# Patient Record
Sex: FEMALE | Race: WHITE | NOT HISPANIC OR LATINO | Employment: UNEMPLOYED | URBAN - METROPOLITAN AREA
[De-identification: names, ages, dates, MRNs, and addresses within clinical notes are randomized per-mention and may not be internally consistent; named-entity substitution may affect disease eponyms.]

---

## 2018-07-31 RX ORDER — IBUPROFEN 200 MG
200 TABLET ORAL EVERY 6 HOURS PRN
COMMUNITY
End: 2023-03-17 | Stop reason: HOSPADM

## 2018-07-31 RX ORDER — LAMOTRIGINE 100 MG/1
100 TABLET ORAL 2 TIMES DAILY
Status: ON HOLD | COMMUNITY

## 2018-07-31 RX ORDER — ACETAMINOPHEN 325 MG/1
650 TABLET ORAL EVERY 6 HOURS PRN
COMMUNITY
End: 2023-03-17 | Stop reason: HOSPADM

## 2018-08-23 ENCOUNTER — OFFICE VISIT (OUTPATIENT)
Dept: NEUROSURGERY | Facility: CLINIC | Age: 53
End: 2018-08-23

## 2018-08-23 VITALS
WEIGHT: 122 LBS | BODY MASS INDEX: 21.62 KG/M2 | RESPIRATION RATE: 18 BRPM | HEART RATE: 73 BPM | HEIGHT: 63 IN | SYSTOLIC BLOOD PRESSURE: 148 MMHG | DIASTOLIC BLOOD PRESSURE: 90 MMHG

## 2018-08-23 DIAGNOSIS — M54.9 UPPER BACK PAIN: ICD-10-CM

## 2018-08-23 DIAGNOSIS — G89.29 NECK PAIN, CHRONIC: Primary | ICD-10-CM

## 2018-08-23 DIAGNOSIS — M54.2 NECK PAIN, CHRONIC: Primary | ICD-10-CM

## 2018-08-23 PROCEDURE — 99205 OFFICE O/P NEW HI 60 MIN: CPT | Performed by: NURSE PRACTITIONER

## 2018-08-23 NOTE — PROGRESS NOTES
"Subjective   Patient ID: Louise Stoddard is a 53 y.o. female is being seen for consultation today at the request of KASIA Martines for neck pain s/p C5/6 C6/7 ACDF 11/06/2013. Patient presents unaccompanied.    History of Present Illness     She presents to the office today at the request of her primary care provider for neck pain.  She is status post C5 6 and C6 7 ACDF with C6 corpectomy in November 2013 with Dr. Tejeda.  She has had a CT imaging of the cervical spine in August 2016.    Today, she reports that she has had long-standing mid and upper back pain that started shortly after her neck surgery in 2013.  She was admitted to [facility] in May for pyelonephritis and sepsis.  She reports that she had a right IJ central line placed that caused her to have significant neck pain for a few months following hospital discharge.  Just recently, the right-sided neck pain has started to improve.  She states that she was told by Dr. Tejeda after her neck surgery that she would potentially need more surgery secondary to significant arthritis throughout her cervical spine and upper back.  Due to the ongoing mid and upper back pain that she has had, she is concerned that this is related to the arthritis that he previously addressed.    She reports that pain is constant and on average is between a 7 to an 8 out of 10.  She is hesitant to try physical therapy as she is concerned that it will make her symptoms worse.  Pain is around the bra line and radiates bilaterally.  It does not radiate anteriorly to her chest.  She has very mild chronic weakness in the right arm and has been present since her neck surgery.  She denies any pain, numbness or tingling in her other extremities.  She continues to feel \"weak all over\" following prolonged hospitalization a few months ago.    Her biggest concern is that prior to being hospitalized for sepsis, she had a fall at home causing her to hit her head on her dining room " "table.  Following that, she has had some worsening neck discomfort and she is afraid that this has worsened any underlying arthritis or stenosis in her neck.    She treats the pain with over-the-counter anti-inflammatories.  She states it is starting to get debilitating to the point where she has difficulty lifting her young grandchildren.    She presents unaccompanied.      /90 (BP Location: Right arm, Patient Position: Sitting)   Pulse 73   Resp 18   Ht 158.8 cm (62.5\")   Wt 55.3 kg (122 lb)   BMI 21.96 kg/m²     The following portions of the patient's history were reviewed and updated as appropriate: allergies, current medications, past family history, past medical history, past social history, past surgical history and problem list.    Review of Systems   Constitutional: Positive for chills (occ'l). Negative for fever.   HENT: Negative for trouble swallowing.    Eyes: Negative for visual disturbance.   Respiratory: Negative for cough, shortness of breath and wheezing.    Cardiovascular: Negative for chest pain and palpitations.   Gastrointestinal: Negative for abdominal pain, nausea and vomiting.   Genitourinary: Positive for enuresis (stress incontinence, unchanged). Negative for difficulty urinating.   Musculoskeletal: Positive for back pain, gait problem and neck pain.        Denies arm or leg pain   Skin: Negative for rash.   Neurological: Positive for weakness (right side). Negative for numbness.   Psychiatric/Behavioral: Positive for sleep disturbance.       Objective   Physical Exam   Constitutional: She is oriented to person, place, and time. She appears well-developed and well-nourished. She is cooperative.   Very pleasant, middle-aged, petite statured female   HENT:   Head: Atraumatic.   Eyes: EOM are normal.   Neck: Neck supple.   Cardiovascular: Intact distal pulses.    Pulmonary/Chest: Effort normal.   Abdominal: Soft.   Musculoskeletal: Normal range of motion. She exhibits tenderness " (Very tender to minimal palpation bilateral paraspinal upper thoracic spine just above the brow line, also tenderness bilateral lower neck and upper back). She exhibits no edema or deformity.        Cervical back: She exhibits tenderness and pain. She exhibits normal range of motion.        Thoracic back: She exhibits tenderness and pain. She exhibits normal range of motion.   Strength equal bilateral upper and lower extremities, normal muscle tone and bulk  Full cervical range of motion, pain with extension   Neurological: She is alert and oriented to person, place, and time. She has normal strength. She displays normal reflexes. No cranial nerve deficit or sensory deficit. She exhibits normal muscle tone. Coordination and gait normal. GCS eye subscore is 4. GCS verbal subscore is 5. GCS motor subscore is 6.   Reflex Scores:       Tricep reflexes are 2+ on the right side and 2+ on the left side.       Bicep reflexes are 2+ on the right side and 2+ on the left side.       Brachioradialis reflexes are 2+ on the right side and 2+ on the left side.       Patellar reflexes are 2+ on the right side and 2+ on the left side.       Achilles reflexes are 2+ on the right side and 2+ on the left side.  Sensory intact to soft touch, temperature and vibration bilateral upper and lower extremities  Normal proprioception throughout  Gait is stable and upright  Able to heel and toe walk, able to tandem   normal deep tendon reflexes  Negative Esquivel's, negative clonus   Skin: Skin is warm and dry.   Psychiatric: She has a normal mood and affect. Her behavior is normal. Thought content normal.   Vitals reviewed.    Neurologic Exam     Mental Status   Oriented to person, place, and time.     Cranial Nerves     CN III, IV, VI   Extraocular motions are normal.     Motor Exam     Strength   Strength 5/5 throughout.     Gait, Coordination, and Reflexes     Reflexes   Right brachioradialis: 2+  Left brachioradialis: 2+  Right biceps:  2+  Left biceps: 2+  Right triceps: 2+  Left triceps: 2+  Right patellar: 2+  Left patellar: 2+  Right achilles: 2+  Left achilles: 2+      Assessment/Plan   Independent Review of Radiographic Studies:    CT cervical imaging from [facility] dated August 27, 2016 reveals straightening have the normal lordosis with degenerative changes seen at multiple levels.    Prior hospital records reviewed      Medical Decision Making:    She presents the office today for further evaluation of chronic mid and upper back pain as well as neck pain.  Exam as noted above, no red flags.  She is a prior patient of Dr. Tejeda and is status post 2 level cervical decompression and fusion in 2013.  At that time, she had severe stenosis.  She had a fall a couple of months ago prior to being hospitalized for sepsis stating that she had her head on a table.  Following this episode, her neck and upper back pain have worsened.  She reports that thoracic pain has been present for a few years that has progressively worsened.    Thoracic CT imaging from [facility] reveals some degenerative changes at multiple levels.  Given her history of severe cervical stenosis as well as her reports acute on chronic neck and upper back pain, I have ordered MRI imaging of the thoracic and cervical spine for further evaluation.  I've also ordered physical therapy to help work out any of her pain that may be muscular in origin.  She was given no new medications today.  We will plan on seeing her back in the office once imaging studies have been completed.  She is requesting to have this done here at [facility].    Plan: MRI imaging of the cervical and thoracic spine, referral to physical therapy, return to office following  Louise was seen today for neck pain.    Diagnoses and all orders for this visit:    Neck pain, chronic  -     MRI Cervical Spine With & Without Contrast; Future  -     MRI Thoracic Spine Without Contrast;  Future  -     Ambulatory Referral to Physical Therapy Evaluate and treat; Stretching, ROM, Strengthening; Feather weight bearing    Upper back pain  -     MRI Cervical Spine With & Without Contrast; Future  -     MRI Thoracic Spine Without Contrast; Future  -     Ambulatory Referral to Physical Therapy Evaluate and treat; Stretching, ROM, Strengthening; Feather weight bearing      Return in about 4 weeks (around 9/20/2018).

## 2018-08-28 ENCOUNTER — APPOINTMENT (OUTPATIENT)
Dept: MRI IMAGING | Facility: HOSPITAL | Age: 53
End: 2018-08-28

## 2018-09-04 ENCOUNTER — APPOINTMENT (OUTPATIENT)
Dept: MRI IMAGING | Facility: HOSPITAL | Age: 53
End: 2018-09-04

## 2018-10-11 ENCOUNTER — TELEPHONE (OUTPATIENT)
Dept: NEUROSURGERY | Facility: CLINIC | Age: 53
End: 2018-10-11

## 2018-10-11 NOTE — TELEPHONE ENCOUNTER
Patient was referred back to our office for neck pain s/p ACDF with DELLA 11/6/13.  At OV with SUGAR 8/23 patient also reported mid/upper back pain that began shortly following that surgery. States DELLA told her in 2013 she may need further surgery secondary to significant arthritis.  States thoracic pain present for a few years, progressively worsening. D/T history of severe cervical stenosis and acute/chronic neck and back pain, LB ordered MRI cerv/thor as well as PT that patient wished to have done at [facility].  Patient had been scheduled for MRI 8/28 and 9/4 but cancelled both (one because of issues with insurance). They have attempted to reach her multiple times regarding scheduling this imaging but it has not been completed. Spoke with rehab dept at [facility] who states that they have never been seen patient for PT.     Will send to KK to determine what she would like done.

## 2018-10-11 NOTE — TELEPHONE ENCOUNTER
Patient was seen on 8/23 by LB for neck pain. Pt was suppose to return in 4 wks with an MRI.        The patient was out of town for a while. I contacted her and she was going to schedule her MRI but scheduling still has not been able to reach her. I called her again today and left a message for her to call us.  Please advise

## 2018-10-11 NOTE — TELEPHONE ENCOUNTER
There were no red flags per Bety's note. Patient can call for follow up if she desires with PT and MRI prior.

## 2021-09-06 ENCOUNTER — HOSPITAL ENCOUNTER (EMERGENCY)
Facility: HOSPITAL | Age: 56
End: 2021-09-06

## 2023-02-20 ENCOUNTER — APPOINTMENT (OUTPATIENT)
Dept: CT IMAGING | Facility: HOSPITAL | Age: 58
DRG: 163 | End: 2023-02-20
Payer: COMMERCIAL

## 2023-02-20 ENCOUNTER — HOSPITAL ENCOUNTER (INPATIENT)
Facility: HOSPITAL | Age: 58
LOS: 22 days | DRG: 163 | End: 2023-03-17
Attending: EMERGENCY MEDICINE | Admitting: STUDENT IN AN ORGANIZED HEALTH CARE EDUCATION/TRAINING PROGRAM
Payer: COMMERCIAL

## 2023-02-20 ENCOUNTER — APPOINTMENT (OUTPATIENT)
Dept: MRI IMAGING | Facility: HOSPITAL | Age: 58
DRG: 163 | End: 2023-02-20
Payer: COMMERCIAL

## 2023-02-20 DIAGNOSIS — R26.81 GAIT INSTABILITY: ICD-10-CM

## 2023-02-20 DIAGNOSIS — R20.2 PARESTHESIA: Primary | ICD-10-CM

## 2023-02-20 DIAGNOSIS — J86.9 EMPYEMA OF PLEURA: ICD-10-CM

## 2023-02-20 DIAGNOSIS — R20.0 EXTREMITY NUMBNESS: ICD-10-CM

## 2023-02-20 LAB
ALBUMIN SERPL-MCNC: 4.1 G/DL (ref 3.5–5.2)
ALBUMIN/GLOB SERPL: 1.4 G/DL
ALP SERPL-CCNC: 165 U/L (ref 39–117)
ALT SERPL W P-5'-P-CCNC: 21 U/L (ref 1–33)
ANION GAP SERPL CALCULATED.3IONS-SCNC: 16.7 MMOL/L (ref 5–15)
AST SERPL-CCNC: 72 U/L (ref 1–32)
BACTERIA UR QL AUTO: ABNORMAL /HPF
BASOPHILS # BLD AUTO: 0.11 10*3/MM3 (ref 0–0.2)
BASOPHILS NFR BLD AUTO: 1.1 % (ref 0–1.5)
BILIRUB SERPL-MCNC: 0.7 MG/DL (ref 0–1.2)
BILIRUB UR QL STRIP: ABNORMAL
BUN SERPL-MCNC: 7 MG/DL (ref 6–20)
BUN/CREAT SERPL: 10.1 (ref 7–25)
CALCIUM SPEC-SCNC: 10.3 MG/DL (ref 8.6–10.5)
CHLORIDE SERPL-SCNC: 99 MMOL/L (ref 98–107)
CK SERPL-CCNC: 32 U/L (ref 20–180)
CLARITY UR: ABNORMAL
CO2 SERPL-SCNC: 23.3 MMOL/L (ref 22–29)
COLOR UR: ABNORMAL
CREAT SERPL-MCNC: 0.69 MG/DL (ref 0.57–1)
DEPRECATED RDW RBC AUTO: 52.1 FL (ref 37–54)
EGFRCR SERPLBLD CKD-EPI 2021: 100.1 ML/MIN/1.73
EOSINOPHIL # BLD AUTO: 0.06 10*3/MM3 (ref 0–0.4)
EOSINOPHIL NFR BLD AUTO: 0.6 % (ref 0.3–6.2)
ERYTHROCYTE [DISTWIDTH] IN BLOOD BY AUTOMATED COUNT: 14.4 % (ref 12.3–15.4)
GLOBULIN UR ELPH-MCNC: 2.9 GM/DL
GLUCOSE SERPL-MCNC: 100 MG/DL (ref 65–99)
GLUCOSE UR STRIP-MCNC: NEGATIVE MG/DL
HBA1C MFR BLD: 5.1 % (ref 4.8–5.6)
HCT VFR BLD AUTO: 36 % (ref 34–46.6)
HGB BLD-MCNC: 12.3 G/DL (ref 12–15.9)
HGB UR QL STRIP.AUTO: NEGATIVE
HYALINE CASTS UR QL AUTO: ABNORMAL /LPF
IMM GRANULOCYTES # BLD AUTO: 0.13 10*3/MM3 (ref 0–0.05)
IMM GRANULOCYTES NFR BLD AUTO: 1.3 % (ref 0–0.5)
KETONES UR QL STRIP: ABNORMAL
LEUKOCYTE ESTERASE UR QL STRIP.AUTO: ABNORMAL
LYMPHOCYTES # BLD AUTO: 3 10*3/MM3 (ref 0.7–3.1)
LYMPHOCYTES NFR BLD AUTO: 29.6 % (ref 19.6–45.3)
MAGNESIUM SERPL-MCNC: 1.4 MG/DL (ref 1.6–2.6)
MCH RBC QN AUTO: 34.6 PG (ref 26.6–33)
MCHC RBC AUTO-ENTMCNC: 34.2 G/DL (ref 31.5–35.7)
MCV RBC AUTO: 101.1 FL (ref 79–97)
MONOCYTES # BLD AUTO: 1.09 10*3/MM3 (ref 0.1–0.9)
MONOCYTES NFR BLD AUTO: 10.8 % (ref 5–12)
NEUTROPHILS NFR BLD AUTO: 5.74 10*3/MM3 (ref 1.7–7)
NEUTROPHILS NFR BLD AUTO: 56.6 % (ref 42.7–76)
NITRITE UR QL STRIP: NEGATIVE
NRBC BLD AUTO-RTO: 0.2 /100 WBC (ref 0–0.2)
PH UR STRIP.AUTO: 5.5 [PH] (ref 5–8)
PHOSPHATE SERPL-MCNC: 3.1 MG/DL (ref 2.5–4.5)
PLATELET # BLD AUTO: 389 10*3/MM3 (ref 140–450)
PMV BLD AUTO: 10.6 FL (ref 6–12)
POTASSIUM SERPL-SCNC: 3.1 MMOL/L (ref 3.5–5.2)
PROT SERPL-MCNC: 7 G/DL (ref 6–8.5)
PROT UR QL STRIP: ABNORMAL
RBC # BLD AUTO: 3.56 10*6/MM3 (ref 3.77–5.28)
RBC # UR STRIP: ABNORMAL /HPF
REF LAB TEST METHOD: ABNORMAL
SODIUM SERPL-SCNC: 139 MMOL/L (ref 136–145)
SP GR UR STRIP: >=1.03 (ref 1–1.03)
SQUAMOUS #/AREA URNS HPF: ABNORMAL /HPF
UROBILINOGEN UR QL STRIP: ABNORMAL
VIT B12 BLD-MCNC: 534 PG/ML (ref 211–946)
WBC # UR STRIP: ABNORMAL /HPF
WBC NRBC COR # BLD: 10.13 10*3/MM3 (ref 3.4–10.8)

## 2023-02-20 PROCEDURE — 80053 COMPREHEN METABOLIC PANEL: CPT | Performed by: PHYSICIAN ASSISTANT

## 2023-02-20 PROCEDURE — 83036 HEMOGLOBIN GLYCOSYLATED A1C: CPT | Performed by: NURSE PRACTITIONER

## 2023-02-20 PROCEDURE — G0378 HOSPITAL OBSERVATION PER HR: HCPCS

## 2023-02-20 PROCEDURE — 72148 MRI LUMBAR SPINE W/O DYE: CPT

## 2023-02-20 PROCEDURE — 25010000002 LORAZEPAM PER 2 MG: Performed by: NURSE PRACTITIONER

## 2023-02-20 PROCEDURE — 83735 ASSAY OF MAGNESIUM: CPT | Performed by: PHYSICIAN ASSISTANT

## 2023-02-20 PROCEDURE — 82550 ASSAY OF CK (CPK): CPT | Performed by: PHYSICIAN ASSISTANT

## 2023-02-20 PROCEDURE — 70450 CT HEAD/BRAIN W/O DYE: CPT

## 2023-02-20 PROCEDURE — 82607 VITAMIN B-12: CPT | Performed by: NURSE PRACTITIONER

## 2023-02-20 PROCEDURE — 84100 ASSAY OF PHOSPHORUS: CPT | Performed by: PHYSICIAN ASSISTANT

## 2023-02-20 PROCEDURE — 99285 EMERGENCY DEPT VISIT HI MDM: CPT

## 2023-02-20 PROCEDURE — 85025 COMPLETE CBC W/AUTO DIFF WBC: CPT | Performed by: PHYSICIAN ASSISTANT

## 2023-02-20 PROCEDURE — 81001 URINALYSIS AUTO W/SCOPE: CPT | Performed by: PHYSICIAN ASSISTANT

## 2023-02-20 RX ORDER — POTASSIUM CHLORIDE 750 MG/1
40 TABLET, FILM COATED, EXTENDED RELEASE ORAL EVERY 4 HOURS
Status: COMPLETED | OUTPATIENT
Start: 2023-02-20 | End: 2023-02-20

## 2023-02-20 RX ORDER — SODIUM CHLORIDE 0.9 % (FLUSH) 0.9 %
10 SYRINGE (ML) INJECTION EVERY 12 HOURS SCHEDULED
Status: DISCONTINUED | OUTPATIENT
Start: 2023-02-20 | End: 2023-03-17

## 2023-02-20 RX ORDER — UBIDECARENONE 75 MG
50 CAPSULE ORAL DAILY
Status: DISCONTINUED | OUTPATIENT
Start: 2023-02-21 | End: 2023-03-04

## 2023-02-20 RX ORDER — LAMOTRIGINE 100 MG/1
100 TABLET ORAL DAILY
Status: DISCONTINUED | OUTPATIENT
Start: 2023-02-21 | End: 2023-02-23

## 2023-02-20 RX ORDER — MAGNESIUM SULFATE HEPTAHYDRATE 40 MG/ML
2 INJECTION, SOLUTION INTRAVENOUS AS NEEDED
Status: DISCONTINUED | OUTPATIENT
Start: 2023-02-20 | End: 2023-03-12

## 2023-02-20 RX ORDER — UBIDECARENONE 75 MG
50 CAPSULE ORAL DAILY
Status: ON HOLD | COMMUNITY

## 2023-02-20 RX ORDER — MAGNESIUM SULFATE HEPTAHYDRATE 40 MG/ML
4 INJECTION, SOLUTION INTRAVENOUS AS NEEDED
Status: DISCONTINUED | OUTPATIENT
Start: 2023-02-20 | End: 2023-03-12

## 2023-02-20 RX ORDER — SODIUM CHLORIDE 9 MG/ML
40 INJECTION, SOLUTION INTRAVENOUS AS NEEDED
Status: DISCONTINUED | OUTPATIENT
Start: 2023-02-20 | End: 2023-03-17

## 2023-02-20 RX ORDER — SODIUM CHLORIDE 0.9 % (FLUSH) 0.9 %
10 SYRINGE (ML) INJECTION AS NEEDED
Status: DISCONTINUED | OUTPATIENT
Start: 2023-02-20 | End: 2023-03-12

## 2023-02-20 RX ORDER — LORAZEPAM 2 MG/ML
1 INJECTION INTRAMUSCULAR ONCE
Status: COMPLETED | OUTPATIENT
Start: 2023-02-20 | End: 2023-02-20

## 2023-02-20 RX ORDER — NITROFURANTOIN 25; 75 MG/1; MG/1
800 CAPSULE ORAL 2 TIMES DAILY
COMMUNITY
Start: 2023-02-17 | End: 2023-03-17 | Stop reason: HOSPADM

## 2023-02-20 RX ORDER — FOLIC ACID 1 MG/1
1 TABLET ORAL DAILY
Status: DISCONTINUED | OUTPATIENT
Start: 2023-02-21 | End: 2023-03-17 | Stop reason: HOSPADM

## 2023-02-20 RX ORDER — SODIUM CHLORIDE 0.9 % (FLUSH) 0.9 %
10 SYRINGE (ML) INJECTION AS NEEDED
Status: DISCONTINUED | OUTPATIENT
Start: 2023-02-20 | End: 2023-03-17

## 2023-02-20 RX ORDER — FOLIC ACID 1 MG/1
1 TABLET ORAL DAILY
Status: ON HOLD | COMMUNITY
Start: 2022-11-29

## 2023-02-20 RX ADMIN — POTASSIUM CHLORIDE 40 MEQ: 750 TABLET, EXTENDED RELEASE ORAL at 21:28

## 2023-02-20 RX ADMIN — Medication 10 ML: at 21:28

## 2023-02-20 RX ADMIN — POTASSIUM CHLORIDE 40 MEQ: 750 TABLET, EXTENDED RELEASE ORAL at 23:49

## 2023-02-20 RX ADMIN — LORAZEPAM 1 MG: 2 INJECTION INTRAMUSCULAR; INTRAVENOUS at 22:12

## 2023-02-21 ENCOUNTER — APPOINTMENT (OUTPATIENT)
Dept: MRI IMAGING | Facility: HOSPITAL | Age: 58
DRG: 163 | End: 2023-02-21
Payer: COMMERCIAL

## 2023-02-21 PROBLEM — G95.9 CERVICAL MYELOPATHY: Status: ACTIVE | Noted: 2023-02-21

## 2023-02-21 LAB
ANION GAP SERPL CALCULATED.3IONS-SCNC: 8.3 MMOL/L (ref 5–15)
BUN SERPL-MCNC: 8 MG/DL (ref 6–20)
BUN/CREAT SERPL: 12.7 (ref 7–25)
CALCIUM SPEC-SCNC: 9.3 MG/DL (ref 8.6–10.5)
CHLORIDE SERPL-SCNC: 106 MMOL/L (ref 98–107)
CO2 SERPL-SCNC: 24.7 MMOL/L (ref 22–29)
CREAT SERPL-MCNC: 0.63 MG/DL (ref 0.57–1)
DEPRECATED RDW RBC AUTO: 49.8 FL (ref 37–54)
EGFRCR SERPLBLD CKD-EPI 2021: 102.3 ML/MIN/1.73
ERYTHROCYTE [DISTWIDTH] IN BLOOD BY AUTOMATED COUNT: 13.9 % (ref 12.3–15.4)
FOLATE SERPL-MCNC: >20 NG/ML (ref 4.78–24.2)
GLUCOSE SERPL-MCNC: 88 MG/DL (ref 65–99)
HCT VFR BLD AUTO: 28.2 % (ref 34–46.6)
HGB BLD-MCNC: 10 G/DL (ref 12–15.9)
MAGNESIUM SERPL-MCNC: 1.3 MG/DL (ref 1.6–2.6)
MCH RBC QN AUTO: 35 PG (ref 26.6–33)
MCHC RBC AUTO-ENTMCNC: 35.5 G/DL (ref 31.5–35.7)
MCV RBC AUTO: 98.6 FL (ref 79–97)
PLATELET # BLD AUTO: 305 10*3/MM3 (ref 140–450)
PMV BLD AUTO: 11 FL (ref 6–12)
POTASSIUM SERPL-SCNC: 4.3 MMOL/L (ref 3.5–5.2)
RBC # BLD AUTO: 2.86 10*6/MM3 (ref 3.77–5.28)
SODIUM SERPL-SCNC: 139 MMOL/L (ref 136–145)
WBC NRBC COR # BLD: 8.39 10*3/MM3 (ref 3.4–10.8)

## 2023-02-21 PROCEDURE — 85027 COMPLETE CBC AUTOMATED: CPT | Performed by: NURSE PRACTITIONER

## 2023-02-21 PROCEDURE — 72157 MRI CHEST SPINE W/O & W/DYE: CPT

## 2023-02-21 PROCEDURE — 83735 ASSAY OF MAGNESIUM: CPT | Performed by: PHYSICIAN ASSISTANT

## 2023-02-21 PROCEDURE — 99222 1ST HOSP IP/OBS MODERATE 55: CPT | Performed by: PSYCHIATRY & NEUROLOGY

## 2023-02-21 PROCEDURE — 0 METHYLPREDNISOLONE PER 125 MG: Performed by: PSYCHIATRY & NEUROLOGY

## 2023-02-21 PROCEDURE — G0378 HOSPITAL OBSERVATION PER HR: HCPCS

## 2023-02-21 PROCEDURE — 25010000002 MAGNESIUM SULFATE 2 GM/50ML SOLUTION: Performed by: NURSE PRACTITIONER

## 2023-02-21 PROCEDURE — 25010000002 LORAZEPAM PER 2 MG: Performed by: PSYCHIATRY & NEUROLOGY

## 2023-02-21 PROCEDURE — A9577 INJ MULTIHANCE: HCPCS | Performed by: EMERGENCY MEDICINE

## 2023-02-21 PROCEDURE — 82746 ASSAY OF FOLIC ACID SERUM: CPT | Performed by: PHYSICIAN ASSISTANT

## 2023-02-21 PROCEDURE — 80048 BASIC METABOLIC PNL TOTAL CA: CPT | Performed by: NURSE PRACTITIONER

## 2023-02-21 PROCEDURE — 72156 MRI NECK SPINE W/O & W/DYE: CPT

## 2023-02-21 PROCEDURE — 97530 THERAPEUTIC ACTIVITIES: CPT

## 2023-02-21 PROCEDURE — 97162 PT EVAL MOD COMPLEX 30 MIN: CPT

## 2023-02-21 PROCEDURE — 0 GADOBENATE DIMEGLUMINE 529 MG/ML SOLUTION: Performed by: EMERGENCY MEDICINE

## 2023-02-21 RX ORDER — POLYETHYLENE GLYCOL 3350 17 G/17G
17 POWDER, FOR SOLUTION ORAL DAILY PRN
Status: DISCONTINUED | OUTPATIENT
Start: 2023-02-21 | End: 2023-03-02

## 2023-02-21 RX ORDER — BISACODYL 5 MG/1
5 TABLET, DELAYED RELEASE ORAL DAILY PRN
Status: DISCONTINUED | OUTPATIENT
Start: 2023-02-21 | End: 2023-02-21

## 2023-02-21 RX ORDER — BISACODYL 10 MG
10 SUPPOSITORY, RECTAL RECTAL DAILY PRN
Status: DISCONTINUED | OUTPATIENT
Start: 2023-02-21 | End: 2023-03-02

## 2023-02-21 RX ORDER — AMOXICILLIN 250 MG
2 CAPSULE ORAL 2 TIMES DAILY
Status: DISCONTINUED | OUTPATIENT
Start: 2023-02-21 | End: 2023-03-09

## 2023-02-21 RX ORDER — POLYETHYLENE GLYCOL 3350 17 G/17G
17 POWDER, FOR SOLUTION ORAL DAILY PRN
Status: DISCONTINUED | OUTPATIENT
Start: 2023-02-21 | End: 2023-02-21

## 2023-02-21 RX ORDER — LORAZEPAM 2 MG/ML
1 INJECTION INTRAMUSCULAR EVERY 4 HOURS PRN
Status: DISCONTINUED | OUTPATIENT
Start: 2023-02-21 | End: 2023-02-21

## 2023-02-21 RX ORDER — AMOXICILLIN 250 MG
2 CAPSULE ORAL 2 TIMES DAILY
Status: DISCONTINUED | OUTPATIENT
Start: 2023-02-21 | End: 2023-02-21

## 2023-02-21 RX ORDER — CHOLECALCIFEROL (VITAMIN D3) 125 MCG
5 CAPSULE ORAL NIGHTLY PRN
Status: DISCONTINUED | OUTPATIENT
Start: 2023-02-21 | End: 2023-03-17 | Stop reason: HOSPADM

## 2023-02-21 RX ORDER — LORAZEPAM 2 MG/ML
1 INJECTION INTRAMUSCULAR ONCE
Status: COMPLETED | OUTPATIENT
Start: 2023-02-21 | End: 2023-02-21

## 2023-02-21 RX ORDER — BISACODYL 10 MG
10 SUPPOSITORY, RECTAL RECTAL DAILY PRN
Status: DISCONTINUED | OUTPATIENT
Start: 2023-02-21 | End: 2023-02-21

## 2023-02-21 RX ORDER — BISACODYL 5 MG/1
5 TABLET, DELAYED RELEASE ORAL DAILY PRN
Status: DISCONTINUED | OUTPATIENT
Start: 2023-02-21 | End: 2023-03-02

## 2023-02-21 RX ORDER — LORAZEPAM 2 MG/ML
1 INJECTION INTRAMUSCULAR EVERY 4 HOURS PRN
Status: DISCONTINUED | OUTPATIENT
Start: 2023-02-21 | End: 2023-02-27

## 2023-02-21 RX ADMIN — LORAZEPAM 1 MG: 2 INJECTION INTRAMUSCULAR; INTRAVENOUS at 15:51

## 2023-02-21 RX ADMIN — GADOBENATE DIMEGLUMINE 11 ML: 529 INJECTION, SOLUTION INTRAVENOUS at 17:21

## 2023-02-21 RX ADMIN — MAGNESIUM SULFATE 2 G: 2 INJECTION INTRAVENOUS at 08:21

## 2023-02-21 RX ADMIN — APIXABAN 5 MG: 5 TABLET, FILM COATED ORAL at 08:21

## 2023-02-21 RX ADMIN — MAGNESIUM SULFATE 2 G: 2 INJECTION INTRAVENOUS at 10:54

## 2023-02-21 RX ADMIN — Medication 10 ML: at 08:28

## 2023-02-21 RX ADMIN — FOLIC ACID 1 MG: 1 TABLET ORAL at 08:21

## 2023-02-21 RX ADMIN — Medication 10 ML: at 20:12

## 2023-02-21 RX ADMIN — DOCUSATE SODIUM 50MG AND SENNOSIDES 8.6MG 2 TABLET: 8.6; 5 TABLET, FILM COATED ORAL at 20:12

## 2023-02-21 RX ADMIN — LAMOTRIGINE 100 MG: 100 TABLET ORAL at 08:21

## 2023-02-21 RX ADMIN — MAGNESIUM SULFATE 2 G: 2 INJECTION INTRAVENOUS at 06:14

## 2023-02-21 RX ADMIN — METHYLPREDNISOLONE SODIUM SUCCINATE 1 G: 1 INJECTION, POWDER, LYOPHILIZED, FOR SOLUTION INTRAMUSCULAR; INTRAVENOUS at 12:53

## 2023-02-22 ENCOUNTER — APPOINTMENT (OUTPATIENT)
Dept: GENERAL RADIOLOGY | Facility: HOSPITAL | Age: 58
DRG: 163 | End: 2023-02-22
Payer: COMMERCIAL

## 2023-02-22 PROBLEM — Z79.01 CHRONIC ANTICOAGULATION: Status: ACTIVE | Noted: 2023-02-22

## 2023-02-22 PROBLEM — R29.898 LOWER EXTREMITY WEAKNESS: Status: ACTIVE | Noted: 2023-02-22

## 2023-02-22 PROBLEM — Z86.718 HISTORY OF BLOOD CLOTS: Status: ACTIVE | Noted: 2023-02-22

## 2023-02-22 LAB
ANION GAP SERPL CALCULATED.3IONS-SCNC: 16.6 MMOL/L (ref 5–15)
BASOPHILS # BLD AUTO: 0.01 10*3/MM3 (ref 0–0.2)
BASOPHILS NFR BLD AUTO: 0.1 % (ref 0–1.5)
BUN SERPL-MCNC: 9 MG/DL (ref 6–20)
BUN/CREAT SERPL: 12.7 (ref 7–25)
CALCIUM SPEC-SCNC: 9.1 MG/DL (ref 8.6–10.5)
CHLORIDE SERPL-SCNC: 101 MMOL/L (ref 98–107)
CO2 SERPL-SCNC: 16.4 MMOL/L (ref 22–29)
CREAT SERPL-MCNC: 0.71 MG/DL (ref 0.57–1)
DEPRECATED RDW RBC AUTO: 51.3 FL (ref 37–54)
EGFRCR SERPLBLD CKD-EPI 2021: 98.1 ML/MIN/1.73
EOSINOPHIL # BLD AUTO: 0 10*3/MM3 (ref 0–0.4)
EOSINOPHIL NFR BLD AUTO: 0 % (ref 0.3–6.2)
ERYTHROCYTE [DISTWIDTH] IN BLOOD BY AUTOMATED COUNT: 14.1 % (ref 12.3–15.4)
GLUCOSE SERPL-MCNC: 291 MG/DL (ref 65–99)
HCT VFR BLD AUTO: 30.4 % (ref 34–46.6)
HGB BLD-MCNC: 10.7 G/DL (ref 12–15.9)
IMM GRANULOCYTES # BLD AUTO: 0.17 10*3/MM3 (ref 0–0.05)
IMM GRANULOCYTES NFR BLD AUTO: 1.2 % (ref 0–0.5)
LYMPHOCYTES # BLD AUTO: 0.72 10*3/MM3 (ref 0.7–3.1)
LYMPHOCYTES NFR BLD AUTO: 4.9 % (ref 19.6–45.3)
MAGNESIUM SERPL-MCNC: 2.3 MG/DL (ref 1.6–2.6)
MCH RBC QN AUTO: 35.7 PG (ref 26.6–33)
MCHC RBC AUTO-ENTMCNC: 35.2 G/DL (ref 31.5–35.7)
MCV RBC AUTO: 101.3 FL (ref 79–97)
MONOCYTES # BLD AUTO: 0.14 10*3/MM3 (ref 0.1–0.9)
MONOCYTES NFR BLD AUTO: 1 % (ref 5–12)
NEUTROPHILS NFR BLD AUTO: 13.52 10*3/MM3 (ref 1.7–7)
NEUTROPHILS NFR BLD AUTO: 92.8 % (ref 42.7–76)
NRBC BLD AUTO-RTO: 0.1 /100 WBC (ref 0–0.2)
PLATELET # BLD AUTO: 275 10*3/MM3 (ref 140–450)
PMV BLD AUTO: 10.6 FL (ref 6–12)
POTASSIUM SERPL-SCNC: 4.5 MMOL/L (ref 3.5–5.2)
RBC # BLD AUTO: 3 10*6/MM3 (ref 3.77–5.28)
SODIUM SERPL-SCNC: 134 MMOL/L (ref 136–145)
WBC NRBC COR # BLD: 14.56 10*3/MM3 (ref 3.4–10.8)

## 2023-02-22 PROCEDURE — 99231 SBSQ HOSP IP/OBS SF/LOW 25: CPT | Performed by: PSYCHIATRY & NEUROLOGY

## 2023-02-22 PROCEDURE — 92611 MOTION FLUOROSCOPY/SWALLOW: CPT

## 2023-02-22 PROCEDURE — 99204 OFFICE O/P NEW MOD 45 MIN: CPT | Performed by: NURSE PRACTITIONER

## 2023-02-22 PROCEDURE — 0 METHYLPREDNISOLONE PER 125 MG: Performed by: PSYCHIATRY & NEUROLOGY

## 2023-02-22 PROCEDURE — G0378 HOSPITAL OBSERVATION PER HR: HCPCS

## 2023-02-22 PROCEDURE — 25010000002 ENOXAPARIN PER 10 MG: Performed by: NURSE PRACTITIONER

## 2023-02-22 PROCEDURE — 85025 COMPLETE CBC W/AUTO DIFF WBC: CPT | Performed by: PHYSICIAN ASSISTANT

## 2023-02-22 PROCEDURE — 80048 BASIC METABOLIC PNL TOTAL CA: CPT | Performed by: PHYSICIAN ASSISTANT

## 2023-02-22 PROCEDURE — 74230 X-RAY XM SWLNG FUNCJ C+: CPT

## 2023-02-22 PROCEDURE — 25010000002 LORAZEPAM PER 2 MG: Performed by: PSYCHIATRY & NEUROLOGY

## 2023-02-22 PROCEDURE — 97530 THERAPEUTIC ACTIVITIES: CPT

## 2023-02-22 PROCEDURE — 92610 EVALUATE SWALLOWING FUNCTION: CPT

## 2023-02-22 PROCEDURE — 83735 ASSAY OF MAGNESIUM: CPT | Performed by: NURSE PRACTITIONER

## 2023-02-22 RX ORDER — POTASSIUM CHLORIDE 7.45 MG/ML
10 INJECTION INTRAVENOUS
Status: DISCONTINUED | OUTPATIENT
Start: 2023-02-22 | End: 2023-03-17

## 2023-02-22 RX ORDER — ENOXAPARIN SODIUM 100 MG/ML
1 INJECTION SUBCUTANEOUS EVERY 12 HOURS
Status: DISCONTINUED | OUTPATIENT
Start: 2023-02-22 | End: 2023-02-23

## 2023-02-22 RX ORDER — POTASSIUM CHLORIDE 750 MG/1
40 TABLET, FILM COATED, EXTENDED RELEASE ORAL AS NEEDED
Status: DISCONTINUED | OUTPATIENT
Start: 2023-02-22 | End: 2023-03-17 | Stop reason: HOSPADM

## 2023-02-22 RX ORDER — POTASSIUM CHLORIDE 1.5 G/1.77G
40 POWDER, FOR SOLUTION ORAL AS NEEDED
Status: DISCONTINUED | OUTPATIENT
Start: 2023-02-22 | End: 2023-03-17 | Stop reason: HOSPADM

## 2023-02-22 RX ADMIN — Medication 10 ML: at 09:30

## 2023-02-22 RX ADMIN — LORAZEPAM 1 MG: 2 INJECTION INTRAMUSCULAR; INTRAVENOUS at 12:57

## 2023-02-22 RX ADMIN — BARIUM SULFATE 55 ML: 0.81 POWDER, FOR SUSPENSION ORAL at 13:56

## 2023-02-22 RX ADMIN — Medication 5 MG: at 00:04

## 2023-02-22 RX ADMIN — ENOXAPARIN SODIUM 60 MG: 100 INJECTION SUBCUTANEOUS at 18:33

## 2023-02-22 RX ADMIN — BARIUM SULFATE 50 ML: 400 SUSPENSION ORAL at 13:56

## 2023-02-22 RX ADMIN — LORAZEPAM 1 MG: 2 INJECTION INTRAMUSCULAR; INTRAVENOUS at 22:42

## 2023-02-22 RX ADMIN — Medication 10 ML: at 20:54

## 2023-02-22 RX ADMIN — BARIUM SULFATE 4 ML: 980 POWDER, FOR SUSPENSION ORAL at 13:56

## 2023-02-22 RX ADMIN — LAMOTRIGINE 100 MG: 100 TABLET ORAL at 09:29

## 2023-02-22 RX ADMIN — Medication 50 MCG: at 10:07

## 2023-02-22 RX ADMIN — LORAZEPAM 1 MG: 2 INJECTION INTRAMUSCULAR; INTRAVENOUS at 04:12

## 2023-02-22 RX ADMIN — DOCUSATE SODIUM 50MG AND SENNOSIDES 8.6MG 2 TABLET: 8.6; 5 TABLET, FILM COATED ORAL at 20:54

## 2023-02-22 RX ADMIN — FOLIC ACID 1 MG: 1 TABLET ORAL at 09:29

## 2023-02-22 RX ADMIN — BARIUM SULFATE 1 TEASPOON(S): 0.6 CREAM ORAL at 13:56

## 2023-02-22 RX ADMIN — METHYLPREDNISOLONE SODIUM SUCCINATE 1 G: 1 INJECTION, POWDER, LYOPHILIZED, FOR SOLUTION INTRAMUSCULAR; INTRAVENOUS at 05:44

## 2023-02-23 ENCOUNTER — APPOINTMENT (OUTPATIENT)
Dept: MRI IMAGING | Facility: HOSPITAL | Age: 58
DRG: 163 | End: 2023-02-23
Payer: COMMERCIAL

## 2023-02-23 LAB
ANION GAP SERPL CALCULATED.3IONS-SCNC: 10.7 MMOL/L (ref 5–15)
BASOPHILS # BLD AUTO: 0.02 10*3/MM3 (ref 0–0.2)
BASOPHILS NFR BLD AUTO: 0.1 % (ref 0–1.5)
BUN SERPL-MCNC: 14 MG/DL (ref 6–20)
BUN/CREAT SERPL: 20.6 (ref 7–25)
CALCIUM SPEC-SCNC: 8.8 MG/DL (ref 8.6–10.5)
CHLORIDE SERPL-SCNC: 106 MMOL/L (ref 98–107)
CO2 SERPL-SCNC: 21.3 MMOL/L (ref 22–29)
CREAT SERPL-MCNC: 0.68 MG/DL (ref 0.57–1)
DEPRECATED RDW RBC AUTO: 51 FL (ref 37–54)
EGFRCR SERPLBLD CKD-EPI 2021: 100.5 ML/MIN/1.73
EOSINOPHIL # BLD AUTO: 0 10*3/MM3 (ref 0–0.4)
EOSINOPHIL NFR BLD AUTO: 0 % (ref 0.3–6.2)
ERYTHROCYTE [DISTWIDTH] IN BLOOD BY AUTOMATED COUNT: 13.9 % (ref 12.3–15.4)
GLUCOSE SERPL-MCNC: 143 MG/DL (ref 65–99)
HCT VFR BLD AUTO: 28.2 % (ref 34–46.6)
HGB BLD-MCNC: 9.9 G/DL (ref 12–15.9)
IMM GRANULOCYTES # BLD AUTO: 0.19 10*3/MM3 (ref 0–0.05)
IMM GRANULOCYTES NFR BLD AUTO: 1.2 % (ref 0–0.5)
LYMPHOCYTES # BLD AUTO: 1.01 10*3/MM3 (ref 0.7–3.1)
LYMPHOCYTES NFR BLD AUTO: 6.1 % (ref 19.6–45.3)
MAGNESIUM SERPL-MCNC: 2 MG/DL (ref 1.6–2.6)
MCH RBC QN AUTO: 35.2 PG (ref 26.6–33)
MCHC RBC AUTO-ENTMCNC: 35.1 G/DL (ref 31.5–35.7)
MCV RBC AUTO: 100.4 FL (ref 79–97)
MONOCYTES # BLD AUTO: 0.64 10*3/MM3 (ref 0.1–0.9)
MONOCYTES NFR BLD AUTO: 3.9 % (ref 5–12)
NEUTROPHILS NFR BLD AUTO: 14.59 10*3/MM3 (ref 1.7–7)
NEUTROPHILS NFR BLD AUTO: 88.7 % (ref 42.7–76)
NRBC BLD AUTO-RTO: 0.1 /100 WBC (ref 0–0.2)
PHOSPHATE SERPL-MCNC: 3 MG/DL (ref 2.5–4.5)
PLATELET # BLD AUTO: 293 10*3/MM3 (ref 140–450)
PMV BLD AUTO: 11.1 FL (ref 6–12)
POTASSIUM SERPL-SCNC: 4.3 MMOL/L (ref 3.5–5.2)
RBC # BLD AUTO: 2.81 10*6/MM3 (ref 3.77–5.28)
SODIUM SERPL-SCNC: 138 MMOL/L (ref 136–145)
WBC NRBC COR # BLD: 16.45 10*3/MM3 (ref 3.4–10.8)

## 2023-02-23 PROCEDURE — 25010000002 ENOXAPARIN PER 10 MG: Performed by: NURSE PRACTITIONER

## 2023-02-23 PROCEDURE — 25010000002 LORAZEPAM PER 2 MG: Performed by: PSYCHIATRY & NEUROLOGY

## 2023-02-23 PROCEDURE — 99232 SBSQ HOSP IP/OBS MODERATE 35: CPT | Performed by: PSYCHIATRY & NEUROLOGY

## 2023-02-23 PROCEDURE — A9577 INJ MULTIHANCE: HCPCS | Performed by: STUDENT IN AN ORGANIZED HEALTH CARE EDUCATION/TRAINING PROGRAM

## 2023-02-23 PROCEDURE — 009U3ZX DRAINAGE OF SPINAL CANAL, PERCUTANEOUS APPROACH, DIAGNOSTIC: ICD-10-PCS | Performed by: RADIOLOGY

## 2023-02-23 PROCEDURE — 0 GADOBENATE DIMEGLUMINE 529 MG/ML SOLUTION: Performed by: STUDENT IN AN ORGANIZED HEALTH CARE EDUCATION/TRAINING PROGRAM

## 2023-02-23 PROCEDURE — 70553 MRI BRAIN STEM W/O & W/DYE: CPT

## 2023-02-23 PROCEDURE — 0 METHYLPREDNISOLONE PER 125 MG: Performed by: PSYCHIATRY & NEUROLOGY

## 2023-02-23 PROCEDURE — 97530 THERAPEUTIC ACTIVITIES: CPT

## 2023-02-23 PROCEDURE — 80048 BASIC METABOLIC PNL TOTAL CA: CPT | Performed by: STUDENT IN AN ORGANIZED HEALTH CARE EDUCATION/TRAINING PROGRAM

## 2023-02-23 PROCEDURE — 97116 GAIT TRAINING THERAPY: CPT

## 2023-02-23 PROCEDURE — 85025 COMPLETE CBC W/AUTO DIFF WBC: CPT | Performed by: STUDENT IN AN ORGANIZED HEALTH CARE EDUCATION/TRAINING PROGRAM

## 2023-02-23 PROCEDURE — 84100 ASSAY OF PHOSPHORUS: CPT | Performed by: STUDENT IN AN ORGANIZED HEALTH CARE EDUCATION/TRAINING PROGRAM

## 2023-02-23 PROCEDURE — 83735 ASSAY OF MAGNESIUM: CPT | Performed by: STUDENT IN AN ORGANIZED HEALTH CARE EDUCATION/TRAINING PROGRAM

## 2023-02-23 RX ORDER — LAMOTRIGINE 100 MG/1
200 TABLET ORAL DAILY
Status: CANCELLED | OUTPATIENT
Start: 2023-02-24

## 2023-02-23 RX ORDER — LAMOTRIGINE 100 MG/1
100 TABLET ORAL ONCE
Status: COMPLETED | OUTPATIENT
Start: 2023-02-23 | End: 2023-02-23

## 2023-02-23 RX ORDER — LAMOTRIGINE 100 MG/1
200 TABLET ORAL EVERY 12 HOURS SCHEDULED
Status: DISCONTINUED | OUTPATIENT
Start: 2023-02-23 | End: 2023-02-23

## 2023-02-23 RX ORDER — LAMOTRIGINE 100 MG/1
200 TABLET ORAL DAILY
Status: DISCONTINUED | OUTPATIENT
Start: 2023-02-24 | End: 2023-03-17 | Stop reason: HOSPADM

## 2023-02-23 RX ADMIN — Medication 10 ML: at 08:55

## 2023-02-23 RX ADMIN — Medication 10 ML: at 20:30

## 2023-02-23 RX ADMIN — LORAZEPAM 1 MG: 2 INJECTION INTRAMUSCULAR; INTRAVENOUS at 22:54

## 2023-02-23 RX ADMIN — FOLIC ACID 1 MG: 1 TABLET ORAL at 08:55

## 2023-02-23 RX ADMIN — GADOBENATE DIMEGLUMINE 11 ML: 529 INJECTION, SOLUTION INTRAVENOUS at 14:33

## 2023-02-23 RX ADMIN — SODIUM CHLORIDE 40 ML: 900 INJECTION INTRAVENOUS at 06:03

## 2023-02-23 RX ADMIN — METHYLPREDNISOLONE SODIUM SUCCINATE 1 G: 1 INJECTION, POWDER, LYOPHILIZED, FOR SOLUTION INTRAMUSCULAR; INTRAVENOUS at 06:05

## 2023-02-23 RX ADMIN — DOCUSATE SODIUM 50MG AND SENNOSIDES 8.6MG 2 TABLET: 8.6; 5 TABLET, FILM COATED ORAL at 08:55

## 2023-02-23 RX ADMIN — Medication 50 MCG: at 08:55

## 2023-02-23 RX ADMIN — ENOXAPARIN SODIUM 60 MG: 100 INJECTION SUBCUTANEOUS at 06:03

## 2023-02-23 RX ADMIN — LORAZEPAM 1 MG: 2 INJECTION INTRAMUSCULAR; INTRAVENOUS at 13:43

## 2023-02-23 RX ADMIN — LAMOTRIGINE 100 MG: 100 TABLET ORAL at 17:20

## 2023-02-23 RX ADMIN — LAMOTRIGINE 100 MG: 100 TABLET ORAL at 08:55

## 2023-02-24 ENCOUNTER — APPOINTMENT (OUTPATIENT)
Dept: GENERAL RADIOLOGY | Facility: HOSPITAL | Age: 58
DRG: 163 | End: 2023-02-24
Payer: COMMERCIAL

## 2023-02-24 LAB
ANION GAP SERPL CALCULATED.3IONS-SCNC: 10.8 MMOL/L (ref 5–15)
APPEARANCE CSF: CLEAR
APPEARANCE CSF: CLEAR
BASOPHILS # BLD AUTO: 0.01 10*3/MM3 (ref 0–0.2)
BASOPHILS NFR BLD AUTO: 0.1 % (ref 0–1.5)
BUN SERPL-MCNC: 20 MG/DL (ref 6–20)
BUN/CREAT SERPL: 29.9 (ref 7–25)
CALCIUM SPEC-SCNC: 8.9 MG/DL (ref 8.6–10.5)
CHLORIDE SERPL-SCNC: 105 MMOL/L (ref 98–107)
CO2 SERPL-SCNC: 19.2 MMOL/L (ref 22–29)
COLOR CSF: COLORLESS
COLOR CSF: COLORLESS
CREAT SERPL-MCNC: 0.67 MG/DL (ref 0.57–1)
DEPRECATED RDW RBC AUTO: 54.8 FL (ref 37–54)
EGFRCR SERPLBLD CKD-EPI 2021: 100.8 ML/MIN/1.73
EOSINOPHIL # BLD AUTO: 0 10*3/MM3 (ref 0–0.4)
EOSINOPHIL NFR BLD AUTO: 0 % (ref 0.3–6.2)
ERYTHROCYTE [DISTWIDTH] IN BLOOD BY AUTOMATED COUNT: 14.4 % (ref 12.3–15.4)
GLUCOSE CSF-MCNC: 93 MG/DL (ref 40–70)
GLUCOSE SERPL-MCNC: 122 MG/DL (ref 65–99)
HCT VFR BLD AUTO: 29 % (ref 34–46.6)
HGB BLD-MCNC: 10 G/DL (ref 12–15.9)
IMM GRANULOCYTES # BLD AUTO: 0.23 10*3/MM3 (ref 0–0.05)
IMM GRANULOCYTES NFR BLD AUTO: 1.9 % (ref 0–0.5)
LYMPHOCYTES # BLD AUTO: 0.95 10*3/MM3 (ref 0.7–3.1)
LYMPHOCYTES NFR BLD AUTO: 7.7 % (ref 19.6–45.3)
MAGNESIUM SERPL-MCNC: 2.1 MG/DL (ref 1.6–2.6)
MCH RBC QN AUTO: 35.7 PG (ref 26.6–33)
MCHC RBC AUTO-ENTMCNC: 34.5 G/DL (ref 31.5–35.7)
MCV RBC AUTO: 103.6 FL (ref 79–97)
MONOCYTES # BLD AUTO: 0.61 10*3/MM3 (ref 0.1–0.9)
MONOCYTES NFR BLD AUTO: 4.9 % (ref 5–12)
NEUTROPHILS NFR BLD AUTO: 10.61 10*3/MM3 (ref 1.7–7)
NEUTROPHILS NFR BLD AUTO: 85.4 % (ref 42.7–76)
NRBC BLD AUTO-RTO: 0 /100 WBC (ref 0–0.2)
NUC CELL # CSF MANUAL: 0 /MM3 (ref 0–5)
NUC CELL # CSF MANUAL: 0 /MM3 (ref 0–5)
PHOSPHATE SERPL-MCNC: 3 MG/DL (ref 2.5–4.5)
PLATELET # BLD AUTO: 292 10*3/MM3 (ref 140–450)
PMV BLD AUTO: 11.3 FL (ref 6–12)
POTASSIUM SERPL-SCNC: 3.8 MMOL/L (ref 3.5–5.2)
PROT CSF-MCNC: 38.2 MG/DL (ref 15–45)
RBC # BLD AUTO: 2.8 10*6/MM3 (ref 3.77–5.28)
RBC # CSF MANUAL: 0 /MM3 (ref 0–0)
RBC # CSF MANUAL: 4 /MM3 (ref 0–0)
SODIUM SERPL-SCNC: 135 MMOL/L (ref 136–145)
SPECIMEN VOL CSF: 3 ML
SPECIMEN VOL CSF: 3 ML
TUBE # CSF: 1
TUBE # CSF: 4
WBC NRBC COR # BLD: 12.41 10*3/MM3 (ref 3.4–10.8)

## 2023-02-24 PROCEDURE — 86255 FLUORESCENT ANTIBODY SCREEN: CPT | Performed by: PHYSICIAN ASSISTANT

## 2023-02-24 PROCEDURE — 86789 WEST NILE VIRUS ANTIBODY: CPT | Performed by: PSYCHIATRY & NEUROLOGY

## 2023-02-24 PROCEDURE — 86052 AQUAPORIN-4 ANTB CBA EACH: CPT | Performed by: PSYCHIATRY & NEUROLOGY

## 2023-02-24 PROCEDURE — 25010000002 LORAZEPAM PER 2 MG: Performed by: PSYCHIATRY & NEUROLOGY

## 2023-02-24 PROCEDURE — 86788 WEST NILE VIRUS AB IGM: CPT | Performed by: PSYCHIATRY & NEUROLOGY

## 2023-02-24 PROCEDURE — 84100 ASSAY OF PHOSPHORUS: CPT | Performed by: STUDENT IN AN ORGANIZED HEALTH CARE EDUCATION/TRAINING PROGRAM

## 2023-02-24 PROCEDURE — 82945 GLUCOSE OTHER FLUID: CPT | Performed by: PSYCHIATRY & NEUROLOGY

## 2023-02-24 PROCEDURE — 97116 GAIT TRAINING THERAPY: CPT

## 2023-02-24 PROCEDURE — 83735 ASSAY OF MAGNESIUM: CPT | Performed by: STUDENT IN AN ORGANIZED HEALTH CARE EDUCATION/TRAINING PROGRAM

## 2023-02-24 PROCEDURE — 0 LIDOCAINE 1 % SOLUTION: Performed by: RADIOLOGY

## 2023-02-24 PROCEDURE — 85025 COMPLETE CBC W/AUTO DIFF WBC: CPT | Performed by: STUDENT IN AN ORGANIZED HEALTH CARE EDUCATION/TRAINING PROGRAM

## 2023-02-24 PROCEDURE — 89050 BODY FLUID CELL COUNT: CPT | Performed by: PSYCHIATRY & NEUROLOGY

## 2023-02-24 PROCEDURE — 84157 ASSAY OF PROTEIN OTHER: CPT | Performed by: PSYCHIATRY & NEUROLOGY

## 2023-02-24 PROCEDURE — 86341 ISLET CELL ANTIBODY: CPT | Performed by: PHYSICIAN ASSISTANT

## 2023-02-24 PROCEDURE — 80048 BASIC METABOLIC PNL TOTAL CA: CPT | Performed by: STUDENT IN AN ORGANIZED HEALTH CARE EDUCATION/TRAINING PROGRAM

## 2023-02-24 PROCEDURE — 25010000002 METHYLPREDNISOLONE PER 125 MG: Performed by: PSYCHIATRY & NEUROLOGY

## 2023-02-24 PROCEDURE — 99231 SBSQ HOSP IP/OBS SF/LOW 25: CPT | Performed by: PSYCHIATRY & NEUROLOGY

## 2023-02-24 PROCEDURE — 97530 THERAPEUTIC ACTIVITIES: CPT

## 2023-02-24 PROCEDURE — 86617 LYME DISEASE ANTIBODY: CPT | Performed by: PSYCHIATRY & NEUROLOGY

## 2023-02-24 RX ORDER — HYDROXYZINE HYDROCHLORIDE 25 MG/1
25 TABLET, FILM COATED ORAL 4 TIMES DAILY PRN
Status: DISCONTINUED | OUTPATIENT
Start: 2023-02-24 | End: 2023-02-28

## 2023-02-24 RX ORDER — HYDROCODONE BITARTRATE AND ACETAMINOPHEN 5; 325 MG/1; MG/1
1 TABLET ORAL EVERY 6 HOURS PRN
Status: DISCONTINUED | OUTPATIENT
Start: 2023-02-24 | End: 2023-03-02

## 2023-02-24 RX ORDER — LIDOCAINE HYDROCHLORIDE 10 MG/ML
10 INJECTION, SOLUTION INFILTRATION; PERINEURAL ONCE
Status: COMPLETED | OUTPATIENT
Start: 2023-02-24 | End: 2023-02-24

## 2023-02-24 RX ADMIN — HYDROCODONE BITARTRATE AND ACETAMINOPHEN 1 TABLET: 5; 325 TABLET ORAL at 16:25

## 2023-02-24 RX ADMIN — LIDOCAINE HYDROCHLORIDE 2 ML: 10 INJECTION, SOLUTION INFILTRATION; PERINEURAL at 11:06

## 2023-02-24 RX ADMIN — Medication 10 ML: at 20:17

## 2023-02-24 RX ADMIN — LORAZEPAM 1 MG: 2 INJECTION INTRAMUSCULAR; INTRAVENOUS at 21:35

## 2023-02-24 RX ADMIN — LORAZEPAM 1 MG: 2 INJECTION INTRAMUSCULAR; INTRAVENOUS at 10:10

## 2023-02-24 RX ADMIN — LAMOTRIGINE 200 MG: 100 TABLET ORAL at 14:12

## 2023-02-24 RX ADMIN — SODIUM CHLORIDE 500 MG: 900 INJECTION INTRAVENOUS at 16:25

## 2023-02-24 RX ADMIN — FOLIC ACID 1 MG: 1 TABLET ORAL at 14:12

## 2023-02-24 RX ADMIN — Medication 10 ML: at 10:10

## 2023-02-24 RX ADMIN — LORAZEPAM 1 MG: 2 INJECTION INTRAMUSCULAR; INTRAVENOUS at 03:58

## 2023-02-24 RX ADMIN — Medication 50 MCG: at 14:12

## 2023-02-24 RX ADMIN — HYDROXYZINE HYDROCHLORIDE 25 MG: 25 TABLET ORAL at 16:36

## 2023-02-25 ENCOUNTER — APPOINTMENT (OUTPATIENT)
Dept: MRI IMAGING | Facility: HOSPITAL | Age: 58
DRG: 163 | End: 2023-02-25
Payer: COMMERCIAL

## 2023-02-25 PROBLEM — K21.9 GERD (GASTROESOPHAGEAL REFLUX DISEASE): Status: ACTIVE | Noted: 2023-02-25

## 2023-02-25 PROBLEM — R73.9 HYPERGLYCEMIA: Status: ACTIVE | Noted: 2023-02-25

## 2023-02-25 PROBLEM — D64.9 NORMOCYTIC ANEMIA: Status: ACTIVE | Noted: 2023-02-25

## 2023-02-25 PROBLEM — D72.829 LEUKOCYTOSIS: Status: ACTIVE | Noted: 2023-02-25

## 2023-02-25 LAB
ANION GAP SERPL CALCULATED.3IONS-SCNC: 11.8 MMOL/L (ref 5–15)
BASOPHILS # BLD AUTO: 0.01 10*3/MM3 (ref 0–0.2)
BASOPHILS NFR BLD AUTO: 0.1 % (ref 0–1.5)
BUN SERPL-MCNC: 24 MG/DL (ref 6–20)
BUN/CREAT SERPL: 28.6 (ref 7–25)
CALCIUM SPEC-SCNC: 8.9 MG/DL (ref 8.6–10.5)
CHLORIDE SERPL-SCNC: 99 MMOL/L (ref 98–107)
CO2 SERPL-SCNC: 22.2 MMOL/L (ref 22–29)
CREAT SERPL-MCNC: 0.84 MG/DL (ref 0.57–1)
DEPRECATED RDW RBC AUTO: 51 FL (ref 37–54)
EGFRCR SERPLBLD CKD-EPI 2021: 80.2 ML/MIN/1.73
EOSINOPHIL # BLD AUTO: 0 10*3/MM3 (ref 0–0.4)
EOSINOPHIL NFR BLD AUTO: 0 % (ref 0.3–6.2)
ERYTHROCYTE [DISTWIDTH] IN BLOOD BY AUTOMATED COUNT: 14.2 % (ref 12.3–15.4)
GLUCOSE SERPL-MCNC: 165 MG/DL (ref 65–99)
HCT VFR BLD AUTO: 31 % (ref 34–46.6)
HGB BLD-MCNC: 10.4 G/DL (ref 12–15.9)
IMM GRANULOCYTES # BLD AUTO: 0.34 10*3/MM3 (ref 0–0.05)
IMM GRANULOCYTES NFR BLD AUTO: 3 % (ref 0–0.5)
LYMPHOCYTES # BLD AUTO: 0.85 10*3/MM3 (ref 0.7–3.1)
LYMPHOCYTES NFR BLD AUTO: 7.4 % (ref 19.6–45.3)
MAGNESIUM SERPL-MCNC: 1.9 MG/DL (ref 1.6–2.6)
MCH RBC QN AUTO: 34.1 PG (ref 26.6–33)
MCHC RBC AUTO-ENTMCNC: 33.5 G/DL (ref 31.5–35.7)
MCV RBC AUTO: 101.6 FL (ref 79–97)
MONOCYTES # BLD AUTO: 0.19 10*3/MM3 (ref 0.1–0.9)
MONOCYTES NFR BLD AUTO: 1.6 % (ref 5–12)
NEUTROPHILS NFR BLD AUTO: 10.13 10*3/MM3 (ref 1.7–7)
NEUTROPHILS NFR BLD AUTO: 87.9 % (ref 42.7–76)
NRBC BLD AUTO-RTO: 0 /100 WBC (ref 0–0.2)
PHOSPHATE SERPL-MCNC: 3.1 MG/DL (ref 2.5–4.5)
PLATELET # BLD AUTO: 269 10*3/MM3 (ref 140–450)
PMV BLD AUTO: 11.4 FL (ref 6–12)
POTASSIUM SERPL-SCNC: 3.8 MMOL/L (ref 3.5–5.2)
RBC # BLD AUTO: 3.05 10*6/MM3 (ref 3.77–5.28)
SODIUM SERPL-SCNC: 133 MMOL/L (ref 136–145)
WBC NRBC COR # BLD: 11.52 10*3/MM3 (ref 3.4–10.8)

## 2023-02-25 PROCEDURE — A9577 INJ MULTIHANCE: HCPCS | Performed by: STUDENT IN AN ORGANIZED HEALTH CARE EDUCATION/TRAINING PROGRAM

## 2023-02-25 PROCEDURE — 84100 ASSAY OF PHOSPHORUS: CPT | Performed by: STUDENT IN AN ORGANIZED HEALTH CARE EDUCATION/TRAINING PROGRAM

## 2023-02-25 PROCEDURE — 99231 SBSQ HOSP IP/OBS SF/LOW 25: CPT | Performed by: PSYCHIATRY & NEUROLOGY

## 2023-02-25 PROCEDURE — 83735 ASSAY OF MAGNESIUM: CPT | Performed by: STUDENT IN AN ORGANIZED HEALTH CARE EDUCATION/TRAINING PROGRAM

## 2023-02-25 PROCEDURE — 97110 THERAPEUTIC EXERCISES: CPT

## 2023-02-25 PROCEDURE — 0 GADOBENATE DIMEGLUMINE 529 MG/ML SOLUTION: Performed by: STUDENT IN AN ORGANIZED HEALTH CARE EDUCATION/TRAINING PROGRAM

## 2023-02-25 PROCEDURE — 25010000002 METHYLPREDNISOLONE PER 125 MG: Performed by: PSYCHIATRY & NEUROLOGY

## 2023-02-25 PROCEDURE — 85025 COMPLETE CBC W/AUTO DIFF WBC: CPT | Performed by: STUDENT IN AN ORGANIZED HEALTH CARE EDUCATION/TRAINING PROGRAM

## 2023-02-25 PROCEDURE — 25010000002 LORAZEPAM PER 2 MG: Performed by: PSYCHIATRY & NEUROLOGY

## 2023-02-25 PROCEDURE — 72158 MRI LUMBAR SPINE W/O & W/DYE: CPT

## 2023-02-25 PROCEDURE — 80048 BASIC METABOLIC PNL TOTAL CA: CPT | Performed by: STUDENT IN AN ORGANIZED HEALTH CARE EDUCATION/TRAINING PROGRAM

## 2023-02-25 RX ADMIN — HYDROCODONE BITARTRATE AND ACETAMINOPHEN 1 TABLET: 5; 325 TABLET ORAL at 20:59

## 2023-02-25 RX ADMIN — Medication 10 ML: at 21:00

## 2023-02-25 RX ADMIN — FOLIC ACID 1 MG: 1 TABLET ORAL at 10:06

## 2023-02-25 RX ADMIN — HYDROXYZINE HYDROCHLORIDE 25 MG: 25 TABLET ORAL at 03:17

## 2023-02-25 RX ADMIN — LORAZEPAM 1 MG: 2 INJECTION INTRAMUSCULAR; INTRAVENOUS at 19:19

## 2023-02-25 RX ADMIN — GADOBENATE DIMEGLUMINE 11 ML: 529 INJECTION, SOLUTION INTRAVENOUS at 20:05

## 2023-02-25 RX ADMIN — LORAZEPAM 1 MG: 2 INJECTION INTRAMUSCULAR; INTRAVENOUS at 13:44

## 2023-02-25 RX ADMIN — Medication 50 MCG: at 10:06

## 2023-02-25 RX ADMIN — LAMOTRIGINE 200 MG: 100 TABLET ORAL at 10:06

## 2023-02-25 RX ADMIN — SODIUM CHLORIDE 500 MG: 900 INJECTION INTRAVENOUS at 05:20

## 2023-02-25 RX ADMIN — Medication 10 ML: at 10:06

## 2023-02-26 LAB
ALBUMIN SERPL-MCNC: 3.1 G/DL (ref 3.5–5.2)
ALBUMIN/GLOB SERPL: 1.3 G/DL
ALP SERPL-CCNC: 76 U/L (ref 39–117)
ALT SERPL W P-5'-P-CCNC: 19 U/L (ref 1–33)
ANION GAP SERPL CALCULATED.3IONS-SCNC: 12.1 MMOL/L (ref 5–15)
AST SERPL-CCNC: 22 U/L (ref 1–32)
BASOPHILS # BLD AUTO: 0.05 10*3/MM3 (ref 0–0.2)
BASOPHILS NFR BLD AUTO: 0.3 % (ref 0–1.5)
BILIRUB SERPL-MCNC: 0.4 MG/DL (ref 0–1.2)
BUN SERPL-MCNC: 22 MG/DL (ref 6–20)
BUN/CREAT SERPL: 28.9 (ref 7–25)
CALCIUM SPEC-SCNC: 8.9 MG/DL (ref 8.6–10.5)
CHLORIDE SERPL-SCNC: 103 MMOL/L (ref 98–107)
CO2 SERPL-SCNC: 22.9 MMOL/L (ref 22–29)
CREAT SERPL-MCNC: 0.76 MG/DL (ref 0.57–1)
CRP SERPL-MCNC: <0.3 MG/DL (ref 0–0.5)
DEPRECATED RDW RBC AUTO: 53.2 FL (ref 37–54)
EGFRCR SERPLBLD CKD-EPI 2021: 90.4 ML/MIN/1.73
EOSINOPHIL # BLD AUTO: 0 10*3/MM3 (ref 0–0.4)
EOSINOPHIL NFR BLD AUTO: 0 % (ref 0.3–6.2)
ERYTHROCYTE [DISTWIDTH] IN BLOOD BY AUTOMATED COUNT: 14.6 % (ref 12.3–15.4)
ERYTHROCYTE [SEDIMENTATION RATE] IN BLOOD: 15 MM/HR (ref 0–30)
GLOBULIN UR ELPH-MCNC: 2.3 GM/DL
GLUCOSE SERPL-MCNC: 112 MG/DL (ref 65–99)
HCT VFR BLD AUTO: 29.3 % (ref 34–46.6)
HGB BLD-MCNC: 10.3 G/DL (ref 12–15.9)
IMM GRANULOCYTES # BLD AUTO: 0.5 10*3/MM3 (ref 0–0.05)
IMM GRANULOCYTES NFR BLD AUTO: 3.2 % (ref 0–0.5)
LYMPHOCYTES # BLD AUTO: 1.28 10*3/MM3 (ref 0.7–3.1)
LYMPHOCYTES NFR BLD AUTO: 8.2 % (ref 19.6–45.3)
MAGNESIUM SERPL-MCNC: 2.1 MG/DL (ref 1.6–2.6)
MCH RBC QN AUTO: 35.6 PG (ref 26.6–33)
MCHC RBC AUTO-ENTMCNC: 35.2 G/DL (ref 31.5–35.7)
MCV RBC AUTO: 101.4 FL (ref 79–97)
MONOCYTES # BLD AUTO: 1.09 10*3/MM3 (ref 0.1–0.9)
MONOCYTES NFR BLD AUTO: 7 % (ref 5–12)
NEUTROPHILS NFR BLD AUTO: 12.71 10*3/MM3 (ref 1.7–7)
NEUTROPHILS NFR BLD AUTO: 81.3 % (ref 42.7–76)
NRBC BLD AUTO-RTO: 0.1 /100 WBC (ref 0–0.2)
PHOSPHATE SERPL-MCNC: 2.9 MG/DL (ref 2.5–4.5)
PLATELET # BLD AUTO: 253 10*3/MM3 (ref 140–450)
PMV BLD AUTO: 11.2 FL (ref 6–12)
POTASSIUM SERPL-SCNC: 3.7 MMOL/L (ref 3.5–5.2)
PROT SERPL-MCNC: 5.4 G/DL (ref 6–8.5)
RBC # BLD AUTO: 2.89 10*6/MM3 (ref 3.77–5.28)
SODIUM SERPL-SCNC: 138 MMOL/L (ref 136–145)
T3FREE SERPL-MCNC: 1.7 PG/ML (ref 2–4.4)
T4 FREE SERPL-MCNC: 0.96 NG/DL (ref 0.93–1.7)
TSH SERPL DL<=0.05 MIU/L-ACNC: 1.67 UIU/ML (ref 0.27–4.2)
WBC NRBC COR # BLD: 15.63 10*3/MM3 (ref 3.4–10.8)

## 2023-02-26 PROCEDURE — 83655 ASSAY OF LEAD: CPT | Performed by: PHYSICIAN ASSISTANT

## 2023-02-26 PROCEDURE — 86235 NUCLEAR ANTIGEN ANTIBODY: CPT | Performed by: PHYSICIAN ASSISTANT

## 2023-02-26 PROCEDURE — 85652 RBC SED RATE AUTOMATED: CPT | Performed by: PHYSICIAN ASSISTANT

## 2023-02-26 PROCEDURE — 94799 UNLISTED PULMONARY SVC/PX: CPT

## 2023-02-26 PROCEDURE — 80050 GENERAL HEALTH PANEL: CPT | Performed by: STUDENT IN AN ORGANIZED HEALTH CARE EDUCATION/TRAINING PROGRAM

## 2023-02-26 PROCEDURE — 97110 THERAPEUTIC EXERCISES: CPT

## 2023-02-26 PROCEDURE — 25010000002 LORAZEPAM PER 2 MG: Performed by: PSYCHIATRY & NEUROLOGY

## 2023-02-26 PROCEDURE — 83825 ASSAY OF MERCURY: CPT | Performed by: PHYSICIAN ASSISTANT

## 2023-02-26 PROCEDURE — 86140 C-REACTIVE PROTEIN: CPT | Performed by: PHYSICIAN ASSISTANT

## 2023-02-26 PROCEDURE — 82164 ANGIOTENSIN I ENZYME TEST: CPT | Performed by: PHYSICIAN ASSISTANT

## 2023-02-26 PROCEDURE — 84439 ASSAY OF FREE THYROXINE: CPT | Performed by: PHYSICIAN ASSISTANT

## 2023-02-26 PROCEDURE — 99233 SBSQ HOSP IP/OBS HIGH 50: CPT | Performed by: PHYSICIAN ASSISTANT

## 2023-02-26 PROCEDURE — 82175 ASSAY OF ARSENIC: CPT | Performed by: PHYSICIAN ASSISTANT

## 2023-02-26 PROCEDURE — 83735 ASSAY OF MAGNESIUM: CPT | Performed by: STUDENT IN AN ORGANIZED HEALTH CARE EDUCATION/TRAINING PROGRAM

## 2023-02-26 PROCEDURE — 84100 ASSAY OF PHOSPHORUS: CPT | Performed by: STUDENT IN AN ORGANIZED HEALTH CARE EDUCATION/TRAINING PROGRAM

## 2023-02-26 PROCEDURE — 84481 FREE ASSAY (FT-3): CPT | Performed by: PHYSICIAN ASSISTANT

## 2023-02-26 PROCEDURE — 25010000002 METHYLPREDNISOLONE PER 125 MG: Performed by: PSYCHIATRY & NEUROLOGY

## 2023-02-26 PROCEDURE — 86225 DNA ANTIBODY NATIVE: CPT | Performed by: PHYSICIAN ASSISTANT

## 2023-02-26 PROCEDURE — 82300 ASSAY OF CADMIUM: CPT | Performed by: PHYSICIAN ASSISTANT

## 2023-02-26 RX ADMIN — Medication 10 ML: at 20:07

## 2023-02-26 RX ADMIN — Medication 50 MCG: at 09:40

## 2023-02-26 RX ADMIN — FOLIC ACID 1 MG: 1 TABLET ORAL at 09:40

## 2023-02-26 RX ADMIN — LAMOTRIGINE 200 MG: 100 TABLET ORAL at 09:41

## 2023-02-26 RX ADMIN — HYDROCODONE BITARTRATE AND ACETAMINOPHEN 1 TABLET: 5; 325 TABLET ORAL at 13:10

## 2023-02-26 RX ADMIN — LORAZEPAM 1 MG: 2 INJECTION INTRAMUSCULAR; INTRAVENOUS at 02:27

## 2023-02-26 RX ADMIN — LORAZEPAM 1 MG: 2 INJECTION INTRAMUSCULAR; INTRAVENOUS at 09:36

## 2023-02-26 RX ADMIN — Medication 10 ML: at 09:41

## 2023-02-26 RX ADMIN — SODIUM CHLORIDE 250 MG: 9 INJECTION, SOLUTION INTRAVENOUS at 09:34

## 2023-02-26 RX ADMIN — LORAZEPAM 1 MG: 2 INJECTION INTRAMUSCULAR; INTRAVENOUS at 14:47

## 2023-02-26 RX ADMIN — LORAZEPAM 1 MG: 2 INJECTION INTRAMUSCULAR; INTRAVENOUS at 22:29

## 2023-02-27 PROBLEM — G61.0 GUILLAIN-BARRE SYNDROME: Status: ACTIVE | Noted: 2023-02-27

## 2023-02-27 LAB
ALBUMIN SERPL-MCNC: 3.1 G/DL (ref 3.5–5.2)
ALBUMIN/GLOB SERPL: 1.4 G/DL
ALP SERPL-CCNC: 77 U/L (ref 39–117)
ALT SERPL W P-5'-P-CCNC: 28 U/L (ref 1–33)
ANION GAP SERPL CALCULATED.3IONS-SCNC: 13.8 MMOL/L (ref 5–15)
AST SERPL-CCNC: 26 U/L (ref 1–32)
BASOPHILS # BLD AUTO: 0.03 10*3/MM3 (ref 0–0.2)
BASOPHILS NFR BLD AUTO: 0.2 % (ref 0–1.5)
BILIRUB SERPL-MCNC: 0.4 MG/DL (ref 0–1.2)
BUN SERPL-MCNC: 22 MG/DL (ref 6–20)
BUN/CREAT SERPL: 28.6 (ref 7–25)
CALCIUM SPEC-SCNC: 8.6 MG/DL (ref 8.6–10.5)
CHLORIDE SERPL-SCNC: 102 MMOL/L (ref 98–107)
CK SERPL-CCNC: 25 U/L (ref 20–180)
CO2 SERPL-SCNC: 22.2 MMOL/L (ref 22–29)
CREAT SERPL-MCNC: 0.77 MG/DL (ref 0.57–1)
DEPRECATED RDW RBC AUTO: 53.4 FL (ref 37–54)
EGFRCR SERPLBLD CKD-EPI 2021: 89 ML/MIN/1.73
EOSINOPHIL # BLD AUTO: 0 10*3/MM3 (ref 0–0.4)
EOSINOPHIL NFR BLD AUTO: 0 % (ref 0.3–6.2)
ERYTHROCYTE [DISTWIDTH] IN BLOOD BY AUTOMATED COUNT: 14.5 % (ref 12.3–15.4)
GLOBULIN UR ELPH-MCNC: 2.2 GM/DL
GLUCOSE SERPL-MCNC: 198 MG/DL (ref 65–99)
HCT VFR BLD AUTO: 29.6 % (ref 34–46.6)
HGB BLD-MCNC: 10.1 G/DL (ref 12–15.9)
IGA1 MFR SER: 185 MG/DL (ref 70–400)
IGG1 SER-MCNC: 493 MG/DL (ref 700–1600)
IGM SERPL-MCNC: 178 MG/DL (ref 40–230)
IMM GRANULOCYTES # BLD AUTO: 0.66 10*3/MM3 (ref 0–0.05)
IMM GRANULOCYTES NFR BLD AUTO: 3.8 % (ref 0–0.5)
LYMPHOCYTES # BLD AUTO: 1.1 10*3/MM3 (ref 0.7–3.1)
LYMPHOCYTES NFR BLD AUTO: 6.4 % (ref 19.6–45.3)
MAGNESIUM SERPL-MCNC: 2 MG/DL (ref 1.6–2.6)
MCH RBC QN AUTO: 34.8 PG (ref 26.6–33)
MCHC RBC AUTO-ENTMCNC: 34.1 G/DL (ref 31.5–35.7)
MCV RBC AUTO: 102.1 FL (ref 79–97)
MONOCYTES # BLD AUTO: 0.99 10*3/MM3 (ref 0.1–0.9)
MONOCYTES NFR BLD AUTO: 5.7 % (ref 5–12)
NEUTROPHILS NFR BLD AUTO: 14.54 10*3/MM3 (ref 1.7–7)
NEUTROPHILS NFR BLD AUTO: 83.9 % (ref 42.7–76)
NRBC BLD AUTO-RTO: 0.2 /100 WBC (ref 0–0.2)
PHOSPHATE SERPL-MCNC: 3 MG/DL (ref 2.5–4.5)
PLATELET # BLD AUTO: 240 10*3/MM3 (ref 140–450)
PMV BLD AUTO: 10.9 FL (ref 6–12)
POTASSIUM SERPL-SCNC: 3.5 MMOL/L (ref 3.5–5.2)
PROT SERPL-MCNC: 5.3 G/DL (ref 6–8.5)
RBC # BLD AUTO: 2.9 10*6/MM3 (ref 3.77–5.28)
SODIUM SERPL-SCNC: 138 MMOL/L (ref 136–145)
WBC NRBC COR # BLD: 17.32 10*3/MM3 (ref 3.4–10.8)

## 2023-02-27 PROCEDURE — 95911 NRV CNDJ TEST 9-10 STUDIES: CPT | Performed by: PSYCHIATRY & NEUROLOGY

## 2023-02-27 PROCEDURE — 97535 SELF CARE MNGMENT TRAINING: CPT

## 2023-02-27 PROCEDURE — 63710000001 DIPHENHYDRAMINE PER 50 MG: Performed by: PSYCHIATRY & NEUROLOGY

## 2023-02-27 PROCEDURE — 95911 NRV CNDJ TEST 9-10 STUDIES: CPT

## 2023-02-27 PROCEDURE — 95886 MUSC TEST DONE W/N TEST COMP: CPT | Performed by: PSYCHIATRY & NEUROLOGY

## 2023-02-27 PROCEDURE — 25010000002 METHYLPREDNISOLONE PER 125 MG: Performed by: PSYCHIATRY & NEUROLOGY

## 2023-02-27 PROCEDURE — 80053 COMPREHEN METABOLIC PANEL: CPT | Performed by: STUDENT IN AN ORGANIZED HEALTH CARE EDUCATION/TRAINING PROGRAM

## 2023-02-27 PROCEDURE — 97166 OT EVAL MOD COMPLEX 45 MIN: CPT

## 2023-02-27 PROCEDURE — 84100 ASSAY OF PHOSPHORUS: CPT | Performed by: STUDENT IN AN ORGANIZED HEALTH CARE EDUCATION/TRAINING PROGRAM

## 2023-02-27 PROCEDURE — 97110 THERAPEUTIC EXERCISES: CPT

## 2023-02-27 PROCEDURE — 95886 MUSC TEST DONE W/N TEST COMP: CPT

## 2023-02-27 PROCEDURE — 25010000002 IMMUNE GLOBULIN (HUMAN) 20 GM/200ML SOLUTION: Performed by: PSYCHIATRY & NEUROLOGY

## 2023-02-27 PROCEDURE — 85025 COMPLETE CBC W/AUTO DIFF WBC: CPT | Performed by: STUDENT IN AN ORGANIZED HEALTH CARE EDUCATION/TRAINING PROGRAM

## 2023-02-27 PROCEDURE — 82784 ASSAY IGA/IGD/IGG/IGM EACH: CPT | Performed by: PSYCHIATRY & NEUROLOGY

## 2023-02-27 PROCEDURE — 83735 ASSAY OF MAGNESIUM: CPT | Performed by: STUDENT IN AN ORGANIZED HEALTH CARE EDUCATION/TRAINING PROGRAM

## 2023-02-27 PROCEDURE — 82550 ASSAY OF CK (CPK): CPT | Performed by: PSYCHIATRY & NEUROLOGY

## 2023-02-27 PROCEDURE — 99233 SBSQ HOSP IP/OBS HIGH 50: CPT | Performed by: PSYCHIATRY & NEUROLOGY

## 2023-02-27 PROCEDURE — 4A01X2B MEASUREMENT OF PERIPHERAL NERVOUS CONDUCTIVITY, MOTOR, EXTERNAL APPROACH: ICD-10-PCS | Performed by: PSYCHIATRY & NEUROLOGY

## 2023-02-27 PROCEDURE — 4A0FX3Z MEASUREMENT OF MUSCULOSKELETAL CONTRACTILITY, EXTERNAL APPROACH: ICD-10-PCS | Performed by: PSYCHIATRY & NEUROLOGY

## 2023-02-27 RX ORDER — LORAZEPAM 1 MG/1
1 TABLET ORAL EVERY 6 HOURS PRN
Status: DISCONTINUED | OUTPATIENT
Start: 2023-02-27 | End: 2023-03-02

## 2023-02-27 RX ORDER — DIPHENHYDRAMINE HYDROCHLORIDE 50 MG/ML
50 INJECTION INTRAMUSCULAR; INTRAVENOUS ONCE AS NEEDED
Status: DISCONTINUED | OUTPATIENT
Start: 2023-02-27 | End: 2023-03-12

## 2023-02-27 RX ORDER — FAMOTIDINE 10 MG/ML
20 INJECTION, SOLUTION INTRAVENOUS ONCE AS NEEDED
Status: DISCONTINUED | OUTPATIENT
Start: 2023-02-27 | End: 2023-03-17

## 2023-02-27 RX ORDER — ACETAMINOPHEN 325 MG/1
650 TABLET ORAL ONCE
Status: COMPLETED | OUTPATIENT
Start: 2023-02-27 | End: 2023-02-27

## 2023-02-27 RX ORDER — DIPHENHYDRAMINE HCL 25 MG
25 CAPSULE ORAL ONCE
Status: COMPLETED | OUTPATIENT
Start: 2023-02-27 | End: 2023-02-27

## 2023-02-27 RX ADMIN — FOLIC ACID 1 MG: 1 TABLET ORAL at 09:48

## 2023-02-27 RX ADMIN — IMMUNE GLOBULIN (HUMAN) 20 G: 10 INJECTION INTRAVENOUS; SUBCUTANEOUS at 17:04

## 2023-02-27 RX ADMIN — Medication 10 ML: at 09:56

## 2023-02-27 RX ADMIN — HYDROCODONE BITARTRATE AND ACETAMINOPHEN 1 TABLET: 5; 325 TABLET ORAL at 16:03

## 2023-02-27 RX ADMIN — LORAZEPAM 1 MG: 1 TABLET ORAL at 16:03

## 2023-02-27 RX ADMIN — SODIUM CHLORIDE 250 MG: 9 INJECTION, SOLUTION INTRAVENOUS at 09:58

## 2023-02-27 RX ADMIN — HYDROCODONE BITARTRATE AND ACETAMINOPHEN 1 TABLET: 5; 325 TABLET ORAL at 03:13

## 2023-02-27 RX ADMIN — DIPHENHYDRAMINE HYDROCHLORIDE 25 MG: 25 CAPSULE ORAL at 16:03

## 2023-02-27 RX ADMIN — LORAZEPAM 1 MG: 1 TABLET ORAL at 09:48

## 2023-02-27 RX ADMIN — Medication 50 MCG: at 09:48

## 2023-02-27 RX ADMIN — HYDROCODONE BITARTRATE AND ACETAMINOPHEN 1 TABLET: 5; 325 TABLET ORAL at 09:48

## 2023-02-27 RX ADMIN — APIXABAN 5 MG: 5 TABLET, FILM COATED ORAL at 09:48

## 2023-02-27 RX ADMIN — LORAZEPAM 1 MG: 1 TABLET ORAL at 22:33

## 2023-02-27 RX ADMIN — LAMOTRIGINE 200 MG: 100 TABLET ORAL at 09:48

## 2023-02-27 RX ADMIN — ACETAMINOPHEN 650 MG: 325 TABLET ORAL at 16:03

## 2023-02-27 RX ADMIN — Medication 10 ML: at 20:08

## 2023-02-28 PROBLEM — F43.23 SITUATIONAL MIXED ANXIETY AND DEPRESSIVE DISORDER: Status: ACTIVE | Noted: 2023-02-28

## 2023-02-28 LAB
ACE SERPL-CCNC: 48 U/L (ref 14–82)
ALBUMIN SERPL-MCNC: 2.8 G/DL (ref 3.5–5.2)
ALBUMIN/GLOB SERPL: 0.9 G/DL
ALP SERPL-CCNC: 86 U/L (ref 39–117)
ALT SERPL W P-5'-P-CCNC: 34 U/L (ref 1–33)
ANION GAP SERPL CALCULATED.3IONS-SCNC: 13 MMOL/L (ref 5–15)
AST SERPL-CCNC: 29 U/L (ref 1–32)
BASOPHILS # BLD AUTO: 0.02 10*3/MM3 (ref 0–0.2)
BASOPHILS NFR BLD AUTO: 0.1 % (ref 0–1.5)
BILIRUB SERPL-MCNC: 0.4 MG/DL (ref 0–1.2)
BUN SERPL-MCNC: 21 MG/DL (ref 6–20)
BUN/CREAT SERPL: 30.9 (ref 7–25)
CALCIUM SPEC-SCNC: 8.4 MG/DL (ref 8.6–10.5)
CENTROMERE B AB SER-ACNC: <0.2 AI (ref 0–0.9)
CHLORIDE SERPL-SCNC: 102 MMOL/L (ref 98–107)
CHROMATIN AB SERPL-ACNC: <0.2 AI (ref 0–0.9)
CO2 SERPL-SCNC: 23 MMOL/L (ref 22–29)
CREAT SERPL-MCNC: 0.68 MG/DL (ref 0.57–1)
DEPRECATED RDW RBC AUTO: 51.4 FL (ref 37–54)
DSDNA AB SER-ACNC: 2 IU/ML (ref 0–9)
EGFRCR SERPLBLD CKD-EPI 2021: 100.5 ML/MIN/1.73
ENA JO1 AB SER-ACNC: <0.2 AI (ref 0–0.9)
ENA RNP AB SER-ACNC: <0.2 AI (ref 0–0.9)
ENA SCL70 AB SER-ACNC: <0.2 AI (ref 0–0.9)
ENA SM AB SER-ACNC: <0.2 AI (ref 0–0.9)
ENA SS-A AB SER-ACNC: <0.2 AI (ref 0–0.9)
ENA SS-B AB SER-ACNC: <0.2 AI (ref 0–0.9)
EOSINOPHIL # BLD AUTO: 0 10*3/MM3 (ref 0–0.4)
EOSINOPHIL NFR BLD AUTO: 0 % (ref 0.3–6.2)
ERYTHROCYTE [DISTWIDTH] IN BLOOD BY AUTOMATED COUNT: 14.3 % (ref 12.3–15.4)
FOLATE SERPL-MCNC: >20 NG/ML (ref 4.78–24.2)
GLOBULIN UR ELPH-MCNC: 3.2 GM/DL
GLUCOSE SERPL-MCNC: 139 MG/DL (ref 65–99)
HCT VFR BLD AUTO: 28.4 % (ref 34–46.6)
HGB BLD-MCNC: 9.7 G/DL (ref 12–15.9)
HOLD SPECIMEN: NORMAL
IMM GRANULOCYTES # BLD AUTO: 0.71 10*3/MM3 (ref 0–0.05)
IMM GRANULOCYTES NFR BLD AUTO: 3.9 % (ref 0–0.5)
LYMPHOCYTES # BLD AUTO: 1.18 10*3/MM3 (ref 0.7–3.1)
LYMPHOCYTES NFR BLD AUTO: 6.4 % (ref 19.6–45.3)
Lab: NORMAL
MAGNESIUM SERPL-MCNC: 2.1 MG/DL (ref 1.6–2.6)
MCH RBC QN AUTO: 34.4 PG (ref 26.6–33)
MCHC RBC AUTO-ENTMCNC: 34.2 G/DL (ref 31.5–35.7)
MCV RBC AUTO: 100.7 FL (ref 79–97)
MONOCYTES # BLD AUTO: 1.24 10*3/MM3 (ref 0.1–0.9)
MONOCYTES NFR BLD AUTO: 6.8 % (ref 5–12)
NEUTROPHILS NFR BLD AUTO: 15.22 10*3/MM3 (ref 1.7–7)
NEUTROPHILS NFR BLD AUTO: 82.8 % (ref 42.7–76)
NRBC BLD AUTO-RTO: 0.1 /100 WBC (ref 0–0.2)
PHOSPHATE SERPL-MCNC: 2.6 MG/DL (ref 2.5–4.5)
PLATELET # BLD AUTO: 195 10*3/MM3 (ref 140–450)
PMV BLD AUTO: 11.6 FL (ref 6–12)
POTASSIUM SERPL-SCNC: 3.8 MMOL/L (ref 3.5–5.2)
PROT SERPL-MCNC: 6 G/DL (ref 6–8.5)
RBC # BLD AUTO: 2.82 10*6/MM3 (ref 3.77–5.28)
SODIUM SERPL-SCNC: 138 MMOL/L (ref 136–145)
WBC NRBC COR # BLD: 18.37 10*3/MM3 (ref 3.4–10.8)
WNV IGG SPEC QL IA: NEGATIVE
WNV IGM CSF QL IA: NEGATIVE

## 2023-02-28 PROCEDURE — 82784 ASSAY IGA/IGD/IGG/IGM EACH: CPT | Performed by: NURSE PRACTITIONER

## 2023-02-28 PROCEDURE — 84100 ASSAY OF PHOSPHORUS: CPT | Performed by: STUDENT IN AN ORGANIZED HEALTH CARE EDUCATION/TRAINING PROGRAM

## 2023-02-28 PROCEDURE — 85025 COMPLETE CBC W/AUTO DIFF WBC: CPT | Performed by: STUDENT IN AN ORGANIZED HEALTH CARE EDUCATION/TRAINING PROGRAM

## 2023-02-28 PROCEDURE — 99233 SBSQ HOSP IP/OBS HIGH 50: CPT | Performed by: NURSE PRACTITIONER

## 2023-02-28 PROCEDURE — 97530 THERAPEUTIC ACTIVITIES: CPT

## 2023-02-28 PROCEDURE — 86334 IMMUNOFIX E-PHORESIS SERUM: CPT | Performed by: NURSE PRACTITIONER

## 2023-02-28 PROCEDURE — 82595 ASSAY OF CRYOGLOBULIN: CPT | Performed by: NURSE PRACTITIONER

## 2023-02-28 PROCEDURE — 25010000002 IMMUNE GLOBULIN (HUMAN) 20 GM/200ML SOLUTION: Performed by: PSYCHIATRY & NEUROLOGY

## 2023-02-28 PROCEDURE — 97110 THERAPEUTIC EXERCISES: CPT

## 2023-02-28 PROCEDURE — 25010000002 METHYLPREDNISOLONE PER 125 MG: Performed by: PSYCHIATRY & NEUROLOGY

## 2023-02-28 PROCEDURE — 82746 ASSAY OF FOLIC ACID SERUM: CPT | Performed by: NURSE PRACTITIONER

## 2023-02-28 PROCEDURE — 83735 ASSAY OF MAGNESIUM: CPT | Performed by: STUDENT IN AN ORGANIZED HEALTH CARE EDUCATION/TRAINING PROGRAM

## 2023-02-28 PROCEDURE — 84165 PROTEIN E-PHORESIS SERUM: CPT | Performed by: NURSE PRACTITIONER

## 2023-02-28 PROCEDURE — 86592 SYPHILIS TEST NON-TREP QUAL: CPT | Performed by: NURSE PRACTITIONER

## 2023-02-28 PROCEDURE — 80053 COMPREHEN METABOLIC PANEL: CPT | Performed by: STUDENT IN AN ORGANIZED HEALTH CARE EDUCATION/TRAINING PROGRAM

## 2023-02-28 RX ORDER — DESVENLAFAXINE 25 MG/1
25 TABLET, EXTENDED RELEASE ORAL DAILY
Status: DISCONTINUED | OUTPATIENT
Start: 2023-02-28 | End: 2023-03-17 | Stop reason: HOSPADM

## 2023-02-28 RX ORDER — GABAPENTIN 100 MG/1
100 CAPSULE ORAL NIGHTLY
Status: DISCONTINUED | OUTPATIENT
Start: 2023-02-28 | End: 2023-03-02

## 2023-02-28 RX ADMIN — Medication 10 ML: at 21:54

## 2023-02-28 RX ADMIN — IMMUNE GLOBULIN (HUMAN) 20 G: 10 INJECTION INTRAVENOUS; SUBCUTANEOUS at 09:28

## 2023-02-28 RX ADMIN — HYDROCODONE BITARTRATE AND ACETAMINOPHEN 1 TABLET: 5; 325 TABLET ORAL at 22:00

## 2023-02-28 RX ADMIN — Medication 50 MCG: at 09:26

## 2023-02-28 RX ADMIN — SODIUM CHLORIDE 250 MG: 9 INJECTION, SOLUTION INTRAVENOUS at 09:27

## 2023-02-28 RX ADMIN — LAMOTRIGINE 200 MG: 100 TABLET ORAL at 09:27

## 2023-02-28 RX ADMIN — LORAZEPAM 1 MG: 1 TABLET ORAL at 18:17

## 2023-02-28 RX ADMIN — DESVENLAFAXINE SUCCINATE 25 MG: 25 TABLET, EXTENDED RELEASE ORAL at 21:53

## 2023-02-28 RX ADMIN — GABAPENTIN 100 MG: 100 CAPSULE ORAL at 21:53

## 2023-02-28 RX ADMIN — APIXABAN 5 MG: 5 TABLET, FILM COATED ORAL at 09:27

## 2023-02-28 RX ADMIN — LORAZEPAM 1 MG: 1 TABLET ORAL at 05:06

## 2023-02-28 RX ADMIN — FOLIC ACID 1 MG: 1 TABLET ORAL at 09:27

## 2023-02-28 RX ADMIN — Medication 5 MG: at 22:00

## 2023-02-28 RX ADMIN — Medication 10 ML: at 09:42

## 2023-02-28 RX ADMIN — DOCUSATE SODIUM 50MG AND SENNOSIDES 8.6MG 2 TABLET: 8.6; 5 TABLET, FILM COATED ORAL at 21:53

## 2023-03-01 ENCOUNTER — APPOINTMENT (OUTPATIENT)
Dept: GENERAL RADIOLOGY | Facility: HOSPITAL | Age: 58
DRG: 163 | End: 2023-03-01
Payer: COMMERCIAL

## 2023-03-01 LAB
ALBUMIN SERPL ELPH-MCNC: 2.5 G/DL (ref 2.9–4.4)
ALBUMIN SERPL-MCNC: 2.7 G/DL (ref 3.5–5.2)
ALBUMIN/GLOB SERPL: 0.7 {RATIO} (ref 0.7–1.7)
ALBUMIN/GLOB SERPL: 0.9 G/DL
ALP SERPL-CCNC: 89 U/L (ref 39–117)
ALPHA1 GLOB SERPL ELPH-MCNC: 0.3 G/DL (ref 0–0.4)
ALPHA2 GLOB SERPL ELPH-MCNC: 0.9 G/DL (ref 0.4–1)
ALT SERPL W P-5'-P-CCNC: 37 U/L (ref 1–33)
ANION GAP SERPL CALCULATED.3IONS-SCNC: 8 MMOL/L (ref 5–15)
ANISOCYTOSIS BLD QL: ABNORMAL
AQP4 H2O CHANNEL IGG CSF QL: NORMAL
ARSENIC BLD-MCNC: <1 UG/L (ref 0–9)
AST SERPL-CCNC: 34 U/L (ref 1–32)
B-GLOBULIN SERPL ELPH-MCNC: 0.8 G/DL (ref 0.7–1.3)
BILIRUB SERPL-MCNC: 0.5 MG/DL (ref 0–1.2)
BUN SERPL-MCNC: 17 MG/DL (ref 6–20)
BUN/CREAT SERPL: 27.9 (ref 7–25)
CADMIUM BLD-MCNC: 0.7 UG/L (ref 0–1.2)
CALCIUM SPEC-SCNC: 8.1 MG/DL (ref 8.6–10.5)
CHLORIDE SERPL-SCNC: 101 MMOL/L (ref 98–107)
CO2 SERPL-SCNC: 27 MMOL/L (ref 22–29)
CREAT SERPL-MCNC: 0.61 MG/DL (ref 0.57–1)
D DIMER PPP FEU-MCNC: 0.56 MCGFEU/ML (ref 0–0.59)
DEPRECATED RDW RBC AUTO: 53 FL (ref 37–54)
EGFRCR SERPLBLD CKD-EPI 2021: 103.1 ML/MIN/1.73
ERYTHROCYTE [DISTWIDTH] IN BLOOD BY AUTOMATED COUNT: 14.7 % (ref 12.3–15.4)
GAMMA GLOB SERPL ELPH-MCNC: 1.5 G/DL (ref 0.4–1.8)
GEN 5 2HR TROPONIN T REFLEX: 7 NG/L
GLOBULIN SER-MCNC: 3.6 G/DL (ref 2.2–3.9)
GLOBULIN UR ELPH-MCNC: 3 GM/DL
GLUCOSE SERPL-MCNC: 122 MG/DL (ref 65–99)
HCT VFR BLD AUTO: 27.6 % (ref 34–46.6)
HGB BLD-MCNC: 9.4 G/DL (ref 12–15.9)
IGA SERPL-MCNC: 164 MG/DL (ref 87–352)
IGG SERPL-MCNC: 1577 MG/DL (ref 586–1602)
IGM SERPL-MCNC: 161 MG/DL (ref 26–217)
INTERPRETATION SERPL IEP-IMP: ABNORMAL
LABORATORY COMMENT REPORT: ABNORMAL
LEAD BLDV-MCNC: 1.3 UG/DL (ref 0–3.4)
LYMPHOCYTES # BLD MANUAL: 0.61 10*3/MM3 (ref 0.7–3.1)
LYMPHOCYTES NFR BLD MANUAL: 5.3 % (ref 5–12)
M PROTEIN SERPL ELPH-MCNC: ABNORMAL G/DL
MACROCYTES BLD QL SMEAR: ABNORMAL
MAGNESIUM SERPL-MCNC: 1.9 MG/DL (ref 1.6–2.6)
MCH RBC QN AUTO: 34.3 PG (ref 26.6–33)
MCHC RBC AUTO-ENTMCNC: 34.1 G/DL (ref 31.5–35.7)
MCV RBC AUTO: 100.7 FL (ref 79–97)
MERCURY BLD-MCNC: <1 UG/L (ref 0–14.9)
MONOCYTES # BLD: 1.02 10*3/MM3 (ref 0.1–0.9)
NEUTROPHILS # BLD AUTO: 17.53 10*3/MM3 (ref 1.7–7)
NEUTROPHILS NFR BLD MANUAL: 91.5 % (ref 42.7–76)
PHOSPHATE SERPL-MCNC: 3.1 MG/DL (ref 2.5–4.5)
PLAT MORPH BLD: NORMAL
PLATELET # BLD AUTO: 243 10*3/MM3 (ref 140–450)
PMV BLD AUTO: 11.7 FL (ref 6–12)
POTASSIUM SERPL-SCNC: 3.7 MMOL/L (ref 3.5–5.2)
PROT SERPL-MCNC: 5.7 G/DL (ref 6–8.5)
PROT SERPL-MCNC: 6.1 G/DL (ref 6–8.5)
QT INTERVAL: 377 MS
RBC # BLD AUTO: 2.74 10*6/MM3 (ref 3.77–5.28)
RPR SER QL: NORMAL
SMUDGE CELLS BLD QL SMEAR: ABNORMAL
SODIUM SERPL-SCNC: 136 MMOL/L (ref 136–145)
TROPONIN T DELTA: -3 NG/L
TROPONIN T SERPL HS-MCNC: 10 NG/L
VARIANT LYMPHS NFR BLD MANUAL: 3.2 % (ref 19.6–45.3)
WBC NRBC COR # BLD: 19.16 10*3/MM3 (ref 3.4–10.8)

## 2023-03-01 PROCEDURE — 85379 FIBRIN DEGRADATION QUANT: CPT | Performed by: NURSE PRACTITIONER

## 2023-03-01 PROCEDURE — 25010000002 KETOROLAC TROMETHAMINE PER 15 MG: Performed by: NURSE PRACTITIONER

## 2023-03-01 PROCEDURE — 97530 THERAPEUTIC ACTIVITIES: CPT

## 2023-03-01 PROCEDURE — 71045 X-RAY EXAM CHEST 1 VIEW: CPT

## 2023-03-01 PROCEDURE — 85025 COMPLETE CBC W/AUTO DIFF WBC: CPT | Performed by: STUDENT IN AN ORGANIZED HEALTH CARE EDUCATION/TRAINING PROGRAM

## 2023-03-01 PROCEDURE — 93010 ELECTROCARDIOGRAM REPORT: CPT | Performed by: INTERNAL MEDICINE

## 2023-03-01 PROCEDURE — 85007 BL SMEAR W/DIFF WBC COUNT: CPT | Performed by: STUDENT IN AN ORGANIZED HEALTH CARE EDUCATION/TRAINING PROGRAM

## 2023-03-01 PROCEDURE — 93005 ELECTROCARDIOGRAM TRACING: CPT | Performed by: NURSE PRACTITIONER

## 2023-03-01 PROCEDURE — 25010000002 METHYLPREDNISOLONE PER 125 MG: Performed by: PSYCHIATRY & NEUROLOGY

## 2023-03-01 PROCEDURE — 99232 SBSQ HOSP IP/OBS MODERATE 35: CPT | Performed by: NURSE PRACTITIONER

## 2023-03-01 PROCEDURE — 97110 THERAPEUTIC EXERCISES: CPT

## 2023-03-01 PROCEDURE — 80053 COMPREHEN METABOLIC PANEL: CPT | Performed by: STUDENT IN AN ORGANIZED HEALTH CARE EDUCATION/TRAINING PROGRAM

## 2023-03-01 PROCEDURE — 83735 ASSAY OF MAGNESIUM: CPT | Performed by: STUDENT IN AN ORGANIZED HEALTH CARE EDUCATION/TRAINING PROGRAM

## 2023-03-01 PROCEDURE — 84100 ASSAY OF PHOSPHORUS: CPT | Performed by: STUDENT IN AN ORGANIZED HEALTH CARE EDUCATION/TRAINING PROGRAM

## 2023-03-01 PROCEDURE — 84484 ASSAY OF TROPONIN QUANT: CPT | Performed by: NURSE PRACTITIONER

## 2023-03-01 PROCEDURE — 25010000002 IMMUNE GLOBULIN (HUMAN) 20 GM/200ML SOLUTION: Performed by: PSYCHIATRY & NEUROLOGY

## 2023-03-01 RX ORDER — LIDOCAINE 50 MG/G
1 PATCH TOPICAL
Status: DISCONTINUED | OUTPATIENT
Start: 2023-03-01 | End: 2023-03-17

## 2023-03-01 RX ORDER — KETOROLAC TROMETHAMINE 15 MG/ML
15 INJECTION, SOLUTION INTRAMUSCULAR; INTRAVENOUS EVERY 6 HOURS PRN
Status: DISCONTINUED | OUTPATIENT
Start: 2023-03-01 | End: 2023-03-03

## 2023-03-01 RX ADMIN — Medication 50 MCG: at 09:56

## 2023-03-01 RX ADMIN — LORAZEPAM 1 MG: 1 TABLET ORAL at 01:44

## 2023-03-01 RX ADMIN — LAMOTRIGINE 200 MG: 100 TABLET ORAL at 09:56

## 2023-03-01 RX ADMIN — KETOROLAC TROMETHAMINE 15 MG: 15 INJECTION, SOLUTION INTRAMUSCULAR; INTRAVENOUS at 16:47

## 2023-03-01 RX ADMIN — HYDROCODONE BITARTRATE AND ACETAMINOPHEN 1 TABLET: 5; 325 TABLET ORAL at 20:36

## 2023-03-01 RX ADMIN — KETOROLAC TROMETHAMINE 15 MG: 15 INJECTION, SOLUTION INTRAMUSCULAR; INTRAVENOUS at 23:13

## 2023-03-01 RX ADMIN — SODIUM CHLORIDE 250 MG: 9 INJECTION, SOLUTION INTRAVENOUS at 05:35

## 2023-03-01 RX ADMIN — APIXABAN 5 MG: 5 TABLET, FILM COATED ORAL at 09:56

## 2023-03-01 RX ADMIN — HYDROCODONE BITARTRATE AND ACETAMINOPHEN 1 TABLET: 5; 325 TABLET ORAL at 11:53

## 2023-03-01 RX ADMIN — Medication 10 ML: at 09:57

## 2023-03-01 RX ADMIN — GABAPENTIN 100 MG: 100 CAPSULE ORAL at 20:36

## 2023-03-01 RX ADMIN — DOCUSATE SODIUM 50MG AND SENNOSIDES 8.6MG 2 TABLET: 8.6; 5 TABLET, FILM COATED ORAL at 09:55

## 2023-03-01 RX ADMIN — FOLIC ACID 1 MG: 1 TABLET ORAL at 09:56

## 2023-03-01 RX ADMIN — LIDOCAINE 1 PATCH: 700 PATCH TOPICAL at 14:59

## 2023-03-01 RX ADMIN — IMMUNE GLOBULIN (HUMAN) 20 G: 10 INJECTION INTRAVENOUS; SUBCUTANEOUS at 10:42

## 2023-03-01 RX ADMIN — DESVENLAFAXINE SUCCINATE 25 MG: 25 TABLET, EXTENDED RELEASE ORAL at 09:56

## 2023-03-01 RX ADMIN — HYDROCODONE BITARTRATE AND ACETAMINOPHEN 1 TABLET: 5; 325 TABLET ORAL at 05:35

## 2023-03-01 RX ADMIN — LORAZEPAM 1 MG: 1 TABLET ORAL at 20:36

## 2023-03-01 RX ADMIN — Medication 10 ML: at 20:40

## 2023-03-01 NOTE — THERAPY TREATMENT NOTE
Patient Name: Louise GARCIA  : 1965    MRN: [id number]                              Today's Date: 3/1/2023       Admit Date: 2023    Visit Dx:     ICD-10-CM ICD-9-CM   1. Paresthesia  R20.2 782.0   2. Gait instability  R26.81 781.2   3. Extremity numbness  R20.0 782.0     Patient Active Problem List   Diagnosis   • Sepsis (HCC)   • Neck pain, chronic   • Upper back pain   • Paresthesia   • Cervical myelopathy (HCC)   • Lower extremity weakness   • History of blood clots   • Chronic anticoagulation   • Seizures (HCC)   • Leukocytosis   • Normocytic anemia   • Hyperglycemia   • GERD (gastroesophageal reflux disease)   • Guillain-Centuria syndrome (HCC)   • Situational mixed anxiety and depressive disorder     Past Medical History:   Diagnosis Date   • Degenerative disc disease, cervical    • Gestational diabetes    • H/O blood clots    • Hematemesis    • Seizures (HCC)    • Septic shock (HCC)      Past Surgical History:   Procedure Laterality Date   • APPENDECTOMY     • BACK SURGERY     • CHOLECYSTECTOMY     • ENDOSCOPY N/A 2016    Procedure: ESOPHAGOGASTRODUODENOSCOPY with biopsy;  Surgeon: Austen Brito MD;  Location: [facility] ENDOSCOPY;  Service:    • HYSTERECTOMY     • NECK SURGERY       approx 6 yrs ago   • TONSILLECTOMY     • TONSILLECTOMY AND ADENOIDECTOMY        General Information     Row Name 23 1110          Physical Therapy Time and Intention    Document Type therapy note (daily note)  -MS     Mode of Treatment physical therapy  -MS     Row Name 23 1110          General Information    Existing Precautions/Restrictions fall  -MS     Row Name 23 1110          Cognition    Orientation Status (Cognition) oriented x 4  -MS     Row Name 23 1110          Safety Issues, Functional Mobility    Comment, Safety Issues/Impairments (Mobility) Gait belt and non skid socks donned.  -MS           User Key  (r) = Recorded By, (t) = Taken By, (c) = Cosigned By    Initials Name  Provider Type    Елена Stallworth, PT Physical Therapist               Mobility     Row Name 03/01/23 1111          Bed Mobility    Supine-Sit Strandquist (Bed Mobility) standby assist  -MS     Assistive Device (Bed Mobility) bed rails;head of bed elevated  -MS     Row Name 03/01/23 1111          Sit-Stand Transfer    Sit-Stand Strandquist (Transfers) minimum assist (75% patient effort);moderate assist (50% patient effort);verbal cues  -MS     Assistive Device (Sit-Stand Transfers) walker, front-wheeled  -MS     Comment, (Sit-Stand Transfer) Unsuccessful first STS- significant WS anteriorly with knees forward- abruplty sat back down for safety.  -MS     Row Name 03/01/23 1111          Gait/Stairs (Locomotion)    Strandquist Level (Gait) contact guard;verbal cues  -MS     Assistive Device (Gait) walker, front-wheeled  -MS     Distance in Feet (Gait) 15'  -MS     Comment, (Gait/Stairs) Chair followed behind patient for safety, minimally unsteady with wide DASHA, agreeable to sitting UIC  -MS           User Key  (r) = Recorded By, (t) = Taken By, (c) = Cosigned By    Initials Name Provider Type    Елена Stallworth, PT Physical Therapist               Obj/Interventions     Row Name 03/01/23 1124          Motor Skills    Therapeutic Exercise --  Seated LAQs x 10 reps  -MS     Row Name 03/01/23 1124          Balance    Static Sitting Balance standby assist  -MS     Dynamic Sitting Balance standby assist  -MS     Position, Sitting Balance sitting edge of bed  -MS     Static Standing Balance contact guard  -MS     Position/Device Used, Standing Balance supported;walker, rolling  -MS           User Key  (r) = Recorded By, (t) = Taken By, (c) = Cosigned By    Initials Name Provider Type    Елена Stallworth, PT Physical Therapist               Goals/Plan    No documentation.                Clinical Impression     Row Name 03/01/23 1124          Pain    Pretreatment Pain Rating 0/10 - no pain  -MS      Posttreatment Pain Rating 0/10 - no pain  -MS     Row Name 03/01/23 1124          Plan of Care Review    Plan of Care Reviewed With patient;family  -MS     Progress improving  -MS     Outcome Evaluation Patient pleasant and agreeable to PT this AM. Increased ambulation distance to 15' CGA with a RW today- chair pulled behind patient to ensure safety. Anxious at times but overall improvement noted. 2 STS required as first was unsuccessful- transfers appear to be patient's most difficult task currently. PT will continue to follow.  -MS           User Key  (r) = Recorded By, (t) = Taken By, (c) = Cosigned By    Initials Name Provider Type    MS GautamЕлена, PT Physical Therapist               Outcome Measures     Row Name 03/01/23 1127          How much help from another person do you currently need...    Turning from your back to your side while in flat bed without using bedrails? 4  -MS     Moving from lying on back to sitting on the side of a flat bed without bedrails? 3  -MS     Moving to and from a bed to a chair (including a wheelchair)? 2  -MS     Standing up from a chair using your arms (e.g., wheelchair, bedside chair)? 2  -MS     Climbing 3-5 steps with a railing? 1  -MS     To walk in hospital room? 2  -MS     AM-PAC 6 Clicks Score (PT) 14  -MS     Highest level of mobility 4 --> Transferred to chair/commode  -MS     Row Name 03/01/23 0849          Functional Assessment    Outcome Measure Options Tinetti  -MS           User Key  (r) = Recorded By, (t) = Taken By, (c) = Cosigned By    Initials Name Provider Type    Елена Stallworth PT Physical Therapist                             Physical Therapy Education     Title: PT OT SLP Therapies (In Progress)     Topic: Physical Therapy (Done)     Point: Mobility training (Done)     Learning Progress Summary           Patient Acceptance, E,TB, VU by MS at 3/1/2023 1135    Acceptance, E,TB, VU by MS at 2/28/2023 1105    Acceptance, E,TB,D, VU,DU by LB at  2/26/2023 1159    Acceptance, E,TB, VU,DU by LB at 2/25/2023 1542    Acceptance, E,D, VU,NR by EB at 2/24/2023 1254    Acceptance, E,D, VU,NR by EB at 2/23/2023 1057    Acceptance, E,TB, VU,NR by EB1 at 2/21/2023 1134   Significant Other Acceptance, E,TB, VU,NR by EB1 at 2/21/2023 1134                   Point: Home exercise program (Done)     Learning Progress Summary           Patient Acceptance, E,TB, VU by MS at 2/28/2023 1105    Acceptance, E,TB,D, VU,DU by LB at 2/26/2023 1159    Acceptance, E,TB, VU,DU by LB at 2/25/2023 1542    Acceptance, E,D, VU,NR by EB at 2/24/2023 1254    Acceptance, E,D, VU,NR by EB at 2/23/2023 1057                   Point: Body mechanics (Done)     Learning Progress Summary           Patient Acceptance, E,TB,D, VU,DU by LB at 2/26/2023 1159    Acceptance, E,TB, VU,DU by LB at 2/25/2023 1542    Acceptance, E,D, VU,NR by EB at 2/24/2023 1254    Acceptance, E,D, VU,NR by EB at 2/23/2023 1057                   Point: Precautions (Done)     Learning Progress Summary           Patient Acceptance, E,TB,D, VU,DU by LB at 2/26/2023 1159    Acceptance, E,TB, VU,DU by LB at 2/25/2023 1542    Acceptance, E,D, VU,NR by EB at 2/24/2023 1254    Acceptance, E,D, VU,NR by EB at 2/23/2023 1057                               User Key     Initials Effective Dates Name Provider Type Discipline    LB 08/09/20 -  Nia Arellano, PT Physical Therapist PT    MS 06/16/21 -  Елена Florez PT Physical Therapist PT    EB 02/14/23 -  Caren James, PTA Physical Therapist Assistant PT    EB1 01/12/23 -  Yesica Zamora, PT Student PT Student PT              PT Recommendation and Plan     Plan of Care Reviewed With: patient, family  Progress: improving  Outcome Evaluation: Patient pleasant and agreeable to PT this AM. Increased ambulation distance to 15' CGA with a RW today- chair pulled behind patient to ensure safety. Anxious at times but overall improvement noted. 2 STS required as first was unsuccessful-  transfers appear to be patient's most difficult task currently. PT will continue to follow.     Time Calculation:    PT Charges     Row Name 03/01/23 1110             Time Calculation    Start Time 1005  -MS      Stop Time 1024  -MS      Time Calculation (min) 19 min  -MS      PT Received On 03/01/23  -MS      PT - Next Appointment 03/02/23  -MS      PT Goal Re-Cert Due Date 03/08/23  -MS            User Key  (r) = Recorded By, (t) = Taken By, (c) = Cosigned By    Initials Name Provider Type    MS FlorezЕлена, PT Physical Therapist              Therapy Charges for Today     Code Description Service Date Service Provider Modifiers Qty    80364991439 HC PT THER PROC EA 15 MIN 2/28/2023 Елена Florez, PT GP 1    72948622475 HC PT THER SUPP EA 15 MIN 2/28/2023 Елена Florez, PT GP 1    27860517583 HC PT THER PROC EA 15 MIN 3/1/2023 Елена Florez, PT GP 1    [id number]  PT THER SUPP EA 15 MIN 3/1/2023 Елена Florez, PT GP 1          PT G-Codes  Outcome Measure Options: Tinetti  AM-PAC 6 Clicks Score (PT): 14  AM-PAC 6 Clicks Score (OT): 15  Modified Parisa Scale: 4 - Moderately severe disability.  Unable to walk without assistance, and unable to attend to own bodily needs without assistance.  PT Discharge Summary  Anticipated Discharge Disposition (PT): inpatient rehabilitation facility    Елена Florez PT  3/1/2023

## 2023-03-01 NOTE — PLAN OF CARE
Goal Outcome Evaluation:  Plan of Care Reviewed With: patient, family        Progress: improving  Outcome Evaluation: Pt participated in OT this morning. PT present for cotreat to maxmize therepeutic benefit. Supine>sit w/ SBA using bed rails, HOB elevated. Donned socks w/ setup from EOB. Attempted 1st sit>stand from EOB, was unsuccessful. 2nd attempt sit>stand successful w/ Min A x 2<>Mod A x 2. Performed functional mobility (household distance) w/ CGA x 1 using RW. Chair follow for safety. Stand>sit w/ CGA. Improvements w/ overall mobility this date,  but Continues to present w/ decreased endurance and needing assist for sit>stand. OT to continue to follow to address stated deficits.

## 2023-03-01 NOTE — NURSING NOTE
C/o chest pain over right breast. States it worsens with taking a breath. C/o shortness of breath as well. JASON PEDROZA notified. Orders received.

## 2023-03-01 NOTE — THERAPY TREATMENT NOTE
Patient Name: Louise GARCIA  : 1965    MRN: [id number]                              Today's Date: 3/1/2023       Admit Date: 2023    Visit Dx:     ICD-10-CM ICD-9-CM   1. Paresthesia  R20.2 782.0   2. Gait instability  R26.81 781.2   3. 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)   • Situational mixed anxiety and depressive disorder     Past Medical History:   Diagnosis Date   • Degenerative disc disease, cervical    • Gestational diabetes    • H/O blood clots    • Hematemesis    • Seizures (HCC)    • Septic shock (HCC)      Past Surgical History:   Procedure Laterality Date   • APPENDECTOMY     • BACK SURGERY     • CHOLECYSTECTOMY     • ENDOSCOPY N/A 2016    Procedure: ESOPHAGOGASTRODUODENOSCOPY with biopsy;  Surgeon: Austen Brito MD;  Location: [facility] ENDOSCOPY;  Service:    • HYSTERECTOMY     • NECK SURGERY       approx 6 yrs ago   • TONSILLECTOMY     • TONSILLECTOMY AND ADENOIDECTOMY        General Information     Row Name 23 110          OT Time and Intention    Document Type therapy note (daily note)  -KG     Mode of Treatment co-treatment;physical therapy;occupational therapy  -KG     Row Name 23 110          General Information    Patient Profile Reviewed yes  -KG           User Key  (r) = Recorded By, (t) = Taken By, (c) = Cosigned By    Initials Name Provider Type    KG Yomi Harris OT Occupational Therapist                 Mobility/ADL's     Row Name 23 1109          Bed Mobility    Bed Mobility supine-sit  -KG     Supine-Sit [place] (Bed Mobility) standby assist  -KG     Assistive  Device (Bed Mobility) bed rails;head of bed elevated  -KG     Row Name 03/01/23 1109          Transfers    Transfers sit-stand transfer;stand-sit transfer  -KG     Row Name 03/01/23 1109          Sit-Stand Transfer    Sit-Stand [place] (Transfers) moderate assist (50% patient effort)  -KG     Assistive Device (Sit-Stand Transfers) walker, front-wheeled  -KG     Row Name 03/01/23 1109          Stand-Sit Transfer    Stand-Sit [place] (Transfers) contact guard  -KG     Assistive Device (Stand-Sit Transfers) walker, front-wheeled  -KG     Row Name 03/01/23 1109          Functional Mobility    Functional Mobility- Ind. Level --  Performed household distance functional mobility w/ CGA x 1 using RW.  -KG     Functional Mobility- Device walker, front-wheeled  -KG     Row Name 03/01/23 1109          Activities of Daily Living    BADL Assessment/Intervention lower body dressing  -KG     Row Name 03/01/23 1109          Lower Body Dressing Assessment/Training    [place] Level (Lower Body Dressing) don;socks;set up  -KG     Position (Lower Body Dressing) edge of bed sitting  -KG           User Key  (r) = Recorded By, (t) = Taken By, (c) = Cosigned By    Initials Name Provider Type    Yomi Mathis OT Occupational Therapist               Obj/Interventions     Row Name 03/01/23 1111          Balance    Balance Assessment --  G dynamic/static sitting balance, F+ static/dynamic standing balance.  -KG     Balance Interventions sitting;standing;static;dynamic;occupation based/functional task  -KG           User Key  (r) = Recorded By, (t) = Taken By, (c) = Cosigned By    Initials Name Provider Type    Yomi Mathis OT Occupational Therapist               Goals/Plan    No documentation.                Clinical Impression     Row Name 03/01/23 1112          Pain Assessment    Pretreatment Pain Rating 0/10 - no pain  -KG     Posttreatment Pain Rating 0/10 - no pain  -KG     Row Name 03/01/23 1112          Plan of  Care Review    Plan of Care Reviewed With patient;family  -KG     Progress improving  -KG     Outcome Evaluation Pt participated in OT this morning. PT present for cotreat to maxmize therepeutic benefit. Supine>sit w/ SBA using bed rails, HOB elevated. Donned socks w/ setup from EOB. Attempted 1st sit>stand from EOB, was unsuccessful. 2nd attempt sit>stand successful w/ Min A x 2<>Mod A x 2. Performed functional mobility (household distance) w/ CGA x 1 using RW. Chair follow for safety. Stand>sit w/ CGA. Improvements w/ overall mobility this date,  but Continues to present w/ decreased endurance and needing assist for sit>stand. OT to continue to follow to address stated deficits.  -KG     Row Name 03/01/23 1112          Vital Signs    Pre Patient Position Supine  -KG     Intra Patient Position Standing  -KG     Post Patient Position Sitting  -KG     Row Name 03/01/23 1112          Positioning and Restraints    Pre-Treatment Position in bed  -KG     Post Treatment Position chair  -KG     In Chair notified nsg;reclined;call light within reach;exit alarm on;with family/caregiver  -KG           User Key  (r) = Recorded By, (t) = Taken By, (c) = Cosigned By    Initials Name Provider Type    KG Yomi Harris OT Occupational Therapist               Outcome Measures     Row Name 03/01/23 0849          Functional Assessment    Outcome Measure Options [place]  -MS           User Key  (r) = Recorded By, (t) = Taken By, (c) = Cosigned By    Initials Name Provider Type    MS FlorezЕлена, PT Physical Therapist                Occupational Therapy Education     Title: PT OT SLP Therapies (In Progress)     Topic: Occupational Therapy (In Progress)     Point: ADL training (Done)     Description:   Instruct learner(s) on proper safety adaptation and remediation techniques during self care or transfers.   Instruct in proper use of assistive devices.              Learning Progress Summary           Patient Acceptance, E, VU by   at 2/27/2023 1522    Comment: role of OT, d/c rec, goals of care   Family Acceptance, E, VU by  at 2/27/2023 1522    Comment: role of OT, d/c rec, goals of care                   Point: Home exercise program (Not Started)     Description:   Instruct learner(s) on appropriate technique for monitoring, assisting and/or progressing therapeutic exercises/activities.              Learner Progress:  Not documented in this visit.          Point: Precautions (Done)     Description:   Instruct learner(s) on prescribed precautions during self-care and functional transfers.              Learning Progress Summary           Patient Acceptance, E, VU by  at 2/27/2023 1522    Comment: role of OT, d/c rec, goals of care   Family Acceptance, E, VU by  at 2/27/2023 1522    Comment: role of OT, d/c rec, goals of care                   Point: Body mechanics (Done)     Description:   Instruct learner(s) on proper positioning and spine alignment during self-care, functional mobility activities and/or exercises.              Learning Progress Summary           Patient Acceptance, E, VU by  at 2/27/2023 1522    Comment: role of OT, d/c rec, goals of care   Family Acceptance, E, VU by  at 2/27/2023 1522    Comment: role of OT, d/c rec, goals of care                               User Key     Initials Effective Dates Name Provider Type Discipline     08/20/21 -  Nathalia Holly OT Occupational Therapist OT              OT Recommendation and Plan     Plan of Care Review  Plan of Care Reviewed With: patient, family  Progress: improving  Outcome Evaluation: Pt participated in OT this morning. PT present for cotreat to [facility] therepeutic benefit. Supine>sit w/ SBA using bed rails, HOB elevated. Donned socks w/ setup from EOB. Attempted 1st sit>stand from EOB, was unsuccessful. 2nd attempt sit>stand successful w/ Min A x 2<>Mod A x 2. Performed functional mobility (household distance) w/ CGA x 1 using RW. Chair follow for  safety. Stand>sit w/ CGA. Improvements w/ overall mobility this date,  but Continues to present w/ decreased endurance and needing assist for sit>stand. OT to continue to follow to address stated deficits.     Time Calculation:    Time Calculation- OT     Row Name 03/01/23 1120             Time Calculation- OT    OT Start Time 1005  -KG      OT Stop Time 1024  -KG      OT Time Calculation (min) 19 min  -KG      Total Timed Code Minutes- OT 19 minute(s)  -KG      OT Received On 03/01/23  -KG      OT - Next Appointment 03/02/23  -KG         Timed Charges    30968 - OT Therapeutic Activity Minutes 19  -KG         Total Minutes    Timed Charges Total Minutes 19  -KG       Total Minutes 19  -KG            User Key  (r) = Recorded By, (t) = Taken By, (c) = Cosigned By    Initials Name Provider Type    KG Yomi Harris OT Occupational Therapist              Therapy Charges for Today     Code Description Service Date Service Provider Modifiers Qty    11731476990 HC OT THERAPEUTIC ACT EA 15 MIN 3/1/2023 Yomi Harris OT GO 1               Yomi Harris OT  3/1/2023

## 2023-03-01 NOTE — PROGRESS NOTES
DOS: 3/1/2023  NAME: Louise GARCIA   : 1965  PCP: Chloe Schaefer APRN    Chief Complaint   Patient presents with   • Fall   • Numbness     Numbness in bilateral arms and legs post falling.         Stroke    Subjective: No acute events overnight.  She states she feels much better and slept much better last night after receiving the gabapentin.  She states that she feels her left leg is still weak.  She would like to get up more today.  No worsening paresthesias.  Denies any new weakness, numbness, speech or visual disturbances, or headaches.   at bedside.    Objective:  Vital signs:      Vitals:    23 1113 23 1149 23 1228 23 1248   BP: 144/81 141/84 143/80 139/88   BP Location: Left arm Left arm Left arm Left arm   Patient Position: Sitting Lying Sitting Lying   Pulse: 93 91 98 92   Resp: 20 20 18 18   Temp: 98.2 °F (36.8 °C) 97.9 °F (36.6 °C) 97.8 °F (36.6 °C) 97.8 °F (36.6 °C)   TempSrc: Oral Oral Oral Oral   SpO2: 94% 97% 94% 93%   Weight:       Height:           Current Facility-Administered Medications:   •  apixaban (ELIQUIS) tablet 5 mg, 5 mg, Oral, Daily, Jake Richmond DO, 5 mg at 23 0956  •  sennosides-docusate (PERICOLACE) 8.6-50 MG per tablet 2 tablet, 2 tablet, Oral, BID, 2 tablet at 23 0955 **AND** polyethylene glycol (MIRALAX) packet 17 g, 17 g, Oral, Daily PRN **AND** bisacodyl (DULCOLAX) EC tablet 5 mg, 5 mg, Oral, Daily PRN **AND** bisacodyl (DULCOLAX) suppository 10 mg, 10 mg, Rectal, Daily PRN, Franci Griffith PA  •  Desvenlafaxine Succinate ER 25 mg, 25 mg, Oral, Daily, Violet Burdick APRN, 25 mg at 23 0956  •  diphenhydrAMINE (BENADRYL) injection 50 mg, 50 mg, Intravenous, Once PRN, Dwayne Jerry MD  •  famotidine (PEPCID) injection 20 mg, 20 mg, Intravenous, Once PRN, Dwayne Jerry MD  •  folic acid (FOLVITE) tablet 1 mg, 1 mg, Oral, Daily, Renata Oro, APRN, 1 mg at 23 0956  •  gabapentin (NEURONTIN)  capsule 100 mg, 100 mg, Oral, Nightly, Gila Pro, APRN, 100 mg at 02/28/23 2153  •  HYDROcodone-acetaminophen (NORCO) 5-325 MG per tablet 1 tablet, 1 tablet, Oral, Q6H PRN, Violet Burdick APRN, 1 tablet at 03/01/23 1153  •  Hydrocortisone Sod Suc (PF) (Solu-CORTEF) injection 100 mg, 100 mg, Intravenous, Once PRN, Dwayne Jerry MD  •  immune globulin (human) 20 g infusion 200 mL, 20 g, Intravenous, Daily, Ryley Arce MD, Stopped at 03/01/23 1250  •  lamoTRIgine (LaMICtal) tablet 200 mg, 200 mg, Oral, Daily, Violet Burdick APRN, 200 mg at 03/01/23 0956  •  lidocaine (LIDODERM) 5 % 1 patch, 1 patch, Transdermal, Q24H, Violet Burdick APRN  •  LORazepam (ATIVAN) tablet 1 mg, 1 mg, Oral, Q6H PRN, Steve Valentino MD, 1 mg at 03/01/23 0144  •  Magnesium Sulfate - Total Dose 10 grams - Magnesium 1 or Less, 2 g, Intravenous, PRN **OR** Magnesium Sulfate - Total Dose 6 grams - Magnesium 1.1 - 1.5, 2 g, Intravenous, PRN, Stopped at 02/21/23 1252 **OR** Magnesium Sulfate - Total Dose 4 grams - Magnesium 1.6 - 1.9, 4 g, Intravenous, PRN, Renata Oro, APRN  •  melatonin tablet 5 mg, 5 mg, Oral, Nightly PRN, Renata Oro, APRN, 5 mg at 02/28/23 2200  •  methylPREDNISolone sodium succinate (SOLU-Medrol) 250 mg in sodium chloride 0.9 % 100 mL IVPB, 250 mg, Intravenous, Q AM, Ryley Arce MD, Last Rate: 200 mL/hr at 03/01/23 0535, 250 mg at 03/01/23 0535  •  Pharmacy to enter IVIG order 20,000 mg, 400 mg/kg (Adjusted), Intravenous, Daily, Dwayne Jerry MD, 20,000 mg at 02/27/23 1706  •  potassium & sodium phosphates (PHOS-NAK) 280-160-250 MG packet - for Phosphorus less than 1.25 mg/dL, 2 packet, Oral, Q6H PRN **OR** potassium & sodium phosphates (PHOS-NAK) 280-160-250 MG packet - for Phosphorus 1.25 - 2.5 mg/dL, 2 packet, Oral, Q6H PRN, Jake Richmond, DO  •  potassium chloride (K-DUR,KLOR-CON) ER tablet 40 mEq, 40 mEq, Oral, PRN **OR** potassium chloride (KLOR-CON) packet  40 mEq, 40 mEq, Oral, PRN **OR** potassium chloride 10 mEq in 100 mL IVPB, 10 mEq, Intravenous, Q1H PRN, Jake Richmond DO  •  [COMPLETED] Insert Peripheral IV, , , Once **AND** sodium chloride 0.9 % flush 10 mL, 10 mL, Intravenous, PRN, Shin Rivers PA  •  sodium chloride 0.9 % flush 10 mL, 10 mL, Intravenous, Q12H, Qadah, Abdalhakim, APRN, 10 mL at 03/01/23 0957  •  sodium chloride 0.9 % flush 10 mL, 10 mL, Intravenous, PRN, Qadah, Abdalhakim, APRN  •  sodium chloride 0.9 % infusion 40 mL, 40 mL, Intravenous, PRN, Qadah, Abdalhakim, APRN, 40 mL at 02/23/23 0603  •  vitamin B-12 (CYANOCOBALAMIN) tablet 50 mcg, 50 mcg, Oral, Daily, Qadah, Abdalhakim, APRN, 50 mcg at 03/01/23 0956    PRN meds  •  senna-docusate sodium **AND** polyethylene glycol **AND** bisacodyl **AND** bisacodyl  •  diphenhydrAMINE  •  famotidine  •  HYDROcodone-acetaminophen  •  hydrocortisone sodium succinate  •  LORazepam  •  magnesium sulfate **OR** magnesium sulfate **OR** magnesium sulfate  •  melatonin  •  potassium & sodium phosphates **OR** potassium & sodium phosphates  •  potassium chloride **OR** potassium chloride **OR** potassium chloride  •  [COMPLETED] Insert Peripheral IV **AND** sodium chloride  •  sodium chloride  •  sodium chloride    No current facility-administered medications on file prior to encounter.     Current Outpatient Medications on File Prior to Encounter   Medication Sig   • acetaminophen (TYLENOL) 325 MG tablet Take 650 mg by mouth Every 6 (Six) Hours As Needed for Mild Pain .   • apixaban (ELIQUIS) 5 MG tablet tablet Take 5 mg by mouth Daily.   • folic acid (FOLVITE) 1 MG tablet Take 1 mg by mouth Daily.   • ibuprofen (ADVIL,MOTRIN) 200 MG tablet Take 200 mg by mouth Every 6 (Six) Hours As Needed for Mild Pain .   • lamoTRIgine (LaMICtal) 100 MG tablet Take 100 mg by mouth Daily.   • nitrofurantoin, macrocrystal-monohydrate, (MACROBID) 100 MG capsule Take 800 mg by mouth 2 (Two) Times a Day.   • vitamin B-12  (CYANOCOBALAMIN) 100 MCG tablet Take 50 mcg by mouth Daily.   • diazepam (VALIUM) 10 MG tablet TAKE 1 TABLET THREE TIMES A DAY   • estrogens, conjugated,-methyltestosterone (ESTRATEST HS) 0.625-1.25 MG per tablet Take  by mouth.   • pantoprazole (PROTONIX) 40 MG EC tablet Take 1 tablet by mouth 2 (two) times a day before meals.   • sucralfate (CARAFATE) 1 GM/10ML suspension Take 10 mL by mouth every 6 (six) hours.       General appearance: NAD, alert and cooperative, well groomed  HEENT: Normocephalic, atraumatic, right pupil 3, left pupil 3, no masses or tenderness  Resp: Even and unlabored  Extremities: No obvious edema  Skin: warm, dry     Neurological:   MS: oriented x3, able to correctly identify wall clock time, recent/remote memory intact, normal attention/concentration, language intact however noted dysphonia, no neglect, normal fund of knowledge  CN: visual acuity grossly normal, visual fields full, EOMI, facial sensation equal, no facial droop, palate elevates symmetrically, tongue midline  Motor: 5/5 in upper extremities, 4/5 LLE worse proximally, 4/5 RLE  Reflexes: Areflexic in lower extremities, diminished in upper extremities  Sensory: Diminished cold temperature sensation of bilateral lower extremities, no sense of vibratory sensation of lower extremities or abdomen just below the navel   Coordination: Normal finger to nose test  Gait and station: Deferred  Rapid alternating movements: normal finger to thumb tap    Physical exam performed, changes noted.    Laboratory results:  Lab Results   Component Value Date    TSH 1.670 02/26/2023     Lab Results   Component Value Date    HGBA1C 5.10 02/20/2023     Lab Results   Component Value Date    LCENYYHM58 534 02/20/2023     No results found for: CHOL, CHLPL  No results found for: TRIG  No results found for: HDL  No results found for: LDL, LDLDIRECT  Lab Results   Component Value Date    WBC 19.16 (H) 03/01/2023    HGB 9.4 (L) 03/01/2023    HCT 27.6 (L)  03/01/2023    .7 (H) 03/01/2023     03/01/2023     Lab Results   Component Value Date    GLUCOSE 122 (H) 03/01/2023    BUN 17 03/01/2023    CREATININE 0.61 03/01/2023    EGFRIFNONA 81 08/29/2016    EGFRIFAFRI  08/29/2016      Comment:      <15 Indicative of kidney failure.    BCR 27.9 (H) 03/01/2023    K 3.7 03/01/2023    CO2 27.0 03/01/2023    CALCIUM 8.1 (L) 03/01/2023    ALBUMIN 2.7 (L) 03/01/2023    LABIL2 1.7 03/01/2019    AST 34 (H) 03/01/2023    ALT 37 (H) 03/01/2023     Lab Results   Component Value Date    PTT 25.4 03/01/2019     Lab Results   Component Value Date    INR 1.0 03/01/2019    INR 1.1 05/24/2018    INR 1.3 05/23/2018    PROTIME 11.2 03/01/2019    PROTIME 12.3 05/24/2018    PROTIME 13.9 (H) 05/23/2018     Brief Urine Lab Results  (Last result in the past 365 days)      Color   Clarity   Blood   Leuk Est   Nitrite   Protein   CREAT   Urine HCG        02/20/23 1732 Dark Yellow   Cloudy   Negative   Trace   Negative   Trace               Folate >20.00  RPR nonreactive  CK 25     Review and interpretation of imaging:  MRI Brain With & Without Contrast    Result Date: 2/23/2023   Moderate T2 hyperintensity within the periventricular and subcortical white matter, many of which have a pericallosal distribution. No definite abnormal cranial enhancement or restricted diffusion. Differential considerations include demyelination such as multiple sclerosis versus chronic ischemic white matter changes and correlation with patient history is recommended as well as continued attention on follow-up.  This report was finalized on 2/23/2023 5:09 PM by Dr. Juan Duckworth M.D.      MRI Cervical Spine With & Without Contrast    Result Date: 2/21/2023   Multilevel spondyloarthropathy, as detailed above.  This report was finalized on 2/21/2023 6:14 PM by Dr. Kurt Garrison M.D.      MRI Thoracic Spine With & Without Contrast    Result Date: 2/21/2023   Multilevel spondyloarthropathy, as detailed  above.  This report was finalized on 2/21/2023 6:14 PM by Dr. Kurt Garrison M.D.      IR LUMBAR PUNCTURE DIAGNOSTIC    Result Date: 2/24/2023  Technically successful fluoroscopically guided lumbar puncture.  FLUOROSCOPY TIME: 5 seconds, 2 images.  This report was finalized on 2/24/2023 11:22 AM by Dr. Agustin Reyes M.D.      FL Video Swallow Single Contrast    Result Date: 2/22/2023  Fluoroscopy was provided for the speech pathologist during a video swallow study. For full details please see the speech pathology report.  This report was finalized on 2/22/2023 3:47 PM by Dr. Ryley Nascimento M.D.      MRI Lumbar Spine With & Without Contrast    Result Date: 2/25/2023  Electronically signed by Lanny Wilkinson MD on 02-25-23 at 4554      Impression/Assessment:  This is a 58-year-old female with past medical history of cervical DDD s/p C6 corpectomy and C5-7 ACDF for disc herniation with radiculopathy and weakness, blood clots on Eliquis 5 mg twice daily PTA, gestational diabetes, reported seizures on Lamictal 100 mg daily PTA, anxiety on Valium 1 tablet 3 times daily, reported history of alcohol abuse who presented to the hospital on 2/20/2023 with complaints of whole body numbness that started after a fall earlier this month.  She reported initially she had a numbness from her neck down her chest, abdomen, pubic area, and down both legs.  She began to notice some urinary urgency and incontinence as well as constipation a few days later.  She denied any numbness involving her upper extremities but did note some numbness of the tips of her fingers.  She noted that she was becoming weak as well progressively in her legs which is worse on the left. Reportedly she also had an additional fall on 2/20 leaving work due to her weakness.     She was initially evaluated by Dr. Arce with our service who ordered full spinal and brain imaging with contrasted MRI as well as a lumbar puncture.  He was concern for an acute  traumatic myelopathy and initiated her on Solu-Medrol 1 g IV daily which she received 3 doses of and then was reduced to 500 daily which she received 2 doses of it and then reduce to 250 mg daily which she received 3 doses. Her spinal imaging was essentially unrevealing showing multilevel spondyloarthropathy but no significant canal stenosis.  Her MRI brain revealed mild to moderate white matter changes otherwise no other acute findings.  Her spinal fluid was also unremarkable showing 0 TNCs, a protein level 38.2, glucose 93, and RBC 4.  She has had serum autoimmune testing including antiscleroderma antibodies, gaol 1 IgG, JEANNETTE SSB antibody, JEANNETTE SSA antibody, anti-DNA antibody, antichromatin antibody, anticentromere B antibodies, [place] virus, IgA 185, IgG 493, IgM 178 which have all been negative.  She did receive an EMG/NCS by Dr. Caleb Reyes yesterday that revealed findings consistent with peripheral neuropathy with mild evidence in the upper extremity with sensory involvement and mild to moderate involvement of the lower extremity with sensorimotor involvement, no evidence of right cervical or lumbosacral radiculopathy.  We are still awaiting oligoclonal banding, heavy metal profile, ACE, Western blot Lyme, SPEP/IEP, cryoglobulins, and NMO/aqua foreign results.      Although her EMG/NCS did not show typical features of GBS given concerning clinical exam especially with areflexia the decision was made to start her on IVIG based on clinical suspicion for a total of 5 doses.     Diagnosis:  Progressive generalized numbness and weakness  Recurrent falls  History of cervical degenerative disc disease s/p corpectomy and ACDF  Generalized anxiety  History of alcohol abuse    Plan:  Looks better today, no worsening paresthesias.  Abdominal numbness improving.   Continue Gabapentin 100mg nightly  On day 3 of IVIG, tolerating well.  I have sent a message to the office to have her follow-up with Dr. Caleb Reyes in the  outpatient setting.  We will continue to follow.    Case discussed with patient, , RN, and Dr. Jerry, and he agrees with plan above.    KASIA Melo

## 2023-03-01 NOTE — PROGRESS NOTES
Name: Louise GARCIA ADMIT: 2023   : 1965  PCP: Chloe Schaefer APRN    MRN: [id number] LOS: 6 days   AGE/SEX: 58 y.o. female  ROOM: [facility]     Subjective   Subjective   Aide is at bedside.  Weakness in left leg unchanged and worse then right.  Anxiety and depression is better today.  He is complaining of right-sided chest wall pain near her breast that is worse with palpation, movement of right arm.  No cardiac complaints.    Review of Systems   Constitutional: Positive for fatigue. Negative for chills and fever.   HENT: Positive for voice change. Negative for trouble swallowing.    Respiratory: Negative for shortness of breath.    Cardiovascular: Negative for chest pain and palpitations.   Gastrointestinal: Negative for abdominal distention and abdominal pain.   Neurological: Positive for weakness. Negative for dizziness, syncope and light-headedness.        Objective   Objective   Vital Signs  Temp:  [97.8 °F (36.6 °C)-98.4 °F (36.9 °C)] 97.8 °F (36.6 °C)  Heart Rate:  [] 115  Resp:  [18-20] 18  BP: (131-158)/(67-98) 139/88  SpO2:  [87 %-97 %] 93 %  on   ;   Device (Oxygen Therapy): room air  Body mass index is 22.86 kg/m².  Physical Exam  Vitals and nursing note reviewed.   Constitutional:       General: She is not in acute distress.     Appearance: She is ill-appearing.      Comments: Appears uncomfortable   Eyes:      General: No scleral icterus.     Extraocular Movements: Extraocular movements intact.   Cardiovascular:      Rate and Rhythm: Regular rhythm. Tachycardia present.      Pulses: Normal pulses.      Heart sounds: Normal heart sounds.   Pulmonary:      Effort: Pulmonary effort is normal. No respiratory distress.      Breath sounds: Normal breath sounds. No wheezing, rhonchi or rales.   Abdominal:      General: Bowel sounds are normal.      Palpations: Abdomen is soft.      Tenderness: There is no abdominal tenderness.   Musculoskeletal:      Right lower leg: No edema.      Left  lower leg: No edema.      Comments: Reproducible right-sided chest wall pain under her breast tissue   Skin:     General: Skin is warm and dry.   Neurological:      General: No focal deficit present.      Mental Status: She is alert and oriented to person, place, and time.      Sensory: No sensory deficit.      Motor: Weakness (Bilateral upper extremities 4/5; bilateral lower extremities 3/5) present.   Psychiatric:         Behavior: Behavior normal.         Thought Content: Thought content normal.      Comments: Flat affect depressed       Results Review:  I reviewed the patient's new clinical results.  I reviewed the patient's new imaging results and agree with the interpretation.  I reviewed the patient's other test results and agree with the interpretation  I personally viewed and interpreted the patient's EKG/Telemetry data    Results from last 7 days   Lab Units 03/01/23  0742 02/28/23  0525 02/27/23  0503 02/26/23  0408   WBC 10*3/mm3 19.16* 18.37* 17.32* 15.63*   HEMOGLOBIN g/dL 9.4* 9.7* 10.1* 10.3*   PLATELETS 10*3/mm3 243 195 240 253     Results from last 7 days   Lab Units 03/01/23  0653 02/28/23  0525 02/27/23  0503 02/26/23  0408   SODIUM mmol/L 136 138 138 138   POTASSIUM mmol/L 3.7 3.8 3.5 3.7   CHLORIDE mmol/L 101 102 102 103   CO2 mmol/L 27.0 23.0 22.2 22.9   BUN mg/dL 17 21* 22* 22*   CREATININE mg/dL 0.61 0.68 0.77 0.76   GLUCOSE mg/dL 122* 139* 198* 112*   EGFR mL/min/1.73 103.1 100.5 89.0 90.4     Results from last 7 days   Lab Units 03/01/23  0653 02/28/23  1450 02/28/23  0525 02/27/23  0503 02/26/23  0408   ALBUMIN g/dL 2.7* 2.5* 2.8* 3.1* 3.1*   BILIRUBIN mg/dL 0.5  --  0.4 0.4 0.4   ALK PHOS U/L 89  --  86 77 76   AST (SGOT) U/L 34*  --  29 26 22   ALT (SGPT) U/L 37*  --  34* 28 19     Results from last 7 days   Lab Units 03/01/23  0653 02/28/23  1450 02/28/23  0525 02/27/23  0503 02/26/23  0408   CALCIUM mg/dL 8.1*  --  8.4* 8.6 8.9   ALBUMIN g/dL 2.7* 2.5* 2.8* 3.1* 3.1*   MAGNESIUM mg/dL  1.9  --  2.1 2.0 2.1   PHOSPHORUS mg/dL 3.1  --  2.6 3.0 2.9       No results found for: HGBA1C, POCGLU    No radiology results for the last day  I have personally reviewed all medications:  Scheduled Medications  apixaban, 5 mg, Oral, Daily  desvenlafaxine, 25 mg, Oral, Daily  folic acid, 1 mg, Oral, Daily  gabapentin, 100 mg, Oral, Nightly  immune globulin (human), 20 g, Intravenous, Daily  lamoTRIgine, 200 mg, Oral, Daily  lidocaine, 1 patch, Transdermal, Q24H  methylPREDNISolone sodium succinate, 250 mg, Intravenous, Q AM  Pharmacy to enter IVIG order, 400 mg/kg (Adjusted), Intravenous, Daily  senna-docusate sodium, 2 tablet, Oral, BID  sodium chloride, 10 mL, Intravenous, Q12H  vitamin B-12, 50 mcg, Oral, Daily    Infusions   Diet  Diet: Regular/House Diet; Texture: Regular Texture (IDDSI 7); Fluid Consistency: Thin (IDDSI 0)    I have personally reviewed:  [x]  Laboratory   [x]  Radiology   [x]  EKG/Telemetry       Assessment/Plan     Active Hospital Problems    Diagnosis  POA   • **Paresthesia [R20.2]  Yes   • Situational mixed anxiety and depressive disorder [F43.23]  Yes   • 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       58 y.o. female admitted with Paresthesia.    Traumatic myelopathy  Paresthesias/generalized weakness  - Per Neurology,  findings are concerning for suspected traumatic myelopathy at this time. She is on high dose Corticosteroids, dose subsequently being weaned at present. It appears that tomorrow will be final day of treatment.  MRI Brain with white  matter possibly chronic changes versus MS but no enhancement.    - MRI lumbar spine showing multilevel disc degeneration with annular disc bulging flattening the ventral thecal sac and causing mild subarticular recess stenosis at L2-L5, without any evidence of neural impingement.  Multilevel minimal to moderate facet arthropathy of lumbar spine.  No evidence of fracture, infection, tumor or arachnoiditis.  - SLP evaluated with VFSS. No swallowing restrictions.  - Patient with complaints of hoarseness which is a little better today.   ENT eval as outpatient if persists.  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 Started on IVIG for 3/5 days per neurology. Started on gabapentin.   - Continue PT/OT, given the significant weakness, she needs SNF.    Normocytic anemia  - Hemoglobin low, but stable from prior. No evidence of overt blood loss. No indications for acute intervention at this time.        History of DVT on long term AC  - On Eliquis at home, has been held in setting of procedures but resumed yesterday    Leukocytosis  - Leukocytosis noted during hospital stay, likely 2/2 corticosteroids, patient afebrile, no new signs/symptoms of infection.  Will continue to monitor for now.    Hyperglycemia  - No history of diabetes. Most recent A1c 5.10%, likely 2/2 corticosteroids, suspect this will improve as steroids are weaned. No indication for SSI for now, but will monitor and administer if needed.    Right chest wall pain  Musculoskeletal in nature.  Cardiac work-up negative.  Chest x-ray with atelectasis as expected encourage I-S.  Add Lidoderm patch.  She is already on gabapentin.  If continues to have pain consider Toradol.    Seizure disorder  - Stable. Continue Lamotrigine as prescribed.    GERD  - Stable. Continue PPI as prescribed.     Transaminitis, resolved  - Noted on admission. LFT subsequently improved and now within normal limits.  Continue to monitor.    Situational depression/anxiety  Limit narcotics.  Continue desvenlafaxine. She did not like atarax. Prn ativan.     · SCDs for DVT prophylaxis.  · Full code.  · Discussed with patient, family, nursing staff and consulting provider.  · Anticipate discharge TBKASIA Membreno  [facility]  03/01/23

## 2023-03-01 NOTE — PROGRESS NOTES
[facility]  Continuing to follow progress with therapy. Today is day 3/5 of IVIG.     Wendy Hamlin RN  Acute Rehab Admission Nurse

## 2023-03-01 NOTE — PLAN OF CARE
Goal Outcome Evaluation:  Plan of Care Reviewed With: patient, family        Progress: improving  Outcome Evaluation: Patient pleasant and agreeable to PT this AM. Increased ambulation distance to 15' CGA with a RW today- chair pulled behind patient to ensure safety. Anxious at times but overall improvement noted. 2 STS required as first was unsuccessful- transfers appear to be patient's most difficult task currently. PT will continue to follow.

## 2023-03-02 ENCOUNTER — APPOINTMENT (OUTPATIENT)
Dept: GENERAL RADIOLOGY | Facility: HOSPITAL | Age: 58
DRG: 163 | End: 2023-03-02
Payer: COMMERCIAL

## 2023-03-02 ENCOUNTER — APPOINTMENT (OUTPATIENT)
Dept: ULTRASOUND IMAGING | Facility: HOSPITAL | Age: 58
DRG: 163 | End: 2023-03-02
Payer: COMMERCIAL

## 2023-03-02 ENCOUNTER — APPOINTMENT (OUTPATIENT)
Dept: CT IMAGING | Facility: HOSPITAL | Age: 58
DRG: 163 | End: 2023-03-02
Payer: COMMERCIAL

## 2023-03-02 PROBLEM — R10.10 UPPER ABDOMINAL PAIN: Status: ACTIVE | Noted: 2023-03-02

## 2023-03-02 LAB
ALBUMIN SERPL-MCNC: 2.7 G/DL (ref 3.5–5.2)
ALBUMIN/GLOB SERPL: 0.7 G/DL
ALP SERPL-CCNC: 96 U/L (ref 39–117)
ALT SERPL W P-5'-P-CCNC: 51 U/L (ref 1–33)
ANION GAP SERPL CALCULATED.3IONS-SCNC: 9.6 MMOL/L (ref 5–15)
AST SERPL-CCNC: 40 U/L (ref 1–32)
B BURGDOR IGG PATRN CSF IB-IMP: NEGATIVE
B BURGDOR IGM PATRN CSF IB-IMP: NEGATIVE
B BURGDOR18KD IGG CSF QL IB: NORMAL
B BURGDOR23KD IGG CSF QL IB: NORMAL
B BURGDOR23KD IGM CSF QL IB: NORMAL
B BURGDOR28KD IGG CSF QL IB: NORMAL
B BURGDOR30KD IGG CSF QL IB: NORMAL
B BURGDOR39KD IGG CSF QL IB: NORMAL
B BURGDOR39KD IGM CSF QL IB: NORMAL
B BURGDOR41KD IGG CSF QL IB: NORMAL
B BURGDOR41KD IGM CSF QL IB: NORMAL
B BURGDOR45KD IGG CSF QL IB: NORMAL
B BURGDOR58KD IGG CSF QL IB: NORMAL
B BURGDOR66KD IGG CSF QL IB: NORMAL
B BURGDOR93KD IGG CSF QL IB: NORMAL
BASOPHILS # BLD AUTO: 0.05 10*3/MM3 (ref 0–0.2)
BASOPHILS NFR BLD AUTO: 0.2 % (ref 0–1.5)
BILIRUB SERPL-MCNC: 0.9 MG/DL (ref 0–1.2)
BUN SERPL-MCNC: 23 MG/DL (ref 6–20)
BUN/CREAT SERPL: 33.8 (ref 7–25)
CALCIUM SPEC-SCNC: 8.6 MG/DL (ref 8.6–10.5)
CHLORIDE SERPL-SCNC: 99 MMOL/L (ref 98–107)
CO2 SERPL-SCNC: 26.4 MMOL/L (ref 22–29)
CREAT SERPL-MCNC: 0.68 MG/DL (ref 0.57–1)
D DIMER PPP FEU-MCNC: 0.96 MCGFEU/ML (ref 0–0.59)
D-LACTATE SERPL-SCNC: 3.6 MMOL/L (ref 0.5–2)
D-LACTATE SERPL-SCNC: 3.6 MMOL/L (ref 0.5–2)
DEPRECATED RDW RBC AUTO: 49.9 FL (ref 37–54)
EGFRCR SERPLBLD CKD-EPI 2021: 100.5 ML/MIN/1.73
EOSINOPHIL # BLD AUTO: 0 10*3/MM3 (ref 0–0.4)
EOSINOPHIL NFR BLD AUTO: 0 % (ref 0.3–6.2)
ERYTHROCYTE [DISTWIDTH] IN BLOOD BY AUTOMATED COUNT: 14.2 % (ref 12.3–15.4)
GLOBULIN UR ELPH-MCNC: 3.9 GM/DL
GLUCOSE SERPL-MCNC: 95 MG/DL (ref 65–99)
HAV IGM SERPL QL IA: NORMAL
HBV CORE IGM SERPL QL IA: NORMAL
HBV SURFACE AG SERPL QL IA: NORMAL
HCT VFR BLD AUTO: 28.7 % (ref 34–46.6)
HCV AB SER DONR QL: NORMAL
HGB BLD-MCNC: 9.9 G/DL (ref 12–15.9)
IMM GRANULOCYTES # BLD AUTO: 0.38 10*3/MM3 (ref 0–0.05)
IMM GRANULOCYTES NFR BLD AUTO: 1.6 % (ref 0–0.5)
LIPASE SERPL-CCNC: 29 U/L (ref 13–60)
LYMPHOCYTES # BLD AUTO: 1.34 10*3/MM3 (ref 0.7–3.1)
LYMPHOCYTES NFR BLD AUTO: 5.7 % (ref 19.6–45.3)
MAGNESIUM SERPL-MCNC: 2.1 MG/DL (ref 1.6–2.6)
MCH RBC QN AUTO: 33.9 PG (ref 26.6–33)
MCHC RBC AUTO-ENTMCNC: 34.5 G/DL (ref 31.5–35.7)
MCV RBC AUTO: 98.3 FL (ref 79–97)
MONOCYTES # BLD AUTO: 0.9 10*3/MM3 (ref 0.1–0.9)
MONOCYTES NFR BLD AUTO: 3.8 % (ref 5–12)
NEUTROPHILS NFR BLD AUTO: 20.73 10*3/MM3 (ref 1.7–7)
NEUTROPHILS NFR BLD AUTO: 88.7 % (ref 42.7–76)
NRBC BLD AUTO-RTO: 0.1 /100 WBC (ref 0–0.2)
PHOSPHATE SERPL-MCNC: 3.8 MG/DL (ref 2.5–4.5)
PLATELET # BLD AUTO: 168 10*3/MM3 (ref 140–450)
PMV BLD AUTO: 11.9 FL (ref 6–12)
POTASSIUM SERPL-SCNC: 4.1 MMOL/L (ref 3.5–5.2)
PROCALCITONIN SERPL-MCNC: 0.43 NG/ML (ref 0–0.25)
PROT SERPL-MCNC: 6.6 G/DL (ref 6–8.5)
RBC # BLD AUTO: 2.92 10*6/MM3 (ref 3.77–5.28)
SODIUM SERPL-SCNC: 135 MMOL/L (ref 136–145)
VIT B12 BLD-MCNC: 298 PG/ML (ref 211–946)
WBC NRBC COR # BLD: 23.4 10*3/MM3 (ref 3.4–10.8)

## 2023-03-02 PROCEDURE — 84100 ASSAY OF PHOSPHORUS: CPT | Performed by: STUDENT IN AN ORGANIZED HEALTH CARE EDUCATION/TRAINING PROGRAM

## 2023-03-02 PROCEDURE — 87040 BLOOD CULTURE FOR BACTERIA: CPT | Performed by: INTERNAL MEDICINE

## 2023-03-02 PROCEDURE — 84145 PROCALCITONIN (PCT): CPT | Performed by: NURSE PRACTITIONER

## 2023-03-02 PROCEDURE — 87147 CULTURE TYPE IMMUNOLOGIC: CPT | Performed by: INTERNAL MEDICINE

## 2023-03-02 PROCEDURE — 25010000002 IMMUNE GLOBULIN (HUMAN) 20 GM/200ML SOLUTION: Performed by: PSYCHIATRY & NEUROLOGY

## 2023-03-02 PROCEDURE — 76705 ECHO EXAM OF ABDOMEN: CPT

## 2023-03-02 PROCEDURE — 74176 CT ABD & PELVIS W/O CONTRAST: CPT

## 2023-03-02 PROCEDURE — 99232 SBSQ HOSP IP/OBS MODERATE 35: CPT | Performed by: PHYSICIAN ASSISTANT

## 2023-03-02 PROCEDURE — 85025 COMPLETE CBC W/AUTO DIFF WBC: CPT | Performed by: STUDENT IN AN ORGANIZED HEALTH CARE EDUCATION/TRAINING PROGRAM

## 2023-03-02 PROCEDURE — 25010000002 METHYLPREDNISOLONE PER 125 MG: Performed by: PSYCHIATRY & NEUROLOGY

## 2023-03-02 PROCEDURE — 80053 COMPREHEN METABOLIC PANEL: CPT | Performed by: STUDENT IN AN ORGANIZED HEALTH CARE EDUCATION/TRAINING PROGRAM

## 2023-03-02 PROCEDURE — 83735 ASSAY OF MAGNESIUM: CPT | Performed by: STUDENT IN AN ORGANIZED HEALTH CARE EDUCATION/TRAINING PROGRAM

## 2023-03-02 PROCEDURE — 82607 VITAMIN B-12: CPT | Performed by: INTERNAL MEDICINE

## 2023-03-02 PROCEDURE — 25010000002 HYDROMORPHONE 1 MG/ML SOLUTION: Performed by: NURSE PRACTITIONER

## 2023-03-02 PROCEDURE — 87186 SC STD MICRODIL/AGAR DIL: CPT | Performed by: INTERNAL MEDICINE

## 2023-03-02 PROCEDURE — 87150 DNA/RNA AMPLIFIED PROBE: CPT | Performed by: INTERNAL MEDICINE

## 2023-03-02 PROCEDURE — 71275 CT ANGIOGRAPHY CHEST: CPT

## 2023-03-02 PROCEDURE — 25010000002 LEVOFLOXACIN PER 250 MG: Performed by: INTERNAL MEDICINE

## 2023-03-02 PROCEDURE — 25510000001 IOPAMIDOL PER 1 ML: Performed by: INTERNAL MEDICINE

## 2023-03-02 PROCEDURE — 25010000002 KETOROLAC TROMETHAMINE PER 15 MG: Performed by: NURSE PRACTITIONER

## 2023-03-02 PROCEDURE — 85379 FIBRIN DEGRADATION QUANT: CPT | Performed by: NURSE PRACTITIONER

## 2023-03-02 PROCEDURE — 25010000002 MORPHINE PER 10 MG: Performed by: NURSE PRACTITIONER

## 2023-03-02 PROCEDURE — 25010000002 HYDROMORPHONE 1 MG/ML SOLUTION: Performed by: INTERNAL MEDICINE

## 2023-03-02 PROCEDURE — 80074 ACUTE HEPATITIS PANEL: CPT | Performed by: INTERNAL MEDICINE

## 2023-03-02 PROCEDURE — 74018 RADEX ABDOMEN 1 VIEW: CPT

## 2023-03-02 PROCEDURE — 83690 ASSAY OF LIPASE: CPT | Performed by: NURSE PRACTITIONER

## 2023-03-02 PROCEDURE — 83605 ASSAY OF LACTIC ACID: CPT | Performed by: INTERNAL MEDICINE

## 2023-03-02 RX ORDER — SODIUM CHLORIDE 9 MG/ML
100 INJECTION, SOLUTION INTRAVENOUS CONTINUOUS
Status: DISCONTINUED | OUTPATIENT
Start: 2023-03-02 | End: 2023-03-04

## 2023-03-02 RX ORDER — LACTULOSE 10 G/15ML
20 SOLUTION ORAL 2 TIMES DAILY
Status: DISCONTINUED | OUTPATIENT
Start: 2023-03-02 | End: 2023-03-09

## 2023-03-02 RX ORDER — BISACODYL 10 MG
10 SUPPOSITORY, RECTAL RECTAL DAILY
Status: DISCONTINUED | OUTPATIENT
Start: 2023-03-02 | End: 2023-03-09

## 2023-03-02 RX ORDER — MORPHINE SULFATE 2 MG/ML
2 INJECTION, SOLUTION INTRAMUSCULAR; INTRAVENOUS ONCE
Status: COMPLETED | OUTPATIENT
Start: 2023-03-02 | End: 2023-03-02

## 2023-03-02 RX ORDER — HYDROCODONE BITARTRATE AND ACETAMINOPHEN 5; 325 MG/1; MG/1
1 TABLET ORAL EVERY 4 HOURS PRN
Status: DISCONTINUED | OUTPATIENT
Start: 2023-03-02 | End: 2023-03-04

## 2023-03-02 RX ORDER — LEVOFLOXACIN 5 MG/ML
750 INJECTION, SOLUTION INTRAVENOUS EVERY 24 HOURS
Status: COMPLETED | OUTPATIENT
Start: 2023-03-02 | End: 2023-03-06

## 2023-03-02 RX ORDER — LORAZEPAM 1 MG/1
1 TABLET ORAL EVERY 4 HOURS PRN
Status: DISCONTINUED | OUTPATIENT
Start: 2023-03-02 | End: 2023-03-07

## 2023-03-02 RX ORDER — GABAPENTIN 100 MG/1
100 CAPSULE ORAL EVERY 8 HOURS SCHEDULED
Status: DISCONTINUED | OUTPATIENT
Start: 2023-03-02 | End: 2023-03-03

## 2023-03-02 RX ORDER — POLYETHYLENE GLYCOL 3350 17 G/17G
17 POWDER, FOR SOLUTION ORAL DAILY
Status: DISCONTINUED | OUTPATIENT
Start: 2023-03-02 | End: 2023-03-03

## 2023-03-02 RX ORDER — PANTOPRAZOLE SODIUM 40 MG/10ML
40 INJECTION, POWDER, LYOPHILIZED, FOR SOLUTION INTRAVENOUS EVERY 12 HOURS SCHEDULED
Status: DISCONTINUED | OUTPATIENT
Start: 2023-03-02 | End: 2023-03-06

## 2023-03-02 RX ADMIN — GABAPENTIN 100 MG: 100 CAPSULE ORAL at 23:28

## 2023-03-02 RX ADMIN — LORAZEPAM 1 MG: 1 TABLET ORAL at 05:23

## 2023-03-02 RX ADMIN — HYDROCODONE BITARTRATE AND ACETAMINOPHEN 1 TABLET: 5; 325 TABLET ORAL at 19:10

## 2023-03-02 RX ADMIN — MORPHINE SULFATE 2 MG: 2 INJECTION, SOLUTION INTRAMUSCULAR; INTRAVENOUS at 03:00

## 2023-03-02 RX ADMIN — SODIUM CHLORIDE 500 ML: 9 INJECTION, SOLUTION INTRAVENOUS at 21:54

## 2023-03-02 RX ADMIN — DOCUSATE SODIUM 50MG AND SENNOSIDES 8.6MG 2 TABLET: 8.6; 5 TABLET, FILM COATED ORAL at 10:07

## 2023-03-02 RX ADMIN — LAMOTRIGINE 200 MG: 100 TABLET ORAL at 10:20

## 2023-03-02 RX ADMIN — HYDROCODONE BITARTRATE AND ACETAMINOPHEN 1 TABLET: 5; 325 TABLET ORAL at 02:41

## 2023-03-02 RX ADMIN — HYDROMORPHONE HYDROCHLORIDE 1 MG: 1 INJECTION, SOLUTION INTRAMUSCULAR; INTRAVENOUS; SUBCUTANEOUS at 20:31

## 2023-03-02 RX ADMIN — IOPAMIDOL 100 ML: 755 INJECTION, SOLUTION INTRAVENOUS at 18:17

## 2023-03-02 RX ADMIN — DOCUSATE SODIUM 50MG AND SENNOSIDES 8.6MG 2 TABLET: 8.6; 5 TABLET, FILM COATED ORAL at 20:32

## 2023-03-02 RX ADMIN — POTASSIUM CHLORIDE 40 MEQ: 750 TABLET, EXTENDED RELEASE ORAL at 20:33

## 2023-03-02 RX ADMIN — Medication 10 MG: at 20:32

## 2023-03-02 RX ADMIN — LACTULOSE 20 G: 20 SOLUTION ORAL at 20:34

## 2023-03-02 RX ADMIN — LORAZEPAM 1 MG: 1 TABLET ORAL at 20:33

## 2023-03-02 RX ADMIN — DESVENLAFAXINE SUCCINATE 25 MG: 25 TABLET, EXTENDED RELEASE ORAL at 10:20

## 2023-03-02 RX ADMIN — SODIUM CHLORIDE 250 MG: 9 INJECTION, SOLUTION INTRAVENOUS at 10:07

## 2023-03-02 RX ADMIN — Medication 50 MCG: at 10:22

## 2023-03-02 RX ADMIN — IMMUNE GLOBULIN (HUMAN) 20 G: 10 INJECTION INTRAVENOUS; SUBCUTANEOUS at 10:07

## 2023-03-02 RX ADMIN — LEVOFLOXACIN 750 MG: 5 INJECTION, SOLUTION INTRAVENOUS at 20:27

## 2023-03-02 RX ADMIN — APIXABAN 5 MG: 5 TABLET, FILM COATED ORAL at 10:29

## 2023-03-02 RX ADMIN — FOLIC ACID 1 MG: 1 TABLET ORAL at 10:20

## 2023-03-02 RX ADMIN — POLYETHYLENE GLYCOL 3350 17 G: 17 POWDER, FOR SOLUTION ORAL at 20:30

## 2023-03-02 RX ADMIN — PANTOPRAZOLE SODIUM 40 MG: 40 INJECTION, POWDER, FOR SOLUTION INTRAVENOUS at 21:53

## 2023-03-02 RX ADMIN — KETOROLAC TROMETHAMINE 15 MG: 15 INJECTION, SOLUTION INTRAMUSCULAR; INTRAVENOUS at 05:23

## 2023-03-02 RX ADMIN — HYDROMORPHONE HYDROCHLORIDE 1 MG: 1 INJECTION, SOLUTION INTRAMUSCULAR; INTRAVENOUS; SUBCUTANEOUS at 10:44

## 2023-03-02 RX ADMIN — SODIUM CHLORIDE 100 ML/HR: 9 INJECTION, SOLUTION INTRAVENOUS at 20:27

## 2023-03-02 RX ADMIN — HYDROCODONE BITARTRATE AND ACETAMINOPHEN 1 TABLET: 5; 325 TABLET ORAL at 10:07

## 2023-03-02 RX ADMIN — HYDROMORPHONE HYDROCHLORIDE 1 MG: 1 INJECTION, SOLUTION INTRAMUSCULAR; INTRAVENOUS; SUBCUTANEOUS at 16:04

## 2023-03-02 RX ADMIN — Medication 10 ML: at 10:21

## 2023-03-02 RX ADMIN — LIDOCAINE 1 PATCH: 700 PATCH TOPICAL at 10:20

## 2023-03-02 NOTE — PROGRESS NOTES
[facility]    Following for progress with therapies.  Today is day 4/5 of IVIG.    Thank you,  Gaviota Goodman, RN  Rehab Admission Nurse

## 2023-03-02 NOTE — PROGRESS NOTES
Name: Louise GARCIA ADMIT: 2023   : 1965  PCP: Chloe Schaefer APRN    MRN: [id number] LOS: 7 days   AGE/SEX: 58 y.o. female  ROOM: [facility]     Subjective   Subjective   Aide is at bedside.  Weakness in left leg unchanged.   The right upper quadrant pain that she complained of under her breast yesterday is now  in her entire right side of her abdomen and worse.  Per nurse she was in severe pain this morning.  KUB ordered demonstrating stool.  She remains very tender on exam so CT abdomen ordered.  No shortness of breath but is on supplemental oxygen.  She denies chest pain except for the right chest pain that is reproducible yesterday.      Review of Systems   Constitutional: Positive for fatigue. Negative for chills and fever.   HENT: Positive for voice change. Negative for trouble swallowing.    Respiratory: Positive for shortness of breath.    Cardiovascular: Positive for chest pain. Negative for palpitations.   Gastrointestinal: Positive for abdominal pain. Negative for abdominal distention.   Neurological: Positive for weakness. Negative for dizziness, syncope and light-headedness.        Objective   Objective   Vital Signs  Temp:  [98.1 °F (36.7 °C)-98.5 °F (36.9 °C)] 98.5 °F (36.9 °C)  Heart Rate:  [] 103  Resp:  [18-20] 18  BP: (122-151)/(83-96) 122/85  SpO2:  [70 %-95 %] 92 %  on  Flow (L/min):  [2] 2;   Device (Oxygen Therapy): nasal cannula  Body mass index is 22.86 kg/m².  Physical Exam  Vitals and nursing note reviewed.   Constitutional:       General: She is not in acute distress.     Appearance: She is ill-appearing.      Comments: Appears uncomfortable   Eyes:      General: No scleral icterus.     Extraocular Movements: Extraocular movements intact.   Cardiovascular:      Rate and Rhythm: Regular rhythm. Tachycardia present.      Pulses: Normal pulses.      Heart sounds: Normal heart sounds.   Pulmonary:      Effort: Pulmonary effort is normal. No respiratory distress.      Breath  sounds: Normal breath sounds. No wheezing, rhonchi or rales.      Comments: Lung sounds diminished.  No increased work of breathing  Abdominal:      General: Bowel sounds are normal.      Palpations: Abdomen is soft.      Tenderness: There is abdominal tenderness (Abdomen distended with tenderness right upper quadrant right lower quadrant and periumbilical.  Hyperactive bowel sounds present).   Musculoskeletal:      Right lower leg: No edema.      Left lower leg: No edema.      Comments: Reproducible right-sided chest wall pain under her breast tissue   Skin:     General: Skin is warm and dry.   Neurological:      General: No focal deficit present.      Mental Status: She is alert and oriented to person, place, and time.      Sensory: No sensory deficit.      Motor: Weakness (Bilateral upper extremities 4/5; bilateral lower extremities 3/5) present.   Psychiatric:         Behavior: Behavior normal.         Thought Content: Thought content normal.      Comments: Flat affect depressed       Results Review:  I reviewed the patient's new clinical results.  I reviewed the patient's new imaging results and agree with the interpretation.  I reviewed the patient's other test results and agree with the interpretation  I personally viewed and interpreted the patient's EKG/Telemetry data    Results from last 7 days   Lab Units 03/02/23  0633 03/01/23  0742 02/28/23  0525 02/27/23  0503   WBC 10*3/mm3 23.40* 19.16* 18.37* 17.32*   HEMOGLOBIN g/dL 9.9* 9.4* 9.7* 10.1*   PLATELETS 10*3/mm3 168 243 195 240     Results from last 7 days   Lab Units 03/02/23  0633 03/01/23  0653 02/28/23  0525 02/27/23  0503   SODIUM mmol/L 135* 136 138 138   POTASSIUM mmol/L 4.1 3.7 3.8 3.5   CHLORIDE mmol/L 99 101 102 102   CO2 mmol/L 26.4 27.0 23.0 22.2   BUN mg/dL 23* 17 21* 22*   CREATININE mg/dL 0.68 0.61 0.68 0.77   GLUCOSE mg/dL 95 122* 139* 198*   EGFR mL/min/1.73 100.5 103.1 100.5 89.0     Results from last 7 days   Lab Units 03/02/23  0633  03/01/23  0653 02/28/23  1450 02/28/23  0525 02/27/23  0503   ALBUMIN g/dL 2.7* 2.7* 2.5* 2.8* 3.1*   BILIRUBIN mg/dL 0.9 0.5  --  0.4 0.4   ALK PHOS U/L 96 89  --  86 77   AST (SGOT) U/L 40* 34*  --  29 26   ALT (SGPT) U/L 51* 37*  --  34* 28     Results from last 7 days   Lab Units 03/02/23  0633 03/01/23  0653 02/28/23  1450 02/28/23  0525 02/27/23  0503   CALCIUM mg/dL 8.6 8.1*  --  8.4* 8.6   ALBUMIN g/dL 2.7* 2.7* 2.5* 2.8* 3.1*   MAGNESIUM mg/dL 2.1 1.9  --  2.1 2.0   PHOSPHORUS mg/dL 3.8 3.1  --  2.6 3.0       No results found for: HGBA1C, POCGLU    No radiology results for the last day  I have personally reviewed all medications:  Scheduled Medications  apixaban, 5 mg, Oral, Daily  desvenlafaxine, 25 mg, Oral, Daily  folic acid, 1 mg, Oral, Daily  gabapentin, 100 mg, Oral, Nightly  immune globulin (human), 20 g, Intravenous, Daily  lamoTRIgine, 200 mg, Oral, Daily  lidocaine, 1 patch, Transdermal, Q24H  methylPREDNISolone sodium succinate, 250 mg, Intravenous, Q AM  Pharmacy to enter IVIG order, 400 mg/kg (Adjusted), Intravenous, Daily  senna-docusate sodium, 2 tablet, Oral, BID  sodium chloride, 10 mL, Intravenous, Q12H  vitamin B-12, 50 mcg, Oral, Daily    Infusions   Diet  Diet: Regular/House Diet; Texture: Regular Texture (IDDSI 7); Fluid Consistency: Thin (IDDSI 0)    I have personally reviewed:  [x]  Laboratory   [x]  Radiology   [x]  EKG/Telemetry       Assessment/Plan     Active Hospital Problems    Diagnosis  POA   • **Paresthesia [R20.2]  Yes   • Situational mixed anxiety and depressive disorder [F43.23]  Yes   • 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       58 y.o. female admitted with Paresthesia.    Traumatic myelopathy  Paresthesias/generalized weakness  - Per Neurology,  findings are concerning for suspected traumatic myelopathy at this time. She is on high dose Corticosteroids, dose subsequently being weaned at present. It appears that tomorrow will be final day of treatment.  MRI Brain with white matter possibly chronic changes versus MS but no enhancement.    - MRI lumbar spine showing multilevel disc degeneration with annular disc bulging flattening the ventral thecal sac and causing mild subarticular recess stenosis at L2-L5, without any evidence of neural impingement.  Multilevel minimal to moderate facet arthropathy of lumbar spine.  No evidence of fracture, infection, tumor or arachnoiditis.  - SLP evaluated with VFSS. No swallowing restrictions.  - Patient with complaints of hoarseness which is a little better today.   ENT eval as outpatient if persists.  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 Started on IVIG for 3/5 days per neurology. Started on gabapentin.   - Continue PT/OT, given the significant weakness, she needs SNF.    Right upper quadrant pain/right chest wall pain  Abdominal pain is severe this morning.  KUB with extensive stool but due to significant pain we will check CT abdomen pelvis.  Lipase normal.  Chest x-ray yesterday with atelectasis possible developing pneumonia and Pro-Chucky very mildly elevated today.  We will start IV antibiotics but await results of CTA chest.  encourage I-S.    Elevated D-dimer  Checked overnight for unclear reasons but slightly elevated with hypoxia and mild tachycardia. Will obtain CTA.     Normocytic anemia  - Hemoglobin low, but stable from prior. No evidence of overt blood loss. No indications for acute intervention at this time.       History of DVT on long term AC  - On Eliquis at home, has been held in setting of procedures but resumed yesterday    Leukocytosis  - Leukocytosis noted during hospital stay, likely 2/2 corticosteroids, patient afebrile, no new signs/symptoms of infection.  Will continue to monitor for now.    Hyperglycemia  - No history of diabetes. Most recent A1c 5.10%, likely 2/2 corticosteroids, suspect this will improve as steroids are weaned. No indication for SSI for now, but will monitor and administer if needed.     Seizure disorder  - Stable. Continue Lamotrigine as prescribed.    GERD  - Stable. Continue PPI as prescribed.     Transaminitis, resolved  - Noted on admission. LFT subsequently improved and now within normal limits. Continue to monitor.    Situational depression/anxiety  Limit narcotics.  Continue desvenlafaxine. She did not like atarax. Prn ativan.     · SCDs for DVT prophylaxis.  · Full code.  · Discussed with patient, family, nursing staff and consulting provider.  · Anticipate discharge TBKASIA Membreno  [facility]  03/02/23

## 2023-03-02 NOTE — PLAN OF CARE
Goal Outcome Evaluation:               Alert and oriented.  No change in neuro assessment this shift.  Up to chair and bedside commode today.  BM noted x2.  CXR obtained due to persistent right chest/side pain .  Toradol added with much relief.  Heat pump obtained for head therapy, but heading pad and distilled water ordered x2.  Will apply heat when pad arrives.

## 2023-03-02 NOTE — PLAN OF CARE
Goal Outcome Evaluation:      Patient had ct of pelvis and angiogram of chest done today, bm 3-1-23, nerve conduction test to be completed, 1mg of diluadid given before ct, pw for voiding, patient admitted with left sided weakness. A/ox4, slow to respond. Sepsis screening done this afternoon. RN messaged dr, waiting for dr to see patient.

## 2023-03-02 NOTE — CASE MANAGEMENT/SOCIAL WORK
Continued Stay Note  [facility]     Patient Name: Louise GARCIA  MRN: [id number]  Today's Date: 3/2/2023    Admit Date: 2/20/2023    Plan: [facility] pending   Discharge Plan     Row Name 03/02/23 0755       Plan    Plan [facility] eval pending    Patient/Family in Agreement with Plan yes    Plan Comments Patient noted to be on day 4/5 IVIG. [facility] eval pending. CCP following. Vicki SHANNON RN CCP               Discharge Codes    No documentation.               Expected Discharge Date and Time     Expected Discharge Date Expected Discharge Time    Mar 3, 2023             Vicki Lopez RN

## 2023-03-02 NOTE — PROGRESS NOTES
"DOS: 3/2/2023  NAME: Louise GARCIA   : 1965  PCP: Chloe Schaefer APRN  Chief Complaint   Patient presents with   • Fall   • Numbness     Numbness in bilateral arms and legs post falling.        Chief complaint: weakness  Subjective: IVIG infusing.  Patient complaining of right upper quadrant belly pain.  This is new over the last couple of days, KUB done.  She denies vomiting.  She had small bowel movement yesterday.  She thinks that her numbness and is improved but weakness is the same    Objective:  Vital signs: /85 (BP Location: Left arm, Patient Position: Lying)   Pulse 103   Temp 98.5 °F (36.9 °C) (Oral)   Resp 18   Ht 157.5 cm (62\")   Wt 56.7 kg (125 lb)   SpO2 92%   Breastfeeding No   BMI 22.86 kg/m²      Gen: NAD, vitals reviewed  MS: oriented x3, drowsy, easily arousable, recent/remote memory intact, fair attention/concentration, language intact, no neglect.  CN: visual acuity grossly normal, PERRL, EOMI, no facial droop, no dysarthria, hypophonic voice  Motor: Antigravity throughout, good power of bilateral upper extremity tricep//bicep, good palmar grasp, 5 out of 5 bilateral iliopsoas, 5 out of 5 dorsi flexion bilaterally, poor effort of confrontation motor testing of quads and gastrocs, pain limited  Sensory: intact to light touch all 4 ext.  Reflexes: Areflexia, down toes    ROS:  No weakness, numbness  No fevers, chills      Laboratory results:  Lab Results   Component Value Date    GLUCOSE 95 2023    CALCIUM 8.6 2023     (L) 2023    K 4.1 2023    CO2 26.4 2023    CL 99 2023    BUN 23 (H) 2023    CREATININE 0.68 2023    EGFRIFAFRI  2016      Comment:      <15 Indicative of kidney failure.    EGFRIFNONA 81 2016    BCR 33.8 (H) 2023    ANIONGAP 9.6 2023     Lab Results   Component Value Date    WBC 23.40 (H) 2023    HGB 9.9 (L) 2023    HCT 28.7 (L) 2023    MCV 98.3 (H) 2023    PLT " 168 03/02/2023     No results found for: LDL         Review of labs: CK 32, B12 534, folate greater than 20, rpr non reactive, spep with decreased IgG but non monoclonal protein, DENISE, ACE,heavy metal negative, spinal fluid, 0 cells, protein 38, NMO, West Nile, Lyme, MS pending    Review and interpretation of imaging:   Reviewed neuroimaging and there is moderate hyperintensities in the periventricular and subcortical white matter some with.  Colossal distribution and demyelination is on the differential.  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  She has proximal weakness and bulbar involvement.  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  IVIG initiated.  Of note she is remained on steroids      Plan:  1.  Tomorrow his last dose of IVIG  2.  Gabapentin seems to be helping, could increase as needed  3.  Order daily NIFs- suspect some hypoventilation in setting of intractable pain today  4.  Primary team planning CT abdomen pelvis  5.  I d/meg steroids, no indication from my perspective    I spent at least 40 minutes interviewing, examining, and counseling patient.  I independently reviewed documentation, laboratory and diagnostic findings, external documentation where applicable, and formulated treatment plan which was discussed with the patient.      Thank you for  this consultation.  Discussed above plan with neuro attending, Dr. Jerry who agrees with above plan.  Neurology team is available for concerns or questions.

## 2023-03-02 NOTE — PROGRESS NOTES
"Nutrition Services    Patient Name:  Louise GARCIA  YOB: 1965  MRN: [id number]  Admit Date:  2/20/2023    Assessment Date:  03/02/23    NUTRITION SCREENING      Reason for Encounter LOS x 10    Diagnosis/Problem Paresthesia     Good po intake. No wt loss. Placement for rehab pending. Will follow PRN        PO Diet Diet: Regular/House Diet; Texture: Regular Texture (IDDSI 7); Fluid Consistency: Thin (IDDSI 0)   PO Supplements/Snacks    PO Intake % %        Labs  Listed below, reviewed   Physical Findings Alert, oriented, dentures    GI Function Last BM 3/1    Skin Status Skin intact        Height:  Weight:  BMI:    Weight Trend Height: 157.5 cm (62\")  Weight: 56.7 kg (125 lb) (02/20/23 2019)  Body mass index is 22.86 kg/m².    Stable       Intervention/Plan No nutrition dx at this time. Will follow PRN          Results from last 7 days   Lab Units 03/02/23  0633 03/01/23  0653 02/28/23  0525   SODIUM mmol/L 135* 136 138   POTASSIUM mmol/L 4.1 3.7 3.8   CHLORIDE mmol/L 99 101 102   CO2 mmol/L 26.4 27.0 23.0   BUN mg/dL 23* 17 21*   CREATININE mg/dL 0.68 0.61 0.68   CALCIUM mg/dL 8.6 8.1* 8.4*   BILIRUBIN mg/dL 0.9 0.5 0.4   ALK PHOS U/L 96 89 86   ALT (SGPT) U/L 51* 37* 34*   AST (SGOT) U/L 40* 34* 29   GLUCOSE mg/dL 95 122* 139*     Results from last 7 days   Lab Units 03/02/23  0633 03/01/23  0742 03/01/23  0653 02/28/23  0525   MAGNESIUM mg/dL 2.1  --  1.9 2.1   PHOSPHORUS mg/dL 3.8  --  3.1 2.6   HEMOGLOBIN g/dL 9.9*   < >  --  9.7*   HEMATOCRIT % 28.7*   < >  --  28.4*    < > = values in this interval not displayed.     No results found for: COVID19  Lab Results   Component Value Date    HGBA1C 5.10 02/20/2023       RD to follow up per protocol.    Electronically signed by:  Cherie Robbins RD  03/02/23 15:25 EST  "

## 2023-03-03 ENCOUNTER — APPOINTMENT (OUTPATIENT)
Dept: CARDIOLOGY | Facility: HOSPITAL | Age: 58
DRG: 163 | End: 2023-03-03
Payer: COMMERCIAL

## 2023-03-03 PROBLEM — R91.8 MASS OF MIDDLE LOBE OF RIGHT LUNG: Status: ACTIVE | Noted: 2023-03-03

## 2023-03-03 LAB
ALBUMIN SERPL-MCNC: 3 G/DL (ref 3.5–5.2)
ALBUMIN/GLOB SERPL: 0.7 G/DL
ALP SERPL-CCNC: 119 U/L (ref 39–117)
ALT SERPL W P-5'-P-CCNC: 55 U/L (ref 1–33)
ANION GAP SERPL CALCULATED.3IONS-SCNC: 10.1 MMOL/L (ref 5–15)
ANISOCYTOSIS BLD QL: ABNORMAL
AST SERPL-CCNC: 39 U/L (ref 1–32)
B PARAPERT DNA SPEC QL NAA+PROBE: NOT DETECTED
B PERT DNA SPEC QL NAA+PROBE: NOT DETECTED
BH CV VAS HEPATIC PORTAL VEIN DIAMETER: 0.92 CM
BH CV VAS HEPATOPORTAL HEPATIC V LT DIRECTION: NORMAL
BH CV VAS HEPATOPORTAL HEPATIC V LT SPONT: NORMAL
BH CV VAS HEPATOPORTAL HEPATIC V MID DIRECTION: NORMAL
BH CV VAS HEPATOPORTAL HEPATIC V MID SPONT: NORMAL
BH CV VAS HEPATOPORTAL HEPATIC V RT DIRECTION: NORMAL
BH CV VAS HEPATOPORTAL HEPATIC V RT SPONT: NORMAL
BH CV VAS HEPATOPORTAL IVC CONFLUENCE SPONT: NORMAL
BH CV VAS HEPATOPORTAL IVC SPONT: NORMAL
BH CV VAS HEPATOPORTAL PORTAL V EXTRAHEPATIC DIRECTION: NORMAL
BH CV VAS HEPATOPORTAL PORTAL V EXTRAHEPATIC SPONT: NORMAL
BH CV VAS HEPATOPORTAL PORTAL V LT INTRA DIRECTION: NORMAL
BH CV VAS HEPATOPORTAL PORTAL V LT INTRA SPONT: NORMAL
BH CV VAS HEPATOPORTAL PORTAL V MAIN INTRA DIRECTION: NORMAL
BH CV VAS HEPATOPORTAL PORTAL V MAIN INTRA SPONT: NORMAL
BH CV VAS HEPATOPORTAL PORTAL V PSV: 48.2 CM/S
BH CV VAS HEPATOPORTAL PORTAL V RT INTRA DIRECTION: NORMAL
BH CV VAS HEPATOPORTAL PORTAL V RT INTRA SPONT: NORMAL
BH CV VAS HEPATOPORTAL SMV DIRECTION: NORMAL
BH CV VAS HEPATOPORTAL SMV PSV: 13 CM/S
BH CV VAS HEPATOPORTAL SMV SPONT: NORMAL
BH CV VAS HEPATOPORTAL SPLENIC DIRECTION: NORMAL
BH CV VAS HEPATOPORTAL SPLENIC SPONT: NORMAL
BH CV VAS HEPATOPORTAL SPLENIC V PSV: 63.7 CM/S
BH CV VAS SMA HEPATIC EDV: 56 CM/S
BH CV VAS SMA HEPATIC PSV: 184 CM/S
BH CV VAS SMA SPLENIC EDV: 46.1 CM/S
BH CV VAS SMA SPLENIC PSV: 146 CM/S
BILIRUB SERPL-MCNC: 0.8 MG/DL (ref 0–1.2)
BUN SERPL-MCNC: 31 MG/DL (ref 6–20)
BUN/CREAT SERPL: 38.3 (ref 7–25)
C PNEUM DNA NPH QL NAA+NON-PROBE: NOT DETECTED
CALCIUM SPEC-SCNC: 9.5 MG/DL (ref 8.6–10.5)
CHLORIDE SERPL-SCNC: 96 MMOL/L (ref 98–107)
CO2 SERPL-SCNC: 25.9 MMOL/L (ref 22–29)
CREAT SERPL-MCNC: 0.81 MG/DL (ref 0.57–1)
D-LACTATE SERPL-SCNC: 1.4 MMOL/L (ref 0.5–2)
D-LACTATE SERPL-SCNC: 2.1 MMOL/L (ref 0.5–2)
D-LACTATE SERPL-SCNC: 2.7 MMOL/L (ref 0.5–2)
DEPRECATED RDW RBC AUTO: 49 FL (ref 37–54)
EGFRCR SERPLBLD CKD-EPI 2021: 83.7 ML/MIN/1.73
ERYTHROCYTE [DISTWIDTH] IN BLOOD BY AUTOMATED COUNT: 13.9 % (ref 12.3–15.4)
FLUAV SUBTYP SPEC NAA+PROBE: NOT DETECTED
FLUBV RNA ISLT QL NAA+PROBE: NOT DETECTED
GLOBULIN UR ELPH-MCNC: 4.4 GM/DL
GLUCOSE SERPL-MCNC: 143 MG/DL (ref 65–99)
HADV DNA SPEC NAA+PROBE: NOT DETECTED
HCOV 229E RNA SPEC QL NAA+PROBE: NOT DETECTED
HCOV HKU1 RNA SPEC QL NAA+PROBE: NOT DETECTED
HCOV NL63 RNA SPEC QL NAA+PROBE: NOT DETECTED
HCOV OC43 RNA SPEC QL NAA+PROBE: NOT DETECTED
HCT VFR BLD AUTO: 27.6 % (ref 34–46.6)
HGB BLD-MCNC: 10 G/DL (ref 12–15.9)
HMPV RNA NPH QL NAA+NON-PROBE: NOT DETECTED
HOLD SPECIMEN: NORMAL
HPIV1 RNA ISLT QL NAA+PROBE: NOT DETECTED
HPIV2 RNA SPEC QL NAA+PROBE: NOT DETECTED
HPIV3 RNA NPH QL NAA+PROBE: NOT DETECTED
HPIV4 P GENE NPH QL NAA+PROBE: NOT DETECTED
LYMPHOCYTES # BLD MANUAL: 0.31 10*3/MM3 (ref 0.7–3.1)
LYMPHOCYTES NFR BLD MANUAL: 2 % (ref 5–12)
M PNEUMO IGG SER IA-ACNC: NOT DETECTED
MACROCYTES BLD QL SMEAR: ABNORMAL
MAGNESIUM SERPL-MCNC: 2.4 MG/DL (ref 1.6–2.6)
MAXIMAL PREDICTED HEART RATE: 161 BPM
MCH RBC QN AUTO: 35.1 PG (ref 26.6–33)
MCHC RBC AUTO-ENTMCNC: 36.2 G/DL (ref 31.5–35.7)
MCV RBC AUTO: 96.8 FL (ref 79–97)
MONOCYTES # BLD: 0.63 10*3/MM3 (ref 0.1–0.9)
MRSA DNA SPEC QL NAA+PROBE: ABNORMAL
NEUTROPHILS # BLD AUTO: 30.45 10*3/MM3 (ref 1.7–7)
NEUTROPHILS NFR BLD MANUAL: 97 % (ref 42.7–76)
PHOSPHATE SERPL-MCNC: 4.3 MG/DL (ref 2.5–4.5)
PLAT MORPH BLD: NORMAL
PLATELET # BLD AUTO: 155 10*3/MM3 (ref 140–450)
PMV BLD AUTO: 11.3 FL (ref 6–12)
POIKILOCYTOSIS BLD QL SMEAR: ABNORMAL
POLYCHROMASIA BLD QL SMEAR: ABNORMAL
POTASSIUM SERPL-SCNC: 4.7 MMOL/L (ref 3.5–5.2)
PROT SERPL-MCNC: 7.4 G/DL (ref 6–8.5)
QT INTERVAL: 321 MS
RBC # BLD AUTO: 2.85 10*6/MM3 (ref 3.77–5.28)
RHINOVIRUS RNA SPEC NAA+PROBE: NOT DETECTED
RSV RNA NPH QL NAA+NON-PROBE: NOT DETECTED
SARS-COV-2 RNA NPH QL NAA+NON-PROBE: NOT DETECTED
SODIUM SERPL-SCNC: 132 MMOL/L (ref 136–145)
SPHEROCYTES BLD QL SMEAR: ABNORMAL
STRESS TARGET HR: 137 BPM
VARIANT LYMPHS NFR BLD MANUAL: 1 % (ref 19.6–45.3)
WBC MORPH BLD: NORMAL
WBC NRBC COR # BLD: 31.39 10*3/MM3 (ref 3.4–10.8)
WHOLE BLOOD HOLD SPECIMEN: NORMAL

## 2023-03-03 PROCEDURE — 93010 ELECTROCARDIOGRAM REPORT: CPT | Performed by: INTERNAL MEDICINE

## 2023-03-03 PROCEDURE — 97530 THERAPEUTIC ACTIVITIES: CPT

## 2023-03-03 PROCEDURE — 25010000002 HYDROMORPHONE 1 MG/ML SOLUTION: Performed by: INTERNAL MEDICINE

## 2023-03-03 PROCEDURE — 93005 ELECTROCARDIOGRAM TRACING: CPT | Performed by: INTERNAL MEDICINE

## 2023-03-03 PROCEDURE — 93975 VASCULAR STUDY: CPT

## 2023-03-03 PROCEDURE — 80053 COMPREHEN METABOLIC PANEL: CPT | Performed by: STUDENT IN AN ORGANIZED HEALTH CARE EDUCATION/TRAINING PROGRAM

## 2023-03-03 PROCEDURE — 25010000002 LEVOFLOXACIN PER 250 MG: Performed by: INTERNAL MEDICINE

## 2023-03-03 PROCEDURE — 85007 BL SMEAR W/DIFF WBC COUNT: CPT | Performed by: STUDENT IN AN ORGANIZED HEALTH CARE EDUCATION/TRAINING PROGRAM

## 2023-03-03 PROCEDURE — 85025 COMPLETE CBC W/AUTO DIFF WBC: CPT | Performed by: STUDENT IN AN ORGANIZED HEALTH CARE EDUCATION/TRAINING PROGRAM

## 2023-03-03 PROCEDURE — 25010000002 IMMUNE GLOBULIN (HUMAN) 20 GM/200ML SOLUTION: Performed by: PSYCHIATRY & NEUROLOGY

## 2023-03-03 PROCEDURE — 94799 UNLISTED PULMONARY SVC/PX: CPT

## 2023-03-03 PROCEDURE — 83605 ASSAY OF LACTIC ACID: CPT | Performed by: INTERNAL MEDICINE

## 2023-03-03 PROCEDURE — 99232 SBSQ HOSP IP/OBS MODERATE 35: CPT | Performed by: NURSE PRACTITIONER

## 2023-03-03 PROCEDURE — 25010000002 KETOROLAC TROMETHAMINE PER 15 MG: Performed by: NURSE PRACTITIONER

## 2023-03-03 PROCEDURE — 84100 ASSAY OF PHOSPHORUS: CPT | Performed by: STUDENT IN AN ORGANIZED HEALTH CARE EDUCATION/TRAINING PROGRAM

## 2023-03-03 PROCEDURE — 83735 ASSAY OF MAGNESIUM: CPT | Performed by: STUDENT IN AN ORGANIZED HEALTH CARE EDUCATION/TRAINING PROGRAM

## 2023-03-03 PROCEDURE — 97110 THERAPEUTIC EXERCISES: CPT

## 2023-03-03 PROCEDURE — 0202U NFCT DS 22 TRGT SARS-COV-2: CPT | Performed by: INTERNAL MEDICINE

## 2023-03-03 PROCEDURE — 87641 MR-STAPH DNA AMP PROBE: CPT | Performed by: INTERNAL MEDICINE

## 2023-03-03 RX ORDER — GABAPENTIN 100 MG/1
100 CAPSULE ORAL EVERY 12 HOURS SCHEDULED
Status: DISCONTINUED | OUTPATIENT
Start: 2023-03-03 | End: 2023-03-08 | Stop reason: SDUPTHER

## 2023-03-03 RX ORDER — KETOROLAC TROMETHAMINE 30 MG/ML
30 INJECTION, SOLUTION INTRAMUSCULAR; INTRAVENOUS EVERY 6 HOURS PRN
Status: DISCONTINUED | OUTPATIENT
Start: 2023-03-03 | End: 2023-03-06

## 2023-03-03 RX ORDER — LUBIPROSTONE 8 UG/1
8 CAPSULE ORAL 2 TIMES DAILY WITH MEALS
Status: DISCONTINUED | OUTPATIENT
Start: 2023-03-03 | End: 2023-03-03

## 2023-03-03 RX ORDER — POLYETHYLENE GLYCOL 3350 17 G/17G
17 POWDER, FOR SOLUTION ORAL 2 TIMES DAILY
Status: DISCONTINUED | OUTPATIENT
Start: 2023-03-03 | End: 2023-03-09

## 2023-03-03 RX ORDER — LUBIPROSTONE 24 UG/1
24 CAPSULE ORAL
Status: DISCONTINUED | OUTPATIENT
Start: 2023-03-04 | End: 2023-03-09

## 2023-03-03 RX ADMIN — PANTOPRAZOLE SODIUM 40 MG: 40 INJECTION, POWDER, FOR SOLUTION INTRAVENOUS at 21:00

## 2023-03-03 RX ADMIN — Medication 50 MCG: at 09:42

## 2023-03-03 RX ADMIN — HYDROMORPHONE HYDROCHLORIDE 1 MG: 1 INJECTION, SOLUTION INTRAMUSCULAR; INTRAVENOUS; SUBCUTANEOUS at 21:00

## 2023-03-03 RX ADMIN — DOCUSATE SODIUM 50MG AND SENNOSIDES 8.6MG 2 TABLET: 8.6; 5 TABLET, FILM COATED ORAL at 09:42

## 2023-03-03 RX ADMIN — PANTOPRAZOLE SODIUM 40 MG: 40 INJECTION, POWDER, FOR SOLUTION INTRAVENOUS at 09:42

## 2023-03-03 RX ADMIN — KETOROLAC TROMETHAMINE 15 MG: 15 INJECTION, SOLUTION INTRAMUSCULAR; INTRAVENOUS at 02:26

## 2023-03-03 RX ADMIN — LACTULOSE 20 G: 20 SOLUTION ORAL at 21:01

## 2023-03-03 RX ADMIN — LUBIPROSTONE 8 MCG: 8 CAPSULE, GELATIN COATED ORAL at 18:08

## 2023-03-03 RX ADMIN — GABAPENTIN 100 MG: 100 CAPSULE ORAL at 21:00

## 2023-03-03 RX ADMIN — Medication 10 ML: at 21:02

## 2023-03-03 RX ADMIN — Medication 10 ML: at 09:47

## 2023-03-03 RX ADMIN — POLYETHYLENE GLYCOL 3350 17 G: 17 POWDER, FOR SOLUTION ORAL at 15:30

## 2023-03-03 RX ADMIN — SODIUM CHLORIDE 100 ML/HR: 9 INJECTION, SOLUTION INTRAVENOUS at 07:44

## 2023-03-03 RX ADMIN — Medication 10 MG: at 15:30

## 2023-03-03 RX ADMIN — GABAPENTIN 100 MG: 100 CAPSULE ORAL at 05:53

## 2023-03-03 RX ADMIN — HYDROCODONE BITARTRATE AND ACETAMINOPHEN 1 TABLET: 5; 325 TABLET ORAL at 12:40

## 2023-03-03 RX ADMIN — IMMUNE GLOBULIN (HUMAN) 20 G: 10 INJECTION INTRAVENOUS; SUBCUTANEOUS at 15:18

## 2023-03-03 RX ADMIN — LORAZEPAM 1 MG: 1 TABLET ORAL at 10:00

## 2023-03-03 RX ADMIN — HYDROCODONE BITARTRATE AND ACETAMINOPHEN 1 TABLET: 5; 325 TABLET ORAL at 07:54

## 2023-03-03 RX ADMIN — DESVENLAFAXINE SUCCINATE 25 MG: 25 TABLET, EXTENDED RELEASE ORAL at 09:47

## 2023-03-03 RX ADMIN — POLYETHYLENE GLYCOL 3350 17 G: 17 POWDER, FOR SOLUTION ORAL at 21:00

## 2023-03-03 RX ADMIN — LAMOTRIGINE 200 MG: 100 TABLET ORAL at 09:42

## 2023-03-03 RX ADMIN — LIDOCAINE 1 PATCH: 700 PATCH TOPICAL at 09:41

## 2023-03-03 RX ADMIN — APIXABAN 5 MG: 5 TABLET, FILM COATED ORAL at 09:42

## 2023-03-03 RX ADMIN — HYDROCODONE BITARTRATE AND ACETAMINOPHEN 1 TABLET: 5; 325 TABLET ORAL at 03:26

## 2023-03-03 RX ADMIN — LEVOFLOXACIN 750 MG: 5 INJECTION, SOLUTION INTRAVENOUS at 21:04

## 2023-03-03 RX ADMIN — LORAZEPAM 1 MG: 1 TABLET ORAL at 02:33

## 2023-03-03 RX ADMIN — HYDROMORPHONE HYDROCHLORIDE 1 MG: 1 INJECTION, SOLUTION INTRAMUSCULAR; INTRAVENOUS; SUBCUTANEOUS at 02:27

## 2023-03-03 RX ADMIN — LUBIPROSTONE 8 MCG: 8 CAPSULE, GELATIN COATED ORAL at 15:18

## 2023-03-03 RX ADMIN — HYDROCODONE BITARTRATE AND ACETAMINOPHEN 1 TABLET: 5; 325 TABLET ORAL at 19:06

## 2023-03-03 RX ADMIN — LACTULOSE 20 G: 20 SOLUTION ORAL at 15:28

## 2023-03-03 RX ADMIN — HYDROMORPHONE HYDROCHLORIDE 1 MG: 1 INJECTION, SOLUTION INTRAMUSCULAR; INTRAVENOUS; SUBCUTANEOUS at 05:53

## 2023-03-03 RX ADMIN — FOLIC ACID 1 MG: 1 TABLET ORAL at 09:42

## 2023-03-03 NOTE — PROGRESS NOTES
Name: Louise GARCIA ADMIT: 2023   : 1965  PCP: Chloe Schaefer APRN    MRN: [id number] LOS: 8 days   AGE/SEX: 58 y.o. female  ROOM: [facility]     Subjective   Subjective   Per PT, patient was weaker today particular in her legs than previously.  Patient is quite sleepy during exam but states her abdominal pain and right-sided chest wall pain is better controlled.  Voice is still hoarse and weak.  In general she appears more ill today.  No BM but have not given all bowel regimen until she returned from ultrasound is now      Review of Systems   Constitutional: Positive for fatigue. Negative for chills and fever.   HENT: Positive for voice change. Negative for trouble swallowing.    Respiratory: Positive for shortness of breath.    Cardiovascular: Positive for chest pain. Negative for palpitations.   Gastrointestinal: Positive for abdominal pain and constipation. Negative for abdominal distention.   Neurological: Positive for weakness. Negative for dizziness, syncope and light-headedness.        Objective   Objective   Vital Signs  Temp:  [98.2 °F (36.8 °C)-98.5 °F (36.9 °C)] 98.5 °F (36.9 °C)  Heart Rate:  [] 109  Resp:  [18] 18  BP: (122-148)/(85-90) 134/90  SpO2:  [90 %-92 %] 91 %  on  Flow (L/min):  [2] 2;   Device (Oxygen Therapy): nasal cannula  Body mass index is 22.86 kg/m².  Physical Exam  Vitals and nursing note reviewed.   Constitutional:       General: She is not in acute distress.     Appearance: She is ill-appearing.      Comments:  Sleepy and falls asleep during sentence.  Appears overmedicated.    Eyes:      General: No scleral icterus.     Extraocular Movements: Extraocular movements intact.   Cardiovascular:      Rate and Rhythm: Regular rhythm. Tachycardia present.      Pulses: Normal pulses.      Heart sounds: Normal heart sounds.   Pulmonary:      Effort: Pulmonary effort is normal. No respiratory distress.      Breath sounds: Rales present. No wheezing or rhonchi.      Comments:  Lung sounds diminished tickly on the right with rales. no increased work of breathing  Chest:      Chest wall: Tenderness (Right chest wall tenderness to palpitation) present.   Abdominal:      General: Bowel sounds are normal.      Palpations: Abdomen is soft.      Tenderness: There is abdominal tenderness (Abdomen distended with tenderness right upper quadrant right lower quadrant and periumbilical.  Hyperactive bowel sounds present).   Musculoskeletal:      Right lower leg: No edema.      Left lower leg: No edema.      Comments: Reproducible right-sided chest wall pain under her breast tissue   Skin:     General: Skin is warm and dry.   Neurological:      General: No focal deficit present.      Mental Status: She is alert and oriented to person, place, and time.      Sensory: No sensory deficit.      Motor: Weakness ( bilateral lower extremities 3/5.  Decreased effort on motor exam due to sleepiness) present.   Psychiatric:         Behavior: Behavior normal.         Thought Content: Thought content normal.      Comments: Flat affect depressed       Results Review:  I reviewed the patient's new clinical results.  I reviewed the patient's new imaging results and agree with the interpretation.  I reviewed the patient's other test results and agree with the interpretation  I personally viewed and interpreted the patient's EKG/Telemetry data    Results from last 7 days   Lab Units 03/03/23  0159 03/02/23  0633 03/01/23  0742 02/28/23  0525   WBC 10*3/mm3 31.39* 23.40* 19.16* 18.37*   HEMOGLOBIN g/dL 10.0* 9.9* 9.4* 9.7*   PLATELETS 10*3/mm3 155 168 243 195     Results from last 7 days   Lab Units 03/03/23  0159 03/02/23  0633 03/01/23  0653 02/28/23  0525   SODIUM mmol/L 132* 135* 136 138   POTASSIUM mmol/L 4.7 4.1 3.7 3.8   CHLORIDE mmol/L 96* 99 101 102   CO2 mmol/L 25.9 26.4 27.0 23.0   BUN mg/dL 31* 23* 17 21*   CREATININE mg/dL 0.81 0.68 0.61 0.68   GLUCOSE mg/dL 143* 95 122* 139*   EGFR mL/min/1.73 83.7 100.5  103.1 100.5     Results from last 7 days   Lab Units 03/03/23  0159 03/02/23  0633 03/01/23  0653 02/28/23  1450 02/28/23  0525   ALBUMIN g/dL 3.0* 2.7* 2.7* 2.5* 2.8*   BILIRUBIN mg/dL 0.8 0.9 0.5  --  0.4   ALK PHOS U/L 119* 96 89  --  86   AST (SGOT) U/L 39* 40* 34*  --  29   ALT (SGPT) U/L 55* 51* 37*  --  34*     Results from last 7 days   Lab Units 03/03/23  0159 03/02/23  0633 03/01/23  0653 02/28/23  1450 02/28/23  0525   CALCIUM mg/dL 9.5 8.6 8.1*  --  8.4*   ALBUMIN g/dL 3.0* 2.7* 2.7* 2.5* 2.8*   MAGNESIUM mg/dL 2.4 2.1 1.9  --  2.1   PHOSPHORUS mg/dL 4.3 3.8 3.1  --  2.6     Results from last 7 days   Lab Units 03/03/23  1051 03/03/23  0454 03/03/23  0159 03/02/23  2250 03/02/23  1955 03/02/23  0633   PROCALCITONIN ng/mL  --   --   --   --   --  0.43*   LACTATE mmol/L 1.4 2.1* 2.7* 3.6*   < >  --     < > = values in this interval not displayed.     No results found for: HGBA1C, POCGLU    CT Abdomen Pelvis Without Contrast    Result Date: 3/3/2023  1. Abnormal parenchymal opacity in the right middle lobe has a somewhat masslike appearance anteriorly where there is a subcentimeter area of air suggesting cavitation. Whether this represents tumor or pneumonia is unclear. There is a small right pleural effusion with parenchymal volume loss in the posterior right lower lobe favored to represent atelectasis. Mildly enlarged right hilar and subcarinal lymph nodes. 2. Diffuse third spacing around the pelvis. Otherwise no acute abnormality identified in the abdomen or the pelvis. 3. 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  This report was finalized on 3/3/2023 10:13 AM by Dr. Ryley Nascimento M.D.      CT Angiogram Chest    Result Date: 3/3/2023  1. Abnormal parenchymal opacity in the right middle lobe has a somewhat masslike appearance anteriorly where there is a subcentimeter area of air suggesting cavitation. Whether this represents tumor or pneumonia is unclear. There is a small right pleural effusion with  parenchymal volume loss in the posterior right lower lobe favored to represent atelectasis. Mildly enlarged right hilar and subcarinal lymph nodes. 2. Diffuse third spacing around the pelvis. Otherwise no acute abnormality identified in the abdomen or the pelvis. 3. 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  This report was finalized on 3/3/2023 10:13 AM by Dr. Ryley Nascimento M.D.      US Abdomen Limited    Result Date: 3/2/2023  Prior cholecystectomy. No abnormality identified to account for pain.  This report was finalized on 3/2/2023 10:55 PM by Dr. Ryley Nascimento M.D.      I have personally reviewed all medications:  Scheduled Medications  apixaban, 5 mg, Oral, Daily  bisacodyl, 10 mg, Rectal, Daily  desvenlafaxine, 25 mg, Oral, Daily  folic acid, 1 mg, Oral, Daily  gabapentin, 100 mg, Oral, Q12H  lactulose, 20 g, Oral, BID  lamoTRIgine, 200 mg, Oral, Daily  levoFLOXacin, 750 mg, Intravenous, Q24H  lidocaine, 1 patch, Transdermal, Q24H  lubiprostone, 8 mcg, Oral, BID With Meals  pantoprazole, 40 mg, Intravenous, Q12H  Pharmacy to enter IVIG order, 400 mg/kg (Adjusted), Intravenous, Daily  polyethylene glycol, 17 g, Oral, BID  senna-docusate sodium, 2 tablet, Oral, BID  sodium chloride, 10 mL, Intravenous, Q12H  vitamin B-12, 50 mcg, Oral, Daily    Infusions  hold, 1 each  sodium chloride, 100 mL/hr, Last Rate: 100 mL/hr (03/03/23 0744)    Diet  Diet: Regular/House Diet; Texture: Regular Texture (IDDSI 7); Fluid Consistency: Thin (IDDSI 0)    I have personally reviewed:  [x]  Laboratory   [x]  Radiology   [x]  EKG/Telemetry       Assessment/Plan     Active Hospital Problems    Diagnosis  POA   • **Paresthesia [R20.2]  Yes   • Upper abdominal pain [R10.10]  No   • Situational mixed anxiety and depressive disorder [F43.23]  Yes   • 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       58 y.o. female admitted with Paresthesia.    Traumatic myelopathy  Paresthesias/generalized weakness  - Per Neurology,  findings are concerning for suspected traumatic myelopathy at this time. She is on high dose Corticosteroids, dose subsequently being weaned at present. It appears that tomorrow will be final day of treatment.  MRI Brain with white matter possibly chronic changes versus MS but no enhancement.    - MRI lumbar spine showing multilevel disc degeneration with annular disc bulging flattening the ventral thecal sac and causing mild subarticular recess stenosis at L2-L5, without any evidence of neural impingement.  Multilevel minimal to moderate facet arthropathy of lumbar spine.  No evidence of fracture, infection, tumor or arachnoiditis.  - SLP evaluated with VFSS. No swallowing restrictions.   - EMG did not show typical features of GBS but clinical exam concerning so she was started on IV Ig and has completed 5 doses.  Paresthesias of the leg has improved but does not offer much from effort today during exam to move her legs.  NIF -35 and FVC 1.74.  CSF fluids pending.  awaiting cryo globin test and oligoclonal bands.  MS profile and M BP of CSF send out lab ordered by neuro.     right chest wall pain/abnormal CTA chest     CTA chest does demonstrate right middle lobe masslike appearance concerning for pneumonia versus mass.  Levaquin started with increase in WBC, steroids stopped yesterday.  She is afebrile.  Appreciate pulmonary assistance.  Infectious work-up for pneumonia. thoracentesis tomorrow and possible bronchoscopy.  Concern for neoplastic process and additional CSF lab ordered    Right  upper quadrant pain/Constipation  Ultrasound abdomen, hepatic duplex, CT abdomen and pelvis without acute process explaining abdominal pain.KUB with extensive stool.  Continue bowel regimen.   Avoid over medicating for pain.  She is quite somnolent today may need to decrease gabapentin, discussed with RN.     Normocytic anemia  - Hemoglobin low, but stable from prior. No evidence of overt blood loss. No indications for acute intervention at this time.      History of DVT on long term AC  - On Eliquis at home, has been held for thoracentesis     Leukocytosis  - Leukocytosis noted during hospital stay, likely 2/2 corticosteroids, patient afebrile, no new signs/symptoms of infection.  Will continue to monitor for now.    Hyperglycemia  - No history of diabetes. Most recent A1c 5.10%, likely 2/2 corticosteroids, suspect this will improve as steroids are weaned. No indication for SSI for now, but will monitor and administer if needed.     Seizure disorder  - Stable. Continue Lamotrigine as prescribed.    GERD  - Stable. Continue PPI as prescribed.     Transaminitis, resolved  - Noted on admission. LFT subsequently improved and now within normal limits. Continue to monitor.    Situational depression/anxiety  Limit narcotics.  Continue desvenlafaxine. She did not like atarax. Prn ativan.     · SCDs for DVT prophylaxis.  · Full code.  · Discussed with patient, family, nursing staff and consulting provider.  · Anticipate discharge [facility]KASIA Membreno  [facility]  03/03/23

## 2023-03-03 NOTE — PLAN OF CARE
Goal Outcome Evaluation:              Outcome Evaluation: Declined strength in LLE during PT today. Pt now requiring L knee blocked to stand, moderate assist x2 to stand w/  B UE support on PT/tech.  Unable to attempt any steps 2/2 LE weakness.  ALso impaired siting balance, frequently leaning towards L; requiring CGA-min A w/ frequent verbal cues.  RN reports pt received ativan ~2 hours before PT.  Hopeful for progress for DC to acute rehab.

## 2023-03-03 NOTE — PLAN OF CARE
Problem: Adult Inpatient Plan of Care  Goal: Plan of Care Review  Outcome: Ongoing, Progressing     Problem: Adult Inpatient Plan of Care  Goal: Absence of Hospital-Acquired Illness or Injury  Intervention: Identify and Manage Fall Risk  Recent Flowsheet Documentation  Taken 3/3/2023 0400 by Gwen Walton RN  Safety Promotion/Fall Prevention:   assistive device/personal items within reach   clutter free environment maintained   room organization consistent   safety round/check completed  Taken 3/3/2023 0000 by Gwen Walton RN  Safety Promotion/Fall Prevention:   assistive device/personal items within reach   clutter free environment maintained   room organization consistent   safety round/check completed  Taken 3/2/2023 2200 by Gwen Walton RN  Safety Promotion/Fall Prevention:   clutter free environment maintained   assistive device/personal items within reach   safety round/check completed   room organization consistent  Taken 3/2/2023 2010 by Gwen Walton RN  Safety Promotion/Fall Prevention:   safety round/check completed   room organization consistent   assistive device/personal items within reach   clutter free environment maintained   activity supervised     Problem: Adult Inpatient Plan of Care  Goal: Absence of Hospital-Acquired Illness or Injury  Intervention: Prevent Infection  Recent Flowsheet Documentation  Taken 3/3/2023 0400 by Gwen Walton RN  Infection Prevention:   environmental surveillance performed   equipment surfaces disinfected   personal protective equipment utilized   rest/sleep promoted   hand hygiene promoted   single patient room provided  Taken 3/3/2023 0000 by Gwen Walton RN  Infection Prevention:   environmental surveillance performed   equipment surfaces disinfected   hand hygiene promoted   personal protective equipment utilized   rest/sleep promoted   single patient room provided  Taken 3/2/2023 2200 by Gwen Walton RN  Infection Prevention:    environmental surveillance performed   equipment surfaces disinfected   hand hygiene promoted   rest/sleep promoted   personal protective equipment utilized   single patient room provided  Taken 3/2/2023 2010 by Gwen Walton RN  Infection Prevention:   single patient room provided   rest/sleep promoted   personal protective equipment utilized   equipment surfaces disinfected   environmental surveillance performed   hand hygiene promoted     Problem: Adult Inpatient Plan of Care  Goal: Absence of Hospital-Acquired Illness or Injury  Intervention: Prevent and Manage VTE (Venous Thromboembolism) Risk  Recent Flowsheet Documentation  Taken 3/2/2023 2010 by Gwen Walton RN  VTE Prevention/Management:   bilateral   sequential compression devices on  Range of Motion: ROM (range of motion) performed     Problem: Fall Injury Risk  Goal: Absence of Fall and Fall-Related Injury  Outcome: Ongoing, Progressing  Intervention: Identify and Manage Contributors  Recent Flowsheet Documentation  Taken 3/3/2023 0400 by Gwen Walton RN  Medication Review/Management: medications reviewed  Taken 3/3/2023 0000 by Gwen Walton RN  Medication Review/Management: medications reviewed  Taken 3/2/2023 2200 by Gwen Walton RN  Medication Review/Management: medications reviewed  Taken 3/2/2023 2010 by Gwen Walton RN  Medication Review/Management: medications reviewed  Intervention: Promote Injury-Free Environment  Recent Flowsheet Documentation  Taken 3/3/2023 0400 by Gwen Walton RN  Safety Promotion/Fall Prevention:   assistive device/personal items within reach   clutter free environment maintained   room organization consistent   safety round/check completed  Taken 3/3/2023 0000 by Gwen Walton RN  Safety Promotion/Fall Prevention:   assistive device/personal items within reach   clutter free environment maintained   room organization consistent   safety round/check completed  Taken 3/2/2023 2200 by  Killinder, Gwen, RN  Safety Promotion/Fall Prevention:   clutter free environment maintained   assistive device/personal items within reach   safety round/check completed   room organization consistent  Taken 3/2/2023 2010 by Gwen Walton RN  Safety Promotion/Fall Prevention:   safety round/check completed   room organization consistent   assistive device/personal items within reach   clutter free environment maintained   activity supervised     Problem: Fall Injury Risk  Goal: Absence of Fall and Fall-Related Injury  Intervention: Identify and Manage Contributors  Recent Flowsheet Documentation  Taken 3/3/2023 0400 by Gwen Walton RN  Medication Review/Management: medications reviewed  Taken 3/3/2023 0000 by Gwen Walton RN  Medication Review/Management: medications reviewed  Taken 3/2/2023 2200 by Gwen Walton RN  Medication Review/Management: medications reviewed  Taken 3/2/2023 2010 by Gwen Walton RN  Medication Review/Management: medications reviewed     Problem: Fall Injury Risk  Goal: Absence of Fall and Fall-Related Injury  Intervention: Promote Injury-Free Environment  Recent Flowsheet Documentation  Taken 3/3/2023 0400 by Gwen Walton RN  Safety Promotion/Fall Prevention:   assistive device/personal items within reach   clutter free environment maintained   room organization consistent   safety round/check completed  Taken 3/3/2023 0000 by Gwen Walton RN  Safety Promotion/Fall Prevention:   assistive device/personal items within reach   clutter free environment maintained   room organization consistent   safety round/check completed  Taken 3/2/2023 2200 by Gwen Walton RN  Safety Promotion/Fall Prevention:   clutter free environment maintained   assistive device/personal items within reach   safety round/check completed   room organization consistent  Taken 3/2/2023 2010 by Gwen Walton RN  Safety Promotion/Fall Prevention:   safety round/check completed    room organization consistent   assistive device/personal items within reach   clutter free environment maintained   activity supervised     Problem: Skin Injury Risk Increased  Goal: Skin Health and Integrity  Outcome: Ongoing, Progressing  Intervention: Optimize Skin Protection  Recent Flowsheet Documentation  Taken 3/2/2023 2010 by Gwen Walton RN  Pressure Reduction Techniques:   frequent weight shift encouraged   weight shift assistance provided  Pressure Reduction Devices:   alternating pressure pump (ADD)   positioning supports utilized  Skin Protection:   adhesive use limited   transparent dressing maintained     Problem: Skin Injury Risk Increased  Goal: Skin Health and Integrity  Intervention: Optimize Skin Protection  Recent Flowsheet Documentation  Taken 3/2/2023 2010 by Gwen Walton RN  Pressure Reduction Techniques:   frequent weight shift encouraged   weight shift assistance provided  Pressure Reduction Devices:   alternating pressure pump (ADD)   positioning supports utilized  Skin Protection:   adhesive use limited   transparent dressing maintained     Problem: Pain Acute  Goal: Acceptable Pain Control and Functional Ability  Outcome: Ongoing, Progressing  Intervention: Prevent or Manage Pain  Recent Flowsheet Documentation  Taken 3/3/2023 0400 by Gwen Walton RN  Medication Review/Management: medications reviewed  Taken 3/3/2023 0000 by Gwen Walton RN  Medication Review/Management: medications reviewed  Taken 3/2/2023 2200 by Gwen Walton RN  Medication Review/Management: medications reviewed  Taken 3/2/2023 2010 by Gwen Walton RN  Sleep/Rest Enhancement:   awakenings minimized   relaxation techniques promoted   room darkened  Medication Review/Management: medications reviewed  Intervention: Optimize Psychosocial Wellbeing  Recent Flowsheet Documentation  Taken 3/2/2023 2010 by Gwen Walton RN  Diversional Activities: television     Problem: Pain Acute  Goal:  Acceptable Pain Control and Functional Ability  Intervention: Prevent or Manage Pain  Recent Flowsheet Documentation  Taken 3/3/2023 0400 by Gwen Walton RN  Medication Review/Management: medications reviewed  Taken 3/3/2023 0000 by Gwen Walton RN  Medication Review/Management: medications reviewed  Taken 3/2/2023 2200 by Gwen Walton RN  Medication Review/Management: medications reviewed  Taken 3/2/2023 2010 by Gwen Walton RN  Sleep/Rest Enhancement:   awakenings minimized   relaxation techniques promoted   room darkened  Medication Review/Management: medications reviewed     Problem: Pain Acute  Goal: Acceptable Pain Control and Functional Ability  Intervention: Optimize Psychosocial Wellbeing  Recent Flowsheet Documentation  Taken 3/2/2023 2010 by Gwen Walton RN  Diversional Activities: television   Goal Outcome Evaluation:

## 2023-03-03 NOTE — THERAPY TREATMENT NOTE
Patient Name: Louise GARCIA  : 1965    MRN: [id number]                              Today's Date: 3/3/2023       Admit Date: 2023    Visit Dx:     ICD-10-CM ICD-9-CM   1. Paresthesia  R20.2 782.0   2. Gait instability  R26.81 781.2   3. Extremity numbness  R20.0 782.0     Patient Active Problem List   Diagnosis   • Sepsis (HCC)   • Neck pain, chronic   • Upper back pain   • Paresthesia   • Cervical myelopathy (HCC)   • Lower extremity weakness   • History of blood clots   • Chronic anticoagulation   • Seizures (HCC)   • Leukocytosis   • Normocytic anemia   • Hyperglycemia   • GERD (gastroesophageal reflux disease)   • Guillain-Ocilla syndrome (HCC)   • Situational mixed anxiety and depressive disorder   • Upper abdominal pain     Past Medical History:   Diagnosis Date   • Degenerative disc disease, cervical    • Gestational diabetes    • H/O blood clots    • Hematemesis    • Seizures (HCC)    • Septic shock (HCC)      Past Surgical History:   Procedure Laterality Date   • APPENDECTOMY     • BACK SURGERY     • CHOLECYSTECTOMY     • ENDOSCOPY N/A 2016    Procedure: ESOPHAGOGASTRODUODENOSCOPY with biopsy;  Surgeon: Austen Brito MD;  Location: [facility] ENDOSCOPY;  Service:    • HYSTERECTOMY     • NECK SURGERY       approx 6 yrs ago   • TONSILLECTOMY     • TONSILLECTOMY AND ADENOIDECTOMY        General Information     Row Name 23 1341          Physical Therapy Time and Intention    Document Type therapy note (daily note)  -AR     Mode of Treatment physical therapy  -AR     Row Name 23 1341          General Information    Patient Profile Reviewed yes  -AR     Existing Precautions/Restrictions fall  -AR     Row Name 23 1341          Cognition    Orientation Status (Cognition) oriented x 3  -AR           User Key  (r) = Recorded By, (t) = Taken By, (c) = Cosigned By    Initials Name Provider Type    AR Jovita Miller PT Physical Therapist               Mobility     Row Name  03/03/23 1341          Bed Mobility    Bed Mobility supine-sit  -AR     Supine-Sit Maries (Bed Mobility) moderate assist (50% patient effort)  -AR     Sit-Supine Maries (Bed Mobility) moderate assist (50% patient effort);maximum assist (25% patient effort);2 person assist  -AR     Assistive Device (Bed Mobility) bed rails;head of bed elevated;draw sheet  -AR     Comment, (Bed Mobility) increased time, cues for sequencing  -AR     Row Name 03/03/23 1341          Sit-Stand Transfer    Sit-Stand Maries (Transfers) moderate assist (50% patient effort);2 person assist  -AR     Comment, (Sit-Stand Transfer) first STS with walker and mod A x2.  weight shift anteriorly w/ safety concerns.  2nd stand, L knee blocked well, B UE support on PT/tech.  -AR     Row Name 03/03/23 1341          Gait/Stairs (Locomotion)    Distance in Feet (Gait) unable to attempt any steps today 2/2 weakness  -AR           User Key  (r) = Recorded By, (t) = Taken By, (c) = Cosigned By    Initials Name Provider Type    Jovita Lopez, PT Physical Therapist               Obj/Interventions     Row Name 03/03/23 1343          Balance    Static Sitting Balance minimal assist  -AR           User Key  (r) = Recorded By, (t) = Taken By, (c) = Cosigned By    Initials Name Provider Type    Jovita Lopez, PT Physical Therapist               Goals/Plan    No documentation.                Clinical Impression     Row Name 03/03/23 1343          Pain    Pretreatment Pain Rating 5/10  -AR     Posttreatment Pain Rating 5/10  -AR     Pain Location - abdomen  -AR     Pain Intervention(s) Repositioned  -AR     Row Name 03/03/23 1343          Plan of Care Review    Outcome Evaluation Declined strength in LLE during PT today. Pt now requiring L knee blocked to stand, moderate assist x2 to stand w/  B UE support on PT/tech.  Unable to attempt any steps 2/2 LE weakness.  ALso impaired siting balance, frequently leaning towards L; requiring  CGA-min A w/ frequent verbal cues.  RN reports pt received ativan ~2 hours before PT.  Hopeful for progress for DC to acute rehab.  -AR     Row Name 03/03/23 1343          Therapy Assessment/Plan (PT)    Rehab Potential (PT) good, to achieve stated therapy goals  -AR     Criteria for Skilled Interventions Met (PT) yes;skilled treatment is necessary  -AR     Therapy Frequency (PT) 6 times/wk  -AR     Row Name 03/03/23 1343          Vital Signs    O2 Delivery Pre Treatment room air  -AR     Row Name 03/03/23 1343          Positioning and Restraints    Pre-Treatment Position in bed  -AR     Post Treatment Position bed  -AR     In Bed notified nsg;supine;call light within reach;encouraged to call for assist;exit alarm on  chair position.  spouse declined pt pivoting to chair due to her pain level and needing to be slid to bed for another test soon.  -AR           User Key  (r) = Recorded By, (t) = Taken By, (c) = Cosigned By    Initials Name Provider Type    Jovita Lopez, PT Physical Therapist               Outcome Measures     Row Name 03/03/23 1346 03/03/23 0745       How much help from another person do you currently need...    Turning from your back to your side while in flat bed without using bedrails? 3  -AR 4  -JM    Moving from lying on back to sitting on the side of a flat bed without bedrails? 2  -AR 3  -JM    Moving to and from a bed to a chair (including a wheelchair)? 2  -AR 2  -JM    Standing up from a chair using your arms (e.g., wheelchair, bedside chair)? 2  -AR 2  -JM    Climbing 3-5 steps with a railing? 1  -AR 1  -JM    To walk in hospital room? 1  -AR 2  -JM    AM-PAC 6 Clicks Score (PT) 11  -AR 14  -JM    Highest level of mobility 4 --> Transferred to chair/commode  -AR 4 --> Transferred to chair/commode  -JM    Row Name 03/03/23 1346          Functional Assessment    Outcome Measure Options AM-PAC 6 Clicks Basic Mobility (PT)  -AR           User Key  (r) = Recorded By, (t) = Taken By, (c) =  Cosigned By    Initials Name Provider Type    Jovita Lopez PT Physical Therapist    Chey Hays, RN Registered Nurse                             Physical Therapy Education     Title: PT OT SLP Therapies (In Progress)     Topic: Physical Therapy (In Progress)     Point: Mobility training (In Progress)     Learning Progress Summary           Patient Acceptance, E, NR by AR at 3/3/2023 1346    Acceptance, E,TB, VU by JOÃO at 3/2/2023 1836    Acceptance, E,TB, VU by MS at 3/1/2023 1135    Acceptance, E,TB, VU by MS at 2/28/2023 1105    Acceptance, E,TB,D, VU,DU by LB at 2/26/2023 1159    Acceptance, E,TB, VU,DU by LB at 2/25/2023 1542    Acceptance, E,D, VU,NR by EB at 2/24/2023 1254    Acceptance, E,D, VU,NR by EB at 2/23/2023 1057    Acceptance, E,TB, VU,NR by EB1 at 2/21/2023 1134   Family Acceptance, E, NR by AR at 3/3/2023 1346   Significant Other Acceptance, E,TB, VU,NR by EB1 at 2/21/2023 1134                   Point: Home exercise program (In Progress)     Learning Progress Summary           Patient Acceptance, E, NR by AR at 3/3/2023 1346    Acceptance, E,TB, VU by JOÃO at 3/2/2023 1836    Acceptance, E,TB, VU by MS at 2/28/2023 1105    Acceptance, E,TB,D, VU,DU by LB at 2/26/2023 1159    Acceptance, E,TB, VU,DU by LB at 2/25/2023 1542    Acceptance, E,D, VU,NR by EB at 2/24/2023 1254    Acceptance, E,D, VU,NR by EB at 2/23/2023 1057   Family Acceptance, E, NR by AR at 3/3/2023 1346                   Point: Body mechanics (In Progress)     Learning Progress Summary           Patient Acceptance, E, NR by AR at 3/3/2023 1346    Acceptance, E,TB, VU by JOÃO at 3/2/2023 1836    Acceptance, E,TB,D, VU,DU by LB at 2/26/2023 1159    Acceptance, E,TB, VU,DU by LB at 2/25/2023 1542    Acceptance, E,D, VU,NR by EB at 2/24/2023 1254    Acceptance, E,D, VU,NR by EB at 2/23/2023 1057   Family Acceptance, E, NR by AR at 3/3/2023 1346                   Point: Precautions (In Progress)     Learning Progress Summary            Patient Acceptance, E, NR by AR at 3/3/2023 1346    Acceptance, E,TB, VU by JOÃO at 3/2/2023 1836    Acceptance, E,TB,D, VU,DU by LB at 2/26/2023 1159    Acceptance, E,TB, VU,DU by LB at 2/25/2023 1542    Acceptance, E,D, VU,NR by EB at 2/24/2023 1254    Acceptance, E,D, VU,NR by EB at 2/23/2023 1057   Family Acceptance, E, NR by AR at 3/3/2023 1346                               User Key     Initials Effective Dates Name Provider Type Discipline    AR 06/16/21 -  Jovita Miller, PT Physical Therapist PT    LB 08/09/20 -  Nia Arellano, PT Physical Therapist PT    MS 06/16/21 -  Елена Florez, PT Physical Therapist PT    EB 02/14/23 -  Caren James, PTA Physical Therapist Assistant PT    JOÃO 09/20/22 -  Jayda Patel, RN Registered Nurse Nurse    EB 01/12/23 -  Yesica Zamora, PT Student PT Student PT              PT Recommendation and Plan     Outcome Evaluation: Declined strength in LLE during PT today. Pt now requiring L knee blocked to stand, moderate assist x2 to stand w/  B UE support on PT/tech.  Unable to attempt any steps 2/2 LE weakness.  ALso impaired siting balance, frequently leaning towards L; requiring CGA-min A w/ frequent verbal cues.  RN reports pt received ativan ~2 hours before PT.  Hopeful for progress for DC to acute rehab.     Time Calculation:    PT Charges     Row Name 03/03/23 1341             Time Calculation    Start Time 1146  -AR      Stop Time 1219  -AR      Time Calculation (min) 33 min  -AR      PT Received On 03/03/23  -AR      PT - Next Appointment 03/04/23  -AR            User Key  (r) = Recorded By, (t) = Taken By, (c) = Cosigned By    Initials Name Provider Type    AR Jovita Miller, PT Physical Therapist              Therapy Charges for Today     Code Description Service Date Service Provider Modifiers Qty    74952082377 HC PT THER PROC EA 15 MIN 3/3/2023 Jovita Miller, PT GP 1    18662177367 HC PT THERAPEUTIC ACT EA 15 MIN 3/3/2023 Jovita Miller, PT GP 1     [id number]  PT THER SUPP EA 15 MIN 3/3/2023 Jovita Miller, PT GP 2          PT G-Codes  Outcome Measure Options: AM-PAC 6 Clicks Basic Mobility (PT)  AM-PAC 6 Clicks Score (PT): 11  AM-PAC 6 Clicks Score (OT): 15  Modified [place] Scale: 4 - Moderately severe disability.  Unable to walk without assistance, and unable to attend to own bodily needs without assistance.  PT Discharge Summary  Anticipated Discharge Disposition (PT): inpatient rehabilitation facility    Jovita Miller PT  3/3/2023

## 2023-03-03 NOTE — PROGRESS NOTES
DOS: 3/3/2023  NAME: Louise GARCIA   : 1965  PCP: Chloe Schaefer APRN    Chief Complaint   Patient presents with   • Fall   • Numbness     Numbness in bilateral arms and legs post falling.         Subjective: No acute events overnight.  Was falling asleep off-and-on during exam today.  Has reports she has not been getting up as much as she should.  She also has not been doing her IS that much.  She denies any new weakness, numbness, speech or visual disturbances, or headaches.  Paresthesias improving.  Still feels left leg is weaker than the right.  Receives her last IVIG treatment today.    Objective:  Vital signs:      Vitals:    23 1600 23 1632 23 0329   BP: 122/85 123/85 131/88 148/89   BP Location:  Left arm Left arm Left arm   Patient Position:  Lying Sitting Sitting   Pulse: 101 92 114 109   Resp:  18 18 18   Temp:  98.2 °F (36.8 °C)  98.5 °F (36.9 °C)   TempSrc:  Oral  Oral   SpO2: 92% 90% 92% 91%   Weight:       Height:           Current Facility-Administered Medications:   •  apixaban (ELIQUIS) tablet 5 mg, 5 mg, Oral, Daily, Jake Richmond DO, 5 mg at 23  •  bisacodyl (DULCOLAX) suppository 10 mg, 10 mg, Rectal, Daily, Daisy Kaur MD, 10 mg at 23  •  Desvenlafaxine Succinate ER 25 mg, 25 mg, Oral, Daily, Violet Burdick APRN, 25 mg at 23 0947  •  diphenhydrAMINE (BENADRYL) injection 50 mg, 50 mg, Intravenous, Once PRN, Dwayne Jerry MD  •  famotidine (PEPCID) injection 20 mg, 20 mg, Intravenous, Once PRN, Dwayne Jerry MD  •  folic acid (FOLVITE) tablet 1 mg, 1 mg, Oral, Daily, Renata Oro APRN, 1 mg at 2342  •  gabapentin (NEURONTIN) capsule 100 mg, 100 mg, Oral, Q8H, Daisy Kaur MD, 100 mg at 23 0565  •  HYDROcodone-acetaminophen (NORCO) 5-325 MG per tablet 1 tablet, 1 tablet, Oral, Q4H PRN, Daisy Kaur MD, 1 tablet at 23 1240  •  Hydrocortisone Sod Suc (PF)  (Solu-CORTEF) injection 100 mg, 100 mg, Intravenous, Once PRN, Dwayne Jerry MD  •  HYDROmorphone (DILAUDID) injection 1 mg, 1 mg, Intravenous, Q4H PRN, Daisy Kaur MD, 1 mg at 03/03/23 0553  •  immune globulin (human) 20 g infusion 200 mL, 20 g, Intravenous, Daily, Ryley Arce MD, Last Rate: 0 mL/hr at 03/01/23 1250, 20 g at 03/02/23 1007  •  ketorolac (TORADOL) injection 30 mg, 30 mg, Intravenous, Q6H PRN, Violet Burdick APRN  •  lactulose (CHRONULAC) 10 GM/15ML solution 20 g, 20 g, Oral, BID, Daisy Kaur MD, 20 g at 03/02/23 2034  •  lamoTRIgine (LaMICtal) tablet 200 mg, 200 mg, Oral, Daily, Violet Burdick APRN, 200 mg at 03/03/23 0942  •  levoFLOXacin (LEVAQUIN) 750 mg/150 mL D5W (premix) (LEVAQUIN) 750 mg, 750 mg, Intravenous, Q24H, Daisy Kaur MD, 750 mg at 03/02/23 2027  •  lidocaine (LIDODERM) 5 % 1 patch, 1 patch, Transdermal, Q24H, Violet Burdick APRN, 1 patch at 03/03/23 0941  •  LORazepam (ATIVAN) tablet 1 mg, 1 mg, Oral, Q4H PRN, Daisy Kaur MD, 1 mg at 03/03/23 1000  •  lubiprostone (AMITIZA) capsule 8 mcg, 8 mcg, Oral, BID With Meals, Violet Burdick APRN  •  Magnesium Sulfate - Total Dose 10 grams - Magnesium 1 or Less, 2 g, Intravenous, PRN **OR** Magnesium Sulfate - Total Dose 6 grams - Magnesium 1.1 - 1.5, 2 g, Intravenous, PRN, Stopped at 02/21/23 1252 **OR** Magnesium Sulfate - Total Dose 4 grams - Magnesium 1.6 - 1.9, 4 g, Intravenous, PRN, Qadah, Abdalhakim, APRN  •  melatonin tablet 5 mg, 5 mg, Oral, Nightly PRN, Renata Oro APRN, 5 mg at 02/28/23 2200  •  pantoprazole (PROTONIX) injection 40 mg, 40 mg, Intravenous, Q12H, Daisy Kaur MD, 40 mg at 03/03/23 0942  •  Pharmacy to enter IVIG order 20,000 mg, 400 mg/kg (Adjusted), Intravenous, Daily, Dwayne Jerry MD, 20,000 mg at 02/27/23 1706  •  polyethylene glycol (MIRALAX) packet 17 g, 17 g, Oral, BID, Violet Burdick APRN  •  potassium & sodium phosphates  (PHOS-NAK) 280-160-250 MG packet - for Phosphorus less than 1.25 mg/dL, 2 packet, Oral, Q6H PRN **OR** potassium & sodium phosphates (PHOS-NAK) 280-160-250 MG packet - for Phosphorus 1.25 - 2.5 mg/dL, 2 packet, Oral, Q6H PRN, Yi, Jake, DO  •  potassium chloride (K-DUR,KLOR-CON) ER tablet 40 mEq, 40 mEq, Oral, PRN, 40 mEq at 03/02/23 2033 **OR** potassium chloride (KLOR-CON) packet 40 mEq, 40 mEq, Oral, PRN **OR** potassium chloride 10 mEq in 100 mL IVPB, 10 mEq, Intravenous, Q1H PRN, Yi, Jake, DO  •  sennosides-docusate (PERICOLACE) 8.6-50 MG per tablet 2 tablet, 2 tablet, Oral, BID, 2 tablet at 03/03/23 0942 **AND** [DISCONTINUED] polyethylene glycol (MIRALAX) packet 17 g, 17 g, Oral, Daily PRN **AND** [DISCONTINUED] bisacodyl (DULCOLAX) EC tablet 5 mg, 5 mg, Oral, Daily PRN **AND** [DISCONTINUED] bisacodyl (DULCOLAX) suppository 10 mg, 10 mg, Rectal, Daily PRN, Franci Griffith PA  •  [COMPLETED] Insert Peripheral IV, , , Once **AND** sodium chloride 0.9 % flush 10 mL, 10 mL, Intravenous, PRN, Shin Rivers PA  •  sodium chloride 0.9 % flush 10 mL, 10 mL, Intravenous, Q12H, Qadah, Abdalhakim, APRN, 10 mL at 03/03/23 0947  •  sodium chloride 0.9 % flush 10 mL, 10 mL, Intravenous, PRN, Qadah, Abdalhakim, APRN  •  sodium chloride 0.9 % infusion 40 mL, 40 mL, Intravenous, PRN, Qadah, Abdalhakim, APRN, 40 mL at 02/23/23 0603  •  sodium chloride 0.9 % infusion, 100 mL/hr, Intravenous, Continuous, Daisy Kaur MD, Last Rate: 100 mL/hr at 03/03/23 0744, 100 mL/hr at 03/03/23 0744  •  vitamin B-12 (CYANOCOBALAMIN) tablet 50 mcg, 50 mcg, Oral, Daily, Renata Oro APRN, 50 mcg at 03/03/23 0942    PRN meds  •  diphenhydrAMINE  •  famotidine  •  HYDROcodone-acetaminophen  •  hydrocortisone sodium succinate  •  HYDROmorphone  •  ketorolac  •  LORazepam  •  magnesium sulfate **OR** magnesium sulfate **OR** magnesium sulfate  •  melatonin  •  potassium & sodium phosphates **OR** potassium & sodium  phosphates  •  potassium chloride **OR** potassium chloride **OR** potassium chloride  •  [COMPLETED] Insert Peripheral IV **AND** sodium chloride  •  sodium chloride  •  sodium chloride    No current facility-administered medications on file prior to encounter.     Current Outpatient Medications on File Prior to Encounter   Medication Sig   • acetaminophen (TYLENOL) 325 MG tablet Take 650 mg by mouth Every 6 (Six) Hours As Needed for Mild Pain .   • apixaban (ELIQUIS) 5 MG tablet tablet Take 5 mg by mouth Daily.   • folic acid (FOLVITE) 1 MG tablet Take 1 mg by mouth Daily.   • ibuprofen (ADVIL,MOTRIN) 200 MG tablet Take 200 mg by mouth Every 6 (Six) Hours As Needed for Mild Pain .   • lamoTRIgine (LaMICtal) 100 MG tablet Take 100 mg by mouth Daily.   • nitrofurantoin, macrocrystal-monohydrate, (MACROBID) 100 MG capsule Take 800 mg by mouth 2 (Two) Times a Day.   • vitamin B-12 (CYANOCOBALAMIN) 100 MCG tablet Take 50 mcg by mouth Daily.   • diazepam (VALIUM) 10 MG tablet TAKE 1 TABLET THREE TIMES A DAY   • estrogens, conjugated,-methyltestosterone (ESTRATEST HS) 0.625-1.25 MG per tablet Take  by mouth.   • pantoprazole (PROTONIX) 40 MG EC tablet Take 1 tablet by mouth 2 (two) times a day before meals.   • sucralfate (CARAFATE) 1 GM/10ML suspension Take 10 mL by mouth every 6 (six) hours.       General appearance: NAD, drowsy, well groomed  HEENT: Normocephalic, atraumatic, right pupil 3, left pupil 3, no masses or tenderness  Resp: Even and unlabored  Extremities: No obvious edema  Skin: warm, dry     Neurological:   MS: oriented to person, place, year, recent/remote memory intact, decreased attention/concentration 2/2 drowsiness, delayed speech with response, no neglect, normal fund of knowledge  CN: visual acuity grossly normal, visual fields full, EOMI, facial sensation equal, no facial droop, tongue midline  Motor: gave minimal effort today which I feel was 2/2 to her drowsiness- 5/5 in upper extremities, LLE  3/5 LLE worse distally, 4/5 RLE  Reflexes: Areflexic in lower extremities, diminished in upper extremities  Sensory: Normal cold temperature sensation of all 4 ext. no sense of vibratory sensation of lower extremities or abdomen just below the navel   Coordination: Normal finger to nose test  Gait and station: Deferred  Rapid alternating movements: normal finger to thumb tap    Physical exam performed, changes noted    Laboratory results:  Lab Results   Component Value Date    TSH 1.670 02/26/2023     Lab Results   Component Value Date    HGBA1C 5.10 02/20/2023     Lab Results   Component Value Date    [id number] 298 03/02/2023     No results found for: CHOL, CHLPL  No results found for: TRIG  No results found for: HDL  No results found for: LDL, LDLDIRECT  Lab Results   Component Value Date    WBC 31.39 (C) 03/03/2023    HGB 10.0 (L) 03/03/2023    HCT 27.6 (L) 03/03/2023    MCV 96.8 03/03/2023     03/03/2023     Lab Results   Component Value Date    GLUCOSE 143 (H) 03/03/2023    BUN 31 (H) 03/03/2023    CREATININE 0.81 03/03/2023    EGFRIFNONA 81 08/29/2016    EGFRIFAFRI  08/29/2016      Comment:      <15 Indicative of kidney failure.    BCR 38.3 (H) 03/03/2023    K 4.7 03/03/2023    CO2 25.9 03/03/2023    CALCIUM 9.5 03/03/2023    PROTENTOTREF 6.1 02/28/2023    ALBUMIN 3.0 (L) 03/03/2023    LABIL2 0.7 02/28/2023    AST 39 (H) 03/03/2023    ALT 55 (H) 03/03/2023     Lab Results   Component Value Date    PTT 25.4 03/01/2019     Lab Results   Component Value Date    INR 1.0 03/01/2019    INR 1.1 05/24/2018    INR 1.3 05/23/2018    PROTIME 11.2 03/01/2019    PROTIME 12.3 05/24/2018    PROTIME 13.9 (H) 05/23/2018     Brief Urine Lab Results  (Last result in the past 365 days)      Color   Clarity   Blood   Leuk Est   Nitrite   Protein   CREAT   Urine HCG        02/20/23 1732 Dark Yellow   Cloudy   Negative   Trace   Negative   Trace                 Review and interpretation of imaging:  CT Abdomen Pelvis Without  Contrast    Result Date: 3/3/2023  1. Abnormal parenchymal opacity in the right middle lobe has a somewhat masslike appearance anteriorly where there is a subcentimeter area of air suggesting cavitation. Whether this represents tumor or pneumonia is unclear. There is a small right pleural effusion with parenchymal volume loss in the posterior right lower lobe favored to represent atelectasis. Mildly enlarged right hilar and subcarinal lymph nodes. 2. Diffuse third spacing around the pelvis. Otherwise no acute abnormality identified in the abdomen or the pelvis. 3. 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  This report was finalized on 3/3/2023 10:13 AM by Dr. Ryley Nascimento M.D.      CT Angiogram Chest    Result Date: 3/3/2023  1. Abnormal parenchymal opacity in the right middle lobe has a somewhat masslike appearance anteriorly where there is a subcentimeter area of air suggesting cavitation. Whether this represents tumor or pneumonia is unclear. There is a small right pleural effusion with parenchymal volume loss in the posterior right lower lobe favored to represent atelectasis. Mildly enlarged right hilar and subcarinal lymph nodes. 2. Diffuse third spacing around the pelvis. Otherwise no acute abnormality identified in the abdomen or the pelvis. 3. 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  This report was finalized on 3/3/2023 10:13 AM by Dr. Ryley Nascimento M.D.      MRI Brain With & Without Contrast    Result Date: 2/23/2023   Moderate T2 hyperintensity within the periventricular and subcortical white matter, many of which have a pericallosal distribution. No definite abnormal cranial enhancement or restricted diffusion. Differential considerations include demyelination such as multiple sclerosis versus chronic ischemic white matter changes and correlation with patient history is recommended as well as continued attention on follow-up.  This report was finalized on 2/23/2023 5:09 PM by Dr. Juan Duckworth M.D.       IR LUMBAR PUNCTURE DIAGNOSTIC    Result Date: 2/24/2023  Technically successful fluoroscopically guided lumbar puncture.  FLUOROSCOPY TIME: 5 seconds, 2 images.  This report was finalized on 2/24/2023 11:22 AM by Dr. Agustin Reyes M.D.      US Abdomen Limited    Result Date: 3/2/2023  Prior cholecystectomy. No abnormality identified to account for pain.  This report was finalized on 3/2/2023 10:55 PM by Dr. Ryley Nascimento M.D.      MRI Lumbar Spine With & Without Contrast    Result Date: 2/25/2023  Electronically signed by Lanny Wilkinson MD on 02-25-23 at 2254        Impression/Assessment:  This is a 58-year-old female with past medical history of cervical DDD s/p C6 corpectomy and C5-7 ACDF for disc herniation with radiculopathy and weakness, blood clots on Eliquis 5 mg twice daily PTA, gestational diabetes, reported seizures on Lamictal 100 mg daily PTA, anxiety on Valium 1 tablet 3 times daily, reported history of alcohol abuse who presented to the hospital on 2/20/2023 with complaints of whole body numbness that started after a fall earlier this month.  She reported initially she had a numbness from her neck down her chest, abdomen, pubic area, and down both legs.  She began to notice some urinary urgency and incontinence as well as constipation a few days later.  She denied any numbness involving her upper extremities but did note some numbness of the tips of her fingers.  She noted that she was becoming weak as well progressively in her legs which is worse on the left. Reportedly she also had an additional fall on 2/20 leaving work due to her weakness.     She was initially evaluated by Dr. Arce with our service who ordered full spinal and brain imaging with contrasted MRI as well as a lumbar puncture.  He was concern for an acute traumatic myelopathy and initiated her on Solu-Medrol 1 g IV daily which she received 3 doses of and then was reduced to 500 daily which she received 2 doses of it and then  reduce to 250 mg daily which she received 3 doses. Her spinal imaging was essentially unrevealing showing multilevel spondyloarthropathy but no significant canal stenosis.  Her MRI brain revealed mild to moderate white matter changes otherwise no other acute findings.  Her spinal fluid was also unremarkable showing 0 TNCs, a protein level 38.2, glucose 93, and RBC 4.  She has had serum autoimmune testing including antiscleroderma antibodies, gaol 1 IgG, JEANNETTE SSB antibody, JEANNETTE SSA antibody, anti-DNA antibody, antichromatin antibody, anticentromere B antibodies, west nile virus, IgA 185, IgG 493, IgM 178 which have all been negative.  She did receive an EMG/NCS by Dr. Caleb Reyes yesterday that revealed findings consistent with peripheral neuropathy with mild evidence in the upper extremity with sensory involvement and mild to moderate involvement of the lower extremity with sensorimotor involvement, no evidence of right cervical or lumbosacral radiculopathy.     Although her EMG/NCS did not show typical features of GBS given concerning clinical exam especially with areflexia the decision was made to start her on IVIG based on clinical suspicion for a total of 5 doses.     Diagnosis:  Progressive generalized numbness and weakness  Leukocytosis  Abnormal CT chest  Recurrent falls  History of cervical degenerative disc disease s/p corpectomy and ACDF  Generalized anxiety  History of alcohol abuse    Plan:  Paresthesias of the legs have improved.  She had minimal effort today with motor testing secondary to her drowsiness but appears her weakness of her left leg was more worse distally today. NIF noted to be -35 and FVC 1.74.  Scheduled for day 5/5 of IVIG today.  Only waiting on oligoclonal bands and cryoglobulin testing.  She was pretty drowsy today, gabapentin increased to q8h dosing. Will talk to Dr. Kaur about backing off of this to BID.  She had CT C/A/P that showing concern for right middle lobe masslike  appearance and air as well as a small pleural effusion and third spacing around her pelvis.  She has been initiated on Levaquin by primary and is awaiting hepatic U/S.  White count markedly elevated today, steroids were d/c'd yesterday. No fevers. BC pending.  Will continue to follow.    Case discussed with patient,  at bedside, and Dr. Jerry, and he agrees with plan above.    KASIA Melo

## 2023-03-03 NOTE — PROGRESS NOTES
[facility]    Today is day 5/5 of IVIG.  Following for progress with therapies and ability to tolerate 3 hours of therapy per day.    Thank you,  Gaviota Goodman RN  Rehab Admission Nurse

## 2023-03-03 NOTE — CONSULTS
[facility]    Reason for consult: abnormal CT chest    HPI:  Mrs. Soriano is a 59yo female admitted 2/20 with weakness.  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  She has been on steroids as well. She had an abnormal CXR and has had elevated WBC count and procalcitonin.  A CT chest was ordered showing right middle lobe infiltrate or mass and right sided pleural effusion.  She was started on levaquin yesterday  She is sleepy but arouses and is appropriate, she has some chest discomfort on the right worse with inhalations. She is not having much cough. She has some shortness of breath.     Past Medical History:   Diagnosis Date   • Degenerative disc disease, cervical    • Gestational diabetes    • H/O blood clots    • Hematemesis    • Seizures (HCC)    • Septic shock (HCC)      Social History     Socioeconomic History   • Marital status:    Tobacco Use   • Smoking status: Never   • Smokeless tobacco: Never   Vaping Use   • Vaping Use: Never used   Substance and Sexual Activity   • Alcohol use: Yes     Comment: social   • Drug use: No   • Sexual activity: Defer     Family History   Problem Relation Age of Onset   • Colon cancer Paternal Uncle    • Cancer Mother    • Diabetes Father    • Heart disease Father    • Stroke Father    • Cancer Sister    • Diabetes Paternal Grandmother    • Diabetes Paternal Grandfather      MEDS: reviewed as per chart  ALL: PCN, sulfa  ROS: has had falls, paresthesias, numbness, some urinary urgency    Vital Sign Min/Max for last 24 hours  Temp  Min: 98.2 °F (36.8 °C)  Max: 98.5 °F (36.9 °C)   BP  Min: 122/85  Max: 148/89   Pulse  Min: 92  Max: 114   Resp  Min: 18  Max: 18   SpO2  Min: 90 %  Max: 92 %   Flow (L/min)  Min: 2  Max: 2   No data recorded     GEN:  , appears ill,  A&O x3  HEENT: PERRL, EOMI, no icterus, mmm, no JVD, trachea midline, neck  supple  CHEST: decreased ae  On the right base, no wheezes, + crackles, no use of accessory muscles  CV: tachy, regular, no m/g/r  ABD: soft, nt, nd +bs, no hepatosplenomegaly  EXT: no c/c/e  SKIN: no rashes, no xanthomas, nl turgor, warm, dry  LYMPH: no palpable cervical or supraclavicular lymphadenopathy  NEURO: CN 2-12 intact and symmetric bilaterally  PSYCH: nl affect, nl orientation, nl judgment, nl mood  MSK: no kyphoscoliosis, diminished strength    Labs: 3/3; reviewed:  Lactate 2.1  Glucose 143  Bun 31  Cr 0.81  Na 132  Bicarb 25  Alt 55  ast 39  Alk phos 119  Wbc 31 (97% neutrophils)  hgb 10  plts 155  3/2:  procal 0.43    Ct chest: 3/3: reviewed right middle lobe infiltrate vs. Mass, right sided pleural effusion    A/P:  1. Right sided infiltrate vs. Mass -- certainly concerning given her neurologic presentation, not sure if paraneoplasitc process has been ruled out?  She does appear to meet criteria for sepsis and will check infecitous work up and would recommend continuing antibiotics.  2. Right sided pleural effusion -- recommend thoracentesis given wbc count is increasing, could certainly be parapneumonic or empyema, would start with throacentesis, consider bronchoscopy based on results  3. 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 Anemia  5. Elevated liver enzymes  6. Hyponatremia    Recommend holding eliquis for procedure.

## 2023-03-04 ENCOUNTER — APPOINTMENT (OUTPATIENT)
Dept: ULTRASOUND IMAGING | Facility: HOSPITAL | Age: 58
DRG: 163 | End: 2023-03-04
Payer: COMMERCIAL

## 2023-03-04 PROBLEM — B37.0 ORAL CANDIDIASIS: Status: ACTIVE | Noted: 2023-03-04

## 2023-03-04 LAB
ALBUMIN FLD-MCNC: 1.8 G/DL
ALBUMIN SERPL-MCNC: 2.3 G/DL (ref 3.5–5.2)
ALBUMIN/GLOB SERPL: 0.5 G/DL
ALP SERPL-CCNC: 161 U/L (ref 39–117)
ALT SERPL W P-5'-P-CCNC: 42 U/L (ref 1–33)
ANION GAP SERPL CALCULATED.3IONS-SCNC: 7.2 MMOL/L (ref 5–15)
APPEARANCE FLD: ABNORMAL
AST SERPL-CCNC: 44 U/L (ref 1–32)
BACTERIA BLD CULT: ABNORMAL
BACTERIA SPEC RESP CULT: NORMAL
BACTERIA UR QL AUTO: ABNORMAL /HPF
BASOPHILS # BLD AUTO: 0.04 10*3/MM3 (ref 0–0.2)
BASOPHILS NFR BLD AUTO: 0.2 % (ref 0–1.5)
BILIRUB SERPL-MCNC: 0.8 MG/DL (ref 0–1.2)
BILIRUB UR QL STRIP: NEGATIVE
BOTTLE TYPE: ABNORMAL
BUN SERPL-MCNC: 29 MG/DL (ref 6–20)
BUN/CREAT SERPL: 45.3 (ref 7–25)
CALCIUM SPEC-SCNC: 8.9 MG/DL (ref 8.6–10.5)
CHLORIDE SERPL-SCNC: 101 MMOL/L (ref 98–107)
CLARITY UR: CLEAR
CLUMPED PLATELETS: PRESENT
CO2 SERPL-SCNC: 25.8 MMOL/L (ref 22–29)
COLOR FLD: YELLOW
COLOR UR: YELLOW
CREAT SERPL-MCNC: 0.64 MG/DL (ref 0.57–1)
DEPRECATED RDW RBC AUTO: 50.7 FL (ref 37–54)
EGFRCR SERPLBLD CKD-EPI 2021: 101.9 ML/MIN/1.73
EOSINOPHIL # BLD AUTO: 0.04 10*3/MM3 (ref 0–0.4)
EOSINOPHIL NFR BLD AUTO: 0.2 % (ref 0.3–6.2)
ERYTHROCYTE [DISTWIDTH] IN BLOOD BY AUTOMATED COUNT: 14.4 % (ref 12.3–15.4)
GLOBULIN UR ELPH-MCNC: 4.4 GM/DL
GLUCOSE FLD-MCNC: 25 MG/DL
GLUCOSE SERPL-MCNC: 82 MG/DL (ref 65–99)
GLUCOSE UR STRIP-MCNC: NEGATIVE MG/DL
GRAM STN SPEC: NORMAL
HCT VFR BLD AUTO: 25.1 % (ref 34–46.6)
HGB BLD-MCNC: 8.6 G/DL (ref 12–15.9)
HGB UR QL STRIP.AUTO: NEGATIVE
HYALINE CASTS UR QL AUTO: ABNORMAL /LPF
HYPOCHROMIA BLD QL: NORMAL
KETONES UR QL STRIP: NEGATIVE
L PNEUMO1 AG UR QL IA: NEGATIVE
LDH FLD-CCNC: 2254 U/L
LEUKOCYTE ESTERASE UR QL STRIP.AUTO: ABNORMAL
LYMPHOCYTES # BLD AUTO: 0.94 10*3/MM3 (ref 0.7–3.1)
LYMPHOCYTES NFR BLD AUTO: 3.9 % (ref 19.6–45.3)
LYMPHOCYTES NFR FLD MANUAL: 1 %
MAGNESIUM SERPL-MCNC: 2.3 MG/DL (ref 1.6–2.6)
MCH RBC QN AUTO: 33.9 PG (ref 26.6–33)
MCHC RBC AUTO-ENTMCNC: 34.3 G/DL (ref 31.5–35.7)
MCV RBC AUTO: 98.8 FL (ref 79–97)
METHOD: ABNORMAL
MONOCYTES # BLD AUTO: 0.85 10*3/MM3 (ref 0.1–0.9)
MONOCYTES NFR BLD AUTO: 3.6 % (ref 5–12)
MONOS+MACROS NFR FLD: 10 %
NEUTROPHILS NFR BLD AUTO: 21.5 10*3/MM3 (ref 1.7–7)
NEUTROPHILS NFR BLD AUTO: 89.9 % (ref 42.7–76)
NEUTROPHILS NFR FLD MANUAL: 89 %
NITRITE UR QL STRIP: NEGATIVE
NUC CELL # FLD: ABNORMAL /MM3
PH UR STRIP.AUTO: 5.5 [PH] (ref 5–8)
PHOSPHATE SERPL-MCNC: 3.2 MG/DL (ref 2.5–4.5)
PLATELET # BLD AUTO: 176 10*3/MM3 (ref 140–450)
PMV BLD AUTO: 11.8 FL (ref 6–12)
POTASSIUM SERPL-SCNC: 4.5 MMOL/L (ref 3.5–5.2)
PROT FLD-MCNC: 4.2 G/DL
PROT SERPL-MCNC: 6.7 G/DL (ref 6–8.5)
PROT UR QL STRIP: NEGATIVE
QT INTERVAL: 321 MS
RBC # BLD AUTO: 2.54 10*6/MM3 (ref 3.77–5.28)
RBC # FLD AUTO: 8445 /MM3
RBC # UR STRIP: ABNORMAL /HPF
REF LAB TEST METHOD: ABNORMAL
S PNEUM AG SPEC QL LA: NEGATIVE
SODIUM SERPL-SCNC: 134 MMOL/L (ref 136–145)
SP GR UR STRIP: 1.02 (ref 1–1.03)
SQUAMOUS #/AREA URNS HPF: ABNORMAL /HPF
UROBILINOGEN UR QL STRIP: ABNORMAL
WBC # UR STRIP: ABNORMAL /HPF
WBC MORPH BLD: NORMAL
WBC NRBC COR # BLD: 23.89 10*3/MM3 (ref 3.4–10.8)

## 2023-03-04 PROCEDURE — 25010000002 VANCOMYCIN PER 500 MG: Performed by: NURSE PRACTITIONER

## 2023-03-04 PROCEDURE — 76942 ECHO GUIDE FOR BIOPSY: CPT

## 2023-03-04 PROCEDURE — 81001 URINALYSIS AUTO W/SCOPE: CPT | Performed by: INTERNAL MEDICINE

## 2023-03-04 PROCEDURE — 87186 SC STD MICRODIL/AGAR DIL: CPT | Performed by: INTERNAL MEDICINE

## 2023-03-04 PROCEDURE — 86255 FLUORESCENT ANTIBODY SCREEN: CPT | Performed by: NURSE PRACTITIONER

## 2023-03-04 PROCEDURE — 87015 SPECIMEN INFECT AGNT CONCNTJ: CPT | Performed by: INTERNAL MEDICINE

## 2023-03-04 PROCEDURE — 25010000002 VANCOMYCIN 10 G RECONSTITUTED SOLUTION: Performed by: NURSE PRACTITIONER

## 2023-03-04 PROCEDURE — 87205 SMEAR GRAM STAIN: CPT | Performed by: INTERNAL MEDICINE

## 2023-03-04 PROCEDURE — 84157 ASSAY OF PROTEIN OTHER: CPT | Performed by: INTERNAL MEDICINE

## 2023-03-04 PROCEDURE — 87070 CULTURE OTHR SPECIMN AEROBIC: CPT | Performed by: INTERNAL MEDICINE

## 2023-03-04 PROCEDURE — 85007 BL SMEAR W/DIFF WBC COUNT: CPT | Performed by: STUDENT IN AN ORGANIZED HEALTH CARE EDUCATION/TRAINING PROGRAM

## 2023-03-04 PROCEDURE — 88184 FLOWCYTOMETRY/ TC 1 MARKER: CPT

## 2023-03-04 PROCEDURE — 25010000002 LEVOFLOXACIN PER 250 MG: Performed by: INTERNAL MEDICINE

## 2023-03-04 PROCEDURE — 88112 CYTOPATH CELL ENHANCE TECH: CPT | Performed by: INTERNAL MEDICINE

## 2023-03-04 PROCEDURE — 94760 N-INVAS EAR/PLS OXIMETRY 1: CPT

## 2023-03-04 PROCEDURE — 88305 TISSUE EXAM BY PATHOLOGIST: CPT | Performed by: INTERNAL MEDICINE

## 2023-03-04 PROCEDURE — 83615 LACTATE (LD) (LDH) ENZYME: CPT | Performed by: INTERNAL MEDICINE

## 2023-03-04 PROCEDURE — 87899 AGENT NOS ASSAY W/OPTIC: CPT | Performed by: INTERNAL MEDICINE

## 2023-03-04 PROCEDURE — 25010000002 CYANOCOBALAMIN PER 1000 MCG: Performed by: NURSE PRACTITIONER

## 2023-03-04 PROCEDURE — 87075 CULTR BACTERIA EXCEPT BLOOD: CPT | Performed by: INTERNAL MEDICINE

## 2023-03-04 PROCEDURE — 89051 BODY FLUID CELL COUNT: CPT | Performed by: INTERNAL MEDICINE

## 2023-03-04 PROCEDURE — 25010000002 HYDROMORPHONE 1 MG/ML SOLUTION: Performed by: INTERNAL MEDICINE

## 2023-03-04 PROCEDURE — 84100 ASSAY OF PHOSPHORUS: CPT | Performed by: STUDENT IN AN ORGANIZED HEALTH CARE EDUCATION/TRAINING PROGRAM

## 2023-03-04 PROCEDURE — 94761 N-INVAS EAR/PLS OXIMETRY MLT: CPT

## 2023-03-04 PROCEDURE — 88185 FLOWCYTOMETRY/TC ADD-ON: CPT

## 2023-03-04 PROCEDURE — 87147 CULTURE TYPE IMMUNOLOGIC: CPT | Performed by: INTERNAL MEDICINE

## 2023-03-04 PROCEDURE — 83735 ASSAY OF MAGNESIUM: CPT | Performed by: STUDENT IN AN ORGANIZED HEALTH CARE EDUCATION/TRAINING PROGRAM

## 2023-03-04 PROCEDURE — 99231 SBSQ HOSP IP/OBS SF/LOW 25: CPT | Performed by: NURSE PRACTITIONER

## 2023-03-04 PROCEDURE — 94799 UNLISTED PULMONARY SVC/PX: CPT

## 2023-03-04 PROCEDURE — 93010 ELECTROCARDIOGRAM REPORT: CPT | Performed by: INTERNAL MEDICINE

## 2023-03-04 PROCEDURE — 87102 FUNGUS ISOLATION CULTURE: CPT | Performed by: INTERNAL MEDICINE

## 2023-03-04 PROCEDURE — 85025 COMPLETE CBC W/AUTO DIFF WBC: CPT | Performed by: STUDENT IN AN ORGANIZED HEALTH CARE EDUCATION/TRAINING PROGRAM

## 2023-03-04 PROCEDURE — 82042 OTHER SOURCE ALBUMIN QUAN EA: CPT | Performed by: INTERNAL MEDICINE

## 2023-03-04 PROCEDURE — 93005 ELECTROCARDIOGRAM TRACING: CPT | Performed by: INTERNAL MEDICINE

## 2023-03-04 PROCEDURE — 87040 BLOOD CULTURE FOR BACTERIA: CPT | Performed by: INTERNAL MEDICINE

## 2023-03-04 PROCEDURE — 0 LIDOCAINE 1 % SOLUTION: Performed by: RADIOLOGY

## 2023-03-04 PROCEDURE — 87449 NOS EACH ORGANISM AG IA: CPT | Performed by: INTERNAL MEDICINE

## 2023-03-04 PROCEDURE — 82945 GLUCOSE OTHER FLUID: CPT | Performed by: INTERNAL MEDICINE

## 2023-03-04 PROCEDURE — 80053 COMPREHEN METABOLIC PANEL: CPT | Performed by: STUDENT IN AN ORGANIZED HEALTH CARE EDUCATION/TRAINING PROGRAM

## 2023-03-04 RX ORDER — CHOLECALCIFEROL (VITAMIN D3) 125 MCG
500 CAPSULE ORAL DAILY
Status: DISCONTINUED | OUTPATIENT
Start: 2023-03-07 | End: 2023-03-17 | Stop reason: HOSPADM

## 2023-03-04 RX ORDER — VANCOMYCIN HYDROCHLORIDE 1 G/200ML
1000 INJECTION, SOLUTION INTRAVENOUS EVERY 12 HOURS
Status: DISCONTINUED | OUTPATIENT
Start: 2023-03-04 | End: 2023-03-09

## 2023-03-04 RX ORDER — LIDOCAINE HYDROCHLORIDE 10 MG/ML
10 INJECTION, SOLUTION INFILTRATION; PERINEURAL ONCE
Status: COMPLETED | OUTPATIENT
Start: 2023-03-04 | End: 2023-03-04

## 2023-03-04 RX ORDER — CYANOCOBALAMIN 1000 UG/ML
1000 INJECTION, SOLUTION INTRAMUSCULAR; SUBCUTANEOUS DAILY
Status: COMPLETED | OUTPATIENT
Start: 2023-03-04 | End: 2023-03-06

## 2023-03-04 RX ORDER — HYDROCODONE BITARTRATE AND ACETAMINOPHEN 5; 325 MG/1; MG/1
2 TABLET ORAL EVERY 4 HOURS PRN
Status: DISCONTINUED | OUTPATIENT
Start: 2023-03-04 | End: 2023-03-07

## 2023-03-04 RX ADMIN — PANTOPRAZOLE SODIUM 40 MG: 40 INJECTION, POWDER, FOR SOLUTION INTRAVENOUS at 20:31

## 2023-03-04 RX ADMIN — DOCUSATE SODIUM 50MG AND SENNOSIDES 8.6MG 2 TABLET: 8.6; 5 TABLET, FILM COATED ORAL at 20:31

## 2023-03-04 RX ADMIN — Medication 10 ML: at 20:32

## 2023-03-04 RX ADMIN — FOLIC ACID 1 MG: 1 TABLET ORAL at 08:03

## 2023-03-04 RX ADMIN — HYDROCODONE BITARTRATE AND ACETAMINOPHEN 2 TABLET: 5; 325 TABLET ORAL at 09:58

## 2023-03-04 RX ADMIN — GABAPENTIN 100 MG: 100 CAPSULE ORAL at 08:03

## 2023-03-04 RX ADMIN — HYDROMORPHONE HYDROCHLORIDE 1 MG: 1 INJECTION, SOLUTION INTRAMUSCULAR; INTRAVENOUS; SUBCUTANEOUS at 04:20

## 2023-03-04 RX ADMIN — LEVOFLOXACIN 750 MG: 5 INJECTION, SOLUTION INTRAVENOUS at 18:51

## 2023-03-04 RX ADMIN — VANCOMYCIN HYDROCHLORIDE 1250 MG: 10 INJECTION, POWDER, LYOPHILIZED, FOR SOLUTION INTRAVENOUS at 08:00

## 2023-03-04 RX ADMIN — GABAPENTIN 100 MG: 100 CAPSULE ORAL at 20:19

## 2023-03-04 RX ADMIN — LAMOTRIGINE 200 MG: 100 TABLET ORAL at 08:03

## 2023-03-04 RX ADMIN — DESVENLAFAXINE SUCCINATE 25 MG: 25 TABLET, EXTENDED RELEASE ORAL at 08:03

## 2023-03-04 RX ADMIN — DOCUSATE SODIUM 50MG AND SENNOSIDES 8.6MG 2 TABLET: 8.6; 5 TABLET, FILM COATED ORAL at 08:03

## 2023-03-04 RX ADMIN — SODIUM CHLORIDE 100 ML/HR: 9 INJECTION, SOLUTION INTRAVENOUS at 00:36

## 2023-03-04 RX ADMIN — LACTULOSE 20 G: 20 SOLUTION ORAL at 08:03

## 2023-03-04 RX ADMIN — NYSTATIN 500000 UNITS: 100000 SUSPENSION ORAL at 20:31

## 2023-03-04 RX ADMIN — Medication 50 MCG: at 08:03

## 2023-03-04 RX ADMIN — HYDROCODONE BITARTRATE AND ACETAMINOPHEN 1 TABLET: 5; 325 TABLET ORAL at 06:03

## 2023-03-04 RX ADMIN — LIDOCAINE HYDROCHLORIDE 10 ML: 10 INJECTION, SOLUTION INFILTRATION; PERINEURAL at 08:58

## 2023-03-04 RX ADMIN — NYSTATIN 500000 UNITS: 100000 SUSPENSION ORAL at 17:50

## 2023-03-04 RX ADMIN — HYDROCODONE BITARTRATE AND ACETAMINOPHEN 2 TABLET: 5; 325 TABLET ORAL at 20:19

## 2023-03-04 RX ADMIN — PANTOPRAZOLE SODIUM 40 MG: 40 INJECTION, POWDER, FOR SOLUTION INTRAVENOUS at 08:05

## 2023-03-04 RX ADMIN — VANCOMYCIN HYDROCHLORIDE 1000 MG: 1 INJECTION, SOLUTION INTRAVENOUS at 21:00

## 2023-03-04 RX ADMIN — CYANOCOBALAMIN 1000 MCG: 1000 INJECTION, SOLUTION INTRAMUSCULAR; SUBCUTANEOUS at 11:19

## 2023-03-04 RX ADMIN — APIXABAN 5 MG: 5 TABLET, FILM COATED ORAL at 20:56

## 2023-03-04 RX ADMIN — POLYETHYLENE GLYCOL 3350 17 G: 17 POWDER, FOR SOLUTION ORAL at 20:19

## 2023-03-04 RX ADMIN — HYDROCODONE BITARTRATE AND ACETAMINOPHEN 1 TABLET: 5; 325 TABLET ORAL at 00:34

## 2023-03-04 RX ADMIN — POLYETHYLENE GLYCOL 3350 17 G: 17 POWDER, FOR SOLUTION ORAL at 08:03

## 2023-03-04 RX ADMIN — HYDROMORPHONE HYDROCHLORIDE 1 MG: 1 INJECTION, SOLUTION INTRAMUSCULAR; INTRAVENOUS; SUBCUTANEOUS at 11:22

## 2023-03-04 RX ADMIN — LUBIPROSTONE 24 MCG: 24 CAPSULE, GELATIN COATED ORAL at 08:03

## 2023-03-04 RX ADMIN — LORAZEPAM 1 MG: 1 TABLET ORAL at 00:34

## 2023-03-04 RX ADMIN — Medication 10 ML: at 08:05

## 2023-03-04 RX ADMIN — LACTULOSE 20 G: 20 SOLUTION ORAL at 20:19

## 2023-03-04 RX ADMIN — HYDROCODONE BITARTRATE AND ACETAMINOPHEN 2 TABLET: 5; 325 TABLET ORAL at 15:36

## 2023-03-04 RX ADMIN — LIDOCAINE 1 PATCH: 700 PATCH TOPICAL at 08:05

## 2023-03-04 NOTE — PROGRESS NOTES
"[facility]: Vancomycin Monitoring Note    Louise GARCIA is a 58 y.o. female who is on day 1/5 ordered days of pharmacy to dose vancomycin for Bacteremia.    Previous Vancomycin Dose: 1250 mg (~22 mg/kg) IV x1 loading dose followed by maintenance regimen of 750 mg (~13 mg/kg) IV every 12 hours to start 12 hours after loading dose tonight     Updated Cultures and Sensitivities:   - BCx 1 out of 2 sets 03/02 MRSA       Vitals/Labs  Ht: 157.5 cm (62\"); Wt: 56.7 kg (125 lb)   Temp Readings from Last 1 Encounters:   03/04/23 98.5 °F (36.9 °C) (Oral)     Estimated Creatinine Clearance: 84.7 mL/min (by C-G formula based on SCr of 0.64 mg/dL).   Results from last 7 days   Lab Units 03/04/23  0522 03/03/23  0159 03/02/23  0633   CREATININE mg/dL 0.64 0.81 0.68   WBC 10*3/mm3 23.89* 31.39* 23.40*     Assessment/Plan    Current Vancomycin Dose: 750 mg IV every  12  hours; provides a predicted  mg/L.hr   Changing Vancomycin Dose: changing to 1000 mg IV every  12  hours; provides a predicted  mg/L.hr. Will increase regimen to increase probability of therapeutic regimen due to severity of illness while closely monitoring daily SCr.   Next Level Date and Time: Vanc Trough on 03/05 at 1930  SCr ordered daily with AM labs.    Thank you for involving pharmacy in this patient's care. Please contact pharmacy with any questions or concerns.       Juan Badillo, Kita, HELEN, BCPS  03/04/23 11:08 EST        "

## 2023-03-04 NOTE — SIGNIFICANT NOTE
03/04/23 1120   OTHER   Discipline physical therapy assistant   Rehab Time/Intention   Session Not Performed patient/family declined, not feeling well  (pt declined PT stating she would like to rest today and f/u tomorrow; nsg aware)   Therapy Assessment/Plan (PT)   Criteria for Skilled Interventions Met (PT) yes   Recommendation   PT - Next Appointment 03/05/23

## 2023-03-04 NOTE — PROGRESS NOTES
Sujit Bruce MD                          [phone]            Patient ID:    Name:  Louise GARCIA    MRN:  [id number]    1965   58 y.o.  female            Patient Care Team:  Chloe Schaefer APRN as PCP - General (Family Medicine)  Newton, Mikhail Velazquez MD as Consulting Physician (Neurology)    CC/ Reason for visit: Acute hypoxic sukhwinder failure, abnormal CAT scan, small pleural effusion    Subjective: Pt seen and examined this AM. No acute overnight events noted. Doing same.  Still weak and confused requiring 2 to 3 L nasal cannula to keep sats more than 90%.  No new fevers.  Underwent thoracentesis today but only 50 cc of dark sundar-colored liquid was aspirated per notes.  Awaiting pleural fluid analysis.  Ongoing neurology work-up and IVIG treatment noted.    ROS: Unable to obtain more than about due to encephalopathy    Objective     Vital Signs past 24hrs    BP range: BP: (115-146)/() 132/83  Pulse range: Heart Rate:  [] 101  Resp rate range: Resp:  [18-20] 18  Temp range: Temp (24hrs), Av.4 °F (36.9 °C), Min:97.9 °F (36.6 °C), Max:98.9 °F (37.2 °C)      Ventilator/Non-Invasive Ventilation Settings (From admission, onward)    None          Vent Settings                                         Device (Oxygen Therapy): nasal cannula       56.7 kg (125 lb); Body mass index is 22.86 kg/m².      Intake/Output Summary (Last 24 hours) at 3/4/2023 1307  Last data filed at 3/4/2023 0920  Gross per 24 hour   Intake 2455 ml   Output 550 ml   Net 1905 ml       PHYSICAL EXAM   Constitutional: Middle-aged pt in bed, mild respiratory distress, + accessory muscle use  Head: - NCAT  Eyes: No pallor.  Anicteric sclerae, EOMI.  ENMT:  Mallampati 4, no oral thrush. Moist MM.   NECK: Trachea midline, No thyromegaly, no palpable cervical lymphadenopathy  Heart: RRR, no murmur. No pedal edema   Lungs: CHIRAG +, decreased breath sounds at the right base. no wheezes/ crackles  heard    Abdomen: Soft.  Softly distended. no tenderness, guarding or rigidity. No palpable masses  Extremities: Extremities warm and well perfused. No cyanosis/ clubbing  Neuro: Conscious, drowsy, no gross focal neuro deficits -generalized weakness  Psych: Mood and affect appropriate    PPE recommended per [facility] infectious disease Isolation protocol for the current clinical scenario (as mentioned below) was followed.     Scheduled meds:  apixaban, 5 mg, Oral, Daily  bisacodyl, 10 mg, Rectal, Daily  cyanocobalamin, 1,000 mcg, Intramuscular, Daily  desvenlafaxine, 25 mg, Oral, Daily  folic acid, 1 mg, Oral, Daily  gabapentin, 100 mg, Oral, Q12H  lactulose, 20 g, Oral, BID  lamoTRIgine, 200 mg, Oral, Daily  levoFLOXacin, 750 mg, Intravenous, Q24H  lidocaine, 1 patch, Transdermal, Q24H  lubiprostone, 24 mcg, Oral, Daily With Breakfast  pantoprazole, 40 mg, Intravenous, Q12H  polyethylene glycol, 17 g, Oral, BID  senna-docusate sodium, 2 tablet, Oral, BID  sodium chloride, 10 mL, Intravenous, Q12H  vancomycin, 1,000 mg, Intravenous, Q12H  [START ON 3/7/2023] vitamin B-12, 500 mcg, Oral, Daily        IV meds:                      hold, 1 each  Pharmacy to dose vancomycin,   sodium chloride, 100 mL/hr, Last Rate: 100 mL/hr (03/04/23 0036)        Data Review:      Results from last 7 days   Lab Units 03/04/23  0522 03/03/23  0159 03/02/23  0633   SODIUM mmol/L 134* 132* 135*   POTASSIUM mmol/L 4.5 4.7 4.1   CHLORIDE mmol/L 101 96* 99   CO2 mmol/L 25.8 25.9 26.4   BUN mg/dL 29* 31* 23*   CREATININE mg/dL 0.64 0.81 0.68   CALCIUM mg/dL 8.9 9.5 8.6   BILIRUBIN mg/dL 0.8 0.8 0.9   ALK PHOS U/L 161* 119* 96   ALT (SGPT) U/L 42* 55* 51*   AST (SGOT) U/L 44* 39* 40*   GLUCOSE mg/dL 82 143* 95   WBC 10*3/mm3 23.89* 31.39* 23.40*   HEMOGLOBIN g/dL 8.6* 10.0* 9.9*   PLATELETS 10*3/mm3 176 155 168   PROCALCITONIN ng/mL  --   --  0.43*       Lab Results   Component Value Date    CALCIUM 8.9 03/04/2023    PHOS 3.2 03/04/2023        Results from last 7 days   Lab Units 03/04/23  0959 03/02/23 1955   BLOODCX   --  Abnormal Stain*  No growth at 24 hours   RESPCX  Rejected  --    BCIDPCR   --  Staph aureus. mecA/C and MREJ (methicillin resistance gene) detected. Identification by BCID2 PCR.*              I have personally reviewed the results from the time of this admission to 3/4/2023 13:07 EST and agree with these findings:  [x]  Laboratory  [x]  Microbiology  [x]  Radiology  []  EKG/Telemetry   [x]  Cardiology/Vascular   []  Pathology  []  Old records    ASSESSMENT   Acute hypoxic respiratory failure  Right middle lobe masslike infiltrate - ?  Pneumonia versus other  Right-sided pleural effusion s/p Thora-3/4 -50 cc; exudative  Suspected GBS  ?  MRSA bacteremia  Recurrent falls/generalized weakness  Previous DVT on Eliquis  Hepatitis  Anemia  Hyponatremia    PLAN:  All problems are new to me.  Work-up done so far as well as recommendations from prior pulmonologist noted.  Patient still requiring supplemental oxygen to keep sats more than 90%.  Likely related to abnormal CAT scan findings mainly right middle lobe as well as right lower lobe infiltrate/atelectasis along with some masslike changes in the right middle lobe.  Would need to consider bronchoscopic evaluation if unable to make a diagnosis with pleural fluid analysis.  Pleural fluid aspirated is thick sundar-colored and exudative.  Interestingly only 50 cc was aspirated but suspect there is residual effusion.  Await pleural fluid cytology and cultures.  Agree with empiric antibiotics for potential pneumonia given elevated procalcitonin and leukocytosis.  Unclear about the significance of MRSA and blood cultures and defer to primary.  We will get echocardiogram given some third spacing noted on the CT as well as possible bacteremia.  Ongoing neurology work-up and treatment with IVIG noted  Okay to continue Eliquis.  Would need to be held if he move forward with bronchoscopy  potentially Tuesday/Wednesday depending on pleural fluid analysis.    Long discussion with the patient's family at bedside and expressed concerns about the complex clinical scenario and need for ongoing work-up and potentially further procedures.  He expressed understanding and appreciates input.    Sujit Bruce MD  3/4/2023

## 2023-03-04 NOTE — PROGRESS NOTES
"[facility]: Vancomycin Pharmacokinetic Initial Consult Note    Luoise GARCIA is a 58 y.o. female who is on day 1 of pharmacy to dose vancomycin.    Indication:  MRSA bacteremia  Consulting Provider: KASIA Patel  Planned Duration of Therapy:   Loading Dose Ordered or Given: 1250 mg on 3/4 at 0630  Culture/Source:   3/2 Blood culture - MRSA  Target: -600 mg/L.hr   Other Antimicrobials: levofloxacin 750 mg iv every 24 hours    Vitals/Labs  Ht: 157.5 cm (62\"); Wt: 56.7 kg (125 lb)  Temp Readings from Last 1 Encounters:   03/04/23 98.4 °F (36.9 °C) (Oral)    Estimated Creatinine Clearance: 66.9 mL/min (by C-G formula based on SCr of 0.81 mg/dL).     Results from last 7 days   Lab Units 03/03/23  0159 03/02/23  0633 03/01/23  0742 03/01/23  0653   CREATININE mg/dL 0.81 0.68  --  0.61   WBC 10*3/mm3 31.39* 23.40* 19.16*  --      Assessment/Plan:    Vancomycin Dose:   750 mg IV every  12  hours  Predictive AUC level for the dose ordered is 519 mg/L.hr, which is within the target of 400-600 mg/L.hr  Vanc Trough has been ordered for 3/5 at 1830     Pharmacy will follow patient's kidney function and will adjust doses and obtain levels as necessary. Thank you for involving pharmacy in this patient's care. Please contact pharmacy with any questions or concerns.                           Lloyd Badillo III, [facility]  Clinical Pharmacist    "

## 2023-03-04 NOTE — PROGRESS NOTES
Name: Louise GARCIA ADMIT: 2023   : 1965  PCP: Chloe Schaefer APRN    MRN: [id number] LOS: 9 days   AGE/SEX: 58 y.o. female  ROOM: [facility]     Subjective   Subjective   Continues with right upper quadrant pain and right sided lower chest pain.  Positive cough productive of clear sputum.  Positive shortness of breath.  Positive occasional wheeze.  No hemoptysis.  No fever or chills.  No cardiac chest pain.  No palpitation.    Review of Systems  GI.  Decreased appetite.  No nausea or vomiting.  .  No dysuria or hematuria.  CNS.  Resolved paresthesia.  Positive bilateral lower extremity weakness.     Objective   Objective   Vital Signs  Temp:  [97.9 °F (36.6 °C)-98.9 °F (37.2 °C)] 98.5 °F (36.9 °C)  Heart Rate:  [] 101  Resp:  [18-20] 18  BP: (115-146)/() 132/83  SpO2:  [91 %-94 %] 92 %  on  Flow (L/min):  [2-3] 3;   Device (Oxygen Therapy): nasal cannula    Intake/Output Summary (Last 24 hours) at 3/4/2023 1523  Last data filed at 3/4/2023 0920  Gross per 24 hour   Intake 2455 ml   Output 550 ml   Net 1905 ml     Body mass index is 22.86 kg/m².      23  1425 23   Weight: 54.4 kg (120 lb) 56.7 kg (125 lb)     Physical Exam  General.  Middle-aged female.  She is alert.  Oriented x3.  No acute pain. No respiratory distress or diaphoresis.  Eyes.  Pupils equal round and reactive.  Intact extraocular musculature.  No pallor or jaundice.  Oral cavity.  Dry mucous membrane with thrush.  Neck.  Supple.  No JVD.  No lymphadenopathy or thyromegaly.  Cardiovascular.  Regular rate and rhythm with no gallops or murmurs.  Positive mild tachycardia.    Chest.  Poor to auscultation bilaterally more so on the right than the left.  Rhonchi in right lung base.  No wheezing.  Abdomen.  Mild right upper quadrant tenderness.  No guarding or rebound.  No organomegaly.  Mild distention.  Normal bowel sounds.  Extremities.  No clubbing/cyanosis/edema.  CNS.  No acute focal neurological  deficits    Results Review:      Results from last 7 days   Lab Units 03/04/23  0522 03/03/23  0159 03/02/23  0633 03/01/23  0653 02/28/23  0525 02/27/23  0503 02/26/23  0408   SODIUM mmol/L 134* 132* 135* 136 138 138 138   POTASSIUM mmol/L 4.5 4.7 4.1 3.7 3.8 3.5 3.7   CHLORIDE mmol/L 101 96* 99 101 102 102 103   CO2 mmol/L 25.8 25.9 26.4 27.0 23.0 22.2 22.9   BUN mg/dL 29* 31* 23* 17 21* 22* 22*   CREATININE mg/dL 0.64 0.81 0.68 0.61 0.68 0.77 0.76   GLUCOSE mg/dL 82 143* 95 122* 139* 198* 112*   CALCIUM mg/dL 8.9 9.5 8.6 8.1* 8.4* 8.6 8.9   AST (SGOT) U/L 44* 39* 40* 34* 29 26 22   ALT (SGPT) U/L 42* 55* 51* 37* 34* 28 19     Estimated Creatinine Clearance: 84.7 mL/min (by C-G formula based on SCr of 0.64 mg/dL).          Results from last 7 days   Lab Units 03/01/23  1114 03/01/23  0653 02/27/23  1145   CK TOTAL U/L  --   --  25   HSTROP T ng/L 7 10*  --          Results from last 7 days   Lab Units 02/26/23  1415   TSH uIU/mL 1.670     Results from last 7 days   Lab Units 03/04/23  0522 03/03/23  0159 03/02/23  0633 03/01/23  0653 02/28/23  0525 02/27/23  0503 02/26/23  0408   MAGNESIUM mg/dL 2.3 2.4 2.1 1.9 2.1 2.0 2.1           Invalid input(s): LDLCALC  Results from last 7 days   Lab Units 03/04/23  0522 03/03/23  0159 03/02/23  0633 03/01/23  0742 02/28/23  0525 02/28/23  0525 02/27/23  0503 02/26/23  0408   WBC 10*3/mm3 23.89* 31.39* 23.40* 19.16*  --  18.37* 17.32* 15.63*   HEMOGLOBIN g/dL 8.6* 10.0* 9.9* 9.4*  --  9.7* 10.1* 10.3*   HEMATOCRIT % 25.1* 27.6* 28.7* 27.6*  --  28.4* 29.6* 29.3*   PLATELETS 10*3/mm3 176 155 168 243  --  195 240 253   MCV fL 98.8* 96.8 98.3* 100.7*  --  100.7* 102.1* 101.4*   MCH pg 33.9* 35.1* 33.9* 34.3*  --  34.4* 34.8* 35.6*   MCHC g/dL 34.3 36.2* 34.5 34.1  --  34.2 34.1 35.2   RDW % 14.4 13.9 14.2 14.7  --  14.3 14.5 14.6   RDW-SD fl 50.7 49.0 49.9 53.0  --  51.4 53.4 53.2   MPV fL 11.8 11.3 11.9 11.7  --  11.6 10.9 11.2   NEUTROPHIL % % 89.9*  --  88.7*  --   --  82.8*  83.9* 81.3*   LYMPHOCYTE % % 3.9*  --  5.7*  --   --  6.4* 6.4* 8.2*   MONOCYTES % % 3.6*  --  3.8*  --   --  6.8 5.7 7.0   EOSINOPHIL % % 0.2*  --  0.0*  --   --  0.0* 0.0* 0.0*   BASOPHIL % % 0.2  --  0.2  --   --  0.1 0.2 0.3   IMM GRAN % %  --   --  1.6*  --   --  3.9* 3.8* 3.2*   NEUTROS ABS 10*3/mm3 21.50* 30.45* 20.73* 17.53*   < > 15.22* 14.54* 12.71*   LYMPHS ABS 10*3/mm3 0.94  --  1.34  --   --  1.18 1.10 1.28   MONOS ABS 10*3/mm3 0.85  --  0.90  --   --  1.24* 0.99* 1.09*   EOS ABS 10*3/mm3 0.04  --  0.00  --   --  0.00 0.00 0.00   BASOS ABS 10*3/mm3 0.04  --  0.05  --   --  0.02 0.03 0.05   IMMATURE GRANS (ABS) 10*3/mm3  --   --  0.38*  --   --  0.71* 0.66* 0.50*   NRBC /100 WBC  --   --  0.1  --   --  0.1 0.2 0.1    < > = values in this interval not displayed.             Results from last 7 days   Lab Units 03/03/23  1051 03/03/23  0454 03/03/23  0159 03/02/23  2250 03/02/23  1955 03/02/23  0633   PROCALCITONIN ng/mL  --   --   --   --   --  0.43*   LACTATE mmol/L 1.4 2.1* 2.7* 3.6* 3.6*  --      Results from last 7 days   Lab Units 02/26/23  1415   SED RATE mm/hr 15   CRP mg/dL <0.30     Results from last 7 days   Lab Units 03/02/23  0633   LIPASE U/L 29     Results from last 7 days   Lab Units 03/04/23  0959 03/02/23  1955   BLOODCX   --  Abnormal Stain*  No growth at 24 hours   RESPCX  Rejected  --    BCIDPCR   --  Staph aureus. mecA/C and MREJ (methicillin resistance gene) detected. Identification by BCID2 PCR.*     Results from last 7 days   Lab Units 03/03/23  1830   ADENOVIRUS DETECTION BY PCR  Not Detected   CORONAVIRUS 229E  Not Detected   CORONAVIRUS HKU1  Not Detected   CORONAVIRUS NL63  Not Detected   CORONAVIRUS OC43  Not Detected   HUMAN METAPNEUMOVIRUS  Not Detected   HUMAN RHINOVIRUS/ENTEROVIRUS  Not Detected   INFLUENZA B PCR  Not Detected   PARAINFLUENZA 1  Not Detected   PARAINFLUENZA VIRUS 2  Not Detected   PARAINFLUENZA VIRUS 3  Not Detected   PARAINFLUENZA VIRUS 4  Not Detected    BORDETELLA PERTUSSIS PCR  Not Detected   CHLAMYDOPHILA PNEUMONIAE PCR  Not Detected   MYCOPLAMA PNEUMO PCR  Not Detected   INFLUENZA A PCR  Not Detected   RSV, PCR  Not Detected     Results from last 7 days   Lab Units 03/04/23  1000   NITRITE UA  Negative   WBC UA /HPF 3-5*   BACTERIA UA /HPF None Seen   SQUAM EPITHEL UA /HPF 0-2           Imaging:  Imaging Results (Last 24 Hours)     Procedure Component Value Units Date/Time    US Thoracentesis [[id number]] Collected: 03/04/23 1002    Specimen: Body Fluid Updated: 03/04/23 1422    Narrative:      ULTRASOUND-GUIDED THORACENTESIS     HISTORY: Small right pleural effusion.     The examination was performed with the patient in the left side down  decubitus position. There is a small right pleural effusion and  following sterile prep and local anesthetic, a 5 [place] catheter was  advanced into the pleural effusion by Dr. Ponce. 50 mL of somewhat  dark sundar-colored pleural fluid was removed and sent for studies as  requested.     The patient tolerated the procedure well and there were no immediate  complications.     This report was finalized on 3/4/2023 2:19 PM by Dr. Erwin Ponce M.D.                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         1.  Right upper abdominal pain/elevated liver function test/constipation in a patient with a history of GERD  and status post cholecystectomy.  Right upper abdominal pain is related to #2.  Right upper quadrant ultrasound is negative.  Portal circulation Doppler is negative.  CT scan of the abdomen and pelvis is without acute disease.  Pain is much better controlled today.    Continue pain control..  Continue IV Protonix.  Lipase is normal.   Negative hepatitis panel.  Liver function test elevation could be secondary to #2.  Will monitor liver function test.  Continue bowel regimen   2.  Right lung masslike infiltrate with cavitary lesion and pleural effusion/pneumosepsis/Staph aureus septicemia CT of the chest is noted.  No PE.  Blood cultures 1 and 2 is positive for Staph aureus .  Continue Levaquin.  Vancomycin added yesterday.   Awaiting ID consultation.  Swallow study was negative for aspiration.    MRSA screen is positive.  Negative urine Legionella and Streptococcus antigen/respiratory PCR panel.   Differential diagnoses include necrotizing pneumonia/lung cancer.  Both can cause paraneoplastic syndrome that can explain the paresthesia.  S/p thoracocentesis with exudative fluid.  Awaiting rest of the fluid analysis including culture and cytology. Pulmonary consult noted and appreciated.  Pulmonary entertaining bronchoscopy.  I agree with echo.  We will repeat blood cultures tomorrow   3.  Paresthesia and generalized weakness in a patient with a history of seizure.  MRI of the brain with small vessel disease and demyelination with possible MS.  MRI of the C, T, L-spine revealed osteoarthritis and degenerative disc disease but no cord compromise.  Lumbar puncture with unremarkable CSF.  EMG revealed possibility of [place] barre syndrome but not typical.  Paresthesia and weakness improved on Neurontin (dose decreased secondary  to increased somnolence)..  She is s/p IVIG   She was on steroid and this has been DC'd after it was weaned down.  On PT/OT.  The following tests are negative/Normal : West Nile virus serology, Lyme serology, ESR, CRP, magnesium, phosphorus, potassium, RPR, folate, CK, DENISE, heavy metal screen, TSH, angiotensin-converting enzyme, heavy metal screen.  Still awaiting oligoclonal CSF antibody and MS profile and paraneoplastic panel in the CSF.  Continue Lamictal for seizure.  Neurology on board and their help is appreciated.  4.  Leukocytosis.  This is mostly a combination of steroid-induced and pneumosepsis.    Starting to improve.  Currently on Levaquin/vancomycin.  Awaiting infectious work-up.  Lactic acid was elevated that has now improved with IV fluids.  Awaiting UA.  5.  Steroid-induced hyperglycemia.  6.  Depression and anxiety.  Continue desvenlafaxine.  7.  VTE prophylaxis patient anticoagulated with Eliquis.    Discussed my findings and plan of treatment with the patient/  Disposition.  To be determined based on clinical course      Daisy Kaur MD  [facility]  03/04/23  15:23 EST

## 2023-03-04 NOTE — CONSULTS
CONSULT NOTE    Infectious Diseases - Reji Whitley MD  [facility]       Patient Identification:  Name: Louise GARCIA  Age: 58 y.o.  Sex: female  :  1965  MRN: [id number]             Date of Consultation: 3/4/2023      Primary Care Physician: Chloe Schaefer APRN                               Requesting Physician: Dr. Oliver  Reason for Consultation: MRSA bacteremia    Impression: Patient is a 58-year-old female with past medical history as noted below and consist of seizure disorder and degenerative disc disease has been progressively in pain and discomfort for the last 3 to 4 weeks involving the right upper abdominal and chest area had a fall in the beginning of February.  After the fall she developed numbness from neck down to her wrist and subsequently developed numbness to her leg and feet covering her entire body.  Patient was recently started on Macrobid for her symptoms of body aches fever and chills and weakness.  Because of this she presented to the emergency room on 2023 and has been in the hospital since.  Patient and her condition study performed on 2023 and was noted to have findings consistent with peripheral neuropathy.  MRI of the cervical thoracic and lumbar spine did not show acute abnormality and had a CSF analysis which was essentially unremarkable.  Patient has been on steroid throughout this hospitalization and on 3/2/2023 her white blood cell count jumped up to 31,000 from baseline of 18-22,000.  Blood cultures were drawn today blood culture come back positive for MRSA resulting in introduction of vancomycin and infectious disease consultation.  She had left-sided pleural effusion which was aspirated earlier today and findings consistent with exudative picture with Gram stain negative for any pathogen.  Patient is coming with significant discomfort in the chest and feels unwell.  1-MRSA bacteremia on hospitalization day #11 and likely underlying etiology could  be developing septic phlebitis due to IV access with septic emboli and cavitary lesion in the right lung versus secondary seeding related issues involving her spine, endocarditis or septic arthritis.  Possibility of empyema due to secondary seeding of the traumatic effusion is another concern.  2-paresthesia generalized weakness  3-recent fall  4-seizure disorder  5-leukocytosis likely secondary to steroids with superimposed infection  6-other diagnoses per primary team.  7-MRSA colonization    Recommendations/Discussions:  At this juncture I agree with the care plan consisting of IV vancomycin with plans to repeat blood culture in 24 hours after effective treatment.  Continue with periodic review of systems and if her right upper abdomen and chest discomfort persist in the setting of bacteremia then he may need repeat imaging studies involving her spine.  She will require echocardiogram to rule out right-sided endocarditis and subsequent pulmonary emboli causing cavitary pneumonia and careful assessment of her previous IV access for possibility of phlebitis.  Thank you Dr. Mckay for letting me be the part of your patient care please see above impression and recommendations      History of Present Illness:    Patient is a 58-year-old female with past medical history as noted below and consist of seizure disorder and degenerative disc disease has been progressively in pain and discomfort for the last 3 to 4 weeks involving the right upper abdominal and chest area had a fall in the beginning of February.  After the fall she developed numbness from neck down to her wrist and subsequently developed numbness to her leg and feet covering her entire body.  Patient was recently started on Macrobid for her symptoms of body aches fever and chills and weakness.  Because of this she presented to the emergency room on 2/20/2023 and has been in the hospital since.  Patient and her condition study performed on 2/27/2023 and was  noted to have findings consistent with peripheral neuropathy.  MRI of the cervical thoracic and lumbar spine did not show acute abnormality and had a CSF analysis which was essentially unremarkable.  Patient has been on steroid throughout this hospitalization and on 3/2/2023 her white blood cell count jumped up to 31,000 from baseline of 18-22,000.  Blood cultures were drawn today blood culture come back positive for MRSA resulting in introduction of vancomycin and infectious disease consultation.  She had left-sided pleural effusion which was aspirated earlier today and findings consistent with exudative picture with Gram stain negative for any pathogen.  Patient is coming with significant discomfort in the chest and feels unwell.      Past Medical History:  Past Medical History:   Diagnosis Date   • Degenerative disc disease, cervical    • Gestational diabetes    • H/O blood clots    • Hematemesis    • Seizures (HCC)    • Septic shock (HCC)      Past Surgical History:  Past Surgical History:   Procedure Laterality Date   • APPENDECTOMY     • BACK SURGERY     • CHOLECYSTECTOMY     • ENDOSCOPY N/A 8/30/2016    Procedure: ESOPHAGOGASTRODUODENOSCOPY with biopsy;  Surgeon: Austen Brito MD;  Location: [facility] ENDOSCOPY;  Service:    • HYSTERECTOMY     • NECK SURGERY       approx 6 yrs ago   • TONSILLECTOMY     • TONSILLECTOMY AND ADENOIDECTOMY  1975      Home Meds:  Medications Prior to Admission   Medication Sig Dispense Refill Last Dose   • acetaminophen (TYLENOL) 325 MG tablet Take 650 mg by mouth Every 6 (Six) Hours As Needed for Mild Pain .   Past Week   • apixaban (ELIQUIS) 5 MG tablet tablet Take 5 mg by mouth Daily.   2/20/2023 at 0800   • folic acid (FOLVITE) 1 MG tablet Take 1 mg by mouth Daily.   2/20/2023 at 0800   • ibuprofen (ADVIL,MOTRIN) 200 MG tablet Take 200 mg by mouth Every 6 (Six) Hours As Needed for Mild Pain .   Past Week   • lamoTRIgine (LaMICtal) 100 MG tablet Take 100 mg by mouth Daily.    2/20/2023 at 0800   • nitrofurantoin, macrocrystal-monohydrate, (MACROBID) 100 MG capsule Take 800 mg by mouth 2 (Two) Times a Day.   2/20/2023 at 0800   • vitamin B-12 (CYANOCOBALAMIN) 100 MCG tablet Take 50 mcg by mouth Daily.   2/20/2023 at 0800   • diazepam (VALIUM) 10 MG tablet TAKE 1 TABLET THREE TIMES A DAY  0    • estrogens, conjugated,-methyltestosterone (ESTRATEST HS) 0.625-1.25 MG per tablet Take  by mouth.      • pantoprazole (PROTONIX) 40 MG EC tablet Take 1 tablet by mouth 2 (two) times a day before meals. 60 tablet 0    • sucralfate (CARAFATE) 1 GM/10ML suspension Take 10 mL by mouth every 6 (six) hours. 420 mL 0      Current Meds:     Current Facility-Administered Medications:   •  apixaban (ELIQUIS) tablet 5 mg, 5 mg, Oral, Daily, Jake Richmond DO, 5 mg at 03/03/23 0942  •  bisacodyl (DULCOLAX) suppository 10 mg, 10 mg, Rectal, Daily, Daisy Kaur MD, 10 mg at 03/03/23 1530  •  Desvenlafaxine Succinate ER 25 mg, 25 mg, Oral, Daily, Violet Burdick APRN, 25 mg at 03/03/23 0947  •  diphenhydrAMINE (BENADRYL) injection 50 mg, 50 mg, Intravenous, Once PRN, Dwayne Jerry MD  •  famotidine (PEPCID) injection 20 mg, 20 mg, Intravenous, Once PRN, Dwayne Jerry MD  •  folic acid (FOLVITE) tablet 1 mg, 1 mg, Oral, Daily, Renata Oro APRN, 1 mg at 03/03/23 0942  •  gabapentin (NEURONTIN) capsule 100 mg, 100 mg, Oral, Q12H, Daisy Kaur MD, 100 mg at 03/03/23 2100  •  Hold medication, 1 each, Does not apply, Continuous PRN, Franklyn Powers MD  •  HYDROcodone-acetaminophen (NORCO) 5-325 MG per tablet 1 tablet, 1 tablet, Oral, Q4H PRN, Daisy Kaur MD, 1 tablet at 03/04/23 0603  •  Hydrocortisone Sod Suc (PF) (Solu-CORTEF) injection 100 mg, 100 mg, Intravenous, Once PRN, Dwayne Jerry MD  •  HYDROmorphone (DILAUDID) injection 1 mg, 1 mg, Intravenous, Q4H PRN, Daisy Kaur MD, 1 mg at 03/04/23 0420  •  ketorolac (TORADOL) injection 30 mg, 30 mg,  Intravenous, Q6H PRN, Violet Burdick APRN  •  lactulose (CHRONULAC) 10 GM/15ML solution 20 g, 20 g, Oral, BID, Daisy Kaur MD, 20 g at 03/03/23 2101  •  lamoTRIgine (LaMICtal) tablet 200 mg, 200 mg, Oral, Daily, Violet Burdick APRN, 200 mg at 03/03/23 0942  •  levoFLOXacin (LEVAQUIN) 750 mg/150 mL D5W (premix) (LEVAQUIN) 750 mg, 750 mg, Intravenous, Q24H, Daisy Kaur MD, 750 mg at 03/03/23 2104  •  lidocaine (LIDODERM) 5 % 1 patch, 1 patch, Transdermal, Q24H, Violet Burdick APRN, 1 patch at 03/03/23 0941  •  LORazepam (ATIVAN) tablet 1 mg, 1 mg, Oral, Q4H PRN, Daisy Kaur MD, 1 mg at 03/04/23 0034  •  lubiprostone (AMITIZA) capsule 24 mcg, 24 mcg, Oral, Daily With Breakfast, Daisy Kaur MD  •  Magnesium Sulfate - Total Dose 10 grams - Magnesium 1 or Less, 2 g, Intravenous, PRN **OR** Magnesium Sulfate - Total Dose 6 grams - Magnesium 1.1 - 1.5, 2 g, Intravenous, PRN, Stopped at 02/21/23 1252 **OR** Magnesium Sulfate - Total Dose 4 grams - Magnesium 1.6 - 1.9, 4 g, Intravenous, PRN, Renata Oro APRN  •  melatonin tablet 5 mg, 5 mg, Oral, Nightly PRN, JaynedaRenata eddy APRN, 5 mg at 02/28/23 2200  •  pantoprazole (PROTONIX) injection 40 mg, 40 mg, Intravenous, Q12H, Daisy Kaur MD, 40 mg at 03/03/23 2100  •  Pharmacy to dose vancomycin, , Does not apply, Continuous PRN, Becca Davenport APRJERROD  •  Pharmacy to enter IVIG order 20,000 mg, 400 mg/kg (Adjusted), Intravenous, Daily, Dwayne Jerry MD, 20,000 mg at 02/27/23 1706  •  polyethylene glycol (MIRALAX) packet 17 g, 17 g, Oral, BID, Violet Burdick APRN, 17 g at 03/03/23 2100  •  potassium & sodium phosphates (PHOS-NAK) 280-160-250 MG packet - for Phosphorus less than 1.25 mg/dL, 2 packet, Oral, Q6H PRN **OR** potassium & sodium phosphates (PHOS-NAK) 280-160-250 MG packet - for Phosphorus 1.25 - 2.5 mg/dL, 2 packet, Oral, Q6H PRN, Jake Richmond, DO  •  potassium chloride (K-DUR,KLOR-CON) ER  tablet 40 mEq, 40 mEq, Oral, PRN, 40 mEq at 03/02/23 2033 **OR** potassium chloride (KLOR-CON) packet 40 mEq, 40 mEq, Oral, PRN **OR** potassium chloride 10 mEq in 100 mL IVPB, 10 mEq, Intravenous, Q1H PRN, Jake Richmond DO  •  sennosides-docusate (PERICOLACE) 8.6-50 MG per tablet 2 tablet, 2 tablet, Oral, BID, 2 tablet at 03/03/23 0942 **AND** [DISCONTINUED] polyethylene glycol (MIRALAX) packet 17 g, 17 g, Oral, Daily PRN **AND** [DISCONTINUED] bisacodyl (DULCOLAX) EC tablet 5 mg, 5 mg, Oral, Daily PRN **AND** [DISCONTINUED] bisacodyl (DULCOLAX) suppository 10 mg, 10 mg, Rectal, Daily PRN, Franci Griffith PA  •  [COMPLETED] Insert Peripheral IV, , , Once **AND** sodium chloride 0.9 % flush 10 mL, 10 mL, Intravenous, PRN, Shin Rivers PA  •  sodium chloride 0.9 % flush 10 mL, 10 mL, Intravenous, Q12H, Qadanunu, Abdalhakim, APRN, 10 mL at 03/03/23 2102  •  sodium chloride 0.9 % flush 10 mL, 10 mL, Intravenous, PRN, Qadah, Abdalhakim, APRN  •  sodium chloride 0.9 % infusion 40 mL, 40 mL, Intravenous, PRN, Qadah, Abdalhakim, APRN, 40 mL at 02/23/23 0603  •  sodium chloride 0.9 % infusion, 100 mL/hr, Intravenous, Continuous, Daisy Kaur MD, Last Rate: 100 mL/hr at 03/04/23 0036, 100 mL/hr at 03/04/23 0036  •  vancomycin 1250 mg/250 mL 0.9% NS IVPB ([facility]), 1,250 mg, Intravenous, Once, Becca Davenport, KASIA  •  vancomycin 750 mg in sodium chloride 0.9 % 250 mL IVPB-VTB, 750 mg, Intravenous, Q12H, Becca Davenport, APRN  •  vitamin B-12 (CYANOCOBALAMIN) tablet 50 mcg, 50 mcg, Oral, Daily, Renata Oro APRN, 50 mcg at 03/03/23 0942  Allergies:  Allergies   Allergen Reactions   • Penicillins Shortness Of Breath and Swelling   • Sulfa Antibiotics Rash     Social History:   Social History     Tobacco Use   • Smoking status: Never   • Smokeless tobacco: Never   Substance Use Topics   • Alcohol use: Yes     Comment: social      Family History:  Family History   Problem Relation Age of Onset   • Colon  "cancer Paternal Uncle    • Cancer Mother    • Diabetes Father    • Heart disease Father    • Stroke Father    • Cancer Sister    • Diabetes Paternal Grandmother    • Diabetes Paternal Grandfather           Review of Systems  See history of present illness and past medical history.  Complaining of pain and discomfort in the right upper chest and upper abdomen and the back and feels unwell.      Vitals:   /93   Pulse 113   Temp 98.4 °F (36.9 °C) (Oral)   Resp 20   Ht 157.5 cm (62\")   Wt 56.7 kg (125 lb)   SpO2 92%   Breastfeeding No   BMI 22.86 kg/m²   I/O:     Intake/Output Summary (Last 24 hours) at 3/4/2023 0727  Last data filed at 3/3/2023 1800  Gross per 24 hour   Intake 2455 ml   Output 500 ml   Net 1955 ml     Exam:  Patient is examined using the personal protective equipment as per guidelines from infection control for this particular patient as enacted.  Hand washing was performed before and after patient interaction.  General Appearance:   Ill-appearing female who is crying and miserable.   Head:    Normocephalic, without obvious abnormality, atraumatic   Eyes:    PERRL, conjunctivae/corneas clear, EOM's intact, both eyes   Ears:    Normal external ear canals, both ears   Nose:   Nares normal, septum midline, mucosa normal, no drainage    or sinus tenderness   Throat:   Lips, tongue, gums normal; oral mucosa pink and moist   Neck:   Supple, symmetrical, trachea midline, no adenopathy;     thyroid:  no enlargement/tenderness/nodules; no carotid    bruit or JVD   Back:    Right-sided thoracic spine tenderness   Lungs:     Clear to auscultation bilaterally, respirations unlabored   Chest Wall:    No tenderness or deformity    Heart:   S1-S2 tachycardiac   Abdomen:    No guarding rigidity or rebound noted right upper quadrant tenderness   Extremities:  No rash noted   Pulses:   Pulses palpable in all extremities; symmetric all extremities   Skin:  No obvious phlebitis type lesions noted "   Neurologic:  Weakness of upper and lower extremities with numbness       Data Review:    I reviewed the patient's new clinical results.  Results from last 7 days   Lab Units 03/04/23  0522 03/03/23  0159 03/02/23  0633 03/01/23  0742 02/28/23  0525 02/27/23  0503 02/26/23  0408   WBC 10*3/mm3 23.89* 31.39* 23.40* 19.16* 18.37* 17.32* 15.63*   HEMOGLOBIN g/dL 8.6* 10.0* 9.9* 9.4* 9.7* 10.1* 10.3*   PLATELETS 10*3/mm3 176 155 168 243 195 240 253     Results from last 7 days   Lab Units 03/04/23  0522 03/03/23  0159 03/02/23  0633 03/01/23  0653 02/28/23  0525 02/27/23  0503 02/26/23  0408   SODIUM mmol/L 134* 132* 135* 136 138 138 138   POTASSIUM mmol/L 4.5 4.7 4.1 3.7 3.8 3.5 3.7   CHLORIDE mmol/L 101 96* 99 101 102 102 103   CO2 mmol/L 25.8 25.9 26.4 27.0 23.0 22.2 22.9   BUN mg/dL 29* 31* 23* 17 21* 22* 22*   CREATININE mg/dL 0.64 0.81 0.68 0.61 0.68 0.77 0.76   CALCIUM mg/dL 8.9 9.5 8.6 8.1* 8.4* 8.6 8.9   GLUCOSE mg/dL 82 143* 95 122* 139* 198* 112*     No results found for: CULTURE]    Assessment:  Active Hospital Problems    Diagnosis  POA   • **Paresthesia [R20.2]  Yes   • Mass of middle lobe of right lung [R91.8]  Yes   • Upper abdominal pain [R10.10]  No   • 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         Plan:  See above  Reji Oliver MD   3/4/2023  07:27 EST    Parts of this note may be an electronic transcription/translation of spoken language to printed text using the Dragon dictation system.

## 2023-03-04 NOTE — PROGRESS NOTES
DOS: 3/4/2023  NAME: Louise GARCIA   : 1965  PCP: Chloe Schaefer APRN    Chief Complaint   Patient presents with   • Fall   • Numbness     Numbness in bilateral arms and legs post falling.         Stroke    Subjective: No acute events overnight.  Patient states she is sore from the thoracentesis.  Still drowsy today.   at bedside.  Denies any new weakness, numbness, speech or visual disturbances, or headaches.    Objective:  Vital signs:      Vitals:    23 2357 23 0356 23 0423 23 0828   BP: 141/96 (!) 146/106 137/93 141/89   BP Location: Left arm Left arm  Right arm   Patient Position: Lying Lying  Lying   Pulse: 106 113  100   Resp: 20 20  18   Temp: 97.9 °F (36.6 °C) 98.4 °F (36.9 °C)     TempSrc: Oral Oral     SpO2: 93% 92%  94%   Weight:       Height:           Current Facility-Administered Medications:   •  apixaban (ELIQUIS) tablet 5 mg, 5 mg, Oral, Daily, Jake Richmond DO, 5 mg at 23 0942  •  bisacodyl (DULCOLAX) suppository 10 mg, 10 mg, Rectal, Daily, Daisy Kuar MD, 10 mg at 23 1530  •  Desvenlafaxine Succinate ER 25 mg, 25 mg, Oral, Daily, Violet Burdick APRN, 25 mg at 23 0803  •  diphenhydrAMINE (BENADRYL) injection 50 mg, 50 mg, Intravenous, Once PRN, Dwayne Jerry MD  •  famotidine (PEPCID) injection 20 mg, 20 mg, Intravenous, Once PRN, Dwayne Jerry MD  •  folic acid (FOLVITE) tablet 1 mg, 1 mg, Oral, Daily, Renata Oro APRN, 1 mg at 23 0803  •  gabapentin (NEURONTIN) capsule 100 mg, 100 mg, Oral, Q12H, Daisy Kaur MD, 100 mg at 23 0803  •  Hold medication, 1 each, Does not apply, Continuous PRN, Franklyn Powers MD  •  HYDROcodone-acetaminophen (NORCO) 5-325 MG per tablet 2 tablet, 2 tablet, Oral, Q4H PRN, Daisy Kaur MD  •  Hydrocortisone Sod Suc (PF) (Solu-CORTEF) injection 100 mg, 100 mg, Intravenous, Once PRN, Dwayne Jerry MD  •  HYDROmorphone (DILAUDID) injection 1 mg, 1  mg, Intravenous, Q4H PRN, Daisy Kaur MD, 1 mg at 03/04/23 0420  •  ketorolac (TORADOL) injection 30 mg, 30 mg, Intravenous, Q6H PRN, Violet Burdick APRN  •  lactulose (CHRONULAC) 10 GM/15ML solution 20 g, 20 g, Oral, BID, Daisy Kaur MD, 20 g at 03/04/23 0803  •  lamoTRIgine (LaMICtal) tablet 200 mg, 200 mg, Oral, Daily, Violet Burdick APRN, 200 mg at 03/04/23 0803  •  levoFLOXacin (LEVAQUIN) 750 mg/150 mL D5W (premix) (LEVAQUIN) 750 mg, 750 mg, Intravenous, Q24H, Daisy Kaur MD, 750 mg at 03/03/23 2104  •  lidocaine (LIDODERM) 5 % 1 patch, 1 patch, Transdermal, Q24H, Violet Burdick APRN, 1 patch at 03/04/23 0805  •  lidocaine (XYLOCAINE) 1 % injection 10 mL, 10 mL, Subcutaneous, Once, Mu Pocne MD  •  LORazepam (ATIVAN) tablet 1 mg, 1 mg, Oral, Q4H PRN, Daisy Kaur MD, 1 mg at 03/04/23 0034  •  lubiprostone (AMITIZA) capsule 24 mcg, 24 mcg, Oral, Daily With Breakfast, Daisy Kaur MD, 24 mcg at 03/04/23 0803  •  Magnesium Sulfate - Total Dose 10 grams - Magnesium 1 or Less, 2 g, Intravenous, PRN **OR** Magnesium Sulfate - Total Dose 6 grams - Magnesium 1.1 - 1.5, 2 g, Intravenous, PRN, Stopped at 02/21/23 1252 **OR** Magnesium Sulfate - Total Dose 4 grams - Magnesium 1.6 - 1.9, 4 g, Intravenous, PRN, JaynedaRenata eddy, APRN  •  melatonin tablet 5 mg, 5 mg, Oral, Nightly PRN, JaynedaRneata eddy APRN, 5 mg at 02/28/23 2200  •  pantoprazole (PROTONIX) injection 40 mg, 40 mg, Intravenous, Q12H, Daisy Kaur MD, 40 mg at 03/04/23 0805  •  Pharmacy to dose vancomycin, , Does not apply, Continuous PRN, Becca Davenport, APRN  •  Pharmacy to enter IVIG order 20,000 mg, 400 mg/kg (Adjusted), Intravenous, Daily, Dwayne Jerry MD, 20,000 mg at 02/27/23 1706  •  polyethylene glycol (MIRALAX) packet 17 g, 17 g, Oral, BID, Violet Burdick APRN, 17 g at 03/04/23 0803  •  potassium & sodium phosphates (PHOS-NAK) 280-160-250 MG packet - for  Phosphorus less than 1.25 mg/dL, 2 packet, Oral, Q6H PRN **OR** potassium & sodium phosphates (PHOS-NAK) 280-160-250 MG packet - for Phosphorus 1.25 - 2.5 mg/dL, 2 packet, Oral, Q6H PRN, Jake Richmond, DO  •  potassium chloride (K-DUR,KLOR-CON) ER tablet 40 mEq, 40 mEq, Oral, PRN, 40 mEq at 03/02/23 2033 **OR** potassium chloride (KLOR-CON) packet 40 mEq, 40 mEq, Oral, PRN **OR** potassium chloride 10 mEq in 100 mL IVPB, 10 mEq, Intravenous, Q1H PRN, Yi, Jake, DO  •  sennosides-docusate (PERICOLACE) 8.6-50 MG per tablet 2 tablet, 2 tablet, Oral, BID, 2 tablet at 03/04/23 0803 **AND** [DISCONTINUED] polyethylene glycol (MIRALAX) packet 17 g, 17 g, Oral, Daily PRN **AND** [DISCONTINUED] bisacodyl (DULCOLAX) EC tablet 5 mg, 5 mg, Oral, Daily PRN **AND** [DISCONTINUED] bisacodyl (DULCOLAX) suppository 10 mg, 10 mg, Rectal, Daily PRN, Franci Griffith PA  •  [COMPLETED] Insert Peripheral IV, , , Once **AND** sodium chloride 0.9 % flush 10 mL, 10 mL, Intravenous, PRN, Shin Rivers PA  •  sodium chloride 0.9 % flush 10 mL, 10 mL, Intravenous, Q12H, Qadah, Abdalhakim, APRN, 10 mL at 03/04/23 0805  •  sodium chloride 0.9 % flush 10 mL, 10 mL, Intravenous, PRN, Qadah, Abdalhakim, APRN  •  sodium chloride 0.9 % infusion 40 mL, 40 mL, Intravenous, PRN, Qadah, Abdalhakim, APRN, 40 mL at 02/23/23 0603  •  sodium chloride 0.9 % infusion, 100 mL/hr, Intravenous, Continuous, Daisy Kaur MD, Last Rate: 100 mL/hr at 03/04/23 0036, 100 mL/hr at 03/04/23 0036  •  vancomycin 1250 mg/250 mL 0.9% NS IVPB ([facility]), 1,250 mg, Intravenous, Once, Becca Davenport APRN, 1,250 mg at 03/04/23 0800  •  vancomycin 750 mg in sodium chloride 0.9 % 250 mL IVPB-VTB, 750 mg, Intravenous, Q12H, Becca Davenport APRN  •  vitamin B-12 (CYANOCOBALAMIN) tablet 50 mcg, 50 mcg, Oral, Daily, Renata Oro APRN, 50 mcg at 03/04/23 0803    PRN meds  •  diphenhydrAMINE  •  famotidine  •  hold  •  HYDROcodone-acetaminophen  •   hydrocortisone sodium succinate  •  HYDROmorphone  •  ketorolac  •  LORazepam  •  magnesium sulfate **OR** magnesium sulfate **OR** magnesium sulfate  •  melatonin  •  Pharmacy to dose vancomycin  •  potassium & sodium phosphates **OR** potassium & sodium phosphates  •  potassium chloride **OR** potassium chloride **OR** potassium chloride  •  [COMPLETED] Insert Peripheral IV **AND** sodium chloride  •  sodium chloride  •  sodium chloride    No current facility-administered medications on file prior to encounter.     Current Outpatient Medications on File Prior to Encounter   Medication Sig   • acetaminophen (TYLENOL) 325 MG tablet Take 650 mg by mouth Every 6 (Six) Hours As Needed for Mild Pain .   • apixaban (ELIQUIS) 5 MG tablet tablet Take 5 mg by mouth Daily.   • folic acid (FOLVITE) 1 MG tablet Take 1 mg by mouth Daily.   • ibuprofen (ADVIL,MOTRIN) 200 MG tablet Take 200 mg by mouth Every 6 (Six) Hours As Needed for Mild Pain .   • lamoTRIgine (LaMICtal) 100 MG tablet Take 100 mg by mouth Daily.   • nitrofurantoin, macrocrystal-monohydrate, (MACROBID) 100 MG capsule Take 800 mg by mouth 2 (Two) Times a Day.   • vitamin B-12 (CYANOCOBALAMIN) 100 MCG tablet Take 50 mcg by mouth Daily.   • diazepam (VALIUM) 10 MG tablet TAKE 1 TABLET THREE TIMES A DAY   • estrogens, conjugated,-methyltestosterone (ESTRATEST HS) 0.625-1.25 MG per tablet Take  by mouth.   • pantoprazole (PROTONIX) 40 MG EC tablet Take 1 tablet by mouth 2 (two) times a day before meals.   • sucralfate (CARAFATE) 1 GM/10ML suspension Take 10 mL by mouth every 6 (six) hours.       General appearance: NAD, drowsy, well groomed  HEENT: Normocephalic, atraumatic, right pupil 3, left pupil 3, no masses or tenderness  Resp: Even and mildly labored during conversation  Extremities: No obvious edema  Skin: warm, dry     Neurological:   MS: oriented to person, place, year, stated month was Feb. recent/remote memory intact, decreased attention/concentration  2/2 drowsiness, delayed speech with response, no neglect, normal fund of knowledge  CN: visual acuity grossly normal, visual fields full, EOMI, facial sensation equal, no facial droop, tongue midline  Motor: gave minimal effort today which I feel is still 2/2 to her drowsiness- 5/5 in upper extremities, LLE 1/5 but when manually held up and released pt able to hold up for 2 seconds prior to drifting to bed, RLE 1/5 again when manually held up and released pt able to hold up for 3 seconds prior to drifting to bed. Did have some asterixis of upper extremities  Reflexes: Areflexic in lower extremities, diminished in upper extremities  Sensory: Normal cold temperature sensation of all 4 ext. normal vibratory sensation of lower and upper extremities  Coordination: Normal finger to nose test  Gait and station: Deferred  Rapid alternating movements: normal finger to thumb tap    Physical exam performed, changes noted.    Laboratory results:  Lab Results   Component Value Date    TSH 1.670 02/26/2023     Lab Results   Component Value Date    HGBA1C 5.10 02/20/2023     Lab Results   Component Value Date    GVLLMPWT02 298 03/02/2023     No results found for: CHOL, CHLPL  No results found for: TRIG  No results found for: HDL  No results found for: LDL, LDLDIRECT  Lab Results   Component Value Date    WBC 23.89 (H) 03/04/2023    HGB 8.6 (L) 03/04/2023    HCT 25.1 (L) 03/04/2023    MCV 98.8 (H) 03/04/2023     03/04/2023     Lab Results   Component Value Date    GLUCOSE 82 03/04/2023    BUN 29 (H) 03/04/2023    CREATININE 0.64 03/04/2023    EGFRIFNONA 81 08/29/2016    EGFRIFAFRI  08/29/2016      Comment:      <15 Indicative of kidney failure.    BCR 45.3 (H) 03/04/2023    K 4.5 03/04/2023    CO2 25.8 03/04/2023    CALCIUM 8.9 03/04/2023    PROTENTOTREF 6.1 02/28/2023    ALBUMIN 2.3 (L) 03/04/2023    LABIL2 0.7 02/28/2023    AST 44 (H) 03/04/2023    ALT 42 (H) 03/04/2023     Lab Results   Component Value Date    PTT 25.4  03/01/2019     Lab Results   Component Value Date    INR 1.0 03/01/2019    INR 1.1 05/24/2018    INR 1.3 05/23/2018    PROTIME 11.2 03/01/2019    PROTIME 12.3 05/24/2018    PROTIME 13.9 (H) 05/23/2018     Brief Urine Lab Results  (Last result in the past 365 days)      Color   Clarity   Blood   Leuk Est   Nitrite   Protein   CREAT   Urine HCG        02/20/23 1732 Dark Yellow   Cloudy   Negative   Trace   Negative   Trace                       RPR nonreactive  RNP antibodies <0.2  Reyes antibodies <0.2  EVERETT-1 IgG <0.2  CRP <0.30  ESR  15  Serial CKs all normal  Folate >20.00  IgA 185/164  IgG 493/1577  IgM 178/161  [place] virus CSF negative   Serum tox negative   Lyme antibodies negative   Hepatitis panel negative.    Review and interpretation of imaging:  CT Abdomen Pelvis Without Contrast    Result Date: 3/3/2023  1. Abnormal parenchymal opacity in the right middle lobe has a somewhat masslike appearance anteriorly where there is a subcentimeter area of air suggesting cavitation. Whether this represents tumor or pneumonia is unclear. There is a small right pleural effusion with parenchymal volume loss in the posterior right lower lobe favored to represent atelectasis. Mildly enlarged right hilar and subcarinal lymph nodes. 2. Diffuse third spacing around the pelvis. Otherwise no acute abnormality identified in the abdomen or the pelvis. 3. 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  This report was finalized on 3/3/2023 10:13 AM by Dr. Ryley Nascimento M.D.      CT Angiogram Chest    Result Date: 3/3/2023  1. Abnormal parenchymal opacity in the right middle lobe has a somewhat masslike appearance anteriorly where there is a subcentimeter area of air suggesting cavitation. Whether this represents tumor or pneumonia is unclear. There is a small right pleural effusion with parenchymal volume loss in the posterior right lower lobe favored to represent atelectasis. Mildly enlarged right hilar and subcarinal lymph nodes.  2. Diffuse third spacing around the pelvis. Otherwise no acute abnormality identified in the abdomen or the pelvis. 3. 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  This report was finalized on 3/3/2023 10:13 AM by Dr. Ryley Nascimento M.D.      IR LUMBAR PUNCTURE DIAGNOSTIC    Result Date: 2/24/2023  Technically successful fluoroscopically guided lumbar puncture.  FLUOROSCOPY TIME: 5 seconds, 2 images.  This report was finalized on 2/24/2023 11:22 AM by Dr. Agustin Reyes M.D.      US Abdomen Limited    Result Date: 3/2/2023  Prior cholecystectomy. No abnormality identified to account for pain.  This report was finalized on 3/2/2023 10:55 PM by Dr. Ryely Nascimento M.D.      MRI Lumbar Spine With & Without Contrast    Result Date: 2/25/2023  Electronically signed by Lanny Wilkinson MD on 02-25-23 at 2254       Impression/Assessment:  This is a 58-year-old female with past medical history of cervical DDD s/p C6 corpectomy and C5-7 ACDF for disc herniation with radiculopathy and weakness, blood clots on Eliquis 5 mg twice daily PTA, gestational diabetes, reported seizures on Lamictal 100 mg daily PTA, anxiety on Valium 1 tablet 3 times daily, reported history of alcohol abuse who presented to the hospital on 2/20/2023 with complaints of whole body numbness that started after a fall earlier this month.  She reported initially she had a numbness from her neck down her chest, abdomen, pubic area, and down both legs.  She began to notice some urinary urgency and incontinence as well as constipation a few days later.  She denied any numbness involving her upper extremities but did note some numbness of the tips of her fingers.  She noted that she was becoming weak as well progressively in her legs which is worse on the left. Reportedly she also had an additional fall on 2/20 leaving work due to her weakness.     She was initially evaluated by Dr. Arce with our service who ordered full spinal and brain imaging with contrasted  MRI as well as a lumbar puncture.  He was concern for an acute traumatic myelopathy and initiated her on Solu-Medrol 1 g IV daily which she received 3 doses of and then was reduced to 500 daily which she received 2 doses of it and then reduce to 250 mg daily which she received 3 doses. Her spinal imaging was essentially unrevealing showing multilevel spondyloarthropathy but no significant canal stenosis.  Her MRI brain revealed mild to moderate white matter changes otherwise no other acute findings.  Her initial routine spinal fluid was also unremarkable showing 0 TNCs, a protein level of 38.2, glucose 93, and RBC 4.     She did receive an EMG/NCS by Dr. Caleb Reyes while inpatient that revealed findings consistent with peripheral neuropathy with mild evidence in the upper extremity with sensory involvement and mild to moderate involvement of the lower extremity with sensorimotor involvement, no evidence of right cervical or lumbosacral radiculopathy. Although her EMG/NCS did not show typical features of GBS given concerning clinical exam especially with areflexia the decision was made to start her on IVIG based on clinical suspicion for a total of 5 doses.     Diagnosis:  Progressive generalized numbness and weakness  Abnormal CT chest  Leukocytosis  Recurrent falls  History of cervical degenerative disc disease s/p corpectomy and ACDF  Generalized anxiety  History of alcohol abuse  Elevated LFTs    Plan:  No worsening of paresthesias or weakness, actual improvement of paresthesias of legs and arms. Able to feel vibratory and cold sensation. Did have some asterixis of upper extremities today.   Pt underwent thoracentesis this morning for pleural effusion, noted Dr. Powers's kiarra. Concern is mass vs right sided infiltrate and possibly necrotizing pneumonia per primary.   Agree that if we feel this area could be malignant paraneoplastic process is a concern. I am going to call the lab today and see if she has enough  CSF fluid left over to send a paraneoplastic profile. For now will send serum profile.   May need to repeat LP, I will discuss this with Dr. Jerry today.  She completed the 5th dose of IVIG yesterday.  Steroids were discontinued on 3/2.  White count improved to 23.89 today.  Lactate also improved.  Remains on Levaquin. BC look contaminated. ID has been consulted.  B12 298 despite being on 50mcg daily, will start her on IM replacement 1000mcg x3 days and then PO 500mcg daily.  Still waiting on oligoclonal banding and cryoglobulins but so far all other testing has been unremarkable.  See my lab review above for results of numerous testing results so far.  Continue PT/OT.  Will follow.     Case discussed with patient,  at bedside, and RN.    I spent greater than 30 minutes of floor time reviewing patient's chart and new imaging and lab results, ordering tests, discussion with other providers and patient about continued plan of care.    Addenda: Lab states they are unsure of CSF sample, states pathology would be the one to ask. When I asked if pathology was here on weekends micro lab stated I do not know. We will need to check with pathology on Monday. Discussed with Dr. Jerry.    KASIA Melo

## 2023-03-04 NOTE — PROGRESS NOTES
[facility] Acute Inpt Rehab    Continuing to follow progress with therapies to determine most appropriate level of rehab for pt at time of DC.    Gaviota Haro RN  Acute Rehab Admission Nurse

## 2023-03-05 ENCOUNTER — APPOINTMENT (OUTPATIENT)
Dept: GENERAL RADIOLOGY | Facility: HOSPITAL | Age: 58
DRG: 163 | End: 2023-03-05
Payer: COMMERCIAL

## 2023-03-05 ENCOUNTER — APPOINTMENT (OUTPATIENT)
Dept: CARDIOLOGY | Facility: HOSPITAL | Age: 58
DRG: 163 | End: 2023-03-05
Payer: COMMERCIAL

## 2023-03-05 LAB
ALBUMIN SERPL-MCNC: 2.5 G/DL (ref 3.5–5.2)
ALBUMIN/GLOB SERPL: 0.6 G/DL
ALP SERPL-CCNC: 213 U/L (ref 39–117)
ALT SERPL W P-5'-P-CCNC: 55 U/L (ref 1–33)
ANION GAP SERPL CALCULATED.3IONS-SCNC: 9 MMOL/L (ref 5–15)
AORTIC DIMENSIONLESS INDEX: 0.8 (DI)
ARTERIAL PATENCY WRIST A: ABNORMAL
ASCENDING AORTA: 3.3 CM
AST SERPL-CCNC: 80 U/L (ref 1–32)
ATMOSPHERIC PRESS: 753.7 MMHG
BASE EXCESS BLDA CALC-SCNC: 2.5 MMOL/L (ref 0–2)
BASOPHILS # BLD AUTO: 0.02 10*3/MM3 (ref 0–0.2)
BASOPHILS NFR BLD AUTO: 0.1 % (ref 0–1.5)
BDY SITE: ABNORMAL
BH CV ECHO MEAS - ACS: 1.82 CM
BH CV ECHO MEAS - AO MAX PG: 12.1 MMHG
BH CV ECHO MEAS - AO MEAN PG: 7 MMHG
BH CV ECHO MEAS - AO V2 MAX: 173.7 CM/SEC
BH CV ECHO MEAS - AO V2 VTI: 31.9 CM
BH CV ECHO MEAS - AVA(I,D): 2.01 CM2
BH CV ECHO MEAS - EDV(CUBED): 49.2 ML
BH CV ECHO MEAS - EDV(MOD-SP2): 76 ML
BH CV ECHO MEAS - EDV(MOD-SP4): 73 ML
BH CV ECHO MEAS - EF(MOD-BP): 62.2 %
BH CV ECHO MEAS - EF(MOD-SP2): 64.5 %
BH CV ECHO MEAS - EF(MOD-SP4): 58.9 %
BH CV ECHO MEAS - ESV(CUBED): 12.5 ML
BH CV ECHO MEAS - ESV(MOD-SP2): 27 ML
BH CV ECHO MEAS - ESV(MOD-SP4): 30 ML
BH CV ECHO MEAS - FS: 36.7 %
BH CV ECHO MEAS - IVS/LVPW: 1.16 CM
BH CV ECHO MEAS - IVSD: 1.2 CM
BH CV ECHO MEAS - LAT PEAK E' VEL: 9.2 CM/SEC
BH CV ECHO MEAS - LV DIASTOLIC VOL/BSA (35-75): 46.6 CM2
BH CV ECHO MEAS - LV MASS(C)D: 129.8 GRAMS
BH CV ECHO MEAS - LV MAX PG: 7.7 MMHG
BH CV ECHO MEAS - LV MEAN PG: 4 MMHG
BH CV ECHO MEAS - LV SYSTOLIC VOL/BSA (12-30): 19.2 CM2
BH CV ECHO MEAS - LV V1 MAX: 138.8 CM/SEC
BH CV ECHO MEAS - LV V1 VTI: 26.4 CM
BH CV ECHO MEAS - LVIDD: 3.7 CM
BH CV ECHO MEAS - LVIDS: 2.32 CM
BH CV ECHO MEAS - LVOT AREA: 2.42 CM2
BH CV ECHO MEAS - LVOT DIAM: 1.76 CM
BH CV ECHO MEAS - LVPWD: 1.03 CM
BH CV ECHO MEAS - MED PEAK E' VEL: 7.7 CM/SEC
BH CV ECHO MEAS - MV A DUR: 0.1 SEC
BH CV ECHO MEAS - MV A MAX VEL: 77.1 CM/SEC
BH CV ECHO MEAS - MV DEC SLOPE: 524.3 CM/SEC2
BH CV ECHO MEAS - MV DEC TIME: 180 MSEC
BH CV ECHO MEAS - MV E MAX VEL: 99.6 CM/SEC
BH CV ECHO MEAS - MV E/A: 1.29
BH CV ECHO MEAS - MV MAX PG: 4.9 MMHG
BH CV ECHO MEAS - MV MEAN PG: 3 MMHG
BH CV ECHO MEAS - MV P1/2T: 62.4 MSEC
BH CV ECHO MEAS - MV V2 VTI: 23.1 CM
BH CV ECHO MEAS - MVA(P1/2T): 3.5 CM2
BH CV ECHO MEAS - MVA(VTI): 2.8 CM2
BH CV ECHO MEAS - PA ACC TIME: 0.1 SEC
BH CV ECHO MEAS - PA PR(ACCEL): 32.7 MMHG
BH CV ECHO MEAS - PA V2 MAX: 137.1 CM/SEC
BH CV ECHO MEAS - PULM A REVS DUR: 0.11 SEC
BH CV ECHO MEAS - PULM A REVS VEL: 34.5 CM/SEC
BH CV ECHO MEAS - PULM DIAS VEL: 53.1 CM/SEC
BH CV ECHO MEAS - PULM S/D: 0.75
BH CV ECHO MEAS - PULM SYS VEL: 39.9 CM/SEC
BH CV ECHO MEAS - RAP SYSTOLE: 3 MMHG
BH CV ECHO MEAS - RV MAX PG: 3.1 MMHG
BH CV ECHO MEAS - RV V1 MAX: 87.6 CM/SEC
BH CV ECHO MEAS - RV V1 VTI: 11.7 CM
BH CV ECHO MEAS - RVSP: 28.7 MMHG
BH CV ECHO MEAS - SI(MOD-SP2): 31.3 ML/M2
BH CV ECHO MEAS - SI(MOD-SP4): 27.5 ML/M2
BH CV ECHO MEAS - SV(LVOT): 64 ML
BH CV ECHO MEAS - SV(MOD-SP2): 49 ML
BH CV ECHO MEAS - SV(MOD-SP4): 43 ML
BH CV ECHO MEAS - TAPSE (>1.6): 2.23 CM
BH CV ECHO MEAS - TR MAX PG: 25.7 MMHG
BH CV ECHO MEAS - TR MAX VEL: 253.4 CM/SEC
BH CV ECHO MEASUREMENTS AVERAGE E/E' RATIO: 11.79
BH CV LOWER VASCULAR LEFT COMMON FEMORAL AUGMENT: NORMAL
BH CV LOWER VASCULAR LEFT COMMON FEMORAL COMPETENT: NORMAL
BH CV LOWER VASCULAR LEFT COMMON FEMORAL COMPRESS: NORMAL
BH CV LOWER VASCULAR LEFT COMMON FEMORAL PHASIC: NORMAL
BH CV LOWER VASCULAR LEFT COMMON FEMORAL SPONT: NORMAL
BH CV LOWER VASCULAR LEFT DISTAL FEMORAL COMPRESS: NORMAL
BH CV LOWER VASCULAR LEFT GASTRONEMIUS COMPRESS: NORMAL
BH CV LOWER VASCULAR LEFT GREATER SAPH AK COMPRESS: NORMAL
BH CV LOWER VASCULAR LEFT GREATER SAPH BK COMPRESS: NORMAL
BH CV LOWER VASCULAR LEFT LESSER SAPH COMPRESS: NORMAL
BH CV LOWER VASCULAR LEFT MID FEMORAL AUGMENT: NORMAL
BH CV LOWER VASCULAR LEFT MID FEMORAL COMPETENT: NORMAL
BH CV LOWER VASCULAR LEFT MID FEMORAL COMPRESS: NORMAL
BH CV LOWER VASCULAR LEFT MID FEMORAL PHASIC: NORMAL
BH CV LOWER VASCULAR LEFT MID FEMORAL SPONT: NORMAL
BH CV LOWER VASCULAR LEFT PERONEAL COMPRESS: NORMAL
BH CV LOWER VASCULAR LEFT POPLITEAL AUGMENT: NORMAL
BH CV LOWER VASCULAR LEFT POPLITEAL COMPETENT: NORMAL
BH CV LOWER VASCULAR LEFT POPLITEAL COMPRESS: NORMAL
BH CV LOWER VASCULAR LEFT POPLITEAL PHASIC: NORMAL
BH CV LOWER VASCULAR LEFT POPLITEAL SPONT: NORMAL
BH CV LOWER VASCULAR LEFT POSTERIOR TIBIAL COMPRESS: NORMAL
BH CV LOWER VASCULAR LEFT PROFUNDA FEMORAL COMPRESS: NORMAL
BH CV LOWER VASCULAR LEFT PROXIMAL FEMORAL COMPRESS: NORMAL
BH CV LOWER VASCULAR LEFT SAPHENOFEMORAL JUNCTION COMPRESS: NORMAL
BH CV LOWER VASCULAR RIGHT COMMON FEMORAL AUGMENT: NORMAL
BH CV LOWER VASCULAR RIGHT COMMON FEMORAL COMPETENT: NORMAL
BH CV LOWER VASCULAR RIGHT COMMON FEMORAL COMPRESS: NORMAL
BH CV LOWER VASCULAR RIGHT COMMON FEMORAL PHASIC: NORMAL
BH CV LOWER VASCULAR RIGHT COMMON FEMORAL SPONT: NORMAL
BH CV LOWER VASCULAR RIGHT DISTAL FEMORAL COMPRESS: NORMAL
BH CV LOWER VASCULAR RIGHT GASTRONEMIUS COMPRESS: NORMAL
BH CV LOWER VASCULAR RIGHT GREATER SAPH AK COMPRESS: NORMAL
BH CV LOWER VASCULAR RIGHT GREATER SAPH BK COMPRESS: NORMAL
BH CV LOWER VASCULAR RIGHT LESSER SAPH COMPRESS: NORMAL
BH CV LOWER VASCULAR RIGHT MID FEMORAL AUGMENT: NORMAL
BH CV LOWER VASCULAR RIGHT MID FEMORAL COMPETENT: NORMAL
BH CV LOWER VASCULAR RIGHT MID FEMORAL COMPRESS: NORMAL
BH CV LOWER VASCULAR RIGHT MID FEMORAL PHASIC: NORMAL
BH CV LOWER VASCULAR RIGHT MID FEMORAL SPONT: NORMAL
BH CV LOWER VASCULAR RIGHT PERONEAL COMPRESS: NORMAL
BH CV LOWER VASCULAR RIGHT POPLITEAL AUGMENT: NORMAL
BH CV LOWER VASCULAR RIGHT POPLITEAL COMPETENT: NORMAL
BH CV LOWER VASCULAR RIGHT POPLITEAL COMPRESS: NORMAL
BH CV LOWER VASCULAR RIGHT POPLITEAL PHASIC: NORMAL
BH CV LOWER VASCULAR RIGHT POPLITEAL SPONT: NORMAL
BH CV LOWER VASCULAR RIGHT POSTERIOR TIBIAL COMPRESS: NORMAL
BH CV LOWER VASCULAR RIGHT PROFUNDA FEMORAL COMPRESS: NORMAL
BH CV LOWER VASCULAR RIGHT PROXIMAL FEMORAL COMPRESS: NORMAL
BH CV LOWER VASCULAR RIGHT SAPHENOFEMORAL JUNCTION COMPRESS: NORMAL
BH CV UPPER VENOUS LEFT AXILLARY AUGMENT: NORMAL
BH CV UPPER VENOUS LEFT AXILLARY COMPRESS: NORMAL
BH CV UPPER VENOUS LEFT AXILLARY PHASIC: NORMAL
BH CV UPPER VENOUS LEFT AXILLARY SPONT: NORMAL
BH CV UPPER VENOUS LEFT BASILIC FOREARM COMPRESS: NORMAL
BH CV UPPER VENOUS LEFT BASILIC UPPER COMPRESS: NORMAL
BH CV UPPER VENOUS LEFT BRACHIAL COMPRESS: NORMAL
BH CV UPPER VENOUS LEFT CEPHALIC FOREARM COMPRESS: NORMAL
BH CV UPPER VENOUS LEFT CEPHALIC UPPER COMPRESS: NORMAL
BH CV UPPER VENOUS LEFT INTERNAL JUGULAR AUGMENT: NORMAL
BH CV UPPER VENOUS LEFT INTERNAL JUGULAR COMPRESS: NORMAL
BH CV UPPER VENOUS LEFT INTERNAL JUGULAR PHASIC: NORMAL
BH CV UPPER VENOUS LEFT INTERNAL JUGULAR SPONT: NORMAL
BH CV UPPER VENOUS LEFT RADIAL COMPRESS: NORMAL
BH CV UPPER VENOUS LEFT SUBCLAVIAN AUGMENT: NORMAL
BH CV UPPER VENOUS LEFT SUBCLAVIAN COMPRESS: NORMAL
BH CV UPPER VENOUS LEFT SUBCLAVIAN PHASIC: NORMAL
BH CV UPPER VENOUS LEFT SUBCLAVIAN SPONT: NORMAL
BH CV UPPER VENOUS LEFT ULNAR COMPRESS: NORMAL
BH CV UPPER VENOUS RIGHT AXILLARY AUGMENT: NORMAL
BH CV UPPER VENOUS RIGHT AXILLARY COMPRESS: NORMAL
BH CV UPPER VENOUS RIGHT AXILLARY PHASIC: NORMAL
BH CV UPPER VENOUS RIGHT AXILLARY SPONT: NORMAL
BH CV UPPER VENOUS RIGHT BASILIC FOREARM COMPRESS: NORMAL
BH CV UPPER VENOUS RIGHT BASILIC UPPER COMPRESS: NORMAL
BH CV UPPER VENOUS RIGHT BRACHIAL COMPRESS: NORMAL
BH CV UPPER VENOUS RIGHT CEPHALIC FOREARM COLOR: 1
BH CV UPPER VENOUS RIGHT CEPHALIC FOREARM COMPRESS: NORMAL
BH CV UPPER VENOUS RIGHT CEPHALIC FOREARM THROMBUS: NORMAL
BH CV UPPER VENOUS RIGHT CEPHALIC UPPER COLOR: 1
BH CV UPPER VENOUS RIGHT CEPHALIC UPPER COMPRESS: NORMAL
BH CV UPPER VENOUS RIGHT CEPHALIC UPPER THROMBUS: NORMAL
BH CV UPPER VENOUS RIGHT INTERNAL JUGULAR AUGMENT: NORMAL
BH CV UPPER VENOUS RIGHT INTERNAL JUGULAR COMPRESS: NORMAL
BH CV UPPER VENOUS RIGHT INTERNAL JUGULAR PHASIC: NORMAL
BH CV UPPER VENOUS RIGHT INTERNAL JUGULAR SPONT: NORMAL
BH CV UPPER VENOUS RIGHT RADIAL COMPRESS: NORMAL
BH CV UPPER VENOUS RIGHT SUBCLAVIAN AUGMENT: NORMAL
BH CV UPPER VENOUS RIGHT SUBCLAVIAN COMPRESS: NORMAL
BH CV UPPER VENOUS RIGHT SUBCLAVIAN PHASIC: NORMAL
BH CV UPPER VENOUS RIGHT SUBCLAVIAN SPONT: NORMAL
BH CV UPPER VENOUS RIGHT ULNAR COMPRESS: NORMAL
BH CV XLRA - RV BASE: 3.1 CM
BH CV XLRA - RV LENGTH: 6.7 CM
BH CV XLRA - RV MID: 2.43 CM
BH CV XLRA - TDI S': 17.7 CM/SEC
BILIRUB SERPL-MCNC: 1.4 MG/DL (ref 0–1.2)
BUN SERPL-MCNC: 19 MG/DL (ref 6–20)
BUN/CREAT SERPL: 33.3 (ref 7–25)
CALCIUM SPEC-SCNC: 9 MG/DL (ref 8.6–10.5)
CHLORIDE SERPL-SCNC: 99 MMOL/L (ref 98–107)
CO2 SERPL-SCNC: 26 MMOL/L (ref 22–29)
CREAT SERPL-MCNC: 0.57 MG/DL (ref 0.57–1)
DEPRECATED RDW RBC AUTO: 50.7 FL (ref 37–54)
EGFRCR SERPLBLD CKD-EPI 2021: 104.8 ML/MIN/1.73
EOSINOPHIL # BLD AUTO: 0.13 10*3/MM3 (ref 0–0.4)
EOSINOPHIL NFR BLD AUTO: 0.6 % (ref 0.3–6.2)
ERYTHROCYTE [DISTWIDTH] IN BLOOD BY AUTOMATED COUNT: 14.3 % (ref 12.3–15.4)
GAS FLOW AIRWAY: 4 LPM
GLOBULIN UR ELPH-MCNC: 4.2 GM/DL
GLUCOSE BLDC GLUCOMTR-MCNC: 115 MG/DL (ref 70–130)
GLUCOSE SERPL-MCNC: 79 MG/DL (ref 65–99)
HCO3 BLDA-SCNC: 27.4 MMOL/L (ref 22–28)
HCT VFR BLD AUTO: 26.9 % (ref 34–46.6)
HGB BLD-MCNC: 9.3 G/DL (ref 12–15.9)
IMM GRANULOCYTES # BLD AUTO: 0.57 10*3/MM3 (ref 0–0.05)
IMM GRANULOCYTES NFR BLD AUTO: 2.8 % (ref 0–0.5)
LEFT ATRIUM VOLUME INDEX: 26.2 ML/M2
LYMPHOCYTES # BLD AUTO: 1.09 10*3/MM3 (ref 0.7–3.1)
LYMPHOCYTES NFR BLD AUTO: 5.4 % (ref 19.6–45.3)
MAGNESIUM SERPL-MCNC: 2 MG/DL (ref 1.6–2.6)
MAXIMAL PREDICTED HEART RATE: 161 BPM
MCH RBC QN AUTO: 33.8 PG (ref 26.6–33)
MCHC RBC AUTO-ENTMCNC: 34.6 G/DL (ref 31.5–35.7)
MCV RBC AUTO: 97.8 FL (ref 79–97)
MODALITY: ABNORMAL
MONOCYTES # BLD AUTO: 1.24 10*3/MM3 (ref 0.1–0.9)
MONOCYTES NFR BLD AUTO: 6.1 % (ref 5–12)
NEUTROPHILS NFR BLD AUTO: 17.22 10*3/MM3 (ref 1.7–7)
NEUTROPHILS NFR BLD AUTO: 85 % (ref 42.7–76)
NRBC BLD AUTO-RTO: 0 /100 WBC (ref 0–0.2)
PCO2 BLDA: 43 MM HG (ref 35–45)
PH BLDA: 7.41 PH UNITS (ref 7.35–7.45)
PHOSPHATE SERPL-MCNC: 4 MG/DL (ref 2.5–4.5)
PLATELET # BLD AUTO: 167 10*3/MM3 (ref 140–450)
PMV BLD AUTO: 11.5 FL (ref 6–12)
PO2 BLDA: 80.5 MM HG (ref 80–100)
POTASSIUM SERPL-SCNC: 4.2 MMOL/L (ref 3.5–5.2)
PROCALCITONIN SERPL-MCNC: 2.4 NG/ML (ref 0–0.25)
PROT SERPL-MCNC: 6.7 G/DL (ref 6–8.5)
QT INTERVAL: 313 MS
RBC # BLD AUTO: 2.75 10*6/MM3 (ref 3.77–5.28)
SAO2 % BLDCOA: 95.9 % (ref 92–99)
SINUS: 2.9 CM
SODIUM SERPL-SCNC: 134 MMOL/L (ref 136–145)
STRESS TARGET HR: 137 BPM
TOTAL RATE: 24 BREATHS/MINUTE
VANCOMYCIN TROUGH SERPL-MCNC: 13.6 MCG/ML (ref 5–20)
WBC NRBC COR # BLD: 20.27 10*3/MM3 (ref 3.4–10.8)

## 2023-03-05 PROCEDURE — 71045 X-RAY EXAM CHEST 1 VIEW: CPT

## 2023-03-05 PROCEDURE — 80202 ASSAY OF VANCOMYCIN: CPT | Performed by: NURSE PRACTITIONER

## 2023-03-05 PROCEDURE — 25010000002 CYANOCOBALAMIN PER 1000 MCG: Performed by: NURSE PRACTITIONER

## 2023-03-05 PROCEDURE — 82962 GLUCOSE BLOOD TEST: CPT

## 2023-03-05 PROCEDURE — 93005 ELECTROCARDIOGRAM TRACING: CPT | Performed by: INTERNAL MEDICINE

## 2023-03-05 PROCEDURE — 93306 TTE W/DOPPLER COMPLETE: CPT | Performed by: STUDENT IN AN ORGANIZED HEALTH CARE EDUCATION/TRAINING PROGRAM

## 2023-03-05 PROCEDURE — 84145 PROCALCITONIN (PCT): CPT | Performed by: INTERNAL MEDICINE

## 2023-03-05 PROCEDURE — 93306 TTE W/DOPPLER COMPLETE: CPT

## 2023-03-05 PROCEDURE — 80053 COMPREHEN METABOLIC PANEL: CPT | Performed by: STUDENT IN AN ORGANIZED HEALTH CARE EDUCATION/TRAINING PROGRAM

## 2023-03-05 PROCEDURE — 85025 COMPLETE CBC W/AUTO DIFF WBC: CPT | Performed by: STUDENT IN AN ORGANIZED HEALTH CARE EDUCATION/TRAINING PROGRAM

## 2023-03-05 PROCEDURE — 83735 ASSAY OF MAGNESIUM: CPT | Performed by: STUDENT IN AN ORGANIZED HEALTH CARE EDUCATION/TRAINING PROGRAM

## 2023-03-05 PROCEDURE — 93970 EXTREMITY STUDY: CPT

## 2023-03-05 PROCEDURE — 84100 ASSAY OF PHOSPHORUS: CPT | Performed by: STUDENT IN AN ORGANIZED HEALTH CARE EDUCATION/TRAINING PROGRAM

## 2023-03-05 PROCEDURE — 25010000002 LEVOFLOXACIN PER 250 MG: Performed by: INTERNAL MEDICINE

## 2023-03-05 PROCEDURE — 94761 N-INVAS EAR/PLS OXIMETRY MLT: CPT

## 2023-03-05 PROCEDURE — 94640 AIRWAY INHALATION TREATMENT: CPT

## 2023-03-05 PROCEDURE — 25010000002 VANCOMYCIN PER 500 MG: Performed by: NURSE PRACTITIONER

## 2023-03-05 PROCEDURE — 82803 BLOOD GASES ANY COMBINATION: CPT

## 2023-03-05 PROCEDURE — 99222 1ST HOSP IP/OBS MODERATE 55: CPT | Performed by: THORACIC SURGERY (CARDIOTHORACIC VASCULAR SURGERY)

## 2023-03-05 PROCEDURE — 94799 UNLISTED PULMONARY SVC/PX: CPT

## 2023-03-05 PROCEDURE — 25010000002 HYDROMORPHONE 1 MG/ML SOLUTION: Performed by: INTERNAL MEDICINE

## 2023-03-05 PROCEDURE — 99231 SBSQ HOSP IP/OBS SF/LOW 25: CPT | Performed by: NURSE PRACTITIONER

## 2023-03-05 PROCEDURE — 94664 DEMO&/EVAL PT USE INHALER: CPT

## 2023-03-05 PROCEDURE — 36600 WITHDRAWAL OF ARTERIAL BLOOD: CPT

## 2023-03-05 PROCEDURE — 93010 ELECTROCARDIOGRAM REPORT: CPT | Performed by: INTERNAL MEDICINE

## 2023-03-05 RX ORDER — HYDROMORPHONE HYDROCHLORIDE 1 MG/ML
0.5 INJECTION, SOLUTION INTRAMUSCULAR; INTRAVENOUS; SUBCUTANEOUS EVERY 4 HOURS PRN
Status: DISPENSED | OUTPATIENT
Start: 2023-03-05 | End: 2023-03-09

## 2023-03-05 RX ORDER — LIDOCAINE 50 MG/G
1 PATCH TOPICAL
Status: DISCONTINUED | OUTPATIENT
Start: 2023-03-05 | End: 2023-03-17

## 2023-03-05 RX ORDER — IPRATROPIUM BROMIDE AND ALBUTEROL SULFATE 2.5; .5 MG/3ML; MG/3ML
3 SOLUTION RESPIRATORY (INHALATION)
Status: DISCONTINUED | OUTPATIENT
Start: 2023-03-05 | End: 2023-03-17 | Stop reason: HOSPADM

## 2023-03-05 RX ADMIN — APIXABAN 5 MG: 5 TABLET, FILM COATED ORAL at 08:10

## 2023-03-05 RX ADMIN — CYANOCOBALAMIN 1000 MCG: 1000 INJECTION, SOLUTION INTRAMUSCULAR; SUBCUTANEOUS at 08:11

## 2023-03-05 RX ADMIN — LEVOFLOXACIN 750 MG: 5 INJECTION, SOLUTION INTRAVENOUS at 22:00

## 2023-03-05 RX ADMIN — DOCUSATE SODIUM 50MG AND SENNOSIDES 8.6MG 2 TABLET: 8.6; 5 TABLET, FILM COATED ORAL at 08:10

## 2023-03-05 RX ADMIN — HYDROMORPHONE HYDROCHLORIDE 1 MG: 1 INJECTION, SOLUTION INTRAMUSCULAR; INTRAVENOUS; SUBCUTANEOUS at 09:31

## 2023-03-05 RX ADMIN — LAMOTRIGINE 200 MG: 100 TABLET ORAL at 08:10

## 2023-03-05 RX ADMIN — LIDOCAINE 1 PATCH: 700 PATCH TOPICAL at 08:11

## 2023-03-05 RX ADMIN — Medication 10 ML: at 09:23

## 2023-03-05 RX ADMIN — Medication 10 ML: at 22:00

## 2023-03-05 RX ADMIN — LORAZEPAM 1 MG: 1 TABLET ORAL at 03:40

## 2023-03-05 RX ADMIN — HYDROMORPHONE HYDROCHLORIDE 1 MG: 1 INJECTION, SOLUTION INTRAMUSCULAR; INTRAVENOUS; SUBCUTANEOUS at 01:29

## 2023-03-05 RX ADMIN — IPRATROPIUM BROMIDE AND ALBUTEROL SULFATE 3 ML: 2.5; .5 SOLUTION RESPIRATORY (INHALATION) at 10:59

## 2023-03-05 RX ADMIN — VANCOMYCIN HYDROCHLORIDE 1000 MG: 1 INJECTION, SOLUTION INTRAVENOUS at 09:25

## 2023-03-05 RX ADMIN — LACTULOSE 20 G: 20 SOLUTION ORAL at 08:12

## 2023-03-05 RX ADMIN — PANTOPRAZOLE SODIUM 40 MG: 40 INJECTION, POWDER, FOR SOLUTION INTRAVENOUS at 08:10

## 2023-03-05 RX ADMIN — HYDROCODONE BITARTRATE AND ACETAMINOPHEN 2 TABLET: 5; 325 TABLET ORAL at 08:10

## 2023-03-05 RX ADMIN — DESVENLAFAXINE SUCCINATE 25 MG: 25 TABLET, EXTENDED RELEASE ORAL at 08:12

## 2023-03-05 RX ADMIN — GABAPENTIN 100 MG: 100 CAPSULE ORAL at 08:10

## 2023-03-05 RX ADMIN — FOLIC ACID 1 MG: 1 TABLET ORAL at 08:11

## 2023-03-05 RX ADMIN — NYSTATIN 500000 UNITS: 100000 SUSPENSION ORAL at 08:12

## 2023-03-05 RX ADMIN — LUBIPROSTONE 24 MCG: 24 CAPSULE, GELATIN COATED ORAL at 08:11

## 2023-03-05 NOTE — PROGRESS NOTES
Sujit Bruce MD                          [phone]            Patient ID:    Name:  Louies GARCIA    MRN:  [id number]    1965   58 y.o.  female            Patient Care Team:  Chloe Schaefer APRN as PCP - General (Family Medicine)  Newton, Mikhail Velazquez MD as Consulting Physician (Neurology)    CC/ Reason for visit: Acute hypoxic respiratory failure, abnormal CAT scan, small pleural effusion    Subjective: Pt seen and examined this AM.  Patient is more confused and drowsy this a.m. but able to answer some questions intermittently.  Does not have any new fevers.  Tolerating vancomycin.  Blood culture grew MRSA and pleural fluid culture is growing MRSA as well and patient continues to complain of right-sided chest discomfort and is requiring as needed pushes as well as lidocaine patch.  Long discussion with the neurology team a few times today, discussed with the nurse and charge nurse as well.  Ordered patient to be transferred to the ICU.    ROS: Unable to obtain more than about due to encephalopathy    Objective     Vital Signs past 24hrs    BP range: BP: (131-158)/(92-99) 149/92  Pulse range: Heart Rate:  [] 109  Resp rate range: Resp:  [18-20] 20  Temp range: Temp (24hrs), Av °F (36.7 °C), Min:97.5 °F (36.4 °C), Max:98.2 °F (36.8 °C)      Ventilator/Non-Invasive Ventilation Settings (From admission, onward)    None          Vent Settings                                         Device (Oxygen Therapy): nasal cannula       56.7 kg (125 lb); Body mass index is 22.86 kg/m².      Intake/Output Summary (Last 24 hours) at 3/5/2023 1318  Last data filed at 3/5/2023 0632  Gross per 24 hour   Intake --   Output 100 ml   Net -100 ml       PHYSICAL EXAM   Constitutional: Middle-aged pt in bed, mild respiratory distress, + accessory muscle use  Head: - NCAT  Eyes: No pallor.  Anicteric sclerae, EOMI.  ENMT:  Mallampati 4, no oral thrush. Moist MM.   NECK: Trachea  midline, No thyromegaly, no palpable cervical lymphadenopathy  Heart: RRR, no murmur. No pedal edema   Lungs: CHIRAG +, decreased breath sounds at the right base. no wheezes/ crackles heard    Abdomen: Soft.  Softly distended. no tenderness, guarding or rigidity. No palpable masses  Extremities: Extremities warm and well perfused. No cyanosis/ clubbing  Neuro: Conscious, drowsy, no gross focal neuro deficits -generalized weakness  Psych: Mood and affect-UTO    PPE recommended per [facility] infectious disease Isolation protocol for the current clinical scenario (as mentioned below) was followed.     Scheduled meds:  bisacodyl, 10 mg, Rectal, Daily  cyanocobalamin, 1,000 mcg, Intramuscular, Daily  desvenlafaxine, 25 mg, Oral, Daily  folic acid, 1 mg, Oral, Daily  gabapentin, 100 mg, Oral, Q12H  ipratropium-albuterol, 3 mL, Nebulization, 4x Daily - RT  lactulose, 20 g, Oral, BID  lamoTRIgine, 200 mg, Oral, Daily  levoFLOXacin, 750 mg, Intravenous, Q24H  lidocaine, 1 patch, Transdermal, Q24H  lidocaine, 1 patch, Transdermal, Q24H  lubiprostone, 24 mcg, Oral, Daily With Breakfast  nystatin, 5 mL, Swish & Swallow, 4x Daily  pantoprazole, 40 mg, Intravenous, Q12H  polyethylene glycol, 17 g, Oral, BID  senna-docusate sodium, 2 tablet, Oral, BID  sodium chloride, 10 mL, Intravenous, Q12H  vancomycin, 1,000 mg, Intravenous, Q12H  [START ON 3/7/2023] vitamin B-12, 500 mcg, Oral, Daily        IV meds:                      hold, 1 each  Pharmacy to dose vancomycin,         Data Review:      Results from last 7 days   Lab Units 03/05/23  0532 03/04/23  0522 03/03/23  0159 03/02/23  0633   SODIUM mmol/L 134* 134* 132* 135*   POTASSIUM mmol/L 4.2 4.5 4.7 4.1   CHLORIDE mmol/L 99 101 96* 99   CO2 mmol/L 26.0 25.8 25.9 26.4   BUN mg/dL 19 29* 31* 23*   CREATININE mg/dL 0.57 0.64 0.81 0.68   CALCIUM mg/dL 9.0 8.9 9.5 8.6   BILIRUBIN mg/dL 1.4* 0.8 0.8 0.9   ALK PHOS U/L 213* 161* 119* 96   ALT (SGPT) U/L 55* 42* 55* 51*   AST  (SGOT) U/L 80* 44* 39* 40*   GLUCOSE mg/dL 79 82 143* 95   WBC 10*3/mm3 20.27* 23.89* 31.39* 23.40*   HEMOGLOBIN g/dL 9.3* 8.6* 10.0* 9.9*   PLATELETS 10*3/mm3 167 176 155 168   PROCALCITONIN ng/mL 2.40*  --   --  0.43*       Lab Results   Component Value Date    CALCIUM 9.0 03/05/2023    PHOS 4.0 03/05/2023       Results from last 7 days   Lab Units 03/04/23  0959 03/04/23  0900 03/02/23  1955   BLOODCX   --   --  Staphylococcus aureus, MRSA*  No growth at 2 days   BODYFLDCX   --  Light growth (2+) Staphylococcus aureus, MRSA*  --    RESPCX  Rejected  --   --    BCIDPCR   --   --  Staph aureus. mecA/C and MREJ (methicillin resistance gene) detected. Identification by BCID2 PCR.*       Results from last 7 days   Lab Units 03/05/23  1054   PH, ARTERIAL pH units 7.413   PO2 ART mm Hg 80.5   PCO2, ARTERIAL mm Hg 43.0   HCO3 ART mmol/L 27.4        I have personally reviewed the results from the time of this admission to 3/5/2023 13:18 EST and agree with these findings:  [x]  Laboratory  [x]  Microbiology  [x]  Radiology  []  EKG/Telemetry   [x]  Cardiology/Vascular   []  Pathology  []  Old records    ASSESSMENT   Acute hypoxic respiratory failure  Acute metabolic encephalopathy  Right middle lobe masslike infiltrate - ?  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  Orders have been placed and updated nurse and charge nurse.  Pleural fluid is growing MRSA and in the setting of ongoing respiratory compromise as well as sepsis we will need to drain the pleural space dry and will get a thoracic surgery consultation for potential chest  tube versus VATS intervention.  Continue with antibiotics for coverage of MRSA bacteremia and pleural infection  Suspect CT findings to likely represent MRSA pneumonia at this point likely explaining the entire course with potential sepsis.  Given complex nature we will consult infectious diseases as well.  Echocardiogram is pending.  Will consider bronchoscopy and biopsy given masslike infiltrate if the patient unfortunately ends up on mechanical ventilator but would hold off for now as she is high risk for the same with any intervention.  Defer further work-up for neurological findings to neurology team.  Paraneoplastic work-up is being considered.  Okay to continue Eliquis.   Poor prognosis  Full code    We will transfer the patient to the ICU.  Updated the ICU team and defer further management to them.  High risk of further decompensation.  I was unable to update the patient's  as he reportedly took a break and went home.  We are available for any further questions or concerns.    CC-45 minutes    Sujit Bruce MD  3/5/2023

## 2023-03-05 NOTE — PROGRESS NOTES
Name: Louise GARCIA ADMIT: 2023   : 1965  PCP: Chloe Schaefer APRN    MRN: [id number] LOS: 10 days   AGE/SEX: 58 y.o. female  ROOM: [facility]     Subjective   Subjective   Slight improvement of right upper quadrant pain and right sided lower chest pain.  No change in the shortness of breath.  Resolution of the cough.  No wheeze.  No hemoptysis.  No fever but positive chills.  No cardiac chest pain.  No palpitation.    Review of Systems  GI.  No abdominal pain.  No nausea or vomiting.  Failed initial speech evaluation today  .  No dysuria or hematuria.  CNS.  Resolved paresthesia.  Positive bilateral lower extremity weakness without change from yesterday.     Objective   Objective   Vital Signs  Temp:  [97.5 °F (36.4 °C)-98.2 °F (36.8 °C)] 98.2 °F (36.8 °C)  Heart Rate:  [] 92  Resp:  [18-20] 20  BP: (121-164)/(64-99) 130/78  SpO2:  [90 %-97 %] 91 %  on  Flow (L/min):  [3-4] 4;   Device (Oxygen Therapy): nasal cannula    Intake/Output Summary (Last 24 hours) at 3/5/2023 1859  Last data filed at 3/5/2023 0632  Gross per 24 hour   Intake --   Output 100 ml   Net -100 ml     Body mass index is 22.86 kg/m².      23  1425 23  1755   Weight: 54.4 kg (120 lb) 56.7 kg (125 lb) 56.7 kg (125 lb)     Physical Exam    General.  Middle-aged female.  She is alert.  Oriented x3.  No acute pain. No respiratory distress or diaphoresis.  Lethargic  Eyes.  Pupils equal round and reactive.  Intact extraocular musculature.  No pallor or jaundice.  Oral cavity.  Dry mucous membrane with thrush.  Neck.  Supple.  No JVD.  No lymphadenopathy or thyromegaly.  Cardiovascular.  Regular rate and rhythm with no gallops or murmurs.  Tachycardia  Chest.  Poor to auscultation bilaterally more so on the right than the left.    Scattered rhonchi in the right lung  Abdomen.  Mild right upper quadrant tenderness.  No guarding or rebound.  No organomegaly.  Mild distention.  Normal bowel  sounds.  Extremities.  No clubbing/cyanosis/edema.  CNS.  No acute focal neurological deficits    Results Review:      Results from last 7 days   Lab Units 03/05/23  0532 03/04/23  0522 03/03/23  0159 03/02/23  0633 03/01/23  0653 02/28/23  0525 02/27/23  0503   SODIUM mmol/L 134* 134* 132* 135* 136 138 138   POTASSIUM mmol/L 4.2 4.5 4.7 4.1 3.7 3.8 3.5   CHLORIDE mmol/L 99 101 96* 99 101 102 102   CO2 mmol/L 26.0 25.8 25.9 26.4 27.0 23.0 22.2   BUN mg/dL 19 29* 31* 23* 17 21* 22*   CREATININE mg/dL 0.57 0.64 0.81 0.68 0.61 0.68 0.77   GLUCOSE mg/dL 79 82 143* 95 122* 139* 198*   CALCIUM mg/dL 9.0 8.9 9.5 8.6 8.1* 8.4* 8.6   AST (SGOT) U/L 80* 44* 39* 40* 34* 29 26   ALT (SGPT) U/L 55* 42* 55* 51* 37* 34* 28     Estimated Creatinine Clearance: 95.1 mL/min (by C-G formula based on SCr of 0.57 mg/dL).      Results from last 7 days   Lab Units 03/05/23  1046   GLUCOSE mg/dL 115     Results from last 7 days   Lab Units 03/01/23  1114 03/01/23  0653 02/27/23  1145   CK TOTAL U/L  --   --  25   HSTROP T ng/L 7 10*  --              Results from last 7 days   Lab Units 03/05/23  0532 03/04/23  0522 03/03/23  0159 03/02/23  0633 03/01/23  0653 02/28/23  0525 02/27/23  0503   MAGNESIUM mg/dL 2.0 2.3 2.4 2.1 1.9 2.1 2.0           Invalid input(s): LDLCALC  Results from last 7 days   Lab Units 03/05/23  0532 03/04/23  0522 03/03/23  0159 03/02/23  0633 03/01/23  0742 02/28/23  0525 02/28/23  0525 02/27/23  0503   WBC 10*3/mm3 20.27* 23.89* 31.39* 23.40* 19.16*  --  18.37* 17.32*   HEMOGLOBIN g/dL 9.3* 8.6* 10.0* 9.9* 9.4*  --  9.7* 10.1*   HEMATOCRIT % 26.9* 25.1* 27.6* 28.7* 27.6*  --  28.4* 29.6*   PLATELETS 10*3/mm3 167 176 155 168 243  --  195 240   MCV fL 97.8* 98.8* 96.8 98.3* 100.7*  --  100.7* 102.1*   MCH pg 33.8* 33.9* 35.1* 33.9* 34.3*  --  34.4* 34.8*   MCHC g/dL 34.6 34.3 36.2* 34.5 34.1  --  34.2 34.1   RDW % 14.3 14.4 13.9 14.2 14.7  --  14.3 14.5   RDW-SD fl 50.7 50.7 49.0 49.9 53.0  --  51.4 53.4   MPV fL 11.5  11.8 11.3 11.9 11.7  --  11.6 10.9   NEUTROPHIL % % 85.0* 89.9*  --  88.7*  --   --  82.8* 83.9*   LYMPHOCYTE % % 5.4* 3.9*  --  5.7*  --   --  6.4* 6.4*   MONOCYTES % % 6.1 3.6*  --  3.8*  --   --  6.8 5.7   EOSINOPHIL % % 0.6 0.2*  --  0.0*  --   --  0.0* 0.0*   BASOPHIL % % 0.1 0.2  --  0.2  --   --  0.1 0.2   IMM GRAN % % 2.8*  --   --  1.6*  --   --  3.9* 3.8*   NEUTROS ABS 10*3/mm3 17.22* 21.50* 30.45* 20.73* 17.53*   < > 15.22* 14.54*   LYMPHS ABS 10*3/mm3 1.09 0.94  --  1.34  --   --  1.18 1.10   MONOS ABS 10*3/mm3 1.24* 0.85  --  0.90  --   --  1.24* 0.99*   EOS ABS 10*3/mm3 0.13 0.04  --  0.00  --   --  0.00 0.00   BASOS ABS 10*3/mm3 0.02 0.04  --  0.05  --   --  0.02 0.03   IMMATURE GRANS (ABS) 10*3/mm3 0.57*  --   --  0.38*  --   --  0.71* 0.66*   NRBC /100 WBC 0.0  --   --  0.1  --   --  0.1 0.2    < > = values in this interval not displayed.         Results from last 7 days   Lab Units 03/05/23  1054   PH, ARTERIAL pH units 7.413   PO2 ART mm Hg 80.5   PCO2, ARTERIAL mm Hg 43.0   HCO3 ART mmol/L 27.4     Results from last 7 days   Lab Units 03/05/23  0532 03/03/23  1051 03/03/23  0454 03/03/23  0159 03/02/23  2250 03/02/23 1955 03/02/23  0633   PROCALCITONIN ng/mL 2.40*  --   --   --   --   --  0.43*   LACTATE mmol/L  --  1.4 2.1* 2.7* 3.6* 3.6*  --          Results from last 7 days   Lab Units 03/02/23  0633   LIPASE U/L 29     Results from last 7 days   Lab Units 03/04/23  0959 03/04/23  0900 03/02/23 1955   BLOODCX   --   --  Staphylococcus aureus, MRSA*  No growth at 2 days   BODYFLDCX   --  Light growth (2+) Staphylococcus aureus, MRSA*  --    RESPCX  Rejected  --   --    BCIDPCR   --   --  Staph aureus. mecA/C and MREJ (methicillin resistance gene) detected. Identification by BCID2 PCR.*     Results from last 7 days   Lab Units 03/03/23  1830   ADENOVIRUS DETECTION BY PCR  Not Detected   CORONAVIRUS 229E  Not Detected   CORONAVIRUS HKU1  Not Detected   CORONAVIRUS NL63  Not Detected    CORONAVIRUS OC43  Not Detected   HUMAN METAPNEUMOVIRUS  Not Detected   HUMAN RHINOVIRUS/ENTEROVIRUS  Not Detected   INFLUENZA B PCR  Not Detected   PARAINFLUENZA 1  Not Detected   PARAINFLUENZA VIRUS 2  Not Detected   PARAINFLUENZA VIRUS 3  Not Detected   PARAINFLUENZA VIRUS 4  Not Detected   BORDETELLA PERTUSSIS PCR  Not Detected   CHLAMYDOPHILA PNEUMONIAE PCR  Not Detected   MYCOPLAMA PNEUMO PCR  Not Detected   INFLUENZA A PCR  Not Detected   RSV, PCR  Not Detected     Results from last 7 days   Lab Units 03/04/23  1000   NITRITE UA  Negative   WBC UA /HPF 3-5*   BACTERIA UA /HPF None Seen   SQUAM EPITHEL UA /HPF 0-2           Imaging:  Imaging Results (Last 24 Hours)     Procedure Component Value Units Date/Time    XR Chest 1 View [[id number]] Collected: 03/05/23 1131     Updated: 03/05/23 1139    Narrative:      ONE VIEW PORTABLE CHEST AT 10:40 AM     HISTORY: Follow-up of right pleural effusion.     FINDINGS: There is now very large right pleural effusion which is  somewhat loculated and extends along the right lateral chest wall and at  the apex and this shows marked enlargement since the CT scan performed  on 03/02/2023 and chest x-ray performed on 03/01/2023. There is  associated prominent compressive atelectasis in the right chest. The  left lung remains grossly clear except for some minimal atelectasis at  the left base medially. The heart remains borderline enlarged.     This report was called directly to the nursing staff at the time of the  dictation.     This report was finalized on 3/5/2023 11:36 AM by Dr. Erwin Ponce M.D.                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         1.  Right upper abdominal pain/elevated liver function test/constipation in a patient with a history of GERD  and status post cholecystectomy.  Right upper abdominal pain is related to #2.  Right upper quadrant ultrasound is negative.  Portal circulation Doppler is negative.  CT scan of the abdomen and pelvis is without acute disease.  Pain is much better controlled today.    Continue pain control..  Continue IV Protonix.  Lipase is normal.   Negative hepatitis panel.  Liver function test elevation could be secondary to #2.  Will monitor liver function test.  Continue bowel regimen   2.  Right lung masslike infiltrate with cavitary lesion and MRSA infected right pleural effusion/pneumosepsis/MRSA septicemia CT of the chest is noted.  No PE.  Blood cultures are positive for MRSA .  Currently on Levaquin and vancomycin.  Awaiting ID consultation.  Swallow study was negative for aspiration but this needs to be reevaluated by speech as the patient has failed her swallow study screen again..      Negative urine Legionella and Streptococcus antigen/respiratory PCR panel.   Need to rule out lung cancer.  Cardiothoracic surgery planning VATS with decortication (Eliquis DC'gunjan today).  Pulmonary entertaining bronchoscopy.  Awaiting repeat blood cultures.  Awaiting echo.   3.  Paresthesia and generalized weakness in a patient with a history of seizure.  MRI of the brain with small vessel disease and demyelination with possible MS.  MRI  of the C, T, L-spine revealed osteoarthritis and degenerative disc disease but no cord compromise.  Lumbar puncture with unremarkable CSF.  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  Paresthesia and weakness improved on Neurontin (dose decreased secondary to increased somnolence)..  She is s/p IVIG   She was on steroid and this has been DC'd after it was weaned down.  On PT/OT.  The following tests are negative/Normal : West Nile virus serology, Lyme serology, ESR, CRP, magnesium, phosphorus, potassium, RPR, folate, CK, DENISE, heavy metal screen, TSH, angiotensin-converting enzyme, heavy metal screen.  Still awaiting oligoclonal CSF antibody and MS profile and paraneoplastic panel in the CSF.  Continue Lamictal for seizure.  Neurology on board and their help is appreciated.  4.  Leukocytosis.  This is mostly a combination of steroid-induced and pneumosepsis.    Starting to improve.  Currently on Levaquin/vancomycin.  Awaiting infectious work-up.  Lactic acid was elevated that has now improved with IV fluids.  Awaiting UA.  5.  Steroid-induced hyperglycemia.  6.  Depression and anxiety.  Continue desvenlafaxine.  7.  VTE prophylaxis.  Sequential compression devices after stopping Eliquis.    Discussed my findings and plan of treatment with the patient//nurse  Transferred to the ICU after speaking with the consultants for closer observation  Disposition.  To be determined based on clinical course      Daisy Kaur MD  [facility]  03/05/23  18:59 EST

## 2023-03-05 NOTE — PROGRESS NOTES
DOS: 3/5/2023  NAME: Louise GARCIA   : 1965  PCP: Chloe Schaefer APRN    Chief Complaint   Patient presents with   • Fall   • Numbness     Numbness in bilateral arms and legs post falling.         Stroke    Subjective: No acute events overnight.  Still drowsy.  States she still having pain in her right side under her breast especially when she coughs.  She still feels short of air at times.  She states numbness has improved but she still feels weak in her lower extremities left greater than right.  More confused today.  No family at bedside.    Objective:  Vital signs:      Vitals:    23 1734 23 2133 23 0100 23 0824   BP: 131/95  158/99 149/92   BP Location: Left arm  Left arm Left arm   Patient Position: Lying  Lying Lying   Pulse: 106 105 107 114   Resp: 18  18 18   Temp: 98.2 °F (36.8 °C)  97.5 °F (36.4 °C) 98.2 °F (36.8 °C)   TempSrc: Oral   Oral   SpO2: 92% 94% 94% 97%   Weight:       Height:           Current Facility-Administered Medications:   •  apixaban (ELIQUIS) tablet 5 mg, 5 mg, Oral, Q12H, Jake Richmond DO, 5 mg at 23 0810  •  bisacodyl (DULCOLAX) suppository 10 mg, 10 mg, Rectal, Daily, Daisy Kaur MD, 10 mg at 23 1530  •  cyanocobalamin injection 1,000 mcg, 1,000 mcg, Intramuscular, Daily, Gila Pro APRN, 1,000 mcg at 23 0811  •  Desvenlafaxine Succinate ER 25 mg, 25 mg, Oral, Daily, Violet Burdick APRN, 25 mg at 23 0812  •  diphenhydrAMINE (BENADRYL) injection 50 mg, 50 mg, Intravenous, Once PRN, Dwayne Jerry MD  •  famotidine (PEPCID) injection 20 mg, 20 mg, Intravenous, Once PRN, Dwayne Jerry MD  •  folic acid (FOLVITE) tablet 1 mg, 1 mg, Oral, Daily, Renata Oro APRN, 1 mg at 23 0811  •  gabapentin (NEURONTIN) capsule 100 mg, 100 mg, Oral, Q12H, Daisy Kaur MD, 100 mg at 23 0810  •  Hold medication, 1 each, Does not apply, Continuous PRN, Franklyn Powers MD  •   HYDROcodone-acetaminophen (NORCO) 5-325 MG per tablet 2 tablet, 2 tablet, Oral, Q4H PRN, Daisy Kaur MD, 2 tablet at 03/05/23 0810  •  Hydrocortisone Sod Suc (PF) (Solu-CORTEF) injection 100 mg, 100 mg, Intravenous, Once PRN, Dwayne Jerry MD  •  HYDROmorphone (DILAUDID) injection 1 mg, 1 mg, Intravenous, Q4H PRN, Daisy Kaur MD, 1 mg at 03/05/23 0931  •  ketorolac (TORADOL) injection 30 mg, 30 mg, Intravenous, Q6H PRN, Violet Burdick APRN  •  lactulose (CHRONULAC) 10 GM/15ML solution 20 g, 20 g, Oral, BID, Daisy Kaur MD, 20 g at 03/05/23 0812  •  lamoTRIgine (LaMICtal) tablet 200 mg, 200 mg, Oral, Daily, Violet Burdick APRN, 200 mg at 03/05/23 0810  •  levoFLOXacin (LEVAQUIN) 750 mg/150 mL D5W (premix) (LEVAQUIN) 750 mg, 750 mg, Intravenous, Q24H, Daisy Kaur MD, 750 mg at 03/04/23 1851  •  lidocaine (LIDODERM) 5 % 1 patch, 1 patch, Transdermal, Q24H, Violet Burdick APRN, 1 patch at 03/05/23 0811  •  LORazepam (ATIVAN) tablet 1 mg, 1 mg, Oral, Q4H PRN, Daisy Kaur MD, 1 mg at 03/05/23 0340  •  lubiprostone (AMITIZA) capsule 24 mcg, 24 mcg, Oral, Daily With Breakfast, Daisy Kaur MD, 24 mcg at 03/05/23 0811  •  Magnesium Sulfate - Total Dose 10 grams - Magnesium 1 or Less, 2 g, Intravenous, PRN **OR** Magnesium Sulfate - Total Dose 6 grams - Magnesium 1.1 - 1.5, 2 g, Intravenous, PRN, Stopped at 02/21/23 1252 **OR** Magnesium Sulfate - Total Dose 4 grams - Magnesium 1.6 - 1.9, 4 g, Intravenous, PRN, Renata Oro APRN  •  melatonin tablet 5 mg, 5 mg, Oral, Nightly PRN, Renata Oro APRN, 5 mg at 02/28/23 2200  •  nystatin (MYCOSTATIN) 100,000 unit/mL suspension 500,000 Units, 5 mL, Swish & Swallow, 4x Daily, Daisy Kaur MD, 500,000 Units at 03/05/23 0812  •  pantoprazole (PROTONIX) injection 40 mg, 40 mg, Intravenous, Q12H, Daisy Kaur MD, 40 mg at 03/05/23 0810  •  Pharmacy to dose vancomycin, , Does not apply, Continuous PRN,  Becca Davenport, APRN  •  polyethylene glycol (MIRALAX) packet 17 g, 17 g, Oral, BID, Violet Burdick, APRN, 17 g at 03/04/23 2019  •  potassium & sodium phosphates (PHOS-NAK) 280-160-250 MG packet - for Phosphorus less than 1.25 mg/dL, 2 packet, Oral, Q6H PRN **OR** potassium & sodium phosphates (PHOS-NAK) 280-160-250 MG packet - for Phosphorus 1.25 - 2.5 mg/dL, 2 packet, Oral, Q6H PRN, Yi, Jake, DO  •  potassium chloride (K-DUR,KLOR-CON) ER tablet 40 mEq, 40 mEq, Oral, PRN, 40 mEq at 03/02/23 2033 **OR** potassium chloride (KLOR-CON) packet 40 mEq, 40 mEq, Oral, PRN **OR** potassium chloride 10 mEq in 100 mL IVPB, 10 mEq, Intravenous, Q1H PRN, Yi, Jake, DO  •  sennosides-docusate (PERICOLACE) 8.6-50 MG per tablet 2 tablet, 2 tablet, Oral, BID, 2 tablet at 03/05/23 0810 **AND** [DISCONTINUED] polyethylene glycol (MIRALAX) packet 17 g, 17 g, Oral, Daily PRN **AND** [DISCONTINUED] bisacodyl (DULCOLAX) EC tablet 5 mg, 5 mg, Oral, Daily PRN **AND** [DISCONTINUED] bisacodyl (DULCOLAX) suppository 10 mg, 10 mg, Rectal, Daily PRN, Franci Griffith PA  •  [COMPLETED] Insert Peripheral IV, , , Once **AND** sodium chloride 0.9 % flush 10 mL, 10 mL, Intravenous, PRN, Shin Rivers PA  •  sodium chloride 0.9 % flush 10 mL, 10 mL, Intravenous, Q12H, Renata Oro, APRN, 10 mL at 03/05/23 0923  •  sodium chloride 0.9 % flush 10 mL, 10 mL, Intravenous, PRN, Qadah, Abdalhakim, APRN  •  sodium chloride 0.9 % infusion 40 mL, 40 mL, Intravenous, PRN, Renata Oro APRN, 40 mL at 02/23/23 0603  •  vancomycin (VANCOCIN) 1000 mg/200 mL dextrose 5% IVPB, 1,000 mg, Intravenous, Q12H, Becca Davenport APRN, 1,000 mg at 03/05/23 0925  •  [START ON 3/7/2023] vitamin B-12 (CYANOCOBALAMIN) tablet 500 mcg, 500 mcg, Oral, Daily, Gila Pro APRN    PRN meds  •  diphenhydrAMINE  •  famotidine  •  hold  •  HYDROcodone-acetaminophen  •  hydrocortisone sodium succinate  •  HYDROmorphone  •  ketorolac  •   LORazepam  •  magnesium sulfate **OR** magnesium sulfate **OR** magnesium sulfate  •  melatonin  •  Pharmacy to dose vancomycin  •  potassium & sodium phosphates **OR** potassium & sodium phosphates  •  potassium chloride **OR** potassium chloride **OR** potassium chloride  •  [COMPLETED] Insert Peripheral IV **AND** sodium chloride  •  sodium chloride  •  sodium chloride    No current facility-administered medications on file prior to encounter.     Current Outpatient Medications on File Prior to Encounter   Medication Sig   • acetaminophen (TYLENOL) 325 MG tablet Take 650 mg by mouth Every 6 (Six) Hours As Needed for Mild Pain .   • apixaban (ELIQUIS) 5 MG tablet tablet Take 1 tablet by mouth Daily.   • folic acid (FOLVITE) 1 MG tablet Take 1 mg by mouth Daily.   • ibuprofen (ADVIL,MOTRIN) 200 MG tablet Take 200 mg by mouth Every 6 (Six) Hours As Needed for Mild Pain .   • lamoTRIgine (LaMICtal) 100 MG tablet Take 100 mg by mouth Daily.   • nitrofurantoin, macrocrystal-monohydrate, (MACROBID) 100 MG capsule Take 800 mg by mouth 2 (Two) Times a Day.   • vitamin B-12 (CYANOCOBALAMIN) 100 MCG tablet Take 50 mcg by mouth Daily.   • diazepam (VALIUM) 10 MG tablet TAKE 1 TABLET THREE TIMES A DAY   • estrogens, conjugated,-methyltestosterone (ESTRATEST HS) 0.625-1.25 MG per tablet Take  by mouth.   • pantoprazole (PROTONIX) 40 MG EC tablet Take 1 tablet by mouth 2 (two) times a day before meals.   • sucralfate (CARAFATE) 1 GM/10ML suspension Take 10 mL by mouth every 6 (six) hours.       General appearance: NAD, drowsy, appears ill  HEENT: Normocephalic, atraumatic, right pupil 3, left pupil 3, no masses or tenderness, dry mouth/lips  Resp: Even and mildly labored during conversation, non productive cough  Extremities: Left hand bruising and edema  Skin: warm, dry     Neurological:   MS: drowsy and more confused, oriented to person and year, recent/remote memory impaired, decreased attention/concentration 2/2  drowsiness, delayed speech with response, no neglect, normal fund of knowledge  CN: visual acuity grossly normal, visual fields full, EOMI, facial sensation equal, no facial droop, tongue midline  Motor: Mild asterixis of upper extremities, 5/5 upper extremities, 1/5 BLE- pt c/o pain around her upper abdominal area and around the right rib cage when attempting to lift legs which greatly limited exam, 5/5 dorsi/plantarflexion  Reflexes: Areflexic in lower extremities, diminished in upper extremities  Sensory: Normal cold temperature sensation of all 4 ext.  Diminished vibratory sensation of lower extremities  Coordination: Normal finger to nose test, but performed very slowly   Gait and station: Deferred  Rapid alternating movements: normal finger to thumb tap    Physical exam performed, changes noted.    Laboratory results:  Lab Results   Component Value Date    TSH 1.670 02/26/2023     Lab Results   Component Value Date    HGBA1C 5.10 02/20/2023     Lab Results   Component Value Date    TXEBZFYR56 298 03/02/2023     No results found for: CHOL, CHLPL  No results found for: TRIG  No results found for: HDL  No results found for: LDL, LDLDIRECT  Lab Results   Component Value Date    WBC 20.27 (H) 03/05/2023    HGB 9.3 (L) 03/05/2023    HCT 26.9 (L) 03/05/2023    MCV 97.8 (H) 03/05/2023     03/05/2023     Lab Results   Component Value Date    GLUCOSE 79 03/05/2023    BUN 19 03/05/2023    CREATININE 0.57 03/05/2023    EGFRIFNONA 81 08/29/2016    EGFRIFAFRI  08/29/2016      Comment:      <15 Indicative of kidney failure.    BCR 33.3 (H) 03/05/2023    K 4.2 03/05/2023    CO2 26.0 03/05/2023    CALCIUM 9.0 03/05/2023    PROTENTOTREF 6.1 02/28/2023    ALBUMIN 2.5 (L) 03/05/2023    LABIL2 0.7 02/28/2023    AST 80 (H) 03/05/2023    ALT 55 (H) 03/05/2023     Lab Results   Component Value Date    PTT 25.4 03/01/2019     Lab Results   Component Value Date    INR 1.0 03/01/2019    INR 1.1 05/24/2018    INR 1.3 05/23/2018     PROTIME 11.2 03/01/2019    PROTIME 12.3 05/24/2018    PROTIME 13.9 (H) 05/23/2018     Brief Urine Lab Results  (Last result in the past 365 days)      Color   Clarity   Blood   Leuk Est   Nitrite   Protein   CREAT   Urine HCG        03/04/23 1000 Yellow   Clear   Negative   Trace   Negative   Negative                              RPR nonreactive  RNP antibodies <0.2  Reyes antibodies <0.2  EVERETT-1 IgG <0.2  CRP <0.30  ESR  15  Serial CKs all normal  Folate >20.00  IgA 185/164  IgG 493/1577  IgM 178/161  [place] virus CSF negative   Serum tox negative   Lyme antibodies negative   Hepatitis panel negative.    Review and interpretation of imaging:  CT Abdomen Pelvis Without Contrast    Result Date: 3/3/2023  1. Abnormal parenchymal opacity in the right middle lobe has a somewhat masslike appearance anteriorly where there is a subcentimeter area of air suggesting cavitation. Whether this represents tumor or pneumonia is unclear. There is a small right pleural effusion with parenchymal volume loss in the posterior right lower lobe favored to represent atelectasis. Mildly enlarged right hilar and subcarinal lymph nodes. 2. Diffuse third spacing around the pelvis. Otherwise no acute abnormality identified in the abdomen or the pelvis. 3. 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  This report was finalized on 3/3/2023 10:13 AM by Dr. Ryley Nascimento M.D.      CT Angiogram Chest    Result Date: 3/3/2023  1. Abnormal parenchymal opacity in the right middle lobe has a somewhat masslike appearance anteriorly where there is a subcentimeter area of air suggesting cavitation. Whether this represents tumor or pneumonia is unclear. There is a small right pleural effusion with parenchymal volume loss in the posterior right lower lobe favored to represent atelectasis. Mildly enlarged right hilar and subcarinal lymph nodes. 2. Diffuse third spacing around the pelvis. Otherwise no acute abnormality identified in the abdomen or the pelvis.  3. 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  This report was finalized on 3/3/2023 10:13 AM by Dr. Ryley Nascimento M.D.      US Abdomen Limited    Result Date: 3/2/2023  Prior cholecystectomy. No abnormality identified to account for pain.  This report was finalized on 3/2/2023 10:55 PM by Dr. Ryley Nascimento M.D.      MRI Lumbar Spine With & Without Contrast    Result Date: 2/25/2023  Electronically signed by Lanny Wilkinson MD on 02-25-23 at 2254    Impression/Assessment:  This is a 58-year-old female with past medical history of cervical DDD s/p C6 corpectomy and C5-7 ACDF for disc herniation with radiculopathy and weakness, blood clots on Eliquis 5 mg twice daily PTA, gestational diabetes, reported seizures on Lamictal 100 mg daily PTA, anxiety on Valium 1 tablet 3 times daily, reported history of alcohol abuse who presented to the hospital on 2/20/2023 with complaints of whole body numbness that started after a fall earlier this month.  She reported initially she had a numbness from her neck down her chest, abdomen, pubic area, and down both legs.  She began to notice some urinary urgency and incontinence as well as constipation a few days later.  She denied any numbness involving her upper extremities but did note some numbness of the tips of her fingers.  She noted that she was becoming weak as well progressively in her legs which is worse on the left. Reportedly she also had an additional fall on 2/20 leaving work due to her weakness.     She was initially evaluated by Dr. Arce with our service who ordered full spinal and brain imaging with contrasted MRI as well as a lumbar puncture.  He was concern for an acute traumatic myelopathy and initiated her on Solu-Medrol 1 g IV daily which she received 3 doses of and then was reduced to 500 daily which she received 2 doses of it and then reduce to 250 mg daily which she received 3 doses. Her spinal imaging was essentially unrevealing showing multilevel  spondyloarthropathy but no significant canal stenosis.  Her MRI brain revealed mild to moderate white matter changes otherwise no other acute findings.  Her initial routine spinal fluid was also unremarkable showing 0 TNCs, a protein level of 38.2, glucose 93, and RBC 4.      She did receive an EMG/NCS by Dr. Caleb Reyes while inpatient that revealed findings consistent with peripheral neuropathy with mild evidence in the upper extremity with sensory involvement and mild to moderate involvement of the lower extremity with sensorimotor involvement, no evidence of right cervical or lumbosacral radiculopathy. Although her EMG/NCS did not show typical features of GBS given concerning clinical exam especially with areflexia the decision was made to start her on IVIG based on clinical suspicion for a total of 5 doses which she completed on 3/3.     Diagnosis:  Progressive generalized numbness and weakness  Bacteremia   Abnormal CT Chest, pleural fluid + MRSA concern for abscess versus empyema  B12 deficiency  Elevated LFTs  Generalized anxiety    Plan:  Pt appears more drowsy today. She was c/o worsening pain with cough and labored breathing. Denies new/worsening numbness. Generally weak all over.   Being transferred to the ICU today, discussed with Dr. Bruce. She has MRSA growing in her pleural fluid as well as + BC for the same. May need chest tube, possibly abscess vs empyema. On levaquin and vancomycin.  Again spoke with micro lab yesterday, will need to call back and discuss with pathology on Monday if patient has enough CSF to add on paraneoplastic profile. Serum profile sent on 3/4.   Cryoglobulin and oligoclonal banding still pending. Of note, the oligoclonal banding was an add on. There may not have been enough sample for this. If repeat LP may need to repeat this.  Continue B12 replacement as ordered.  Dr. Aleman will resume care tomorrow.   We will follow.    Case discussed with patient, RN,   Teo, and Dr. Jerry who spoke with Dr. Kaur, and he agrees with plan above.    I spent greater than 30 minutes of floor time reviewing patient's chart and new imaging and lab results, ordering tests, discussing with other providers and patient about continued plan of care and need for higher level of care.    Gila Pro, APRN  .

## 2023-03-05 NOTE — CONSULTS
Consults    Patient Care Team:  Chloe Schaefer APRN as PCP - General (Family Medicine)  Mikhail Glez MD as Consulting Physician (Neurology)    Chief Complaint   Patient presents with   • Fall   • Numbness     Numbness in bilateral arms and legs post falling.        Subjective     History of Present Illness  Ms. Soriano is a 58-year-old lady who presents with weakness, abdominal pain and chest pain.  She was found to have a right pleural effusion and is status post thoracentesis.  She has developed a MRSA bacteremia.  She has also had reaccumulation of her fluid in her chest cavity postthoracentesis.  She is growing MRSA from her chest cavity fluid.  Review of Systems   Constitutional: Positive for activity change, appetite change and fatigue.   HENT: Negative.    Eyes: Negative.    Respiratory: Positive for cough and shortness of breath.    Cardiovascular: Positive for chest pain.   Gastrointestinal: Positive for abdominal pain.   Endocrine: Negative.    Genitourinary: Negative.    Musculoskeletal: Negative.    Skin: Negative.    Allergic/Immunologic: Negative.    Neurological: Positive for dizziness, seizures and weakness.   Hematological: Negative.    Psychiatric/Behavioral: Positive for confusion.        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)   • Situational mixed anxiety and depressive disorder   • Upper abdominal pain   • Mass of middle lobe of right lung   • Oral candidiasis     Past Medical History:   Diagnosis Date   • Degenerative disc disease, cervical    • Gestational diabetes    • H/O blood clots    • Hematemesis    • Seizures ([facility])    • Septic shock  (HCC)      Past Surgical History:   Procedure Laterality Date   • APPENDECTOMY     • BACK SURGERY     • CHOLECYSTECTOMY     • ENDOSCOPY N/A 8/30/2016    Procedure: ESOPHAGOGASTRODUODENOSCOPY with biopsy;  Surgeon: Austen Brito MD;  Location: [facility] ENDOSCOPY;  Service:    • HYSTERECTOMY     • NECK SURGERY       approx 6 yrs ago   • TONSILLECTOMY     • TONSILLECTOMY AND ADENOIDECTOMY  1975     Family History   Problem Relation Age of Onset   • Colon cancer Paternal Uncle    • Cancer Mother    • Diabetes Father    • Heart disease Father    • Stroke Father    • Cancer Sister    • Diabetes Paternal Grandmother    • Diabetes Paternal Grandfather      Social History     Socioeconomic History   • Marital status:    Tobacco Use   • Smoking status: Never   • Smokeless tobacco: Never   Vaping Use   • Vaping Use: Never used   Substance and Sexual Activity   • Alcohol use: Yes     Comment: social   • Drug use: No   • Sexual activity: Defer     Medications Prior to Admission   Medication Sig Dispense Refill Last Dose   • acetaminophen (TYLENOL) 325 MG tablet Take 650 mg by mouth Every 6 (Six) Hours As Needed for Mild Pain .   Past Week   • apixaban (ELIQUIS) 5 MG tablet tablet Take 1 tablet by mouth Daily.   2/20/2023 at 0800   • folic acid (FOLVITE) 1 MG tablet Take 1 mg by mouth Daily.   2/20/2023 at 0800   • ibuprofen (ADVIL,MOTRIN) 200 MG tablet Take 200 mg by mouth Every 6 (Six) Hours As Needed for Mild Pain .   Past Week   • lamoTRIgine (LaMICtal) 100 MG tablet Take 100 mg by mouth Daily.   2/20/2023 at 0800   • nitrofurantoin, macrocrystal-monohydrate, (MACROBID) 100 MG capsule Take 800 mg by mouth 2 (Two) Times a Day.   2/20/2023 at 0800   • vitamin B-12 (CYANOCOBALAMIN) 100 MCG tablet Take 50 mcg by mouth Daily.   2/20/2023 at 0800   • diazepam (VALIUM) 10 MG tablet TAKE 1 TABLET THREE TIMES A DAY  0    • estrogens, conjugated,-methyltestosterone (ESTRATEST HS) 0.625-1.25 MG per tablet Take  by mouth.       • pantoprazole (PROTONIX) 40 MG EC tablet Take 1 tablet by mouth 2 (two) times a day before meals. 60 tablet 0    • sucralfate (CARAFATE) 1 GM/10ML suspension Take 10 mL by mouth every 6 (six) hours. 420 mL 0      Allergies   Allergen Reactions   • Penicillins Shortness Of Breath and Swelling   • Sulfa Antibiotics Rash       Objective      Vital Signs  Temp:  [97.5 °F (36.4 °C)-98.2 °F (36.8 °C)] 98.2 °F (36.8 °C)  Heart Rate:  [] 96  Resp:  [18-20] 20  BP: (130-158)/(76-99) 130/76    Intake & Output (last day)       03/04 0701  03/05 0700 03/05 0701 03/06 0700    I.V. (mL/kg)      IV Piggyback      Total Intake(mL/kg)      Urine (mL/kg/hr) 450 (0.3)     Other 50     Total Output 500     Net -500           Urine Unmeasured Occurrence 3 x 1 x          Physical Exam  Vitals and nursing note reviewed.   Constitutional:       General: She is not in acute distress.     Appearance: She is well-developed. She is ill-appearing.   HENT:      Head: Normocephalic and atraumatic.      Nose: Nose normal.   Eyes:      Conjunctiva/sclera: Conjunctivae normal.   Cardiovascular:      Rate and Rhythm: Normal rate.   Pulmonary:      Effort: Pulmonary effort is normal.   Abdominal:      Palpations: Abdomen is soft.   Musculoskeletal:      Cervical back: Neck supple.   Skin:     General: Skin is warm and dry.   Neurological:      Mental Status: She is alert.   Psychiatric:         Thought Content: Thought content normal.         Results Review:    I reviewed the patient's new clinical results.  I reviewed the patient's new imaging results and agree with the interpretation.  I reviewed the patient's other test results and agree with the interpretation    Imaging Results (Last 24 Hours)     Procedure Component Value Units Date/Time    XR Chest 1 View [[id number]] Collected: 03/05/23 1131     Updated: 03/05/23 1139    Narrative:      ONE VIEW PORTABLE CHEST AT 10:40 AM     HISTORY: Follow-up of right pleural effusion.     FINDINGS:  There is now very large right pleural effusion which is  somewhat loculated and extends along the right lateral chest wall and at  the apex and this shows marked enlargement since the CT scan performed  on 03/02/2023 and chest x-ray performed on 03/01/2023. There is  associated prominent compressive atelectasis in the right chest. The  left lung remains grossly clear except for some minimal atelectasis at  the left base medially. The heart remains borderline enlarged.     This report was called directly to the nursing staff at the time of the  dictation.     This report was finalized on 3/5/2023 11:36 AM by Dr. Erwin Ponce M.D.             Lab Results:  Lab Results (last 24 hours)     Procedure Component Value Units Date/Time    Blood Gas, Arterial - [[id number]]  (Abnormal) Collected: 03/05/23 1054    Specimen: Arterial Blood Updated: 03/05/23 1057     Site Arterial: right brachial     Justino's Test N/A     pH, Arterial 7.413 pH units      pCO2, Arterial 43.0 mm Hg      pO2, Arterial 80.5 mm Hg      HCO3, Arterial 27.4 mmol/L      Base Excess, Arterial 2.5 mmol/L      O2 Saturation Calculated 95.9 %      Comment: spo2=93%. 1050 Meter: [id number] : [id number] [place]        Barometric Pressure for Blood Gas 753.7 mmHg      Modality Cannula     Flow Rate 4 lpm      Rate 24 Breaths/minute     POC Glucose Once [[id number]]  (Normal) Collected: 03/05/23 1046    Specimen: Blood Updated: 03/05/23 1047     Glucose 115 mg/dL      Comment: Meter: [id number] : [id number] Fahad Grimaldo CNA       Procalcitonin [[id number]]  (Abnormal) Collected: 03/05/23 0532    Specimen: Blood Updated: 03/05/23 1022     Procalcitonin 2.40 ng/mL     Narrative:      As a Marker for Sepsis (Non-Neonates):    1. <0.5 ng/mL represents a low risk of severe sepsis and/or septic shock.  2. >2 ng/mL represents a high risk of severe sepsis and/or septic shock.    As a Marker for Lower Respiratory Tract Infections that require  "antibiotic therapy:    PCT on Admission    Antibiotic Therapy       6-12 Hrs later    >0.5                Strongly Recommended  >0.25 - <0.5        Recommended   0.1 - 0.25          Discouraged              Remeasure/reassess PCT  <0.1                Strongly Discouraged     Remeasure/reassess PCT    As 28 day mortality risk marker: \"Change in Procalcitonin Result\" (>80% or <=80%) if Day 0 (or Day 1) and Day 4 values are available. Refer to http://[email or web address]-[email or web address]-[email or web address]    Change in PCT <=80%  A decrease of PCT levels below or equal to 80% defines a positive change in PCT test result representing a higher risk for 28-day all-cause mortality of patients diagnosed with severe sepsis for septic shock.    Change in PCT >80%  A decrease of PCT levels of more than 80% defines a negative change in PCT result representing a lower risk for 28-day all-cause mortality of patients diagnosed with severe sepsis or septic shock.       Body Fluid Culture - Body Fluid, Pleural Cavity [[id number]]  (Abnormal) Collected: 03/04/23 0900    Specimen: Body Fluid from Pleural Cavity Updated: 03/05/23 0902     Body Fluid Culture Light growth (2+) Staphylococcus aureus, MRSA     Comment:   Methicillin resistant Staphylococcus aureus, Patient may be an isolation risk.        Gram Stain Rare (1+) WBCs per low power field      No organisms seen    Phosphorus [[id number]]  (Normal) Collected: 03/05/23 0532    Specimen: Blood Updated: 03/05/23 0633     Phosphorus 4.0 mg/dL     Blood Culture - Blood, Arm, Left [[id number]]  (Abnormal) Collected: 03/02/23 1955    Specimen: Blood from Arm, Left Updated: 03/05/23 0631     Blood Culture Staphylococcus aureus, MRSA     Comment:   Infectious disease consultation is highly recommended to rule out distant foci of infection.  Methicillin resistant Staphylococcus aureus, Patient may be an isolation risk.        Isolated from Aerobic Bottle     Gram Stain Aerobic Bottle Gram positive cocci in clusters    " Comprehensive Metabolic Panel [[id number]]  (Abnormal) Collected: 03/05/23 0532    Specimen: Blood Updated: 03/05/23 0629     Glucose 79 mg/dL      BUN 19 mg/dL      Creatinine 0.57 mg/dL      Sodium 134 mmol/L      Potassium 4.2 mmol/L      Chloride 99 mmol/L      CO2 26.0 mmol/L      Calcium 9.0 mg/dL      Total Protein 6.7 g/dL      Albumin 2.5 g/dL      ALT (SGPT) 55 U/L      AST (SGOT) 80 U/L      Alkaline Phosphatase 213 U/L      Total Bilirubin 1.4 mg/dL      Globulin 4.2 gm/dL      A/G Ratio 0.6 g/dL      BUN/Creatinine Ratio 33.3     Anion Gap 9.0 mmol/L      eGFR 104.8 mL/min/1.73     Narrative:      GFR Normal >60  Chronic Kidney Disease <60  Kidney Failure <15      Magnesium [[id number]]  (Normal) Collected: 03/05/23 0532    Specimen: Blood Updated: 03/05/23 0629     Magnesium 2.0 mg/dL     CBC & Differential [[id number]]  (Abnormal) Collected: 03/05/23 0532    Specimen: Blood Updated: 03/05/23 0614    Narrative:      The following orders were created for panel order CBC & Differential.  Procedure                               Abnormality         Status                     ---------                               -----------         ------                     CBC Auto Differential[[id number]]        Abnormal            Final result                 Please view results for these tests on the individual orders.    CBC Auto Differential [[id number]]  (Abnormal) Collected: 03/05/23 0532    Specimen: Blood Updated: 03/05/23 0614     WBC 20.27 10*3/mm3      RBC 2.75 10*6/mm3      Hemoglobin 9.3 g/dL      Hematocrit 26.9 %      MCV 97.8 fL      MCH 33.8 pg      MCHC 34.6 g/dL      RDW 14.3 %      RDW-SD 50.7 fl      MPV 11.5 fL      Platelets 167 10*3/mm3      Neutrophil % 85.0 %      Lymphocyte % 5.4 %      Monocyte % 6.1 %      Eosinophil % 0.6 %      Basophil % 0.1 %      Immature Grans % 2.8 %      Neutrophils, Absolute 17.22 10*3/mm3      Lymphocytes, Absolute 1.09 10*3/mm3      Monocytes, Absolute 1.24 10*3/mm3       Eosinophils, Absolute 0.13 10*3/mm3      Basophils, Absolute 0.02 10*3/mm3      Immature Grans, Absolute 0.57 10*3/mm3      nRBC 0.0 /100 WBC     Blood Culture - Blood, Wrist, Left [[id number]] Collected: 03/04/23 2043    Specimen: Blood from Wrist, Left Updated: 03/04/23 2108    Blood Culture - Blood, Arm, Left [[id number]]  (Normal) Collected: 03/02/23 1955    Specimen: Blood from Arm, Left Updated: 03/04/23 2016     Blood Culture No growth at 2 days    Blood Culture - Blood, Arm, Right [[id number]] Collected: 03/04/23 1952    Specimen: Blood from Arm, Right Updated: 03/04/23 2014              Assessment & Plan       Paresthesia    Cervical myelopathy (HCC)    Lower extremity weakness    History of blood clots    Chronic anticoagulation    Seizures (HCC)    Leukocytosis    Normocytic anemia    Hyperglycemia    GERD (gastroesophageal reflux disease)    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  She will need a decortication, VATS robotic assisted, however, she was on Eliquis up until today.  I have stopped her Eliquis and we will plan to perform her decortication on Wednesday.  If she worsens between now and then, we will plan a CT-guided chest tube for drainage of her fluid followed by decortication.  This was discussed with her family at bedside.  I discussed the patients findings and our recommendations with patient, family, nursing staff and primary care team    Thank you for this consult and allowing us to participate in the care of your patient.  We will follow along with you during this hospitalization.       Chloe Cedillo MD  [facility]  03/05/23  15:02 EST    Greater than 40 minutes was spent reviewing  the patient's chart, radiographic imaging, labs, provider notes, assessing the patient and developing a plan of care which was discussed with the patient/family and other providers.

## 2023-03-05 NOTE — PROGRESS NOTES
[facility]    Noted transfer to ICU today.  Will continue to monitor medical status and progress with therapies.    Thank you,    Gaviota Haro RN  Acute Inpt Rehab

## 2023-03-05 NOTE — SIGNIFICANT NOTE
03/05/23 1100   Vital Capacity (VC)   Forced Vital Capacity (L)   (pt unable to do at this time, can't follow the directions)

## 2023-03-05 NOTE — PLAN OF CARE
Problem: Adult Inpatient Plan of Care  Goal: Optimal Comfort and Wellbeing  Intervention: Monitor Pain and Promote Comfort  Recent Flowsheet Documentation  Taken 3/4/2023 2000 by Zuleyma Hernandez RN  Pain Management Interventions: see MAR     Problem: Adult Inpatient Plan of Care  Goal: Optimal Comfort and Wellbeing  Intervention: Provide Person-Centered Care  Recent Flowsheet Documentation  Taken 3/4/2023 2000 by Zuleyma Hernandez RN  Trust Relationship/Rapport:   care explained   choices provided   emotional support provided   empathic listening provided   questions answered   Goal Outcome Evaluation:

## 2023-03-05 NOTE — PROGRESS NOTES
Today's preliminary report for bilateral upper extremity venous Doppler. Right arm is positive for acute superficial venous thrombus. Left arm is negative. Report called to floor VIKA Ott.

## 2023-03-06 ENCOUNTER — APPOINTMENT (OUTPATIENT)
Dept: GENERAL RADIOLOGY | Facility: HOSPITAL | Age: 58
DRG: 163 | End: 2023-03-06
Payer: COMMERCIAL

## 2023-03-06 PROBLEM — I33.0 TRICUSPID VALVE VEGETATION: Status: ACTIVE | Noted: 2023-03-06

## 2023-03-06 PROBLEM — J86.9 EMPYEMA OF PLEURA: Status: ACTIVE | Noted: 2023-02-20

## 2023-03-06 LAB
ALBUMIN SERPL-MCNC: 2.1 G/DL (ref 3.5–5.2)
ALBUMIN/GLOB SERPL: 0.5 G/DL
ALP SERPL-CCNC: 202 U/L (ref 39–117)
ALT SERPL W P-5'-P-CCNC: 41 U/L (ref 1–33)
ANION GAP SERPL CALCULATED.3IONS-SCNC: 11.5 MMOL/L (ref 5–15)
APPEARANCE CSF: CLEAR
APPEARANCE CSF: CLEAR
AST SERPL-CCNC: 40 U/L (ref 1–32)
BACTERIA FLD CULT: ABNORMAL
BACTERIA SPEC AEROBE CULT: ABNORMAL
BASOPHILS # BLD AUTO: 0.03 10*3/MM3 (ref 0–0.2)
BASOPHILS NFR BLD AUTO: 0.2 % (ref 0–1.5)
BILIRUB SERPL-MCNC: 1.4 MG/DL (ref 0–1.2)
BUN SERPL-MCNC: 15 MG/DL (ref 6–20)
BUN/CREAT SERPL: 26.3 (ref 7–25)
C GATTII+NEOFOR DNA CSF QL NAA+NON-PROBE: NOT DETECTED
CALCIUM SPEC-SCNC: 8.7 MG/DL (ref 8.6–10.5)
CHLORIDE SERPL-SCNC: 97 MMOL/L (ref 98–107)
CMV DNA CSF QL NAA+PROBE: NOT DETECTED
CO2 SERPL-SCNC: 24.5 MMOL/L (ref 22–29)
COLOR CSF: COLORLESS
COLOR CSF: COLORLESS
CREAT SERPL-MCNC: 0.57 MG/DL (ref 0.57–1)
CRYOGLOB SER QL 1D COLD INC: NORMAL
DEPRECATED RDW RBC AUTO: 48.6 FL (ref 37–54)
E COLI K1 DNA CSF QL NAA+NON-PROBE: NOT DETECTED
EGFRCR SERPLBLD CKD-EPI 2021: 104.8 ML/MIN/1.73
EOSINOPHIL # BLD AUTO: 0.05 10*3/MM3 (ref 0–0.4)
EOSINOPHIL NFR BLD AUTO: 0.3 % (ref 0.3–6.2)
ERYTHROCYTE [DISTWIDTH] IN BLOOD BY AUTOMATED COUNT: 13.8 % (ref 12.3–15.4)
EV RNA CSF QL NAA+PROBE: NOT DETECTED
GLOBULIN UR ELPH-MCNC: 3.9 GM/DL
GLUCOSE BLDC GLUCOMTR-MCNC: 103 MG/DL (ref 70–130)
GLUCOSE CSF-MCNC: 65 MG/DL (ref 40–70)
GLUCOSE SERPL-MCNC: 82 MG/DL (ref 65–99)
GP B STREP DNA SPEC QL NAA+PROBE: NOT DETECTED
GRAM STN SPEC: ABNORMAL
HAEM INFLU SEROTYP DNA SPEC NAA+PROBE: NOT DETECTED
HCT VFR BLD AUTO: 25.2 % (ref 34–46.6)
HGB BLD-MCNC: 9 G/DL (ref 12–15.9)
HHV6 DNA CSF QL NAA+PROBE: NOT DETECTED
HOLD SPECIMEN: NORMAL
HSV1 DNA CSF QL NAA+PROBE: NOT DETECTED
HSV2 DNA CSF QL NAA+PROBE: NOT DETECTED
IMM GRANULOCYTES # BLD AUTO: 0.5 10*3/MM3 (ref 0–0.05)
IMM GRANULOCYTES NFR BLD AUTO: 3 % (ref 0–0.5)
ISOLATED FROM: ABNORMAL
L MONOCYTOG RRNA SPEC QL PROBE: NOT DETECTED
LYMPHOCYTES # BLD AUTO: 1.01 10*3/MM3 (ref 0.7–3.1)
LYMPHOCYTES NFR BLD AUTO: 6.1 % (ref 19.6–45.3)
MAGNESIUM SERPL-MCNC: 1.7 MG/DL (ref 1.6–2.6)
MCH RBC QN AUTO: 34.7 PG (ref 26.6–33)
MCHC RBC AUTO-ENTMCNC: 35.7 G/DL (ref 31.5–35.7)
MCV RBC AUTO: 97.3 FL (ref 79–97)
METHOD: ABNORMAL
METHOD: ABNORMAL
MONOCYTES # BLD AUTO: 0.97 10*3/MM3 (ref 0.1–0.9)
MONOCYTES NFR BLD AUTO: 5.8 % (ref 5–12)
N MEN DNA SPEC QL NAA+PROBE: NOT DETECTED
NEUTROPHILS NFR BLD AUTO: 14.04 10*3/MM3 (ref 1.7–7)
NEUTROPHILS NFR BLD AUTO: 84.6 % (ref 42.7–76)
NRBC BLD AUTO-RTO: 0.1 /100 WBC (ref 0–0.2)
NUC CELL # CSF MANUAL: 0 /MM3 (ref 0–5)
NUC CELL # CSF MANUAL: 1 /MM3 (ref 0–5)
PARECHOVIRUS A RNA CSF QL NAA+NON-PROBE: NOT DETECTED
PHOSPHATE SERPL-MCNC: 3.8 MG/DL (ref 2.5–4.5)
PLATELET # BLD AUTO: 173 10*3/MM3 (ref 140–450)
PMV BLD AUTO: 11.6 FL (ref 6–12)
POTASSIUM SERPL-SCNC: 3.9 MMOL/L (ref 3.5–5.2)
PROT CSF-MCNC: 23.4 MG/DL (ref 15–45)
PROT SERPL-MCNC: 6 G/DL (ref 6–8.5)
RBC # BLD AUTO: 2.59 10*6/MM3 (ref 3.77–5.28)
RBC # CSF MANUAL: 3 /MM3 (ref 0–0)
RBC # CSF MANUAL: 4 /MM3 (ref 0–0)
S PNEUM DNA CSF QL NAA+NON-PROBE: NOT DETECTED
SODIUM SERPL-SCNC: 133 MMOL/L (ref 136–145)
TUBE # CSF: 1
TUBE # CSF: 4
VZV DNA CSF QL NAA+PROBE: NOT DETECTED
WBC NRBC COR # BLD: 16.6 10*3/MM3 (ref 3.4–10.8)

## 2023-03-06 PROCEDURE — 83873 ASSAY OF CSF PROTEIN: CPT | Performed by: PSYCHIATRY & NEUROLOGY

## 2023-03-06 PROCEDURE — 82784 ASSAY IGA/IGD/IGG/IGM EACH: CPT | Performed by: PSYCHIATRY & NEUROLOGY

## 2023-03-06 PROCEDURE — 99222 1ST HOSP IP/OBS MODERATE 55: CPT | Performed by: INTERNAL MEDICINE

## 2023-03-06 PROCEDURE — 97110 THERAPEUTIC EXERCISES: CPT

## 2023-03-06 PROCEDURE — 25010000002 LEVOFLOXACIN PER 250 MG: Performed by: INTERNAL MEDICINE

## 2023-03-06 PROCEDURE — 82962 GLUCOSE BLOOD TEST: CPT

## 2023-03-06 PROCEDURE — 84157 ASSAY OF PROTEIN OTHER: CPT | Performed by: PSYCHIATRY & NEUROLOGY

## 2023-03-06 PROCEDURE — 94799 UNLISTED PULMONARY SVC/PX: CPT

## 2023-03-06 PROCEDURE — 89050 BODY FLUID CELL COUNT: CPT | Performed by: PSYCHIATRY & NEUROLOGY

## 2023-03-06 PROCEDURE — 87070 CULTURE OTHR SPECIMN AEROBIC: CPT | Performed by: PSYCHIATRY & NEUROLOGY

## 2023-03-06 PROCEDURE — 83916 OLIGOCLONAL BANDS: CPT | Performed by: PSYCHIATRY & NEUROLOGY

## 2023-03-06 PROCEDURE — 25010000002 VANCOMYCIN PER 500 MG: Performed by: NURSE PRACTITIONER

## 2023-03-06 PROCEDURE — 94761 N-INVAS EAR/PLS OXIMETRY MLT: CPT

## 2023-03-06 PROCEDURE — 80053 COMPREHEN METABOLIC PANEL: CPT | Performed by: STUDENT IN AN ORGANIZED HEALTH CARE EDUCATION/TRAINING PROGRAM

## 2023-03-06 PROCEDURE — 92526 ORAL FUNCTION THERAPY: CPT

## 2023-03-06 PROCEDURE — 87205 SMEAR GRAM STAIN: CPT | Performed by: PSYCHIATRY & NEUROLOGY

## 2023-03-06 PROCEDURE — 85025 COMPLETE CBC W/AUTO DIFF WBC: CPT | Performed by: STUDENT IN AN ORGANIZED HEALTH CARE EDUCATION/TRAINING PROGRAM

## 2023-03-06 PROCEDURE — 0 LIDOCAINE 1 % SOLUTION: Performed by: INTERNAL MEDICINE

## 2023-03-06 PROCEDURE — 99232 SBSQ HOSP IP/OBS MODERATE 35: CPT | Performed by: NURSE PRACTITIONER

## 2023-03-06 PROCEDURE — 82040 ASSAY OF SERUM ALBUMIN: CPT | Performed by: PSYCHIATRY & NEUROLOGY

## 2023-03-06 PROCEDURE — 25010000002 CYANOCOBALAMIN PER 1000 MCG: Performed by: NURSE PRACTITIONER

## 2023-03-06 PROCEDURE — 82042 OTHER SOURCE ALBUMIN QUAN EA: CPT | Performed by: PSYCHIATRY & NEUROLOGY

## 2023-03-06 PROCEDURE — 94760 N-INVAS EAR/PLS OXIMETRY 1: CPT

## 2023-03-06 PROCEDURE — 87015 SPECIMEN INFECT AGNT CONCNTJ: CPT | Performed by: PSYCHIATRY & NEUROLOGY

## 2023-03-06 PROCEDURE — 82945 GLUCOSE OTHER FLUID: CPT | Performed by: PSYCHIATRY & NEUROLOGY

## 2023-03-06 PROCEDURE — 84100 ASSAY OF PHOSPHORUS: CPT | Performed by: STUDENT IN AN ORGANIZED HEALTH CARE EDUCATION/TRAINING PROGRAM

## 2023-03-06 PROCEDURE — 87483 CNS DNA AMP PROBE TYPE 12-25: CPT | Performed by: PSYCHIATRY & NEUROLOGY

## 2023-03-06 PROCEDURE — 99232 SBSQ HOSP IP/OBS MODERATE 35: CPT | Performed by: PSYCHIATRY & NEUROLOGY

## 2023-03-06 PROCEDURE — 83735 ASSAY OF MAGNESIUM: CPT | Performed by: STUDENT IN AN ORGANIZED HEALTH CARE EDUCATION/TRAINING PROGRAM

## 2023-03-06 PROCEDURE — 0W993ZX DRAINAGE OF RIGHT PLEURAL CAVITY, PERCUTANEOUS APPROACH, DIAGNOSTIC: ICD-10-PCS | Performed by: RADIOLOGY

## 2023-03-06 RX ORDER — LIDOCAINE HYDROCHLORIDE 10 MG/ML
10 INJECTION, SOLUTION INFILTRATION; PERINEURAL ONCE
Status: COMPLETED | OUTPATIENT
Start: 2023-03-06 | End: 2023-03-06

## 2023-03-06 RX ORDER — PANTOPRAZOLE SODIUM 40 MG/10ML
40 INJECTION, POWDER, LYOPHILIZED, FOR SOLUTION INTRAVENOUS
Status: DISCONTINUED | OUTPATIENT
Start: 2023-03-07 | End: 2023-03-10

## 2023-03-06 RX ORDER — LIDOCAINE HYDROCHLORIDE 10 MG/ML
10 INJECTION, SOLUTION INFILTRATION; PERINEURAL ONCE
Status: DISCONTINUED | OUTPATIENT
Start: 2023-03-06 | End: 2023-03-17

## 2023-03-06 RX ADMIN — LEVOFLOXACIN 750 MG: 5 INJECTION, SOLUTION INTRAVENOUS at 22:02

## 2023-03-06 RX ADMIN — VANCOMYCIN HYDROCHLORIDE 1000 MG: 1 INJECTION, SOLUTION INTRAVENOUS at 00:30

## 2023-03-06 RX ADMIN — DESVENLAFAXINE SUCCINATE 25 MG: 25 TABLET, EXTENDED RELEASE ORAL at 11:15

## 2023-03-06 RX ADMIN — CYANOCOBALAMIN 1000 MCG: 1000 INJECTION, SOLUTION INTRAMUSCULAR; SUBCUTANEOUS at 08:33

## 2023-03-06 RX ADMIN — FOLIC ACID 1 MG: 1 TABLET ORAL at 11:16

## 2023-03-06 RX ADMIN — NYSTATIN 500000 UNITS: 100000 SUSPENSION ORAL at 21:04

## 2023-03-06 RX ADMIN — POLYETHYLENE GLYCOL 3350 17 G: 17 POWDER, FOR SOLUTION ORAL at 21:03

## 2023-03-06 RX ADMIN — LIDOCAINE 1 PATCH: 700 PATCH TOPICAL at 08:33

## 2023-03-06 RX ADMIN — DOCUSATE SODIUM 50MG AND SENNOSIDES 8.6MG 2 TABLET: 8.6; 5 TABLET, FILM COATED ORAL at 21:03

## 2023-03-06 RX ADMIN — LAMOTRIGINE 200 MG: 100 TABLET ORAL at 11:16

## 2023-03-06 RX ADMIN — IPRATROPIUM BROMIDE AND ALBUTEROL SULFATE 3 ML: 2.5; .5 SOLUTION RESPIRATORY (INHALATION) at 19:20

## 2023-03-06 RX ADMIN — IPRATROPIUM BROMIDE AND ALBUTEROL SULFATE 3 ML: 2.5; .5 SOLUTION RESPIRATORY (INHALATION) at 14:18

## 2023-03-06 RX ADMIN — Medication 10 ML: at 11:17

## 2023-03-06 RX ADMIN — GABAPENTIN 100 MG: 100 CAPSULE ORAL at 11:16

## 2023-03-06 RX ADMIN — VANCOMYCIN HYDROCHLORIDE 1000 MG: 1 INJECTION, SOLUTION INTRAVENOUS at 11:18

## 2023-03-06 RX ADMIN — LACTULOSE 20 G: 20 SOLUTION ORAL at 11:14

## 2023-03-06 RX ADMIN — LIDOCAINE 1 PATCH: 700 PATCH TOPICAL at 08:34

## 2023-03-06 RX ADMIN — LIDOCAINE HYDROCHLORIDE 1 ML: 10 INJECTION, SOLUTION INFILTRATION; PERINEURAL at 15:24

## 2023-03-06 RX ADMIN — PANTOPRAZOLE SODIUM 40 MG: 40 INJECTION, POWDER, FOR SOLUTION INTRAVENOUS at 08:33

## 2023-03-06 RX ADMIN — NYSTATIN 500000 UNITS: 100000 SUSPENSION ORAL at 18:41

## 2023-03-06 RX ADMIN — NYSTATIN 500000 UNITS: 100000 SUSPENSION ORAL at 11:17

## 2023-03-06 RX ADMIN — DOCUSATE SODIUM 50MG AND SENNOSIDES 8.6MG 2 TABLET: 8.6; 5 TABLET, FILM COATED ORAL at 11:16

## 2023-03-06 RX ADMIN — IPRATROPIUM BROMIDE AND ALBUTEROL SULFATE 3 ML: 2.5; .5 SOLUTION RESPIRATORY (INHALATION) at 10:11

## 2023-03-06 RX ADMIN — GABAPENTIN 100 MG: 100 CAPSULE ORAL at 21:03

## 2023-03-06 RX ADMIN — Medication 100 MG: at 13:07

## 2023-03-06 RX ADMIN — Medication 10 ML: at 21:04

## 2023-03-06 RX ADMIN — LUBIPROSTONE 24 MCG: 24 CAPSULE, GELATIN COATED ORAL at 11:16

## 2023-03-06 RX ADMIN — POLYETHYLENE GLYCOL 3350 17 G: 17 POWDER, FOR SOLUTION ORAL at 11:17

## 2023-03-06 RX ADMIN — HYDROCODONE BITARTRATE AND ACETAMINOPHEN 2 TABLET: 5; 325 TABLET ORAL at 13:30

## 2023-03-06 RX ADMIN — PANTOPRAZOLE SODIUM 40 MG: 40 INJECTION, POWDER, FOR SOLUTION INTRAVENOUS at 00:31

## 2023-03-06 NOTE — PLAN OF CARE
Goal Outcome Evaluation:  Plan of Care Reviewed With: patient           Outcome Evaluation: Pt is now in ICU for closer observation, she is lethargic and very weak, pt declined sitting up in bed, perf RAJEEV Hankins, only able to perform AP and glut sets on her own, PT will cont to follow, VATS planned for Wednesday

## 2023-03-06 NOTE — PROGRESS NOTES
"Nutrition Services    Patient Name:  Louise GARCIA  YOB: 1965  MRN: [id number]  Admit Date:  2/20/2023    FOLLOW UP - CLINICAL NUTRITION    Assessment Date:  03/06/23    Encounter Information         Reason for Encounter Follow Up    Current Issues Pt moved to ICU on 3/5 d/t AMS. Pt is A&O x 4 today. CT surgery following d/t empyema, with plan for VATS, tentative plan for OR Wednesday. Pt is currently NPO with meds in puree and free water protocol. Spoke with SLP today who reports plan for f/u FEES study this afternoon or tomorrow morning, states hopeful for pt to pass for PO diet.      Current Nutrition Orders & Evaluation of Intake       Oral Nutrition     Current PO Diet NPO Diet NPO Type: Other (see comments), Sips with Meds   Supplement n/a   PO Evaluation     % PO Intake     # of Days Evaluated     Factors Affecting Intake  swallow impairment   --  Anthropometrics          Height    Weight Height: 157.5 cm (62\")  Weight: 56.7 kg (125 lb) (03/06/23 0600)    BMI kg/m2 Body mass index is 22.86 kg/m².    Weight trend Stable     Labs        Pertinent Labs Reviewed, listed below     Results from last 7 days   Lab Units 03/06/23  0719 03/05/23 0532 03/04/23  0522   SODIUM mmol/L 133* 134* 134*   POTASSIUM mmol/L 3.9 4.2 4.5   CHLORIDE mmol/L 97* 99 101   CO2 mmol/L 24.5 26.0 25.8   BUN mg/dL 15 19 29*   CREATININE mg/dL 0.57 0.57 0.64   CALCIUM mg/dL 8.7 9.0 8.9   BILIRUBIN mg/dL 1.4* 1.4* 0.8   ALK PHOS U/L 202* 213* 161*   ALT (SGPT) U/L 41* 55* 42*   AST (SGOT) U/L 40* 80* 44*   GLUCOSE mg/dL 82 79 82     Results from last 7 days   Lab Units 03/06/23 0719 03/05/23  0532 03/04/23  0522   MAGNESIUM mg/dL 1.7 2.0 2.3   PHOSPHORUS mg/dL 3.8 4.0 3.2   HEMOGLOBIN g/dL 9.0* 9.3* 8.6*   HEMATOCRIT % 25.2* 26.9* 25.1*   WBC 10*3/mm3 16.60* 20.27* 23.89*   ALBUMIN g/dL 2.1* 2.5* 2.3*     Results from last 7 days   Lab Units 03/06/23  0719 03/05/23  0532 03/04/23  0522 03/03/23  0159 03/02/23  0633 "   PLATELETS 10*3/mm3 173 167 176 155 168     COVID19   Date Value Ref Range Status   03/03/2023 Not Detected Not Detected - Ref. Range Final     Lab Results   Component Value Date    HGBA1C 5.10 02/20/2023          Medications            Scheduled Medications bisacodyl, 10 mg, Rectal, Daily  desvenlafaxine, 25 mg, Oral, Daily  folic acid, 1 mg, Oral, Daily  gabapentin, 100 mg, Oral, Q12H  ipratropium-albuterol, 3 mL, Nebulization, 4x Daily - RT  lactulose, 20 g, Oral, BID  lamoTRIgine, 200 mg, Oral, Daily  levoFLOXacin, 750 mg, Intravenous, Q24H  lidocaine, 1 patch, Transdermal, Q24H  lidocaine, 1 patch, Transdermal, Q24H  lidocaine, 10 mL, Intradermal, Once  lubiprostone, 24 mcg, Oral, Daily With Breakfast  nystatin, 5 mL, Swish & Swallow, 4x Daily  pantoprazole, 40 mg, Intravenous, Q12H  polyethylene glycol, 17 g, Oral, BID  senna-docusate sodium, 2 tablet, Oral, BID  sodium chloride, 10 mL, Intravenous, Q12H  thiamine, 100 mg, Oral, Daily  vancomycin, 1,000 mg, Intravenous, Q12H  [START ON 3/7/2023] vitamin B-12, 500 mcg, Oral, Daily        Infusions hold, 1 each  hold, 1 each  Pharmacy to dose vancomycin,         PRN Medications •  diphenhydrAMINE  •  famotidine  •  hold  •  hold  •  HYDROcodone-acetaminophen  •  HYDROmorphone  •  LORazepam  •  magnesium sulfate **OR** magnesium sulfate **OR** magnesium sulfate  •  melatonin  •  Pharmacy to dose vancomycin  •  potassium & sodium phosphates **OR** potassium & sodium phosphates  •  potassium chloride **OR** potassium chloride **OR** potassium chloride  •  [COMPLETED] Insert Peripheral IV **AND** sodium chloride  •  sodium chloride  •  sodium chloride     Physical Findings          Physical Appearance alert, oriented   Oral/Mouth Cavity dentures   Edema  no edema   Gastrointestinal last bowel movement: 3/6   Skin  skin intact   Tubes/Drains none   NFPE Not applicable at this time   --  NUTRITION INTERVENTION / PLAN OF CARE  Intervention Goal         Intervention  Goal(s) Initiate feeding/diet and Maintain weight     Nutrition Intervention         RD Action Follow Tx Progress     Nutrition Prescription         Diet Prescription     Supplement Prescription n/a   EN/PN Prescription    New Prescription Ordered? No changes at this time   --  Monitor/Evaluation        Monitor Per protocol   Discharge Needs No discharge needs identified at this time   Education Will instruct as appropriate   --    RD to follow up per protocol.    Electronically signed by:  Madie Aldana RD  03/06/23 15:35 EST

## 2023-03-06 NOTE — NURSING NOTE
Pt has very up and down assessments.  She has gotten clearer as the shift when on.   During conversation she would be clear then say something that didn't make sense.  Her spouse was at bedside and says she has been this way during this admission.  She accidently pulled out her IV turning in bed.  This caused  meds to be off , pharmacy to re-schedule doses.

## 2023-03-06 NOTE — H&P (VIEW-ONLY)
"DOS: 3/6/2023  NAME: Louise GARCIA   : 1965  PCP: Chloe Schaefer APRN  Chief Complaint   Patient presents with   • Fall   • Numbness     Numbness in bilateral arms and legs post falling.        Chief complaint: Weakness  Subjective: Patient is new to me today.  This is a 58-year-old female who was originally seen by our service when she was admitted in late February.  She presented with generalized numbness and weakness with associated ataxia and paraparesis.  My colleague Dr. Arce initially suspected a myelopathy.  Imaging of the total neuraxis including MRI brain, C, T and L-spine were done and were negative for any evidence of demyelinating lesions or spinal stenosis.  Initial CSF did not show any albumin no cytologic dissociation.  Last week care was assumed by Dr. Jerry.  He arranged for an EMG which was done by Dr. Reyes.  He reported no definite evidence of acute inflammatory demyelinating neuropathy.  He and Dr. Carrillo discussed the case further and agreedh to treat her presumptively with IVIG.  Patient declined medically and went to the ICU for respiratory failure in the setting of empyema.  She is pending additional surgery for that.  She reports stable paraparesis and numbness without much change since receiving IVIG last week.    Objective:  Vital signs: /83   Pulse 110   Temp 98.7 °F (37.1 °C) (Oral)   Resp 20   Ht 157.5 cm (62\")   Wt 56.7 kg (125 lb)   SpO2 94%   Breastfeeding No   BMI 22.86 kg/m²    Gen: NAD, vitals reviewed  MS: oriented x3, recent/remote memory intact, normal attention/concentration, language intact, no neglect.  CN: visual acuity grossly normal, PERRL, EOMI, no facial droop, no dysarthria  Motor: 5/5 bilateral upper extremities, 4+/5 bilateral lower extremities, weaker proximal than distal  Sensory: intact to cold temperature and vibration all 4 extremities  Reflexes: Absent throughout, plantars downgoing, no ankle clonus    Laboratory results:  Lab Results "   Component Value Date    GLUCOSE 82 03/06/2023    CALCIUM 8.7 03/06/2023     (L) 03/06/2023    K 3.9 03/06/2023    CO2 24.5 03/06/2023    CL 97 (L) 03/06/2023    BUN 15 03/06/2023    CREATININE 0.57 03/06/2023    EGFRIFAFRI  08/29/2016      Comment:      <15 Indicative of kidney failure.    EGFRIFNONA 81 08/29/2016    BCR 26.3 (H) 03/06/2023    ANIONGAP 11.5 03/06/2023     Lab Results   Component Value Date    WBC 16.60 (H) 03/06/2023    HGB 9.0 (L) 03/06/2023    HCT 25.2 (L) 03/06/2023    MCV 97.3 (H) 03/06/2023     03/06/2023     No results found for: LDL         Review of labs: Sodium 133, WBC 17, hemoglobin 9.0.    Initial CSF 2/24 protein 38 total nucleated cells 0.  Aqua porin 4 negative, DENISE negative, heavy metal screen negative, ACE negative, ESR normal, CK normal, SPEP normal    Repeat CSF studies ordered including cell count and protein    Review and interpretation of imaging: I personally reviewed her MRI brain, C, T, L-spine performed shortly after her initial admission.  The studies are essentially unremarkable with no evidence of demyelinating disease or spinal stenosis appreciated.  Radiology reports reviewed.    Diagnoses:  Paraparesis  Lower extremity numbness  Areflexia  Empyema of the lung  Respiratory failure    Comment: Patient new to me today.  She presented to the hospital with an unidentified neurologic illness consisting of ataxia, paraparesis and lower extremity numbness.  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  The work-up has been equivocal so far but she was treated empirically with IVIG.  Neurologic exam is significant mainly for areflexia with mild to moderate lower extremity weakness.  I agree with the working diagnosis of GBS.  I will try to repeat her lumbar puncture this week to look for any development of albuminocytologic dissociation which would seal the diagnosis at this point.  She has been treated with IVIG and would be expected to improve gradually  over the next several weeks to months.    Plan:  1.  Continue supportive care  2.  Repeat CSF studies when able  3.  Completed IVIG    Discussed with Dr. Cruz    Total visit time 45 minutes including chart/imaging review, patient assessment, care coordination, patient/family education.

## 2023-03-06 NOTE — PROGRESS NOTES
"    Chief Complaint: Empyema, follow-up    Subjective:  Symptoms:  Stable.  She reports shortness of breath and chest pain.    Diet:  NPO.  No nausea or vomiting.    Activity level: Impaired due to weakness.    Pain:  She complains of pain that is mild.        Vital Signs:  Temp:  [98.6 °F (37 °C)-99 °F (37.2 °C)] 99 °F (37.2 °C)  Heart Rate:  [] 95  Resp:  [20] 20  BP: (121-164)/() 130/73    Intake & Output (last day)       03/05 0701  03/06 0700 03/06 0701  03/07 0700    I.V. (mL/kg) 60 (1.1)     IV Piggyback 350     Total Intake(mL/kg) 410 (7.2)     Urine (mL/kg/hr) 575 (0.4)     Other      Total Output 575     Net -165           Urine Unmeasured Occurrence 1 x           Objective:  General Appearance:  Comfortable, ill-appearing and in no acute distress.    Vital signs: (most recent): Blood pressure 130/73, pulse 95, temperature 99 °F (37.2 °C), temperature source Oral, resp. rate 20, height 157.5 cm (62\"), weight 56.7 kg (125 lb), SpO2 95 %, not currently breastfeeding.  Vital signs are normal.  No fever.    Lungs:  Normal effort and normal respiratory rate.  There are decreased breath sounds.  No wheezes or rhonchi.    Heart: Normal rate.  Regular rhythm.    Chest: No chest wall tenderness.    Abdomen: Bowel sounds are normal.   There is no mass.   Pulses: Distal pulses are intact.    Neurological: Patient is alert and oriented to person, place and time.    Skin:  Warm and dry.          Results Review:     I reviewed the patient's new clinical results.  I reviewed the patient's new imaging results and agree with the interpretation.  I reviewed the patient's other test results and agree with the interpretation  Discussed with patient, RN and Dr. Allison.    Imaging Results (Last 24 Hours)     ** No results found for the last 24 hours. **          Lab Results:     Lab Results (last 24 hours)     Procedure Component Value Units Date/Time    Body Fluid Culture - Body Fluid, Pleural Cavity [[id number]]  " (Abnormal)  (Susceptibility) Collected: 03/04/23 0900    Specimen: Body Fluid from Pleural Cavity Updated: 03/06/23 1124     Body Fluid Culture Light growth (2+) Staphylococcus aureus, MRSA     Comment:   Methicillin resistant Staphylococcus aureus, Patient may be an isolation risk.        Gram Stain Rare (1+) WBCs per low power field      No organisms seen    Susceptibility      Staphylococcus aureus, MRSA      YAKOV      Gentamicin Susceptible      Oxacillin Resistant     Rifampin Susceptible      Vancomycin Susceptible                       Susceptibility Comments     Staphylococcus aureus, MRSA    This isolate does not demonstrate inducible clindamycin resistance in vitro.               POC Glucose Once [[id number]]  (Normal) Collected: 03/06/23 1114    Specimen: Blood Updated: 03/06/23 1116     Glucose 103 mg/dL      Comment: Meter: [id number] : [id number] Graham Aryeon NA       Non-gynecologic Cytology [[id number]] Collected: 03/04/23 0900    Specimen: Pleural Fluid from Pleural Cavity Updated: 03/06/23 1015    Comprehensive Metabolic Panel [[id number]]  (Abnormal) Collected: 03/06/23 0719    Specimen: Blood Updated: 03/06/23 0830     Glucose 82 mg/dL      BUN 15 mg/dL      Creatinine 0.57 mg/dL      Sodium 133 mmol/L      Potassium 3.9 mmol/L      Chloride 97 mmol/L      CO2 24.5 mmol/L      Calcium 8.7 mg/dL      Total Protein 6.0 g/dL      Albumin 2.1 g/dL      ALT (SGPT) 41 U/L      AST (SGOT) 40 U/L      Alkaline Phosphatase 202 U/L      Total Bilirubin 1.4 mg/dL      Globulin 3.9 gm/dL      A/G Ratio 0.5 g/dL      BUN/Creatinine Ratio 26.3     Anion Gap 11.5 mmol/L      eGFR 104.8 mL/min/1.73     Narrative:      GFR Normal >60  Chronic Kidney Disease <60  Kidney Failure <15      Phosphorus [[id number]]  (Normal) Collected: 03/06/23 0719    Specimen: Blood Updated: 03/06/23 0830     Phosphorus 3.8 mg/dL     Magnesium [[id number]]  (Normal) Collected: 03/06/23 0719    Specimen: Blood Updated: 03/06/23 0830      Magnesium 1.7 mg/dL     CBC & Differential [[id number]]  (Abnormal) Collected: 03/06/23 0719    Specimen: Blood Updated: 03/06/23 0816    Narrative:      The following orders were created for panel order CBC & Differential.  Procedure                               Abnormality         Status                     ---------                               -----------         ------                     CBC Auto Differential[[id number]]        Abnormal            Final result                 Please view results for these tests on the individual orders.    CBC Auto Differential [[id number]]  (Abnormal) Collected: 03/06/23 0719    Specimen: Blood Updated: 03/06/23 0816     WBC 16.60 10*3/mm3      RBC 2.59 10*6/mm3      Hemoglobin 9.0 g/dL      Hematocrit 25.2 %      MCV 97.3 fL      MCH 34.7 pg      MCHC 35.7 g/dL      RDW 13.8 %      RDW-SD 48.6 fl      MPV 11.6 fL      Platelets 173 10*3/mm3      Neutrophil % 84.6 %      Lymphocyte % 6.1 %      Monocyte % 5.8 %      Eosinophil % 0.3 %      Basophil % 0.2 %      Immature Grans % 3.0 %      Neutrophils, Absolute 14.04 10*3/mm3      Lymphocytes, Absolute 1.01 10*3/mm3      Monocytes, Absolute 0.97 10*3/mm3      Eosinophils, Absolute 0.05 10*3/mm3      Basophils, Absolute 0.03 10*3/mm3      Immature Grans, Absolute 0.50 10*3/mm3      nRBC 0.1 /100 WBC     Cryoglobulin [[id number]] Collected: 02/28/23 1450    Specimen: Blood Updated: 03/06/23 0808     Cryoglobulin, Ql, Serum, Rflx Comment     Comment: None Detected at 72 hours  This test was developed and its performance characteristics  determined by [facility]. It has not been cleared or approved  by the Food and Drug Administration.       Narrative:      Performed at:  [address]  [id number]  : Devon Madden PhD, Phone:  [phone]    Blood Culture - Blood, Arm, Left [[id number]]  (Abnormal)  (Susceptibility) Collected: 03/02/23 1955    Specimen: Blood from Arm, Left Updated:  03/06/23 0622     Blood Culture Staphylococcus aureus, MRSA     Comment:   Infectious disease consultation is highly recommended to rule out distant foci of infection.  Methicillin resistant Staphylococcus aureus, Patient may be an isolation risk.        Isolated from Aerobic Bottle     Gram Stain Aerobic Bottle Gram positive cocci in clusters    Susceptibility      Staphylococcus aureus, MRSA      YAKOV      Gentamicin Susceptible      Oxacillin Resistant     Rifampin Susceptible      Vancomycin Susceptible                       Susceptibility Comments     Staphylococcus aureus, MRSA    This isolate does not demonstrate inducible clindamycin resistance in vitro.               Vancomycin, Trough [[id number]]  (Normal) Collected: 03/05/23 2210    Specimen: Blood Updated: 03/05/23 2308     Vancomycin Trough 13.60 mcg/mL     Narrative:      Therapeutic Ranges for Vancomycin    Vancomycin Random   5.0-40.0 mcg/mL  Vancomycin Trough   5.0-20.0 mcg/mL  Vancomycin Peak     20.0-40.0 mcg/mL    Blood Culture - Blood, Wrist, Left [[id number]]  (Normal) Collected: 03/04/23 2043    Specimen: Blood from Wrist, Left Updated: 03/05/23 2115     Blood Culture No growth at 24 hours    Blood Culture - Blood, Arm, Right [[id number]]  (Normal) Collected: 03/04/23 1952    Specimen: Blood from Arm, Right Updated: 03/05/23 2016     Blood Culture No growth at 24 hours    Blood Culture - Blood, Arm, Left [[id number]]  (Normal) Collected: 03/02/23 1955    Specimen: Blood from Arm, Left Updated: 03/05/23 2016     Blood Culture No growth at 3 days           Assessment & Plan       Paresthesia    Cervical myelopathy (HCC)    Lower extremity weakness    History of blood clots    Chronic anticoagulation    Seizures (HCC)    Leukocytosis    Normocytic anemia    Hyperglycemia    GERD (gastroesophageal reflux disease)    Guillain-Cecil syndrome (HCC)    Situational mixed anxiety and depressive disorder    Upper abdominal pain    Mass of middle lobe of  right lung    Oral candidiasis    Empyema of pleura (HCC)       Assessment & Plan     Patient is new to me today.    I reviewed her chart and radiographic imaging in detail.  She appears to have a right pleural effusion with compressive atelectasis.  There is an abnormal parenchymal opacity in the right middle lobe with an area suggesting cavitation concerning for infectious process.  She underwent thoracentesis on 3/4/2023 with only 50 cc of fluid drained from her right chest and fluid returning positive for MRSA.    Empyema: Patient will require VATS with decortication.  She was continued on Eliquis during her hospitalization which has since been held.  We will need this to be held for 3 days prior to surgical intervention so tentative plan is for OR on Wednesday.  Preoperative orders will be placed tomorrow.  Discussed plan in detail with the patient and her spouse who is at bedside.  All of their questions have been answered to their satisfaction and they are agreeable to proceeding with surgery on Wednesday.    Dysphagia: Patient underwent video swallow earlier during admission.  Plan is to proceed with FEES further diet recommendations.  She is presently n.p.o. with meds in purée and free water protocol with ice.    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  Neurology is following.  Plan for repeat LP today.  She has been treated with IVIG.  Gradual improvement over the next several weeks to months is expected.    KASIA Boyd  [facility]  03/06/23  13:51 EST    Greater than 37 minutes was spent reviewing the patient's chart, radiographic imaging, labs, provider notes, assessing the patient and developing a plan of care.  This was discussed with the patient and RN.

## 2023-03-06 NOTE — PROGRESS NOTES
[facility]  [phone]  Dr. Eliezer Cruz    Subjective:  LOS: 11    Chief Complaint:  AMS and Empyema    She feels better. Her chest is not longer hurting. Remains somewhat weak.    Objective   Vital Signs past 24hrs    Temp range: Temp (24hrs), Av.7 °F (37.1 °C), Min:98.6 °F (37 °C), Max:99 °F (37.2 °C)    BP range: BP: (121-164)/() 130/73  Pulse range: Heart Rate:  [] 95  Resp rate range: Resp:  [20] 20  Device (Oxygen Therapy): nasal cannulaFlow (L/min):  [3-4] 3  Oxygen range:SpO2:  [90 %-97 %] 95 %       Physical Exam  Cardiovascular:      Rate and Rhythm: Normal rate and regular rhythm.      Heart sounds: No murmur heard.  Pulmonary:      Breath sounds: Rales (right side) present.   Abdominal:      General: Bowel sounds are normal.      Palpations: Abdomen is soft. There is no mass.      Tenderness: There is no abdominal tenderness.   Musculoskeletal:         General: No swelling.   Neurological:      Mental Status: She is lethargic.       Results Review:    I have reviewed the laboratory and imaging data since the last note by [facility] physician.  My annotations are noted in assessment and plan.      Result Review:  I have personally reviewed the results from last note by [facility] physician to 3/6/2023 12:23 EST and agree with these findings:  [x]  Laboratory list / accordion  [x]  Microbiology  [x]  Radiology  [x]  EKG/Telemetry   []  Cardiology/Vascular   []  Pathology  []  Old records  []  Other:    Medication Review:  I have reviewed the current MAR.  My annotations are noted in assessment and plan.    bisacodyl, 10 mg, Rectal, Daily  desvenlafaxine, 25 mg, Oral, Daily  folic acid, 1 mg, Oral, Daily  gabapentin, 100 mg, Oral, Q12H  ipratropium-albuterol, 3 mL, Nebulization, 4x Daily - RT  lactulose, 20 g, Oral, BID  lamoTRIgine, 200 mg, Oral, Daily  levoFLOXacin, 750 mg, Intravenous, Q24H  lidocaine, 1 patch, Transdermal, Q24H  lidocaine, 1 patch, Transdermal, Q24H  lubiprostone,  24 mcg, Oral, Daily With Breakfast  nystatin, 5 mL, Swish & Swallow, 4x Daily  pantoprazole, 40 mg, Intravenous, Q12H  polyethylene glycol, 17 g, Oral, BID  senna-docusate sodium, 2 tablet, Oral, BID  sodium chloride, 10 mL, Intravenous, Q12H  vancomycin, 1,000 mg, Intravenous, Q12H  [START ON 3/7/2023] vitamin B-12, 500 mcg, Oral, Daily        hold, 1 each  hold, 1 each  Pharmacy to dose vancomycin,       Lines, Drains & Airways     Active LDAs     Name Placement date Placement time Site Days    Peripheral IV 03/03/23 0815 Anterior;Distal;Right;Upper Arm 03/03/23  0815  Arm  3    Peripheral IV 03/06/23 0520 Left;Anterior Wrist 03/06/23  0520  Wrist  less than 1    External Urinary Catheter --  --  --  --              Contact  Diet Orders (active) (From admission, onward)     Start     Ordered    03/06/23 1043  NPO Diet NPO Type: Other (see comments), Sips with Meds  Diet Effective Now        Comments: Meds whole or crushed in puree. Can allow free water protocol with ice.    03/06/23 1043              PCCM Problems  Paraparesis and areflexia - unexplained  MRSA pneumonia/empyema  Elevated liver enz  Anemia  Dysphagia  Hx DVT, was on Eliquis  Osteonecrosis of femoral heads    THESE ARE NEW MEDICAL PROBLEMS TO ME.    Plan of Treatment    Discussed with neurology. Pending further investigation of ? Axonal neuropathy. Already on FA and Vitamin B12. Add Thiamine.    MRSA pneumonia and empyema - on abx. Plan VATS on Wednesday. (was on Eliquis).    Elevated liver enz of uncertain etiology. Mild.     Note speech input - proceed with FEES and decide on diet.    Hx DVT and Eliquis on hold.    Keep ICU for now.      Eliezer Cruz MD  03/06/23  12:23 EST      Part of this note may be an electronic transcription/translation of spoken language to printed text using the Dragon Dictation System.

## 2023-03-06 NOTE — THERAPY TREATMENT NOTE
Patient Name: Louise GARCIA  : 1965    MRN: [id number]                              Today's Date: 3/6/2023       Admit Date: 2023    Visit Dx:     ICD-10-CM ICD-9-CM   1. Paresthesia  R20.2 782.0   2. Gait instability  R26.81 781.2   3. Extremity numbness  R20.0 782.0   4. 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)   • Situational mixed anxiety and depressive disorder   • Upper abdominal pain   • Mass of middle lobe of right lung   • Oral candidiasis   • Empyema of pleura (HCC)     Past Medical History:   Diagnosis Date   • Degenerative disc disease, cervical    • Gestational diabetes    • H/O blood clots    • Hematemesis    • Seizures (HCC)    • Septic shock (HCC)      Past Surgical History:   Procedure Laterality Date   • APPENDECTOMY     • BACK SURGERY     • CHOLECYSTECTOMY     • ENDOSCOPY N/A 2016    Procedure: ESOPHAGOGASTRODUODENOSCOPY with biopsy;  Surgeon: Austen Brito MD;  Location: [facility];  Service:    • HYSTERECTOMY     • NECK SURGERY       approx 6 yrs ago   • TONSILLECTOMY     • TONSILLECTOMY AND ADENOIDECTOMY        General Information     Row Name 23 2054          Physical Therapy Time and Intention    Document Type therapy note (daily note)  -PC     Mode of Treatment physical therapy;occupational therapy;co-treatment  -PC           User Key  (r) = Recorded By, (t) = Taken By, (c) = Cosigned By    Initials Name Provider Type    PC Saida Burrows PT Physical Therapist               Mobility     Row Name 23 5301          Bed Mobility    Comment, (Bed Mobility) pt declined  attempting to sit edge of bed today, said she felt tired and too weak  -PC           User Key  (r) = Recorded By, (t) = Taken By, (c) = Cosigned By    Initials Name Provider Type    PC Saida Burrows, PT Physical Therapist               Obj/Interventions     Row Name 03/06/23 1352          Motor Skills    Therapeutic Exercise --  pt able to perform AP and GS  on her own, but needed assist to perform other LE ex, perf AAROM B hips and knees, pt stated QS caused pain  -PC           User Key  (r) = Recorded By, (t) = Taken By, (c) = Cosigned By    Initials Name Provider Type    PC Saida Burrows, PT Physical Therapist               Goals/Plan    No documentation.                Clinical Impression     Row Name 03/06/23 1354          Pain    Pretreatment Pain Rating 7/10  -PC     Pain Location - abdomen  -PC     Pre/Posttreatment Pain Comment abdomen and B LEs  -PC     Row Name 03/06/23 1354          Plan of Care Review    Plan of Care Reviewed With patient  -PC     Outcome Evaluation Pt is now in ICU for closer observation, she is lethargic and very weak, pt declined sitting up in bed, perf AAROM B LEs, only able to perform AP and glut sets on her own, PT will cont to follow, VATS planned for Wednesday  -PC     Row Name 03/06/23 1354          Positioning and Restraints    Pre-Treatment Position in bed  -PC     Post Treatment Position bed  -PC     In Bed supine;call light within reach;encouraged to call for assist;exit alarm on  -PC           User Key  (r) = Recorded By, (t) = Taken By, (c) = Cosigned By    Initials Name Provider Type    Saida Jones, PT Physical Therapist               Outcome Measures     Row Name 03/06/23 1353          How much help from another person do you currently need...    Turning from your back to your side while in flat bed without using bedrails? 1  -PC     Moving from lying on back to sitting on the side of a flat bed without bedrails? 1  -PC     Moving to and from a bed to a chair  (including a wheelchair)? 1  -PC     Standing up from a chair using your arms (e.g., wheelchair, bedside chair)? 1  -PC     Climbing 3-5 steps with a railing? 1  -PC     To walk in hospital room? 1  -PC     AM-PAC 6 Clicks Score (PT) 6  -PC     Highest level of mobility 2 --> Bed activities/dependent transfer  -PC           User Key  (r) = Recorded By, (t) = Taken By, (c) = Cosigned By    Initials Name Provider Type    PC Saida Burrows, PT Physical Therapist                             Physical Therapy Education     Title: PT OT SLP Therapies (In Progress)     Topic: Physical Therapy (In Progress)     Point: Mobility training (In Progress)     Learning Progress Summary           Patient Acceptance, E, NR by AR at 3/3/2023 1346    Acceptance, E,TB, VU by JOÃO at 3/2/2023 1836    Acceptance, E,TB, VU by MS at 3/1/2023 1135    Acceptance, E,TB, VU by MS at 2/28/2023 1105    Acceptance, E,TB,D, VU,DU by LB at 2/26/2023 1159    Acceptance, E,TB, VU,DU by LB at 2/25/2023 1542    Acceptance, E,D, VU,NR by EB at 2/24/2023 1254    Acceptance, E,D, VU,NR by EB at 2/23/2023 1057    Acceptance, E,TB, VU,NR by EB1 at 2/21/2023 1134   Family Acceptance, E, NR by AR at 3/3/2023 1346   Significant Other Acceptance, E,TB, VU,NR by EB1 at 2/21/2023 1134                   Point: Home exercise program (In Progress)     Learning Progress Summary           Patient Acceptance, E,D, NR by PC at 3/6/2023 1357    Acceptance, E, NR by AR at 3/3/2023 1346    Acceptance, E,TB, VU by JOÃO at 3/2/2023 1836    Acceptance, E,TB, VU by MS at 2/28/2023 1105    Acceptance, E,TB,D, VU,DU by LB at 2/26/2023 1159    Acceptance, E,TB, VU,DU by LB at 2/25/2023 1542    Acceptance, E,D, VU,NR by EB at 2/24/2023 1254    Acceptance, E,D, VU,NR by EB at 2/23/2023 1057   Family Acceptance, E, NR by AR at 3/3/2023 1346                   Point: Body mechanics (In Progress)     Learning Progress Summary           Patient Acceptance, E, NR by AR at 3/3/2023 134     Acceptance, E,TB, VU by JOÃO at 3/2/2023 1836    Acceptance, E,TB,D, VU,DU by LB at 2/26/2023 1159    Acceptance, E,TB, VU,DU by LB at 2/25/2023 1542    Acceptance, E,D, VU,NR by EB at 2/24/2023 1254    Acceptance, E,D, VU,NR by EB at 2/23/2023 1057   Family Acceptance, E, NR by AR at 3/3/2023 1346                   Point: Precautions (In Progress)     Learning Progress Summary           Patient Acceptance, E, NR by AR at 3/3/2023 1346    Acceptance, E,TB, VU by JOÃO at 3/2/2023 1836    Acceptance, E,TB,D, VU,DU by LB at 2/26/2023 1159    Acceptance, E,TB, VU,DU by LB at 2/25/2023 1542    Acceptance, E,D, VU,NR by EB at 2/24/2023 1254    Acceptance, E,D, VU,NR by EB at 2/23/2023 1057   Family Acceptance, E, NR by AR at 3/3/2023 1346                               User Key     Initials Effective Dates Name Provider Type Discipline    PC 06/16/21 -  Saida Burrows, PT Physical Therapist PT    AR 06/16/21 -  Jovita Miller, PT Physical Therapist PT    LB 08/09/20 -  Nia Arellano, PT Physical Therapist PT    MS 06/16/21 -  Елена Florez, PT Physical Therapist PT    EB 02/14/23 -  Caren James PTA Physical Therapist Assistant PT    JOÃO 09/20/22 -  Jayda Patel, RN Registered Nurse Nurse    EB1 01/12/23 -  Yesica Zamora, ALBERTO Student PT Student PT              PT Recommendation and Plan     Plan of Care Reviewed With: patient  Outcome Evaluation: Pt is now in ICU for closer observation, she is lethargic and very weak, pt declined sitting up in bed, perf AAROM B LEs, only able to perform AP and glut sets on her own, PT will cont to follow, VATS planned for Wednesday     Time Calculation:    PT Charges     Row Name 03/06/23 1357             Time Calculation    Start Time 1017  -PC      Stop Time 1027  -PC      Time Calculation (min) 10 min  -PC      PT Received On 03/06/23  -PC      PT - Next Appointment 03/07/23  -            User Key  (r) = Recorded By, (t) = Taken By, (c) = Cosigned By    Initials Name Provider Type     Saida Jones, PT Physical Therapist              Therapy Charges for Today     Code Description Service Date Service Provider Modifiers Qty    91214260985 HC PT THER PROC EA 15 MIN 3/6/2023 Saida Burrows, PT GP 1          PT G-Codes  Outcome Measure Options: AM-PAC 6 Clicks Basic Mobility (PT)  AM-PAC 6 Clicks Score (PT): 6  AM-PAC 6 Clicks Score (OT): 15  Modified Parisa Scale: 4 - Moderately severe disability.  Unable to walk without assistance, and unable to attend to own bodily needs without assistance.       Saida Burrows, PT  3/6/2023

## 2023-03-06 NOTE — CONSULTS
Patient Name: Louise SORIANO  :1965  58 y.o.    Date of Admission: 2023  Date of Consultation:  23  Encounter Provider: Hank Dowling III, MD  Place of Service: [facility] CARDIOLOGY  Referring Provider: No ref. provider found  Patient Care Team:  Chloe Schaefer APRN as PCP - General (Family Medicine)  Mikhail Glez MD as Consulting Physician (Neurology)      Chief complaint: fall and numbness    History of Present Illness:Louise Soriano is a 58 year old pt with a history of seizures DVT of LLE on Eliquis and DDD.  Patient has developed a MRSA empyema.  Decortication is planned, anticipated for Wednesday.    Patient has been found to have MRSA septicemia.  We have been asked to see her because of possible tricuspid valve vegetation.  Patient had an echocardiogram there appears to be a possible small vegetation on the atrial surface of the tricuspid    Pt presented to ER on 23 with complaints of paresthesia and falls. Pt reported a fall on 23 and struck her head. A few days later she developed numbness from her neck down to her waist and then on Friday the numbness began to radiate down to her legs and feet and covering her entire body. She described the numbness and tingling and pricking sensation. Pt was seen by her PCP on Friday and was diagnosed with UTI and started on Macrobid. She denied any headache, lightheadedness, abd pain, N/V/D or visual disturbance, aphasia, chest pain, palpitations or dyspnea. Pt reported increased urinary frequency, and urgency. Pt reported several episodes or urgency incontinence. In ER, CK 32, K 3.1, AST 72, MG 1.4, WBC 10.13, HGB 12.2 PLTs 389, UA showed ketones 15, leukocytes race, protein trace, bilirubin small and squamous epithelial cells 7-12, CT of head negative for acute, MRI of lumbar spine was negative for acute,     Neurology was consulted and pt started on steroids.   Neurosurgery consulted and felt there  were no neurosurgical issues to address.      ECHO 3/5/23    There is a small (12 mm) mobile mass on the tricuspid valve affecting the posterior leaflet that is consistent with a vegetation. This is best visualized in the subcostal view image series 78  •  Left ventricular systolic function is normal. Calculated left ventricular EF = 62.2% Left ventricular ejection fraction appears to be 61 - 65%.  •  Left ventricular wall thickness is consistent with concentric remodeling.  •  Left ventricular diastolic function was normal.  •  Estimated right ventricular systolic pressure from tricuspid regurgitation is normal (<35 mmHg).       Past Medical History:   Diagnosis Date   • Degenerative disc disease, cervical    • Gestational diabetes    • H/O blood clots    • Hematemesis    • Seizures (HCC)    • Septic shock (HCC)        Past Surgical History:   Procedure Laterality Date   • APPENDECTOMY     • BACK SURGERY     • CHOLECYSTECTOMY     • ENDOSCOPY N/A 8/30/2016    Procedure: ESOPHAGOGASTRODUODENOSCOPY with biopsy;  Surgeon: Austen Brito MD;  Location: [facility] ENDOSCOPY;  Service:    • HYSTERECTOMY     • NECK SURGERY       approx 6 yrs ago   • TONSILLECTOMY     • TONSILLECTOMY AND ADENOIDECTOMY  1975         Prior to Admission medications    Medication Sig Start Date End Date Taking? Authorizing Provider   acetaminophen (TYLENOL) 325 MG tablet Take 650 mg by mouth Every 6 (Six) Hours As Needed for Mild Pain .   Yes ProviderBebeto MD   apixaban (ELIQUIS) 5 MG tablet tablet Take 1 tablet by mouth Daily.   Yes ProviderBebeto MD   folic acid (FOLVITE) 1 MG tablet Take 1 mg by mouth Daily. 11/29/22  Yes ProviderBebeto MD   ibuprofen (ADVIL,MOTRIN) 200 MG tablet Take 200 mg by mouth Every 6 (Six) Hours As Needed for Mild Pain .   Yes ProviderBebeto MD   lamoTRIgine (LaMICtal) 100 MG tablet Take 100 mg by mouth Daily.   Yes Bebeto Brice MD   nitrofurantoin, macrocrystal-monohydrate,  (MACROBID) 100 MG capsule Take 800 mg by mouth 2 (Two) Times a Day. 2/17/23  Yes Bebeto Brice MD   vitamin B-12 (CYANOCOBALAMIN) 100 MCG tablet Take 50 mcg by mouth Daily.   Yes Bebeto Brice MD   diazepam (VALIUM) 10 MG tablet TAKE 1 TABLET THREE TIMES A DAY 9/2/16   Bebeto Brice MD   estrogens, conjugated,-methyltestosterone (ESTRATEST HS) 0.625-1.25 MG per tablet Take  by mouth. 10/17/13   Bebeto Brice MD   pantoprazole (PROTONIX) 40 MG EC tablet Take 1 tablet by mouth 2 (two) times a day before meals. 8/30/16   Franklyn Powers MD   sucralfate (CARAFATE) 1 GM/10ML suspension Take 10 mL by mouth every 6 (six) hours. 8/30/16   Franklyn Powers MD       Allergies   Allergen Reactions   • Penicillins Shortness Of Breath and Swelling   • Sulfa Antibiotics Rash       Social History     Socioeconomic History   • Marital status:    Tobacco Use   • Smoking status: Never   • Smokeless tobacco: Never   Vaping Use   • Vaping Use: Never used   Substance and Sexual Activity   • Alcohol use: Yes     Comment: social   • Drug use: No   • Sexual activity: Defer       Family History   Problem Relation Age of Onset   • Colon cancer Paternal Uncle    • Cancer Mother    • Diabetes Father    • Heart disease Father    • Stroke Father    • Cancer Sister    • Diabetes Paternal Grandmother    • Diabetes Paternal Grandfather        REVIEW OF SYSTEMS:   All systems reviewed.  Pertinent positives identified in HPI.  All other systems are negative.      Objective:     Vitals:    03/06/23 0900 03/06/23 1000 03/06/23 1011 03/06/23 1100   BP: 149/86 143/100  130/73   Pulse: 94 112 110 95   Resp:   20    Temp:    99 °F (37.2 °C)   TempSrc:    Oral   SpO2: 97% 91% 94% 95%   Weight:       Height:         Body mass index is 22.86 kg/m².    Physical Exam:  General Appearance:    Ill appearing   Head:    Normocephalic, without obvious abnormality   Eyes:            Lids and lashes normal, conjunctivae and  sclerae normal, no icterus, no pallor, corneas clear   Ears:    Ears appear intact with no abnormalities noted   Throat:   No oral lesions, oral mucosa moist   Neck:   No adenopathy, supple, trachea midline, no thyromegaly, no carotid bruit, no JVD   Back:     No kyphosis present, no erythema or scars, no tenderness to palpation    Lungs:    Diminished breath sounds right base,respirations regular, even and unlabored    Heart:    Regular rhythm and normal rate, normal S1 and S2, no murmur, no gallop, no rub, no click   Chest Wall:    No abnormalities observed   Abdomen:     Normal bowel sounds, no masses, no organomegaly, soft        non-tender, non-distended, no guarding   Extremities:   Moves all extremities well, no edema, no cyanosis, no redness   Pulses:  Bilateral carotids brisk   Skin:  Psychiatric:   No bleeding or rash    Sleepy, awakens     Lab Review:     Results from last 7 days   Lab Units 03/06/23  0719   SODIUM mmol/L 133*   POTASSIUM mmol/L 3.9   CHLORIDE mmol/L 97*   CO2 mmol/L 24.5   BUN mg/dL 15   CREATININE mg/dL 0.57   CALCIUM mg/dL 8.7   BILIRUBIN mg/dL 1.4*   ALK PHOS U/L 202*   ALT (SGPT) U/L 41*   AST (SGOT) U/L 40*   GLUCOSE mg/dL 82     Results from last 7 days   Lab Units 03/01/23  1114 03/01/23  0653   HSTROP T ng/L 7 10*     Results from last 7 days   Lab Units 03/06/23  0719   WBC 10*3/mm3 16.60*   HEMOGLOBIN g/dL 9.0*   HEMATOCRIT % 25.2*   PLATELETS 10*3/mm3 173         Results from last 7 days   Lab Units 03/06/23  0719   MAGNESIUM mg/dL 1.7                           I personally viewed and interpreted the patient's EKG/Telemetry data.      Current Facility-Administered Medications:   •  bisacodyl (DULCOLAX) suppository 10 mg, 10 mg, Rectal, Daily, Daisy Kaur MD, 10 mg at 03/03/23 1530  •  Desvenlafaxine Succinate ER 25 mg, 25 mg, Oral, Daily, Violet Burdick APRN, 25 mg at 03/06/23 1115  •  diphenhydrAMINE (BENADRYL) injection 50 mg, 50 mg, Intravenous, Once PRN, Rosalino  Dwayne Crow MD  •  famotidine (PEPCID) injection 20 mg, 20 mg, Intravenous, Once PRN, Dwayne Jerry MD  •  folic acid (FOLVITE) tablet 1 mg, 1 mg, Oral, Daily, Renata Oro, APRN, 1 mg at 03/06/23 1116  •  gabapentin (NEURONTIN) capsule 100 mg, 100 mg, Oral, Q12H, Daisy Kaur MD, 100 mg at 03/06/23 1116  •  Hold medication, 1 each, Does not apply, Continuous PRN, Franklyn Powers MD  •  Hold medication, 1 each, Does not apply, Continuous PRN, Barber Aleman MD  •  HYDROcodone-acetaminophen (NORCO) 5-325 MG per tablet 2 tablet, 2 tablet, Oral, Q4H PRN, Daisy Kaur MD, 2 tablet at 03/05/23 0810  •  HYDROmorphone (DILAUDID) injection 0.5 mg, 0.5 mg, Intravenous, Q4H PRN, Sujit Bruce MD  •  ipratropium-albuterol (DUO-NEB) nebulizer solution 3 mL, 3 mL, Nebulization, 4x Daily - RT, Sujit Bruce MD, 3 mL at 03/06/23 1011  •  lactulose (CHRONULAC) 10 GM/15ML solution 20 g, 20 g, Oral, BID, Daisy Kaur MD, 20 g at 03/06/23 1114  •  lamoTRIgine (LaMICtal) tablet 200 mg, 200 mg, Oral, Daily, Violet Burdick APRN, 200 mg at 03/06/23 1116  •  levoFLOXacin (LEVAQUIN) 750 mg/150 mL D5W (premix) (LEVAQUIN) 750 mg, 750 mg, Intravenous, Q24H, Daisy Kaur MD, 750 mg at 03/05/23 2200  •  lidocaine (LIDODERM) 5 % 1 patch, 1 patch, Transdermal, Q24H, Violet Burdick APRN, 1 patch at 03/06/23 0833  •  lidocaine (LIDODERM) 5 % 1 patch, 1 patch, Transdermal, Q24H, Sujit Bruec MD, 1 patch at 03/06/23 0834  •  LORazepam (ATIVAN) tablet 1 mg, 1 mg, Oral, Q4H PRN, Daisy Kaur MD, 1 mg at 03/05/23 0340  •  lubiprostone (AMITIZA) capsule 24 mcg, 24 mcg, Oral, Daily With Breakfast, Daisy Kaur MD, 24 mcg at 03/06/23 1116  •  Magnesium Sulfate - Total Dose 10 grams - Magnesium 1 or Less, 2 g, Intravenous, PRN **OR** Magnesium Sulfate - Total Dose 6 grams - Magnesium 1.1 - 1.5, 2 g, Intravenous, PRN, Stopped at 02/21/23 1252 **OR**  Magnesium Sulfate - Total Dose 4 grams - Magnesium 1.6 - 1.9, 4 g, Intravenous, PRN, Renata Oro APRN  •  melatonin tablet 5 mg, 5 mg, Oral, Nightly PRN, Renata Oro APRN, 5 mg at 02/28/23 2200  •  nystatin (MYCOSTATIN) 100,000 unit/mL suspension 500,000 Units, 5 mL, Swish & Swallow, 4x Daily, Daisy Kaur MD, 500,000 Units at 03/06/23 1117  •  pantoprazole (PROTONIX) injection 40 mg, 40 mg, Intravenous, Q12H, Daisy Kaur MD, 40 mg at 03/06/23 0833  •  Pharmacy to dose vancomycin, , Does not apply, Continuous PRN, Becca Davenport APRN  •  polyethylene glycol (MIRALAX) packet 17 g, 17 g, Oral, BID, Violet Burdick, APRN, 17 g at 03/06/23 1117  •  potassium & sodium phosphates (PHOS-NAK) 280-160-250 MG packet - for Phosphorus less than 1.25 mg/dL, 2 packet, Oral, Q6H PRN **OR** potassium & sodium phosphates (PHOS-NAK) 280-160-250 MG packet - for Phosphorus 1.25 - 2.5 mg/dL, 2 packet, Oral, Q6H PRN, Jake Richmond, DO  •  potassium chloride (K-DUR,KLOR-CON) ER tablet 40 mEq, 40 mEq, Oral, PRN, 40 mEq at 03/02/23 2033 **OR** potassium chloride (KLOR-CON) packet 40 mEq, 40 mEq, Oral, PRN **OR** potassium chloride 10 mEq in 100 mL IVPB, 10 mEq, Intravenous, Q1H PRN, Yi, Jake, DO  •  sennosides-docusate (PERICOLACE) 8.6-50 MG per tablet 2 tablet, 2 tablet, Oral, BID, 2 tablet at 03/06/23 1116 **AND** [DISCONTINUED] polyethylene glycol (MIRALAX) packet 17 g, 17 g, Oral, Daily PRN **AND** [DISCONTINUED] bisacodyl (DULCOLAX) EC tablet 5 mg, 5 mg, Oral, Daily PRN **AND** [DISCONTINUED] bisacodyl (DULCOLAX) suppository 10 mg, 10 mg, Rectal, Daily PRN, Franci Griffith PA  •  [COMPLETED] Insert Peripheral IV, , , Once **AND** sodium chloride 0.9 % flush 10 mL, 10 mL, Intravenous, PRN, Shin Rivers PA  •  sodium chloride 0.9 % flush 10 mL, 10 mL, Intravenous, Q12H, Qadah, Renata, APRN, 10 mL at 03/06/23 1117  •  sodium chloride 0.9 % flush 10 mL, 10 mL, Intravenous, PRN, Qadah,  KASIA Yanez  •  sodium chloride 0.9 % infusion 40 mL, 40 mL, Intravenous, PRN, MalathiRenata eddy APRN, 40 mL at 02/23/23 0603  •  thiamine (VITAMIN B-1) tablet 100 mg, 100 mg, Oral, Daily, Eliezer Cruz MD, 100 mg at 03/06/23 1307  •  vancomycin (VANCOCIN) 1000 mg/200 mL dextrose 5% IVPB, 1,000 mg, Intravenous, Q12H, Becca Davenport APRN, 1,000 mg at 03/06/23 1118  •  [START ON 3/7/2023] vitamin B-12 (CYANOCOBALAMIN) tablet 500 mcg, 500 mcg, Oral, Daily, Gila Pro APRN    Assessment and Plan:       Active Hospital Problems    Diagnosis  POA   • **Paresthesia [R20.2]  Yes   • Oral candidiasis [B37.0]  No   • Mass of middle lobe of right lung [R91.8]  Yes   • Upper abdominal pain [R10.10]  No   • Situational mixed anxiety and depressive disorder [F43.23]  Yes   • 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     1.  MRSA pneumonia and empyema- VATS/decortication planned Wednesday, Eliquis held  2.  MRSA septicemia, ID following  3.  Paresthesias and areflexia, neurology following  4.  Possible small vegetation of tricuspid valve, continue to follow right now, continue IV antibiotics, valvular function normal  5.  DVT history, Eliquis on hold    Hank Dowling III, MD  03/06/23  13:20 EST

## 2023-03-06 NOTE — PLAN OF CARE
Goal Outcome Evaluation:  Plan of Care Reviewed With: patient           Outcome Evaluation: New orders received. Pt now lethargic and in the ICU. VFSS completed on admission was WFLs. FEES aborted d/t seizure hx. VATs planned Wed. Dysphonia persists, breathiness and diplophonia more severe. Irregular high pitch noted. Baseline cough. No overt s/s of aspiration with ice or puree. Immediate cough with nectar and honey with slight voice change, difficult to rate. Discussed with Dr. Cruz, FEES approved. SLP recs NPO, can allow meds in puree and free water protocol with ice. Await FEES completion for diet recs.      Patient was not wearing a face mask during this therapy encounter. Therapist used appropriate personal protective equipment including gown, eye protection, mask and gloves.  Mask used was standard procedure mask. Appropriate PPE was worn during the entire therapy session. Hand hygiene was completed before and after therapy session. Patient is not in enhanced droplet precautions.

## 2023-03-06 NOTE — THERAPY RE-EVALUATION
Acute Care - Speech Language Pathology   Swallow Re-Evaluation [facility]     Patient Name: Louise GARCIA  : 1965  MRN: [id number]  Today's Date: 3/6/2023               Admit Date: 2023    Visit Dx:     ICD-10-CM ICD-9-CM   1. Paresthesia  R20.2 782.0   2. Gait instability  R26.81 781.2   3. Extremity numbness  R20.0 782.0   4. Empyema of pleura (HCC)  J86.9 510.9     Patient Active Problem List   Diagnosis   • Sepsis (HCC)   • Neck pain, chronic   • Upper back pain   • Paresthesia   • Cervical myelopathy (HCC)   • Lower extremity weakness   • History of blood clots   • Chronic anticoagulation   • Seizures (HCC)   • Leukocytosis   • Normocytic anemia   • Hyperglycemia   • GERD (gastroesophageal reflux disease)   • Guillain-Melbeta syndrome (HCC)   • Situational mixed anxiety and depressive disorder   • Upper abdominal pain   • Mass of middle lobe of right lung   • Oral candidiasis   • Empyema of pleura (HCC)     Past Medical History:   Diagnosis Date   • Degenerative disc disease, cervical    • Gestational diabetes    • H/O blood clots    • Hematemesis    • Seizures (HCC)    • Septic shock (HCC)      Past Surgical History:   Procedure Laterality Date   • APPENDECTOMY     • BACK SURGERY     • CHOLECYSTECTOMY     • ENDOSCOPY N/A 2016    Procedure: ESOPHAGOGASTRODUODENOSCOPY with biopsy;  Surgeon: Austen Brito MD;  Location: [facility] ENDOSCOPY;  Service:    • HYSTERECTOMY     • NECK SURGERY       approx 6 yrs ago   • TONSILLECTOMY     • TONSILLECTOMY AND ADENOIDECTOMY         SLP Recommendation and Plan                                                                            Plan of Care Reviewed With: patient  Outcome Evaluation: New orders received. Pt now lethargic and in the ICU. VFSS completed on admission was WFLs. FEES aborted d/t seizure hx. VATs planned Wed. Dysphonia hoarse, breathiness and diplophonia more severe. Irregular high pitch noted. Baseline cough. No overt s/s of  aspiration with ice or puree. Immediate cough with nectar and honey with slight voice change, difficult to rate. Discussed with Dr. Cruz, FEES approved. SLP recs NPO, can allow meds in puree and free water protocol with ice. Await FEES completion for diet recs.      SWALLOW EVALUATION (last 72 hours)     SLP Adult Swallow Evaluation     Row Name 03/06/23 0900                   Rehab Evaluation    Document Type re-evaluation  -           General Information    Patient Profile Reviewed yes  -        Pertinent History Of Current Problem Now plan for VATs d/t lung infection. Working diagnosis of GB.  -              User Key  (r) = Recorded By, (t) = Taken By, (c) = Cosigned By    Initials Name Effective Dates     Milana Ruby MS CCC-SLP 06/16/21 -                 EDUCATION  The patient has been educated in the following areas:   Dysphagia (Swallowing Impairment).        SLP GOALS     Row Name 03/06/23 0900             (LTG) Patient will demonstrate functional swallow for    Diet Texture (Demonstrate functional swallow) regular textures  -      Liquid viscosity (Demonstrate functional swallow) thin liquids  -      Progress/Outcomes (Demonstrate functional swallow) progress slower than expected  -      Comment (Demonstrate functional swallow) New orders received. Pt now lethargic and in the ICU. VFSS completed on admission was WFLs. FEES aborted d/t seizure hx. VATs planned Wed. Dysphonia persists, breathiness and diplophonia more severe. Irregular high pitch noted. Baseline cough. No overt s/s of aspiration with ice or puree. Immediate cough with nectar and honey with slight voice change, difficult to rate. Discussed with Dr. Cruz, FEES approved. SLP recs NPO, can allow meds in puree and free water protocol with ice. Await FEES completion for diet recs.  -            User Key  (r) = Recorded By, (t) = Taken By, (c) = Cosigned By    Initials Name Provider Type     Milana Ruby MS CCC-SLP Speech and  Language Pathologist                   Time Calculation:    Time Calculation- SLP     Row Name 03/06/23 1241             Time Calculation- SLP    SLP Start Time 0900  -SH      SLP Received On 03/06/23  -SH         Untimed Charges    64186-MV Treatment Swallow Minutes 60  -SH         Total Minutes    Untimed Charges Total Minutes 60  -SH       Total Minutes 60  -SH            User Key  (r) = Recorded By, (t) = Taken By, (c) = Cosigned By    Initials Name Provider Type     Milana Ruby MS CCC-SLP Speech and Language Pathologist                Therapy Charges for Today     Code Description Service Date Service Provider Modifiers Qty    [id number]  ST TREATMENT SWALLOW 4 3/6/2023 Milana Ruby MS CCC-SLP GN 1               Milana Ruby MS CCC-SLP  3/6/2023

## 2023-03-06 NOTE — PROGRESS NOTES
Name: Louise GARCIA ADMIT: 2023   : 1965  PCP: Chloe Schaefer APRN    MRN: [id number] LOS: 11 days   AGE/SEX: 58 y.o. female  ROOM: [facility]     Subjective   Subjective   Patient actually reports that she is feeling better.  No shortness of breath.  Positive dry cough.  Decreased right-sided chest pain.  No hemoptysis.  No fever or chills.  No change in the lower extremity weakness but the speech has greatly improved per patient.  No other focal neurological symptoms.  No headache.  No dizziness.  No loss of consciousness.       Review of Systems  GI.  No abdominal pain.  No nausea or vomiting.  Normal bowel movement today without constipation/diarrhea/bleeding per rectum/melena.  Still n.p.o.    .  No dysuria or hematuria.       Objective   Objective   Vital Signs  Temp:  [98.6 °F (37 °C)-99 °F (37.2 °C)] 98.7 °F (37.1 °C)  Heart Rate:  [] 97  Resp:  [18-23] 23  BP: (119-162)/() 140/84  SpO2:  [91 %-99 %] 95 %  on  Flow (L/min):  [3-4] 3;   Device (Oxygen Therapy): nasal cannula    Intake/Output Summary (Last 24 hours) at 3/6/2023 1830  Last data filed at 3/6/2023 0600  Gross per 24 hour   Intake 410 ml   Output 575 ml   Net -165 ml     Body mass index is 22.86 kg/m².      23  1755 23  0600   Weight: 56.7 kg (125 lb) 56.7 kg (125 lb) 56.7 kg (125 lb)     Physical Exam    General.  Middle-aged female.  She is alert.  Oriented x3.  No acute pain. No respiratory distress or diaphoresis.  Much less lethargic than yesterday   Eyes.  Pupils equal round and reactive.  Intact extraocular musculature.  No pallor or jaundice.  Oral cavity.  Improving thrush.  Mildly dry mucous membrane.  Neck.  Supple.  No JVD.  No lymphadenopathy or thyromegaly.  Cardiovascular.  Regular rate and rhythm with no gallops or murmurs.    Chest.  Poor to auscultation bilaterally more so on the right than the left.    Scattered rhonchi in the right lung  Abdomen.  No tenderness.  No guarding  or rebound.  No organomegaly.  No distention.  Normal bowel sounds.  Extremities.  No clubbing/cyanosis/edema.  CNS.  No acute focal neurological deficits    Results Review:      Results from last 7 days   Lab Units 03/06/23  0719 03/05/23  0532 03/04/23  0522 03/03/23  0159 03/02/23  0633 03/01/23  0653 02/28/23  0525   SODIUM mmol/L 133* 134* 134* 132* 135* 136 138   POTASSIUM mmol/L 3.9 4.2 4.5 4.7 4.1 3.7 3.8   CHLORIDE mmol/L 97* 99 101 96* 99 101 102   CO2 mmol/L 24.5 26.0 25.8 25.9 26.4 27.0 23.0   BUN mg/dL 15 19 29* 31* 23* 17 21*   CREATININE mg/dL 0.57 0.57 0.64 0.81 0.68 0.61 0.68   GLUCOSE mg/dL 82 79 82 143* 95 122* 139*   CALCIUM mg/dL 8.7 9.0 8.9 9.5 8.6 8.1* 8.4*   AST (SGOT) U/L 40* 80* 44* 39* 40* 34* 29   ALT (SGPT) U/L 41* 55* 42* 55* 51* 37* 34*     Estimated Creatinine Clearance: 95.1 mL/min (by C-G formula based on SCr of 0.57 mg/dL).      Results from last 7 days   Lab Units 03/06/23  1114 03/05/23  1046   GLUCOSE mg/dL 103 115     Results from last 7 days   Lab Units 03/01/23  1114 03/01/23  0653   HSTROP T ng/L 7 10*             Results from last 7 days   Lab Units 03/06/23  0719 03/05/23  0532 03/04/23  0522 03/03/23  0159 03/02/23  0633 03/01/23  0653 02/28/23  0525   MAGNESIUM mg/dL 1.7 2.0 2.3 2.4 2.1 1.9 2.1           Invalid input(s): LDLCALC  Results from last 7 days   Lab Units 03/06/23  0719 03/05/23  0532 03/04/23  0522 03/03/23  0159 03/02/23  0633 03/01/23  0742 02/28/23  0525 02/28/23  0525   WBC 10*3/mm3 16.60* 20.27* 23.89* 31.39* 23.40* 19.16*  --  18.37*   HEMOGLOBIN g/dL 9.0* 9.3* 8.6* 10.0* 9.9* 9.4*  --  9.7*   HEMATOCRIT % 25.2* 26.9* 25.1* 27.6* 28.7* 27.6*  --  28.4*   PLATELETS 10*3/mm3 173 167 176 155 168 243  --  195   MCV fL 97.3* 97.8* 98.8* 96.8 98.3* 100.7*  --  100.7*   MCH pg 34.7* 33.8* 33.9* 35.1* 33.9* 34.3*  --  34.4*   MCHC g/dL 35.7 34.6 34.3 36.2* 34.5 34.1  --  34.2   RDW % 13.8 14.3 14.4 13.9 14.2 14.7  --  14.3   RDW-SD fl 48.6 50.7 50.7 49.0 49.9  53.0  --  51.4   MPV fL 11.6 11.5 11.8 11.3 11.9 11.7  --  11.6   NEUTROPHIL % % 84.6* 85.0* 89.9*  --  88.7*  --   --  82.8*   LYMPHOCYTE % % 6.1* 5.4* 3.9*  --  5.7*  --   --  6.4*   MONOCYTES % % 5.8 6.1 3.6*  --  3.8*  --   --  6.8   EOSINOPHIL % % 0.3 0.6 0.2*  --  0.0*  --   --  0.0*   BASOPHIL % % 0.2 0.1 0.2  --  0.2  --   --  0.1   IMM GRAN % % 3.0* 2.8*  --   --  1.6*  --   --  3.9*   NEUTROS ABS 10*3/mm3 14.04* 17.22* 21.50* 30.45* 20.73* 17.53*   < > 15.22*   LYMPHS ABS 10*3/mm3 1.01 1.09 0.94  --  1.34  --   --  1.18   MONOS ABS 10*3/mm3 0.97* 1.24* 0.85  --  0.90  --   --  1.24*   EOS ABS 10*3/mm3 0.05 0.13 0.04  --  0.00  --   --  0.00   BASOS ABS 10*3/mm3 0.03 0.02 0.04  --  0.05  --   --  0.02   IMMATURE GRANS (ABS) 10*3/mm3 0.50* 0.57*  --   --  0.38*  --   --  0.71*   NRBC /100 WBC 0.1 0.0  --   --  0.1  --   --  0.1    < > = values in this interval not displayed.         Results from last 7 days   Lab Units 03/05/23  1054   PH, ARTERIAL pH units 7.413   PO2 ART mm Hg 80.5   PCO2, ARTERIAL mm Hg 43.0   HCO3 ART mmol/L 27.4     Results from last 7 days   Lab Units 03/05/23  0532 03/03/23  1051 03/03/23  0454 03/03/23  0159 03/02/23 2250 03/02/23 1955 03/02/23  0633   PROCALCITONIN ng/mL 2.40*  --   --   --   --   --  0.43*   LACTATE mmol/L  --  1.4 2.1* 2.7* 3.6* 3.6*  --          Results from last 7 days   Lab Units 03/02/23  0633   LIPASE U/L 29     Results from last 7 days   Lab Units 03/04/23 2043 03/04/23 1952 03/04/23 0959 03/04/23  0900 03/02/23 1955   BLOODCX  No growth at 24 hours No growth at 24 hours  --   --  Staphylococcus aureus, MRSA*  No growth at 3 days   BODYFLDCX   --   --   --  Light growth (2+) Staphylococcus aureus, MRSA*  --    RESPCX   --   --  Rejected  --   --    BCIDPCR   --   --   --   --  Staph aureus. mecA/C and MREJ (methicillin resistance gene) detected. Identification by BCID2 PCR.*     Results from last 7 days   Lab Units 03/03/23  1830   ADENOVIRUS  DETECTION BY PCR  Not Detected   CORONAVIRUS 229E  Not Detected   CORONAVIRUS HKU1  Not Detected   CORONAVIRUS NL63  Not Detected   CORONAVIRUS OC43  Not Detected   HUMAN METAPNEUMOVIRUS  Not Detected   HUMAN RHINOVIRUS/ENTEROVIRUS  Not Detected   INFLUENZA B PCR  Not Detected   PARAINFLUENZA 1  Not Detected   PARAINFLUENZA VIRUS 2  Not Detected   PARAINFLUENZA VIRUS 3  Not Detected   PARAINFLUENZA VIRUS 4  Not Detected   BORDETELLA PERTUSSIS PCR  Not Detected   CHLAMYDOPHILA PNEUMONIAE PCR  Not Detected   MYCOPLAMA PNEUMO PCR  Not Detected   INFLUENZA A PCR  Not Detected   RSV, PCR  Not Detected     Results from last 7 days   Lab Units 03/04/23  1000   NITRITE UA  Negative   WBC UA /HPF 3-5*   BACTERIA UA /HPF None Seen   SQUAM EPITHEL UA /HPF 0-2           Imaging:  Imaging Results (Last 24 Hours)     Procedure Component Value Units Date/Time    IR LUMBAR PUNCTURE DIAGNOSTIC [[id number]] Collected: 03/06/23 1629     Updated: 03/06/23 1633    Narrative:      FLUOROSCOPIC GUIDED LUMBAR PUNCTURE      HISTORY: follow up, presumed Guillain [place] Syndrome     PROCEDURE:   Informed consent was obtained and documented. The patient was placed in  a left decubitus position on the fluoroscopy table. The skin surface for  access to the thecal sac was identified fluoroscopically and marked. The  area was prepped and draped in the usual sterile fashion. 1% lidocaine  was utilized for local anesthesia. The tip of a 22-gauge spinal needle  was then advanced into the lumbar thecal sac at level of superior L3  using intermittent fluoroscopic guidance. Needle tip position was  confirmed by noting clear spinal fluid in the needle hub. A total of 10  mL clear cerebrospinal fluid was then obtained via gravity drainage and  sent to the clinical laboratories for analysis as requested by the  clinical service. The needle was removed and direct pressure was applied  for a few minutes. There were no immediate complications. All elements  of  maximal sterile technique were followed. 0minutes 26 seconds  fluoroscopy time, 4 mGy, 2 stored images.          Impression:      Successful lumbar puncture.     This report was finalized on 3/6/2023 4:30 PM by Dr. Rickie Rodriguez M.D.                I reviewed the patient's new clinical results / labs / tests / procedures      Assessment/Plan     Active Hospital Problems    Diagnosis  POA   • **Paresthesia [R20.2]  Yes   • Tricuspid valve vegetation [I33.0]  Yes   • Oral candidiasis [B37.0]  No   • Mass of middle lobe of right lung [R91.8]  Yes   • Upper abdominal pain [R10.10]  No   • Situational mixed anxiety and depressive disorder [F43.23]  Yes   • 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]  Yes   • Leukocytosis [D72.829]  Yes   • Normocytic anemia [D64.9]  Yes   • Hyperglycemia [R73.9]  No   • GERD (gastroesophageal reflux disease) [K21.9]  Yes   • Lower extremity weakness [R29.898]  Yes   • History of blood clots [Z86.718]  Not Applicable   • Chronic anticoagulation [Z79.01]  Not Applicable   • Seizures (HCC) [R56.9]  Yes   • Cervical myelopathy (HCC) [G95.9]  Yes   • Empyema of pleura (HCC) [J86.9]  Yes      Resolved Hospital Problems   No resolved problems to display.         1.  Right upper abdominal pain/elevated liver function test/constipation in a patient with a history of GERD  and status post cholecystectomy.  Right upper abdominal pain is related to #2 and has resolved..  Right upper quadrant ultrasound is negative.  Portal circulation Doppler is negative.  CT scan of the abdomen and pelvis is without acute disease.    Continue IV Protonix but decrease the dose..  Lipase is normal.   Negative hepatitis panel.  Liver function test elevation could be secondary to #2.    Stable liver function test.  Will monitor liver function test.  Continue bowel regimen   2.  Right lung masslike infiltrate with cavitary lesion and MRSA infected right pleural effusion/pneumosepsis/MRSA septicemia/tricuspid vegetation.  CT  of the chest is noted.  No PE.  Blood cultures are positive for MRSA .  Currently on Levaquin and vancomycin.  Noted and appreciate ID consultation.  ID recommended continuation of the vancomycin.  Swallow study was negative for aspiration and is being repeated again because of swallowing issue.    Negative urine Legionella and Streptococcus antigen/respiratory PCR panel.  Repeat blood cultures are negative.  Need to rule out lung cancer.  Cardiothoracic surgery planning VATS with decortication on Wednesday once Eliquis is out of her system.  Pulmonary entertaining bronchoscopy.  Cardiology does not recommend MARIAMA at this time.   3.  Paresthesia and generalized weakness in a patient with a history of seizure.  MRI of the brain with small vessel disease and demyelination with possible MS.  MRI of the C, T, L-spine revealed osteoarthritis and degenerative disc disease but no cord compromise.  Lumbar puncture with unremarkable CSF.  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  Paresthesia and weakness improved on Neurontin.  She is s/p IVIG   She was on steroid and this has been DC'd after it was weaned down.  On PT/OT.  The following tests are negative/Normal : West Nile virus serology, Lyme serology, ESR, CRP, magnesium, phosphorus, potassium, RPR, folate, CK, DENISE, heavy metal screen, TSH, angiotensin-converting enzyme, heavy metal screen.  Still awaiting oligoclonal CSF antibody and MS profile and paraneoplastic panel in the CSF.  Continue Lamictal for seizure.  Neurology on board and their help is appreciated.  Neurology repeated lumbar puncture today.  4.  Leukocytosis.  This is mostly a combination of steroid-induced and pneumosepsis.    Starting to improve.  Currently on Levaquin/vancomycin.   Lactic acid was elevated that has now improved with IV fluids.  Repeat blood cultures are negative.  5.  Steroid-induced hyperglycemia.  6.  Depression and anxiety.  Continue desvenlafaxine.  7.  VTE  prophylaxis.  Sequential compression devices after stopping Eliquis.    Discussed my findings and plan of treatment with the patient/  Patient remains in ICU.  Disposition.  To be determined based on clinical course      Daisy Kaur MD  [facility]  03/06/23  18:30 EST

## 2023-03-06 NOTE — PROGRESS NOTES
[facility] Clinical Pharmacy Services: Vancomycin Level Monitoring Note    Louise GARCIA is a 58 y.o. female who is on day 2/5 of pharmacy to dose vancomycin for Bacteremia.    Estimated Creatinine Clearance: 95.1 mL/min (by C-G formula based on SCr of 0.57 mg/dL).    Current Vanc Dose: 750 mg IV every  12  hours  Results from last 7 days   Lab Units 03/05/23  2210   VANCOMYCIN TR mcg/mL 13.60   Predicted AUC at current dose:415 mg/L.hr    No changes at this time. Pharmacy is continuing to monitor and will adjust as needed.    Acosta Walls, [facility]  Clinical Pharmacist

## 2023-03-06 NOTE — PROGRESS NOTES
[facility] Acute Rehab    Will continue to monitor for progress with therapies and medical stability.    Thank you,  Gaviota Goodman RN  Rehab Admission Nurse

## 2023-03-06 NOTE — PROGRESS NOTES
"  Infectious Diseases Progress Note    Reji Oliver MD     [facility]  [place]: 11 days  Patient Identification:  Name: Louise GARCIA  Age: 58 y.o.  Sex: female  :  1965  MRN: [id number]         Primary Care Physician: Chloe Schaefer, KASIA        Subjective: Feeling somewhat better.  Pain is better and breathing somewhat better  Interval History: See consultation note.    Objective:    Scheduled Meds:bisacodyl, 10 mg, Rectal, Daily  cyanocobalamin, 1,000 mcg, Intramuscular, Daily  desvenlafaxine, 25 mg, Oral, Daily  folic acid, 1 mg, Oral, Daily  gabapentin, 100 mg, Oral, Q12H  ipratropium-albuterol, 3 mL, Nebulization, 4x Daily - RT  lactulose, 20 g, Oral, BID  lamoTRIgine, 200 mg, Oral, Daily  levoFLOXacin, 750 mg, Intravenous, Q24H  lidocaine, 1 patch, Transdermal, Q24H  lidocaine, 1 patch, Transdermal, Q24H  lubiprostone, 24 mcg, Oral, Daily With Breakfast  nystatin, 5 mL, Swish & Swallow, 4x Daily  pantoprazole, 40 mg, Intravenous, Q12H  polyethylene glycol, 17 g, Oral, BID  senna-docusate sodium, 2 tablet, Oral, BID  sodium chloride, 10 mL, Intravenous, Q12H  vancomycin, 1,000 mg, Intravenous, Q12H  [START ON 3/7/2023] vitamin B-12, 500 mcg, Oral, Daily      Continuous Infusions:hold, 1 each  Pharmacy to dose vancomycin,         Vital signs in last 24 hours:  Temp:  [98.2 °F (36.8 °C)-98.7 °F (37.1 °C)] 98.7 °F (37.1 °C)  Heart Rate:  [] 88  Resp:  [18-20] 20  BP: (121-164)/() 149/83    Intake/Output:    Intake/Output Summary (Last 24 hours) at 3/6/2023 0752  Last data filed at 3/6/2023 0600  Gross per 24 hour   Intake 410 ml   Output 575 ml   Net -165 ml       Exam:  /83   Pulse 88   Temp 98.7 °F (37.1 °C) (Oral)   Resp 20   Ht 157.5 cm (62\")   Wt 56.7 kg (125 lb)   SpO2 97%   Breastfeeding No   BMI 22.86 kg/m²   Patient is examined using the personal protective equipment as per guidelines from infection control for this particular patient as enacted.  Hand washing " was performed before and after patient interaction.  General Appearance:  Ill-appearing female who is much more calmer and comfortable compared to 24 hours ago.                          Head:    Normocephalic, without obvious abnormality, atraumatic                           Eyes:    PERRL, conjunctivae/corneas clear, EOM's intact, both eyes                         Throat:   Lips, tongue, gums normal; oral mucosa pink and moist                           Neck:   Supple, symmetrical, trachea midline, no JVD                         Lungs:    Decreased breath sounds bilaterally left greater than right                 Chest Wall:    No tenderness or deformity                          Heart:    Regular rate and rhythm, S1 and S2 normal, no murmur, no rub                                         or gallop                  Abdomen:   Soft nontender epigastric and right upper quadrant tenderness noted.                 Extremities: Right forearm IV phlebitis site erythema and tenderness noted.                        Pulses:   Pulses palpable in all extremities                            Skin:   Skin is warm and dry,  no rashes or palpable lesions                  Neurologic: Awake interactive and grossly nonfocal       Data Review:    I reviewed the patient's new clinical results.  Results from last 7 days   Lab Units 03/05/23  0532 03/04/23  0522 03/03/23  0159 03/02/23  0633 03/01/23  0742 02/28/23  0525   WBC 10*3/mm3 20.27* 23.89* 31.39* 23.40* 19.16* 18.37*   HEMOGLOBIN g/dL 9.3* 8.6* 10.0* 9.9* 9.4* 9.7*   PLATELETS 10*3/mm3 167 176 155 168 243 195     Results from last 7 days   Lab Units 03/05/23  0532 03/04/23  0522 03/03/23  0159 03/02/23  0633 03/01/23  0653 02/28/23  0525   SODIUM mmol/L 134* 134* 132* 135* 136 138   POTASSIUM mmol/L 4.2 4.5 4.7 4.1 3.7 3.8   CHLORIDE mmol/L 99 101 96* 99 101 102   CO2 mmol/L 26.0 25.8 25.9 26.4 27.0 23.0   BUN mg/dL 19 29* 31* 23* 17 21*   CREATININE mg/dL 0.57 0.64 0.81 0.68 0.61  0.68   CALCIUM mg/dL 9.0 8.9 9.5 8.6 8.1* 8.4*   GLUCOSE mg/dL 79 82 143* 95 122* 139*     Microbiology Results (last 10 days)     Procedure Component Value - Date/Time    Blood Culture - Blood, Wrist, Left [[id number]]  (Normal) Collected: 03/04/23 2043    Lab Status: Preliminary result Specimen: Blood from Wrist, Left Updated: 03/05/23 2115     Blood Culture No growth at 24 hours    Blood Culture - Blood, Arm, Right [[id number]]  (Normal) Collected: 03/04/23 1952    Lab Status: Preliminary result Specimen: Blood from Arm, Right Updated: 03/05/23 2016     Blood Culture No growth at 24 hours    S. Pneumo Ag Urine or CSF - Urine, Urine, Clean Catch [[id number]]  (Normal) Collected: 03/04/23 1000    Lab Status: Final result Specimen: Urine, Clean Catch Updated: 03/04/23 1128     Strep Pneumo Ag Negative    Legionella Antigen, Urine - Urine, Urine, Clean Catch [[id number]]  (Normal) Collected: 03/04/23 1000    Lab Status: Final result Specimen: Urine, Clean Catch Updated: 03/04/23 1128     LEGIONELLA ANTIGEN, URINE Negative    Respiratory Culture - Sputum, Cough [[id number]] Collected: 03/04/23 0959    Lab Status: Final result Specimen: Sputum from Cough Updated: 03/04/23 1215     Respiratory Culture Rejected     Gram Stain Many (4+) Epithelial cells per low power field      Many (4+) WBCs per low power field      Many (4+) Mixed bacterial morphotypes seen on Gram Stain    Narrative:      Specimen rejected due to oropharyngeal contamination. Please reorder and recollect specimen if clinically necessary.    Body Fluid Culture - Body Fluid, Pleural Cavity [[id number]]  (Abnormal) Collected: 03/04/23 0900    Lab Status: Preliminary result Specimen: Body Fluid from Pleural Cavity Updated: 03/05/23 0902     Body Fluid Culture Light growth (2+) Staphylococcus aureus, MRSA     Comment:   Methicillin resistant Staphylococcus aureus, Patient may be an isolation risk.        Gram Stain Rare (1+) WBCs per low power field      No  organisms seen    MRSA Screen, PCR (Inpatient) - Swab, Nares [[id number]]  (Abnormal) Collected: 03/03/23 1830    Lab Status: Final result Specimen: Swab from Nares Updated: 03/03/23 2016     MRSA PCR MRSA Detected    Narrative:      The negative predictive value of this diagnostic test is high and should only be used to consider de-escalating anti-MRSA therapy. A positive result may indicate colonization with MRSA and must be correlated clinically.    Respiratory Panel PCR w/COVID-19(SARS-CoV-2) NAOMY/RASHMI/ADINA/PAD/COR/MAD/STEVE In-House, NP Swab in UTM/VTM, 3-4 HR TAT - Swab, Nasopharynx [[id number]]  (Normal) Collected: 03/03/23 1830    Lab Status: Final result Specimen: Swab from Nasopharynx Updated: 03/03/23 2005     ADENOVIRUS, PCR Not Detected     Coronavirus 229E Not Detected     Coronavirus HKU1 Not Detected     Coronavirus NL63 Not Detected     Coronavirus OC43 Not Detected     COVID19 Not Detected     Human Metapneumovirus Not Detected     Human Rhinovirus/Enterovirus Not Detected     Influenza A PCR Not Detected     Influenza B PCR Not Detected     Parainfluenza Virus 1 Not Detected     Parainfluenza Virus 2 Not Detected     Parainfluenza Virus 3 Not Detected     Parainfluenza Virus 4 Not Detected     RSV, PCR Not Detected     Bordetella pertussis pcr Not Detected     Bordetella parapertussis PCR Not Detected     Chlamydophila pneumoniae PCR Not Detected     Mycoplasma pneumo by PCR Not Detected    Narrative:      In the setting of a positive respiratory panel with a viral infection PLUS a negative procalcitonin without other underlying concern for bacterial infection, consider observing off antibiotics or discontinuation of antibiotics and continue supportive care. If the respiratory panel is positive for atypical bacterial infection (Bordetella pertussis, Chlamydophila pneumoniae, or Mycoplasma pneumoniae), consider antibiotic de-escalation to target atypical bacterial infection.    Blood Culture - Blood, Arm,  Left [[id number]]  (Abnormal)  (Susceptibility) Collected: 03/02/23 1955    Lab Status: Final result Specimen: Blood from Arm, Left Updated: 03/06/23 0622     Blood Culture Staphylococcus aureus, MRSA     Comment:   Infectious disease consultation is highly recommended to rule out distant foci of infection.  Methicillin resistant Staphylococcus aureus, Patient may be an isolation risk.        Isolated from Aerobic Bottle     Gram Stain Aerobic Bottle Gram positive cocci in clusters    Susceptibility      Staphylococcus aureus, MRSA      YAKOV      Gentamicin Susceptible      Oxacillin Resistant     Rifampin Susceptible      Vancomycin Susceptible                       Susceptibility Comments     Staphylococcus aureus, MRSA    This isolate does not demonstrate inducible clindamycin resistance in vitro.               Blood Culture - Blood, Arm, Left [[id number]]  (Normal) Collected: 03/02/23 1955    Lab Status: Preliminary result Specimen: Blood from Arm, Left Updated: 03/05/23 2016     Blood Culture No growth at 3 days    Blood Culture ID, PCR - Blood, Arm, Left [[id number]]  (Abnormal) Collected: 03/02/23 1955    Lab Status: Final result Specimen: Blood from Arm, Left Updated: 03/04/23 0439     BCID, PCR Staph aureus. mecA/C and MREJ (methicillin resistance gene) detected. Identification by BCID2 PCR.     BOTTLE TYPE Aerobic Bottle    Narrative:      Infectious disease consultation is highly recommended to rule out distant foci of infection.            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    •     Right Cephalic Forearm: Acute superficial thrombophlebitis noted.     - septic emboli and cavitary lesion in the right lung   - Tricuspid Valve endocarditis   - Empyema due to secondary seeding of the traumatic effusion  2-paresthesia generalized weakness  3-recent fall  4-seizure disorder  5-leukocytosis likely secondary to steroids with superimposed infection  6-other diagnoses per primary team.  7-MRSA colonization     Recommendations/Discussions:  · Continue with IV vancomycin  · Moist heat to the phlebitis site of the right forearm  · Plans for treatment for empyema per thoracic surgery service  · Continue to monitor her back pain and evidence of secondary seeding to the joint and spine which may require repeat imaging studies if her symptoms localizes to these areas and affect her function.  · Discussed with Dr. Mckay.  Reji Oliver MD  3/6/2023  07:52 EST    Parts of this note may be an electronic transcription/translation of spoken language to printed text using the Dragon dictation system.

## 2023-03-07 ENCOUNTER — ANCILLARY PROCEDURE (OUTPATIENT)
Dept: SPEECH THERAPY | Facility: HOSPITAL | Age: 58
DRG: 163 | End: 2023-03-07
Payer: COMMERCIAL

## 2023-03-07 LAB
ALBUMIN SERPL-MCNC: 2.2 G/DL (ref 3.5–5.2)
ALBUMIN/GLOB SERPL: 0.6 G/DL
ALP SERPL-CCNC: 202 U/L (ref 39–117)
ALT SERPL W P-5'-P-CCNC: 29 U/L (ref 1–33)
ANION GAP SERPL CALCULATED.3IONS-SCNC: 10.9 MMOL/L (ref 5–15)
AST SERPL-CCNC: 35 U/L (ref 1–32)
BACTERIA SPEC AEROBE CULT: NORMAL
BASOPHILS # BLD AUTO: 0.02 10*3/MM3 (ref 0–0.2)
BASOPHILS NFR BLD AUTO: 0.1 % (ref 0–1.5)
BILIRUB SERPL-MCNC: 1.4 MG/DL (ref 0–1.2)
BUN SERPL-MCNC: 11 MG/DL (ref 6–20)
BUN/CREAT SERPL: 20.8 (ref 7–25)
CALCIUM SPEC-SCNC: 8.1 MG/DL (ref 8.6–10.5)
CALCIUM SPEC-SCNC: 8.2 MG/DL (ref 8.6–10.5)
CHLORIDE SERPL-SCNC: 96 MMOL/L (ref 98–107)
CO2 SERPL-SCNC: 27.1 MMOL/L (ref 22–29)
CREAT SERPL-MCNC: 0.53 MG/DL (ref 0.57–1)
DEPRECATED RDW RBC AUTO: 50.7 FL (ref 37–54)
EGFRCR SERPLBLD CKD-EPI 2021: 106.7 ML/MIN/1.73
EOSINOPHIL # BLD AUTO: 0.1 10*3/MM3 (ref 0–0.4)
EOSINOPHIL NFR BLD AUTO: 0.6 % (ref 0.3–6.2)
ERYTHROCYTE [DISTWIDTH] IN BLOOD BY AUTOMATED COUNT: 14.1 % (ref 12.3–15.4)
GLOBULIN UR ELPH-MCNC: 3.6 GM/DL
GLUCOSE SERPL-MCNC: 80 MG/DL (ref 65–99)
HCT VFR BLD AUTO: 24 % (ref 34–46.6)
HGB BLD-MCNC: 8.3 G/DL (ref 12–15.9)
HU AB SER QL IF: NEGATIVE
HU2 AB SER QL IF: NEGATIVE
IMM GRANULOCYTES # BLD AUTO: 0.72 10*3/MM3 (ref 0–0.05)
IMM GRANULOCYTES NFR BLD AUTO: 4 % (ref 0–0.5)
LYMPHOCYTES # BLD AUTO: 1.18 10*3/MM3 (ref 0.7–3.1)
LYMPHOCYTES NFR BLD AUTO: 6.6 % (ref 19.6–45.3)
MAGNESIUM SERPL-MCNC: 1.7 MG/DL (ref 1.6–2.6)
MCH RBC QN AUTO: 33.7 PG (ref 26.6–33)
MCHC RBC AUTO-ENTMCNC: 34.6 G/DL (ref 31.5–35.7)
MCV RBC AUTO: 97.6 FL (ref 79–97)
MONOCYTES # BLD AUTO: 1.05 10*3/MM3 (ref 0.1–0.9)
MONOCYTES NFR BLD AUTO: 5.9 % (ref 5–12)
NEUTROPHILS NFR BLD AUTO: 14.71 10*3/MM3 (ref 1.7–7)
NEUTROPHILS NFR BLD AUTO: 82.8 % (ref 42.7–76)
NRBC BLD AUTO-RTO: 0.1 /100 WBC (ref 0–0.2)
OLIGOCLONAL BANDS SERPL: NORMAL
OLIGOCLONAL BANDS.IT SER+CSF QL: NORMAL
PCA-1 AB SER QL IF: NEGATIVE
PHOSPHATE SERPL-MCNC: 3.8 MG/DL (ref 2.5–4.5)
PLATELET # BLD AUTO: 198 10*3/MM3 (ref 140–450)
PMV BLD AUTO: 11.8 FL (ref 6–12)
POTASSIUM SERPL-SCNC: 3.8 MMOL/L (ref 3.5–5.2)
PROT SERPL-MCNC: 5.8 G/DL (ref 6–8.5)
PTH-INTACT SERPL-MCNC: 24.4 PG/ML (ref 15–65)
RBC # BLD AUTO: 2.46 10*6/MM3 (ref 3.77–5.28)
SODIUM SERPL-SCNC: 134 MMOL/L (ref 136–145)
WBC NRBC COR # BLD: 17.78 10*3/MM3 (ref 3.4–10.8)

## 2023-03-07 PROCEDURE — 94799 UNLISTED PULMONARY SVC/PX: CPT

## 2023-03-07 PROCEDURE — 83970 ASSAY OF PARATHORMONE: CPT | Performed by: INTERNAL MEDICINE

## 2023-03-07 PROCEDURE — 99232 SBSQ HOSP IP/OBS MODERATE 35: CPT | Performed by: NURSE PRACTITIONER

## 2023-03-07 PROCEDURE — 84100 ASSAY OF PHOSPHORUS: CPT | Performed by: STUDENT IN AN ORGANIZED HEALTH CARE EDUCATION/TRAINING PROGRAM

## 2023-03-07 PROCEDURE — 92612 ENDOSCOPY SWALLOW (FEES) VID: CPT

## 2023-03-07 PROCEDURE — 99232 SBSQ HOSP IP/OBS MODERATE 35: CPT | Performed by: INTERNAL MEDICINE

## 2023-03-07 PROCEDURE — 80053 COMPREHEN METABOLIC PANEL: CPT | Performed by: STUDENT IN AN ORGANIZED HEALTH CARE EDUCATION/TRAINING PROGRAM

## 2023-03-07 PROCEDURE — 97110 THERAPEUTIC EXERCISES: CPT

## 2023-03-07 PROCEDURE — 85025 COMPLETE CBC W/AUTO DIFF WBC: CPT | Performed by: STUDENT IN AN ORGANIZED HEALTH CARE EDUCATION/TRAINING PROGRAM

## 2023-03-07 PROCEDURE — 94664 DEMO&/EVAL PT USE INHALER: CPT

## 2023-03-07 PROCEDURE — 99232 SBSQ HOSP IP/OBS MODERATE 35: CPT | Performed by: PSYCHIATRY & NEUROLOGY

## 2023-03-07 PROCEDURE — 97530 THERAPEUTIC ACTIVITIES: CPT

## 2023-03-07 PROCEDURE — 25010000002 VANCOMYCIN PER 500 MG: Performed by: INTERNAL MEDICINE

## 2023-03-07 PROCEDURE — 83735 ASSAY OF MAGNESIUM: CPT | Performed by: STUDENT IN AN ORGANIZED HEALTH CARE EDUCATION/TRAINING PROGRAM

## 2023-03-07 PROCEDURE — 94761 N-INVAS EAR/PLS OXIMETRY MLT: CPT

## 2023-03-07 PROCEDURE — 25010000002 VANCOMYCIN PER 500 MG: Performed by: NURSE PRACTITIONER

## 2023-03-07 RX ORDER — SODIUM CHLORIDE 9 MG/ML
75 INJECTION, SOLUTION INTRAVENOUS ONCE
Status: COMPLETED | OUTPATIENT
Start: 2023-03-08 | End: 2023-03-07

## 2023-03-07 RX ORDER — DIAZEPAM 2 MG/1
2 TABLET ORAL EVERY 6 HOURS PRN
Status: DISCONTINUED | OUTPATIENT
Start: 2023-03-07 | End: 2023-03-17 | Stop reason: HOSPADM

## 2023-03-07 RX ORDER — OXYCODONE HYDROCHLORIDE 5 MG/1
5 TABLET ORAL EVERY 4 HOURS PRN
Status: DISCONTINUED | OUTPATIENT
Start: 2023-03-07 | End: 2023-03-17 | Stop reason: HOSPADM

## 2023-03-07 RX ADMIN — Medication 500 MCG: at 08:04

## 2023-03-07 RX ADMIN — VANCOMYCIN HYDROCHLORIDE 1000 MG: 1 INJECTION, SOLUTION INTRAVENOUS at 23:15

## 2023-03-07 RX ADMIN — LUBIPROSTONE 24 MCG: 24 CAPSULE, GELATIN COATED ORAL at 08:04

## 2023-03-07 RX ADMIN — SODIUM CHLORIDE 75 ML/HR: 9 INJECTION, SOLUTION INTRAVENOUS at 23:15

## 2023-03-07 RX ADMIN — VANCOMYCIN HYDROCHLORIDE 1000 MG: 1 INJECTION, SOLUTION INTRAVENOUS at 10:58

## 2023-03-07 RX ADMIN — POLYETHYLENE GLYCOL 3350 17 G: 17 POWDER, FOR SOLUTION ORAL at 08:04

## 2023-03-07 RX ADMIN — VANCOMYCIN HYDROCHLORIDE 1000 MG: 1 INJECTION, SOLUTION INTRAVENOUS at 00:12

## 2023-03-07 RX ADMIN — Medication 100 MG: at 08:04

## 2023-03-07 RX ADMIN — OXYCODONE HYDROCHLORIDE 5 MG: 5 TABLET ORAL at 20:16

## 2023-03-07 RX ADMIN — DESVENLAFAXINE SUCCINATE 25 MG: 25 TABLET, EXTENDED RELEASE ORAL at 08:04

## 2023-03-07 RX ADMIN — FOLIC ACID 1 MG: 1 TABLET ORAL at 08:04

## 2023-03-07 RX ADMIN — DIAZEPAM 2 MG: 2 TABLET ORAL at 20:08

## 2023-03-07 RX ADMIN — HYDROCODONE BITARTRATE AND ACETAMINOPHEN 2 TABLET: 5; 325 TABLET ORAL at 15:12

## 2023-03-07 RX ADMIN — HYDROCODONE BITARTRATE AND ACETAMINOPHEN 2 TABLET: 5; 325 TABLET ORAL at 03:22

## 2023-03-07 RX ADMIN — GABAPENTIN 100 MG: 100 CAPSULE ORAL at 08:04

## 2023-03-07 RX ADMIN — IPRATROPIUM BROMIDE AND ALBUTEROL SULFATE 3 ML: 2.5; .5 SOLUTION RESPIRATORY (INHALATION) at 19:26

## 2023-03-07 RX ADMIN — DOCUSATE SODIUM 50MG AND SENNOSIDES 8.6MG 2 TABLET: 8.6; 5 TABLET, FILM COATED ORAL at 08:04

## 2023-03-07 RX ADMIN — IPRATROPIUM BROMIDE AND ALBUTEROL SULFATE 3 ML: 2.5; .5 SOLUTION RESPIRATORY (INHALATION) at 09:24

## 2023-03-07 RX ADMIN — Medication 10 ML: at 20:08

## 2023-03-07 RX ADMIN — PANTOPRAZOLE SODIUM 40 MG: 40 INJECTION, POWDER, FOR SOLUTION INTRAVENOUS at 06:30

## 2023-03-07 RX ADMIN — GABAPENTIN 100 MG: 100 CAPSULE ORAL at 20:08

## 2023-03-07 RX ADMIN — LIDOCAINE 1 PATCH: 700 PATCH TOPICAL at 08:04

## 2023-03-07 RX ADMIN — HYDROCODONE BITARTRATE AND ACETAMINOPHEN 2 TABLET: 5; 325 TABLET ORAL at 08:05

## 2023-03-07 RX ADMIN — LAMOTRIGINE 200 MG: 100 TABLET ORAL at 08:04

## 2023-03-07 RX ADMIN — Medication 10 ML: at 08:06

## 2023-03-07 NOTE — PLAN OF CARE
Goal Outcome Evaluation:         Pt assessment per flowsheet. Medications per MAR. No acute events. Pt AOX4 today, able to moved BLE's more but remains weak.  at bedside. Spoke with them through day updated on care and surgery tomorrow.

## 2023-03-07 NOTE — THERAPY TREATMENT NOTE
Patient Name: Louise GARCIA  : 1965    MRN: [id number]                              Today's Date: 3/7/2023       Admit Date: 2023    Visit Dx:     ICD-10-CM ICD-9-CM   1. Paresthesia  R20.2 782.0   2. Gait instability  R26.81 781.2   3. Extremity numbness  R20.0 782.0   4. Empyema of pleura (HCC)  J86.9 510.9     Patient Active Problem List   Diagnosis   • Sepsis (HCC)   • Neck pain, chronic   • Upper back pain   • Paresthesia   • Cervical myelopathy (HCC)   • Lower extremity weakness   • History of blood clots   • Chronic anticoagulation   • Seizures (HCC)   • Leukocytosis   • Normocytic anemia   • Hyperglycemia   • GERD (gastroesophageal reflux disease)   • Guillain-Ontario syndrome (HCC)   • Situational mixed anxiety and depressive disorder   • Upper abdominal pain   • Mass of middle lobe of right lung   • Oral candidiasis   • Empyema of pleura (HCC)   • Tricuspid valve vegetation     Past Medical History:   Diagnosis Date   • Degenerative disc disease, cervical    • Gestational diabetes    • H/O blood clots    • Hematemesis    • Seizures (HCC)    • Septic shock (HCC)      Past Surgical History:   Procedure Laterality Date   • APPENDECTOMY     • BACK SURGERY     • CHOLECYSTECTOMY     • ENDOSCOPY N/A 2016    Procedure: ESOPHAGOGASTRODUODENOSCOPY with biopsy;  Surgeon: Austen Brito MD;  Location: [facility];  Service:    • HYSTERECTOMY     • NECK SURGERY       approx 6 yrs ago   • TONSILLECTOMY     • TONSILLECTOMY AND ADENOIDECTOMY        General Information     Row Name 23 1430          OT Time and Intention    Document Type therapy note (daily note)  -KB     Mode of Treatment co-treatment;occupational therapy  cotx due to medical complexity  -KB     Row Name 23 1439          General Information    Patient Profile Reviewed yes  -KB     Existing Precautions/Restrictions fall  -KB     Barriers to Rehab none identified  -KB     Row Name 23 1430          Cognition     Orientation Status (Cognition) oriented x 3  -KB     Row Name 03/07/23 1430          Safety Issues, Functional Mobility    Impairments Affecting Function (Mobility) balance;endurance/activity tolerance;strength;sensation/sensory awareness;pain  -KB     Comment, Safety Issues/Impairments (Mobility) gait belt, non slip socks  -KB           User Key  (r) = Recorded By, (t) = Taken By, (c) = Cosigned By    Initials Name Provider Type    KB Reena Tovar OT Occupational Therapist                 Mobility/ADL's     Row Name 03/07/23 1432          Bed Mobility    Bed Mobility supine-sit  -KB     Supine-Sit [place] (Bed Mobility) moderate assist (50% patient effort);minimum assist (75% patient effort);2 person assist  -KB     Sit-Supine [place] (Bed Mobility) moderate assist (50% patient effort);maximum assist (25% patient effort);2 person assist  -KB     Assistive Device (Bed Mobility) bed rails;head of bed elevated;draw sheet  -KB     Comment, (Bed Mobility) pt sat edge of bed with sba this date.  -     Row Name 03/07/23 1432          Transfers    Transfers sit-stand transfer  -KB     Comment, (Transfers) max assist x2 sit/stand with B knees blocking  -     Row Name 03/07/23 1432          Bed-Chair Transfer    Bed-Chair [place] (Transfers) not tested  -     Row Name 03/07/23 1432          Functional Mobility    Functional Mobility- Comment unable to attempt due to weakness  -     Row Name 03/07/23 1432          Activities of Daily Living    BADL Assessment/Intervention lower body dressing;grooming  -     Row Name 03/07/23 1432          Lower Body Dressing Assessment/Training    [place] Level (Lower Body Dressing) don;socks;dependent (less than 25% patient effort)  -     Position (Lower Body Dressing) edge of bed sitting  -KB     Comment, (Lower Body Dressing) unable to attempt due to weakness  -     Row Name 03/07/23 1432          Grooming Assessment/Training    [place] Level  (Grooming) hair care, combing/brushing;minimum assist (75% patient effort)  -     Position (Grooming) edge of bed sitting  -           User Key  (r) = Recorded By, (t) = Taken By, (c) = Cosigned By    Initials Name Provider Type    Reena Guidry OT Occupational Therapist               Obj/Interventions     Row Name 03/07/23 1435          Strength Comprehensive (MMT)    Comment, General Manual Muscle Testing (MMT) Assessment ue B weakness  -     Row Name 03/07/23 1435          Shoulder (Therapeutic Exercise)    Shoulder (Therapeutic Exercise) AROM (active range of motion)  -     Shoulder AROM (Therapeutic Exercise) bilateral;flexion;extension;5 repetitions  -     Row Name 03/07/23 1435          Elbow/Forearm (Therapeutic Exercise)    Elbow/Forearm (Therapeutic Exercise) AROM (active range of motion)  -     Elbow/Forearm AROM (Therapeutic Exercise) bilateral;5 repetitions;flexion;extension  -     Row Name 03/07/23 1435          Motor Skills    Motor Skills functional endurance  -     Functional Endurance poor  -     Therapeutic Exercise shoulder;elbow/forearm  -     Row Name 03/07/23 1435          Balance    Balance Assessment sitting static balance;sitting dynamic balance  -     Static Sitting Balance standby assist  -     Dynamic Sitting Balance minimal assist;moderate assist  -           User Key  (r) = Recorded By, (t) = Taken By, (c) = Cosigned By    Initials Name Provider Type    Reena Guidry OT Occupational Therapist               Goals/Plan    No documentation.                Clinical Impression     Row Name 03/07/23 1437          Pain Assessment    Pretreatment Pain Rating 0/10 - no pain  -     Posttreatment Pain Rating 0/10 - no pain  -     Row Name 03/07/23 1437          Plan of Care Review    Plan of Care Reviewed With patient  -     Progress improving  -     Outcome Evaluation Pt seen for OT this date with PT due to medical complexity. Pt agreeable to  participate. Pt performed bed mobility with mod assist x2. Sat edge of bed to perform LE dressing and grooming. Pt unable to perform LE dressing due to increased weakness, instability in sitting. Pt required min assist for grooming. Pt performed BUE ther ex, arom exercises. Pt tolerated well. Pt attempted to stand x2 with max assist x2 with B knees buckling. Pt cont to demo declining functional endurance/strength since admission and cont to benefit from OT to address functional strength/endurance. Pt plans for VAT tomorrow. Pt will likely benefit from inpatient rehab upon time of dc.  -     Row Name 03/07/23 1437          Therapy Assessment/Plan (OT)    Rehab Potential (OT) good, to achieve stated therapy goals  -     Criteria for Skilled Therapeutic Interventions Met (OT) meets criteria;yes;skilled treatment is necessary  -KB     Therapy Frequency (OT) 5 times/wk  -     Row Name 03/07/23 1437          Therapy Plan Review/Discharge Plan (OT)    Anticipated Discharge Disposition (OT) inpatient rehabilitation facility  -     Row Name 03/07/23 1437          Positioning and Restraints    Pre-Treatment Position in bed  -KB     Post Treatment Position bed  -KB     In Bed supine;call light within reach;encouraged to call for assist;with PT  -KB           User Key  (r) = Recorded By, (t) = Taken By, (c) = Cosigned By    Initials Name Provider Type    Reena Guidry, WILI Occupational Therapist               Outcome Measures     Row Name 03/07/23 1442          How much help from another is currently needed...    Putting on and taking off regular lower body clothing? 1  -KB     Bathing (including washing, rinsing, and drying) 1  -KB     Toileting (which includes using toilet bed pan or urinal) 1  -KB     Putting on and taking off regular upper body clothing 2  -KB     Taking care of personal grooming (such as brushing teeth) 3  -KB     Eating meals 3  -KB     AM-PAC 6 Clicks Score (OT) 11  -KB     Row Name 03/07/23  1418          How much help from another person do you currently need...    Turning from your back to your side while in flat bed without using bedrails? 1  -PC     Moving from lying on back to sitting on the side of a flat bed without bedrails? 2  -PC     Moving to and from a bed to a chair (including a wheelchair)? 1  -PC     Standing up from a chair using your arms (e.g., wheelchair, bedside chair)? 2  -PC     Climbing 3-5 steps with a railing? 1  -PC     To walk in hospital room? 1  -PC     AM-PAC 6 Clicks Score (PT) 8  -PC     Highest level of mobility 3 --> Sat at edge of bed  -PC           User Key  (r) = Recorded By, (t) = Taken By, (c) = Cosigned By    Initials Name Provider Type    PC Saida Burrows, PT Physical Therapist    Reena Guidry, OT Occupational Therapist                Occupational Therapy Education     Title: PT OT SLP Therapies (In Progress)     Topic: Occupational Therapy (Done)     Point: ADL training (Done)     Description:   Instruct learner(s) on proper safety adaptation and remediation techniques during self care or transfers.   Instruct in proper use of assistive devices.              Learning Progress Summary           Patient Acceptance, E,D, NR by PC at 3/6/2023 1357    Acceptance, E, VU by BL at 3/6/2023 0830    Comment: Spoke with patient and spouse at bedside through day.    Acceptance, E,TB, VU by JOÃO at 3/2/2023 1836    Acceptance, E, VU by MW at 2/27/2023 1522    Comment: role of OT, d/c rec, goals of care   Family Acceptance, E, VU by BL at 3/6/2023 0830    Comment: Spoke with patient and spouse at bedside through day.    Acceptance, E, VU by MW at 2/27/2023 1522    Comment: role of OT, d/c rec, goals of care                   Point: Home exercise program (Done)     Description:   Instruct learner(s) on appropriate technique for monitoring, assisting and/or progressing therapeutic exercises/activities.              Learning Progress Summary           Patient Acceptance,  E,D, NR by PC at 3/6/2023 1357    Acceptance, E, VU by BL at 3/6/2023 0830    Comment: Spoke with patient and spouse at bedside through day.    Acceptance, E,TB, VU by JOÃO at 3/2/2023 1836   Family Acceptance, E, VU by BL at 3/6/2023 0830    Comment: Spoke with patient and spouse at bedside through day.                   Point: Precautions (Done)     Description:   Instruct learner(s) on prescribed precautions during self-care and functional transfers.              Learning Progress Summary           Patient Acceptance, E,D, NR by PC at 3/6/2023 1357    Acceptance, E, VU by BL at 3/6/2023 0830    Comment: Spoke with patient and spouse at bedside through day.    Acceptance, E,TB, VU by JOÃO at 3/2/2023 1836    Acceptance, E, VU by MW at 2/27/2023 1522    Comment: role of OT, d/c rec, goals of care   Family Acceptance, E, VU by BL at 3/6/2023 0830    Comment: Spoke with patient and spouse at bedside through day.    Acceptance, E, VU by MW at 2/27/2023 1522    Comment: role of OT, d/c rec, goals of care                   Point: Body mechanics (Done)     Description:   Instruct learner(s) on proper positioning and spine alignment during self-care, functional mobility activities and/or exercises.              Learning Progress Summary           Patient Acceptance, E,D, NR by PC at 3/6/2023 1357    Acceptance, E, VU by BL at 3/6/2023 0830    Comment: Spoke with patient and spouse at bedside through day.    Acceptance, E,TB, VU by JOÃO at 3/2/2023 1836    Acceptance, E, VU by MW at 2/27/2023 1522    Comment: role of OT, d/c rec, goals of care   Family Acceptance, E, VU by BL at 3/6/2023 0830    Comment: Spoke with patient and spouse at bedside through day.    Acceptance, E, VU by MW at 2/27/2023 1522    Comment: role of OT, d/c rec, goals of care                               User Key     Initials Effective Dates Name Provider Type Discipline    PC 06/16/21 -  Saida Burrows PT Physical Therapist PT    MW 08/20/21 -   Nathalia Holly, OT Occupational Therapist OT     10/18/22 -  Josefa Jones, RN Registered Nurse Nurse     09/20/22 -  Jayda Patel RN Registered Nurse Nurse              OT Recommendation and Plan  Therapy Frequency (OT): 5 times/wk  Plan of Care Review  Plan of Care Reviewed With: patient  Progress: improving  Outcome Evaluation: Pt seen for OT this date with PT due to medical complexity. Pt agreeable to participate. Pt performed bed mobility with mod assist x2. Sat edge of bed to perform LE dressing and grooming. Pt unable to perform LE dressing due to increased weakness, instability in sitting. Pt required min assist for grooming. Pt performed BUE ther ex, arom exercises. Pt tolerated well. Pt attempted to stand x2 with max assist x2 with B knees buckling. Pt cont to demo declining functional endurance/strength since admission and cont to benefit from OT to address functional strength/endurance. Pt plans for VAT tomorrow. Pt will likely benefit from inpatient rehab upon time of dc.     Time Calculation:    Time Calculation- OT     Row Name 03/07/23 1442             Time Calculation- OT    OT Start Time 1322  -KB      OT Stop Time 1346  -KB      OT Time Calculation (min) 24 min  -KB      Total Timed Code Minutes- OT 24 minute(s)  -KB      OT Received On 03/07/23  -KB      OT - Next Appointment 03/08/23  -KB      OT Goal Re-Cert Due Date 03/13/23  -KB         Timed Charges    40197 - OT Therapeutic Activity Minutes 24  -KB         Total Minutes    Timed Charges Total Minutes 24  -KB       Total Minutes 24  -KB            User Key  (r) = Recorded By, (t) = Taken By, (c) = Cosigned By    Initials Name Provider Type    KB Reena Tovar OT Occupational Therapist              Therapy Charges for Today     Code Description Service Date Service Provider Modifiers Qty    [id number]  OT THERAPEUTIC ACT EA 15 MIN 3/7/2023 Reena Tovar OT GO 2               Reena Tovar OT  3/7/2023

## 2023-03-07 NOTE — PROGRESS NOTES
[facility]  [phone]  Dr. Eliezer Cruz    Subjective:  LOS: 12    Chief Complaint:  AMS and Empyema    Feels much better today. On RA.    Objective   Vital Signs past 24hrs    Temp range: Temp (24hrs), Av.7 °F (37.1 °C), Min:98.4 °F (36.9 °C), Max:99 °F (37.2 °C)    BP range: BP: (116-151)/() 140/73  Pulse range: Heart Rate:  [] 76  Resp rate range: Resp:  [15-23] 18  Device (Oxygen Therapy): nasal cannulaFlow (L/min):  [3] 3  Oxygen range:SpO2:  [91 %-99 %] 97 %       Physical Exam  Cardiovascular:      Rate and Rhythm: Normal rate and regular rhythm.      Heart sounds: No murmur heard.  Pulmonary:      Breath sounds: Rales (right side) present.   Abdominal:      General: Bowel sounds are normal.      Palpations: Abdomen is soft. There is no mass.      Tenderness: There is no abdominal tenderness.   Musculoskeletal:         General: No swelling.   Neurological:      Mental Status: She is alert.       Results Review:    I have reviewed the laboratory and imaging data since the last note by [facility] physician.  My annotations are noted in assessment and plan.      Result Review:  I have personally reviewed the results from last note by [facility] physician to 3/7/2023 09:03 EST and agree with these findings:  [x]  Laboratory list / accordion  [x]  Microbiology  [x]  Radiology  [x]  EKG/Telemetry   []  Cardiology/Vascular   []  Pathology  []  Old records  []  Other:    Medication Review:  I have reviewed the current MAR.  My annotations are noted in assessment and plan.    bisacodyl, 10 mg, Rectal, Daily  desvenlafaxine, 25 mg, Oral, Daily  folic acid, 1 mg, Oral, Daily  gabapentin, 100 mg, Oral, Q12H  ipratropium-albuterol, 3 mL, Nebulization, 4x Daily - RT  lactulose, 20 g, Oral, BID  lamoTRIgine, 200 mg, Oral, Daily  lidocaine, 1 patch, Transdermal, Q24H  lidocaine, 1 patch, Transdermal, Q24H  lidocaine, 10 mL, Intradermal, Once  lubiprostone, 24 mcg, Oral, Daily With  Breakfast  pantoprazole, 40 mg, Intravenous, Q AM  polyethylene glycol, 17 g, Oral, BID  senna-docusate sodium, 2 tablet, Oral, BID  sodium chloride, 10 mL, Intravenous, Q12H  thiamine, 100 mg, Oral, Daily  vancomycin, 1,000 mg, Intravenous, Q12H  vitamin B-12, 500 mcg, Oral, Daily        hold, 1 each  hold, 1 each  Pharmacy to dose vancomycin,       Lines, Drains & Airways     Active LDAs     Name Placement date Placement time Site Days    Peripheral IV 03/03/23 0815 Anterior;Distal;Right;Upper Arm 03/03/23  0815  Arm  3    Peripheral IV 03/06/23 0520 Left;Anterior Wrist 03/06/23 0520  Wrist  less than 1    External Urinary Catheter --  --  --  --              Contact  Diet Orders (active) (From admission, onward)     Start     Ordered    03/06/23 1043  NPO Diet NPO Type: Other (see comments), Sips with Meds  Diet Effective Now        Comments: Meds whole or crushed in puree. Can allow free water protocol with ice.    03/06/23 1043              PCCM Problems  Paraparesis and areflexia - unexplained  MRSA pneumonia/empyema  Elevated liver enz  Anemia  Dysphagia  Hx DVT, was on Eliquis  Osteonecrosis of femoral heads            Plan of Treatment    Discussed with neurology. Idiopathic neuropathy with negative reveles so far.    MRSA pneumonia and empyema - on abx. Plan VATS on Wednesday. (was on Eliquis).    Elevated liver enz of uncertain etiology. Improved.    Note speech input - proceed with FEES and decide on diet.    Hx DVT and Eliquis on hold.    Transfer out.      Eliezer Cruz MD  03/07/23  09:03 EST      Part of this note may be an electronic transcription/translation of spoken language to printed text using the Dragon Dictation System.

## 2023-03-07 NOTE — THERAPY TREATMENT NOTE
Patient Name: Louise GARCIA  : 1965    MRN: [id number]                              Today's Date: 3/7/2023       Admit Date: 2023    Visit Dx:     ICD-10-CM ICD-9-CM   1. Paresthesia  R20.2 782.0   2. Gait instability  R26.81 781.2   3. Extremity numbness  R20.0 782.0   4. Empyema of pleura (HCC)  J86.9 510.9     Patient Active Problem List   Diagnosis   • Sepsis (HCC)   • Neck pain, chronic   • Upper back pain   • Paresthesia   • Cervical myelopathy (HCC)   • Lower extremity weakness   • History of blood clots   • Chronic anticoagulation   • Seizures (HCC)   • Leukocytosis   • Normocytic anemia   • Hyperglycemia   • GERD (gastroesophageal reflux disease)   • Guillain-Ellis syndrome (HCC)   • Situational mixed anxiety and depressive disorder   • Upper abdominal pain   • Mass of middle lobe of right lung   • Oral candidiasis   • Empyema of pleura (HCC)   • Tricuspid valve vegetation     Past Medical History:   Diagnosis Date   • Degenerative disc disease, cervical    • Gestational diabetes    • H/O blood clots    • Hematemesis    • Seizures (HCC)    • Septic shock (HCC)      Past Surgical History:   Procedure Laterality Date   • APPENDECTOMY     • BACK SURGERY     • CHOLECYSTECTOMY     • ENDOSCOPY N/A 2016    Procedure: ESOPHAGOGASTRODUODENOSCOPY with biopsy;  Surgeon: Austen Brito MD;  Location: [facility];  Service:    • HYSTERECTOMY     • NECK SURGERY       approx 6 yrs ago   • TONSILLECTOMY     • TONSILLECTOMY AND ADENOIDECTOMY        General Information     Row Name 23 140          Physical Therapy Time and Intention    Document Type therapy note (daily note)  -PC     Mode of Treatment physical therapy;co-treatment  -PC     Row Name 23 140          General Information    Existing Precautions/Restrictions fall  -PC     Row Name 23 1401          Cognition    Orientation Status (Cognition) oriented x 3  -PC           User Key  (r) = Recorded By, (t) = Taken By,  (c) = Cosigned By    Initials Name Provider Type    PC Saida Burrows, PT Physical Therapist               Mobility     Row Name 03/07/23 1411          Bed Mobility    Supine-Sit Luce (Bed Mobility) moderate assist (50% patient effort);2 person assist  -PC     Sit-Supine Luce (Bed Mobility) moderate assist (50% patient effort);maximum assist (25% patient effort);2 person assist  -PC     Row Name 03/07/23 1411          Sit-Stand Transfer    Sit-Stand Luce (Transfers) moderate assist (50% patient effort);maximum assist (25% patient effort);2 person assist  -PC     Comment, (Sit-Stand Transfer) HHA x 2 with knees blocked, attempted weight shifting L-R , knees buckling  -PC           User Key  (r) = Recorded By, (t) = Taken By, (c) = Cosigned By    Initials Name Provider Type    PC Saida Burrows, PT Physical Therapist               Obj/Interventions     Row Name 03/07/23 1414          Motor Skills    Therapeutic Exercise --  worked on AAROM ex B LEs with PNF patterns, active AP, QS, GS  -     Row Name 03/07/23 1414          Balance    Comment, Balance worked in sitting on reaching, trunk control  -PC           User Key  (r) = Recorded By, (t) = Taken By, (c) = Cosigned By    Initials Name Provider Type    PC Saida Burrows, PT Physical Therapist               Goals/Plan    No documentation.                Clinical Impression     Row Name 03/07/23 1415          Pain    Pretreatment Pain Rating 0/10 - no pain  -     Row Name 03/07/23 1415          Plan of Care Review    Plan of Care Reviewed With patient  -PC     Progress improving  -PC     Outcome Evaluation Good participation with PT/OT today, worked in sitting on balance and trunk control and worked on LE strengthening ex, pt needed mod A x 2 to sit edge of bed, did 2 sit to stand attempts, knees buckling, L LE weaker than RLE, noted plans for VAT tomorrow, PT will follow up after thoracic surgery  -     Row Name 03/07/23 1416           Positioning and Restraints    Pre-Treatment Position in bed  -PC     Post Treatment Position bed  -PC     In Bed supine;call light within reach;encouraged to call for assist  -PC           User Key  (r) = Recorded By, (t) = Taken By, (c) = Cosigned By    Initials Name Provider Type    PC Saida Burrows, PT Physical Therapist               Outcome Measures     Row Name 03/07/23 1418          How much help from another person do you currently need...    Turning from your back to your side while in flat bed without using bedrails? 1  -PC     Moving from lying on back to sitting on the side of a flat bed without bedrails? 2  -PC     Moving to and from a bed to a chair (including a wheelchair)? 1  -PC     Standing up from a chair using your arms (e.g., wheelchair, bedside chair)? 2  -PC     Climbing 3-5 steps with a railing? 1  -PC     To walk in hospital room? 1  -PC     AM-PAC 6 Clicks Score (PT) 8  -PC     Highest level of mobility 3 --> Sat at edge of bed  -PC           User Key  (r) = Recorded By, (t) = Taken By, (c) = Cosigned By    Initials Name Provider Type    PC Saida Burrows, PT Physical Therapist                             Physical Therapy Education     Title: PT OT SLP Therapies (In Progress)     Topic: Physical Therapy (In Progress)     Point: Mobility training (In Progress)     Learning Progress Summary           Patient Acceptance, E,D, NR by PC at 3/7/2023 1419    Acceptance, E, VU by BL at 3/6/2023 0830    Comment: Spoke with patient and spouse at bedside through day.    Acceptance, E, NR by AR at 3/3/2023 1346    Acceptance, E,TB, VU by JOÃO at 3/2/2023 1836    Acceptance, E,TB, VU by MS at 3/1/2023 1135    Acceptance, E,TB, VU by MS at 2/28/2023 1105    Acceptance, E,TB,D, VU,DU by LB at 2/26/2023 1159    Acceptance, E,TB, VU,DU by LB at 2/25/2023 1542    Acceptance, E,D, VU,NR by EB at 2/24/2023 1254    Acceptance, E,D, VU,NR by EB at 2/23/2023 1057    Acceptance, E,TB, VU,NR by EB1 at 2/21/2023  1134   Family Acceptance, E, VU by BL at 3/6/2023 0830    Comment: Spoke with patient and spouse at bedside through day.    Acceptance, E, NR by AR at 3/3/2023 1346   Significant Other Acceptance, E,TB, VU,NR by EB1 at 2/21/2023 1134                   Point: Home exercise program (In Progress)     Learning Progress Summary           Patient Acceptance, E,D, NR by PC at 3/7/2023 1419    Acceptance, E,D, NR by PC at 3/6/2023 1357    Acceptance, E, VU by BL at 3/6/2023 0830    Comment: Spoke with patient and spouse at bedside through day.    Acceptance, E, NR by AR at 3/3/2023 1346    Acceptance, E,TB, VU by JOÃO at 3/2/2023 1836    Acceptance, E,TB, VU by MS at 2/28/2023 1105    Acceptance, E,TB,D, VU,DU by LB at 2/26/2023 1159    Acceptance, E,TB, VU,DU by LB at 2/25/2023 1542    Acceptance, E,D, VU,NR by EB at 2/24/2023 1254    Acceptance, E,D, VU,NR by EB at 2/23/2023 1057   Family Acceptance, E, VU by BL at 3/6/2023 0830    Comment: Spoke with patient and spouse at bedside through day.    Acceptance, E, NR by AR at 3/3/2023 1346                   Point: Body mechanics (In Progress)     Learning Progress Summary           Patient Acceptance, E,D, NR by PC at 3/7/2023 1419    Acceptance, E, VU by BL at 3/6/2023 0830    Comment: Spoke with patient and spouse at bedside through day.    Acceptance, E, NR by AR at 3/3/2023 1346    Acceptance, E,TB, VU by JOÃO at 3/2/2023 1836    Acceptance, E,TB,D, VU,DU by LB at 2/26/2023 1159    Acceptance, E,TB, VU,DU by LB at 2/25/2023 1542    Acceptance, E,D, VU,NR by EB at 2/24/2023 1254    Acceptance, E,D, VU,NR by EB at 2/23/2023 1057   Family Acceptance, E, VU by BL at 3/6/2023 0830    Comment: Spoke with patient and spouse at bedside through day.    Acceptance, E, NR by AR at 3/3/2023 1346                   Point: Precautions (In Progress)     Learning Progress Summary           Patient Acceptance, E,D, NR by PC at 3/7/2023 1419    Acceptance, E, VU by BL at 3/6/2023 0830     Comment: Spoke with patient and spouse at bedside through day.    Acceptance, E, NR by AR at 3/3/2023 1346    Acceptance, E,TB, VU by JOÃO at 3/2/2023 1836    Acceptance, E,TB,D, VU,DU by LB at 2/26/2023 1159    Acceptance, E,TB, VU,DU by LB at 2/25/2023 1542    Acceptance, E,D, VU,NR by EB at 2/24/2023 1254    Acceptance, E,D, VU,NR by EB at 2/23/2023 1057   Family Acceptance, E, VU by BL at 3/6/2023 0830    Comment: Spoke with patient and spouse at bedside through day.    Acceptance, E, NR by AR at 3/3/2023 1346                               User Key     Initials Effective Dates Name Provider Type Discipline    PC 06/16/21 -  Saida Burrows, PT Physical Therapist PT    AR 06/16/21 -  Jovita Miller, PT Physical Therapist PT    LB 08/09/20 -  Nia Arellano, PT Physical Therapist PT    MS 06/16/21 -  Елена Florez, PT Physical Therapist PT    EB 02/14/23 -  Caren James, PTA Physical Therapist Assistant PT    BL 10/18/22 -  Josefa Jones, RN Registered Nurse Nurse    JOÃO 09/20/22 -  Jayda Patel, VIKA Registered Nurse Nurse    EB1 01/12/23 -  Yesica Zamora, ALBERTO Student PT Student PT              PT Recommendation and Plan     Plan of Care Reviewed With: patient  Progress: improving  Outcome Evaluation: Good participation with PT/OT today, worked in sitting on balance and trunk control and worked on LE strengthening ex, pt needed mod A x 2 to sit edge of bed, did 2 sit to stand attempts, knees buckling, L LE weaker than RLE, noted plans for VAT tomorrow, PT will follow up after thoracic surgery     Time Calculation:    PT Charges     Row Name 03/07/23 1419             Time Calculation    Start Time 1324  -PC      Stop Time 1347  -PC      Time Calculation (min) 23 min  -PC      PT Received On 03/07/23  -PC      PT - Next Appointment 03/08/23  -PC            User Key  (r) = Recorded By, (t) = Taken By, (c) = Cosigned By    Initials Name Provider Type    PC Saida Burrows, PT Physical Therapist              Therapy  Charges for Today     Code Description Service Date Service Provider Modifiers Qty    26391406387  PT THER PROC EA 15 MIN 3/6/2023 Saida Burrows, PT GP 1    17081302409 HC PT THER PROC EA 15 MIN 3/7/2023 Saida Burrows, PT GP 2    [id number]  PT THER SUPP EA 15 MIN 3/7/2023 Saida Burrows, PT GP 1          PT G-Codes  Outcome Measure Options: AM-PAC 6 Clicks Basic Mobility (PT)  AM-PAC 6 Clicks Score (PT): 8  AM-PAC 6 Clicks Score (OT): 15  Modified Bergen Scale: 4 - Moderately severe disability.  Unable to walk without assistance, and unable to attend to own bodily needs without assistance.       Saida Burrows, PT  3/7/2023

## 2023-03-07 NOTE — PLAN OF CARE
Goal Outcome Evaluation:  Plan of Care Reviewed With: patient        Progress: improving  Outcome Evaluation: Good participation with PT/OT today, worked in sitting on balance and trunk control and worked on LE strengthening ex, pt needed mod A x 2 to sit edge of bed, did 2 sit to stand attempts, knees buckling, L LE weaker than RLE, noted plans for VAT tomorrow, PT will follow up after thoracic surgery

## 2023-03-07 NOTE — H&P (VIEW-ONLY)
"    Chief Complaint: Empyema, follow-up    Subjective:  Symptoms:  Improved.  She reports shortness of breath and chest pain.    Diet:  Poor intake.  No nausea or vomiting.    Activity level: Impaired due to weakness.    Pain:  She complains of pain that is mild.  She reports pain is improving.        Vital Signs:  Temp:  [98.4 °F (36.9 °C)-98.9 °F (37.2 °C)] 98.4 °F (36.9 °C)  Heart Rate:  [] 91  Resp:  [15-18] 18  BP: ()/(59-87) 120/65    Intake & Output (last day)       03/06 0701  03/07 0700 03/07 0701  03/08 0700    I.V. (mL/kg)      IV Piggyback      Total Intake(mL/kg)      Urine (mL/kg/hr) 275 (0.2)     Stool 0     Total Output 275     Net -275           Urine Unmeasured Occurrence 1 x     Stool Unmeasured Occurrence 1 x           Objective:  General Appearance:  Comfortable, ill-appearing, in no acute distress and not in pain.    Vital signs: (most recent): Blood pressure 120/65, pulse 91, temperature 98.4 °F (36.9 °C), temperature source Oral, resp. rate 18, height 157.5 cm (62\"), weight 56.7 kg (125 lb), SpO2 97 %, not currently breastfeeding.  Vital signs are normal.  No fever.    Lungs:  Normal effort and normal respiratory rate.  There are decreased breath sounds.  No wheezes or rhonchi.    Heart: Normal rate.  Regular rhythm.    Chest: No chest wall tenderness.    Abdomen: Bowel sounds are normal.   There is no mass.   Pulses: Distal pulses are intact.    Neurological: Patient is alert and oriented to person, place and time.    Skin:  Warm and dry.          Results Review:     I reviewed the patient's new clinical results.  I reviewed the patient's new imaging results and agree with the interpretation.  I reviewed the patient's other test results and agree with the interpretation  Discussed with patient, RN and Dr. Whitehead.    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 The patient was placed in  a left decubitus position on the fluoroscopy table. The skin surface for  access to the thecal sac was identified fluoroscopically and marked. The  area was prepped and draped in the usual sterile fashion. 1% lidocaine  was utilized for local anesthesia. The tip of a 22-gauge spinal needle  was then advanced into the lumbar thecal sac at level of superior L3  using intermittent fluoroscopic guidance. Needle tip position was  confirmed by noting clear spinal fluid in the needle hub. A total of 10  mL clear cerebrospinal fluid was then obtained via gravity drainage and  sent to the clinical laboratories for analysis as requested by the  clinical service. The needle was removed and direct pressure was applied  for a few minutes. There were no immediate complications. All elements  of maximal sterile technique were followed. 0minutes 26 seconds  fluoroscopy time, 4 mGy, 2 stored images.          Impression:      Successful lumbar puncture.     This report was finalized on 3/6/2023 4:30 PM by Dr. Rickie Rodriguez M.D.             Lab Results:     Lab Results (last 24 hours)     Procedure Component Value Units Date/Time    Oligoclonal Banding (COLLECT RED TUBE) [[id number]] Collected: 03/06/23 1551    Specimen: Cerebrospinal Fluid from Lumbar Puncture Updated: 03/07/23 1311    Narrative:      The following orders were created for panel order Oligoclonal Banding (COLLECT RED TUBE).  Procedure                               Abnormality         Status                     ---------                               -----------         ------                     Oligoclonal Banding - Ce...[[id number]]                      Final result               Red Top[[id number]]                                          Final result                  Please view results for these tests on the individual orders.    Oligoclonal Banding - Cerebrospinal Fluid, Lumbar Puncture [[id number]] Collected: 03/06/23 1551    Specimen: Cerebrospinal Fluid from Lumbar Puncture Updated: 03/07/23 1311     Oligo Bands TNP     Comment: Test not performed. Duplicate tests ordered.  See [id number]     3-7-23        Oligoclonal Bands, CSF TNP     Comment: Test not performed       Narrative:      Performed at:  [address]  [id number]  : Devon Madden PhD, Phone:  [phone]    Paraneoplastic Profile 1 [[id number]] Collected: 03/04/23 0947    Specimen: Blood Updated: 03/07/23 1212     Anti-Hu Antibody Negative     Anti-Ri Antibodies* Negative     Anti-Yo Antibodies* Negative    Narrative:      Test(s) 505241-Anti-Hu Ab; 427628-Ycub-Mz Ab; 296624-Ysfe-Iw Ab  was developed and its performance characteristics determined  by [facility]. It has not been cleared or approved by the Food  and Drug Administration.  Performed at:  [address]  [id number]  : Jeane Abdullahi MD, Phone:  [phone]    Culture, CSF - Cerebrospinal Fluid, Lumbar Puncture [[id number]] Collected: 03/06/23 1550    Specimen: Cerebrospinal Fluid from Lumbar Puncture Updated: 03/07/23 1120     CSF Culture No growth     Gram Stain No WBCs or organisms seen    Non-gynecologic Cytology [[id number]] Collected: 03/04/23 0900    Specimen: Pleural Fluid from Pleural Cavity Updated: 03/07/23 0918     Case Report --     Non-gynecologic Cytology                          Case: [id number]                                  Authorizing Provider:  Franklyn Powers MD        Collected:           03/04/2023 09:00 AM          [facility]  Received:            03/06/2023 10:15 AM                                 Agapito SALMON                                                                        Pathologist:           uM Meyer MD                                                         Specimen:    Pleural Cavity, Right Thoracentesis                                                         Final Diagnosis --     1. Pleural Fluid, Right Thoracentesis:   A. Negative for malignant cells.  B. Abundant mixed inflammation, rare histiocytes and mesothelium.  C. Flow pending.        Gross Description --     1. Pleural Cavity.  4 syringes containing in total 28.5 mls of yellow fluid with blood clot received. 1 thin prep, cellblock (in formalin @ 10:52 on 3/6/23) and portion of fluid sent to [facility] for flow cytometry studies. 10 mls of fluid remaining.          Microscopic Description --     Screened by S. Sacksteder      PTH, Intact & Calcium [[id number]]  (Abnormal) Collected: 03/07/23 0733    Specimen: Blood Updated: 03/07/23 0908     PTH, Intact 24.4 pg/mL      Calcium 8.2 mg/dL     Narrative:      PTH results may be falsely decreased if patient taking Biotin.        Comprehensive Metabolic Panel [[id number]]  (Abnormal) Collected: 03/07/23 0733    Specimen: Blood Updated: 03/07/23 0903     Glucose 80 mg/dL      BUN 11 mg/dL      Creatinine 0.53 mg/dL      Sodium 134 mmol/L      Potassium 3.8 mmol/L      Chloride 96 mmol/L      CO2 27.1 mmol/L      Calcium 8.1 mg/dL      Total Protein 5.8 g/dL      Albumin 2.2 g/dL      ALT (SGPT) 29 U/L      AST (SGOT) 35 U/L      Alkaline Phosphatase 202 U/L      Total Bilirubin 1.4 mg/dL      Globulin 3.6 gm/dL      A/G Ratio 0.6 g/dL      BUN/Creatinine Ratio 20.8     Anion Gap 10.9 mmol/L      eGFR 106.7 mL/min/1.73     Narrative:      GFR Normal >60  Chronic Kidney Disease <60  Kidney Failure <15      Phosphorus [[id number]]  (Normal) Collected: 03/07/23 0733    Specimen: Blood Updated: 03/07/23 0903     Phosphorus 3.8 mg/dL     Magnesium [[id number]]  (Normal) Collected: 03/07/23 0733    Specimen: Blood Updated: 03/07/23 0903     Magnesium 1.7 mg/dL     CBC &  Differential [[id number]]  (Abnormal) Collected: 03/07/23 0733    Specimen: Blood Updated: 03/07/23 0845    Narrative:      The following orders were created for panel order CBC & Differential.  Procedure                               Abnormality         Status                     ---------                               -----------         ------                     CBC Auto Differential[[id number]]        Abnormal            Final result                 Please view results for these tests on the individual orders.    CBC Auto Differential [[id number]]  (Abnormal) Collected: 03/07/23 0733    Specimen: Blood Updated: 03/07/23 0845     WBC 17.78 10*3/mm3      RBC 2.46 10*6/mm3      Hemoglobin 8.3 g/dL      Hematocrit 24.0 %      MCV 97.6 fL      MCH 33.7 pg      MCHC 34.6 g/dL      RDW 14.1 %      RDW-SD 50.7 fl      MPV 11.8 fL      Platelets 198 10*3/mm3      Neutrophil % 82.8 %      Lymphocyte % 6.6 %      Monocyte % 5.9 %      Eosinophil % 0.6 %      Basophil % 0.1 %      Immature Grans % 4.0 %      Neutrophils, Absolute 14.71 10*3/mm3      Lymphocytes, Absolute 1.18 10*3/mm3      Monocytes, Absolute 1.05 10*3/mm3      Eosinophils, Absolute 0.10 10*3/mm3      Basophils, Absolute 0.02 10*3/mm3      Immature Grans, Absolute 0.72 10*3/mm3      nRBC 0.1 /100 WBC     Anaerobic Culture - Pleural Fluid, Pleural Cavity [[id number]]  (Normal) Collected: 03/04/23 0900    Specimen: Pleural Fluid from Pleural Cavity Updated: 03/07/23 0707     Anaerobic Culture Screening for Anaerobes    Blood Culture - Blood, Wrist, Left [[id number]]  (Normal) Collected: 03/04/23 2043    Specimen: Blood from Wrist, Left Updated: 03/06/23 2115     Blood Culture No growth at 2 days    Blood Culture - Blood, Arm, Left [[id number]]  (Normal) Collected: 03/02/23 1955    Specimen: Blood from Arm, Left Updated: 03/06/23 2015     Blood Culture No growth at 4 days    Blood Culture - Blood, Arm, Right [[id number]]  (Normal) Collected: 03/04/23 1952     Specimen: Blood from Arm, Right Updated: 03/06/23 2015     Blood Culture No growth at 2 days    Red Top [[id number]] Collected: 03/06/23 1628    Specimen: Blood Updated: 03/06/23 1731     Extra Tube Hold for add-ons.     Comment: Auto resulted.       Meningitis / Encephalitis Panel, PCR - Cerebrospinal Fluid, Lumbar Puncture [[id number]]  (Normal) Collected: 03/06/23 1550    Specimen: Cerebrospinal Fluid from Lumbar Puncture Updated: 03/06/23 1725     ESCHERICHIA COLI K1, PCR Not Detected     HAEMOPHILUS INFLUENZAE, PCR Not Detected     LISTERIA MONOCYTOGENES, PCR Not Detected     NEISSERIA MENINGITIDIS, PCR Not Detected     STREPTOCOCCUS AGALACTIAE, PCR Not Detected     STREPTOCOCCUS PNEUMONIAE, PCR Not Detected     CYTOMEGALOVIRUS (CMV), PCR Not Detected     ENTEROVIRUS, PCR Not Detected     HERPES SIMPLEX VIRUS 1 (HSV-1), PCR Not Detected     HERPES SIMPLEX VIRUS 2 (HSV-2), PCR Not Detected     HUMAN PARECHOVIRUS, PCR Not Detected     VARICELLA ZOSTER VIRUS (VZV), PCR Not Detected     CRYPTOCOCCUS NEOFORMANS / GATTII, PCR Not Detected     HUMAN HERPES VIRUS 6 PCR Not Detected    Protein, CSF - Cerebrospinal Fluid, Lumbar Puncture [[id number]]  (Normal) Collected: 03/06/23 1550    Specimen: Cerebrospinal Fluid from Lumbar Puncture Updated: 03/06/23 1643     Protein, Total (CSF) 23.4 mg/dL     Glucose, CSF - Cerebrospinal Fluid, Lumbar Puncture [[id number]]  (Normal) Collected: 03/06/23 1550    Specimen: Cerebrospinal Fluid from Lumbar Puncture Updated: 03/06/23 1643     Glucose, CSF 65 mg/dL     Cell Count With Differential, CSF [[id number]]  (Abnormal) Collected: 03/06/23 1550    Specimen: Cerebrospinal Fluid from Lumbar Puncture Updated: 03/06/23 1625    Narrative:      The following orders were created for panel order Cell Count With Differential, CSF.  Procedure                               Abnormality         Status                     ---------                               -----------         ------                      Cell Count, CSF - Cerebr...[[id number]]  Abnormal            Final result                 Please view results for these tests on the individual orders.    Cell Count, CSF - Cerebrospinal Fluid, Lumbar Puncture [[id number]]  (Abnormal) Collected: 03/06/23 1550    Specimen: Cerebrospinal Fluid from Lumbar Puncture Updated: 03/06/23 1625     Color, CSF Colorless     Appearance, CSF Clear     RBC, CSF 3 /mm3      Nucleated Cells, CSF 1 /mm3      Tube Number, CSF 4     Method: UF 1000i Automated Method    Narrative:      Differential not indicated.  This test was developed, its performance characteristics determined and judged suitable for clinical purposes by [facility]. It has not been cleared or approved by the FDA. The laboratory is regulated under CLIA as qualified to perform high-complexity testing.    Cell Count With Differential, CSF Use CSF Tube: 1 [[id number]]  (Abnormal) Collected: 03/06/23 1551    Specimen: Cerebrospinal Fluid from Lumbar Puncture Updated: 03/06/23 1624    Narrative:      The following orders were created for panel order Cell Count With Differential, CSF Use CSF Tube: 1.  Procedure                               Abnormality         Status                     ---------                               -----------         ------                     Cell Count, CSF - Cerebr...[[id number]]  Abnormal            Final result                 Please view results for these tests on the individual orders.    Cell Count, CSF - Cerebrospinal Fluid, Lumbar Puncture [[id number]]  (Abnormal) Collected: 03/06/23 1551    Specimen: Cerebrospinal Fluid from Lumbar Puncture Updated: 03/06/23 1624     Color, CSF Colorless     Appearance, CSF Clear     RBC, CSF 4 /mm3      Nucleated Cells, CSF 0 /mm3      Tube Number, CSF 1     Method: UF 1000i Automated Method    Narrative:      Differential not indicated.  This test was developed, its performance characteristics determined and judged  suitable for clinical purposes by [facility]. It has not been cleared or approved by the FDA. The laboratory is regulated under CLIA as qualified to perform high-complexity testing.    MS Profile & MBP, CSF - Cerebrospinal Fluid, Lumbar Puncture [[id number]] Collected: 03/06/23 1551    Specimen: Cerebrospinal Fluid from Lumbar Puncture Updated: 03/06/23 1551    [facility] autoimmune encephalopathy panel CSF (test code ENC2) - Miscellaneous Test [[id number]] Collected: 03/06/23 1551    Specimen: Cerebrospinal Fluid from Lumbar Puncture Updated: 03/06/23 1551    Oligoclonal Banding (COLLECT RED TUBE) [[id number]] Collected: 02/28/23 1101    Specimen: Cerebrospinal Fluid from Lumbar Puncture Updated: 03/06/23 1534    Narrative:      The following orders were created for panel order Oligoclonal Banding (COLLECT RED TUBE).  Procedure                               Abnormality         Status                     ---------                               -----------         ------                     Oligoclonal Banding - Ce...[[id number]]                                                 Red Top[[id number]]                                          Final result                 Please view results for these tests on the individual orders.           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  She appears to have a right pleural effusion with compressive atelectasis.  There is an abnormal  parenchymal opacity in the right middle lobe with an area suggesting cavitation concerning for infectious process.  She underwent thoracentesis on 3/4/2023 with only 50 cc of fluid drained from her right chest and fluid returning positive for MRSA.    Empyema: Patient will require VATS with decortication.  She was continued on Eliquis during her hospitalization which has since been held.  We will need this to be held for 3 days prior to surgical intervention so tentative plan is for OR tomorrow.  Preoperative orders have been placed.  We will transition from Ativan to Valium as this will be somewhat helpful with chest wall spasms.  Patient is quite anxious and I have encouraged her to take medication as needed.  We will go ahead and initiate ERA S protocol for pain management, as well.  N.p.o. at midnight with sips with meds (as well as pre-op gatorade protocol).  Discussed planned surgery in detail with the patient and her spouse who is at bedside.  All of their questions have been answered to their satisfaction and they are agreeable to proceeding with surgery tomorrow.    Dysphagia: Patient underwent FEES earlier today.  No signs or symptoms of aspiration.  Per speech therapy she may have regular diet with thin liquids.  Meds as tolerated.    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  Neurology is following. She has been treated with IVIG.  Gradual improvement over the next several weeks to months is expected.  Continue to work with PT/OT.    Patient okay to transfer to [facility].    KASIA Boyd  [facility]  03/07/23  15:11 EST    Greater than 37 minutes was spent reviewing the patient's chart, radiographic imaging, labs, provider notes, assessing the patient and developing a plan of care.  This was discussed with the patient and RN.

## 2023-03-07 NOTE — PROGRESS NOTES
"    Chief Complaint: Empyema, follow-up    Subjective:  Symptoms:  Improved.  She reports shortness of breath and chest pain.    Diet:  Poor intake.  No nausea or vomiting.    Activity level: Impaired due to weakness.    Pain:  She complains of pain that is mild.  She reports pain is improving.        Vital Signs:  Temp:  [98.4 °F (36.9 °C)-98.9 °F (37.2 °C)] 98.4 °F (36.9 °C)  Heart Rate:  [] 91  Resp:  [15-18] 18  BP: ()/(59-87) 120/65    Intake & Output (last day)       03/06 0701  03/07 0700 03/07 0701  03/08 0700    I.V. (mL/kg)      IV Piggyback      Total Intake(mL/kg)      Urine (mL/kg/hr) 275 (0.2)     Stool 0     Total Output 275     Net -275           Urine Unmeasured Occurrence 1 x     Stool Unmeasured Occurrence 1 x           Objective:  General Appearance:  Comfortable, ill-appearing, in no acute distress and not in pain.    Vital signs: (most recent): Blood pressure 120/65, pulse 91, temperature 98.4 °F (36.9 °C), temperature source Oral, resp. rate 18, height 157.5 cm (62\"), weight 56.7 kg (125 lb), SpO2 97 %, not currently breastfeeding.  Vital signs are normal.  No fever.    Lungs:  Normal effort and normal respiratory rate.  There are decreased breath sounds.  No wheezes or rhonchi.    Heart: Normal rate.  Regular rhythm.    Chest: No chest wall tenderness.    Abdomen: Bowel sounds are normal.   There is no mass.   Pulses: Distal pulses are intact.    Neurological: Patient is alert and oriented to person, place and time.    Skin:  Warm and dry.          Results Review:     I reviewed the patient's new clinical results.  I reviewed the patient's new imaging results and agree with the interpretation.  I reviewed the patient's other test results and agree with the interpretation  Discussed with patient, RN and Dr. Whitehead.    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 The patient was placed in  a left decubitus position on the fluoroscopy table. The skin surface for  access to the thecal sac was identified fluoroscopically and marked. The  area was prepped and draped in the usual sterile fashion. 1% lidocaine  was utilized for local anesthesia. The tip of a 22-gauge spinal needle  was then advanced into the lumbar thecal sac at level of superior L3  using intermittent fluoroscopic guidance. Needle tip position was  confirmed by noting clear spinal fluid in the needle hub. A total of 10  mL clear cerebrospinal fluid was then obtained via gravity drainage and  sent to the clinical laboratories for analysis as requested by the  clinical service. The needle was removed and direct pressure was applied  for a few minutes. There were no immediate complications. All elements  of maximal sterile technique were followed. 0minutes 26 seconds  fluoroscopy time, 4 mGy, 2 stored images.          Impression:      Successful lumbar puncture.     This report was finalized on 3/6/2023 4:30 PM by Dr. Rickie Rodriguez M.D.             Lab Results:     Lab Results (last 24 hours)     Procedure Component Value Units Date/Time    Oligoclonal Banding (COLLECT RED TUBE) [[id number]] Collected: 03/06/23 1551    Specimen: Cerebrospinal Fluid from Lumbar Puncture Updated: 03/07/23 1311    Narrative:      The following orders were created for panel order Oligoclonal Banding (COLLECT RED TUBE).  Procedure                               Abnormality         Status                     ---------                               -----------         ------                     Oligoclonal Banding - Ce...[[id number]]                      Final result               Red Top[[id number]]                                          Final result                  Please view results for these tests on the individual orders.    Oligoclonal Banding - Cerebrospinal Fluid, Lumbar Puncture [[id number]] Collected: 03/06/23 1551    Specimen: Cerebrospinal Fluid from Lumbar Puncture Updated: 03/07/23 1311     Oligo Bands TNP     Comment: Test not performed. Duplicate tests ordered.  See [id number]     3-7-23        Oligoclonal Bands, CSF TNP     Comment: Test not performed       Narrative:      Performed at:  [address]  [id number]  : Devon Madden PhD, Phone:  [phone]    Paraneoplastic Profile 1 [[id number]] Collected: 03/04/23 0947    Specimen: Blood Updated: 03/07/23 1212     Anti-Hu Antibody Negative     Anti-Ri Antibodies* Negative     Anti-Yo Antibodies* Negative    Narrative:      Test(s) 505241-Anti-Hu Ab; 619772-Dnvs-Em Ab; 188844-Zinl-Qi Ab  was developed and its performance characteristics determined  by [facility]. It has not been cleared or approved by the Food  and Drug Administration.  Performed at:  [address]  [id number]  : Jeane Abdullahi MD, Phone:  [phone]    Culture, CSF - Cerebrospinal Fluid, Lumbar Puncture [[id number]] Collected: 03/06/23 1550    Specimen: Cerebrospinal Fluid from Lumbar Puncture Updated: 03/07/23 1120     CSF Culture No growth     Gram Stain No WBCs or organisms seen    Non-gynecologic Cytology [[id number]] Collected: 03/04/23 0900    Specimen: Pleural Fluid from Pleural Cavity Updated: 03/07/23 0918     Case Report --     Non-gynecologic Cytology                          Case: [id number]                                  Authorizing Provider:  Franklyn Powers MD        Collected:           03/04/2023 09:00 AM          Ordering Location:     [facility]  Received:            03/06/2023 10:15 AM                                 Agapito SALMON                                                                        Pathologist:           Mu Meyer MD                                                         Specimen:    Pleural Cavity, Right Thoracentesis                                                         Final Diagnosis --     1. Pleural Fluid, Right Thoracentesis:   A. Negative for malignant cells.  B. Abundant mixed inflammation, rare histiocytes and mesothelium.  C. Flow pending.        Gross Description --     1. Pleural Cavity.  4 syringes containing in total 28.5 mls of yellow fluid with blood clot received. 1 thin prep, cellblock (in formalin @ 10:52 on 3/6/23) and portion of fluid sent to [place] for flow cytometry studies. 10 mls of fluid remaining.          Microscopic Description --     Screened by S. Sacksteder      PTH, Intact & Calcium [[id number]]  (Abnormal) Collected: 03/07/23 0733    Specimen: Blood Updated: 03/07/23 0908     PTH, Intact 24.4 pg/mL      Calcium 8.2 mg/dL     Narrative:      PTH results may be falsely decreased if patient taking Biotin.        Comprehensive Metabolic Panel [[id number]]  (Abnormal) Collected: 03/07/23 0733    Specimen: Blood Updated: 03/07/23 0903     Glucose 80 mg/dL      BUN 11 mg/dL      Creatinine 0.53 mg/dL      Sodium 134 mmol/L      Potassium 3.8 mmol/L      Chloride 96 mmol/L      CO2 27.1 mmol/L      Calcium 8.1 mg/dL      Total Protein 5.8 g/dL      Albumin 2.2 g/dL      ALT (SGPT) 29 U/L      AST (SGOT) 35 U/L      Alkaline Phosphatase 202 U/L      Total Bilirubin 1.4 mg/dL      Globulin 3.6 gm/dL      A/G Ratio 0.6 g/dL      BUN/Creatinine Ratio 20.8     Anion Gap 10.9 mmol/L      eGFR 106.7 mL/min/1.73     Narrative:      GFR Normal >60  Chronic Kidney Disease <60  Kidney Failure <15      Phosphorus [[id number]]  (Normal) Collected: 03/07/23 0733    Specimen: Blood Updated: 03/07/23 0903     Phosphorus 3.8 mg/dL     Magnesium [[id number]]  (Normal) Collected: 03/07/23 0733    Specimen: Blood Updated: 03/07/23 0903     Magnesium 1.7 mg/dL     CBC &  Differential [[id number]]  (Abnormal) Collected: 03/07/23 0733    Specimen: Blood Updated: 03/07/23 0845    Narrative:      The following orders were created for panel order CBC & Differential.  Procedure                               Abnormality         Status                     ---------                               -----------         ------                     CBC Auto Differential[[id number]]        Abnormal            Final result                 Please view results for these tests on the individual orders.    CBC Auto Differential [[id number]]  (Abnormal) Collected: 03/07/23 0733    Specimen: Blood Updated: 03/07/23 0845     WBC 17.78 10*3/mm3      RBC 2.46 10*6/mm3      Hemoglobin 8.3 g/dL      Hematocrit 24.0 %      MCV 97.6 fL      MCH 33.7 pg      MCHC 34.6 g/dL      RDW 14.1 %      RDW-SD 50.7 fl      MPV 11.8 fL      Platelets 198 10*3/mm3      Neutrophil % 82.8 %      Lymphocyte % 6.6 %      Monocyte % 5.9 %      Eosinophil % 0.6 %      Basophil % 0.1 %      Immature Grans % 4.0 %      Neutrophils, Absolute 14.71 10*3/mm3      Lymphocytes, Absolute 1.18 10*3/mm3      Monocytes, Absolute 1.05 10*3/mm3      Eosinophils, Absolute 0.10 10*3/mm3      Basophils, Absolute 0.02 10*3/mm3      Immature Grans, Absolute 0.72 10*3/mm3      nRBC 0.1 /100 WBC     Anaerobic Culture - Pleural Fluid, Pleural Cavity [[id number]]  (Normal) Collected: 03/04/23 0900    Specimen: Pleural Fluid from Pleural Cavity Updated: 03/07/23 0707     Anaerobic Culture Screening for Anaerobes    Blood Culture - Blood, Wrist, Left [[id number]]  (Normal) Collected: 03/04/23 2043    Specimen: Blood from Wrist, Left Updated: 03/06/23 2115     Blood Culture No growth at 2 days    Blood Culture - Blood, Arm, Left [[id number]]  (Normal) Collected: 03/02/23 1955    Specimen: Blood from Arm, Left Updated: 03/06/23 2015     Blood Culture No growth at 4 days    Blood Culture - Blood, Arm, Right [[id number]]  (Normal) Collected: 03/04/23 1952     Specimen: Blood from Arm, Right Updated: 03/06/23 2015     Blood Culture No growth at 2 days    Red Top [[id number]] Collected: 03/06/23 1628    Specimen: Blood Updated: 03/06/23 1731     Extra Tube Hold for add-ons.     Comment: Auto resulted.       Meningitis / Encephalitis Panel, PCR - Cerebrospinal Fluid, Lumbar Puncture [[id number]]  (Normal) Collected: 03/06/23 1550    Specimen: Cerebrospinal Fluid from Lumbar Puncture Updated: 03/06/23 1725     ESCHERICHIA COLI K1, PCR Not Detected     HAEMOPHILUS INFLUENZAE, PCR Not Detected     LISTERIA MONOCYTOGENES, PCR Not Detected     NEISSERIA MENINGITIDIS, PCR Not Detected     STREPTOCOCCUS AGALACTIAE, PCR Not Detected     STREPTOCOCCUS PNEUMONIAE, PCR Not Detected     CYTOMEGALOVIRUS (CMV), PCR Not Detected     ENTEROVIRUS, PCR Not Detected     HERPES SIMPLEX VIRUS 1 (HSV-1), PCR Not Detected     HERPES SIMPLEX VIRUS 2 (HSV-2), PCR Not Detected     HUMAN PARECHOVIRUS, PCR Not Detected     VARICELLA ZOSTER VIRUS (VZV), PCR Not Detected     CRYPTOCOCCUS NEOFORMANS / GATTII, PCR Not Detected     HUMAN HERPES VIRUS 6 PCR Not Detected    Protein, CSF - Cerebrospinal Fluid, Lumbar Puncture [[id number]]  (Normal) Collected: 03/06/23 1550    Specimen: Cerebrospinal Fluid from Lumbar Puncture Updated: 03/06/23 1643     Protein, Total (CSF) 23.4 mg/dL     Glucose, CSF - Cerebrospinal Fluid, Lumbar Puncture [[id number]]  (Normal) Collected: 03/06/23 1550    Specimen: Cerebrospinal Fluid from Lumbar Puncture Updated: 03/06/23 1643     Glucose, CSF 65 mg/dL     Cell Count With Differential, CSF [[id number]]  (Abnormal) Collected: 03/06/23 1550    Specimen: Cerebrospinal Fluid from Lumbar Puncture Updated: 03/06/23 1625    Narrative:      The following orders were created for panel order Cell Count With Differential, CSF.  Procedure                               Abnormality         Status                     ---------                               -----------         ------                      Cell Count, CSF - Cerebr...[[id number]]  Abnormal            Final result                 Please view results for these tests on the individual orders.    Cell Count, CSF - Cerebrospinal Fluid, Lumbar Puncture [[id number]]  (Abnormal) Collected: 03/06/23 1550    Specimen: Cerebrospinal Fluid from Lumbar Puncture Updated: 03/06/23 1625     Color, CSF Colorless     Appearance, CSF Clear     RBC, CSF 3 /mm3      Nucleated Cells, CSF 1 /mm3      Tube Number, CSF 4     Method: UF 1000i Automated Method    Narrative:      Differential not indicated.  This test was developed, its performance characteristics determined and judged suitable for clinical purposes by [facility]. It has not been cleared or approved by the FDA. The laboratory is regulated under CLIA as qualified to perform high-complexity testing.    Cell Count With Differential, CSF Use CSF Tube: 1 [[id number]]  (Abnormal) Collected: 03/06/23 1551    Specimen: Cerebrospinal Fluid from Lumbar Puncture Updated: 03/06/23 1624    Narrative:      The following orders were created for panel order Cell Count With Differential, CSF Use CSF Tube: 1.  Procedure                               Abnormality         Status                     ---------                               -----------         ------                     Cell Count, CSF - Cerebr...[[id number]]  Abnormal            Final result                 Please view results for these tests on the individual orders.    Cell Count, CSF - Cerebrospinal Fluid, Lumbar Puncture [[id number]]  (Abnormal) Collected: 03/06/23 1551    Specimen: Cerebrospinal Fluid from Lumbar Puncture Updated: 03/06/23 1624     Color, CSF Colorless     Appearance, CSF Clear     RBC, CSF 4 /mm3      Nucleated Cells, CSF 0 /mm3      Tube Number, CSF 1     Method: UF 1000i Automated Method    Narrative:      Differential not indicated.  This test was developed, its performance characteristics determined and judged  suitable for clinical purposes by [facility]. It has not been cleared or approved by the FDA. The laboratory is regulated under CLIA as qualified to perform high-complexity testing.    MS Profile & MBP, CSF - Cerebrospinal Fluid, Lumbar Puncture [[id number]] Collected: 03/06/23 1551    Specimen: Cerebrospinal Fluid from Lumbar Puncture Updated: 03/06/23 1551    [facility] autoimmune encephalopathy panel CSF (test code ENC2) - Miscellaneous Test [[id number]] Collected: 03/06/23 1551    Specimen: Cerebrospinal Fluid from Lumbar Puncture Updated: 03/06/23 1551    Oligoclonal Banding (COLLECT RED TUBE) [[id number]] Collected: 02/28/23 1101    Specimen: Cerebrospinal Fluid from Lumbar Puncture Updated: 03/06/23 1534    Narrative:      The following orders were created for panel order Oligoclonal Banding (COLLECT RED TUBE).  Procedure                               Abnormality         Status                     ---------                               -----------         ------                     Oligoclonal Banding - Ce...[[id number]]                                                 Red Top[[id number]]                                          Final result                 Please view results for these tests on the individual orders.           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  She appears to have a right pleural effusion with compressive atelectasis.  There is an abnormal  parenchymal opacity in the right middle lobe with an area suggesting cavitation concerning for infectious process.  She underwent thoracentesis on 3/4/2023 with only 50 cc of fluid drained from her right chest and fluid returning positive for MRSA.    Empyema: Patient will require VATS with decortication.  She was continued on Eliquis during her hospitalization which has since been held.  We will need this to be held for 3 days prior to surgical intervention so tentative plan is for OR tomorrow.  Preoperative orders have been placed.  We will transition from Ativan to Valium as this will be somewhat helpful with chest wall spasms.  Patient is quite anxious and I have encouraged her to take medication as needed.  We will go ahead and initiate ERA S protocol for pain management, as well.  N.p.o. at midnight with sips with meds (as well as pre-op gatorade protocol).  Discussed planned surgery in detail with the patient and her spouse who is at bedside.  All of their questions have been answered to their satisfaction and they are agreeable to proceeding with surgery tomorrow.    Dysphagia: Patient underwent FEES earlier today.  No signs or symptoms of aspiration.  Per speech therapy she may have regular diet with thin liquids.  Meds as tolerated.    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  Neurology is following. She has been treated with IVIG.  Gradual improvement over the next several weeks to months is expected.  Continue to work with PT/OT.    Patient okay to transfer to [facility].    KASIA Boyd  [facility]  03/07/23  15:11 EST    Greater than 37 minutes was spent reviewing the patient's chart, radiographic imaging, labs, provider notes, assessing the patient and developing a plan of care.  This was discussed with the patient and RN.

## 2023-03-07 NOTE — PROGRESS NOTES
[facility] Acute Rehab    Continuing to follow for progress with therapies and medical stability to determine most appropriate level of rehab needed at time of discharge.    Thank you,  Gaviota Goodman RN  Rehab Admission Nurse

## 2023-03-07 NOTE — PLAN OF CARE
Goal Outcome Evaluation:            Pt assessment per flowsheet. Medications per MAR. No acute events today. Pt able to follow commands, sleeping in between care at times. NIH 10 due to inability to lift legs off bed and slight drift of arms. Pt went for LP per orders. Care explained to pt and  through day. Antibiotics per orders. Report to Anisha SANDY.

## 2023-03-07 NOTE — PROGRESS NOTES
"DOS: 3/7/2023  NAME: Louise GARCIA   : 1965  PCP: Chloe Schaefer APRN  Chief Complaint   Patient presents with   • Fall   • Numbness     Numbness in bilateral arms and legs post falling.        Chief complaint: Weakness  Subjective: More alert today.  Complains of a headache but feels it was improved after lumbar puncture yesterday.  Bilateral lower extremity weakness is persistent but stable    Objective:  Vital signs: /73   Pulse 76   Temp 98.4 °F (36.9 °C) (Oral)   Resp 18   Ht 157.5 cm (62\")   Wt 56.7 kg (125 lb)   SpO2 97%   Breastfeeding No   BMI 22.86 kg/m²    Gen: NAD, vitals reviewed  MS: oriented x3, recent/remote memory intact, normal attention/concentration, language intact, no neglect.  CN: visual acuity grossly normal, PERRL, EOMI, no facial droop, no dysarthria  Motor: 5/5 throughout upper upper extremities, 4/5 bilateral lower extremities, normal tone throughout  Sensory: intact to cold temperature and vibration throughout  Reflexes: Absent throughout upper and lower extremities, plantars downgoing    Laboratory results:  Lab Results   Component Value Date    GLUCOSE 82 2023    CALCIUM 8.7 2023     (L) 2023    K 3.9 2023    CO2 24.5 2023    CL 97 (L) 2023    BUN 15 2023    CREATININE 0.57 2023    EGFRIFAFRI  2016      Comment:      <15 Indicative of kidney failure.    EGFRIFNONA 81 2016    BCR 26.3 (H) 2023    ANIONGAP 11.5 2023     Lab Results   Component Value Date    WBC 16.60 (H) 2023    HGB 9.0 (L) 2023    HCT 25.2 (L) 2023    MCV 97.3 (H) 2023     2023     No results found for: LDL         Review of labs: Sodium 133, WBC 16, hemoglobin 9.0, repeat CSF total nucleated cells 0, protein 23.4.  Encephalitis PCR negative, CSF culture pending, [facility] autoimmune panel and MS profile pending.    Diagnoses:  Idiopathic peripheral neuropathy  Paraparesis  Lower " extremity numbness  Areflexia  Empyema of the lung  Respiratory failure    Comment: Her neurologic exam is consistent with a motor predominant neuropathy which has been subacute in presentation however her work-up has been negative despite being comprehensive.  She has undergone a course of IVIG.  We will see if there is any improvement over time.  She will need follow-up with my partner Dr. Caleb Reyes and likely a follow-up EMG in the outpatient setting.  She complains of a headache today.  This preceded the lumbar puncture.  If she develops worsening orthostatic headache we could consider a blood patch    Plan:  1.  Completed a course of IVIG for presumed inflammatory neuropathy but work-up has been negative  2.  PT/OT  3.  Outpatient follow-up with Dr. Reyes  4.  Follow-up send out labs from CSF  5.  We will monitor her headache    Discussed with Dr. Cruz

## 2023-03-07 NOTE — PLAN OF CARE
Goal Outcome Evaluation:  Plan of Care Reviewed With: patient        Progress: improving  Outcome Evaluation: Pt seen for OT this date with PT due to medical complexity. Pt agreeable to participate. Pt performed bed mobility with mod assist x2. Sat edge of bed to perform LE dressing and grooming. Pt unable to perform LE dressing due to increased weakness, instability in sitting. Pt required min assist for grooming. Pt performed BUE ther ex, arom exercises. Pt tolerated well. Pt attempted to stand x2 with max assist x2 with B knees buckling. Pt cont to demo declining functional endurance/strength since admission and cont to benefit from OT to address functional strength/endurance. Pt plans for VAT tomorrow. Pt will likely benefit from inpatient rehab upon time of dc.OT wore appropriate PPE during session and performed hand hygiene before/after session.

## 2023-03-07 NOTE — PLAN OF CARE
Goal Outcome Evaluation:  Plan of Care Reviewed With: patient           Outcome Evaluation: Pt and  initially resistant to FEES, therefore re evaluation complete at the bedside. Voice improved, but still dysphonic. Persistent coughing after all PO noted. Instrumental indicated and patient agreed with encouragement. FEES completed. SLP recs regular and thins. Meds as chato. No aspiration noted, despite persistent cough after PO. Gap visualized between vocal cords during phonation, which accounts for dysphonia. False vocal cord recruitment noted. Recommend outpatient videostrobe and ENT follow up in outpatient setting if symptoms do not resolve.    Patient was not wearing a face mask during this therapy encounter. Therapist used appropriate personal protective equipment including gown, eye protection, mask and gloves.  Mask used was standard procedure mask. Appropriate PPE was worn during the entire therapy session. Hand hygiene was completed before and after therapy session. Patient is not in enhanced droplet precautions.

## 2023-03-07 NOTE — PROGRESS NOTES
"    Patient Name: Louise GARCIA  :1965  58 y.o.      Patient Care Team:  Chloe Schaefer APRN as PCP - General (Family Medicine)  Mikhail Glez MD as Consulting Physician (Neurology)    Chief Complaint: empyema    Interval History: SR overnight       Objective   Vital Signs  Temp:  [98.4 °F (36.9 °C)-98.9 °F (37.2 °C)] 98.4 °F (36.9 °C)  Heart Rate:  [] 92  Resp:  [15-23] 18  BP: (116-151)/(64-99) 140/73    Intake/Output Summary (Last 24 hours) at 3/7/2023 1129  Last data filed at 3/6/2023 1900  Gross per 24 hour   Intake --   Output 275 ml   Net -275 ml     Flowsheet Rows    Flowsheet Row First Filed Value   Admission Height 157.5 cm (62\") Documented at 2023 1425   Admission Weight 54.4 kg (120 lb) Documented at 2023 1425          Physical Exam:   General Appearance:    Alert, cooperative, in no acute distress   Lungs:     Clear to auscultation.  Normal respiratory effort and rate.      Heart:    Regular rhythm and normal rate, normal S1 and S2, no murmurs, gallops or rubs.     Chest Wall:    No abnormalities observed   Abdomen:     Soft, nontender, positive bowel sounds.     Extremities:   no cyanosis, clubbing or edema.  No marked joint deformities.  Adequate musculoskeletal strength.       Results Review:    Results from last 7 days   Lab Units 23  0733   SODIUM mmol/L 134*   POTASSIUM mmol/L 3.8   CHLORIDE mmol/L 96*   CO2 mmol/L 27.1   BUN mg/dL 11   CREATININE mg/dL 0.53*   GLUCOSE mg/dL 80   CALCIUM mg/dL 8.2*  8.1*     Results from last 7 days   Lab Units 23  1114 23  0653   HSTROP T ng/L 7 10*     Results from last 7 days   Lab Units 23  0733   WBC 10*3/mm3 17.78*   HEMOGLOBIN g/dL 8.3*   HEMATOCRIT % 24.0*   PLATELETS 10*3/mm3 198         Results from last 7 days   Lab Units 23  0733   MAGNESIUM mg/dL 1.7                   Medication Review:   bisacodyl, 10 mg, Rectal, Daily  desvenlafaxine, 25 mg, Oral, Daily  folic acid, 1 mg, Oral, " Daily  gabapentin, 100 mg, Oral, Q12H  ipratropium-albuterol, 3 mL, Nebulization, 4x Daily - RT  lactulose, 20 g, Oral, BID  lamoTRIgine, 200 mg, Oral, Daily  lidocaine, 1 patch, Transdermal, Q24H  lidocaine, 1 patch, Transdermal, Q24H  lidocaine, 10 mL, Intradermal, Once  lubiprostone, 24 mcg, Oral, Daily With Breakfast  pantoprazole, 40 mg, Intravenous, Q AM  polyethylene glycol, 17 g, Oral, BID  senna-docusate sodium, 2 tablet, Oral, BID  sodium chloride, 10 mL, Intravenous, Q12H  thiamine, 100 mg, Oral, Daily  vancomycin, 1,000 mg, Intravenous, Q12H  vitamin B-12, 500 mcg, Oral, Daily         hold, 1 each  hold, 1 each  Pharmacy to dose vancomycin,         Assessment & Plan     Active Hospital Problems    Diagnosis  POA   • **Paresthesia [R20.2]  Yes   • Tricuspid valve vegetation [I33.0]  Yes   • Oral candidiasis [B37.0]  No   • Mass of middle lobe of right lung [R91.8]  Yes   • Upper abdominal pain [R10.10]  No   • Situational mixed anxiety and depressive disorder [F43.23]  Yes   • 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     1.  MRSA pneumonia and empyema- VATS/decortication planned Wednesday, Eliquis held  2.  MRSA septicemia, ID following  3.  Paresthesias and areflexia, neurology following  4.  Possible small vegetation of tricuspid valve, continue to follow right now, continue IV antibiotics, valvular function normal  5.  DVT history, Eliquis on hold    Stable cardiac status, for surgery tomorrow, will see Violet BADILLO  HAROON Dowling MD  [facility]  03/07/23  11:29 EST

## 2023-03-07 NOTE — MBS/VFSS/FEES
Acute Care - Speech Language Pathology   Swallow Initial Evaluation and Re-Evaluation [facility]     Patient Name: Louise GARCIA  : 1965  MRN: [id number]  Today's Date: 3/7/2023               Admit Date: 2023    Visit Dx:     ICD-10-CM ICD-9-CM   1. Paresthesia  R20.2 782.0   2. Gait instability  R26.81 781.2   3. Extremity numbness  R20.0 782.0   4. Empyema of pleura (HCC)  J86.9 510.9     Patient Active Problem List   Diagnosis   • Sepsis (HCC)   • Neck pain, chronic   • Upper back pain   • Paresthesia   • Cervical myelopathy (HCC)   • Lower extremity weakness   • History of blood clots   • Chronic anticoagulation   • Seizures (HCC)   • Leukocytosis   • Normocytic anemia   • Hyperglycemia   • GERD (gastroesophageal reflux disease)   • Guillain-[place] syndrome (HCC)   • Situational mixed anxiety and depressive disorder   • Upper abdominal pain   • Mass of middle lobe of right lung   • Oral candidiasis   • Empyema of pleura (HCC)   • Tricuspid valve vegetation     Past Medical History:   Diagnosis Date   • Degenerative disc disease, cervical    • Gestational diabetes    • H/O blood clots    • Hematemesis    • Seizures (HCC)    • Septic shock (HCC)      Past Surgical History:   Procedure Laterality Date   • APPENDECTOMY     • BACK SURGERY     • CHOLECYSTECTOMY     • ENDOSCOPY N/A 2016    Procedure: ESOPHAGOGASTRODUODENOSCOPY with biopsy;  Surgeon: Austen Brito MD;  Location: [facility];  Service:    • HYSTERECTOMY     • NECK SURGERY       approx 6 yrs ago   • TONSILLECTOMY     • TONSILLECTOMY AND ADENOIDECTOMY         SLP Recommendation and Plan  SLP Swallowing Diagnosis: swallow WFL/no suspected pharyngeal impairment (23 1500)  SLP Diet Recommendation: regular textures, thin liquids (23 1500)  Recommended Precautions and Strategies: upright posture during/after eating, small bites of food and sips of liquid (23 1500)  SLP Rec. for Method of Medication  Administration: meds whole, as tolerated (03/07/23 1500)     Monitor for Signs of Aspiration: yes, notify SLP if any concerns (03/07/23 1500)        Anticipated Discharge Disposition (SLP): unknown (03/07/23 1500)     Therapy Frequency (Swallow): PRN (03/07/23 1500)  Predicted Duration Therapy Intervention (Days): until discharge (03/07/23 1500)                                        Plan of Care Reviewed With: patient  Outcome Evaluation: FEES completed. SLP recs regular and thins. Meds as chato. Gap visualized during phonation, which accounts for dysphonia. False vocal cord recruitment noted. Recomment outpatient videostrobe and ENT follow up in outpatient setting if symptoms do not resolve.      SWALLOW EVALUATION (last 72 hours)     SLP Adult Swallow Evaluation     Row Name 03/07/23 1500          Document Type evaluation;re-evaluation  -          Patient Profile Reviewed yes  -    Pertinent History Of Current Problem --    Current Method of Nutrition NPO  -    Precautions/Limitations, Vision WFL;for purposes of eval  -    Precautions/Limitations, Hearing WFL;for purposes of eval  -    Plans/Goals Discussed with patient;family;agreed upon  -    Barriers to Rehab medically complex  -    Patient's Goals for Discharge patient did not state  -    Family Goals for Discharge patient able to return to PO diet  -          Additional Documentation Pain Scale: FACES Pre/Post-Treatment (Group)  -          Pain: FACES Scale, Pretreatment 2-->hurts little bit  -    Pre/Posttreatment Pain Comment 2  -          Risks/Benefits Reviewed risks/benefits explained;patient;family;agreed to eval  -    Nasal Entry left:  -    Special Considerations Ambu scope  -          Oral Phase, Comment Pt and  initially resistant to FEES, therefore re evaluation complete at the bedside. Voice improved, but still dysphonic. Persistent coughing after all PO noted. Instrumental indicated and patient agreed with  encouragement. FEES completed. Objective: Risks/benefits were reviewed with the patient and family, and consent was obtained. The nasendoscope was introduced into the left nare without the use of topical anaesthetic. Textures given included ice, thin liquid, puree consistency, mechanical soft consistency and regular consistency. Assessment: Epiglottis was Adequate in appearance. Tongue base movement was symmetrical with adequate range of motion. Laryngeal function was characterized by incomplete closure during phonation. Secretion rating scale score was 0 - No visible secretions. Difficulties were noted with none of the above consistencies. Adequate swallow initiation appreciated with thins via cup/straw, puree, and regular solids. Mild spill to the pyriforms before the swallow with thin portion of mixed. Trace pharyngeal residue noted post swallow across trials. Trace material present in the interarytenoid space following thins via straw x1, which was not noted with additional trials. SLP Findings:  Patient presents with functional oropharyngeal dysphagia. Recommendations:  Diet Textures: thin liquid, regular consistency food. Medications should be taken whole with thin liquids or puree. No aspiration noted, despite persistent cough after PO. Gap visualized between vocal cords during phonation, which accounts for dysphonia. False vocal cord recruitment noted. Recommend outpatient videostrobe and ENT follow up in outpatient setting if symptoms do not resolve.  -          SLP Swallowing Diagnosis swallow WFL/no suspected pharyngeal impairment  -          Therapy Frequency (Swallow) PRN  -    Predicted Duration Therapy Intervention (Days) until discharge  -    SLP Diet Recommendation regular textures;thin liquids  -    Recommended Precautions and Strategies upright posture during/after eating;small bites of food and sips of liquid  -    Oral Care Recommendations Oral Care BID/PRN  -    SLP Rec. for  Method of Medication Administration meds whole;as tolerated  -    Monitor for Signs of Aspiration yes;notify SLP if any concerns  -    Anticipated Discharge Disposition (SLP) unknown  -          Swallow LTGs Patient will demonstrate functional swallow for  -          Diet Texture (Demonstrate functional swallow) regular textures  -    Liquid viscosity (Demonstrate functional swallow) thin liquids  -          User Key  (r) = Recorded By, (t) = Taken By, (c) = Cosigned By    Initials Name Effective Dates    LUCIE Milana Ruby RAMILA MS CCC-SLP 06/16/21 -                 EDUCATION  The patient has been educated in the following areas:   Dysphagia (Swallowing Impairment).        SLP GOALS     Row Name 03/07/23 1500 03/06/23 0900          (LTG) Patient will demonstrate functional swallow for    Diet Texture (Demonstrate functional swallow) regular textures  - regular textures  -     Liquid viscosity (Demonstrate functional swallow) thin liquids  - thin liquids  -     Progress/Outcomes (Demonstrate functional swallow) -- progress slower than expected  -     Comment (Demonstrate functional swallow) -- New orders received. Pt now lethargic and in the ICU. VFSS completed on admission was WFLs. FEES aborted d/t seizure hx. VATs planned Wed. Dysphonia persists, breathiness and diplophonia more severe. Irregular high pitch noted. Baseline cough. No overt s/s of aspiration with ice or puree. Immediate cough with nectar and honey with slight voice change, difficult to rate. Discussed with Dr. Cruz, FEES approved. SLP recs NPO, can allow meds in puree and free water protocol with ice. Await FEES completion for diet recs.  -           User Key  (r) = Recorded By, (t) = Taken By, (c) = Cosigned By    Initials Name Provider Type    LUCIE TungMilana MS CCC-SLP Speech and Language Pathologist                   Time Calculation:    Time Calculation- SLP     Row Name 03/07/23 1512             Time Calculation- SLP    SLP  Start Time 0800  -      SLP Received On 03/07/23  -            User Key  (r) = Recorded By, (t) = Taken By, (c) = Cosigned By    Initials Name Provider Type     Milana Ruby MS CCC-SLP Speech and Language Pathologist                Therapy Charges for Today     Code Description Service Date Service Provider Modifiers Qty    00731159951 HC ST TREATMENT SWALLOW 4 3/6/2023 Milana Ruby MS CCC-SLP GN 1    [id number]  ST FIBEROPTIC ENDO EVAL SWALL 8 3/7/2023 Milana Ruby MS CCC-SLP GN 1    [id number] [facility] SCP BRONCH ASCOPE FLX DISPOSABLE 3/7/2023 Milana Ruby MS CCC-SLP  1               MS DEAN Reid  3/7/2023

## 2023-03-07 NOTE — SIGNIFICANT NOTE
03/07/23 0854   Vital Capacity (VC)   Vital Capacity (mL) 1.07  (best of 3;)   Patient Position (VC) semi-Lema's   Effort (VC) poor  (pt coughing constantly)     Pt was coughing during/after vital capacity and NIF due to deep breathing. Pt has suction at bedside.

## 2023-03-08 ENCOUNTER — APPOINTMENT (OUTPATIENT)
Dept: GENERAL RADIOLOGY | Facility: HOSPITAL | Age: 58
DRG: 163 | End: 2023-03-08
Payer: COMMERCIAL

## 2023-03-08 ENCOUNTER — ANESTHESIA EVENT (OUTPATIENT)
Dept: PERIOP | Facility: HOSPITAL | Age: 58
DRG: 163 | End: 2023-03-08
Payer: COMMERCIAL

## 2023-03-08 ENCOUNTER — ANESTHESIA (OUTPATIENT)
Dept: PERIOP | Facility: HOSPITAL | Age: 58
DRG: 163 | End: 2023-03-08
Payer: COMMERCIAL

## 2023-03-08 PROBLEM — R78.81 MRSA BACTEREMIA: Status: ACTIVE | Noted: 2023-03-08

## 2023-03-08 PROBLEM — B95.62 MRSA BACTEREMIA: Status: ACTIVE | Noted: 2023-03-08

## 2023-03-08 PROBLEM — R73.9 HYPERGLYCEMIA: Status: RESOLVED | Noted: 2023-02-25 | Resolved: 2023-03-08

## 2023-03-08 PROBLEM — D72.829 LEUKOCYTOSIS: Status: RESOLVED | Noted: 2023-02-25 | Resolved: 2023-03-08

## 2023-03-08 PROBLEM — R10.10 UPPER ABDOMINAL PAIN: Status: RESOLVED | Noted: 2023-03-02 | Resolved: 2023-03-08

## 2023-03-08 LAB
ABO GROUP BLD: NORMAL
ALB CSF/SERPL: 5 {RATIO} (ref 0–8)
ALBUMIN CSF-MCNC: 10 MG/DL (ref 8–37)
ALBUMIN SERPL-MCNC: 1.8 G/DL (ref 3.5–5.2)
ALBUMIN SERPL-MCNC: 2.1 G/DL (ref 3.8–4.9)
ALBUMIN/GLOB SERPL: 0.5 G/DL
ALP SERPL-CCNC: 210 U/L (ref 39–117)
ALT SERPL W P-5'-P-CCNC: 27 U/L (ref 1–33)
ANION GAP SERPL CALCULATED.3IONS-SCNC: 9.1 MMOL/L (ref 5–15)
APTT PPP: 32.2 SECONDS (ref 22.7–35.4)
AST SERPL-CCNC: 32 U/L (ref 1–32)
BASOPHILS # BLD AUTO: 0.05 10*3/MM3 (ref 0–0.2)
BASOPHILS NFR BLD AUTO: 0.2 % (ref 0–1.5)
BILIRUB SERPL-MCNC: 1.2 MG/DL (ref 0–1.2)
BLD GP AB SCN SERPL QL: NEGATIVE
BUN SERPL-MCNC: 9 MG/DL (ref 6–20)
BUN/CREAT SERPL: 16.1 (ref 7–25)
CALCIUM SPEC-SCNC: 8.4 MG/DL (ref 8.6–10.5)
CHLORIDE SERPL-SCNC: 95 MMOL/L (ref 98–107)
CO2 SERPL-SCNC: 27.9 MMOL/L (ref 22–29)
CREAT SERPL-MCNC: 0.56 MG/DL (ref 0.57–1)
DEPRECATED RDW RBC AUTO: 52.6 FL (ref 37–54)
EGFRCR SERPLBLD CKD-EPI 2021: 105.3 ML/MIN/1.73
EOSINOPHIL # BLD AUTO: 0.13 10*3/MM3 (ref 0–0.4)
EOSINOPHIL NFR BLD AUTO: 0.6 % (ref 0.3–6.2)
ERYTHROCYTE [DISTWIDTH] IN BLOOD BY AUTOMATED COUNT: 14.6 % (ref 12.3–15.4)
GLOBULIN UR ELPH-MCNC: 3.9 GM/DL
GLUCOSE BLDC GLUCOMTR-MCNC: 77 MG/DL (ref 70–130)
GLUCOSE SERPL-MCNC: 78 MG/DL (ref 65–99)
HCT VFR BLD AUTO: 23.8 % (ref 34–46.6)
HGB BLD-MCNC: 8.2 G/DL (ref 12–15.9)
IGG CSF-MCNC: 4 MG/DL (ref 0–6.7)
IGG SERPL-MCNC: 1359 MG/DL (ref 586–1602)
IGG SYNTH RATE SER+CSF CALC-MRATE: 0.4 MG/DAY
IGG/ALB CLEAR SER+CSF-RTO: 0.6 (ref 0–0.7)
IGG/ALB CSF: 0.4 {RATIO} (ref 0–0.25)
IMM GRANULOCYTES # BLD AUTO: 0.69 10*3/MM3 (ref 0–0.05)
IMM GRANULOCYTES NFR BLD AUTO: 3.4 % (ref 0–0.5)
INR PPP: 1.1 (ref 0.9–1.1)
LYMPHOCYTES # BLD AUTO: 1.34 10*3/MM3 (ref 0.7–3.1)
LYMPHOCYTES NFR BLD AUTO: 6.6 % (ref 19.6–45.3)
MAGNESIUM SERPL-MCNC: 1.7 MG/DL (ref 1.6–2.6)
MBP CSF-MCNC: 4.1 NG/ML (ref 0–3.7)
MCH RBC QN AUTO: 34.7 PG (ref 26.6–33)
MCHC RBC AUTO-ENTMCNC: 34.5 G/DL (ref 31.5–35.7)
MCV RBC AUTO: 100.8 FL (ref 79–97)
MONOCYTES # BLD AUTO: 0.99 10*3/MM3 (ref 0.1–0.9)
MONOCYTES NFR BLD AUTO: 4.9 % (ref 5–12)
NEUTROPHILS NFR BLD AUTO: 17.11 10*3/MM3 (ref 1.7–7)
NEUTROPHILS NFR BLD AUTO: 84.3 % (ref 42.7–76)
NRBC BLD AUTO-RTO: 0 /100 WBC (ref 0–0.2)
OLIGOCLONAL BANDS.IT SER+CSF QL: ABNORMAL
PHOSPHATE SERPL-MCNC: 3.4 MG/DL (ref 2.5–4.5)
PLATELET # BLD AUTO: 165 10*3/MM3 (ref 140–450)
PMV BLD AUTO: 11.1 FL (ref 6–12)
POTASSIUM SERPL-SCNC: 3.4 MMOL/L (ref 3.5–5.2)
PROT SERPL-MCNC: 5.7 G/DL (ref 6–8.5)
PROTHROMBIN TIME: 14.4 SECONDS (ref 11.7–14.2)
RBC # BLD AUTO: 2.36 10*6/MM3 (ref 3.77–5.28)
RH BLD: POSITIVE
SODIUM SERPL-SCNC: 132 MMOL/L (ref 136–145)
T&S EXPIRATION DATE: NORMAL
WBC NRBC COR # BLD: 20.31 10*3/MM3 (ref 3.4–10.8)

## 2023-03-08 PROCEDURE — 25010000002 MIDAZOLAM PER 1 MG: Performed by: ANESTHESIOLOGY

## 2023-03-08 PROCEDURE — C1729 CATH, DRAINAGE: HCPCS | Performed by: THORACIC SURGERY (CARDIOTHORACIC VASCULAR SURGERY)

## 2023-03-08 PROCEDURE — 25010000002 HYDROMORPHONE PER 4 MG: Performed by: INTERNAL MEDICINE

## 2023-03-08 PROCEDURE — 85610 PROTHROMBIN TIME: CPT | Performed by: NURSE PRACTITIONER

## 2023-03-08 PROCEDURE — 80053 COMPREHEN METABOLIC PANEL: CPT | Performed by: INTERNAL MEDICINE

## 2023-03-08 PROCEDURE — 86923 COMPATIBILITY TEST ELECTRIC: CPT

## 2023-03-08 PROCEDURE — 99232 SBSQ HOSP IP/OBS MODERATE 35: CPT | Performed by: PSYCHIATRY & NEUROLOGY

## 2023-03-08 PROCEDURE — 87070 CULTURE OTHR SPECIMN AEROBIC: CPT | Performed by: THORACIC SURGERY (CARDIOTHORACIC VASCULAR SURGERY)

## 2023-03-08 PROCEDURE — 25010000002 PHENYLEPHRINE 10 MG/ML SOLUTION: Performed by: ANESTHESIOLOGY

## 2023-03-08 PROCEDURE — 87102 FUNGUS ISOLATION CULTURE: CPT | Performed by: THORACIC SURGERY (CARDIOTHORACIC VASCULAR SURGERY)

## 2023-03-08 PROCEDURE — 71045 X-RAY EXAM CHEST 1 VIEW: CPT

## 2023-03-08 PROCEDURE — 25010000002 VANCOMYCIN 750 MG RECONSTITUTED SOLUTION 1 EACH VIAL: Performed by: NURSE PRACTITIONER

## 2023-03-08 PROCEDURE — 86900 BLOOD TYPING SEROLOGIC ABO: CPT | Performed by: NURSE PRACTITIONER

## 2023-03-08 PROCEDURE — 94799 UNLISTED PULMONARY SVC/PX: CPT

## 2023-03-08 PROCEDURE — 86901 BLOOD TYPING SEROLOGIC RH(D): CPT | Performed by: NURSE PRACTITIONER

## 2023-03-08 PROCEDURE — 87176 TISSUE HOMOGENIZATION CULTR: CPT | Performed by: THORACIC SURGERY (CARDIOTHORACIC VASCULAR SURGERY)

## 2023-03-08 PROCEDURE — 0BNK4ZZ RELEASE RIGHT LUNG, PERCUTANEOUS ENDOSCOPIC APPROACH: ICD-10-PCS | Performed by: THORACIC SURGERY (CARDIOTHORACIC VASCULAR SURGERY)

## 2023-03-08 PROCEDURE — 87147 CULTURE TYPE IMMUNOLOGIC: CPT | Performed by: THORACIC SURGERY (CARDIOTHORACIC VASCULAR SURGERY)

## 2023-03-08 PROCEDURE — 87075 CULTR BACTERIA EXCEPT BLOOD: CPT | Performed by: THORACIC SURGERY (CARDIOTHORACIC VASCULAR SURGERY)

## 2023-03-08 PROCEDURE — 85730 THROMBOPLASTIN TIME PARTIAL: CPT | Performed by: NURSE PRACTITIONER

## 2023-03-08 PROCEDURE — 84100 ASSAY OF PHOSPHORUS: CPT | Performed by: INTERNAL MEDICINE

## 2023-03-08 PROCEDURE — 86850 RBC ANTIBODY SCREEN: CPT | Performed by: NURSE PRACTITIONER

## 2023-03-08 PROCEDURE — 82962 GLUCOSE BLOOD TEST: CPT

## 2023-03-08 PROCEDURE — 32652 THORACOSCOPY REM TOTL CORTEX: CPT | Performed by: SPECIALIST/TECHNOLOGIST, OTHER

## 2023-03-08 PROCEDURE — 25010000002 PHENYLEPHRINE 10 MG/ML SOLUTION 5 ML VIAL: Performed by: ANESTHESIOLOGY

## 2023-03-08 PROCEDURE — 8E0W4CZ ROBOTIC ASSISTED PROCEDURE OF TRUNK REGION, PERCUTANEOUS ENDOSCOPIC APPROACH: ICD-10-PCS | Performed by: THORACIC SURGERY (CARDIOTHORACIC VASCULAR SURGERY)

## 2023-03-08 PROCEDURE — 85025 COMPLETE CBC W/AUTO DIFF WBC: CPT | Performed by: INTERNAL MEDICINE

## 2023-03-08 PROCEDURE — 25010000002 PROPOFOL 10 MG/ML EMULSION: Performed by: ANESTHESIOLOGY

## 2023-03-08 PROCEDURE — 88305 TISSUE EXAM BY PATHOLOGIST: CPT | Performed by: THORACIC SURGERY (CARDIOTHORACIC VASCULAR SURGERY)

## 2023-03-08 PROCEDURE — 94664 DEMO&/EVAL PT USE INHALER: CPT

## 2023-03-08 PROCEDURE — 87015 SPECIMEN INFECT AGNT CONCNTJ: CPT | Performed by: THORACIC SURGERY (CARDIOTHORACIC VASCULAR SURGERY)

## 2023-03-08 PROCEDURE — 25010000002 VANCOMYCIN PER 500 MG: Performed by: INTERNAL MEDICINE

## 2023-03-08 PROCEDURE — 87186 SC STD MICRODIL/AGAR DIL: CPT | Performed by: THORACIC SURGERY (CARDIOTHORACIC VASCULAR SURGERY)

## 2023-03-08 PROCEDURE — C9290 INJ, BUPIVACAINE LIPOSOME: HCPCS | Performed by: ANESTHESIOLOGY

## 2023-03-08 PROCEDURE — 009U3ZX DRAINAGE OF SPINAL CANAL, PERCUTANEOUS APPROACH, DIAGNOSTIC: ICD-10-PCS | Performed by: RADIOLOGY

## 2023-03-08 PROCEDURE — 87205 SMEAR GRAM STAIN: CPT | Performed by: THORACIC SURGERY (CARDIOTHORACIC VASCULAR SURGERY)

## 2023-03-08 PROCEDURE — 25010000002 FENTANYL CITRATE (PF) 50 MCG/ML SOLUTION: Performed by: ANESTHESIOLOGY

## 2023-03-08 PROCEDURE — 94761 N-INVAS EAR/PLS OXIMETRY MLT: CPT

## 2023-03-08 PROCEDURE — 0 BUPIVACAINE LIPOSOME 1.3 % SUSPENSION: Performed by: ANESTHESIOLOGY

## 2023-03-08 PROCEDURE — 83735 ASSAY OF MAGNESIUM: CPT | Performed by: INTERNAL MEDICINE

## 2023-03-08 PROCEDURE — 25010000002 ONDANSETRON PER 1 MG: Performed by: ANESTHESIOLOGY

## 2023-03-08 PROCEDURE — 32652 THORACOSCOPY REM TOTL CORTEX: CPT | Performed by: THORACIC SURGERY (CARDIOTHORACIC VASCULAR SURGERY)

## 2023-03-08 RX ORDER — IPRATROPIUM BROMIDE AND ALBUTEROL SULFATE 2.5; .5 MG/3ML; MG/3ML
3 SOLUTION RESPIRATORY (INHALATION) ONCE AS NEEDED
Status: DISCONTINUED | OUTPATIENT
Start: 2023-03-08 | End: 2023-03-08 | Stop reason: HOSPADM

## 2023-03-08 RX ORDER — ONDANSETRON 2 MG/ML
4 INJECTION INTRAMUSCULAR; INTRAVENOUS EVERY 6 HOURS PRN
Status: DISCONTINUED | OUTPATIENT
Start: 2023-03-08 | End: 2023-03-17

## 2023-03-08 RX ORDER — PROPOFOL 10 MG/ML
VIAL (ML) INTRAVENOUS AS NEEDED
Status: DISCONTINUED | OUTPATIENT
Start: 2023-03-08 | End: 2023-03-08 | Stop reason: SURG

## 2023-03-08 RX ORDER — ONDANSETRON 4 MG/1
4 TABLET, FILM COATED ORAL EVERY 6 HOURS PRN
Status: DISCONTINUED | OUTPATIENT
Start: 2023-03-08 | End: 2023-03-17 | Stop reason: HOSPADM

## 2023-03-08 RX ORDER — PROMETHAZINE HYDROCHLORIDE 25 MG/1
25 SUPPOSITORY RECTAL ONCE AS NEEDED
Status: DISCONTINUED | OUTPATIENT
Start: 2023-03-08 | End: 2023-03-08 | Stop reason: HOSPADM

## 2023-03-08 RX ORDER — MIDAZOLAM HYDROCHLORIDE 1 MG/ML
INJECTION INTRAMUSCULAR; INTRAVENOUS
Status: COMPLETED | OUTPATIENT
Start: 2023-03-08 | End: 2023-03-08

## 2023-03-08 RX ORDER — EPHEDRINE SULFATE 50 MG/ML
5 INJECTION, SOLUTION INTRAVENOUS ONCE AS NEEDED
Status: DISCONTINUED | OUTPATIENT
Start: 2023-03-08 | End: 2023-03-08 | Stop reason: HOSPADM

## 2023-03-08 RX ORDER — FENTANYL CITRATE 50 UG/ML
50 INJECTION, SOLUTION INTRAMUSCULAR; INTRAVENOUS
Status: DISCONTINUED | OUTPATIENT
Start: 2023-03-08 | End: 2023-03-08 | Stop reason: HOSPADM

## 2023-03-08 RX ORDER — HYDROCODONE BITARTRATE AND ACETAMINOPHEN 7.5; 325 MG/1; MG/1
1 TABLET ORAL ONCE AS NEEDED
Status: DISCONTINUED | OUTPATIENT
Start: 2023-03-08 | End: 2023-03-08 | Stop reason: HOSPADM

## 2023-03-08 RX ORDER — PROMETHAZINE HYDROCHLORIDE 25 MG/1
25 TABLET ORAL ONCE AS NEEDED
Status: DISCONTINUED | OUTPATIENT
Start: 2023-03-08 | End: 2023-03-08 | Stop reason: HOSPADM

## 2023-03-08 RX ORDER — FLUMAZENIL 0.1 MG/ML
0.2 INJECTION INTRAVENOUS AS NEEDED
Status: DISCONTINUED | OUTPATIENT
Start: 2023-03-08 | End: 2023-03-08 | Stop reason: HOSPADM

## 2023-03-08 RX ORDER — HEPARIN SODIUM 5000 [USP'U]/ML
5000 INJECTION, SOLUTION INTRAVENOUS; SUBCUTANEOUS ONCE
Status: DISCONTINUED | OUTPATIENT
Start: 2023-03-08 | End: 2023-03-08 | Stop reason: HOSPADM

## 2023-03-08 RX ORDER — LIDOCAINE HYDROCHLORIDE 20 MG/ML
INJECTION, SOLUTION INFILTRATION; PERINEURAL AS NEEDED
Status: DISCONTINUED | OUTPATIENT
Start: 2023-03-08 | End: 2023-03-08 | Stop reason: SURG

## 2023-03-08 RX ORDER — OXYCODONE HYDROCHLORIDE 5 MG/1
5 TABLET ORAL EVERY 4 HOURS PRN
Status: DISCONTINUED | OUTPATIENT
Start: 2023-03-08 | End: 2023-03-12

## 2023-03-08 RX ORDER — DROPERIDOL 2.5 MG/ML
0.62 INJECTION, SOLUTION INTRAMUSCULAR; INTRAVENOUS
Status: DISCONTINUED | OUTPATIENT
Start: 2023-03-08 | End: 2023-03-08 | Stop reason: HOSPADM

## 2023-03-08 RX ORDER — NALOXONE HCL 0.4 MG/ML
0.2 VIAL (ML) INJECTION AS NEEDED
Status: DISCONTINUED | OUTPATIENT
Start: 2023-03-08 | End: 2023-03-08 | Stop reason: HOSPADM

## 2023-03-08 RX ORDER — HYDROMORPHONE HYDROCHLORIDE 1 MG/ML
0.5 INJECTION, SOLUTION INTRAMUSCULAR; INTRAVENOUS; SUBCUTANEOUS
Status: DISCONTINUED | OUTPATIENT
Start: 2023-03-08 | End: 2023-03-13

## 2023-03-08 RX ORDER — LABETALOL HYDROCHLORIDE 5 MG/ML
5 INJECTION, SOLUTION INTRAVENOUS
Status: DISCONTINUED | OUTPATIENT
Start: 2023-03-08 | End: 2023-03-08 | Stop reason: HOSPADM

## 2023-03-08 RX ORDER — IPRATROPIUM BROMIDE AND ALBUTEROL SULFATE 2.5; .5 MG/3ML; MG/3ML
3 SOLUTION RESPIRATORY (INHALATION)
Status: DISCONTINUED | OUTPATIENT
Start: 2023-03-09 | End: 2023-03-09

## 2023-03-08 RX ORDER — ONDANSETRON 2 MG/ML
4 INJECTION INTRAMUSCULAR; INTRAVENOUS ONCE AS NEEDED
Status: DISCONTINUED | OUTPATIENT
Start: 2023-03-08 | End: 2023-03-08 | Stop reason: HOSPADM

## 2023-03-08 RX ORDER — DEXTROSE, SODIUM CHLORIDE, AND POTASSIUM CHLORIDE 5; .45; .15 G/100ML; G/100ML; G/100ML
75 INJECTION INTRAVENOUS CONTINUOUS
Status: DISCONTINUED | OUTPATIENT
Start: 2023-03-09 | End: 2023-03-09

## 2023-03-08 RX ORDER — BUPIVACAINE HYDROCHLORIDE 2.5 MG/ML
INJECTION, SOLUTION EPIDURAL; INFILTRATION; INTRACAUDAL
Status: COMPLETED | OUTPATIENT
Start: 2023-03-08 | End: 2023-03-08

## 2023-03-08 RX ORDER — KETAMINE HCL IN NACL, ISO-OSM 100MG/10ML
SYRINGE (ML) INJECTION AS NEEDED
Status: DISCONTINUED | OUTPATIENT
Start: 2023-03-08 | End: 2023-03-08 | Stop reason: SURG

## 2023-03-08 RX ORDER — MIDAZOLAM HYDROCHLORIDE 1 MG/ML
1 INJECTION INTRAMUSCULAR; INTRAVENOUS
Status: DISCONTINUED | OUTPATIENT
Start: 2023-03-08 | End: 2023-03-08 | Stop reason: HOSPADM

## 2023-03-08 RX ORDER — SODIUM CHLORIDE, SODIUM LACTATE, POTASSIUM CHLORIDE, CALCIUM CHLORIDE 600; 310; 30; 20 MG/100ML; MG/100ML; MG/100ML; MG/100ML
INJECTION, SOLUTION INTRAVENOUS CONTINUOUS PRN
Status: DISCONTINUED | OUTPATIENT
Start: 2023-03-08 | End: 2023-03-08 | Stop reason: SURG

## 2023-03-08 RX ORDER — DIPHENHYDRAMINE HYDROCHLORIDE 50 MG/ML
12.5 INJECTION INTRAMUSCULAR; INTRAVENOUS
Status: DISCONTINUED | OUTPATIENT
Start: 2023-03-08 | End: 2023-03-08 | Stop reason: HOSPADM

## 2023-03-08 RX ORDER — LIDOCAINE HYDROCHLORIDE 10 MG/ML
0.5 INJECTION, SOLUTION EPIDURAL; INFILTRATION; INTRACAUDAL; PERINEURAL ONCE AS NEEDED
Status: DISCONTINUED | OUTPATIENT
Start: 2023-03-08 | End: 2023-03-08 | Stop reason: HOSPADM

## 2023-03-08 RX ORDER — CELECOXIB 100 MG/1
100 CAPSULE ORAL EVERY 12 HOURS SCHEDULED
Status: DISCONTINUED | OUTPATIENT
Start: 2023-03-09 | End: 2023-03-17 | Stop reason: HOSPADM

## 2023-03-08 RX ORDER — BISACODYL 10 MG
10 SUPPOSITORY, RECTAL RECTAL DAILY PRN
Status: DISCONTINUED | OUTPATIENT
Start: 2023-03-08 | End: 2023-03-17 | Stop reason: HOSPADM

## 2023-03-08 RX ORDER — CELECOXIB 200 MG/1
200 CAPSULE ORAL ONCE
Status: COMPLETED | OUTPATIENT
Start: 2023-03-08 | End: 2023-03-08

## 2023-03-08 RX ORDER — ONDANSETRON 2 MG/ML
INJECTION INTRAMUSCULAR; INTRAVENOUS AS NEEDED
Status: DISCONTINUED | OUTPATIENT
Start: 2023-03-08 | End: 2023-03-08 | Stop reason: SURG

## 2023-03-08 RX ORDER — PHENYLEPHRINE HYDROCHLORIDE 10 MG/ML
INJECTION INTRAVENOUS AS NEEDED
Status: DISCONTINUED | OUTPATIENT
Start: 2023-03-08 | End: 2023-03-08 | Stop reason: SURG

## 2023-03-08 RX ORDER — FENTANYL CITRATE 50 UG/ML
INJECTION, SOLUTION INTRAMUSCULAR; INTRAVENOUS
Status: COMPLETED | OUTPATIENT
Start: 2023-03-08 | End: 2023-03-08

## 2023-03-08 RX ORDER — GABAPENTIN 300 MG/1
300 CAPSULE ORAL EVERY 8 HOURS SCHEDULED
Status: DISCONTINUED | OUTPATIENT
Start: 2023-03-09 | End: 2023-03-17 | Stop reason: HOSPADM

## 2023-03-08 RX ORDER — ROCURONIUM BROMIDE 10 MG/ML
INJECTION, SOLUTION INTRAVENOUS AS NEEDED
Status: DISCONTINUED | OUTPATIENT
Start: 2023-03-08 | End: 2023-03-08 | Stop reason: SURG

## 2023-03-08 RX ORDER — HEPARIN SODIUM 5000 [USP'U]/ML
5000 INJECTION, SOLUTION INTRAVENOUS; SUBCUTANEOUS EVERY 8 HOURS SCHEDULED
Status: DISCONTINUED | OUTPATIENT
Start: 2023-03-09 | End: 2023-03-15

## 2023-03-08 RX ORDER — SODIUM CHLORIDE 0.9 % (FLUSH) 0.9 %
3 SYRINGE (ML) INJECTION EVERY 12 HOURS SCHEDULED
Status: DISCONTINUED | OUTPATIENT
Start: 2023-03-08 | End: 2023-03-08 | Stop reason: HOSPADM

## 2023-03-08 RX ORDER — HYDROMORPHONE HYDROCHLORIDE 1 MG/ML
0.5 INJECTION, SOLUTION INTRAMUSCULAR; INTRAVENOUS; SUBCUTANEOUS
Status: DISCONTINUED | OUTPATIENT
Start: 2023-03-08 | End: 2023-03-08 | Stop reason: HOSPADM

## 2023-03-08 RX ORDER — SODIUM CHLORIDE 0.9 % (FLUSH) 0.9 %
3-10 SYRINGE (ML) INJECTION AS NEEDED
Status: DISCONTINUED | OUTPATIENT
Start: 2023-03-08 | End: 2023-03-08 | Stop reason: HOSPADM

## 2023-03-08 RX ORDER — HEPARIN SODIUM 5000 [USP'U]/ML
5000 INJECTION, SOLUTION INTRAVENOUS; SUBCUTANEOUS ONCE
Status: DISCONTINUED | OUTPATIENT
Start: 2023-03-09 | End: 2023-03-08

## 2023-03-08 RX ORDER — NITROGLYCERIN 0.4 MG/1
0.4 TABLET SUBLINGUAL
Status: DISCONTINUED | OUTPATIENT
Start: 2023-03-08 | End: 2023-03-17

## 2023-03-08 RX ORDER — SODIUM CHLORIDE, SODIUM LACTATE, POTASSIUM CHLORIDE, CALCIUM CHLORIDE 600; 310; 30; 20 MG/100ML; MG/100ML; MG/100ML; MG/100ML
9 INJECTION, SOLUTION INTRAVENOUS CONTINUOUS
Status: DISCONTINUED | OUTPATIENT
Start: 2023-03-08 | End: 2023-03-12

## 2023-03-08 RX ORDER — OXYCODONE AND ACETAMINOPHEN 7.5; 325 MG/1; MG/1
1 TABLET ORAL EVERY 4 HOURS PRN
Status: DISCONTINUED | OUTPATIENT
Start: 2023-03-08 | End: 2023-03-08 | Stop reason: HOSPADM

## 2023-03-08 RX ORDER — HYDRALAZINE HYDROCHLORIDE 20 MG/ML
5 INJECTION INTRAMUSCULAR; INTRAVENOUS
Status: DISCONTINUED | OUTPATIENT
Start: 2023-03-08 | End: 2023-03-08 | Stop reason: HOSPADM

## 2023-03-08 RX ORDER — FAMOTIDINE 10 MG/ML
20 INJECTION, SOLUTION INTRAVENOUS ONCE
Status: COMPLETED | OUTPATIENT
Start: 2023-03-08 | End: 2023-03-08

## 2023-03-08 RX ORDER — FENTANYL CITRATE 50 UG/ML
INJECTION, SOLUTION INTRAMUSCULAR; INTRAVENOUS AS NEEDED
Status: DISCONTINUED | OUTPATIENT
Start: 2023-03-08 | End: 2023-03-08 | Stop reason: SURG

## 2023-03-08 RX ORDER — ACETAMINOPHEN 500 MG
1000 TABLET ORAL 3 TIMES DAILY
Status: DISCONTINUED | OUTPATIENT
Start: 2023-03-09 | End: 2023-03-17 | Stop reason: HOSPADM

## 2023-03-08 RX ORDER — GABAPENTIN 300 MG/1
600 CAPSULE ORAL ONCE
Status: COMPLETED | OUTPATIENT
Start: 2023-03-08 | End: 2023-03-08

## 2023-03-08 RX ORDER — ACETAMINOPHEN 500 MG
1000 TABLET ORAL ONCE
Status: COMPLETED | OUTPATIENT
Start: 2023-03-08 | End: 2023-03-08

## 2023-03-08 RX ADMIN — LAMOTRIGINE 200 MG: 100 TABLET ORAL at 09:39

## 2023-03-08 RX ADMIN — ROCURONIUM BROMIDE 10 MG: 10 INJECTION, SOLUTION INTRAVENOUS at 20:37

## 2023-03-08 RX ADMIN — BUPIVACAINE HYDROCHLORIDE 30 ML: 2.5 INJECTION, SOLUTION EPIDURAL; INFILTRATION; INTRACAUDAL; PERINEURAL at 17:11

## 2023-03-08 RX ADMIN — DESVENLAFAXINE SUCCINATE 25 MG: 25 TABLET, EXTENDED RELEASE ORAL at 08:59

## 2023-03-08 RX ADMIN — SODIUM CHLORIDE, POTASSIUM CHLORIDE, SODIUM LACTATE AND CALCIUM CHLORIDE: 600; 310; 30; 20 INJECTION, SOLUTION INTRAVENOUS at 18:22

## 2023-03-08 RX ADMIN — VANCOMYCIN HYDROCHLORIDE 750 MG: 750 INJECTION, POWDER, LYOPHILIZED, FOR SOLUTION INTRAVENOUS at 17:34

## 2023-03-08 RX ADMIN — FAMOTIDINE 20 MG: 10 INJECTION INTRAVENOUS at 17:03

## 2023-03-08 RX ADMIN — PHENYLEPHRINE HYDROCHLORIDE 0.4 MCG/KG/MIN: 10 INJECTION INTRAVENOUS at 19:40

## 2023-03-08 RX ADMIN — LIDOCAINE HYDROCHLORIDE 60 MG: 20 INJECTION, SOLUTION INFILTRATION; PERINEURAL at 18:42

## 2023-03-08 RX ADMIN — Medication 100 MG: at 08:59

## 2023-03-08 RX ADMIN — PROPOFOL 50 MG: 10 INJECTION, EMULSION INTRAVENOUS at 18:45

## 2023-03-08 RX ADMIN — Medication 10 ML: at 09:40

## 2023-03-08 RX ADMIN — SODIUM CHLORIDE, POTASSIUM CHLORIDE, SODIUM LACTATE AND CALCIUM CHLORIDE 9 ML/HR: 600; 310; 30; 20 INJECTION, SOLUTION INTRAVENOUS at 17:04

## 2023-03-08 RX ADMIN — FENTANYL CITRATE 25 MCG: 50 INJECTION, SOLUTION INTRAMUSCULAR; INTRAVENOUS at 18:55

## 2023-03-08 RX ADMIN — OXYCODONE HYDROCHLORIDE 5 MG: 5 TABLET ORAL at 00:31

## 2023-03-08 RX ADMIN — ACETAMINOPHEN 1000 MG: 500 TABLET, FILM COATED ORAL at 16:29

## 2023-03-08 RX ADMIN — GABAPENTIN 300 MG: 300 CAPSULE ORAL at 23:41

## 2023-03-08 RX ADMIN — FOLIC ACID 1 MG: 1 TABLET ORAL at 08:59

## 2023-03-08 RX ADMIN — GABAPENTIN 600 MG: 300 CAPSULE ORAL at 16:28

## 2023-03-08 RX ADMIN — PROPOFOL 100 MG: 10 INJECTION, EMULSION INTRAVENOUS at 18:42

## 2023-03-08 RX ADMIN — BUPIVACAINE 10 ML: 13.3 INJECTION, SUSPENSION, LIPOSOMAL INFILTRATION at 17:11

## 2023-03-08 RX ADMIN — MIDAZOLAM 0.5 MG: 1 INJECTION INTRAMUSCULAR; INTRAVENOUS at 17:18

## 2023-03-08 RX ADMIN — PHENYLEPHRINE HYDROCHLORIDE 200 MCG: 10 INJECTION, SOLUTION INTRAVENOUS at 19:14

## 2023-03-08 RX ADMIN — GABAPENTIN 100 MG: 100 CAPSULE ORAL at 08:59

## 2023-03-08 RX ADMIN — PANTOPRAZOLE SODIUM 40 MG: 40 INJECTION, POWDER, FOR SOLUTION INTRAVENOUS at 07:00

## 2023-03-08 RX ADMIN — SODIUM CHLORIDE, POTASSIUM CHLORIDE, SODIUM LACTATE AND CALCIUM CHLORIDE: 600; 310; 30; 20 INJECTION, SOLUTION INTRAVENOUS at 21:18

## 2023-03-08 RX ADMIN — ONDANSETRON 4 MG: 2 INJECTION INTRAMUSCULAR; INTRAVENOUS at 21:03

## 2023-03-08 RX ADMIN — HYDROMORPHONE HYDROCHLORIDE 0.5 MG: 1 INJECTION, SOLUTION INTRAMUSCULAR; INTRAVENOUS; SUBCUTANEOUS at 23:41

## 2023-03-08 RX ADMIN — CELECOXIB 200 MG: 200 CAPSULE ORAL at 16:28

## 2023-03-08 RX ADMIN — IPRATROPIUM BROMIDE AND ALBUTEROL SULFATE 3 ML: 2.5; .5 SOLUTION RESPIRATORY (INHALATION) at 11:45

## 2023-03-08 RX ADMIN — Medication 20 MG: at 19:32

## 2023-03-08 RX ADMIN — POTASSIUM CHLORIDE, DEXTROSE MONOHYDRATE AND SODIUM CHLORIDE 75 ML/HR: 150; 5; 450 INJECTION, SOLUTION INTRAVENOUS at 23:44

## 2023-03-08 RX ADMIN — PHENYLEPHRINE HYDROCHLORIDE 100 MCG: 10 INJECTION, SOLUTION INTRAVENOUS at 19:09

## 2023-03-08 RX ADMIN — PHENYLEPHRINE HYDROCHLORIDE 100 MCG: 10 INJECTION, SOLUTION INTRAVENOUS at 19:38

## 2023-03-08 RX ADMIN — FENTANYL CITRATE 25 MCG: 50 INJECTION, SOLUTION INTRAMUSCULAR; INTRAVENOUS at 18:42

## 2023-03-08 RX ADMIN — Medication 500 MCG: at 08:59

## 2023-03-08 RX ADMIN — VANCOMYCIN HYDROCHLORIDE 1000 MG: 1 INJECTION, SOLUTION INTRAVENOUS at 11:33

## 2023-03-08 RX ADMIN — LUBIPROSTONE 24 MCG: 24 CAPSULE, GELATIN COATED ORAL at 08:59

## 2023-03-08 RX ADMIN — ROCURONIUM BROMIDE 40 MG: 10 INJECTION, SOLUTION INTRAVENOUS at 18:42

## 2023-03-08 RX ADMIN — IPRATROPIUM BROMIDE AND ALBUTEROL SULFATE 3 ML: .5; 3 SOLUTION RESPIRATORY (INHALATION) at 23:45

## 2023-03-08 RX ADMIN — SUGAMMADEX 200 MG: 100 INJECTION, SOLUTION INTRAVENOUS at 21:26

## 2023-03-08 RX ADMIN — FENTANYL CITRATE 50 MCG: 50 INJECTION, SOLUTION INTRAMUSCULAR; INTRAVENOUS at 17:12

## 2023-03-08 RX ADMIN — ACETAMINOPHEN 1000 MG: 500 TABLET, FILM COATED ORAL at 23:41

## 2023-03-08 RX ADMIN — PROPOFOL 50 MG: 10 INJECTION, EMULSION INTRAVENOUS at 18:54

## 2023-03-08 NOTE — SIGNIFICANT NOTE
03/08/23 1532   OTHER   Discipline occupational therapist   Rehab Time/Intention   Session Not Performed patient/family declined treatment  (pt sleeping. having VATS later today. family asked to hold OT. will follow up 3/9)   Recommendation   OT - Next Appointment 03/09/23

## 2023-03-08 NOTE — PROGRESS NOTES
Name: Louise GARCIA ADMIT: 2023   : 1965  PCP: Chloe Schaefer APRN    MRN: [id number] LOS: 13 days   AGE/SEX: 58 y.o. female  ROOM: [facility] Main OR/MAIN OR     Subjective   Subjective   On bed hold to transfer out of ICU. No new complaints. Plan for VATS today.     Objective   Objective   Vital Signs  Temp:  [98.6 °F (37 °C)-98.9 °F (37.2 °C)] 98.9 °F (37.2 °C)  Heart Rate:  [] 98  Resp:  [11-24] 16  BP: (100-140)/(53-85) 140/85  SpO2:  [92 %-98 %] 98 %  on  Flow (L/min):  [3] 3;   Device (Oxygen Therapy): room air  Body mass index is 22.86 kg/m².  Physical Exam  Constitutional:       Appearance: Normal appearance.   Cardiovascular:      Rate and Rhythm: Normal rate.      Heart sounds: No murmur heard.  Pulmonary:      Effort: Pulmonary effort is normal.      Breath sounds: No stridor. No rhonchi.   Abdominal:      General: Abdomen is flat. There is no distension.      Palpations: Abdomen is soft.   Skin:     General: Skin is warm.      Coloration: Skin is not jaundiced.   Neurological:      General: No focal deficit present.      Mental Status: She is alert and oriented to person, place, and time.      Comments: 5/5 strength b/l UEs; 4/5 strength b/l LEs;         Results Review     I reviewed the patient's new clinical results.  Results from last 7 days   Lab Units 23  0731 23  0733 23  0723  0532   WBC 10*3/mm3 20.31* 17.78* 16.60* 20.27*   HEMOGLOBIN g/dL 8.2* 8.3* 9.0* 9.3*   PLATELETS 10*3/mm3 165 198 173 167     Results from last 7 days   Lab Units 23  0731 23  0733 23  0719 23  0532   SODIUM mmol/L 132* 134* 133* 134*   POTASSIUM mmol/L 3.4* 3.8 3.9 4.2   CHLORIDE mmol/L 95* 96* 97* 99   CO2 mmol/L 27.9 27.1 24.5 26.0   BUN mg/dL 9 11 15 19   CREATININE mg/dL 0.56* 0.53* 0.57 0.57   GLUCOSE mg/dL 78 80 82 79   EGFR mL/min/1.73 105.3 106.7 104.8 104.8     Results from last 7 days   Lab Units 23  0731 23  0733 23  1550  03/06/23  0719 03/05/23  0532   ALBUMIN g/dL 1.8* 2.2* 2.1* 2.1* 2.5*   BILIRUBIN mg/dL 1.2 1.4*  --  1.4* 1.4*   ALK PHOS U/L 210* 202*  --  202* 213*   AST (SGOT) U/L 32 35*  --  40* 80*   ALT (SGPT) U/L 27 29  --  41* 55*     Results from last 7 days   Lab Units 03/08/23  0731 03/07/23  0733 03/06/23  1551 03/06/23  0719 03/05/23  0532   CALCIUM mg/dL 8.4* 8.2*  8.1*  --  8.7 9.0   ALBUMIN g/dL 1.8* 2.2* 2.1* 2.1* 2.5*   MAGNESIUM mg/dL 1.7 1.7  --  1.7 2.0   PHOSPHORUS mg/dL 3.4 3.8  --  3.8 4.0     Results from last 7 days   Lab Units 03/05/23  0532 03/03/23  1051 03/03/23  0454 03/03/23  0159 03/02/23  2250 03/02/23  1955 03/02/23  0633   PROCALCITONIN ng/mL 2.40*  --   --   --   --   --  0.43*   LACTATE mmol/L  --  1.4 2.1* 2.7* 3.6*   < >  --     < > = values in this interval not displayed.     Glucose   Date/Time Value Ref Range Status   03/08/2023 1701 77 70 - 130 mg/dL Final     Comment:     Meter: [id number] : [id number] Amrik Mitchell SHANTEL SANDY   03/06/2023 1114 103 70 - 130 mg/dL Final     Comment:     Meter: [id number] : [id number] Graham Aryeon NA       XR Chest 1 View    Result Date: 3/8/2023  Patient does appear to have some increasing vascular congestion. Loculated right pleural effusion appears stable.  This report was finalized on 3/8/2023 4:56 AM by Dr. Lorin Olivia M.D.      Scheduled Medications  bisacodyl, 10 mg, Rectal, Daily  desvenlafaxine, 25 mg, Oral, Daily  folic acid, 1 mg, Oral, Daily  gabapentin, 100 mg, Oral, Q12H  ipratropium-albuterol, 3 mL, Nebulization, 4x Daily - RT  lactulose, 20 g, Oral, BID  lamoTRIgine, 200 mg, Oral, Daily  lidocaine, 1 patch, Transdermal, Q24H  lidocaine, 1 patch, Transdermal, Q24H  lidocaine, 10 mL, Intradermal, Once  lubiprostone, 24 mcg, Oral, Daily With Breakfast  pantoprazole, 40 mg, Intravenous, Q AM  polyethylene glycol, 17 g, Oral, BID  senna-docusate sodium, 2 tablet, Oral, BID  sodium chloride, 10 mL, Intravenous, Q12H  thiamine, 100 mg,  Oral, Daily  vancomycin, 1,000 mg, Intravenous, Q12H  vitamin B-12, 500 mcg, Oral, Daily    Infusions  hold, 1 each  hold, 1 each  lactated ringers, 9 mL/hr, Last Rate: 9 mL/hr (03/08/23 1704)  Pharmacy to dose vancomycin,     Diet  NPO Diet NPO Type: Sips with Meds       Assessment/Plan     Active Hospital Problems    Diagnosis  POA   • **Paresthesia [R20.2]  Yes   • MRSA bacteremia [R78.81, B95.62]  Unknown   • Tricuspid valve vegetation [I33.0]  Yes   • Oral candidiasis [B37.0]  No   • Mass of middle lobe of right lung [R91.8]  Yes   • Situational mixed anxiety and depressive disorder [F43.23]  Yes   • Guillain-[place] syndrome (HCC) [G61.0]  Yes   • Normocytic anemia [D64.9]  Yes   • GERD (gastroesophageal reflux disease) [K21.9]  Yes   • Lower extremity weakness [R29.898]  Yes   • History of blood clots [Z86.718]  Not Applicable   • Chronic anticoagulation [Z79.01]  Not Applicable   • Seizures (HCC) [R56.9]  Yes   • Cervical myelopathy (HCC) [G95.9]  Yes   • Empyema of pleura (HCC) [J86.9]  Yes      Resolved Hospital Problems    Diagnosis Date Resolved POA   • Upper abdominal pain [R10.10] 03/08/2023 No   • Leukocytosis [D72.829] 03/08/2023 Yes   • Hyperglycemia [R73.9] 03/08/2023 No       58 y.o. female admitted with Paresthesia.      03/08/23  Plan for VATS today.    MRSA bacteremia   PNA with empyema  Endocarditis  Septic phlebitis  R pleural effusion   -Blood cx (3/2/23): MRSA; repeat negative   -s/p thoracentesis 3/4/23; pleural fluid w/ MRSA  -TTE (3/5/23): 61-65%; small mobile mass on TV c/w vegetation  -b/l UE doppler (3/5/23): acute RUE superficial thrombophlebitis  -ID following  -cont Vanc (3/4- )  -plan for VATS w/ decortication 3/8/23 with CTS    Idiopathic peripheral neuropathy, motor predominant  -EMG showing evidence of neuropathy, not typical of GBS  -s/p IVIG  -following tests are negative/Normal : West Nile virus serology, Lyme serology, ESR, CRP, magnesium, phosphorus, potassium, RPR, folate,  CK, DENISE, heavy metal screen, TSH, angiotensin-converting enzyme, heavy metal screen  -Neuro following    Seizure disorder  -lamotrigine 200mg    Depression  -desvenlafaxine 25mg    Hypokalemia, replete    Hx DVT  -on Eliquis, on hold for VATS    · SCDs for DVT prophylaxis; Eliquis ON HOLD  · Full code.  · Discussed with patient and nursing staff.  · Anticipate discharge home when cleared by consultants      Balwinder Hidalgo MD  [facility]  03/08/23  19:24 EST

## 2023-03-08 NOTE — PROGRESS NOTES
[facility]  [phone]  Dr. Eliezer Cruz    Subjective:  LOS: 13    Chief Complaint:  AMS and Empyema    She feels okay.  On room air.  Pending surgery later today.    Objective   Vital Signs past 24hrs    Temp range: Temp (24hrs), Av.4 °F (36.9 °C), Min:98.1 °F (36.7 °C), Max:98.8 °F (37.1 °C)    BP range: BP: ()/(63-86) 136/79  Pulse range: Heart Rate:  [] 93  Resp rate range: Resp:  [11-18] 17  Device (Oxygen Therapy): nasal cannulaFlow (L/min):  [3] 3  Oxygen range:SpO2:  [93 %-99 %] 95 %       Physical Exam  Cardiovascular:      Rate and Rhythm: Normal rate and regular rhythm.      Heart sounds: No murmur heard.  Pulmonary:      Breath sounds: Rales (right side) present.   Abdominal:      General: Bowel sounds are normal.      Palpations: Abdomen is soft. There is no mass.      Tenderness: There is no abdominal tenderness.   Musculoskeletal:         General: No swelling.   Neurological:      Mental Status: She is alert.       Results Review:    I have reviewed the laboratory and imaging data since the last note by [facility] physician.  My annotations are noted in assessment and plan.      Result Review:  I have personally reviewed the results from last note by [facility] physician to 3/8/2023 10:42 EST and agree with these findings:  [x]  Laboratory list / accordion  [x]  Microbiology  [x]  Radiology  [x]  EKG/Telemetry   []  Cardiology/Vascular   []  Pathology  []  Old records  []  Other:    Medication Review:  I have reviewed the current MAR.  My annotations are noted in assessment and plan.    bisacodyl, 10 mg, Rectal, Daily  desvenlafaxine, 25 mg, Oral, Daily  folic acid, 1 mg, Oral, Daily  gabapentin, 100 mg, Oral, Q12H  ipratropium-albuterol, 3 mL, Nebulization, 4x Daily - RT  lactulose, 20 g, Oral, BID  lamoTRIgine, 200 mg, Oral, Daily  lidocaine, 1 patch, Transdermal, Q24H  lidocaine, 1 patch, Transdermal, Q24H  lidocaine, 10 mL, Intradermal, Once  lubiprostone, 24 mcg, Oral, Daily  With Breakfast  pantoprazole, 40 mg, Intravenous, Q AM  polyethylene glycol, 17 g, Oral, BID  senna-docusate sodium, 2 tablet, Oral, BID  sodium chloride, 10 mL, Intravenous, Q12H  thiamine, 100 mg, Oral, Daily  vancomycin, 1,000 mg, Intravenous, Q12H  vitamin B-12, 500 mcg, Oral, Daily        hold, 1 each  hold, 1 each  Pharmacy to dose vancomycin,       Lines, Drains & Airways     Active LDAs     Name Placement date Placement time Site Days    Peripheral IV 03/03/23 0815 Anterior;Distal;Right;Upper Arm 03/03/23  0815  Arm  3    Peripheral IV 03/06/23 0520 Left;Anterior Wrist 03/06/23  0520  Wrist  less than 1    External Urinary Catheter --  --  --  --              Contact  Diet Orders (active) (From admission, onward)     Start     Ordered    03/06/23 1043  NPO Diet NPO Type: Other (see comments), Sips with Meds  Diet Effective Now        Comments: Meds whole or crushed in puree. Can allow free water protocol with ice.    03/06/23 1043              PCCM Problems  Idiopathic neuropathy  MRSA pneumonia/empyema  Elevated liver enz  Anemia  Dysphagia  Hx DVT, was on Eliquis  Osteonecrosis of femoral heads            Plan of Treatment    Discussed with neurology. Idiopathic neuropathy with negative reveles so far.    MRSA pneumonia and empyema - on abx. Plan VATS today (at 6PM).    Elevated liver enz of uncertain etiology. Improved.    Hx DVT and Eliquis on hold.      Eliezer Cruz MD  03/08/23  10:42 EST      Part of this note may be an electronic transcription/translation of spoken language to printed text using the Dragon Dictation System.

## 2023-03-08 NOTE — ANESTHESIA PROCEDURE NOTES
Arterial Line      Patient reassessed immediately prior to procedure    Patient location during procedure: pre-op  Start time: 3/8/2023 5:11 PM  Stop Time:3/8/2023 5:25 PM       Line placed for hemodynamic monitoring and ABGs/Labs/ISTAT.  Performed By   Anesthesiologist: Agustin Martínez MD   Preanesthetic Checklist  Completed: patient identified, IV checked, site marked, risks and benefits discussed, surgical consent, monitors and equipment checked, pre-op evaluation and timeout performed  Arterial Line Prep    Sterile Tech: cap, mask, gloves and sterile barriers  Prep: ChloraPrep  Patient monitoring: blood pressure monitoring, continuous pulse oximetry and EKG  Arterial Line Procedure   Laterality:left  Location:  radial artery  Catheter size: 20 G   Guidance: ultrasound guided and palpation technique  Number of attempts: 1  Successful placement: yes Images: still images not obtained  Post Assessment   Dressing Type: occlusive dressing applied, secured with tape and wrist guard applied.   Complications no  Circ/Move/Sens Assessment: unchanged.   Patient Tolerance: patient tolerated the procedure well with no apparent complications

## 2023-03-08 NOTE — SIGNIFICANT NOTE
03/08/23 1436   OTHER   Discipline physical therapist   Rehab Time/Intention   Session Not Performed other (see comments)  (pt currently sleeping and preparing for VATS sx later today, family asked therapy to hold off for today, will follow up tomorrow)   Recommendation   PT - Next Appointment 03/09/23

## 2023-03-08 NOTE — PROGRESS NOTES
[facility]    Noted plan for VATS today. Will follow up postop for progress with therapies for determination of most appropriate level rehab at time of discharge.    Catarina Whatley RN  Rehab Admissions Coordinator  [phone]

## 2023-03-08 NOTE — ANESTHESIA PROCEDURE NOTES
Peripheral Block      Patient reassessed immediately prior to procedure    Patient location during procedure: pre-op  Start time: 3/8/2023 5:05 PM  Stop time: 3/8/2023 5:11 PM  Reason for block: at surgeon's request and post-op pain management  Performed by  Anesthesiologist: Agustin Martínez MD  Preanesthetic Checklist  Completed: patient identified, IV checked, site marked, risks and benefits discussed, surgical consent, monitors and equipment checked, pre-op evaluation and timeout performed  Prep:  Pt Position: supine  Sterile barriers:alcohol skin prep, washed/disinfected hands, cap, gloves and mask  Prep: ChloraPrep  Patient monitoring: blood pressure monitoring, continuous pulse oximetry and EKG  Procedure    Sedation: yes  Performed under: general  Guidance:ultrasound guided    ULTRASOUND INTERPRETATION.  Using ultrasound guidance a 21 G gauge needle was placed in close proximity to the nerve, at which point, under ultrasound guidance anesthetic was injected in the area of the nerve and spread of the anesthesia was seen on ultrasound in close proximity thereto.  There were no abnormalities seen on ultrasound; a digital image was taken; and the patient tolerated the procedure with no complications. Images:still images obtained, printed/placed on chart    Laterality:right  Block Type:erector spinae block  Injection Technique:single-shot  Needle Type:echogenic and short-bevel  Needle Gauge:21 G  Loss of resistance: normal.    Medications Used: bupivacaine liposome (EXPAREL) 1.3 % injection - Infiltration   10 mL - 3/8/2023 5:11:00 PM  bupivacaine PF (MARCAINE) 0.25 % injection - Injection   30 mL - 3/8/2023 5:11:00 PM      Medications  Comment:  Ultrasound Interpretation:  Ultrasound guidance utilized for visualization of needle approach to appropriate fascial plane and verification of local anesthetic disbursement to said region. Photo printed and placed on chart for reference.    Post  Assessment  Injection Assessment: negative aspiration for heme and no paresthesia on injection  Patient Tolerance:comfortable throughout block  Complications:no

## 2023-03-08 NOTE — PROGRESS NOTES
Name: Louise GARCIA ADMIT: 2023   : 1965  PCP: Chloe Schaefer APRN    MRN: [id number] LOS: 12 days   AGE/SEX: 58 y.o. female  ROOM: [facility]     Subjective   Subjective   Resolved right-sided chest pain.  No shortness of breath.  Positive dry cough.  .  No hemoptysis.  No fever or chills.  No change in the lower extremity weakness.  No other focal neurological symptoms.  No headache.  No dizziness.  No loss of consciousness.       Review of Systems  GI.  No abdominal pain.  No nausea or vomiting.  Normal bowel movement yesterday without constipation/diarrhea/bleeding per rectum/melena.    .  No dysuria or hematuria.       Objective   Objective   Vital Signs  Temp:  [98.1 °F (36.7 °C)-98.9 °F (37.2 °C)] 98.1 °F (36.7 °C)  Heart Rate:  [] 80  Resp:  [18] 18  BP: ()/(59-86) 126/78  SpO2:  [94 %-99 %] 98 %  on  Flow (L/min):  [3] 3;   Device (Oxygen Therapy): nasal cannula  No intake or output data in the 24 hours ending 23  Body mass index is 22.86 kg/m².      23  2019 23  1755 23  0600   Weight: 56.7 kg (125 lb) 56.7 kg (125 lb) 56.7 kg (125 lb)     Physical Exam    General.  Middle-aged female.  She is alert.  Oriented x3.  No acute pain. No respiratory distress or diaphoresis.  Eyes.  Pupils equal round and reactive.  Intact extraocular musculature.  No pallor or jaundice.  Oral cavity.  Improving thrush.  Moist mucous membrane.  Neck.  Supple.  No JVD.  No lymphadenopathy or thyromegaly.  Cardiovascular.  Regular rate and rhythm with no gallops or murmurs.    Chest.  Decreased right-sided air entry.  No added sounds.    Abdomen.  No tenderness.  No guarding or rebound.  No organomegaly.  No distention.  Normal bowel sounds.  Extremities.  No clubbing/cyanosis/edema.  CNS.  No acute focal neurological deficits    Results Review:      Results from last 7 days   Lab Units 23  0733 23  0719 23  0532 23  0522 23  0159 23  0633  03/01/23  0653   SODIUM mmol/L 134* 133* 134* 134* 132* 135* 136   POTASSIUM mmol/L 3.8 3.9 4.2 4.5 4.7 4.1 3.7   CHLORIDE mmol/L 96* 97* 99 101 96* 99 101   CO2 mmol/L 27.1 24.5 26.0 25.8 25.9 26.4 27.0   BUN mg/dL 11 15 19 29* 31* 23* 17   CREATININE mg/dL 0.53* 0.57 0.57 0.64 0.81 0.68 0.61   GLUCOSE mg/dL 80 82 79 82 143* 95 122*   CALCIUM mg/dL 8.2*  8.1* 8.7 9.0 8.9 9.5 8.6 8.1*   AST (SGOT) U/L 35* 40* 80* 44* 39* 40* 34*   ALT (SGPT) U/L 29 41* 55* 42* 55* 51* 37*     Estimated Creatinine Clearance: 102.3 mL/min (A) (by C-G formula based on SCr of 0.53 mg/dL (L)).      Results from last 7 days   Lab Units 03/06/23  1114 03/05/23  1046   GLUCOSE mg/dL 103 115     Results from last 7 days   Lab Units 03/01/23  1114 03/01/23  0653   HSTROP T ng/L 7 10*             Results from last 7 days   Lab Units 03/07/23  0733 03/06/23  0719 03/05/23  0532 03/04/23  0522 03/03/23  0159 03/02/23  0633 03/01/23  0653   MAGNESIUM mg/dL 1.7 1.7 2.0 2.3 2.4 2.1 1.9           Invalid input(s): LDLCALC  Results from last 7 days   Lab Units 03/07/23  0733 03/06/23  0719 03/05/23  0532 03/04/23  0522 03/03/23  0159 03/02/23  0633 03/01/23  0742 03/01/23  0742   WBC 10*3/mm3 17.78* 16.60* 20.27* 23.89* 31.39* 23.40*  --  19.16*   HEMOGLOBIN g/dL 8.3* 9.0* 9.3* 8.6* 10.0* 9.9*  --  9.4*   HEMATOCRIT % 24.0* 25.2* 26.9* 25.1* 27.6* 28.7*  --  27.6*   PLATELETS 10*3/mm3 198 173 167 176 155 168  --  243   MCV fL 97.6* 97.3* 97.8* 98.8* 96.8 98.3*  --  100.7*   MCH pg 33.7* 34.7* 33.8* 33.9* 35.1* 33.9*  --  34.3*   MCHC g/dL 34.6 35.7 34.6 34.3 36.2* 34.5  --  34.1   RDW % 14.1 13.8 14.3 14.4 13.9 14.2  --  14.7   RDW-SD fl 50.7 48.6 50.7 50.7 49.0 49.9  --  53.0   MPV fL 11.8 11.6 11.5 11.8 11.3 11.9  --  11.7   NEUTROPHIL % % 82.8* 84.6* 85.0* 89.9*  --  88.7*   < >  --    LYMPHOCYTE % % 6.6* 6.1* 5.4* 3.9*  --  5.7*   < >  --    MONOCYTES % % 5.9 5.8 6.1 3.6*  --  3.8*   < >  --    EOSINOPHIL % % 0.6 0.3 0.6 0.2*  --  0.0*   < >  --     BASOPHIL % % 0.1 0.2 0.1 0.2  --  0.2   < >  --    IMM GRAN % % 4.0* 3.0* 2.8*  --   --  1.6*   < >  --    NEUTROS ABS 10*3/mm3 14.71* 14.04* 17.22* 21.50* 30.45* 20.73*   < > 17.53*   LYMPHS ABS 10*3/mm3 1.18 1.01 1.09 0.94  --  1.34   < >  --    MONOS ABS 10*3/mm3 1.05* 0.97* 1.24* 0.85  --  0.90   < >  --    EOS ABS 10*3/mm3 0.10 0.05 0.13 0.04  --  0.00   < >  --    BASOS ABS 10*3/mm3 0.02 0.03 0.02 0.04  --  0.05   < >  --    IMMATURE GRANS (ABS) 10*3/mm3 0.72* 0.50* 0.57*  --   --  0.38*   < >  --    NRBC /100 WBC 0.1 0.1 0.0  --   --  0.1   < >  --     < > = values in this interval not displayed.         Results from last 7 days   Lab Units 03/05/23  1054   PH, ARTERIAL pH units 7.413   PO2 ART mm Hg 80.5   PCO2, ARTERIAL mm Hg 43.0   HCO3 ART mmol/L 27.4     Results from last 7 days   Lab Units 03/05/23  0532 03/03/23  1051 03/03/23  0454 03/03/23  0159 03/02/23  2250 03/02/23 1955 03/02/23  0633   PROCALCITONIN ng/mL 2.40*  --   --   --   --   --  0.43*   LACTATE mmol/L  --  1.4 2.1* 2.7* 3.6* 3.6*  --          Results from last 7 days   Lab Units 03/02/23  0633   LIPASE U/L 29     Results from last 7 days   Lab Units 03/04/23  2043 03/04/23 1952 03/04/23  0959 03/04/23  0900 03/02/23 1955   BLOODCX  No growth at 2 days No growth at 2 days  --   --  No growth at 4 days  Staphylococcus aureus, MRSA*   BODYFLDCX   --   --   --  Light growth (2+) Staphylococcus aureus, MRSA*  --    RESPCX   --   --  Rejected  --   --    BCIDPCR   --   --   --   --  Staph aureus. mecA/C and MREJ (methicillin resistance gene) detected. Identification by BCID2 PCR.*     Results from last 7 days   Lab Units 03/03/23  3504   ADENOVIRUS DETECTION BY PCR  Not Detected   CORONAVIRUS 229E  Not Detected   CORONAVIRUS HKU1  Not Detected   CORONAVIRUS NL63  Not Detected   CORONAVIRUS OC43  Not Detected   HUMAN METAPNEUMOVIRUS  Not Detected   HUMAN RHINOVIRUS/ENTEROVIRUS  Not Detected   INFLUENZA B PCR  Not Detected   PARAINFLUENZA  1  Not Detected   PARAINFLUENZA VIRUS 2  Not Detected   PARAINFLUENZA VIRUS 3  Not Detected   PARAINFLUENZA VIRUS 4  Not Detected   BORDETELLA PERTUSSIS PCR  Not Detected   CHLAMYDOPHILA PNEUMONIAE PCR  Not Detected   MYCOPLAMA PNEUMO PCR  Not Detected   INFLUENZA A PCR  Not Detected   RSV, PCR  Not Detected     Results from last 7 days   Lab Units 03/04/23  1000   NITRITE UA  Negative   WBC UA /HPF 3-5*   BACTERIA UA /HPF None Seen   SQUAM EPITHEL UA /HPF 0-2           Imaging:  Imaging Results (Last 24 Hours)     Procedure Component Value Units Date/Time    SLP FEES - Fiberoptic Endo Eval Swallow [[id number]] Resulted: 03/07/23 1520     Updated: 03/07/23 1520    Narrative:      This procedure was auto-finalized with no dictation required.             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         1.  Right upper abdominal pain/elevated liver function test/constipation in a patient with a history of GERD  and status post cholecystectomy.  Right upper abdominal pain is related to #2 and has resolved..  Right upper quadrant ultrasound  is negative.  Portal circulation Doppler is negative.  CT scan of the abdomen and pelvis is without acute disease.    Continue IV Protonix ..  Lipase is normal.   Negative hepatitis panel.  Liver function test elevation could be secondary to #2.    Stable liver function test.  Will monitor liver function test.  Continue bowel regimen   2.  Right lung masslike infiltrate with cavitary lesion and MRSA infected right pleural effusion/pneumosepsis/MRSA septicemia/tricuspid vegetation.  CT of the chest is noted.  No PE.  Blood cultures are positive for MRSA .  S/p IV Levaquin treatment.  Currently on vancomycin.  Noted and appreciate ID consultation.  ID recommended continuation of the vancomycin.  Swallow study was negative for aspiration x2.  Speech liberated diet to normal with thin liquids.  Negative urine Legionella and Streptococcus antigen/respiratory PCR panel.  Repeat blood cultures are negative.  Need to rule out lung cancer.  Cardiothoracic surgery planning VATS with decortication on tomorrow.  Eliquis has been on hold..  Pulmonary entertaining bronchoscopy.  Cardiology does not recommend MARIAMA at this time.   3.  Paresthesia and generalized weakness in a patient with a history of seizure.  MRI of the brain with small vessel disease and demyelination with possible MS.  MRI of the C, T, L-spine revealed osteoarthritis and degenerative disc disease but no cord compromise.  Lumbar puncture with unremarkable CSF.  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  Paresthesia and weakness improved on Neurontin.  She is s/p IVIG   She was on steroid and this has been DC'd after it was weaned down.  On PT/OT.  The following tests are negative/Normal : West Nile virus serology, Lyme serology, ESR, CRP, magnesium, phosphorus, potassium, RPR, folate, CK, DENISE, heavy metal screen, TSH, angiotensin-converting enzyme, heavy metal screen.  Still awaiting oligoclonal CSF antibody and MS profile and  paraneoplastic panel in the CSF.  Continue Lamictal for seizure.  Neurology on board and their help is appreciated.  Neurology repeated lumbar puncture today.  4.  Leukocytosis.  This is mostly a combination of steroid-induced and pneumosepsis.    Starting to improve.  Currently on Levaquin/vancomycin.   Lactic acid was elevated that has now improved with IV fluids.  Repeat blood cultures are negative.  5.  Depression and anxiety.  Continue desvenlafaxine.  7.  VTE prophylaxis.  Sequential compression devices after stopping Eliquis.    Discussed my findings and plan of treatment with the patient/  Patient remains in ICU.  Disposition.  To be determined based on clinical course      Daisy Kaur MD  [facility]  03/07/23  20:06 EST

## 2023-03-08 NOTE — ANESTHESIA PREPROCEDURE EVALUATION
Anesthesia Evaluation     no history of anesthetic complications:  NPO Solid Status: > 8 hours  NPO Liquid Status: > 2 hours           Airway   Mallampati: III  Neck ROM: full  no difficulty expected  Dental - normal exam     Pulmonary - negative pulmonary ROS and normal exam   (-) COPD, asthma, sleep apnea, not a smoker    ROS comment: Empyema of pleura;  Mass of middle lobe R lung  PE comment: nonlabored  Cardiovascular - normal exam  Exercise tolerance: poor (<4 METS)    ECG reviewed  Rhythm: regular  Rate: normal    (+) valvular problems/murmurs (Tricuspid valve vegetation), DVT current,   (-) hypertension, past MI, CAD, dysrhythmias, angina    ROS comment: Echo 3/4/23:  •  There is a small (12 mm) mobile mass on the tricuspid valve affecting the posterior leaflet that is consistent with a vegetation. This is best visualized in the subcostal view image series 78  •  Left ventricular systolic function is normal. Calculated left ventricular EF = 62.2% Left ventricular ejection fraction appears to be 61 - 65%.  •  Left ventricular wall thickness is consistent with concentric remodeling.  •  Left ventricular diastolic function was normal.  •  Estimated right ventricular systolic pressure from tricuspid regurgitation is normal (<35 mmHg).      Neuro/Psych  (+) seizures, weakness (LE weakness--unclear etiology but workup ongoing, may be Guillain-[place]), numbness (paresthesias), psychiatric history Anxiety and Depression,    (-) TIA, CVA    ROS Comment: Guillain-[place]  GI/Hepatic/Renal/Endo    (+)  GERD, GI bleeding (Hematemesis) , diabetes mellitus (h/o gestational DM),   (-) liver disease, no renal disease, no thyroid disorder    Musculoskeletal     (+) arthralgias, back pain, chronic pain, neck pain,   Abdominal    Substance History      OB/GYN          Other   arthritis, blood dyscrasia (on anticoagulation chronically') anemia,     ROS/Med Hx Other: Septic shock                  Anesthesia Plan    ASA 4      general and ERAS Protocol     (A-line. MARCELO block for post-op pain PSR)  intravenous induction     Anesthetic plan, risks, benefits, and alternatives have been provided, discussed and informed consent has been obtained with: patient.        CODE STATUS:    Level Of Support Discussed With: Patient  Code Status (Patient has no pulse and is not breathing): CPR (Attempt to Resuscitate)  Medical Interventions (Patient has pulse or is breathing): Full Support

## 2023-03-08 NOTE — PROGRESS NOTES
"DOS: 3/8/2023  NAME: Lousie GARCIA   : 1965  PCP: Chloe Schaefer APRN  Chief Complaint   Patient presents with   • Fall   • Numbness     Numbness in bilateral arms and legs post falling.        Chief complaint: weakness  Subjective: Stood up with therapy yesterday.  Otherwise no significant change in lower extremity weakness.  Persistent generalized headache.  Going for thoracic surgery today.    Objective:  Vital signs: /71   Pulse 97   Temp 98.8 °F (37.1 °C) (Oral)   Resp 16   Ht 157.5 cm (62\")   Wt 56.7 kg (125 lb)   SpO2 95%   Breastfeeding No   BMI 22.86 kg/m²    Gen: NAD, vitals reviewed  MS: oriented x3, recent/remote memory intact, normal attention/concentration, language intact, no neglect.  CN: visual acuity grossly normal, PERRL, EOMI, no facial droop, no dysarthria  Motor: 5/5 bilateral upper extremities, 4+/5 bilateral lower extremities  Sensory: intact to cold temperature and vibration throughout    Laboratory results:  Lab Results   Component Value Date    GLUCOSE 78 2023    CALCIUM 8.4 (L) 2023     (L) 2023    K 3.4 (L) 2023    CO2 27.9 2023    CL 95 (L) 2023    BUN 9 2023    CREATININE 0.56 (L) 2023    EGFRIFAFRI  2016      Comment:      <15 Indicative of kidney failure.    EGFRIFNONA 81 2016    BCR 16.1 2023    ANIONGAP 9.1 2023     Lab Results   Component Value Date    WBC 20.31 (H) 2023    HGB 8.2 (L) 2023    HCT 23.8 (L) 2023    .8 (H) 2023     2023     No results found for: LDL         Review of labs: Sodium 132, WBC 20.3, hemoglobin 8.2    Diagnoses:  Idiopathic peripheral neuropathy, motor predominant  Paraparesis  Lower extremity numbness  Areflexia  Empyema of the lung  Respiratory failure    Comment: No significant change in exam.  Working diagnosis is idiopathic motor predominant neuropathy.  She was treated with IVIG.  She may be improving " slightly at this point.    Plan:  1.  Completed IVIG for possible inflammatory neuropathy.  Extensive negative autoimmune work-up as well as negative CSF studies.  EMG did show evidence of neuropathy but not typical features of GBS.  Additional follow-up CSF studies pending  2.  PT/OT  3.  OR today for empyema  4.  Monitor headache clinically.  Has remained mild.  Possible post LP headache.    Discussed with Dr. Cruz.  We will continue to follow.

## 2023-03-09 ENCOUNTER — APPOINTMENT (OUTPATIENT)
Dept: GENERAL RADIOLOGY | Facility: HOSPITAL | Age: 58
DRG: 163 | End: 2023-03-09
Payer: COMMERCIAL

## 2023-03-09 LAB
ALBUMIN SERPL-MCNC: 1.8 G/DL (ref 3.5–5.2)
ALBUMIN/GLOB SERPL: 0.6 G/DL
ALP SERPL-CCNC: 178 U/L (ref 39–117)
ALT SERPL W P-5'-P-CCNC: 19 U/L (ref 1–33)
ANION GAP SERPL CALCULATED.3IONS-SCNC: 6 MMOL/L (ref 5–15)
AST SERPL-CCNC: 27 U/L (ref 1–32)
BACTERIA SPEC AEROBE CULT: NORMAL
BACTERIA SPEC ANAEROBE CULT: NORMAL
BASOPHILS # BLD AUTO: 0.06 10*3/MM3 (ref 0–0.2)
BASOPHILS NFR BLD AUTO: 0.2 % (ref 0–1.5)
BILIRUB SERPL-MCNC: 1 MG/DL (ref 0–1.2)
BUN SERPL-MCNC: 10 MG/DL (ref 6–20)
BUN/CREAT SERPL: 16.9 (ref 7–25)
CALCIUM SPEC-SCNC: 7.7 MG/DL (ref 8.6–10.5)
CHLORIDE SERPL-SCNC: 99 MMOL/L (ref 98–107)
CO2 SERPL-SCNC: 29 MMOL/L (ref 22–29)
CREAT SERPL-MCNC: 0.59 MG/DL (ref 0.57–1)
CYTO UR: NORMAL
DEPRECATED RDW RBC AUTO: 50.2 FL (ref 37–54)
EGFRCR SERPLBLD CKD-EPI 2021: 104 ML/MIN/1.73
EOSINOPHIL # BLD AUTO: 0.06 10*3/MM3 (ref 0–0.4)
EOSINOPHIL NFR BLD AUTO: 0.2 % (ref 0.3–6.2)
ERYTHROCYTE [DISTWIDTH] IN BLOOD BY AUTOMATED COUNT: 14.3 % (ref 12.3–15.4)
GLOBULIN UR ELPH-MCNC: 3.1 GM/DL
GLUCOSE SERPL-MCNC: 112 MG/DL (ref 65–99)
GRAM STN SPEC: NORMAL
HCT VFR BLD AUTO: 19.3 % (ref 34–46.6)
HCT VFR BLD AUTO: 23.9 % (ref 34–46.6)
HCT VFR BLD AUTO: 24.9 % (ref 34–46.6)
HGB BLD-MCNC: 6.6 G/DL (ref 12–15.9)
HGB BLD-MCNC: 8.5 G/DL (ref 12–15.9)
HGB BLD-MCNC: 8.7 G/DL (ref 12–15.9)
IMM GRANULOCYTES # BLD AUTO: 0.85 10*3/MM3 (ref 0–0.05)
IMM GRANULOCYTES NFR BLD AUTO: 3.3 % (ref 0–0.5)
LAB AP CASE REPORT: NORMAL
LYMPHOCYTES # BLD AUTO: 1.58 10*3/MM3 (ref 0.7–3.1)
LYMPHOCYTES NFR BLD AUTO: 6.2 % (ref 19.6–45.3)
MAGNESIUM SERPL-MCNC: 1.6 MG/DL (ref 1.6–2.6)
MCH RBC QN AUTO: 34 PG (ref 26.6–33)
MCHC RBC AUTO-ENTMCNC: 34.2 G/DL (ref 31.5–35.7)
MCV RBC AUTO: 99.5 FL (ref 79–97)
MONOCYTES # BLD AUTO: 0.85 10*3/MM3 (ref 0.1–0.9)
MONOCYTES NFR BLD AUTO: 3.3 % (ref 5–12)
NEUTROPHILS NFR BLD AUTO: 22.21 10*3/MM3 (ref 1.7–7)
NEUTROPHILS NFR BLD AUTO: 86.8 % (ref 42.7–76)
NRBC BLD AUTO-RTO: 0 /100 WBC (ref 0–0.2)
PATH REPORT.ADDENDUM SPEC: NORMAL
PATH REPORT.FINAL DX SPEC: NORMAL
PATH REPORT.GROSS SPEC: NORMAL
PHOSPHATE SERPL-MCNC: 4.4 MG/DL (ref 2.5–4.5)
PLATELET # BLD AUTO: 219 10*3/MM3 (ref 140–450)
PMV BLD AUTO: 10.9 FL (ref 6–12)
POTASSIUM SERPL-SCNC: 4.1 MMOL/L (ref 3.5–5.2)
PROT SERPL-MCNC: 4.9 G/DL (ref 6–8.5)
RBC # BLD AUTO: 1.94 10*6/MM3 (ref 3.77–5.28)
REF LAB TEST METHOD: NORMAL
SODIUM SERPL-SCNC: 134 MMOL/L (ref 136–145)
VANCOMYCIN TROUGH SERPL-MCNC: 20.7 MCG/ML (ref 5–20)
VANCOMYCIN TROUGH SERPL-MCNC: 34.9 MCG/ML (ref 5–20)
WBC NRBC COR # BLD: 25.61 10*3/MM3 (ref 3.4–10.8)

## 2023-03-09 PROCEDURE — 83735 ASSAY OF MAGNESIUM: CPT | Performed by: THORACIC SURGERY (CARDIOTHORACIC VASCULAR SURGERY)

## 2023-03-09 PROCEDURE — 25010000002 VANCOMYCIN PER 500 MG: Performed by: THORACIC SURGERY (CARDIOTHORACIC VASCULAR SURGERY)

## 2023-03-09 PROCEDURE — 71045 X-RAY EXAM CHEST 1 VIEW: CPT

## 2023-03-09 PROCEDURE — 80053 COMPREHEN METABOLIC PANEL: CPT | Performed by: THORACIC SURGERY (CARDIOTHORACIC VASCULAR SURGERY)

## 2023-03-09 PROCEDURE — 85025 COMPLETE CBC W/AUTO DIFF WBC: CPT | Performed by: INTERNAL MEDICINE

## 2023-03-09 PROCEDURE — 84100 ASSAY OF PHOSPHORUS: CPT | Performed by: THORACIC SURGERY (CARDIOTHORACIC VASCULAR SURGERY)

## 2023-03-09 PROCEDURE — 25010000002 HYDROMORPHONE PER 4 MG: Performed by: THORACIC SURGERY (CARDIOTHORACIC VASCULAR SURGERY)

## 2023-03-09 PROCEDURE — 86900 BLOOD TYPING SEROLOGIC ABO: CPT

## 2023-03-09 PROCEDURE — 36430 TRANSFUSION BLD/BLD COMPNT: CPT

## 2023-03-09 PROCEDURE — 99232 SBSQ HOSP IP/OBS MODERATE 35: CPT | Performed by: INTERNAL MEDICINE

## 2023-03-09 PROCEDURE — 25010000002 HYDROMORPHONE PER 4 MG: Performed by: INTERNAL MEDICINE

## 2023-03-09 PROCEDURE — 80202 ASSAY OF VANCOMYCIN: CPT | Performed by: NURSE PRACTITIONER

## 2023-03-09 PROCEDURE — 94664 DEMO&/EVAL PT USE INHALER: CPT

## 2023-03-09 PROCEDURE — 85018 HEMOGLOBIN: CPT | Performed by: NURSE PRACTITIONER

## 2023-03-09 PROCEDURE — 85014 HEMATOCRIT: CPT | Performed by: NURSE PRACTITIONER

## 2023-03-09 PROCEDURE — 99232 SBSQ HOSP IP/OBS MODERATE 35: CPT | Performed by: PSYCHIATRY & NEUROLOGY

## 2023-03-09 PROCEDURE — 99024 POSTOP FOLLOW-UP VISIT: CPT

## 2023-03-09 PROCEDURE — 85018 HEMOGLOBIN: CPT

## 2023-03-09 PROCEDURE — 25010000002 HEPARIN (PORCINE) PER 1000 UNITS: Performed by: THORACIC SURGERY (CARDIOTHORACIC VASCULAR SURGERY)

## 2023-03-09 PROCEDURE — 94761 N-INVAS EAR/PLS OXIMETRY MLT: CPT

## 2023-03-09 PROCEDURE — 86901 BLOOD TYPING SEROLOGIC RH(D): CPT

## 2023-03-09 PROCEDURE — 94799 UNLISTED PULMONARY SVC/PX: CPT

## 2023-03-09 PROCEDURE — 80202 ASSAY OF VANCOMYCIN: CPT | Performed by: THORACIC SURGERY (CARDIOTHORACIC VASCULAR SURGERY)

## 2023-03-09 PROCEDURE — 85014 HEMATOCRIT: CPT

## 2023-03-09 PROCEDURE — P9016 RBC LEUKOCYTES REDUCED: HCPCS

## 2023-03-09 RX ORDER — AMOXICILLIN 250 MG
2 CAPSULE ORAL 2 TIMES DAILY PRN
Status: DISCONTINUED | OUTPATIENT
Start: 2023-03-09 | End: 2023-03-17 | Stop reason: HOSPADM

## 2023-03-09 RX ORDER — POLYETHYLENE GLYCOL 3350 17 G/17G
17 POWDER, FOR SOLUTION ORAL 2 TIMES DAILY PRN
Status: DISCONTINUED | OUTPATIENT
Start: 2023-03-09 | End: 2023-03-17 | Stop reason: HOSPADM

## 2023-03-09 RX ADMIN — IPRATROPIUM BROMIDE AND ALBUTEROL SULFATE 3 ML: 2.5; .5 SOLUTION RESPIRATORY (INHALATION) at 19:21

## 2023-03-09 RX ADMIN — IPRATROPIUM BROMIDE AND ALBUTEROL SULFATE 3 ML: 2.5; .5 SOLUTION RESPIRATORY (INHALATION) at 16:05

## 2023-03-09 RX ADMIN — LUBIPROSTONE 24 MCG: 24 CAPSULE, GELATIN COATED ORAL at 08:24

## 2023-03-09 RX ADMIN — IPRATROPIUM BROMIDE AND ALBUTEROL SULFATE 3 ML: 2.5; .5 SOLUTION RESPIRATORY (INHALATION) at 08:36

## 2023-03-09 RX ADMIN — FOLIC ACID 1 MG: 1 TABLET ORAL at 08:24

## 2023-03-09 RX ADMIN — GABAPENTIN 300 MG: 300 CAPSULE ORAL at 05:55

## 2023-03-09 RX ADMIN — GABAPENTIN 300 MG: 300 CAPSULE ORAL at 21:28

## 2023-03-09 RX ADMIN — GABAPENTIN 300 MG: 300 CAPSULE ORAL at 14:03

## 2023-03-09 RX ADMIN — LIDOCAINE 1 PATCH: 700 PATCH TOPICAL at 10:57

## 2023-03-09 RX ADMIN — Medication 500 MCG: at 08:24

## 2023-03-09 RX ADMIN — LAMOTRIGINE 200 MG: 100 TABLET ORAL at 08:24

## 2023-03-09 RX ADMIN — Medication 100 MG: at 08:24

## 2023-03-09 RX ADMIN — VANCOMYCIN HYDROCHLORIDE 1000 MG: 1 INJECTION, SOLUTION INTRAVENOUS at 14:28

## 2023-03-09 RX ADMIN — HEPARIN SODIUM 5000 UNITS: 5000 INJECTION INTRAVENOUS; SUBCUTANEOUS at 14:03

## 2023-03-09 RX ADMIN — ACETAMINOPHEN 1000 MG: 500 TABLET, FILM COATED ORAL at 15:00

## 2023-03-09 RX ADMIN — HEPARIN SODIUM 5000 UNITS: 5000 INJECTION INTRAVENOUS; SUBCUTANEOUS at 05:55

## 2023-03-09 RX ADMIN — OXYCODONE HYDROCHLORIDE 5 MG: 5 TABLET ORAL at 08:45

## 2023-03-09 RX ADMIN — ACETAMINOPHEN 1000 MG: 500 TABLET, FILM COATED ORAL at 21:28

## 2023-03-09 RX ADMIN — OXYCODONE HYDROCHLORIDE 5 MG: 5 TABLET ORAL at 18:28

## 2023-03-09 RX ADMIN — Medication 10 ML: at 21:29

## 2023-03-09 RX ADMIN — IPRATROPIUM BROMIDE AND ALBUTEROL SULFATE 3 ML: 2.5; .5 SOLUTION RESPIRATORY (INHALATION) at 12:10

## 2023-03-09 RX ADMIN — HYDROMORPHONE HYDROCHLORIDE 0.5 MG: 1 INJECTION, SOLUTION INTRAMUSCULAR; INTRAVENOUS; SUBCUTANEOUS at 14:56

## 2023-03-09 RX ADMIN — VANCOMYCIN HYDROCHLORIDE 1000 MG: 1 INJECTION, SOLUTION INTRAVENOUS at 02:06

## 2023-03-09 RX ADMIN — HYDROMORPHONE HYDROCHLORIDE 0.5 MG: 1 INJECTION, SOLUTION INTRAMUSCULAR; INTRAVENOUS; SUBCUTANEOUS at 06:02

## 2023-03-09 RX ADMIN — ACETAMINOPHEN 1000 MG: 500 TABLET, FILM COATED ORAL at 08:26

## 2023-03-09 RX ADMIN — DESVENLAFAXINE SUCCINATE 25 MG: 25 TABLET, EXTENDED RELEASE ORAL at 08:24

## 2023-03-09 RX ADMIN — PANTOPRAZOLE SODIUM 40 MG: 40 INJECTION, POWDER, FOR SOLUTION INTRAVENOUS at 05:55

## 2023-03-09 RX ADMIN — CELECOXIB 100 MG: 100 CAPSULE ORAL at 08:24

## 2023-03-09 RX ADMIN — Medication 10 ML: at 08:25

## 2023-03-09 RX ADMIN — CELECOXIB 100 MG: 100 CAPSULE ORAL at 22:30

## 2023-03-09 RX ADMIN — CELECOXIB 100 MG: 100 CAPSULE ORAL at 02:06

## 2023-03-09 RX ADMIN — HEPARIN SODIUM 5000 UNITS: 5000 INJECTION INTRAVENOUS; SUBCUTANEOUS at 21:28

## 2023-03-09 NOTE — PROGRESS NOTES
[facility] Clinical Pharmacy Services: Vancomycin Level Monitoring Note    Louise GARCIA is a 58 y.o. female who is on day 6 of pharmacy to dose vancomycin for MRSA bacteremia-- empyema/ endocarditis.    Estimated Creatinine Clearance: 91.9 mL/min (by C-G formula based on SCr of 0.59 mg/dL).    Current Vanc Dose: 1000 mg IV every  12  hours  Results from last 7 days   Lab Units 03/09/23  0443 03/05/23  2210   VANCOMYCIN TR mcg/mL 34.90* 13.60   Trough level collected early, only ~2.5hr after dose given. Will re-order for appropriate time to evaluate.    No changes at this time. Pharmacy is continuing to monitor and will adjust as needed.    Carleen Burton, PharmD  Clinical Pharmacist

## 2023-03-09 NOTE — ANESTHESIA PROCEDURE NOTES
Airway  Urgency: elective    Date/Time: 3/8/2023 6:55 PM  Airway not difficult    General Information and Staff    Patient location during procedure: OR  Anesthesiologist: Holly Longo MD    Indications and Patient Condition  Indications for airway management: airway protection    Preoxygenated: yes  MILS maintained throughout  Mask difficulty assessment: 1 - vent by mask    Final Airway Details  Final airway type: endotracheal airway      Successful airway: EBT - double lumen left  Cuffed: yes   Successful intubation technique: video laryngoscopy  Endotracheal tube insertion site: oral  Blade: CMAC  Blade size: D  EBT DL size (fr): 37  Cormack-Lehane Classification: grade IIb - view of arytenoids or posterior of glottis only  Placement verified by: bronchoscopy and capnometry   Measured from: lips  Number of attempts at approach: 2  Assessment: lips, teeth, and gum same as pre-op and atraumatic intubation    Additional Comments  Bronchoscopy per surgeon. Placement assisted by direct video laryngoscopy placement of bronchoscope. ETT guided over bronchoscope

## 2023-03-09 NOTE — BRIEF OP NOTE
THORACOSCOPY WITH DAVINCI ROBOT  Progress Note    Louise GARCIA  3/8/2023    Pre-op Diagnosis:   Empyema of pleura (HCC) [J86.9]       Post-Op Diagnosis Codes:     * Empyema of pleura (HCC) [J86.9]    Procedure/CPT® Codes:        Procedure(s):  THORACOSCOPY WITH DAVINCI ROBOT WITH COMPLETE DECORTICATION        Surgeon(s):  Chloe Cedillo MD    Anesthesia: * No anesthesia type entered *    Staff:   Circulator: Laine Ferrera, VIKA; Asuncion Bunch RN  Scrub Person: Radha Cuellar PCT; Kathya Briggs; Jam Peoples  Assistant: Ebonie Silva CRNFA; Juarez Arshad CSA  Assistant: Ebonie Silva CRNFA; Juarez Arshad CSA      Estimated Blood Loss: 300 mL    Urine Voided: 250 mL    Specimens:                Specimens     ID Source Type Tests Collected By Collected At Frozen?    1 Pleural Cavity Body Fluid · ANAEROBIC CULTURE  · BODY FLUID CULTURE  · ANAEROBIC CULTURE 10 DAY INCUBATION (Canceled)   Chloe Cedillo MD 3/8/23 1923     Description: Right Pleural Fluid    2 Pleural Cavity Body Fluid · ANAEROBIC CULTURE  · FUNGAL CULTURE  · BODY FLUID CULTURE   Chloe Cedillo MD 3/8/23 1957     Description: Right Pleural Fluid #2    Comment: Aerobic Test included please.    A Pleural Cavity Tissue · ANAEROBIC CULTURE  · FUNGAL CULTURE  · TISSUE PATHOLOGY EXAM   Chloe Cedillo MD 3/8/23 2036     Description: Pleural Peel    Comment: Aerobic Test included please                Drains:   Chest Tube 1 Right Pleural (Active)       Chest Tube 2 Right Pleural (Active)       Urethral Catheter Silicone 16 Fr. (Active)       [REMOVED] External Urinary Catheter (Removed)   Site Assessment Clean;Skin intact 03/08/23 1030   Application/Removal external catheter changed 03/08/23 1030   Collection Container Wall suction 03/08/23 1138   Wall suction (mmHG) 100 mmHG 03/08/23 1138   Securement Method Securing device 03/08/23 1138   Catheter care complete Yes 03/08/23 1030   Output (mL) 650 mL 03/07/23 8410        Findings: ~1L of gelatinous fluid with a thick fibrinopurulent peel covering the surface of the lung.  Ruptured cavitary lesion in the lower lobe.         Complications: None apparent    Assistant: Ebonie Silva CRNFA; Juarez Arshad CSA  was responsible for performing the following activities: Retraction, Suction, Irrigation, Closing, Placing Dressing and Held/Positioned Camera and their skilled assistance was necessary for the success of this case.    Chloe Cedillo MD     Date: 3/8/2023  Time: 21:18 EST

## 2023-03-09 NOTE — PROGRESS NOTES
Name: Louise GARCIA ADMIT: 2023   : 1965  PCP: Chloe Schaefer APRN    MRN: [id number] LOS: 14 days   AGE/SEX: 58 y.o. female  ROOM: [facility] [place]/     Subjective   Subjective   A bit tired this morning.  Status post VATS yesterday.  Has some pain at chest tube site.    Objective   Objective   Vital Signs  Temp:  [97.7 °F (36.5 °C)-98.9 °F (37.2 °C)] 98.1 °F (36.7 °C)  Heart Rate:  [] 99  Resp:  [11-24] 12  BP: ()/(40-99) 82/49  Arterial Line BP: (105-117)/(9-60) 105/55  SpO2:  [92 %-100 %] 99 %  on  Flow (L/min):  [2-3] 2;   Device (Oxygen Therapy): nasal cannula  Body mass index is 22.86 kg/m².  Physical Exam  Constitutional:       Appearance: She is ill-appearing.   Cardiovascular:      Rate and Rhythm: Normal rate.      Heart sounds: No murmur heard.  Pulmonary:      Effort: Pulmonary effort is normal.      Breath sounds: No stridor. No rhonchi.      Comments: 2 R sided chest tubes  Abdominal:      General: Abdomen is flat. There is no distension.      Palpations: Abdomen is soft.   Skin:     General: Skin is warm.      Coloration: Skin is not jaundiced.   Neurological:      General: No focal deficit present.      Mental Status: She is alert and oriented to person, place, and time.      Comments: 5/5 strength b/l UEs; 4/5 strength b/l LEs;         Results Review     I reviewed the patient's new clinical results.  Results from last 7 days   Lab Units 2331 2333 23  07   WBC 10*3/mm3 25.61* 20.31* 17.78* 16.60*   HEMOGLOBIN g/dL 6.6* 8.2* 8.3* 9.0*   PLATELETS 10*3/mm3 219 165 198 173     Results from last 7 days   Lab Units 23  0731 23  0733 23  0719   SODIUM mmol/L 134* 132* 134* 133*   POTASSIUM mmol/L 4.1 3.4* 3.8 3.9   CHLORIDE mmol/L 99 95* 96* 97*   CO2 mmol/L 29.0 27.9 27.1 24.5   BUN mg/dL 10 9 11 15   CREATININE mg/dL 0.59 0.56* 0.53* 0.57   GLUCOSE mg/dL 112* 78 80 82   EGFR mL/min/1.73 104.0 105.3 106.7 104.8      Results from last 7 days   Lab Units 03/09/23  0443 03/08/23  0731 03/07/23  0733 03/06/23  1551 03/06/23  0719   ALBUMIN g/dL 1.8* 1.8* 2.2* 2.1* 2.1*   BILIRUBIN mg/dL 1.0 1.2 1.4*  --  1.4*   ALK PHOS U/L 178* 210* 202*  --  202*   AST (SGOT) U/L 27 32 35*  --  40*   ALT (SGPT) U/L 19 27 29  --  41*     Results from last 7 days   Lab Units 03/09/23  0443 03/08/23  0731 03/07/23  0733 03/06/23  1551 03/06/23  0719   CALCIUM mg/dL 7.7* 8.4* 8.2*  8.1*  --  8.7   ALBUMIN g/dL 1.8* 1.8* 2.2* 2.1* 2.1*   MAGNESIUM mg/dL 1.6 1.7 1.7  --  1.7   PHOSPHORUS mg/dL 4.4 3.4 3.8  --  3.8     Results from last 7 days   Lab Units 03/05/23  0532 03/03/23  1051 03/03/23  0454 03/03/23  0159 03/02/23  2250   PROCALCITONIN ng/mL 2.40*  --   --   --   --    LACTATE mmol/L  --  1.4 2.1* 2.7* 3.6*     Glucose   Date/Time Value Ref Range Status   03/08/2023 1701 77 70 - 130 mg/dL Final     Comment:     Meter: [id number] : [id number] Amrik Mitchell SHANTEL SANDY   03/06/2023 1114 103 70 - 130 mg/dL Final     Comment:     Meter: [id number] : [id number] Graham Aryeon NA       XR Chest 1 View    Result Date: 3/9/2023  Patient does appear to have a pneumothorax on the current study, most notable at the right lung base.  This report was finalized on 3/9/2023 4:44 AM by Dr. Lorin Olivia M.D.      XR Chest 1 View    Result Date: 3/8/2023  Right-sided chest tube placement, with significant improvement in previously identified loculated right pleural effusion. No obvious pneumothorax seen.  This report was finalized on 3/8/2023 10:55 PM by Dr. Lorin Olivia M.D.      XR Chest 1 View    Result Date: 3/8/2023  Patient does appear to have some increasing vascular congestion. Loculated right pleural effusion appears stable.  This report was finalized on 3/8/2023 4:56 AM by Dr. Lorin Olivia M.D.      Scheduled Medications  acetaminophen, 1,000 mg, Oral, TID  bisacodyl, 10 mg, Rectal, Daily  celecoxib, 100 mg, Oral,  Q12H  desvenlafaxine, 25 mg, Oral, Daily  folic acid, 1 mg, Oral, Daily  gabapentin, 300 mg, Oral, Q8H  heparin (porcine), 5,000 Units, Subcutaneous, Q8H  ipratropium-albuterol, 3 mL, Nebulization, 4x Daily - RT  lactulose, 20 g, Oral, BID  lamoTRIgine, 200 mg, Oral, Daily  lidocaine, 1 patch, Transdermal, Q24H  lidocaine, 1 patch, Transdermal, Q24H  lidocaine, 10 mL, Intradermal, Once  lubiprostone, 24 mcg, Oral, Daily With Breakfast  pantoprazole, 40 mg, Intravenous, Q AM  polyethylene glycol, 17 g, Oral, BID  senna-docusate sodium, 2 tablet, Oral, BID  sodium chloride, 10 mL, Intravenous, Q12H  thiamine, 100 mg, Oral, Daily  vancomycin, 1,000 mg, Intravenous, Q12H  vitamin B-12, 500 mcg, Oral, Daily    Infusions  dextrose 5 % and sodium chloride 0.45 % with KCl 20 mEq/L, 75 mL/hr, Last Rate: 75 mL/hr (03/08/23 2344)  hold, 1 each  hold, 1 each  lactated ringers, 9 mL/hr, Last Rate: 500 mL/hr (03/08/23 1856)  Pharmacy to dose vancomycin,     Diet  Diet: Liquid Diets; Clear Liquid; Texture: Regular Texture (IDDSI 7); Fluid Consistency: Thin (IDDSI 0)       Assessment/Plan     Active Hospital Problems    Diagnosis  POA   • **Paresthesia [R20.2]  Yes   • MRSA bacteremia [R78.81, B95.62]  Unknown   • Tricuspid valve vegetation [I33.0]  Yes   • Oral candidiasis [B37.0]  No   • Mass of middle lobe of right lung [R91.8]  Yes   • Situational mixed anxiety and depressive disorder [F43.23]  Yes   • 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 female admitted with Paresthesia.      03/09/23  Continue antibiotics.  Thoracic surgery managing chest tubes.    MRSA bacteremia   PNA with empyema  Endocarditis  Septic phlebitis  R pleural effusion   -Blood cx (3/2/23): MRSA; repeat negative   -s/p thoracentesis 3/4/23; pleural fluid w/ MRSA  -TTE (3/5/23): 61-65%; small mobile mass on TV c/w vegetation  -b/l UE doppler (3/5/23): acute RUE superficial thrombophlebitis  -ID following  -cont Vanc (3/4- )  -s/p thoracoscopy w/ DaVinci robot w/ complete decortication 3/8/23 with CTS    Idiopathic peripheral neuropathy, motor predominant  -EMG showing evidence of neuropathy, not typical of GBS  -s/p IVIG  -following tests are negative/Normal : West Nile virus serology, Lyme serology, ESR, CRP, magnesium, phosphorus, potassium, RPR, folate, CK, DENISE, heavy metal screen, TSH, angiotensin-converting enzyme, heavy metal screen  -Neuro following    Seizure disorder  -lamotrigine 200mg    Depression  -desvenlafaxine 25mg    Hypokalemia, replete    Hx DVT  -on Eliquis, on hold for VATS    · SCDs for DVT prophylaxis; Eliquis ON HOLD  · Full code.  · Discussed with patient and nursing staff.  · Anticipate discharge home when cleared by consultants      Balwinder Hidalgo MD  [facility]  03/09/23  07:22 EST

## 2023-03-09 NOTE — PROGRESS NOTES
"    Patient Name: Louise GARCIA  :1965  58 y.o.      Patient Care Team:  Chloe Schaefer APRN as PCP - General (Family Medicine)  Mikhail Glez MD as Consulting Physician (Neurology)    Chief Complaint: empyema    Interval History: SR overnight       Objective   Vital Signs  Temp:  [97.7 °F (36.5 °C)-98.9 °F (37.2 °C)] 98 °F (36.7 °C)  Heart Rate:  [] 99  Resp:  [12-24] 12  BP: ()/(40-99) 82/49  Arterial Line BP: (105-117)/(9-60) 105/55    Intake/Output Summary (Last 24 hours) at 3/9/2023 0809  Last data filed at 3/9/2023 0700  Gross per 24 hour   Intake 1942 ml   Output 1230 ml   Net 712 ml     Flowsheet Rows    Flowsheet Row First Filed Value   Admission Height 157.5 cm (62\") Documented at 2023 1425   Admission Weight 54.4 kg (120 lb) Documented at 2023 1425          Physical Exam:   General Appearance:    lethargic, in no acute distress   Lungs:     Clear to auscultation.  Normal respiratory effort and rate.      Heart:    Regular rhythm and normal rate, normal S1 and S2, no murmurs, gallops or rubs.     Chest Wall:    No abnormalities observed   Abdomen:     Soft, nontender, positive bowel sounds.     Extremities:   no cyanosis, clubbing or edema.  No marked joint deformities.  Adequate musculoskeletal strength.       Results Review:    Results from last 7 days   Lab Units 23  0443   SODIUM mmol/L 134*   POTASSIUM mmol/L 4.1   CHLORIDE mmol/L 99   CO2 mmol/L 29.0   BUN mg/dL 10   CREATININE mg/dL 0.59   GLUCOSE mg/dL 112*   CALCIUM mg/dL 7.7*         Results from last 7 days   Lab Units 23  0443   WBC 10*3/mm3 25.61*   HEMOGLOBIN g/dL 6.6*   HEMATOCRIT % 19.3*   PLATELETS 10*3/mm3 219     Results from last 7 days   Lab Units 23  0731   INR  1.10   APTT seconds 32.2     Results from last 7 days   Lab Units 23  0443   MAGNESIUM mg/dL 1.6                   Medication Review:   acetaminophen, 1,000 mg, Oral, TID  bisacodyl, 10 mg, Rectal, " Daily  celecoxib, 100 mg, Oral, Q12H  desvenlafaxine, 25 mg, Oral, Daily  folic acid, 1 mg, Oral, Daily  gabapentin, 300 mg, Oral, Q8H  heparin (porcine), 5,000 Units, Subcutaneous, Q8H  ipratropium-albuterol, 3 mL, Nebulization, 4x Daily - RT  lactulose, 20 g, Oral, BID  lamoTRIgine, 200 mg, Oral, Daily  lidocaine, 1 patch, Transdermal, Q24H  lidocaine, 1 patch, Transdermal, Q24H  lidocaine, 10 mL, Intradermal, Once  lubiprostone, 24 mcg, Oral, Daily With Breakfast  pantoprazole, 40 mg, Intravenous, Q AM  polyethylene glycol, 17 g, Oral, BID  senna-docusate sodium, 2 tablet, Oral, BID  sodium chloride, 10 mL, Intravenous, Q12H  thiamine, 100 mg, Oral, Daily  vancomycin, 1,000 mg, Intravenous, Q12H  vitamin B-12, 500 mcg, Oral, Daily         dextrose 5 % and sodium chloride 0.45 % with KCl 20 mEq/L, 75 mL/hr, Last Rate: 75 mL/hr (03/08/23 2344)  hold, 1 each  hold, 1 each  lactated ringers, 9 mL/hr, Last Rate: 500 mL/hr (03/08/23 1856)  Pharmacy to dose vancomycin,         Assessment & Plan     Active Hospital Problems    Diagnosis  POA   • **Paresthesia [R20.2]  Yes   • MRSA bacteremia [R78.81, B95.62]  Unknown   • Tricuspid valve vegetation [I33.0]  Yes   • Oral candidiasis [B37.0]  No   • Mass of middle lobe of right lung [R91.8]  Yes   • Situational mixed anxiety and depressive disorder [F43.23]  Yes   • 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  MRSA pneumonia and  empyema- VATS/decortication performed Wednesday, Eliquis held  2.  MRSA septicemia, ID following  3.  Paresthesias and areflexia, neurology following  4.  Possible small vegetation of tricuspid valve, continue to follow right now, continue IV antibiotics, valvular function normal  5.  DVT history, Eliquis on hold    Stable cardiac status, for surgery tomorrow, will see Violet Dowling III, MD  [facility]  03/09/23  08:09 EST

## 2023-03-09 NOTE — PROGRESS NOTES
Continued Stay Note  [facility]     Patient Name: Louise GARCIA  MRN: [id number]  Today's Date: 3/9/2023    Admit Date: 2/20/2023    Plan: Evaluation continues for BAR   Discharge Plan     Row Name 03/09/23 0900       Plan    Plan Evaluation continues for BAR    Plan Comments BAR following.  CCP following for discharge needs as clinical course evolves.               Discharge Codes    No documentation.               Expected Discharge Date and Time     Expected Discharge Date Expected Discharge Time    Mar 6, 2023             Елена Worthington RN

## 2023-03-09 NOTE — PROGRESS NOTES
[facility]     Continue to follow postop for progress with therapies for determination of most appropriate level rehab at time of discharge.     Catarina Whatley RN  Rehab Admissions Coordinator  [phone]

## 2023-03-09 NOTE — PROGRESS NOTES
"  POST-OPERATIVE NOTE     Chief Complaint: Empyema  S/P: Video-assisted thoracoscopy with complete decortication  POD # 1    Subjective:  Symptoms:  Stable.  She reports cough and weakness.  No shortness of breath.    Diet:  Poor intake.  No nausea or vomiting.    Activity level: Impaired due to weakness.    Pain:  She complains of pain that is mild.  Pain is well controlled.    Remains in ICU.  Somnolent today.  Hemodynamically stable.    Objective:  General Appearance:  Ill-appearing, in no acute distress, in pain and comfortable.    Vital signs: (most recent): Blood pressure (!) 89/56, pulse 87, temperature 98.1 °F (36.7 °C), temperature source Axillary, resp. rate 16, height 157.5 cm (62\"), weight 56.7 kg (125 lb), SpO2 95 %, not currently breastfeeding.    Output: Producing urine (Booker catheter in place.).    Lungs:  Normal effort and normal respiratory rate.    Heart: Normal rate.  Regular rhythm.    Chest: Symmetric chest wall expansion. Chest wall tenderness (Around chest tubes.) present.    Abdomen: Abdomen is non-distended.    Neurological: Patient is alert and oriented to person, place and time.    Skin:  Warm and dry.            Chest tube:   Site: Right, Clean, Dry and Securement device intact  Suction: -40 cm  Air Leak: Negative  24 Hour Total: 300/130ml     Results Review:     I reviewed the patient's new clinical results.  I reviewed the patient's new imaging results and agree with the interpretation.  Discussed with Patient, nurse, and Dr. Cedillo.    Assessment & Plan     S/p VAT with complete decortication POD 1.    She remains stable postoperatively.  Chest x-ray performed this morning  demonstrates chest tubes in stable position.  The right lung is relatively well-expanded. She remains on 2 L oxygen via nasal cannula.  Hemoglobin of 6.6 noted with morning labs.  Transfused 1 unit of PRBCs, repeat Hgb 8.7.  Continue to trend H&H and transfuse as needed. Continue analgesic regimen.  Antibiotics per " infectious disease.    Her chest tubes remained to -40 cm of suction overnight and are draining a moderate amount of thin serosanguineous fluid.  No air leak noted on exam.  Continue chest tube to -40 cm of suction and repeat a chest x-ray in the morning.    Encourage good pulmonary hygiene including use incentive spirometer, coughing, deep breathing.  Increase activity as tolerated with assistance from nursing staff.  Pending OT/PT evaluation.    KASIA Olmos  [facility]  03/09/23  14:17 EST    Patient was seen and assessed while wearing personal protective equipment including facemask, protective eyewear and gloves.  Hand hygiene performed prior to entering the room and upon exiting with doffing of gloves.

## 2023-03-09 NOTE — SIGNIFICANT NOTE
03/09/23 1022   OTHER   Discipline occupational therapist   Rehab Time/Intention   Session Not Performed other (see comments)  (hemoglobin 6.6, receiving blood currently. RN gave ok to see pt, but she is very groggy and unable to stay awake for OT. Will follow up 3/10)   Recommendation   OT - Next Appointment 03/10/23

## 2023-03-09 NOTE — PROGRESS NOTES
[facility] INPATIENT PROGRESS NOTE         [facility]IVE [facility]    3/9/2023      PATIENT IDENTIFICATION:  Name: Louise GARCIA ADMIT: 2023   : 1965  PCP: Chloe Schaefer APRN    MRN: [id number] LOS: 14 days   AGE/SEX: 58 y.o. female  ROOM: [facility]                     LOS 14    Reason for visit: Altered mental status and empyema      SUBJECTIVE:      Complains of pain at chest tube sites as expected.  Chest tubes stable without airleak.  Not requiring pressors.  Discussed with multiple family members at bedside.  I am seeing the patient for the first time today.  All patient problems are new to me.      Objective   OBJECTIVE:    Vital Sign Min/Max for last 24 hours  Temp  Min: 97.7 °F (36.5 °C)  Max: 98.9 °F (37.2 °C)   BP  Min: 82/49  Max: 140/85   Pulse  Min: 83  Max: 112   Resp  Min: 12  Max: 24   SpO2  Min: 92 %  Max: 100 %   No data recorded   No data recorded                         Body mass index is 22.86 kg/m².    Intake/Output Summary (Last 24 hours) at 3/9/2023 1051  Last data filed at 3/9/2023 0700  Gross per 24 hour   Intake 1942 ml   Output 1230 ml   Net 712 ml         Exam:  GEN:  No distress, appears stated age  EYES:   PERRL, anicteric sclerae  ENT:    External ears/nose normal, OP clear  NECK:  No adenopathy, midline trachea  LUNGS: Normal chest on inspection, palpation and diminished right greater than left on auscultation.  Chest tubes stable without airleak.  CV:  Normal S1S2, without murmur  ABD:  Nontender, nondistended, no hepatosplenomegaly, +BS  EXT:  No edema.  No cyanosis or clubbing.  No mottling and normal cap refill.    Assessment     Scheduled meds:  acetaminophen, 1,000 mg, Oral, TID  bisacodyl, 10 mg, Rectal, Daily  celecoxib, 100 mg, Oral, Q12H  desvenlafaxine, 25 mg, Oral, Daily  folic acid, 1 mg, Oral, Daily  gabapentin, 300 mg, Oral, Q8H  heparin (porcine), 5,000 Units, Subcutaneous, Q8H  ipratropium-albuterol, 3 mL, Nebulization, 4x Daily -  RT  lactulose, 20 g, Oral, BID  lamoTRIgine, 200 mg, Oral, Daily  lidocaine, 1 patch, Transdermal, Q24H  lidocaine, 1 patch, Transdermal, Q24H  lidocaine, 10 mL, Intradermal, Once  lubiprostone, 24 mcg, Oral, Daily With Breakfast  pantoprazole, 40 mg, Intravenous, Q AM  polyethylene glycol, 17 g, Oral, BID  senna-docusate sodium, 2 tablet, Oral, BID  sodium chloride, 10 mL, Intravenous, Q12H  thiamine, 100 mg, Oral, Daily  vancomycin, 1,000 mg, Intravenous, Q12H  vitamin B-12, 500 mcg, Oral, Daily      IV meds:                      hold, 1 each  hold, 1 each  lactated ringers, 9 mL/hr, Last Rate: 500 mL/hr (03/08/23 1856)  Pharmacy to dose vancomycin,       Data Review:  Results from last 7 days   Lab Units 03/09/23 0443 03/08/23 0731 03/07/23  0733 03/06/23  0719 03/05/23  0532   SODIUM mmol/L 134* 132* 134* 133* 134*   POTASSIUM mmol/L 4.1 3.4* 3.8 3.9 4.2   CHLORIDE mmol/L 99 95* 96* 97* 99   CO2 mmol/L 29.0 27.9 27.1 24.5 26.0   BUN mg/dL 10 9 11 15 19   CREATININE mg/dL 0.59 0.56* 0.53* 0.57 0.57   GLUCOSE mg/dL 112* 78 80 82 79   CALCIUM mg/dL 7.7* 8.4* 8.2*  8.1* 8.7 9.0         Estimated Creatinine Clearance: 91.9 mL/min (by C-G formula based on SCr of 0.59 mg/dL).  Results from last 7 days   Lab Units 03/09/23 0443 03/08/23 0731 03/07/23  0733 03/06/23  0719 03/05/23  0532   WBC 10*3/mm3 25.61* 20.31* 17.78* 16.60* 20.27*   HEMOGLOBIN g/dL 6.6* 8.2* 8.3* 9.0* 9.3*   PLATELETS 10*3/mm3 219 165 198 173 167     Results from last 7 days   Lab Units 03/08/23  0731   INR  1.10     Results from last 7 days   Lab Units 03/09/23  0443 03/08/23  0731 03/07/23  0733 03/06/23  0719 03/05/23  0532   ALT (SGPT) U/L 19 27 29 41* 55*   AST (SGOT) U/L 27 32 35* 40* 80*     Results from last 7 days   Lab Units 03/05/23  1054   PH, ARTERIAL pH units 7.413   PO2 ART mm Hg 80.5   PCO2, ARTERIAL mm Hg 43.0   HCO3 ART mmol/L 27.4     Results from last 7 days   Lab Units 03/05/23  0532 03/03/23  1051 03/03/23  0454  03/03/23  0159 03/02/23  2250 03/02/23 1955   PROCALCITONIN ng/mL 2.40*  --   --   --   --   --    LACTATE mmol/L  --  1.4 2.1* 2.7* 3.6* 3.6*         Glucose   Date/Time Value Ref Range Status   03/08/2023 1701 77 70 - 130 mg/dL Final     Comment:     Meter: [id number] : [id number] Amrik FUNES RN   03/06/2023 1114 103 70 - 130 mg/dL Final     Comment:     Meter: [id number] : [id number] Alfred Aryeon NA               Active Hospital Problems    Diagnosis  POA   • **Paresthesia [R20.2]  Yes   • MRSA bacteremia [R78.81, B95.62]  Unknown   • Tricuspid valve vegetation [I33.0]  Yes   • Oral candidiasis [B37.0]  No   • Mass of middle lobe of right lung [R91.8]  Yes   • Situational mixed anxiety and depressive disorder [F43.23]  Yes   • 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]  Yes   • Normocytic anemia [D64.9]  Yes   • GERD (gastroesophageal reflux disease) [K21.9]  Yes   • Lower extremity weakness [R29.898]  Yes   • History of blood clots [Z86.718]  Not Applicable   • Chronic anticoagulation [Z79.01]  Not Applicable   • Seizures (HCC) [R56.9]  Yes   • Cervical myelopathy (HCC) [G95.9]  Yes   • Empyema of pleura (HCC) [J86.9]  Yes      Resolved Hospital Problems    Diagnosis Date Resolved POA   • Upper abdominal pain [R10.10] 03/08/2023 No   • Leukocytosis [D72.829] 03/08/2023 Yes   • Hyperglycemia [R73.9] 03/08/2023 No         ASSESSMENT:    Idiopathic neuropathy  MRSA pneumonia/empyema: S/P decortication 3/8  Elevated liver enz  Anemia  Dysphagia  Hx DVT, was on Eliquis  Osteonecrosis of femoral heads        PLAN:    Encourage pulmonary toilet.  Continue antibiotics.  Infectious disease following.  Bronchodilators to help with clearance.  Pain control.  Chest tube management per thoracic surgery.  Transfuse for hemoglobin less than 7.  Repeat H&H posttransfusion.    Discussed with multidisciplinary ICU team on rounds this morning.      CCT: 33 min    Michael Murry MD  Pulmonary and Critical Care  [facility]  3/9/2023    10:51 EST

## 2023-03-09 NOTE — PROGRESS NOTES
"DOS: 3/9/2023  NAME: Louise GARCIA   : 1965  PCP: Chloe Schaefer APRN  Chief Complaint   Patient presents with   • Fall   • Numbness     Numbness in bilateral arms and legs post falling.        Chief complaint: weakness  Subjective: s/p thoracic surgery yesterday. Somewhat sedated and encephalopathic this morning.    Objective:  Vital signs: /51   Pulse 83   Temp 98 °F (36.7 °C) (Oral)   Resp 18   Ht 157.5 cm (62\")   Wt 56.7 kg (125 lb)   SpO2 100%   Breastfeeding No   BMI 22.86 kg/m²    Gen: NAD, vitals reviewed  MS: oriented x 2, memory, concentration impaired, language intact, no neglect  CN: visual acuity grossly normal, PERRL, EOMI, no facial droop, mild dysarthria  Motor: 4+/5 bilateral upper extremities, 2/5 left lower extremity, 3/5 right lower extremity, normal tone throughout  Sensory: intact to cold temperature and vibration throughout    Laboratory results:  Lab Results   Component Value Date    GLUCOSE 112 (H) 2023    CALCIUM 7.7 (L) 2023     (L) 2023    K 4.1 2023    CO2 29.0 2023    CL 99 2023    BUN 10 2023    CREATININE 0.59 2023    EGFRIFAFRI  2016      Comment:      <15 Indicative of kidney failure.    EGFRIFNONA 81 2016    BCR 16.9 2023    ANIONGAP 6.0 2023     Lab Results   Component Value Date    WBC 25.61 (H) 2023    HGB 6.6 (C) 2023    HCT 19.3 (C) 2023    MCV 99.5 (H) 2023     2023     No results found for: LDL         Review of labs: Sodium 134, WBC 25.6, hemoglobin 6.6    Diagnoses:  Idiopathic peripheral neuropathy, motor predominant  Paraparesis  Lower extremity numbness  Areflexia  Empyema of the lung  Respiratory failure    Comment: Slightly weaker in the legs today but I think this is probably normal with her postop fatigue and degree of encephalopathy    Plan:  1.  Completed IVIG for possible inflammatory neuropathy.  Negative work-up including " autoimmune studies, CSF studies.  [facility] autoimmune encephalopathy screening and CSF still pending.  2.  PT/OT  3.  Follow-up anticipated with Dr. Caleb Reyes after discharge.    Discussed with patient, family at bedside.

## 2023-03-09 NOTE — SIGNIFICANT NOTE
03/09/23 1108   OTHER   Discipline physical therapist   Rehab Time/Intention   Session Not Performed   (Hgb 6.6 this morning. pt has gotten blood, but also just got pain medicine and is very lethargic and having difficulty staying awake to participate. Will follow up tomorrow)   Recommendation   PT - Next Appointment 03/10/23

## 2023-03-09 NOTE — ANESTHESIA POSTPROCEDURE EVALUATION
Patient: Louise GARCIA    Procedure Summary     Date: 03/08/23 Room / Location: [facility] OR  / [facility] MAIN OR    Anesthesia Start: 1829 Anesthesia Stop: 2201    Procedure: THORACOSCOPY WITH DAVINCI ROBOT WITH COMPLETE DECORTICATION (Right: Chest) Diagnosis:       Empyema of pleura (HCC)      (Empyema of pleura (HCC) [J86.9])    Surgeons: Chloe Cedillo MD Provider: Deniz Frankel MD    Anesthesia Type: general, ERAS Protocol ASA Status: 4          Anesthesia Type: general, ERAS Protocol    Vitals  Vitals Value Taken Time   /84 03/08/23 2236   Temp 36.5 °C (97.7 °F) 03/08/23 2151   Pulse 85 03/08/23 2246   Resp 16 03/08/23 2235   SpO2 95 % 03/08/23 2246   Vitals shown include unvalidated device data.        Post Anesthesia Care and Evaluation      Comments: Pt. Discharged prior to being evaluated by anesthesia.  Chart is reviewed and no complications are noted.  THIS CASE IS NOT MEDICALLY DIRECTED

## 2023-03-09 NOTE — PROGRESS NOTES
"[facility] Clinical Pharmacy Services: Vancomycin Monitoring Note    Louise GARCIA is a 58 y.o. female who is on day 6 of pharmacy to dose vancomycin for MRSA Bacteremia-- empyema/endocarditis    current Vancomycin Dose:   1000 mg IV every  12  hours  Updated Cultures and Sensitivities:   -3/8 pleural fluid cultures pending  -3/6 CSF:  ng  -3/6 menin/encph pcr:  neg  -3/6 blood (2 sets):  ng x 2  -3/4 pleural fluid:  2+ MRSA  -3/3 MRSA screen positive  -3/2 blood:  MRSA  -3/2 blood   ng x 5 days    Results from last 7 days   Lab Units 03/09/23  1427 03/09/23  0443 03/05/23  2210   VANCOMYCIN TR mcg/mL 20.70* 34.90* 13.60     Vitals/Labs  Ht: 157.5 cm (62\"); Wt: 56.7 kg (125 lb)   Temp Readings from Last 1 Encounters:   03/09/23 98.1 °F (36.7 °C) (Axillary)     Estimated Creatinine Clearance: 91.9 mL/min (by C-G formula based on SCr of 0.59 mg/dL).       Results from last 7 days   Lab Units 03/09/23  0443 03/08/23  0731 03/07/23  0733   CREATININE mg/dL 0.59 0.56* 0.53*   WBC 10*3/mm3 25.61* 20.31* 17.78*     Assessment/Plan  Re-draw of trough level collected prior to afternoon dose today= 20.7 mcg/ml. Predicted AUC with current dose 1000mg IV q12h= 598 mg/L.h (goal 400-600). Note pt got an additional perioperative dose of vancomycin 750mg x1 3/8 @ 1734 which may affect levels collected today. Will follow levels closely.  Vancomycin Dose: Will decrease dose slightly beginning tomorrow AM to 750 mg IV every  12  hours; provides a predicted  mg/L.hr   Next Level Date and Time: Recheck trough prior to dose due 3/11 @ 0600.   We will continue to monitor patient changes and renal function     Thank you for involving pharmacy in this patient's care. Please contact pharmacy with any questions or concerns.       Carleen Burton, PharmD  Clinical Pharmacist              "

## 2023-03-10 ENCOUNTER — APPOINTMENT (OUTPATIENT)
Dept: CARDIOLOGY | Facility: HOSPITAL | Age: 58
DRG: 163 | End: 2023-03-10
Payer: COMMERCIAL

## 2023-03-10 ENCOUNTER — APPOINTMENT (OUTPATIENT)
Dept: GENERAL RADIOLOGY | Facility: HOSPITAL | Age: 58
DRG: 163 | End: 2023-03-10
Payer: COMMERCIAL

## 2023-03-10 LAB
ALBUMIN SERPL-MCNC: 1.8 G/DL (ref 3.5–5.2)
ALBUMIN/GLOB SERPL: 0.5 G/DL
ALP SERPL-CCNC: 199 U/L (ref 39–117)
ALT SERPL W P-5'-P-CCNC: 17 U/L (ref 1–33)
ANION GAP SERPL CALCULATED.3IONS-SCNC: 7 MMOL/L (ref 5–15)
AST SERPL-CCNC: 29 U/L (ref 1–32)
BASOPHILS # BLD AUTO: 0.03 10*3/MM3 (ref 0–0.2)
BASOPHILS NFR BLD AUTO: 0.2 % (ref 0–1.5)
BH BB BLOOD EXPIRATION DATE: NORMAL
BH BB BLOOD TYPE BARCODE: 6200
BH BB DISPENSE STATUS: NORMAL
BH BB PRODUCT CODE: NORMAL
BH BB UNIT NUMBER: NORMAL
BILIRUB SERPL-MCNC: 0.8 MG/DL (ref 0–1.2)
BUN SERPL-MCNC: 10 MG/DL (ref 6–20)
BUN/CREAT SERPL: 11.4 (ref 7–25)
CALCIUM SPEC-SCNC: 7.6 MG/DL (ref 8.6–10.5)
CHLORIDE SERPL-SCNC: 98 MMOL/L (ref 98–107)
CO2 SERPL-SCNC: 28 MMOL/L (ref 22–29)
CREAT SERPL-MCNC: 0.88 MG/DL (ref 0.57–1)
CROSSMATCH INTERPRETATION: NORMAL
DEPRECATED RDW RBC AUTO: 51.5 FL (ref 37–54)
EGFRCR SERPLBLD CKD-EPI 2021: 75.8 ML/MIN/1.73
EOSINOPHIL # BLD AUTO: 0.19 10*3/MM3 (ref 0–0.4)
EOSINOPHIL NFR BLD AUTO: 1 % (ref 0.3–6.2)
ERYTHROCYTE [DISTWIDTH] IN BLOOD BY AUTOMATED COUNT: 14.7 % (ref 12.3–15.4)
GLOBULIN UR ELPH-MCNC: 3.3 GM/DL
GLUCOSE SERPL-MCNC: 81 MG/DL (ref 65–99)
HCT VFR BLD AUTO: 23.1 % (ref 34–46.6)
HCT VFR BLD AUTO: 23.8 % (ref 34–46.6)
HCT VFR BLD AUTO: 23.8 % (ref 34–46.6)
HGB BLD-MCNC: 8.1 G/DL (ref 12–15.9)
HGB BLD-MCNC: 8.1 G/DL (ref 12–15.9)
HGB BLD-MCNC: 8.2 G/DL (ref 12–15.9)
IMM GRANULOCYTES # BLD AUTO: 0.68 10*3/MM3 (ref 0–0.05)
IMM GRANULOCYTES NFR BLD AUTO: 3.6 % (ref 0–0.5)
LAB AP CASE REPORT: NORMAL
LAB AP CLINICAL INFORMATION: NORMAL
LYMPHOCYTES # BLD AUTO: 1.88 10*3/MM3 (ref 0.7–3.1)
LYMPHOCYTES NFR BLD AUTO: 9.9 % (ref 19.6–45.3)
MAGNESIUM SERPL-MCNC: 1.7 MG/DL (ref 1.6–2.6)
MAXIMAL PREDICTED HEART RATE: 161 BPM
MCH RBC QN AUTO: 32.9 PG (ref 26.6–33)
MCHC RBC AUTO-ENTMCNC: 34 G/DL (ref 31.5–35.7)
MCV RBC AUTO: 96.7 FL (ref 79–97)
MONOCYTES # BLD AUTO: 0.96 10*3/MM3 (ref 0.1–0.9)
MONOCYTES NFR BLD AUTO: 5.1 % (ref 5–12)
NEUTROPHILS NFR BLD AUTO: 15.21 10*3/MM3 (ref 1.7–7)
NEUTROPHILS NFR BLD AUTO: 80.2 % (ref 42.7–76)
NRBC BLD AUTO-RTO: 0.1 /100 WBC (ref 0–0.2)
PATH REPORT.FINAL DX SPEC: NORMAL
PATH REPORT.GROSS SPEC: NORMAL
PHOSPHATE SERPL-MCNC: 3.1 MG/DL (ref 2.5–4.5)
PLATELET # BLD AUTO: 238 10*3/MM3 (ref 140–450)
PMV BLD AUTO: 10.7 FL (ref 6–12)
POTASSIUM SERPL-SCNC: 3.8 MMOL/L (ref 3.5–5.2)
PROT SERPL-MCNC: 5.1 G/DL (ref 6–8.5)
RBC # BLD AUTO: 2.46 10*6/MM3 (ref 3.77–5.28)
SODIUM SERPL-SCNC: 133 MMOL/L (ref 136–145)
STRESS TARGET HR: 137 BPM
UNIT  ABO: NORMAL
UNIT  RH: NORMAL
VANCOMYCIN TROUGH SERPL-MCNC: 21.9 MCG/ML (ref 5–20)
WBC NRBC COR # BLD: 18.95 10*3/MM3 (ref 3.4–10.8)

## 2023-03-10 PROCEDURE — 99232 SBSQ HOSP IP/OBS MODERATE 35: CPT | Performed by: PSYCHIATRY & NEUROLOGY

## 2023-03-10 PROCEDURE — 97110 THERAPEUTIC EXERCISES: CPT

## 2023-03-10 PROCEDURE — 93325 DOPPLER ECHO COLOR FLOW MAPG: CPT | Performed by: INTERNAL MEDICINE

## 2023-03-10 PROCEDURE — 25010000002 VANCOMYCIN 750 MG RECONSTITUTED SOLUTION 1 EACH VIAL: Performed by: NURSE PRACTITIONER

## 2023-03-10 PROCEDURE — 93325 DOPPLER ECHO COLOR FLOW MAPG: CPT

## 2023-03-10 PROCEDURE — 97530 THERAPEUTIC ACTIVITIES: CPT

## 2023-03-10 PROCEDURE — 97166 OT EVAL MOD COMPLEX 45 MIN: CPT

## 2023-03-10 PROCEDURE — 93308 TTE F-UP OR LMTD: CPT | Performed by: INTERNAL MEDICINE

## 2023-03-10 PROCEDURE — 80202 ASSAY OF VANCOMYCIN: CPT | Performed by: NURSE PRACTITIONER

## 2023-03-10 PROCEDURE — 25010000002 HYDROMORPHONE PER 4 MG: Performed by: THORACIC SURGERY (CARDIOTHORACIC VASCULAR SURGERY)

## 2023-03-10 PROCEDURE — 93308 TTE F-UP OR LMTD: CPT

## 2023-03-10 PROCEDURE — 99024 POSTOP FOLLOW-UP VISIT: CPT

## 2023-03-10 PROCEDURE — 85014 HEMATOCRIT: CPT

## 2023-03-10 PROCEDURE — 25010000002 HEPARIN (PORCINE) PER 1000 UNITS: Performed by: THORACIC SURGERY (CARDIOTHORACIC VASCULAR SURGERY)

## 2023-03-10 PROCEDURE — 99232 SBSQ HOSP IP/OBS MODERATE 35: CPT | Performed by: INTERNAL MEDICINE

## 2023-03-10 PROCEDURE — 94664 DEMO&/EVAL PT USE INHALER: CPT

## 2023-03-10 PROCEDURE — 94799 UNLISTED PULMONARY SVC/PX: CPT

## 2023-03-10 PROCEDURE — 80053 COMPREHEN METABOLIC PANEL: CPT | Performed by: THORACIC SURGERY (CARDIOTHORACIC VASCULAR SURGERY)

## 2023-03-10 PROCEDURE — 84100 ASSAY OF PHOSPHORUS: CPT | Performed by: THORACIC SURGERY (CARDIOTHORACIC VASCULAR SURGERY)

## 2023-03-10 PROCEDURE — 85025 COMPLETE CBC W/AUTO DIFF WBC: CPT | Performed by: THORACIC SURGERY (CARDIOTHORACIC VASCULAR SURGERY)

## 2023-03-10 PROCEDURE — 83735 ASSAY OF MAGNESIUM: CPT | Performed by: THORACIC SURGERY (CARDIOTHORACIC VASCULAR SURGERY)

## 2023-03-10 PROCEDURE — 71045 X-RAY EXAM CHEST 1 VIEW: CPT

## 2023-03-10 PROCEDURE — 94761 N-INVAS EAR/PLS OXIMETRY MLT: CPT

## 2023-03-10 PROCEDURE — B24BZZ4 ULTRASONOGRAPHY OF HEART WITH AORTA, TRANSESOPHAGEAL: ICD-10-PCS | Performed by: INTERNAL MEDICINE

## 2023-03-10 PROCEDURE — 85018 HEMOGLOBIN: CPT

## 2023-03-10 RX ORDER — PANTOPRAZOLE SODIUM 40 MG/1
40 TABLET, DELAYED RELEASE ORAL
Status: DISCONTINUED | OUTPATIENT
Start: 2023-03-11 | End: 2023-03-17 | Stop reason: HOSPADM

## 2023-03-10 RX ADMIN — HEPARIN SODIUM 5000 UNITS: 5000 INJECTION INTRAVENOUS; SUBCUTANEOUS at 13:53

## 2023-03-10 RX ADMIN — CELECOXIB 100 MG: 100 CAPSULE ORAL at 20:07

## 2023-03-10 RX ADMIN — GABAPENTIN 300 MG: 300 CAPSULE ORAL at 13:23

## 2023-03-10 RX ADMIN — GABAPENTIN 300 MG: 300 CAPSULE ORAL at 06:49

## 2023-03-10 RX ADMIN — ACETAMINOPHEN 1000 MG: 500 TABLET, FILM COATED ORAL at 16:40

## 2023-03-10 RX ADMIN — ACETAMINOPHEN 1000 MG: 500 TABLET, FILM COATED ORAL at 08:41

## 2023-03-10 RX ADMIN — DESVENLAFAXINE SUCCINATE 25 MG: 25 TABLET, EXTENDED RELEASE ORAL at 08:41

## 2023-03-10 RX ADMIN — HEPARIN SODIUM 5000 UNITS: 5000 INJECTION INTRAVENOUS; SUBCUTANEOUS at 06:49

## 2023-03-10 RX ADMIN — Medication 10 ML: at 10:00

## 2023-03-10 RX ADMIN — ACETAMINOPHEN 1000 MG: 500 TABLET, FILM COATED ORAL at 20:07

## 2023-03-10 RX ADMIN — IPRATROPIUM BROMIDE AND ALBUTEROL SULFATE 3 ML: 2.5; .5 SOLUTION RESPIRATORY (INHALATION) at 11:32

## 2023-03-10 RX ADMIN — HYDROMORPHONE HYDROCHLORIDE 0.5 MG: 1 INJECTION, SOLUTION INTRAMUSCULAR; INTRAVENOUS; SUBCUTANEOUS at 16:29

## 2023-03-10 RX ADMIN — FOLIC ACID 1 MG: 1 TABLET ORAL at 08:41

## 2023-03-10 RX ADMIN — CELECOXIB 100 MG: 100 CAPSULE ORAL at 08:41

## 2023-03-10 RX ADMIN — GABAPENTIN 300 MG: 300 CAPSULE ORAL at 21:59

## 2023-03-10 RX ADMIN — OXYCODONE HYDROCHLORIDE 5 MG: 5 TABLET ORAL at 23:12

## 2023-03-10 RX ADMIN — LAMOTRIGINE 200 MG: 100 TABLET ORAL at 08:41

## 2023-03-10 RX ADMIN — Medication 10 ML: at 20:08

## 2023-03-10 RX ADMIN — IPRATROPIUM BROMIDE AND ALBUTEROL SULFATE 3 ML: 2.5; .5 SOLUTION RESPIRATORY (INHALATION) at 19:54

## 2023-03-10 RX ADMIN — OXYCODONE HYDROCHLORIDE 5 MG: 5 TABLET ORAL at 10:13

## 2023-03-10 RX ADMIN — Medication 100 MG: at 08:41

## 2023-03-10 RX ADMIN — VANCOMYCIN HYDROCHLORIDE 750 MG: 750 INJECTION, POWDER, LYOPHILIZED, FOR SOLUTION INTRAVENOUS at 06:54

## 2023-03-10 RX ADMIN — OXYCODONE HYDROCHLORIDE 5 MG: 5 TABLET ORAL at 14:42

## 2023-03-10 RX ADMIN — PANTOPRAZOLE SODIUM 40 MG: 40 INJECTION, POWDER, FOR SOLUTION INTRAVENOUS at 06:49

## 2023-03-10 RX ADMIN — HEPARIN SODIUM 5000 UNITS: 5000 INJECTION INTRAVENOUS; SUBCUTANEOUS at 21:58

## 2023-03-10 RX ADMIN — Medication 500 MCG: at 08:41

## 2023-03-10 RX ADMIN — IPRATROPIUM BROMIDE AND ALBUTEROL SULFATE 3 ML: 2.5; .5 SOLUTION RESPIRATORY (INHALATION) at 07:25

## 2023-03-10 NOTE — PROGRESS NOTES
"DOS: 3/10/2023  NAME: Louise GARCIA   : 1965  PCP: Chloe Schaefer APRN  Chief Complaint   Patient presents with   • Fall   • Numbness     Numbness in bilateral arms and legs post falling.        Chief complaint: Weakness  Subjective: Feels that hands are tingling today.  Headache improved    Objective:  Vital signs: /75   Pulse 106   Temp 97.4 °F (36.3 °C) (Oral)   Resp 22   Ht 157.5 cm (62\")   Wt 57.9 kg (127 lb 10.3 oz)   SpO2 98%   Breastfeeding No   BMI 23.35 kg/m²    Gen: NAD, vitals reviewed  MS: oriented x3, recent/remote memory intact, normal attention/concentration, language intact, no neglect.  CN: visual acuity grossly normal, PERRL, EOMI, no facial droop, no dysarthria  Motor: 4+/5 bilateral upper extremities proximally, 3/5 bilateral lower extremities, normal tone throughout  Sensory: intact to cold temperature and vibration throughout  Reflexes: Diffusely areflexic    Laboratory results:  Lab Results   Component Value Date    GLUCOSE 81 03/10/2023    CALCIUM 7.6 (L) 03/10/2023     (L) 03/10/2023    K 3.8 03/10/2023    CO2 28.0 03/10/2023    CL 98 03/10/2023    BUN 10 03/10/2023    CREATININE 0.88 03/10/2023    EGFRIFAFRI  2016      Comment:      <15 Indicative of kidney failure.    EGFRIFNONA 81 2016    BCR 11.4 03/10/2023    ANIONGAP 7.0 03/10/2023     Lab Results   Component Value Date    WBC 18.95 (H) 03/10/2023    HGB 8.1 (L) 03/10/2023    HGB 8.1 (L) 03/10/2023    HCT 23.8 (L) 03/10/2023    HCT 23.8 (L) 03/10/2023    MCV 96.7 03/10/2023     03/10/2023     No results found for: LDL         Review of labs: Sodium 133, WBC 19, hemoglobin 8.1    Diagnoses:  Idiopathic peripheral neuropathy, motor predominant  Paraparesis  Lower extremity numbness  Areflexia  Empyema of the lung  Respiratory failure    Comment: Strength overall improved compared to yesterday.    Plan:  1.  Status post IVIG  2.  Awaiting [facility] CSF autoimmune panel    Discussed with " patient, spouse

## 2023-03-10 NOTE — PROGRESS NOTES
"[facility] Clinical Pharmacy Services: Vancomycin Monitoring Note    Louise GARCIA is a 58 y.o. female who is on day 7 of pharmacy to dose vancomycin for MRSA Bacteremia-- empyema/endocarditis    current Vancomycin Dose:   750 mg IV every  12  hours  Updated Cultures and Sensitivities:   -3/8 pleural fluid cultures pending  3/8 tissue culture pending  -3/6 CSF:  ng  -3/6 menin/encph pcr:  neg  -3/6 blood (2 sets):  ng x 2  -3/4 pleural fluid:  2+ MRSA  -3/3 MRSA screen positive  -3/2 blood:  MRSA  -3/2 blood   ng x 5 days    Results from last 7 days   Lab Units 03/10/23  1640 03/09/23  1427 03/09/23  0443   VANCOMYCIN TR mcg/mL 21.90* 20.70* 34.90*     Vitals/Labs  Ht: 157.5 cm (62\"); Wt: 57.9 kg (127 lb 10.3 oz)   Temp Readings from Last 1 Encounters:   03/10/23 98 °F (36.7 °C) (Oral)     Estimated Creatinine Clearance: 62.9 mL/min (by C-G formula based on SCr of 0.88 mg/dL).       Results from last 7 days   Lab Units 03/10/23  0322 03/09/23  0443 03/08/23  0731   CREATININE mg/dL 0.88 0.59 0.56*   WBC 10*3/mm3 18.95* 25.61* 20.31*     Assessment/Plan  Creatinine increased slightly today. Levels yesterday (and predicted AUC) may be slightly inaccurate due to extra dose given perioperatively on 3/8.   Vancomycin Dose: Change frequency of current regimen of 750 mg IV every 12 hours to every 18 hrs. Trough was 21.9 mcg/ml with an AUC of 615; Start next dose at 0100 3/11/23 which provides a predicted  mg/L.hr   Next Level Date and Time: Will change trough to be collected at 0630 3/13/23  We will continue to monitor patient changes and renal function     Thank you for involving pharmacy in this patient's care. Please contact pharmacy with any questions or concerns.       Barber Masterson [facility]  Clinical Pharmacist                  "

## 2023-03-10 NOTE — PROGRESS NOTES
[facility] INPATIENT PROGRESS NOTE         [facility] CARDIAC INTENSIVE CARE    3/10/2023      PATIENT IDENTIFICATION:  Name: Louise GARCIA ADMIT: 2023   : 1965  PCP: Chloe Schaefer APRN    MRN: [id number] LOS: 15 days   AGE/SEX: 58 y.o. female  ROOM: [facility]                     LOS 15    Reason for visit: Altered mental status and empyema      SUBJECTIVE:      No new complaints.  Complains of numbness and weakness in her hands but she says that this is not a new issue going on for a long time.  Chest tubes are stable without airleak.  Discussed with family at bedside.      Objective   OBJECTIVE:    Vital Sign Min/Max for last 24 hours  Temp  Min: 97.2 °F (36.2 °C)  Max: 98.5 °F (36.9 °C)   BP  Min: 87/44  Max: 116/70   Pulse  Min: 77  Max: 103   Resp  Min: 14  Max: 18   SpO2  Min: 92 %  Max: 100 %   No data recorded   Weight  Min: 57.9 kg (127 lb 10.3 oz)  Max: 57.9 kg (127 lb 10.3 oz)                         Body mass index is 23.35 kg/m².    Intake/Output Summary (Last 24 hours) at 3/10/2023 0846  Last data filed at 3/10/2023 0700  Gross per 24 hour   Intake 570 ml   Output 1995 ml   Net -1425 ml         Exam:  GEN:  No distress, appears stated age  EYES:   PERRL, anicteric sclerae  ENT:    External ears/nose normal, OP clear  NECK:  No adenopathy, midline trachea  LUNGS: Normal chest on inspection, palpation and diminished right greater than left on auscultation.  Chest tubes stable without airleak.  CV:  Normal S1S2, without murmur  ABD:  Nontender, nondistended, no hepatosplenomegaly, +BS  EXT:  No edema.  No cyanosis or clubbing.  No mottling and normal cap refill.    Assessment     Scheduled meds:  acetaminophen, 1,000 mg, Oral, TID  celecoxib, 100 mg, Oral, Q12H  desvenlafaxine, 25 mg, Oral, Daily  folic acid, 1 mg, Oral, Daily  gabapentin, 300 mg, Oral, Q8H  heparin (porcine), 5,000 Units, Subcutaneous, Q8H  ipratropium-albuterol, 3 mL, Nebulization, 4x Daily - RT  lamoTRIgine, 200  mg, Oral, Daily  lidocaine, 1 patch, Transdermal, Q24H  lidocaine, 1 patch, Transdermal, Q24H  lidocaine, 10 mL, Intradermal, Once  pantoprazole, 40 mg, Intravenous, Q AM  sodium chloride, 10 mL, Intravenous, Q12H  thiamine, 100 mg, Oral, Daily  vancomycin, 750 mg, Intravenous, Q12H  vitamin B-12, 500 mcg, Oral, Daily      IV meds:                      hold, 1 each  hold, 1 each  lactated ringers, 9 mL/hr, Last Rate: 500 mL/hr (03/08/23 1856)  Pharmacy to dose vancomycin,       Data Review:  Results from last 7 days   Lab Units 03/10/23  0322 03/09/23  0443 03/08/23  0731 03/07/23 0733 03/06/23 0719   SODIUM mmol/L 133* 134* 132* 134* 133*   POTASSIUM mmol/L 3.8 4.1 3.4* 3.8 3.9   CHLORIDE mmol/L 98 99 95* 96* 97*   CO2 mmol/L 28.0 29.0 27.9 27.1 24.5   BUN mg/dL 10 10 9 11 15   CREATININE mg/dL 0.88 0.59 0.56* 0.53* 0.57   GLUCOSE mg/dL 81 112* 78 80 82   CALCIUM mg/dL 7.6* 7.7* 8.4* 8.2*  8.1* 8.7         Estimated Creatinine Clearance: 62.9 mL/min (by C-G formula based on SCr of 0.88 mg/dL).  Results from last 7 days   Lab Units 03/10/23  0322 03/09/23  2026 03/09/23  1224 03/09/23 0443 03/08/23 0731 03/07/23 0733 03/06/23  0719   WBC 10*3/mm3 18.95*  --   --  25.61* 20.31* 17.78* 16.60*   HEMOGLOBIN g/dL 8.1*  8.1* 8.5* 8.7* 6.6* 8.2* 8.3* 9.0*   PLATELETS 10*3/mm3 238  --   --  219 165 198 173     Results from last 7 days   Lab Units 03/08/23  0731   INR  1.10     Results from last 7 days   Lab Units 03/10/23  0322 03/09/23  0443 03/08/23  0731 03/07/23  0733 03/06/23  0719   ALT (SGPT) U/L 17 19 27 29 41*   AST (SGOT) U/L 29 27 32 35* 40*     Results from last 7 days   Lab Units 03/05/23  1054   PH, ARTERIAL pH units 7.413   PO2 ART mm Hg 80.5   PCO2, ARTERIAL mm Hg 43.0   HCO3 ART mmol/L 27.4     Results from last 7 days   Lab Units 03/05/23  0532 03/03/23  1051   PROCALCITONIN ng/mL 2.40*  --    LACTATE mmol/L  --  1.4         Glucose   Date/Time Value Ref Range Status   03/08/2023 1701 77 70 - 130  mg/dL Final     Comment:     Meter: [id number] : [id number] Amrik FUNES RN     Chest x-ray 3/10 reviewed            Microbiology reviewed                  Active Hospital Problems    Diagnosis  POA   • **Paresthesia [R20.2]  Yes   • MRSA bacteremia [R78.81, B95.62]  Unknown   • Tricuspid valve vegetation [I33.0]  Yes   • Oral candidiasis [B37.0]  No   • Mass of middle lobe of right lung [R91.8]  Yes   • Situational mixed anxiety and depressive disorder [F43.23]  Yes   • 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]  Yes   • Normocytic anemia [D64.9]  Yes   • GERD (gastroesophageal reflux disease) [K21.9]  Yes   • Lower extremity weakness [R29.898]  Yes   • History of blood clots [Z86.718]  Not Applicable   • Chronic anticoagulation [Z79.01]  Not Applicable   • Seizures (HCC) [R56.9]  Yes   • Cervical myelopathy (HCC) [G95.9]  Yes   • Empyema of pleura (HCC) [J86.9]  Yes      Resolved Hospital Problems    Diagnosis Date Resolved POA   • Upper abdominal pain [R10.10] 03/08/2023 No   • Leukocytosis [D72.829] 03/08/2023 Yes   • Hyperglycemia [R73.9] 03/08/2023 No         ASSESSMENT:    Idiopathic neuropathy  MRSA pneumonia/empyema: S/P decortication 3/8  Elevated liver enz  Anemia  Dysphagia  Hx DVT, was on Eliquis  Osteonecrosis of femoral heads        PLAN:    Encourage pulmonary toilet.  Continue antibiotics per ID recs.  Bronchodilators to help with clearance.  Pain control.  Chest tube management per thoracic surgery.  Transfuse for hemoglobin less than 7.  Repeat H&H posttransfusion showed appropriate response and stable repeat H&H.    Discussed with multidisciplinary ICU team on rounds this morning.    Discussed with family at bedside.      Michael Murry MD  Pulmonary and Critical Care Medicine  [facility], [facility]  3/10/2023    08:46 EST

## 2023-03-10 NOTE — PROGRESS NOTES
"    Patient Name: Louise GARCIA  :1965  58 y.o.      Patient Care Team:  Chloe Schaefer APRN as PCP - General (Family Medicine)  Mikhail Glez MD as Consulting Physician (Neurology)    Chief Complaint: empyema    Interval History: remains in SR       Objective   Vital Signs  Temp:  [97.2 °F (36.2 °C)-98.5 °F (36.9 °C)] 98 °F (36.7 °C)  Heart Rate:  [] 89  Resp:  [14-18] 16  BP: ()/() 114/71    Intake/Output Summary (Last 24 hours) at 3/10/2023 0831  Last data filed at 3/10/2023 0700  Gross per 24 hour   Intake 570 ml   Output 1995 ml   Net -1425 ml     Flowsheet Rows    Flowsheet Row First Filed Value   Admission Height 157.5 cm (62\") Documented at 2023 1425   Admission Weight 54.4 kg (120 lb) Documented at 2023 1425          Physical Exam:   General Appearance:    lethargic, in no acute distress   Lungs:     Clear to auscultation.  Normal respiratory effort and rate.      Heart:    Regular rhythm and normal rate, normal S1 and S2, no murmurs, gallops or rubs.     Chest Wall:    No abnormalities observed   Abdomen:     Soft, nontender, positive bowel sounds.     Extremities:   no cyanosis, clubbing or edema.  No marked joint deformities.  Adequate musculoskeletal strength.       Results Review:    Results from last 7 days   Lab Units 03/10/23  0322   SODIUM mmol/L 133*   POTASSIUM mmol/L 3.8   CHLORIDE mmol/L 98   CO2 mmol/L 28.0   BUN mg/dL 10   CREATININE mg/dL 0.88   GLUCOSE mg/dL 81   CALCIUM mg/dL 7.6*         Results from last 7 days   Lab Units 03/10/23  0322   WBC 10*3/mm3 18.95*   HEMOGLOBIN g/dL 8.1*  8.1*   HEMATOCRIT % 23.8*  23.8*   PLATELETS 10*3/mm3 238     Results from last 7 days   Lab Units 23  0731   INR  1.10   APTT seconds 32.2     Results from last 7 days   Lab Units 03/10/23  0322   MAGNESIUM mg/dL 1.7                   Medication Review:   acetaminophen, 1,000 mg, Oral, TID  celecoxib, 100 mg, Oral, Q12H  desvenlafaxine, 25 mg, Oral, " Daily  folic acid, 1 mg, Oral, Daily  gabapentin, 300 mg, Oral, Q8H  heparin (porcine), 5,000 Units, Subcutaneous, Q8H  ipratropium-albuterol, 3 mL, Nebulization, 4x Daily - RT  lamoTRIgine, 200 mg, Oral, Daily  lidocaine, 1 patch, Transdermal, Q24H  lidocaine, 1 patch, Transdermal, Q24H  lidocaine, 10 mL, Intradermal, Once  pantoprazole, 40 mg, Intravenous, Q AM  sodium chloride, 10 mL, Intravenous, Q12H  thiamine, 100 mg, Oral, Daily  vancomycin, 750 mg, Intravenous, Q12H  vitamin B-12, 500 mcg, Oral, Daily         hold, 1 each  hold, 1 each  lactated ringers, 9 mL/hr, Last Rate: 500 mL/hr (03/08/23 1856)  Pharmacy to dose vancomycin,         Assessment & Plan     Active Hospital Problems    Diagnosis  POA   • **Paresthesia [R20.2]  Yes   • MRSA bacteremia [R78.81, B95.62]  Unknown   • Tricuspid valve vegetation [I33.0]  Yes   • Oral candidiasis [B37.0]  No   • Mass of middle lobe of right lung [R91.8]  Yes   • Situational mixed anxiety and depressive disorder [F43.23]  Yes   • 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  MRSA pneumonia and empyema- VATS/decortication performed Wednesday, Eliquis held  2.  MRSA septicemia, ID following  3.  Paresthesias and areflexia, neurology following  4.  Possible small vegetation of tricuspid valve, continue to follow right now, continue IV antibiotics, valvular function normal, will repeat limited echocardiogram to assess  tricuspid valve  5.  DVT history, Eliquis on hold    Hank Dowling III, MD  [facility]  03/10/23  08:31 EST

## 2023-03-10 NOTE — THERAPY RE-EVALUATION
Patient Name: Louise GARCIA  : 1965    MRN: [id number]                              Today's Date: 3/10/2023       Admit Date: 2023    Visit Dx:     ICD-10-CM ICD-9-CM   1. Paresthesia  R20.2 782.0   2. Gait instability  R26.81 781.2   3. Extremity numbness  R20.0 782.0   4. Empyema of pleura (HCC)  J86.9 510.9     Patient Active Problem List   Diagnosis   • Sepsis (HCC)   • Neck pain, chronic   • Upper back pain   • Paresthesia   • Cervical myelopathy (HCC)   • Lower extremity weakness   • History of blood clots   • Chronic anticoagulation   • Seizures (HCC)   • Normocytic anemia   • GERD (gastroesophageal reflux disease)   • Guillain-Tehama syndrome (HCC)   • Situational mixed anxiety and depressive disorder   • Mass of middle lobe of right lung   • Oral candidiasis   • Empyema of pleura (HCC)   • Tricuspid valve vegetation   • MRSA bacteremia     Past Medical History:   Diagnosis Date   • Degenerative disc disease, cervical    • Gestational diabetes    • H/O blood clots    • Hematemesis    • Seizures (HCC)    • Septic shock (HCC)      Past Surgical History:   Procedure Laterality Date   • APPENDECTOMY     • BACK SURGERY     • CHOLECYSTECTOMY     • ENDOSCOPY N/A 2016    Procedure: ESOPHAGOGASTRODUODENOSCOPY with biopsy;  Surgeon: Austen Brito MD;  Location: [facility] ENDOSCOPY;  Service:    • HYSTERECTOMY     • NECK SURGERY       approx 6 yrs ago   • THORACOSCOPY Right 3/8/2023    Procedure: THORACOSCOPY WITH DAVINCI ROBOT WITH COMPLETE DECORTICATION;  Surgeon: Chloe Cedillo MD;  Location: [facility] MAIN OR;  Service: Robotics - DaVinci;  Laterality: Right;   • TONSILLECTOMY     • TONSILLECTOMY AND ADENOIDECTOMY        General Information     Row Name 03/10/23 1559          OT Time and Intention    Document Type re-evaluation  -KB     Mode of Treatment co-treatment;occupational therapy  cotx due to medical complexity  -KB     Row Name 03/10/23 1559          General Information    Patient  Profile Reviewed yes  -KB     Existing Precautions/Restrictions fall  -KB     Row Name 03/10/23 1559          Cognition    Orientation Status (Cognition) oriented x 3  -KB     Row Name 03/10/23 1559          Safety Issues, Functional Mobility    Impairments Affecting Function (Mobility) balance;endurance/activity tolerance;strength;pain  -KB     Comment, Safety Issues/Impairments (Mobility) gait belt , non slip socks  -KB           User Key  (r) = Recorded By, (t) = Taken By, (c) = Cosigned By    Initials Name Provider Type    Reena Guidry OT Occupational Therapist                 Mobility/ADL's     Row Name 03/10/23 1600          Bed Mobility    Bed Mobility supine-sit  -KB     Supine-Sit [place] (Bed Mobility) moderate assist (50% patient effort);2 person assist  -KB     Sit-Supine [place] (Bed Mobility) moderate assist (50% patient effort);2 person assist  -KB     Assistive Device (Bed Mobility) head of bed elevated;draw sheet  -     Row Name 03/10/23 1600          Transfers    Transfers sit-stand transfer  -     Comment, (Transfers) min assist x2 sit/stand, left knee buckling, required blocking  -KB     Row Name 03/10/23 1600          Sit-Stand Transfer    Sit-Stand [place] (Transfers) minimum assist (75% patient effort);2 person assist  -KB     Row Name 03/10/23 1600          Toilet Transfer    Comment, (Toilet Transfer) unable to tolerate functional transfers at this time due to weakness  -KB     Row Name 03/10/23 1600          Activities of Daily Living    BADL Assessment/Intervention grooming  -     Row Name 03/10/23 1600          Grooming Assessment/Training    [place] Level (Grooming) hair care, combing/brushing;minimum assist (75% patient effort)  -     Position (Grooming) edge of bed sitting  -KB           User Key  (r) = Recorded By, (t) = Taken By, (c) = Cosigned By    Initials Name Provider Type    Reena Guidry OT Occupational Therapist                Obj/Interventions     Row Name 03/10/23 1602          Sensory Assessment (Somatosensory)    Sensory Assessment reports tingling,numbness in hands  -     Row Name 03/10/23 1602          Vision Assessment/Intervention    Visual Impairment/Limitations WNL  -     Row Name 03/10/23 1602          Range of Motion Comprehensive    General Range of Motion bilateral upper extremity ROM WNL  -[facility] Name 03/10/23 1602          Strength Comprehensive (MMT)    General Manual Muscle Testing (MMT) Assessment upper extremity strength deficits identified  -     Comment, General Manual Muscle Testing (MMT) Assessment B UE grossly 3+/5  -[facility] Name 03/10/23 1602          Shoulder (Therapeutic Exercise)    Shoulder (Therapeutic Exercise) AROM (active range of motion)  -     Shoulder AROM (Therapeutic Exercise) bilateral;flexion;extension;10 repetitions  -[facility] Name 03/10/23 1602          Elbow/Forearm (Therapeutic Exercise)    Elbow/Forearm (Therapeutic Exercise) AROM (active range of motion)  -     Elbow/Forearm AROM (Therapeutic Exercise) bilateral;flexion;extension;10 repetitions  -[facility] Name 03/10/23 1602          Hand (Therapeutic Exercise)    Hand (Therapeutic Exercise) AROM (active range of motion)  -     Hand AROM/AAROM (Therapeutic Exercise) bilateral;AROM (active range of motion);finger extension;finger flexion;10 repetitions  -[facility] Name 03/10/23 1602          Motor Skills    Motor Skills functional endurance  -     Functional Endurance poor  -     Therapeutic Exercise shoulder;elbow/forearm;hand  -[facility] Name 03/10/23 1602          Balance    Balance Assessment sitting static balance;sitting dynamic balance;standing static balance;standing dynamic balance  -     Static Sitting Balance standby assist;contact guard  -     Dynamic Sitting Balance minimal assist  -     Static Standing Balance minimal assist;2-person assist  -     Dynamic Standing Balance moderate  assist;2-person assist  -KB           User Key  (r) = Recorded By, (t) = Taken By, (c) = Cosigned By    Initials Name Provider Type    Reena Guidry OT Occupational Therapist               Goals/Plan     Row Name 03/10/23 1608          Transfer Goal 1 (OT)    Activity/Assistive Device (Transfer Goal 1, OT) sit-to-stand/stand-to-sit;bed-to-chair/chair-to-bed;toilet  -KB     [place] Level/Cues Needed (Transfer Goal 1, OT) minimum assist (75% or more patient effort)  -KB     Time Frame (Transfer Goal 1, OT) 2 weeks  -KB     Progress/Outcome (Transfer Goal 1, OT) goal ongoing  -     Row Name 03/10/23 1608          Bathing Goal 1 (OT)    Activity/Device (Bathing Goal 1, OT) lower body bathing;upper body bathing  -KB     [place] Level/Cues Needed (Bathing Goal 1, OT) minimum assist (75% or more patient effort)  -KB     Time Frame (Bathing Goal 1, OT) 2 weeks  -KB     Progress/Outcomes (Bathing Goal 1, OT) goal ongoing  -     Row Name 03/10/23 1608          Dressing Goal 1 (OT)    Activity/Device (Dressing Goal 1, OT) lower body dressing  -KB     [place]/Cues Needed (Dressing Goal 1, OT) moderate assist (50-74% patient effort)  -KB     Time Frame (Dressing Goal 1, OT) short term goal (STG);2 weeks  -KB     Progress/Outcome (Dressing Goal 1, OT) goal ongoing  -[place]     Row Name 03/10/23 1608          Toileting Goal 1 (OT)    Activity/Device (Toileting Goal 1, OT) toileting skills, all  -KB     [place] Level/Cues Needed (Toileting Goal 1, OT) minimum assist (75% or more patient effort)  -KB     Time Frame (Toileting Goal 1, OT) short term goal (STG);2 weeks  -KB     Progress/Outcome (Toileting Goal 1, OT) goal ongoing  -     Row Name 03/10/23 1608          Strength Goal 1 (OT)    Strength Goal 1 (OT) improve strength to 4+/5  -KB     Time Frame (Strength Goal 1, OT) short term goal (STG);2 weeks  -KB     Progress/Outcome (Strength Goal 1, OT) goal ongoing  -[place]     Row Name 03/10/23 1606           Therapy Assessment/Plan (OT)    Planned Therapy Interventions (OT) activity tolerance training;BADL retraining;occupation/activity based interventions;patient/caregiver education/training;transfer/mobility retraining;strengthening exercise  -           User Key  (r) = Recorded By, (t) = Taken By, (c) = Cosigned By    Initials Name Provider Type    KB Reena Tovar OT Occupational Therapist               Clinical Impression     Row Name 03/10/23 1603          Pain Assessment    Pretreatment Pain Rating 0/10 - no pain  -KB     Posttreatment Pain Rating 0/10 - no pain  -KB     Row Name 03/10/23 1603          Plan of Care Review    Plan of Care Reviewed With patient  -KB     Progress improving  -     Outcome Evaluation Pt seen for OT reeval this date. Pt has had medical set backs due to recent VATS. Pt agreeable and motivated to participate in OT. Performed head elevated supine UE exercises, sets of 10. Performed bed mobility with mod assist x2 supine to sit, max assist x2 sit to supine. Sat edge of bed with occassional loss of balance backward, but otherwise sba. Performed sit/stand x3 with min assist x2 with left knee buckling, needing blocking at all times. Attempted to side step to left, but increased assist needed due to LE left weakness. Pt cont to demo decreased functional endurance/strength for adls, mobility and will cont to benefit from cont OT. Will likely need snf vs rehab at time of dc  -     Row Name 03/10/23 1602          Therapy Assessment/Plan (OT)    Rehab Potential (OT) good, to achieve stated therapy goals  -     Criteria for Skilled Therapeutic Interventions Met (OT) meets criteria;yes;skilled treatment is necessary  -     Therapy Frequency (OT) 5 times/wk  -     Row Name 03/10/23 1603          Therapy Plan Review/Discharge Plan (OT)    Anticipated Discharge Disposition (OT) inpatient rehabilitation facility  -     Row Name 03/10/23 1603          Positioning and Restraints     Pre-Treatment Position in bed  -KB     Post Treatment Position bed  -KB     In Bed notified nsg;supine;with family/caregiver;encouraged to call for assist;call light within reach  -KB           User Key  (r) = Recorded By, (t) = Taken By, (c) = Cosigned By    Initials Name Provider Type    Reena Guidry OT Occupational Therapist               Outcome Measures     Row Name 03/10/23 1609          How much help from another is currently needed...    Putting on and taking off regular lower body clothing? 1  -KB     Bathing (including washing, rinsing, and drying) 1  -KB     Toileting (which includes using toilet bed pan or urinal) 1  -KB     Putting on and taking off regular upper body clothing 2  -KB     Taking care of personal grooming (such as brushing teeth) 3  -KB     Eating meals 3  -KB     AM-PAC 6 Clicks Score (OT) 11  -KB     Row Name 03/10/23 1881          How much help from another person do you currently need...    Turning from your back to your side while in flat bed without using bedrails? 2  -EM     Moving from lying on back to sitting on the side of a flat bed without bedrails? 2  -EM     Moving to and from a bed to a chair (including a wheelchair)? 2  -EM     Standing up from a chair using your arms (e.g., wheelchair, bedside chair)? 2  -EM     Climbing 3-5 steps with a railing? 1  -EM     To walk in hospital room? 1  -EM     AM-PAC 6 Clicks Score (PT) 10  -EM     Highest level of mobility 4 --> Transferred to chair/commode  -EM     Row Name 03/10/23 1609          Modified Parisa Scale    Modified Parisa Scale 4 - Moderately severe disability.  Unable to walk without assistance, and unable to attend to own bodily needs without assistance.  -KB           User Key  (r) = Recorded By, (t) = Taken By, (c) = Cosigned By    Initials Name Provider Type    Violet Ku PT Physical Therapist    Reena Guidry OT Occupational Therapist                Occupational Therapy Education     Title:  PT OT SLP Therapies (Done)     Topic: Occupational Therapy (Done)     Point: ADL training (Done)     Description:   Instruct learner(s) on proper safety adaptation and remediation techniques during self care or transfers.   Instruct in proper use of assistive devices.              Learning Progress Summary           Patient Acceptance, E, VU by BL at 3/7/2023 1712    Comment: Spoke with Pt and  through day, updated on plan of care. All questions answered.    Acceptance, E,D, NR by PC at 3/6/2023 1357    Acceptance, E, VU by BL at 3/6/2023 0830    Comment: Spoke with patient and spouse at bedside through day.    Acceptance, E,TB, VU by JÃOO at 3/2/2023 1836    Acceptance, E, VU by MW at 2/27/2023 1522    Comment: role of OT, d/c rec, goals of care   Family Acceptance, E, VU by BL at 3/7/2023 1712    Comment: Spoke with Pt and  through day, updated on plan of care. All questions answered.    Acceptance, E, VU by BL at 3/6/2023 0830    Comment: Spoke with patient and spouse at bedside through day.    Acceptance, E, VU by MW at 2/27/2023 1522    Comment: role of OT, d/c rec, goals of care                   Point: Home exercise program (Done)     Description:   Instruct learner(s) on appropriate technique for monitoring, assisting and/or progressing therapeutic exercises/activities.              Learning Progress Summary           Patient Acceptance, E, VU by BL at 3/7/2023 1712    Comment: Spoke with Pt and  through day, updated on plan of care. All questions answered.    Acceptance, E,D, NR by PC at 3/6/2023 1357    Acceptance, E, VU by BL at 3/6/2023 0830    Comment: Spoke with patient and spouse at bedside through day.    Acceptance, E,TB, VU by JOÃO at 3/2/2023 1836   Family Acceptance, E, VU by BL at 3/7/2023 1712    Comment: Spoke with Pt and  through day, updated on plan of care. All questions answered.    Acceptance, E, VU by BL at 3/6/2023 0830    Comment: Spoke with patient and spouse  at bedside through day.                   Point: Precautions (Done)     Description:   Instruct learner(s) on prescribed precautions during self-care and functional transfers.              Learning Progress Summary           Patient Acceptance, E, VU by BL at 3/7/2023 1712    Comment: Spoke with Pt and  through day, updated on plan of care. All questions answered.    Acceptance, E,D, NR by PC at 3/6/2023 1357    Acceptance, E, VU by BL at 3/6/2023 0830    Comment: Spoke with patient and spouse at bedside through day.    Acceptance, E,TB, VU by JOÃO at 3/2/2023 1836    Acceptance, E, VU by MW at 2/27/2023 1522    Comment: role of OT, d/c rec, goals of care   Family Acceptance, E, VU by BL at 3/7/2023 1712    Comment: Spoke with Pt and  through day, updated on plan of care. All questions answered.    Acceptance, E, VU by BL at 3/6/2023 0830    Comment: Spoke with patient and spouse at bedside through day.    Acceptance, E, VU by MW at 2/27/2023 1522    Comment: role of OT, d/c rec, goals of care                   Point: Body mechanics (Done)     Description:   Instruct learner(s) on proper positioning and spine alignment during self-care, functional mobility activities and/or exercises.              Learning Progress Summary           Patient Acceptance, E, VU by BL at 3/7/2023 1712    Comment: Spoke with Pt and  through day, updated on plan of care. All questions answered.    Acceptance, E,D, NR by PC at 3/6/2023 1357    Acceptance, E, VU by BL at 3/6/2023 0830    Comment: Spoke with patient and spouse at bedside through day.    Acceptance, E,TB, VU by JOÃO at 3/2/2023 1836    Acceptance, E, VU by MW at 2/27/2023 1522    Comment: role of OT, d/c rec, goals of care   Family Acceptance, E, VU by BL at 3/7/2023 1712    Comment: Spoke with Pt and  through day, updated on plan of care. All questions answered.    Acceptance, E, VU by BL at 3/6/2023 0830    Comment: Spoke with patient and spouse at  bedside through day.    Acceptance, E, VU by MW at 2/27/2023 1522    Comment: role of OT, d/c rec, goals of care                               User Key     Initials Effective Dates Name Provider Type Discipline    PC 06/16/21 -  Saida Burrows PT Physical Therapist PT     08/20/21 -  Nathalia Holly OT Occupational Therapist OT    BL 10/18/22 -  Josefa Jones, RN Registered Nurse Nurse    JOÃO 09/20/22 -  Jayda Patel RN Registered Nurse Nurse              OT Recommendation and Plan  Planned Therapy Interventions (OT): activity tolerance training, BADL retraining, occupation/activity based interventions, patient/caregiver education/training, transfer/mobility retraining, strengthening exercise  Therapy Frequency (OT): 5 times/wk  Plan of Care Review  Plan of Care Reviewed With: patient  Progress: improving  Outcome Evaluation: Pt seen for OT reeval this date. Pt has had medical set backs due to recent VATS. Pt agreeable and motivated to participate in OT. Performed head elevated supine UE exercises, sets of 10. Performed bed mobility with mod assist x2 supine to sit, max assist x2 sit to supine. Sat edge of bed with occassional loss of balance backward, but otherwise sba. Performed sit/stand x3 with min assist x2 with left knee buckling, needing blocking at all times. Attempted to side step to left, but increased assist needed due to LE left weakness. Pt cont to demo decreased functional endurance/strength for adls, mobility and will cont to benefit from cont OT. Will likely need snf vs rehab at time of dc     Time Calculation:    Time Calculation- OT     Row Name 03/10/23 1610             Time Calculation- OT    OT Start Time 1319  -KB      OT Stop Time 1352  -KB      OT Time Calculation (min) 33 min  -KB      Total Timed Code Minutes- OT 23 minute(s)  -KB      OT Received On 03/10/23  -KB      OT - Next Appointment 03/13/23  -KB      OT Goal Re-Cert Due Date 03/24/23  -KB         Untimed Charges    OT  Eval/Re-eval Minutes 10  -KB         Total Minutes    Untimed Charges Total Minutes 10  -KB       Total Minutes 10  -KB            User Key  (r) = Recorded By, (t) = Taken By, (c) = Cosigned By    Initials Name Provider Type    Reena Guidry OT Occupational Therapist              Therapy Charges for Today     Code Description Service Date Service Provider Modifiers Qty    [id number]  OT THERAPEUTIC ACT EA 15 MIN 3/10/2023 Reena Tovar OT GO 2    [id number]  OT EVAL MOD COMPLEXITY 2 3/10/2023 Reena Tovar OT GO 1               Reena Tovar OT  3/10/2023

## 2023-03-10 NOTE — PROGRESS NOTES
"  Infectious Diseases Progress Note    Reji Oliver MD     [facility]  [place]: 15 days  Patient Identification:  Name: Louise GARCIA  Age: 58 y.o.  Sex: female  :  1965  MRN: [id number]         Primary Care Physician: Chloe Schaefer APRN        Subjective: Feeling better and breathing better.  Interval History: See consultation note.  3/8/2023 underwent thoracoscopy with Nevaeh hernandez with complete decortication  Objective:    Scheduled Meds:acetaminophen, 1,000 mg, Oral, TID  celecoxib, 100 mg, Oral, Q12H  desvenlafaxine, 25 mg, Oral, Daily  folic acid, 1 mg, Oral, Daily  gabapentin, 300 mg, Oral, Q8H  heparin (porcine), 5,000 Units, Subcutaneous, Q8H  ipratropium-albuterol, 3 mL, Nebulization, 4x Daily - RT  lamoTRIgine, 200 mg, Oral, Daily  lidocaine, 1 patch, Transdermal, Q24H  lidocaine, 1 patch, Transdermal, Q24H  lidocaine, 10 mL, Intradermal, Once  pantoprazole, 40 mg, Intravenous, Q AM  sodium chloride, 10 mL, Intravenous, Q12H  thiamine, 100 mg, Oral, Daily  vancomycin, 750 mg, Intravenous, Q12H  vitamin B-12, 500 mcg, Oral, Daily      Continuous Infusions:hold, 1 each  hold, 1 each  lactated ringers, 9 mL/hr, Last Rate: 500 mL/hr (23 1856)  Pharmacy to dose vancomycin,         Vital signs in last 24 hours:  Temp:  [97.2 °F (36.2 °C)-98.5 °F (36.9 °C)] 98.3 °F (36.8 °C)  Heart Rate:  [] 99  Resp:  [14-18] 18  BP: ()/(44-72) 111/70    Intake/Output:    Intake/Output Summary (Last 24 hours) at 3/10/2023 1131  Last data filed at 3/10/2023 0700  Gross per 24 hour   Intake 220 ml   Output 1995 ml   Net -1775 ml       Exam:  /70   Pulse 99   Temp 98.3 °F (36.8 °C)   Resp 18   Ht 157.5 cm (62\")   Wt 57.9 kg (127 lb 10.3 oz)   SpO2 91%   Breastfeeding No   BMI 23.35 kg/m²   Patient is examined using the personal protective equipment as per guidelines from infection control for this particular patient as enacted.  Hand washing was performed before and after " patient interaction.  General Appearance:  Ill-appearing female who is much more calmer and comfortable compared to 24 hours ago.                          Head:    Normocephalic, without obvious abnormality, atraumatic                           Eyes:    PERRL, conjunctivae/corneas clear, EOM's intact, both eyes                         Throat:   Lips, tongue, gums normal; oral mucosa pink and moist                           Neck:   Supple, symmetrical, trachea midline, no JVD                         Lungs:    Decreased breath sounds bilaterally left greater than right                 Chest Wall:  Right sided chest tube in place                          Heart:    Regular rate and rhythm, S1 and S2 normal, no murmur, no rub                                         or gallop                  Abdomen:   Soft nontender epigastric and right upper quadrant tenderness noted.                 Extremities: Right forearm IV phlebitis site erythema and tenderness noted.                        Pulses:   Pulses palpable in all extremities                            Skin:   Skin is warm and dry,  no rashes or palpable lesions                  Neurologic: Awake interactive and grossly nonfocal       Data Review:    I reviewed the patient's new clinical results.  Results from last 7 days   Lab Units 03/10/23  0322 03/09/23  2026 03/09/23  1224 03/09/23 0443 03/08/23  0731 03/07/23  0733 03/06/23  0719 03/05/23  0532 03/04/23  0522   WBC 10*3/mm3 18.95*  --   --  25.61* 20.31* 17.78* 16.60* 20.27* 23.89*   HEMOGLOBIN g/dL 8.1*  8.1* 8.5* 8.7* 6.6* 8.2* 8.3* 9.0* 9.3* 8.6*   PLATELETS 10*3/mm3 238  --   --  219 165 198 173 167 176     Results from last 7 days   Lab Units 03/10/23  0322 03/09/23  0443 03/08/23  0731 03/07/23  0733 03/06/23  0719 03/05/23  0532 03/04/23  0522   SODIUM mmol/L 133* 134* 132* 134* 133* 134* 134*   POTASSIUM mmol/L 3.8 4.1 3.4* 3.8 3.9 4.2 4.5   CHLORIDE mmol/L 98 99 95* 96* 97* 99 101   CO2 mmol/L 28.0 29.0  27.9 27.1 24.5 26.0 25.8   BUN mg/dL 10 10 9 11 15 19 29*   CREATININE mg/dL 0.88 0.59 0.56* 0.53* 0.57 0.57 0.64   CALCIUM mg/dL 7.6* 7.7* 8.4* 8.2*  8.1* 8.7 9.0 8.9   GLUCOSE mg/dL 81 112* 78 80 82 79 82     Microbiology Results (last 10 days)     Procedure Component Value - Date/Time    Tissue / Bone Culture - Tissue, Pleural Cavity [[id number]]  (Abnormal) Collected: 03/08/23 2036    Lab Status: Preliminary result Specimen: Tissue from Pleural Cavity Updated: 03/10/23 0759     Tissue Culture Rare Staphylococcus aureus, MRSA     Comment:   Methicillin resistant Staphylococcus aureus, Patient may be an isolation risk.        Gram Stain Occasional WBCs seen      Rare (1+) Gram positive cocci in pairs and clusters    Body Fluid Culture - Body Fluid, Pleural Cavity [[id number]]  (Abnormal) Collected: 03/08/23 1957    Lab Status: Preliminary result Specimen: Body Fluid from Pleural Cavity Updated: 03/10/23 0757     Body Fluid Culture Scant growth (1+) Staphylococcus aureus, MRSA     Comment:   Methicillin resistant Staphylococcus aureus, Patient may be an isolation risk.        Gram Stain Moderate (3+) WBCs per low power field      Rare (1+) Gram positive cocci    Body Fluid Culture - Body Fluid, Pleural Cavity [[id number]] Collected: 03/08/23 1923    Lab Status: Preliminary result Specimen: Body Fluid from Pleural Cavity Updated: 03/10/23 0849     Body Fluid Culture No growth at 2 days     Gram Stain Rare (1+) WBCs per low power field      Rare (1+) Gram positive cocci    Culture, CSF - Cerebrospinal Fluid, Lumbar Puncture [[id number]] Collected: 03/06/23 1550    Lab Status: Final result Specimen: Cerebrospinal Fluid from Lumbar Puncture Updated: 03/09/23 1058     CSF Culture No growth at 3 days     Gram Stain No WBCs or organisms seen    Meningitis / Encephalitis Panel, PCR - Cerebrospinal Fluid, Lumbar Puncture [[id number]]  (Normal) Collected: 03/06/23 1550    Lab Status: Final result Specimen: Cerebrospinal Fluid  from Lumbar Puncture Updated: 03/06/23 1725     ESCHERICHIA COLI K1, PCR Not Detected     HAEMOPHILUS INFLUENZAE, PCR Not Detected     LISTERIA MONOCYTOGENES, PCR Not Detected     NEISSERIA MENINGITIDIS, PCR Not Detected     STREPTOCOCCUS AGALACTIAE, PCR Not Detected     STREPTOCOCCUS PNEUMONIAE, PCR Not Detected     CYTOMEGALOVIRUS (CMV), PCR Not Detected     ENTEROVIRUS, PCR Not Detected     HERPES SIMPLEX VIRUS 1 (HSV-1), PCR Not Detected     HERPES SIMPLEX VIRUS 2 (HSV-2), PCR Not Detected     HUMAN PARECHOVIRUS, PCR Not Detected     VARICELLA ZOSTER VIRUS (VZV), PCR Not Detected     CRYPTOCOCCUS NEOFORMANS / GATTII, PCR Not Detected     HUMAN HERPES VIRUS 6 PCR Not Detected    Blood Culture - Blood, Wrist, Left [[id number]]  (Normal) Collected: 03/04/23 2043    Lab Status: Final result Specimen: Blood from Wrist, Left Updated: 03/09/23 2116     Blood Culture No growth at 5 days    Blood Culture - Blood, Arm, Right [[id number]]  (Normal) Collected: 03/04/23 1952    Lab Status: Final result Specimen: Blood from Arm, Right Updated: 03/09/23 2015     Blood Culture No growth at 5 days    S. Pneumo Ag Urine or CSF - Urine, Urine, Clean Catch [[id number]]  (Normal) Collected: 03/04/23 1000    Lab Status: Final result Specimen: Urine, Clean Catch Updated: 03/04/23 1128     Strep Pneumo Ag Negative    Legionella Antigen, Urine - Urine, Urine, Clean Catch [[id number]]  (Normal) Collected: 03/04/23 1000    Lab Status: Final result Specimen: Urine, Clean Catch Updated: 03/04/23 1128     LEGIONELLA ANTIGEN, URINE Negative    Respiratory Culture - Sputum, Cough [[id number]] Collected: 03/04/23 0959    Lab Status: Final result Specimen: Sputum from Cough Updated: 03/04/23 1215     Respiratory Culture Rejected     Gram Stain Many (4+) Epithelial cells per low power field      Many (4+) WBCs per low power field      Many (4+) Mixed bacterial morphotypes seen on Gram Stain    Narrative:      Specimen rejected due to oropharyngeal  contamination. Please reorder and recollect specimen if clinically necessary.    Body Fluid Culture - Body Fluid, Pleural Cavity [[id number]]  (Abnormal)  (Susceptibility) Collected: 03/04/23 0900    Lab Status: Final result Specimen: Body Fluid from Pleural Cavity Updated: 03/06/23 1124     Body Fluid Culture Light growth (2+) Staphylococcus aureus, MRSA     Comment:   Methicillin resistant Staphylococcus aureus, Patient may be an isolation risk.        Gram Stain Rare (1+) WBCs per low power field      No organisms seen    Susceptibility      Staphylococcus aureus, MRSA      YAKOV      Gentamicin Susceptible      Oxacillin Resistant     Rifampin Susceptible      Vancomycin Susceptible                       Susceptibility Comments     Staphylococcus aureus, MRSA    This isolate does not demonstrate inducible clindamycin resistance in vitro.               Anaerobic Culture - Pleural Fluid, Pleural Cavity [[id number]]  (Normal) Collected: 03/04/23 0900    Lab Status: Final result Specimen: Pleural Fluid from Pleural Cavity Updated: 03/09/23 0656     Anaerobic Culture No anaerobes isolated at 5 days    Fungus Culture - Body Fluid, Pleural Cavity [[id number]] Collected: 03/04/23 0900    Lab Status: Preliminary result Specimen: Body Fluid from Pleural Cavity Updated: 03/09/23 1000     Fungus Culture No fungus isolated at less than 1 week    MRSA Screen, PCR (Inpatient) - Swab, Nares [[id number]]  (Abnormal) Collected: 03/03/23 1830    Lab Status: Final result Specimen: Swab from Nares Updated: 03/03/23 2016     MRSA PCR MRSA Detected    Narrative:      The negative predictive value of this diagnostic test is high and should only be used to consider de-escalating anti-MRSA therapy. A positive result may indicate colonization with MRSA and must be correlated clinically.    Respiratory Panel PCR w/COVID-19(SARS-CoV-2) NAOMY/RASHMI/ADINA/PAD/COR/MAD/STEVE In-House, NP Swab in UTM/VTM, 3-4 HR TAT - Swab, Nasopharynx [[id number]]  (Normal)  Collected: 03/03/23 1830    Lab Status: Final result Specimen: Swab from Nasopharynx Updated: 03/03/23 2005     ADENOVIRUS, PCR Not Detected     Coronavirus 229E Not Detected     Coronavirus HKU1 Not Detected     Coronavirus NL63 Not Detected     Coronavirus OC43 Not Detected     COVID19 Not Detected     Human Metapneumovirus Not Detected     Human Rhinovirus/Enterovirus Not Detected     Influenza A PCR Not Detected     Influenza B PCR Not Detected     Parainfluenza Virus 1 Not Detected     Parainfluenza Virus 2 Not Detected     Parainfluenza Virus 3 Not Detected     Parainfluenza Virus 4 Not Detected     RSV, PCR Not Detected     Bordetella pertussis pcr Not Detected     Bordetella parapertussis PCR Not Detected     Chlamydophila pneumoniae PCR Not Detected     Mycoplasma pneumo by PCR Not Detected    Narrative:      In the setting of a positive respiratory panel with a viral infection PLUS a negative procalcitonin without other underlying concern for bacterial infection, consider observing off antibiotics or discontinuation of antibiotics and continue supportive care. If the respiratory panel is positive for atypical bacterial infection (Bordetella pertussis, Chlamydophila pneumoniae, or Mycoplasma pneumoniae), consider antibiotic de-escalation to target atypical bacterial infection.    Blood Culture - Blood, Arm, Left [[id number]]  (Abnormal)  (Susceptibility) Collected: 03/02/23 1955    Lab Status: Final result Specimen: Blood from Arm, Left Updated: 03/06/23 0622     Blood Culture Staphylococcus aureus, MRSA     Comment:   Infectious disease consultation is highly recommended to rule out distant foci of infection.  Methicillin resistant Staphylococcus aureus, Patient may be an isolation risk.        Isolated from Aerobic Bottle     Gram Stain Aerobic Bottle Gram positive cocci in clusters    Susceptibility      Staphylococcus aureus, MRSA      YAKOV      Gentamicin Susceptible      Oxacillin Resistant     Rifampin  Susceptible      Vancomycin Susceptible                       Susceptibility Comments     Staphylococcus aureus, MRSA    This isolate does not demonstrate inducible clindamycin resistance in vitro.               Blood Culture - Blood, Arm, Left [[id number]]  (Normal) Collected: 03/02/23 1955    Lab Status: Final result Specimen: Blood from Arm, Left Updated: 03/07/23 2015     Blood Culture No growth at 5 days    Blood Culture ID, PCR - Blood, Arm, Left [[id number]]  (Abnormal) Collected: 03/02/23 1955    Lab Status: Final result Specimen: Blood from Arm, Left Updated: 03/04/23 0439     BCID, PCR Staph aureus. mecA/C and MREJ (methicillin resistance gene) detected. Identification by BCID2 PCR.     BOTTLE TYPE Aerobic Bottle    Narrative:      Infectious disease consultation is highly recommended to rule out distant foci of infection.            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    •     Right Cephalic Forearm: Acute superficial thrombophlebitis noted.     - septic emboli and cavitary lesion in the right lung   - Tricuspid Valve endocarditis   - Empyema due to secondary seeding of the traumatic effusion  2-paresthesia generalized weakness  3-recent fall  4-seizure disorder  5-leukocytosis likely secondary to steroids with superimposed infection  6-other diagnoses per primary team.  7-MRSA  colonization     Recommendations/Discussions:  · Continue with IV vancomycin  · Moist heat to the phlebitis site of the right forearm  · Continue to monitor her back pain and evidence of secondary seeding to the joint and spine which may require repeat imaging studies if her symptoms localizes to these areas and affect her function.  · Discussed with patient's caring nurse.  Reji Oliver MD  3/10/2023  11:31 EST    Parts of this note may be an electronic transcription/translation of spoken language to printed text using the Dragon dictation system.

## 2023-03-10 NOTE — PLAN OF CARE
Goal Outcome Evaluation:  Plan of Care Reviewed With: patient, spouse           Outcome Evaluation: Vitals remain stable. Pain medicine given per protocol. Minimal chest tubes drainage. Plan of care explained to patient and spouse. Will continue to monitor.

## 2023-03-10 NOTE — PROGRESS NOTES
"  POST-OPERATIVE NOTE     Chief Complaint: Empyema  S/P: Video-assisted thoracoscopy with complete decortication  POD # 2    Subjective:  Symptoms:  Stable.  She reports cough and weakness.  No shortness of breath.    Diet:  Poor intake.  No nausea or vomiting.    Activity level: Impaired due to weakness.    Pain:  She complains of pain that is mild.  Pain is well controlled.    Remains in ICU, remains pretty somnolent.  In bed, appears comfortable.    Objective:  General Appearance:  Ill-appearing, in no acute distress, in pain and comfortable.    Vital signs: (most recent): Blood pressure 120/75, pulse 106, temperature 97.4 °F (36.3 °C), temperature source Oral, resp. rate 22, height 157.5 cm (62\"), weight 57.9 kg (127 lb 10.3 oz), SpO2 98 %, not currently breastfeeding.    Output: Producing urine (Booker catheter in place.).    Lungs:  Normal effort and normal respiratory rate.    Heart: Normal rate.  Regular rhythm.    Chest: Symmetric chest wall expansion. Chest wall tenderness (Around chest tubes.) present.    Abdomen: Abdomen is non-distended.    Neurological: Patient is alert and oriented to person, place and time.    Skin:  Warm and dry.            Chest tube:   Site: Right, Clean, Dry and Securement device intact  Suction: -40 cm  Air Leak: Negative  24 Hour Total: 130/240ml     Results Review:     I reviewed the patient's new clinical results.  I reviewed the patient's new imaging results and agree with the interpretation.  Discussed with Patient, nurse, and Dr. Cedillo.    Assessment & Plan     S/p VAT with complete decortication POD 2.     Remains hemodynamically stable postoperatively.  I have been independently reviewed this morning's chest x-ray which demonstrates stable positioning of the 2 right-sided chest tubes.  There is increasing right basilar atelectasis.  Hemoglobin continues to downtrend.  Continue serial H&H's and transfuse as needed for hemoglobin less than 7 or if she becomes " symptomatic.    Chest tube remained to -40 cm of suction and has drained a small amount of thin serosanguineous fluid.  We will transition the chest tube to -20 cm of suction and repeat a chest x-ray in the morning.    MRSA empyema: Antibiotics per infectious disease.  Leukocytosis continues to downtrend.  She remains afebrile.     It is imperative we increase her activity with assistance from nursing staff, PT, and OT. Continue analgesic medications.  Encourage good pulmonary hygiene including use incentive spirometer, coughing, and deep breathing.  Wean oxygen as able.    KASIA Olmos  [facility]  03/10/23  13:11 EST    Patient was seen and assessed while wearing personal protective equipment including facemask, protective eyewear and gloves.  Hand hygiene performed prior to entering the room and upon exiting with doffing of gloves.

## 2023-03-10 NOTE — PROGRESS NOTES
"Nutrition Services    Patient Name:  Louise GARCIA  YOB: 1965  MRN: [id number]  Admit Date:  2/20/2023    FOLLOW UP - CLINICAL NUTRITION    Assessment Date:  03/10/23    Encounter Information         Reason for Encounter Follow Up    Current Issues Pt in CICU s/p VATS w/ decortication (POD2). Diet advanced from clear liquids to regular this AM. Pt had breakfast tray and eager for intake, no N/V. Boost Plus added BID.   R chest tube x 2.  Na 133  LR@9mL/hr       Current Nutrition Orders & Evaluation of Intake       Oral Nutrition     Current PO Diet Diet: Regular/House Diet; Texture: Regular Texture (IDDSI 7); Fluid Consistency: Thin (IDDSI 0)   Supplement n/a   PO Evaluation     % PO Intake Not documented, tolerated clear liquids    # of Days Evaluated     Factors Affecting Intake  No factors at this time   --  Anthropometrics          Height    Weight Height: 157.5 cm (62\")  Weight: 57.9 kg (127 lb 10.3 oz) (03/10/23 0600)    BMI kg/m2 Body mass index is 23.35 kg/m².    Weight trend Stable     Labs        Pertinent Labs Reviewed, listed below     Results from last 7 days   Lab Units 03/10/23  0322 03/09/23 0443 03/08/23  0731   SODIUM mmol/L 133* 134* 132*   POTASSIUM mmol/L 3.8 4.1 3.4*   CHLORIDE mmol/L 98 99 95*   CO2 mmol/L 28.0 29.0 27.9   BUN mg/dL 10 10 9   CREATININE mg/dL 0.88 0.59 0.56*   CALCIUM mg/dL 7.6* 7.7* 8.4*   BILIRUBIN mg/dL 0.8 1.0 1.2   ALK PHOS U/L 199* 178* 210*   ALT (SGPT) U/L 17 19 27   AST (SGOT) U/L 29 27 32   GLUCOSE mg/dL 81 112* 78     Results from last 7 days   Lab Units 03/10/23  0322 03/09/23  1224 03/09/23  0443 03/08/23  0731   MAGNESIUM mg/dL 1.7  --  1.6 1.7   PHOSPHORUS mg/dL 3.1  --  4.4 3.4   HEMOGLOBIN g/dL 8.1*  8.1*   < > 6.6* 8.2*   HEMATOCRIT % 23.8*  23.8*   < > 19.3* 23.8*   WBC 10*3/mm3 18.95*  --  25.61* 20.31*   ALBUMIN g/dL 1.8*  --  1.8* 1.8*    < > = values in this interval not displayed.     Results from last 7 days   Lab Units 03/10/23  0322 " 03/09/23  0443 03/08/23  0731 03/07/23  0733 03/06/23  0719   INR   --   --  1.10  --   --    APTT seconds  --   --  32.2  --   --    PLATELETS 10*3/mm3 238 219 165 198 173     COVID19   Date Value Ref Range Status   03/03/2023 Not Detected Not Detected - Ref. Range Final     Lab Results   Component Value Date    HGBA1C 5.10 02/20/2023          Medications            Scheduled Medications acetaminophen, 1,000 mg, Oral, TID  celecoxib, 100 mg, Oral, Q12H  desvenlafaxine, 25 mg, Oral, Daily  folic acid, 1 mg, Oral, Daily  gabapentin, 300 mg, Oral, Q8H  heparin (porcine), 5,000 Units, Subcutaneous, Q8H  ipratropium-albuterol, 3 mL, Nebulization, 4x Daily - RT  lamoTRIgine, 200 mg, Oral, Daily  lidocaine, 1 patch, Transdermal, Q24H  lidocaine, 1 patch, Transdermal, Q24H  lidocaine, 10 mL, Intradermal, Once  pantoprazole, 40 mg, Intravenous, Q AM  sodium chloride, 10 mL, Intravenous, Q12H  thiamine, 100 mg, Oral, Daily  vancomycin, 750 mg, Intravenous, Q12H  vitamin B-12, 500 mcg, Oral, Daily        Infusions hold, 1 each  hold, 1 each  lactated ringers, 9 mL/hr, Last Rate: 500 mL/hr (03/08/23 1856)  Pharmacy to dose vancomycin,         PRN Medications •  bisacodyl  •  diazePAM  •  diphenhydrAMINE  •  famotidine  •  hold  •  hold  •  HYDROmorphone  •  magnesium hydroxide  •  magnesium sulfate **OR** magnesium sulfate **OR** magnesium sulfate  •  melatonin  •  nitroglycerin  •  ondansetron **OR** ondansetron  •  oxyCODONE  •  oxyCODONE  •  Pharmacy to dose vancomycin  •  polyethylene glycol  •  potassium & sodium phosphates **OR** potassium & sodium phosphates  •  potassium chloride **OR** potassium chloride **OR** potassium chloride  •  senna-docusate sodium **AND** [DISCONTINUED] polyethylene glycol **AND** [DISCONTINUED] bisacodyl **AND** [DISCONTINUED] bisacodyl  •  [COMPLETED] Insert Peripheral IV **AND** sodium chloride  •  sodium chloride  •  sodium chloride     Physical Findings          Physical Appearance alert,  oriented   Oral/Mouth Cavity teeth missing   Edema  1+ (trace)   Gastrointestinal fecal incontinence, last bowel movement: 3/8   Skin  surgical incision R lateral   Tubes/Drains chest tubex2 (R)   NFPE Not applicable at this time   --  NUTRITION INTERVENTION / PLAN OF CARE  Intervention Goal         Intervention Goal(s) Establish PO intake and Maintain weight     Nutrition Intervention         RD Action Follow Tx Progress     Nutrition Prescription         Diet Prescription     Supplement Prescription Boost Plus   EN/PN Prescription    New Prescription Ordered? Yes   --  Monitor/Evaluation        Monitor Per protocol   Discharge Needs No discharge needs identified at this time   Education Will instruct as appropriate   --    RD to follow up per protocol.    Electronically signed by:  Madie Aldana RD  03/10/23 13:58 EST

## 2023-03-10 NOTE — PROGRESS NOTES
[facility]     Therapies restarted s/p VATS. Will follow progress and participation for determination of most appropriate level rehab at time of discharge.     Catarina Whatley RN  Rehab Admissions Coordinator  [phone]

## 2023-03-10 NOTE — THERAPY TREATMENT NOTE
Patient Name: Louise GARCIA  : 1965    MRN: [id number]                              Today's Date: 3/10/2023       Admit Date: 2023    Visit Dx:     ICD-10-CM ICD-9-CM   1. Paresthesia  R20.2 782.0   2. Gait instability  R26.81 781.2   3. Extremity numbness  R20.0 782.0   4. 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)   • Situational mixed anxiety and depressive disorder   • Mass of middle lobe of right lung   • Oral candidiasis   • Empyema of pleura (HCC)   • Tricuspid valve vegetation   • MRSA bacteremia     Past Medical History:   Diagnosis Date   • Degenerative disc disease, cervical    • Gestational diabetes    • H/O blood clots    • Hematemesis    • Seizures (HCC)    • Septic shock (HCC)      Past Surgical History:   Procedure Laterality Date   • APPENDECTOMY     • BACK SURGERY     • CHOLECYSTECTOMY     • ENDOSCOPY N/A 2016    Procedure: ESOPHAGOGASTRODUODENOSCOPY with biopsy;  Surgeon: Austen Brito MD;  Location: [facility] ENDOSCOPY;  Service:    • HYSTERECTOMY     • NECK SURGERY       approx 6 yrs ago   • THORACOSCOPY Right 3/8/2023    Procedure: THORACOSCOPY WITH DAVINCI ROBOT WITH COMPLETE DECORTICATION;  Surgeon: Chloe Cedillo MD;  Location: [facility] MAIN OR;  Service: Robotics - DaVinci;  Laterality: Right;   • TONSILLECTOMY     • TONSILLECTOMY AND ADENOIDECTOMY        General Information     Row Name 03/10/23 1446          Physical Therapy Time and Intention    Document Type therapy note (daily note)  -EM     Mode of Treatment co-treatment;physical therapy;occupational therapy  -EM     Row Name 03/10/23 1446          General Information     Patient Profile Reviewed yes  -EM     Existing Precautions/Restrictions fall  -EM     Row Name 03/10/23 1446          Cognition    Orientation Status (Cognition) oriented x 3  -EM     Row Name 03/10/23 1446          Safety Issues, Functional Mobility    Impairments Affecting Function (Mobility) balance;endurance/activity tolerance;strength;pain  -EM           User Key  (r) = Recorded By, (t) = Taken By, (c) = Cosigned By    Initials Name Provider Type    Violet Ku PT Physical Therapist               Mobility     Row Name 03/10/23 1446          Bed Mobility    Bed Mobility supine-sit  -EM     Supine-Sit [place] (Bed Mobility) moderate assist (50% patient effort);2 person assist  -EM     Sit-Supine [place] (Bed Mobility) moderate assist (50% patient effort);2 person assist  -EM     Assistive Device (Bed Mobility) head of bed elevated;draw sheet  -EM     Row Name 03/10/23 1446          Sit-Stand Transfer    Sit-Stand [place] (Transfers) minimum assist (75% patient effort);2 person assist  -EM     Comment, (Sit-Stand Transfer) stood x 4 trials at the bedside, L knee blocked, able to achieve full upright standing but L knee king quickly with weight shifting  -EM           User Key  (r) = Recorded By, (t) = Taken By, (c) = Cosigned By    Initials Name Provider Type    Violet Ku PT Physical Therapist               Obj/Interventions     Row Name 03/10/23 1447          Motor Skills    Therapeutic Exercise other (see comments)  AP, QS, LAQ x 10 reps (assist with LAQ to achieve full ROM and to maintain sitting balance)  -EM     Row Name 03/10/23 1447          Balance    Static Sitting Balance standby assist  -EM     Dynamic Sitting Balance minimal assist  -EM     Position, Sitting Balance sitting edge of bed  -EM     Static Standing Balance minimal assist  -EM     Dynamic Standing Balance moderate assist;2-person assist  -EM           User Key  (r) = Recorded By, (t) = Taken By,  (c) = Cosigned By    Initials Name Provider Type    EM Violet Branham PT Physical Therapist               Goals/Plan     Row Name 03/10/23 1452          Bed Mobility Goal 1 (PT)    Activity/Assistive Device (Bed Mobility Goal 1, PT) bed mobility activities, all  -EM     [place] Level/Cues Needed (Bed Mobility Goal 1, PT) minimum assist (75% or more patient effort)  -EM     Time Frame (Bed Mobility Goal 1, PT) 2 weeks  -EM     Progress/Outcomes (Bed Mobility Goal 1, PT) goal revised this date  -EM     Row Name 03/10/23 1452          Transfer Goal 1 (PT)    Activity/Assistive Device (Transfer Goal 1, PT) transfers, all  -EM     [place] Level/Cues Needed (Transfer Goal 1, PT) minimum assist (75% or more patient effort)  -EM     Time Frame (Transfer Goal 1, PT) 2 weeks  -EM     Progress/Outcome (Transfer Goal 1, PT) goal revised this date  -EM     Row Name 03/10/23 1452          Gait Training Goal 1 (PT)    Activity/Assistive Device (Gait Training Goal 1, PT) gait (walking locomotion);walker, rolling  -EM     [place] Level (Gait Training Goal 1, PT) minimum assist (75% or more patient effort);moderate assist (50-74% patient effort)  -EM     Distance (Gait Training Goal 1, PT) 20  -EM     Time Frame (Gait Training Goal 1, PT) 2 weeks  -EM     Progress/Outcome (Gait Training Goal 1, PT) goal revised this date  -EM           User Key  (r) = Recorded By, (t) = Taken By, (c) = Cosigned By    Initials Name Provider Type    EM Violet Branham PT Physical Therapist               Clinical Impression     Row Name 03/10/23 1442          Pain    Pre/Posttreatment Pain Comment pt c/o pain in R flank (chest tube insertion), did not rate on numeric scale  -EM     Pain Intervention(s) Medication (See MAR);Repositioned  -EM     Row Name 03/10/23 0212          Plan of Care Review    Plan of Care Reviewed With patient  -EM     Outcome Evaluation Pt is POD s/p thorascopy with complete decortication. Patient  awake and alert today, multiple family members at the bedside. patient performed LE exercises in supine and in sitting with assist with LAQ to achieve full ROM and maintain sitting balance while perfoming. Patient performed bed mobility with modAx2, sit to stand with minAx2, able to achieve full upright standing but L knee king with any attempted weight shifting. Pt encouraged to perform LE exercises ad chet throughout weekend, PT will continue to follow.  -EM     Row Name 03/10/23 1449          Positioning and Restraints    Pre-Treatment Position in bed  -EM     Post Treatment Position bed  -EM     In Bed supine;call light within reach;with nsg  -EM           User Key  (r) = Recorded By, (t) = Taken By, (c) = Cosigned By    Initials Name Provider Type    Violet Ku PT Physical Therapist               Outcome Measures     Row Name 03/10/23 3728          How much help from another person do you currently need...    Turning from your back to your side while in flat bed without using bedrails? 2  -EM     Moving from lying on back to sitting on the side of a flat bed without bedrails? 2  -EM     Moving to and from a bed to a chair (including a wheelchair)? 2  -EM     Standing up from a chair using your arms (e.g., wheelchair, bedside chair)? 2  -EM     Climbing 3-5 steps with a railing? 1  -EM     To walk in hospital room? 1  -EM     AM-PAC 6 Clicks Score (PT) 10  -EM     Highest level of mobility 4 --> Transferred to chair/commode  -EM           User Key  (r) = Recorded By, (t) = Taken By, (c) = Cosigned By    Initials Name Provider Type    Violet Ku PT Physical Therapist                             Physical Therapy Education     Title: PT OT SLP Therapies (Done)     Topic: Physical Therapy (Done)     Point: Mobility training (Done)     Learning Progress Summary           Patient Acceptance, E, VU by EM at 3/10/2023 1454    Acceptance, E, VU by BL at 3/7/2023 1712    Comment: Spoke with  Pt and  through day, updated on plan of care. All questions answered.    Acceptance, E,D, NR by PC at 3/7/2023 1419    Acceptance, E, VU by BL at 3/6/2023 0830    Comment: Spoke with patient and spouse at bedside through day.    Acceptance, E, NR by AR at 3/3/2023 1346    Acceptance, E,TB, VU by JOÃO at 3/2/2023 1836    Acceptance, E,TB, VU by MS at 3/1/2023 1135    Acceptance, E,TB, VU by MS at 2/28/2023 1105    Acceptance, E,TB,D, VU,DU by LB at 2/26/2023 1159    Acceptance, E,TB, VU,DU by LB at 2/25/2023 1542    Acceptance, E,D, VU,NR by EB at 2/24/2023 1254    Acceptance, E,D, VU,NR by EB at 2/23/2023 1057    Acceptance, E,TB, VU,NR by EB1 at 2/21/2023 1134   Family Acceptance, E, VU by EM at 3/10/2023 1454    Acceptance, E, VU by BL at 3/7/2023 1712    Comment: Spoke with Pt and  through day, updated on plan of care. All questions answered.    Acceptance, E, VU by BL at 3/6/2023 0830    Comment: Spoke with patient and spouse at bedside through day.    Acceptance, E, NR by AR at 3/3/2023 1346   Significant Other Acceptance, E,TB, VU,NR by EB1 at 2/21/2023 1134                   Point: Home exercise program (Done)     Learning Progress Summary           Patient Acceptance, E, VU by EM at 3/10/2023 1454    Acceptance, E, VU by BL at 3/7/2023 1712    Comment: Spoke with Pt and  through day, updated on plan of care. All questions answered.    Acceptance, E,D, NR by PC at 3/7/2023 1419    Acceptance, E,D, NR by PC at 3/6/2023 1357    Acceptance, E, VU by BL at 3/6/2023 0830    Comment: Spoke with patient and spouse at bedside through day.    Acceptance, E, NR by AR at 3/3/2023 1346    Acceptance, E,TB, VU by JOÃO at 3/2/2023 1836    Acceptance, E,TB, VU by MS at 2/28/2023 1105    Acceptance, E,TB,D, VU,DU by LB at 2/26/2023 1159    Acceptance, E,TB, VU,DU by LB at 2/25/2023 1542    Acceptance, E,D, VU,NR by EB at 2/24/2023 1254    Acceptance, E,D, VU,NR by EB at 2/23/2023 1057   Family Acceptance, E,  VU by EM at 3/10/2023 1454    Acceptance, E, VU by BL at 3/7/2023 1712    Comment: Spoke with Pt and  through day, updated on plan of care. All questions answered.    Acceptance, E, VU by BL at 3/6/2023 0830    Comment: Spoke with patient and spouse at bedside through day.    Acceptance, E, NR by AR at 3/3/2023 1346                   Point: Body mechanics (Done)     Learning Progress Summary           Patient Acceptance, E, VU by BL at 3/7/2023 1712    Comment: Spoke with Pt and  through day, updated on plan of care. All questions answered.    Acceptance, E,D, NR by PC at 3/7/2023 1419    Acceptance, E, VU by BL at 3/6/2023 0830    Comment: Spoke with patient and spouse at bedside through day.    Acceptance, E, NR by AR at 3/3/2023 1346    Acceptance, E,TB, VU by JOÃO at 3/2/2023 1836    Acceptance, E,TB,D, VU,DU by LB at 2/26/2023 1159    Acceptance, E,TB, VU,DU by LB at 2/25/2023 1542    Acceptance, E,D, VU,NR by EB at 2/24/2023 1254    Acceptance, E,D, VU,NR by EB at 2/23/2023 1057   Family Acceptance, E, VU by BL at 3/7/2023 1712    Comment: Spoke with Pt and  through day, updated on plan of care. All questions answered.    Acceptance, E, VU by BL at 3/6/2023 0830    Comment: Spoke with patient and spouse at bedside through day.    Acceptance, E, NR by AR at 3/3/2023 1346                   Point: Precautions (Done)     Learning Progress Summary           Patient Acceptance, E, VU by BL at 3/7/2023 1712    Comment: Spoke with Pt and  through day, updated on plan of care. All questions answered.    Acceptance, E,D, NR by PC at 3/7/2023 1419    Acceptance, E, VU by BL at 3/6/2023 0830    Comment: Spoke with patient and spouse at bedside through day.    Acceptance, E, NR by AR at 3/3/2023 1346    Acceptance, E,TB, VU by JOÃO at 3/2/2023 1836    Acceptance, E,TB,D, VU,DU by LB at 2/26/2023 1159    Acceptance, E,TB, VU,DU by LB at 2/25/2023 1542    Acceptance, E,D, VU,NR by EB at 2/24/2023 1254     Acceptance, E,D, VU,NR by EB at 2/23/2023 1057   Family Acceptance, E, VU by BL at 3/7/2023 1712    Comment: Spoke with Pt and  through day, updated on plan of care. All questions answered.    Acceptance, E, VU by BL at 3/6/2023 0830    Comment: Spoke with patient and spouse at bedside through day.    Acceptance, E, NR by AR at 3/3/2023 1346                               User Key     Initials Effective Dates Name Provider Type Discipline    PC 06/16/21 -  Saida Burrows, PT Physical Therapist PT    EM 06/16/21 -  Violet Branham, PT Physical Therapist PT    AR 06/16/21 -  Jovita Miller, PT Physical Therapist PT    LB 08/09/20 -  Nia Arellano, PT Physical Therapist PT    MS 06/16/21 -  Елена Florez, PT Physical Therapist PT    EB 02/14/23 -  Caren James, JACQUELYN Physical Therapist Assistant PT    BL 10/18/22 -  Josefa Jones, RN Registered Nurse Nurse    JOÃO 09/20/22 -  Jayda Patel RN Registered Nurse Nurse    EB1 01/12/23 -  Yesica Zamora, PT Student PT Student PT              PT Recommendation and Plan     Plan of Care Reviewed With: patient  Outcome Evaluation: Pt is POD s/p thorascopy with complete decortication. Patient awake and alert today, multiple family members at the bedside. patient performed LE exercises in supine and in sitting with assist with LAQ to achieve full ROM and maintain sitting balance while perfoming. Patient performed bed mobility with modAx2, sit to stand with minAx2, able to achieve full upright standing but L knee king with any attempted weight shifting. Pt encouraged to perform LE exercises ad chet throughout weekend, PT will continue to follow.     Time Calculation:    PT Charges     Row Name 03/10/23 1454             Time Calculation    Start Time 1320  -EM      Stop Time 1353  -EM      Time Calculation (min) 33 min  -EM      PT Received On 03/10/23  -EM      PT - Next Appointment 03/11/23  -EM      PT Goal Re-Cert Due Date 03/24/23  -EM         Time  Calculation- PT    Total Timed Code Minutes- PT 33 minute(s)  -EM         Timed Charges    70099 - PT Therapeutic Exercise Minutes 10  -EM      63713 - PT Therapeutic Activity Minutes 23  -EM         Total Minutes    Timed Charges Total Minutes 33  -EM       Total Minutes 33  -EM            User Key  (r) = Recorded By, (t) = Taken By, (c) = Cosigned By    Initials Name Provider Type    Violet Ku PT Physical Therapist              Therapy Charges for Today     Code Description Service Date Service Provider Modifiers Qty    04472155971 HC PT THER PROC EA 15 MIN 3/10/2023 Violet Branham PT GP 1    [id number]  PT THERAPEUTIC ACT EA 15 MIN 3/10/2023 Violet Branham PT GP 1          PT G-Codes  Outcome Measure Options: AM-PAC 6 Clicks Basic Mobility (PT)  AM-PAC 6 Clicks Score (PT): 10  AM-PAC 6 Clicks Score (OT): 11  Modified [place] Scale: 4 - Moderately severe disability.  Unable to walk without assistance, and unable to attend to own bodily needs without assistance.       Violet Branham PT  3/10/2023

## 2023-03-10 NOTE — PLAN OF CARE
Goal Outcome Evaluation:  Plan of Care Reviewed With: patient        Progress: improving  Outcome Evaluation: Pt seen for OT reeval this date. Pt has had medical set backs due to recent VATS. Pt agreeable and motivated to participate in OT. Performed head elevated supine UE exercises, sets of 10. Performed bed mobility with mod assist x2 supine to sit, max assist x2 sit to supine. Sat edge of bed with occassional loss of balance backward, but otherwise sba. Performed sit/stand x3 with min assist x2 with left knee buckling, needing blocking at all times. Attempted to side step to left, but increased assist needed due to LE left weakness. Pt cont to demo decreased functional endurance/strength for adls, mobility and will cont to benefit from cont OT. Will likely need snf vs rehab at time of dc. OT wore appropriate PPE during session and performed hand hygiene before/after session.

## 2023-03-10 NOTE — PLAN OF CARE
Problem: Adult Inpatient Plan of Care  Goal: Plan of Care Review  Outcome: Ongoing, Progressing  Goal: Patient-Specific Goal (Individualized)  Outcome: Ongoing, Progressing  Goal: Absence of Hospital-Acquired Illness or Injury  Outcome: Ongoing, Progressing  Intervention: Identify and Manage Fall Risk  Recent Flowsheet Documentation  Taken 3/9/2023 1800 by Genet Bledsoe RN  Safety Promotion/Fall Prevention:   safety round/check completed   clutter free environment maintained  Taken 3/9/2023 1700 by Genet Bledsoe RN  Safety Promotion/Fall Prevention:   safety round/check completed   clutter free environment maintained  Taken 3/9/2023 1600 by Genet Bledsoe RN  Safety Promotion/Fall Prevention:   safety round/check completed   clutter free environment maintained  Taken 3/9/2023 1500 by Genet Bledsoe RN  Safety Promotion/Fall Prevention:   safety round/check completed   clutter free environment maintained  Taken 3/9/2023 1400 by Genet Bledsoe RN  Safety Promotion/Fall Prevention:   safety round/check completed   clutter free environment maintained  Taken 3/9/2023 1300 by Genet Bledsoe RN  Safety Promotion/Fall Prevention:   safety round/check completed   clutter free environment maintained  Taken 3/9/2023 1215 by Genet Bledsoe RN  Safety Promotion/Fall Prevention:   safety round/check completed   clutter free environment maintained  Taken 3/9/2023 1000 by Genet Bledsoe RN  Safety Promotion/Fall Prevention:   safety round/check completed   clutter free environment maintained  Taken 3/9/2023 0900 by Genet Bledsoe RN  Safety Promotion/Fall Prevention:   safety round/check completed   clutter free environment maintained  Taken 3/9/2023 0845 by Genet Bledsoe RN  Safety Promotion/Fall Prevention: safety round/check completed  Intervention: Prevent Skin Injury  Recent Flowsheet Documentation  Taken 3/9/2023 1600 by Genet Bledsoe RN  Body Position: supine, legs elevated  Taken 3/9/2023 1400 by Genet Bledsoe  RN  Body Position:   turned   right   side-lying  Taken 3/9/2023 1215 by Genet Bledsoe RN  Body Position:   turned   left   side-lying  Skin Protection:   tubing/devices free from skin contact   skin-to-device areas padded  Taken 3/9/2023 0845 by Genet Bledsoe RN  Body Position:   turned   right   position changed independently  Skin Protection:   skin-to-device areas padded   tubing/devices free from skin contact  Intervention: Prevent and Manage VTE (Venous Thromboembolism) Risk  Recent Flowsheet Documentation  Taken 3/9/2023 1600 by Genet Bledsoe RN  VTE Prevention/Management:   bilateral   sequential compression devices on  Taken 3/9/2023 1215 by Genet Bledsoe RN  Activity Management: bedrest  VTE Prevention/Management:   bilateral   sequential compression devices on  Taken 3/9/2023 0845 by Genet Bledsoe RN  Activity Management: bedrest  VTE Prevention/Management:   bilateral   sequential compression devices on  Intervention: Prevent Infection  Recent Flowsheet Documentation  Taken 3/9/2023 1600 by Genet Bledsoe RN  Infection Prevention: single patient room provided  Taken 3/9/2023 1400 by Genet Bledsoe RN  Infection Prevention: single patient room provided  Taken 3/9/2023 1215 by Genet Bledsoe RN  Infection Prevention: single patient room provided  Taken 3/9/2023 1000 by Genet Bledsoe RN  Infection Prevention: single patient room provided  Goal: Optimal Comfort and Wellbeing  Outcome: Ongoing, Progressing  Intervention: Monitor Pain and Promote Comfort  Recent Flowsheet Documentation  Taken 3/9/2023 1828 by Genet Bledsoe RN  Pain Management Interventions:   see MAR   pillow support provided   position adjusted   care clustered  Taken 3/9/2023 0845 by Genet Bledsoe RN  Pain Management Interventions:   see MAR   pillow support provided   position adjusted   care clustered  Intervention: Provide Person-Centered Care  Recent Flowsheet Documentation  Taken 3/9/2023 1600 by Genet Bledsoe RN  Trust  Relationship/Rapport:   care explained   thoughts/feelings acknowledged  Taken 3/9/2023 1215 by Genet Bledsoe, RN  Trust Relationship/Rapport:   care explained   thoughts/feelings acknowledged  Taken 3/9/2023 0845 by Genet Bledsoe RN  Trust Relationship/Rapport:   care explained   thoughts/feelings acknowledged  Goal: Readiness for Transition of Care  Outcome: Ongoing, Progressing   Goal Outcome Evaluation:  Pt remain in cicu on 2L o2. A & O. Chest tube in place. Booker in place. Afibrile. Given prn pain meds. 1 unit of blood today. Hold off on another unit until next lab by cardio thoracic. Vital stable. Will keep monitoring.

## 2023-03-10 NOTE — PLAN OF CARE
Goal Outcome Evaluation:  Pt remained in the CICU overnight. Blood pressures normotensive for the shift. Pt alert and oriented x 4 with intermittent confusion at the beginning of the shift. Good urine output overnight.

## 2023-03-10 NOTE — PLAN OF CARE
Goal Outcome Evaluation:  Plan of Care Reviewed With: patient           Outcome Evaluation: Pt is POD s/p thorascopy with complete decortication. Patient awake and alert today, multiple family members at the bedside. patient performed LE exercises in supine and in sitting with assist with LAQ to achieve full ROM and maintain sitting balance while perfoming. Patient performed bed mobility with modAx2, sit to stand with minAx2, able to achieve full upright standing but L knee king with any attempted weight shifting. Pt encouraged to perform LE exercises ad chet throughout weekend, PT will continue to follow.

## 2023-03-11 ENCOUNTER — APPOINTMENT (OUTPATIENT)
Dept: GENERAL RADIOLOGY | Facility: HOSPITAL | Age: 58
DRG: 163 | End: 2023-03-11
Payer: COMMERCIAL

## 2023-03-11 LAB
ALBUMIN SERPL-MCNC: 1.9 G/DL (ref 3.5–5.2)
ALBUMIN/GLOB SERPL: 0.6 G/DL
ALP SERPL-CCNC: 216 U/L (ref 39–117)
ALT SERPL W P-5'-P-CCNC: 16 U/L (ref 1–33)
ANION GAP SERPL CALCULATED.3IONS-SCNC: 8 MMOL/L (ref 5–15)
AST SERPL-CCNC: 26 U/L (ref 1–32)
BACTERIA FLD CULT: ABNORMAL
BACTERIA FLD CULT: NORMAL
BACTERIA SPEC AEROBE CULT: ABNORMAL
BASOPHILS # BLD AUTO: 0.01 10*3/MM3 (ref 0–0.2)
BASOPHILS NFR BLD AUTO: 0.1 % (ref 0–1.5)
BH BB BLOOD EXPIRATION DATE: NORMAL
BH BB BLOOD TYPE BARCODE: 6200
BH BB DISPENSE STATUS: NORMAL
BH BB PRODUCT CODE: NORMAL
BH BB UNIT NUMBER: NORMAL
BILIRUB SERPL-MCNC: 0.4 MG/DL (ref 0–1.2)
BUN SERPL-MCNC: 12 MG/DL (ref 6–20)
BUN/CREAT SERPL: 13.3 (ref 7–25)
CALCIUM SPEC-SCNC: 7.5 MG/DL (ref 8.6–10.5)
CHLORIDE SERPL-SCNC: 103 MMOL/L (ref 98–107)
CO2 SERPL-SCNC: 27 MMOL/L (ref 22–29)
CREAT SERPL-MCNC: 0.9 MG/DL (ref 0.57–1)
CROSSMATCH INTERPRETATION: NORMAL
DEPRECATED RDW RBC AUTO: 51.4 FL (ref 37–54)
EGFRCR SERPLBLD CKD-EPI 2021: 73.8 ML/MIN/1.73
EOSINOPHIL # BLD AUTO: 0.14 10*3/MM3 (ref 0–0.4)
EOSINOPHIL NFR BLD AUTO: 1 % (ref 0.3–6.2)
ERYTHROCYTE [DISTWIDTH] IN BLOOD BY AUTOMATED COUNT: 14.5 % (ref 12.3–15.4)
GLOBULIN UR ELPH-MCNC: 3.2 GM/DL
GLUCOSE SERPL-MCNC: 103 MG/DL (ref 65–99)
GRAM STN SPEC: ABNORMAL
GRAM STN SPEC: NORMAL
GRAM STN SPEC: NORMAL
HCT VFR BLD AUTO: 22.3 % (ref 34–46.6)
HCT VFR BLD AUTO: 22.7 % (ref 34–46.6)
HCT VFR BLD AUTO: 24.8 % (ref 34–46.6)
HGB BLD-MCNC: 7.5 G/DL (ref 12–15.9)
HGB BLD-MCNC: 7.7 G/DL (ref 12–15.9)
HGB BLD-MCNC: 8.3 G/DL (ref 12–15.9)
IMM GRANULOCYTES # BLD AUTO: 0.4 10*3/MM3 (ref 0–0.05)
IMM GRANULOCYTES NFR BLD AUTO: 2.8 % (ref 0–0.5)
LYMPHOCYTES # BLD AUTO: 1.6 10*3/MM3 (ref 0.7–3.1)
LYMPHOCYTES NFR BLD AUTO: 11.3 % (ref 19.6–45.3)
MAGNESIUM SERPL-MCNC: 1.9 MG/DL (ref 1.6–2.6)
MCH RBC QN AUTO: 32.5 PG (ref 26.6–33)
MCHC RBC AUTO-ENTMCNC: 33 G/DL (ref 31.5–35.7)
MCV RBC AUTO: 98.3 FL (ref 79–97)
MONOCYTES # BLD AUTO: 0.82 10*3/MM3 (ref 0.1–0.9)
MONOCYTES NFR BLD AUTO: 5.8 % (ref 5–12)
NEUTROPHILS NFR BLD AUTO: 11.13 10*3/MM3 (ref 1.7–7)
NEUTROPHILS NFR BLD AUTO: 79 % (ref 42.7–76)
NRBC BLD AUTO-RTO: 0.1 /100 WBC (ref 0–0.2)
PHOSPHATE SERPL-MCNC: 2.6 MG/DL (ref 2.5–4.5)
PLATELET # BLD AUTO: 210 10*3/MM3 (ref 140–450)
PMV BLD AUTO: 10.6 FL (ref 6–12)
POTASSIUM SERPL-SCNC: 3.7 MMOL/L (ref 3.5–5.2)
PROT SERPL-MCNC: 5.1 G/DL (ref 6–8.5)
RBC # BLD AUTO: 2.31 10*6/MM3 (ref 3.77–5.28)
SODIUM SERPL-SCNC: 138 MMOL/L (ref 136–145)
UNIT  ABO: NORMAL
UNIT  RH: NORMAL
WBC NRBC COR # BLD: 14.1 10*3/MM3 (ref 3.4–10.8)

## 2023-03-11 PROCEDURE — 97110 THERAPEUTIC EXERCISES: CPT

## 2023-03-11 PROCEDURE — 94799 UNLISTED PULMONARY SVC/PX: CPT

## 2023-03-11 PROCEDURE — 25010000002 VANCOMYCIN PER 500 MG: Performed by: INTERNAL MEDICINE

## 2023-03-11 PROCEDURE — 99024 POSTOP FOLLOW-UP VISIT: CPT | Performed by: NURSE PRACTITIONER

## 2023-03-11 PROCEDURE — 94664 DEMO&/EVAL PT USE INHALER: CPT

## 2023-03-11 PROCEDURE — 85018 HEMOGLOBIN: CPT

## 2023-03-11 PROCEDURE — 84100 ASSAY OF PHOSPHORUS: CPT | Performed by: THORACIC SURGERY (CARDIOTHORACIC VASCULAR SURGERY)

## 2023-03-11 PROCEDURE — 80053 COMPREHEN METABOLIC PANEL: CPT | Performed by: THORACIC SURGERY (CARDIOTHORACIC VASCULAR SURGERY)

## 2023-03-11 PROCEDURE — 85025 COMPLETE CBC W/AUTO DIFF WBC: CPT | Performed by: THORACIC SURGERY (CARDIOTHORACIC VASCULAR SURGERY)

## 2023-03-11 PROCEDURE — 99232 SBSQ HOSP IP/OBS MODERATE 35: CPT | Performed by: INTERNAL MEDICINE

## 2023-03-11 PROCEDURE — 85014 HEMATOCRIT: CPT

## 2023-03-11 PROCEDURE — 25010000002 HYDROMORPHONE PER 4 MG: Performed by: THORACIC SURGERY (CARDIOTHORACIC VASCULAR SURGERY)

## 2023-03-11 PROCEDURE — 94761 N-INVAS EAR/PLS OXIMETRY MLT: CPT

## 2023-03-11 PROCEDURE — 83735 ASSAY OF MAGNESIUM: CPT | Performed by: THORACIC SURGERY (CARDIOTHORACIC VASCULAR SURGERY)

## 2023-03-11 PROCEDURE — 71045 X-RAY EXAM CHEST 1 VIEW: CPT

## 2023-03-11 PROCEDURE — 25010000002 HEPARIN (PORCINE) PER 1000 UNITS: Performed by: THORACIC SURGERY (CARDIOTHORACIC VASCULAR SURGERY)

## 2023-03-11 PROCEDURE — 97530 THERAPEUTIC ACTIVITIES: CPT

## 2023-03-11 PROCEDURE — 25010000002 VANCOMYCIN 750 MG RECONSTITUTED SOLUTION 1 EACH VIAL: Performed by: STUDENT IN AN ORGANIZED HEALTH CARE EDUCATION/TRAINING PROGRAM

## 2023-03-11 RX ORDER — VANCOMYCIN HYDROCHLORIDE 1 G/200ML
1000 INJECTION, SOLUTION INTRAVENOUS EVERY 24 HOURS
Status: DISCONTINUED | OUTPATIENT
Start: 2023-03-11 | End: 2023-03-17 | Stop reason: HOSPADM

## 2023-03-11 RX ADMIN — VANCOMYCIN HYDROCHLORIDE 750 MG: 750 INJECTION, POWDER, LYOPHILIZED, FOR SOLUTION INTRAVENOUS at 01:18

## 2023-03-11 RX ADMIN — OXYCODONE HYDROCHLORIDE 5 MG: 5 TABLET ORAL at 08:23

## 2023-03-11 RX ADMIN — IPRATROPIUM BROMIDE AND ALBUTEROL SULFATE 3 ML: 2.5; .5 SOLUTION RESPIRATORY (INHALATION) at 19:53

## 2023-03-11 RX ADMIN — HEPARIN SODIUM 5000 UNITS: 5000 INJECTION INTRAVENOUS; SUBCUTANEOUS at 13:57

## 2023-03-11 RX ADMIN — FOLIC ACID 1 MG: 1 TABLET ORAL at 08:23

## 2023-03-11 RX ADMIN — OXYCODONE HYDROCHLORIDE 5 MG: 5 TABLET ORAL at 19:35

## 2023-03-11 RX ADMIN — CELECOXIB 100 MG: 100 CAPSULE ORAL at 21:35

## 2023-03-11 RX ADMIN — HEPARIN SODIUM 5000 UNITS: 5000 INJECTION INTRAVENOUS; SUBCUTANEOUS at 05:02

## 2023-03-11 RX ADMIN — DESVENLAFAXINE SUCCINATE 25 MG: 25 TABLET, EXTENDED RELEASE ORAL at 08:23

## 2023-03-11 RX ADMIN — Medication 100 MG: at 08:22

## 2023-03-11 RX ADMIN — GABAPENTIN 300 MG: 300 CAPSULE ORAL at 05:02

## 2023-03-11 RX ADMIN — PANTOPRAZOLE SODIUM 40 MG: 40 TABLET, DELAYED RELEASE ORAL at 05:02

## 2023-03-11 RX ADMIN — OXYCODONE HYDROCHLORIDE 5 MG: 5 TABLET ORAL at 11:32

## 2023-03-11 RX ADMIN — GABAPENTIN 300 MG: 300 CAPSULE ORAL at 13:59

## 2023-03-11 RX ADMIN — ACETAMINOPHEN 1000 MG: 500 TABLET, FILM COATED ORAL at 08:27

## 2023-03-11 RX ADMIN — HYDROMORPHONE HYDROCHLORIDE 0.5 MG: 1 INJECTION, SOLUTION INTRAMUSCULAR; INTRAVENOUS; SUBCUTANEOUS at 00:18

## 2023-03-11 RX ADMIN — IPRATROPIUM BROMIDE AND ALBUTEROL SULFATE 3 ML: 2.5; .5 SOLUTION RESPIRATORY (INHALATION) at 16:53

## 2023-03-11 RX ADMIN — GABAPENTIN 300 MG: 300 CAPSULE ORAL at 21:35

## 2023-03-11 RX ADMIN — Medication 10 ML: at 08:27

## 2023-03-11 RX ADMIN — IPRATROPIUM BROMIDE AND ALBUTEROL SULFATE 3 ML: 2.5; .5 SOLUTION RESPIRATORY (INHALATION) at 07:03

## 2023-03-11 RX ADMIN — HEPARIN SODIUM 5000 UNITS: 5000 INJECTION INTRAVENOUS; SUBCUTANEOUS at 21:35

## 2023-03-11 RX ADMIN — ACETAMINOPHEN 1000 MG: 500 TABLET, FILM COATED ORAL at 15:23

## 2023-03-11 RX ADMIN — HYDROMORPHONE HYDROCHLORIDE 0.5 MG: 1 INJECTION, SOLUTION INTRAMUSCULAR; INTRAVENOUS; SUBCUTANEOUS at 16:08

## 2023-03-11 RX ADMIN — LAMOTRIGINE 200 MG: 100 TABLET ORAL at 08:22

## 2023-03-11 RX ADMIN — VANCOMYCIN HYDROCHLORIDE 1000 MG: 1 INJECTION, SOLUTION INTRAVENOUS at 23:27

## 2023-03-11 RX ADMIN — CELECOXIB 100 MG: 100 CAPSULE ORAL at 08:23

## 2023-03-11 RX ADMIN — HYDROMORPHONE HYDROCHLORIDE 0.5 MG: 1 INJECTION, SOLUTION INTRAMUSCULAR; INTRAVENOUS; SUBCUTANEOUS at 22:07

## 2023-03-11 RX ADMIN — IPRATROPIUM BROMIDE AND ALBUTEROL SULFATE 3 ML: 2.5; .5 SOLUTION RESPIRATORY (INHALATION) at 12:05

## 2023-03-11 RX ADMIN — ACETAMINOPHEN 1000 MG: 500 TABLET, FILM COATED ORAL at 21:35

## 2023-03-11 RX ADMIN — Medication 10 ML: at 21:35

## 2023-03-11 RX ADMIN — Medication 500 MCG: at 08:22

## 2023-03-11 NOTE — PROGRESS NOTES
"  POST-OPERATIVE NOTE     Chief Complaint: Empyema  S/P: Video-assisted thoracoscopy with complete decortication  POD # 3    Subjective:  Symptoms:  Stable.  She reports cough and weakness.  No shortness of breath.    Diet:  Poor intake.  No nausea or vomiting.    Activity level: Impaired due to weakness.    Pain:  She complains of pain that is mild.  Pain is well controlled.    Eating lunch. Reports she is improved.    Objective:  General Appearance:  Ill-appearing, in no acute distress, in pain and comfortable.    Vital signs: (most recent): Blood pressure 127/77, pulse 98, temperature 98.4 °F (36.9 °C), temperature source Oral, resp. rate 18, height 157.5 cm (62\"), weight 57.6 kg (127 lb), SpO2 99 %, not currently breastfeeding.  Vital signs are normal.    Output: Producing urine (Booker catheter in place.).    Lungs:  Normal effort and normal respiratory rate.    Heart: Normal rate.  Regular rhythm.    Chest: Symmetric chest wall expansion. Chest wall tenderness (Around chest tubes.) present.    Abdomen: Abdomen is non-distended.    Extremities: Decreased range of motion.    Neurological: Patient is alert and oriented to person, place and time.    Skin:  Warm and dry.            Chest tube:   Site: Right, Clean, Dry and Securement device intact  Suction: -20 cm  Air Leak: Negative  24 Hour Total: 60/120ml     Results Review:     I reviewed the patient's new clinical results.  I reviewed the patient's new imaging results and agree with the interpretation.  Discussed with Patient, nurse, and Dr. Cedillo.    Assessment & Plan     S/p VAT with complete decortication POD 3.     Remains hemodynamically stable postoperatively.   DC A-line.  Chest tubes to waterseal today.  Repeat x-ray in AM.  Daily CBC, trend hemoglobin.    Appears better this morning and states that she feels improved.  She is still quite weak.  Expressed the importance of increasing activity and getting out of the bed and up to the chair today as well " as incentive spirometry 10 times per hour while awake to improve her pulmonary function and expedite recovery.  This was all explained with her family present who expressed understanding, as well.    Transfer to [facility].    KASIA Boyd  [facility]  03/11/23  12:35 EST    Patient was seen and assessed while wearing personal protective equipment including facemask, protective eyewear and gloves.  Hand hygiene performed prior to entering the room and upon exiting with doffing of gloves.

## 2023-03-11 NOTE — PROGRESS NOTES
"    Patient Name: Louise GARCIA  :1965  58 y.o.      Patient Care Team:  Chloe Schaefer APRN as PCP - General (Family Medicine)  Mikhail Glez MD as Consulting Physician (Neurology)    Chief Complaint: empyema    Interval History: remains in SR       Objective   Vital Signs  Temp:  [97.4 °F (36.3 °C)-98.4 °F (36.9 °C)] 98.4 °F (36.9 °C)  Heart Rate:  [] 91  Resp:  [16-22] 17  BP: (120-153)/() 141/76    Intake/Output Summary (Last 24 hours) at 3/11/2023 0910  Last data filed at 3/11/2023 0500  Gross per 24 hour   Intake 1232.52 ml   Output 630 ml   Net 602.52 ml     Flowsheet Rows    Flowsheet Row First Filed Value   Admission Height 157.5 cm (62\") Documented at 2023 1425   Admission Weight 54.4 kg (120 lb) Documented at 2023 1425          Physical Exam:   General Appearance:    lethargic, in no acute distress   Lungs:     Clear to auscultation.  Normal respiratory effort and rate.      Heart:    Regular rhythm and normal rate, normal S1 and S2, no murmurs, gallops or rubs.     Chest Wall:    No abnormalities observed   Abdomen:     Soft, nontender, positive bowel sounds.     Extremities:   no cyanosis, clubbing or edema.  No marked joint deformities.  Adequate musculoskeletal strength.       Results Review:    Results from last 7 days   Lab Units 23  0315   SODIUM mmol/L 138   POTASSIUM mmol/L 3.7   CHLORIDE mmol/L 103   CO2 mmol/L 27.0   BUN mg/dL 12   CREATININE mg/dL 0.90   GLUCOSE mg/dL 103*   CALCIUM mg/dL 7.5*         Results from last 7 days   Lab Units 23  0315   WBC 10*3/mm3 14.10*   HEMOGLOBIN g/dL 7.5*   HEMATOCRIT % 22.7*   PLATELETS 10*3/mm3 210     Results from last 7 days   Lab Units 23  0731   INR  1.10   APTT seconds 32.2     Results from last 7 days   Lab Units 23  0315   MAGNESIUM mg/dL 1.9                   Medication Review:   acetaminophen, 1,000 mg, Oral, TID  celecoxib, 100 mg, Oral, Q12H  desvenlafaxine, 25 mg, Oral, " Daily  folic acid, 1 mg, Oral, Daily  gabapentin, 300 mg, Oral, Q8H  heparin (porcine), 5,000 Units, Subcutaneous, Q8H  ipratropium-albuterol, 3 mL, Nebulization, 4x Daily - RT  lamoTRIgine, 200 mg, Oral, Daily  lidocaine, 1 patch, Transdermal, Q24H  lidocaine, 1 patch, Transdermal, Q24H  lidocaine, 10 mL, Intradermal, Once  pantoprazole, 40 mg, Oral, Q AM  sodium chloride, 10 mL, Intravenous, Q12H  thiamine, 100 mg, Oral, Daily  vancomycin, 1,000 mg, Intravenous, Q24H  vitamin B-12, 500 mcg, Oral, Daily         hold, 1 each  hold, 1 each  lactated ringers, 9 mL/hr, Last Rate: 500 mL/hr (03/08/23 1856)  Pharmacy to dose vancomycin,         Assessment & Plan     Active Hospital Problems    Diagnosis  POA   • **Paresthesia [R20.2]  Yes   • MRSA bacteremia [R78.81, B95.62]  Unknown   • Tricuspid valve vegetation [I33.0]  Yes   • Oral candidiasis [B37.0]  No   • Mass of middle lobe of right lung [R91.8]  Yes   • Situational mixed anxiety and depressive disorder [F43.23]  Yes   • 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  MRSA pneumonia and empyema- VATS/decortication performed Wednesday, Eliquis held  2.  MRSA septicemia, ID following  3.  Paresthesias and areflexia, neurology following  4.  Possible small vegetation of tricuspid valve, continue to follow right now, continue IV antibiotics, valvular function normal, repeated limited echocardiogram to assess tricuspid valve  and no vegetation now present  5.  DVT history, Eliquis on hold    No active cardiac issues, will sign off, call if we can help in any way    Hank Dowling III, MD  [facility]  03/11/23  09:10 EST

## 2023-03-11 NOTE — PROGRESS NOTES
[facility]     Mar/chart reviewed  Follow up empyema, pneumonia, neuropathy   chest discomfort at site of chest tubes, some cough    Vital Sign Min/Max for last 24 hours  Temp  Min: 97.4 °F (36.3 °C)  Max: 98.4 °F (36.9 °C)   BP  Min: 120/75  Max: 153/85   Pulse  Min: 84  Max: 107   Resp  Min: 16  Max: 22   SpO2  Min: 90 %  Max: 100 %   Flow (L/min)  Min: 2  Max: 2   Weight  Min: 57.6 kg (127 lb)  Max: 57.6 kg (127 lb)   1232/630 (180 chest tube)  Appears ill, axox3,   perrl, eomi, normal sclera  mmm, no jvd, trachea midline, neck supple,  chest decreased ae bilaterally, no crackles, no wheezes,   rrr,   soft, nt, nd +bs,  no c/c/ e  Skin warm, dry no rashes    Labs: 3/11: reviewed:  Glucose 103  Bun 12  Cr 0.9  Bicarb 27  Wbc 14.1  hgb 7.5  plts 210    3/11: CXR: chest tube in place, right sided infiltrate    ASSESSMENT:     Idiopathic neuropathy  MRSA pneumonia/empyema: S/P decortication 3/8  Elevated liver enz  Anemia  Dysphagia  Hx DVT, was on Eliquis  Osteonecrosis of femoral heads  Possible vegitation on tricuspid valve -- cards and ID following             PLAN:     Encourage pulmonary toilet.  Continue antibiotics per ID recs.  Bronchodilators to help with clearance.  Pain control.  Chest tube management per thoracic surgery.  Transfuse for hemoglobin less than 7.  Repeat H&H posttransfusion showed appropriate response and stable repeat H&H.    Patient is new to me today, out of unit when ok with thoracic surgery

## 2023-03-11 NOTE — PLAN OF CARE
Goal Outcome Evaluation:  Plan of Care Reviewed With: patient, spouse           Outcome Evaluation: Patient sleeping but arousable, agreeable to therapy. patient performed bed mobility with modA x 1-2, sit to stand x 3 trials at the bedside with L knee blocked, able to achieve full upright standing, knees buckle quickly with any attempted weight shifting, demonstrates decreased eccentric control with stand to sit. Pt completed LE exercises with assist.

## 2023-03-11 NOTE — PROGRESS NOTES
[facility]    Will follow for progress with therapies and medical stability to determine most appropriate level of rehab needed at time of discharge.    Thank you,  Gaviota Goodman, RN  Rehab Admission Nurse

## 2023-03-11 NOTE — PLAN OF CARE
Goal Outcome Evaluation:        Pt remains in the CICU. Vitals remain stable.  Minimal chest tubes drainage. No changes overnight. Plan of care explained to patient. Will continue to monitor.

## 2023-03-11 NOTE — PROGRESS NOTES
"[facility] Clinical Pharmacy Services: Vancomycin Monitoring Note    Louise GARCIA is a 58 y.o. female who is on day 8 of pharmacy to dose vancomycin for MRSA Bacteremia-- empyema/endocarditis    current Vancomycin Dose:   750 mg IV every  12  hours  Updated Cultures and Sensitivities:   -3/8 pleural fluid cultures pending  3/8 tissue culture pending  -3/6 CSF:  ng  -3/6 menin/encph pcr:  neg  -3/6 blood (2 sets):  ng x 2  -3/4 pleural fluid:  2+ MRSA  -3/3 MRSA screen positive  -3/2 blood:  MRSA  -3/2 blood   ng x 5 days    Results from last 7 days   Lab Units 03/10/23  1640 03/09/23  1427 03/09/23  0443   VANCOMYCIN TR mcg/mL 21.90* 20.70* 34.90*   34.9mcg/mL on 3/9 was essentially a peak level.  Vitals/Labs  Ht: 157.5 cm (62\"); Wt: 57.6 kg (127 lb)   Temp Readings from Last 1 Encounters:   03/11/23 98.4 °F (36.9 °C) (Oral)     Estimated Creatinine Clearance: 61.2 mL/min (by C-G formula based on SCr of 0.9 mg/dL).       Results from last 7 days   Lab Units 03/11/23  0315 03/10/23  0322 03/09/23  0443   CREATININE mg/dL 0.90 0.88 0.59   WBC 10*3/mm3 14.10* 18.95* 25.61*     Assessment/Plan  Changing to a q24h interval regimen since levels have been sitting around 21 and I am concern more frequent dosing will accumulate further. I agree with previous pharmacist the extra perioperative dose likely playing a role.    Vancomycin Dose: adjust to 1000mg q24h dosing. Predicted AUC 434mg/L hr    Next Level Date and Time: Will change trough to be collected at 2030 on 3/12/23  We will continue to monitor patient changes and renal function     Thank you for involving pharmacy in this patient's care. Please contact pharmacy with any questions or concerns.       Barber Suarez [facility]  Clinical Pharmacist                    "

## 2023-03-11 NOTE — THERAPY TREATMENT NOTE
Patient Name: Louise GARCIA  : 1965    MRN: [id number]                              Today's Date: 3/11/2023       Admit Date: 2023    Visit Dx:     ICD-10-CM ICD-9-CM   1. Paresthesia  R20.2 782.0   2. Gait instability  R26.81 781.2   3. Extremity numbness  R20.0 782.0   4. 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)   • Situational mixed anxiety and depressive disorder   • Mass of middle lobe of right lung   • Oral candidiasis   • Empyema of pleura (HCC)   • Tricuspid valve vegetation   • MRSA bacteremia     Past Medical History:   Diagnosis Date   • Degenerative disc disease, cervical    • Gestational diabetes    • H/O blood clots    • Hematemesis    • Seizures (HCC)    • Septic shock (HCC)      Past Surgical History:   Procedure Laterality Date   • APPENDECTOMY     • BACK SURGERY     • CHOLECYSTECTOMY     • ENDOSCOPY N/A 2016    Procedure: ESOPHAGOGASTRODUODENOSCOPY with biopsy;  Surgeon: Austen Brito MD;  Location: [facility] ENDOSCOPY;  Service:    • HYSTERECTOMY     • NECK SURGERY       approx 6 yrs ago   • THORACOSCOPY Right 3/8/2023    Procedure: THORACOSCOPY WITH DAVINCI ROBOT WITH COMPLETE DECORTICATION;  Surgeon: Chloe Cedillo MD;  Location: [facility] MAIN OR;  Service: Robotics - DaVinci;  Laterality: Right;   • TONSILLECTOMY     • TONSILLECTOMY AND ADENOIDECTOMY        General Information     Row Name 23 1605          Physical Therapy Time and Intention    Document Type therapy note (daily note)  -EM     Mode of Treatment individual therapy;physical therapy  -EM     Row Name 23 1605          General Information    Existing  Precautions/Restrictions fall  -EM           User Key  (r) = Recorded By, (t) = Taken By, (c) = Cosigned By    Initials Name Provider Type    Violet Ku PT Physical Therapist               Mobility     Row Name 03/11/23 1605          Bed Mobility    Supine-Sit Turner (Bed Mobility) moderate assist (50% patient effort)  -EM     Sit-Supine Turner (Bed Mobility) moderate assist (50% patient effort);2 person assist  -EM     Assistive Device (Bed Mobility) head of bed elevated  -EM     Row Name 03/11/23 1605          Sit-Stand Transfer    Sit-Stand Turner (Transfers) moderate assist (50% patient effort);minimum assist (75% patient effort);2 person assist  -EM     Comment, (Sit-Stand Transfer) 3 standing trials at the bedside, L knee blocked, attempted weight shifting  -EM           User Key  (r) = Recorded By, (t) = Taken By, (c) = Cosigned By    Initials Name Provider Type    Violet Ku PT Physical Therapist               Obj/Interventions     Row Name 03/11/23 1606          Motor Skills    Therapeutic Exercise other (see comments)  AP, QS, SAQ, LAQ, hip abd/add x 10-15 reps  -EM           User Key  (r) = Recorded By, (t) = Taken By, (c) = Cosigned By    Initials Name Provider Type    Violet Ku PT Physical Therapist               Goals/Plan    No documentation.                Clinical Impression     Row Name 03/11/23 1607          Pain    Pretreatment Pain Rating 4/10  -EM     Pain Location - Side/Orientation Right  -EM     Pain Location - flank  -EM     Pre/Posttreatment Pain Comment chest tubes painful  -EM     Pain Intervention(s) Medication (See MAR);Repositioned  -EM     Row Name 03/11/23 1607          Plan of Care Review    Plan of Care Reviewed With patient;spouse  -EM     Outcome Evaluation Patient sleeping but arousable, agreeable to therapy. patient performed bed mobility with modA x 1-2, sit to stand x 3 trials at the bedside with L knee blocked,  able to achieve full upright standing, knees buckle quickly with any attempted weight shifting, demonstrates decreased eccentric control with stand to sit. Pt completed LE exercises with assist.  -EM     Row Name 03/11/23 1607          Positioning and Restraints    Pre-Treatment Position in bed  -EM     Post Treatment Position bed  -EM     In Bed notified nsg;supine;call light within reach;with family/caregiver  -EM           User Key  (r) = Recorded By, (t) = Taken By, (c) = Cosigned By    Initials Name Provider Type    Violet Ku PT Physical Therapist               Outcome Measures     Row Name 03/11/23 1611          How much help from another person do you currently need...    Turning from your back to your side while in flat bed without using bedrails? 2  -EM     Moving from lying on back to sitting on the side of a flat bed without bedrails? 2  -EM     Moving to and from a bed to a chair (including a wheelchair)? 2  -EM     Standing up from a chair using your arms (e.g., wheelchair, bedside chair)? 2  -EM     Climbing 3-5 steps with a railing? 1  -EM     To walk in hospital room? 1  -EM     AM-PAC 6 Clicks Score (PT) 10  -EM     Highest level of mobility 4 --> Transferred to chair/commode  -EM           User Key  (r) = Recorded By, (t) = Taken By, (c) = Cosigned By    Initials Name Provider Type    Violet Ku PT Physical Therapist                             Physical Therapy Education     Title: PT OT SLP Therapies (Done)     Topic: Physical Therapy (Done)     Point: Mobility training (Done)     Learning Progress Summary           Patient Acceptance, E, VU by EM at 3/11/2023 1611    Acceptance, E, VU by EM at 3/10/2023 1454    Acceptance, E, VU by BL at 3/7/2023 1712    Comment: Spoke with Pt and  through day, updated on plan of care. All questions answered.    Acceptance, E,D, NR by PC at 3/7/2023 1419    Acceptance, E, VU by BL at 3/6/2023 0830    Comment: Spoke with patient  and spouse at bedside through day.    Acceptance, E, NR by AR at 3/3/2023 1346    Acceptance, E,TB, VU by JOÃO at 3/2/2023 1836    Acceptance, E,TB, VU by MS at 3/1/2023 1135    Acceptance, E,TB, VU by MS at 2/28/2023 1105    Acceptance, E,TB,D, VU,DU by LB at 2/26/2023 1159    Acceptance, E,TB, VU,DU by LB at 2/25/2023 1542    Acceptance, E,D, VU,NR by EB at 2/24/2023 1254    Acceptance, E,D, VU,NR by EB at 2/23/2023 1057    Acceptance, E,TB, VU,NR by EB1 at 2/21/2023 1134   Family Acceptance, E, VU by EM at 3/10/2023 1454    Acceptance, E, VU by BL at 3/7/2023 1712    Comment: Spoke with Pt and  through day, updated on plan of care. All questions answered.    Acceptance, E, VU by BL at 3/6/2023 0830    Comment: Spoke with patient and spouse at bedside through day.    Acceptance, E, NR by AR at 3/3/2023 1346   Significant Other Acceptance, E,TB, VU,NR by EB1 at 2/21/2023 1134                   Point: Home exercise program (Done)     Learning Progress Summary           Patient Acceptance, E, VU by EM at 3/11/2023 1611    Acceptance, E, VU by EM at 3/10/2023 1454    Acceptance, E, VU by BL at 3/7/2023 1712    Comment: Spoke with Pt and  through day, updated on plan of care. All questions answered.    Acceptance, E,D, NR by PC at 3/7/2023 1419    Acceptance, E,D, NR by PC at 3/6/2023 1357    Acceptance, E, VU by BL at 3/6/2023 0830    Comment: Spoke with patient and spouse at bedside through day.    Acceptance, E, NR by AR at 3/3/2023 1346    Acceptance, E,TB, VU by JOÃO at 3/2/2023 1836    Acceptance, E,TB, VU by MS at 2/28/2023 1105    Acceptance, E,TB,D, VU,DU by LB at 2/26/2023 1159    Acceptance, E,TB, VU,DU by LB at 2/25/2023 1542    Acceptance, E,D, VU,NR by EB at 2/24/2023 1254    Acceptance, E,D, VU,NR by EB at 2/23/2023 1057   Family Acceptance, E, VU by EM at 3/10/2023 1454    Acceptance, E, VU by BL at 3/7/2023 1712    Comment: Spoke with Pt and  through day, updated on plan of care. All  questions answered.    Acceptance, E, VU by BL at 3/6/2023 0830    Comment: Spoke with patient and spouse at bedside through day.    Acceptance, E, NR by AR at 3/3/2023 1346                   Point: Body mechanics (Done)     Learning Progress Summary           Patient Acceptance, E, VU by BL at 3/7/2023 1712    Comment: Spoke with Pt and  through day, updated on plan of care. All questions answered.    Acceptance, E,D, NR by PC at 3/7/2023 1419    Acceptance, E, VU by BL at 3/6/2023 0830    Comment: Spoke with patient and spouse at bedside through day.    Acceptance, E, NR by AR at 3/3/2023 1346    Acceptance, E,TB, VU by JOÃO at 3/2/2023 1836    Acceptance, E,TB,D, VU,DU by LB at 2/26/2023 1159    Acceptance, E,TB, VU,DU by LB at 2/25/2023 1542    Acceptance, E,D, VU,NR by EB at 2/24/2023 1254    Acceptance, E,D, VU,NR by EB at 2/23/2023 1057   Family Acceptance, E, VU by BL at 3/7/2023 1712    Comment: Spoke with Pt and  through day, updated on plan of care. All questions answered.    Acceptance, E, VU by BL at 3/6/2023 0830    Comment: Spoke with patient and spouse at bedside through day.    Acceptance, E, NR by AR at 3/3/2023 1346                   Point: Precautions (Done)     Learning Progress Summary           Patient Acceptance, E, VU by BL at 3/7/2023 1712    Comment: Spoke with Pt and  through day, updated on plan of care. All questions answered.    Acceptance, E,D, NR by PC at 3/7/2023 1419    Acceptance, E, VU by BL at 3/6/2023 0830    Comment: Spoke with patient and spouse at bedside through day.    Acceptance, E, NR by AR at 3/3/2023 1346    Acceptance, E,TB, VU by JOÃO at 3/2/2023 1836    Acceptance, E,TB,D, VU,DU by LB at 2/26/2023 1159    Acceptance, E,TB, VU,DU by LB at 2/25/2023 1542    Acceptance, E,D, VU,NR by EB at 2/24/2023 1254    Acceptance, E,D, VU,NR by EB at 2/23/2023 1057   Family Acceptance, E, VU by BL at 3/7/2023 1712    Comment: Spoke with Pt and  through day,  updated on plan of care. All questions answered.    Acceptance, E, VU by BL at 3/6/2023 0830    Comment: Spoke with patient and spouse at bedside through day.    Acceptance, E, NR by AR at 3/3/2023 1346                               User Key     Initials Effective Dates Name Provider Type Discipline    PC 06/16/21 -  Saida Burrows, PT Physical Therapist PT    EM 06/16/21 -  Violet Branham, PT Physical Therapist PT    AR 06/16/21 -  Jovita Miller, PT Physical Therapist PT    LB 08/09/20 -  Nia Arellano, PT Physical Therapist PT    MS 06/16/21 -  Елена Florez, PT Physical Therapist PT    EB 02/14/23 -  Caren James PTA Physical Therapist Assistant PT    BL 10/18/22 -  Josefa Jones, RN Registered Nurse Nurse    JOÃO 09/20/22 -  Jayda Patel RN Registered Nurse Nurse    EB1 01/12/23 -  Yesica Zamora, PT Student PT Student PT              PT Recommendation and Plan     Plan of Care Reviewed With: patient, spouse  Outcome Evaluation: Patient sleeping but arousable, agreeable to therapy. patient performed bed mobility with modA x 1-2, sit to stand x 3 trials at the bedside with L knee blocked, able to achieve full upright standing, knees buckle quickly with any attempted weight shifting, demonstrates decreased eccentric control with stand to sit. Pt completed LE exercises with assist.     Time Calculation:    PT Charges     Row Name 03/11/23 1612             Time Calculation    Start Time 1326  -EM      Stop Time 1354  -EM      Time Calculation (min) 28 min  -EM      PT Received On 03/11/23  -EM      PT - Next Appointment 03/13/23  -EM         Time Calculation- PT    Total Timed Code Minutes- PT 28 minute(s)  -EM         Timed Charges    61341 - PT Therapeutic Exercise Minutes 15  -EM      69765 - PT Therapeutic Activity Minutes 13  -EM         Total Minutes    Timed Charges Total Minutes 28  -EM       Total Minutes 28  -EM            User Key  (r) = Recorded By, (t) = Taken By, (c) = Cosigned By     Initials Name Provider Type     Violet Branham PT Physical Therapist              Therapy Charges for Today     Code Description Service Date Service Provider Modifiers Qty    57043629119 HC PT THER PROC EA 15 MIN 3/10/2023 Violet Branham, PT GP 1    41392240915 HC PT THERAPEUTIC ACT EA 15 MIN 3/10/2023 Violet Branham, PT GP 1    42923112071 HC PT THER PROC EA 15 MIN 3/11/2023 Violet Branham, PT GP 1    08316941048  PT THERAPEUTIC ACT EA 15 MIN 3/11/2023 Violet Branham PT GP 1          PT G-Codes  Outcome Measure Options: AM-PAC 6 Clicks Basic Mobility (PT)  AM-PAC 6 Clicks Score (PT): 10  AM-PAC 6 Clicks Score (OT): 11  Modified Payette Scale: 4 - Moderately severe disability.  Unable to walk without assistance, and unable to attend to own bodily needs without assistance.       Violet Branham PT  3/11/2023

## 2023-03-11 NOTE — NURSING NOTE
Took over patient care. Will continue to monitor and care for this patient. Pt remains stable and on 2 liters nasal cannula.    Tim Meek

## 2023-03-12 ENCOUNTER — APPOINTMENT (OUTPATIENT)
Dept: GENERAL RADIOLOGY | Facility: HOSPITAL | Age: 58
DRG: 163 | End: 2023-03-12
Payer: COMMERCIAL

## 2023-03-12 PROBLEM — I33.0 TRICUSPID VALVE VEGETATION: Status: RESOLVED | Noted: 2023-03-06 | Resolved: 2023-03-12

## 2023-03-12 LAB
ALBUMIN SERPL-MCNC: 1.7 G/DL (ref 3.5–5.2)
ALBUMIN/GLOB SERPL: 0.5 G/DL
ALP SERPL-CCNC: 224 U/L (ref 39–117)
ALT SERPL W P-5'-P-CCNC: 14 U/L (ref 1–33)
ANION GAP SERPL CALCULATED.3IONS-SCNC: 8 MMOL/L (ref 5–15)
AST SERPL-CCNC: 29 U/L (ref 1–32)
BASOPHILS # BLD AUTO: 0.02 10*3/MM3 (ref 0–0.2)
BASOPHILS NFR BLD AUTO: 0.2 % (ref 0–1.5)
BILIRUB SERPL-MCNC: 0.3 MG/DL (ref 0–1.2)
BUN SERPL-MCNC: 12 MG/DL (ref 6–20)
BUN/CREAT SERPL: 12.9 (ref 7–25)
CALCIUM SPEC-SCNC: 7.8 MG/DL (ref 8.6–10.5)
CHLORIDE SERPL-SCNC: 101 MMOL/L (ref 98–107)
CO2 SERPL-SCNC: 26 MMOL/L (ref 22–29)
CREAT SERPL-MCNC: 0.93 MG/DL (ref 0.57–1)
DEPRECATED RDW RBC AUTO: 49.1 FL (ref 37–54)
EGFRCR SERPLBLD CKD-EPI 2021: 70.9 ML/MIN/1.73
EOSINOPHIL # BLD AUTO: 0.11 10*3/MM3 (ref 0–0.4)
EOSINOPHIL NFR BLD AUTO: 1 % (ref 0.3–6.2)
ERYTHROCYTE [DISTWIDTH] IN BLOOD BY AUTOMATED COUNT: 14.1 % (ref 12.3–15.4)
GLOBULIN UR ELPH-MCNC: 3.7 GM/DL
GLUCOSE BLDC GLUCOMTR-MCNC: 90 MG/DL (ref 70–130)
GLUCOSE SERPL-MCNC: 126 MG/DL (ref 65–99)
HCT VFR BLD AUTO: 22 % (ref 34–46.6)
HCT VFR BLD AUTO: 22 % (ref 34–46.6)
HGB BLD-MCNC: 7.6 G/DL (ref 12–15.9)
HGB BLD-MCNC: 7.6 G/DL (ref 12–15.9)
IMM GRANULOCYTES # BLD AUTO: 0.42 10*3/MM3 (ref 0–0.05)
IMM GRANULOCYTES NFR BLD AUTO: 4 % (ref 0–0.5)
LYMPHOCYTES # BLD AUTO: 1.49 10*3/MM3 (ref 0.7–3.1)
LYMPHOCYTES NFR BLD AUTO: 14.1 % (ref 19.6–45.3)
MAGNESIUM SERPL-MCNC: 1.9 MG/DL (ref 1.6–2.6)
MCH RBC QN AUTO: 33.6 PG (ref 26.6–33)
MCHC RBC AUTO-ENTMCNC: 34.5 G/DL (ref 31.5–35.7)
MCV RBC AUTO: 97.3 FL (ref 79–97)
MONOCYTES # BLD AUTO: 0.6 10*3/MM3 (ref 0.1–0.9)
MONOCYTES NFR BLD AUTO: 5.7 % (ref 5–12)
NEUTROPHILS NFR BLD AUTO: 7.95 10*3/MM3 (ref 1.7–7)
NEUTROPHILS NFR BLD AUTO: 75 % (ref 42.7–76)
NRBC BLD AUTO-RTO: 0.1 /100 WBC (ref 0–0.2)
PHOSPHATE SERPL-MCNC: 2.6 MG/DL (ref 2.5–4.5)
PLATELET # BLD AUTO: 276 10*3/MM3 (ref 140–450)
PMV BLD AUTO: 10.3 FL (ref 6–12)
POTASSIUM SERPL-SCNC: 4.3 MMOL/L (ref 3.5–5.2)
PROT SERPL-MCNC: 5.4 G/DL (ref 6–8.5)
RBC # BLD AUTO: 2.26 10*6/MM3 (ref 3.77–5.28)
SODIUM SERPL-SCNC: 135 MMOL/L (ref 136–145)
VANCOMYCIN TROUGH SERPL-MCNC: 17.1 MCG/ML (ref 5–20)
WBC NRBC COR # BLD: 10.59 10*3/MM3 (ref 3.4–10.8)

## 2023-03-12 PROCEDURE — 99232 SBSQ HOSP IP/OBS MODERATE 35: CPT | Performed by: PSYCHIATRY & NEUROLOGY

## 2023-03-12 PROCEDURE — 85014 HEMATOCRIT: CPT

## 2023-03-12 PROCEDURE — 85025 COMPLETE CBC W/AUTO DIFF WBC: CPT | Performed by: THORACIC SURGERY (CARDIOTHORACIC VASCULAR SURGERY)

## 2023-03-12 PROCEDURE — 25010000002 VANCOMYCIN PER 500 MG: Performed by: INTERNAL MEDICINE

## 2023-03-12 PROCEDURE — 82962 GLUCOSE BLOOD TEST: CPT

## 2023-03-12 PROCEDURE — 94799 UNLISTED PULMONARY SVC/PX: CPT

## 2023-03-12 PROCEDURE — 84100 ASSAY OF PHOSPHORUS: CPT | Performed by: THORACIC SURGERY (CARDIOTHORACIC VASCULAR SURGERY)

## 2023-03-12 PROCEDURE — 94664 DEMO&/EVAL PT USE INHALER: CPT

## 2023-03-12 PROCEDURE — 71045 X-RAY EXAM CHEST 1 VIEW: CPT

## 2023-03-12 PROCEDURE — 80202 ASSAY OF VANCOMYCIN: CPT | Performed by: INTERNAL MEDICINE

## 2023-03-12 PROCEDURE — 25010000002 HEPARIN (PORCINE) PER 1000 UNITS: Performed by: THORACIC SURGERY (CARDIOTHORACIC VASCULAR SURGERY)

## 2023-03-12 PROCEDURE — 25010000002 HYDROMORPHONE PER 4 MG: Performed by: THORACIC SURGERY (CARDIOTHORACIC VASCULAR SURGERY)

## 2023-03-12 PROCEDURE — 85018 HEMOGLOBIN: CPT

## 2023-03-12 PROCEDURE — 94761 N-INVAS EAR/PLS OXIMETRY MLT: CPT

## 2023-03-12 PROCEDURE — 80053 COMPREHEN METABOLIC PANEL: CPT | Performed by: THORACIC SURGERY (CARDIOTHORACIC VASCULAR SURGERY)

## 2023-03-12 PROCEDURE — 83735 ASSAY OF MAGNESIUM: CPT | Performed by: THORACIC SURGERY (CARDIOTHORACIC VASCULAR SURGERY)

## 2023-03-12 RX ADMIN — VANCOMYCIN HYDROCHLORIDE 1000 MG: 1 INJECTION, SOLUTION INTRAVENOUS at 21:17

## 2023-03-12 RX ADMIN — CELECOXIB 100 MG: 100 CAPSULE ORAL at 10:09

## 2023-03-12 RX ADMIN — IPRATROPIUM BROMIDE AND ALBUTEROL SULFATE 3 ML: 2.5; .5 SOLUTION RESPIRATORY (INHALATION) at 15:42

## 2023-03-12 RX ADMIN — IPRATROPIUM BROMIDE AND ALBUTEROL SULFATE 3 ML: 2.5; .5 SOLUTION RESPIRATORY (INHALATION) at 19:10

## 2023-03-12 RX ADMIN — HEPARIN SODIUM 5000 UNITS: 5000 INJECTION INTRAVENOUS; SUBCUTANEOUS at 14:00

## 2023-03-12 RX ADMIN — DIAZEPAM 2 MG: 2 TABLET ORAL at 10:09

## 2023-03-12 RX ADMIN — OXYCODONE HYDROCHLORIDE 5 MG: 5 TABLET ORAL at 07:23

## 2023-03-12 RX ADMIN — ACETAMINOPHEN 1000 MG: 500 TABLET, FILM COATED ORAL at 10:09

## 2023-03-12 RX ADMIN — PANTOPRAZOLE SODIUM 40 MG: 40 TABLET, DELAYED RELEASE ORAL at 06:10

## 2023-03-12 RX ADMIN — LIDOCAINE 1 PATCH: 700 PATCH TOPICAL at 10:11

## 2023-03-12 RX ADMIN — CELECOXIB 100 MG: 100 CAPSULE ORAL at 21:18

## 2023-03-12 RX ADMIN — LIDOCAINE 1 PATCH: 700 PATCH TOPICAL at 10:10

## 2023-03-12 RX ADMIN — GABAPENTIN 300 MG: 300 CAPSULE ORAL at 21:18

## 2023-03-12 RX ADMIN — DESVENLAFAXINE SUCCINATE 25 MG: 25 TABLET, EXTENDED RELEASE ORAL at 10:09

## 2023-03-12 RX ADMIN — OXYCODONE HYDROCHLORIDE 5 MG: 5 TABLET ORAL at 17:07

## 2023-03-12 RX ADMIN — OXYCODONE HYDROCHLORIDE 5 MG: 5 TABLET ORAL at 21:18

## 2023-03-12 RX ADMIN — HEPARIN SODIUM 5000 UNITS: 5000 INJECTION INTRAVENOUS; SUBCUTANEOUS at 06:11

## 2023-03-12 RX ADMIN — IPRATROPIUM BROMIDE AND ALBUTEROL SULFATE 3 ML: 2.5; .5 SOLUTION RESPIRATORY (INHALATION) at 08:33

## 2023-03-12 RX ADMIN — ACETAMINOPHEN 1000 MG: 500 TABLET, FILM COATED ORAL at 17:07

## 2023-03-12 RX ADMIN — GABAPENTIN 300 MG: 300 CAPSULE ORAL at 14:46

## 2023-03-12 RX ADMIN — Medication 100 MG: at 10:09

## 2023-03-12 RX ADMIN — FOLIC ACID 1 MG: 1 TABLET ORAL at 10:09

## 2023-03-12 RX ADMIN — Medication 10 ML: at 10:12

## 2023-03-12 RX ADMIN — LAMOTRIGINE 200 MG: 100 TABLET ORAL at 10:09

## 2023-03-12 RX ADMIN — HYDROMORPHONE HYDROCHLORIDE 0.5 MG: 1 INJECTION, SOLUTION INTRAMUSCULAR; INTRAVENOUS; SUBCUTANEOUS at 14:46

## 2023-03-12 RX ADMIN — ACETAMINOPHEN 1000 MG: 500 TABLET, FILM COATED ORAL at 21:17

## 2023-03-12 RX ADMIN — Medication 10 ML: at 21:19

## 2023-03-12 RX ADMIN — IPRATROPIUM BROMIDE AND ALBUTEROL SULFATE 3 ML: 2.5; .5 SOLUTION RESPIRATORY (INHALATION) at 12:03

## 2023-03-12 RX ADMIN — Medication 500 MCG: at 10:09

## 2023-03-12 RX ADMIN — HEPARIN SODIUM 5000 UNITS: 5000 INJECTION INTRAVENOUS; SUBCUTANEOUS at 21:18

## 2023-03-12 RX ADMIN — OXYCODONE HYDROCHLORIDE 5 MG: 5 TABLET ORAL at 12:34

## 2023-03-12 RX ADMIN — GABAPENTIN 300 MG: 300 CAPSULE ORAL at 06:10

## 2023-03-12 NOTE — PLAN OF CARE
Goal Outcome Evaluation:  Plan of Care Reviewed With: patient, spouse        Progress: no change  Outcome Evaluation: patient arrived on unit stable, chest tube to waterseal, no air leak/fluctuation. Patient with c/o pain and treated with PRN pain medication  Problem: Adult Inpatient Plan of Care  Goal: Plan of Care Review  3/12/2023 1842 by Violet Tavares RN  Outcome: Ongoing, Progressing  Flowsheets  Taken 3/12/2023 1842  Progress: no change  Taken 3/12/2023 1733  Outcome Evaluation: patient arrived on unit stable, chest tube to waterseal,  3/12/2023 1824 by Violet Tavares RN  Outcome: Ongoing, Progressing  Flowsheets  Taken 3/12/2023 1733 by Violet Tavares RN  Plan of Care Reviewed With:   patient   spouse  Outcome Evaluation: patient arrived on unit stable, chest tube to waterseal,  Taken 3/12/2023 0539 by Chey Pham RN  Progress: improving  Goal: Patient-Specific Goal (Individualized)  3/12/2023 1842 by Violet Tavares RN  Outcome: Ongoing, Progressing  3/12/2023 1824 by Violet Tavares RN  Outcome: Ongoing, Progressing  Goal: Absence of Hospital-Acquired Illness or Injury  3/12/2023 1842 by Violet Tavares RN  Outcome: Ongoing, Progressing  3/12/2023 1824 by Violet Tavares RN  Outcome: Ongoing, Progressing  Intervention: Identify and Manage Fall Risk  Recent Flowsheet Documentation  Taken 3/12/2023 1600 by Violet Tavares RN  Safety Promotion/Fall Prevention:   activity supervised   assistive device/personal items within reach   clutter free environment maintained   fall prevention program maintained   nonskid shoes/slippers when out of bed   room organization consistent   safety round/check completed  Taken 3/12/2023 1200 by Violet Tavares RN  Safety Promotion/Fall Prevention:   activity supervised   assistive device/personal items within reach   clutter free environment maintained   fall prevention program maintained   nonskid shoes/slippers when out of bed   safety  round/check completed   room organization consistent  Taken 3/12/2023 1000 by Violet Tavares RN  Safety Promotion/Fall Prevention:   activity supervised   assistive device/personal items within reach   clutter free environment maintained   fall prevention program maintained   nonskid shoes/slippers when out of bed   room organization consistent   safety round/check completed  Intervention: Prevent Skin Injury  Recent Flowsheet Documentation  Taken 3/12/2023 1600 by Violet Tavares RN  Body Position:   position changed independently   patient/family refused  Taken 3/12/2023 1200 by Violet Tavares RN  Body Position: position changed independently  Taken 3/12/2023 1000 by Violet Tavares RN  Body Position:   position changed independently   patient/family refused  Intervention: Prevent and Manage VTE (Venous Thromboembolism) Risk  Recent Flowsheet Documentation  Taken 3/12/2023 1600 by Violet Tavares RN  Activity Management:   activity encouraged   activity adjusted per tolerance  Taken 3/12/2023 1200 by Violet Tavares RN  Activity Management:   activity encouraged   activity adjusted per tolerance  Taken 3/12/2023 1000 by Violet Tavares RN  Activity Management:   activity encouraged   activity adjusted per tolerance  Intervention: Prevent Infection  Recent Flowsheet Documentation  Taken 3/12/2023 1600 by Violet Tavares RN  Infection Prevention: single patient room provided  Goal: Optimal Comfort and Wellbeing  3/12/2023 1842 by Violet Tavares RN  Outcome: Ongoing, Progressing  3/12/2023 1824 by Violet Tavares RN  Outcome: Ongoing, Progressing  Intervention: Monitor Pain and Promote Comfort  Recent Flowsheet Documentation  Taken 3/12/2023 1707 by Violet Tavares RN  Pain Management Interventions: see MAR  Taken 3/12/2023 1600 by Violet Tavares RN  Pain Management Interventions:   position adjusted   pillow support provided  Taken 3/12/2023 1200 by Violet Tavares RN  Pain Management  Interventions: see MAR  Goal: Readiness for Transition of Care  3/12/2023 1842 by Violet Tavares RN  Outcome: Ongoing, Progressing  3/12/2023 1824 by Violet Tavares RN  Outcome: Ongoing, Progressing     Problem: Fall Injury Risk  Goal: Absence of Fall and Fall-Related Injury  3/12/2023 1842 by Violet Tavares RN  Outcome: Ongoing, Progressing  3/12/2023 1824 by Violet Tavares RN  Outcome: Ongoing, Progressing  Intervention: Identify and Manage Contributors  Recent Flowsheet Documentation  Taken 3/12/2023 1600 by Violet Tavares RN  Medication Review/Management: medications reviewed  Intervention: Promote Injury-Free Environment  Recent Flowsheet Documentation  Taken 3/12/2023 1600 by Violet Tavares RN  Safety Promotion/Fall Prevention:   activity supervised   assistive device/personal items within reach   clutter free environment maintained   fall prevention program maintained   nonskid shoes/slippers when out of bed   room organization consistent   safety round/check completed  Taken 3/12/2023 1200 by Violet Tavares RN  Safety Promotion/Fall Prevention:   activity supervised   assistive device/personal items within reach   clutter free environment maintained   fall prevention program maintained   nonskid shoes/slippers when out of bed   safety round/check completed   room organization consistent  Taken 3/12/2023 1000 by Violet Tavares RN  Safety Promotion/Fall Prevention:   activity supervised   assistive device/personal items within reach   clutter free environment maintained   fall prevention program maintained   nonskid shoes/slippers when out of bed   room organization consistent   safety round/check completed     Problem: Skin Injury Risk Increased  Goal: Skin Health and Integrity  3/12/2023 1842 by Violet Tavares RN  Outcome: Ongoing, Progressing  3/12/2023 1824 by Violet Tavares RN  Outcome: Ongoing, Progressing  Intervention: Optimize Skin Protection  Recent Flowsheet  Documentation  Taken 3/12/2023 1600 by Violet Tavares RN  Head of Bed ([facility]) Positioning: HOB elevated  Taken 3/12/2023 1200 by Violet Tavares RN  Head of Bed ([facility]) Positioning: HOB elevated  Taken 3/12/2023 1000 by Violet Tavares RN  Head of Bed ([facility]) Positioning: HOB elevated     Problem: Pain Acute  Goal: Acceptable Pain Control and Functional Ability  3/12/2023 1842 by Violet Tavares RN  Outcome: Ongoing, Progressing  3/12/2023 1824 by Violet Tavares RN  Outcome: Ongoing, Progressing  Intervention: Prevent or Manage Pain  Recent Flowsheet Documentation  Taken 3/12/2023 1600 by Violet Tavares RN  Medication Review/Management: medications reviewed  Intervention: Develop Pain Management Plan  Recent Flowsheet Documentation  Taken 3/12/2023 1707 by Violet Tavares RN  Pain Management Interventions: see MAR  Taken 3/12/2023 1600 by Violte Tavares RN  Pain Management Interventions:   position adjusted   pillow support provided  Taken 3/12/2023 1200 by Violet Tavares RN  Pain Management Interventions: see MAR

## 2023-03-12 NOTE — PLAN OF CARE
Goal Outcome Evaluation:  Plan of Care Reviewed With: patient        Progress: improving  Outcome Evaluation: VSS, 2L O2, SR/ST on monitor. Right chest tubes to water seal. Pain controlled with ERAS and PRN medication. IV Vanc per order. Spouse at bedside assisting patient. Will continue to monitor.

## 2023-03-12 NOTE — PLAN OF CARE
Problem: Adult Inpatient Plan of Care  Goal: Plan of Care Review  3/12/2023 1902 by Violet Tavares RN  Outcome: Ongoing, Progressing  Flowsheets  Taken 3/12/2023 1844  Progress: improving  Outcome Evaluation: patient with CT #2 removed and tolerated without s/s of distress. patient up in bed and using IS as instructed. patient appears to have slight improvement this shift by rolling/turning more in bed.  Taken 3/12/2023 1733  Plan of Care Reviewed With:   patient   spouse  3/12/2023 1844 by Violet Tavares RN  Outcome: Ongoing, Progressing  Flowsheets  Taken 3/12/2023 1844  Progress: improving  Outcome Evaluation: patient with CT #2 removed and tolerated without s/s of distress. patient up in bed and using IS as instructed. patient appears to have slight improvement this shift by rolling/turning more in bed.  Taken 3/12/2023 1733  Plan of Care Reviewed With:   patient   spouse  3/12/2023 1842 by Violet Tavares RN  Outcome: Ongoing, Progressing  Flowsheets  Taken 3/12/2023 1842  Progress: no change  Taken 3/12/2023 1733  Outcome Evaluation: patient arrived on unit stable, chest tube to waterseal,  3/12/2023 1824 by Violet Tavares RN  Outcome: Ongoing, Progressing  Flowsheets  Taken 3/12/2023 1733 by Violet Tavares RN  Plan of Care Reviewed With:   patient   spouse  Outcome Evaluation: patient arrived on unit stable, chest tube to waterseal,  Taken 3/12/2023 0539 by Chey Pham RN  Progress: improving  Goal: Patient-Specific Goal (Individualized)  3/12/2023 1902 by Violet Tavares RN  Outcome: Ongoing, Progressing  3/12/2023 1844 by Violet Tavares RN  Outcome: Ongoing, Progressing  3/12/2023 1842 by Violet Tavares RN  Outcome: Ongoing, Progressing  3/12/2023 1824 by Violet Tavares RN  Outcome: Ongoing, Progressing  Goal: Absence of Hospital-Acquired Illness or Injury  3/12/2023 1902 by Violet Tavares RN  Outcome: Ongoing, Progressing  3/12/2023 1844 by Violet Tavares,  RN  Outcome: Ongoing, Progressing  3/12/2023 1842 by Violet Tavares RN  Outcome: Ongoing, Progressing  3/12/2023 1824 by Violet Tavares RN  Outcome: Ongoing, Progressing  Intervention: Identify and Manage Fall Risk  Recent Flowsheet Documentation  Taken 3/12/2023 1600 by Violet Tavares RN  Safety Promotion/Fall Prevention:   activity supervised   assistive device/personal items within reach   clutter free environment maintained   fall prevention program maintained   nonskid shoes/slippers when out of bed   room organization consistent   safety round/check completed  Taken 3/12/2023 1200 by Violet Tavares RN  Safety Promotion/Fall Prevention:   activity supervised   assistive device/personal items within reach   clutter free environment maintained   fall prevention program maintained   nonskid shoes/slippers when out of bed   safety round/check completed   room organization consistent  Taken 3/12/2023 1000 by Violet Tavares RN  Safety Promotion/Fall Prevention:   activity supervised   assistive device/personal items within reach   clutter free environment maintained   fall prevention program maintained   nonskid shoes/slippers when out of bed   room organization consistent   safety round/check completed  Taken 3/12/2023 0800 by Violet Tavares RN  Safety Promotion/Fall Prevention:   activity supervised   assistive device/personal items within reach   clutter free environment maintained   fall prevention program maintained   nonskid shoes/slippers when out of bed   room organization consistent   safety round/check completed  Intervention: Prevent Skin Injury  Recent Flowsheet Documentation  Taken 3/12/2023 1600 by Violet Tavares RN  Body Position:   position changed independently   patient/family refused  Taken 3/12/2023 1200 by Violet Tavares RN  Body Position: position changed independently  Taken 3/12/2023 1000 by Violet Tavares RN  Body Position:   position changed independently    patient/family refused  Taken 3/12/2023 0800 by Violet Tavares RN  Skin Protection: adhesive use limited  Intervention: Prevent and Manage VTE (Venous Thromboembolism) Risk  Recent Flowsheet Documentation  Taken 3/12/2023 1600 by Violet Tavares RN  Activity Management:   activity encouraged   activity adjusted per tolerance  Taken 3/12/2023 1200 by Violet Tavares RN  Activity Management:   activity encouraged   activity adjusted per tolerance  Taken 3/12/2023 1000 by Violet Tavares RN  Activity Management:   activity encouraged   activity adjusted per tolerance  Taken 3/12/2023 0800 by Violet Tavares RN  Activity Management:   activity adjusted per tolerance   activity encouraged  VTE Prevention/Management:   bilateral   sequential compression devices on  Intervention: Prevent Infection  Recent Flowsheet Documentation  Taken 3/12/2023 1600 by Violet Tavares RN  Infection Prevention: single patient room provided  Goal: Optimal Comfort and Wellbeing  3/12/2023 1902 by Violet Tavares RN  Outcome: Ongoing, Progressing  3/12/2023 1844 by Violet Tavares RN  Outcome: Ongoing, Progressing  3/12/2023 1842 by Violet Tavares RN  Outcome: Ongoing, Progressing  3/12/2023 1824 by Violet Tavares RN  Outcome: Ongoing, Progressing  Intervention: Monitor Pain and Promote Comfort  Recent Flowsheet Documentation  Taken 3/12/2023 1707 by Violet Tavares RN  Pain Management Interventions: see MAR  Taken 3/12/2023 1600 by Violet Tavares RN  Pain Management Interventions:   position adjusted   pillow support provided  Taken 3/12/2023 1200 by Violet Tavares RN  Pain Management Interventions: see MAR  Taken 3/12/2023 0800 by Violet Tavares RN  Pain Management Interventions:   pillow support provided   position adjusted  Intervention: Provide Person-Centered Care  Recent Flowsheet Documentation  Taken 3/12/2023 0800 by Violet Tavares RN  Trust Relationship/Rapport:   care explained   choices  provided   questions answered   thoughts/feelings acknowledged   reassurance provided  Goal: Readiness for Transition of Care  3/12/2023 1902 by Violet Tavares RN  Outcome: Ongoing, Progressing  3/12/2023 1844 by Violet Tavares RN  Outcome: Ongoing, Progressing  3/12/2023 1842 by Violet Tavares RN  Outcome: Ongoing, Progressing  3/12/2023 1824 by Violet Tavares RN  Outcome: Ongoing, Progressing     Problem: Fall Injury Risk  Goal: Absence of Fall and Fall-Related Injury  3/12/2023 1902 by Violet Tavares RN  Outcome: Ongoing, Progressing  3/12/2023 1844 by Violet Tavares RN  Outcome: Ongoing, Progressing  3/12/2023 1842 by Violet Tavares RN  Outcome: Ongoing, Progressing  3/12/2023 1824 by Violet Tavares RN  Outcome: Ongoing, Progressing  Intervention: Identify and Manage Contributors  Recent Flowsheet Documentation  Taken 3/12/2023 1600 by Violet Tavares RN  Medication Review/Management: medications reviewed  Taken 3/12/2023 0800 by Violet Tavares RN  Medication Review/Management: medications reviewed  Intervention: Promote Injury-Free Environment  Recent Flowsheet Documentation  Taken 3/12/2023 1600 by Violet Tavares RN  Safety Promotion/Fall Prevention:   activity supervised   assistive device/personal items within reach   clutter free environment maintained   fall prevention program maintained   nonskid shoes/slippers when out of bed   room organization consistent   safety round/check completed  Taken 3/12/2023 1200 by Violet Tavares RN  Safety Promotion/Fall Prevention:   activity supervised   assistive device/personal items within reach   clutter free environment maintained   fall prevention program maintained   nonskid shoes/slippers when out of bed   safety round/check completed   room organization consistent  Taken 3/12/2023 1000 by Violet Tavares RN  Safety Promotion/Fall Prevention:   activity supervised   assistive device/personal items within reach   clutter free  environment maintained   fall prevention program maintained   nonskid shoes/slippers when out of bed   room organization consistent   safety round/check completed  Taken 3/12/2023 0800 by Violet Tavares RN  Safety Promotion/Fall Prevention:   activity supervised   assistive device/personal items within reach   clutter free environment maintained   fall prevention program maintained   nonskid shoes/slippers when out of bed   room organization consistent   safety round/check completed     Problem: Skin Injury Risk Increased  Goal: Skin Health and Integrity  3/12/2023 1902 by Violet Tavares RN  Outcome: Ongoing, Progressing  3/12/2023 1844 by Violet Tavares RN  Outcome: Ongoing, Progressing  3/12/2023 1842 by Violet Tavares RN  Outcome: Ongoing, Progressing  3/12/2023 1824 by Violet Tavares RN  Outcome: Ongoing, Progressing  Intervention: Optimize Skin Protection  Recent Flowsheet Documentation  Taken 3/12/2023 1600 by Violet Tavares RN  Head of Bed (HOB) Positioning: HOB elevated  Taken 3/12/2023 1200 by Violet Tavares RN  Head of Bed (HOB) Positioning: HOB elevated  Taken 3/12/2023 1000 by Violet Tavares RN  Head of Bed (HOB) Positioning: HOB elevated  Taken 3/12/2023 0800 by Violet Tavares RN  Pressure Reduction Techniques: weight shift assistance provided  Pressure Reduction Devices: specialty bed utilized  Skin Protection: adhesive use limited     Problem: Pain Acute  Goal: Acceptable Pain Control and Functional Ability  3/12/2023 1902 by Violet Tavares RN  Outcome: Ongoing, Progressing  3/12/2023 1844 by Violet Tavares RN  Outcome: Ongoing, Progressing  3/12/2023 1842 by Violet Tavares RN  Outcome: Ongoing, Progressing  3/12/2023 1824 by Violet Tavares RN  Outcome: Ongoing, Progressing  Intervention: Prevent or Manage Pain  Recent Flowsheet Documentation  Taken 3/12/2023 1600 by Violet Tavares RN  Medication Review/Management: medications reviewed  Taken 3/12/2023 0800 by  Violet Tavares RN  Medication Review/Management: medications reviewed  Intervention: Develop Pain Management Plan  Recent Flowsheet Documentation  Taken 3/12/2023 1707 by Violet Tavares RN  Pain Management Interventions: see MAR  Taken 3/12/2023 1600 by Violet Tavares RN  Pain Management Interventions:   position adjusted   pillow support provided  Taken 3/12/2023 1200 by Violet Tavares RN  Pain Management Interventions: see MAR  Taken 3/12/2023 0800 by Violet Tavares RN  Pain Management Interventions:   pillow support provided   position adjusted  Intervention: Optimize Psychosocial Wellbeing  Recent Flowsheet Documentation  Taken 3/12/2023 0800 by Violet Tavares RN  Diversional Activities: television   Goal Outcome Evaluation:  Plan of Care Reviewed With: patient, spouse        Progress: improving  Outcome Evaluation: patient with CT #2 removed and tolerated without s/s of distress. patient up in bed and using IS as instructed. patient appears to have slight improvement this shift by rolling/turning more in bed.

## 2023-03-12 NOTE — PROGRESS NOTES
"  POST-OPERATIVE NOTE     Chief Complaint: Empyema  S/P: Video-assisted thoracoscopy with complete decortication  POD # 4    Subjective:  Symptoms:  Stable.  She reports cough and weakness.  No shortness of breath.    Diet:  Poor intake.  No nausea or vomiting.    Activity level: Impaired due to weakness.    Pain:  She complains of pain that is mild.  Pain is well controlled.    Feeling much better today.    Objective:  General Appearance:  Ill-appearing, in no acute distress, in pain and comfortable.    Vital signs: (most recent): Blood pressure 125/77, pulse 119, temperature 99 °F (37.2 °C), temperature source Oral, resp. rate 18, height 157.5 cm (62\"), weight 57.6 kg (127 lb), SpO2 92 %, not currently breastfeeding.  Vital signs are normal.    Output: Producing urine (Booker catheter in place.).    Lungs:  Normal effort and normal respiratory rate.    Heart: Normal rate.  Regular rhythm.    Chest: Symmetric chest wall expansion. Chest wall tenderness (Around chest tubes.) present.    Abdomen: Abdomen is non-distended.    Extremities: Decreased range of motion.    Neurological: Patient is alert and oriented to person, place and time.    Skin:  Warm and dry.            Chest tube:   Site: Right, Clean, Dry and Securement device intact  Suction: Waterseal  Air Leak: Negative    Results Review:     I reviewed the patient's new clinical results.  I reviewed the patient's new imaging results and agree with the interpretation.    Assessment & Plan     S/p VAT with complete decortication POD 4.    Recovering as expected.  Continue multimodal pain management.  Wean supplemental oxygen.  1 out of 2 chest tube removed.  The last chest tube will likely come out tomorrow.  Repeat x-ray in the morning.  Encourage incentive spirometer, progressive mobility.  Rest of the care per primary team.  PT/OT.    Sekou Allison MD  [facility]  03/12/23  13:00 EDT    Patient was seen and assessed while wearing personal " protective equipment including facemask, protective eyewear and gloves.  Hand hygiene performed prior to entering the room and upon exiting with doffing of gloves.

## 2023-03-12 NOTE — PROGRESS NOTES
[facility]    Will follow for progress with therapies and ability to tolerate 3 hours of therapy per day.    Thank you,  Gaviota Goodman, RN  Rehab Admission Nurse

## 2023-03-12 NOTE — PROGRESS NOTES
Name: Louise GARCIA ADMIT: 2023   : 1965  PCP: Chloe Schaefer APRN    MRN: [id number] LOS: 17 days   AGE/SEX: 58 y.o. female  ROOM: [facility]     Subjective   Subjective   Did not feel well this morning but doing a little better this afternoon.  No new complaints.  Expected chest pain given chest tubes.  Having bowel movements.  Tolerating some diet.  Working with therapy.      Objective   Objective   Vital Signs  Temp:  [98.6 °F (37 °C)-99.2 °F (37.3 °C)] 99 °F (37.2 °C)  Heart Rate:  [] 119  Resp:  [16-18] 18  BP: (120-131)/(63-77) 125/77  SpO2:  [89 %-99 %] 92 %  on  Flow (L/min):  [2] 2;   Device (Oxygen Therapy): room air  Body mass index is 23.23 kg/m².  Physical Exam  Constitutional:       Appearance: She is ill-appearing. She is not toxic-appearing.   Cardiovascular:      Rate and Rhythm: Tachycardia present.      Heart sounds: No murmur heard.  Pulmonary:      Effort: Pulmonary effort is normal.      Breath sounds: No stridor. No rhonchi.      Comments: 2 R sided chest tubes  Abdominal:      General: Abdomen is flat. There is no distension.      Palpations: Abdomen is soft.   Skin:     General: Skin is warm.      Coloration: Skin is not jaundiced.   Neurological:      General: No focal deficit present.      Mental Status: She is alert and oriented to person, place, and time.      Comments: 5/5 strength b/l UEs; 4/5 strength b/l LEs;         Results Review     I reviewed the patient's new clinical results.  Results from last 7 days   Lab Units 23  0813 23  2319 23  0840 23  0315 03/10/23  2017 03/10/23  0322 23  1224 23  0443   WBC 10*3/mm3 10.59  --   --  14.10*  --  18.95*  --  25.61*   HEMOGLOBIN g/dL 7.6*  7.6* 7.7* 8.3* 7.5*   < > 8.1*  8.1*   < > 6.6*   PLATELETS 10*3/mm3 276  --   --  210  --  238  --  219    < > = values in this interval not displayed.     Results from last 7 days   Lab Units 23  0813 23  0315 03/10/23  0322  03/09/23  0443   SODIUM mmol/L 135* 138 133* 134*   POTASSIUM mmol/L 4.3 3.7 3.8 4.1   CHLORIDE mmol/L 101 103 98 99   CO2 mmol/L 26.0 27.0 28.0 29.0   BUN mg/dL 12 12 10 10   CREATININE mg/dL 0.93 0.90 0.88 0.59   GLUCOSE mg/dL 126* 103* 81 112*   EGFR mL/min/1.73 70.9 73.8 75.8 104.0     Results from last 7 days   Lab Units 03/12/23  0813 03/11/23  0315 03/10/23  0322 03/09/23  0443   ALBUMIN g/dL 1.7* 1.9* 1.8* 1.8*   BILIRUBIN mg/dL 0.3 0.4 0.8 1.0   ALK PHOS U/L 224* 216* 199* 178*   AST (SGOT) U/L 29 26 29 27   ALT (SGPT) U/L 14 16 17 19     Results from last 7 days   Lab Units 03/12/23  0813 03/11/23  0315 03/10/23  0322 03/09/23  0443   CALCIUM mg/dL 7.8* 7.5* 7.6* 7.7*   ALBUMIN g/dL 1.7* 1.9* 1.8* 1.8*   MAGNESIUM mg/dL 1.9 1.9 1.7 1.6   PHOSPHORUS mg/dL 2.6 2.6 3.1 4.4         Glucose   Date/Time Value Ref Range Status   03/12/2023 0537 90 70 - 130 mg/dL Final     Comment:     Meter: [id number] : [id number] Sherman Strickland PCA       XR Chest 1 View    Result Date: 3/12/2023  Partially improved aeration of the right lung.  This report was finalized on 3/12/2023 7:24 AM by Dr. Kurt Garrison M.D.      XR Chest 1 View    Result Date: 3/11/2023  Electronically signed by Juan Lake MD on 03-11-23 at 0626    Scheduled Medications  acetaminophen, 1,000 mg, Oral, TID  celecoxib, 100 mg, Oral, Q12H  desvenlafaxine, 25 mg, Oral, Daily  folic acid, 1 mg, Oral, Daily  gabapentin, 300 mg, Oral, Q8H  heparin (porcine), 5,000 Units, Subcutaneous, Q8H  ipratropium-albuterol, 3 mL, Nebulization, 4x Daily - RT  lamoTRIgine, 200 mg, Oral, Daily  lidocaine, 1 patch, Transdermal, Q24H  lidocaine, 1 patch, Transdermal, Q24H  lidocaine, 10 mL, Intradermal, Once  pantoprazole, 40 mg, Oral, Q AM  sodium chloride, 10 mL, Intravenous, Q12H  thiamine, 100 mg, Oral, Daily  vancomycin, 1,000 mg, Intravenous, Q24H  vitamin B-12, 500 mcg, Oral, Daily    Infusions  hold, 1 each  hold, 1 each  lactated ringers, 9  mL/hr, Last Rate: 500 mL/hr (03/08/23 1856)  Pharmacy to dose vancomycin,     Diet  Diet: Regular/House Diet; Texture: Regular Texture (IDDSI 7); Fluid Consistency: Thin (IDDSI 0)       Assessment/Plan     Active Hospital Problems    Diagnosis  POA   • **Paresthesia [R20.2]  Yes   • MRSA bacteremia [R78.81, B95.62]  Unknown   • Oral candidiasis [B37.0]  No   • Mass of middle lobe of right lung [R91.8]  Yes   • Situational mixed anxiety and depressive disorder [F43.23]  Yes   • 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]  Yes   • Normocytic anemia [D64.9]  Yes   • GERD (gastroesophageal reflux disease) [K21.9]  Yes   • Lower extremity weakness [R29.898]  Yes   • History of blood clots [Z86.718]  Not Applicable   • Chronic anticoagulation [Z79.01]  Not Applicable   • Seizures (HCC) [R56.9]  Yes   • Cervical myelopathy (HCC) [G95.9]  Yes   • Empyema of pleura (HCC) [J86.9]  Yes      Resolved Hospital Problems    Diagnosis Date Resolved POA   • Tricuspid valve vegetation [I33.0] 03/12/2023 Yes   • Upper abdominal pain [R10.10] 03/08/2023 No   • Leukocytosis [D72.829] 03/08/2023 Yes   • Hyperglycemia [R73.9] 03/08/2023 No       58 y.o. female admitted with Paresthesia.    Magnesium and phosphorus have both been normal for 19 consecutive days.  See no reason to continue checking these daily.    LFTs have normalized.  Monitor as outpatient.      MRSA bacteremia/PNA with empyema/Endocarditis/Septic phlebitis  -Blood cx (3/2/23): MRSA; repeat negative   -s/p thoracentesis 3/4/23; pleural fluid w/ MRSA  -TTE (3/5/23): 61-65%; small mobile mass on TV c/w vegetation  -TTE (3/10/23): NO VEGETATION  -b/l UE doppler (3/5/23): acute RUE superficial thrombophlebitis  -ID following; continues on IV vancomycin  -s/p thoracoscopy w/ DaVinci robot w/ complete decortication 3/8/23 with CTS    Idiopathic peripheral neuropathy, motor predominant  -EMG showing evidence of neuropathy, not typical of GBS  -s/p IVIG for possible autoimmune  neuropathy  -following tests are negative/Normal : West Nile virus serology, Lyme serology, ESR, CRP, magnesium, phosphorus, potassium, RPR, folate, CK, DENISE, heavy metal screen, TSH, angiotensin-converting enzyme, heavy metal screen  -Neuro has signed off but will follow-up after discharge and will follow-up results [facility] CSF autoimmune panel    Seizure disorder  -lamotrigine 200mg    Depression  -desvenlafaxine 25mg    Hx DVT  -on Eliquis, on hold for VATS      · SCDs for DVT prophylaxis; Eliquis ON HOLD  · Full code.  · Discussed with patient and family.  · Anticipate discharge to SNF when cleared by consultants      Yvan Samuel MD  [facility]  03/12/23  15:18 EDT

## 2023-03-12 NOTE — PROGRESS NOTES
"DOS: 3/12/2023  NAME: Louise GARCIA   : 1965  PCP: Chloe Schaefer APRN  Chief Complaint   Patient presents with   • Fall   • Numbness     Numbness in bilateral arms and legs post falling.        Chief complaint: Weakness  Subjective: Feels her upper extremity strength is improved slightly today.  Complains of some slight right-sided thoracic pain from her chest tubes.    Objective:  Vital signs: /63 (BP Location: Left arm, Patient Position: Lying)   Pulse 101   Temp 98.6 °F (37 °C) (Oral)   Resp 18   Ht 157.5 cm (62\")   Wt 57.6 kg (127 lb)   SpO2 99%   Breastfeeding No   BMI 23.23 kg/m²    Gen: NAD, vitals reviewed  MS: oriented x3, recent/remote memory intact, normal attention/concentration, language intact, no neglect.  CN: visual acuity grossly normal, PERRL, EOMI, no facial droop, no dysarthria  Motor: 5/5 bilateral upper extremities, 3/5 right lower extremity, 2/5 left lower extremity, normal tone throughout  Sensory: intact to vibration and cold temperature throughout  Reflexes: 2+ right biceps, otherwise areflexic.  Plantars downgoing    Laboratory results:  Lab Results   Component Value Date    GLUCOSE 126 (H) 2023    CALCIUM 7.8 (L) 2023     (L) 2023    K 4.3 2023    CO2 26.0 2023     2023    BUN 12 2023    CREATININE 0.93 2023    EGFRIFAFRI  2016      Comment:      <15 Indicative of kidney failure.    EGFRIFNONA 81 2016    BCR 12.9 2023    ANIONGAP 8.0 2023     Lab Results   Component Value Date    WBC 10.59 2023    HGB 7.6 (L) 2023    HGB 7.6 (L) 2023    HCT 22.0 (L) 2023    HCT 22.0 (L) 2023    MCV 97.3 (H) 2023     2023     No results found for: LDL         Review of labs: Sodium 135, hemoglobin 7.6    Work-up to date: Idiopathic motor predominant neuropathy, subacute onset, status post course of IVIG    Labs: Cryo, SPEP, CK, ACE, ESR, TSH, heavy metal " "screen, DENISE all negative/normal  MRI brain, total spine: Negative  CSF x2: Normal cell count, normal protein, 0 oligoclonal bands  [facility] autoimmune panel pending  EMG Dr. Reyes 2/27: \"Abnormal study consistent with peripheral neuropathy there is mild evidence in the upper extremity with sensory involvement and mild to moderate involvement of the lower extremity with sensory and motor involvement.  Typical features however of Guillain-Barré syndrome were not seen such as dramatically slowed velocities or significant conduction block.  There was no evidence of a right cervical or lumbosacral radiculopathy\"     Diagnoses:  Subacute motor predominant idiopathic peripheral neuropathy  Paraparesis  Areflexia  Lung empyema    Comment: Clinically now back to her preop state.  Subjectively she does seem to have  had some slight improvement with IVIG.  We will see how she does over the next month or so    Plan:  1.  Continue supportive care, status post IVIG for possible autoimmune neuropathy  2.  Awaiting [facility] CSF autoimmune panel  3.  I will arrange follow-up in our clinic for after rehab    Discussed with patient, spouse.  I will see the patient as needed at this point.    "

## 2023-03-12 NOTE — PROGRESS NOTES
[facility]      Mar/chart reviewed  Follow up empyema, pneumonia, neuropathy   chest discomfort at site of chest tubes, some cough    Vital Sign Min/Max for last 24 hours  Temp  Min: 98 °F (36.7 °C)  Max: 99.2 °F (37.3 °C)   BP  Min: 120/63  Max: 142/76   Pulse  Min: 87  Max: 104   Resp  Min: 16  Max: 18   SpO2  Min: 89 %  Max: 99 %   Flow (L/min)  Min: 2  Max: 2   No data recorded   150 out chest tube    Appears ill, axox3,   perrl, eomi, normal sclera  mmm, no jvd, trachea midline, neck supple,  chest decreased ae bilaterally, no crackles, no wheezes,   rrr,   soft, nt, nd +bs,  no c/c/ e  Skin warm, dry no rashes    Labs: 3/12: reviewed:  Glucose 126  Bun 12  Cr 0.93  Bicarb 26  Wbc 10.6  hgb 7.6  plts 276    3/12: cxr: reviewed chest tubes, improving infiltrate      ASSESSMENT:     Idiopathic neuropathy  MRSA pneumonia/empyema: S/P decortication 3/8  Elevated liver enz  Anemia  Dysphagia  Hx DVT, was on Eliquis  Osteonecrosis of femoral heads  Possible vegitation on tricuspid valve -- cards and ID following              PLAN:     Encourage pulmonary toilet.  Continue antibiotics per ID recs.  Bronchodilators to help with clearance.  Pain control.  Chest tube management per thoracic surgery.  Doing well

## 2023-03-13 ENCOUNTER — APPOINTMENT (OUTPATIENT)
Dept: GENERAL RADIOLOGY | Facility: HOSPITAL | Age: 58
DRG: 163 | End: 2023-03-13
Payer: COMMERCIAL

## 2023-03-13 LAB
ANION GAP SERPL CALCULATED.3IONS-SCNC: 9.1 MMOL/L (ref 5–15)
BACTERIA SPEC ANAEROBE CULT: NORMAL
BUN SERPL-MCNC: 12 MG/DL (ref 6–20)
BUN/CREAT SERPL: 11.8 (ref 7–25)
CALCIUM SPEC-SCNC: 8.5 MG/DL (ref 8.6–10.5)
CHLORIDE SERPL-SCNC: 99 MMOL/L (ref 98–107)
CO2 SERPL-SCNC: 25.9 MMOL/L (ref 22–29)
CREAT SERPL-MCNC: 1.02 MG/DL (ref 0.57–1)
DEPRECATED RDW RBC AUTO: 50.8 FL (ref 37–54)
EGFRCR SERPLBLD CKD-EPI 2021: 63.5 ML/MIN/1.73
ERYTHROCYTE [DISTWIDTH] IN BLOOD BY AUTOMATED COUNT: 14 % (ref 12.3–15.4)
GLUCOSE SERPL-MCNC: 81 MG/DL (ref 65–99)
HCT VFR BLD AUTO: 23.8 % (ref 34–46.6)
HGB BLD-MCNC: 7.9 G/DL (ref 12–15.9)
MCH RBC QN AUTO: 33.1 PG (ref 26.6–33)
MCHC RBC AUTO-ENTMCNC: 33.2 G/DL (ref 31.5–35.7)
MCV RBC AUTO: 99.6 FL (ref 79–97)
PLATELET # BLD AUTO: 290 10*3/MM3 (ref 140–450)
PMV BLD AUTO: 11.2 FL (ref 6–12)
POTASSIUM SERPL-SCNC: 4.8 MMOL/L (ref 3.5–5.2)
RBC # BLD AUTO: 2.39 10*6/MM3 (ref 3.77–5.28)
SODIUM SERPL-SCNC: 134 MMOL/L (ref 136–145)
WBC NRBC COR # BLD: 11.32 10*3/MM3 (ref 3.4–10.8)

## 2023-03-13 PROCEDURE — 97530 THERAPEUTIC ACTIVITIES: CPT

## 2023-03-13 PROCEDURE — 85027 COMPLETE CBC AUTOMATED: CPT | Performed by: HOSPITALIST

## 2023-03-13 PROCEDURE — 97110 THERAPEUTIC EXERCISES: CPT

## 2023-03-13 PROCEDURE — 71045 X-RAY EXAM CHEST 1 VIEW: CPT

## 2023-03-13 PROCEDURE — 25010000002 VANCOMYCIN PER 500 MG: Performed by: INTERNAL MEDICINE

## 2023-03-13 PROCEDURE — 99024 POSTOP FOLLOW-UP VISIT: CPT | Performed by: NURSE PRACTITIONER

## 2023-03-13 PROCEDURE — 94664 DEMO&/EVAL PT USE INHALER: CPT

## 2023-03-13 PROCEDURE — 80048 BASIC METABOLIC PNL TOTAL CA: CPT | Performed by: HOSPITALIST

## 2023-03-13 PROCEDURE — 97535 SELF CARE MNGMENT TRAINING: CPT

## 2023-03-13 PROCEDURE — 94799 UNLISTED PULMONARY SVC/PX: CPT

## 2023-03-13 PROCEDURE — 25010000002 HYDROMORPHONE PER 4 MG: Performed by: THORACIC SURGERY (CARDIOTHORACIC VASCULAR SURGERY)

## 2023-03-13 PROCEDURE — 94761 N-INVAS EAR/PLS OXIMETRY MLT: CPT

## 2023-03-13 PROCEDURE — 25010000002 HEPARIN (PORCINE) PER 1000 UNITS: Performed by: THORACIC SURGERY (CARDIOTHORACIC VASCULAR SURGERY)

## 2023-03-13 RX ADMIN — ACETAMINOPHEN 1000 MG: 500 TABLET, FILM COATED ORAL at 15:22

## 2023-03-13 RX ADMIN — DESVENLAFAXINE SUCCINATE 25 MG: 25 TABLET, EXTENDED RELEASE ORAL at 08:10

## 2023-03-13 RX ADMIN — Medication 100 MG: at 08:10

## 2023-03-13 RX ADMIN — OXYCODONE HYDROCHLORIDE 5 MG: 5 TABLET ORAL at 16:22

## 2023-03-13 RX ADMIN — DIAZEPAM 2 MG: 2 TABLET ORAL at 18:19

## 2023-03-13 RX ADMIN — CELECOXIB 100 MG: 100 CAPSULE ORAL at 08:09

## 2023-03-13 RX ADMIN — LIDOCAINE 1 PATCH: 700 PATCH TOPICAL at 08:11

## 2023-03-13 RX ADMIN — OXYCODONE HYDROCHLORIDE 5 MG: 5 TABLET ORAL at 20:55

## 2023-03-13 RX ADMIN — FOLIC ACID 1 MG: 1 TABLET ORAL at 08:10

## 2023-03-13 RX ADMIN — GABAPENTIN 300 MG: 300 CAPSULE ORAL at 05:53

## 2023-03-13 RX ADMIN — IPRATROPIUM BROMIDE AND ALBUTEROL SULFATE 3 ML: 2.5; .5 SOLUTION RESPIRATORY (INHALATION) at 07:03

## 2023-03-13 RX ADMIN — Medication 10 ML: at 20:57

## 2023-03-13 RX ADMIN — PANTOPRAZOLE SODIUM 40 MG: 40 TABLET, DELAYED RELEASE ORAL at 05:53

## 2023-03-13 RX ADMIN — OXYCODONE HYDROCHLORIDE 5 MG: 5 TABLET ORAL at 12:05

## 2023-03-13 RX ADMIN — OXYCODONE HYDROCHLORIDE 5 MG: 5 TABLET ORAL at 07:51

## 2023-03-13 RX ADMIN — ACETAMINOPHEN 1000 MG: 500 TABLET, FILM COATED ORAL at 08:09

## 2023-03-13 RX ADMIN — HEPARIN SODIUM 5000 UNITS: 5000 INJECTION INTRAVENOUS; SUBCUTANEOUS at 21:00

## 2023-03-13 RX ADMIN — VANCOMYCIN HYDROCHLORIDE 1000 MG: 1 INJECTION, SOLUTION INTRAVENOUS at 21:09

## 2023-03-13 RX ADMIN — GABAPENTIN 300 MG: 300 CAPSULE ORAL at 14:20

## 2023-03-13 RX ADMIN — CELECOXIB 100 MG: 100 CAPSULE ORAL at 20:55

## 2023-03-13 RX ADMIN — Medication 500 MCG: at 08:10

## 2023-03-13 RX ADMIN — LIDOCAINE 1 PATCH: 700 PATCH TOPICAL at 08:10

## 2023-03-13 RX ADMIN — IPRATROPIUM BROMIDE AND ALBUTEROL SULFATE 3 ML: 2.5; .5 SOLUTION RESPIRATORY (INHALATION) at 14:11

## 2023-03-13 RX ADMIN — HYDROMORPHONE HYDROCHLORIDE 0.5 MG: 1 INJECTION, SOLUTION INTRAMUSCULAR; INTRAVENOUS; SUBCUTANEOUS at 05:53

## 2023-03-13 RX ADMIN — HEPARIN SODIUM 5000 UNITS: 5000 INJECTION INTRAVENOUS; SUBCUTANEOUS at 14:20

## 2023-03-13 RX ADMIN — Medication 10 ML: at 08:11

## 2023-03-13 RX ADMIN — IPRATROPIUM BROMIDE AND ALBUTEROL SULFATE 3 ML: 2.5; .5 SOLUTION RESPIRATORY (INHALATION) at 17:16

## 2023-03-13 RX ADMIN — IPRATROPIUM BROMIDE AND ALBUTEROL SULFATE 3 ML: 2.5; .5 SOLUTION RESPIRATORY (INHALATION) at 19:11

## 2023-03-13 RX ADMIN — HEPARIN SODIUM 5000 UNITS: 5000 INJECTION INTRAVENOUS; SUBCUTANEOUS at 05:53

## 2023-03-13 RX ADMIN — GABAPENTIN 300 MG: 300 CAPSULE ORAL at 21:00

## 2023-03-13 RX ADMIN — LAMOTRIGINE 200 MG: 100 TABLET ORAL at 08:10

## 2023-03-13 RX ADMIN — ACETAMINOPHEN 1000 MG: 500 TABLET, FILM COATED ORAL at 20:55

## 2023-03-13 NOTE — PROGRESS NOTES
"  Infectious Diseases Progress Note    Reji Oliver MD     [facility]  [place]: 18 days  Patient Identification:  Name: Louise GARCIA  Age: 58 y.o.  Sex: female  :  1965  MRN: [id number]         Primary Care Physician: Chloe Schaefer, KASIA        Subjective: Continues to feel well except for discomfort in the right side of the chest which is improving but is still there.  Interval History: See consultation note.  3/8/2023 underwent thoracoscopy with Nevaeh hernandez with complete decortication  3/12/2023 one of the right-sided chest tube were removed.  Objective:    Scheduled Meds:acetaminophen, 1,000 mg, Oral, TID  celecoxib, 100 mg, Oral, Q12H  desvenlafaxine, 25 mg, Oral, Daily  folic acid, 1 mg, Oral, Daily  gabapentin, 300 mg, Oral, Q8H  heparin (porcine), 5,000 Units, Subcutaneous, Q8H  ipratropium-albuterol, 3 mL, Nebulization, 4x Daily - RT  lamoTRIgine, 200 mg, Oral, Daily  lidocaine, 1 patch, Transdermal, Q24H  lidocaine, 1 patch, Transdermal, Q24H  lidocaine, 10 mL, Intradermal, Once  pantoprazole, 40 mg, Oral, Q AM  sodium chloride, 10 mL, Intravenous, Q12H  thiamine, 100 mg, Oral, Daily  vancomycin, 1,000 mg, Intravenous, Q24H  vitamin B-12, 500 mcg, Oral, Daily      Continuous Infusions:Pharmacy to dose vancomycin,         Vital signs in last 24 hours:  Temp:  [98.2 °F (36.8 °C)-99.1 °F (37.3 °C)] 98.6 °F (37 °C)  Heart Rate:  [] 102  Resp:  [18-20] 20  BP: (125-149)/(71-91) 130/71    Intake/Output:    Intake/Output Summary (Last 24 hours) at 3/13/2023 0958  Last data filed at 3/13/2023 0800  Gross per 24 hour   Intake 560 ml   Output 1160 ml   Net -600 ml       Exam:  /71 (BP Location: Left arm, Patient Position: Lying)   Pulse 102   Temp 98.6 °F (37 °C) (Oral)   Resp 20   Ht 157.5 cm (62\")   Wt 57.6 kg (127 lb)   SpO2 97%   Breastfeeding No   BMI 23.23 kg/m²   Patient is examined using the personal protective equipment as per guidelines from infection control for " this particular patient as enacted.  Hand washing was performed before and after patient interaction.  General Appearance:  Comfortable nontoxic-appearing female who is pleasant and interactive.  Did participate in physical therapy earlier.                          Head:    Normocephalic, without obvious abnormality, atraumatic                           Eyes:    PERRL, conjunctivae/corneas clear, EOM's intact, both eyes                         Throat:   Lips, tongue, gums normal; oral mucosa pink and moist                           Neck:   Supple, symmetrical, trachea midline, no JVD                         Lungs:    Decreased breath sounds bilaterally left greater than right                 Chest Wall:  Right sided chest tube in place                          Heart:    Regular rate and rhythm, S1 and S2 normal, no murmur, no rub                                         or gallop                  Abdomen:   Soft nontender epigastric and right upper quadrant tenderness noted.                 Extremities: Right forearm superficial phlebitis site improved and inflammation and tenderness resolved.                        Pulses:   Pulses palpable in all extremities                            Skin:   Skin is warm and dry,  no rashes or palpable lesions                  Neurologic: Awake interactive and grossly nonfocal       Data Review:    I reviewed the patient's new clinical results.  Results from last 7 days   Lab Units 03/13/23  0528 03/12/23  0813 03/11/23  2319 03/11/23  0840 03/11/23  0315 03/10/23  2017 03/10/23  0322 03/09/23  1224 03/09/23  0443 03/08/23  0731 03/07/23  0733   WBC 10*3/mm3 11.32* 10.59  --   --  14.10*  --  18.95*  --  25.61* 20.31* 17.78*   HEMOGLOBIN g/dL 7.9* 7.6*  7.6* 7.7* 8.3* 7.5* 8.2* 8.1*  8.1*   < > 6.6* 8.2* 8.3*   PLATELETS 10*3/mm3 290 276  --   --  210  --  238  --  219 165 198    < > = values in this interval not displayed.     Results from last 7 days   Lab Units 03/13/23  0528  03/12/23  0813 03/11/23  0315 03/10/23  0322 03/09/23  0443 03/08/23  0731 03/07/23  0733   SODIUM mmol/L 134* 135* 138 133* 134* 132* 134*   POTASSIUM mmol/L 4.8 4.3 3.7 3.8 4.1 3.4* 3.8   CHLORIDE mmol/L 99 101 103 98 99 95* 96*   CO2 mmol/L 25.9 26.0 27.0 28.0 29.0 27.9 27.1   BUN mg/dL 12 12 12 10 10 9 11   CREATININE mg/dL 1.02* 0.93 0.90 0.88 0.59 0.56* 0.53*   CALCIUM mg/dL 8.5* 7.8* 7.5* 7.6* 7.7* 8.4* 8.2*  8.1*   GLUCOSE mg/dL 81 126* 103* 81 112* 78 80     Microbiology Results (last 10 days)     Procedure Component Value - Date/Time    Anaerobic Culture - Tissue, Pleural Cavity [[id number]]  (Normal) Collected: 03/08/23 2036    Lab Status: Final result Specimen: Tissue from Pleural Cavity Updated: 03/13/23 0655     Anaerobic Culture No anaerobes isolated at 5 days    Tissue / Bone Culture - Tissue, Pleural Cavity [[id number]]  (Abnormal) Collected: 03/08/23 2036    Lab Status: Final result Specimen: Tissue from Pleural Cavity Updated: 03/11/23 1037     Tissue Culture Rare Staphylococcus aureus, MRSA     Comment:   Methicillin resistant Staphylococcus aureus, Patient may be an isolation risk.        Gram Stain Occasional WBCs seen      Rare (1+) Gram positive cocci in pairs and clusters    Narrative:      Refer to previous fluid culture collected on 3/08      Anaerobic Culture - Body Fluid, Pleural Cavity [[id number]]  (Normal) Collected: 03/08/23 1957    Lab Status: Final result Specimen: Body Fluid from Pleural Cavity Updated: 03/13/23 0655     Anaerobic Culture No anaerobes isolated at 5 days    Body Fluid Culture - Body Fluid, Pleural Cavity [[id number]]  (Abnormal)  (Susceptibility) Collected: 03/08/23 1957    Lab Status: Final result Specimen: Body Fluid from Pleural Cavity Updated: 03/11/23 1036     Body Fluid Culture Scant growth (1+) Staphylococcus aureus, MRSA     Comment:   Methicillin resistant Staphylococcus aureus, Patient may be an isolation risk.        Gram Stain Moderate (3+) WBCs per low  power field      Rare (1+) Gram positive cocci    Susceptibility      Staphylococcus aureus, MRSA      YAKOV      Gentamicin Susceptible      Oxacillin Resistant     Rifampin Susceptible      Vancomycin Susceptible                       Susceptibility Comments     Staphylococcus aureus, MRSA    This isolate does not demonstrate inducible clindamycin resistance in vitro.               Anaerobic Culture - Body Fluid, Pleural Cavity [[id number]]  (Normal) Collected: 03/08/23 1923    Lab Status: Final result Specimen: Body Fluid from Pleural Cavity Updated: 03/13/23 0700     Anaerobic Culture No anaerobes isolated at 5 days    Body Fluid Culture - Body Fluid, Pleural Cavity [[id number]] Collected: 03/08/23 1923    Lab Status: Final result Specimen: Body Fluid from Pleural Cavity Updated: 03/11/23 1050     Body Fluid Culture No growth at 3 days     Gram Stain Rare (1+) WBCs per low power field      Rare (1+) Gram positive cocci    Culture, CSF - Cerebrospinal Fluid, Lumbar Puncture [[id number]] Collected: 03/06/23 1550    Lab Status: Final result Specimen: Cerebrospinal Fluid from Lumbar Puncture Updated: 03/09/23 1058     CSF Culture No growth at 3 days     Gram Stain No WBCs or organisms seen    Meningitis / Encephalitis Panel, PCR - Cerebrospinal Fluid, Lumbar Puncture [[id number]]  (Normal) Collected: 03/06/23 1550    Lab Status: Final result Specimen: Cerebrospinal Fluid from Lumbar Puncture Updated: 03/06/23 1725     ESCHERICHIA COLI K1, PCR Not Detected     HAEMOPHILUS INFLUENZAE, PCR Not Detected     LISTERIA MONOCYTOGENES, PCR Not Detected     NEISSERIA MENINGITIDIS, PCR Not Detected     STREPTOCOCCUS AGALACTIAE, PCR Not Detected     STREPTOCOCCUS PNEUMONIAE, PCR Not Detected     CYTOMEGALOVIRUS (CMV), PCR Not Detected     ENTEROVIRUS, PCR Not Detected     HERPES SIMPLEX VIRUS 1 (HSV-1), PCR Not Detected     HERPES SIMPLEX VIRUS 2 (HSV-2), PCR Not Detected     HUMAN PARECHOVIRUS, PCR Not Detected     VARICELLA ZOSTER  VIRUS (VZV), PCR Not Detected     CRYPTOCOCCUS NEOFORMANS / GATTII, PCR Not Detected     HUMAN HERPES VIRUS 6 PCR Not Detected    Blood Culture - Blood, Wrist, Left [[id number]]  (Normal) Collected: 03/04/23 2043    Lab Status: Final result Specimen: Blood from Wrist, Left Updated: 03/09/23 2116     Blood Culture No growth at 5 days    Blood Culture - Blood, Arm, Right [[id number]]  (Normal) Collected: 03/04/23 1952    Lab Status: Final result Specimen: Blood from Arm, Right Updated: 03/09/23 2015     Blood Culture No growth at 5 days    S. Pneumo Ag Urine or CSF - Urine, Urine, Clean Catch [[id number]]  (Normal) Collected: 03/04/23 1000    Lab Status: Final result Specimen: Urine, Clean Catch Updated: 03/04/23 1128     Strep Pneumo Ag Negative    Legionella Antigen, Urine - Urine, Urine, Clean Catch [[id number]]  (Normal) Collected: 03/04/23 1000    Lab Status: Final result Specimen: Urine, Clean Catch Updated: 03/04/23 1128     LEGIONELLA ANTIGEN, URINE Negative    Respiratory Culture - Sputum, Cough [[id number]] Collected: 03/04/23 0959    Lab Status: Final result Specimen: Sputum from Cough Updated: 03/04/23 1215     Respiratory Culture Rejected     Gram Stain Many (4+) Epithelial cells per low power field      Many (4+) WBCs per low power field      Many (4+) Mixed bacterial morphotypes seen on Gram Stain    Narrative:      Specimen rejected due to oropharyngeal contamination. Please reorder and recollect specimen if clinically necessary.    Body Fluid Culture - Body Fluid, Pleural Cavity [[id number]]  (Abnormal)  (Susceptibility) Collected: 03/04/23 0900    Lab Status: Final result Specimen: Body Fluid from Pleural Cavity Updated: 03/06/23 1124     Body Fluid Culture Light growth (2+) Staphylococcus aureus, MRSA     Comment:   Methicillin resistant Staphylococcus aureus, Patient may be an isolation risk.        Gram Stain Rare (1+) WBCs per low power field      No organisms seen    Susceptibility      Staphylococcus  aureus, MRSA      YAKOV      Gentamicin Susceptible      Oxacillin Resistant     Rifampin Susceptible      Vancomycin Susceptible                       Susceptibility Comments     Staphylococcus aureus, MRSA    This isolate does not demonstrate inducible clindamycin resistance in vitro.               Anaerobic Culture - Pleural Fluid, Pleural Cavity [[id number]]  (Normal) Collected: 03/04/23 0900    Lab Status: Final result Specimen: Pleural Fluid from Pleural Cavity Updated: 03/09/23 0656     Anaerobic Culture No anaerobes isolated at 5 days    Fungus Culture - Body Fluid, Pleural Cavity [[id number]] Collected: 03/04/23 0900    Lab Status: Preliminary result Specimen: Body Fluid from Pleural Cavity Updated: 03/11/23 1000     Fungus Culture No fungus isolated at 1 week    MRSA Screen, PCR (Inpatient) - Swab, Nares [[id number]]  (Abnormal) Collected: 03/03/23 1830    Lab Status: Final result Specimen: Swab from Nares Updated: 03/03/23 2016     MRSA PCR MRSA Detected    Narrative:      The negative predictive value of this diagnostic test is high and should only be used to consider de-escalating anti-MRSA therapy. A positive result may indicate colonization with MRSA and must be correlated clinically.    Respiratory Panel PCR w/COVID-19(SARS-CoV-2) NAOMY/RASHMI/ADINA/PAD/COR/MAD/STEVE In-House, NP Swab in UTM/VTM, 3-4 HR TAT - Swab, Nasopharynx [[id number]]  (Normal) Collected: 03/03/23 1830    Lab Status: Final result Specimen: Swab from Nasopharynx Updated: 03/03/23 2005     ADENOVIRUS, PCR Not Detected     Coronavirus 229E Not Detected     Coronavirus HKU1 Not Detected     Coronavirus NL63 Not Detected     Coronavirus OC43 Not Detected     COVID19 Not Detected     Human Metapneumovirus Not Detected     Human Rhinovirus/Enterovirus Not Detected     Influenza A PCR Not Detected     Influenza B PCR Not Detected     Parainfluenza Virus 1 Not Detected     Parainfluenza Virus 2 Not Detected     Parainfluenza Virus 3 Not Detected      Parainfluenza Virus 4 Not Detected     RSV, PCR Not Detected     Bordetella pertussis pcr Not Detected     Bordetella parapertussis PCR Not Detected     Chlamydophila pneumoniae PCR Not Detected     Mycoplasma pneumo by PCR Not Detected    Narrative:      In the setting of a positive respiratory panel with a viral infection PLUS a negative procalcitonin without other underlying concern for bacterial infection, consider observing off antibiotics or discontinuation of antibiotics and continue supportive care. If the respiratory panel is positive for atypical bacterial infection (Bordetella pertussis, Chlamydophila pneumoniae, or Mycoplasma pneumoniae), consider antibiotic de-escalation to target atypical bacterial infection.            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    •     Right Cephalic Forearm: Acute superficial thrombophlebitis noted.     - septic emboli and cavitary lesion in the right lung   - Tricuspid Valve endocarditis   - Empyema due to secondary seeding of the traumatic effusion  2-paresthesia generalized weakness  3-recent fall  4-seizure disorder  5-leukocytosis likely secondary to steroids with superimposed infection  6-other diagnoses per primary team.  7-MRSA colonization     Recommendations/Discussions:  · Continue with IV vancomycin-would need 4 weeks of IV vancomycin to be dosed by pharmacy to achieve a trough level of 15-20 or area  under the curve of 400-600 from last negative blood culture or last intervention which ever is the latest.  · Moist heat to the phlebitis site of the right forearm  · Continue to monitor her back pain and evidence of secondary seeding to the joint and spine which may require repeat imaging studies if her symptoms localizes to these areas and affect her function.  · Discussed with patient's caring nurse.  Reji Oliver MD  3/13/2023  09:58 EDT    Parts of this note may be an electronic transcription/translation of spoken language to printed text using the Dragon dictation system.

## 2023-03-13 NOTE — PROGRESS NOTES
[facility] Clinical Pharmacy Services: Vancomycin Level Monitoring Note    Louise GARCIA is a 58 y.o. female who is on day 10/14 of pharmacy to dose vancomycin for MRSA Bacteremia-- empyema/endocarditis    Estimated Creatinine Clearance: 54 mL/min (A) (by C-G formula based on SCr of 1.02 mg/dL (H)).    Current Vanc Dose: 1000 mg IV every  24 hours  Results from last 7 days   Lab Units 03/12/23 2027 03/10/23  1640 03/09/23  1427   VANCOMYCIN TR mcg/mL 17.10 21.90* 20.70*   Predicted AUC at current dose:458 mg/L.hr. Trough within goal range  Next Level Date and Time: Vanc Trough on 3/15 @ 2030 unless scr increases.     No changes at this time. Pharmacy is continuing to monitor and will adjust as needed.    Barber Masterson [facility]  Clinical Pharmacist

## 2023-03-13 NOTE — CASE MANAGEMENT/SOCIAL WORK
Continued Stay Note  [facility]     Patient Name: Louise GARCIA  MRN: [id number]  Today's Date: 3/13/2023    Admit Date: 2/20/2023    Plan: BAR pending precert and bed availability   Discharge Plan     Row Name 03/13/23 1557       Plan    Plan BAR pending precert and bed availability    Patient/Family in Agreement with Plan yes    Plan Comments Spoke with [facility] who states pt accepted and they will start precert. Anticipate bed available Wed or Thurs.  Pt and spouse updated.  CCP continues to follow.  SHARONDA Johnson RN               Discharge Codes    No documentation.               Expected Discharge Date and Time     Expected Discharge Date Expected Discharge Time    Mar 14, 2023             Valerie Johnson RN

## 2023-03-13 NOTE — PROGRESS NOTES
"  Infectious Diseases Progress Note    Reji Oliver MD     [facility]  [place]: 17 days  Patient Identification:  Name: Louise GARCIA  Age: 58 y.o.  Sex: female  :  1965  MRN: [id number]         Primary Care Physician: Chloe Schaefer, KASIA        Subjective: Continues to feel better with improvement in chest discomfort and denies any fever and chills.  Denies any new pain and discomfort.  Currently being attended by her  at the bedside.  Had one of the right-sided chest tube removed.  Interval History: See consultation note.  3/8/2023 underwent thoracoscopy with Nevaeh hernandez with complete decortication  Objective:    Scheduled Meds:acetaminophen, 1,000 mg, Oral, TID  celecoxib, 100 mg, Oral, Q12H  desvenlafaxine, 25 mg, Oral, Daily  folic acid, 1 mg, Oral, Daily  gabapentin, 300 mg, Oral, Q8H  heparin (porcine), 5,000 Units, Subcutaneous, Q8H  ipratropium-albuterol, 3 mL, Nebulization, 4x Daily - RT  lamoTRIgine, 200 mg, Oral, Daily  lidocaine, 1 patch, Transdermal, Q24H  lidocaine, 1 patch, Transdermal, Q24H  lidocaine, 10 mL, Intradermal, Once  pantoprazole, 40 mg, Oral, Q AM  sodium chloride, 10 mL, Intravenous, Q12H  thiamine, 100 mg, Oral, Daily  vancomycin, 1,000 mg, Intravenous, Q24H  vitamin B-12, 500 mcg, Oral, Daily      Continuous Infusions:Pharmacy to dose vancomycin,         Vital signs in last 24 hours:  Temp:  [98.2 °F (36.8 °C)-99.1 °F (37.3 °C)] 98.2 °F (36.8 °C)  Heart Rate:  [] 105  Resp:  [16-18] 18  BP: (120-136)/(63-79) 136/79    Intake/Output:    Intake/Output Summary (Last 24 hours) at 3/12/2023 2201  Last data filed at 3/12/2023 2117  Gross per 24 hour   Intake 640 ml   Output 520 ml   Net 120 ml       Exam:  /79 (BP Location: Left arm, Patient Position: Lying)   Pulse 105   Temp 98.2 °F (36.8 °C) (Oral)   Resp 18   Ht 157.5 cm (62\")   Wt 57.6 kg (127 lb)   SpO2 94%   Breastfeeding No   BMI 23.23 kg/m²   Patient is examined using the personal " protective equipment as per guidelines from infection control for this particular patient as enacted.  Hand washing was performed before and after patient interaction.  General Appearance:  Ill-appearing female who is much more calmer and comfortable compared to 24 hours ago.                          Head:    Normocephalic, without obvious abnormality, atraumatic                           Eyes:    PERRL, conjunctivae/corneas clear, EOM's intact, both eyes                         Throat:   Lips, tongue, gums normal; oral mucosa pink and moist                           Neck:   Supple, symmetrical, trachea midline, no JVD                         Lungs:    Decreased breath sounds bilaterally left greater than right                 Chest Wall:  Right sided chest tube in place                          Heart:    Regular rate and rhythm, S1 and S2 normal, no murmur, no rub                                         or gallop                  Abdomen:   Soft nontender epigastric and right upper quadrant tenderness noted.                 Extremities: Right forearm IV phlebitis site erythema and tenderness noted.                        Pulses:   Pulses palpable in all extremities                            Skin:   Skin is warm and dry,  no rashes or palpable lesions                  Neurologic: Awake interactive and grossly nonfocal       Data Review:    I reviewed the patient's new clinical results.  Results from last 7 days   Lab Units 03/12/23  0813 03/11/23  2319 03/11/23  0840 03/11/23  0315 03/10/23  2017 03/10/23  0322 03/09/23  2026 03/09/23  1224 03/09/23  0443 03/08/23  0731 03/07/23  0733 03/06/23  0719   WBC 10*3/mm3 10.59  --   --  14.10*  --  18.95*  --   --  25.61* 20.31* 17.78* 16.60*   HEMOGLOBIN g/dL 7.6*  7.6* 7.7* 8.3* 7.5* 8.2* 8.1*  8.1* 8.5*   < > 6.6* 8.2* 8.3* 9.0*   PLATELETS 10*3/mm3 276  --   --  210  --  238  --   --  219 165 198 173    < > = values in this interval not displayed.     Results from  last 7 days   Lab Units 03/12/23  0813 03/11/23  0315 03/10/23  0322 03/09/23  0443 03/08/23  0731 03/07/23  0733 03/06/23  0719   SODIUM mmol/L 135* 138 133* 134* 132* 134* 133*   POTASSIUM mmol/L 4.3 3.7 3.8 4.1 3.4* 3.8 3.9   CHLORIDE mmol/L 101 103 98 99 95* 96* 97*   CO2 mmol/L 26.0 27.0 28.0 29.0 27.9 27.1 24.5   BUN mg/dL 12 12 10 10 9 11 15   CREATININE mg/dL 0.93 0.90 0.88 0.59 0.56* 0.53* 0.57   CALCIUM mg/dL 7.8* 7.5* 7.6* 7.7* 8.4* 8.2*  8.1* 8.7   GLUCOSE mg/dL 126* 103* 81 112* 78 80 82     Microbiology Results (last 10 days)     Procedure Component Value - Date/Time    Anaerobic Culture - Tissue, Pleural Cavity [[id number]]  (Normal) Collected: 03/08/23 2036    Lab Status: Preliminary result Specimen: Tissue from Pleural Cavity Updated: 03/11/23 1408     Anaerobic Culture No anaerobes isolated at 3 days    Tissue / Bone Culture - Tissue, Pleural Cavity [[id number]]  (Abnormal) Collected: 03/08/23 2036    Lab Status: Final result Specimen: Tissue from Pleural Cavity Updated: 03/11/23 1037     Tissue Culture Rare Staphylococcus aureus, MRSA     Comment:   Methicillin resistant Staphylococcus aureus, Patient may be an isolation risk.        Gram Stain Occasional WBCs seen      Rare (1+) Gram positive cocci in pairs and clusters    Narrative:      Refer to previous fluid culture collected on 3/08      Anaerobic Culture - Body Fluid, Pleural Cavity [[id number]]  (Normal) Collected: 03/08/23 1957    Lab Status: Preliminary result Specimen: Body Fluid from Pleural Cavity Updated: 03/11/23 1408     Anaerobic Culture No anaerobes isolated at 3 days    Body Fluid Culture - Body Fluid, Pleural Cavity [[id number]]  (Abnormal)  (Susceptibility) Collected: 03/08/23 1957    Lab Status: Final result Specimen: Body Fluid from Pleural Cavity Updated: 03/11/23 1036     Body Fluid Culture Scant growth (1+) Staphylococcus aureus, MRSA     Comment:   Methicillin resistant Staphylococcus aureus, Patient may be an isolation  risk.        Gram Stain Moderate (3+) WBCs per low power field      Rare (1+) Gram positive cocci    Susceptibility      Staphylococcus aureus, MRSA      YAKOV      Gentamicin Susceptible      Oxacillin Resistant     Rifampin Susceptible      Vancomycin Susceptible                       Susceptibility Comments     Staphylococcus aureus, MRSA    This isolate does not demonstrate inducible clindamycin resistance in vitro.               Anaerobic Culture - Body Fluid, Pleural Cavity [[id number]]  (Normal) Collected: 03/08/23 1923    Lab Status: Preliminary result Specimen: Body Fluid from Pleural Cavity Updated: 03/11/23 1408     Anaerobic Culture No anaerobes isolated at 3 days    Body Fluid Culture - Body Fluid, Pleural Cavity [[id number]] Collected: 03/08/23 1923    Lab Status: Final result Specimen: Body Fluid from Pleural Cavity Updated: 03/11/23 1050     Body Fluid Culture No growth at 3 days     Gram Stain Rare (1+) WBCs per low power field      Rare (1+) Gram positive cocci    Culture, CSF - Cerebrospinal Fluid, Lumbar Puncture [[id number]] Collected: 03/06/23 1550    Lab Status: Final result Specimen: Cerebrospinal Fluid from Lumbar Puncture Updated: 03/09/23 1058     CSF Culture No growth at 3 days     Gram Stain No WBCs or organisms seen    Meningitis / Encephalitis Panel, PCR - Cerebrospinal Fluid, Lumbar Puncture [[id number]]  (Normal) Collected: 03/06/23 1550    Lab Status: Final result Specimen: Cerebrospinal Fluid from Lumbar Puncture Updated: 03/06/23 1725     ESCHERICHIA COLI K1, PCR Not Detected     HAEMOPHILUS INFLUENZAE, PCR Not Detected     LISTERIA MONOCYTOGENES, PCR Not Detected     NEISSERIA MENINGITIDIS, PCR Not Detected     STREPTOCOCCUS AGALACTIAE, PCR Not Detected     STREPTOCOCCUS PNEUMONIAE, PCR Not Detected     CYTOMEGALOVIRUS (CMV), PCR Not Detected     ENTEROVIRUS, PCR Not Detected     HERPES SIMPLEX VIRUS 1 (HSV-1), PCR Not Detected     HERPES SIMPLEX VIRUS 2 (HSV-2), PCR Not Detected      HUMAN PARECHOVIRUS, PCR Not Detected     VARICELLA ZOSTER VIRUS (VZV), PCR Not Detected     CRYPTOCOCCUS NEOFORMANS / GATTII, PCR Not Detected     HUMAN HERPES VIRUS 6 PCR Not Detected    Blood Culture - Blood, Wrist, Left [[id number]]  (Normal) Collected: 03/04/23 2043    Lab Status: Final result Specimen: Blood from Wrist, Left Updated: 03/09/23 2116     Blood Culture No growth at 5 days    Blood Culture - Blood, Arm, Right [[id number]]  (Normal) Collected: 03/04/23 1952    Lab Status: Final result Specimen: Blood from Arm, Right Updated: 03/09/23 2015     Blood Culture No growth at 5 days    S. Pneumo Ag Urine or CSF - Urine, Urine, Clean Catch [[id number]]  (Normal) Collected: 03/04/23 1000    Lab Status: Final result Specimen: Urine, Clean Catch Updated: 03/04/23 1128     Strep Pneumo Ag Negative    Legionella Antigen, Urine - Urine, Urine, Clean Catch [[id number]]  (Normal) Collected: 03/04/23 1000    Lab Status: Final result Specimen: Urine, Clean Catch Updated: 03/04/23 1128     LEGIONELLA ANTIGEN, URINE Negative    Respiratory Culture - Sputum, Cough [[id number]] Collected: 03/04/23 0959    Lab Status: Final result Specimen: Sputum from Cough Updated: 03/04/23 1215     Respiratory Culture Rejected     Gram Stain Many (4+) Epithelial cells per low power field      Many (4+) WBCs per low power field      Many (4+) Mixed bacterial morphotypes seen on Gram Stain    Narrative:      Specimen rejected due to oropharyngeal contamination. Please reorder and recollect specimen if clinically necessary.    Body Fluid Culture - Body Fluid, Pleural Cavity [[id number]]  (Abnormal)  (Susceptibility) Collected: 03/04/23 0900    Lab Status: Final result Specimen: Body Fluid from Pleural Cavity Updated: 03/06/23 1124     Body Fluid Culture Light growth (2+) Staphylococcus aureus, MRSA     Comment:   Methicillin resistant Staphylococcus aureus, Patient may be an isolation risk.        Gram Stain Rare (1+) WBCs per low power field       No organisms seen    Susceptibility      Staphylococcus aureus, MRSA      YAKOV      Gentamicin Susceptible      Oxacillin Resistant     Rifampin Susceptible      Vancomycin Susceptible                       Susceptibility Comments     Staphylococcus aureus, MRSA    This isolate does not demonstrate inducible clindamycin resistance in vitro.               Anaerobic Culture - Pleural Fluid, Pleural Cavity [[id number]]  (Normal) Collected: 03/04/23 0900    Lab Status: Final result Specimen: Pleural Fluid from Pleural Cavity Updated: 03/09/23 0656     Anaerobic Culture No anaerobes isolated at 5 days    Fungus Culture - Body Fluid, Pleural Cavity [[id number]] Collected: 03/04/23 0900    Lab Status: Preliminary result Specimen: Body Fluid from Pleural Cavity Updated: 03/11/23 1000     Fungus Culture No fungus isolated at 1 week    MRSA Screen, PCR (Inpatient) - Swab, Nares [[id number]]  (Abnormal) Collected: 03/03/23 1830    Lab Status: Final result Specimen: Swab from Nares Updated: 03/03/23 2016     MRSA PCR MRSA Detected    Narrative:      The negative predictive value of this diagnostic test is high and should only be used to consider de-escalating anti-MRSA therapy. A positive result may indicate colonization with MRSA and must be correlated clinically.    Respiratory Panel PCR w/COVID-19(SARS-CoV-2) NAOMY/RASHMI/ADINA/PAD/COR/MAD/STEVE In-House, NP Swab in UTM/VTM, 3-4 HR TAT - Swab, Nasopharynx [[id number]]  (Normal) Collected: 03/03/23 1830    Lab Status: Final result Specimen: Swab from Nasopharynx Updated: 03/03/23 2005     ADENOVIRUS, PCR Not Detected     Coronavirus 229E Not Detected     Coronavirus HKU1 Not Detected     Coronavirus NL63 Not Detected     Coronavirus OC43 Not Detected     COVID19 Not Detected     Human Metapneumovirus Not Detected     Human Rhinovirus/Enterovirus Not Detected     Influenza A PCR Not Detected     Influenza B PCR Not Detected     Parainfluenza Virus 1 Not Detected     Parainfluenza Virus 2  Not Detected     Parainfluenza Virus 3 Not Detected     Parainfluenza Virus 4 Not Detected     RSV, PCR Not Detected     Bordetella pertussis pcr Not Detected     Bordetella parapertussis PCR Not Detected     Chlamydophila pneumoniae PCR Not Detected     Mycoplasma pneumo by PCR Not Detected    Narrative:      In the setting of a positive respiratory panel with a viral infection PLUS a negative procalcitonin without other underlying concern for bacterial infection, consider observing off antibiotics or discontinuation of antibiotics and continue supportive care. If the respiratory panel is positive for atypical bacterial infection (Bordetella pertussis, Chlamydophila pneumoniae, or Mycoplasma pneumoniae), consider antibiotic de-escalation to target atypical bacterial infection.            Assessment:    Paresthesia    Cervical myelopathy (HCC)    Lower extremity weakness    History of blood clots    Chronic anticoagulation    Seizures (HCC)    Normocytic anemia    GERD (gastroesophageal reflux disease)    Guillain-Coello syndrome (HCC)    Situational mixed anxiety and depressive disorder    Mass of middle lobe of right lung    Oral candidiasis    Empyema of pleura (HCC)    MRSA bacteremia  1-MRSA bacteremia on hospitalization day #11 and likely underlying etiology:   - Septic phlebitis due to IV access with -     •     Right Cephalic Upper Arm: Acute superficial thrombophlebitis noted.    •     Right Cephalic Forearm: Acute superficial thrombophlebitis noted.     - septic emboli and cavitary lesion in the right lung   - Tricuspid Valve endocarditis   - Empyema due to secondary seeding of the traumatic effusion  2-paresthesia generalized weakness  3-recent fall  4-seizure disorder  5-leukocytosis likely secondary to steroids with superimposed infection  6-other diagnoses per primary team.  7-MRSA colonization     Recommendations/Discussions:  · Continue with IV vancomycin-would need 4 weeks of IV vancomycin to be  dosed by pharmacy to achieve a trough level of 15-20 or area under the curve of 400-600 from last negative blood culture or last intervention which ever is the latest.  · Moist heat to the phlebitis site of the right forearm  · Continue to monitor her back pain and evidence of secondary seeding to the joint and spine which may require repeat imaging studies if her symptoms localizes to these areas and affect her function.  · Discussed with patient's caring nurse.  Reji Oliver MD  3/12/2023  22:01 EDT    Parts of this note may be an electronic transcription/translation of spoken language to printed text using the Dragon dictation system.

## 2023-03-13 NOTE — PLAN OF CARE
Goal Outcome Evaluation:  Plan of Care Reviewed With: patient        Progress: improving  Outcome Evaluation: VSS, 2L O2. Right chest tube #1 to waterseal with fluctuation. Bowel movement overnight. Purewick in place. IV Vanc per order. Pain controlled with ERAS and PRN medication.  Will continue to monitor.

## 2023-03-13 NOTE — PROGRESS NOTES
"  PROGRESS NOTE  Patient Name: Louise GARCIA  Age/Sex: 58 y.o. female  : 1965  MRN: [id number]    Date of Admission: 2023  Date of Encounter Visit: 23   LOS: 18 days   Patient Care Team:  Chloe Schaefer APRN as PCP - General (Family Medicine)  Newton, Mikhail Velazquez MD as Consulting Physician (Neurology)    Chief Complaint: Pneumonia with empyema status post decortication    Hospital course: Patient is doing well, she had 1 chest tube already removed with the plan to consider removing the other 1 today.  She is afebrile Tmax 99.1, on minimal amount of oxygen supplementation, working on coughing deep breathing and expectoration.  She has positive MRSA sepsis and there was some questionable vegetation on the tricuspid valve and that is being evaluated by infectious disease and cardiology.  Tolerating p.o. diet, working with PT/OT        REVIEW OF SYSTEMS:   CONSTITUTIONAL: no fever or chills  CARDIOVASCULAR: No chest pain, chest pressure or chest discomfort. No palpitations or edema.   RESPIRATORY: Improving shortness of breath, less discomfort from the chest tube, good cough.   GASTROINTESTINAL: No anorexia, nausea, vomiting or diarrhea. No abdominal pain or blood.   HEMATOLOGIC: No bleeding or bruising.     Ventilator/Non-Invasive Ventilation Settings (From admission, onward)     1 L/min nasal cannula oxygen            Vital Signs  Temp:  [98.2 °F (36.8 °C)-99.1 °F (37.3 °C)] 98.6 °F (37 °C)  Heart Rate:  [] 102  Resp:  [18-20] 20  BP: (125-149)/(71-91) 130/71  SpO2:  [90 %-99 %] 97 %  on  Flow (L/min):  [0.5-1] 1 Device (Oxygen Therapy): nasal cannula    Intake/Output Summary (Last 24 hours) at 3/13/2023 0923  Last data filed at 3/13/2023 0800  Gross per 24 hour   Intake 560 ml   Output 1160 ml   Net -600 ml     Flowsheet Rows    Flowsheet Row First Filed Value   Admission Height 157.5 cm (62\") Documented at 2023 1425   Admission Weight 54.4 kg (120 lb) Documented at 2023 1425 "        Body mass index is 23.23 kg/m².      03/06/23  0600 03/10/23  0600 03/10/23  1410   Weight: 56.7 kg (125 lb) 57.9 kg (127 lb 10.3 oz) 57.6 kg (127 lb)       Physical Exam:  GEN:  No acute distress, alert, cooperative, well developed, on nasal oxygen  EYES:   Sclerae clear. No icterus. PERRL. Normal EOM  ENT:   External ears/nose normal, no oral lesions, no thrush, mucous membranes moist  NECK:  Supple, midline trachea, no JVD  LUNGS: Normal chest on inspection, she has positive crackles and very minimal rhonchi, good breath sounds, right-sided chest tube with no subcutaneous air and no air leak from the chest tube. Respirations regular, even and unlabored.   CV:  Regular rhythm and rate. Normal S1/S2. No murmurs, gallops, or rubs noted.  ABD:  Soft, nontender and nondistended. Normal bowel sounds. No guarding  EXT:  Moves all extremities well. No cyanosis. No redness.  She has positive pitting edema of both lower extremities bilaterally 2+.   Skin: Dry, intact, no bleeding    Results Review:    Results From Last 14 Days   Lab Units 03/03/23  1051 03/03/23  0454 03/03/23  0159 03/02/23  1955 03/02/23  0633 03/01/23  0708   D DIMER QUANT MCGFEU/mL  --   --   --   --  0.96* 0.56   LACTATE mmol/L 1.4 2.1* 2.7*   < >  --   --     < > = values in this interval not displayed.     Results from last 7 days   Lab Units 03/13/23  0528 03/12/23  0813 03/11/23  0315 03/10/23  0322 03/09/23  0443 03/08/23  0731 03/07/23  0733 03/06/23  1551   SODIUM mmol/L 134* 135* 138 133* 134* 132* 134*  --    POTASSIUM mmol/L 4.8 4.3 3.7 3.8 4.1 3.4* 3.8  --    CHLORIDE mmol/L 99 101 103 98 99 95* 96*  --    CO2 mmol/L 25.9 26.0 27.0 28.0 29.0 27.9 27.1  --    BUN mg/dL 12 12 12 10 10 9 11  --    CREATININE mg/dL 1.02* 0.93 0.90 0.88 0.59 0.56* 0.53*  --    CALCIUM mg/dL 8.5* 7.8* 7.5* 7.6* 7.7* 8.4* 8.2*  8.1*  --    AST (SGOT) U/L  --  29 26 29 27 32 35*  --    ALT (SGPT) U/L  --  14 16 17 19 27 29  --    ANION GAP mmol/L 9.1 8.0  8.0 7.0 6.0 9.1 10.9  --    ALBUMIN g/dL  --  1.7* 1.9* 1.8* 1.8* 1.8* 2.2* 2.1*                 Results from last 7 days   Lab Units 03/13/23  0528 03/12/23  0813 03/11/23  2319 03/11/23  0840 03/11/23  0315 03/10/23  2017 03/10/23  0322 03/09/23  1224 03/09/23  0443 03/08/23  0731 03/07/23  0733   WBC 10*3/mm3 11.32* 10.59  --   --  14.10*  --  18.95*  --  25.61* 20.31* 17.78*   HEMOGLOBIN g/dL 7.9* 7.6*  7.6* 7.7* 8.3* 7.5* 8.2* 8.1*  8.1*   < > 6.6* 8.2* 8.3*   HEMATOCRIT % 23.8* 22.0*  22.0* 22.3* 24.8* 22.7* 23.1* 23.8*  23.8*   < > 19.3* 23.8* 24.0*   PLATELETS 10*3/mm3 290 276  --   --  210  --  238  --  219 165 198   MCV fL 99.6* 97.3*  --   --  98.3*  --  96.7  --  99.5* 100.8* 97.6*   NEUTROPHIL % %  --  75.0  --   --  79.0*  --  80.2*  --  86.8* 84.3* 82.8*   LYMPHOCYTE % %  --  14.1*  --   --  11.3*  --  9.9*  --  6.2* 6.6* 6.6*   MONOCYTES % %  --  5.7  --   --  5.8  --  5.1  --  3.3* 4.9* 5.9   EOSINOPHIL % %  --  1.0  --   --  1.0  --  1.0  --  0.2* 0.6 0.6   BASOPHIL % %  --  0.2  --   --  0.1  --  0.2  --  0.2 0.2 0.1   IMM GRAN % %  --  4.0*  --   --  2.8*  --  3.6*  --  3.3* 3.4* 4.0*    < > = values in this interval not displayed.     Results from last 7 days   Lab Units 03/08/23  0731   INR  1.10   APTT seconds 32.2     Results from last 7 days   Lab Units 03/12/23  0813 03/11/23  0315 03/10/23  0322 03/09/23  0443 03/08/23  0731 03/07/23  0733   MAGNESIUM mg/dL 1.9 1.9 1.7 1.6 1.7 1.7           Invalid input(s): LDLCALC          Glucose   Date/Time Value Ref Range Status   03/12/2023 0537 90 70 - 130 mg/dL Final     Comment:     Meter: [id number] : [id number] Sherman Strickland PCA         Results from last 7 days   Lab Units 03/08/23 1957 03/08/23  1923   BODYFLDCX  Scant growth (1+) Staphylococcus aureus, MRSA* No growth at 3 days                       Imaging:   Imaging Results (All)     Procedure Component Value Units Date/Time        I reviewed the patient's new clinical  results.  I personally viewed and interpreted the patient's imaging results:        Medication Review:   acetaminophen, 1,000 mg, Oral, TID  celecoxib, 100 mg, Oral, Q12H  desvenlafaxine, 25 mg, Oral, Daily  folic acid, 1 mg, Oral, Daily  gabapentin, 300 mg, Oral, Q8H  heparin (porcine), 5,000 Units, Subcutaneous, Q8H  ipratropium-albuterol, 3 mL, Nebulization, 4x Daily - RT  lamoTRIgine, 200 mg, Oral, Daily  lidocaine, 1 patch, Transdermal, Q24H  lidocaine, 1 patch, Transdermal, Q24H  lidocaine, 10 mL, Intradermal, Once  pantoprazole, 40 mg, Oral, Q AM  sodium chloride, 10 mL, Intravenous, Q12H  thiamine, 100 mg, Oral, Daily  vancomycin, 1,000 mg, Intravenous, Q24H  vitamin B-12, 500 mcg, Oral, Daily        Pharmacy to dose vancomycin,         ASSESSMENT:   1. Idiopathic neuropathy  2. MRSA pneumonia/empyema: S/P decortication 3/8/2023  3. Elevated liver enz  4. Anemia  5. Dysphagia  6. Hx DVT, was on Eliquis  7. Osteonecrosis of femoral heads  8. Possible vegitation on tricuspid valve -- cards and ID following  9. Seizure disorder    PLAN:  Antibiotics are being followed and dosed by infectious diseases, the patient is currently on vancomycin IV  Chest tube management per thoracic surgery, patient had 1 chest tube removed and the second 1 will likely be removed today  Patient is on pulmonary hygiene measures  Followed by PT/OT  Pain control with gabapentin and Celebrex trying to minimize narcotics  Chest x-ray today showed no pneumothorax with 1 remaining  Renal function is stable, white count is borderline with a low but stable hemoglobin chest tube  Encourage p.o. intake  Questionable endocarditis evaluation per ID/cardiology  Discussed with patient and family and with thoracic surgery    Disposition: Per primary team    Aurelia Heller MD  03/13/23  09:23 EDT          Dictated utilizing Dragon dictation

## 2023-03-13 NOTE — PLAN OF CARE
Goal Outcome Evaluation:  Plan of Care Reviewed With: patient        Progress: improving  Outcome Evaluation: Pt tolerated treatment well this date. Required min A for supine to sit, then stood w/ mod A x2. B knees were locked out and did not buckle until attempting to turn to the chair. Knees were blocked as needed when making a turn to the chair w/ max A x2. Pt completed a few OT tasks at sink while sitting upright in chair, then was assisted back to bed w/ max A x2 again. Pt completed a few seated LE exercises, and required max A x2 to return to supine. Pt remains very motivated and was encouraged to attempt a few exercises on own during the day.

## 2023-03-13 NOTE — THERAPY TREATMENT NOTE
Patient Name: Louise GARCIA  : 1965    MRN: [id number]                              Today's Date: 3/13/2023       Admit Date: 2023    Visit Dx:     ICD-10-CM ICD-9-CM   1. Paresthesia  R20.2 782.0   2. Gait instability  R26.81 781.2   3. Extremity numbness  R20.0 782.0   4. 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)   • Situational mixed anxiety and depressive disorder   • Mass of middle lobe of right lung   • Oral candidiasis   • Empyema of pleura (HCC)   • MRSA bacteremia     Past Medical History:   Diagnosis Date   • Degenerative disc disease, cervical    • Gestational diabetes    • H/O blood clots    • Hematemesis    • Seizures (HCC)    • Septic shock (HCC)      Past Surgical History:   Procedure Laterality Date   • APPENDECTOMY     • BACK SURGERY     • CHOLECYSTECTOMY     • ENDOSCOPY N/A 2016    Procedure: ESOPHAGOGASTRODUODENOSCOPY with biopsy;  Surgeon: Austen Brito MD;  Location: [facility] ENDOSCOPY;  Service:    • HYSTERECTOMY     • NECK SURGERY       approx 6 yrs ago   • THORACOSCOPY Right 3/8/2023    Procedure: THORACOSCOPY WITH DAVINCI ROBOT WITH COMPLETE DECORTICATION;  Surgeon: Chloe Cedillo MD;  Location: [facility] MAIN OR;  Service: Robotics - DaVinci;  Laterality: Right;   • TONSILLECTOMY     • TONSILLECTOMY AND ADENOIDECTOMY        General Information     Row Name 23 1022          Physical Therapy Time and Intention    Document Type therapy note (daily note)  -     Mode of Treatment physical therapy  -     Row Name 23 1022          General Information    Existing Precautions/Restrictions fall  -     Row Name 23  1022          Cognition    Orientation Status (Cognition) oriented x 3  -           User Key  (r) = Recorded By, (t) = Taken By, (c) = Cosigned By    Initials Name Provider Type    Milana Aguirre PTA Physical Therapist Assistant               Mobility     Row Name 03/13/23 1023          Bed Mobility    Bed Mobility supine-sit;sit-supine  -     Supine-Sit [place] (Bed Mobility) minimum assist (75% patient effort)  -     Sit-Supine [place] (Bed Mobility) maximum assist (25% patient effort);2 person assist  -     Assistive Device (Bed Mobility) bed rails;head of bed elevated  -     Row Name 03/13/23 1023          Bed-Chair Transfer    Bed-Chair [place] (Transfers) maximum assist (25% patient effort);2 person assist  -     Assistive Device (Bed-Chair Transfers) --  HHA  -     Comment, (Bed-Chair Transfer) knees blocked  -     Row Name 03/13/23 1023          Sit-Stand Transfer    Sit-Stand [place] (Transfers) moderate assist (50% patient effort);2 person assist  -     Assistive Device (Sit-Stand Transfers) --  HHA  -           User Key  (r) = Recorded By, (t) = Taken By, (c) = Cosigned By    Initials Name Provider Type    Milana Aguirre PTA Physical Therapist Assistant               Obj/Interventions     Row Name 03/13/23 1024          Motor Skills    Therapeutic Exercise --  seated AP and LAQ x10 reps  -           User Key  (r) = Recorded By, (t) = Taken By, (c) = Cosigned By    Initials Name Provider Type     Milana Burdick PTA Physical Therapist Assistant               Goals/Plan    No documentation.                Clinical Impression     Row Name 03/13/23 1024          Pain    Pretreatment Pain Rating 2/10  -SM     Posttreatment Pain Rating 4/10  -     Pain Location - Side/Orientation Right  -     Pain Location - flank  -     Pain Intervention(s) Repositioned;Ambulation/increased activity;Rest  -     Row Name 03/13/23 1024           Positioning and Restraints    Pre-Treatment Position in bed  -SM     Post Treatment Position bed  -SM     In Bed supine;call light within reach;encouraged to call for assist;exit alarm on;with family/caregiver  -           User Key  (r) = Recorded By, (t) = Taken By, (c) = Cosigned By    Initials Name Provider Type    Milana Aguirre PTA Physical Therapist Assistant               Outcome Measures     Row Name 03/13/23 1025          How much help from another person do you currently need...    Turning from your back to your side while in flat bed without using bedrails? 2  -SM     Moving from lying on back to sitting on the side of a flat bed without bedrails? 2  -SM     Moving to and from a bed to a chair (including a wheelchair)? 2  -SM     Standing up from a chair using your arms (e.g., wheelchair, bedside chair)? 2  -SM     Climbing 3-5 steps with a railing? 1  -SM     To walk in hospital room? 1  -SM     AM-PAC 6 Clicks Score (PT) 10  -SM     Highest level of mobility 4 --> Transferred to chair/commode  -     Row Name 03/13/23 1025          Functional Assessment    Outcome Measure Options AM-PAC 6 Clicks Basic Mobility (PT)  -           User Key  (r) = Recorded By, (t) = Taken By, (c) = Cosigned By    Initials Name Provider Type    Milana Aguirre PTA Physical Therapist Assistant                             Physical Therapy Education     Title: PT OT SLP Therapies (Done)     Topic: Physical Therapy (Done)     Point: Mobility training (Done)     Learning Progress Summary           Patient Acceptance, E,TB,D, VU,NR by  at 3/13/2023 1026    Acceptance, E, VU by EM at 3/11/2023 1611    Acceptance, E, VU by EM at 3/10/2023 1454    Acceptance, E, VU by BL at 3/7/2023 1712    Comment: Spoke with Pt and  through day, updated on plan of care. All questions answered.    Acceptance, E,D, NR by PC at 3/7/2023 1419    Acceptance, E, VU by BL at 3/6/2023 0830    Comment: Spoke with patient and  spouse at bedside through day.    Acceptance, E, NR by AR at 3/3/2023 1346    Acceptance, E,TB, VU by JOÃO at 3/2/2023 1836    Acceptance, E,TB, VU by MS at 3/1/2023 1135    Acceptance, E,TB, VU by MS at 2/28/2023 1105    Acceptance, E,TB,D, VU,DU by LB at 2/26/2023 1159    Acceptance, E,TB, VU,DU by LB at 2/25/2023 1542    Acceptance, E,D, VU,NR by EB at 2/24/2023 1254    Acceptance, E,D, VU,NR by EB at 2/23/2023 1057    Acceptance, E,TB, VU,NR by EB1 at 2/21/2023 1134   Family Acceptance, E, VU by EM at 3/10/2023 1454    Acceptance, E, VU by BL at 3/7/2023 1712    Comment: Spoke with Pt and  through day, updated on plan of care. All questions answered.    Acceptance, E, VU by BL at 3/6/2023 0830    Comment: Spoke with patient and spouse at bedside through day.    Acceptance, E, NR by AR at 3/3/2023 1346   Significant Other Acceptance, E,TB, VU,NR by EB1 at 2/21/2023 1134                   Point: Home exercise program (Done)     Learning Progress Summary           Patient Acceptance, E,TB,D, VU,NR by SM at 3/13/2023 1026    Acceptance, E, VU by EM at 3/11/2023 1611    Acceptance, E, VU by EM at 3/10/2023 1454    Acceptance, E, VU by BL at 3/7/2023 1712    Comment: Spoke with Pt and  through day, updated on plan of care. All questions answered.    Acceptance, E,D, NR by PC at 3/7/2023 1419    Acceptance, E,D, NR by PC at 3/6/2023 1357    Acceptance, E, VU by BL at 3/6/2023 0830    Comment: Spoke with patient and spouse at bedside through day.    Acceptance, E, NR by AR at 3/3/2023 1346    Acceptance, E,TB, VU by JOÃO at 3/2/2023 1836    Acceptance, E,TB, VU by MS at 2/28/2023 1105    Acceptance, E,TB,D, VU,DU by LB at 2/26/2023 1159    Acceptance, E,TB, VU,DU by LB at 2/25/2023 1542    Acceptance, E,D, VU,NR by EB at 2/24/2023 1254    Acceptance, E,D, VU,NR by EB at 2/23/2023 1057   Family Acceptance, E, VU by EM at 3/10/2023 1454    Acceptance, E, VU by BL at 3/7/2023 1712    Comment: Spoke with Pt and   through day, updated on plan of care. All questions answered.    Acceptance, E, VU by BL at 3/6/2023 0830    Comment: Spoke with patient and spouse at bedside through day.    Acceptance, E, NR by AR at 3/3/2023 1346                   Point: Body mechanics (Done)     Learning Progress Summary           Patient Acceptance, E,TB,D, VU,NR by SM at 3/13/2023 1026    Acceptance, E, VU by BL at 3/7/2023 1712    Comment: Spoke with Pt and  through day, updated on plan of care. All questions answered.    Acceptance, E,D, NR by PC at 3/7/2023 1419    Acceptance, E, VU by BL at 3/6/2023 0830    Comment: Spoke with patient and spouse at bedside through day.    Acceptance, E, NR by AR at 3/3/2023 1346    Acceptance, E,TB, VU by JOÃO at 3/2/2023 1836    Acceptance, E,TB,D, VU,DU by LB at 2/26/2023 1159    Acceptance, E,TB, VU,DU by LB at 2/25/2023 1542    Acceptance, E,D, VU,NR by EB at 2/24/2023 1254    Acceptance, E,D, VU,NR by EB at 2/23/2023 1057   Family Acceptance, E, VU by BL at 3/7/2023 1712    Comment: Spoke with Pt and  through day, updated on plan of care. All questions answered.    Acceptance, E, VU by BL at 3/6/2023 0830    Comment: Spoke with patient and spouse at bedside through day.    Acceptance, E, NR by AR at 3/3/2023 1346                   Point: Precautions (Done)     Learning Progress Summary           Patient Acceptance, E,TB,D, VU,NR by SM at 3/13/2023 1026    Acceptance, E, VU by BL at 3/7/2023 1712    Comment: Spoke with Pt and  through day, updated on plan of care. All questions answered.    Acceptance, E,D, NR by PC at 3/7/2023 1419    Acceptance, E, VU by BL at 3/6/2023 0830    Comment: Spoke with patient and spouse at bedside through day.    Acceptance, E, NR by AR at 3/3/2023 1346    Acceptance, E,TB, VU by JOÃO at 3/2/2023 1836    Acceptance, E,TB,D, VU,DU by LB at 2/26/2023 1159    Acceptance, E,TB, VU,DU by LB at 2/25/2023 1542    Acceptance, E,D, VU,NR by EB at 2/24/2023  1254    Acceptance, E,D, VU,NR by EB at 2/23/2023 1057   Family Acceptance, E, VU by BL at 3/7/2023 1712    Comment: Spoke with Pt and  through day, updated on plan of care. All questions answered.    Acceptance, E, VU by BL at 3/6/2023 0830    Comment: Spoke with patient and spouse at bedside through day.    Acceptance, E, NR by AR at 3/3/2023 1346                               User Key     Initials Effective Dates Name Provider Type Discipline    PC 06/16/21 -  Saida Burrows, PT Physical Therapist PT    EM 06/16/21 -  Violet Branham, PT Physical Therapist PT    AR 06/16/21 -  Jovita Miller, PT Physical Therapist PT    SM 03/07/18 -  Milana Burdick, PTA Physical Therapist Assistant PT    LB 08/09/20 -  Nia Arellano, PT Physical Therapist PT    MS 06/16/21 -  Елена Florez, PT Physical Therapist PT    EB 02/14/23 -  Caren James PTA Physical Therapist Assistant PT    BL 10/18/22 -  Josefa Jones, RN Registered Nurse Nurse    JOÃO 09/20/22 -  Jayda Patel RN Registered Nurse Nurse    EB1 01/12/23 -  Yesica Zamora, PT Student PT Student PT              PT Recommendation and Plan     Plan of Care Reviewed With: patient  Progress: improving  Outcome Evaluation: Pt tolerated treatment well this date. Required min A for supine to sit, then stood w/ mod A x2. B knees were locked out and did not buckle until attempting to turn to the chair. Knees were blocked as needed when making a turn to the chair w/ max A x2. Pt completed a few OT tasks at sink while sitting upright in chair, then was assisted back to bed w/ max A x2 again. Pt completed a few seated LE exercises, and required max A x2 to return to supine. Pt remains very motivated and was encouraged to attempt a few exercises on own during the day.     Time Calculation:    PT Charges     Row Name 03/13/23 1031             Time Calculation    Start Time 0832  -SM      Stop Time 0907  -SM      Time Calculation (min) 35 min  -SM      PT  Received On 03/13/23  -TIFFANIE      PT - Next Appointment 03/14/23  -            User Key  (r) = Recorded By, (t) = Taken By, (c) = Cosigned By    Initials Name Provider Type    Milana Aguirre PTA Physical Therapist Assistant              Therapy Charges for Today     Code Description Service Date Service Provider Modifiers Qty    40904983114  PT THER PROC EA 15 MIN 3/13/2023 Milana Burdick, JACQUELYN GP 1    51855599311 HC PT THERAPEUTIC ACT EA 15 MIN 3/13/2023 Milana Burdick PTA GP 1    [id number]  PT THER SUPP EA 15 MIN 3/13/2023 Milana Burdick PTA GP 2          PT G-Codes  Outcome Measure Options: AM-PAC 6 Clicks Basic Mobility (PT)  AM-PAC 6 Clicks Score (PT): 10  AM-PAC 6 Clicks Score (OT): 11  Modified [place] Scale: 4 - Moderately severe disability.  Unable to walk without assistance, and unable to attend to own bodily needs without assistance.  PT Discharge Summary  Anticipated Discharge Disposition (PT): inpatient rehabilitation facility    Milana Burdick PTA  3/13/2023

## 2023-03-13 NOTE — PROGRESS NOTES
"  POST-OPERATIVE NOTE     Chief Complaint: Empyema  S/P: Video-assisted thoracoscopy with complete decortication  POD # 5    Subjective:  Symptoms:  Stable.  She reports cough and weakness.  No shortness of breath.    Diet:  Poor intake.  No nausea or vomiting.    Activity level: Impaired due to weakness.    Pain:  She complains of pain that is mild.  Pain is well controlled.    Feeling much better today.    Objective:  General Appearance:  Ill-appearing, in no acute distress, in pain and comfortable.    Vital signs: (most recent): Blood pressure 132/81, pulse 106, temperature 98.6 °F (37 °C), temperature source Oral, resp. rate 20, height 157.5 cm (62\"), weight 57.6 kg (127 lb), SpO2 95 %, not currently breastfeeding.  Vital signs are normal.    Output: Producing urine (Booker catheter in place.).    Lungs:  Normal effort and normal respiratory rate.    Heart: Normal rate.  Regular rhythm.    Chest: Symmetric chest wall expansion. Chest wall tenderness (Around chest tubes.) present.    Abdomen: Abdomen is non-distended.    Extremities: Decreased range of motion.    Neurological: Patient is alert and oriented to person, place and time.    Skin:  Warm and dry.            Chest tube:   Site: Right, Clean, Dry and Securement device intact  Suction: Waterseal  Air Leak: Negative  24h: 10ml    Results Review:     I reviewed the patient's new clinical results.  I reviewed the patient's new imaging results and agree with the interpretation.    Assessment & Plan     S/p VAT with complete decortication POD 5.    Recovering as expected.  Final chest tube was removed at the bedside without difficulty today.  We will recheck a chest x-ray in the morning and patient should be stable for discharge from a thoracic standpoint pending stable clinical course.    Continue to work with physical therapy and nursing staff with increasing mobilization and out of bed is much as possible.    Noa Garcia DNP, APRN  [facility]  03/13/23  11:41 EDT    Patient was seen and assessed while wearing personal protective equipment including facemask, protective eyewear and gloves.  Hand hygiene performed prior to entering the room and upon exiting with doffing of gloves.

## 2023-03-13 NOTE — OP NOTE
THORACOSCOPY WITH DAVINCI ROBOT  Procedure Report    Patient Name:  Louise GARCIA  YOB: 1965    Date of Surgery:  3/8/2023     Indications: Empyema    Pre-op Diagnosis:   Empyema of pleura (HCC) [J86.9]       Post-Op Diagnosis Codes:     * Empyema of pleura (HCC) [J86.9]    Procedure/CPT® Codes:      Procedure(s):  THORACOSCOPY WITH DAVINCI ROBOT WITH COMPLETE DECORTICATION    Staff:  Surgeon(s):  Chloe Cedillo MD    Assistant: Ebonie Silva CRNFA; Juarez Arshad CSA was responsible for performing the following activities: Retraction, Suction, Closing, Placing Dressing and Held/Positioned Camera and their skilled assistance was necessary for the success of this case.     Anesthesia: * No anesthesia type entered *    Estimated Blood Loss: 300 mL    Implants:    Nothing was implanted during the procedure    Specimen:          Specimens     ID Source Type Tests Collected By Collected At Frozen?    1 Pleural Cavity Body Fluid · ANAEROBIC CULTURE  · BODY FLUID CULTURE  · ANAEROBIC CULTURE 10 DAY INCUBATION (Canceled)   Chloe Cedillo MD 3/8/23 1923     Description: Right Pleural Fluid    2 Pleural Cavity Body Fluid · ANAEROBIC CULTURE  · FUNGAL CULTURE  · BODY FLUID CULTURE   Chloe Cedillo MD 3/8/23 1957     Description: Right Pleural Fluid #2    Comment: Aerobic Test included please.    A Pleural Cavity Tissue · ANAEROBIC CULTURE  · FUNGAL CULTURE  · TISSUE PATHOLOGY EXAM   Chloe Cedillo MD 3/8/23 2036 No    Description: Pleural Peel    Comment: Aerobic Test included please            Findings: Empyema, approximately 2 L of purulent fluid and fibrinopurulent purulence evacuated from patient's chest    Complications: None apparent        Description of Procedure: Louise GARCIA was identified in the preoperative holding area and again her consent for the procedure was verified.  The patient was transported to the operating room and placed on the operating room table in supine position.   A general anesthetic was successfully administered and She was intubated with a double lumen endotracheal tube without difficulty.  Placement of the double lumen was confirmed with a video bronchoscope examination.  A time out was performed to verify correct patient, correct procedure and the correct administration of IV antibiotics per [facility] protocol.     The patient was placed in left lateral decubitus position, right side up and all pressure points were padded.  The patient was prepped and draped in standard sterile fashion.    After standard robotic ports were placed in the seventh intercostal space from the midaxillary line.  An assistant port was placed in the 10th intercostal space posterior axillary line.  The chest cavity was entered and insufflation was begun.  The robot was brought in the field and docked to the patient in standard fashion.  Instruments were passed under direct vision.  My assistant remained at the bedside and I proceeded to the robotic console.     There were copious amounts of purulent material and fibrinopurulent peel in the chest cavity.  This was evacuated.  All of the gelatinous material was removed from the chest cavity and the lung was completely mobilized.  The costophrenic angle was cleared.  The lung was mobilized off the diaphragm, pericardium.  The fissures were opened and fluid was evacuated.  A large amount of fibrinopurulent peel was removed from the surface of the lung from the diaphragm to the apex.  Once all of this was removed, the lung nicely expanded.  Two 28 [place] chest tubes was placed in the 7th intercostal space incisions. The lung was reexpanded under direct visualization.  The incisions were closed using deep vicryl sutures and Monocryl for the skin in standard fashion.  The chest tube was secured to the skin.      The patient tolerated this procedure well and was extubated and transported to the recovery room in stable condition.  The counts were correct at the  end of the case.           Chloe Cedillo MD     Date: 3/13/2023  Time: 17:07 EDT

## 2023-03-13 NOTE — PROGRESS NOTES
Name: Louise GARCIA ADMIT: 2023   : 1965  PCP: Chloe Schaefer APRN    MRN: [id number] LOS: 18 days   AGE/SEX: 58 y.o. female  ROOM: [facility]     Subjective   Subjective   No new complaints.  Has some right-sided chest/abdominal pain related to chest tube.  Feels weak in general specifically in her left leg.  Decreased appetite but tolerating boost.  Having bowel movements.      Objective   Objective   Vital Signs  Temp:  [98.2 °F (36.8 °C)-99.1 °F (37.3 °C)] 98.6 °F (37 °C)  Heart Rate:  [] 102  Resp:  [18-20] 20  BP: (125-149)/(71-91) 130/71  SpO2:  [90 %-99 %] 97 %  on  Flow (L/min):  [0.5-1] 1;   Device (Oxygen Therapy): nasal cannula  Body mass index is 23.23 kg/m².  Physical Exam  Constitutional:       Appearance: She is ill-appearing. She is not toxic-appearing.   Cardiovascular:      Rate and Rhythm: Tachycardia present.      Heart sounds: No murmur heard.  Pulmonary:      Effort: Pulmonary effort is normal.      Breath sounds: No stridor. No rhonchi.      Comments: 2 R sided chest tubes  Abdominal:      General: Abdomen is flat. There is no distension.      Palpations: Abdomen is soft.   Skin:     General: Skin is warm.      Coloration: Skin is not jaundiced.   Neurological:      General: No focal deficit present.      Mental Status: She is alert and oriented to person, place, and time.      Comments: 5/5 strength b/l UEs; 4/5 strength b/l LEs;         Results Review     I reviewed the patient's new clinical results.  Results from last 7 days   Lab Units 23  0528 23  0813 23  2319 23  0840 23  0315 03/10/23  2017 03/10/23  0322   WBC 10*3/mm3 11.32* 10.59  --   --  14.10*  --  18.95*   HEMOGLOBIN g/dL 7.9* 7.6*  7.6* 7.7* 8.3* 7.5*   < > 8.1*  8.1*   PLATELETS 10*3/mm3 290 276  --   --  210  --  238    < > = values in this interval not displayed.     Results from last 7 days   Lab Units 23  0528 23  0813 23  0315 03/10/23  0322   SODIUM  mmol/L 134* 135* 138 133*   POTASSIUM mmol/L 4.8 4.3 3.7 3.8   CHLORIDE mmol/L 99 101 103 98   CO2 mmol/L 25.9 26.0 27.0 28.0   BUN mg/dL 12 12 12 10   CREATININE mg/dL 1.02* 0.93 0.90 0.88   GLUCOSE mg/dL 81 126* 103* 81   EGFR mL/min/1.73 63.5 70.9 73.8 75.8     Results from last 7 days   Lab Units 03/12/23  0813 03/11/23  0315 03/10/23  0322 03/09/23  0443   ALBUMIN g/dL 1.7* 1.9* 1.8* 1.8*   BILIRUBIN mg/dL 0.3 0.4 0.8 1.0   ALK PHOS U/L 224* 216* 199* 178*   AST (SGOT) U/L 29 26 29 27   ALT (SGPT) U/L 14 16 17 19     Results from last 7 days   Lab Units 03/13/23  0528 03/12/23  0813 03/11/23  0315 03/10/23  0322 03/09/23  0443   CALCIUM mg/dL 8.5* 7.8* 7.5* 7.6* 7.7*   ALBUMIN g/dL  --  1.7* 1.9* 1.8* 1.8*   MAGNESIUM mg/dL  --  1.9 1.9 1.7 1.6   PHOSPHORUS mg/dL  --  2.6 2.6 3.1 4.4         Glucose   Date/Time Value Ref Range Status   03/12/2023 0537 90 70 - 130 mg/dL Final     Comment:     Meter: [id number] : [id number] Sherman Strickland PCA       XR Chest 1 View    Result Date: 3/12/2023  Partially improved aeration of the right lung.  This report was finalized on 3/12/2023 7:24 AM by Dr. Kurt Garrison M.D.      Scheduled Medications  acetaminophen, 1,000 mg, Oral, TID  celecoxib, 100 mg, Oral, Q12H  desvenlafaxine, 25 mg, Oral, Daily  folic acid, 1 mg, Oral, Daily  gabapentin, 300 mg, Oral, Q8H  heparin (porcine), 5,000 Units, Subcutaneous, Q8H  ipratropium-albuterol, 3 mL, Nebulization, 4x Daily - RT  lamoTRIgine, 200 mg, Oral, Daily  lidocaine, 1 patch, Transdermal, Q24H  lidocaine, 1 patch, Transdermal, Q24H  lidocaine, 10 mL, Intradermal, Once  pantoprazole, 40 mg, Oral, Q AM  sodium chloride, 10 mL, Intravenous, Q12H  thiamine, 100 mg, Oral, Daily  vancomycin, 1,000 mg, Intravenous, Q24H  vitamin B-12, 500 mcg, Oral, Daily    Infusions  Pharmacy to dose vancomycin,     Diet  Diet: Regular/House Diet; Texture: Regular Texture (IDDSI 7); Fluid Consistency: Thin (IDDSI 0)       Assessment/Plan      Active Hospital Problems    Diagnosis  POA   • **Paresthesia [R20.2]  Yes   • MRSA bacteremia [R78.81, B95.62]  Unknown   • Oral candidiasis [B37.0]  No   • Mass of middle lobe of right lung [R91.8]  Yes   • Situational mixed anxiety and depressive disorder [F43.23]  Yes   • 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]  Yes   • Normocytic anemia [D64.9]  Yes   • GERD (gastroesophageal reflux disease) [K21.9]  Yes   • Lower extremity weakness [R29.898]  Yes   • History of blood clots [Z86.718]  Not Applicable   • Chronic anticoagulation [Z79.01]  Not Applicable   • Seizures (HCC) [R56.9]  Yes   • Cervical myelopathy (HCC) [G95.9]  Yes   • Empyema of pleura (HCC) [J86.9]  Yes      Resolved Hospital Problems    Diagnosis Date Resolved POA   • Tricuspid valve vegetation [I33.0] 03/12/2023 Yes   • Upper abdominal pain [R10.10] 03/08/2023 No   • Leukocytosis [D72.829] 03/08/2023 Yes   • Hyperglycemia [R73.9] 03/08/2023 No       58 y.o. female admitted with Paresthesia.    Thoracic surgery to follow-up make recommendations regarding chest tube.  Could probably look to going to rehab soon after tube removal.  ID recommending 4 weeks of IV antibiotics.    [facility] following.       MRSA bacteremia/PNA with empyema/Endocarditis/Septic phlebitis  -Blood cx (3/2/23): MRSA; repeat negative   -s/p thoracentesis 3/4/23; pleural fluid w/ MRSA  -TTE (3/5/23): 61-65%; small mobile mass on TV c/w vegetation  -TTE (3/10/23): NO VEGETATION  -b/l UE doppler (3/5/23): acute RUE superficial thrombophlebitis  -ID following and recommend IV vancomycin for 4 weeks  -s/p thoracoscopy w/ DaVinci robot w/ complete decortication 3/8/23 with CTS    Idiopathic peripheral neuropathy, motor predominant  -EMG showing evidence of neuropathy, not typical of GBS  -s/p IVIG for possible autoimmune neuropathy  -following tests are negative/Normal : West Nile virus serology, Lyme serology, ESR, CRP, magnesium, phosphorus, potassium, RPR, folate, CK, DENISE,  heavy metal screen, TSH, angiotensin-converting enzyme, heavy metal screen  -Neuro has signed off but will follow-up after discharge and will follow-up results [facility] CSF autoimmune panel    Seizure disorder  -lamotrigine 200mg    Depression  -desvenlafaxine 25mg    Hx DVT  -on Eliquis, on hold for VATS      · SCDs for DVT prophylaxis; Eliquis ON HOLD  · Full code.  · Discussed with patient and family.  · Anticipate discharge to SNF or possibly [facility] when cleared by consultants.      Yvan Samuel MD  [facility]  03/13/23  08:55 EDT

## 2023-03-13 NOTE — PLAN OF CARE
The pt participated in OT/PT this AM. She was pleasant and agreeable to therapy. Min A provided for bed mobility. Total A LBD. She stood with Min A x2 and knees locked into extension. She was able to participate in mini squats without any buckling. She pivoted to a bedside chair with Mod-Max A x2 with knees blocked PRN 2/2 to buckling. Chair pushed to sinkside where she completed oral hygiene after set-up. Therapists assisted her with hair washing. She stood and pivoted back to bed with Max A x2. She remains very motivated and acute rehab recommended.     Patient was not wearing a face mask during this therapy encounter. Therapist used appropriate personal protective equipment including mask and gloves. Mask used was standard procedure mask. Appropriate PPE was worn during the entire therapy session. Hand hygiene was completed before and after therapy session. Patient is not in enhanced droplet precautions.

## 2023-03-13 NOTE — THERAPY TREATMENT NOTE
Patient Name: Louise GARCIA  : 1965    MRN: [id number]                              Today's Date: 3/13/2023       Admit Date: 2023    Visit Dx:     ICD-10-CM ICD-9-CM   1. Paresthesia  R20.2 782.0   2. Gait instability  R26.81 781.2   3. Extremity numbness  R20.0 782.0   4. 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)   • Situational mixed anxiety and depressive disorder   • Mass of middle lobe of right lung   • Oral candidiasis   • Empyema of pleura (HCC)   • MRSA bacteremia     Past Medical History:   Diagnosis Date   • Degenerative disc disease, cervical    • Gestational diabetes    • H/O blood clots    • Hematemesis    • Seizures (HCC)    • Septic shock (HCC)      Past Surgical History:   Procedure Laterality Date   • APPENDECTOMY     • BACK SURGERY     • CHOLECYSTECTOMY     • ENDOSCOPY N/A 2016    Procedure: ESOPHAGOGASTRODUODENOSCOPY with biopsy;  Surgeon: Austen Brito MD;  Location: [facility] ENDOSCOPY;  Service:    • HYSTERECTOMY     • NECK SURGERY       approx 6 yrs ago   • THORACOSCOPY Right 3/8/2023    Procedure: THORACOSCOPY WITH DAVINCI ROBOT WITH COMPLETE DECORTICATION;  Surgeon: Chloe Cedillo MD;  Location: [facility] OR;  Service: Robotics - DaVinci;  Laterality: Right;   • TONSILLECTOMY     • TONSILLECTOMY AND ADENOIDECTOMY        General Information     Row Name 23 0943          OT Time and Intention    Document Type therapy note (daily note)  -RB     Mode of Treatment occupational therapy;co-treatment;physical therapy  -RB     Row Name 23 0943          General Information    Patient Profile Reviewed yes  -RB      Existing Precautions/Restrictions fall  -RB     Row Name 03/13/23 0943          Cognition    Orientation Status (Cognition) oriented x 3  -           User Key  (r) = Recorded By, (t) = Taken By, (c) = Cosigned By    Initials Name Provider Type    RB La Carr OT Occupational Therapist                 Mobility/ADL's     Row Name 03/13/23 0943          Bed Mobility    Bed Mobility supine-sit;sit-supine  -RB     Supine-Sit [place] (Bed Mobility) minimum assist (75% patient effort);2 person assist;verbal cues  -RB     Sit-Supine [place] (Bed Mobility) minimum assist (75% patient effort);2 person assist;verbal cues;moderate assist (50% patient effort)  -     Assistive Device (Bed Mobility) bed rails  -     Row Name 03/13/23 0943          Transfers    Transfers sit-stand transfer;stand-sit transfer;bed-chair transfer  -RB     Comment, (Transfers) knees blocked ~75% of the time. Near fall at the beginning of the first transfer due to impulsively trying to lunge for the chair. education provided on standing and taking small steps to the bedside chair  -     Row Name 03/13/23 0943          Bed-Chair Transfer    Bed-Chair [place] (Transfers) moderate assist (50% patient effort);maximum assist (25% patient effort);2 person assist;verbal cues  -RB     Assistive Device (Bed-Chair Transfers) --  hha  -     Row Name 03/13/23 0943          Sit-Stand Transfer    Sit-Stand [place] (Transfers) minimum assist (75% patient effort);2 person assist;verbal cues  -RB     Assistive Device (Sit-Stand Transfers) --  [facility]  -RB     Comment, (Sit-Stand Transfer) kness blocked ~75% of the time  -     Row Name 03/13/23 0943          Stand-Sit Transfer    Stand-Sit [place] (Transfers) minimum assist (75% patient effort);2 person assist;verbal cues  -     Row Name 03/13/23 0943          Functional Mobility    Functional Mobility- Ind. Level not appropriate to assess  -     Row Name 03/13/23 0943           Activities of Daily Living    BADL Assessment/Intervention grooming  -RB     Row Name 03/13/23 0943          Grooming Assessment/Training    [place] Level (Grooming) grooming skills;oral care regimen;verbal cues  OT/PT dependently washed her hair at sinkside  -RB     Position (Grooming) supported sitting;sink side  -RB           User Key  (r) = Recorded By, (t) = Taken By, (c) = Cosigned By    Initials Name Provider Type    La Gaviria OT Occupational Therapist               Obj/Interventions     Row Name 03/13/23 0946          Sensory Assessment (Somatosensory)    Sensory Assessment (Somatosensory) sensation intact  -RB     Row Name 03/13/23 0946          Balance    Comment, Balance CGA sitting balance, Mod-Max A for standing balance due to knee buckling  -RB           User Key  (r) = Recorded By, (t) = Taken By, (c) = Cosigned By    Initials Name Provider Type    La Gaviria OT Occupational Therapist               Goals/Plan    No documentation.                Clinical Impression     Row Name 03/13/23 0946          Pain Assessment    Pretreatment Pain Rating 7/10  -RB     Posttreatment Pain Rating 7/10  -RB     Pain Location - Side/Orientation Right  -RB     Pain Location - flank  -RB     Pain Intervention(s) Repositioned;Ambulation/increased activity  -RB     Row Name 03/13/23 0946          Plan of Care Review    Plan of Care Reviewed With patient;spouse  -RB     Progress improving  -RB     Row Name 03/13/23 0946          Therapy Assessment/Plan (OT)    Rehab Potential (OT) good, to achieve stated therapy goals  -RB     Criteria for Skilled Therapeutic Interventions Met (OT) yes;skilled treatment is necessary  -RB     Therapy Frequency (OT) 5 times/wk  -RB     Row Name 03/13/23 0946          Therapy Plan Review/Discharge Plan (OT)    Anticipated Discharge Disposition (OT) inpatient rehabilitation facility  -RB     Row Name 03/13/23 0946          Vital Signs    Pre SpO2 (%) 98  -RB      O2 Delivery Pre Treatment supplemental O2  -RB     O2 Delivery Intra Treatment supplemental O2  -RB     Post SpO2 (%) 95  -RB     O2 Delivery Post Treatment supplemental O2  -RB     Pre Patient Position Supine  -RB     Intra Patient Position Standing  -RB     Post Patient Position Supine  -RB     Row Name 03/13/23 0946          Positioning and Restraints    Pre-Treatment Position in bed  -RB     Post Treatment Position bed  -RB     In Bed notified nsg;supine;call light within reach;encouraged to call for assist;exit alarm on;fowlers  -RB           User Key  (r) = Recorded By, (t) = Taken By, (c) = Cosigned By    Initials Name Provider Type    La Gaviria OT Occupational Therapist               Outcome Measures    No documentation.                 Occupational Therapy Education     Title: PT OT SLP Therapies (Done)     Topic: Occupational Therapy (Done)     Point: ADL training (Done)     Description:   Instruct learner(s) on proper safety adaptation and remediation techniques during self care or transfers.   Instruct in proper use of assistive devices.              Learning Progress Summary           Patient Acceptance, E, VU by BL at 3/7/2023 1712    Comment: Spoke with Pt and  through day, updated on plan of care. All questions answered.    Acceptance, E,D, NR by PC at 3/6/2023 1357    Acceptance, E, VU by BL at 3/6/2023 0830    Comment: Spoke with patient and spouse at bedside through day.    Acceptance, E,TB, VU by JOÃO at 3/2/2023 1836    Acceptance, E, VU by MW at 2/27/2023 1522    Comment: role of OT, d/c rec, goals of care   Family Acceptance, E, VU by BL at 3/7/2023 1712    Comment: Spoke with Pt and  through day, updated on plan of care. All questions answered.    Acceptance, E, VU by BL at 3/6/2023 0830    Comment: Spoke with patient and spouse at bedside through day.    Acceptance, E, VU by MW at 2/27/2023 1522    Comment: role of OT, d/c rec, goals of care                   Point:  Home exercise program (Done)     Description:   Instruct learner(s) on appropriate technique for monitoring, assisting and/or progressing therapeutic exercises/activities.              Learning Progress Summary           Patient Acceptance, E, VU by BL at 3/7/2023 1712    Comment: Spoke with Pt and  through day, updated on plan of care. All questions answered.    Acceptance, E,D, NR by PC at 3/6/2023 1357    Acceptance, E, VU by BL at 3/6/2023 0830    Comment: Spoke with patient and spouse at bedside through day.    Acceptance, E,TB, VU by JOÃO at 3/2/2023 1836   Family Acceptance, E, VU by BL at 3/7/2023 1712    Comment: Spoke with Pt and  through day, updated on plan of care. All questions answered.    Acceptance, E, VU by BL at 3/6/2023 0830    Comment: Spoke with patient and spouse at bedside through day.                   Point: Precautions (Done)     Description:   Instruct learner(s) on prescribed precautions during self-care and functional transfers.              Learning Progress Summary           Patient Acceptance, E, VU by BL at 3/7/2023 1712    Comment: Spoke with Pt and  through day, updated on plan of care. All questions answered.    Acceptance, E,D, NR by PC at 3/6/2023 1357    Acceptance, E, VU by BL at 3/6/2023 0830    Comment: Spoke with patient and spouse at bedside through day.    Acceptance, E,TB, VU by JOÃO at 3/2/2023 1836    Acceptance, E, VU by MW at 2/27/2023 1522    Comment: role of OT, d/c rec, goals of care   Family Acceptance, E, VU by BL at 3/7/2023 1712    Comment: Spoke with Pt and  through day, updated on plan of care. All questions answered.    Acceptance, E, VU by BL at 3/6/2023 0830    Comment: Spoke with patient and spouse at bedside through day.    Acceptance, E, VU by MW at 2/27/2023 1522    Comment: role of OT, d/c rec, goals of care                   Point: Body mechanics (Done)     Description:   Instruct learner(s) on proper positioning and spine  alignment during self-care, functional mobility activities and/or exercises.              Learning Progress Summary           Patient Acceptance, E, VU by BL at 3/7/2023 1712    Comment: Spoke with Pt and  through day, updated on plan of care. All questions answered.    Acceptance, E,D, NR by PC at 3/6/2023 1357    Acceptance, E, VU by BL at 3/6/2023 0830    Comment: Spoke with patient and spouse at bedside through day.    Acceptance, E,TB, VU by JOÃO at 3/2/2023 1836    Acceptance, E, VU by  at 2/27/2023 1522    Comment: role of OT, d/c rec, goals of care   Family Acceptance, E, VU by BL at 3/7/2023 1712    Comment: Spoke with Pt and  through day, updated on plan of care. All questions answered.    Acceptance, E, VU by BL at 3/6/2023 0830    Comment: Spoke with patient and spouse at bedside through day.    Acceptance, E, VU by MW at 2/27/2023 1522    Comment: role of OT, d/c rec, goals of care                               User Key     Initials Effective Dates Name Provider Type Discipline    PC 06/16/21 -  Saida Burrows PT Physical Therapist PT    MW 08/20/21 -  Nathalia Holly OT Occupational Therapist OT    BL 10/18/22 -  Josefa Jones, RN Registered Nurse Nurse     09/20/22 -  Jayda Patel RN Registered Nurse Nurse              OT Recommendation and Plan  Therapy Frequency (OT): 5 times/wk  Plan of Care Review  Plan of Care Reviewed With: patient, spouse  Progress: improving     Time Calculation:    Time Calculation- OT     Row Name 03/13/23 0942             Time Calculation- OT    OT Start Time 0832  -RB      OT Stop Time 0907  -RB      OT Time Calculation (min) 35 min  -RB      Total Timed Code Minutes- OT 35 minute(s)  -RB      OT Received On 03/13/23  -RB      OT - Next Appointment 03/14/23  -RB         Timed Charges    42679 - OT Therapeutic Activity Minutes 10  -RB      00391 - OT Self Care/Mgmt Minutes 25  -RB         Total Minutes    Timed Charges Total Minutes 35  -RB        Total Minutes 35  -RB            User Key  (r) = Recorded By, (t) = Taken By, (c) = Cosigned By    Initials Name Provider Type    La Gaviria OT Occupational Therapist              Therapy Charges for Today     Code Description Service Date Service Provider Modifiers Qty    [id number]  OT THERAPEUTIC ACT EA 15 MIN 3/13/2023 La Carr OT GO 1    [id number]  OT SELF CARE/MGMT/TRAIN EA 15 MIN 3/13/2023 La Carr OT GO 1               La Carr OT  3/13/2023

## 2023-03-13 NOTE — PROGRESS NOTES
[facility]: Vancomycin Level Monitoring Note    Louise GARCIA is a 58 y.o. female who is on day 8 of pharmacy to dose vancomycin for MRSA Bacteremia-- empyema/endocarditis    Estimated Creatinine Clearance: 59.2 mL/min (by C-G formula based on SCr of 0.93 mg/dL).    Current Vanc Dose: 1000 mg IV every  24 hours  Results from last 7 days   Lab Units 03/12/23  2027 03/10/23  1640 03/09/23  1427   VANCOMYCIN TR mcg/mL 17.10 21.90* 20.70*   Predicted AUC at current dose:458 mg/L.hr. Trough within goal range  Next Level Date and Time: Vanc Trough on 3/14 @ 0900    No changes at this time. Pharmacy is continuing to monitor and will adjust as needed.    Hesham Gomez [facility]  Clinical Pharmacist

## 2023-03-13 NOTE — PROGRESS NOTES
[facility]    Discussed with Dr. Rider and has been accepted for acute inpatient rehab.  Will initiate pre-cert.  Bed available Wed. or Thurs.     Left message for CCP.    Thank you,  Gaviota Goodman RN  Rehab Admission Nurse

## 2023-03-14 ENCOUNTER — APPOINTMENT (OUTPATIENT)
Dept: GENERAL RADIOLOGY | Facility: HOSPITAL | Age: 58
DRG: 163 | End: 2023-03-14
Payer: COMMERCIAL

## 2023-03-14 LAB
ANION GAP SERPL CALCULATED.3IONS-SCNC: 10.1 MMOL/L (ref 5–15)
BUN SERPL-MCNC: 11 MG/DL (ref 6–20)
BUN/CREAT SERPL: 11.8 (ref 7–25)
CALCIUM SPEC-SCNC: 8.9 MG/DL (ref 8.6–10.5)
CHLORIDE SERPL-SCNC: 98 MMOL/L (ref 98–107)
CO2 SERPL-SCNC: 28.9 MMOL/L (ref 22–29)
CREAT SERPL-MCNC: 0.93 MG/DL (ref 0.57–1)
DEPRECATED RDW RBC AUTO: 49.6 FL (ref 37–54)
EGFRCR SERPLBLD CKD-EPI 2021: 70.9 ML/MIN/1.73
ERYTHROCYTE [DISTWIDTH] IN BLOOD BY AUTOMATED COUNT: 14.1 % (ref 12.3–15.4)
GLUCOSE SERPL-MCNC: 84 MG/DL (ref 65–99)
HCT VFR BLD AUTO: 22.6 % (ref 34–46.6)
HGB BLD-MCNC: 7.7 G/DL (ref 12–15.9)
MCH RBC QN AUTO: 33 PG (ref 26.6–33)
MCHC RBC AUTO-ENTMCNC: 34.1 G/DL (ref 31.5–35.7)
MCV RBC AUTO: 97 FL (ref 79–97)
PLATELET # BLD AUTO: 351 10*3/MM3 (ref 140–450)
PMV BLD AUTO: 10.8 FL (ref 6–12)
POTASSIUM SERPL-SCNC: 4.5 MMOL/L (ref 3.5–5.2)
RBC # BLD AUTO: 2.33 10*6/MM3 (ref 3.77–5.28)
SODIUM SERPL-SCNC: 137 MMOL/L (ref 136–145)
WBC NRBC COR # BLD: 11.39 10*3/MM3 (ref 3.4–10.8)

## 2023-03-14 PROCEDURE — 94799 UNLISTED PULMONARY SVC/PX: CPT

## 2023-03-14 PROCEDURE — 94664 DEMO&/EVAL PT USE INHALER: CPT

## 2023-03-14 PROCEDURE — 97110 THERAPEUTIC EXERCISES: CPT

## 2023-03-14 PROCEDURE — 25010000002 VANCOMYCIN PER 500 MG: Performed by: INTERNAL MEDICINE

## 2023-03-14 PROCEDURE — 25010000002 HEPARIN (PORCINE) PER 1000 UNITS: Performed by: THORACIC SURGERY (CARDIOTHORACIC VASCULAR SURGERY)

## 2023-03-14 PROCEDURE — 80048 BASIC METABOLIC PNL TOTAL CA: CPT | Performed by: HOSPITALIST

## 2023-03-14 PROCEDURE — 25010000002 FUROSEMIDE PER 20 MG: Performed by: HOSPITALIST

## 2023-03-14 PROCEDURE — 99024 POSTOP FOLLOW-UP VISIT: CPT | Performed by: NURSE PRACTITIONER

## 2023-03-14 PROCEDURE — 97530 THERAPEUTIC ACTIVITIES: CPT

## 2023-03-14 PROCEDURE — 94761 N-INVAS EAR/PLS OXIMETRY MLT: CPT

## 2023-03-14 PROCEDURE — 85027 COMPLETE CBC AUTOMATED: CPT | Performed by: HOSPITALIST

## 2023-03-14 PROCEDURE — 71045 X-RAY EXAM CHEST 1 VIEW: CPT

## 2023-03-14 RX ORDER — FUROSEMIDE 10 MG/ML
40 INJECTION INTRAMUSCULAR; INTRAVENOUS EVERY 12 HOURS
Status: COMPLETED | OUTPATIENT
Start: 2023-03-14 | End: 2023-03-15

## 2023-03-14 RX ORDER — CELECOXIB 100 MG/1
100 CAPSULE ORAL EVERY 12 HOURS SCHEDULED
Qty: 60 CAPSULE | Refills: 0 | OUTPATIENT
Start: 2023-03-14 | End: 2023-04-13

## 2023-03-14 RX ORDER — POTASSIUM CHLORIDE 750 MG/1
20 TABLET, FILM COATED, EXTENDED RELEASE ORAL ONCE
Status: COMPLETED | OUTPATIENT
Start: 2023-03-14 | End: 2023-03-14

## 2023-03-14 RX ORDER — ACETAMINOPHEN 500 MG
1000 TABLET ORAL 3 TIMES DAILY
Qty: 56 TABLET | Refills: 0 | OUTPATIENT
Start: 2023-03-14 | End: 2023-03-24

## 2023-03-14 RX ADMIN — OXYCODONE HYDROCHLORIDE 5 MG: 5 TABLET ORAL at 05:53

## 2023-03-14 RX ADMIN — VANCOMYCIN HYDROCHLORIDE 1000 MG: 1 INJECTION, SOLUTION INTRAVENOUS at 21:56

## 2023-03-14 RX ADMIN — OXYCODONE HYDROCHLORIDE 5 MG: 5 TABLET ORAL at 18:07

## 2023-03-14 RX ADMIN — LIDOCAINE 1 PATCH: 700 PATCH TOPICAL at 09:16

## 2023-03-14 RX ADMIN — GABAPENTIN 300 MG: 300 CAPSULE ORAL at 21:55

## 2023-03-14 RX ADMIN — IPRATROPIUM BROMIDE AND ALBUTEROL SULFATE 3 ML: 2.5; .5 SOLUTION RESPIRATORY (INHALATION) at 10:47

## 2023-03-14 RX ADMIN — OXYCODONE HYDROCHLORIDE 5 MG: 5 TABLET ORAL at 00:42

## 2023-03-14 RX ADMIN — GABAPENTIN 300 MG: 300 CAPSULE ORAL at 05:53

## 2023-03-14 RX ADMIN — IPRATROPIUM BROMIDE AND ALBUTEROL SULFATE 3 ML: 2.5; .5 SOLUTION RESPIRATORY (INHALATION) at 15:12

## 2023-03-14 RX ADMIN — LAMOTRIGINE 200 MG: 100 TABLET ORAL at 09:16

## 2023-03-14 RX ADMIN — IPRATROPIUM BROMIDE AND ALBUTEROL SULFATE 3 ML: 2.5; .5 SOLUTION RESPIRATORY (INHALATION) at 19:58

## 2023-03-14 RX ADMIN — Medication 100 MG: at 09:16

## 2023-03-14 RX ADMIN — HEPARIN SODIUM 5000 UNITS: 5000 INJECTION INTRAVENOUS; SUBCUTANEOUS at 14:30

## 2023-03-14 RX ADMIN — POTASSIUM CHLORIDE 20 MEQ: 750 TABLET, EXTENDED RELEASE ORAL at 12:20

## 2023-03-14 RX ADMIN — OXYCODONE HYDROCHLORIDE 5 MG: 5 TABLET ORAL at 21:55

## 2023-03-14 RX ADMIN — ACETAMINOPHEN 1000 MG: 500 TABLET, FILM COATED ORAL at 18:07

## 2023-03-14 RX ADMIN — FOLIC ACID 1 MG: 1 TABLET ORAL at 09:16

## 2023-03-14 RX ADMIN — PANTOPRAZOLE SODIUM 40 MG: 40 TABLET, DELAYED RELEASE ORAL at 05:53

## 2023-03-14 RX ADMIN — Medication 500 MCG: at 09:16

## 2023-03-14 RX ADMIN — Medication 10 ML: at 21:57

## 2023-03-14 RX ADMIN — OXYCODONE HYDROCHLORIDE 5 MG: 5 TABLET ORAL at 14:31

## 2023-03-14 RX ADMIN — FUROSEMIDE 40 MG: 10 INJECTION, SOLUTION INTRAMUSCULAR; INTRAVENOUS at 12:19

## 2023-03-14 RX ADMIN — ACETAMINOPHEN 1000 MG: 500 TABLET, FILM COATED ORAL at 21:55

## 2023-03-14 RX ADMIN — IPRATROPIUM BROMIDE AND ALBUTEROL SULFATE 3 ML: 2.5; .5 SOLUTION RESPIRATORY (INHALATION) at 07:33

## 2023-03-14 RX ADMIN — ACETAMINOPHEN 1000 MG: 500 TABLET, FILM COATED ORAL at 09:16

## 2023-03-14 RX ADMIN — HEPARIN SODIUM 5000 UNITS: 5000 INJECTION INTRAVENOUS; SUBCUTANEOUS at 05:53

## 2023-03-14 RX ADMIN — HEPARIN SODIUM 5000 UNITS: 5000 INJECTION INTRAVENOUS; SUBCUTANEOUS at 21:56

## 2023-03-14 RX ADMIN — OXYCODONE HYDROCHLORIDE 5 MG: 5 TABLET ORAL at 09:54

## 2023-03-14 RX ADMIN — CELECOXIB 100 MG: 100 CAPSULE ORAL at 09:16

## 2023-03-14 RX ADMIN — Medication 10 ML: at 09:16

## 2023-03-14 RX ADMIN — GABAPENTIN 300 MG: 300 CAPSULE ORAL at 14:31

## 2023-03-14 RX ADMIN — DESVENLAFAXINE SUCCINATE 25 MG: 25 TABLET, EXTENDED RELEASE ORAL at 14:31

## 2023-03-14 RX ADMIN — CELECOXIB 100 MG: 100 CAPSULE ORAL at 21:56

## 2023-03-14 NOTE — PROGRESS NOTES
"  POST-OPERATIVE NOTE     Chief Complaint: Empyema  S/P: Video-assisted thoracoscopy with complete decortication  POD # 6    Subjective:  Symptoms:  Improved.  She reports cough and weakness.  No shortness of breath.    Diet:  Poor intake.  No nausea or vomiting.    Activity level: Impaired due to weakness.    Pain:  She complains of pain that is mild.  Pain is well controlled.    Sitting on side of the bed.  Working with PT.  Reports she feels improved.  Has some burning pain in her right chest wall.    Objective:  General Appearance:  Ill-appearing, in no acute distress, in pain and comfortable.    Vital signs: (most recent): Blood pressure 121/65, pulse 116, temperature 99.9 °F (37.7 °C), temperature source Oral, resp. rate 16, height 157.5 cm (62\"), weight 57.6 kg (127 lb), SpO2 90 %, not currently breastfeeding.  Vital signs are normal.    Output: Producing urine (Booker catheter in place.).    Lungs:  Normal effort and normal respiratory rate.    Heart: Normal rate.  Regular rhythm.    Chest: Symmetric chest wall expansion. Chest wall tenderness (Around chest tubes.) present.    Abdomen: Abdomen is non-distended.    Extremities: Decreased range of motion.    Neurological: Patient is alert and oriented to person, place and time.    Skin:  Warm and dry.              Results Review:     I reviewed the patient's new clinical results.  I reviewed the patient's new imaging results and agree with the interpretation.  I discussed these results with the patient, spouse at bedside, RN and Dr. Whitehead.    Assessment & Plan     S/p VAT with complete decortication POD 6.    Recovering as expected.  Chest x-ray stable this morning s/p removal of final chest tube.  No significant effusion or pneumothorax.   Chest wall discomfort is likely secondary to postoperative neuralgia.  Gabapentin has been ordered.  She may apply heat or ice as needed.  Surgical sites are healing appropriately.  Leave DuoDERM in place for another 24 " hours.  Noted plans to discharge to rehab later this week.  We are in agreement with this plan.  Patient will need to follow-up with us in 2 to 3 weeks with a hospital perform chest x-ray.  If she is in [facility] acute rehab, we are happy to see her there for her initial postop visit.  Continue to work with physical therapy and nursing staff with increasing mobilization and out of bed is much as possible.    We will sign off.  Patient has an outpatient appointment with Dr. Cedillo on 3/28/2023.    KASIA Boyd  [facility]  03/14/23  11:43 EDT    Patient was seen and assessed while wearing personal protective equipment including facemask, protective eyewear and gloves.  Hand hygiene performed prior to entering the room and upon exiting with doffing of gloves.

## 2023-03-14 NOTE — PLAN OF CARE
Problem: Adult Inpatient Plan of Care  Goal: Plan of Care Review  Outcome: Ongoing, Progressing  Flowsheets (Taken 3/14/2023 1522)  Progress: no change  Plan of Care Reviewed With: patient  Outcome Evaluation: vss. up with assist x1-2. on room air when awake. prn medication provided for c/o pain. plan for possible discharge tomorrow.   Goal Outcome Evaluation:  Plan of Care Reviewed With: patient        Progress: no change  Outcome Evaluation: vss. up with assist x1-2. on room air when awake. prn medication provided for c/o pain. plan for possible discharge tomorrow.

## 2023-03-14 NOTE — PROGRESS NOTES
[facility]    Await insurance precert. Bed availaable 3/14/23 pending insurance approval and medical stability. Will f/u in JENNIFER Whatley RN  Rehab Admissions Coordinator  [phone]

## 2023-03-14 NOTE — PROGRESS NOTES
"    Name: Louise GARCIA ADMIT: 2023   : 1965  PCP: Chloe Schaefer APRN    MRN: [id number] LOS: 19 days   AGE/SEX: 58 y.o. female  ROOM: [facility]     Subjective   Subjective   Complains of some discomfort in both quadrants of her lower abdomen but more so on the right where the chest tubes were.  She feels \"swollen.\"  Chest tubes removed yesterday.    Objective   Objective   Vital Signs  Temp:  [98.2 °F (36.8 °C)-99.9 °F (37.7 °C)] 99.9 °F (37.7 °C)  Heart Rate:  [] 116  Resp:  [16-20] 16  BP: (121-134)/(65-82) 121/65  SpO2:  [85 %-100 %] 90 %  on  Flow (L/min):  [1] 1;   Device (Oxygen Therapy): room air  Body mass index is 23.23 kg/m².     Intake/Output Summary (Last 24 hours) at 3/14/2023 1138  Last data filed at 3/14/2023 1117  Gross per 24 hour   Intake 480 ml   Output 950 ml   Net -470 ml   Net IO Since Admission: 168.52 mL [23 1138]   Wt Readings from Last 1 Encounters:   03/10/23 1410 57.6 kg (127 lb)   03/10/23 0600 57.9 kg (127 lb 10.3 oz)   23 0600 56.7 kg (125 lb)   23 1755 56.7 kg (125 lb)   23 2019 56.7 kg (125 lb)   23 1425 54.4 kg (120 lb)     Wt Readings from Last 3 Encounters:   03/10/23 57.6 kg (127 lb)   23 56.7 kg (125 lb)   23 56.7 kg (125 lb)     Physical Exam  Constitutional:       Appearance: She is ill-appearing. She is not toxic-appearing.   Cardiovascular:      Rate and Rhythm: Tachycardia present.      Heart sounds: No murmur heard.  Pulmonary:      Effort: Pulmonary effort is normal.      Breath sounds: No stridor. No rhonchi.      Comments: 2 R sided chest tubes  Abdominal:      General: Abdomen is flat. There is no distension.      Palpations: Abdomen is soft.   Skin:     General: Skin is warm.      Coloration: Skin is not jaundiced.   Neurological:      General: No focal deficit present.      Mental Status: She is alert and oriented to person, place, and time.      Comments: 5/5 strength b/l UEs; 4/5 strength b/l LEs; "         Results Review     I reviewed the patient's new clinical results.  Results from last 7 days   Lab Units 03/14/23  0535 03/13/23  0528 03/12/23  0813 03/11/23  2319 03/11/23  0840 03/11/23 0315   WBC 10*3/mm3 11.39* 11.32* 10.59  --   --  14.10*   HEMOGLOBIN g/dL 7.7* 7.9* 7.6*  7.6* 7.7*   < > 7.5*   PLATELETS 10*3/mm3 351 290 276  --   --  210    < > = values in this interval not displayed.     Results from last 7 days   Lab Units 03/14/23  0535 03/13/23  0528 03/12/23  0813 03/11/23 0315   SODIUM mmol/L 137 134* 135* 138   POTASSIUM mmol/L 4.5 4.8 4.3 3.7   CHLORIDE mmol/L 98 99 101 103   CO2 mmol/L 28.9 25.9 26.0 27.0   BUN mg/dL 11 12 12 12   CREATININE mg/dL 0.93 1.02* 0.93 0.90   GLUCOSE mg/dL 84 81 126* 103*   EGFR mL/min/1.73 70.9 63.5 70.9 73.8     Results from last 7 days   Lab Units 03/12/23  0813 03/11/23  0315 03/10/23  0322 03/09/23  0443   ALBUMIN g/dL 1.7* 1.9* 1.8* 1.8*   BILIRUBIN mg/dL 0.3 0.4 0.8 1.0   ALK PHOS U/L 224* 216* 199* 178*   AST (SGOT) U/L 29 26 29 27   ALT (SGPT) U/L 14 16 17 19     Results from last 7 days   Lab Units 03/14/23  0535 03/13/23  0528 03/12/23  0813 03/11/23  0315 03/10/23  0322 03/09/23  0443   CALCIUM mg/dL 8.9 8.5* 7.8* 7.5* 7.6* 7.7*   ALBUMIN g/dL  --   --  1.7* 1.9* 1.8* 1.8*   MAGNESIUM mg/dL  --   --  1.9 1.9 1.7 1.6   PHOSPHORUS mg/dL  --   --  2.6 2.6 3.1 4.4         Glucose   Date/Time Value Ref Range Status   03/12/2023 0537 90 70 - 130 mg/dL Final     Comment:     Meter: [id number] : [id number] Sherman Strickland PCA       XR Chest 1 View    Result Date: 3/14/2023  There has been interval removal of a right-sided chest tube without evidence for pneumothorax. A stable right pleural effusion and/or pleural thickening is noted in conjunction with stable areas of atelectasis, interstitial scarring and/or possibly pneumonia throughout the right lung.  This report was finalized on 3/14/2023 8:34 AM by Dr. Hubert Milner M.D.      Scheduled  Medications  acetaminophen, 1,000 mg, Oral, TID  celecoxib, 100 mg, Oral, Q12H  desvenlafaxine, 25 mg, Oral, Daily  folic acid, 1 mg, Oral, Daily  gabapentin, 300 mg, Oral, Q8H  heparin (porcine), 5,000 Units, Subcutaneous, Q8H  ipratropium-albuterol, 3 mL, Nebulization, 4x Daily - RT  lamoTRIgine, 200 mg, Oral, Daily  lidocaine, 1 patch, Transdermal, Q24H  lidocaine, 1 patch, Transdermal, Q24H  lidocaine, 10 mL, Intradermal, Once  pantoprazole, 40 mg, Oral, Q AM  sodium chloride, 10 mL, Intravenous, Q12H  thiamine, 100 mg, Oral, Daily  vancomycin, 1,000 mg, Intravenous, Q24H  vitamin B-12, 500 mcg, Oral, Daily    Infusions  Pharmacy to dose vancomycin,     Diet  Diet: Regular/House Diet; Texture: Regular Texture (IDDSI 7); Fluid Consistency: Thin (IDDSI 0)       Assessment/Plan     Active Hospital Problems    Diagnosis  POA   • **Paresthesia [R20.2]  Yes   • MRSA bacteremia [R78.81, B95.62]  Unknown   • Oral candidiasis [B37.0]  No   • Mass of middle lobe of right lung [R91.8]  Yes   • Situational mixed anxiety and depressive disorder [F43.23]  Yes   • 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]  Yes   • Normocytic anemia [D64.9]  Yes   • GERD (gastroesophageal reflux disease) [K21.9]  Yes   • Lower extremity weakness [R29.898]  Yes   • History of blood clots [Z86.718]  Not Applicable   • Chronic anticoagulation [Z79.01]  Not Applicable   • Seizures (HCC) [R56.9]  Yes   • Cervical myelopathy (HCC) [G95.9]  Yes   • Empyema of pleura (HCC) [J86.9]  Yes      Resolved Hospital Problems    Diagnosis Date Resolved POA   • Tricuspid valve vegetation [I33.0] 03/12/2023 Yes   • Upper abdominal pain [R10.10] 03/08/2023 No   • Leukocytosis [D72.829] 03/08/2023 Yes   • Hyperglycemia [R73.9] 03/08/2023 No       58 y.o. female admitted with Paresthesia.    Chest tubes removed yesterday.  She has quite a bit of dependent edema including in her lower abdomen.  I do not feel any fluctuance or warmth in this area.  Patient states it was  red and hot earlier.  She does have low-grade fever this morning.  Will await follow-up evaluation by thoracic surgery and ID.  Remains on IV vancomycin.  Awaiting precert and bed availability for [facility].  Due to her edema will give her IV Lasix today.  Edema could be made worse by current use of Celebrex and gabapentin though per surgery needs to continue these medications for another 30 days.    Could probably look to going to rehab soon after tube removal.  ID recommending 4 weeks of IV antibiotics.      MRSA bacteremia/PNA with empyema/Endocarditis/Septic phlebitis  -Blood cx (3/2/23): MRSA; repeat negative   -s/p thoracentesis 3/4/23; pleural fluid w/ MRSA  -TTE (3/5/23): 61-65%; small mobile mass on TV c/w vegetation  -TTE (3/10/23): NO VEGETATION  -b/l UE doppler (3/5/23): acute RUE superficial thrombophlebitis  -ID following and recommend IV vancomycin for 4 weeks  -s/p thoracoscopy w/ DaVinci robot w/ complete decortication 3/8/23 with CTS    Idiopathic peripheral neuropathy, motor predominant  -EMG showing evidence of neuropathy, not typical of GBS  -s/p IVIG for possible autoimmune neuropathy  -following tests are negative/Normal : West Nile virus serology, Lyme serology, ESR, CRP, magnesium, phosphorus, potassium, RPR, folate, CK, DENISE, heavy metal screen, TSH, angiotensin-converting enzyme, heavy metal screen  -Neuro has signed off but will follow-up after discharge and will follow-up results [facility] CSF autoimmune panel    Seizure disorder  -lamotrigine 200mg    Depression  -desvenlafaxine 25mg    Hx DVT  -on Eliquis, on hold for VATS  -resume when okay with thoracic surgery      · SCDs for DVT prophylaxis; Eliquis ON HOLD  · Full code.  · Discussed with patient, family and thoracic surgery.  · Anticipate discharge to SNF or possibly [facility] when cleared by consultants.  Still awaiting precert.  Anticipate bed available Wednesday or Thursday      Yvan Samuel MD  [facility]  03/14/23  11:36 EDT

## 2023-03-14 NOTE — PROGRESS NOTES
"  PROGRESS NOTE  Patient Name: Louise GARCIA  Age/Sex: 58 y.o. female  : 1965  MRN: [id number]    Date of Admission: 2023  Date of Encounter Visit: 23   LOS: 19 days   Patient Care Team:  Chloe Schaefer APRN as PCP - General (Family Medicine)  Newton, Mikhail Velazquez MD as Consulting Physician (Neurology)    Chief Complaint: Pneumonia with empyema status post decortication    Hospital course: Patient had all her chest tube removed, she is afebrile with a Tmax at 99.9.  She was concerned about that but was reassured that this is not considered fever yet  Her antibiotic is continued with vancomycin for 4 weeks per infectious disease  She is on 1 L and she has sats in the mid 90s and hopefully she will continue to wean  She is complaining of some right-sided pleuritic chest pain which is not unexpected        REVIEW OF SYSTEMS:   CONSTITUTIONAL: no fever or chills  CARDIOVASCULAR: No chest pain, chest pressure or chest discomfort. No palpitations or edema.   RESPIRATORY: Improving shortness of breath, less discomfort from the chest tube, good cough.   GASTROINTESTINAL: No anorexia, nausea, vomiting or diarrhea. No abdominal pain or blood.   HEMATOLOGIC: No bleeding or bruising.     Ventilator/Non-Invasive Ventilation Settings (From admission, onward)     1 L/min nasal cannula oxygen            Vital Signs  Temp:  [98.2 °F (36.8 °C)-99.9 °F (37.7 °C)] 99.9 °F (37.7 °C)  Heart Rate:  [] 116  Resp:  [16-20] 16  BP: (121-134)/(65-82) 121/65  SpO2:  [85 %-100 %] 90 %  on  Flow (L/min):  [1] 1 Device (Oxygen Therapy): room air    Intake/Output Summary (Last 24 hours) at 3/14/2023 1202  Last data filed at 3/14/2023 1117  Gross per 24 hour   Intake 480 ml   Output 950 ml   Net -470 ml     Flowsheet Rows    Flowsheet Row First Filed Value   Admission Height 157.5 cm (62\") Documented at 2023 1425   Admission Weight 54.4 kg (120 lb) Documented at 2023 1425        Body mass index is 23.23 " kg/m².      03/06/23  0600 03/10/23  0600 03/10/23  1410   Weight: 56.7 kg (125 lb) 57.9 kg (127 lb 10.3 oz) 57.6 kg (127 lb)       Physical Exam:  GEN:  No acute distress, alert, cooperative, well developed, on nasal oxygen  EYES:   Sclerae clear. No icterus. PERRL. Normal EOM  ENT:   External ears/nose normal, no oral lesions, no thrush, mucous membranes moist  NECK:  Supple, midline trachea, no JVD  LUNGS: Normal chest on inspection, no more chest tube, no subcutaneous air, she has diminished breath sounds on the right side with minimal crackles, no wheezes. Respirations regular, even and unlabored.   CV:  Regular rhythm and rate. Normal S1/S2. No murmurs, gallops, or rubs noted.  ABD:  Soft, nontender and nondistended. Normal bowel sounds. No guarding  EXT:  Moves all extremities well. No cyanosis. No redness.  She has positive pitting edema of both lower extremities bilaterally 2+.   Skin: Dry, intact, no bleeding    Results Review:    Results From Last 14 Days   Lab Units 03/03/23  1051 03/03/23  0454 03/03/23  0159 03/02/23  1955 03/02/23  0633 03/01/23  0708   D DIMER QUANT MCGFEU/mL  --   --   --   --  0.96* 0.56   LACTATE mmol/L 1.4 2.1* 2.7*   < >  --   --     < > = values in this interval not displayed.     Results from last 7 days   Lab Units 03/14/23  0535 03/13/23  0528 03/12/23  0813 03/11/23  0315 03/10/23  0322 03/09/23  0443 03/08/23  0731   SODIUM mmol/L 137 134* 135* 138 133* 134* 132*   POTASSIUM mmol/L 4.5 4.8 4.3 3.7 3.8 4.1 3.4*   CHLORIDE mmol/L 98 99 101 103 98 99 95*   CO2 mmol/L 28.9 25.9 26.0 27.0 28.0 29.0 27.9   BUN mg/dL 11 12 12 12 10 10 9   CREATININE mg/dL 0.93 1.02* 0.93 0.90 0.88 0.59 0.56*   CALCIUM mg/dL 8.9 8.5* 7.8* 7.5* 7.6* 7.7* 8.4*   AST (SGOT) U/L  --   --  29 26 29 27 32   ALT (SGPT) U/L  --   --  14 16 17 19 27   ANION GAP mmol/L 10.1 9.1 8.0 8.0 7.0 6.0 9.1   ALBUMIN g/dL  --   --  1.7* 1.9* 1.8* 1.8* 1.8*                 Results from last 7 days   Lab Units  03/14/23  0535 03/13/23  0528 03/12/23  0813 03/11/23  2319 03/11/23  0840 03/11/23 0315 03/10/23  2017 03/10/23  0322 03/09/23  1224 03/09/23  0443 03/08/23  0731   WBC 10*3/mm3 11.39* 11.32* 10.59  --   --  14.10*  --  18.95*  --  25.61* 20.31*   HEMOGLOBIN g/dL 7.7* 7.9* 7.6*  7.6* 7.7* 8.3* 7.5* 8.2* 8.1*  8.1*   < > 6.6* 8.2*   HEMATOCRIT % 22.6* 23.8* 22.0*  22.0* 22.3* 24.8* 22.7* 23.1* 23.8*  23.8*   < > 19.3* 23.8*   PLATELETS 10*3/mm3 351 290 276  --   --  210  --  238  --  219 165   MCV fL 97.0 99.6* 97.3*  --   --  98.3*  --  96.7  --  99.5* 100.8*   NEUTROPHIL % %  --   --  75.0  --   --  79.0*  --  80.2*  --  86.8* 84.3*   LYMPHOCYTE % %  --   --  14.1*  --   --  11.3*  --  9.9*  --  6.2* 6.6*   MONOCYTES % %  --   --  5.7  --   --  5.8  --  5.1  --  3.3* 4.9*   EOSINOPHIL % %  --   --  1.0  --   --  1.0  --  1.0  --  0.2* 0.6   BASOPHIL % %  --   --  0.2  --   --  0.1  --  0.2  --  0.2 0.2   IMM GRAN % %  --   --  4.0*  --   --  2.8*  --  3.6*  --  3.3* 3.4*    < > = values in this interval not displayed.     Results from last 7 days   Lab Units 03/08/23  0731   INR  1.10   APTT seconds 32.2     Results from last 7 days   Lab Units 03/12/23  0813 03/11/23  0315 03/10/23  0322 03/09/23  0443 03/08/23  0731   MAGNESIUM mg/dL 1.9 1.9 1.7 1.6 1.7           Invalid input(s): LDLCALC          Glucose   Date/Time Value Ref Range Status   03/12/2023 0537 90 70 - 130 mg/dL Final     Comment:     Meter: [id number] : [id number] Sherman Strickland PCA         Results from last 7 days   Lab Units 03/08/23 1957 03/08/23  1923   BODYFLDCX  Scant growth (1+) Staphylococcus aureus, MRSA* No growth at 3 days                       Imaging:   Imaging Results (All)     Procedure Component Value Units Date/Time          I reviewed the patient's new clinical results.  I personally viewed and interpreted the patient's imaging results:        Medication Review:   acetaminophen, 1,000 mg, Oral, TID  celecoxib, 100  mg, Oral, Q12H  desvenlafaxine, 25 mg, Oral, Daily  folic acid, 1 mg, Oral, Daily  furosemide, 40 mg, Intravenous, Q12H  gabapentin, 300 mg, Oral, Q8H  heparin (porcine), 5,000 Units, Subcutaneous, Q8H  ipratropium-albuterol, 3 mL, Nebulization, 4x Daily - RT  lamoTRIgine, 200 mg, Oral, Daily  lidocaine, 1 patch, Transdermal, Q24H  lidocaine, 1 patch, Transdermal, Q24H  lidocaine, 10 mL, Intradermal, Once  pantoprazole, 40 mg, Oral, Q AM  potassium chloride, 20 mEq, Oral, Once  sodium chloride, 10 mL, Intravenous, Q12H  thiamine, 100 mg, Oral, Daily  vancomycin, 1,000 mg, Intravenous, Q24H  vitamin B-12, 500 mcg, Oral, Daily        Pharmacy to dose vancomycin,         ASSESSMENT:   1. Idiopathic neuropathy  2. MRSA pneumonia/empyema: S/P decortication 3/8/2023  3. Elevated liver enz  4. Anemia  5. Dysphagia  6. Hx DVT, was on Eliquis  7. Osteonecrosis of femoral heads  8. Possible vegitation on tricuspid valve -- cards and ID following  9. Seizure disorder    PLAN:  Antibiotics are being followed and dosed by infectious diseases, the patient is currently on vancomycin IV  Chest tubes are out and that should help with the deep breathing and decrease the level of discomfort  Continue to work on the deep breathing exercise and expectoration with the hope that she can wean off the oxygen in the next 1 to 2 days  Continue to monitor for any sign of superimposed infection, her temperature is within acceptable range at 99.9.  Patient is already on DVT prophylaxis with subcutaneous heparin  Discussed with the patient, labs and films reviewed, we will continue to follow    Disposition: Per primary team    Aurelia Heller MD  03/14/23  12:02 EDT          Dictated utilizing Dragon dictation

## 2023-03-14 NOTE — PLAN OF CARE
Goal Outcome Evaluation:  Plan of Care Reviewed With: patient        Progress: improving  Outcome Evaluation: Pt tolerated treatment well this date. Very motivated, though limited d/t fatigue and weakness. Required min-mod A for bed mobility, then mod-max A x2 to stand. Pt extends B knees out upon standing, though knees immediately start buckling when attempting to move. Required max A x2 to transfer to and from [facility], B knees blocked. Pt completed a few LE exercises as well.

## 2023-03-14 NOTE — PROGRESS NOTES
"  Infectious Diseases Progress Note    Reji Oliver MD     [facility]  Los: 19 days  Patient Identification:  Name: Louise GARCIA  Age: 58 y.o.  Sex: female  :  1965  MRN: [id number]         Primary Care Physician: Chloe Schaefer APRN        Subjective: Complaining of pain in bilateral hip and attempted to participate in physical therapy.  Also running low-grade temperature today.    Interval History: See consultation note.  3/8/2023 underwent thoracoscopy with Nevaeh hernandez with complete decortication  3/12/2023 one of the right-sided chest tube were removed.  3/14/2023 right-sided chest tube removed.  Objective:    Scheduled Meds:acetaminophen, 1,000 mg, Oral, TID  celecoxib, 100 mg, Oral, Q12H  desvenlafaxine, 25 mg, Oral, Daily  folic acid, 1 mg, Oral, Daily  gabapentin, 300 mg, Oral, Q8H  heparin (porcine), 5,000 Units, Subcutaneous, Q8H  ipratropium-albuterol, 3 mL, Nebulization, 4x Daily - RT  lamoTRIgine, 200 mg, Oral, Daily  lidocaine, 1 patch, Transdermal, Q24H  lidocaine, 1 patch, Transdermal, Q24H  lidocaine, 10 mL, Intradermal, Once  pantoprazole, 40 mg, Oral, Q AM  sodium chloride, 10 mL, Intravenous, Q12H  thiamine, 100 mg, Oral, Daily  vancomycin, 1,000 mg, Intravenous, Q24H  vitamin B-12, 500 mcg, Oral, Daily      Continuous Infusions:Pharmacy to dose vancomycin,         Vital signs in last 24 hours:  Temp:  [98.2 °F (36.8 °C)-99.2 °F (37.3 °C)] 99.2 °F (37.3 °C)  Heart Rate:  [] 103  Resp:  [18-20] 18  BP: (131-134)/(68-82) 134/82    Intake/Output:    Intake/Output Summary (Last 24 hours) at 3/14/2023 1048  Last data filed at 3/13/2023 1740  Gross per 24 hour   Intake 480 ml   Output 950 ml   Net -470 ml       Exam:  /82 (BP Location: Left arm, Patient Position: Lying)   Pulse 103   Temp 99.2 °F (37.3 °C) (Oral)   Resp 18   Ht 157.5 cm (62\")   Wt 57.6 kg (127 lb)   SpO2 93%   Breastfeeding No   BMI 23.23 kg/m²   Patient is examined using the personal " protective equipment as per guidelines from infection control for this particular patient as enacted.  Hand washing was performed before and after patient interaction.  General Appearance:  Comfortable nontoxic-appearing female who is pleasant and interactive.  Did participate in physical therapy earlier.                          Head:    Normocephalic, without obvious abnormality, atraumatic                           Eyes:    PERRL, conjunctivae/corneas clear, EOM's intact, both eyes                         Throat:   Lips, tongue, gums normal; oral mucosa pink and moist                           Neck:   Supple, symmetrical, trachea midline, no JVD                         Lungs:    Decreased breath sounds bilaterally left greater than right                 Chest Wall:  Right-sided chest tube site dressed with DuoDERM.                          Heart:    Regular rate and rhythm, S1 and S2 normal, no murmur, no rub                                         or gallop                  Abdomen:   Soft nontender epigastric and right upper quadrant tenderness noted.                 Extremities: Right forearm superficial phlebitis site improved and inflammation and tenderness resolved.                        Pulses:   Pulses palpable in all extremities                            Skin:   Skin is warm and dry,  no rashes or palpable lesions                  Neurologic: Awake interactive and grossly nonfocal       Data Review:    I reviewed the patient's new clinical results.  Results from last 7 days   Lab Units 03/14/23  0535 03/13/23  0528 03/12/23  0813 03/11/23  2319 03/11/23  0840 03/11/23  0315 03/10/23  2017 03/10/23  0322 03/09/23  1224 03/09/23  0443 03/08/23  0731   WBC 10*3/mm3 11.39* 11.32* 10.59  --   --  14.10*  --  18.95*  --  25.61* 20.31*   HEMOGLOBIN g/dL 7.7* 7.9* 7.6*  7.6* 7.7* 8.3* 7.5* 8.2* 8.1*  8.1*   < > 6.6* 8.2*   PLATELETS 10*3/mm3 351 290 276  --   --  210  --  238  --  219 165    < > = values in  this interval not displayed.     Results from last 7 days   Lab Units 03/14/23  0535 03/13/23  0528 03/12/23  0813 03/11/23  0315 03/10/23  0322 03/09/23  0443 03/08/23  0731   SODIUM mmol/L 137 134* 135* 138 133* 134* 132*   POTASSIUM mmol/L 4.5 4.8 4.3 3.7 3.8 4.1 3.4*   CHLORIDE mmol/L 98 99 101 103 98 99 95*   CO2 mmol/L 28.9 25.9 26.0 27.0 28.0 29.0 27.9   BUN mg/dL 11 12 12 12 10 10 9   CREATININE mg/dL 0.93 1.02* 0.93 0.90 0.88 0.59 0.56*   CALCIUM mg/dL 8.9 8.5* 7.8* 7.5* 7.6* 7.7* 8.4*   GLUCOSE mg/dL 84 81 126* 103* 81 112* 78     Microbiology Results (last 10 days)     Procedure Component Value - Date/Time    Anaerobic Culture - Tissue, Pleural Cavity [[id number]]  (Normal) Collected: 03/08/23 2036    Lab Status: Final result Specimen: Tissue from Pleural Cavity Updated: 03/13/23 0655     Anaerobic Culture No anaerobes isolated at 5 days    Fungus Culture - Tissue, Pleural Cavity [[id number]] Collected: 03/08/23 2036    Lab Status: Preliminary result Specimen: Tissue from Pleural Cavity Updated: 03/13/23 2215     Fungus Culture No fungus isolated at less than 1 week    Tissue / Bone Culture - Tissue, Pleural Cavity [[id number]]  (Abnormal) Collected: 03/08/23 2036    Lab Status: Final result Specimen: Tissue from Pleural Cavity Updated: 03/11/23 1037     Tissue Culture Rare Staphylococcus aureus, MRSA     Comment:   Methicillin resistant Staphylococcus aureus, Patient may be an isolation risk.        Gram Stain Occasional WBCs seen      Rare (1+) Gram positive cocci in pairs and clusters    Narrative:      Refer to previous fluid culture collected on 3/08      Anaerobic Culture - Body Fluid, Pleural Cavity [[id number]]  (Normal) Collected: 03/08/23 1957    Lab Status: Final result Specimen: Body Fluid from Pleural Cavity Updated: 03/13/23 0655     Anaerobic Culture No anaerobes isolated at 5 days    Fungus Culture - Body Fluid, Pleural Cavity [[id number]] Collected: 03/08/23 1957    Lab Status: Preliminary  result Specimen: Body Fluid from Pleural Cavity Updated: 03/13/23 2115     Fungus Culture No fungus isolated at less than 1 week    Body Fluid Culture - Body Fluid, Pleural Cavity [[id number]]  (Abnormal)  (Susceptibility) Collected: 03/08/23 1957    Lab Status: Final result Specimen: Body Fluid from Pleural Cavity Updated: 03/11/23 1036     Body Fluid Culture Scant growth (1+) Staphylococcus aureus, MRSA     Comment:   Methicillin resistant Staphylococcus aureus, Patient may be an isolation risk.        Gram Stain Moderate (3+) WBCs per low power field      Rare (1+) Gram positive cocci    Susceptibility      Staphylococcus aureus, MRSA      YAKOV      Gentamicin Susceptible      Oxacillin Resistant     Rifampin Susceptible      Vancomycin Susceptible                       Susceptibility Comments     Staphylococcus aureus, MRSA    This isolate does not demonstrate inducible clindamycin resistance in vitro.               Anaerobic Culture - Body Fluid, Pleural Cavity [[id number]]  (Normal) Collected: 03/08/23 1923    Lab Status: Final result Specimen: Body Fluid from Pleural Cavity Updated: 03/13/23 0700     Anaerobic Culture No anaerobes isolated at 5 days    Body Fluid Culture - Body Fluid, Pleural Cavity [[id number]] Collected: 03/08/23 1923    Lab Status: Final result Specimen: Body Fluid from Pleural Cavity Updated: 03/11/23 1050     Body Fluid Culture No growth at 3 days     Gram Stain Rare (1+) WBCs per low power field      Rare (1+) Gram positive cocci    Fungus Culture - Body Fluid, Pleural Cavity [[id number]] Collected: 03/08/23 1923    Lab Status: Preliminary result Specimen: Body Fluid from Pleural Cavity Updated: 03/13/23 2115     Fungus Culture No fungus isolated at less than 1 week    Culture, CSF - Cerebrospinal Fluid, Lumbar Puncture [[id number]] Collected: 03/06/23 1550    Lab Status: Final result Specimen: Cerebrospinal Fluid from Lumbar Puncture Updated: 03/09/23 1058     CSF Culture No growth at 3 days      Gram Stain No WBCs or organisms seen    Meningitis / Encephalitis Panel, PCR - Cerebrospinal Fluid, Lumbar Puncture [[id number]]  (Normal) Collected: 03/06/23 1550    Lab Status: Final result Specimen: Cerebrospinal Fluid from Lumbar Puncture Updated: 03/06/23 1725     ESCHERICHIA COLI K1, PCR Not Detected     HAEMOPHILUS INFLUENZAE, PCR Not Detected     LISTERIA MONOCYTOGENES, PCR Not Detected     NEISSERIA MENINGITIDIS, PCR Not Detected     STREPTOCOCCUS AGALACTIAE, PCR Not Detected     STREPTOCOCCUS PNEUMONIAE, PCR Not Detected     CYTOMEGALOVIRUS (CMV), PCR Not Detected     ENTEROVIRUS, PCR Not Detected     HERPES SIMPLEX VIRUS 1 (HSV-1), PCR Not Detected     HERPES SIMPLEX VIRUS 2 (HSV-2), PCR Not Detected     HUMAN PARECHOVIRUS, PCR Not Detected     VARICELLA ZOSTER VIRUS (VZV), PCR Not Detected     CRYPTOCOCCUS NEOFORMANS / GATTII, PCR Not Detected     HUMAN HERPES VIRUS 6 PCR Not Detected    Blood Culture - Blood, Wrist, Left [[id number]]  (Normal) Collected: 03/04/23 2043    Lab Status: Final result Specimen: Blood from Wrist, Left Updated: 03/09/23 2116     Blood Culture No growth at 5 days    Blood Culture - Blood, Arm, Right [[id number]]  (Normal) Collected: 03/04/23 1952    Lab Status: Final result Specimen: Blood from Arm, Right Updated: 03/09/23 2015     Blood Culture No growth at 5 days            Assessment:    Paresthesia    Cervical myelopathy (HCC)    Lower extremity weakness    History of blood clots    Chronic anticoagulation    Seizures (HCC)    Normocytic anemia    GERD (gastroesophageal reflux disease)    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    •      Right Cephalic Forearm: Acute superficial thrombophlebitis noted.     - septic emboli and cavitary lesion in the right lung   - Tricuspid Valve endocarditis   - Empyema due to secondary seeding of the traumatic effusion  2-paresthesia generalized weakness  3-recent fall  4-seizure disorder  5-leukocytosis likely secondary to steroids with superimposed infection  6-other diagnoses per primary team.  7-MRSA colonization  8-low-grade fever with slight worsening leukocytosis and worsening hip pain bilaterally.  Recommendations/Discussions:  · Continue with IV vancomycin-would need 4 weeks of IV vancomycin to be dosed by pharmacy to achieve a trough level of 15-20 or area under the curve of 400-600 from last negative blood culture or last intervention which ever is the latest.  · Moist heat to the phlebitis site of the right forearm  · Continue to monitor her back pain and evidence of secondary seeding to the joint and spine which may require repeat imaging studies if her symptoms localizes to these areas and affect her function.  · Discussed with patient's caring nurse.  · Monitor her fever pattern and hip pain and leukocytosis.  Reji Oliver MD  3/14/2023  10:48 EDT    Parts of this note may be an electronic transcription/translation of spoken language to printed text using the Dragon dictation system.

## 2023-03-14 NOTE — THERAPY TREATMENT NOTE
Patient Name: Louise GARCIA  : 1965    MRN: [id number]                              Today's Date: 3/14/2023       Admit Date: 2023    Visit Dx:     ICD-10-CM ICD-9-CM   1. Paresthesia  R20.2 782.0   2. Gait instability  R26.81 781.2   3. Extremity numbness  R20.0 782.0   4. Empyema of pleura (HCC)  J86.9 510.9     Patient Active Problem List   Diagnosis   • Sepsis (HCC)   • Neck pain, chronic   • Upper back pain   • Paresthesia   • Cervical myelopathy (HCC)   • Lower extremity weakness   • History of blood clots   • Chronic anticoagulation   • Seizures (HCC)   • Normocytic anemia   • GERD (gastroesophageal reflux disease)   • Guillain-Paris syndrome (HCC)   • Situational mixed anxiety and depressive disorder   • Mass of middle lobe of right lung   • Oral candidiasis   • Empyema of pleura (HCC)   • MRSA bacteremia     Past Medical History:   Diagnosis Date   • Degenerative disc disease, cervical    • Gestational diabetes    • H/O blood clots    • Hematemesis    • Seizures (HCC)    • Septic shock (HCC)      Past Surgical History:   Procedure Laterality Date   • APPENDECTOMY     • BACK SURGERY     • CHOLECYSTECTOMY     • ENDOSCOPY N/A 2016    Procedure: ESOPHAGOGASTRODUODENOSCOPY with biopsy;  Surgeon: Austen Brito MD;  Location: [facility] ENDOSCOPY;  Service:    • HYSTERECTOMY     • NECK SURGERY       approx 6 yrs ago   • THORACOSCOPY Right 3/8/2023    Procedure: THORACOSCOPY WITH DAVINCI ROBOT WITH COMPLETE DECORTICATION;  Surgeon: Chloe Cedillo MD;  Location: [facility] MAIN OR;  Service: Robotics - DaVinci;  Laterality: Right;   • TONSILLECTOMY     • TONSILLECTOMY AND ADENOIDECTOMY        General Information     Row Name 23          Physical Therapy Time and Intention    Document Type therapy note (daily note)  -     Row Name 23          General Information    Existing Precautions/Restrictions fall  -     Row Name 23          Cognition    Orientation  Status (Cognition) oriented x 4  -SM           User Key  (r) = Recorded By, (t) = Taken By, (c) = Cosigned By    Initials Name Provider Type    Milana Aguirre PTA Physical Therapist Assistant               Mobility     Row Name 03/14/23 1105          Bed Mobility    Bed Mobility supine-sit;sit-supine  -     Supine-Sit [place] (Bed Mobility) minimum assist (75% patient effort)  -     Sit-Supine [place] (Bed Mobility) moderate assist (50% patient effort)  -     Assistive Device (Bed Mobility) bed rails;head of bed elevated  -     Row Name 03/14/23 1105          Bed-Chair Transfer    Bed-Chair [place] (Transfers) maximum assist (25% patient effort);2 person assist  -     Comment, (Bed-Chair Transfer) bed<>BSC; B knees blocked  -     Row Name 03/14/23 1105          Sit-Stand Transfer    Sit-Stand [place] (Transfers) moderate assist (50% patient effort);2 person assist  -           User Key  (r) = Recorded By, (t) = Taken By, (c) = Cosigned By    Initials Name Provider Type    Milana Aguirre PTA Physical Therapist Assistant               Obj/Interventions     Row Name 03/14/23 1107          Motor Skills    Therapeutic Exercise --  seated AP and LAQ x10 reps  -           User Key  (r) = Recorded By, (t) = Taken By, (c) = Cosigned By    Initials Name Provider Type    Milana Aguirre PTA Physical Therapist Assistant               Goals/Plan    No documentation.                Clinical Impression     Row Name 03/14/23 1108          Pain    Pretreatment Pain Rating 2/10  -     Posttreatment Pain Rating 4/10  -SM     Pain Location - Side/Orientation Right  -     Pain Intervention(s) Repositioned;Ambulation/increased activity;Rest  -     Row Name 03/14/23 1108          Positioning and Restraints    Pre-Treatment Position in bed  -     Post Treatment Position bed  -     In Bed supine;call light within reach;encouraged to call for assist;exit alarm on;with  family/caregiver;notified nsg  -           User Key  (r) = Recorded By, (t) = Taken By, (c) = Cosigned By    Initials Name Provider Type    Milana Aguirre PTA Physical Therapist Assistant               Outcome Measures     Row Name 03/14/23 1109          How much help from another person do you currently need...    Turning from your back to your side while in flat bed without using bedrails? 3  -SM     Moving from lying on back to sitting on the side of a flat bed without bedrails? 2  -SM     Moving to and from a bed to a chair (including a wheelchair)? 2  -SM     Standing up from a chair using your arms (e.g., wheelchair, bedside chair)? 2  -SM     Climbing 3-5 steps with a railing? 1  -SM     To walk in hospital room? 1  -SM     AM-PAC 6 Clicks Score (PT) 11  -SM     Highest level of mobility 4 --> Transferred to chair/commode  -     Row Name 03/14/23 1109          Functional Assessment    Outcome Measure Options AM-PAC 6 Clicks Basic Mobility (PT)  -           User Key  (r) = Recorded By, (t) = Taken By, (c) = Cosigned By    Initials Name Provider Type    Milana Aguirre PTA Physical Therapist Assistant                             Physical Therapy Education     Title: PT OT SLP Therapies (Done)     Topic: Physical Therapy (Done)     Point: Mobility training (Done)     Learning Progress Summary           Patient Acceptance, E,TB,D, VU,NR by  at 3/14/2023 1109    Acceptance, E,TB,D, VU,NR by  at 3/13/2023 1026    Acceptance, E, VU by EM at 3/11/2023 1611    Acceptance, E, VU by EM at 3/10/2023 1454    Acceptance, E, VU by BL at 3/7/2023 1712    Comment: Spoke with Pt and  through day, updated on plan of care. All questions answered.    Acceptance, E,D, NR by PC at 3/7/2023 1419    Acceptance, E, VU by BL at 3/6/2023 0830    Comment: Spoke with patient and spouse at bedside through day.    Acceptance, E, NR by AR at 3/3/2023 1346    Acceptance, E,TB, VU by JOÃO at 3/2/2023 1836     Acceptance, E,TB, VU by MS at 3/1/2023 1135    Acceptance, E,TB, VU by MS at 2/28/2023 1105    Acceptance, E,TB,D, VU,DU by LB at 2/26/2023 1159    Acceptance, E,TB, VU,DU by LB at 2/25/2023 1542    Acceptance, E,D, VU,NR by EB at 2/24/2023 1254    Acceptance, E,D, VU,NR by EB at 2/23/2023 1057    Acceptance, E,TB, VU,NR by EB1 at 2/21/2023 1134   Family Acceptance, E, VU by EM at 3/10/2023 1454    Acceptance, E, VU by BL at 3/7/2023 1712    Comment: Spoke with Pt and  through day, updated on plan of care. All questions answered.    Acceptance, E, VU by BL at 3/6/2023 0830    Comment: Spoke with patient and spouse at bedside through day.    Acceptance, E, NR by AR at 3/3/2023 1346   Significant Other Acceptance, E,TB, VU,NR by EB1 at 2/21/2023 1134                   Point: Home exercise program (Done)     Learning Progress Summary           Patient Acceptance, E,TB,D, VU,NR by SM at 3/14/2023 1109    Acceptance, E,TB,D, VU,NR by SM at 3/13/2023 1026    Acceptance, E, VU by EM at 3/11/2023 1611    Acceptance, E, VU by EM at 3/10/2023 1454    Acceptance, E, VU by BL at 3/7/2023 1712    Comment: Spoke with Pt and  through day, updated on plan of care. All questions answered.    Acceptance, E,D, NR by PC at 3/7/2023 1419    Acceptance, E,D, NR by PC at 3/6/2023 1357    Acceptance, E, VU by BL at 3/6/2023 0830    Comment: Spoke with patient and spouse at bedside through day.    Acceptance, E, NR by AR at 3/3/2023 1346    Acceptance, E,TB, VU by JOÃO at 3/2/2023 1836    Acceptance, E,TB, VU by MS at 2/28/2023 1105    Acceptance, E,TB,D, VU,DU by LB at 2/26/2023 1159    Acceptance, E,TB, VU,DU by LB at 2/25/2023 1542    Acceptance, E,D, VU,NR by EB at 2/24/2023 1254    Acceptance, E,D, VU,NR by EB at 2/23/2023 1057   Family Acceptance, E, VU by EM at 3/10/2023 1454    Acceptance, E, VU by BL at 3/7/2023 1712    Comment: Spoke with Pt and  through day, updated on plan of care. All questions answered.     Acceptance, E, VU by BL at 3/6/2023 0830    Comment: Spoke with patient and spouse at bedside through day.    Acceptance, E, NR by AR at 3/3/2023 1346                   Point: Body mechanics (Done)     Learning Progress Summary           Patient Acceptance, E,TB,D, VU,NR by SM at 3/14/2023 1109    Acceptance, E,TB,D, VU,NR by SM at 3/13/2023 1026    Acceptance, E, VU by BL at 3/7/2023 1712    Comment: Spoke with Pt and  through day, updated on plan of care. All questions answered.    Acceptance, E,D, NR by PC at 3/7/2023 1419    Acceptance, E, VU by BL at 3/6/2023 0830    Comment: Spoke with patient and spouse at bedside through day.    Acceptance, E, NR by AR at 3/3/2023 1346    Acceptance, E,TB, VU by JOÃO at 3/2/2023 1836    Acceptance, E,TB,D, VU,DU by LB at 2/26/2023 1159    Acceptance, E,TB, VU,DU by LB at 2/25/2023 1542    Acceptance, E,D, VU,NR by EB at 2/24/2023 1254    Acceptance, E,D, VU,NR by EB at 2/23/2023 1057   Family Acceptance, E, VU by BL at 3/7/2023 1712    Comment: Spoke with Pt and  through day, updated on plan of care. All questions answered.    Acceptance, E, VU by BL at 3/6/2023 0830    Comment: Spoke with patient and spouse at bedside through day.    Acceptance, E, NR by AR at 3/3/2023 1346                   Point: Precautions (Done)     Learning Progress Summary           Patient Acceptance, E,TB,D, VU,NR by SM at 3/14/2023 1109    Acceptance, E,TB,D, VU,NR by SM at 3/13/2023 1026    Acceptance, E, VU by BL at 3/7/2023 1712    Comment: Spoke with Pt and  through day, updated on plan of care. All questions answered.    Acceptance, E,D, NR by PC at 3/7/2023 1419    Acceptance, E, VU by BL at 3/6/2023 0830    Comment: Spoke with patient and spouse at bedside through day.    Acceptance, E, NR by AR at 3/3/2023 1346    Acceptance, E,TB, VU by JOÃO at 3/2/2023 1836    Acceptance, E,TB,D, VU,DU by LB at 2/26/2023 1159    Acceptance, E,TB, VU,DU by LB at 2/25/2023 1542     Acceptance, E,D, VU,NR by EB at 2/24/2023 1254    Acceptance, E,D, VU,NR by EB at 2/23/2023 1057   Family Acceptance, E, VU by BL at 3/7/2023 1712    Comment: Spoke with Pt and  through day, updated on plan of care. All questions answered.    Acceptance, E, VU by BL at 3/6/2023 0830    Comment: Spoke with patient and spouse at bedside through day.    Acceptance, E, NR by AR at 3/3/2023 1346                               User Key     Initials Effective Dates Name Provider Type Discipline    PC 06/16/21 -  Saida Burrows, PT Physical Therapist PT    EM 06/16/21 -  Violet Branham, PT Physical Therapist PT    AR 06/16/21 -  Jovita Miller, PT Physical Therapist PT    SM 03/07/18 -  Milana Burdick, PTA Physical Therapist Assistant PT    LB 08/09/20 -  Nia Arellano, PT Physical Therapist PT    MS 06/16/21 -  Елена Florez, PT Physical Therapist PT    EB 02/14/23 -  Caren James, PTA Physical Therapist Assistant PT    BL 10/18/22 -  Josefa Jones, RN Registered Nurse Nurse    JOÃO 09/20/22 -  Jayda Patel, RN Registered Nurse Nurse    EB1 01/12/23 -  Yesica Zamora, PT Student PT Student PT              PT Recommendation and Plan     Plan of Care Reviewed With: patient  Progress: improving  Outcome Evaluation: Pt tolerated treatment well this date. Very motivated, though limited d/t fatigue and weakness. Required min-mod A for bed mobility, then mod-max A x2 to stand. Pt extends B knees out upon standing, though knees immediately start buckling when attempting to move. Required max A x2 to transfer to and from [facility], B knees blocked. Pt completed a few LE exercises as well.     Time Calculation:    PT Charges     Row Name 03/14/23 1113             Time Calculation    Start Time 1015  -SM      Stop Time 1040  -SM      Time Calculation (min) 25 min  -SM      PT Received On 03/14/23  -      PT - Next Appointment 03/15/23  -            User Key  (r) = Recorded By, (t) = Taken By, (c) = Cosigned By     Initials Name Provider Type     Milana Burdick Mariann, JACQUELYN Physical Therapist Assistant              Therapy Charges for Today     Code Description Service Date Service Provider Modifiers Qty    20086766536 HC PT THER PROC EA 15 MIN 3/13/2023 Milana Burdick, PTA GP 1    55630753672 HC PT THERAPEUTIC ACT EA 15 MIN 3/13/2023 Milana Burdick, PTA GP 1    21626694563 HC PT THER SUPP EA 15 MIN 3/13/2023 Milana Burdick, PTA GP 2    88168299882 HC PT THER PROC EA 15 MIN 3/14/2023 Milana Burdick, PTA GP 1    48475603472 HC PT THERAPEUTIC ACT EA 15 MIN 3/14/2023 Milana Burdick, PTA GP 1    54672446194 HC PT THER SUPP EA 15 MIN 3/14/2023 Milana Burdick, PTA GP 2          PT G-Codes  Outcome Measure Options: AM-PAC 6 Clicks Basic Mobility (PT)  AM-PAC 6 Clicks Score (PT): 11  AM-PAC 6 Clicks Score (OT): 11  Modified Parisa Scale: 4 - Moderately severe disability.  Unable to walk without assistance, and unable to attend to own bodily needs without assistance.  PT Discharge Summary  Anticipated Discharge Disposition (PT): inpatient rehabilitation facility    Milana Mariann Burdick PTA  3/14/2023

## 2023-03-14 NOTE — PLAN OF CARE
Goal Outcome Evaluation:  Plan of Care Reviewed With: patient        Progress: no change  Outcome Evaluation: VSS. Alert and oriented x4. Pain managed with prn pain meds. turned as pt tolerated. 2L O2 nc while resting. Incontinence care provided as needed. IV abx continued per order. Will continue to provide supportive care.

## 2023-03-15 ENCOUNTER — APPOINTMENT (OUTPATIENT)
Dept: GENERAL RADIOLOGY | Facility: HOSPITAL | Age: 58
DRG: 163 | End: 2023-03-15
Payer: COMMERCIAL

## 2023-03-15 LAB
ANION GAP SERPL CALCULATED.3IONS-SCNC: 9 MMOL/L (ref 5–15)
BUN SERPL-MCNC: 12 MG/DL (ref 6–20)
BUN/CREAT SERPL: 10.4 (ref 7–25)
CALCIUM SPEC-SCNC: 8.7 MG/DL (ref 8.6–10.5)
CHLORIDE SERPL-SCNC: 96 MMOL/L (ref 98–107)
CO2 SERPL-SCNC: 28 MMOL/L (ref 22–29)
CREAT SERPL-MCNC: 1.15 MG/DL (ref 0.57–1)
DEPRECATED RDW RBC AUTO: 50.1 FL (ref 37–54)
EGFRCR SERPLBLD CKD-EPI 2021: 55 ML/MIN/1.73
ERYTHROCYTE [DISTWIDTH] IN BLOOD BY AUTOMATED COUNT: 13.9 % (ref 12.3–15.4)
GLUCOSE SERPL-MCNC: 103 MG/DL (ref 65–99)
HCT VFR BLD AUTO: 25.2 % (ref 34–46.6)
HGB BLD-MCNC: 8.1 G/DL (ref 12–15.9)
MCH RBC QN AUTO: 31.9 PG (ref 26.6–33)
MCHC RBC AUTO-ENTMCNC: 32.1 G/DL (ref 31.5–35.7)
MCV RBC AUTO: 99.2 FL (ref 79–97)
PLATELET # BLD AUTO: 455 10*3/MM3 (ref 140–450)
PMV BLD AUTO: 10.7 FL (ref 6–12)
POTASSIUM SERPL-SCNC: 4.7 MMOL/L (ref 3.5–5.2)
RBC # BLD AUTO: 2.54 10*6/MM3 (ref 3.77–5.28)
SODIUM SERPL-SCNC: 133 MMOL/L (ref 136–145)
VANCOMYCIN TROUGH SERPL-MCNC: 17.2 MCG/ML (ref 5–20)
WBC NRBC COR # BLD: 12.45 10*3/MM3 (ref 3.4–10.8)

## 2023-03-15 PROCEDURE — 80202 ASSAY OF VANCOMYCIN: CPT | Performed by: INTERNAL MEDICINE

## 2023-03-15 PROCEDURE — 92526 ORAL FUNCTION THERAPY: CPT

## 2023-03-15 PROCEDURE — 71045 X-RAY EXAM CHEST 1 VIEW: CPT

## 2023-03-15 PROCEDURE — 97110 THERAPEUTIC EXERCISES: CPT

## 2023-03-15 PROCEDURE — 25010000002 FUROSEMIDE PER 20 MG: Performed by: STUDENT IN AN ORGANIZED HEALTH CARE EDUCATION/TRAINING PROGRAM

## 2023-03-15 PROCEDURE — 85027 COMPLETE CBC AUTOMATED: CPT | Performed by: HOSPITALIST

## 2023-03-15 PROCEDURE — 94799 UNLISTED PULMONARY SVC/PX: CPT

## 2023-03-15 PROCEDURE — 25010000002 FUROSEMIDE PER 20 MG: Performed by: HOSPITALIST

## 2023-03-15 PROCEDURE — 80048 BASIC METABOLIC PNL TOTAL CA: CPT | Performed by: HOSPITALIST

## 2023-03-15 PROCEDURE — 25010000002 VANCOMYCIN PER 500 MG: Performed by: INTERNAL MEDICINE

## 2023-03-15 PROCEDURE — 97530 THERAPEUTIC ACTIVITIES: CPT

## 2023-03-15 PROCEDURE — 25010000002 HEPARIN (PORCINE) PER 1000 UNITS: Performed by: THORACIC SURGERY (CARDIOTHORACIC VASCULAR SURGERY)

## 2023-03-15 RX ORDER — FUROSEMIDE 10 MG/ML
40 INJECTION INTRAMUSCULAR; INTRAVENOUS EVERY 8 HOURS
Status: DISCONTINUED | OUTPATIENT
Start: 2023-03-15 | End: 2023-03-15

## 2023-03-15 RX ADMIN — ACETAMINOPHEN 1000 MG: 500 TABLET, FILM COATED ORAL at 20:58

## 2023-03-15 RX ADMIN — IPRATROPIUM BROMIDE AND ALBUTEROL SULFATE 3 ML: 2.5; .5 SOLUTION RESPIRATORY (INHALATION) at 07:20

## 2023-03-15 RX ADMIN — FOLIC ACID 1 MG: 1 TABLET ORAL at 09:19

## 2023-03-15 RX ADMIN — GABAPENTIN 300 MG: 300 CAPSULE ORAL at 05:00

## 2023-03-15 RX ADMIN — GABAPENTIN 300 MG: 300 CAPSULE ORAL at 21:01

## 2023-03-15 RX ADMIN — CELECOXIB 100 MG: 100 CAPSULE ORAL at 09:19

## 2023-03-15 RX ADMIN — OXYCODONE HYDROCHLORIDE 5 MG: 5 TABLET ORAL at 04:58

## 2023-03-15 RX ADMIN — IPRATROPIUM BROMIDE AND ALBUTEROL SULFATE 3 ML: 2.5; .5 SOLUTION RESPIRATORY (INHALATION) at 20:11

## 2023-03-15 RX ADMIN — OXYCODONE HYDROCHLORIDE 5 MG: 5 TABLET ORAL at 14:08

## 2023-03-15 RX ADMIN — DIAZEPAM 2 MG: 2 TABLET ORAL at 00:36

## 2023-03-15 RX ADMIN — OXYCODONE HYDROCHLORIDE 5 MG: 5 TABLET ORAL at 22:49

## 2023-03-15 RX ADMIN — FUROSEMIDE 40 MG: 10 INJECTION, SOLUTION INTRAMUSCULAR; INTRAVENOUS at 00:25

## 2023-03-15 RX ADMIN — DIAZEPAM 2 MG: 2 TABLET ORAL at 18:24

## 2023-03-15 RX ADMIN — DESVENLAFAXINE SUCCINATE 25 MG: 25 TABLET, EXTENDED RELEASE ORAL at 09:19

## 2023-03-15 RX ADMIN — VANCOMYCIN HYDROCHLORIDE 1000 MG: 1 INJECTION, SOLUTION INTRAVENOUS at 22:49

## 2023-03-15 RX ADMIN — OXYCODONE HYDROCHLORIDE 5 MG: 5 TABLET ORAL at 18:24

## 2023-03-15 RX ADMIN — IPRATROPIUM BROMIDE AND ALBUTEROL SULFATE 3 ML: 2.5; .5 SOLUTION RESPIRATORY (INHALATION) at 11:04

## 2023-03-15 RX ADMIN — Medication 10 ML: at 21:06

## 2023-03-15 RX ADMIN — Medication 10 ML: at 11:22

## 2023-03-15 RX ADMIN — LAMOTRIGINE 200 MG: 100 TABLET ORAL at 09:19

## 2023-03-15 RX ADMIN — APIXABAN 5 MG: 5 TABLET, FILM COATED ORAL at 20:58

## 2023-03-15 RX ADMIN — FUROSEMIDE 40 MG: 10 INJECTION, SOLUTION INTRAMUSCULAR; INTRAVENOUS at 11:22

## 2023-03-15 RX ADMIN — PANTOPRAZOLE SODIUM 40 MG: 40 TABLET, DELAYED RELEASE ORAL at 05:00

## 2023-03-15 RX ADMIN — ACETAMINOPHEN 1000 MG: 500 TABLET, FILM COATED ORAL at 17:12

## 2023-03-15 RX ADMIN — Medication 100 MG: at 09:19

## 2023-03-15 RX ADMIN — CELECOXIB 100 MG: 100 CAPSULE ORAL at 20:58

## 2023-03-15 RX ADMIN — GABAPENTIN 300 MG: 300 CAPSULE ORAL at 14:08

## 2023-03-15 RX ADMIN — IPRATROPIUM BROMIDE AND ALBUTEROL SULFATE 3 ML: 2.5; .5 SOLUTION RESPIRATORY (INHALATION) at 14:44

## 2023-03-15 RX ADMIN — HEPARIN SODIUM 5000 UNITS: 5000 INJECTION INTRAVENOUS; SUBCUTANEOUS at 14:09

## 2023-03-15 RX ADMIN — HEPARIN SODIUM 5000 UNITS: 5000 INJECTION INTRAVENOUS; SUBCUTANEOUS at 05:00

## 2023-03-15 RX ADMIN — OXYCODONE HYDROCHLORIDE 5 MG: 5 TABLET ORAL at 09:19

## 2023-03-15 RX ADMIN — Medication 500 MCG: at 09:19

## 2023-03-15 RX ADMIN — ACETAMINOPHEN 1000 MG: 500 TABLET, FILM COATED ORAL at 09:19

## 2023-03-15 NOTE — PROGRESS NOTES
Dr. ABY Womack    [address]        Patient ID:  Name:  Louise GARCIA  MRN:  [id number]  1965  58 y.o.  female            CC/Reason for visit: Empyema caused by right-sided pneumonia, status post decortication    Interval hx: She still has some right-sided chest discomfort but feels better since the last chest tube was removed yesterday.  She still has a cough but denies hemoptysis.  Saturating 90% on room air during sleep when I walked in.  Not requiring oxygen.    ROS: No fever, no hemoptysis, no abdominal pain    I reviewed old medical records.  Past medical history, social history and family history: Unchanged from admission H&P.      Vitals:  Vitals:    03/14/23 2356 03/15/23 0720 03/15/23 0737 03/15/23 1104   BP: 121/77  133/70    BP Location: Left arm  Left arm    Patient Position: Lying  Lying    Pulse: 117 105 106 118   Resp: 16 18 20 18   Temp: 98.9 °F (37.2 °C)  98.7 °F (37.1 °C)    TempSrc: Oral  Oral    SpO2: 95% 100% 95% 92%   Weight:       Height:               Body mass index is 23.23 kg/m².    Intake/Output Summary (Last 24 hours) at 3/15/2023 1118  Last data filed at 3/15/2023 0923  Gross per 24 hour   Intake --   Output 2000 ml   Net -2000 ml       Exam:  GEN:  No distress  Alert, oriented x 3.   LUNGS: A few scattered rhonchi on the right side, left side sounds diminished but clear, no use of accessory muscles  CV:  Normal S1S2, without murmur, no edema  ABD:  Non tender, no enlarged liver or masses      Scheduled meds:  acetaminophen, 1,000 mg, Oral, TID  celecoxib, 100 mg, Oral, Q12H  desvenlafaxine, 25 mg, Oral, Daily  folic acid, 1 mg, Oral, Daily  furosemide, 40 mg, Intravenous, Q8H  gabapentin, 300 mg, Oral, Q8H  heparin (porcine), 5,000 Units, Subcutaneous, Q8H  ipratropium-albuterol, 3 mL, Nebulization, 4x Daily - RT  lamoTRIgine, 200 mg, Oral, Daily  lidocaine, 1 patch, Transdermal, Q24H  lidocaine, 1 patch, Transdermal, Q24H  lidocaine, 10 mL, Intradermal,  Once  pantoprazole, 40 mg, Oral, Q AM  sodium chloride, 10 mL, Intravenous, Q12H  thiamine, 100 mg, Oral, Daily  vancomycin, 1,000 mg, Intravenous, Q24H  vitamin B-12, 500 mcg, Oral, Daily      IV meds:                      Pharmacy to dose vancomycin,         Data Review:   I reviewed the patient's medications and new clinical results.    COVID19   Date Value Ref Range Status   03/03/2023 Not Detected Not Detected - Ref. Range Final           Results from last 7 days   Lab Units 03/15/23  0452 03/14/23  0535 03/13/23  0528 03/12/23  0813 03/11/23  0840 03/11/23  0315 03/10/23  2017 03/10/23  0322   SODIUM mmol/L 133* 137 134* 135*  --  138  --  133*   POTASSIUM mmol/L 4.7 4.5 4.8 4.3  --  3.7  --  3.8   CHLORIDE mmol/L 96* 98 99 101  --  103  --  98   CO2 mmol/L 28.0 28.9 25.9 26.0  --  27.0  --  28.0   BUN mg/dL 12 11 12 12  --  12  --  10   CREATININE mg/dL 1.15* 0.93 1.02* 0.93  --  0.90  --  0.88   CALCIUM mg/dL 8.7 8.9 8.5* 7.8*  --  7.5*  --  7.6*   BILIRUBIN mg/dL  --   --   --  0.3  --  0.4  --  0.8   ALK PHOS U/L  --   --   --  224*  --  216*  --  199*   ALT (SGPT) U/L  --   --   --  14  --  16  --  17   AST (SGOT) U/L  --   --   --  29  --  26  --  29   GLUCOSE mg/dL 103* 84 81 126*  --  103*  --  81   WBC 10*3/mm3 12.45* 11.39* 11.32* 10.59  --  14.10*  --  18.95*   HEMOGLOBIN g/dL 8.1* 7.7* 7.9* 7.6*  7.6*   < > 7.5*   < > 8.1*  8.1*   PLATELETS 10*3/mm3 455* 351 290 276  --  210  --  238    < > = values in this interval not displayed.     Results from last 7 days   Lab Units 03/08/23 1957 03/08/23 1923   BODYFLDCX  Scant growth (1+) Staphylococcus aureus, MRSA* No growth at 3 days           ASSESSMENT:   1. Idiopathic neuropathy  2. MRSA pneumonia/empyema: S/P decortication 3/8/2023  3. Elevated liver enz  4. Anemia  5. Dysphagia  6. Hx DVT, was on Eliquis  7. Osteonecrosis of femoral heads  8. Possible vegitation on tricuspid valve -- cards and ID following  9. Seizure disorder      PLAN:  Patient  and all problems new to me.  I reviewed x-ray images of the chest.  Has some atelectasis at the right base to be expected after recent decortication.  Chest tubes have been removed.  She is not requiring oxygen at rest.  Saturating 90% on room air while asleep.  Her saturation improved immediately when I woke her up and she started taking deep breaths.  She does have some rhonchi.  I am encouraging her to get up and ambulate twice a day to improve clearance of respiratory secretions.  Continue using flutter valve.  Scheduled DuoNeb as needed depending on wheezing.  Patient is anemic, this is being treated by primary team.  Antibiotics intravenously as per infectious diseases, currently on vancomycin.  On heparin for DVT prophylaxis.  This patient has several chronic medical conditions, and now several acute medical illnesses.  Extensive amount of data was reviewed.  Images were directly visualized by me.  This patient warrants high complexity medical decision-making.            Aj Womack MD  3/15/2023

## 2023-03-15 NOTE — THERAPY TREATMENT NOTE
Patient Name: Luoise GARCIA  : 1965    MRN: [id number]                              Today's Date: 3/15/2023       Admit Date: 2023    Visit Dx:     ICD-10-CM ICD-9-CM   1. Paresthesia  R20.2 782.0   2. Gait instability  R26.81 781.2   3. Extremity numbness  R20.0 782.0   4. 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)   • Situational mixed anxiety and depressive disorder   • Mass of middle lobe of right lung   • Oral candidiasis   • Empyema of pleura (HCC)   • MRSA bacteremia     Past Medical History:   Diagnosis Date   • Degenerative disc disease, cervical    • Gestational diabetes    • H/O blood clots    • Hematemesis    • Seizures (HCC)    • Septic shock (HCC)      Past Surgical History:   Procedure Laterality Date   • APPENDECTOMY     • BACK SURGERY     • CHOLECYSTECTOMY     • ENDOSCOPY N/A 2016    Procedure: ESOPHAGOGASTRODUODENOSCOPY with biopsy;  Surgeon: Austen Brito MD;  Location: [facility] ENDOSCOPY;  Service:    • HYSTERECTOMY     • NECK SURGERY       approx 6 yrs ago   • THORACOSCOPY Right 3/8/2023    Procedure: THORACOSCOPY WITH DAVINCI ROBOT WITH COMPLETE DECORTICATION;  Surgeon: Chloe Cedillo MD;  Location: [facility] MAIN OR;  Service: Robotics - DaVinci;  Laterality: Right;   • TONSILLECTOMY     • TONSILLECTOMY AND ADENOIDECTOMY        General Information     Row Name 03/15/23 1538          OT Time and Intention    Document Type therapy note (daily note)  -RB     Mode of Treatment occupational therapy  -RB     Row Name 03/15/23 1538          General Information    Patient Profile Reviewed yes  -RB     Prior Level of Function  independent:;ADL's;transfer  -RB     Existing Precautions/Restrictions fall  -RB     Row Name 03/15/23 1538          Cognition    Orientation Status (Cognition) oriented x 3  -RB     Row Name 03/15/23 1538          Safety Issues, Functional Mobility    Safety Issues Affecting Function (Mobility) safety precautions follow-through/compliance;safety precaution awareness  -RB           User Key  (r) = Recorded By, (t) = Taken By, (c) = Cosigned By    Initials Name Provider Type    La Gaviria OT Occupational Therapist                 Mobility/ADL's     Row Name 03/15/23 1538          Bed Mobility    Bed Mobility supine-sit;sit-supine  -RB     Supine-Sit [place] (Bed Mobility) standby assist;verbal cues  -RB     Sit-Supine [place] (Bed Mobility) minimum assist (75% patient effort)  -RB     Assistive Device (Bed Mobility) bed rails;head of bed elevated  -RB     Row Name 03/15/23 1538          Transfers    Transfers sit-stand transfer;stand-sit transfer  -RB     Comment, (Transfers) x3-4 stands at the EOB, less knee buckling compared to the previous therapy session. able to tolerate several side steps along the EOB with therapist blocking only her L knee.  -RB     Row Name 03/15/23 1538          Bed-Chair Transfer    Bed-Chair [place] (Transfers) unable to assess  -RB     Row Name 03/15/23 1538          Activities of Daily Living    BADL Assessment/Intervention grooming  -RB     Row Name 03/15/23 1538          Grooming Assessment/Training    [place] Level (Grooming) grooming skills;hair care, combing/brushing;minimum assist (75% patient effort)  -RB     Position (Grooming) edge of bed sitting  -RB     Comment, (Grooming) Min A provided for sitting balance while participating in a overhead task  -RB           User Key  (r) = Recorded By, (t) = Taken By, (c) = Cosigned By    Initials Name Provider Type    La Gaviria OT Occupational Therapist               Obj/Interventions     Row  Name 03/15/23 1544          Shoulder (Therapeutic Exercise)    Shoulder AROM (Therapeutic Exercise) --  She participated in overhead punching with CGA/Min A for sitting balance. Outward and punches crossing midline only CGA provided for sitting balance. X10 reps x2 sets  -RB     Row Name 03/15/23 1544          Balance    Comment, Balance Mod A x2 for standing balance + side steps. CGA/Min A for sitting balance  -RB           User Key  (r) = Recorded By, (t) = Taken By, (c) = Cosigned By    Initials Name Provider Type    RB La Carr, OT Occupational Therapist               Goals/Plan    No documentation.                Clinical Impression     Row Name 03/15/23 1546          Pain Assessment    Pretreatment Pain Rating 0/10 - no pain  -RB     Posttreatment Pain Rating 0/10 - no pain  -RB     Row Name 03/15/23 1546          Plan of Care Review    Plan of Care Reviewed With patient;spouse  -RB     Progress improving  -RB     Row Name 03/15/23 1546          Therapy Assessment/Plan (OT)    Rehab Potential (OT) good, to achieve stated therapy goals  -RB     Criteria for Skilled Therapeutic Interventions Met (OT) yes;skilled treatment is necessary  -RB     Therapy Frequency (OT) 5 times/wk  -RB     Row Name 03/15/23 1546          Therapy Plan Review/Discharge Plan (OT)    Anticipated Discharge Disposition (OT) inpatient rehabilitation facility  -RB     Row Name 03/15/23 1546          Vital Signs    O2 Delivery Pre Treatment room air  -RB     O2 Delivery Intra Treatment room air  -RB     O2 Delivery Post Treatment room air  -RB     Pre Patient Position Supine  -RB     Intra Patient Position Standing  -RB     Post Patient Position Supine  -RB     Row Name 03/15/23 1546          Positioning and Restraints    Pre-Treatment Position in bed  -RB     Post Treatment Position bed  -RB     In Bed notified nsg;supine;call light within reach;encouraged to call for assist;exit alarm on;legs elevated  -RB           User Key  (r)  = Recorded By, (t) = Taken By, (c) = Cosigned By    Initials Name Provider Type    La Gaviria OT Occupational Therapist               Outcome Measures    No documentation.                 Occupational Therapy Education     Title: PT OT SLP Therapies (Done)     Topic: Occupational Therapy (Done)     Point: ADL training (Done)     Description:   Instruct learner(s) on proper safety adaptation and remediation techniques during self care or transfers.   Instruct in proper use of assistive devices.              Learning Progress Summary           Patient Acceptance, E, VU by BL at 3/7/2023 1712    Comment: Spoke with Pt and  through day, updated on plan of care. All questions answered.    Acceptance, E,D, NR by PC at 3/6/2023 1357    Acceptance, E, VU by BL at 3/6/2023 0830    Comment: Spoke with patient and spouse at bedside through day.    Acceptance, E,TB, VU by JOÃO at 3/2/2023 1836    Acceptance, E, VU by MW at 2/27/2023 1522    Comment: role of OT, d/c rec, goals of care   Family Acceptance, E, VU by BL at 3/7/2023 1712    Comment: Spoke with Pt and  through day, updated on plan of care. All questions answered.    Acceptance, E, VU by BL at 3/6/2023 0830    Comment: Spoke with patient and spouse at bedside through day.    Acceptance, E, VU by MW at 2/27/2023 1522    Comment: role of OT, d/c rec, goals of care                   Point: Home exercise program (Done)     Description:   Instruct learner(s) on appropriate technique for monitoring, assisting and/or progressing therapeutic exercises/activities.              Learning Progress Summary           Patient Acceptance, E, VU by BL at 3/7/2023 1712    Comment: Spoke with Pt and  through day, updated on plan of care. All questions answered.    Acceptance, E,D, NR by PC at 3/6/2023 1357    Acceptance, E, VU by BL at 3/6/2023 0830    Comment: Spoke with patient and spouse at bedside through day.    Acceptance, E,TB, VU by JÃOO at 3/2/2023  1836   Family Acceptance, E, VU by BL at 3/7/2023 1712    Comment: Spoke with Pt and  through day, updated on plan of care. All questions answered.    Acceptance, E, VU by BL at 3/6/2023 0830    Comment: Spoke with patient and spouse at bedside through day.                   Point: Precautions (Done)     Description:   Instruct learner(s) on prescribed precautions during self-care and functional transfers.              Learning Progress Summary           Patient Acceptance, E, VU by BL at 3/7/2023 1712    Comment: Spoke with Pt and  through day, updated on plan of care. All questions answered.    Acceptance, E,D, NR by PC at 3/6/2023 1357    Acceptance, E, VU by BL at 3/6/2023 0830    Comment: Spoke with patient and spouse at bedside through day.    Acceptance, E,TB, VU by JOÃO at 3/2/2023 1836    Acceptance, E, VU by MW at 2/27/2023 1522    Comment: role of OT, d/c rec, goals of care   Family Acceptance, E, VU by BL at 3/7/2023 1712    Comment: Spoke with Pt and  through day, updated on plan of care. All questions answered.    Acceptance, E, VU by BL at 3/6/2023 0830    Comment: Spoke with patient and spouse at bedside through day.    Acceptance, E, VU by MW at 2/27/2023 1522    Comment: role of OT, d/c rec, goals of care                   Point: Body mechanics (Done)     Description:   Instruct learner(s) on proper positioning and spine alignment during self-care, functional mobility activities and/or exercises.              Learning Progress Summary           Patient Acceptance, E, VU by BL at 3/7/2023 1712    Comment: Spoke with Pt and  through day, updated on plan of care. All questions answered.    Acceptance, E,D, NR by PC at 3/6/2023 1357    Acceptance, E, VU by BL at 3/6/2023 0830    Comment: Spoke with patient and spouse at bedside through day.    Acceptance, E,TB, VU by JOÃO at 3/2/2023 1836    Acceptance, E, VU by MW at 2/27/2023 1522    Comment: role of OT, d/c rec, goals of care    Family Acceptance, E, VU by BL at 3/7/2023 1712    Comment: Spoke with Pt and  through day, updated on plan of care. All questions answered.    Acceptance, E, VU by BL at 3/6/2023 0830    Comment: Spoke with patient and spouse at bedside through day.    Acceptance, E, VU by  at 2/27/2023 1522    Comment: role of OT, d/c rec, goals of care                               User Key     Initials Effective Dates Name Provider Type Discipline    PC 06/16/21 -  Saida Burrows PT Physical Therapist PT    MW 08/20/21 -  Nathalia Holly OT Occupational Therapist OT    BL 10/18/22 -  Josefa Jones, RN Registered Nurse Nurse    JOÃO 09/20/22 -  Jayda Patel RN Registered Nurse Nurse              OT Recommendation and Plan  Therapy Frequency (OT): 5 times/wk  Plan of Care Review  Plan of Care Reviewed With: patient, spouse  Progress: improving     Time Calculation:    Time Calculation- OT     Row Name 03/15/23 1535             Time Calculation- OT    OT Start Time 1446  -RB      OT Stop Time 1518  -RB      OT Time Calculation (min) 32 min  -RB      Total Timed Code Minutes- OT 32 minute(s)  -RB      OT Received On 03/15/23  -RB      OT - Next Appointment 03/16/23  -RB         Timed Charges    62973 - OT Therapeutic Exercise Minutes 12  -RB      44710 - OT Therapeutic Activity Minutes 22  -RB         Total Minutes    Timed Charges Total Minutes 34  -RB       Total Minutes 34  -RB            User Key  (r) = Recorded By, (t) = Taken By, (c) = Cosigned By    Initials Name Provider Type    RB La Carr OT Occupational Therapist              Therapy Charges for Today     Code Description Service Date Service Provider Modifiers Qty    30243396338 HC OT THER PROC EA 15 MIN 3/15/2023 La Carr OT GO 1    65675293066 HC OT THERAPEUTIC ACT EA 15 MIN 3/15/2023 La Carr OT GO 1               La Carr OT  3/15/2023

## 2023-03-15 NOTE — THERAPY TREATMENT NOTE
Acute Care - Speech Language Pathology   Swallow Treatment Note [facility]     Patient Name: Louise GARCIA  : 1965  MRN: [id number]  Today's Date: 3/15/2023               Admit Date: 2023    Visit Dx:     ICD-10-CM ICD-9-CM   1. Paresthesia  R20.2 782.0   2. Gait instability  R26.81 781.2   3. Extremity numbness  R20.0 782.0   4. 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)   • Situational mixed anxiety and depressive disorder   • Mass of middle lobe of right lung   • Oral candidiasis   • Empyema of pleura (HCC)   • MRSA bacteremia     Past Medical History:   Diagnosis Date   • Degenerative disc disease, cervical    • Gestational diabetes    • H/O blood clots    • Hematemesis    • Seizures (HCC)    • Septic shock (HCC)      Past Surgical History:   Procedure Laterality Date   • APPENDECTOMY     • BACK SURGERY     • CHOLECYSTECTOMY     • ENDOSCOPY N/A 2016    Procedure: ESOPHAGOGASTRODUODENOSCOPY with biopsy;  Surgeon: Austen Brito MD;  Location: [facility] ENDOSCOPY;  Service:    • HYSTERECTOMY     • NECK SURGERY       approx 6 yrs ago   • THORACOSCOPY Right 3/8/2023    Procedure: THORACOSCOPY WITH DAVINCI ROBOT WITH COMPLETE DECORTICATION;  Surgeon: Chloe Cedillo MD;  Location: [facility] OR;  Service: Robotics - DaVinci;  Laterality: Right;   • TONSILLECTOMY     • TONSILLECTOMY AND ADENOIDECTOMY         SLP Recommendation and Plan                                                                            Plan of Care Reviewed With: patient  Outcome Evaluation: Pt seen for diet tolerance. Pt took trials of regular and thin via straw. Mastication  was functional. Pt had throat clearing x2 after thins. Pt appeared to clear. Pt's voice was hoarse and breathy. She stated it was stronger yesterday. Pt reported word finding deficits and difficulty thinking. Pt is discharging to rehab soon. Discussed cog eval. Pt in agreement. ST to follow.      SWALLOW EVALUATION (last 72 hours)     SLP Adult Swallow Evaluation     Row Name 03/15/23 1600                   Rehab Evaluation    Document Type therapy note (daily note)  -AW        Patient Observations alert;cooperative;agree to therapy  -AW        Patient Effort good  -AW           (LTG) Patient will demonstrate functional swallow for    Diet Texture (Demonstrate functional swallow) regular textures  -AW        Liquid viscosity (Demonstrate functional swallow) thin liquids  -AW        Comment (Demonstrate functional swallow) Pt seen for diet tolerance. Pt took trials of regular and thin via straw. Mastication was functional. Pt had throat clearing x2 after thins. Pt appeared to clear. Pt's voice was hoarse and breathy. She stated it was stronger yesterday. Pt reported word finding deficits and difficulty thinking. Pt is discharging to rehab soon. Discussed cog eval. Pt in agreement. ST to follow.  -AW              User Key  (r) = Recorded By, (t) = Taken By, (c) = Cosigned By    Initials Name Effective Dates    AW Heaven Pablo, MS [facility]-SLP 06/16/21 -                 EDUCATION  The patient has been educated in the following areas:   Cognitive Impairment Voice Disorder Dysphagia (Swallowing Impairment) Oral Care/Hydration.        SLP GOALS     Row Name 03/15/23 1600             (LTG) Patient will demonstrate functional swallow for    Diet Texture (Demonstrate functional swallow) regular textures  -AW      Liquid viscosity (Demonstrate functional swallow) thin liquids  -AW      Comment (Demonstrate functional swallow) Pt seen for diet tolerance. Pt took trials of regular and thin via straw. Mastication was functional. Pt  had throat clearing x2 after thins. Pt appeared to clear. Pt's voice was hoarse and breathy. She stated it was stronger yesterday. Pt reported word finding deficits and difficulty thinking. Pt is discharging to rehab soon. Discussed cog eval. Pt in agreement. ST to follow.  -            User Key  (r) = Recorded By, (t) = Taken By, (c) = Cosigned By    Initials Name Provider Type    Heaven Adams MS CCC-SLP Speech and Language Pathologist                   Time Calculation:    Time Calculation- SLP     Row Name 03/15/23 1639             Time Calculation- SLP    SLP Start Time 1430  -AW      SLP Received On 03/15/23  -            User Key  (r) = Recorded By, (t) = Taken By, (c) = Cosigned By    Initials Name Provider Type    Heaven Adams MS CCC-SLP Speech and Language Pathologist                Therapy Charges for Today     Code Description Service Date Service Provider Modifiers Qty    [id number]  ST TREATMENT SWALLOW 3 3/15/2023 Heaven Pablo MS CCC-SLP GN 1               Heaven Pablo MS CCC-SLP  3/15/2023

## 2023-03-15 NOTE — PLAN OF CARE
Goal Outcome Evaluation:  Plan of Care Reviewed With: patient           Outcome Evaluation: Pt seen for diet tolerance. Pt took trials of regular and thin via straw. Mastication was functional. Pt had throat clearing x2 after thins. Pt appeared to clear. Pt's voice was hoarse and breathy. She stated it was stronger yesterday. Pt reported word finding deficits and difficulty thinking. Pt is discharging to rehab soon. Discussed cog eval. Pt in agreement. ST to follow.

## 2023-03-15 NOTE — PROGRESS NOTES
Name: Louise GARCIA ADMIT: 2023   : 1965  PCP: Chloe Schaefer APRN    MRN: [id number] LOS: 20 days   AGE/SEX: 58 y.o. female  ROOM: [facility]     Subjective   Subjective   No acute events overnight.  Patient resting comfortably in bed.  Responded well to diuresis.  Family at bedside.  States she does not take any diuretics at home.    Review of Systems   Constitutional: Negative for chills and fever.   Respiratory: Negative for cough.    Cardiovascular: Positive for leg swelling.   Gastrointestinal: Negative for abdominal pain, diarrhea and nausea.     As above     Objective   Objective   Vital Signs  Temp:  [98.2 °F (36.8 °C)-99.2 °F (37.3 °C)] 99.1 °F (37.3 °C)  Heart Rate:  [103-121] 116  Resp:  [16-20] 18  BP: (115-133)/(63-77) 115/63  SpO2:  [92 %-100 %] 92 %  on  Flow (L/min):  [1] 1;   Device (Oxygen Therapy): room air  Body mass index is 23.23 kg/m².  Physical Exam  Constitutional:       General: She is not in acute distress.     Appearance: She is not diaphoretic.   Cardiovascular:      Rate and Rhythm: Normal rate and regular rhythm.   Pulmonary:      Effort: Pulmonary effort is normal. No respiratory distress.   Abdominal:      General: Abdomen is flat.      Palpations: Abdomen is soft.   Musculoskeletal:         General: No deformity. Normal range of motion.      Right lower leg: Edema present.      Left lower leg: Edema present.   Skin:     General: Skin is warm and dry.   Neurological:      General: No focal deficit present.      Mental Status: She is alert.         Results Review     I reviewed the patient's new clinical results.  Results from last 7 days   Lab Units 03/15/23  04523  0535 23  0813   WBC 10*3/mm3 12.45* 11.39* 11.32* 10.59   HEMOGLOBIN g/dL 8.1* 7.7* 7.9* 7.6*  7.6*   PLATELETS 10*3/mm3 455* 351 290 276     Results from last 7 days   Lab Units 03/15/23  0452 23  0535 23  0528 23  0813   SODIUM mmol/L 133* 137 134* 135*    POTASSIUM mmol/L 4.7 4.5 4.8 4.3   CHLORIDE mmol/L 96* 98 99 101   CO2 mmol/L 28.0 28.9 25.9 26.0   BUN mg/dL 12 11 12 12   CREATININE mg/dL 1.15* 0.93 1.02* 0.93   GLUCOSE mg/dL 103* 84 81 126*   Estimated Creatinine Clearance: 47.9 mL/min (A) (by C-G formula based on SCr of 1.15 mg/dL (H)).  Results from last 7 days   Lab Units 03/12/23  0813 03/11/23  0315 03/10/23  0322 03/09/23  0443   ALBUMIN g/dL 1.7* 1.9* 1.8* 1.8*   BILIRUBIN mg/dL 0.3 0.4 0.8 1.0   ALK PHOS U/L 224* 216* 199* 178*   AST (SGOT) U/L 29 26 29 27   ALT (SGPT) U/L 14 16 17 19     Results from last 7 days   Lab Units 03/15/23  0452 03/14/23  0535 03/13/23  0528 03/12/23  0813 03/11/23  0315 03/10/23  0322 03/09/23  0443   CALCIUM mg/dL 8.7 8.9 8.5* 7.8* 7.5* 7.6* 7.7*   ALBUMIN g/dL  --   --   --  1.7* 1.9* 1.8* 1.8*   MAGNESIUM mg/dL  --   --   --  1.9 1.9 1.7 1.6   PHOSPHORUS mg/dL  --   --   --  2.6 2.6 3.1 4.4       COVID19   Date Value Ref Range Status   03/03/2023 Not Detected Not Detected - Ref. Range Final     No results found for: HGBA1C, POCGLU    XR Chest 1 View  XR CHEST 1 VW-     HISTORY:  Status post chest tube removal.     COMPARISON:  Chest radiograph 3/14/2023     FINDINGS:    A single view of the chest was obtained. The cardiac silhouette is  enlarged. Mediastinal and hilar contours are not significantly changed.  A right pleural effusion and right pulmonary opacities may be slightly  progressed at the lung base compared to 3/14/2023. There are new mild  airspace opacities in the left lung. Pulmonary opacities may be  exaggerated by low lung volumes. There is no definite pneumothorax.  There is scoliotic curvature of the spine. There is incompletely  assessed cervical spinal fusion hardware. There are surgical clips  projecting over the right upper quadrant.     This report was finalized on 3/15/2023 7:14 AM by Dr. Melinda Khan M.D.       I reviewed the patient's daily medications.  Scheduled Medications  acetaminophen,  1,000 mg, Oral, TID  apixaban, 5 mg, Oral, Q12H  celecoxib, 100 mg, Oral, Q12H  desvenlafaxine, 25 mg, Oral, Daily  folic acid, 1 mg, Oral, Daily  gabapentin, 300 mg, Oral, Q8H  ipratropium-albuterol, 3 mL, Nebulization, 4x Daily - RT  lamoTRIgine, 200 mg, Oral, Daily  lidocaine, 1 patch, Transdermal, Q24H  lidocaine, 1 patch, Transdermal, Q24H  lidocaine, 10 mL, Intradermal, Once  pantoprazole, 40 mg, Oral, Q AM  sodium chloride, 10 mL, Intravenous, Q12H  thiamine, 100 mg, Oral, Daily  vancomycin, 1,000 mg, Intravenous, Q24H  vitamin B-12, 500 mcg, Oral, Daily    Infusions  Pharmacy to dose vancomycin,     Diet  Diet: Regular/House Diet; Texture: Regular Texture (IDDSI 7); Fluid Consistency: Thin (IDDSI 0)         I have personally reviewed:  [x]  Laboratory   [x]  Microbiology   []  Radiology   []  EKG/Telemetry   [x]  Cardiology/Vascular   []  Pathology   [x]  Records     Assessment/Plan     Active Hospital Problems    Diagnosis  POA   • **Paresthesia [R20.2]  Yes   • MRSA bacteremia [R78.81, B95.62]  Unknown   • Oral candidiasis [B37.0]  No   • Mass of middle lobe of right lung [R91.8]  Yes   • Situational mixed anxiety and depressive disorder [F43.23]  Yes   • 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]  Yes   • Normocytic anemia [D64.9]  Yes   • GERD (gastroesophageal reflux disease) [K21.9]  Yes   • Lower extremity weakness [R29.898]  Yes   • History of blood clots [Z86.718]  Not Applicable   • Chronic anticoagulation [Z79.01]  Not Applicable   • Seizures (HCC) [R56.9]  Yes   • Cervical myelopathy (HCC) [G95.9]  Yes   • Empyema of pleura (HCC) [J86.9]  Yes      Resolved Hospital Problems    Diagnosis Date Resolved POA   • Tricuspid valve vegetation [I33.0] 03/12/2023 Yes   • Upper abdominal pain [R10.10] 03/08/2023 No   • Leukocytosis [D72.829] 03/08/2023 Yes   • Hyperglycemia [R73.9] 03/08/2023 No       58 y.o. female admitted with Paresthesia.         MRSA bacteremia/PNA with empyema/Endocarditis/Septic  phlebitis  -Blood cx (3/2/23): MRSA; repeat negative   -s/p thoracentesis 3/4/23; pleural fluid w/ MRSA  -TTE (3/5/23): 61-65%; small mobile mass on TV c/w vegetation  -TTE (3/10/23): NO VEGETATION  -b/l UE doppler (3/5/23): acute RUE superficial thrombophlebitis  -ID following and recommend IV vancomycin for 4 weeks  -s/p thoracoscopy w/ DaVinci robot w/ complete decortication 3/8/23 with CTS    Hypervolemia  -Given Lasix yesterday with mild improvement.  Creatinine mildly increased today  -Most recent albumin was 1.9, she is likely third spacing  -We will give an additional dose of Lasix x1 today.  -We will start boost     Idiopathic peripheral neuropathy, motor predominant  -EMG showing evidence of neuropathy, not typical of GBS  -s/p IVIG for possible autoimmune neuropathy  -following tests are negative/Normal : West Nile virus serology, Lyme serology, ESR, CRP, magnesium, phosphorus, potassium, RPR, folate, CK, DENISE, heavy metal screen, TSH, angiotensin-converting enzyme, heavy metal screen  -Neuro has signed off but will follow-up after discharge and will follow-up results [facility] CSF autoimmune panel     Seizure disorder  -lamotrigine 200mg     Depression  -desvenlafaxine 25mg     Hx DVT  -on Eliquis, on hold for VATS  -Discussed with thoracic surgery, and okay to restart. Resume Eliquis this evening.    · Eliquis (home med) for DVT prophylaxis.  · Full code.  · Discussed with patient, nursing staff and CCP.  · Anticipate discharge to acute rehab once arrangements have been made.      Tyler Carter MD  [facility]  03/15/23  16:22 EDT

## 2023-03-15 NOTE — PLAN OF CARE
Goal Outcome Evaluation:  Plan of Care Reviewed With: patient        Progress: improving  Outcome Evaluation: Pt tolerated treatment well this date. Required SBA for supine to sit, then pt able to maintain static sitting balance w/ mostly SBA. Occasional posterior lean noted, w/ min A to correct. Pt completed a few seated and supine LE exercises, encouraging pt to attempt a few supine ones on her own during the day. Pt stood a few times w/ mod A x2, knees partially blocked, though pt able to lock knees out for static standing balance. Tolerated a few mini squats w/ mod A x2, for the most part controlling slight bend then able to extend knees back out w/ only a touch of therapists' knees to pt's knees. Pt was also able to complete a few small side steps towards HOB, cues for keeping knees locked when weight shifting. Overall, good progress made today.

## 2023-03-15 NOTE — PLAN OF CARE
Goal Outcome Evaluation:  Plan of Care Reviewed With: patient        Progress: no change  Outcome Evaluation: VSS ex tachycardic. Alert and oriented x4. Pt c/o right flank/chest pain and muscle spasms in BLE. PRN oxy and valium managing pain. IV lasix x1 this shift. Pt with pitting edema in bilateral flanks and lower extremitites. q2h turn as pt allows. Possible d/c to rehab today. Will continue to provide supportive care.

## 2023-03-15 NOTE — PROGRESS NOTES
[facility]    Await insurance precert. Rehab bed available maybe today and more likely tomorrow pending insurance approval and medical stability. Please complete discharge - readmit option in Epic when discharging to [facility] rehab.    Catarina Whatley RN  Rehab Admissions Coordinator  [phone]

## 2023-03-15 NOTE — PLAN OF CARE
Problem: Adult Inpatient Plan of Care  Goal: Plan of Care Review  Outcome: Ongoing, Progressing  Flowsheets (Taken 3/15/2023 1502)  Progress: no change  Plan of Care Reviewed With: patient  Outcome Evaluation: vss ex tachycardic. aaox4. prn medication provided for c/o pain. duoderms from previous chest tube sites removed. up with assist. plan for discharge tomorrow pending precert.   Goal Outcome Evaluation:  Plan of Care Reviewed With: patient        Progress: no change  Outcome Evaluation: vss ex tachycardic. aaox4. prn medication provided for c/o pain. duoderms from previous chest tube sites removed. up with assist. plan for discharge tomorrow pending precert. per pulm, okay to d/c anytime

## 2023-03-15 NOTE — PLAN OF CARE
The pt participated in OT/PT this afternoon. SBA provided for bed mobility. She participated in BUE/BLE exercises - BUE punches outward, across midline and overhead. CGA/Min A provided for sitting balance. Min-Mod A x2 provided to stand x3. On her third stand she tolerated side steps to her right. She was able to slightly sustain knee bends and mini squats while standing and less buckling noticed. She remains very motivated and Acute rehab recommended.    Patient was not wearing a face mask during this therapy encounter. Therapist used appropriate personal protective equipment including mask and gloves. Mask used was standard procedure mask. Appropriate PPE was worn during the entire therapy session. Hand hygiene was completed before and after therapy session. Patient is not in enhanced droplet precautions.

## 2023-03-15 NOTE — THERAPY TREATMENT NOTE
Patient Name: Louise GARCIA  : 1965    MRN: [id number]                              Today's Date: 3/15/2023       Admit Date: 2023    Visit Dx:     ICD-10-CM ICD-9-CM   1. Paresthesia  R20.2 782.0   2. Gait instability  R26.81 781.2   3. Extremity numbness  R20.0 782.0   4. 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)   • Situational mixed anxiety and depressive disorder   • Mass of middle lobe of right lung   • Oral candidiasis   • Empyema of pleura (HCC)   • MRSA bacteremia     Past Medical History:   Diagnosis Date   • Degenerative disc disease, cervical    • Gestational diabetes    • H/O blood clots    • Hematemesis    • Seizures (HCC)    • Septic shock (HCC)      Past Surgical History:   Procedure Laterality Date   • APPENDECTOMY     • BACK SURGERY     • CHOLECYSTECTOMY     • ENDOSCOPY N/A 2016    Procedure: ESOPHAGOGASTRODUODENOSCOPY with biopsy;  Surgeon: Austen Brito MD;  Location: [facility] ENDOSCOPY;  Service:    • HYSTERECTOMY     • NECK SURGERY       approx 6 yrs ago   • THORACOSCOPY Right 3/8/2023    Procedure: THORACOSCOPY WITH DAVINCI ROBOT WITH COMPLETE DECORTICATION;  Surgeon: Chloe Cedillo MD;  Location: [facility] MAIN OR;  Service: Robotics - DaVinci;  Laterality: Right;   • TONSILLECTOMY     • TONSILLECTOMY AND ADENOIDECTOMY        General Information     Row Name 03/15/23 1536          Physical Therapy Time and Intention    Document Type therapy note (daily note)  -     Mode of Treatment physical therapy  -     Row Name 03/15/23 1536          General Information    Existing Precautions/Restrictions fall  -     Row Name 03/15/23  1536          Cognition    Orientation Status (Cognition) oriented x 3  -           User Key  (r) = Recorded By, (t) = Taken By, (c) = Cosigned By    Initials Name Provider Type    Milana Aguirre PTA Physical Therapist Assistant               Mobility     Row Name 03/15/23 1536          Bed Mobility    Bed Mobility supine-sit;sit-supine  -     Supine-Sit [place] (Bed Mobility) standby assist  -     Sit-Supine [place] (Bed Mobility) minimum assist (75% patient effort)  -     Assistive Device (Bed Mobility) bed rails;head of bed elevated  -     Comment, (Bed Mobility) assist to raise LEs into bed  -     Row Name 03/15/23 1536          Sit-Stand Transfer    Sit-Stand [place] (Transfers) moderate assist (50% patient effort);2 person assist  -     Assistive Device (Sit-Stand Transfers) --  HHA  -     Comment, (Sit-Stand Transfer) stood ~3x, knees locked out, partially blocked; also able to take a few small side steps towards HOB  -           User Key  (r) = Recorded By, (t) = Taken By, (c) = Cosigned By    Initials Name Provider Type    Milana Aguirre PTA Physical Therapist Assistant               Obj/Interventions     Row Name 03/15/23 1542          Motor Skills    Therapeutic Exercise --  seated AP, LAQ, marches, hip abd/add x10 reps; supine QS, GS, heel slides x3 reps (just for demo and pt to complete on own later)  -     Row Name 03/15/23 1542          Balance    Comment, Balance static sitting balance w/ mostly SBA, occasional posterior lean w/ min A to correct; static standing balance w/ mod A x2  -           User Key  (r) = Recorded By, (t) = Taken By, (c) = Cosigned By    Initials Name Provider Type    Milana Aguirre PTA Physical Therapist Assistant               Goals/Plan    No documentation.                Clinical Impression     Row Name 03/15/23 1544          Pain    Pretreatment Pain Rating 0/10 - no pain  -     Posttreatment Pain Rating 0/10  - no pain  -     Row Name 03/15/23 1544          Positioning and Restraints    Pre-Treatment Position in bed  -SM     Post Treatment Position bed  -SM     In Bed supine;call light within reach;encouraged to call for assist;exit alarm on;with family/caregiver  -           User Key  (r) = Recorded By, (t) = Taken By, (c) = Cosigned By    Initials Name Provider Type     Milana Burdick PTA Physical Therapist Assistant               Outcome Measures     Row Name 03/15/23 1547          How much help from another person do you currently need...    Turning from your back to your side while in flat bed without using bedrails? 3  -SM     Moving from lying on back to sitting on the side of a flat bed without bedrails? 3  -SM     Moving to and from a bed to a chair (including a wheelchair)? 2  -SM     Standing up from a chair using your arms (e.g., wheelchair, bedside chair)? 2  -SM     Climbing 3-5 steps with a railing? 1  -SM     To walk in hospital room? 1  -SM     AM-PAC 6 Clicks Score (PT) 12  -SM     Highest level of mobility 4 --> Transferred to chair/commode  -     Row Name 03/15/23 1547          Functional Assessment    Outcome Measure Options AM-PAC 6 Clicks Basic Mobility (PT)  -           User Key  (r) = Recorded By, (t) = Taken By, (c) = Cosigned By    Initials Name Provider Type     Milana Burdick PTA Physical Therapist Assistant                             Physical Therapy Education     Title: PT OT SLP Therapies (Done)     Topic: Physical Therapy (Done)     Point: Mobility training (Done)     Learning Progress Summary           Patient Acceptance, E,TB,D, VU,NR by  at 3/15/2023 1548    Acceptance, E,TB,D, VU,NR by  at 3/14/2023 1109    Acceptance, E,TB,D, VU,NR by  at 3/13/2023 1026    Acceptance, E, VU by EM at 3/11/2023 1611    Acceptance, E, VU by EM at 3/10/2023 1454    Acceptance, E, VU by  at 3/7/2023 1712    Comment: Spoke with Pt and  through day, updated on plan of  care. All questions answered.    Acceptance, E,D, NR by PC at 3/7/2023 1419    Acceptance, E, VU by BL at 3/6/2023 0830    Comment: Spoke with patient and spouse at bedside through day.    Acceptance, E, NR by AR at 3/3/2023 1346    Acceptance, E,TB, VU by JOÃO at 3/2/2023 1836    Acceptance, E,TB, VU by MS at 3/1/2023 1135    Acceptance, E,TB, VU by MS at 2/28/2023 1105    Acceptance, E,TB,D, VU,DU by LB at 2/26/2023 1159    Acceptance, E,TB, VU,DU by LB at 2/25/2023 1542    Acceptance, E,D, VU,NR by EB at 2/24/2023 1254    Acceptance, E,D, VU,NR by EB at 2/23/2023 1057    Acceptance, E,TB, VU,NR by EB1 at 2/21/2023 1134   Family Acceptance, E, VU by EM at 3/10/2023 1454    Acceptance, E, VU by BL at 3/7/2023 1712    Comment: Spoke with Pt and  through day, updated on plan of care. All questions answered.    Acceptance, E, VU by BL at 3/6/2023 0830    Comment: Spoke with patient and spouse at bedside through day.    Acceptance, E, NR by AR at 3/3/2023 1346   Significant Other Acceptance, E,TB, VU,NR by EB1 at 2/21/2023 1134                   Point: Home exercise program (Done)     Learning Progress Summary           Patient Acceptance, E,TB,D, VU,NR by SM at 3/15/2023 1548    Acceptance, E,TB,D, VU,NR by SM at 3/14/2023 1109    Acceptance, E,TB,D, VU,NR by SM at 3/13/2023 1026    Acceptance, E, VU by EM at 3/11/2023 1611    Acceptance, E, VU by EM at 3/10/2023 1454    Acceptance, E, VU by BL at 3/7/2023 1712    Comment: Spoke with Pt and  through day, updated on plan of care. All questions answered.    Acceptance, E,D, NR by PC at 3/7/2023 1419    Acceptance, E,D, NR by PC at 3/6/2023 1357    Acceptance, E, VU by BL at 3/6/2023 0830    Comment: Spoke with patient and spouse at bedside through day.    Acceptance, E, NR by AR at 3/3/2023 1346    Acceptance, E,TB, VU by JOÃO at 3/2/2023 1836    Acceptance, E,TB, VU by MS at 2/28/2023 1105    Acceptance, E,TB,D, VU,DU by LB at 2/26/2023 1159    Acceptance,  E,TB, VU,DU by LB at 2/25/2023 1542    Acceptance, E,D, VU,NR by EB at 2/24/2023 1254    Acceptance, E,D, VU,NR by EB at 2/23/2023 1057   Family Acceptance, E, VU by EM at 3/10/2023 1454    Acceptance, E, VU by BL at 3/7/2023 1712    Comment: Spoke with Pt and  through day, updated on plan of care. All questions answered.    Acceptance, E, VU by BL at 3/6/2023 0830    Comment: Spoke with patient and spouse at bedside through day.    Acceptance, E, NR by AR at 3/3/2023 1346                   Point: Body mechanics (Done)     Learning Progress Summary           Patient Acceptance, E,TB,D, VU,NR by SM at 3/15/2023 1548    Acceptance, E,TB,D, VU,NR by SM at 3/14/2023 1109    Acceptance, E,TB,D, VU,NR by SM at 3/13/2023 1026    Acceptance, E, VU by BL at 3/7/2023 1712    Comment: Spoke with Pt and  through day, updated on plan of care. All questions answered.    Acceptance, E,D, NR by PC at 3/7/2023 1419    Acceptance, E, VU by BL at 3/6/2023 0830    Comment: Spoke with patient and spouse at bedside through day.    Acceptance, E, NR by AR at 3/3/2023 1346    Acceptance, E,TB, VU by JOÃO at 3/2/2023 1836    Acceptance, E,TB,D, VU,DU by LB at 2/26/2023 1159    Acceptance, E,TB, VU,DU by LB at 2/25/2023 1542    Acceptance, E,D, VU,NR by EB at 2/24/2023 1254    Acceptance, E,D, VU,NR by EB at 2/23/2023 1057   Family Acceptance, E, VU by BL at 3/7/2023 1712    Comment: Spoke with Pt and  through day, updated on plan of care. All questions answered.    Acceptance, E, VU by BL at 3/6/2023 0830    Comment: Spoke with patient and spouse at bedside through day.    Acceptance, E, NR by AR at 3/3/2023 1346                   Point: Precautions (Done)     Learning Progress Summary           Patient Acceptance, E,TB,D, VU,NR by SM at 3/15/2023 1548    Acceptance, E,TB,D, VU,NR by SM at 3/14/2023 1109    Acceptance, E,TB,D, VU,NR by SM at 3/13/2023 1026    Acceptance, E, VU by BL at 3/7/2023 1712    Comment: Spoke with  Pt and  through day, updated on plan of care. All questions answered.    Acceptance, E,D, NR by PC at 3/7/2023 1419    Acceptance, E, VU by BL at 3/6/2023 0830    Comment: Spoke with patient and spouse at bedside through day.    Acceptance, E, NR by AR at 3/3/2023 1346    Acceptance, E,TB, VU by JOÃO at 3/2/2023 1836    Acceptance, E,TB,D, VU,DU by LB at 2/26/2023 1159    Acceptance, E,TB, VU,DU by LB at 2/25/2023 1542    Acceptance, E,D, VU,NR by EB at 2/24/2023 1254    Acceptance, E,D, VU,NR by EB at 2/23/2023 1057   Family Acceptance, E, VU by BL at 3/7/2023 1712    Comment: Spoke with Pt and  through day, updated on plan of care. All questions answered.    Acceptance, E, VU by BL at 3/6/2023 0830    Comment: Spoke with patient and spouse at bedside through day.    Acceptance, E, NR by AR at 3/3/2023 1346                               User Key     Initials Effective Dates Name Provider Type Discipline    PC 06/16/21 -  Saida Burrows, PT Physical Therapist PT    EM 06/16/21 -  Violet Branham, PT Physical Therapist PT    AR 06/16/21 -  Jovita Miller, PT Physical Therapist PT    SM 03/07/18 -  Milana Burdick, PTA Physical Therapist Assistant PT    LB 08/09/20 -  Nia Arellano, PT Physical Therapist PT    MS 06/16/21 -  Елена Florez, PT Physical Therapist PT    EB 02/14/23 -  Caren James PTA Physical Therapist Assistant PT    BL 10/18/22 -  Josefa Jones, RN Registered Nurse Nurse    JOÃO 09/20/22 -  Jayda Patel, RN Registered Nurse Nurse    EB1 01/12/23 -  Yesica Zamora, PT Student PT Student PT              PT Recommendation and Plan     Plan of Care Reviewed With: patient  Progress: improving  Outcome Evaluation: Pt tolerated treatment well this date. Required SBA for supine to sit, then pt able to maintain static sitting balance w/ mostly SBA. Occasional posterior lean noted, w/ min A to correct. Pt completed a few seated and supine LE exercises, encouraging pt to attempt a few  supine ones on her own during the day. Pt stood a few times w/ mod A x2, knees partially blocked, though pt able to lock knees out for static standing balance. Tolerated a few mini squats w/ mod A x2, for the most part controlling slight bend then able to extend knees back out w/ only a touch of therapists' knees to pt's knees. Pt was also able to complete a few small side steps towards HOB, cues for keeping knees locked when weight shifting. Overall, good progress made today.     Time Calculation:    PT Charges     Row Name 03/15/23 1555             Time Calculation    Start Time 1446  -      Stop Time 1519  -      Time Calculation (min) 33 min  -SM      PT Received On 03/15/23  -      PT - Next Appointment 03/16/23  -            User Key  (r) = Recorded By, (t) = Taken By, (c) = Cosigned By    Initials Name Provider Type     Milana Burdick JACQUELYN Waite Physical Therapist Assistant              Therapy Charges for Today     Code Description Service Date Service Provider Modifiers Qty    42402748546 HC PT THER PROC EA 15 MIN 3/14/2023 Milana Burdick, PTA GP 1    17804059981 HC PT THERAPEUTIC ACT EA 15 MIN 3/14/2023 Milana Burdick, PTA GP 1    56158477456 HC PT THER SUPP EA 15 MIN 3/14/2023 Milana Burdick, PTA GP 2    47088052674 HC PT THER PROC EA 15 MIN 3/15/2023 Milana Burdick, PTA GP 1    56362037087 HC PT THERAPEUTIC ACT EA 15 MIN 3/15/2023 Milana Burdick, PTA GP 1    32472829488 HC PT THER SUPP EA 15 MIN 3/15/2023 Milana Burdick, PTA GP 2          PT G-Codes  Outcome Measure Options: AM-PAC 6 Clicks Basic Mobility (PT)  AM-PAC 6 Clicks Score (PT): 12  AM-PAC 6 Clicks Score (OT): 11  Modified Parisa Scale: 4 - Moderately severe disability.  Unable to walk without assistance, and unable to attend to own bodily needs without assistance.  PT Discharge Summary  Anticipated Discharge Disposition (PT): inpatient rehabilitation facility    Milana Mariann Burdick PTA  3/15/2023

## 2023-03-16 ENCOUNTER — APPOINTMENT (OUTPATIENT)
Dept: GENERAL RADIOLOGY | Facility: HOSPITAL | Age: 58
DRG: 163 | End: 2023-03-16
Payer: COMMERCIAL

## 2023-03-16 ENCOUNTER — APPOINTMENT (OUTPATIENT)
Dept: MRI IMAGING | Facility: HOSPITAL | Age: 58
DRG: 163 | End: 2023-03-16
Payer: COMMERCIAL

## 2023-03-16 LAB
ANION GAP SERPL CALCULATED.3IONS-SCNC: 11 MMOL/L (ref 5–15)
APPEARANCE FLD: CLEAR
BUN SERPL-MCNC: 14 MG/DL (ref 6–20)
BUN/CREAT SERPL: 12 (ref 7–25)
CALCIUM SPEC-SCNC: 8.3 MG/DL (ref 8.6–10.5)
CHLORIDE SERPL-SCNC: 92 MMOL/L (ref 98–107)
CO2 SERPL-SCNC: 29 MMOL/L (ref 22–29)
COLOR FLD: NORMAL
CREAT SERPL-MCNC: 1.17 MG/DL (ref 0.57–1)
CRYSTALS FLD MICRO: NORMAL
DEPRECATED RDW RBC AUTO: 48.8 FL (ref 37–54)
EGFRCR SERPLBLD CKD-EPI 2021: 53.9 ML/MIN/1.73
ERYTHROCYTE [DISTWIDTH] IN BLOOD BY AUTOMATED COUNT: 13.9 % (ref 12.3–15.4)
GLUCOSE SERPL-MCNC: 110 MG/DL (ref 65–99)
HCT VFR BLD AUTO: 22 % (ref 34–46.6)
HGB BLD-MCNC: 7.3 G/DL (ref 12–15.9)
LYMPHOCYTES NFR FLD MANUAL: 40 %
MCH RBC QN AUTO: 32.4 PG (ref 26.6–33)
MCHC RBC AUTO-ENTMCNC: 33.2 G/DL (ref 31.5–35.7)
MCV RBC AUTO: 97.8 FL (ref 79–97)
METHOD: NORMAL
MONOCYTES NFR FLD: 50 %
NEUTROPHILS NFR FLD MANUAL: 10 %
NUC CELL # FLD: 43 /MM3
PLATELET # BLD AUTO: 535 10*3/MM3 (ref 140–450)
PMV BLD AUTO: 10.4 FL (ref 6–12)
POTASSIUM SERPL-SCNC: 4.4 MMOL/L (ref 3.5–5.2)
RBC # BLD AUTO: 2.25 10*6/MM3 (ref 3.77–5.28)
RBC # FLD AUTO: 113 /MM3
SODIUM SERPL-SCNC: 132 MMOL/L (ref 136–145)
WBC NRBC COR # BLD: 11.74 10*3/MM3 (ref 3.4–10.8)

## 2023-03-16 PROCEDURE — 72157 MRI CHEST SPINE W/O & W/DYE: CPT

## 2023-03-16 PROCEDURE — 89051 BODY FLUID CELL COUNT: CPT | Performed by: INTERNAL MEDICINE

## 2023-03-16 PROCEDURE — 85027 COMPLETE CBC AUTOMATED: CPT | Performed by: STUDENT IN AN ORGANIZED HEALTH CARE EDUCATION/TRAINING PROGRAM

## 2023-03-16 PROCEDURE — 87205 SMEAR GRAM STAIN: CPT | Performed by: INTERNAL MEDICINE

## 2023-03-16 PROCEDURE — 0 LIDOCAINE 1 % SOLUTION: Performed by: RADIOLOGY

## 2023-03-16 PROCEDURE — 0S9B3ZX DRAINAGE OF LEFT HIP JOINT, PERCUTANEOUS APPROACH, DIAGNOSTIC: ICD-10-PCS | Performed by: RADIOLOGY

## 2023-03-16 PROCEDURE — 89060 EXAM SYNOVIAL FLUID CRYSTALS: CPT | Performed by: INTERNAL MEDICINE

## 2023-03-16 PROCEDURE — 72158 MRI LUMBAR SPINE W/O & W/DYE: CPT

## 2023-03-16 PROCEDURE — 77002 NEEDLE LOCALIZATION BY XRAY: CPT

## 2023-03-16 PROCEDURE — 80048 BASIC METABOLIC PNL TOTAL CA: CPT | Performed by: STUDENT IN AN ORGANIZED HEALTH CARE EDUCATION/TRAINING PROGRAM

## 2023-03-16 PROCEDURE — 94799 UNLISTED PULMONARY SVC/PX: CPT

## 2023-03-16 PROCEDURE — 99232 SBSQ HOSP IP/OBS MODERATE 35: CPT | Performed by: PSYCHIATRY & NEUROLOGY

## 2023-03-16 PROCEDURE — A9577 INJ MULTIHANCE: HCPCS | Performed by: STUDENT IN AN ORGANIZED HEALTH CARE EDUCATION/TRAINING PROGRAM

## 2023-03-16 PROCEDURE — 87070 CULTURE OTHR SPECIMN AEROBIC: CPT | Performed by: INTERNAL MEDICINE

## 2023-03-16 PROCEDURE — 71045 X-RAY EXAM CHEST 1 VIEW: CPT

## 2023-03-16 PROCEDURE — 0 GADOBENATE DIMEGLUMINE 529 MG/ML SOLUTION: Performed by: STUDENT IN AN ORGANIZED HEALTH CARE EDUCATION/TRAINING PROGRAM

## 2023-03-16 PROCEDURE — 25010000002 VANCOMYCIN PER 500 MG: Performed by: INTERNAL MEDICINE

## 2023-03-16 RX ORDER — LIDOCAINE HYDROCHLORIDE 10 MG/ML
10 INJECTION, SOLUTION INFILTRATION; PERINEURAL ONCE
Status: COMPLETED | OUTPATIENT
Start: 2023-03-16 | End: 2023-03-16

## 2023-03-16 RX ADMIN — CELECOXIB 100 MG: 100 CAPSULE ORAL at 09:49

## 2023-03-16 RX ADMIN — CELECOXIB 100 MG: 100 CAPSULE ORAL at 20:16

## 2023-03-16 RX ADMIN — ACETAMINOPHEN 1000 MG: 500 TABLET, FILM COATED ORAL at 16:38

## 2023-03-16 RX ADMIN — GABAPENTIN 300 MG: 300 CAPSULE ORAL at 16:41

## 2023-03-16 RX ADMIN — DESVENLAFAXINE SUCCINATE 25 MG: 25 TABLET, EXTENDED RELEASE ORAL at 09:49

## 2023-03-16 RX ADMIN — GABAPENTIN 300 MG: 300 CAPSULE ORAL at 05:05

## 2023-03-16 RX ADMIN — OXYCODONE HYDROCHLORIDE 5 MG: 5 TABLET ORAL at 09:55

## 2023-03-16 RX ADMIN — IPRATROPIUM BROMIDE AND ALBUTEROL SULFATE 3 ML: 2.5; .5 SOLUTION RESPIRATORY (INHALATION) at 11:22

## 2023-03-16 RX ADMIN — GADOBENATE DIMEGLUMINE 11 ML: 529 INJECTION, SOLUTION INTRAVENOUS at 13:45

## 2023-03-16 RX ADMIN — FOLIC ACID 1 MG: 1 TABLET ORAL at 09:49

## 2023-03-16 RX ADMIN — IPRATROPIUM BROMIDE AND ALBUTEROL SULFATE 3 ML: 2.5; .5 SOLUTION RESPIRATORY (INHALATION) at 20:03

## 2023-03-16 RX ADMIN — DIAZEPAM 2 MG: 2 TABLET ORAL at 11:17

## 2023-03-16 RX ADMIN — Medication 10 ML: at 20:15

## 2023-03-16 RX ADMIN — LIDOCAINE HYDROCHLORIDE 4 ML: 10 INJECTION, SOLUTION INFILTRATION; PERINEURAL at 14:15

## 2023-03-16 RX ADMIN — PANTOPRAZOLE SODIUM 40 MG: 40 TABLET, DELAYED RELEASE ORAL at 05:05

## 2023-03-16 RX ADMIN — OXYCODONE HYDROCHLORIDE 5 MG: 5 TABLET ORAL at 05:05

## 2023-03-16 RX ADMIN — APIXABAN 5 MG: 5 TABLET, FILM COATED ORAL at 20:16

## 2023-03-16 RX ADMIN — Medication 10 ML: at 09:51

## 2023-03-16 RX ADMIN — OXYCODONE HYDROCHLORIDE 5 MG: 5 TABLET ORAL at 20:20

## 2023-03-16 RX ADMIN — APIXABAN 5 MG: 5 TABLET, FILM COATED ORAL at 09:49

## 2023-03-16 RX ADMIN — ACETAMINOPHEN 1000 MG: 500 TABLET, FILM COATED ORAL at 09:54

## 2023-03-16 RX ADMIN — OXYCODONE HYDROCHLORIDE 5 MG: 5 TABLET ORAL at 16:37

## 2023-03-16 RX ADMIN — Medication 500 MCG: at 09:49

## 2023-03-16 RX ADMIN — LAMOTRIGINE 200 MG: 100 TABLET ORAL at 09:49

## 2023-03-16 RX ADMIN — GABAPENTIN 300 MG: 300 CAPSULE ORAL at 20:16

## 2023-03-16 RX ADMIN — VANCOMYCIN HYDROCHLORIDE 1000 MG: 1 INJECTION, SOLUTION INTRAVENOUS at 20:16

## 2023-03-16 RX ADMIN — ACETAMINOPHEN 1000 MG: 500 TABLET, FILM COATED ORAL at 20:16

## 2023-03-16 RX ADMIN — Medication 100 MG: at 09:49

## 2023-03-16 RX ADMIN — IPRATROPIUM BROMIDE AND ALBUTEROL SULFATE 3 ML: 2.5; .5 SOLUTION RESPIRATORY (INHALATION) at 07:47

## 2023-03-16 NOTE — PROGRESS NOTES
Name: Louise GARCIA ADMIT: 2023   : 1965  PCP: Chloe Schaefer APRN    MRN: [id number] LOS: 21 days   AGE/SEX: 58 y.o. female  ROOM: [facility]     Subjective   Subjective   Family at bedside.  Patient with hip pain when she moves.  Family expressed frustration about having to stay for more testing, but understands it is necessary.  They are just eager to get to rehab.    Review of Systems   Constitutional: Negative for chills and fever.   Respiratory: Negative for cough.    Cardiovascular: Positive for leg swelling.   Gastrointestinal: Negative for abdominal pain, diarrhea and nausea.     As above     Objective   Objective   Vital Signs  Temp:  [97.2 °F (36.2 °C)-99.5 °F (37.5 °C)] 99.5 °F (37.5 °C)  Heart Rate:  [100-116] 111  Resp:  [16-20] 18  BP: (111-122)/(63-74) 122/74  SpO2:  [88 %-100 %] 98 %  on  Flow (L/min):  [2] 2;   Device (Oxygen Therapy): nasal cannula  Body mass index is 23.23 kg/m².  Physical Exam  Constitutional:       General: She is not in acute distress.     Appearance: She is not diaphoretic.   Cardiovascular:      Rate and Rhythm: Normal rate and regular rhythm.   Pulmonary:      Effort: Pulmonary effort is normal. No respiratory distress.   Abdominal:      General: Abdomen is flat.      Palpations: Abdomen is soft.   Musculoskeletal:         General: No deformity. Normal range of motion.      Right lower leg: Edema present.      Left lower leg: Edema present.   Skin:     General: Skin is warm and dry.   Neurological:      General: No focal deficit present.      Mental Status: She is alert.         Results Review     I reviewed the patient's new clinical results.  Results from last 7 days   Lab Units 23  0517 03/15/23  0452 03/14/23  0535 23  0528   WBC 10*3/mm3 11.74* 12.45* 11.39* 11.32*   HEMOGLOBIN g/dL 7.3* 8.1* 7.7* 7.9*   PLATELETS 10*3/mm3 535* 455* 351 290     Results from last 7 days   Lab Units 23  0517 03/15/23  0452 23  0535 23  0528    SODIUM mmol/L 132* 133* 137 134*   POTASSIUM mmol/L 4.4 4.7 4.5 4.8   CHLORIDE mmol/L 92* 96* 98 99   CO2 mmol/L 29.0 28.0 28.9 25.9   BUN mg/dL 14 12 11 12   CREATININE mg/dL 1.17* 1.15* 0.93 1.02*   GLUCOSE mg/dL 110* 103* 84 81   Estimated Creatinine Clearance: 47.1 mL/min (A) (by C-G formula based on SCr of 1.17 mg/dL (H)).  Results from last 7 days   Lab Units 03/12/23  0813 03/11/23  0315 03/10/23  0322   ALBUMIN g/dL 1.7* 1.9* 1.8*   BILIRUBIN mg/dL 0.3 0.4 0.8   ALK PHOS U/L 224* 216* 199*   AST (SGOT) U/L 29 26 29   ALT (SGPT) U/L 14 16 17     Results from last 7 days   Lab Units 03/16/23  0517 03/15/23  0452 03/14/23  0535 03/13/23  0528 03/12/23  0813 03/11/23  0315 03/10/23  0322   CALCIUM mg/dL 8.3* 8.7 8.9 8.5* 7.8* 7.5* 7.6*   ALBUMIN g/dL  --   --   --   --  1.7* 1.9* 1.8*   MAGNESIUM mg/dL  --   --   --   --  1.9 1.9 1.7   PHOSPHORUS mg/dL  --   --   --   --  2.6 2.6 3.1       COVID19   Date Value Ref Range Status   03/03/2023 Not Detected Not Detected - Ref. Range Final     No results found for: HGBA1C, POCGLU    XR Chest 1 View  Narrative: XR CHEST 1 VW-     HISTORY: Female who is 58 years-old,  chest tube removal     TECHNIQUE: Frontal view of the chest     COMPARISON: 03/15/2023     FINDINGS: The heart size is borderline. Pulmonary vasculature is  unremarkable. Bilateral pulmonary opacities and right pleural effusion  appears similar to prior exam. No pneumothorax. No acute osseous  process.     Impression: No significant change.     This report was finalized on 3/16/2023 8:06 AM by KAMILA BoykinD.       I reviewed the patient's daily medications.  Scheduled Medications  acetaminophen, 1,000 mg, Oral, TID  apixaban, 5 mg, Oral, Q12H  celecoxib, 100 mg, Oral, Q12H  desvenlafaxine, 25 mg, Oral, Daily  folic acid, 1 mg, Oral, Daily  gabapentin, 300 mg, Oral, Q8H  ipratropium-albuterol, 3 mL, Nebulization, 4x Daily - RT  lamoTRIgine, 200 mg, Oral, Daily  lidocaine, 1 patch,  Transdermal, Q24H  lidocaine, 1 patch, Transdermal, Q24H  lidocaine, 10 mL, Intradermal, Once  pantoprazole, 40 mg, Oral, Q AM  sodium chloride, 10 mL, Intravenous, Q12H  thiamine, 100 mg, Oral, Daily  vancomycin, 1,000 mg, Intravenous, Q24H  vitamin B-12, 500 mcg, Oral, Daily    Infusions  Pharmacy to dose vancomycin,     Diet  Diet: Regular/House Diet; Texture: Regular Texture (IDDSI 7); Fluid Consistency: Thin (IDDSI 0)         I have personally reviewed:  [x]  Laboratory   []  Microbiology   []  Radiology   []  EKG/Telemetry   [x]  Cardiology/Vascular   []  Pathology   []  Records     Assessment/Plan     Active Hospital Problems    Diagnosis  POA   • **Paresthesia [R20.2]  Yes   • MRSA bacteremia [R78.81, B95.62]  Unknown   • Oral candidiasis [B37.0]  No   • Mass of middle lobe of right lung [R91.8]  Yes   • Situational mixed anxiety and depressive disorder [F43.23]  Yes   • 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) [G61.0]  Yes   • Normocytic anemia [D64.9]  Yes   • GERD (gastroesophageal reflux disease) [K21.9]  Yes   • Lower extremity weakness [R29.898]  Yes   • History of blood clots [Z86.718]  Not Applicable   • Chronic anticoagulation [Z79.01]  Not Applicable   • Seizures (HCC) [R56.9]  Yes   • Cervical myelopathy (HCC) [G95.9]  Yes   • Empyema of pleura (HCC) [J86.9]  Yes      Resolved Hospital Problems    Diagnosis Date Resolved POA   • Tricuspid valve vegetation [I33.0] 03/12/2023 Yes   • Upper abdominal pain [R10.10] 03/08/2023 No   • Leukocytosis [D72.829] 03/08/2023 Yes   • Hyperglycemia [R73.9] 03/08/2023 No       58 y.o. female admitted with Paresthesia.         MRSA bacteremia/PNA with empyema/Endocarditis/Septic phlebitis  -Blood cx (3/2/23): MRSA; repeat negative   -s/p thoracentesis 3/4/23; pleural fluid w/ MRSA  -TTE (3/5/23): 61-65%; small mobile mass on TV c/w vegetation  -TTE (3/10/23): NO VEGETATION  -b/l UE doppler (3/5/23): acute RUE superficial thrombophlebitis  -ID following and recommend IV  vancomycin for 4 weeks  -s/p thoracoscopy w/ DaVinci robot w/ complete decortication 3/8/23 with CTS  -Infectious disease ordered repeat MRI of the thoracic and lumbar spine as well as left hip arthrocentesis    Hypervolemia  -Creatinine stable  -Most recent albumin was 1.9, she is likely third spacing  -No further Lasix.  -Continue boost supplementation     Idiopathic peripheral neuropathy, motor predominant  -EMG showing evidence of neuropathy, not typical of GBS  -s/p IVIG for possible autoimmune neuropathy  -following tests are negative/Normal : West Nile virus serology, Lyme serology, ESR, CRP, magnesium, phosphorus, potassium, RPR, folate, CK, DENISE, heavy metal screen, TSH, angiotensin-converting enzyme, heavy metal screen  -Neuro has signed off but will follow-up after discharge and will follow-up results [facility] CSF autoimmune panel     Seizure disorder  -lamotrigine 200mg     Depression  -desvenlafaxine 25mg     Hx DVT  -Eliquis resumed.    · Eliquis (home med) for DVT prophylaxis.  · Full code.  · Discussed with patient, nursing staff and CCP.  · Anticipate discharge to acute rehab once arrangements have been made.      Tyler Carter MD  [facility]  03/16/23  12:05 EDT

## 2023-03-16 NOTE — PROGRESS NOTES
[facility] Clinical Pharmacy Services: Vancomycin Level Monitoring Note    Louise GARCIA is a 58 y.o. female who is on day 13/28 of pharmacy to dose vancomycin for Bacteremia MRSA.  Dr. Oliver said in his notes on 3/14 to increase DOT to 4 weeks (tomorrow will be day 14 of therapy so I have increased DOT by 14 days  Estimated Creatinine Clearance: 47.1 mL/min (A) (by C-G formula based on SCr of 1.17 mg/dL (H)).    Current Vanc Dose: 1000 mg IV every 24 hours  Results from last 7 days   Lab Units 03/15/23  2044 03/12/23  2027 03/10/23  1640   VANCOMYCIN TR mcg/mL 17.20 17.10 21.90*   Predicted AUC at current dose:545 mg/L.hr  Next Level Date and Time: in about 4-5 days if patient is still here. Scr is now up to 1.17mg/dl (is slowly trending upward-might have to decrease dose if this trend continues)    No changes at this time. Pharmacy is continuing to monitor and will adjust as needed.    Barber Masterson, formerly [facility]  Clinical Pharmacist

## 2023-03-16 NOTE — PLAN OF CARE
Goal Outcome Evaluation:  Plan of Care Reviewed With: patient        Progress: no change  Outcome Evaluation: VSS. Alert and oriented x4. Pt turned as tolerated. Pt c/o bilateral lower extremity pain and right-sided chest pain. Managed with prn po pain meds. Encouraged pulmonary toileting. 2L O2 nc placed while resting this AM r/t desats to mid 80s. Will continue to provide supportive care.

## 2023-03-16 NOTE — PROGRESS NOTES
"DOS: 3/16/2023  NAME: Louise GARCIA   : 1965  PCP: Chloe Schaefer APRN  Chief Complaint   Patient presents with   • Fall   • Numbness     Numbness in bilateral arms and legs post falling.        Chief complaint: weakness  Subjective: 58-year-old female well-known to me with what is felt to be an idiopathic motor predominant neuropathy with a negative work-up, status posttreatment with IVIG.  Her case was complicated by development of staph septicemia and surgical intervention of a lung empyema.  Since I last saw her her sepsis has recurred.  There is some report of increasing weakness.  She complains of left hip and groin pain which is new.  I was asked to see her again to comment on any changes in her neurologic status.    Objective:  Vital signs: /74 (BP Location: Left arm, Patient Position: Lying)   Pulse 111   Temp 99.5 °F (37.5 °C) (Oral)   Resp 18   Ht 157.5 cm (62\")   Wt 57.6 kg (127 lb)   SpO2 98%   Breastfeeding No   BMI 23.23 kg/m²    Gen: NAD, vitals reviewed  MS: oriented x3, recent/remote memory intact, normal attention/concentration, language intact, no neglect.  CN: visual acuity grossly normal, PERRL, EOMI, no facial droop, no dysarthria  Motor: 4+/5 bilateral shoulder abduction and arm extension, otherwise 5/5 upper extremities, 2/5 left lower extremity, 3/5 right lower extremity, normal tone throughout  Sensory: intact to light touch all 4 ext.  Reflexes: 2+ right biceps, otherwise areflexic, plantars downgoing    Laboratory results:  Lab Results   Component Value Date    GLUCOSE 110 (H) 2023    CALCIUM 8.3 (L) 2023     (L) 2023    K 4.4 2023    CO2 29.0 2023    CL 92 (L) 2023    BUN 14 2023    CREATININE 1.17 (H) 2023    EGFRIFAFRI  2016      Comment:      <15 Indicative of kidney failure.    EGFRIFNONA 81 2016    BCR 12.0 2023    ANIONGAP 11.0 2023     Lab Results   Component Value Date    WBC 11.74 " (H) 03/16/2023    HGB 7.3 (L) 03/16/2023    HCT 22.0 (L) 03/16/2023    MCV 97.8 (H) 03/16/2023     (H) 03/16/2023     No results found for: LDL         Review of labs: Sodium 132, WBC 11.7, platelets 535    Review and interpretation of imaging: Repeat MRI T and L-spine pending    Diagnoses:  Subacute motor predominant idiopathic peripheral neuropathy  Paraparesis  Areflexia  Lung empyema  Left hip pain    Comment: Overall patient's neurologic exam is stable compared to when I last saw her besides her new left hip and groin pain.  Given her recurrent sepsis I certainly do not disagree with repeat spine imaging to exclude an abscess.    Plan:  I will continue to see her intermittently if there is anything I can add to her care.

## 2023-03-16 NOTE — PROGRESS NOTES
Dr. ABY Womack    [address]        Patient ID:  Name:  Louise GARCIA  MRN:  [id number]  1965  58 y.o.  female            CC/Reason for visit: Empyema caused by right-sided pneumonia, status post decortication     Interval hx: She complains of being left unattended downstairs for 40 min but now feels better.  Has a mild cough. No fever nor sputum     ROS: No fever, no hemoptysis, no abdominal pain    Vitals:  Vitals:    03/16/23 0747 03/16/23 1119 03/16/23 1122 03/16/23 1524   BP:  122/74  129/67   BP Location:  Left arm  Left arm   Patient Position:  Lying  Lying   Pulse: 110 110 111 104   Resp: 18 18 18 18   Temp:  99.5 °F (37.5 °C)  98.9 °F (37.2 °C)   TempSrc:  Oral  Oral   SpO2: 90% 93% 98% 96%   Weight:       Height:               Body mass index is 23.23 kg/m².    Intake/Output Summary (Last 24 hours) at 3/16/2023 1617  Last data filed at 3/16/2023 1100  Gross per 24 hour   Intake 600 ml   Output 400 ml   Net 200 ml       Exam:  GEN:  No distress  Alert, oriented x 3.   LUNGS: Diminished breath sounds bilat, no use of accessory muscles  CV:  Normal S1S2, without murmur, no edema  ABD:  Non tender, no enlarged liver or masses      Scheduled meds:  acetaminophen, 1,000 mg, Oral, TID  apixaban, 5 mg, Oral, Q12H  celecoxib, 100 mg, Oral, Q12H  desvenlafaxine, 25 mg, Oral, Daily  folic acid, 1 mg, Oral, Daily  gabapentin, 300 mg, Oral, Q8H  ipratropium-albuterol, 3 mL, Nebulization, 4x Daily - RT  lamoTRIgine, 200 mg, Oral, Daily  lidocaine, 1 patch, Transdermal, Q24H  lidocaine, 1 patch, Transdermal, Q24H  lidocaine, 10 mL, Intradermal, Once  pantoprazole, 40 mg, Oral, Q AM  sodium chloride, 10 mL, Intravenous, Q12H  thiamine, 100 mg, Oral, Daily  vancomycin, 1,000 mg, Intravenous, Q24H  vitamin B-12, 500 mcg, Oral, Daily      IV meds:                      Pharmacy to dose vancomycin,         Data Review:   I reviewed the patient's medications and new clinical results.    COVID19   Date  Value Ref Range Status   03/03/2023 Not Detected Not Detected - Ref. Range Final         Lab Results   Component Value Date    CALCIUM 8.3 (L) 03/16/2023    PHOS 2.6 03/12/2023    MG 1.9 03/12/2023    MG 1.9 03/11/2023    MG 1.7 03/10/2023     Results from last 7 days   Lab Units 03/16/23  0517 03/15/23  0452 03/14/23  0535 03/13/23  0528 03/12/23  0813 03/11/23  0840 03/11/23  0315 03/10/23  2017 03/10/23  0322   SODIUM mmol/L 132* 133* 137   < > 135*  --  138  --  133*   POTASSIUM mmol/L 4.4 4.7 4.5   < > 4.3  --  3.7  --  3.8   CHLORIDE mmol/L 92* 96* 98   < > 101  --  103  --  98   CO2 mmol/L 29.0 28.0 28.9   < > 26.0  --  27.0  --  28.0   BUN mg/dL 14 12 11   < > 12  --  12  --  10   CREATININE mg/dL 1.17* 1.15* 0.93   < > 0.93  --  0.90  --  0.88   CALCIUM mg/dL 8.3* 8.7 8.9   < > 7.8*  --  7.5*  --  7.6*   BILIRUBIN mg/dL  --   --   --   --  0.3  --  0.4  --  0.8   ALK PHOS U/L  --   --   --   --  224*  --  216*  --  199*   ALT (SGPT) U/L  --   --   --   --  14  --  16  --  17   AST (SGOT) U/L  --   --   --   --  29  --  26  --  29   GLUCOSE mg/dL 110* 103* 84   < > 126*  --  103*  --  81   WBC 10*3/mm3 11.74* 12.45* 11.39*   < > 10.59  --  14.10*  --  18.95*   HEMOGLOBIN g/dL 7.3* 8.1* 7.7*   < > 7.6*  7.6*   < > 7.5*   < > 8.1*  8.1*   PLATELETS 10*3/mm3 535* 455* 351   < > 276  --  210  --  238    < > = values in this interval not displayed.                             ASSESSMENT:     Paresthesia    Cervical myelopathy (HCC)    Lower extremity weakness    History of blood clots    Chronic anticoagulation    Seizures (HCC)    Normocytic anemia    GERD (gastroesophageal reflux disease)    Guillain-Columbia syndrome (HCC)    Situational mixed anxiety and depressive disorder    Mass of middle lobe of right lung    Oral candidiasis    Empyema of pleura (HCC)    MRSA bacteremia        PLAN:  Patient needs to ambulate.  I explained this to her and her .  She is now back on oxygen.  Perhaps due to not  participating with physical therapy and not moving out of bed all day long.  She may be developing some mild atelectasis.  Continue to wean oxygen and increase mobility.  Antibiotics as per Dr. Oliver.  Not much else to add from a pulmonary standpoint.        Aj Womack MD  3/16/2023

## 2023-03-16 NOTE — CASE MANAGEMENT/SOCIAL WORK
Continued Stay Note  [facility]     Patient Name: Louise GARCIA  MRN: [id number]  Today's Date: 3/16/2023    Admit Date: 2/20/2023    Plan: BAR pending medically readiness and precert   Discharge Plan     Row Name 03/16/23 1410       Plan    Plan BAR pending medically readiness and precert    Patient/Family in Agreement with Plan yes    Plan Comments Spoke with ann marie/JEAN-PIERRE who states precert still pending. Pt not medically ready now.  Will continue to follow for precert and appropriateness for rehab.  SHARONDA johnson RN               Discharge Codes    No documentation.               Expected Discharge Date and Time     Expected Discharge Date Expected Discharge Time    Mar 14, 2023             Valerie Johnson, RN

## 2023-03-16 NOTE — PROGRESS NOTES
[facility]    Chart reviewed.  Events noted.  Will follow for medical work up and medical stabilty.      Spoke with EDISON Mckoy.    Thank you,  Gaviota Goodman RN  Rehab Admission Nurse

## 2023-03-16 NOTE — SIGNIFICANT NOTE
03/16/23 1604   OTHER   Discipline physical therapy assistant   Rehab Time/Intention   Session Not Performed patient unavailable for treatment  (pt off floor for MRI; PT will f/u tomorrow)   Recommendation   PT - Next Appointment 03/17/23

## 2023-03-16 NOTE — PROGRESS NOTES
"Nutrition Services    Patient Name:  Louise GARCIA  YOB: 1965  MRN: [id number]  Admit Date:  2/20/2023    FOLLOW UP - CLINICAL NUTRITION    Assessment Date:  03/16/23    Encounter Information        Reason for Encounter Follow Up    Diagnosis MSA Bacteremia, PNA, endocarditis, Idiopathics peripheral neuropathy, S/P IVIG    Current Issues Pt off the unit in MRI. Per flowsheet, she is consuming ~50% of her meals. Spoke with spouse at bedside, states she is drinking the boost and he is bringing her additional bottles for more variety in flavors.  New skin issues noted. Labs,  meds reviewed. DC planning in progress for rehab. Will follow clinical course, nutrition needs.     Current Nutrition Orders & Evaluation of Intake       Oral Nutrition     Current PO Diet Diet: Regular/House Diet; Texture: Regular Texture (IDDSI 7); Fluid Consistency: Thin (IDDSI 0)   Supplement Boost Plus, TID   PO Evaluation     % PO Intake 50%    # of Days Evaluated 5    Factors Affecting Intake  No factors at this time   --  Anthropometrics          Height    Weight Height: 157.5 cm (62\")  Weight: 57.6 kg (127 lb) (03/10/23 1410)    BMI kg/m2 Body mass index is 23.23 kg/m².    Weight trend Stable     Labs        Pertinent Labs Reviewed, listed below     Results from last 7 days   Lab Units 03/16/23  0517 03/15/23  0452 03/14/23  0535 03/13/23  0528 03/12/23  0813 03/11/23  0315 03/10/23  0322   SODIUM mmol/L 132* 133* 137   < > 135* 138 133*   POTASSIUM mmol/L 4.4 4.7 4.5   < > 4.3 3.7 3.8   CHLORIDE mmol/L 92* 96* 98   < > 101 103 98   CO2 mmol/L 29.0 28.0 28.9   < > 26.0 27.0 28.0   BUN mg/dL 14 12 11   < > 12 12 10   CREATININE mg/dL 1.17* 1.15* 0.93   < > 0.93 0.90 0.88   CALCIUM mg/dL 8.3* 8.7 8.9   < > 7.8* 7.5* 7.6*   BILIRUBIN mg/dL  --   --   --   --  0.3 0.4 0.8   ALK PHOS U/L  --   --   --   --  224* 216* 199*   ALT (SGPT) U/L  --   --   --   --  14 16 17   AST (SGOT) U/L  --   --   --   --  29 26 29   GLUCOSE mg/dL " 110* 103* 84   < > 126* 103* 81    < > = values in this interval not displayed.     Results from last 7 days   Lab Units 03/16/23  0517 03/13/23  0528 03/12/23  0813 03/11/23  0840 03/11/23  0315 03/10/23  2017 03/10/23  0322   MAGNESIUM mg/dL  --   --  1.9  --  1.9  --  1.7   PHOSPHORUS mg/dL  --   --  2.6  --  2.6  --  3.1   HEMOGLOBIN g/dL 7.3*   < > 7.6*  7.6*   < > 7.5*   < > 8.1*  8.1*   HEMATOCRIT % 22.0*   < > 22.0*  22.0*   < > 22.7*   < > 23.8*  23.8*   WBC 10*3/mm3 11.74*   < > 10.59  --  14.10*  --  18.95*   ALBUMIN g/dL  --   --  1.7*  --  1.9*  --  1.8*    < > = values in this interval not displayed.     Results from last 7 days   Lab Units 03/16/23  0517 03/15/23  0452 03/14/23  0535 03/13/23  0528 03/12/23  0813   PLATELETS 10*3/mm3 535* 455* 351 290 276     COVID19   Date Value Ref Range Status   03/03/2023 Not Detected Not Detected - Ref. Range Final     Lab Results   Component Value Date    HGBA1C 5.10 02/20/2023          Medications            Scheduled Medications acetaminophen, 1,000 mg, Oral, TID  apixaban, 5 mg, Oral, Q12H  celecoxib, 100 mg, Oral, Q12H  desvenlafaxine, 25 mg, Oral, Daily  folic acid, 1 mg, Oral, Daily  gabapentin, 300 mg, Oral, Q8H  ipratropium-albuterol, 3 mL, Nebulization, 4x Daily - RT  lamoTRIgine, 200 mg, Oral, Daily  lidocaine, 1 patch, Transdermal, Q24H  lidocaine, 1 patch, Transdermal, Q24H  lidocaine, 10 mL, Intradermal, Once  pantoprazole, 40 mg, Oral, Q AM  sodium chloride, 10 mL, Intravenous, Q12H  thiamine, 100 mg, Oral, Daily  vancomycin, 1,000 mg, Intravenous, Q24H  vitamin B-12, 500 mcg, Oral, Daily        Infusions Pharmacy to dose vancomycin,         PRN Medications •  bisacodyl  •  diazePAM  •  famotidine  •  magnesium hydroxide  •  melatonin  •  nitroglycerin  •  ondansetron **OR** ondansetron  •  oxyCODONE  •  Pharmacy to dose vancomycin  •  polyethylene glycol  •  potassium chloride **OR** potassium chloride **OR** potassium chloride  •   senna-docusate sodium **AND** [DISCONTINUED] polyethylene glycol **AND** [DISCONTINUED] bisacodyl **AND** [DISCONTINUED] bisacodyl  •  sodium chloride  •  sodium chloride     Physical Findings          Physical Appearance alert, oriented, on oxygen therapy   Oral/Mouth Cavity teeth missing   Edema  2+ (mild)   Gastrointestinal fecal incontinence, last bowel movement: 3/14   Skin  surgical incision R lateral, abrasions   Tubes/Drains none   NFPE Not applicable at this time   --  NUTRITION INTERVENTION / PLAN OF CARE  Intervention Goal         Intervention Goal(s) Maintain nutrition status, Reduce/improve symptoms, Meet estimated needs, Disease management/therapy, Tolerate PO , Increase intake and Maintain weight     Nutrition Intervention         RD Action Follow Tx Progress and Care plan reviewed     Nutrition Prescription         Diet Prescription     Supplement Prescription n/a   EN/PN Prescription    New Prescription Ordered? No changes at this time   --  Monitor/Evaluation        Monitor Per protocol   Discharge Needs No discharge needs identified at this time   Education Will instruct as appropriate   --    RD to follow up per protocol.    Electronically signed by:  Claudia Clark RD  03/16/23 14:10 EDT

## 2023-03-16 NOTE — PROGRESS NOTES
Infectious Diseases Progress Note    Reji Oliver MD     Psychiatric  Los: 21 days  Patient Identification:  Name: Louise GARCIA  Age: 58 y.o.  Sex: female  :  1965  MRN: [id number]         Primary Care Physician: Chloe Schaefer, KASIA        Subjective: Continues to have weakness and pain in the left hip as well as persistent weakness of the left leg.  According to the  the left leg weakness is not changed.  Also noted by nursing staff for nocturnal hypoxia requiring oxygen supplementation as well as concern by nursing staff about third spacing and generalized edema.  Interval History: See consultation note.  3/8/2023 underwent thoracoscopy with Nevaeh hernandez with complete decortication  3/12/2023 one of the right-sided chest tube were removed.  3/14/2023 right-sided chest tube removed.  Objective:    Scheduled Meds:acetaminophen, 1,000 mg, Oral, TID  apixaban, 5 mg, Oral, Q12H  celecoxib, 100 mg, Oral, Q12H  desvenlafaxine, 25 mg, Oral, Daily  folic acid, 1 mg, Oral, Daily  gabapentin, 300 mg, Oral, Q8H  ipratropium-albuterol, 3 mL, Nebulization, 4x Daily - RT  lamoTRIgine, 200 mg, Oral, Daily  lidocaine, 1 patch, Transdermal, Q24H  lidocaine, 1 patch, Transdermal, Q24H  lidocaine, 10 mL, Intradermal, Once  pantoprazole, 40 mg, Oral, Q AM  sodium chloride, 10 mL, Intravenous, Q12H  thiamine, 100 mg, Oral, Daily  vancomycin, 1,000 mg, Intravenous, Q24H  vitamin B-12, 500 mcg, Oral, Daily      Continuous Infusions:Pharmacy to dose vancomycin,         Vital signs in last 24 hours:  Temp:  [97.2 °F (36.2 °C)-99.1 °F (37.3 °C)] 97.2 °F (36.2 °C)  Heart Rate:  [100-118] 107  Resp:  [16-20] 16  BP: (111-133)/(63-70) 111/64    Intake/Output:    Intake/Output Summary (Last 24 hours) at 3/16/2023 0651  Last data filed at 3/15/2023 1800  Gross per 24 hour   Intake 240 ml   Output 1225 ml   Net -985 ml       Exam:  /64 (BP Location: Left arm, Patient Position: Lying)   Pulse 107   Temp  "97.2 °F (36.2 °C) (Oral)   Resp 16   Ht 157.5 cm (62\")   Wt 57.6 kg (127 lb)   SpO2 100%   Breastfeeding No   BMI 23.23 kg/m²   Patient is examined using the personal protective equipment as per guidelines from infection control for this particular patient as enacted.  Hand washing was performed before and after patient interaction.  General Appearance:  Comfortable nontoxic-appearing female who is pleasant and interactive.  Did participate in physical therapy earlier.                          Head:    Normocephalic, without obvious abnormality, atraumatic                           Eyes:    PERRL, conjunctivae/corneas clear, EOM's intact, both eyes                         Throat:   Lips, tongue, gums normal; oral mucosa pink and moist                           Neck:   Supple, symmetrical, trachea midline, no JVD                         Lungs:    Decreased breath sounds bilaterally left greater than right                 Chest Wall:  Right-sided chest tube site dressed with DuoDERM.                          Heart:    Regular rate and rhythm, S1 and S2 normal, no murmur, no rub                                         or gallop                  Abdomen:   Soft nontender epigastric and right upper quadrant tenderness noted.                 Extremities: Right forearm superficial phlebitis site improved and inflammation and tenderness resolved.  Bilateral lower extremity edema and body wall edema noted                        Pulses:   Pulses palpable in all extremities                            Skin: Body wall edema noted                  Neurologic: Assistant weakness of the bilateral lower extremity with left lower extremity worse than right.       Data Review:    I reviewed the patient's new clinical results.  Results from last 7 days   Lab Units 03/16/23  0517 03/15/23  0452 03/14/23  0535 03/13/23  0528 03/12/23  0813 03/11/23  2319 03/11/23  0840 03/11/23  0315 03/10/23  2017 03/10/23  0322   WBC 10*3/mm3 " 11.74* 12.45* 11.39* 11.32* 10.59  --   --  14.10*  --  18.95*   HEMOGLOBIN g/dL 7.3* 8.1* 7.7* 7.9* 7.6*  7.6* 7.7* 8.3* 7.5*   < > 8.1*  8.1*   PLATELETS 10*3/mm3 535* 455* 351 290 276  --   --  210  --  238    < > = values in this interval not displayed.     Results from last 7 days   Lab Units 03/16/23  0517 03/15/23  0452 03/14/23  0535 03/13/23  0528 03/12/23  0813 03/11/23  0315 03/10/23  0322   SODIUM mmol/L 132* 133* 137 134* 135* 138 133*   POTASSIUM mmol/L 4.4 4.7 4.5 4.8 4.3 3.7 3.8   CHLORIDE mmol/L 92* 96* 98 99 101 103 98   CO2 mmol/L 29.0 28.0 28.9 25.9 26.0 27.0 28.0   BUN mg/dL 14 12 11 12 12 12 10   CREATININE mg/dL 1.17* 1.15* 0.93 1.02* 0.93 0.90 0.88   CALCIUM mg/dL 8.3* 8.7 8.9 8.5* 7.8* 7.5* 7.6*   GLUCOSE mg/dL 110* 103* 84 81 126* 103* 81     Microbiology Results (last 10 days)     Procedure Component Value - Date/Time    Anaerobic Culture - Tissue, Pleural Cavity [[id number]]  (Normal) Collected: 03/08/23 2036    Lab Status: Final result Specimen: Tissue from Pleural Cavity Updated: 03/13/23 0655     Anaerobic Culture No anaerobes isolated at 5 days    Fungus Culture - Tissue, Pleural Cavity [[id number]] Collected: 03/08/23 2036    Lab Status: Preliminary result Specimen: Tissue from Pleural Cavity Updated: 03/15/23 2215     Fungus Culture No fungus isolated at 1 week    Tissue / Bone Culture - Tissue, Pleural Cavity [[id number]]  (Abnormal) Collected: 03/08/23 2036    Lab Status: Final result Specimen: Tissue from Pleural Cavity Updated: 03/11/23 1037     Tissue Culture Rare Staphylococcus aureus, MRSA     Comment:   Methicillin resistant Staphylococcus aureus, Patient may be an isolation risk.        Gram Stain Occasional WBCs seen      Rare (1+) Gram positive cocci in pairs and clusters    Narrative:      Refer to previous fluid culture collected on 3/08      Anaerobic Culture - Body Fluid, Pleural Cavity [[id number]]  (Normal) Collected: 03/08/23 1957    Lab Status: Final result  Specimen: Body Fluid from Pleural Cavity Updated: 03/13/23 0655     Anaerobic Culture No anaerobes isolated at 5 days    Fungus Culture - Body Fluid, Pleural Cavity [[id number]] Collected: 03/08/23 1957    Lab Status: Preliminary result Specimen: Body Fluid from Pleural Cavity Updated: 03/15/23 2116     Fungus Culture No fungus isolated at 1 week    Body Fluid Culture - Body Fluid, Pleural Cavity [[id number]]  (Abnormal)  (Susceptibility) Collected: 03/08/23 1957    Lab Status: Final result Specimen: Body Fluid from Pleural Cavity Updated: 03/11/23 1036     Body Fluid Culture Scant growth (1+) Staphylococcus aureus, MRSA     Comment:   Methicillin resistant Staphylococcus aureus, Patient may be an isolation risk.        Gram Stain Moderate (3+) WBCs per low power field      Rare (1+) Gram positive cocci    Susceptibility      Staphylococcus aureus, MRSA      YAKOV      Gentamicin Susceptible      Oxacillin Resistant     Rifampin Susceptible      Vancomycin Susceptible                       Susceptibility Comments     Staphylococcus aureus, MRSA    This isolate does not demonstrate inducible clindamycin resistance in vitro.               Anaerobic Culture - Body Fluid, Pleural Cavity [[id number]]  (Normal) Collected: 03/08/23 1923    Lab Status: Final result Specimen: Body Fluid from Pleural Cavity Updated: 03/13/23 0700     Anaerobic Culture No anaerobes isolated at 5 days    Body Fluid Culture - Body Fluid, Pleural Cavity [[id number]] Collected: 03/08/23 1923    Lab Status: Final result Specimen: Body Fluid from Pleural Cavity Updated: 03/11/23 1050     Body Fluid Culture No growth at 3 days     Gram Stain Rare (1+) WBCs per low power field      Rare (1+) Gram positive cocci    Fungus Culture - Body Fluid, Pleural Cavity [[id number]] Collected: 03/08/23 1923    Lab Status: Preliminary result Specimen: Body Fluid from Pleural Cavity Updated: 03/15/23 2116     Fungus Culture No fungus isolated at 1 week    Culture, CSF -  Cerebrospinal Fluid, Lumbar Puncture [[id number]] Collected: 03/06/23 1550    Lab Status: Final result Specimen: Cerebrospinal Fluid from Lumbar Puncture Updated: 03/09/23 1058     CSF Culture No growth at 3 days     Gram Stain No WBCs or organisms seen    Meningitis / Encephalitis Panel, PCR - Cerebrospinal Fluid, Lumbar Puncture [[id number]]  (Normal) Collected: 03/06/23 1550    Lab Status: Final result Specimen: Cerebrospinal Fluid from Lumbar Puncture Updated: 03/06/23 1725     ESCHERICHIA COLI K1, PCR Not Detected     HAEMOPHILUS INFLUENZAE, PCR Not Detected     LISTERIA MONOCYTOGENES, PCR Not Detected     NEISSERIA MENINGITIDIS, PCR Not Detected     STREPTOCOCCUS AGALACTIAE, PCR Not Detected     STREPTOCOCCUS PNEUMONIAE, PCR Not Detected     CYTOMEGALOVIRUS (CMV), PCR Not Detected     ENTEROVIRUS, PCR Not Detected     HERPES SIMPLEX VIRUS 1 (HSV-1), PCR Not Detected     HERPES SIMPLEX VIRUS 2 (HSV-2), PCR Not Detected     HUMAN PARECHOVIRUS, PCR Not Detected     VARICELLA ZOSTER VIRUS (VZV), PCR Not Detected     CRYPTOCOCCUS NEOFORMANS / GATTII, PCR Not Detected     HUMAN HERPES VIRUS 6 PCR Not Detected            Assessment:    Paresthesia    Cervical myelopathy (HCC)    Lower extremity weakness    History of blood clots    Chronic anticoagulation    Seizures (HCC)    Normocytic anemia    GERD (gastroesophageal reflux disease)    Guillain-Ona syndrome (HCC)    Situational mixed anxiety and depressive disorder    Mass of middle lobe of right lung    Oral candidiasis    Empyema of pleura (HCC)    MRSA bacteremia  1-MRSA bacteremia on hospitalization day #11 and likely underlying etiology:   - Septic phlebitis due to IV access with -     •     Right Cephalic Upper Arm: Acute superficial thrombophlebitis noted.    •     Right Cephalic Forearm: Acute superficial thrombophlebitis noted.     - septic emboli and cavitary lesion in the right lung   - Tricuspid Valve endocarditis   - Empyema due to secondary seeding of  the traumatic effusion  2-paresthesia generalized weakness  3-recent fall  4-seizure disorder  5-leukocytosis likely secondary to steroids with superimposed infection  6-other diagnoses per primary team.  7-MRSA colonization  8-low-grade fever with slight worsening leukocytosis and worsening hip pain bilaterally with weakness of the left leg which appears to be persistent and not improving according to the -rule out thoracic lumbar epidural abscess and septic arthritis of the left hip.  Recommendations/Discussions:  · She seems to have plateaued with persistent weakness of the left lower extremity and now discomfort in the left hip.  In the setting of recent disseminated MRSA infection with bacteremia epiphenomena suggest secondary epidural abscess in the thoracic and lumbar spine is a concern and also possibility of secondary septic arthritis due to MRSA seeding.  · Discussed with neurology service will proceed with repeat MRI of the thoracic and lumbar spine as well as fluoroscopic guided arthrocentesis of the left hip.  · Hypoxia and concern about low albumin level and generalized body wall edema per primary team.  · Continue with IV vancomycin-would need 4 weeks of IV vancomycin to be dosed by pharmacy to achieve a trough level of 15-20 or area under the curve of 400-600 from last negative blood culture or last intervention which ever is the latest.  · Moist heat to the phlebitis site of the right forearm  · Continue to monitor her back pain and evidence of secondary seeding to the joint and spine which may require repeat imaging studies if her symptoms localizes to these areas and affect her function.  · Discussed with patient's caring nurse.  · Monitor her fever pattern and hip pain and leukocytosis.  Reji Oliver MD  3/16/2023  06:51 EDT    Parts of this note may be an electronic transcription/translation of spoken language to printed text using the Dragon dictation system.

## 2023-03-17 ENCOUNTER — HOSPITAL ENCOUNTER (INPATIENT)
Facility: HOSPITAL | Age: 58
DRG: 74 | End: 2023-03-17
Attending: PHYSICAL MEDICINE & REHABILITATION | Admitting: PHYSICAL MEDICINE & REHABILITATION
Payer: COMMERCIAL

## 2023-03-17 VITALS
SYSTOLIC BLOOD PRESSURE: 109 MMHG | OXYGEN SATURATION: 99 % | HEIGHT: 62 IN | RESPIRATION RATE: 18 BRPM | DIASTOLIC BLOOD PRESSURE: 64 MMHG | BODY MASS INDEX: 23.37 KG/M2 | TEMPERATURE: 98.1 F | HEART RATE: 100 BPM | WEIGHT: 127 LBS

## 2023-03-17 DIAGNOSIS — R91.8 MASS OF MIDDLE LOBE OF RIGHT LUNG: ICD-10-CM

## 2023-03-17 DIAGNOSIS — Z74.09 IMPAIRED FUNCTIONAL MOBILITY, BALANCE, GAIT, AND ENDURANCE: ICD-10-CM

## 2023-03-17 DIAGNOSIS — G47.34 NOCTURNAL OXYGEN DESATURATION: ICD-10-CM

## 2023-03-17 DIAGNOSIS — G60.9 IDIOPATHIC PERIPHERAL NEUROPATHY: Primary | ICD-10-CM

## 2023-03-17 LAB
ANION GAP SERPL CALCULATED.3IONS-SCNC: 11.6 MMOL/L (ref 5–15)
BUN SERPL-MCNC: 13 MG/DL (ref 6–20)
BUN/CREAT SERPL: 11.6 (ref 7–25)
CALCIUM SPEC-SCNC: 9.3 MG/DL (ref 8.6–10.5)
CHLORIDE SERPL-SCNC: 96 MMOL/L (ref 98–107)
CO2 SERPL-SCNC: 27.4 MMOL/L (ref 22–29)
CREAT SERPL-MCNC: 1.12 MG/DL (ref 0.57–1)
DEPRECATED RDW RBC AUTO: 50.6 FL (ref 37–54)
EGFRCR SERPLBLD CKD-EPI 2021: 56.8 ML/MIN/1.73
ERYTHROCYTE [DISTWIDTH] IN BLOOD BY AUTOMATED COUNT: 14.2 % (ref 12.3–15.4)
GLUCOSE SERPL-MCNC: 81 MG/DL (ref 65–99)
HCT VFR BLD AUTO: 24.1 % (ref 34–46.6)
HGB BLD-MCNC: 7.9 G/DL (ref 12–15.9)
MCH RBC QN AUTO: 32.8 PG (ref 26.6–33)
MCHC RBC AUTO-ENTMCNC: 32.8 G/DL (ref 31.5–35.7)
MCV RBC AUTO: 100 FL (ref 79–97)
PLATELET # BLD AUTO: 653 10*3/MM3 (ref 140–450)
PMV BLD AUTO: 10.7 FL (ref 6–12)
POTASSIUM SERPL-SCNC: 4.7 MMOL/L (ref 3.5–5.2)
RBC # BLD AUTO: 2.41 10*6/MM3 (ref 3.77–5.28)
SODIUM SERPL-SCNC: 135 MMOL/L (ref 136–145)
WBC NRBC COR # BLD: 13.59 10*3/MM3 (ref 3.4–10.8)

## 2023-03-17 PROCEDURE — 85027 COMPLETE CBC AUTOMATED: CPT | Performed by: STUDENT IN AN ORGANIZED HEALTH CARE EDUCATION/TRAINING PROGRAM

## 2023-03-17 PROCEDURE — 80048 BASIC METABOLIC PNL TOTAL CA: CPT | Performed by: STUDENT IN AN ORGANIZED HEALTH CARE EDUCATION/TRAINING PROGRAM

## 2023-03-17 PROCEDURE — 94799 UNLISTED PULMONARY SVC/PX: CPT

## 2023-03-17 PROCEDURE — 94760 N-INVAS EAR/PLS OXIMETRY 1: CPT

## 2023-03-17 PROCEDURE — 94761 N-INVAS EAR/PLS OXIMETRY MLT: CPT

## 2023-03-17 PROCEDURE — 94664 DEMO&/EVAL PT USE INHALER: CPT

## 2023-03-17 RX ORDER — IPRATROPIUM BROMIDE AND ALBUTEROL SULFATE 2.5; .5 MG/3ML; MG/3ML
3 SOLUTION RESPIRATORY (INHALATION)
Status: CANCELLED | OUTPATIENT
Start: 2023-03-17

## 2023-03-17 RX ORDER — DESVENLAFAXINE 25 MG/1
25 TABLET, EXTENDED RELEASE ORAL DAILY
Status: CANCELLED | OUTPATIENT
Start: 2023-03-18

## 2023-03-17 RX ORDER — ONDANSETRON 2 MG/ML
4 INJECTION INTRAMUSCULAR; INTRAVENOUS EVERY 6 HOURS PRN
Status: CANCELLED | OUTPATIENT
Start: 2023-03-17

## 2023-03-17 RX ORDER — POTASSIUM CHLORIDE 750 MG/1
40 TABLET, FILM COATED, EXTENDED RELEASE ORAL AS NEEDED
Status: CANCELLED | OUTPATIENT
Start: 2023-03-17

## 2023-03-17 RX ORDER — VANCOMYCIN HYDROCHLORIDE 1 G/200ML
1000 INJECTION, SOLUTION INTRAVENOUS EVERY 24 HOURS
Status: DISCONTINUED | OUTPATIENT
Start: 2023-03-17 | End: 2023-03-18 | Stop reason: DRUGHIGH

## 2023-03-17 RX ORDER — ONDANSETRON 4 MG/1
4 TABLET, FILM COATED ORAL EVERY 6 HOURS PRN
Status: CANCELLED | OUTPATIENT
Start: 2023-03-17

## 2023-03-17 RX ORDER — SODIUM CHLORIDE 0.9 % (FLUSH) 0.9 %
10 SYRINGE (ML) INJECTION EVERY 12 HOURS SCHEDULED
Status: CANCELLED | OUTPATIENT
Start: 2023-03-17

## 2023-03-17 RX ORDER — ACETAMINOPHEN 500 MG
1000 TABLET ORAL 3 TIMES DAILY
Status: CANCELLED | OUTPATIENT
Start: 2023-03-17 | End: 2023-03-23

## 2023-03-17 RX ORDER — DESVENLAFAXINE 25 MG/1
25 TABLET, EXTENDED RELEASE ORAL DAILY
Status: DISCONTINUED | OUTPATIENT
Start: 2023-03-18 | End: 2023-04-21 | Stop reason: HOSPADM

## 2023-03-17 RX ORDER — ACETAMINOPHEN 500 MG
1000 TABLET ORAL 3 TIMES DAILY
Qty: 38 TABLET | Refills: 0 | Status: ON HOLD | OUTPATIENT
Start: 2023-03-17 | End: 2023-03-20

## 2023-03-17 RX ORDER — GABAPENTIN 300 MG/1
300 CAPSULE ORAL EVERY 8 HOURS SCHEDULED
Status: CANCELLED | OUTPATIENT
Start: 2023-03-17

## 2023-03-17 RX ORDER — VANCOMYCIN HYDROCHLORIDE 1 G/200ML
1000 INJECTION, SOLUTION INTRAVENOUS EVERY 24 HOURS
Status: CANCELLED | OUTPATIENT
Start: 2023-03-17 | End: 2023-03-31

## 2023-03-17 RX ORDER — CHOLECALCIFEROL (VITAMIN D3) 125 MCG
5 CAPSULE ORAL NIGHTLY PRN
Status: DISCONTINUED | OUTPATIENT
Start: 2023-03-17 | End: 2023-03-23

## 2023-03-17 RX ORDER — BISACODYL 10 MG
10 SUPPOSITORY, RECTAL RECTAL DAILY PRN
Status: CANCELLED | OUTPATIENT
Start: 2023-03-17

## 2023-03-17 RX ORDER — POTASSIUM CHLORIDE 1.5 G/1.77G
40 POWDER, FOR SOLUTION ORAL AS NEEDED
Status: CANCELLED | OUTPATIENT
Start: 2023-03-17

## 2023-03-17 RX ORDER — LAMOTRIGINE 100 MG/1
200 TABLET ORAL DAILY
Status: DISCONTINUED | OUTPATIENT
Start: 2023-03-18 | End: 2023-04-21 | Stop reason: HOSPADM

## 2023-03-17 RX ORDER — DIAZEPAM 2 MG/1
2 TABLET ORAL EVERY 6 HOURS PRN
Status: DISCONTINUED | OUTPATIENT
Start: 2023-03-17 | End: 2023-03-19

## 2023-03-17 RX ORDER — IPRATROPIUM BROMIDE AND ALBUTEROL SULFATE 2.5; .5 MG/3ML; MG/3ML
3 SOLUTION RESPIRATORY (INHALATION)
Status: DISCONTINUED | OUTPATIENT
Start: 2023-03-18 | End: 2023-04-21 | Stop reason: HOSPADM

## 2023-03-17 RX ORDER — FAMOTIDINE 10 MG/ML
20 INJECTION, SOLUTION INTRAVENOUS ONCE AS NEEDED
Status: CANCELLED | OUTPATIENT
Start: 2023-03-17

## 2023-03-17 RX ORDER — AMOXICILLIN 250 MG
2 CAPSULE ORAL 2 TIMES DAILY PRN
Status: CANCELLED | OUTPATIENT
Start: 2023-03-17

## 2023-03-17 RX ORDER — FOLIC ACID 1 MG/1
1 TABLET ORAL DAILY
Status: DISCONTINUED | OUTPATIENT
Start: 2023-03-18 | End: 2023-04-21 | Stop reason: HOSPADM

## 2023-03-17 RX ORDER — CHOLECALCIFEROL (VITAMIN D3) 125 MCG
5 CAPSULE ORAL NIGHTLY PRN
Status: CANCELLED | OUTPATIENT
Start: 2023-03-17

## 2023-03-17 RX ORDER — LAMOTRIGINE 100 MG/1
200 TABLET ORAL DAILY
Status: CANCELLED | OUTPATIENT
Start: 2023-03-18

## 2023-03-17 RX ORDER — OXYCODONE HYDROCHLORIDE 5 MG/1
5 TABLET ORAL EVERY 4 HOURS PRN
Status: CANCELLED | OUTPATIENT
Start: 2023-03-17 | End: 2023-03-20

## 2023-03-17 RX ORDER — BISACODYL 10 MG
10 SUPPOSITORY, RECTAL RECTAL DAILY PRN
Status: DISCONTINUED | OUTPATIENT
Start: 2023-03-17 | End: 2023-04-21 | Stop reason: HOSPADM

## 2023-03-17 RX ORDER — POTASSIUM CHLORIDE 7.45 MG/ML
10 INJECTION INTRAVENOUS
Status: CANCELLED | OUTPATIENT
Start: 2023-03-17

## 2023-03-17 RX ORDER — LIDOCAINE 50 MG/G
1 PATCH TOPICAL
Status: CANCELLED | OUTPATIENT
Start: 2023-03-18

## 2023-03-17 RX ORDER — CHOLECALCIFEROL (VITAMIN D3) 125 MCG
500 CAPSULE ORAL DAILY
Status: DISCONTINUED | OUTPATIENT
Start: 2023-03-18 | End: 2023-04-21 | Stop reason: HOSPADM

## 2023-03-17 RX ORDER — AMOXICILLIN 250 MG
2 CAPSULE ORAL 2 TIMES DAILY PRN
Status: DISCONTINUED | OUTPATIENT
Start: 2023-03-17 | End: 2023-04-21 | Stop reason: HOSPADM

## 2023-03-17 RX ORDER — CELECOXIB 100 MG/1
100 CAPSULE ORAL EVERY 12 HOURS SCHEDULED
Status: DISCONTINUED | OUTPATIENT
Start: 2023-03-17 | End: 2023-03-18

## 2023-03-17 RX ORDER — POLYETHYLENE GLYCOL 3350 17 G/17G
17 POWDER, FOR SOLUTION ORAL 2 TIMES DAILY PRN
Status: CANCELLED | OUTPATIENT
Start: 2023-03-17

## 2023-03-17 RX ORDER — POLYETHYLENE GLYCOL 3350 17 G/17G
17 POWDER, FOR SOLUTION ORAL 2 TIMES DAILY PRN
Status: DISCONTINUED | OUTPATIENT
Start: 2023-03-17 | End: 2023-04-21 | Stop reason: HOSPADM

## 2023-03-17 RX ORDER — FOLIC ACID 1 MG/1
1 TABLET ORAL DAILY
Status: CANCELLED | OUTPATIENT
Start: 2023-03-18

## 2023-03-17 RX ORDER — GABAPENTIN 300 MG/1
300 CAPSULE ORAL EVERY 8 HOURS SCHEDULED
Status: DISCONTINUED | OUTPATIENT
Start: 2023-03-18 | End: 2023-04-21 | Stop reason: HOSPADM

## 2023-03-17 RX ORDER — SODIUM CHLORIDE 9 MG/ML
40 INJECTION, SOLUTION INTRAVENOUS AS NEEDED
Status: CANCELLED | OUTPATIENT
Start: 2023-03-17

## 2023-03-17 RX ORDER — OXYCODONE HYDROCHLORIDE 5 MG/1
5 TABLET ORAL EVERY 4 HOURS PRN
Status: DISPENSED | OUTPATIENT
Start: 2023-03-17 | End: 2023-03-23

## 2023-03-17 RX ORDER — CELECOXIB 100 MG/1
100 CAPSULE ORAL EVERY 12 HOURS SCHEDULED
Status: CANCELLED | OUTPATIENT
Start: 2023-03-17

## 2023-03-17 RX ORDER — PANTOPRAZOLE SODIUM 40 MG/1
40 TABLET, DELAYED RELEASE ORAL
Status: DISCONTINUED | OUTPATIENT
Start: 2023-03-18 | End: 2023-04-21 | Stop reason: HOSPADM

## 2023-03-17 RX ORDER — SODIUM CHLORIDE 0.9 % (FLUSH) 0.9 %
10 SYRINGE (ML) INJECTION AS NEEDED
Status: CANCELLED | OUTPATIENT
Start: 2023-03-17

## 2023-03-17 RX ORDER — NITROGLYCERIN 0.4 MG/1
0.4 TABLET SUBLINGUAL
Status: CANCELLED | OUTPATIENT
Start: 2023-03-17

## 2023-03-17 RX ORDER — PANTOPRAZOLE SODIUM 40 MG/1
40 TABLET, DELAYED RELEASE ORAL
Status: CANCELLED | OUTPATIENT
Start: 2023-03-18

## 2023-03-17 RX ORDER — CHOLECALCIFEROL (VITAMIN D3) 125 MCG
500 CAPSULE ORAL DAILY
Status: CANCELLED | OUTPATIENT
Start: 2023-03-18

## 2023-03-17 RX ORDER — DIAZEPAM 2 MG/1
2 TABLET ORAL EVERY 6 HOURS PRN
Status: CANCELLED | OUTPATIENT
Start: 2023-03-17 | End: 2023-03-20

## 2023-03-17 RX ORDER — ACETAMINOPHEN 500 MG
1000 TABLET ORAL 3 TIMES DAILY
Status: DISPENSED | OUTPATIENT
Start: 2023-03-17 | End: 2023-03-24

## 2023-03-17 RX ADMIN — Medication 10 ML: at 09:26

## 2023-03-17 RX ADMIN — OXYCODONE HYDROCHLORIDE 5 MG: 5 TABLET ORAL at 09:25

## 2023-03-17 RX ADMIN — IPRATROPIUM BROMIDE AND ALBUTEROL SULFATE 3 ML: 2.5; .5 SOLUTION RESPIRATORY (INHALATION) at 08:39

## 2023-03-17 RX ADMIN — ACETAMINOPHEN 1000 MG: 325 TABLET ORAL at 22:35

## 2023-03-17 RX ADMIN — APIXABAN 5 MG: 5 TABLET, FILM COATED ORAL at 09:24

## 2023-03-17 RX ADMIN — FOLIC ACID 1 MG: 1 TABLET ORAL at 09:24

## 2023-03-17 RX ADMIN — ACETAMINOPHEN 1000 MG: 500 TABLET, FILM COATED ORAL at 09:24

## 2023-03-17 RX ADMIN — ACETAMINOPHEN 1000 MG: 500 TABLET, FILM COATED ORAL at 17:57

## 2023-03-17 RX ADMIN — CELECOXIB 100 MG: 100 CAPSULE ORAL at 22:35

## 2023-03-17 RX ADMIN — OXYCODONE HYDROCHLORIDE 5 MG: 5 TABLET ORAL at 17:58

## 2023-03-17 RX ADMIN — Medication 100 MG: at 09:24

## 2023-03-17 RX ADMIN — PANTOPRAZOLE SODIUM 40 MG: 40 TABLET, DELAYED RELEASE ORAL at 05:11

## 2023-03-17 RX ADMIN — CELECOXIB 100 MG: 100 CAPSULE ORAL at 09:24

## 2023-03-17 RX ADMIN — OXYCODONE HYDROCHLORIDE 5 MG: 5 TABLET ORAL at 05:11

## 2023-03-17 RX ADMIN — Medication 500 MCG: at 09:24

## 2023-03-17 RX ADMIN — GABAPENTIN 300 MG: 300 CAPSULE ORAL at 17:57

## 2023-03-17 RX ADMIN — DESVENLAFAXINE SUCCINATE 25 MG: 25 TABLET, EXTENDED RELEASE ORAL at 09:24

## 2023-03-17 RX ADMIN — GABAPENTIN 300 MG: 300 CAPSULE ORAL at 05:11

## 2023-03-17 RX ADMIN — LAMOTRIGINE 200 MG: 100 TABLET ORAL at 09:24

## 2023-03-17 RX ADMIN — APIXABAN 5 MG: 5 TABLET, FILM COATED ORAL at 22:35

## 2023-03-17 RX ADMIN — IPRATROPIUM BROMIDE AND ALBUTEROL SULFATE 3 ML: 2.5; .5 SOLUTION RESPIRATORY (INHALATION) at 11:47

## 2023-03-17 NOTE — PLAN OF CARE
Goal Outcome Evaluation:  VSS. Pain controlled with PO meds. Monitoring labs daily. Will continue to monitor.

## 2023-03-17 NOTE — PROGRESS NOTES
[facility]    Reviewed labs and radiology report with Dr. Rider. Bed available this afternoon if medically ready. Pre-cert obtained. If medically ready, please complete DC/readmit orders and DC summary.    Spoke with EDISON Mckoy.    Thank you,  Gaviota Goodman, RN  Rehab Admission Nurse

## 2023-03-17 NOTE — PLAN OF CARE
Goal Outcome Evaluation:  Plan of Care Reviewed With: patient        Progress: improving   Vital signs stable. Alert and oriented x 4. Spouse at bedside. Oxygen on at 2 liters per nasal cannula. Sinus tachycardia noted. Prn oxycodone 5 mg given for complaint of left hip and LLE discomfort. MRI spine completed this afternoon. Left hip fluid aspiration done in X ray triage. Tolerating regular diet fair. External catheter in place for urinary incontinence. Turned as tolerated every 2 hours. Contact isolation maintained for MRSA infection. Napping upon return from MRI. Will continue to monitor.

## 2023-03-17 NOTE — PROGRESS NOTES
Chart reviewed and I reviewed the thoracic MRI.  There are expected postoperative changes with pleural thickening and minimal pleural effusion.  The effusion is too small for intervention and will likely to continue to resolve with time.  Recommend continue good pulmonary hygiene with incentive spirometry hourly as well as increase activity/ambulation as tolerated.  We will plan to keep patient's follow-up appointment with Dr. Cedillo on 3/28/2023.  If the patient is still admitted, we can postpone the appointment or see her if she is on campus for acute rehab.    KASIA Boyd  [facility]  [phone]

## 2023-03-17 NOTE — DISCHARGE SUMMARY
NAME: Louise GARCIA ADMIT: 2023   : 1965  PCP: Chloe Schaefer APRN    MRN: [id number] LOS: 22 days   AGE/SEX: 58 y.o. female  ROOM: [facility]     Date of Admission:  2023  Date of Discharge:  3/17/2023    PCP: Chloe Schaefer APRN    CHIEF COMPLAINT  Fall and Numbness (Numbness in bilateral arms and legs post falling. 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]  Yes   • Normocytic anemia [D64.9]  Yes   • GERD (gastroesophageal reflux disease) [K21.9]  Yes   • Lower extremity weakness [R29.898]  Yes   • History of blood clots [Z86.718]  Not Applicable   • Chronic anticoagulation [Z79.01]  Not Applicable   • Seizures (HCC) [R56.9]  Yes   • Cervical myelopathy (HCC) [G95.9]  Yes   • Paresthesia [R20.2]  Yes   • Empyema of pleura (HCC) [J86.9]  Yes      Resolved Hospital Problems    Diagnosis Date Resolved POA   • Tricuspid valve vegetation [I33.0] 2023 Yes   • Upper abdominal pain [R10.10] 2023 No   • Leukocytosis [D72.829] 2023 Yes   • Hyperglycemia [R73.9] 2023 No       SECONDARY DIAGNOSES  Past Medical History:   Diagnosis Date   • Degenerative disc disease, cervical    • Gestational diabetes    • H/O blood clots    • Hematemesis    • Seizures (HCC)    • Septic shock (HCC)        CONSULTS   Neurology  Pulmonary  KEISHA  ID  Thoracic Surgery  Cardiology    HOSPITAL COURSE  Patient is a 58 y.o. female admitted on  originally for numbness/paraesthesias starting in chest to b/l LE after a fall. 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 However she again started having weakness in her legs and underwent EMG, though not typical of  GBS, given concerning exam was started on 5 days IVIG. Later developed R lung masslike infiltrate with cavitary lesions/pleural effusion for which Pulmonology was consulted, and underwent thoracentesis on 3/3. Blood cx and pleural fluid ended up positive for MRSA for which ID was consulted. Also noted to have TV mass on TTE with findings concerning for endocarditis, and UE doppler with RUE superficial thrombophlebitis concerning for septic phlebitis. CTS consulted for chest tube placement given empyema, and she underwent thoracoscopy w/ decortication 3/8/23.  She underwent MARIAMA 3/10/2023 which had no vegetation.    Came out of ICU 3/12.  Chest tubes have been removed. She had hip arthrocentesis to rule out any hip septic arthritis- no growth to date on fluid.  She had some lower back pain so obtained MRI of spine to rule out discitis or osteomyelitis.  This did have known degenerative changes but thankfully no infection.  There was pleural thickening and effusion so cardiothoracic surgery preevaluated and did not recommend any additional procedure.  She will continue 4 weeks of vancomycin as recommended by infectious disease dosed by pharmacy to achieve a trough level of 15-20 or area under the curve of 400-600 from last negative blood culture or last intervention which ever is the latest.    Encouraged her to ambulate more as well as work with physical therapy and use incentive spirometry.  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     I am seeing her for the first time on 3/17 but have summarized the hospitalization as listed above. She feels well and was wishing to be dcd to rehab today.     DIAGNOSTICS  FL Guided Aspiration Joint [[id number]] Michael as Reviewed   Order Status: Completed Collected: 03/16/23 1435    Updated: 03/16/23 1449   Narrative:     LEFT HIP ASPIRATION        INDICATION: Left hip pain, MRSA bacteremia       Total fluoroscopy time: 18 seconds, Number of images: 1.       TECHNIQUE:   Skin overlying the left hip was prepped and draped in the usual sterile   fashion with 2% chlorhexidine. 1% lidocaine was administered for local   anesthesia. Next under fluoroscopic guidance a 22-gauge needle was   advanced into the left hip joint. Aspiration performed yielding   approximately 1 mL of clear yellow fluid which was sent for requested   labs.       Impression:     Technically successful left hip joint aspiration under fluoroscopic   guidance       This report was finalized on 3/16/2023 2:36 PM by Dr. Agustin Reyes M.D.        MRI Thoracic Spine With & Without Contrast [[id number]] Michael as Reviewed   Order Status: Completed Collected: 03/16/23 1617    Updated: 03/16/23 1701   Narrative:     MRI OF THE THORACIC AND LUMBAR SPINE WITH AND WITHOUT CONTRAST       CLINICAL HISTORY: MRSA bacteremia with persistent weakness. Evaluate for   abscess.       TECHNIQUE: MRI of the thoracic spine was obtained with sagittal pre and   postgadolinium T1, proton-density, and T2-weighted images. Additionally,   there are axial pre and postgadolinium T1 and T2-weighted images through   the thoracic spine.       COMPARISON: Comparison is made to previous MRI of the thoracic spine   dated 02/21/2023.       FINDINGS:       There is no evidence to suggest discitis or osteomyelitis within the   thoracic spine. No abnormal areas of contrast enhancement are seen   within the thoracic spinal canal to suggest an infectious abnormality   within the thoracic spinal canal. There is a disc bulge eccentric to the   right at the T9-10 level that results in a mild-to-moderate degree of   canal stenosis. Similar findings were seen at the T9-10 level on the   prior exam       The thoracic spinal cord has normal signal intensity. The vertebral body   heights are well-maintained. There are no pathological  areas of marrow   replacement. There is mild dextroconvex scoliotic curvature of the   thoracic spine with its apex centered at T6-7.       There is complex loculated appearing pleural fluid within the right   hemithorax. Additionally, there is pleural thickening. The findings are   concerning for an empyema. Furthermore, there are areas of opacification   within the right lung that are suspicious for overlying pneumonia. These   findings within the right hemithorax are new when compared to the prior   MRI of the thoracic spine dated 2023. These findings can be further   evaluated with a CT scan of the chest as clinically indicated.       Impression:         There is no evidence to suggest discitis or osteomyelitis within the   thoracic spine.       However, there is complex loculated appearing pleural fluid within the   right hemithorax with pleural thickening. The findings are concerning   for an empyema. Furthermore, there is opacification within the right   lung that is suspicious for overlying pneumonia. These findings within   the right hemithorax are new when compared to the prior MRI of the   thoracic spine dated 02/21/2023. These findings could be further   evaluated with a chest CT, as clinically indicated. These findings and   recommendations were discussed with Tyler Carter on 03/16/2023   approximately 4:22 PM.       Incidental note is made of a disc bulge eccentric to the right at the   T9-10 level resulting in a mild-to-moderate degree of canal stenosis.   Similar findings were noted on the prior exam.           TECHNIQUE: MRI of the lumbar spine was obtained with sagittal pre and   postcontrast T1, proton-density, and T2-weighted images. Additionally,   there are axial pre and postgadolinium T1 and T2-weighted images through   the lumbar spine.       FINDINGS:       There is no convincing evidence for discitis or osteomyelitis involving   the lumbar spine. No abnormal areas of contrast enhancement are  noted   within the lumbar spinal canal.       The conus medullaris terminates at the level of the lower body of L1 and   has normal signal intensity.       At L1-2, L2-3, and L3-4, there is no significant canal or foraminal   stenosis.       At L4-5, there is a disc bulge with a posterior annular fissure. There   is a mild degree of canal and bilateral foraminal narrowing secondary to   bulging disc material.       Mild-to-moderate facet arthropathy is seen at the L4-5 level. There is a   prominent amount of fluid signal intensity within the L4-5 facet joints   that is likely arthritic in nature.       At L5-S1, there are advanced degenerative disc changes. There is   degenerative anterior spondylolisthesis of L5 on S1 by approximately 3   mm. There is moderate facet hypertrophic change and unroofed bulging   disc material that results in a mild-to-moderate degree of right   foraminal narrowing.       IMPRESSION:       There is no evidence to suggest an infectious abnormality within the   lumbar spine. There is arthritic change within the L4-5 facet joints   with prominent amount of fluid within the facet joints that is felt to   probably be arthritic in nature.       Incidental degenerative phenomena within the lumbar spine are as   discussed in detail above       This report was finalized on 3/16/2023 4:58 PM by Dr. Jad Guevara M.D.        MRI Lumbar Spine With & Without Contrast [[id number]] Michael as Reviewed   Order Status: Completed Collected: 03/16/23 1617    Updated: 03/16/23 1701   Narrative:     MRI OF THE THORACIC AND LUMBAR SPINE WITH AND WITHOUT CONTRAST       CLINICAL HISTORY: MRSA bacteremia with persistent weakness. Evaluate for   abscess.       TECHNIQUE: MRI of the thoracic spine was obtained with sagittal pre and   postgadolinium T1, proton-density, and T2-weighted images. Additionally,   there are axial pre and postgadolinium T1 and T2-weighted images through   the thoracic spine.        COMPARISON: Comparison is made to previous MRI of the thoracic spine   dated 02/21/2023.       FINDINGS:       There is no evidence to suggest discitis or osteomyelitis within the   thoracic spine. No abnormal areas of contrast enhancement are seen   within the thoracic spinal canal to suggest an infectious abnormality   within the thoracic spinal canal. There is a disc bulge eccentric to the   right at the T9-10 level that results in a mild-to-moderate degree of   canal stenosis. Similar findings were seen at the T9-10 level on the   prior exam       The thoracic spinal cord has normal signal intensity. The vertebral body   heights are well-maintained. There are no pathological areas of marrow   replacement. There is mild dextroconvex scoliotic curvature of the   thoracic spine with its apex centered at T6-7.       There is complex loculated appearing pleural fluid within the right   hemithorax. Additionally, there is pleural thickening. The findings are   concerning for an empyema. Furthermore, there are areas of opacification   within the right lung that are suspicious for overlying pneumonia. These   findings within the right hemithorax are new when compared to the prior   MRI of the thoracic spine dated 2023. These findings can be further   evaluated with a CT scan of the chest as clinically indicated.       Impression:         There is no evidence to suggest discitis or osteomyelitis within the   thoracic spine.       However, there is complex loculated appearing pleural fluid within the   right hemithorax with pleural thickening. The findings are concerning   for an empyema. Furthermore, there is opacification within the right   lung that is suspicious for overlying pneumonia. These findings within   the right hemithorax are new when compared to the prior MRI of the   thoracic spine dated 02/21/2023. These findings could be further   evaluated with a chest CT, as clinically indicated. These findings and    recommendations were discussed with Tyler Carter on 03/16/2023   approximately 4:22 PM.       Incidental note is made of a disc bulge eccentric to the right at the   T9-10 level resulting in a mild-to-moderate degree of canal stenosis.   Similar findings were noted on the prior exam.           TECHNIQUE: MRI of the lumbar spine was obtained with sagittal pre and   postcontrast T1, proton-density, and T2-weighted images. Additionally,   there are axial pre and postgadolinium T1 and T2-weighted images through   the lumbar spine.       FINDINGS:       There is no convincing evidence for discitis or osteomyelitis involving   the lumbar spine. No abnormal areas of contrast enhancement are noted   within the lumbar spinal canal.       The conus medullaris terminates at the level of the lower body of L1 and   has normal signal intensity.       At L1-2, L2-3, and L3-4, there is no significant canal or foraminal   stenosis.       At L4-5, there is a disc bulge with a posterior annular fissure. There   is a mild degree of canal and bilateral foraminal narrowing secondary to   bulging disc material.       Mild-to-moderate facet arthropathy is seen at the L4-5 level. There is a   prominent amount of fluid signal intensity within the L4-5 facet joints   that is likely arthritic in nature.       At L5-S1, there are advanced degenerative disc changes. There is   degenerative anterior spondylolisthesis of L5 on S1 by approximately 3   mm. There is moderate facet hypertrophic change and unroofed bulging   disc material that results in a mild-to-moderate degree of right   foraminal narrowing.       IMPRESSION:       There is no evidence to suggest an infectious abnormality within the   lumbar spine. There is arthritic change within the L4-5 facet joints   with prominent amount of fluid within the facet joints that is felt to   probably be arthritic in nature.          03/17/2023 0526 03/17/2023 0644 Basic Metabolic Panel [[id number]]     (Abnormal)   Blood    Final result Component Value Units   Glucose 81 mg/dL   BUN 13 mg/dL   Creatinine 1.12 High  mg/dL   Sodium 135 Low  mmol/L   Potassium 4.7  mmol/L   Chloride 96 Low  mmol/L   CO2 27.4 mmol/L   Calcium 9.3 mg/dL   BUN/Creatinine Ratio 11.6    Anion Gap 11.6 mmol/L   eGFR 56.8 Low  mL/min/1.73          03/17/2023 0526 03/17/2023 0613 CBC (No Diff) [[id number]]   (Abnormal)   Blood    Final result Component Value Units   WBC 13.59 High  10*3/mm3   RBC 2.41 Low  10*6/mm3   Hemoglobin 7.9 Low  g/dL   Hematocrit 24.1 Low  %   .0 High  fL   MCH 32.8 pg   MCHC 32.8 g/dL   RDW 14.2 %   RDW-SD 50.6 fl   MPV 10.7 fL   Platelets 653 High  10*3/mm3          03/16/2023 1437 03/17/2023 1006 Body Fluid Culture - Aspirate, Hip, Left [[id number]]   Aspirate from Hip, Left    Preliminary result Component Value   Body Fluid Culture No growth P   Gram Stain No WBCs or organisms seen P             03/16/2023 1437 03/16/2023 1659 Body Fluid Cell Count With Differential - Synovial Fluid, Hip, Left [[id number]]    Synovial Fluid from Hip, Left    Final result Component Value   No component results          03/16/2023 1437 03/16/2023 1700 Crystal Exam, Fluid - Synovial Fluid, [[id number]]   Synovial Fluid    Final result Component Value   Crystals, Fluid None Seen          03/16/2023 1437 03/16/2023 1555 Body fluid cell count - Synovial Fluid, Hip, Left [[id number]]    Synovial Fluid from Hip, Left    Final result Component Value Units   Color, Fluid Light Yellow    Appearance, Fluid Clear    RBC, Fluid 113 /mm3   Nucleated Cells, Fluid 43 /mm3   Method: Hemacytometer Method           03/16/2023 1437 03/16/2023 1659 Body fluid differential - Synovial Fluid, Hip, Left [[id number]]   Synovial Fluid from Hip, Left    Final result Component Value Units   Neutrophils, Fluid 10 %   Lymphocytes, Fluid 40 %   Monocytes, Fluid 50 %        03/08/2023 1957 03/11/2023 1036 Body Fluid Culture - Body Fluid, Pleural Cavity  [[id number]]    (Abnormal)   Body Fluid from Pleural Cavity    Final result Component Value   Body Fluid Culture Scant growth (1+) Staphylococcus aureus, MRSA Abnormal     Gram Stain Moderate (3+) WBCs per low power field    Rare (1+) Gram positive cocci       Susceptibility    Staphylococcus aureus, MRSA (1)    Antibiotic Interpretation Method Status    Gentamicin Susceptible YAKOV Final    Oxacillin Resistant YAKOV Final    Rifampin Susceptible YAKOV Final    Vancomycin Susceptible YAKOV Final    Susceptibility Comments    This isolate does not demonstrate inducible clindamycin resistance in vitro.                  PHYSICAL EXAM  Objective    Alert  nad  No resp distress  Decreased bs at bases  Slightly tachycardic  Wishing to be discharged to rehab today    CONDITION ON DISCHARGE  Stable.      DISCHARGE DISPOSITION   Rehab Facility or Unit ([facility])      DISCHARGE MEDICATIONS       Your medication list      CHANGE how you take these medications      Instructions Last Dose Given Next Dose Due   acetaminophen 500 MG tablet  Commonly known as: TYLENOL  What changed:   · medication strength  · how much to take  · when to take this  · reasons to take this      Take 2 tablets by mouth 3 (Three) Times a Day for 19 doses.          CONTINUE taking these medications      Instructions Last Dose Given Next Dose Due   apixaban 5 MG tablet tablet  Commonly known as: ELIQUIS      Take 1 tablet by mouth Daily.       diazePAM 10 MG tablet  Commonly known as: VALIUM      TAKE 1 TABLET THREE TIMES A DAY       estrogens (conjugated)-methyltestosterone 0.625-1.25 MG per tablet  Commonly known as: ESTRATEST HS      Take  by mouth.       folic acid 1 MG tablet  Commonly known as: FOLVITE      Take 1 mg by mouth Daily.       lamoTRIgine 100 MG tablet  Commonly known as: LaMICtal      Take 100 mg by mouth Daily.       pantoprazole 40 MG EC tablet  Commonly known as: PROTONIX      Take 1 tablet by mouth 2 (two) times a day before  meals.       sucralfate 1 GM/10ML suspension  Commonly known as: CARAFATE      Take 10 mL by mouth every 6 (six) hours.          STOP taking these medications    ibuprofen 200 MG tablet  Commonly known as: ADVIL,MOTRIN        nitrofurantoin (macrocrystal-monohydrate) 100 MG capsule  Commonly known as: MACROBID           ASK your doctor about these medications      Instructions Last Dose Given Next Dose Due   vitamin B-12 100 MCG tablet  Commonly known as: CYANOCOBALAMIN      Take 50 mcg by mouth Daily.             Where to Get Your Medications      [facility] [place] [address] [facility] [id number]    Hours: 7:00 AM-6:00 PM Mon-Fri, 8:00 AM-4:30 PM Sat-Sun (Closed 12-12:30PM) Phone: [phone]   · acetaminophen 500 MG tablet          Future Appointments   Date Time Provider Department Center   3/28/2023  1:00 PM Chloe Cedillo MD [facility]   6/19/2023  3:00 PM Caleb Reyes MD [facility] [address] [facility]     Additional Instructions for the Follow-ups that You Need to Schedule     Ambulatory Referral to Physical Therapy Evaluate and treat   As directed      Specialty needed: Evaluate and treat    Follow-up needed: Yes         XR Chest 2 View    Mar 28, 2023 (Approximate)      Exam reason: post-op            Follow-up Information     Chloe Cedillo MD. Go on 3/28/2023.    Specialty: Thoracic Surgery  Why: [facility] 12:15 PM on 3/28/2023, then come to our office for your follow-up appointment with Dr. Cedillo.  Contact information:  [address]  [address]  [facility] [id number] [phone]             Chloe Schaefer, APRN .    Specialty: Family Medicine  Contact information:  [address] [place] [id number] [phone]                         TEST  RESULTS PENDING AT DISCHARGE  Pending Labs     Order Current Status    Body Fluid Culture - Aspirate, Hip, Left Preliminary result    Fungus Culture - Body  Fluid, Pleural Cavity Preliminary result    Fungus Culture - Body Fluid, Pleural Cavity Preliminary result    Fungus Culture - Body Fluid, Pleural Cavity Preliminary result    Fungus Culture - Tissue, Pleural Cavity Preliminary result             Pavel Robbins MD  [facility]  03/17/23  12:41 EDT      Time: greater than 32 minutes on discharge  It was a pleasure taking care of this patient while in the hospital.

## 2023-03-17 NOTE — H&P
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   S/p IVIG x 5 days  Impaired mobility/impaired self care/ impaired cognition  R lung masslike infiltrate with cavitary lesions/pleural effusion   S/p thoracentesis - Blood cx and pleural fluid  positive for MRSA   mass on TTE with findings concerning for endocarditis - underwent MARIAMA 3/10/2023 which had no vegetation  RUE superficial thrombophlebitis concerning for septic phlebitis.  chest tube placement given empyema, and she underwent thoracoscopy w/ decortication 3/8/23   -expected postoperative changes with pleural thickening and minimal pleural effusion.  The effusion is too small for intervention and will likely to continue to resolve with time.  Recommend continue good pulmonary hygiene with incentive spirometry hourly as well as increase activity/ambulation as tolerated.  ID - continue 4 weeks of vancomycin as recommended by infectious disease dosed by pharmacy to achieve a trough level of 15-20 or area under the curve of 400-600 from last negative blood culture or last intervention which ever is the latest - to complete 2023  Left hip arthrocentesis  to rule out any hip septic arthritis- no growth to date on fluid.    lower back pain - MRI of spine to rule out discitis or osteomyelitis.  This did have known degenerative changes but  no infection.   DVT prophylaxis - SCDs / apixiban. History of LLE DVT - on Eliquis at home  Pain management - Tylenol 1,000 mg three times a day/ Celebrex 100 mg q 112 hours/ gabapentin 300 mg q 8 hours Oxycodone 5 mg q 4 hours prn  Anxiety/ muscle spasms - Diazepam 2 mg q 6 hours prn - [facility] reviewed  Seizure disorder -Lamotrigine 200 mg  daily        History of Present Illness   58 y.o. female transferred to [facility] on March 17, 2023 after hospital stay on [facility].  Hospital course noted for - [admitted on 2/20 originally for numbness/paraesthesias starting in chest to b/l LE after a fall. 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 However she again started having weakness in her legs and underwent EMG, though not typical of GBS, given concerning exam was started on 5 days IVIG. Later developed R lung masslike infiltrate with cavitary lesions/pleural effusion for which Pulmonology was consulted, and underwent thoracentesis on 3/3. Blood cx and pleural fluid ended up positive for MRSA for which ID was consulted. Also noted to have TV mass on TTE with findings concerning for endocarditis, and UE doppler with RUE superficial thrombophlebitis concerning for septic phlebitis. CTS consulted for chest tube placement given empyema, and she underwent thoracoscopy w/ decortication 3/8/23.  She underwent MARIAMA 3/10/2023 which had no vegetation.   Came out of ICU 3/12.  Chest tubes have been removed. She had hip arthrocentesis to rule out any hip septic arthritis- no growth to date on fluid.  She had some lower back pain so obtained MRI of spine to rule out discitis or osteomyelitis.  This did have known degenerative changes but thankfully no infection.  There was pleural thickening and effusion so cardiothoracic surgery preevaluated and did not recommend any additional procedure.  She will continue 4 weeks of vancomycin as recommended by infectious disease dosed by pharmacy to achieve a trough level of 15-20 or area under the curve of 400-600 from last negative blood culture or last intervention which ever is the latest.   Encouraged her to ambulate more as well as work with physical therapy and use incentive spirometry.  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 ]    She is on 1L O2, mainly at night. Yellowish sputum. Breathing fair Continues weak LLE > RLE > BUE. Some cognitive deficits with decreased processing. Seen by Neurology on March 16 for concern regarding increased weakness. Had MRI Thoracic and Lumbar spine done. Exam was felt unchanged from previous visit.     OT - March 15 -[SBA provided for bed mobility. She participated in BUE/BLE exercises - BUE punches outward, across midline and overhead. CGA/Min A provided for sitting balance. Min-Mod A x2 provided to stand x3. On her third stand she tolerated side steps to her right. She was able to slightly sustain knee bends and mini squats while standing and less buckling noticed]  PT - March 15 - [Required SBA for supine to sit, then pt able to maintain static sitting balance w/ mostly SBA. Occasional posterior lean noted, w/ min A to correct. Pt completed a few seated and supine LE exercises, encouraging pt to attempt a few supine ones on her own during the day. Pt stood a few times w/ mod A x2, knees partially blocked, though pt able to lock knees out for static standing balance. Tolerated a few mini squats w/ mod A x2, for the most part controlling slight bend then able to extend knees back out w/ only a touch of therapists' knees to pt's knees. Pt was also able to complete a few small side steps towards HOB, cues for keeping knees locked when weight shifting]    Review of Systems     Personal History     Past Medical History:   Diagnosis Date   • Degenerative disc disease, cervical    • Gestational diabetes    • H/O blood clots    • Hematemesis    • Seizures (HCC)    • Septic shock (HCC)        Past Surgical History:   Procedure Laterality Date   • APPENDECTOMY     • BACK SURGERY     • CHOLECYSTECTOMY     • ENDOSCOPY N/A 8/30/2016    Procedure: ESOPHAGOGASTRODUODENOSCOPY with  biopsy;  Surgeon: Austen Brito MD;  Location: [facility] ENDOSCOPY;  Service:    • HYSTERECTOMY     • NECK SURGERY       approx 6 yrs ago   • THORACOSCOPY Right 3/8/2023    Procedure: THORACOSCOPY WITH DAVINCI ROBOT WITH COMPLETE DECORTICATION;  Surgeon: Chloe Cedillo MD;  Location: [facility] MAIN OR;  Service: Robotics - DaVinci;  Laterality: Right;   • TONSILLECTOMY     • TONSILLECTOMY AND ADENOIDECTOMY  1975       Family History: family history includes Cancer in her mother and sister; Colon cancer in her paternal uncle; Diabetes in her father, paternal grandfather, and paternal grandmother; Heart disease in her father; Stroke in her father. Otherwise pertinent FHx was reviewed and not pertinent to current issue.    Social History:  reports that she has never smoked. She has never used smokeless tobacco. She reports current alcohol use. She reports that she does not use drugs.  Lives in [place]. . 2 step entry. No steps to bedroom. 2 steps to bathroom.      Home Medications:  acetaminophen, apixaban, diazePAM, estrogens (conjugated)-methyltestosterone, folic acid, lamoTRIgine, pantoprazole, sucralfate, and vitamin B-12    Allergies:  Allergies   Allergen Reactions   • Penicillins Shortness Of Breath and Swelling   • Sulfa Antibiotics Rash     Scheduled Meds:acetaminophen, 1,000 mg, Oral, TID  apixaban, 5 mg, Oral, Q12H  celecoxib, 100 mg, Oral, Q12H  [START ON 3/18/2023] desvenlafaxine, 25 mg, Oral, Daily  [START ON 3/18/2023] folic acid, 1 mg, Oral, Daily  [START ON 3/18/2023] gabapentin, 300 mg, Oral, Q8H  [START ON 3/18/2023] ipratropium-albuterol, 3 mL, Nebulization, 4x Daily - RT  [START ON 3/18/2023] lamoTRIgine, 200 mg, Oral, Daily  [START ON 3/18/2023] pantoprazole, 40 mg, Oral, Q AM  [START ON 3/18/2023] thiamine, 100 mg, Oral, Daily  vancomycin, 1,000 mg, Intravenous, Q24H  [START ON 3/18/2023] vitamin B-12, 500 mcg, Oral, Daily      Continuous Infusions:Pharmacy to dose vancomycin,        PRN Meds:.•  bisacodyl  •  diazePAM  •  melatonin  •  oxyCODONE  •  Pharmacy to dose vancomycin  •  polyethylene glycol  •  senna-docusate sodium    Objective    Objective     Vitals:   Temp:  [97.2 °F (36.2 °C)-99.3 °F (37.4 °C)] 99.3 °F (37.4 °C)  Heart Rate:  [] 112  Resp:  [18] 18  BP: (109-117)/(60-71) 115/71  Flow (L/min):  [1-3] 1    Physical Exam    MENTAL STATUS -  AWAKE / ALERT  HEENT- NCAT, SCLERAE ANICTERIC, CONJUNCTIVAE PINK, OP MOIST, NO JVD, EARS UNREMARKABLE EXTERNALLY  O2 1 L  LUNGS - DECREASED BREATH SOUNDS RIGHT BASE MORE SO THAN LEFT BASS  HEART- RRR, NO RUB, MURMUR, OR GALLOP  ABD - NORMOACTIVE BOWEL SOUNDS, SOFT, NT.  EXT - NO EDEMA OR CYANOSIS  NEURO - O X 4. DIFFICULTY WITH SIMPLE TIME AND MONEY MANAGEMENT. NOT ABLE TO SPELL NAME BACKWARDS. SPEECH FLUENT.   MOTOR EXAM - R/L:  SHOULDER ABDUCTION 4/4 EF 5/5, WE 5/5, HAND INTRINSICS 4/4  HF 2/1, KE 3-/2-, ADF 4/4          Result Review    Result Review:     Results from last 7 days   Lab Units 03/17/23  0526 03/16/23  0517 03/15/23  0452   WBC 10*3/mm3 13.59* 11.74* 12.45*   HEMOGLOBIN g/dL 7.9* 7.3* 8.1*   HEMATOCRIT % 24.1* 22.0* 25.2*   PLATELETS 10*3/mm3 653* 535* 455*     Results from last 7 days   Lab Units 03/17/23  0526 03/16/23  0517 03/15/23  0452 03/13/23  0528 03/12/23  0813 03/11/23  0315   SODIUM mmol/L 135* 132* 133*   < > 135* 138   POTASSIUM mmol/L 4.7 4.4 4.7   < > 4.3 3.7   CHLORIDE mmol/L 96* 92* 96*   < > 101 103   CO2 mmol/L 27.4 29.0 28.0   < > 26.0 27.0   BUN mg/dL 13 14 12   < > 12 12   CREATININE mg/dL 1.12* 1.17* 1.15*   < > 0.93 0.90   CALCIUM mg/dL 9.3 8.3* 8.7   < > 7.8* 7.5*   BILIRUBIN mg/dL  --   --   --   --  0.3 0.4   ALK PHOS U/L  --   --   --   --  224* 216*   ALT (SGPT) U/L  --   --   --   --  14 16   AST (SGOT) U/L  --   --   --   --  29 26   GLUCOSE mg/dL 81 110* 103*   < > 126* 103*    < > = values in this interval not displayed.     Lab Results   Component Value Date    [facility] 17.20  03/15/2023   [facility] March 15    MRI OF THE THORACIC AND LUMBAR SPINE WITH AND WITHOUT CONTRAST     CLINICAL HISTORY: MRSA bacteremia with persistent weakness. Evaluate for  abscess.     TECHNIQUE: MRI of the thoracic spine was obtained with sagittal pre and  postgadolinium T1, proton-density, and T2-weighted images. Additionally,  there are axial pre and postgadolinium T1 and T2-weighted images through  the thoracic spine.     COMPARISON: Comparison is made to previous MRI of the thoracic spine  dated 02/21/2023.     FINDINGS:     There is no evidence to suggest discitis or osteomyelitis within the  thoracic spine. No abnormal areas of contrast enhancement are seen  within the thoracic spinal canal to suggest an infectious abnormality  within the thoracic spinal canal. There is a disc bulge eccentric to the  right at the T9-10 level that results in a mild-to-moderate degree of  canal stenosis. Similar findings were seen at the T9-10 level on the  prior exam     The thoracic spinal cord has normal signal intensity. The vertebral body  heights are well-maintained. There are no pathological areas of marrow  replacement. There is mild dextroconvex scoliotic curvature of the  thoracic spine with its apex centered at T6-7.     There is complex loculated appearing pleural fluid within the right  hemithorax. Additionally, there is pleural thickening. The findings are  concerning for an empyema. Furthermore, there are areas of opacification  within the right lung that are suspicious for overlying pneumonia. These  findings within the right hemithorax are new when compared to the prior  MRI of the thoracic spine dated 2023. These findings can be further  evaluated with a CT scan of the chest as clinically indicated.     IMPRESSION:     There is no evidence to suggest discitis or osteomyelitis within the  thoracic spine.     However, there is complex loculated appearing pleural fluid within the  right hemithorax with  pleural thickening. The findings are concerning  for an empyema. Furthermore, there is opacification within the right  lung that is suspicious for overlying pneumonia. These findings within  the right hemithorax are new when compared to the prior MRI of the  thoracic spine dated 02/21/2023. These findings could be further  evaluated with a chest CT, as clinically indicated. These findings and  recommendations were discussed with Tyler Carter on 03/16/2023  approximately 4:22 PM.     Incidental note is made of a disc bulge eccentric to the right at the  T9-10 level resulting in a mild-to-moderate degree of canal stenosis.  Similar findings were noted on the prior exam.        TECHNIQUE: MRI of the lumbar spine was obtained with sagittal pre and  postcontrast T1, proton-density, and T2-weighted images. Additionally,  there are axial pre and postgadolinium T1 and T2-weighted images through  the lumbar spine.     FINDINGS:     There is no convincing evidence for discitis or osteomyelitis involving  the lumbar spine. No abnormal areas of contrast enhancement are noted  within the lumbar spinal canal.     The conus medullaris terminates at the level of the lower body of L1 and  has normal signal intensity.     At L1-2, L2-3, and L3-4, there is no significant canal or foraminal  stenosis.     At L4-5, there is a disc bulge with a posterior annular fissure. There  is a mild degree of canal and bilateral foraminal narrowing secondary to  bulging disc material.      Mild-to-moderate facet arthropathy is seen at the L4-5 level. There is a  prominent amount of fluid signal intensity within the L4-5 facet joints  that is likely arthritic in nature.     At L5-S1, there are advanced degenerative disc changes. There is  degenerative anterior spondylolisthesis of L5 on S1 by approximately 3  mm. There is moderate facet hypertrophic change and unroofed bulging  disc material that results in a mild-to-moderate degree of right  foraminal  narrowing.      IMPRESSION:     There is no evidence to suggest an infectious abnormality within the  lumbar spine. There is arthritic change within the L4-5 facet joints  with prominent amount of fluid within the facet joints that is felt to  probably be arthritic in nature.     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   S/p IVIG x 5 days    Impaired mobility/impaired self care/ impaired cognition    R lung masslike infiltrate with cavitary lesions/pleural effusion   S/p thoracentesis - Blood cx and pleural fluid  positive for MRSA     chest tube placement given empyema, and she underwent thoracoscopy w/ decortication 3/8/23   -expected postoperative changes with pleural thickening and minimal pleural effusion.  The effusion is too small for intervention and will likely to continue to resolve with time.  Recommend continue good pulmonary hygiene with incentive spirometry hourly as well as increase activity/ambulation as tolerated.      mass on TTE with findings concerning for endocarditis - underwent MARIAMA 3/10/2023 which had no vegetation    RUE superficial thrombophlebitis concerning for septic phlebitis.      ID - continue 4 weeks of vancomycin as recommended by infectious disease dosed by pharmacy to achieve a trough level of 15-20 or area under the curve of 400-600 from last negative blood culture or last intervention which ever is the latest - to complete April 1, 2023    Left hip arthrocentesis March 16 to rule out any hip septic arthritis- no growth to date on fluid.    lower back pain - MRI of spine to rule out discitis or osteomyelitis.  This did have known degenerative changes but  no infection.     DVT  prophylaxis - SCDs / apixiban. History of LLE DVT - on Eliquis at home    Pain management - Tylenol 1,000 mg three times a day/ Celebrex 100 mg q 112 hours/ gabapentin 300 mg q 8 hours Oxycodone 5 mg q 4 hours prn    Anxiety/ muscle spasms - Diazepam 2 mg q 6 hours prn - JONES ambrosio    Seizure disorder -Lamotrigine 200 mg daily    Now admit for comprehensive acute inpatient rehabilitation .  This would be an interdisciplinary program with physical therapy 1 hour,  occupational therapy 1 hour, and speech therapy 1 hour, 5 days a week.  Rehabilitation nursing for carryover, monitoring of  Infection and neurologic and pulmonary   status, bowel and bladder, and skin  Ongoing physician follow-up.  Weekly team conferences.  Goals areindeterminate.  Rehabilitation prognosis indeterminate.  Medical prognosis indeterminate.  Estimated length of stay is indeterminate  The patient's functional status and clinical status is unchanged from preadmission assessment and the patient continues appropriate for acute inpatient rehabilitation.  Goal is for home with  Home health   therapies.  Barrier to discharge:  Impaired mobility and self care and cognition  - work on  Balance, strength, coordination  to overcome.       CODE STATUS:    Level Of Support Discussed With: Patient  Code Status (Patient has no pulse and is not breathing): CPR (Attempt to Resuscitate)  Medical Interventions (Patient has pulse or is breathing): Full Support    Admission Status:  I believe this patient meets inpatient status.    Ryley Rider MD    During rounds, used appropriate personal protective equipment including mask and gloves.  Additional gown if indicated.  Mask used was standard procedure mask. Appropriate PPE was worn during the entire visit.  Hand hygiene was completed before and after.

## 2023-03-18 LAB
ABO GROUP BLD: NORMAL
ANION GAP SERPL CALCULATED.3IONS-SCNC: 11.4 MMOL/L (ref 5–15)
BLD GP AB SCN SERPL QL: NEGATIVE
BUN SERPL-MCNC: 14 MG/DL (ref 6–20)
BUN/CREAT SERPL: 10.3 (ref 7–25)
CALCIUM SPEC-SCNC: 9 MG/DL (ref 8.6–10.5)
CHLORIDE SERPL-SCNC: 98 MMOL/L (ref 98–107)
CO2 SERPL-SCNC: 29.6 MMOL/L (ref 22–29)
CREAT SERPL-MCNC: 1.36 MG/DL (ref 0.57–1)
DEPRECATED RDW RBC AUTO: 48.9 FL (ref 37–54)
EGFRCR SERPLBLD CKD-EPI 2021: 45 ML/MIN/1.73
ERYTHROCYTE [DISTWIDTH] IN BLOOD BY AUTOMATED COUNT: 14.1 % (ref 12.3–15.4)
GLUCOSE SERPL-MCNC: 97 MG/DL (ref 65–99)
HCT VFR BLD AUTO: 21.1 % (ref 34–46.6)
HCT VFR BLD AUTO: 22 % (ref 34–46.6)
HGB BLD-MCNC: 6.8 G/DL (ref 12–15.9)
HGB BLD-MCNC: 7 G/DL (ref 12–15.9)
MCH RBC QN AUTO: 31.3 PG (ref 26.6–33)
MCHC RBC AUTO-ENTMCNC: 31.8 G/DL (ref 31.5–35.7)
MCV RBC AUTO: 98.2 FL (ref 79–97)
PLATELET # BLD AUTO: 691 10*3/MM3 (ref 140–450)
PMV BLD AUTO: 10.4 FL (ref 6–12)
POTASSIUM SERPL-SCNC: 4.3 MMOL/L (ref 3.5–5.2)
RBC # BLD AUTO: 2.24 10*6/MM3 (ref 3.77–5.28)
RH BLD: POSITIVE
SODIUM SERPL-SCNC: 139 MMOL/L (ref 136–145)
T&S EXPIRATION DATE: NORMAL
VANCOMYCIN TROUGH SERPL-MCNC: 21.7 MCG/ML (ref 5–20)
WBC NRBC COR # BLD: 12.73 10*3/MM3 (ref 3.4–10.8)

## 2023-03-18 PROCEDURE — 85027 COMPLETE CBC AUTOMATED: CPT | Performed by: PHYSICAL MEDICINE & REHABILITATION

## 2023-03-18 PROCEDURE — 86900 BLOOD TYPING SEROLOGIC ABO: CPT

## 2023-03-18 PROCEDURE — 97535 SELF CARE MNGMENT TRAINING: CPT

## 2023-03-18 PROCEDURE — 63710000001 DIPHENHYDRAMINE PER 50 MG: Performed by: PHYSICAL MEDICINE & REHABILITATION

## 2023-03-18 PROCEDURE — 36430 TRANSFUSION BLD/BLD COMPNT: CPT

## 2023-03-18 PROCEDURE — 97110 THERAPEUTIC EXERCISES: CPT

## 2023-03-18 PROCEDURE — 85014 HEMATOCRIT: CPT | Performed by: PHYSICAL MEDICINE & REHABILITATION

## 2023-03-18 PROCEDURE — P9016 RBC LEUKOCYTES REDUCED: HCPCS

## 2023-03-18 PROCEDURE — 85018 HEMOGLOBIN: CPT | Performed by: PHYSICAL MEDICINE & REHABILITATION

## 2023-03-18 PROCEDURE — 25010000002 VANCOMYCIN PER 500 MG: Performed by: PHYSICAL MEDICINE & REHABILITATION

## 2023-03-18 PROCEDURE — 97530 THERAPEUTIC ACTIVITIES: CPT

## 2023-03-18 PROCEDURE — 86900 BLOOD TYPING SEROLOGIC ABO: CPT | Performed by: PHYSICAL MEDICINE & REHABILITATION

## 2023-03-18 PROCEDURE — 80048 BASIC METABOLIC PNL TOTAL CA: CPT | Performed by: PHYSICAL MEDICINE & REHABILITATION

## 2023-03-18 PROCEDURE — 94799 UNLISTED PULMONARY SVC/PX: CPT

## 2023-03-18 PROCEDURE — 80202 ASSAY OF VANCOMYCIN: CPT | Performed by: PHYSICAL MEDICINE & REHABILITATION

## 2023-03-18 PROCEDURE — 94640 AIRWAY INHALATION TREATMENT: CPT

## 2023-03-18 PROCEDURE — 86901 BLOOD TYPING SEROLOGIC RH(D): CPT | Performed by: PHYSICAL MEDICINE & REHABILITATION

## 2023-03-18 PROCEDURE — 86923 COMPATIBILITY TEST ELECTRIC: CPT

## 2023-03-18 PROCEDURE — 86850 RBC ANTIBODY SCREEN: CPT | Performed by: PHYSICAL MEDICINE & REHABILITATION

## 2023-03-18 PROCEDURE — 97162 PT EVAL MOD COMPLEX 30 MIN: CPT

## 2023-03-18 PROCEDURE — 92523 SPEECH SOUND LANG COMPREHEN: CPT

## 2023-03-18 PROCEDURE — 97166 OT EVAL MOD COMPLEX 45 MIN: CPT

## 2023-03-18 RX ORDER — DIPHENHYDRAMINE HCL 25 MG
25 CAPSULE ORAL ONCE
Status: COMPLETED | OUTPATIENT
Start: 2023-03-18 | End: 2023-03-18

## 2023-03-18 RX ORDER — ACETAMINOPHEN 160 MG/5ML
650 SOLUTION ORAL ONCE
Status: COMPLETED | OUTPATIENT
Start: 2023-03-18 | End: 2023-03-18

## 2023-03-18 RX ORDER — DIPHENHYDRAMINE HYDROCHLORIDE 50 MG/ML
25 INJECTION INTRAMUSCULAR; INTRAVENOUS ONCE
Status: COMPLETED | OUTPATIENT
Start: 2023-03-18 | End: 2023-03-18

## 2023-03-18 RX ORDER — ACETAMINOPHEN 325 MG/1
650 TABLET ORAL ONCE
Status: COMPLETED | OUTPATIENT
Start: 2023-03-18 | End: 2023-03-18

## 2023-03-18 RX ORDER — ACETAMINOPHEN 650 MG/1
650 SUPPOSITORY RECTAL ONCE
Status: COMPLETED | OUTPATIENT
Start: 2023-03-18 | End: 2023-03-18

## 2023-03-18 RX ADMIN — PANTOPRAZOLE SODIUM 40 MG: 40 TABLET, DELAYED RELEASE ORAL at 06:24

## 2023-03-18 RX ADMIN — Medication 100 MG: at 08:12

## 2023-03-18 RX ADMIN — DESVENLAFAXINE SUCCINATE 25 MG: 25 TABLET, EXTENDED RELEASE ORAL at 08:13

## 2023-03-18 RX ADMIN — APIXABAN 5 MG: 5 TABLET, FILM COATED ORAL at 08:12

## 2023-03-18 RX ADMIN — DIPHENHYDRAMINE HYDROCHLORIDE 25 MG: 25 CAPSULE ORAL at 21:09

## 2023-03-18 RX ADMIN — ACETAMINOPHEN 650 MG: 325 TABLET ORAL at 21:09

## 2023-03-18 RX ADMIN — GABAPENTIN 300 MG: 300 CAPSULE ORAL at 23:39

## 2023-03-18 RX ADMIN — VANCOMYCIN HYDROCHLORIDE 1000 MG: 1 INJECTION, SOLUTION INTRAVENOUS at 00:04

## 2023-03-18 RX ADMIN — SODIUM CHLORIDE 500 ML: 9 INJECTION, SOLUTION INTRAVENOUS at 23:39

## 2023-03-18 RX ADMIN — ACETAMINOPHEN 1000 MG: 325 TABLET ORAL at 08:13

## 2023-03-18 RX ADMIN — GABAPENTIN 300 MG: 300 CAPSULE ORAL at 06:24

## 2023-03-18 RX ADMIN — APIXABAN 5 MG: 5 TABLET, FILM COATED ORAL at 23:39

## 2023-03-18 RX ADMIN — OXYCODONE HYDROCHLORIDE 5 MG: 5 TABLET ORAL at 15:55

## 2023-03-18 RX ADMIN — ACETAMINOPHEN 1000 MG: 325 TABLET ORAL at 15:55

## 2023-03-18 RX ADMIN — Medication 500 MCG: at 08:13

## 2023-03-18 RX ADMIN — LAMOTRIGINE 200 MG: 100 TABLET ORAL at 08:12

## 2023-03-18 RX ADMIN — OXYCODONE HYDROCHLORIDE 5 MG: 5 TABLET ORAL at 08:13

## 2023-03-18 RX ADMIN — CELECOXIB 100 MG: 100 CAPSULE ORAL at 08:13

## 2023-03-18 RX ADMIN — IPRATROPIUM BROMIDE AND ALBUTEROL SULFATE 3 ML: 2.5; .5 SOLUTION RESPIRATORY (INHALATION) at 07:43

## 2023-03-18 RX ADMIN — Medication 1 MG: at 08:12

## 2023-03-18 RX ADMIN — GABAPENTIN 300 MG: 300 CAPSULE ORAL at 15:06

## 2023-03-18 NOTE — PROGRESS NOTES
SECTION GG    Self Care Performance:   Oral Hygiene: Tyler does less than half the effort. Tyler lifts, holds or  supports trunk or limbs but provides less than half the effort.   Toileting Hygiene: Tyler does all of the effort. Patient does none of the  effort to complete the activity. Or, the assistance of 2 or more helpers is  required for the patient to complete the activity.   Shower/Bathe Self: Tyler does more than half the effort. Tyler lifts or holds  trunk or limbs and provides more than half the effort.   Upper Body Dressing: Tyler does less than half the effort. Tyler lifts, holds  or supports trunk or limbs but provides less than half the effort.   Lower Body Dressing: Tyler does all of the effort. Patient does none of the  effort to complete the activity. Or, the assistance of 2 or more helpers is  required for the patient to complete the activity.   Putting On/Taking Off Footwear: Tyler does all of the effort. Patient does  none of the effort to complete the activity. Or, the assistance of 2 or more  helpers is required for the patient to complete the activity.    Self Care Discharge Goals:   Shower/Bathe Self: Tyler provides verbal cues or touching/steadying assistance  as patient completes activity.   Lower Body Dressing: Tyler does less than half the effort. Tyler lifts, holds  or supports trunk or limbs but provides less than half the effort.   Toileting Hygiene: Tyler does less than half the effort. Tyler lifts, holds  or supports trunk or limbs but provides less than half the effort.    Mobility Toilet Transfer Performance: Tyler does all of the effort. Patient  does none of the effort to complete the activity. Or, the assistance of 2 or  more helpers is required for the patient to complete the activity.    Mobility Toilet Transfer Discharge Goal: Tyler does less than half the effort.  Tyler lifts, holds or supports trunk or limbs but provides less than half  the  effort.    Signed by: La Carr, OT

## 2023-03-18 NOTE — PLAN OF CARE
Goal Outcome Evaluation:  Plan of Care Reviewed With: patient        Progress: improving   Vital signs stable. Spouse at bedside. Alert and oriented x 4. External catheter patent with sundar colored urine noted. Tolerating regular diet fairly well. On 1 liter oxygen prn and at HS. Denies shortness of breath. Sinus tachycardia noted. Prn oral oxycodone given for complaint of left hip and left lower leg discomfort. Generalized weakness noted. Moderate assistance of two when out of bed. Bed alarm on. Discharged to [facility] rehab unit. Transporter assigned for transport per stretcher to rehab unit.

## 2023-03-18 NOTE — PROGRESS NOTES
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   S/p IVIG x 5 days  Impaired mobility/impaired self care/ impaired cognition  R lung masslike infiltrate with cavitary lesions/pleural effusion   S/p thoracentesis - Blood cx and pleural fluid  positive for MRSA   mass on TTE with findings concerning for endocarditis - underwent MARIAMA 3/10/2023 which had no vegetation  RUE superficial thrombophlebitis concerning for septic phlebitis.  chest tube placement given empyema, and she underwent thoracoscopy w/ decortication 3/8/23   -expected postoperative changes with pleural thickening and minimal pleural effusion.  The effusion is too small for intervention and will likely to continue to resolve with time.  Recommend continue good pulmonary hygiene with incentive spirometry hourly as well as increase activity/ambulation as tolerated.  ID - continue 4 weeks of vancomycin as recommended by infectious disease dosed by pharmacy to achieve a trough level of 15-20 or area under the curve of 400-600 from last negative blood culture or last intervention which ever is the latest - to complete April 1, 2023  Left hip arthrocentesis March 16 to rule out any hip septic arthritis- no growth to date on fluid.    lower back pain - MRI of spine to rule out discitis or osteomyelitis.  This did have known degenerative changes but  no infection.   DVT prophylaxis - SCDs / apixiban. History of LLE DVT - on Eliquis at home  Pain management - Tylenol 1,000 mg three times a day/ Celebrex 100 mg q 112 hours/ gabapentin 300 mg q 8 hours Oxycodone 5 mg q 4 hours prn  Anxiety/ muscle spasms - Diazepam 2 mg q 6 hours prn - [facility]ONES reviewed  Seizure disorder -Lamotrigine 200 mg daily      SUBJECTIVE:  Patient seen and examined. No acute events overnight. Denies CP, SOA, N/V, F/C. C/o fatigue.      OBJECTIVE:  AF, VSS  BP: ()/(57-72) 95/57    Physical Exam     MENTAL STATUS -  AAO x3, Verbal, NAD.  HEENT-  NCAT, SCLERAE ANICTERIC, CONJUNCTIVAE PINK, OP MOIST, NO JVD, EARS UNREMARKABLE EXTERNALLY  O2 1 L  LUNGS - DECREASED BREATH SOUNDS RIGHT BASE MORE SO THAN LEFT BASS  HEART- RRR, NO RUB, MURMUR, OR GALLOP  ABD - NORMOACTIVE BOWEL SOUNDS, SOFT, NT.  EXT - NO EDEMA OR CYANOSIS  MOTOR EXAM - R/L:  SHOULDER ABDUCTION 4/4 EF 5/5, WE 5/5, HAND INTRINSICS 4/4  HF 2/1, KE 3-/2-, ADF 4/4        Scheduled Meds:acetaminophen, 1,000 mg, Oral, TID  apixaban, 5 mg, Oral, Q12H  celecoxib, 100 mg, Oral, Q12H  desvenlafaxine, 25 mg, Oral, Daily  folic acid, 1 mg, Oral, Daily  gabapentin, 300 mg, Oral, Q8H  ipratropium-albuterol, 3 mL, Nebulization, 4x Daily - RT  lamoTRIgine, 200 mg, Oral, Daily  pantoprazole, 40 mg, Oral, Q AM  thiamine, 100 mg, Oral, Daily  vancomycin, 1,000 mg, Intravenous, Q24H  vitamin B-12, 500 mcg, Oral, Daily      Continuous Infusions:Pharmacy to dose vancomycin,       PRN Meds:.•  bisacodyl  •  diazePAM  •  melatonin  •  oxyCODONE  •  Pharmacy to dose vancomycin  •  polyethylene glycol  •  senna-docusate sodium    Lab Results (last 24 hours)     Procedure Component Value Units Date/Time    CBC (No Diff) [[id number]]  (Abnormal) Collected: 03/18/23 0352    Specimen: Blood Updated: 03/18/23 0421     WBC 12.73 10*3/mm3      RBC 2.24 10*6/mm3      Hemoglobin 7.0 g/dL      Hematocrit 22.0 %      MCV 98.2 fL      MCH 31.3 pg      MCHC 31.8 g/dL      RDW 14.1 %      RDW-SD 48.9 fl      MPV 10.4 fL      Platelets 691 10*3/mm3     Basic Metabolic Panel [[id number]]  (Abnormal) Collected: 03/18/23 0352    Specimen: Blood Updated: 03/18/23 0437     Glucose 97 mg/dL      BUN 14 mg/dL      Creatinine 1.36 mg/dL      Sodium 139 mmol/L      Potassium 4.3 mmol/L      Chloride 98 mmol/L      CO2 29.6 mmol/L      Calcium 9.0 mg/dL      BUN/Creatinine Ratio 10.3     Anion Gap 11.4 mmol/L      eGFR 45.0 mL/min/1.73     Narrative:      GFR Normal >60  Chronic Kidney Disease <60  Kidney Failure <15            Imaging Results (Last  24 Hours)     ** No results found for the last 24 hours. 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   S/p IVIG x 5 days     Impaired mobility/impaired self care/ impaired cognition     R lung masslike infiltrate with cavitary lesions/pleural effusion   S/p thoracentesis - Blood cx and pleural fluid  positive for MRSA      chest tube placement given empyema, and she underwent thoracoscopy w/ decortication 3/8/23   -expected postoperative changes with pleural thickening and minimal pleural effusion.  The effusion is too small for intervention and will likely to continue to resolve with time.  Recommend continue good pulmonary hygiene with incentive spirometry hourly as well as increase activity/ambulation as tolerated.        mass on TTE with findings concerning for endocarditis - underwent MARIAMA 3/10/2023 which had no vegetation     RUE superficial thrombophlebitis concerning for septic phlebitis.        ID - continue 4 weeks of vancomycin as recommended by infectious disease dosed by pharmacy to achieve a trough level of 15-20 or area under the curve of 400-600 from last negative blood culture or last intervention which ever is the latest - to complete April 1, 2023     Left hip arthrocentesis March 16 to rule out any hip septic arthritis- no growth to date on fluid.    lower back pain - MRI of spine to rule out discitis or osteomyelitis.  This did have known degenerative changes but  no infection.      DVT prophylaxis - SCDs / apixiban. History of LLE DVT - on Eliquis at home     Pain management - Tylenol 1,000 mg three times a day/ Celebrex 100 mg q 112 hours/ gabapentin 300 mg q 8 hours Oxycodone 5 mg q 4 hours prn  March 18 - DC Celebrex 2/2 PHYLLIS, and anemia     Anxiety/ muscle spasms - Diazepam 2 mg q 6 hours prn - [facility] reviewed     Seizure disorder -Lamotrigine 200 mg  daily    ABLA - March 18- Hgb 7.0 (7.3 on 3/16). Type, cross and transfuse 1 unit PRBCs. Pre-medicate with Tylenol and Benadryl. CBC in am.     PHYLLIS - March 18- Creatinine 136 up from 1.12. On IV Vanc and Celebrex. DC Celebrex. BMP in am.    Will ask Medicine to see and assist with medical management.     >35 minutes spent evaluating patient and coordinating care.    Barber Burdick M.D.  Physical Medicine and Rehabilitation

## 2023-03-18 NOTE — PROGRESS NOTES
Inpatient Rehabilitation Plan of Care Note    Plan of Care  Care Plan Reviewed - No updates at this time.    Sphincter Control    Performed Intervention(s)  Bladder/bowel training program  Monitor intake and output  Use incontinence products/equipment      Psychosocial    Performed Intervention(s)  Verbalizes needs and concerns  Support from family/peer groups      Body Systems    Performed Intervention(s)  Perform active and passive ROM  Frequent position changes      Safety    Performed Intervention(s)  Safety Rounds  Bed alarm/Chair alarm  Items within reach    Signed by: Gaviota Paris RN

## 2023-03-18 NOTE — THERAPY EVALUATION
Inpatient Rehabilitation - Speech Language Pathology Initial Evaluation  [facility]     Patient Name: Louise GARCIA  : 1965  MRN: [id number]  Today's Date: 3/18/2023               Admit Date: 3/17/2023     Visit Dx:  No diagnosis found.  Patient Active Problem List   Diagnosis   • Sepsis (HCC)   • Neck pain, chronic   • Upper back pain   • Paresthesia   • Cervical myelopathy (HCC)   • Lower extremity weakness   • History of blood clots   • Chronic anticoagulation   • Seizures (HCC)   • Normocytic anemia   • GERD (gastroesophageal reflux disease)   • Guillain-Dolores syndrome (HCC)   • Situational mixed anxiety and depressive disorder   • Mass of middle lobe of right lung   • Oral candidiasis   • Empyema of pleura (HCC)   • MRSA bacteremia   • Idiopathic peripheral neuropathy     Past Medical History:   Diagnosis Date   • Degenerative disc disease, cervical    • Gestational diabetes    • H/O blood clots    • Hematemesis    • Seizures (HCC)    • Septic shock (HCC)      Past Surgical History:   Procedure Laterality Date   • APPENDECTOMY     • BACK SURGERY     • CHOLECYSTECTOMY     • ENDOSCOPY N/A 2016    Procedure: ESOPHAGOGASTRODUODENOSCOPY with biopsy;  Surgeon: Austen Brito MD;  Location: [facility] ENDOSCOPY;  Service:    • HYSTERECTOMY     • NECK SURGERY       approx 6 yrs ago   • THORACOSCOPY Right 3/8/2023    Procedure: THORACOSCOPY WITH DAVINCI ROBOT WITH COMPLETE DECORTICATION;  Surgeon: Chloe Cedillo MD;  Location: [facility] OR;  Service: Robotics - DaVinci;  Laterality: Right;   • TONSILLECTOMY     • TONSILLECTOMY AND ADENOIDECTOMY         SLP Recommendation and Plan  SLP Diagnosis: moderate-severe, cognitive-linguistic disorder (dysphonia) (23 1300)           [facility] Criteria for Skilled Therapy Interventions Met: yes (23 1300)  Anticipated Discharge Disposition (SLP): unknown (23 1300)        Predicted Duration Therapy Intervention (Days): until discharge (23  "1300)                                 SLP EVALUATION (last 72 hours)     SLP SLC Evaluation     Row Name 03/18/23 1300                   Communication Assessment/Intervention    Document Type evaluation  -HF        Subjective Information complains of;fatigue  -HF        Patient Observations cooperative;agree to therapy  -HF        Patient Effort adequate  -HF           General Information    Patient Profile Reviewed yes  -HF        Pertinent History Of Current Problem The patient is a 58-year-old admitted to [facility] Rehabilitation with diagnoses of idiopathic peripheral neuropathy. Patient had VFSS completed 3/7/2023 with swallow ruled to be WFL, recommended a regular diet with thin liquids. No additional history of ST services. ST evaluation was originally scheduled for this AM, but patient was too lethargic to participate. Seen in the PM as a result. Patient remained lethargic, but participated to fullest capacity.  -HF        Precautions/Limitations, Vision corrective lenses needed for reading  -HF        Precautions/Limitations, Hearing WFL;for purposes of eval  -HF        Prior Level of Function-Communication WFL  -HF        Plans/Goals Discussed with patient;agreed upon  -HF        Barriers to Rehab medically complex  -HF        Patient's Goals for Discharge return to home  -HF        Standardized Assessment Used SLUMS;other (see comments)  Patient was inappropriate for the CLQT this date due to level of lethargy  -HF           Cursory Voice Assessment/Intervention    Voice, Comment The patient is dysphonic and demonstrates weak vocal intensity. Vocal quality is breathy and hoarse.  -HF           Cognitive Assessment Intervention- SLP    Cognition, Comment See \"SLUMS comments\" for details of cognitive-communicative performance.  -HF           Standardized Tests    Cognitive/Memory Tests SLUMS: [facility] Mental Status Examination  -HF           SLUMS: [facility] Mental Status " Examination    SLUMS Score 12  -HF        SLUMS Range 1-20: Dementia (High school education or higher)  -HF        SLUMS Comments ATTENTION:  The patient demonstrated moderately impaired attention with reduced cognitive endurance/sustained attention and susceptibility to internal/external distractors due to poor selective attention. While able to complete the entirety of the SLUMS, her attention was too poor and fatigue too high to complete the CLQT this date. Suspect some changes in visual attention and/or visuoperceptual skills given performance on the SLUMS.    MEMORY:  The patient presents with moderately-severely impaired memory - characterized by deficits in immediate recall (60% for 5-words; 75% for paragraph), short-term recall (40%), and working memory (0%) - suspected to be further exacerbated by impaired attention. She denies difficulty with long-term recall; however, recall of these details was inefficient and labored, suggested secondarily to level of lethargy and reported “brain fog.” Goals to be added to POT accordingly.    EXECUTIVE FUNCTIONING:  The patient presents with some awareness of her deficits and cognitive changes, but is unable to differentiate these deficits from physical impairments. While aware of inaccuracies on the clock drawing, she was unable to revise.  -HF           SLP Evaluation Clinical Impressions    SLP Diagnosis moderate-severe;cognitive-linguistic disorder  dysphonia  -HF        Rehab Potential/Prognosis good  -HF        SLC Criteria for Skilled Therapy Interventions Met yes  -HF        Functional Impact functional impact in social situations;functional impact in ADLs;difficulty in expressing complex messages;difficulty completing home management task;difficulty completing vocational tasks  -HF           Recommendations    Therapy Frequency (SLP SLC) 2 times/day;5 days per week  -HF        Predicted Duration Therapy Intervention (Days) until discharge  -HF         Anticipated Discharge Disposition (SLP) unknown  -HF           Communication Treatment Objective and Progress Goals (SLP)    SLC LTGs Patient will demonstrate functional cognitive-linguistic skills for return to discharge environment  -HF        Cognitive Linguistic Treatment Objectives Cognitive Linguistic Treatment Objectives (Group)  -HF           Patient will demonstrate functional cognitive-linguistic skills for return to discharge environment    [place] with minimal cues  -HF        Time frame by discharge  -HF        Barriers lethargy;fatigue;medically complex  -HF           Cognitive Linguistic Treatment Objectives    Attention Selection attention, SLP goal 1  -HF        Memory Skills Selection memory skills, SLP goal 1  -HF           Attention Goal 1 (SLP)    Improve Attention by Goal 1 (SLP) complete sustained attention task;80%;with minimal cues (75-90%)  -HF        Time Frame (Attention Goal 1, SLP) by discharge  -HF           Memory Skills Goal 1 (SLP)    Improve Memory Skills Through Goal 1 (SLP) use memory strategies;use external memory aid;recall details of the day;80%;with moderate cues (50-74%)  -HF              User Key  (r) = Recorded By, (t) = Taken By, (c) = Cosigned By    Initials Name Effective Dates    HF Kacey Song CCC-SLP 12/27/22 -                    EDUCATION  The patient has been educated in the following areas:     Cognitive Impairment.           SLP GOALS     Row Name 03/18/23 1300             Patient will demonstrate functional cognitive-linguistic skills for return to discharge environment    [place] with minimal cues  -HF      Time frame by discharge  -HF      Barriers lethargy;fatigue;medically complex  -HF         Attention Goal 1 (SLP)    Improve Attention by Goal 1 (SLP) complete sustained attention task;80%;with minimal cues (75-90%)  -HF      Time Frame (Attention Goal 1, SLP) by discharge  -HF         Memory Skills Goal 1 (SLP)    Improve Memory Skills Through  Goal 1 (SLP) use memory strategies;use external memory aid;recall details of the day;80%;with moderate cues (50-74%)  -HF            User Key  (r) = Recorded By, (t) = Taken By, (c) = Cosigned By    Initials Name Provider Type    HF Kacey Song CCC-SLP Speech and Language Pathologist                        Time Calculation:      Time Calculation- SLP     Row Name 03/18/23 1401             Time Calculation- SLP    SLP Start Time 1300  -HF      SLP Stop Time 1400  -HF      SLP Time Calculation (min) 60 min  -HF         Untimed Charges    SLP Eval/Re-eval  ST Eval Speech and Production w/ Language - 13214  -HF      [id number] Eval Speech and Production w/ Language Minutes 60  -HF         Total Minutes    Untimed Charges Total Minutes 60  -HF       Total Minutes 60  -HF            User Key  (r) = Recorded By, (t) = Taken By, (c) = Cosigned By    Initials Name Provider Type     Kacey Song CCC-SLP Speech and Language Pathologist                Therapy Charges for Today     Code Description Service Date Service Provider Modifiers Qty    [id number] HC ST EVAL SPEECH AND PROD W LANG  4 3/18/2023 Kacey Song CCC-SLP GN 1                     DEAN Kramer  3/18/2023

## 2023-03-18 NOTE — PLAN OF CARE
Goal Outcome Evaluation:  Plan of Care Reviewed With: patient        Progress: improving  Outcome Evaluation: Patient rested well this shift.  Pamela @.  All meds whole w/thins.  Scheduled tylenol for pain.  No prn pain meds this shift.  Cont of bowel this shift.  Spouse at bedside.

## 2023-03-18 NOTE — PLAN OF CARE
Goal Outcome Evaluation:           Progress: improving  Outcome Evaluation: A&OX4. Intermittent confusion and disorientation. Difficulty with word-finding. Idiopathic peripheral neuropathy. Full code. Hx. chronic anemia and seizures. Daily weight. 1L O2 at all times. Assistx2 with the wheelchair. Very weak in the legs. Continent and incontinent of B&B. Last BM todayx2. Wears a brief. Purewick at night. On scheduled gabapentin and tylenol. Has PRN Aura and ativan. 20G IV access in R. arm. Seizure precautions. Siderails padded. On-going pain is an issue. Pain meds help. Diet: Reg, thins. Not eating much at meals. Is drinking boost shakes. Did not sleep well last night. Has napped on and off in between and after therapies.

## 2023-03-18 NOTE — PROGRESS NOTES
Inpatient Rehabilitation Functional Measures Assessment and Plan of Care    Plan of Care  Updated Problems/Interventions  Self Care    [OT] Bathing(Active)  Current Status(03/18/2023): Mod A x1  Weekly Goal(03/25/2023): Min A x1  Discharge Goal: CGA    [OT] Dressing (Lower)(Active)  Current Status(03/18/2023): Dep  Weekly Goal(03/25/2023): Max  Discharge Goal: Min    [OT] Dressing (Upper)(Active)  Current Status(03/18/2023): Mod  Weekly Goal(03/25/2023): Min  Discharge Goal: S/S    [OT] Eating(Active)  Current Status(03/18/2023): Min  Weekly Goal(03/25/2023): S/S  Discharge Goal: Mod I    [OT] Grooming(Active)  Current Status(03/18/2023): Min A  Weekly Goal(03/25/2023): S/S  Discharge Goal: Mod I    [OT] Toileting(Active)  Current Status(03/18/2023): Dep  Weekly Goal(03/25/2023): Max  Discharge Goal: Min        Mobility    [OT] Tub/Shower Transfers(Active)  Current Status(03/18/2023): Max A x2  Weekly Goal(03/25/2023): Mod A x2  Discharge Goal: CGA    [OT] Bed/Chair/Wheelchair(Active)  Current Status(03/18/2023): Max A x2  Weekly Goal(03/25/2023): Mod A x2  Discharge Goal: CGA/Min A x1    [OT] Toilet Transfers(Active)  Current Status(03/18/2023): Max A x2  Weekly Goal(03/25/2023): Mod A x2  Discharge Goal: CGA/Min A x1    Functional Measures  BA Eating:  Branch  BA Grooming: Branch  BA Bathing:  Branch  BA Upper Body Dressing:  Branch  BA Lower Body Dressing:  Branch  BA Toileting:  Branch    BA Bladder Management  Level of Assistance:  Branch  Frequency/Number of Accidents this Shift:  Branch    BA Bowel Management  Level of Assistance: Branch  Frequency/Number of Accidents this Shift: Branch    BA Bed/Chair/Wheelchair Transfer:  Branch  BA Toilet Transfer:  Branch  BA Tub/Shower Transfer:  Branch    Previously Documented Mode of Locomotion at Discharge: Field  BA Expected Mode of Locomotion at Discharge: Branch  BA Walk/Wheelchair:  Branch  BA Stairs:  Branch    BA Comprehension:  Branch  BA  Expression:  Branch  [facility] Social Interaction:  Branch  [facility] Problem Solving:  Branch  [facility] Memory:  Branch    Therapy Mode Minutes  Occupational Therapy: Branch  Physical Therapy: Branch  Speech Language Pathology:  Branch    Signed by: La Carr OT

## 2023-03-18 NOTE — PROGRESS NOTES
SECTION GG    Eating Performance: [place] provides verbal cues or touching/steadying assistance  as patient completes activity.    Eating Discharge Goals: Patient completed the activities by themself with no  assistance from a helper.    Section B. Hearing and Vision  Ability to Hear:  Adequate - no difficulty in normal conversation, social  interaction, listening to TV  Ability to See in Adequate Light:  Adequate - sees fine detail, such as regular  print in newspapers/books    Section B. Health Literacy  Frequency of Needing Assistance Reading:  Rarely    Section D. Mood  Presence of little interest or pleasure in doing things:   Yes  Frequency of having little interest or pleasure in doing things:   Never or 1  day  Presence of feeling down, depressed, or hopeless:   No  Frequency of feeling down, depressed, or hopeless:   Never or 1 day   Interview Ended. Above responses do not meet criteria to continue.  Total Severity Score:   0    Section D. Social Isolation  Frequecy of Feeling Lonely or Isolated:  Never    Section J. Health Conditions (Pain Effect on Sleep)  Pain Effect on Sleep:   Frequently    Signed by: Dayanna Serna RN

## 2023-03-18 NOTE — PLAN OF CARE
Goal Outcome Evaluation:  Plan of Care Reviewed With: patient        Progress: no change  Outcome Evaluation: admitted to [facility].  Alert oriented, forgetful.  Spouse at bedside

## 2023-03-18 NOTE — PROGRESS NOTES
"[facility] Clinical Pharmacy Services: Vancomycin Monitoring Note    Louise GARCIA is a 58 y.o. female who is on day 15/28 of pharmacy to dose vancomycin for  empyema .     Previous Vancomycin Dose:   1000 mg IV every  24  hours  Updated Cultures and Sensitivities:   -3/8 pleural cavity fluid: 1+ MRSA      Results from last 7 days   Lab Units 03/15/23  2044 03/12/23  2027   VANCOMYCIN TR mcg/mL 17.20 17.10     Vitals/Labs  Ht: 157.5 cm (62\"); Wt: 63.8 kg (140 lb 10.5 oz)   Temp Readings from Last 1 Encounters:   03/18/23 98.3 °F (36.8 °C) (Oral)     Estimated Creatinine Clearance: 39.1 mL/min (A) (by C-G formula based on SCr of 1.36 mg/dL (H)).       Results from last 7 days   Lab Units 03/18/23  0352 03/17/23  0526 03/16/23  0517   CREATININE mg/dL 1.36* 1.12* 1.17*   WBC 10*3/mm3 12.73* 13.59* 11.74*     Assessment/Plan    -Scr increased this am slightly to 1.36. Will check trough earlier than originally intended to reassess.    Current Vancomycin Dose: 1000 mg IV every  24  hours; provides a predicted  mg/L.hr   Next Level Date and Time: Vanc Trough on 3/18 at 2100  We will continue to monitor patient changes and renal function     Thank you for involving pharmacy in this patient's care. Please contact pharmacy with any questions or concerns.       Barber Mcneill [facility]  Clinical Pharmacist          "

## 2023-03-18 NOTE — THERAPY EVALUATION
Inpatient Rehabilitation - Physical Therapy Initial Evaluation       [facility]     Patient Name: Louise GARCIA  : 1965  MRN: [id number]    Today's Date: 3/18/2023                    Admit Date: 3/17/2023      Visit Dx:     ICD-10-CM ICD-9-CM   1. 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)   • MRSA bacteremia   • Idiopathic peripheral neuropathy       Past Medical History:   Diagnosis Date   • Degenerative disc disease, cervical    • Gestational diabetes    • H/O blood clots    • Hematemesis    • Seizures ([facility])    • Septic shock (HCC)        Past Surgical History:   Procedure Laterality Date   • APPENDECTOMY     • BACK SURGERY     • CHOLECYSTECTOMY     • ENDOSCOPY N/A 2016    Procedure: ESOPHAGOGASTRODUODENOSCOPY with biopsy;  Surgeon: Austen Brito MD;  Location: [facility] ENDOSCOPY;  Service:    • HYSTERECTOMY     • NECK SURGERY       approx 6 yrs ago   • THORACOSCOPY Right 3/8/2023    Procedure: THORACOSCOPY WITH DAVINCI ROBOT WITH COMPLETE DECORTICATION;  Surgeon: Chloe Cedillo MD;  Location: [facility] MAIN OR;  Service: Robotics - DaVinci;  Laterality: Right;   • TONSILLECTOMY     • TONSILLECTOMY AND ADENOIDECTOMY         PT ASSESSMENT (last 12 hours)     IRF PT Evaluation and Treatment     Row Name 23 1235          PT Time and Intention    Document Type initial evaluation  -     Mode of Treatment physical therapy  -     Patient/Family/Caregiver  Comments/Observations Pt supinein bed upon arrival in both am and pm session.  In am, pt alert and more oriented than in pm.  Pt on 2 L O2 throughout session.  -     Row Name 03/18/23 1235          General Information    Patient Profile Reviewed yes  -     General Observations of Patient Pt required inc assist in pm and had min more difficulty following commands.  Noatably very tired but nursing reported that she had been asleep since OT finished at 10:00.  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  Unable to give job history prior to that.  NOted spouse is home and can assist.  -     Row Name 03/18/23 1235          Living Environment    Current Living Arrangements home  -     Home Accessibility stairs to enter home  -     People in Home spouse  -     Row Name 03/18/23 1235          Home Main Entrance    Number of Stairs, Main Entrance three  Pt noted three, chart noted one.  -     Stair Railings, Main Entrance railings on both sides of stairs  per pt, she can reach both.  -     Row Name 03/18/23 1235          Home Use of Assistive/Adaptive Equipment    Equipment Currently Used at Home none  -     Row Name 03/18/23 1235          Pain Assessment    Pretreatment Pain Rating 7/10  -     Posttreatment Pain Rating 7/10  -     Pain Location - Side/Orientation Bilateral  -     Pre/Posttreatment Pain Comment pain  in B groun/upper thigh and lower leg. - burning/aching.  -     Row Name 03/18/23 1235          Cognition/Psychosocial    Affect/Mental Status (Cognition) confused;flat/blunted affect  -     Orientation Status (Cognition) oriented to;person;place  -     Follows Commands (Cognition) follows one-step commands;50-74% accuracy;increased processing time needed;delayed response/completion;repetition of directions required  -     Personal Safety Interventions fall prevention program maintained;gait belt;nonskid shoes/slippers when out of bed;supervised activity  -     Cognitive Function memory deficit;safety deficit  -     Memory Deficit (Cognition) episodic memory;procedural memory  -     Safety Deficit (Cognition) ability to follow commands;safety precautions follow-through/compliance  -     Comment, Cognition Cognition worse in pm vs am.  -     Row Name 03/18/23 1235          Range of Motion Comprehensive    Comment, General Range of Motion B LE grossly WFL noted with function and AAROM  Pt unable to move either LE through full ROM against gravity.  R LE with better AROM than L LE  -[place] [place] Name 03/18/23 1235          Strength Comprehensive (MMT)    Comment, General Manual Muscle Testing (MMT) Assessment Difficult to assess MMT due to cognition and pain with touch. pt B LE , 3/5.  R LE stronger than L LE.  Pt able to perform HS with R LE through 60% of ROM .  Unable to perform on L.  No active movt noted at ankle.  -     Row Name 03/18/23 1235          Sensory    Additional Documentation Sensory Assessment (Somatosensory) (Group)  -     Row Name 03/18/23 1235          Sensory Assessment (Somatosensory)    Bilateral LE Sensory Assessment light touch awareness;proprioception;intact  -     Sensory Assessment pt noted tingling in feet  -     Row Name 03/18/23 1235          Bed Mobility    Bed Mobility rolling left;rolling right;scooting/bridging  -     Rolling Left [place] (Bed Mobility)  minimum assist (75% patient effort);verbal cues;set up  -KP     Rolling Right [place] (Bed Mobility) minimum assist (75% patient effort);verbal cues;set up  -KP     Scooting/Bridging [place] (Bed Mobility) 2 person assist;maximum assist (25% patient effort)  -     Supine-Sit [place] (Bed Mobility) moderate assist (50% patient effort);verbal cues;set up  -KP     Sit-Supine [place] (Bed Mobility) moderate assist (50% patient effort);verbal cues;set up  -KP     Bed Mobility, Safety Issues cognitive deficits limit understanding;decreased use of arms for pushing/pulling;decreased use of legs for bridging/pushing;impaired trunk control for bed mobility  -     Assistive Device (Bed Mobility) bed rails;head of bed elevated  -     Comment, (Bed Mobility) unable to tolerated minimal WS in sitting at EOB without LOB.  -     Row Name 03/18/23 1235          Transfer Assessment/Treatment    Transfers bed-chair transfer;chair-bed transfer;sit-stand transfer;stand-sit transfer;toilet transfer  -     Row Name 03/18/23 1235          Bed-Chair Transfer    Bed-Chair [place] (Transfers) moderate assist (50% patient effort);2 person assist;verbal cues;set up  -KP     Assistive Device (Bed-Chair Transfers) wheelchair  -     Comment, (Bed-Chair Transfer) x 2  -KP     Row Name 03/18/23 1235          Chair-Bed Transfer    Chair-Bed [place] (Transfers) moderate assist (50% patient effort);2 person assist;verbal cues;set up  -KP     Assistive Device (Chair-Bed Transfers) wheelchair  -     Comment, (Chair-Bed Transfer) x 2  -KP     Row Name 03/18/23 1235          Sit-Stand Transfer    Sit-Stand [place] (Transfers) moderate assist (50% patient effort);2 person assist;verbal cues;set up  -KP     Assistive Device (Sit-Stand Transfers) parallel bars;wheelchair  -     Comment, (Sit-Stand Transfer) stood in parallel bars  x 1 min.  Mod guarding at B Knees and hips., Min MC at trunk for ext.  -KP      Row Name 03/18/23 1235          Stand-Sit Transfer    Stand-Sit [place] (Transfers) moderate assist (50% patient effort);2 person assist;verbal cues;set up  -KP     Assistive Device (Stand-Sit Transfers) wheelchair  -KP     Comment, (Stand-Sit Transfer) to control descent  -KP     Row Name 03/18/23 1235          Toilet Transfer    Type (Toilet Transfer) squat pivot  -KP     [place] Level (Toilet Transfer) minimum assist (75% patient effort);2 person assist  -KP     Assistive Device (Toilet Transfer) commode, 3-in-1;walker, front-wheeled  -KP     Comment, (Toilet Transfer) max MC at knees to prevent buckling.  Max Vc to encourage WB  -KP     Row Name 03/18/23 1235          Gait/Stairs (Locomotion)    [place] Level (Gait) moderate assist (50% patient effort);maximum assist (25% patient effort);2 person assist;verbal cues;set up  -KP     Assistive Device (Gait) parallel bars  -KP     Distance in Feet (Gait) 6  -KP     Pattern (Gait) step-through  -KP     Deviations/Abnormal Patterns (Gait) base of support, narrow;federico decreased;gait speed decreased;stride length decreased;weight shifting decreased  -KP     Bilateral Gait Deviations foot drop/toe drag;forward flexed posture;heel strike decreased;knee buckling bilaterally;knee hyperextension  -     Gait Assessment/Intervention dec flexion noted in swing throughout LEs, inc stance time B and time in dual stance. MC at hip for ext, light guarding at B knees.  Vc for sequence.  -KP     Row Name 03/18/23 1235          Balance    Static Sitting Balance minimal assist;contact guard  -KP     Position, Sitting Balance sitting edge of bed  -KP     Sit to Stand Dynamic Balance maximum assist;moderate assist;2-person assist  -KP     Static Standing Balance moderate assist;2-person assist  -KP     Row Name 03/18/23 1235          Positioning and Restraints    Pre-Treatment Position in bed  -KP     Post Treatment Position wheelchair  -KP     In Wheelchair  sitting;call light within reach;encouraged to call for assist;with OT  in am  -     Row Name 03/18/23 1235          Therapy Assessment/Plan (PT)    Patient's Goals For Discharge return home;return to all previous roles/activities  -     Row Name 03/18/23 1235          Therapy Assessment/Plan (PT)    Functional Level at Time of Evaluation (PT) max A x 2  -     PT Diagnosis (PT) Impaired functional mobilty  -     Rehab Potential/Prognosis (PT) good, to achieve stated therapy goals  -     Frequency of Treatment (PT) 60 minutes per session;5 times per week  -     Estimated Duration of Therapy (PT) 4 weeks  -     Problem List (PT) problems related to;balance;cognition;coordination;mobility;muscle tone;range of motion (ROM);strength;sensation;pain;postural control;communication  -     Activity Limitations Related to Problem List (PT) unable to ambulate safely;unable to transfer safely  -     Comment, Therapy Assessment/Plan (PT) Pt is a 59 yo female who present to PT for evaluation following prolonged hospitalization for treatmen of PNA.  Pt presents with dx of IS and shows impairments of pain, dec AROM, dec strength, impaired sensation, dec balance, dec postural control, dec motor control, and dec cognition, all of which are contributing to an overall decrease in functional indep.  Pt would benefit from skilled PT to address impariments and facilitate pts return home to care of  at [place] functioanl level.  Pt has comorbidities of seizure hx, cervical disc disease, and blood clots which may impact pt POC.  -     Row Name 03/18/23 1235          Therapy Plan Review/Discharge Plan (PT)    Anticipated Equipment Needs at Discharge (PT Eval) --  TBD  -     Row Name 03/18/23 1235          IRF PT Goals    Bed Mobility Goal Selection (PT-IRF) bed mobility, PT goal 1;bed mobility, PT goal 2  -     Transfer Goal Selection (PT-IRF) transfers, PT goal 1;transfers, PT goal 2;transfers, PT goal 3;transfers,  PT goal 4  -KP     Gait (Walking Locomotion) Goal Selection (PT-IRF) gait, PT goal 1;gait, PT goal 2  -KP     Stairs Goal Selection (PT-IRF) stairs, PT goal 2;stairs, PT goal 1  -KP     Strength Goal Selection (PT-IRF) strength, PT goal 1 (free text)  -KP     Balance Goal Selection (PT) balance, PT goal 1  -KP     Row Name 03/18/23 1235          Bed Mobility Goal 1 (PT-IRF)    Activity/Assistive Device (Bed Mobility Goal 1, PT-IRF) bed mobility activities, all  -KP     [place] Level (Bed Mobility Goal 1, PT-IRF) minimum assist (75% or more patient effort)  -KP     Time Frame (Bed Mobility Goal 1, PT-IRF) short-term goal (STG);2 weeks  -KP     Progress/Outcomes (Bed Mobility Goal 1, PT-IRF) goal ongoing  -     Row Name 03/18/23 1235          Bed Mobility Goal 2 (PT-IRF)    Activity/Assistive Device (Bed Mobility Goal 2, PT-IRF) bed mobility activities, all  -KP     [place] Level (Bed Mobility Goal 2, PT-IRF) supervision required  -KP     Time Frame (Bed Mobility Goal 2, PT-IRF) long-term goal (LTG);4 weeks  -KP     Progress/Outcomes (Bed Mobility Goal 2, PT-IRF) goal ongoing  -     Row Name 03/18/23 1231          Transfer Goal 1 (PT-IRF)    Activity/Assistive Device (Transfer Goal 1, PT-IRF) bed-to-chair/chair-to-bed  -KP     [place] Level (Transfer Goal 1, PT-IRF) minimum assist (75% or more patient effort)  -KP     Time Frame (Transfer Goal 1, PT-IRF) short-term goal (STG);2 weeks  -KP     Progress/Outcomes (Transfer Goal 1, PT-IRF) goal ongoing  -KP     Row Name 03/18/23 1238          Transfer Goal 2 (PT-IRF)    Activity/Assistive Device (Transfer Goal 2, PT-IRF) all transfers  AAD  -KP     [place] Level (Transfer Goal 2, PT-IRF) supervision required  -KP     Time Frame (Transfer Goal 2, PT-IRF) long-term goal (LTG);4 weeks  -KP     Progress/Outcomes (Transfer Goal 2, PT-IRF) goal ongoing  -     Row Name 03/18/23 1237          Transfer Goal 3 (PT-IRF)    Activity/Assistive Device  (Transfer Goal 3, PT-IRF) car transfer  AAD  -KP     [place] Level (Transfer Goal 3, PT-IRF) contact guard required  -KP     Time Frame (Transfer Goal 3, PT-IRF) 4 weeks;long-term goal (LTG)  -KP     Progress/Outcomes (Transfer Goal 3, PT-IRF) goal ongoing  -     Row Name 03/18/23 1235          Gait/Walking Locomotion Goal 1 (PT-IRF)    Activity/Assistive Device (Gait/Walking Locomotion Goal 1, PT-IRF) gait (walking locomotion);walker, rolling  -KP     Gait/Walking Locomotion Distance Goal 1 (PT-IRF) 20  -KP     [place] Level (Gait/Walking Locomotion Goal 1, PT-IRF) moderate assist (50-74% patient effort);tactile cues required  x 2  -KP     Time Frame (Gait/Walking Locomotion Goal 1, PT-IRF) 2 weeks;short-term goal (STG)  -KP     Progress/Outcomes (Gait/Walking Locomotion Goal 1, PT-IRF) goal ongoing  -     Row Name 03/18/23 1235          Gait/Walking Locomotion Goal 2 (PT-IRF)    Activity/Assistive Device (Gait/Walking Locomotion Goal 2, PT-IRF) gait (walking locomotion);walker, rolling  or AAD  -KP     Gait/Walking Locomotion Distance Goal 2 (PT-IRF) 150  -KP     [place] Level (Gait/Walking Locomotion Goal 2, PT-IRF) contact guard required  -KP     Time Frame (Gait/Walking Locomotion Goal 2, PT-IRF) long-term goal (LTG);4 weeks  -KP     Progress/Outcomes (Gait/Walking Locomotion Goal 2, PT-IRF) goal ongoing  -     Row Name 03/18/23 1235          Stairs Goal 1 (PT-IRF)    Activity/Assistive Device (Stairs Goal 1, PT-IRF) stairs, all skills;using handrail, left;using handrail, right  -KP     Number of Stairs (Stairs Goal 1, PT-IRF) 3  -KP     [place] Level (Stairs Goal 1, PT-IRF) contact guard required  -KP     Progress/Outcomes (Stairs Goal 1, PT-IRF) goal ongoing  -     Row Name 03/18/23 1235          Strength Goal 1 (PT-IRF)    Strength Goal 1 (PT-IRF) Pt to demonstrate B LE strength of at leat 3/5 throughout.  -KP     Time Frame (Strength Goal 1, PT-IRF) long-term goal (LTG);4 weeks   -KP     Progress/Outcomes (Strength Goal 1, PT-IRF) goal ongoing  -KP     Row Name 03/18/23 1235          Balance Goal 1 (PT)    Activity/Assistive Device (Balance Goal 1, PT) sitting, static  -KP     [place] Level/Cues Needed (Balance Goal 1, PT) conditional independence  -KP     Time Frame (Balance Goal 1, PT) short term goal (STG);2 weeks  -KP     Progress/Outcomes (Balance Goal 1, PT) goal ongoing  -KP           User Key  (r) = Recorded By, (t) = Taken By, (c) = Cosigned By    Initials Name Provider Type    Yolanda Morgan, PT Physical Therapist              Wound 03/08/23 1917 Right lateral chest Incision (Active)   Dressing Appearance open to air 03/18/23 0813   Closure Liquid skin adhesive 03/18/23 0813   Base clean;dry 03/18/23 0813   Drainage Amount none 03/18/23 0813   Dressing Care open to air 03/18/23 0813       Wound 03/11/23 1530 Other (See comments) anterior thigh Abrasion (Active)   Dressing Appearance open to air 03/18/23 0813   Closure Open to air 03/17/23 2235   Periwound dry;intact 03/18/23 0813   Periwound Temperature warm 03/18/23 0813   Dressing Care open to air 03/18/23 0813   Periwound Care dry periwound area maintained 03/18/23 0813       Wound 03/11/23 1530 medial sacral spine Pressure Injury (Active)   Wound Image   03/17/23 2235   Dressing Appearance open to air 03/18/23 0813   Closure Open to air 03/18/23 0813   Base pink 03/18/23 0813   Care, Wound cleansed with;soap and water 03/18/23 0813       Wound 03/11/23 1530 Other (See comments) other (see comments) pubis Abrasion (Active)   Dressing Appearance open to air 03/18/23 0813   Closure Open to air 03/18/23 0813   Base red;scab 03/18/23 0813   Dressing Care open to air 03/18/23 0813   Periwound Care dry periwound area maintained 03/18/23 0813     Physical Therapy Education     Title: PT OT SLP Therapies (In Progress)     Topic: Physical Therapy (In Progress)     Point: Mobility training (Done)     Learning Progress Summary            Patient Acceptance, E,TB, VU,NR by  at 3/18/2023 1643                   Point: Home exercise program (Not Started)     Learner Progress:  Not documented in this visit.          Point: Body mechanics (Not Started)     Learner Progress:  Not documented in this visit.          Point: Precautions (Not Started)     Learner Progress:  Not documented in this visit.                      User Key     Initials Effective Dates Name Provider Type [facility] 06/16/21 -  Yolanda Shannon PT Physical Therapist PT                PT Recommendation and Plan    Planned Therapy Interventions (PT): balance training, bed mobility training, gait training, home exercise program, motor coordination training, neuromuscular re-education, patient/family education, postural re-education, stair training, strengthening, stretching, transfer training  Frequency of Treatment (PT): 60 minutes per session, 5 times per week  Anticipated Equipment Needs at Discharge (PT Eval):  (TBD)                  Time Calculation:      PT Charges     Row Name 03/18/23 1644 03/18/23 1244          Time Calculation    Start Time 1230  - 0830  -     Stop Time 1300  - 0900  -     Time Calculation (min) 30 min  - 30 min  -     PT Received On -- 03/18/23  -     PT - Next Appointment -- 03/20/23  -           User Key  (r) = Recorded By, (t) = Taken By, (c) = Cosigned By    Initials Name Provider Type     Yolanda Shannon PT Physical Therapist                Therapy Charges for Today     Code Description Service Date Service Provider Modifiers Qty    18496012769  PT EVAL MOD COMPLEXITY 3 3/18/2023 Yolanda Shannon, PT GP 1    [id number]  PT THERAPEUTIC ACT EA 15 MIN 3/18/2023 Yolanda Shannon, PT GP 1    [id number]  PT THER PROC EA 15 MIN 3/18/2023 Yolanda Shannon, PT GP 1        Patient was intermittently wearing a face mask during this therapy encounter. Therapist used appropriate personal protective equipment including mask  and gloves.  Mask used was standard procedure mask. Appropriate PPE was worn during the entire therapy session. Hand hygiene was completed before and after therapy session. Patient is not in enhanced droplet precautions.              Yolanda Shannon, PT  3/18/2023

## 2023-03-18 NOTE — PROGRESS NOTES
[facility] Clinical Pharmacy Services: Vancomycin Pharmacokinetic Initial Consult Note    Louise GARCIA is a 58 y.o. female who is on day 1/15 of pharmacy to dose vancomycin.    Indication: Bacteremia  Consulting Provider: Dr Pavel Robbins  Planned Duration of Therapy: 15 days  Target: -600 mg/L.hr   Other Antimicrobials:      Vitals/Labs  Ht:  ; Wt:    Temp Readings from Last 1 Encounters:   03/17/23 98.1 °F (36.7 °C) (Oral)    Estimated Creatinine Clearance: 49.2 mL/min (A) (by C-G formula based on SCr of 1.12 mg/dL (H)).        Results from last 7 days   Lab Units 03/17/23  0526 03/16/23  0517 03/15/23  0452   CREATININE mg/dL 1.12* 1.17* 1.15*   WBC 10*3/mm3 13.59* 11.74* 12.45*     Assessment/Plan:    1. Vancomycin Dose:  Will continue Vancomycin 1000 mg IV every  24  hours from [facility].  2. Predictive AUC level for the dose ordered is 531 mg/L.hr, which is within the target of 400-600 mg/L.hr  3. Vanc Trough has been ordered for 03/19/2023 at 2100     Pharmacy will follow patient's kidney function and will adjust doses and obtain levels as necessary. Thank you for involving pharmacy in this patient's care. Please contact pharmacy with any questions or concerns.                           Acosta Walls [facility]  Clinical Pharmacist

## 2023-03-18 NOTE — PROGRESS NOTES
Inpatient Rehabilitation Plan of Care Note    Plan of Care  Care Plan Reviewed - Updates as Follows    Sphincter Control    [RN] Bladder Management(Active)  Current Status(03/18/2023): Bladder urgency  Weekly Goal(03/24/2023): decrease in episodes of incontinence  Discharge Goal: Continent 100%    [RN] Bowel Management(Active)  Current Status(03/18/2023): Patient is 100% continent of bowel  Weekly Goal(03/24/2023): Patient will maintain a daily bowel pattern.  Discharge Goal: Patient will maintain 100% continence of bowel        Psychosocial    [RN] Coping/Adjustment(Active)  Current Status(03/18/2023): Anxiety r/t uncertainty of disease course  Weekly Goal(03/24/2023): Allow opportunity to express concerns regarding current  status  Discharge Goal: Patient/family will verbalize decreased feelings of anxiety.        Body Systems    [RN] Neurological(Active)  Current Status(03/18/2023): Impaired physical mobility r/t decreased muscle  strength/control and limited ROM  Weekly Goal(03/24/2023): Patient will have increased ROM  Discharge Goal: Patient will have improved strength and function of BLE and B  hands.        Safety    [RN] Potential for Injury(Active)  Current Status(03/18/2023): Risk for falls  Weekly Goal(03/24/2023): Family/Caregivers regarding safety precautions and need  for close supervision  Discharge Goal: Patient/family will be aware of risk of fall and safety in the  home setting.    Signed by: Helen Weston RN

## 2023-03-18 NOTE — PROGRESS NOTES
"Section B. Hearing, Speech, Vision: Expression of Ideas and Wants: Exhibits some  difficulty with expressing needs and ideas (e.g., some words or finishing  thoughts) or speech is not clear.  Understanding Verbal and Non-Verbal Content: Usually Understands: Understands  most conversations, but misses some part/intent of message. Requires cues at  times to understand.    Section C. Cognitive Patterns: Brief Interview for Mental Status (BIMS) was  conducted.  Repetition of Three Words: Two words  Able to report correct year: Correct  Able to report correct month: Accurate within 5 days  Able to report correct day of the week: Correct  Able to recall \"sock\": No, could not recall  Able to recall \"blue\": No, could not recall  Able to recall \"bed\": No, could not recall    BIMS SUMMARY SCORE: 8 Moderately impaired Patient was able to complete the Brief  Interview for Mental Status    Section C. Signs and Symptoms of Delirium (from CAM): Evidence of Acute Change  in Mental Status:   Yes  Inattention: Behavior present, fluctuates (comes and goes, changes in severity)  Thinking Disorganized or Incoherent:   Behavior present, fluctuates (comes and  goes, changes in severity)  Altered Level of Consciousness:   Behavior not present    Signed by: Kacey Song, Speech Therapist    "

## 2023-03-18 NOTE — THERAPY EVALUATION
Inpatient Rehabilitation - Occupational Therapy Initial Evaluation    [facility]     Patient Name: Louise GARCIA  : 1965  MRN: [id number]    Today's Date: 3/18/2023                 Admit Date: 3/17/2023       No diagnosis found.    Patient Active Problem List   Diagnosis   • Sepsis (HCC)   • Neck pain, chronic   • Upper back pain   • Paresthesia   • Cervical myelopathy (HCC)   • Lower extremity weakness   • History of blood clots   • Chronic anticoagulation   • Seizures (HCC)   • Normocytic anemia   • GERD (gastroesophageal reflux disease)   • Guillain-Roosevelt syndrome (HCC)   • Situational mixed anxiety and depressive disorder   • Mass of middle lobe of right lung   • Oral candidiasis   • Empyema of pleura (HCC)   • MRSA bacteremia   • Idiopathic peripheral neuropathy       Past Medical History:   Diagnosis Date   • Degenerative disc disease, cervical    • Gestational diabetes    • H/O blood clots    • Hematemesis    • Seizures (HCC)    • Septic shock (HCC)        Past Surgical History:   Procedure Laterality Date   • APPENDECTOMY     • BACK SURGERY     • CHOLECYSTECTOMY     • ENDOSCOPY N/A 2016    Procedure: ESOPHAGOGASTRODUODENOSCOPY with biopsy;  Surgeon: Austen Brito MD;  Location: [facility] ENDOSCOPY;  Service:    • HYSTERECTOMY     • NECK SURGERY       approx 6 yrs ago   • THORACOSCOPY Right 3/8/2023    Procedure: THORACOSCOPY WITH DAVINCI ROBOT WITH COMPLETE DECORTICATION;  Surgeon: Chloe Cedillo MD;  Location: [facility] OR;  Service: Robotics - DaVinci;  Laterality: Right;   • TONSILLECTOMY     • TONSILLECTOMY AND ADENOIDECTOMY               [facility] OT ASSESSMENT FLOWSHEET (last 12 hours)     IRF OT Evaluation and Treatment     Row Name 23 1532          OT Time and Intention    Document Type initial evaluation  -RB     Mode of Treatment individual therapy;occupational therapy  -RB     Patient Effort adequate  -RB     Symptoms Noted During/After Treatment fatigue;increased pain   Please call patient and see who is prescribing the medication because it is not me. It may be her cardiologist, Dr. Chaidez. She had been on it the last few times I had seen her but had not reported taking it when I first met her so not sure if it was added at some point as she was not certain during our visit per my note. I would recommend she call the provider who has been prescribing the medication to clarify if she is supposed to be taking it but from my standpoint it is okay to continue as long as her BP is not low <100.   -RB     Row Name 03/18/23 1532          General Information    Patient Profile Reviewed yes  -RB     Existing Precautions/Restrictions fall  -RB     Row Name 03/18/23 1532          Previous Level of Function/Home Environm    Bathing/Grooming, Premorbid Functional Level independent  -RB     Dressing, Premorbid Functional Level independent  -RB     Eating/Feeding, Premorbid Functional Level independent  -RB     Toileting, Premorbid Functional Level independent  -RB     BADLs, Premorbid Functional Level independent  -RB     IADLs, Premorbid Functional Level independent  -RB     Bed Mobility, Premorbid Functional Level independent  -RB     Transfers, Premorbid Functional Level independent  -RB     Household Ambulation, Premorbid Functional Level independent  -RB     Stairs, Premorbid Functional Level independent  -RB     Community Ambulation, Premorbid Functional Level independent  -RB     Row Name 03/18/23 1532          Living Environment    Current Living Arrangements home  -RB     People in Home spouse  -RB     Row Name 03/18/23 1532          Home Use of Assistive/Adaptive Equipment    Equipment Currently Used at Home none  -RB     Row Name 03/18/23 1532          Pain Assessment    Pretreatment Pain Rating 1/10  -RB     Posttreatment Pain Rating 5/10  -RB     Pain Location - Side/Orientation Bilateral  -RB     Pain Location lower  -RB     Pain Location - extremity  -RB     Row Name 03/18/23 1532          Pain Scale: Word Pre/Post-Treatment    Pain Intervention(s) Medication (See MAR)  -RB     Row Name 03/18/23 1532          Cognition/Psychosocial    Affect/Mental Status (Cognition) confused;emotionally labile;flat/blunted affect;low arousal/lethargic  RN notified post session and at bedside  -RB     Orientation Status (Cognition) oriented to;person;place;situation  -RB     Comment, Cognition Pt became very lethargic and had difficulty getting words out at the end of the therapy session - RN notified and at bedside.   -RB     Cognitive Interventions/Strategies occupation/activity based interventions;process/task specific training  -RB     Row Name 03/18/23 1532          Range of Motion (ROM)    Range of Motion bilateral upper extremities  -RB     Row Name 03/18/23 1532          Range of Motion Comprehensive    General Range of Motion lower extremity range of motion deficits identified  -RB     Comment, General Range of Motion BUE WFL proximally/mid arms. Wrists and hands very weak and impaired coordination present  -     Row Name 03/18/23 1532          Strength (Manual Muscle Testing)    Strength (Manual Muscle Testing) --  3+/5 BUE strength  -RB     [facility] Name 03/18/23 1532          Strength Comprehensive (MMT)    Comment, General Manual Muscle Testing (MMT) Assessment BUE strength grossly 3+/5 mid-proximal arms. 3/5  strength  -[facility] Name 03/18/23 1532          Sensory    Additional Documentation --  B/L hand numbness present  -     Row Name 03/18/23 1532          Vision Assessment/Intervention    Visual Impairment/Limitations WFL  -[facility] Name 03/18/23 1532          Basic Activities of Daily Living (BADLs)    Basic Activities of Daily Living bathing;upper body dressing;lower body dressing;grooming;toileting  -RB     Row Name 03/18/23 1532          Bathing    [place] Level (Bathing) moderate assist (50% patient effort);lower body;upper body;upper extremities;chest/trunk;distal lower extremities/feet;proximal lower extremities;perineal area;maximum assist (25% patient effort);verbal cues  -RB     Position (Bathing) sink side;supported sitting  -RB     Set-up Assistance (Bathing) obtain supplies;open containers  -     Row Name 03/18/23 1532          Upper Body Dressing    [place] Level (Upper Body Dressing) doff;don;bra/undergarment;front opening garment;pajama/robe;pull over garment;maximal assist (25-49% patient effort);verbal cues  -RB     Position (Upper Body Dressing) supported sitting;supported  standing  -RB     Set-up Assistance (Upper Body Dressing) obtain clothing  -RB     Row Name 03/18/23 1532          Lower Body Dressing    [place] Level (Lower Body Dressing) pants/bottoms;shoes/slippers;socks;underwear;maximum assist (25% patient effort);verbal cues  -RB     Position (Lower Body Dressing) supported sitting;supported standing  -RB     Set-up Assistance (Lower Body Dressing) don;doff;obtain clothing  -RB     Row Name 03/18/23 1532          Grooming    [place] Level (Grooming) grooming skills;verbal cues;minimum assist (75% patient effort);wash face, hands;oral care regimen  -RB     Position (Grooming) supported sitting  -RB     Set-up Assistance (Grooming) open containers;obtain supplies  -RB     Comment (Grooming) OT also dependently washed the pt's hair at sinkside  -RB     Row Name 03/18/23 1532          Toileting    [place] Level (Toileting) dependent (less than 25% patient effort);verbal cues  -RB     Assistive Device Use (Toileting) grab bar/safety frame  -RB     Position (Toileting) supported sitting;supported standing  -RB     Set-up Assistance (Toileting) obtain supplies;change pad/brief  -RB     Row Name 03/18/23 1532          Bed Mobility    Bed Mobility supine-sit;sit-supine  -RB     Supine-Sit [place] (Bed Mobility) moderate assist (50% patient effort);1 person assist;verbal cues  -RB     Sit-Supine [place] (Bed Mobility) maximum assist (25% patient effort);2 person assist;verbal cues  -RB     Bed Mobility, Safety Issues cognitive deficits limit understanding;decreased use of legs for bridging/pushing  -RB     Assistive Device (Bed Mobility) bed rails  -RB     Comment, (Bed Mobility) Pt became very weak when returning to bed and unable to assist with transfer  -RB     Row Name 03/18/23 1532          Functional Mobility    Functional Mobility- Ind. Level unable to perform;not tested  -RB     Row Name 03/18/23 1532          Transfer Assessment/Treatment    Transfers  sit-stand transfer;stand-sit transfer;toilet transfer;wheelchair transfer  -RB     Row Name 03/18/23 1532          Sit-Stand Transfer    Sit-Stand [place] (Transfers) moderate assist (50% patient effort);maximum assist (25% patient effort);2 person assist;verbal cues  -RB     Assistive Device (Sit-Stand Transfers) walker, front-wheeled  -RB     Row Name 03/18/23 1532          Stand-Sit Transfer    Stand-Sit [place] (Transfers) moderate assist (50% patient effort);maximum assist (25% patient effort);2 person assist;verbal cues  -RB     Assistive Device (Stand-Sit Transfers) walker, front-wheeled  -RB     Row Name 03/18/23 1532          Toilet Transfer    Type (Toilet Transfer) squat pivot  -RB     [place] Level (Toilet Transfer) maximum assist (25% patient effort);2 person assist  -RB     Assistive Device (Toilet Transfer) grab bars/safety frame;commode  -RB     Comment, (Toilet Transfer) Knee buckling present  -RB     Row Name 03/18/23 1532          Wheelchair Transfer    Type (Wheelchair Transfer) stand-sit;sit-stand  -RB     [place] Level (Wheelchair Transfer) maximum assist (25% patient effort);moderate assist (50% patient effort);verbal cues;2 person assist  -RB     Assistive Device (Wheelchair Transfer) walker, front-wheeled  -RB     Row Name 03/18/23 1532          Safety Issues, Functional Mobility    Safety Issues Affecting Function (Mobility) awareness of need for assistance;insight into deficits/self-awareness;judgment;positioning of assistive device;problem-solving;safety precaution awareness;safety precautions follow-through/compliance;sequencing abilities  -RB     Impairments Affecting Function (Mobility) balance;cognition;coordination;endurance/activity tolerance;motor control;pain;postural/trunk control;sensation/sensory awareness;range of motion (ROM);shortness of breath;strength  -RB     Row Name 03/18/23 1532          Motor Skills    Coordination bilateral;upper extremity;minimal  impairment;bimanual skills  -RB     Functional Endurance Poor  -RB     Row Name 03/18/23 1532          Balance    Balance Assessment sitting static balance;sitting dynamic balance;standing static balance  -RB     Static Sitting Balance contact guard;minimal assist  -RB     Dynamic Sitting Balance moderate assist  -RB     Position, Sitting Balance sitting edge of bed;sitting in chair  -RB     Sit to Stand Dynamic Balance maximum assist;moderate assist  -RB     Static Standing Balance maximum assist;2-person assist  -RB     Dynamic Standing Balance maximum assist;2-person assist  -RB     Position/Device Used, Standing Balance walker, front-wheeled  -RB     Balance Interventions occupation based/functional task  -RB     Row Name 03/18/23 1532          Coping Strategies    Trust Relationship/Rapport care explained;choices provided;emotional support provided;empathic listening provided;questions answered;questions encouraged;reassurance provided;thoughts/feelings acknowledged  -RB     Diversional Activities television  -RB     Row Name 03/18/23 1532          Positioning and Restraints    Pre-Treatment Position in bed  -RB     Post Treatment Position bed  -RB     In Bed notified nsg;supine;fowlers;with nsg;encouraged to call for assist;exit alarm on;call light within reach  -RB     Row Name 03/18/23 1532          Vital Signs    Pre SpO2 (%) 97  -RB     O2 Delivery Pre Treatment supplemental O2  -RB     Intra SpO2 (%) 92  -RB     O2 Delivery Intra Treatment room air  -RB     Post SpO2 (%) 95  -RB     O2 Delivery Post Treatment room air  -RB     Pre Patient Position Supine  -RB     Intra Patient Position Standing  -RB     Post Patient Position Supine  -RB     Row Name 03/18/23 1532          Therapy Assessment/Plan (OT)    Patient's Goals For Discharge return home  -RB     Row Name 03/18/23 1532          Therapy Assessment/Plan (OT)    Estimated Duration of Therapy (OT) 4 weeks;3 weeks  -RB     Problem List (OT)  balance;cognition;coordination;mobility;motor control;postural control;pain;strength;sensation;range of motion (ROM)  -RB     Activity Limitations Related to Problem List (OT) unable to ambulate safely;unable to transfer safely;BADLs not performed adequately or safely;IADLs not performed adequately or safely;community activities not performed adequately or safely;home management activity not performed adequately or safely  -RB     Planned Therapy Interventions (OT) activity tolerance training;adaptive equipment training;BADL retraining;cognitive/visual perception retraining;edema control/reduction;functional balance retraining;IADL retraining;wheelchair assessment/training;transfer/mobility retraining;strengthening exercise;ROM/therapeutic exercise;patient/caregiver education/training;occupation/activity based interventions;neuromuscular control/coordination retraining  -RB     Row Name 03/18/23 1532          Evaluation Complexity (OT)    Review Occupational Profile/Medical/Therapy History Complexity expanded/moderate complexity  -RB     Assessment, Occupational Performance/Identification of Deficit Complexity 5 or more performance deficits  -RB     Clinical Decision Making Complexity (OT) comprehensive assessment/high complexity  -RB     Overall Complexity of Evaluation (OT) moderate complexity  -RB     Row Name 03/18/23 1532          Therapy Plan Review/Discharge Plan (OT)    Anticipated Discharge Disposition (OT) home with 24/7 care;home with home health  -RB     Row Name 03/18/23 1532          IRF OT Goals    Bed Mobility Goal Selection (OT-IRF) bed mobility, OT goal 1  -RB     Transfer Goal Selection (OT-IRF) transfers, OT goal 1;transfers, OT goal 2  -RB     Bathing Goal Selection (OT-IRF) bathing, OT goal 1;bathing, OT goal 2  -RB     UB Dressing Goal Selection (OT-IRF) UB dressing, OT goal 1;UB dressing, OT goal 2  -RB     LB Dressing Goal Selection (OT-IRF) LB dressing, OT goal 1;LB dressing, OT goal 2  -RB      Grooming Goal Selection (OT-IRF) grooming, OT goal 1  -RB     Toileting Goal Selection (OT-IRF) toileting, OT goal 1;toileting, OT goal 2  -RB     Strength Goal Selection (OT-IRF) strength, OT goal 1 (free text)  -RB     Balance Goal Selection (OT) balance, OT goal 1  -RB     Endurance Goal Selection (OT) endurance, OT goal 1  -RB     Functional Mobility Goal Selection (OT) functional mobility, OT goal 1  -RB     Row Name 03/18/23 1532          Bed Mobility Goal 1 (OT-IRF)    Activity/Assistive Device (Bed Mobility Goal 1, OT-IRF) bed mobility activities, all  -RB     [place] Level (Bed Mobility Goal 1, OT-IRF) contact guard required  -RB     Time Frame (Bed Mobility Goal 1, OT-IRF) long-term goal (LTG);4 weeks  -RB     Progress/Outcomes (Bed Mobility Goal 1, OT-IRF) continuing progress toward goal  -RB     Row Name 03/18/23 1532          Transfer Goal 1 (OT-IRF)    Activity/Assistive Device (Transfer Goal 1, OT-IRF) all transfers  -RB     [place] Level (Transfer Goal 1, OT-IRF) moderate assist (50-74% patient effort)  -RB     Time Frame (Transfer Goal 1, OT-IRF) short-term goal (STG);2 weeks  -RB     Progress/Outcomes (Transfer Goal 1, OT-IRF) continuing progress toward goal  -RB     Row Name 03/18/23 1532          Transfer Goal 2 (OT-IRF)    Activity/Assistive Device (Transfer Goal 2, OT-IRF) all transfers  -RB     [place] Level (Transfer Goal 2, OT-IRF) contact guard required  -RB     Time Frame (Transfer Goal 2, OT-IRF) long-term goal (LTG);4 weeks  -RB     Progress/Outcomes (Transfer Goal 2, OT-IRF) continuing progress toward goal  -RB     Row Name 03/18/23 1532          Bathing Goal 1 (OT-IRF)    Activity/Device (Bathing Goal 1, OT-IRF) bathing skills, all  -RB     [place] Level (Bathing Goal 1, OT-IRF) minimum assist (75% or more patient effort)  -RB     Time Frame (Bathing Goal 1, OT-IRF) short-term goal (STG);2 weeks  -RB     Progress/Outcomes (Bathing Goal 1, OT-IRF) continuing  progress toward goal  -RB     Row Name 03/18/23 1532          Bathing Goal 2 (OT-IRF)    Activity/Device (Bathing Goal 2, OT-IRF) bathing skills, all  -RB     [place] Level (Bathing Goal 2, OT-IRF) contact guard required  -RB     Time Frame (Bathing Goal 2, OT-IRF) long-term goal (LTG);4 weeks  -RB     Progress/Outcomes (Bathing Goal 2, OT-IRF) continuing progress toward goal  -RB     Row Name 03/18/23 1532          UB Dressing Goal 1 (OT-IRF)    Activity/Device (UB Dressing Goal 1, OT-IRF) upper body dressing  -RB     [place] (UB Dress Goal 1, OT-IRF) minimum assist (75% or more patient effort)  -RB     Time Frame (UB Dressing Goal 1, OT-IRF) short-term goal (STG);2 weeks  -RB     Progress/Outcomes (UB Dressing Goal 1, OT-IRF) continuing progress toward goal  -RB     Row Name 03/18/23 1532          UB Dressing Goal 2 (OT-IRF)    Activity/Device (UB Dressing Goal 2, OT-IRF) upper body dressing  -RB     [place] (UB Dress Goal 2, OT-IRF) set-up required  -RB     Time Frame (UB Dressing Goal 2, OT-IRF) long-term goal (LTG)  -RB     Progress/Outcomes (UB Dressing Goal 2, OT-IRF) continuing progress toward goal  -RB     Row Name 03/18/23 1532          LB Dressing Goal 1 (OT-IRF)    Activity/Device (LB Dressing Goal 1, OT-IRF) lower body dressing  -RB     [place] (LB Dressing Goal 1, OT-IRF) moderate assist (50-74% patient effort)  -RB     Time Frame (LB Dressing Goal 1, OT-IRF) short-term goal (STG);2 weeks  -RB     Progress/Outcomes (LB Dressing Goal 1, OT-IRF) continuing progress toward goal  -RB     Row Name 03/18/23 1532          LB Dressing Goal 2 (OT-IRF)    Activity/Device (LB Dressing Goal 2, OT-IRF) lower body dressing  -RB     [place] (LB Dressing Goal 2, OT-IRF) contact guard required  -RB     Time Frame (LB Dressing Goal 2, OT-IRF) 4 weeks;long-term goal (LTG)  -RB     Progress/Outcomes (LB Dressing Goal 2, OT-IRF) continuing progress toward goal  -RB     Row Name 03/18/23 1532           Grooming Goal 1 (OT-IRF)    Activity/Device (Grooming Goal 1, OT-IRF) grooming skills, all  -RB     [place] (Grooming Goal 1, OT-IRF) set-up required  -RB     Time Frame (Grooming Goal 1, OT-IRF) long-term goal (LTG);4 weeks  -RB     Progress/Outcomes (Grooming Goal 1, OT-IRF) continuing progress toward goal  -RB     Row Name 03/18/23 1532          Toileting Goal 1 (OT-IRF)    Activity/Device (Toileting Goal 1, OT-IRF) toileting skills, all  -RB     [place] Level (Toileting Goal 1, OT-IRF) moderate assist (50-74% patient effort)  -RB     Progress/Outcomes (Toileting Goal 1, OT-IRF) continuing progress toward goal  -RB     Time Frame (Toileting Goal 1, OT-IRF) short-term goal (STG);2 weeks  -RB     Row Name 03/18/23 1532          Toileting Goal 2 (OT-IRF)    Activity/Device (Toileting Goal 2, OT-IRF) toileting skills, all  -RB     [place] Level (Toileting Goal 2, OT-IRF) minimum assist (75% or more patient effort)  -RB     Progress/Outcomes (Toileting Goal 2, OT-IRF) continuing progress toward goal  -RB     Time Frame (Toileting Goal 2, OT-IRF) long-term goal (LTG);4 weeks  -RB     Row Name 03/18/23 1532          Strength Goal 1 (OT-IRF)    Strength Goal 1 (OT-IRF) Increase BUE strength to 4/5  -RB     Time Frame (Strength Goal 1, OT-IRF) long-term goal (LTG);4 weeks  -RB     Progress/Outcomes (Strength Goal 1, OT-IRF) continuing progress toward goal  -RB     Row Name 03/18/23 1532          Strength Goal 2 (OT-IRF)    Strength Goal 2 (OT-IRF) Increase BUE  strength to 3+/5  -RB     Time Frame (Strength Goal 2, OT-IRF) short-term goal (STG);2 weeks  -RB     Progress/Outcomes (Strength Goal 2, OT-IRF) continuing progress toward goal  -RB     Row Name 03/18/23 1532          Balance Goal 1 (OT)    Activity/Assistive Device (Balance Goal 1, OT) sitting, static;sitting, dynamic;standing, static;standing, dynamic  -RB     [place] Level/Cues Needed (Balance Goal 1, OT) contact guard assist  -RB      Time Frame (Balance Goal 1, OT) by discharge  -RB     Progress/Outcomes (Balance Goal 1, OT) continuing progress toward goal  -RB     Row Name 03/18/23 1532           Endurance Goal 1 (OT)    Endurance Goal 1 (OT) During functional tasks, less than 1 rest break  -RB     [place] Level (Endurance Goal 1, OT) endurance 2 good  -RB     Time Frame (Endurance Goal 1, OT) long term goal (LTG);by discharge  -RB     Progress/Outcome (Endurance Goal 1, OT) continuing progress toward goal  -RB     Row Name 03/18/23 1532          Functional Mobility Goal 1 (OT)    Activity/Assistive Device (Functional Mobility Goal 1, OT) walker, rolling  -RB     [place] Level/Cues Needed (Functional Mobility Goal 1, OT) contact guard assist  -RB     Time Frame (Functional Mobility Goal 1, OT) long term goal (LTG);by discharge  -RB     Progress/Outcome (Functional Mobility Goal 1, OT) continuing progress toward goal  -RB           User Key  (r) = Recorded By, (t) = Taken By, (c) = Cosigned By    Initials Name Effective Dates    RB La Carr, OT 06/16/21 -                  Occupational Therapy Education     Title: PT OT SLP Therapies (Not Started)     Topic: Occupational Therapy (Not Started)     Point: ADL training (Not Started)     Description:   Instruct learner(s) on proper safety adaptation and remediation techniques during self care or transfers.   Instruct in proper use of assistive devices.              Learner Progress:  Not documented in this visit.          Point: Home exercise program (Not Started)     Description:   Instruct learner(s) on appropriate technique for monitoring, assisting and/or progressing therapeutic exercises/activities.              Learner Progress:  Not documented in this visit.          Point: Precautions (Not Started)     Description:   Instruct learner(s) on prescribed precautions during self-care and functional transfers.              Learner Progress:  Not documented in this visit.           Point: Body mechanics (Not Started)     Description:   Instruct learner(s) on proper positioning and spine alignment during self-care, functional mobility activities and/or exercises.              Learner Progress:  Not documented in this visit.                                OT Recommendation and Plan    Planned Therapy Interventions (OT): activity tolerance training, adaptive equipment training, BADL retraining, cognitive/visual perception retraining, edema control/reduction, functional balance retraining, IADL retraining, wheelchair assessment/training, transfer/mobility retraining, strengthening exercise, ROM/therapeutic exercise, patient/caregiver education/training, occupation/activity based interventions, neuromuscular control/coordination retraining                    Time Calculation:      Time Calculation- OT     Row Name 03/18/23 1530             Time Calculation- OT    OT Start Time 0900  -RB      OT Stop Time 1000  -RB      OT Time Calculation (min) 60 min  -RB      Total Timed Code Minutes- OT 60 minute(s)  -RB            User Key  (r) = Recorded By, (t) = Taken By, (c) = Cosigned By    Initials Name Provider Type    RB La Carr OT Occupational Therapist              Therapy Charges for Today     Code Description Service Date Service Provider Modifiers Qty    [id number]  OT SELF CARE/MGMT/TRAIN EA 15 MIN 3/18/2023 La Carr OT GO 1    [id number]  OT EVAL MOD COMPLEXITY 3 3/18/2023 La Carr OT GO 1                   La Carr OT  3/18/2023

## 2023-03-19 LAB
ANION GAP SERPL CALCULATED.3IONS-SCNC: 9.6 MMOL/L (ref 5–15)
BASOPHILS # BLD AUTO: 0.05 10*3/MM3 (ref 0–0.2)
BASOPHILS NFR BLD AUTO: 0.4 % (ref 0–1.5)
BH BB BLOOD EXPIRATION DATE: NORMAL
BH BB BLOOD TYPE BARCODE: 6200
BH BB DISPENSE STATUS: NORMAL
BH BB PRODUCT CODE: NORMAL
BH BB UNIT NUMBER: NORMAL
BUN SERPL-MCNC: 13 MG/DL (ref 6–20)
BUN/CREAT SERPL: 9.9 (ref 7–25)
CALCIUM SPEC-SCNC: 9 MG/DL (ref 8.6–10.5)
CHLORIDE SERPL-SCNC: 101 MMOL/L (ref 98–107)
CO2 SERPL-SCNC: 26.4 MMOL/L (ref 22–29)
CREAT SERPL-MCNC: 1.31 MG/DL (ref 0.57–1)
CROSSMATCH INTERPRETATION: NORMAL
DEPRECATED RDW RBC AUTO: 48.4 FL (ref 37–54)
EGFRCR SERPLBLD CKD-EPI 2021: 47 ML/MIN/1.73
EOSINOPHIL # BLD AUTO: 0.21 10*3/MM3 (ref 0–0.4)
EOSINOPHIL NFR BLD AUTO: 1.6 % (ref 0.3–6.2)
ERYTHROCYTE [DISTWIDTH] IN BLOOD BY AUTOMATED COUNT: 14.1 % (ref 12.3–15.4)
FERRITIN SERPL-MCNC: 1725 NG/ML (ref 13–150)
GLUCOSE SERPL-MCNC: 87 MG/DL (ref 65–99)
HCT VFR BLD AUTO: 25.3 % (ref 34–46.6)
HEMOCCULT STL QL: POSITIVE
HGB BLD-MCNC: 8.8 G/DL (ref 12–15.9)
IMM GRANULOCYTES # BLD AUTO: 0.29 10*3/MM3 (ref 0–0.05)
IMM GRANULOCYTES NFR BLD AUTO: 2.2 % (ref 0–0.5)
IRON 24H UR-MRATE: 21 MCG/DL (ref 37–145)
IRON SATN MFR SERPL: 16 % (ref 20–50)
LYMPHOCYTES # BLD AUTO: 2.41 10*3/MM3 (ref 0.7–3.1)
LYMPHOCYTES NFR BLD AUTO: 18.2 % (ref 19.6–45.3)
MCH RBC QN AUTO: 33.2 PG (ref 26.6–33)
MCHC RBC AUTO-ENTMCNC: 34.8 G/DL (ref 31.5–35.7)
MCV RBC AUTO: 95.5 FL (ref 79–97)
MONOCYTES # BLD AUTO: 2.05 10*3/MM3 (ref 0.1–0.9)
MONOCYTES NFR BLD AUTO: 15.5 % (ref 5–12)
NEUTROPHILS NFR BLD AUTO: 62.1 % (ref 42.7–76)
NEUTROPHILS NFR BLD AUTO: 8.24 10*3/MM3 (ref 1.7–7)
NRBC BLD AUTO-RTO: 0.2 /100 WBC (ref 0–0.2)
PLATELET # BLD AUTO: 664 10*3/MM3 (ref 140–450)
PMV BLD AUTO: 10.1 FL (ref 6–12)
POTASSIUM SERPL-SCNC: 4 MMOL/L (ref 3.5–5.2)
RBC # BLD AUTO: 2.65 10*6/MM3 (ref 3.77–5.28)
SODIUM SERPL-SCNC: 137 MMOL/L (ref 136–145)
TIBC SERPL-MCNC: 128 MCG/DL (ref 298–536)
TRANSFERRIN SERPL-MCNC: 86 MG/DL (ref 200–360)
UNIT  ABO: NORMAL
UNIT  RH: NORMAL
WBC NRBC COR # BLD: 13.25 10*3/MM3 (ref 3.4–10.8)

## 2023-03-19 PROCEDURE — 83540 ASSAY OF IRON: CPT | Performed by: STUDENT IN AN ORGANIZED HEALTH CARE EDUCATION/TRAINING PROGRAM

## 2023-03-19 PROCEDURE — 82272 OCCULT BLD FECES 1-3 TESTS: CPT | Performed by: NURSE PRACTITIONER

## 2023-03-19 PROCEDURE — 85025 COMPLETE CBC W/AUTO DIFF WBC: CPT | Performed by: PHYSICAL MEDICINE & REHABILITATION

## 2023-03-19 PROCEDURE — 25010000002 VANCOMYCIN 750 MG RECONSTITUTED SOLUTION 1 EACH VIAL: Performed by: PHYSICAL MEDICINE & REHABILITATION

## 2023-03-19 PROCEDURE — 84466 ASSAY OF TRANSFERRIN: CPT | Performed by: STUDENT IN AN ORGANIZED HEALTH CARE EDUCATION/TRAINING PROGRAM

## 2023-03-19 PROCEDURE — 80048 BASIC METABOLIC PNL TOTAL CA: CPT | Performed by: PHYSICAL MEDICINE & REHABILITATION

## 2023-03-19 PROCEDURE — 82728 ASSAY OF FERRITIN: CPT | Performed by: STUDENT IN AN ORGANIZED HEALTH CARE EDUCATION/TRAINING PROGRAM

## 2023-03-19 RX ORDER — DIAZEPAM 2 MG/1
1 TABLET ORAL EVERY 6 HOURS PRN
Status: DISPENSED | OUTPATIENT
Start: 2023-03-19 | End: 2023-03-21

## 2023-03-19 RX ADMIN — Medication 100 MG: at 09:00

## 2023-03-19 RX ADMIN — DIAZEPAM 1 MG: 2 TABLET ORAL at 19:34

## 2023-03-19 RX ADMIN — ACETAMINOPHEN 1000 MG: 325 TABLET ORAL at 09:01

## 2023-03-19 RX ADMIN — LAMOTRIGINE 200 MG: 100 TABLET ORAL at 09:00

## 2023-03-19 RX ADMIN — APIXABAN 5 MG: 5 TABLET, FILM COATED ORAL at 09:00

## 2023-03-19 RX ADMIN — ACETAMINOPHEN 1000 MG: 325 TABLET ORAL at 21:19

## 2023-03-19 RX ADMIN — GABAPENTIN 300 MG: 300 CAPSULE ORAL at 14:07

## 2023-03-19 RX ADMIN — OXYCODONE HYDROCHLORIDE 5 MG: 5 TABLET ORAL at 15:59

## 2023-03-19 RX ADMIN — OXYCODONE HYDROCHLORIDE 5 MG: 5 TABLET ORAL at 10:52

## 2023-03-19 RX ADMIN — OXYCODONE HYDROCHLORIDE 5 MG: 5 TABLET ORAL at 07:06

## 2023-03-19 RX ADMIN — PANTOPRAZOLE SODIUM 40 MG: 40 TABLET, DELAYED RELEASE ORAL at 05:00

## 2023-03-19 RX ADMIN — DIAZEPAM 2 MG: 2 TABLET ORAL at 07:06

## 2023-03-19 RX ADMIN — Medication 1 MG: at 09:00

## 2023-03-19 RX ADMIN — GABAPENTIN 300 MG: 300 CAPSULE ORAL at 05:00

## 2023-03-19 RX ADMIN — ACETAMINOPHEN 1000 MG: 325 TABLET ORAL at 15:58

## 2023-03-19 RX ADMIN — Medication 500 MCG: at 09:00

## 2023-03-19 RX ADMIN — APIXABAN 5 MG: 5 TABLET, FILM COATED ORAL at 21:19

## 2023-03-19 RX ADMIN — DESVENLAFAXINE SUCCINATE 25 MG: 25 TABLET, EXTENDED RELEASE ORAL at 09:01

## 2023-03-19 RX ADMIN — GABAPENTIN 300 MG: 300 CAPSULE ORAL at 21:19

## 2023-03-19 RX ADMIN — VANCOMYCIN HYDROCHLORIDE 750 MG: 750 INJECTION, POWDER, LYOPHILIZED, FOR SOLUTION INTRAVENOUS at 02:06

## 2023-03-19 NOTE — PROGRESS NOTES
SECTION GG    Mobility Performance:     Roll Left and Right: [place] does less than half the effort. [place] lifts, holds  or supports trunk or limbs but provides less than half the effort.   Sit to Lying: [place] does less than half the effort. [place] lifts, holds or  supports trunk or limbs but provides less than half the effort.   Sit to Stand: [place] does all of the effort. Patient does none of the effort to  complete the activity. Or, the assistance of 2 or more helpers is required for  the patient to complete the activity.   Chair/Bed to Chair Transfer: [place] does all of the effort. Patient does none  of the effort to complete the activity. Or, the assistance of 2 or more helpers  is required for the patient to complete the activity.   Car Transfer: Not attempted due to medical or safety concerns.   Walk 10 Feet:   Not attempted due to medical or safety concerns.  1 Step Over Curb or Up/Down Stair:   Not attempted due to medical or safety  concerns.  Picking up an Object:   Not attempted due to medical or safety concerns.  Uses Wheelchair/Scooter: No    Mobility Discharge Goals:   Chair/Bed to Chair Transfer: [place] provides verbal cues or touching/steadying  assistance as patient completes activity.    Section J. Health Conditions (Pain):  Pain Interference with Therapy Activities:   Occasionally  Pain Interference with Day-to-Day Activities:   Occasionally    Signed by: Yolanda Shannon, PT

## 2023-03-19 NOTE — PROGRESS NOTES
Name: Louise GARCIA ADMIT: 3/17/2023   : 1965  PCP: Chloe Schaefer APRN    MRN: [id number] LOS: 2 days   AGE/SEX: 58 y.o. female  ROOM: [facility]     Subjective   Subjective   She is quite lethargic this morning. Does awaken and follows all commands though is disoriented.    Objective   Objective   Vital Signs  Temp:  [97.2 °F (36.2 °C)-98.7 °F (37.1 °C)] 97.2 °F (36.2 °C)  Heart Rate:  [] 111  Resp:  [18] 18  BP: ()/(57-83) 134/81  SpO2:  [92 %-95 %] 92 %  on  Flow (L/min):  [1-2] 2;   Device (Oxygen Therapy): nasal cannula  Body mass index is 25.73 kg/m².  Physical Exam  Constitutional:       General: She is not in acute distress.     Appearance: She is ill-appearing. She is not toxic-appearing or diaphoretic.   Cardiovascular:      Rate and Rhythm: Normal rate.      Pulses: Normal pulses.      Heart sounds: No murmur heard.    No gallop.   Pulmonary:      Effort: Pulmonary effort is normal. No respiratory distress.      Breath sounds: Normal breath sounds. No stridor. No wheezing or rhonchi.   Abdominal:      General: Abdomen is flat. There is no distension.      Palpations: Abdomen is soft.      Tenderness: There is no abdominal tenderness.   Musculoskeletal:      Right lower leg: No edema.      Left lower leg: No edema.   Neurological:      Mental Status: She is alert. She is disoriented.      Motor: Weakness present.         Results Review     I reviewed the patient's new clinical results.  Results from last 7 days   Lab Units 23  0433 03/18/23  1802 23  0352 23  0526 23  0517   WBC 10*3/mm3 13.25*  --  12.73* 13.59* 11.74*   HEMOGLOBIN g/dL 8.8* 6.8* 7.0* 7.9* 7.3*   PLATELETS 10*3/mm3 664*  --  691* 653* 535*     Results from last 7 days   Lab Units 23  0433 23  0352 23  0526 23  0517   SODIUM mmol/L 137 139 135* 132*   POTASSIUM mmol/L 4.0 4.3 4.7 4.4   CHLORIDE mmol/L 101 98 96* 92*   CO2 mmol/L 26.4 29.6* 27.4 29.0   BUN mg/dL 13 14 13 14    CREATININE mg/dL 1.31* 1.36* 1.12* 1.17*   GLUCOSE mg/dL 87 97 81 110*   EGFR mL/min/1.73 47.0* 45.0* 56.8* 53.9*         Results from last 7 days   Lab Units 03/19/23  0433 03/18/23  0352 03/17/23  0526 03/16/23  0517   CALCIUM mg/dL 9.0 9.0 9.3 8.3*       No results found for: HGBA1C, POCGLU    No radiology results for the last day  Scheduled Medications  acetaminophen, 1,000 mg, Oral, TID  apixaban, 5 mg, Oral, Q12H  desvenlafaxine, 25 mg, Oral, Daily  folic acid, 1 mg, Oral, Daily  gabapentin, 300 mg, Oral, Q8H  ipratropium-albuterol, 3 mL, Nebulization, 4x Daily - RT  lamoTRIgine, 200 mg, Oral, Daily  pantoprazole, 40 mg, Oral, Q AM  thiamine, 100 mg, Oral, Daily  vancomycin, 12.5 mg/kg, Intravenous, Q24H  vitamin B-12, 500 mcg, Oral, Daily    Infusions  Pharmacy to dose vancomycin,     Diet  Diet: Regular/House Diet; Texture: Regular Texture (IDDSI 7); Fluid Consistency: Thin (IDDSI 0)       Assessment/Plan     Active Hospital Problems    Diagnosis  POA   • **Idiopathic peripheral neuropathy [G60.9]  Yes   • MRSA bacteremia [R78.81, B95.62]  Yes   • 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       58 y.o. female admitted with Idiopathic peripheral neuropathy.      03/19/23  Patient is lethargic, though upon further review and discussion with nursing staff she just received an oxycodone and Valium at 9 AM.  I discussed limiting these sedating medications, especially given her mild PHYLLIS which will affect clearance of these substances. She does not require higher level of care at this time though will continue to monitor closely.     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  · Full code.  · Discussed with patient.      Balwinder Hidalgo MD  [facility]  03/19/23  10:33 EDT

## 2023-03-19 NOTE — PLAN OF CARE
Goal Outcome Evaluation:           Progress: improving  Outcome Evaluation: A&OX4. Intermittent confusion and disorientation. Difficulty with word-finding. Idiopathic peripheral neuropathy. Full code. Hx. chronic anemia and seizures. Daily weight. 1L O2 at all times. Assistx2 with the wheelchair. Very weak in the legs. Continent and incontinent of B&B. Last BM todayx2. Wears a brief. Purewick at night. On scheduled gabapentin and tylenol. Has PRN Aura and ativan. 20G IV access in R. arm. Seizure precautions. Siderails padded. On-going pain is an issue. Pain meds help. Diet: Reg, thins. Not eating much at meals. Is drinking boost shakes. Has napped on and off in between and after therapies. Runs tachy. Occult blood test of stool+.

## 2023-03-19 NOTE — PROGRESS NOTES
Inpatient Rehabilitation Plan of Care Note    Plan of Care  Care Plan Reviewed - No updates at this time.    Sphincter Control    Performed Intervention(s)  Bladder/bowel training program  Monitor intake and output  Use incontinence products/equipment      Psychosocial    Performed Intervention(s)  Verbalizes needs and concerns  Support from family/peer groups      Body Systems    Performed Intervention(s)  Perform active and passive ROM  Frequent position changes      Safety    Performed Intervention(s)  Safety Rounds  Bed alarm/Chair alarm  Items within reach    Signed by: Dayanna Serna RN

## 2023-03-19 NOTE — CONSULTS
Patient Name:  Louise GARCIA  YOB: 1965  MRN:  [id number]  Date of Admission:  3/17/2023  Date of Consult:  3/18/2023  Patient Care Team:  Chloe Schaefer APRN as PCP - General (Family Medicine)  Mikhail Glez MD as Consulting Physician (Neurology)    Consults  Evaluate status and make recommendations regarding treatment for anemia and elevated creatinine.  Subjective   History of Present Illness  Ms. GARCIA is a 58 y.o. female that has been admitted to [facility] acute rehab with paraparesis and areflexia.  She has been admitted by the rehabilitation service and and we were asked to see and assist with her medical problems, specifically relating to her anemia and increasing creatinine.  Patient was actually just discharged from our service yesterday to acute rehab.  She had an extended stay in the hospital from 2/20/2023 until 3/17/2023 when she was discharged to acute rehab.  She originally presented with numbness and paresthesias starting in her bilateral lower extremities initially was started on high-dose steroids then developed some weakness, had a EMG, did receive 5 days of IVIG for concern for possible [place] barre syndrome, later developed right lung masslike infiltrate with pleural effusion was seen by pulmonology, was in ICU for few days, had thoracentesis as well as chest tube placement for empyema, she was also noted to have TV mass on TTE concerning for endocarditis, upper extremity Doppler of her right arm showed superficial thrombophlebitis concerning for septic phlebitis, she had a hip arthroplasty to rule out hip septic arthritis, there is no growth in that fluid.  She also had MRI to rule out osteomyelitis, no signs of infection there.  She will continue on 4 weeks of vancomycin per infectious disease for MRSA positive blood culture and pleural fluid cultures.  Patient still had significant weakness but her paresthesias were improving, she felt well and  was ready to go to acute rehab upon discharge.  We were reconsulted due to patient's drop in hemoglobin, she is on Eliquis for history of DVT, there is no signs of active bleeding.  Patient is sleepy, but arouses easily, just states that she has had to have increasing doses of pain medicine today due to her generalized pain.  She denies any chest pain or shortness of breath, no rectal bleeding, no trouble with urination.      Past Medical History:   Diagnosis Date   • Degenerative disc disease, cervical    • Gestational diabetes    • H/O blood clots    • Hematemesis    • Seizures (HCC)    • Septic shock (HCC)      Past Surgical History:   Procedure Laterality Date   • APPENDECTOMY     • BACK SURGERY     • CHOLECYSTECTOMY     • ENDOSCOPY N/A 8/30/2016    Procedure: ESOPHAGOGASTRODUODENOSCOPY with biopsy;  Surgeon: Austen Brito MD;  Location: [facility] ENDOSCOPY;  Service:    • HYSTERECTOMY     • NECK SURGERY       approx 6 yrs ago   • THORACOSCOPY Right 3/8/2023    Procedure: THORACOSCOPY WITH DAVINCI ROBOT WITH COMPLETE DECORTICATION;  Surgeon: Chloe Cedillo MD;  Location: [facility] MAIN OR;  Service: Robotics - DaVinci;  Laterality: Right;   • TONSILLECTOMY     • TONSILLECTOMY AND ADENOIDECTOMY  1975     Family History   Problem Relation Age of Onset   • Cancer Mother    • Diabetes Father    • Heart disease Father    • Stroke Father    • Cancer Sister    • Colon cancer Paternal Uncle    • Diabetes Paternal Grandmother    • Diabetes Paternal Grandfather    • Malig Hyperthermia Neg Hx      Social History     Tobacco Use   • Smoking status: Never     Passive exposure: Never   • Smokeless tobacco: Never   Vaping Use   • Vaping Use: Never used   Substance Use Topics   • Alcohol use: Yes     Comment: social   • Drug use: No     Medications Prior to Admission   Medication Sig Dispense Refill Last Dose   • apixaban (ELIQUIS) 5 MG tablet tablet Take 1 tablet by mouth Every 12 (Twelve) Hours. 60 tablet 0    •  lamoTRIgine (LaMICtal) 100 MG tablet Take 1 tablet by mouth Daily.      • acetaminophen (TYLENOL) 500 MG tablet Take 2 tablets by mouth 3 (Three) Times a Day for 19 doses. 38 tablet 0    • diazepam (VALIUM) 10 MG tablet TAKE 1 TABLET THREE TIMES A DAY  0    • estrogens, conjugated,-methyltestosterone (ESTRATEST HS) 0.625-1.25 MG per tablet Take  by mouth.      • folic acid (FOLVITE) 1 MG tablet Take 1 mg by mouth Daily.      • pantoprazole (PROTONIX) 40 MG EC tablet Take 1 tablet by mouth 2 (two) times a day before meals. 60 tablet 0    • sucralfate (CARAFATE) 1 GM/10ML suspension Take 10 mL by mouth every 6 (six) hours. 420 mL 0    • vitamin B-12 (CYANOCOBALAMIN) 100 MCG tablet Take 50 mcg by mouth Daily.        Allergies:  Penicillins and Sulfa antibiotics    Review of Systems   Constitutional: Negative for appetite change and fever.   HENT: Negative for nosebleeds and trouble swallowing.    Eyes: Negative for photophobia, redness and visual disturbance.   Respiratory: Negative for cough, chest tightness, shortness of breath and wheezing.    Cardiovascular: Negative for chest pain, palpitations and leg swelling.   Gastrointestinal: Negative for abdominal distention, abdominal pain, nausea and vomiting.   Endocrine: Negative.    Genitourinary: Negative.    Musculoskeletal: Negative for gait problem and joint swelling.   Skin: Negative.    Neurological: Positive for weakness and numbness. Negative for dizziness, seizures, speech difficulty, light-headedness and headaches.   Hematological: Negative.    Psychiatric/Behavioral: Negative for behavioral problems and confusion.       Objective      Vital Signs  Temp:  [97.6 °F (36.4 °C)-98.7 °F (37.1 °C)] 97.6 °F (36.4 °C)  Heart Rate:  [] 102  Resp:  [18] 18  BP: ()/(57-83) 132/83  Body mass index is 25.73 kg/m².    Physical Exam  Cardiovascular:      Heart sounds: Normal heart sounds.   Pulmonary:      Comments: Lung sounds clear  Abdominal:      General:  Bowel sounds are normal.   Neurological:      Mental Status: She is oriented to person, place, and time. She is lethargic.      Comments: Patient sleepy but arouses easily   Psychiatric:         Speech: Speech normal.         Cognition and Memory: Cognition normal.         Results Review:   I reviewed the patient's new clinical results.  I reviewed the patient's new imaging results and agree with the interpretation.  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 GARCIA is a 58 y.o. female that has been admitted to [facility] acute rehab with paraparesis and areflexia.  She has been admitted by the rehabilitation service and and we were asked to see and assist with her medical problems, specifically relating to her anemia and increasing creatinine.    Anemia  -Patient currently receiving 1 unit packed red blood cells  -Recheck CBC in a.m.  -We will check stool for occult blood  Patient will need-iron studies, however there is no recent blood available prior to packed red blood cells being started  -Patient is currently on Eliquis for history of DVT, if continues to have drops in anemia, anticoagulation will need to be further addressed  -Vital signs are stable    Increasing creatinine  -We will give patient a 500 cc bolus  -Recheck BMP in a.m.  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 We will follow along with you.      KASIA Osuna  [facility]  03/18/23  23:44 EDT

## 2023-03-19 NOTE — PROGRESS NOTES
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   S/p IVIG x 5 days  Impaired mobility/impaired self care/ impaired cognition  R lung masslike infiltrate with cavitary lesions/pleural effusion   S/p thoracentesis - Blood cx and pleural fluid  positive for MRSA   mass on TTE with findings concerning for endocarditis - underwent MARIAMA 3/10/2023 which had no vegetation  RUE superficial thrombophlebitis concerning for septic phlebitis.  chest tube placement given empyema, and she underwent thoracoscopy w/ decortication 3/8/23   -expected postoperative changes with pleural thickening and minimal pleural effusion.  The effusion is too small for intervention and will likely to continue to resolve with time.  Recommend continue good pulmonary hygiene with incentive spirometry hourly as well as increase activity/ambulation as tolerated.  ID - continue 4 weeks of vancomycin as recommended by infectious disease dosed by pharmacy to achieve a trough level of 15-20 or area under the curve of 400-600 from last negative blood culture or last intervention which ever is the latest - to complete April 1, 2023  Left hip arthrocentesis March 16 to rule out any hip septic arthritis- no growth to date on fluid.    lower back pain - MRI of spine to rule out discitis or osteomyelitis.  This did have known degenerative changes but  no infection.   DVT prophylaxis - SCDs / apixiban. History of LLE DVT - on Eliquis at home  Pain management - Tylenol 1,000 mg three times a day/ Celebrex 100 mg q 112 hours/ gabapentin 300 mg q 8 hours Oxycodone 5 mg q 4 hours prn  Anxiety/ muscle spasms - Diazepam 2 mg q 6 hours prn - JONES reviewed  Seizure disorder -Lamotrigine 200 mg daily      SUBJECTIVE:  Patient seen and examined. No acute events overnight. Denies CP, SOA, N/V, F/C. Looks better/color better. Less fatigue.    Appreciate Medicine input.      OBJECTIVE:  AF, VSS  BP: ()/(57-83)  134/81    Physical Exam     MENTAL STATUS -  AAO x3, Verbal, NAD.  HEENT- NCAT, SCLERAE ANICTERIC, CONJUNCTIVAE PINK, OP MOIST, NO JVD, EARS UNREMARKABLE EXTERNALLY  O2 1 L  LUNGS - DECREASED BREATH SOUNDS RIGHT BASE MORE SO THAN LEFT BASS  HEART- RRR, NO RUB, MURMUR, OR GALLOP  ABD - NORMOACTIVE BOWEL SOUNDS, SOFT, NT.  EXT - NO EDEMA OR CYANOSIS  MOTOR EXAM - R/L:  SHOULDER ABDUCTION 4/4 EF 5/5, WE 5/5, HAND INTRINSICS 4/4  HF 2/1, KE 3-/2-, ADF 4/4        Scheduled Meds:acetaminophen, 1,000 mg, Oral, TID  apixaban, 5 mg, Oral, Q12H  desvenlafaxine, 25 mg, Oral, Daily  folic acid, 1 mg, Oral, Daily  gabapentin, 300 mg, Oral, Q8H  ipratropium-albuterol, 3 mL, Nebulization, 4x Daily - RT  lamoTRIgine, 200 mg, Oral, Daily  pantoprazole, 40 mg, Oral, Q AM  thiamine, 100 mg, Oral, Daily  vancomycin, 12.5 mg/kg, Intravenous, Q24H  vitamin B-12, 500 mcg, Oral, Daily      Continuous Infusions:Pharmacy to dose vancomycin,       PRN Meds:.•  bisacodyl  •  diazePAM  •  melatonin  •  oxyCODONE  •  Pharmacy to dose vancomycin  •  polyethylene glycol  •  senna-docusate sodium    Lab Results (last 24 hours)     Procedure Component Value Units Date/Time    Hemoglobin & Hematocrit, Blood [[id number]]  (Abnormal) Collected: 03/18/23 1802    Specimen: Blood Updated: 03/18/23 1916     Hemoglobin 6.8 g/dL      Hematocrit 21.1 %     Vancomycin, Trough Vancomycin dose due at 2200. Please make sure Vancomycin IV is not infusing before drawing the vancomycin level. Hold vancomycin dose if level is >21 mcg/mL. [[id number]]  (Abnormal) Collected: 03/18/23 2126    Specimen: Blood Updated: 03/18/23 2155     Vancomycin Trough 21.70 mcg/mL     Narrative:      Therapeutic Ranges for Vancomycin    Vancomycin Random   5.0-40.0 mcg/mL  Vancomycin Trough   5.0-20.0 mcg/mL  Vancomycin Peak     20.0-40.0 mcg/mL    Basic Metabolic Panel [[id number]]  (Abnormal) Collected: 03/19/23 0433    Specimen: Blood Updated: 03/19/23 0526     Glucose 87 mg/dL       BUN 13 mg/dL      Creatinine 1.31 mg/dL      Sodium 137 mmol/L      Potassium 4.0 mmol/L      Chloride 101 mmol/L      CO2 26.4 mmol/L      Calcium 9.0 mg/dL      BUN/Creatinine Ratio 9.9     Anion Gap 9.6 mmol/L      eGFR 47.0 mL/min/1.73     Narrative:      GFR Normal >60  Chronic Kidney Disease <60  Kidney Failure <15      CBC & Differential [[id number]]  (Abnormal) Collected: 03/19/23 0433    Specimen: Blood Updated: 03/19/23 0521    Narrative:      The following orders were created for panel order CBC & Differential.  Procedure                               Abnormality         Status                     ---------                               -----------         ------                     CBC Auto Differential[[id number]]        Abnormal            Final result                 Please view results for these tests on the individual orders.    CBC Auto Differential [[id number]]  (Abnormal) Collected: 03/19/23 0433    Specimen: Blood Updated: 03/19/23 0521     WBC 13.25 10*3/mm3      RBC 2.65 10*6/mm3      Hemoglobin 8.8 g/dL      Hematocrit 25.3 %      MCV 95.5 fL      MCH 33.2 pg      MCHC 34.8 g/dL      RDW 14.1 %      RDW-SD 48.4 fl      MPV 10.1 fL      Platelets 664 10*3/mm3      Neutrophil % 62.1 %      Lymphocyte % 18.2 %      Monocyte % 15.5 %      Eosinophil % 1.6 %      Basophil % 0.4 %      Immature Grans % 2.2 %      Neutrophils, Absolute 8.24 10*3/mm3      Lymphocytes, Absolute 2.41 10*3/mm3      Monocytes, Absolute 2.05 10*3/mm3      Eosinophils, Absolute 0.21 10*3/mm3      Basophils, Absolute 0.05 10*3/mm3      Immature Grans, Absolute 0.29 10*3/mm3      nRBC 0.2 /100 WBC     Iron Profile [[id number]]  (Abnormal) Collected: 03/19/23 0433    Specimen: Blood Updated: 03/19/23 0727     Iron 21 mcg/dL      Iron Saturation 16 %      Transferrin 86 mg/dL      TIBC 128 mcg/dL     Ferritin [[id number]]  (Abnormal) Collected: 03/19/23 0433    Specimen: Blood Updated: 03/19/23 0733     Ferritin 1,725.00 ng/mL      Narrative:      Results may be falsely decreased if patient taking Biotin.      Occult Blood X 1, Stool - Stool, Per Rectum [[id number]]  (Abnormal) Collected: 03/19/23 0918    Specimen: Stool from Per Rectum Updated: 03/19/23 0946     Fecal Occult Blood Positive          Imaging Results (Last 24 Hours)     ** No results found for the last 24 hours. 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   S/p IVIG x 5 days     Impaired mobility/impaired self care/ impaired cognition     R lung masslike infiltrate with cavitary lesions/pleural effusion   S/p thoracentesis - Blood cx and pleural fluid  positive for MRSA      chest tube placement given empyema, and she underwent thoracoscopy w/ decortication 3/8/23   -expected postoperative changes with pleural thickening and minimal pleural effusion.  The effusion is too small for intervention and will likely to continue to resolve with time.  Recommend continue good pulmonary hygiene with incentive spirometry hourly as well as increase activity/ambulation as tolerated.        mass on TTE with findings concerning for endocarditis - underwent MARIAMA 3/10/2023 which had no vegetation     RUE superficial thrombophlebitis concerning for septic phlebitis.        ID - continue 4 weeks of vancomycin as recommended by infectious disease dosed by pharmacy to achieve a trough level of 15-20 or area under the curve of 400-600 from last negative blood culture or last intervention which ever is the latest - to complete April 1, 2023     Left hip arthrocentesis March 16 to rule out any hip septic arthritis- no growth to date on fluid.    lower back pain - MRI of spine to rule out discitis or osteomyelitis.  This did have known degenerative changes but  no infection.      DVT prophylaxis - SCDs / apixiban. History of LLE DVT - on Eliquis at home     Pain  management - Tylenol 1,000 mg three times a day/ Celebrex 100 mg q 112 hours/ gabapentin 300 mg q 8 hours Oxycodone 5 mg q 4 hours prn  March 18 - DC Celebrex 2/2 PHYLLIS, and anemia     Anxiety/ muscle spasms - Diazepam 2 mg q 6 hours prn - JONES reviewed     Seizure disorder -Lamotrigine 200 mg daily    ABLA - March 18- Hgb 7.0 (7.3 on 3/16). Type, cross and transfuse 1 unit PRBCs. Pre-medicate with Tylenol and Benadryl. CBC in am.   March 19- Hgb 8.8 s/p 1 unit PRBcs. Hemoccult positive. On Eliquis for h/o DVT. Follow Hgb. May need GI work up.    PHYLLIS - March 18- Creatinine 1.36 up from 1.12. On IV Vanc and Celebrex. DC Celebrex. BMP in am.  March 19- s/p 500ml NS bolus. Creatinine 1.31.    Medicine following for medical management.       Reviewed medications, vital signs, and recent lab work. Continue comprehensive inpatient rehabilitation program.        Barber Burdick M.D.  Physical Medicine and Rehabilitation

## 2023-03-19 NOTE — PLAN OF CARE
Goal Outcome Evaluation:  Plan of Care Reviewed With: patient        Progress: no change  Outcome Evaluation: Louise rested well this shift.  1L O2 @HS.  Purewick @[facility].  Received 1 U PRBC, and dosed vancy after trough, tolerated well.  Pain to lower extremities.  Confusion at times.

## 2023-03-19 NOTE — PROGRESS NOTES
"[facility] Clinical Pharmacy Services: Vancomycin Monitoring Note    Louise GARCIA is a 58 y.o. female who is on day 15/28 of pharmacy to dose vancomycin for Empyema.    Previous Vancomycin Dose:   1000 mg IV every  24  hours  Updated Cultures and Sensitivities:    -3/8 pleural cavity fluid: 1+ MRSA    Results from last 7 days   Lab Units 03/18/23 2126 03/15/23  2044 03/12/23  2027   VANCOMYCIN TR mcg/mL 21.70* 17.20 17.10     Vitals/Labs  Ht: 157.5 cm (62\"); Wt: 63.8 kg (140 lb 10.5 oz)   Temp Readings from Last 1 Encounters:   03/18/23 98.6 °F (37 °C) (Oral)     Estimated Creatinine Clearance: 39.1 mL/min (A) (by C-G formula based on SCr of 1.36 mg/dL (H)).        Results from last 7 days   Lab Units 03/18/23  0352 03/17/23  0526 03/16/23  0517   CREATININE mg/dL 1.36* 1.12* 1.17*   WBC 10*3/mm3 12.73* 13.59* 11.74*     Assessment/Plan    1. Current Vancomycin Dose: 750 mg IV every  24  hours; provides a predicted  mg/L.hr   2. Next Level Date and Time: Vanc Trough on 03/21/23 at 0000  3. We will continue to monitor patient changes and renal function     Thank you for involving pharmacy in this patient's care. Please contact pharmacy with any questions or concerns.       [facility], located within [facility]  Clinical Pharmacist            "

## 2023-03-20 LAB
ANION GAP SERPL CALCULATED.3IONS-SCNC: 11.2 MMOL/L (ref 5–15)
BUN SERPL-MCNC: 10 MG/DL (ref 6–20)
BUN/CREAT SERPL: 8.8 (ref 7–25)
CALCIUM SPEC-SCNC: 9 MG/DL (ref 8.6–10.5)
CHLORIDE SERPL-SCNC: 98 MMOL/L (ref 98–107)
CO2 SERPL-SCNC: 26.8 MMOL/L (ref 22–29)
CREAT SERPL-MCNC: 1.14 MG/DL (ref 0.57–1)
DEPRECATED RDW RBC AUTO: 47.3 FL (ref 37–54)
EGFRCR SERPLBLD CKD-EPI 2021: 55.6 ML/MIN/1.73
ERYTHROCYTE [DISTWIDTH] IN BLOOD BY AUTOMATED COUNT: 13.6 % (ref 12.3–15.4)
GLUCOSE SERPL-MCNC: 92 MG/DL (ref 65–99)
HCT VFR BLD AUTO: 27.3 % (ref 34–46.6)
HGB BLD-MCNC: 9.3 G/DL (ref 12–15.9)
MCH RBC QN AUTO: 32.5 PG (ref 26.6–33)
MCHC RBC AUTO-ENTMCNC: 34.1 G/DL (ref 31.5–35.7)
MCV RBC AUTO: 95.5 FL (ref 79–97)
PLATELET # BLD AUTO: 621 10*3/MM3 (ref 140–450)
PMV BLD AUTO: 10.4 FL (ref 6–12)
POTASSIUM SERPL-SCNC: 3.7 MMOL/L (ref 3.5–5.2)
RBC # BLD AUTO: 2.86 10*6/MM3 (ref 3.77–5.28)
SODIUM SERPL-SCNC: 136 MMOL/L (ref 136–145)
WBC NRBC COR # BLD: 10.63 10*3/MM3 (ref 3.4–10.8)

## 2023-03-20 PROCEDURE — 97110 THERAPEUTIC EXERCISES: CPT | Performed by: PHYSICAL THERAPIST

## 2023-03-20 PROCEDURE — 85027 COMPLETE CBC AUTOMATED: CPT | Performed by: PHYSICAL MEDICINE & REHABILITATION

## 2023-03-20 PROCEDURE — 97130 THER IVNTJ EA ADDL 15 MIN: CPT

## 2023-03-20 PROCEDURE — 97530 THERAPEUTIC ACTIVITIES: CPT | Performed by: PHYSICAL THERAPIST

## 2023-03-20 PROCEDURE — 80202 ASSAY OF VANCOMYCIN: CPT | Performed by: PHYSICAL MEDICINE & REHABILITATION

## 2023-03-20 PROCEDURE — 97129 THER IVNTJ 1ST 15 MIN: CPT

## 2023-03-20 PROCEDURE — 97112 NEUROMUSCULAR REEDUCATION: CPT

## 2023-03-20 PROCEDURE — 25010000002 VANCOMYCIN 750 MG RECONSTITUTED SOLUTION 1 EACH VIAL: Performed by: PHYSICAL MEDICINE & REHABILITATION

## 2023-03-20 PROCEDURE — 97535 SELF CARE MNGMENT TRAINING: CPT

## 2023-03-20 PROCEDURE — 80048 BASIC METABOLIC PNL TOTAL CA: CPT | Performed by: PHYSICAL MEDICINE & REHABILITATION

## 2023-03-20 RX ORDER — MONTELUKAST SODIUM 10 MG/1
10 TABLET ORAL NIGHTLY
COMMUNITY

## 2023-03-20 RX ADMIN — OXYCODONE HYDROCHLORIDE 5 MG: 5 TABLET ORAL at 02:43

## 2023-03-20 RX ADMIN — DESVENLAFAXINE SUCCINATE 25 MG: 25 TABLET, EXTENDED RELEASE ORAL at 08:03

## 2023-03-20 RX ADMIN — APIXABAN 5 MG: 5 TABLET, FILM COATED ORAL at 22:19

## 2023-03-20 RX ADMIN — Medication 100 MG: at 08:02

## 2023-03-20 RX ADMIN — Medication 1 MG: at 08:03

## 2023-03-20 RX ADMIN — LAMOTRIGINE 200 MG: 100 TABLET ORAL at 08:02

## 2023-03-20 RX ADMIN — Medication 500 MCG: at 08:02

## 2023-03-20 RX ADMIN — GABAPENTIN 300 MG: 300 CAPSULE ORAL at 13:52

## 2023-03-20 RX ADMIN — VANCOMYCIN HYDROCHLORIDE 750 MG: 750 INJECTION, POWDER, LYOPHILIZED, FOR SOLUTION INTRAVENOUS at 01:57

## 2023-03-20 RX ADMIN — OXYCODONE HYDROCHLORIDE 5 MG: 5 TABLET ORAL at 08:03

## 2023-03-20 RX ADMIN — PANTOPRAZOLE SODIUM 40 MG: 40 TABLET, DELAYED RELEASE ORAL at 06:20

## 2023-03-20 RX ADMIN — OXYCODONE HYDROCHLORIDE 5 MG: 5 TABLET ORAL at 23:05

## 2023-03-20 RX ADMIN — GABAPENTIN 300 MG: 300 CAPSULE ORAL at 06:20

## 2023-03-20 RX ADMIN — ACETAMINOPHEN 1000 MG: 325 TABLET ORAL at 22:19

## 2023-03-20 RX ADMIN — GABAPENTIN 300 MG: 300 CAPSULE ORAL at 22:19

## 2023-03-20 RX ADMIN — OXYCODONE HYDROCHLORIDE 5 MG: 5 TABLET ORAL at 13:52

## 2023-03-20 RX ADMIN — APIXABAN 5 MG: 5 TABLET, FILM COATED ORAL at 08:02

## 2023-03-20 RX ADMIN — ACETAMINOPHEN 1000 MG: 325 TABLET ORAL at 16:49

## 2023-03-20 RX ADMIN — ACETAMINOPHEN 1000 MG: 325 TABLET ORAL at 08:03

## 2023-03-20 NOTE — THERAPY TREATMENT NOTE
Inpatient Rehabilitation - Physical Therapy Treatment Note       [facility]     Patient Name: Louise GARCIA  : 1965  MRN: [id number]    Today's Date: 3/20/2023                    Admit Date: 3/17/2023      Visit Dx:     ICD-10-CM ICD-9-CM   1. Impaired functional mobility, balance, gait, and endurance  Z74.09 V49.89       Patient Active Problem List   Diagnosis   • Sepsis (HCC)   • Neck pain, chronic   • Upper back pain   • Paresthesia   • Cervical myelopathy (HCC)   • Lower extremity weakness   • History of blood clots   • Chronic anticoagulation   • Seizures (HCC)   • Anemia   • GERD (gastroesophageal reflux disease)   • Guillain-Decatur syndrome (HCC)   • Situational mixed anxiety and depressive disorder   • Mass of middle lobe of right lung   • Oral candidiasis   • Empyema of pleura (HCC)   • MRSA bacteremia   • Idiopathic peripheral neuropathy       Past Medical History:   Diagnosis Date   • Degenerative disc disease, cervical    • Gestational diabetes    • H/O blood clots    • Hematemesis    • Seizures ([facility])    • Septic shock (HCC)        Past Surgical History:   Procedure Laterality Date   • APPENDECTOMY     • BACK SURGERY     • CHOLECYSTECTOMY     • ENDOSCOPY N/A 2016    Procedure: ESOPHAGOGASTRODUODENOSCOPY with biopsy;  Surgeon: Austen Brito MD;  Location: [facility] ENDOSCOPY;  Service:    • HYSTERECTOMY     • NECK SURGERY       approx 6 yrs ago   • THORACOSCOPY Right 3/8/2023    Procedure: THORACOSCOPY WITH DAVINCI ROBOT WITH COMPLETE DECORTICATION;  Surgeon: Chloe Cedillo MD;  Location: [facility] OR;  Service: Robotics - DaVinci;  Laterality: Right;   • TONSILLECTOMY     • TONSILLECTOMY AND ADENOIDECTOMY         PT ASSESSMENT (last 12 hours)     IRF PT Evaluation and Treatment     Row Name 23 1400          PT Time and Intention    Document Type daily treatment  -JK     Mode of Treatment physical therapy  -JK     Patient/Family/Caregiver Comments/Observations pt supine on  mat after working with OT  -ETHAN     Row Name 03/20/23 1400          General Information    Patient Profile Reviewed yes  -ETHAN     Existing Precautions/Restrictions fall  -ETHAN     Limitations/Impairments safety/cognitive  -[employer]     Row Name 03/20/23 1400          Pain Assessment    Pretreatment Pain Rating 0/10 - no pain  -ETHAN     Posttreatment Pain Rating 0/10 - no pain  -[employer]     Row Name 03/20/23 1400          Bed Mobility    Supine-Sit [place] (Bed Mobility) moderate assist (50% patient effort);2 person assist;verbal cues  -[employer]     Row Name 03/20/23 1400          Bed-Chair Transfer    Bed-Chair [place] (Transfers) moderate assist (50% patient effort);maximum assist (25% patient effort);2 person assist;verbal cues;nonverbal cues (demo/gesture)  -CLOVER     Assistive Device (Bed-Chair Transfers) wheelchair  -[employer]     Row Name 03/20/23 1400          Chair-Bed Transfer    Chair-Bed [place] (Transfers) moderate assist (50% patient effort);maximum assist (25% patient effort);2 person assist  -     Assistive Device (Chair-Bed Transfers) wheelchair;transfer board  -[employer]     Row Name 03/20/23 1400          Sit-Stand Transfer    Sit-Stand [place] (Transfers) dependent (less than 25% patient effort);2 person assist  -     Assistive Device (Sit-Stand Transfers) kimo EVANS     Comment, (Sit-Stand Transfer) pt unable to come to  // bars; 3 attempts  -[employer]     Row Name 03/20/23 1400          Stand-Sit Transfer    Stand-Sit [place] (Transfers) unable to assess  -[employer]     Assistive Device (Stand-Sit Transfers) parallel bars  CRISTINA     Comment, (Stand-Sit Transfer) attempted 3x but pt could not lift bottom off chair or straighten knees  -[employer]     Row Name 03/20/23 1400          Gait/Stairs (Locomotion)    Comment, (Gait/Stairs) unable to come to  // bars  -[employer]     Row Name 03/20/23 1400          Safety Issues, Functional Mobility    Impairments Affecting Function (Mobility)  balance;cognition;coordination;endurance/activity tolerance;motor control;pain;postural/trunk control;sensation/sensory awareness;range of motion (ROM);shortness of breath;strength  -     Row Name 03/20/23 1400          Motor Skills    Therapeutic Exercise hip;knee;ankle  -     Row Name 03/20/23 1400          Hip (Therapeutic Exercise)    Hip (Therapeutic Exercise) strengthening exercise  -     Hip Strengthening (Therapeutic Exercise) sitting;bilateral;marching while seated;5 repetitions  -     Row Name 03/20/23 1400          Knee (Therapeutic Exercise)    Knee (Therapeutic Exercise) strengthening exercise  -     Knee Strengthening (Therapeutic Exercise) sitting;LAQ (long arc quad);bilateral;10 repetitions  very little knee extension  -     Row Name 03/20/23 1400          Ankle (Therapeutic Exercise)    Ankle (Therapeutic Exercise) strengthening exercise  -     Ankle Strengthening (Therapeutic Exercise) sitting;bilateral;dorsiflexion;plantarflexion;10 repetitions  -     Row Name 03/20/23 1400          Positioning and Restraints    Pre-Treatment Position other (comment)  gym mat  -     Post Treatment Position bed  -           User Key  (r) = Recorded By, (t) = Taken By, (c) = Cosigned By    Initials Name Provider Type     Juana Veloz, PT Physical Therapist              Wound 03/08/23 1917 Right lateral chest Incision (Active)   Dressing Appearance open to air 03/20/23 0803   Closure Liquid skin adhesive 03/20/23 0803   Base clean;dry 03/20/23 0803   Drainage Amount none 03/20/23 0803   Dressing Care open to air 03/20/23 0803   Periwound Care dry periwound area maintained 03/20/23 0803       Wound 03/11/23 1530 Other (See comments) anterior thigh Abrasion (Active)   Dressing Appearance open to air 03/19/23 2119   Closure Open to air 03/20/23 0803   Periwound dry;intact 03/20/23 0803   Periwound Temperature warm 03/20/23 0803   Dressing Care open to air 03/20/23 0803       Wound 03/11/23 1530  medial sacral spine Pressure Injury (Active)   Dressing Appearance open to air 03/20/23 0803   Closure Open to air 03/20/23 0803   Base pink 03/20/23 0803   Care, Wound cleansed with;soap and water 03/20/23 0803   Dressing Care open to air 03/20/23 0803   Periwound Care dry periwound area maintained 03/20/23 0803       Wound 03/11/23 1530 Other (See comments) other (see comments) pubis Abrasion (Active)   Dressing Appearance open to air 03/19/23 2119   Closure Open to air 03/20/23 0803   Base red;scab 03/20/23 0803   Dressing Care open to air 03/20/23 0803   Periwound Care dry periwound area maintained 03/20/23 0803     Physical Therapy Education     Title: PT OT SLP Therapies (In Progress)     Topic: Physical Therapy (In Progress)     Point: Mobility training (In Progress)     Learning Progress Summary           Patient Acceptance, E, NR by ETHAN at 3/20/2023 1513    Acceptance, E,TB, VU,NR by  at 3/18/2023 1643                   Point: Home exercise program (In Progress)     Learning Progress Summary           Patient Acceptance, E, NR by JK at 3/20/2023 1513                   Point: Body mechanics (Not Started)     Learner Progress:  Not documented in this visit.          Point: Precautions (Not Started)     Learner Progress:  Not documented in this visit.                      User Key     Initials Effective Dates Name Provider Type Discipline     06/16/21 -  Juana Veloz, PT Physical Therapist PT     06/16/21 -  Yolanda Shannon, PT Physical Therapist PT                PT Recommendation and Plan                          Time Calculation:      PT Charges     Row Name 03/20/23 1512             Time Calculation    Start Time 1400  -JK      Stop Time 1500  -JK      Time Calculation (min) 60 min  -JK      PT Received On 03/20/23  -JK      PT - Next Appointment 03/21/23  -JK            User Key  (r) = Recorded By, (t) = Taken By, (c) = Cosigned By    Initials Name Provider Type     Juana Veloz, PT Physical  Therapist                Therapy Charges for Today     Code Description Service Date Service Provider Modifiers Qty    03498383631  PT THER PROC EA 15 MIN 3/20/2023 Juana Veloz, PT GP 2    [id number]  PT THERAPEUTIC ACT EA 15 MIN 3/20/2023 Juana Veloz, PT GP 2    [id number]  PT THER SUPP EA 15 MIN 3/20/2023 Juana Veloz, PT GP 2          Patient was wearing a face mask during this therapy encounter. Therapist used appropriate personal protective equipment including mask and gloves.  Mask used was standard procedure mask. Appropriate PPE was worn during the entire therapy session. Hand hygiene was completed before and after therapy session. Patient is not in enhanced droplet precautions.            Juana Veloz, PT  3/20/2023

## 2023-03-20 NOTE — PROGRESS NOTES
"Enter Query Response Below      Query Response: MRSA bacteremia only due to MRSA empyema, present on admit  Electronically signed by Pavel Robbins MD, 23, 1:27 PM EDT.               If applicable, please update the problem list.   Patient: Louise Soriano        : 1965  Account: [id number]           Admit Date: 2023        How to Respond to this query:       a. Click New Note     b. Answer query within the yellow box.                c. Update the Problem List, if applicable.      If you have any questions about this query contact me at: [email or web address]     Dr. Robbins,     59 yo female admitted to in-patient on 23 for \"paresthesia\" per progress note 23.  Progress note 23 includes, \"Lumbar puncture completed, CSF studies rather unremarkable   EMG/NCS completed and + for suspected GBS.\"   Nursing note 3/2/23 \"Patient is testing Positive for sepsis.\"   Pulmonary consult 3/3/23 \"he does appear to meet criteria for sepsis and will check infecitous work up and would recommend continuing antibiotics.\"   Blood culture obtained 3/2/23 \"STAPHYLOCOCCUS AUREUS, MRSA.\" Laboratory values reported 3/2/23 WBC 23.40, platelets 168, creatinine 0.68, lactate 3.6, and procalcitonin 0.43.    Discharge summary 3/17/23 includes, \"MRSA bacteremia. Underwent throacentesis on 3/3. Blood cx and pleural fluid ended up positive for MRSA. CTS consulted for chest tube placement given empyema, and she underwent thoracoscopy w/decortication 3/8/23.\" Present on admit status is marked as \"yes\" for MRSA bacteremia and mass of middle lobe of right lung.   Additional treatment included IV Vancomycin 3/4/-3/11/23, IV Levaquin 3/2-3/6/23.    After study can you please clarify conflicting documentation as    MRSA bacteremia only due to MRSA empyema, present on admit  MRSA bacteremia only due to MRSA empyema, developed after admit  MSRA sepsis due to MRSA empyema, present on admit  MRSA sepsis due to MRSA empyema, " developed after admit  Other (please specify)___________________  Unable to determine    By submitting this query, we are merely seeking further clarification of documentation to accurately reflect all conditions that you are monitoring, evaluating, treating or that extend the hospitalization or utilize additional resources of care. Please utilize your independent clinical judgment when addressing the question(s) above.     This query and your response, once completed, will be entered into the legal medical record.    Sincerely,  Anisha GOMES RN CCDS  System Director Clinical Documentation Integrity Program

## 2023-03-20 NOTE — PROGRESS NOTES
Name: Louise GARCIA ADMIT: 3/17/2023   : 1965  PCP: Chloe Schaefer APRN    MRN: [id number] LOS: 3 days   AGE/SEX: 58 y.o. female  ROOM: [facility]     Subjective   Subjective   Remains confused, however mental status is better this morning.  Discussed with  at bedside.    Objective   Objective   Vital Signs  Temp:  [97.9 °F (36.6 °C)-98.8 °F (37.1 °C)] 97.9 °F (36.6 °C)  Heart Rate:  [] 105  Resp:  [18-22] 22  BP: (124-168)/(78-88) 168/88  SpO2:  [90 %-99 %] 94 %  on  Flow (L/min):  [2] 2;   Device (Oxygen Therapy): nasal cannula  Body mass index is 25.73 kg/m².  Physical Exam  Constitutional:       General: She is not in acute distress.     Appearance: She is ill-appearing. She is not toxic-appearing or diaphoretic.   Cardiovascular:      Rate and Rhythm: Normal rate.      Pulses: Normal pulses.      Heart sounds: No murmur heard.    No gallop.   Pulmonary:      Effort: Pulmonary effort is normal. No respiratory distress.      Breath sounds: Normal breath sounds. No stridor. No wheezing or rhonchi.   Abdominal:      General: Abdomen is flat. There is no distension.      Palpations: Abdomen is soft.      Tenderness: There is no abdominal tenderness.   Musculoskeletal:      Right lower leg: No edema.      Left lower leg: No edema.   Neurological:      Mental Status: She is alert. She is disoriented.      Motor: Weakness present.         Results Review     I reviewed the patient's new clinical results.  Results from last 7 days   Lab Units 23  0714 23  0433 23  1802 23  0352 23  0526   WBC 10*3/mm3 10.63 13.25*  --  12.73* 13.59*   HEMOGLOBIN g/dL 9.3* 8.8* 6.8* 7.0* 7.9*   PLATELETS 10*3/mm3 621* 664*  --  691* 653*     Results from last 7 days   Lab Units 23  0714 23  0433 23  0352 23  0526   SODIUM mmol/L 136 137 139 135*   POTASSIUM mmol/L 3.7 4.0 4.3 4.7   CHLORIDE mmol/L 98 101 98 96*   CO2 mmol/L 26.8 26.4 29.6* 27.4   BUN mg/dL 10 13 14  13   CREATININE mg/dL 1.14* 1.31* 1.36* 1.12*   GLUCOSE mg/dL 92 87 97 81   EGFR mL/min/1.73 55.6* 47.0* 45.0* 56.8*         Results from last 7 days   Lab Units 03/20/23  0714 03/19/23  0433 03/18/23  0352 03/17/23  0526   CALCIUM mg/dL 9.0 9.0 9.0 9.3       No results found for: HGBA1C, POCGLU    No radiology results for the last day  Scheduled Medications  acetaminophen, 1,000 mg, Oral, TID  apixaban, 5 mg, Oral, Q12H  desvenlafaxine, 25 mg, Oral, Daily  folic acid, 1 mg, Oral, Daily  gabapentin, 300 mg, Oral, Q8H  ipratropium-albuterol, 3 mL, Nebulization, 4x Daily - RT  lamoTRIgine, 200 mg, Oral, Daily  pantoprazole, 40 mg, Oral, Q AM  thiamine, 100 mg, Oral, Daily  vancomycin, 12.5 mg/kg, Intravenous, Q24H  vitamin B-12, 500 mcg, Oral, Daily    Infusions  Pharmacy to dose vancomycin,     Diet  Diet: Regular/House Diet; Texture: Regular Texture (IDDSI 7); Fluid Consistency: Thin (IDDSI 0)       Assessment/Plan     Active Hospital Problems    Diagnosis  POA   • **Idiopathic peripheral neuropathy [G60.9]  Yes   • MRSA bacteremia [R78.81, B95.62]  Yes   • 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       58 y.o. female admitted with Idiopathic peripheral neuropathy.      03/20/23  Hemoglobin stable and creatinine improving.  Patient is showing signs of delirium, not unsurprising given prolonged hospital stay.  Would try to limit all sedating medicines. Expect improvement with increased activity and adequate nutrition.    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  · Full code.  · Discussed with patient and family.      Balwinder Hidalgo MD  [facility]  03/20/23  10:00 EDT

## 2023-03-20 NOTE — PROGRESS NOTES
Discharge Planning Assessment  [facility]     Patient Name: Louise GARCIA  MRN: [id number]  Today's Date: 3/20/2023    Admit Date: 3/17/2023    Plan: Patient will d/c home with . Will arrange follow up therapy as needed.   Discharge Needs Assessment    No documentation.                Discharge Plan     Row Name 03/20/23 1706       Plan    Plan Patient will d/c home with . Will arrange follow up therapy as needed.    Patient/Family in Agreement with Plan yes    Plan Comments Completed assessment with patient and .  provided most information as patient was asleep and difficult to arouse and maintain attention before going back to sleep. Patient lives with  in  travel trailer with 3 fold down steps with rail. There are 3 steps inside to get up to bedroom. 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  has history of colon cancer and has colostomy. He is not working. D/C plan is home with . Discussed SW role, team and family conference. Will assist with d/c plans.              Continued Care and Services - Admitted Since 3/17/2023    Coordination has not been started for this encounter.          Demographic Summary    No documentation.                Functional Status     Row Name 03/20/23 0931       Functional Status    Usual Activity Tolerance good    Current Activity Tolerance fair    Functional Status Comments Patient was independent prior to onset of illness and hospitalization.       Functional Status, IADL    Medications independent    Meal Preparation independent    Housekeeping assistive equipment    Laundry assistive equipment    Shopping independent    IADL Comments Patient was independent at home.  helps with housekeeping and does their laundry.       Mental Status    General Appearance WDL WDL       Mental Status Summary    Recent Changes in Mental Status/Cognitive Functioning ability to speak  clearly;mood;memory (recent)    Mental Status Comments Patient had some difficulty with recall of information.       Employment/    Employment Status employed full-time    Shift Worked first shift    Current or Previous Occupation healthcare    Employment/ Comments Patient works three 12 hour shifts as private duty nurse               Psychosocial     Row Name 03/20/23 2444       Behavior WDL    Behavior WDL interactions    Interactions eye contact intermittent;cooperative       Emotion Mood WDL    Emotion/Mood/Affect WDL affect;emotion mood    Affect flat    Emotion/Mood appropriate to situation       Speech WDL    Speech WDL speech    Speech soft/quiet;response lag;slurring of words       Perceptual State WDL    Perceptual State WDL WDL       Thought Process WDL    Thought Process WDL thought process    Thought Process other (see comments)  slower processing       Intellectual Performance WDL    Intellectual Performance WDL intellectual performance    Intellectual Performance impaired concentration;memory deficit, recent       Coping/Stress    Major Change/Loss/Stressor medical condition/diagnosis;loss of independence    Patient Personal Strengths strong support system;successful coping history    Sources of Support adult child(aric);friend(s);spouse    Techniques to [place] with Loss/Stress/Change diversional activities    Reaction to Health Status unable to assess;other (see comments)  Patient was asleep most of assessment    Understanding of Condition and Treatment needs time to process;partial understanding of medical condition;partial understanding of treatment    Coping/Stress Comments Difficult to assess coping during assessment as patient was asleep and hard to arouse and sustain attention before falling back asleep. Will continue to assess when more alert and awake.       Developmental Stage (Williams's)    Developmental Stage Stage 7 (35-65 years/Middle Adulthood) Generativity vs. Stagnation                Abuse/Neglect    No documentation.                Legal     Row Name 03/20/23 1707       Financial/Legal    Source of Income salary/wages    Who Manages Finances if Patient Unable     Finance Comments  does not work               Substance Abuse    No documentation.                Patient Forms    No documentation.                   RONDA Buckley

## 2023-03-20 NOTE — THERAPY TREATMENT NOTE
Inpatient Rehabilitation - Occupational Therapy Treatment Note    [facility]     Patient Name: Louise GARCIA  : 1965  MRN: [id number]    Today's Date: 3/20/2023                 Admit Date: 3/17/2023         ICD-10-CM ICD-9-CM   1. Impaired functional mobility, balance, gait, and endurance  Z74.09 V49.89       Patient Active Problem List   Diagnosis   • Sepsis (HCC)   • Neck pain, chronic   • Upper back pain   • Paresthesia   • Cervical myelopathy (HCC)   • Lower extremity weakness   • History of blood clots   • Chronic anticoagulation   • Seizures (HCC)   • Anemia   • GERD (gastroesophageal reflux disease)   • Guillain-Condon syndrome (HCC)   • Situational mixed anxiety and depressive disorder   • Mass of middle lobe of right lung   • Oral candidiasis   • Empyema of pleura (HCC)   • MRSA bacteremia   • Idiopathic peripheral neuropathy       Past Medical History:   Diagnosis Date   • Degenerative disc disease, cervical    • Gestational diabetes    • H/O blood clots    • Hematemesis    • Seizures ([facility])    • Septic shock ([facility])        Past Surgical History:   Procedure Laterality Date   • APPENDECTOMY     • BACK SURGERY     • CHOLECYSTECTOMY     • ENDOSCOPY N/A 2016    Procedure: ESOPHAGOGASTRODUODENOSCOPY with biopsy;  Surgeon: Austen Brito MD;  Location: [facility] ENDOSCOPY;  Service:    • HYSTERECTOMY     • NECK SURGERY       approx 6 yrs ago   • THORACOSCOPY Right 3/8/2023    Procedure: THORACOSCOPY WITH DAVINCI ROBOT WITH COMPLETE DECORTICATION;  Surgeon: Chloe Cedillo MD;  Location: [facility] OR;  Service: Robotics - DaVinci;  Laterality: Right;   • TONSILLECTOMY     • TONSILLECTOMY AND ADENOIDECTOMY               [facility] OT ASSESSMENT FLOWSHEET (last 12 hours)     [facility] OT Evaluation and Treatment     Row Name 23 1447          OT Time and Intention    Document Type daily treatment  -AF     Mode of Treatment occupational therapy  -AF     Patient Effort adequate  -AF     Symptoms Noted  During/After Treatment fatigue  -AF     Row Name 03/20/23 1447          General Information    Patient/Family/Caregiver Comments/Observations pt supine in bed in AM with  present, in PM up with [facility] staff using commode  -AF     Existing Precautions/Restrictions fall  -AF     Row Name 03/20/23 1447          Pain Assessment    Pretreatment Pain Rating 0/10 - no pain  -AF     Posttreatment Pain Rating 0/10 - no pain  -AF     Pre/Posttreatment Pain Comment complaints of pain in back of knees and calves, discussed bed positioning  -AF     Row Name 03/20/23 1447          Cognition/Psychosocial    Affect/Mental Status (Cognition) confused;flat/blunted affect  -AF     Orientation Status (Cognition) oriented to;person;place  -AF     Follows Commands (Cognition) follows one-step commands;50-74% accuracy;delayed response/completion;increased processing time needed;initiation impaired;repetition of directions required;verbal cues/prompting required  -AF     Personal Safety Interventions fall prevention program maintained;gait belt;nonskid shoes/slippers when out of bed  -AF     Cognitive Function memory deficit;safety deficit  -AF     Memory Deficit (Cognition) episodic memory;procedural memory  -AF     Safety Deficit (Cognition) awareness of need for assistance;judgment;problem-solving  -AF     Comment, Cognition with MOD vc's and increased time to recreate 3D block design with 4-5 blocks  -AF     Cognitive Interventions/Strategies occupation/activity based interventions;process/task specific training  -AF     Row Name 03/20/23 1447          Bathing    [place] Level (Bathing) bathing skills;lower body;upper body;moderate assist (50% patient effort);maximum assist (25% patient effort)  A x 2 when standing to complete bathing of bottom and [place] area  -AF     Position (Bathing) sink side;supported sitting;supported standing  -AF     Set-up Assistance (Bathing) obtain supplies  -AF     Comment (Bathing) sink side  -AF      Row Name 03/20/23 1447          Upper Body Dressing    [place] Level (Upper Body Dressing) upper body dressing skills;maximal assist (25-49% patient effort);moderate assist (50-74% patient effort)  -AF     Position (Upper Body Dressing) supported sitting  -AF     Set-up Assistance (Upper Body Dressing) obtain clothing  -AF     Row Name 03/20/23 1447          Lower Body Dressing    [place] Level (Lower Body Dressing) doff;don;pants/bottoms;shoes/slippers;socks;underwear;maximum assist (25% patient effort);verbal cues;dependent (less than 25% patient effort)  A x 2  -AF     Position (Lower Body Dressing) supported standing;supported sitting  -AF     Row Name 03/20/23 1447          Grooming    [place] Level (Grooming) grooming skills;moderate assist (50% patient effort);verbal cues  -AF     Position (Grooming) supported sitting  -AF     Set-up Assistance (Grooming) open containers;obtain supplies  -AF     Comment (Grooming) vc's to sequence task  -AF     Row Name 03/20/23 1447          Bed Mobility    Supine-Sit [place] (Bed Mobility) maximum assist (25% patient effort);moderate assist (50% patient effort);1 person assist  -AF     Sit-Supine [place] (Bed Mobility) maximum assist (25% patient effort);verbal cues  mat mobility  -AF     Assistive Device (Bed Mobility) bed rails  -AF     Row Name 03/20/23 1447          Transfer Assessment/Treatment    Comment, (Transfers) sit to  bathroom using grab bars with MAX A x 1 and increased time. w/c to EOM with sit pivot MOD A x 2  -AF     Row Name 03/20/23 1447          Bed-Chair Transfer    Bed-Chair [place] (Transfers) maximum assist (25% patient effort);moderate assist (50% patient effort);2 person assist  -AF     Assistive Device (Bed-Chair Transfers) wheelchair  -AF     Row Name 03/20/23 1447          Motor Skills    Coordination fine motor deficit;bilateral;upper extremity;ataxia;bimanual skills;moderate impairment  facilated  forward weigth shifting during FMC task, visually scanning to find correct colored clothespin then placing laterally and forward, facialited flexion and postural activation during reaching task, pt has limited ability to actively engage core  -AF     Functional Endurance poor  -AF     Row Name 03/20/23 1447          Balance    Static Sitting Balance minimal assist  -AF     Dynamic Sitting Balance moderate assist;minimal assist  with funciontal reaching tasks  -AF     Row Name 03/20/23 1447          Positioning and Restraints    Pre-Treatment Position in bed  -AF     Post Treatment Position wheelchair  -AF     In Wheelchair sitting;exit alarm on;with PT;with SLP  with SLP in AM, with PT in Pm  -AF           User Key  (r) = Recorded By, (t) = Taken By, (c) = Cosigned By    Initials Name Effective Dates    AF Heaven Villareal, OTR 06/16/21 -                  Occupational Therapy Education     Title: PT OT SLP Therapies (In Progress)     Topic: Occupational Therapy (Not Started)     Point: ADL training (Not Started)     Description:   Instruct learner(s) on proper safety adaptation and remediation techniques during self care or transfers.   Instruct in proper use of assistive devices.              Learner Progress:  Not documented in this visit.          Point: Home exercise program (Not Started)     Description:   Instruct learner(s) on appropriate technique for monitoring, assisting and/or progressing therapeutic exercises/activities.              Learner Progress:  Not documented in this visit.          Point: Precautions (Not Started)     Description:   Instruct learner(s) on prescribed precautions during self-care and functional transfers.              Learner Progress:  Not documented in this visit.          Point: Body mechanics (Not Started)     Description:   Instruct learner(s) on proper positioning and spine alignment during self-care, functional mobility activities and/or exercises.              Learner  Progress:  Not documented in this visit.                                OT Recommendation and Plan                         Time Calculation:      Time Calculation- OT     Row Name 03/20/23 1458 03/20/23 1455          Time Calculation- OT    OT Start Time 1330  -AF 0930  -AF     OT Stop Time 1400  -AF 1000  -AF     OT Time Calculation (min) 30 min  -AF 30 min  -AF           User Key  (r) = Recorded By, (t) = Taken By, (c) = Cosigned By    Initials Name Provider Type    AF Heaven Villareal, OTR Occupational Therapist              Therapy Charges for Today     Code Description Service Date Service Provider Modifiers Qty    24225896945 HC OT SELF CARE/MGMT/TRAIN EA 15 MIN 3/20/2023 Heaven Villareal OTR GO 2    94442611869 HC OT NEUROMUSC RE EDUCATION EA 15 MIN 3/20/2023 Heaven Villareal OTR GO 2                   MARY ANN Gunn  3/20/2023

## 2023-03-20 NOTE — PLAN OF CARE
Goal Outcome Evaluation:           Progress: improving  Outcome Evaluation: A&OX1-2. Intermittent confusion and disorientation. Difficulty with word-finding. Idiopathic peripheral neuropathy. Full code. Hx. chronic anemia and seizures. Daily weight. 1L-2L O2 at all times. Assistx2 with the wheelchair. Very weak in the legs. Continent and incontinent of B&B. Last BM todayx2. Wears a brief. Purewick at night. On scheduled gabapentin and tylenol. Has PRN Aura and ativan. 20G IV access in R. arm. Seizure precautions. Siderails padded. On-going pain is an issue. Pain meds help. Diet: Reg, thins. Not eating much at meals. Is drinking boost shakes. Has napped on and off in between and after therapies. Runs tachy. Occult blood test of stool+. Anxious and restless.  at bedside.

## 2023-03-20 NOTE — PROGRESS NOTES
Case Management  Inpatient Rehabilitation Plan of Care and Discharge Plan Note    Rehabilitation Diagnosis:  Branch  Date of Onset:  Branch    Medical Summary:  Branch  Past Medical History: Branch    Plan of Care  Updated Problems/Interventions  Field    Expected Intensity:  Branch  Interdisciplinary Team:  Branch  Estimated Length of Stay/Anticipated Discharge Date: Branch  Anticipated Discharge Destination:  Anticipated discharge destination from inpatient rehabilitation is community  discharge with assistance. Patient lives with  in  travel trailer. 3  fold down steps with rail to enter.  D/C plan is home with .  not currently working.      Based on the patient's medical and functional status, their prognosis and  expected level of functional improvement is:  Branch    Signed by: OZZIE Mccarthy

## 2023-03-20 NOTE — PROGRESS NOTES
Inpatient Rehabilitation Plan of Care Note    Plan of Care  Updated Problems/Interventions  Mobility    [PT] Stairs(Active)  Current Status(03/20/2023): TBA  Weekly Goal(03/24/2023):  Discharge Goal: 3, B rails, Min/CG    [PT] Walk(Active)  Current Status(03/20/2023): TBA (unable to come to stand 3/20)  Weekly Goal(03/24/2023): PT only  Discharge Goal: 150', RW, CG    [PT] Bed/Chair/Wheelchair(Active)  Current Status(03/20/2023): Max x 2, stand pivot or squat with tsf bd  Weekly Goal(03/24/2023): Max, stand pivot  Discharge Goal: Min,  rwx    [PT] Bed Mobility(Active)  Current Status(03/20/2023): Mod x 2, rails, HOB elev  Weekly Goal(03/24/2023): Min  Discharge Goal: Mod Indep    Signed by: Juana Veloz, PT

## 2023-03-20 NOTE — PROGRESS NOTES
Recreational Therapy Note    Patient Name: Louise GARCIA   MRN: [id number]    Therapeutic Recreation Eval and Treat (last 12 hours)     Therapeutic Recreation Eval & Treat     Row Name 03/20/23 1415       Lifestyle/Recreational History/Interest    Current Living Situation with family  -    Transportation Situation car, drives self  -    Row Name 03/20/23 1415       Recreational History and Interests    How Important is Recreation to You? somewhat important  -    Satisfied With Leisure Lifestyle? yes  -SS    Participate Regularly in Leisure Activity? yes  -SS    Current Hobbies/Interests games;interaction with pets;television/movies/videos;social media/computer activities  -    Games card games  -    Row Name 03/20/23 1415       Coping/Wellbeing    Current State of Wellbeing calm  -    Factors Impacting Current State of Wellbeing no significant issues  -    Row Name 03/20/23 1415       Therapeutic Recreation Participation    Orientation to Therapeutic Recreation patient/family  -    Row Name 03/20/23 1415       Therapeutic Recreation Assessment/Plan    Recreation Therapy Goals/Objectives strength and endurance;cognitive skills;concentration attention span;leisure participation  -    Recreation Plan structured leisure participation  -          User Key  (r) = Recorded By, (t) = Taken By, (c) = Cosigned By    Initials Name Provider Type    SS Mariel Cantu, CTRS Recreational Therapist                  DELVIN Wei  3/20/2023

## 2023-03-20 NOTE — PROGRESS NOTES
Inpatient Rehabilitation Functional Measures Assessment and Plan of Care    Plan of Care  Updated Problems/Interventions  Cognition    [ST] Memory(Active)  Current Status(03/21/2023): The patient has difficulty remembering new  information due to short-term memory impairments.  Weekly Goal(03/28/2023): The patient will remember recommendations from medical  team and therapies.  Discharge Goal: The patient will improve memory necessary for home-based  participation when given appropriate supervision and cuing.        Communication    [ST] Expression(Active)  Current Status(03/21/2023): Word finding tasks (naming, sentence generation) 50%  MOD cues  Weekly Goal(03/28/2023): Word finding tasks 80% MIN cues  Discharge Goal: Functional language to express wants, needs, ideas, feelings,  concepts with 100% accuracy NO cues for compensations    [ST] Voice(Active)  Current Status(03/21/2023): Vocal quality breathy, hoarse, known glottic gap  suggests insufficient adduction of VF - avergae 68dB MPT 2 seconds  Weekly Goal(03/28/2023): Vocal quality with adequate intensity at least 70dB,  MPT >2 seconds  Discharge Goal: Functional voice for utilization in all settings, 90%  intelligibility NO cues for compensatory strategies    Functional Measures  BA Eating:  Branch  [facility] Grooming: Branch  [facility] Bathing:  Branch  [facility] Upper Body Dressing:  Branch  [facility] Lower Body Dressing:  Branch  [facility] Toileting:  [facility] Bladder Management  Level of Assistance:  Branch  Frequency/Number of Accidents this Shift:  [facility] Bowel Management  Level of Assistance: [place]  Frequency/Number of Accidents this Shift: Branch    [facility] Bed/Chair/Wheelchair Transfer:  [facility] Toilet Transfer:  [facility] Tub/Shower Transfer:  [place]    Previously Documented Mode of Locomotion at Discharge: Field  BA Expected Mode of Locomotion at Discharge: [facility] Walk/Wheelchair:  [facility] Stairs:  Branch    [facility] Comprehension:  [facility] Expression:   Branch  [facility] Social Interaction:  Branch  [facility] Problem Solving:  Branch  [facility] Memory:  Branch    Therapy Mode Minutes  Occupational Therapy: Branch  Physical Therapy: Branch  Speech Language Pathology:  Branch    Signed by: SKYLAR Dexter

## 2023-03-20 NOTE — PLAN OF CARE
Goal Outcome Evaluation:  Plan of Care Reviewed With: patient        Progress: improving  Outcome Evaluation: A&Ox3 with periods of confusion, garbled and slurred speech at times. Meds whole with water. See MAR. PRN pain med given for back and ble pain. Bootom read, blanchable, peeling. Cont B&B, using bedpan. Bladder scan for 0ml after void. Purewick placed at bedtime. IV to RFA. Seizure precautions. Assist x2 for transfers. Tachycardic this shift. Restless in bed periodically throughout the night.  remained at bedside. O2 at 1l/min per nc at bedtime. No unsafe behavior. Sleeping poorly this shift.

## 2023-03-20 NOTE — CONSULTS
Nutrition Services    Patient Name:  Louise GARCIA  YOB: 1965  MRN: [id number]  Admit Date:  3/17/2023    Assessment Date:  03/20/23    CLINICAL NUTRITION ASSESSMENT    Comments:  Nutrition consult received d/t new rehab admission. Pt with idiopathic peripheral neuropathy. Also with a lung mass; received thoracentesis 3/9, and PI of sacral spine. Hx of Gu[place] syndrome.   Visited pt in afternoon who was sleeping; primary conversation with pt's  in room.  reported pt with declining appetite, stating pt used to eat really well and in the past 2-3 d, only eats bites of meals. Pt does drink Boost supplements and pt's  keeps some in his cooler in room. Informed pt's  of available snacks/ONS on floor. Will also add Boost TID and plan to follow up with pt for full interview tomorrow.     Recommend:   1. Boost Plus TID  2. Follow-up tomorrow for full interview with pt.     RD will continue to follow-up, per protocol.           Reason for Assessment Acute Rehab Admission     Admitting Diagnosis idiopathic peripheral neuropathy     Medical/Surgical History   Past Medical History:   Diagnosis Date   • Degenerative disc disease, cervical    • Gestational diabetes    • H/O blood clots    • Hematemesis    • Seizures (HCC)    • Septic shock (HCC)        Past Surgical History:   Procedure Laterality Date   • APPENDECTOMY     • BACK SURGERY     • CHOLECYSTECTOMY     • ENDOSCOPY N/A 8/30/2016    Procedure: ESOPHAGOGASTRODUODENOSCOPY with biopsy;  Surgeon: Austen Brito MD;  Location: [facility] ENDOSCOPY;  Service:    • HYSTERECTOMY     • NECK SURGERY       approx 6 yrs ago   • THORACOSCOPY Right 3/8/2023    Procedure: THORACOSCOPY WITH DAVINCI ROBOT WITH COMPLETE DECORTICATION;  Surgeon: Chloe Cedillo MD;  Location: [facility] OR;  Service: Robotics - DaVinci;  Laterality: Right;   • TONSILLECTOMY     • TONSILLECTOMY AND ADENOIDECTOMY  1975        Encounter Information       "  Nutrition History:  PTA, ate very well, per pt's    Food Preferences:    Supplements: Drinks Boost/ONS   Factors Affecting Intake: decreased appetite     Current Nutrition Orders & Evaluation of Intake       Oral Nutrition     Food Allergies NKFA   Current PO Diet Diet: Regular/House Diet; Texture: Regular Texture (IDDSI 7); Fluid Consistency: Thin (IDDSI 0)   Supplement    PO Evaluation     % PO Intake Avg 50% or less    # of Days Evaluated 1   --  Anthropometrics        Current Height  Current Weight  BMI kg/m2 Height: 157.5 cm (62\")  Weight: 63.8 kg (140 lb 10.5 oz) (03/17/23 2040)  Body mass index is 25.73 kg/m².   Adj BMI (if applicable)        Ideal Body Weight (IBW) 110#   Adj IBW (if applicable)        Usual Body Weight (UBW) Per wt review, 120-130#. Unsure if current wt is accurate.    Weight Change/Trend Stable       Weight History Wt Readings from Last 15 Encounters:   03/17/23 2040 63.8 kg (140 lb 10.5 oz)   03/10/23 1410 57.6 kg (127 lb)   03/10/23 0600 57.9 kg (127 lb 10.3 oz)   03/06/23 0600 56.7 kg (125 lb)   03/05/23 1755 56.7 kg (125 lb)   02/20/23 2019 56.7 kg (125 lb)   02/20/23 1425 54.4 kg (120 lb)   03/16/23 0837 57.6 kg (127 lb)   03/16/23 0837 57.6 kg (127 lb)   02/23/23 1233 56.7 kg (125 lb)   02/21/23 1047 56.7 kg (125 lb)   02/21/23 1034 56.7 kg (125 lb)   08/23/18 1502 55.3 kg (122 lb)   09/29/16 1027 54.4 kg (120 lb)   08/28/16 0600 57.6 kg (127 lb)   08/27/16 2324 57.6 kg (127 lb)   02/14/14 0849 62.1 kg (136 lb 15.9 oz)   02/07/14 1313 117 kg (258 lb 4 oz)   12/11/13 1053 60.9 kg (134 lb 4.2 oz)   11/20/13 1303 65.3 kg (144 lb 0.1 oz)   10/17/13 0957 62.6 kg (138 lb 0.1 oz)      --  Physical Findings          Physical Appearance Other:Sleeping during visit.    Oral/Mouth Cavity teeth missing   Edema  1+ (trace), 2+ (mild)   Gastrointestinal normoactive, last bowel movement:3/19   Skin  MASD, pressure injury, surgical incision   Tubes/Drains none     Tests/Procedures/Labs     "    Tests/Procedures No new tests/procedures       Pertinent Labs     Results from last 7 days   Lab Units 03/20/23  0714 03/19/23  0433 03/18/23  0352   SODIUM mmol/L 136 137 139   POTASSIUM mmol/L 3.7 4.0 4.3   CHLORIDE mmol/L 98 101 98   CO2 mmol/L 26.8 26.4 29.6*   BUN mg/dL 10 13 14   CREATININE mg/dL 1.14* 1.31* 1.36*   CALCIUM mg/dL 9.0 9.0 9.0   GLUCOSE mg/dL 92 87 97     Results from last 7 days   Lab Units 03/20/23  0714   HEMOGLOBIN g/dL 9.3*   HEMATOCRIT % 27.3*   WBC 10*3/mm3 10.63     Results from last 7 days   Lab Units 03/20/23  0714 03/19/23  0433 03/18/23  0352 03/17/23  0526 03/16/23  0517   PLATELETS 10*3/mm3 621* 664* 691* 653* 535*     COVID19   Date Value Ref Range Status   03/03/2023 Not Detected Not Detected - Ref. Range Final     Lab Results   Component Value Date    HGBA1C 5.10 02/20/2023        Medications           Scheduled Medications acetaminophen, 1,000 mg, Oral, TID  apixaban, 5 mg, Oral, Q12H  desvenlafaxine, 25 mg, Oral, Daily  folic acid, 1 mg, Oral, Daily  gabapentin, 300 mg, Oral, Q8H  ipratropium-albuterol, 3 mL, Nebulization, 4x Daily - RT  lamoTRIgine, 200 mg, Oral, Daily  pantoprazole, 40 mg, Oral, Q AM  thiamine, 100 mg, Oral, Daily  vancomycin, 12.5 mg/kg, Intravenous, Q24H  vitamin B-12, 500 mcg, Oral, Daily       Infusions Pharmacy to dose vancomycin,        PRN Medications •  bisacodyl  •  diazePAM  •  melatonin  •  oxyCODONE  •  Pharmacy to dose vancomycin  •  polyethylene glycol  •  senna-docusate sodium        Nutrition Diagnosis        Nutrition Dx Problem  Problem: Predicted Suboptimal Intake  Etiology: Functional Diagnosis Low appetite  Signs/Symptoms: Report of Minimal PO Intake    Comment:      NUTRITION INTERVENTION / PLAN OF CARE  Goals        Intervention Goal(s) Establish PO intake, Increase intake, Maintain weight, No significant weight loss and PO intake goal %: 60%     Nutrition Intervention        RD Action Supplement provided, Encourage intake, Follow  Tx Progress and Care plan reviewed     Prescription        Diet Prescription    Supplement Prescription Boost Plus TID   Snack Prescription    EN Prescription    New Prescription Ordered? Yes     Monitor/Evaluation        Monitor Per protocol   Discharge Needs Pending clinical course   Education Will instruct as appropriate       RD to follow up per protocol.    Electronically signed by:  Silke Arias  03/20/23 15:49 EDT

## 2023-03-20 NOTE — NURSING NOTE
Wound/ostomy - consult received regarding old chest tube sites and skin discoloration to gluteals. Patient had 2 optifoam's to R lateral chest from previous chest tubes. Each site approx 1 cm in length, no completely healed and approximated, scant tan drainage to the optifoam's. Chest tubes were removed on 3/12 and 3/13.  Recommend to keep covered with dry gauze and secure with tape, avoid use of optifoam's as they are waterproof and trap moisture and will prevent epithelialization closure. Gluteals and gluteal cleft observed to have some flaking skin, blanchable redness, consistent with healing MASD, recommend to keep area clean and dry and use zinc based cream to area to prevent friction. Patient is using an adult diaper due to urinary urgency, enc spouse and nurse to avoid use of a diaper which traps moisture and heat leading to skin damage, spouse is insistent that she wear a diaper for now to avoid any incontinence episodes.

## 2023-03-20 NOTE — PROGRESS NOTES
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   S/p IVIG x 5 days  Impaired mobility/impaired self care/ impaired cognition  R lung masslike infiltrate with cavitary lesions/pleural effusion   S/p thoracentesis - Blood cx and pleural fluid  positive for MRSA   mass on TTE with findings concerning for endocarditis - underwent MARIAMA 3/10/2023 which had no vegetation  RUE superficial thrombophlebitis concerning for septic phlebitis.  chest tube placement given empyema, and she underwent thoracoscopy w/ decortication 3/8/23   -expected postoperative changes with pleural thickening and minimal pleural effusion.  The effusion is too small for intervention and will likely to continue to resolve with time.  Recommend continue good pulmonary hygiene with incentive spirometry hourly as well as increase activity/ambulation as tolerated.  ID - continue 4 weeks of vancomycin as recommended by infectious disease dosed by pharmacy to achieve a trough level of 15-20 or area under the curve of 400-600 from last negative blood culture or last intervention which ever is the latest - to complete April 1, 2023  Left hip arthrocentesis March 16 to rule out any hip septic arthritis- no growth to date on fluid.    lower back pain - MRI of spine to rule out discitis or osteomyelitis.  This did have known degenerative changes but  no infection.   DVT prophylaxis - SCDs / apixiban. History of LLE DVT - on Eliquis at home  Pain management - Tylenol 1,000 mg three times a day/ Celebrex 100 mg q 112 hours/ gabapentin 300 mg q 8 hours Oxycodone 5 mg q 4 hours prn  Anxiety/ muscle spasms - Diazepam 2 mg q 6 hours prn - JONES reviewed  Seizure disorder -Lamotrigine 200 mg daily      SUBJECTIVE:  Patient seen and examined. No acute events overnight. Denies CP, SOA, N/V, F/C.  Comfortable with her breathing.   Weakness the same  Tolerates therapies.       OBJECTIVE:  AF, VSS  BP: (106-168)/(71-88)  106/71    Physical Exam     MENTAL STATUS -  AAO x3, Verbal, NAD.  HEENT- NCAT, SCLERAE ANICTERIC, CONJUNCTIVAE PINK, OP MOIST, NO JVD, EARS UNREMARKABLE EXTERNALLY  O2 1 L  LUNGS - DECREASED BREATH SOUNDS RIGHT BASE MORE SO THAN LEFT BASE  HEART- RRR, NO RUB, MURMUR, OR GALLOP  ABD - NORMOACTIVE BOWEL SOUNDS, SOFT, NT.  EXT - NO EDEMA OR CYANOSIS  MOTOR EXAM - R/L:  SHOULDER ABDUCTION 4/4 EF 5/5, WE 5/5, HAND INTRINSICS 4/4  HF 2/1, KE 3-/2-, ADF 4/4        Scheduled Meds:acetaminophen, 1,000 mg, Oral, TID  apixaban, 5 mg, Oral, Q12H  desvenlafaxine, 25 mg, Oral, Daily  folic acid, 1 mg, Oral, Daily  gabapentin, 300 mg, Oral, Q8H  ipratropium-albuterol, 3 mL, Nebulization, 4x Daily - RT  lamoTRIgine, 200 mg, Oral, Daily  pantoprazole, 40 mg, Oral, Q AM  thiamine, 100 mg, Oral, Daily  vancomycin, 12.5 mg/kg, Intravenous, Q24H  vitamin B-12, 500 mcg, Oral, Daily      Continuous Infusions:Pharmacy to dose vancomycin,       PRN Meds:.•  bisacodyl  •  diazePAM  •  melatonin  •  oxyCODONE  •  Pharmacy to dose vancomycin  •  polyethylene glycol  •  senna-docusate sodium    Lab Results (last 24 hours)     Procedure Component Value Units Date/Time    CBC (No Diff) [[id number]]  (Abnormal) Collected: 03/20/23 0714    Specimen: Blood Updated: 03/20/23 0812     WBC 10.63 10*3/mm3      RBC 2.86 10*6/mm3      Hemoglobin 9.3 g/dL      Hematocrit 27.3 %      MCV 95.5 fL      MCH 32.5 pg      MCHC 34.1 g/dL      RDW 13.6 %      RDW-SD 47.3 fl      MPV 10.4 fL      Platelets 621 10*3/mm3     Basic Metabolic Panel [[id number]]  (Abnormal) Collected: 03/20/23 0714    Specimen: Blood Updated: 03/20/23 0835     Glucose 92 mg/dL      BUN 10 mg/dL      Creatinine 1.14 mg/dL      Sodium 136 mmol/L      Potassium 3.7 mmol/L      Comment: Slight hemolysis detected by analyzer. Results may be affected.        Chloride 98 mmol/L      CO2 26.8 mmol/L      Calcium 9.0 mg/dL      BUN/Creatinine Ratio 8.8     Anion Gap 11.2 mmol/L      eGFR 55.6  mL/min/1.73     Narrative:      GFR Normal >60  Chronic Kidney Disease <60  Kidney Failure <15            Imaging Results (Last 24 Hours)     ** No results found for the last 24 hours. 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   S/p IVIG x 5 days     Impaired mobility/impaired self care/ impaired cognition     R lung masslike infiltrate with cavitary lesions/pleural effusion   S/p thoracentesis - Blood cx and pleural fluid  positive for MRSA      chest tube placement given empyema, and she underwent thoracoscopy w/ decortication 3/8/23   -expected postoperative changes with pleural thickening and minimal pleural effusion.  The effusion is too small for intervention and will likely to continue to resolve with time.  Recommend continue good pulmonary hygiene with incentive spirometry hourly as well as increase activity/ambulation as tolerated.        mass on TTE with findings concerning for endocarditis - underwent MARIAMA 3/10/2023 which had no vegetation     RUE superficial thrombophlebitis concerning for septic phlebitis.        ID - continue 4 weeks of vancomycin as recommended by infectious disease dosed by pharmacy to achieve a trough level of 15-20 or area under the curve of 400-600 from last negative blood culture or last intervention which ever is the latest - to complete April 1, 2023     Left hip arthrocentesis March 16 to rule out any hip septic arthritis- no growth to date on fluid.    lower back pain - MRI of spine to rule out discitis or osteomyelitis.  This did have known degenerative changes but  no infection.      DVT prophylaxis - SCDs / apixiban. History of LLE DVT - on Eliquis at home     Pain management - Tylenol 1,000 mg three times a day/ Celebrex 100 mg q 112 hours/ gabapentin 300 mg q 8 hours Oxycodone 5 mg q 4 hours prn  March 18 - DC Celebrex 2/2 PHYLLSI,  and anemia     Anxiety/ muscle spasms - Diazepam 2 mg q 6 hours prn - JONES reviewed     Seizure disorder -Lamotrigine 200 mg daily    ABLA - March 18- Hgb 7.0 (7.3 on 3/16). Type, cross and transfuse 1 unit PRBCs. Pre-medicate with Tylenol and Benadryl. CBC in am.   March 19- Hgb 8.8 s/p 1 unit PRBcs. Hemoccult positive. On Eliquis for h/o DVT. Follow Hgb. May need GI work up.  March 20 - HGB 9.3 s/p transfusion    PHYLLIS - March 18- Creatinine 1.36 up from 1.12. On IV Vanc and Celebrex. DC Celebrex. BMP in am.  March 19- s/p 500ml NS bolus. Creatinine 1.31.    Medicine following for medical management.       Reviewed medications, vital signs, and recent lab work. Continue comprehensive inpatient rehabilitation program.      Ryley Rider MD    During rounds, used appropriate personal protective equipment including mask and gloves.  Additional gown if indicated.  Mask used was standard procedure mask. Appropriate PPE was worn during the entire visit.  Hand hygiene was completed before and after.

## 2023-03-20 NOTE — PROGRESS NOTES
Inpatient Rehabilitation Functional Measures Assessment and Plan of Care    Plan of Care  Updated Problems/Interventions  Self Care    [OT] Bathing(Active)  Current Status(03/20/2023): Mod A x 1-2  Weekly Goal(03/28/2023): Min A x1  Discharge Goal: CGA    [OT] Dressing (Lower)(Active)  Current Status(03/20/2023): Dep, A x2  with standing  Weekly Goal(03/28/2023): Max  Discharge Goal: Min    [OT] Dressing (Upper)(Active)  Current Status(03/20/2023): Mod  Weekly Goal(03/28/2023): Min  Discharge Goal: S/S    [OT] Eating(Active)  Current Status(03/20/2023): Min  Weekly Goal(03/28/2023): S/S  Discharge Goal: Mod I    [OT] Grooming(Active)  Current Status(03/20/2023): Min A  Weekly Goal(03/28/2023): S/S  Discharge Goal: Mod I    [OT] Toileting(Active)  Current Status(03/20/2023): Dep  Weekly Goal(03/28/2023): Max  Discharge Goal: Min        Mobility    [OT] Tub/Shower Transfers(Active)  Current Status(03/20/2023): TBA  Weekly Goal(03/28/2023): Mod A x2  Discharge Goal: CGA    [OT] Bed/Chair/Wheelchair(Active)  Current Status(03/18/2023): Max A x2  Weekly Goal(03/25/2023): Mod A x2  Discharge Goal: CGA/Min A x1    [OT] Toilet Transfers(Active)  Current Status(03/20/2023): Max A x2  Weekly Goal(03/28/2023): Mod A x2  Discharge Goal: CGA/Min A x1    Functional Measures  BA Eating:  Branch  BA Grooming: Branch  BA Bathing:  Branch  BA Upper Body Dressing:  Branch  BA Lower Body Dressing:  Branch  BA Toileting:  Branch    BA Bladder Management  Level of Assistance:  Branch  Frequency/Number of Accidents this Shift:  Branch    BA Bowel Management  Level of Assistance: Branch  Frequency/Number of Accidents this Shift: Branch    BA Bed/Chair/Wheelchair Transfer:  Branch  BA Toilet Transfer:  Branch  BA Tub/Shower Transfer:  Branch    Previously Documented Mode of Locomotion at Discharge: Field  BA Expected Mode of Locomotion at Discharge: Branch  BA Walk/Wheelchair:  Branch  [facility] Stairs:  Branch    [facility] Comprehension:   Branch  BA Expression:  Branch  BA Social Interaction:  Branch  BA Problem Solving:  Branch  BA Memory:  Branch    Therapy Mode Minutes  Occupational Therapy: Branch  Physical Therapy: Branch  Speech Language Pathology:  Branch    Signed by: Heaven Villareal OT

## 2023-03-20 NOTE — PROGRESS NOTES
Inpatient Rehabilitation Admission  Section A. Transportation  Issues Due to Lack of Transportation:   No    Signed by: OZZIE Mccarthy

## 2023-03-20 NOTE — PROGRESS NOTES
Inpatient Rehabilitation Plan of Care Note    Plan of Care  Care Plan Reviewed - No updates at this time.    Sphincter Control    Performed Intervention(s)  Bladder/bowel training program  Monitor intake and output  Use incontinence products/equipment      Psychosocial    Performed Intervention(s)  Verbalizes needs and concerns  Support from family/peer groups      Body Systems    Performed Intervention(s)  Perform active and passive ROM  Frequent position changes      Safety    Performed Intervention(s)  Safety Rounds  Bed alarm/Chair alarm  Items within reach    Signed by: Radha Tomlinson RN

## 2023-03-20 NOTE — PROGRESS NOTES
Case Management  Inpatient Rehabilitation Plan of Care and Discharge Plan Note    Rehabilitation Diagnosis:  Idiopathic peripheral neuropathy  Date of Onset:  02/20/2023    Medical Summary:  ?58 y.o.?female?transferred to [facility] on March 17, 2023 after hospital stay on [facility].  Hospital course noted for - [admitted?on 2/20 originally for  numbness/paraesthesias starting in chest to b/l LE after a fall. Originally  thought to be traumatic myelopathy by Neuro and was started on high dose  steroids with some improvement in symptoms. However she again started having  weakness in her legs and underwent EMG, though not typical of GBS, given  concerning exam was started on 5 days IVIG. Later developed?R lung masslike  infiltrate with cavitary lesions/pleural effusion for which Pulmonology was  consulted, and underwent thoracentesis on 3/3. Blood cx and pleural fluid ended  up positive for MRSA for which ID was consulted. Also noted to have TV mass on  TTE with findings concerning for endocarditis, and UE doppler with RUE  superficial thrombophlebitis concerning for septic phlebitis. CTS consulted for  chest tube placement given empyema, and she underwent thoracoscopy w/  decortication 3/8/23.??She underwent MARIAMA 3/10/2023 which had no vegetation.?  Came out of ICU 3/12. ?Chest tubes have been removed. She had hip arthrocentesis  to rule out any hip septic arthritis- no growth to date on fluid.??She had some  lower back pain so obtained MRI of spine to rule out discitis or osteomyelitis.  ?This did have known degenerative changes but thankfully no infection. ?There  was pleural thickening and effusion so cardiothoracic surgery preevaluated and  did not recommend any additional procedure. ?She will continue 4 weeks of  vancomycin as recommended by infectious disease?dosed by pharmacy to achieve a  trough level of 15-20 or area under the curve of 400-600 from last negative  blood culture or last intervention  which ever is the latest.?  Encouraged her to ambulate more as well as work with physical therapy and use  incentive spirometry. ?She admitted to [facility] acute rehab for further  antibiotics for infection as well as Subacute motor predominant idiopathic  peripheral neuropathy?with?Paraparesis?and?Areflexia. ]  ?  She is on 1L O2, mainly at night. Yellowish sputum. Breathing fair Continues  weak LLE > RLE > BUE. Some cognitive deficits with decreased processing. Seen by  Neurology on March 16 for concern regarding increased weakness. Had MRI Thoracic  and Lumbar spine done. Exam was felt unchanged from previous visit.  ?  OT - March 15 -[SBA provided for bed mobility. She participated in BUE/BLE  exercises - BUE punches outward, across midline and overhead. CGA/Min A provided  for sitting balance. Min-Mod A x2 provided to stand x3. On her third stand she  tolerated side steps to her right. She was able to slightly sustain knee bends  and mini squats while standing and less buckling noticed]  PT - March 15 - [Required SBA for supine to sit, then pt able to maintain static  sitting balance w/ mostly SBA. Occasional posterior lean noted, w/ min A to  correct. Pt completed a few seated and supine LE exercises, encouraging pt to  attempt a few supine ones on her own during the day. Pt stood a few times w/ mod  A x2, knees partially blocked, though pt able to lock knees out for static  standing balance. Tolerated a few mini squats w/ mod A x2, for the most part  controlling slight bend then able to extend knees back out w/ only a touch of  therapists' knees to pt's knees. Pt was also able to complete a few small side  steps towards HOB, cues for keeping knees locked when weight shifting]  Past Medical History: Past Medical History:  DiagnosisDate  ?Degenerative disc disease, cervical?  ?Gestational diabetes?  ?H/O blood clots?  ?Hematemesis?  ?Seizures (HCC)?  ?Septic shock (HCC)?    ?  ?  Surgical History  Past Surgical  History:  ProcedureLateralityDate  ?APPENDECTOMY??  ?BACK SURGERY??  ?CHOLECYSTECTOMY??  ?ENDOSCOPYN/A8/30/2016  ?Procedure: ESOPHAGOGASTRODUODENOSCOPY with biopsy;  Surgeon: Austen Brito MD;  Location: [facility] ENDOSCOPY;  Service:  ?HYSTERECTOMY??  ?NECK SURGERY??  ? approx 6 yrs ago  ?THORACOSCOPYRight3/8/2023  ?Procedure: THORACOSCOPY WITH DAVINCI ROBOT WITH COMPLETE DECORTICATION;  Surgeon: Chloe Cedillo MD;  Location: [facility] MAIN OR;  Service: Robotics -  DaVinci;  Laterality: Right;  ?TONSILLECTOMY??  ?TONSILLECTOMY AND ADENOIDECTOMY?1975    ?    Plan of Care  Updated Problems/Interventions  Field    Expected Intensity:  Average of 3 hours of therapy 5 days/week.  Interdisciplinary Team:  Interdisciplinary Team: Medical Supervision and 24 Hour Rehabilitation Nursing.,  Physical Therapy:, Occupational Therapy:, Speech and Language Therapy:, Social  Work, Therapeutic Recreation., Psychology., Registered Dietitian.  Physical Therapy Intensity/Duration: 1 hour / day, 5 days / week, for  approximately indetermine length of stay.  Occupational Therapy Intensity/Duration: 1 hour / day, 5 days / week, for  approximately indetermine length of stay.  Speech Language Pathology  Intensity/Duration: 1 hour / day, 5 days / week, for  approximately indetermine length of stay.  Estimated Length of Stay/Anticipated Discharge Date: Indeterminate at this time  Anticipated Discharge Destination:  Anticipated discharge destination from inpatient rehabilitation is community  discharge with assistance. Home w/ spouse who can provide assistance, he is not  currently working.      Based on the patient's medical and functional status, their prognosis and  expected level of functional improvement is:  Goals areindeterminate.  Rehabilitation prognosis indeterminate.  Medical prognosis indeterminate.    Signed by: Ana Burdick RN

## 2023-03-20 NOTE — THERAPY TREATMENT NOTE
Inpatient Rehabilitation - Speech Language Pathology Treatment Note  [facility]     Patient Name: Louise GARCIA  : 1965  MRN: [id number]  Today's Date: 3/20/2023               Admit Date: 3/17/2023     Visit Dx:    ICD-10-CM ICD-9-CM   1. Impaired functional mobility, balance, gait, and endurance  Z74.09 V49.89     Patient Active Problem List   Diagnosis   • Sepsis (HCC)   • Neck pain, chronic   • Upper back pain   • Paresthesia   • Cervical myelopathy (HCC)   • Lower extremity weakness   • History of blood clots   • Chronic anticoagulation   • Seizures (HCC)   • Anemia   • GERD (gastroesophageal reflux disease)   • Guillain-Mullens syndrome (HCC)   • Situational mixed anxiety and depressive disorder   • Mass of middle lobe of right lung   • Oral candidiasis   • Empyema of pleura (HCC)   • MRSA bacteremia   • Idiopathic peripheral neuropathy     Past Medical History:   Diagnosis Date   • Degenerative disc disease, cervical    • Gestational diabetes    • H/O blood clots    • Hematemesis    • Seizures ([facility])    • Septic shock (HCC)      Past Surgical History:   Procedure Laterality Date   • APPENDECTOMY     • BACK SURGERY     • CHOLECYSTECTOMY     • ENDOSCOPY N/A 2016    Procedure: ESOPHAGOGASTRODUODENOSCOPY with biopsy;  Surgeon: Austen Brito MD;  Location: [facility] ENDOSCOPY;  Service:    • HYSTERECTOMY     • NECK SURGERY       approx 6 yrs ago   • THORACOSCOPY Right 3/8/2023    Procedure: THORACOSCOPY WITH DAVINCI ROBOT WITH COMPLETE DECORTICATION;  Surgeon: Chloe Cedillo MD;  Location: [facility] OR;  Service: Robotics - DaVinci;  Laterality: Right;   • TONSILLECTOMY     • TONSILLECTOMY AND ADENOIDECTOMY         SLP Recommendation and Plan    Initial evaluation 3.18, pt obtained a score 12/30 on SLUMS cognitive assessment indicating severe cognitive impairment/dementia, goals initiated for memory and sustaining attention.     Additional goals implemented 3.20 for word finding deficits,  "average 50-60% for structured word finding tasks and sentence generation. Pt is intermittently aware of anomia, not consistent. 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     Regular/thin diet continued since FEES completion 3.7, although pt known to cough with and without PO intake, may be contributed partially to ineffective VF closure.       SLP EVALUATION (last 72 hours)     SLP SLC Evaluation     Row Name 03/20/23 1000       Communication Assessment/Intervention    Document Type therapy note (daily note)  -AB    Subjective Information no complaints  -AB    Patient Observations alert  -AB    Patient/Family/Caregiver Comments/Observations pt seen in SLP office, cooperative, dysphonic and aphasic. note cognitive evaluation completed over weekend, this date, significant anomia at conversational level, difficulty in providing simple biographical information or sentence formation. vocal quality severely dysphonic with known glottic gap contributing.  goals initiated for expressive language and voice given significant contributing impact  -AB    Patient Effort good  -AB    Symptoms Noted During/After Treatment none  -AB       Pain    Additional Documentation Pain Scale: Numbers Pre/Post-Treatment (Group)  -AB       Pain Scale: Numbers Pre/Post-Treatment    Pretreatment Pain Rating 0/10 - no pain  -AB    Posttreatment Pain Rating 0/10 - no pain  -AB          User Key  (r) = Recorded By, (t) = Taken By, (c) = Cosigned By    Initials Name Effective Dates    Jacey Spencer, MS CCC-SLP 12/27/22 -                    EDUCATION  The patient has been educated in the following areas:     Cognitive " "Impairment Communication Impairment.           SLP GOALS     Row Name 23 1000       (LTG) Patient will demonstrate functional swallow for    Diet Texture (Demonstrate functional swallow) regular textures  -AB    Liquid viscosity (Demonstrate functional swallow) thin liquids  -AB    [employer] (Demonstrate functional swallow) independently (over 90% accuracy)  -AB    Progress/Outcomes (Demonstrate functional swallow) goal ongoing  -AB       Word Retrieval Skills Goal 1 (SLP)    Improve Word Retrieval Skills By Goal 1 (SLP) completing functional word finding tasks;90%;independently (over 90% accuracy)  -AB    Time Frame (Word Retrieval Goal 1, SLP) short term goal (STG)  -AB    Progress (Word Retrieval Skills Goal 1, SLP) 30%;with moderate cues (50-74%)  -AB    Progress/Outcomes (Word Retrieval Goal 1, SLP) new goal  -AB    Comment (Word Retrieval Goal 1, SLP) responsive naming - 60% MOD cues; confrontational namin% MOD cues; divergent namin in 1 minute concrete, 0 in 1 minute concrete. overall daily average - 33% MOD  -AB       Ability to Construct Phrase and Sentence Level Response Goal 1 (SLP)    Improve Ability to Construct Phrase and Sentence Level Responses By Goal 1 (SLP) answering question with phrase;constructing a sentence with a key word;90%;independently (over 90% accuracy)  -AB    Time Frame (Phrase and Sentence Level Response Goal 1, SLP) short term goal (STG)  -AB    Progress (Construct Phrase and Sentence Level Response Goal 1, SLP) 0%;with maximum cues (25-49%)  -AB    Progress/Outcomes (Phrase and Sentence Level Response Goal 1, SLP) new goal  -AB    Comment (Phrase and Sentence Level Response Goal 1, SLP) significant anomia at sentence level frequent filler \"you know\" \"I mean it was,\" \"I like,\" when provided with cloze prompt of at least 3 words, generate 1-2 words to complete 0% successful in x5 opps presented, MAX cues  -AB       Respiratory Support Goal 1 (SLP)    Improve " "Respiratory Support Goal 1 (SLP) sustaining a vowel sound on exhalation;independently (over 90% accuracy)  -AB    Time Frame (Respiratory Support Goal 1, SLP) short term goal (STG)  -AB    Progress/Outcomes (Respiratory Support Goal 1, SLP) new goal  -AB    Comment (Respiratory Support Goal 1, SLP) see below - avg 2 seconds (MPT), 2.1 seconds (PUSH), 2.5 (PULL)  -AB       Phonation Goal 1 (SLP)    Improve Phonation By Goal 1 (SLP) using loud speech;90%;with minimal cues (75-90%)  -AB    Time Frame (Phonation Goal 1, SLP) short term goal (STG)  -AB    Progress/Outcomes (Phonation Goal 1, SLP) new goal  -AB    Comment (Phonation Goal 1, SLP) voice targeted through baseline measurements. with sustained phonation \"ah\" with no use of compensations: avg 68dB, 140Hz, 2 seconds; utilizing PUSH for adduction: 279Hz 69dB 2.1 seconds; 275hZ, 75Db utilizing PULL for adduction. overall quality improved with compensations to decrease breathiness and pitch breaks. unable to cue for carryover at word level or cue for longer MPT  -AB       Attention Goal 1 (SLP)    Improve Attention by Goal 1 (SLP) complete sustained attention task;80%;with minimal cues (75-90%)  -AB    Time Frame (Attention Goal 1, SLP) by discharge  -AB    Progress (Attention Goal 1, SLP) with maximum cues (25-49%)  -AB    Progress/Outcomes (Attention Goal 1, SLP) goal ongoing  -AB    Comment (Attention Goal 1, SLP) MAX cues to attend to naming tasks, adequate across voice  -AB       Memory Skills Goal 1 (SLP)    Improve Memory Skills Through Goal 1 (SLP) use memory strategies;use external memory aid;recall details of the day;80%;with moderate cues (50-74%)  -AB    Time Frame (Memory Skills Goal 1, SLP) by discharge  -AB    Progress (Memory Skills Goal 1, SLP) 30%;with maximum cues (25-49%)  -AB    Progress/Outcomes (Memory Skills Goal 1, SLP) goal ongoing  -AB    Comment (Memory Skills Goal 1, SLP) delayed recall of MD name, SLP name, therapies completed 33% MOD " cues. do not increase with field of 2 options  -AB          User Key  (r) = Recorded By, (t) = Taken By, (c) = Cosigned By    Initials Name Provider Type    AB Jacey Monroe, MS CCC-SLP Speech and Language Pathologist     Kacey Song CCC-SLP Speech and Language Pathologist                        Time Calculation:      Time Calculation- SLP     Row Name 03/20/23 1255             Time Calculation- SLP    SLP Start Time 1000  -AB      SLP Stop Time 1100  -AB      SLP Time Calculation (min) 60 min  -AB         Timed Charges    [id number] Dev of Cogn Skills Initial Minutes 15  -AB      [id number] Dev of Cogn Skills Add Minutes 45  -AB         Total Minutes    Timed Charges Total Minutes 60  -AB       Total Minutes 60  -AB            User Key  (r) = Recorded By, (t) = Taken By, (c) = Cosigned By    Initials Name Provider Type    Jacey Spencer, MS CCC-SLP Speech and Language Pathologist                Therapy Charges for Today     Code Description Service Date Service Provider Modifiers Qty    42800931129 HC ST DEV OF COGN SKILLS INITIAL 15 MIN 3/20/2023 Jacey Monroe, MS CCC-SLP  1    35190782675 HC ST DEV OF COGN SKILLS EACH ADDT'L 15 MIN 3/20/2023 Jacey Monroe, MS CCC-SLP  3                     Jacey Monroe MS CCC-SLP  3/20/2023

## 2023-03-21 ENCOUNTER — APPOINTMENT (OUTPATIENT)
Dept: GENERAL RADIOLOGY | Facility: HOSPITAL | Age: 58
DRG: 74 | End: 2023-03-21
Payer: COMMERCIAL

## 2023-03-21 LAB
ANION GAP SERPL CALCULATED.3IONS-SCNC: 13.5 MMOL/L (ref 5–15)
BACTERIA FLD CULT: NORMAL
BUN SERPL-MCNC: 9 MG/DL (ref 6–20)
BUN/CREAT SERPL: 7.4 (ref 7–25)
CALCIUM SPEC-SCNC: 9 MG/DL (ref 8.6–10.5)
CHLORIDE SERPL-SCNC: 98 MMOL/L (ref 98–107)
CO2 SERPL-SCNC: 26.5 MMOL/L (ref 22–29)
CREAT SERPL-MCNC: 1.22 MG/DL (ref 0.57–1)
DEPRECATED RDW RBC AUTO: 47.8 FL (ref 37–54)
EGFRCR SERPLBLD CKD-EPI 2021: 51.2 ML/MIN/1.73
ERYTHROCYTE [DISTWIDTH] IN BLOOD BY AUTOMATED COUNT: 13.9 % (ref 12.3–15.4)
GLUCOSE SERPL-MCNC: 91 MG/DL (ref 65–99)
GRAM STN SPEC: NORMAL
HCT VFR BLD AUTO: 27 % (ref 34–46.6)
HGB BLD-MCNC: 9.1 G/DL (ref 12–15.9)
MCH RBC QN AUTO: 32.4 PG (ref 26.6–33)
MCHC RBC AUTO-ENTMCNC: 33.7 G/DL (ref 31.5–35.7)
MCV RBC AUTO: 96.1 FL (ref 79–97)
PLATELET # BLD AUTO: 650 10*3/MM3 (ref 140–450)
PMV BLD AUTO: 9.9 FL (ref 6–12)
POTASSIUM SERPL-SCNC: 3.8 MMOL/L (ref 3.5–5.2)
RBC # BLD AUTO: 2.81 10*6/MM3 (ref 3.77–5.28)
SODIUM SERPL-SCNC: 138 MMOL/L (ref 136–145)
VANCOMYCIN TROUGH SERPL-MCNC: 12.7 MCG/ML (ref 5–20)
WBC NRBC COR # BLD: 10.41 10*3/MM3 (ref 3.4–10.8)

## 2023-03-21 PROCEDURE — 97535 SELF CARE MNGMENT TRAINING: CPT

## 2023-03-21 PROCEDURE — 74018 RADEX ABDOMEN 1 VIEW: CPT

## 2023-03-21 PROCEDURE — 25010000002 VANCOMYCIN PER 500 MG: Performed by: PHYSICAL MEDICINE & REHABILITATION

## 2023-03-21 PROCEDURE — 85027 COMPLETE CBC AUTOMATED: CPT | Performed by: PHYSICAL MEDICINE & REHABILITATION

## 2023-03-21 PROCEDURE — 97530 THERAPEUTIC ACTIVITIES: CPT

## 2023-03-21 PROCEDURE — 25010000002 VANCOMYCIN 750 MG RECONSTITUTED SOLUTION 1 EACH VIAL: Performed by: PHYSICAL MEDICINE & REHABILITATION

## 2023-03-21 PROCEDURE — 97129 THER IVNTJ 1ST 15 MIN: CPT

## 2023-03-21 PROCEDURE — 80048 BASIC METABOLIC PNL TOTAL CA: CPT | Performed by: PHYSICAL MEDICINE & REHABILITATION

## 2023-03-21 PROCEDURE — 97130 THER IVNTJ EA ADDL 15 MIN: CPT

## 2023-03-21 PROCEDURE — 97110 THERAPEUTIC EXERCISES: CPT

## 2023-03-21 PROCEDURE — 97112 NEUROMUSCULAR REEDUCATION: CPT

## 2023-03-21 RX ORDER — VANCOMYCIN HYDROCHLORIDE 1 G/200ML
1000 INJECTION, SOLUTION INTRAVENOUS EVERY 24 HOURS
Status: COMPLETED | OUTPATIENT
Start: 2023-03-21 | End: 2023-03-31

## 2023-03-21 RX ORDER — SIMETHICONE 80 MG
80 TABLET,CHEWABLE ORAL 3 TIMES DAILY PRN
Status: DISCONTINUED | OUTPATIENT
Start: 2023-03-21 | End: 2023-04-21 | Stop reason: HOSPADM

## 2023-03-21 RX ADMIN — GABAPENTIN 300 MG: 300 CAPSULE ORAL at 21:41

## 2023-03-21 RX ADMIN — LAMOTRIGINE 200 MG: 100 TABLET ORAL at 08:06

## 2023-03-21 RX ADMIN — Medication 1 MG: at 08:07

## 2023-03-21 RX ADMIN — SIMETHICONE 80 MG: 80 TABLET, CHEWABLE ORAL at 05:58

## 2023-03-21 RX ADMIN — DOCUSATE SODIUM 50MG AND SENNOSIDES 8.6MG 2 TABLET: 8.6; 5 TABLET, FILM COATED ORAL at 21:41

## 2023-03-21 RX ADMIN — GABAPENTIN 300 MG: 300 CAPSULE ORAL at 13:57

## 2023-03-21 RX ADMIN — DESVENLAFAXINE SUCCINATE 25 MG: 25 TABLET, EXTENDED RELEASE ORAL at 08:07

## 2023-03-21 RX ADMIN — APIXABAN 5 MG: 5 TABLET, FILM COATED ORAL at 21:41

## 2023-03-21 RX ADMIN — Medication 100 MG: at 08:06

## 2023-03-21 RX ADMIN — APIXABAN 5 MG: 5 TABLET, FILM COATED ORAL at 08:07

## 2023-03-21 RX ADMIN — VANCOMYCIN HYDROCHLORIDE 1000 MG: 1 INJECTION, SOLUTION INTRAVENOUS at 23:00

## 2023-03-21 RX ADMIN — ACETAMINOPHEN 1000 MG: 325 TABLET ORAL at 16:44

## 2023-03-21 RX ADMIN — ACETAMINOPHEN 1000 MG: 325 TABLET ORAL at 21:41

## 2023-03-21 RX ADMIN — ACETAMINOPHEN 1000 MG: 325 TABLET ORAL at 08:07

## 2023-03-21 RX ADMIN — Medication 500 MCG: at 08:06

## 2023-03-21 RX ADMIN — VANCOMYCIN HYDROCHLORIDE 750 MG: 750 INJECTION, POWDER, LYOPHILIZED, FOR SOLUTION INTRAVENOUS at 01:45

## 2023-03-21 NOTE — THERAPY TREATMENT NOTE
Inpatient Rehabilitation - Speech Language Pathology Treatment Note    [facility]     Patient Name: Louise GARCIA  : 1965  MRN: [id number]    Today's Date: 3/21/2023                   Admit Date: 3/17/2023       Visit Dx:      ICD-10-CM ICD-9-CM   1. Impaired functional mobility, balance, gait, and endurance  Z74.09 V49.89       Patient Active Problem List   Diagnosis   • Sepsis (HCC)   • Neck pain, chronic   • Upper back pain   • Paresthesia   • Cervical myelopathy (HCC)   • Lower extremity weakness   • History of blood clots   • Chronic anticoagulation   • Seizures (HCC)   • Anemia   • GERD (gastroesophageal reflux disease)   • Guillain-Fullerton syndrome (HCC)   • Situational mixed anxiety and depressive disorder   • Mass of middle lobe of right lung   • Oral candidiasis   • Empyema of pleura ([facility])   • MRSA bacteremia   • Idiopathic peripheral neuropathy       Past Medical History:   Diagnosis Date   • Degenerative disc disease, cervical    • Gestational diabetes    • H/O blood clots    • Hematemesis    • Seizures ([facility])    • Septic shock ([facility])        Past Surgical History:   Procedure Laterality Date   • APPENDECTOMY     • BACK SURGERY     • CHOLECYSTECTOMY     • ENDOSCOPY N/A 2016    Procedure: ESOPHAGOGASTRODUODENOSCOPY with biopsy;  Surgeon: Austen Brito MD;  Location: [facility] ENDOSCOPY;  Service:    • HYSTERECTOMY     • NECK SURGERY       approx 6 yrs ago   • THORACOSCOPY Right 3/8/2023    Procedure: THORACOSCOPY WITH DAVINCI ROBOT WITH COMPLETE DECORTICATION;  Surgeon: Chloe Cedillo MD;  Location: [facility] MAIN OR;  Service: Robotics - DaVinci;  Laterality: Right;   • TONSILLECTOMY     • TONSILLECTOMY AND ADENOIDECTOMY         SLP Recommendation and Plan                                                            SLP EVALUATION (last 72 hours)     SLP SLC Evaluation     Row Name 23 1300 23 1030 23 1000             Communication Assessment/Intervention    Document Type  therapy note (daily note)  -AL therapy note (daily note)  -AL therapy note (daily note)  -AB      Subjective Information -- -- no complaints  -AB      Patient Observations -- -- alert  -AB      Patient/Family/Caregiver Comments/Observations Pt participated well. She reported confusion regarding therapy schedule. Stated she was surprised to have therapies two times a day.  -AL Pt participated well.  -AL pt seen in SLP office, cooperative, dysphonic and aphasic. note cognitive evaluation completed over weekend, this date, significant anomia at conversational level, difficulty in providing simple biographical information or sentence formation. vocal quality severely dysphonic with known glottic gap contributing.  goals initiated for expressive language and voice given significant contributing impact  -AB      Patient Effort -- -- good  -AB      Symptoms Noted During/After Treatment -- -- none  -AB         Pain    Additional Documentation -- -- Pain Scale: Numbers Pre/Post-Treatment (Group)  -AB         Pain Scale: Numbers Pre/Post-Treatment    Pretreatment Pain Rating 0/10 - no pain  -AL 0/10 - no pain  -AL 0/10 - no pain  -AB      Posttreatment Pain Rating -- -- 0/10 - no pain  -AB            User Key  (r) = Recorded By, (t) = Taken By, (c) = Cosigned By    Initials Name Effective Dates    AB Jacey Monroe, MS CCC-SLP 12/27/22 -     Nessa Kraus, MS CCC-SLP 06/16/21 -                Patient was wearing a face mask during this therapy encounter. Therapist used appropriate personal protective equipment including mask, eye protection and gloves.  Mask used was standard procedure mask. Appropriate PPE was worn during the entire therapy session. Hand hygiene was completed before and after therapy session. Patient is not in enhanced droplet precautions.               EDUCATION    The patient has been educated in the following areas:       Cognitive Impairment Communication Impairment.             SLP GOALS      "Row Name 23 1300 23 1030 23 1000       (LTG) Patient will demonstrate functional swallow for    Diet Texture (Demonstrate functional swallow) -- -- regular textures  -AB    Liquid viscosity (Demonstrate functional swallow) -- -- thin liquids  -AB    Garrett (Demonstrate functional swallow) -- -- independently (over 90% accuracy)  -AB    Progress/Outcomes (Demonstrate functional swallow) -- -- goal ongoing  -AB       Word Retrieval Skills Goal 1 (SLP)    Improve Word Retrieval Skills By Goal 1 (SLP) -- -- completing functional word finding tasks;90%;independently (over 90% accuracy)  -AB    Time Frame (Word Retrieval Goal 1, SLP) -- -- short term goal (STG)  -AB    Progress (Word Retrieval Skills Goal 1, SLP) -- -- 30%;with moderate cues (50-74%)  -AB    Progress/Outcomes (Word Retrieval Goal 1, SLP) -- -- new goal  -AB    Comment (Word Retrieval Goal 1, SLP) -- -- responsive naming - 60% MOD cues; confrontational namin% MOD cues; divergent namin in 1 minute concrete, 0 in 1 minute concrete. overall daily average - 33% MOD  -AB       Ability to Construct Phrase and Sentence Level Response Goal 1 (SLP)    Improve Ability to Construct Phrase and Sentence Level Responses By Goal 1 (SLP) -- -- answering question with phrase;constructing a sentence with a key word;90%;independently (over 90% accuracy)  -AB    Time Frame (Phrase and Sentence Level Response Goal 1, SLP) -- -- short term goal (STG)  -AB    Progress (Construct Phrase and Sentence Level Response Goal 1, SLP) -- -- 0%;with maximum cues (25-49%)  -AB    Progress/Outcomes (Phrase and Sentence Level Response Goal 1, SLP) -- -- new goal  -AB    Comment (Phrase and Sentence Level Response Goal 1, SLP) -- -- significant anomia at sentence level frequent filler \"you know\" \"I mean it was,\" \"I like,\" when provided with cloze prompt of at least 3 words, generate 1-2 words to complete 0% successful in x5 opps presented, MAX cues  -AB       " "Respiratory Support Goal 1 (SLP)    Improve Respiratory Support Goal 1 (SLP) -- -- sustaining a vowel sound on exhalation;independently (over 90% accuracy)  -AB    Time Frame (Respiratory Support Goal 1, SLP) -- -- short term goal (STG)  -AB    Progress/Outcomes (Respiratory Support Goal 1, SLP) -- -- new goal  -AB    Comment (Respiratory Support Goal 1, SLP) -- -- see below - avg 2 seconds (MPT), 2.1 seconds (PUSH), 2.5 (PULL)  -AB       Phonation Goal 1 (SLP)    Improve Phonation By Goal 1 (SLP) -- -- using loud speech;90%;with minimal cues (75-90%)  -AB    Time Frame (Phonation Goal 1, SLP) -- -- short term goal (STG)  -AB    Progress/Outcomes (Phonation Goal 1, SLP) -- good progress toward goal  -AL new goal  -AB    Comment (Phonation Goal 1, SLP) -- Pt was stimulable to produce improved voice with use of frontal focus technique. She produced adequate vocal quality on humming /m/ with overall MOD-MAX cues, initial /m/ CV chanting (i.e. ma, mo, mi) with MOD-MAX cues and 2-syllable word chanting with MAX cues. Pt was unable to carry-over improved voice into speech; however, noted intermittent improved vocal quality when pt was telling a story at end of session. Pt presents with an overall moderate strained/hoarse vocal quality. Pt also noted to have frequent strong throat clearing; discussed use of swallow, rather than throat clear due to possible impact on vocal folds. Provided education regarding frontal tone focus and reducing tension in larynx, as prevous FEES showed reduced laryngeal closure and some hyperfunction.  -AL voice targeted through baseline measurements. with sustained phonation \"ah\" with no use of compensations: avg 68dB, 140Hz, 2 seconds; utilizing PUSH for adduction: 279Hz 69dB 2.1 seconds; 275hZ, 75Db utilizing PULL for adduction. overall quality improved with compensations to decrease breathiness and pitch breaks. unable to cue for carryover at word level or cue for longer MPT  -AB       " Attention Goal 1 (SLP)    Improve Attention by Goal 1 (SLP) -- -- complete sustained attention task;80%;with minimal cues (75-90%)  -AB    Time Frame (Attention Goal 1, SLP) -- -- by discharge  -AB    Progress (Attention Goal 1, SLP) -- -- with maximum cues (25-49%)  -AB    Progress/Outcomes (Attention Goal 1, SLP) goal ongoing  -AL -- goal ongoing  -AB    Comment (Attention Goal 1, SLP) Pt required overall MIN-MOD cues for sustained attention to logic puzzle task.  -AL -- MAX cues to attend to naming tasks, adequate across voice  -AB       Memory Skills Goal 1 (SLP)    Improve Memory Skills Through Goal 1 (SLP) -- -- use memory strategies;use external memory aid;recall details of the day;80%;with moderate cues (50-74%)  -AB    Time Frame (Memory Skills Goal 1, SLP) -- -- by discharge  -AB    Progress (Memory Skills Goal 1, SLP) -- -- 30%;with maximum cues (25-49%)  -AB    Progress/Outcomes (Memory Skills Goal 1, SLP) -- -- goal ongoing  -AB    Comment (Memory Skills Goal 1, SLP) -- -- delayed recall of MD name, SLP name, therapies completed 33% MOD cues. do not increase with field of 2 options  -AB       Reasoning Goal 1 (SLP)    Improve Reasoning Through Goal 1 (SLP) complete deductive reasoning task;80%;with minimal cues (75-90%)  -AL -- --    Time Frame (Reasoning Goal 1, SLP) 1 week  -AL -- --    Progress/Outcomes (Reasoning Goal 1, SLP) new goal  -AL -- --    Comment (Reasoning Goal 1, [facility]) Diagnostic treatment: Pt completed moderate level logic puzzle with 10% accuracy with NO cues, 40% with MIN cues, 25% with MIN cues, 100% with MOD-MAX cues. Pt with difficutly sustaining attention to task. Suspect vision deficits, as pt had difficulty writing words in center of boxes; SLP wrote for patient as task progressed.  -AL -- --          User Key  (r) = Recorded By, (t) = Taken By, (c) = Cosigned By    Initials Name Provider Type    Jacey Spencer MS CCC-SLP Speech and Language Pathologist    Nessa Kraus  MS Wendy CCC-SLP Speech and Language Pathologist                            Time Calculation:        Time Calculation- SLP     Row Name 03/21/23 1527 03/21/23 1206          Time Calculation- SLP    SLP Start Time 1300  -AL 1030  -AL     SLP Stop Time 1330  -AL 1100  -AL     SLP Time Calculation (min) 30 min  -AL 30 min  -AL           User Key  (r) = Recorded By, (t) = Taken By, (c) = Cosigned By    Initials Name Provider Type    Nessa Kraus MS CCC-SLP Speech and Language Pathologist                  Therapy Charges for Today     Code Description Service Date Service Provider Modifiers Qty    69424114901 HC ST DEV OF COGN SKILLS INITIAL 15 MIN 3/21/2023 Nessa Spangler MS CCC-SLP  1    24985594380 HC ST DEV OF COGN SKILLS EACH ADDT'L 15 MIN 3/21/2023 Nessa Spangler MS CCC-SLP  3                           Nessa Spangler MS CCC-SLP  3/21/2023

## 2023-03-21 NOTE — THERAPY TREATMENT NOTE
Inpatient Rehabilitation - Occupational Therapy Treatment Note    [facility]     Patient Name: Louise GARCIA  : 1965  MRN: [id number]    Today's Date: 3/21/2023                 Admit Date: 3/17/2023         ICD-10-CM ICD-9-CM   1. 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)   • Situational mixed anxiety and depressive disorder   • Mass of middle lobe of right lung   • Oral candidiasis   • Empyema of pleura (HCC)   • MRSA bacteremia   • Idiopathic peripheral neuropathy       Past Medical History:   Diagnosis Date   • Degenerative disc disease, cervical    • Gestational diabetes    • H/O blood clots    • Hematemesis    • Seizures ([facility])    • Septic shock ([facility])        Past Surgical History:   Procedure Laterality Date   • APPENDECTOMY     • BACK SURGERY     • CHOLECYSTECTOMY     • ENDOSCOPY N/A 2016    Procedure: ESOPHAGOGASTRODUODENOSCOPY with biopsy;  Surgeon: Austen Brito MD;  Location: [facility] ENDOSCOPY;  Service:    • HYSTERECTOMY     • NECK SURGERY       approx 6 yrs ago   • THORACOSCOPY Right 3/8/2023    Procedure: THORACOSCOPY WITH DAVINCI ROBOT WITH COMPLETE DECORTICATION;  Surgeon: Chloe Cedillo MD;  Location: [facility] OR;  Service: Robotics - DaVinci;  Laterality: Right;   • TONSILLECTOMY     • TONSILLECTOMY AND ADENOIDECTOMY               [facility] OT ASSESSMENT FLOWSHEET (last 12 hours)     IR[facility] OT Evaluation and Treatment     Row Name 23 1548 23 1048       OT Time and Intention    Document Type daily treatment  -AF daily treatment  -AF    Mode of Treatment occupational therapy  -AF occupational therapy   -AF    Patient Effort good  -AF good  -AF    Symptoms Noted During/After Treatment fatigue  -AF --    Row Name 03/21/23 1548 03/21/23 1048       General Information    Patient/Family/Caregiver Comments/Observations pt sitting upin w/c after SLP session  -AF pt sitting up in w/c in room  present  -AF    General Observations of Patient -- increased alertness today, still having confusion, educated on not having purewic on while sitting up in the w/c.  -AF    Existing Precautions/Restrictions fall  contact precautions  -AF fall  contact precautions  -AF    Row Name 03/21/23 1548 03/21/23 1048       Pain Assessment    Pretreatment Pain Rating 0/10 - no pain  -AF 4/10  -AF    Posttreatment Pain Rating 0/10 - no pain  -AF 4/10  -AF    Pre/Posttreatment Pain Comment c/o of fatigue and muslce soreness  -AF c/o of pain states it feels like her bowel  -AF    Row Name 03/21/23 1548 03/21/23 1048       Cognition/Psychosocial    Affect/Mental Status (Cognition) confused;flat/blunted affect  -AF confused;flat/blunted affect  -AF    Orientation Status (Cognition) oriented to;person;place  -AF oriented to;person;place  -AF    Follows Commands (Cognition) follows one-step commands;50-74% accuracy;delayed response/completion;increased processing time needed;initiation impaired;repetition of directions required;verbal cues/prompting required  -AF follows one-step commands;50-74% accuracy;delayed response/completion;increased processing time needed;initiation impaired;repetition of directions required;verbal cues/prompting required  -AF    Personal Safety Interventions fall prevention program maintained;gait belt;nonskid shoes/slippers when out of bed  -AF fall prevention program maintained;gait belt;nonskid shoes/slippers when out of bed  -AF    Cognitive Function memory deficit;safety deficit  -AF memory deficit;safety deficit  -AF    Memory Deficit (Cognition) episodic memory;procedural memory  -AF episodic memory;procedural  memory  -AF    Comment, Cognition -- MOD/MAX vc's for sequening during ADL tasks  -AF    Row Name 03/21/23 1048          Bathing    Lucas Level (Bathing) bathing skills;lower body;upper body;moderate assist (50% patient effort);maximum assist (25% patient effort)  -AF     Position (Bathing) sink side;supported sitting;supported standing  -AF     Set-up Assistance (Bathing) obtain supplies  -AF     Comment (Bathing) used grab bar for support  -AF     Row Name 03/21/23 1048          Upper Body Dressing    Lucas Level (Upper Body Dressing) upper body dressing skills;minimum assist (75% or more patient effort);verbal cues;pull over garment  -AF     Position (Upper Body Dressing) supported sitting  -AF     Comment (Upper Body Dressing) w/c level  -AF     Row Name 03/21/23 1048          Lower Body Dressing    Lucas Level (Lower Body Dressing) doff;don;pants/bottoms;shoes/slippers;socks;underwear;maximum assist (25% patient effort);verbal cues;dependent (less than 25% patient effort)  -AF     Position (Lower Body Dressing) supported sitting;supported standing  -AF     Comment (Lower Body Dressing) A x 2 when standing  -AF     Row Name 03/21/23 1048          Grooming    Lucas Level (Grooming) grooming skills;minimum assist (75% patient effort);verbal cues  -AF     Position (Grooming) supported sitting  -AF     Comment (Grooming) w/c level, assistance to wash hair  -AF     Row Name 03/21/23 1048          Toileting    Lucas Level (Toileting) toileting skills;dependent (less than 25% patient effort)  A x 2  -AF     Assistive Device Use (Toileting) grab bar/safety frame;raised toilet seat  -AF     Position (Toileting) supported standing;supported sitting  -AF     Row Name 03/21/23 1548 03/21/23 1048       Transfer Assessment/Treatment    Comment, (Transfers) sit pivot transfer EOM <> w/c MOD A x 2  -AF sit to stand at grab bar in bathroom with LBD tasks MAX A x 1  -AF    Row Name 03/21/23 1548           Chair-Bed Transfer    Chair-Bed [place] (Transfers) moderate assist (50% patient effort);verbal cues;2 person assist  -AF     Assistive Device (Chair-Bed Transfers) wheelchair  -AF     Comment, (Chair-Bed Transfer) sit pivot  -AF     Row Name 03/21/23 1048          Toilet Transfer    Type (Toilet Transfer) squat pivot  -AF     [place] Level (Toilet Transfer) 2 person assist;moderate assist (50% patient effort)  -AF     Assistive Device (Toilet Transfer) commode;grab bars/safety frame;wheelchair  -AF     Row Name 03/21/23 1048          Motor Skills    Functional Endurance poor plus  -AF     Row Name 03/21/23 1548 03/21/23 1048       Balance    Static Sitting Balance contact guard  -AF minimal assist;contact guard  -AF    Dynamic Sitting Balance minimal assist;contact guard;verbal cues  -AF --    Static Standing Balance -- moderate assist  grab bar assistance  -AF    Balance Interventions sitting;dynamic;minimal challenge;moderate challenge;core stability exercise;dynamic reaching;weight shifting activity;UE activity with balance activity  utilized a 4KG weight medicene ball to engage core with UB exs and with trunk rotations, facilaite core activation with weight ball toss with increased distance. FMC/cogntivie task seated on EOM with placing playing cards by color on [employer]Ninjao board  -AF --    Row Name 03/21/23 1548 03/21/23 1048       Positioning and Restraints    Pre-Treatment Position sitting in chair/recliner  -AF sitting in chair/recliner  -AF    Post Treatment Position bed  -AF wheelchair  -AF    In Bed with nsg;supine;call light within reach;encouraged to call for assist;exit alarm on;with family/caregiver  -AF --    In Wheelchair -- sitting;exit alarm on;with PT  -AF          User Key  (r) = Recorded By, (t) = Taken By, (c) = Cosigned By    Initials Name Effective Dates    AF Heaven Villareal, OTR 06/16/21 -                  Occupational Therapy Education     Title: PT OT SLP Therapies (In  Progress)     Topic: Occupational Therapy (Not Started)     Point: ADL training (Not Started)     Description:   Instruct learner(s) on proper safety adaptation and remediation techniques during self care or transfers.   Instruct in proper use of assistive devices.              Learner Progress:  Not documented in this visit.          Point: Home exercise program (Not Started)     Description:   Instruct learner(s) on appropriate technique for monitoring, assisting and/or progressing therapeutic exercises/activities.              Learner Progress:  Not documented in this visit.          Point: Precautions (Not Started)     Description:   Instruct learner(s) on prescribed precautions during self-care and functional transfers.              Learner Progress:  Not documented in this visit.          Point: Body mechanics (Not Started)     Description:   Instruct learner(s) on proper positioning and spine alignment during self-care, functional mobility activities and/or exercises.              Learner Progress:  Not documented in this visit.                                OT Recommendation and Plan                         Time Calculation:      Time Calculation- OT     Row Name 03/21/23 1552 03/21/23 1055          Time Calculation- OT    OT Start Time 1330  -AF 0930  -AF     OT Stop Time 1400  -AF 1000  -AF     OT Time Calculation (min) 30 min  -AF 30 min  -AF           User Key  (r) = Recorded By, (t) = Taken By, (c) = Cosigned By    Initials Name Provider Type    AF Heaven Villareal, OTR Occupational Therapist              Therapy Charges for Today     Code Description Service Date Service Provider Modifiers Qty    61293048503 HC OT SELF CARE/MGMT/TRAIN EA 15 MIN 3/20/2023 Heaven Villareal OTR GO 2    38291043590 HC OT NEUROMUSC RE EDUCATION EA 15 MIN 3/20/2023 Heaven Villareal OTR GO 2    81299773763 HC OT SELF CARE/MGMT/TRAIN EA 15 MIN 3/21/2023 Heaven Villareal OTR GO 2    30358694634 HC OT THER SUPP EA 15 MIN 3/21/2023  Heaven Villareal, OTR GO 2    89909718198 HC OT NEUROMUSC RE EDUCATION EA 15 MIN 3/21/2023 Heaven Villareal OTR GO 2    38851230382 HC OT THER SUPP EA 15 MIN 3/21/2023 Heaven Villareal, OTR GO 1                   MARY ANN Gunn  3/21/2023

## 2023-03-21 NOTE — PROGRESS NOTES
[facility] Clinical Pharmacy Services: Vancomycin Level Monitoring Note    Louise GARCIA is a 58 y.o. female who is on day 18/28 of pharmacy to dose vancomycin for  empyema .    Estimated Creatinine Clearance: 46.6 mL/min (A) (by C-G formula based on SCr of 1.14 mg/dL (H)).    Current Vanc Dose: 1000 mg IV every  24  hours  Results from last 7 days   Lab Units 03/20/23  2357 03/18/23  2126 03/15/23  2044   VANCOMYCIN TR mcg/mL 12.70 21.70* 17.20   Predicted AUC at current dose:509 mg/L.hr  Next Level Date and Time: Defer to clinical pharmacist    Vancomycin AUC below 400 on previous regimen of 750 mg every 24 hours.  Increased dose to 1000 mg every 24 hours.    No changes at this time. Pharmacy is continuing to monitor and will adjust as needed.    Lloyd Badillo III, [facility]  Clinical Pharmacist

## 2023-03-21 NOTE — THERAPY TREATMENT NOTE
Inpatient Rehabilitation - Occupational Therapy Treatment Note    [facility]     Patient Name: Louise GARCIA  : 1965  MRN: [id number]    Today's Date: 3/21/2023                 Admit Date: 3/17/2023         ICD-10-CM ICD-9-CM   1. Impaired functional mobility, balance, gait, and endurance  Z74.09 V49.89       Patient Active Problem List   Diagnosis   • Sepsis (HCC)   • Neck pain, chronic   • Upper back pain   • Paresthesia   • Cervical myelopathy (HCC)   • Lower extremity weakness   • History of blood clots   • Chronic anticoagulation   • Seizures (HCC)   • Anemia   • GERD (gastroesophageal reflux disease)   • Guillain-Columbus syndrome (HCC)   • Situational mixed anxiety and depressive disorder   • Mass of middle lobe of right lung   • Oral candidiasis   • Empyema of pleura (HCC)   • MRSA bacteremia   • Idiopathic peripheral neuropathy       Past Medical History:   Diagnosis Date   • Degenerative disc disease, cervical    • Gestational diabetes    • H/O blood clots    • Hematemesis    • Seizures ([facility])    • Septic shock ([facility])        Past Surgical History:   Procedure Laterality Date   • APPENDECTOMY     • BACK SURGERY     • CHOLECYSTECTOMY     • ENDOSCOPY N/A 2016    Procedure: ESOPHAGOGASTRODUODENOSCOPY with biopsy;  Surgeon: Austen Brito MD;  Location: [facility] ENDOSCOPY;  Service:    • HYSTERECTOMY     • NECK SURGERY       approx 6 yrs ago   • THORACOSCOPY Right 3/8/2023    Procedure: THORACOSCOPY WITH DAVINCI ROBOT WITH COMPLETE DECORTICATION;  Surgeon: Chloe Cedillo MD;  Location: [facility] OR;  Service: Robotics - DaVinci;  Laterality: Right;   • TONSILLECTOMY     • TONSILLECTOMY AND ADENOIDECTOMY               [facility] OT ASSESSMENT FLOWSHEET (last 12 hours)     IRF OT Evaluation and Treatment     Row Name 23 1048          OT Time and Intention    Document Type daily treatment  -AF     Mode of Treatment occupational therapy  -AF     Patient Effort good  -AF     Row Name 23  1048          General Information    Patient/Family/Caregiver Comments/Observations pt sitting up in w/c in room  present  -AF     General Observations of Patient increased alertness today, still having confusion, educated on not having purewic on while sitting up in the w/c.  -AF     Existing Precautions/Restrictions fall  contact precautions  -AF     Row Name 03/21/23 1048          Pain Assessment    Pretreatment Pain Rating 4/10  -AF     Posttreatment Pain Rating 4/10  -AF     Pre/Posttreatment Pain Comment c/o of pain states it feels like her bowel  -AF     Row Name 03/21/23 1048          Cognition/Psychosocial    Affect/Mental Status (Cognition) confused;flat/blunted affect  -AF     Orientation Status (Cognition) oriented to;person;place  -AF     Follows Commands (Cognition) follows one-step commands;50-74% accuracy;delayed response/completion;increased processing time needed;initiation impaired;repetition of directions required;verbal cues/prompting required  -AF     Personal Safety Interventions fall prevention program maintained;gait belt;nonskid shoes/slippers when out of bed  -AF     Cognitive Function memory deficit;safety deficit  -AF     Memory Deficit (Cognition) episodic memory;procedural memory  -AF     Comment, Cognition MOD/MAX vc's for sequening during ADL tasks  -AF     Row Name 03/21/23 1048          Bathing    Lucas Level (Bathing) bathing skills;lower body;upper body;moderate assist (50% patient effort);maximum assist (25% patient effort)  -AF     Position (Bathing) sink side;supported sitting;supported standing  -AF     Set-up Assistance (Bathing) obtain supplies  -AF     Comment (Bathing) used grab bar for support  -AF     Row Name 03/21/23 1048          Upper Body Dressing    Lucas Level (Upper Body Dressing) upper body dressing skills;minimum assist (75% or more patient effort);verbal cues;pull over garment  -AF     Position (Upper Body Dressing) supported sitting  -AF      Comment (Upper Body Dressing) w/c level  -AF     Row Name 03/21/23 1048          Lower Body Dressing    [place] Level (Lower Body Dressing) doff;don;pants/bottoms;shoes/slippers;socks;underwear;maximum assist (25% patient effort);verbal cues;dependent (less than 25% patient effort)  -AF     Position (Lower Body Dressing) supported sitting;supported standing  -AF     Comment (Lower Body Dressing) A x 2 when standing  -AF     Row Name 03/21/23 1048          Grooming    [place] Level (Grooming) grooming skills;minimum assist (75% patient effort);verbal cues  -AF     Position (Grooming) supported sitting  -AF     Comment (Grooming) w/c level, assistance to wash hair  -AF     Row Name 03/21/23 1048          Toileting    [place] Level (Toileting) toileting skills;dependent (less than 25% patient effort)  A x 2  -AF     Assistive Device Use (Toileting) grab bar/safety frame;raised toilet seat  -AF     Position (Toileting) supported standing;supported sitting  -AF     Row Name 03/21/23 1048          Transfer Assessment/Treatment    Comment, (Transfers) sit to stand at grab bar in bathroom with LBD tasks MAX A x 1  -AF     Row Name 03/21/23 1048          Toilet Transfer    Type (Toilet Transfer) squat pivot  -AF     [place] Level (Toilet Transfer) 2 person assist;moderate assist (50% patient effort)  -AF     Assistive Device (Toilet Transfer) commode;grab bars/safety frame;wheelchair  -AF     Row Name 03/21/23 1048          Motor Skills    Functional Endurance poor plus  -AF     Row Name 03/21/23 1048          Balance    Static Sitting Balance minimal assist;contact guard  -AF     Static Standing Balance moderate assist  grab bar assistance  -AF     Row Name 03/21/23 1048          Positioning and Restraints    Pre-Treatment Position sitting in chair/recliner  -AF     Post Treatment Position wheelchair  -AF     In Wheelchair sitting;exit alarm on;with PT  -AF           User Key  (r) = Recorded By, (t)  = Taken By, (c) = Cosigned By    Initials Name Effective Dates    Heaven Reynolds OTR 06/16/21 -                  Occupational Therapy Education     Title: PT OT SLP Therapies (In Progress)     Topic: Occupational Therapy (Not Started)     Point: ADL training (Not Started)     Description:   Instruct learner(s) on proper safety adaptation and remediation techniques during self care or transfers.   Instruct in proper use of assistive devices.              Learner Progress:  Not documented in this visit.          Point: Home exercise program (Not Started)     Description:   Instruct learner(s) on appropriate technique for monitoring, assisting and/or progressing therapeutic exercises/activities.              Learner Progress:  Not documented in this visit.          Point: Precautions (Not Started)     Description:   Instruct learner(s) on prescribed precautions during self-care and functional transfers.              Learner Progress:  Not documented in this visit.          Point: Body mechanics (Not Started)     Description:   Instruct learner(s) on proper positioning and spine alignment during self-care, functional mobility activities and/or exercises.              Learner Progress:  Not documented in this visit.                                OT Recommendation and Plan                         Time Calculation:      Time Calculation- OT     Row Name 03/21/23 1055             Time Calculation- OT    OT Start Time 0930  -AF      OT Stop Time 1000  -AF      OT Time Calculation (min) 30 min  -AF            User Key  (r) = Recorded By, (t) = Taken By, (c) = Cosigned By    Initials Name Provider Type    Heaven Reynolds OTR Occupational Therapist              Therapy Charges for Today     Code Description Service Date Service Provider Modifiers Qty    42145462060 HC OT SELF CARE/MGMT/TRAIN EA 15 MIN 3/20/2023 Heaven Villareal OTR  2    29955105642 HC OT NEUROMUSC RE EDUCATION EA 15 MIN 3/20/2023 Heaven Villareal OTNAIMA  GO 2    89364386305 HC OT SELF CARE/MGMT/TRAIN EA 15 MIN 3/21/2023 Heaven Villareal, MARY ANN GO 2    04077353870 HC OT THER SUPP EA 15 MIN 3/21/2023 Heaven Villareal, MARY ANN GO 2                   MARY ANN Gunn  3/21/2023

## 2023-03-21 NOTE — PROGRESS NOTES
"Nutrition Services    Patient Name:  Louise GARCIA  YOB: 1965  MRN: [id number]  Admit Date:  3/17/2023    Assessment Date:  03/21/23    FOLLOW UP NOTE - CLINICAL NUTRITION    Comments:  Visited pt in room who reported fatigue and low intakes. Pt stated she used to eat well PTA (similar to 's report at yesterday's visit), but appetite has declined x 3-5d. Pt expressed some relief this AM after having a BM, following constipation. Pt reported last BM x 6-7d (pt's  stated 2-3 d). Pt mentioned she already spoke with MD about desired scheduled bowel regimen. #, per pt. No N/V. Pt consuming Boost, prefers vanilla. Advised available snacks and encouraged pt to eat frequently to improve appetite.     RD will continue to follow-up, per protocol.      Encounter Information        Reason for Encounter Follow-up   Current Issues Idiopathic peripheral neuropathy     Current Nutrition Orders & Evaluation of Intake       Oral Nutrition     Current PO Diet Diet: Regular/House Diet; Texture: Regular Texture (IDDSI 7); Fluid Consistency: Thin (IDDSI 0)   Supplement Boost Plus TID   PO Evaluation    % PO Intake/# of days Varying, 0-50%    Factors Affecting Intake  constipation, decreased appetite, early satiety      Anthropometrics         Height   Weight Height: 157.5 cm (62\")  Weight: 63.8 kg (140 lb 10.5 oz) (03/17/23 2040)   BMI kg/m2 Body mass index is 25.73 kg/m².   Weight trend No new weight available     Physical Findings          Physical Appearance disheveled , oriented fatigued   Oral/Mouth Cavity teeth missing   Edema  1+ (trace), 2+ (mild)   Gastrointestinal hyperactive bowel sounds, last bowel movement:3/19   Skin  MASD, surgical incision, abrasion   Tubes/Drains none     Labs       Pertinent Labs Reviewed, listed below     Results from last 7 days   Lab Units 03/21/23  0637 03/20/23  0714 03/19/23  0433   SODIUM mmol/L 138 136 137   POTASSIUM mmol/L 3.8 3.7 4.0   CHLORIDE mmol/L 98 98 " Nephrology 101   CO2 mmol/L 26.5 26.8 26.4   BUN mg/dL 9 10 13   CREATININE mg/dL 1.22* 1.14* 1.31*   CALCIUM mg/dL 9.0 9.0 9.0   GLUCOSE mg/dL 91 92 87     Results from last 7 days   Lab Units 03/21/23  0637   HEMOGLOBIN g/dL 9.1*   HEMATOCRIT % 27.0*   WBC 10*3/mm3 10.41     Results from last 7 days   Lab Units 03/21/23  0637 03/20/23  0714 03/19/23  0433 03/18/23  0352 03/17/23  0526   PLATELETS 10*3/mm3 650* 621* 664* 691* 653*     COVID19   Date Value Ref Range Status   03/03/2023 Not Detected Not Detected - Ref. Range Final     Lab Results   Component Value Date    HGBA1C 5.10 02/20/2023          Medications           Scheduled Medications acetaminophen, 1,000 mg, Oral, TID  apixaban, 5 mg, Oral, Q12H  desvenlafaxine, 25 mg, Oral, Daily  folic acid, 1 mg, Oral, Daily  gabapentin, 300 mg, Oral, Q8H  ipratropium-albuterol, 3 mL, Nebulization, 4x Daily - RT  lamoTRIgine, 200 mg, Oral, Daily  pantoprazole, 40 mg, Oral, Q AM  thiamine, 100 mg, Oral, Daily  vancomycin, 1,000 mg, Intravenous, Q24H  vitamin B-12, 500 mcg, Oral, Daily       Infusions Pharmacy to dose vancomycin,        PRN Medications •  bisacodyl  •  diazePAM  •  melatonin  •  oxyCODONE  •  Pharmacy to dose vancomycin  •  polyethylene glycol  •  senna-docusate sodium  •  simethicone     PLAN OF CARE  Intervention Goal        Intervention Goal(s) Maintain nutrition status, Tolerate PO , Increase intake, Maintain weight, No significant weight loss and PO intake goal %: 75%     Nutrition Intervention        RD Action Advise available snack, Adjusted supplement, Encourage intake, Follow Tx Progress and Care plan reviewed     Prescription        Diet Prescription    Supplement Prescription Changing ONS to vanilla (pts preferrable flavor)    EN/PN Prescription    Prescription Ordered Yes   --  Monitor/Evaluation        Monitor Per protocol, PO intake, Supplement intake, Weight, GI status, Symptoms   Discharge Plan Pending clinical course   Education Will educate as  needed       Electronically signed by:  Silke Arias  03/21/23 13:02 EDT

## 2023-03-21 NOTE — NURSING NOTE
"Mrs Soriano woke up complaining of severe stomach \"tightness\" and pain.  Says its severe cramping feeling.    Pt with +hemoccult, audible bowel sounds in all quadrants, states not gas, small BM early 3/20.    Contacted on call dr. Dr Burdick ordered stat KUB, and simethicone.      "

## 2023-03-21 NOTE — PROGRESS NOTES
Case Management  Inpatient Rehabilitation Team Conference    Conference Date/Time: 3/21/2023 8:23:30 AM    Team Conference Attendees:  Dr. Ryley King, Pharmacist  Mariel Rodriguez, ISIDROW  Papa Marsh, PT  Heaven Villareal, OT  Milana Lemus, SLP  Mariel Cantu, CTRS  Ana Burdick, RN  Sandra Tanner, RN   Silke Arias, Dietician    Demographics            Age: 58Y            Gender: Female    Admission Date: 3/17/2023 8:27:00 PM  Rehabilitation Diagnosis:  Idiopathic peripheral neuropathy  Past Medical History: Past Medical History:  DiagnosisDate  ?Degenerative disc disease, cervical?  ?Gestational diabetes?  ?H/O blood clots?  ?Hematemesis?  ?Seizures (HCC)?  ?Septic shock (HCC)?    ?  ?  Surgical History  Past Surgical History:  ProcedureLateralityDate  ?APPENDECTOMY??  ?BACK SURGERY??  ?CHOLECYSTECTOMY??  ?ENDOSCOPYN/A8/30/2016  ?Procedure: ESOPHAGOGASTRODUODENOSCOPY with biopsy;  Surgeon: Austen Brito MD;  Location: [facility] ENDOSCOPY;  Service:  ?HYSTERECTOMY??  ?NECK SURGERY??  ? approx 6 yrs ago  ?THORACOSCOPYRight3/8/2023  ?Procedure: THORACOSCOPY WITH DAVINCI ROBOT WITH COMPLETE DECORTICATION;  Surgeon: Chloe Cedillo MD;  Location: [facility] OR;  Service: Robotics -  DaVinci;  Laterality: Right;  ?TONSILLECTOMY??  ?TONSILLECTOMY AND ADENOIDECTOMY?1975    ?      Plan of Care  Anticipated Discharge Date/Estimated Length of Stay: Indeterminate at this time  Anticipated Discharge Destination: Community discharge with assistance  Discharge Plan : Patient lives with  in  travel trailer. 3 fold down  steps with rail to enter.  D/C plan is home with .  not currently working.  Medical Necessity Expected Level Rationale: Goals areindeterminate.  Rehabilitation prognosis indeterminate.  Medical prognosis indeterminate.  Intensity and Duration: an average of 3 hours/5 days per week  Medical Supervision and 24 Hour Rehab Nursing: x  Physical Therapy: x  PT Intensity/Duration:  1 hour / day, 5 days / week, for approximately  indetermine length of stay.  Occupational Therapy: x  OT Intensity/Duration: 1 hour / day, 5 days / week, for approximately  indetermine length of stay.  Speech and Language Therapy: x  SLP Intensity/Duration: 1 hour / day, 5 days / week, for approximately  indetermine length of stay.  Social Work: x  Therapeutic Recreation: x  Psychology: x  Registered Dietician: x  Updated (if changes indicated)    Anticipated Discharge Date/Estimated Length of Stay:   ELOS: 4 weeks    Based on the patient's medical and functional status, their prognosis and  expected level of functional improvement is: Goals areindeterminate.  Rehabilitation prognosis indeterminate.  Medical prognosis indeterminate.      Interdisciplinary Problem/Goals/Status    All Rehab Problems:  Sphincter Control    [RN] Bladder Management(Active)  Current Status(03/21/2023): Bladder urgency  Weekly Goal(03/28/2023): decrease in episodes of incontinence  Discharge Goal: Continent 100%    [RN] Bowel Management(Active)  Current Status(03/21/2023): Patient is 100% continent of bowel  Weekly Goal(03/28/2023): Patient will maintain a daily bowel pattern.  Discharge Goal: Patient will maintain 100% continence of bowel        Psychosocial    [RN] Coping/Adjustment(Active)  Current Status(03/21/2023): Anxiety r/t uncertainty of disease course  Weekly Goal(03/28/2023): Allow opportunity to express concerns regarding current  status  Discharge Goal: Patient/family will verbalize decreased feelings of anxiety.        Body Systems    [RN] Neurological(Active)  Current Status(03/21/2023): Impaired physical mobility r/t decreased muscle  strength/control and limited ROM  Weekly Goal(03/28/2023): Patient will have increased ROM  Discharge Goal: Patient will have improved strength and function of BLE and B  hands.        Safety    [RN] Potential for Injury(Active)  Current Status(03/21/2023): Risk for falls  Weekly  Goal(03/28/2023): Family/Caregivers regarding safety precautions and need  for close supervision  Discharge Goal: Patient/family will be aware of risk of fall and safety in the  home setting.        Cognition    [ST] Memory(Active)  Current Status(03/21/2023): The patient has difficulty remembering new  information due to short-term memory impairments.  Weekly Goal(03/28/2023): The patient will remember recommendations from medical  team and therapies.  Discharge Goal: The patient will improve memory necessary for home-based  participation when given appropriate supervision and cuing.        Self Care    [OT] Bathing(Active)  Current Status(03/20/2023): Mod A x 1-2  Weekly Goal(03/28/2023): Min A x1  Discharge Goal: CGA    [OT] Dressing (Lower)(Active)  Current Status(03/20/2023): Dep, A x2  with standing  Weekly Goal(03/28/2023): Max  Discharge Goal: Min    [OT] Dressing (Upper)(Active)  Current Status(03/20/2023): Mod  Weekly Goal(03/28/2023): Min  Discharge Goal: S/S    [OT] Eating(Active)  Current Status(03/20/2023): Min  Weekly Goal(03/28/2023): S/S  Discharge Goal: Mod I    [OT] Grooming(Active)  Current Status(03/20/2023): Min A  Weekly Goal(03/28/2023): S/S  Discharge Goal: Mod I    [OT] Toileting(Active)  Current Status(03/20/2023): Dep  Weekly Goal(03/28/2023): Max  Discharge Goal: Min        Mobility    [OT] Tub/Shower Transfers(Active)  Current Status(03/20/2023): TBA  Weekly Goal(03/28/2023): Mod A x2  Discharge Goal: CGA    [OT] Bed/Chair/Wheelchair(Active)  Current Status(03/18/2023): Max A x2  Weekly Goal(03/25/2023): Mod A x2  Discharge Goal: CGA/Min A x1    [OT] Toilet Transfers(Active)  Current Status(03/20/2023): Max A x2  Weekly Goal(03/28/2023): Mod A x2  Discharge Goal: CGA/Min A x1    [PT] Stairs(Active)  Current Status(03/20/2023): TBA  Weekly Goal(03/24/2023):  Discharge Goal: 3, B rails, Min/CG    [PT] Walk(Active)  Current Status(03/20/2023): TBA (unable to come to stand 3/20)  Weekly  Goal(03/24/2023): PT only  Discharge Goal: 150', RW, CG    [PT] Bed/Chair/Wheelchair(Active)  Current Status(03/20/2023): Max x 2, stand pivot or squat with tsf bd  Weekly Goal(03/24/2023): Max, stand pivot  Discharge Goal: Min,  rwx    [PT] Bed Mobility(Active)  Current Status(03/20/2023): Mod x 2, rails, HOB elev  Weekly Goal(03/24/2023): Min  Discharge Goal: Mod Indep        Communication    [ST] Expression(Active)  Current Status(03/21/2023): Word finding tasks (naming, sentence generation) 50%  MOD cues  Weekly Goal(03/28/2023): Word finding tasks 80% MIN cues  Discharge Goal: Functional language to express wants, needs, ideas, feelings,  concepts with 100% accuracy NO cues for compensations    [ST] Voice(Active)  Current Status(03/21/2023): Vocal quality breathy, hoarse, known glottic gap  suggests insufficient adduction of VF - avergae 68dB MPT 2 seconds  Weekly Goal(03/28/2023): Vocal quality with adequate intensity at least 70dB,  MPT >2 seconds  Discharge Goal: Functional voice for utilization in all settings, 90%  intelligibility NO cues for compensatory strategies        Comments: 3/21 Bed mob Mod A x 2 w/ rails and HOB elevated, Max A x 2 for stand  pivot or squat pivot transfer, Max A x 2 for toilet transfer.  Dep for  toileting.  Difficulty with remembering new information due to short term memory  impairments.  Word finding tasks are 50% w/ mod cues.  Bladder urgency,  continent of bowel.    Signed by: Ana Burdick RN    Physician CoSigned By: Ryley Rider 03/21/2023 08:35:53

## 2023-03-21 NOTE — PROGRESS NOTES
TEAM CONF - MARCH 21 - BED MOD X 2. TRANSFERS MAX 2. STAND PIVOT OR SQUAT PIVOT. NON-AMBULATORY. Saturday MARCH 18 GAIT 6 FEET PARALLEL BARS MOD MAX OF 2.   BATH MOD 1-2. LBD DEP. UBD MOD. EATING MIN. GROOMING MIN. TOILETING DEP.   The patient has difficulty remembering new information due to short-term memory impairments.  Initial evaluation 3.18, pt obtained a score 12/30 on SLUMS cognitive assessment indicating severe cognitive impairment/dementia, goals initiated for memory and sustaining attention  FEES completed 3.7 revealing glottic gap accounting for glottic insufficiency, dysphonia, hoarse/breathy quality, recommend targeting vocal function as appropriate d/t impact on intelligibility.    Regular/thin diet continued since FEES completion 3.7, although pt known to cough with and without PO intake, may be contributed partially to ineffective VF closure.   DRESSING FORMER CHEST TUBE SITE. CONTINUES ON PUREWICK AT NIGHT. O2 AT 1-2 LITERS AT NIGHT.  DECREASED APPETITE. ABD X-RAY THIS AM SHOWS NON-OBSTRUCTIVE BOWEL GAS PATTERN.   ELOS - 4 WEEKS

## 2023-03-21 NOTE — PROGRESS NOTES
Name: Louise GARCIA ADMIT: 3/17/2023   : 1965  PCP: Chloe Schaefer APRN    MRN: [id number] LOS: 4 days   AGE/SEX: 58 y.o. female  ROOM: [facility]     Subjective   Subjective   Sitting up in wheelchair.  She is much more alert today and states that she is feeling better overall.  She did have some abdominal pain yesterday.    Objective   Objective   Vital Signs  Temp:  [97.6 °F (36.4 °C)-98.9 °F (37.2 °C)] 98.9 °F (37.2 °C)  Heart Rate:  [] 103  Resp:  [18-20] 18  BP: (106-131)/(68-83) 131/83  SpO2:  [91 %-95 %] 93 %  on  Flow (L/min):  [1] 1;   Device (Oxygen Therapy): room air  Body mass index is 25.73 kg/m².  Physical Exam  Constitutional:       General: She is not in acute distress.     Appearance: She is ill-appearing. She is not toxic-appearing or diaphoretic.   Cardiovascular:      Rate and Rhythm: Normal rate.      Pulses: Normal pulses.      Heart sounds: No murmur heard.    No gallop.   Pulmonary:      Effort: Pulmonary effort is normal. No respiratory distress.      Breath sounds: Normal breath sounds. No stridor. No wheezing or rhonchi.   Abdominal:      General: Abdomen is flat. There is no distension.      Palpations: Abdomen is soft.      Tenderness: There is no abdominal tenderness.   Musculoskeletal:      Right lower leg: No edema.      Left lower leg: No edema.   Neurological:      Mental Status: She is alert and oriented to person, place, and time.      Motor: Weakness present.         Results Review     I reviewed the patient's new clinical results.  Results from last 7 days   Lab Units 23  0637 23  0714 23  0433 23  1802 23  0352   WBC 10*3/mm3 10.41 10.63 13.25*  --  12.73*   HEMOGLOBIN g/dL 9.1* 9.3* 8.8* 6.8* 7.0*   PLATELETS 10*3/mm3 650* 621* 664*  --  691*     Results from last 7 days   Lab Units 23  0637 23  0714 23  0433 23  0352   SODIUM mmol/L 138 136 137 139   POTASSIUM mmol/L 3.8 3.7 4.0 4.3   CHLORIDE mmol/L 98 98  101 98   CO2 mmol/L 26.5 26.8 26.4 29.6*   BUN mg/dL 9 10 13 14   CREATININE mg/dL 1.22* 1.14* 1.31* 1.36*   GLUCOSE mg/dL 91 92 87 97   EGFR mL/min/1.73 51.2* 55.6* 47.0* 45.0*         Results from last 7 days   Lab Units 03/21/23  0637 03/20/23  0714 03/19/23  0433 03/18/23  0352   CALCIUM mg/dL 9.0 9.0 9.0 9.0       No results found for: HGBA1C, POCGLU    XR Abdomen KUB    Result Date: 3/21/2023  Nonobstructive bowel gas pattern.  This report was finalized on 3/21/2023 6:18 AM by Dr. Melinda Khan M.D.      Scheduled Medications  acetaminophen, 1,000 mg, Oral, TID  apixaban, 5 mg, Oral, Q12H  desvenlafaxine, 25 mg, Oral, Daily  folic acid, 1 mg, Oral, Daily  gabapentin, 300 mg, Oral, Q8H  ipratropium-albuterol, 3 mL, Nebulization, 4x Daily - RT  lamoTRIgine, 200 mg, Oral, Daily  pantoprazole, 40 mg, Oral, Q AM  thiamine, 100 mg, Oral, Daily  vancomycin, 1,000 mg, Intravenous, Q24H  vitamin B-12, 500 mcg, Oral, Daily    Infusions  Pharmacy to dose vancomycin,     Diet  Diet: Regular/House Diet; Texture: Regular Texture (IDDSI 7); Fluid Consistency: Thin (IDDSI 0)       Assessment/Plan     Active Hospital Problems    Diagnosis  POA   • **Idiopathic peripheral neuropathy [G60.9]  Yes   • MRSA bacteremia [R78.81, B95.62]  Yes   • 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       58 y.o. female admitted with Idiopathic peripheral neuropathy.      03/21/23  Hemoglobin and creatinine both stable.  Suggest bowel regimen as she has had limited bowel movements the past several days.    Anemia  -this is likely 2/2 anemia of chronic disease; no signs of active bleeding  -s/p 1unit PRBCs w/ appropriate rise in Hb  -iron studies c/w ACD  -would not stop Eliquis at this time  unless active bleeding noted or Hb drop continues  -Hb 7.6 (3/12) > 7.9 > 7.7 > 8.1 > 7.3 > 7.9 > 7 > 6.8 > 8.8 > 9.3     PHYLLIS, improved  -Crt 0.93 (3/12) > 1.36 (3/18) > 1.31 > 1.14   -vanc trough 21.7 (3/18) which increases risk of PHYLLIS  -need to cont to monitor trough levels     Idiopathic peripheral neuropathy/Guillain-Barré syndrome/paresthesia  -Patient currently in acute rehab, continue OT/PT     History of seizures  -Continue Lamictal  -No signs of seizure activity      Recs  -limit sedating meds  -Follow Vanc trough daily  -continue apixaban  -Hemoccult was positive though there is no evidence this represents any significant bleed. She does have a history of gastritis per prior EGD report, cont PPI. Do not recommend any aggressive work-up at this time as she has had stable Hb and no evidence of bleeding.  She has had regular colonoscopies in [place], with most recent within the last year.  She states she is due for another repeat this year which I recommend that she keep her appointment for.  Would not stop apixaban or pursue any additional work-up at this time unless new evidence of GIB.  Medicine will sign off, please contact if any new issues arise we will be happy to see again.        · Eliquis  for DVT prophylaxis.  · Full code.  · Discussed with patient and family.      Balwinder Hidalgo MD  [facility]  03/21/23  10:44 EDT

## 2023-03-21 NOTE — PROGRESS NOTES
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   S/p IVIG x 5 days       SUBJECTIVE:    Patient tolerating therapies.  Continues with weakness in the lower extremities greater than the upper extremities.      OBJECTIVE:  AF, VSS  BP: (111-131)/(68-83) 119/74    Physical Exam     MENTAL STATUS -  AAO x3, Verbal, NAD.  HEENT- NCAT, SCLERAE ANICTERIC, CONJUNCTIVAE PINK, OP MOIST, NO JVD, EARS UNREMARKABLE EXTERNALLY  O2 1 L  LUNGS - DECREASED BREATH SOUNDS RIGHT BASE MORE SO THAN LEFT BASE  HEART- RRR, NO RUB, MURMUR, OR GALLOP  ABD - NORMOACTIVE BOWEL SOUNDS, SOFT, NT.  EXT - NO EDEMA OR CYANOSIS  MOTOR EXAM - R/L:  SHOULDER ABDUCTION 4/4 EF 5/5, WE 5/5, HAND INTRINSICS 4/4  HF 2/1, KE 3-/2-, ADF 4/4        Scheduled Meds:acetaminophen, 1,000 mg, Oral, TID  apixaban, 5 mg, Oral, Q12H  desvenlafaxine, 25 mg, Oral, Daily  folic acid, 1 mg, Oral, Daily  gabapentin, 300 mg, Oral, Q8H  ipratropium-albuterol, 3 mL, Nebulization, 4x Daily - RT  lamoTRIgine, 200 mg, Oral, Daily  pantoprazole, 40 mg, Oral, Q AM  thiamine, 100 mg, Oral, Daily  vancomycin, 1,000 mg, Intravenous, Q24H  vitamin B-12, 500 mcg, Oral, Daily      Continuous Infusions:Pharmacy to dose vancomycin,       PRN Meds:.•  bisacodyl  •  diazePAM  •  melatonin  •  oxyCODONE  •  Pharmacy to dose vancomycin  •  polyethylene glycol  •  senna-docusate sodium  •  simethicone    Lab Results (last 24 hours)     Procedure Component Value Units Date/Time    Vancomycin, Trough Vancomycin dose due at 0130. Please make sure Vancomycin IV is not infusing before drawing the vancomycin level. Hold vancomycin dose if level is >21 mcg/mL. [[id number]]  (Normal) Collected: 03/20/23 7999    Specimen: Blood Updated: 03/21/23 0053     Vancomycin Trough 12.70 mcg/mL     Narrative:      Therapeutic Ranges for Vancomycin    Vancomycin Random   5.0-40.0 mcg/mL  Vancomycin Trough   5.0-20.0 mcg/mL  Vancomycin Peak     20.0-40.0 mcg/mL    CBC  (No Diff) [[id number]]  (Abnormal) Collected: 03/21/23 0637    Specimen: Blood Updated: 03/21/23 0715     WBC 10.41 10*3/mm3      RBC 2.81 10*6/mm3      Hemoglobin 9.1 g/dL      Hematocrit 27.0 %      MCV 96.1 fL      MCH 32.4 pg      MCHC 33.7 g/dL      RDW 13.9 %      RDW-SD 47.8 fl      MPV 9.9 fL      Platelets 650 10*3/mm3     Basic Metabolic Panel [[id number]]  (Abnormal) Collected: 03/21/23 0637    Specimen: Blood Updated: 03/21/23 0824     Glucose 91 mg/dL      BUN 9 mg/dL      Creatinine 1.22 mg/dL      Sodium 138 mmol/L      Potassium 3.8 mmol/L      Chloride 98 mmol/L      CO2 26.5 mmol/L      Calcium 9.0 mg/dL      BUN/Creatinine Ratio 7.4     Anion Gap 13.5 mmol/L      eGFR 51.2 mL/min/1.73     Narrative:      GFR Normal >60  Chronic Kidney Disease <60  Kidney Failure <15            Imaging Results (Last 24 Hours)     Procedure Component Value Units Date/Time    XR Abdomen KUB [[id number]] Collected: 03/21/23 0616     Updated: 03/21/23 0621    Narrative:      PROCEDURE:  XR ABDOMEN KUB-     HISTORY: Stomach pain.     COMPARISON: Chest radiograph 03/16/2023.     FINDINGS: A single view the abdomen/pelvis was obtained. Lung bases are  poorly visualized. There are surgical clips projecting over the right  upper quadrant. There is air in nondilated loops of small bowel and  colon and in the stomach. There is a mild stool burden. There is  multilevel degenerative disc disease.       Impression:      Nonobstructive bowel gas pattern.     This report was finalized on 3/21/2023 6:18 AM by Dr. Melinda Khan M.D.             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   S/p IVIG x 5 days     Impaired mobility/impaired self care/ impaired cognition     R lung masslike infiltrate with cavitary lesions/pleural effusion   S/p thoracentesis - Blood cx and pleural fluid  positive for MRSA       chest tube placement given empyema, and she underwent thoracoscopy w/ decortication 3/8/23   -expected postoperative changes with pleural thickening and minimal pleural effusion.  The effusion is too small for intervention and will likely to continue to resolve with time.  Recommend continue good pulmonary hygiene with incentive spirometry hourly as well as increase activity/ambulation as tolerated.        mass on TTE with findings concerning for endocarditis - underwent MARIAMA 3/10/2023 which had no vegetation     RUE superficial thrombophlebitis concerning for septic phlebitis.        ID - continue 4 weeks of vancomycin as recommended by infectious disease dosed by pharmacy to achieve a trough level of 15-20 or area under the curve of 400-600 from last negative blood culture or last intervention which ever is the latest - to complete April 1, 2023     Left hip arthrocentesis March 16 to rule out any hip septic arthritis- no growth to date on fluid.    lower back pain - MRI of spine to rule out discitis or osteomyelitis.  This did have known degenerative changes but  no infection.      DVT prophylaxis - SCDs / apixiban. History of LLE DVT - on Eliquis at home     Pain management - Tylenol 1,000 mg three times a day/ Celebrex 100 mg q 112 hours/ gabapentin 300 mg q 8 hours Oxycodone 5 mg q 4 hours prn  March 18 - DC Celebrex 2/2 PHYLLIS, and anemia     Anxiety/ muscle spasms - Diazepam 2 mg q 6 hours prn - [facility] reviewed     Seizure disorder -Lamotrigine 200 mg daily    ABLA - March 18- Hgb 7.0 (7.3 on 3/16). Type, cross and transfuse 1 unit PRBCs. Pre-medicate with Tylenol and Benadryl. CBC in am.   March 19- Hgb 8.8 s/p 1 unit PRBcs. Hemoccult positive. On Eliquis for h/o DVT. Follow Hgb. May need GI work up.  March 20 - HGB 9.3 s/p transfusion  March 21-reviewed with internal medicine-hemoglobin stable after transfusion.  Iron studies consistent with inflammation.  No further work-up presently    PHYLLIS - March 18-  Creatinine 1.36 up from 1.12. On IV Vanc and Celebrex. DC Celebrex. BMP in am.  March 19- s/p 500ml NS bolus. Creatinine 1.31.    TEAM CONF - MARCH 21 - BED MOD X 2. TRANSFERS MAX 2. STAND PIVOT OR SQUAT PIVOT. NON-AMBULATORY. Saturday MARCH 18 GAIT 6 FEET PARALLEL BARS MOD MAX OF 2.   BATH MOD 1-2. LBD DEP. UBD MOD. EATING MIN. GROOMING MIN. TOILETING DEP.   The patient has difficulty remembering new information due to short-term memory impairments.  Initial evaluation 3.18, pt obtained a score 12/30 on SLUMS cognitive assessment indicating severe cognitive impairment/dementia, goals initiated for memory and sustaining attention  FEES completed 3.7 revealing glottic gap accounting for glottic insufficiency, dysphonia, hoarse/breathy quality, recommend targeting vocal function as appropriate d/t impact on intelligibility.    Regular/thin diet continued since FEES completion 3.7, although pt known to cough with and without PO intake, may be contributed partially to ineffective VF closure.   DRESSING FORMER CHEST TUBE SITE. CONTINUES ON PUREWICK AT NIGHT. O2 AT 1-2 LITERS AT NIGHT.  DECREASED APPETITE. ABD X-RAY THIS AM SHOWS NON-OBSTRUCTIVE BOWEL GAS PATTERN.   ELOS - 4 WEEKS        Reviewed medications, vital signs, and recent lab work. Continue comprehensive inpatient rehabilitation program.    Patient discussed with Dr Seay  Exam and plan reviewed.     Ryley Rider MD    During rounds, used appropriate personal protective equipment including mask and gloves.  Additional gown if indicated.  Mask used was standard procedure mask. Appropriate PPE was worn during the entire visit.  Hand hygiene was completed before and after.

## 2023-03-21 NOTE — PROGRESS NOTES
Physical Medicine and Rehabilitation  Inpatient Rehabilitation Interdisciplinary Plan of Care    Demographics            Age: 58Y            Gender: Female    Admission Date: 3/17/2023 8:27:00 PM  Rehabilitation Diagnosis:  Idiopathic peripheral neuropathy   Subacute motor predominant idiopathic peripheral neuropathy with Paraparesis  and Areflexia.  S/p IVIG x 5 days    Impaired mobility/impaired self care/ impaired cognition    R lung masslike infiltrate with cavitary lesions/pleural effusion  S/p thoracentesis - Blood cx and pleural fluid  positive for MRSA    chest tube placement given empyema, and she underwent thoracoscopy w/  decortication 3/8/23   -expected postoperative changes with pleural thickening and minimal pleural  effusion.  The effusion is too small for intervention and will likely to  continue to resolve with time.  Recommend continue good pulmonary hygiene with  incentive spirometry hourly as well as increase activity/ambulation as  tolerated.      mass on TTE with findings concerning for endocarditis - underwent MARIAMA 3/10/2023  which had no vegetation    RUE superficial thrombophlebitis concerning for septic phlebitis.      ID - continue 4 weeks of vancomycin as recommended by infectious disease dosed  by pharmacy to achieve a trough level of 15-20 or area under the curve of  400-600 from last negative blood culture or last intervention which ever is the  latest - to complete April 1, 2023    Left hip arthrocentesis March 16 to rule out any hip septic arthritis- no growth  to date on fluid.  lower back pain - MRI of spine to rule out discitis or osteomyelitis.  This did  have known degenerative changes but  no infection.    DVT prophylaxis - SCDs / apixiban. History of LLE DVT - on Eliquis at home    Pain management - Tylenol 1,000 mg three times a day/ Celebrex 100 mg q 112  hours/ gabapentin 300 mg q 8 hours Oxycodone 5 mg q 4 hours prn  March 18 - DC Celebrex 2/2 PHYLLIS, and anemia    Anxiety/  muscle spasms - Diazepam 2 mg q 6 hours prn - [facility] reviewed    Seizure disorder -Lamotrigine 200 mg daily    ABLA - March 18- Hgb 7.0 (7.3 on 3/16). Type, cross and transfuse 1 unit PRBCs.  Pre-medicate with Tylenol and Benadryl. CBC in am.  March 19- Hgb 8.8 s/p 1 unit PRBcs. Hemoccult positive. On Eliquis for h/o DVT.  Follow Hgb. May need GI work up.  March 20 - HGB 9.3 s/p transfusion    PHYLLIS - March 18- Creatinine 1.36 up from 1.12. On IV Vanc and Celebrex. DC  Celebrex. BMP in am.  March 19- s/p 500ml NS bolus. Creatinine 1.31.    Medicine following for medical management.    Plan of Care  Anticipated Discharge Date/Estimated Length of Stay: Indeterminate at this time  Anticipated Discharge Destination: Community discharge with assistance  Discharge Plan : Patient lives with  in  travel trailer. 3 fold down  steps with rail to enter.  D/C plan is home with .  not currently working.  Medical Necessity Expected Level Rationale: Goals areindeterminate.  Rehabilitation prognosis indeterminate.  Medical prognosis indeterminate.  Intensity and Duration: an average of 3 hours/5 days per week  Medical Supervision and 24 Hour Rehab Nursing: x  Physical Therapy: x  PT Intensity/Duration: 1 hour / day, 5 days / week, for approximately  indetermine length of stay.  Occupational Therapy: x  OT Intensity/Duration: 1 hour / day, 5 days / week, for approximately  indetermine length of stay.  Speech and Language Therapy: x  SLP Intensity/Duration: 1 hour / day, 5 days / week, for approximately  indetermine length of stay.  Social Work: x  Therapeutic Recreation: x  Psychology: x  Registered Dietician: x  Updated (if changes indicated)  No changes to plan.    Based on the patient's medical and functional status, their prognosis and  expected level of functional improvement is: Goals areindeterminate.  Rehabilitation prognosis indeterminate.  Medical prognosis indeterminate.    Interdisciplinary  Problem/Goals/Status  Sphincter Control    [RN] Bladder Management(Active)  Current Status(03/21/2023): Bladder urgency  Weekly Goal(03/28/2023): decrease in episodes of incontinence  Discharge Goal: Continent 100%    [RN] Bowel Management(Active)  Current Status(03/21/2023): Patient is 100% continent of bowel  Weekly Goal(03/28/2023): Patient will maintain a daily bowel pattern.  Discharge Goal: Patient will maintain 100% continence of bowel        Psychosocial    [RN] Coping/Adjustment(Active)  Current Status(03/21/2023): Anxiety r/t uncertainty of disease course  Weekly Goal(03/28/2023): Allow opportunity to express concerns regarding current  status  Discharge Goal: Patient/family will verbalize decreased feelings of anxiety.        Body Systems    [RN] Neurological(Active)  Current Status(03/21/2023): Impaired physical mobility r/t decreased muscle  strength/control and limited ROM  Weekly Goal(03/28/2023): Patient will have increased ROM  Discharge Goal: Patient will have improved strength and function of BLE and B  hands.        Safety    [RN] Potential for Injury(Active)  Current Status(03/21/2023): Risk for falls  Weekly Goal(03/28/2023): Family/Caregivers regarding safety precautions and need  for close supervision  Discharge Goal: Patient/family will be aware of risk of fall and safety in the  home setting.        Cognition    [ST] Memory(Active)  Current Status(03/21/2023): The patient has difficulty remembering new  information due to short-term memory impairments.  Weekly Goal(03/28/2023): The patient will remember recommendations from medical  team and therapies.  Discharge Goal: The patient will improve memory necessary for home-based  participation when given appropriate supervision and cuing.        Self Care    [OT] Bathing(Active)  Current Status(03/20/2023): Mod A x 1-2  Weekly Goal(03/28/2023): Min A x1  Discharge Goal: CGA    [OT] Dressing (Lower)(Active)  Current Status(03/20/2023): Dep, A x2   with standing  Weekly Goal(03/28/2023): Max  Discharge Goal: Min    [OT] Dressing (Upper)(Active)  Current Status(03/20/2023): Mod  Weekly Goal(03/28/2023): Min  Discharge Goal: S/S    [OT] Eating(Active)  Current Status(03/20/2023): Min  Weekly Goal(03/28/2023): S/S  Discharge Goal: Mod I    [OT] Grooming(Active)  Current Status(03/20/2023): Min A  Weekly Goal(03/28/2023): S/S  Discharge Goal: Mod I    [OT] Toileting(Active)  Current Status(03/20/2023): Dep  Weekly Goal(03/28/2023): Max  Discharge Goal: Min        Mobility    [OT] Tub/Shower Transfers(Active)  Current Status(03/20/2023): TBA  Weekly Goal(03/28/2023): Mod A x2  Discharge Goal: CGA    [OT] Bed/Chair/Wheelchair(Active)  Current Status(03/18/2023): Max A x2  Weekly Goal(03/25/2023): Mod A x2  Discharge Goal: CGA/Min A x1    [OT] Toilet Transfers(Active)  Current Status(03/20/2023): Max A x2  Weekly Goal(03/28/2023): Mod A x2  Discharge Goal: CGA/Min A x1    [PT] Stairs(Active)  Current Status(03/20/2023): TBA  Weekly Goal(03/24/2023):  Discharge Goal: 3, B rails, Min/CG    [PT] Walk(Active)  Current Status(03/20/2023): TBA (unable to come to stand 3/20)  Weekly Goal(03/24/2023): PT only  Discharge Goal: 150', RW, CG    [PT] Bed/Chair/Wheelchair(Active)  Current Status(03/20/2023): Max x 2, stand pivot or squat with tsf bd  Weekly Goal(03/24/2023): Max, stand pivot  Discharge Goal: Min,  rwx    [PT] Bed Mobility(Active)  Current Status(03/20/2023): Mod x 2, rails, HOB elev  Weekly Goal(03/24/2023): Min  Discharge Goal: Mod Indep        Communication    [ST] Expression(Active)  Current Status(03/21/2023): Word finding tasks (naming, sentence generation) 50%  MOD cues  Weekly Goal(03/28/2023): Word finding tasks 80% MIN cues  Discharge Goal: Functional language to express wants, needs, ideas, feelings,  concepts with 100% accuracy NO cues for compensations    [ST] Voice(Active)  Current Status(03/21/2023): Vocal quality breathy, hoarse, known glottic  gap  suggests insufficient adduction of VF - avergae 68dB MPT 2 seconds  Weekly Goal(03/28/2023): Vocal quality with adequate intensity at least 70dB,  MPT >2 seconds  Discharge Goal: Functional voice for utilization in all settings, 90%  intelligibility NO cues for compensatory strategies      Comments:    Signed by: Ryley Rider MD

## 2023-03-21 NOTE — PLAN OF CARE
Goal Outcome Evaluation:  Plan of Care Reviewed With: patient        Progress: improving  Outcome Evaluation: Ms Soriano rested well this shift.  Delayed orientation- 1L O2 @[facility].  Spouse at bedside.  IV R forearm, saline-locked/infusing vancomycin.  All meds whole w/thins.  Not oob this shift.  Purewick in place.  C/o pain, prn oxycodone giving x1 so far this shift.

## 2023-03-21 NOTE — PROGRESS NOTES
Inpatient Rehabilitation Plan of Care Note    Plan of Care  Care Plan Reviewed - No updates at this time.    Sphincter Control    Performed Intervention(s)  Bladder/bowel training program  Monitor intake and output  Use incontinence products/equipment      Psychosocial    Performed Intervention(s)  Verbalizes needs and concerns  Support from family/peer groups      Body Systems    Performed Intervention(s)  Perform active and passive ROM  Frequent position changes      Safety    Performed Intervention(s)  Safety Rounds  Bed alarm/Chair alarm  Items within reach    Signed by: Sandra Tanner RN

## 2023-03-21 NOTE — PROGRESS NOTES
Reviewed team conference report with patient and  regarding current status, d/c and weekly goals, and ELOS. They were agreeable to plan.

## 2023-03-21 NOTE — PLAN OF CARE
Goal Outcome Evaluation:  Plan of Care Reviewed With: patient        Progress: improving   Patient is alert and oriented. Forgetful. Confusion at times. Easily redirected. She is calm and cooperative. She makes needs and wants known. She has been complaining of abdominal and bilateral hip pain. Scheduled tylenol given. Declined PRN Roxicodone. Abdominal x -ray done this morning. MD aware of result. She is taking her medication whole with thin liquids. 2 person assist. Participating in therapies. Will continue to monitor.

## 2023-03-21 NOTE — PROGRESS NOTES
Inpatient Rehabilitation Plan of Care Note    Plan of Care  Care Plan Reviewed - No updates at this time.    Sphincter Control    [RN] Bladder Management(Active)  Current Status(03/21/2023): Bladder urgency  Weekly Goal(03/28/2023): decrease in episodes of incontinence  Discharge Goal: Continent 100%    [RN] Bowel Management(Active)  Current Status(03/21/2023): Patient is 100% continent of bowel  Weekly Goal(03/28/2023): Patient will maintain a daily bowel pattern.  Discharge Goal: Patient will maintain 100% continence of bowel    Performed Intervention(s)  Bladder/bowel training program  Monitor intake and output  Use incontinence products/equipment      Psychosocial    [RN] Coping/Adjustment(Active)  Current Status(03/21/2023): Anxiety r/t uncertainty of disease course  Weekly Goal(03/28/2023): Allow opportunity to express concerns regarding current  status  Discharge Goal: Patient/family will verbalize decreased feelings of anxiety.    Performed Intervention(s)  Verbalizes needs and concerns  Support from family/peer groups      Body Systems    [RN] Neurological(Active)  Current Status(03/21/2023): Impaired physical mobility r/t decreased muscle  strength/control and limited ROM  Weekly Goal(03/28/2023): Patient will have increased ROM  Discharge Goal: Patient will have improved strength and function of BLE and B  hands.    Performed Intervention(s)  Perform active and passive ROM  Frequent position changes      Safety    [RN] Potential for Injury(Active)  Current Status(03/21/2023): Risk for falls  Weekly Goal(03/28/2023): Family/Caregivers regarding safety precautions and need  for close supervision  Discharge Goal: Patient/family will be aware of risk of fall and safety in the  home setting.    Performed Intervention(s)  Safety Rounds  Bed alarm/Chair alarm  Items within reach    Signed by: Dayanna Serna RN

## 2023-03-21 NOTE — THERAPY TREATMENT NOTE
Inpatient Rehabilitation - Physical Therapy Treatment Note       [facility]     Patient Name: Louise GARCIA  : 1965  MRN: [id number]    Today's Date: 3/21/2023                    Admit Date: 3/17/2023      Visit Dx:     ICD-10-CM ICD-9-CM   1. Impaired functional mobility, balance, gait, and endurance  Z74.09 V49.89       Patient Active Problem List   Diagnosis   • Sepsis (HCC)   • Neck pain, chronic   • Upper back pain   • Paresthesia   • Cervical myelopathy (HCC)   • Lower extremity weakness   • History of blood clots   • Chronic anticoagulation   • Seizures (HCC)   • Anemia   • GERD (gastroesophageal reflux disease)   • Guillain-Allen syndrome (HCC)   • Situational mixed anxiety and depressive disorder   • Mass of middle lobe of right lung   • Oral candidiasis   • Empyema of pleura (HCC)   • MRSA bacteremia   • Idiopathic peripheral neuropathy       Past Medical History:   Diagnosis Date   • Degenerative disc disease, cervical    • Gestational diabetes    • H/O blood clots    • Hematemesis    • Seizures (HCC)    • Septic shock (HCC)        Past Surgical History:   Procedure Laterality Date   • APPENDECTOMY     • BACK SURGERY     • CHOLECYSTECTOMY     • ENDOSCOPY N/A 2016    Procedure: ESOPHAGOGASTRODUODENOSCOPY with biopsy;  Surgeon: Austen Brito MD;  Location: [facility] ENDOSCOPY;  Service:    • HYSTERECTOMY     • NECK SURGERY       approx 6 yrs ago   • THORACOSCOPY Right 3/8/2023    Procedure: THORACOSCOPY WITH DAVINCI ROBOT WITH COMPLETE DECORTICATION;  Surgeon: Chloe Cedillo MD;  Location: [facility] OR;  Service: Robotics - DaVinci;  Laterality: Right;   • TONSILLECTOMY     • TONSILLECTOMY AND ADENOIDECTOMY         PT ASSESSMENT (last 12 hours)     IRF PT Evaluation and Treatment     Row Name 23 0904          PT Time and Intention    Document Type daily treatment  -DP     Mode of Treatment individual therapy;physical therapy  -DP     Patient/Family/Caregiver  Comments/Observations Pt increased fatigue in the PM session  -DP     Row Name 03/21/23 0904          General Information    Patient Profile Reviewed yes  -DP     General Observations of Patient Pt fatigues significantly by the PM session and seems to do better in the morning  -DP     Existing Precautions/Restrictions fall  -DP     Comment, General Information MRSA precautions  -DP     Row Name 03/21/23 0904          Pain Assessment    Pretreatment Pain Rating 4/10  -DP     Posttreatment Pain Rating 4/10  -DP     Pain Location - Side/Orientation Bilateral  -DP     Pain Location lower  -DP     Pain Location - extremity  -DP     Row Name 03/21/23 0904          Cognition/Psychosocial    Affect/Mental Status (Cognition) confused;flat/blunted affect  -DP     Orientation Status (Cognition) oriented to;person;place  -DP     Follows Commands (Cognition) follows one-step commands;50-74% accuracy;delayed response/completion;increased processing time needed;initiation impaired;repetition of directions required;verbal cues/prompting required  -DP     Personal Safety Interventions elopement precautions initiated;safety round/check completed;fall prevention program maintained;gait belt;muscle strengthening facilitated;supervised activity;nonskid shoes/slippers when out of bed  -DP     Row Name 03/21/23 0904          Mobility    Additional Documentation Wheelchair Mobility/Management (Group)  -DP     Row Name 03/21/23 0904          Bed Mobility    Comment, (Bed Mobility) pt UIC  -DP     Row Name 03/21/23 0904          Sit-Stand Transfer    Sit-Stand [place] (Transfers) maximum assist (25% patient effort)  -DP     Assistive Device (Sit-Stand Transfers) parallel bars  -DP     Comment, (Sit-Stand Transfer) On first stand the pt was modA and on the second stand the pt was maxA 1-2. Both performed in th AM  -DP     Row Name 03/21/23 0904          Stand-Sit Transfer    Stand-Sit [place] (Transfers) maximum assist (25% patient  effort)  -DP     Assistive Device (Stand-Sit Transfers) parallel bars  -DP     Row Name 03/21/23 0904          Gait/Stairs (Locomotion)    [place] Level (Gait) moderate assist (50% patient effort);maximum assist (25% patient effort);2 person assist;verbal cues;set up  -DP     Assistive Device (Gait) parallel bars  -DP     Distance in Feet (Gait) 6  -DP     Pattern (Gait) step-through  -DP     Deviations/Abnormal Patterns (Gait) base of support, narrow;federico decreased;gait speed decreased;stride length decreased;weight shifting decreased  -DP     Bilateral Gait Deviations foot drop/toe drag;forward flexed posture;heel strike decreased;knee buckling bilaterally;knee hyperextension  -DP     Left Sided Gait Deviations knee buckling, left side  -DP     Row Name 03/21/23 0904          Wheelchair Mobility/Management    Mobility Activities (Wheelchair) forward propulsion;steering;turning  -DP     Forward Propulsion [place] (Wheelchair) standby assist  -DP     Steering [place] (Wheelchair) minimum assist (75% patient effort)  -DP     Turning [place] (Wheelchair) minimum assist (75% patient effort)  -DP     Distance Propelled in Feet (Wheelchair) 25  -DP     Comment, Wheelchair Mobility Pt demonstrates slow w/c propulsion using BUE and BLE as she required VC to reach further back with hands on the wheels to improve propulsion efficency  -DP     Row Name 03/21/23 0904          Safety Issues, Functional Mobility    Impairments Affecting Function (Mobility) balance;cognition;coordination;endurance/activity tolerance;motor control;pain;postural/trunk control;sensation/sensory awareness;range of motion (ROM);shortness of breath;strength  -DP     Row Name 03/21/23 0904          Balance    Comment, Balance Pt stood up to 2 mins during AM session with Bianca once standing but PT  -DP     Row Name 03/21/23 0904          Hip (Therapeutic Exercise)    Hip (Therapeutic Exercise) isometric exercises;strengthening  exercise  -DP     Hip Isometrics (Therapeutic Exercise) bilateral;aBduction;aDduction;sitting;10 repetitions;3 second hold  ADD decreased strength  -DP     Hip Strengthening (Therapeutic Exercise) bilateral;marching while seated;15 repititions;2 sets  AAROM after about 50% AROM  -DP     Row Name 03/21/23 0904          Knee (Therapeutic Exercise)    Knee (Therapeutic Exercise) AAROM (active assistive range of motion)  -DP     Knee AAROM (Therapeutic Exercise) bilateral;flexion;10 repetitions  with pillow case  -DP     Knee Strengthening (Therapeutic Exercise) LAQ (long arc quad);15 repititions  AAROM after about 50% AROM  -DP     Row Name 03/21/23 0904          Ankle (Therapeutic Exercise)    Ankle (Therapeutic Exercise) AROM (active range of motion)  -DP     Ankle AROM (Therapeutic Exercise) bilateral;dorsiflexion;10 repetitions;2 sets  -DP     Row Name 03/21/23 0904          Positioning and Restraints    Pre-Treatment Position sitting in chair/recliner  -DP     Post Treatment Position wheelchair  Upon PT arrival the pt was asleep in the PM  -DP     In Wheelchair sitting;exit alarm on;with SLP  -DP           User Key  (r) = Recorded By, (t) = Taken By, (c) = Cosigned By    Initials Name Provider Type    Papa Traylor PT Physical Therapist              Wound 03/08/23 1917 Right lateral chest Incision (Active)   Closure Liquid skin adhesive 03/20/23 2213   Base clean;dry 03/20/23 2213       Wound 03/11/23 1530 Other (See comments) other (see comments) pubis Abrasion (Active)   Dressing Appearance open to air 03/21/23 0807   Closure Open to air 03/21/23 0807   Base scab;red;pink 03/21/23 0807   Periwound dry 03/21/23 0807   Periwound Temperature warm 03/21/23 0807   Periwound Skin Turgor soft 03/21/23 0807   Dressing Care open to air 03/21/23 0807       Wound 03/20/23 1527 Bilateral upper gluteal MASD (Moisture associated skin damage) (Active)   Dressing Appearance open to air 03/21/23 0807   Closure None 03/21/23  0807   Base blanchable;red;dry 03/21/23 0807   Drainage Amount none 03/21/23 0807   Care, Wound cleansed with;soap and water 03/21/23 0807   Dressing Care skin barrier agent applied 03/21/23 0807     Physical Therapy Education     Title: PT OT SLP Therapies (In Progress)     Topic: Physical Therapy (Done)     Point: Mobility training (Done)     Learning Progress Summary           Patient Acceptance, E,D, NR,VU,DU by DP at 3/21/2023 1448    Acceptance, E, NR by JK at 3/20/2023 1513    Acceptance, E,TB, VU,NR by KP at 3/18/2023 1643                   Point: Home exercise program (Done)     Learning Progress Summary           Patient Acceptance, E,D, NR,VU,DU by DP at 3/21/2023 1448    Acceptance, E, NR by JK at 3/20/2023 1513                   Point: Body mechanics (Done)     Learning Progress Summary           Patient Acceptance, E,D, NR,VU,DU by DP at 3/21/2023 1448                   Point: Precautions (Done)     Learning Progress Summary           Patient Acceptance, E,D, NR,VU,DU by DP at 3/21/2023 1448                               User Key     Initials Effective Dates Name Provider Type Discipline    JDEYSI 06/16/21 -  Juana Veloz, PT Physical Therapist PT     06/16/21 -  Yolanda Shannon, PT Physical Therapist PT    DP 08/24/21 -  Papa Marsh PT Physical Therapist PT                PT Recommendation and Plan                          Time Calculation:      PT Charges     Row Name 03/21/23 1455 03/21/23 1449          Time Calculation    Start Time 1230  -DP 1000  -DP     Stop Time 1300  -DP 1030  -DP     Time Calculation (min) 30 min  -DP 30 min  -DP     PT Received On -- 03/21/23  -DP     PT - Next Appointment -- 03/22/23  -DP        Time Calculation- PT    Total Timed Code Minutes- PT 30 minute(s)  -DP 30 minute(s)  -DP           User Key  (r) = Recorded By, (t) = Taken By, (c) = Cosigned By    Initials Name Provider Type    DP Papa Marsh, PT Physical Therapist                Therapy Charges for Today      Code Description Service Date Service Provider Modifiers Qty    [id number]  PT THERAPEUTIC ACT EA 15 MIN 3/21/2023 Papa Marsh, PT GP 2    [id number]  PT THER PROC EA 15 MIN 3/21/2023 Papa Marsh, PT GP 2    [id number]  PT THER SUPP EA 15 MIN 3/21/2023 Papa Marsh, PT GP 1              Patient was wearing a face mask during this therapy encounter. Therapist used appropriate personal protective equipment including mask and gloves.  Mask used was standard procedure mask. Appropriate PPE was worn during the entire therapy session. Hand hygiene was completed before and after therapy session. Patient is not in enhanced droplet precautions.         Papa Marsh PT  3/21/2023

## 2023-03-21 NOTE — PROGRESS NOTES
[facility] Clinical Pharmacy Services: Vancomycin Level Monitoring Note    Louise GARCIA is a 58 y.o. female who is on day 19/28 of pharmacy to dose vancomycin for  empyema .    Estimated Creatinine Clearance: 43.6 mL/min (A) (by C-G formula based on SCr of 1.22 mg/dL (H)).    Current Vanc Dose: 1000 mg IV every  24  hours  Results from last 7 days   Lab Units 03/20/23  2357 03/18/23  2126 03/15/23  2044   VANCOMYCIN TR mcg/mL 12.70 21.70* 17.20   Predicted AUC at current dose:534 mg/L.hr  Next Level Date and Time: 3/23 @ 2130    Assessment/Plan    Current Vancomycin Dose: 1000mg IV every  24 hours; provides a predicted AUC of 534 mg/L.hr   Next Level Date and Time: Vanc Trough on 3/23 at 2130  We will continue to monitor patient changes and renal function     Pharmacy is continuing to monitor and will adjust as needed.    Zoran King [facility]  Clinical Pharmacist

## 2023-03-21 NOTE — PROGRESS NOTES
PPS CMG Coordinator  Inpatient Rehabilitation Admission    Ethnic Group: White.  Marital Status:  Marital Status: .    IRF Admission Date:  03/17/2023  Admission Class: Initial Rehab.  Admit From:  [facility]    Pre-Hospital Living: Home. Pre-Hospital Living  With: (2) Family/Relatives.    Payment Sources: Primary: Not Listed.  Secondary: Not Listed.  Impairment Group: 03.3 Polyneuropathy  Date of Onset of Impairment: 02/20/2023    Etiologic Diagnosis Code(s):  Rank Code      Description  1    G60.9     Hereditary and idiopathic neuropathy,                 unspecified    Comorbidities:  ICD    Are there any arthritis conditions recorded for Impairment Group, Etiologic  Diagnosis, or Comorbid Conditions that meet all of the regulatory requirements  for IRF classification (in 42 .29(b)(2)(x), (xi), and xii))? No    Presence of Pressure Ulcer:  No observed/documented pressure ulcers.    MEDICAL NEEDS  Height on Admission:  62 inches.  Weight on Admission:  141 pounds.    QUALITY INDICATORS  Prior Functioning:  Self Care: Patient completed all the activities by themself, with or without an  assistive device, with no assistance from a helper.  Indoor Mobility: Patient completed the activities by themself, with or without  an assistive device, with no assistance from a helper.  Stairs: Patient completed the activities by themself, with or without an  assistive device, with no assistance from a helper.  Functional Cognition: Patient completed the activities by themself, with or  without an assistive device, with no assistance from a helper.  Prior Device Use: Patient does not use manual or motorized wheelchair or  scooter, mechanical lift, walker, or an orthotic/prosthesis.    Bladder and Bowel: Bladder Continence: Incontinent less than daily (e.g., once  or twice during the 3-day assessment period).  Bowel Continence: Occasionally incontinent (one episode of bowel  incontinence).  Swallowing/Nutritional Status: Regular food (solids and liquids swallowed safely  without supervision or modified food or liquid consistency).  Special Conditions: Patient did not receive total parenteral nutrition treatment  at the time of admission.  Section A. Ethnicity/ Race/Language  Ethnicity: Not of , /a, [place] Origin  Race: White  Preferred Language: English  Requests  to Communicate:   No    Section I. Active Diagnosis: Comorbidities and Co-existing Conditions:   Patient  does not have PAD, PVD, or Diabetes Mellitus  Section J. Health Conditions: Patient has not had any falls in the past year.  Patient has had major surgery during the 100 days prior to admission.  Section K. Swallowing/Nutritional Status  Nutritional Approaches on Admission:  Nutritional Approaches on Admission:  None  Section M. Skin Conditions  Unhealed Pressure Ulcer/Injuries at Stage 1 or  Higher on Admission:  No.  Section N. Medication:  Potential Clinically Significant Medication Issues: No issues found during  review  Section . High-Risk Drug Classes: Use and Indication                       Is Taking                    Indication noted  High-RiskDrug Class  E. Anticoagulant     Yes                          Yes  F. Antibiotic        Yes                          Yes  H. Opioid            Yes                          Yes    Section O. Special Treatments, Procedures, and Programs  IV Access: Peripheral   Oxygen Therapy: Intermittent   IV Medications: Antibiotics   Transfusions:    Signed by: Ana Burdick RN

## 2023-03-22 LAB
ANION GAP SERPL CALCULATED.3IONS-SCNC: 10 MMOL/L (ref 5–15)
BUN SERPL-MCNC: 9 MG/DL (ref 6–20)
BUN/CREAT SERPL: 9.6 (ref 7–25)
CALCIUM SPEC-SCNC: 8.4 MG/DL (ref 8.6–10.5)
CHLORIDE SERPL-SCNC: 101 MMOL/L (ref 98–107)
CO2 SERPL-SCNC: 26 MMOL/L (ref 22–29)
CREAT SERPL-MCNC: 0.94 MG/DL (ref 0.57–1)
DEPRECATED RDW RBC AUTO: 47.6 FL (ref 37–54)
EGFRCR SERPLBLD CKD-EPI 2021: 70 ML/MIN/1.73
ERYTHROCYTE [DISTWIDTH] IN BLOOD BY AUTOMATED COUNT: 13.8 % (ref 12.3–15.4)
GLUCOSE SERPL-MCNC: 72 MG/DL (ref 65–99)
HCT VFR BLD AUTO: 26.3 % (ref 34–46.6)
HGB BLD-MCNC: 8.9 G/DL (ref 12–15.9)
MCH RBC QN AUTO: 32 PG (ref 26.6–33)
MCHC RBC AUTO-ENTMCNC: 33.8 G/DL (ref 31.5–35.7)
MCV RBC AUTO: 94.6 FL (ref 79–97)
PLATELET # BLD AUTO: 520 10*3/MM3 (ref 140–450)
PMV BLD AUTO: 10.3 FL (ref 6–12)
POTASSIUM SERPL-SCNC: 4.1 MMOL/L (ref 3.5–5.2)
RBC # BLD AUTO: 2.78 10*6/MM3 (ref 3.77–5.28)
SODIUM SERPL-SCNC: 137 MMOL/L (ref 136–145)
WBC NRBC COR # BLD: 10.03 10*3/MM3 (ref 3.4–10.8)

## 2023-03-22 PROCEDURE — 97112 NEUROMUSCULAR REEDUCATION: CPT

## 2023-03-22 PROCEDURE — 97130 THER IVNTJ EA ADDL 15 MIN: CPT

## 2023-03-22 PROCEDURE — 25010000002 VANCOMYCIN PER 500 MG: Performed by: PHYSICAL MEDICINE & REHABILITATION

## 2023-03-22 PROCEDURE — 97129 THER IVNTJ 1ST 15 MIN: CPT

## 2023-03-22 PROCEDURE — 94761 N-INVAS EAR/PLS OXIMETRY MLT: CPT

## 2023-03-22 PROCEDURE — 94664 DEMO&/EVAL PT USE INHALER: CPT

## 2023-03-22 PROCEDURE — 97530 THERAPEUTIC ACTIVITIES: CPT

## 2023-03-22 PROCEDURE — 97110 THERAPEUTIC EXERCISES: CPT

## 2023-03-22 PROCEDURE — 80048 BASIC METABOLIC PNL TOTAL CA: CPT | Performed by: PHYSICAL MEDICINE & REHABILITATION

## 2023-03-22 PROCEDURE — 97535 SELF CARE MNGMENT TRAINING: CPT

## 2023-03-22 PROCEDURE — 94760 N-INVAS EAR/PLS OXIMETRY 1: CPT

## 2023-03-22 PROCEDURE — 94799 UNLISTED PULMONARY SVC/PX: CPT

## 2023-03-22 PROCEDURE — 85027 COMPLETE CBC AUTOMATED: CPT | Performed by: PHYSICAL MEDICINE & REHABILITATION

## 2023-03-22 RX ORDER — GUAIFENESIN 600 MG/1
600 TABLET, EXTENDED RELEASE ORAL EVERY 12 HOURS SCHEDULED
Status: DISCONTINUED | OUTPATIENT
Start: 2023-03-22 | End: 2023-03-23

## 2023-03-22 RX ADMIN — APIXABAN 5 MG: 5 TABLET, FILM COATED ORAL at 08:11

## 2023-03-22 RX ADMIN — PANTOPRAZOLE SODIUM 40 MG: 40 TABLET, DELAYED RELEASE ORAL at 04:46

## 2023-03-22 RX ADMIN — APIXABAN 5 MG: 5 TABLET, FILM COATED ORAL at 20:03

## 2023-03-22 RX ADMIN — Medication 100 MG: at 08:11

## 2023-03-22 RX ADMIN — GUAIFENESIN 600 MG: 600 TABLET, EXTENDED RELEASE ORAL at 20:03

## 2023-03-22 RX ADMIN — ACETAMINOPHEN 1000 MG: 325 TABLET ORAL at 08:11

## 2023-03-22 RX ADMIN — GUAIFENESIN 600 MG: 600 TABLET, EXTENDED RELEASE ORAL at 11:27

## 2023-03-22 RX ADMIN — IPRATROPIUM BROMIDE AND ALBUTEROL SULFATE 3 ML: 2.5; .5 SOLUTION RESPIRATORY (INHALATION) at 14:56

## 2023-03-22 RX ADMIN — GABAPENTIN 300 MG: 300 CAPSULE ORAL at 13:35

## 2023-03-22 RX ADMIN — Medication 1 MG: at 08:11

## 2023-03-22 RX ADMIN — IPRATROPIUM BROMIDE AND ALBUTEROL SULFATE 3 ML: 2.5; .5 SOLUTION RESPIRATORY (INHALATION) at 11:12

## 2023-03-22 RX ADMIN — ACETAMINOPHEN 1000 MG: 325 TABLET ORAL at 20:02

## 2023-03-22 RX ADMIN — POLYETHYLENE GLYCOL 3350 17 G: 17 POWDER, FOR SOLUTION ORAL at 08:09

## 2023-03-22 RX ADMIN — Medication 500 MCG: at 08:11

## 2023-03-22 RX ADMIN — VANCOMYCIN HYDROCHLORIDE 1000 MG: 1 INJECTION, SOLUTION INTRAVENOUS at 20:03

## 2023-03-22 RX ADMIN — DESVENLAFAXINE SUCCINATE 25 MG: 25 TABLET, EXTENDED RELEASE ORAL at 08:11

## 2023-03-22 RX ADMIN — ACETAMINOPHEN 1000 MG: 325 TABLET ORAL at 15:33

## 2023-03-22 RX ADMIN — GABAPENTIN 300 MG: 300 CAPSULE ORAL at 20:03

## 2023-03-22 RX ADMIN — LAMOTRIGINE 200 MG: 100 TABLET ORAL at 08:11

## 2023-03-22 RX ADMIN — GABAPENTIN 300 MG: 300 CAPSULE ORAL at 04:46

## 2023-03-22 NOTE — PLAN OF CARE
Goal Outcome Evaluation:  Plan of Care Reviewed With: patient        Progress: improving   Patient is calm and cooperative. Alert and oriented. Forgetful. Easily re-oriented. Increased productive coughing noted this morning. Sputum is clear and thick. MD notified. Received order for scheduled Mucinex and first dose given today. PRN bowel medication given this morning for risk of constipation. She is continent of B/B this shift; using the bedpan. She is using the call light for assistance. BLE weak. 2 person assist. Encouraged frequent turning and repositioning. She has a poor PO intake. Boost ordered with meals. Significant other at bedside. Will continue to monitor.

## 2023-03-22 NOTE — THERAPY TREATMENT NOTE
Inpatient Rehabilitation - Occupational Therapy Treatment Note    [facility]     Patient Name: Louise GARCIA  : 1965  MRN: [id number]    Today's Date: 3/22/2023                 Admit Date: 3/17/2023         ICD-10-CM ICD-9-CM   1. 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)   • Situational mixed anxiety and depressive disorder   • Mass of middle lobe of right lung   • Oral candidiasis   • Empyema of pleura (HCC)   • MRSA bacteremia   • Idiopathic peripheral neuropathy       Past Medical History:   Diagnosis Date   • Degenerative disc disease, cervical    • Gestational diabetes    • H/O blood clots    • Hematemesis    • Seizures ([facility])    • Septic shock ([facility])        Past Surgical History:   Procedure Laterality Date   • APPENDECTOMY     • BACK SURGERY     • CHOLECYSTECTOMY     • ENDOSCOPY N/A 2016    Procedure: ESOPHAGOGASTRODUODENOSCOPY with biopsy;  Surgeon: Austen Brito MD;  Location: [facility] ENDOSCOPY;  Service:    • HYSTERECTOMY     • NECK SURGERY       approx 6 yrs ago   • THORACOSCOPY Right 3/8/2023    Procedure: THORACOSCOPY WITH DAVINCI ROBOT WITH COMPLETE DECORTICATION;  Surgeon: Chloe Cedillo MD;  Location: [facility] OR;  Service: Robotics - DaVinci;  Laterality: Right;   • TONSILLECTOMY     • TONSILLECTOMY AND ADENOIDECTOMY               [facility] OT ASSESSMENT FLOWSHEET (last 12 hours)     [facility] OT Evaluation and Treatment     Row Name 23 1514          OT Time and Intention    Document Type daily treatment  -AF     Mode of Treatment occupational therapy  -AF     Patient Effort good  -AF     Symptoms Noted  During/After Treatment fatigue  coughing in AM session  -AF     Row Name 03/22/23 1514          General Information    Patient/Family/Caregiver Comments/Observations pt sitting up in bed in AM and Pm sessions  -AF     Existing Precautions/Restrictions fall  contact precautions  -AF     Row Name 03/22/23 1514          Pain Assessment    Pretreatment Pain Rating 0/10 - no pain  -AF     Posttreatment Pain Rating 0/10 - no pain  -AF     Pre/Posttreatment Pain Comment pain after coughing  -AF     Row Name 03/22/23 1514          Cognition/Psychosocial    Affect/Mental Status (Cognition) confused;flat/blunted affect  -AF     Orientation Status (Cognition) oriented to;person;place  -AF     Follows Commands (Cognition) follows one-step commands;50-74% accuracy;delayed response/completion;increased processing time needed;initiation impaired;repetition of directions required;verbal cues/prompting required  -AF     Personal Safety Interventions fall prevention program maintained;gait belt;nonskid shoes/slippers when out of bed  -AF     Cognitive Function memory deficit;safety deficit  -AF     Memory Deficit (Cognition) episodic memory;procedural memory  -AF     Safety Deficit (Cognition) insight into deficits/self-awareness;awareness of need for assistance  -AF     Row Name 03/22/23 1514          Bathing    [place] Level (Bathing) bathing skills;lower body;upper body;moderate assist (50% patient effort);maximum assist (25% patient effort)  -AF     Position (Bathing) sink side;supported sitting;supported standing  -AF     Set-up Assistance (Bathing) obtain supplies  -AF     Row Name 03/22/23 1514          Upper Body Dressing    [place] Level (Upper Body Dressing) upper body dressing skills;minimum assist (75% or more patient effort);verbal cues  -AF     Position (Upper Body Dressing) supported sitting  -AF     Set-up Assistance (Upper Body Dressing) obtain clothing  -AF     Comment (Upper Body Dressing) w/c level  -AF      Row Name 03/22/23 1514          Lower Body Dressing    [place] Level (Lower Body Dressing) doff;don;pants/bottoms;shoes/slippers;socks;maximum assist (25% patient effort);dependent (less than 25% patient effort)  -AF     Position (Lower Body Dressing) supported standing;supported sitting  -AF     Row Name 03/22/23 1514          Grooming    [place] Level (Grooming) grooming skills;minimum assist (75% patient effort)  -AF     Position (Grooming) supported sitting;sink side  -AF     Set-up Assistance (Grooming) open containers;obtain supplies  -AF     Row Name 03/22/23 1514          Toileting    [place] Level (Toileting) toileting skills;dependent (less than 25% patient effort)  -AF     Assistive Device Use (Toileting) grab bar/safety frame;raised toilet seat  -AF     Position (Toileting) supported standing;supported sitting  -AF     Comment (Toileting) A x 2  -AF     Row Name 03/22/23 1514          Bed Mobility    Supine-Sit [place] (Bed Mobility) moderate assist (50% patient effort);verbal cues;contact guard  MOD in AM and CGA in PM  -AF     Assistive Device (Bed Mobility) bed rails  -AF     Row Name 03/22/23 1514          Transfer Assessment/Treatment    Comment, (Transfers) sit pivot A x 2 MOD, w/c to EOM  -AF     Row Name 03/22/23 1514          Bed-Chair Transfer    Bed-Chair [place] (Transfers) moderate assist (50% patient effort);2 person assist  -AF     Assistive Device (Bed-Chair Transfers) wheelchair  -AF     Comment, (Bed-Chair Transfer) A x 2  -AF     Row Name 03/22/23 1514          Toilet Transfer    Type (Toilet Transfer) squat pivot  -AF     [place] Level (Toilet Transfer) 2 person assist;moderate assist (50% patient effort);maximum assist (25% patient effort)  -AF     Assistive Device (Toilet Transfer) commode;grab bars/safety frame;wheelchair  -AF     Row Name 03/22/23 1514          Balance    Dynamic Sitting Balance minimal assist;verbal cues  -AF     Balance  Interventions dynamic;sitting;moderate challenge;dynamic reaching;occupation based/functional task;weight shifting activity;UE activity with balance activity  wrist weight on LUE during reaching tasks, CGA for balance  -AF     Row Name 03/22/23 1514          Positioning and Restraints    Pre-Treatment Position in bed  -AF     Post Treatment Position other  -AF     In Bed call light within reach;encouraged to call for assist;exit alarm on;patient within staff view  in room with   -AF     Other Position with PT  seated on EOM in PM  -AF           User Key  (r) = Recorded By, (t) = Taken By, (c) = Cosigned By    Initials Name Effective Dates    AF [place] SHANTEL, OTR 06/16/21 -                  Occupational Therapy Education     Title: PT OT SLP Therapies (In Progress)     Topic: Occupational Therapy (Not Started)     Point: ADL training (Not Started)     Description:   Instruct learner(s) on proper safety adaptation and remediation techniques during self care or transfers.   Instruct in proper use of assistive devices.              Learner Progress:  Not documented in this visit.          Point: Home exercise program (Not Started)     Description:   Instruct learner(s) on appropriate technique for monitoring, assisting and/or progressing therapeutic exercises/activities.              Learner Progress:  Not documented in this visit.          Point: Precautions (Not Started)     Description:   Instruct learner(s) on prescribed precautions during self-care and functional transfers.              Learner Progress:  Not documented in this visit.          Point: Body mechanics (Not Started)     Description:   Instruct learner(s) on proper positioning and spine alignment during self-care, functional mobility activities and/or exercises.              Learner Progress:  Not documented in this visit.                                OT Recommendation and Plan                         Time Calculation:      Time Calculation-  OT     Row Name 03/22/23 1520 03/22/23 1519          Time Calculation- OT    OT Start Time 1330  -AF 0930  -AF     OT Stop Time 1400  -AF 1000  -AF     OT Time Calculation (min) 30 min  -AF 30 min  -AF           User Key  (r) = Recorded By, (t) = Taken By, (c) = Cosigned By    Initials Name Provider Type    AF Heaven Villareal, OTR Occupational Therapist              Therapy Charges for Today     Code Description Service Date Service Provider Modifiers Qty    07347167398 HC OT SELF CARE/MGMT/TRAIN EA 15 MIN 3/21/2023 Heaven Villareal, OTR GO 2    89689699332 HC OT THER SUPP EA 15 MIN 3/21/2023 Heaven Villareal, OTR GO 2    42607144055 HC OT NEUROMUSC RE EDUCATION EA 15 MIN 3/21/2023 Heaven Villareal, OTR GO 2    03240899044 HC OT THER SUPP EA 15 MIN 3/21/2023 Heaven Villareal, OTR GO 1    58848218965 HC OT SELF CARE/MGMT/TRAIN EA 15 MIN 3/22/2023 Heaven Villareal, OTR GO 2    29210746792 HC OT THER SUPP EA 15 MIN 3/22/2023 Heaven Villareal, OTR GO 3    00071593799 HC OT NEUROMUSC RE EDUCATION EA 15 MIN 3/22/2023 Heaven Villareal, OTR GO 2                   Heaven Villareal OTNAIMA  3/22/2023

## 2023-03-22 NOTE — THERAPY TREATMENT NOTE
Inpatient Rehabilitation - Physical Therapy Treatment Note       [facility]     Patient Name: Louise GARCIA  : 1965  MRN: [id number]    Today's Date: 3/22/2023                    Admit Date: 3/17/2023      Visit Dx:     ICD-10-CM ICD-9-CM   1. 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)   • Situational mixed anxiety and depressive disorder   • Mass of middle lobe of right lung   • Oral candidiasis   • Empyema of pleura (HCC)   • MRSA bacteremia   • Idiopathic peripheral neuropathy       Past Medical History:   Diagnosis Date   • Degenerative disc disease, cervical    • Gestational diabetes    • H/O blood clots    • Hematemesis    • Seizures (HCC)    • Septic shock (HCC)        Past Surgical History:   Procedure Laterality Date   • APPENDECTOMY     • BACK SURGERY     • CHOLECYSTECTOMY     • ENDOSCOPY N/A 2016    Procedure: ESOPHAGOGASTRODUODENOSCOPY with biopsy;  Surgeon: Austen Brito MD;  Location: [facility] ENDOSCOPY;  Service:    • HYSTERECTOMY     • NECK SURGERY       approx 6 yrs ago   • THORACOSCOPY Right 3/8/2023    Procedure: THORACOSCOPY WITH DAVINCI ROBOT WITH COMPLETE DECORTICATION;  Surgeon: Chloe Cedillo MD;  Location: [facility] OR;  Service: Robotics - DaVinci;  Laterality: Right;   • TONSILLECTOMY     • TONSILLECTOMY AND ADENOIDECTOMY         PT ASSESSMENT (last 12 hours)     [facility] PT Evaluation and Treatment     Row Name 23 0909          PT Time and Intention    Document Type daily treatment  -LB     Mode of Treatment physical therapy  -LB     Patient/Family/Caregiver Comments/Observations pt in bed.  Pt  had significant coughing this morning with thick phelgm.  Pt is more awake and alert today  -LB     Row Name 03/22/23 0909          General Information    Existing Precautions/Restrictions fall  -LB     Comment, General Information MRSA  -LB     Row Name 03/22/23 0909          Pain Assessment    Pre/Posttreatment Pain Comment Tired after coughing  -LB     Row Name 03/22/23 0909          Cognition/Psychosocial    Affect/Mental Status (Cognition) confused;flat/blunted affect  -LB     Personal Safety Interventions muscle strengthening facilitated;gait belt;fall prevention program maintained;nonskid shoes/slippers when out of bed  -LB     Row Name 03/22/23 0909          Bed Mobility    Supine-Sit [place] (Bed Mobility) verbal cues;moderate assist (50% patient effort)  -LB     Sit-Supine [place] (Bed Mobility) verbal cues;maximum assist (25% patient effort)  -LB     Row Name 03/22/23 0909          Bed-Chair Transfer    Bed-Chair [place] (Transfers) maximum assist (25% patient effort);moderate assist (50% patient effort);2 person assist  -LB     Assistive Device (Bed-Chair Transfers) wheelchair  -LB     Row Name 03/22/23 0909          Chair-Bed Transfer    Chair-Bed [place] (Transfers) moderate assist (50% patient effort);verbal cues;2 person assist  -LB     Assistive Device (Chair-Bed Transfers) wheelchair  -LB     Row Name 03/22/23 0909          Safety Issues, Functional Mobility    Impairments Affecting Function (Mobility) balance;cognition;coordination;endurance/activity tolerance;motor control;pain;postural/trunk control;sensation/sensory awareness;range of motion (ROM);shortness of breath;strength  -LB     Row Name 03/22/23 0909          Balance    Static Sitting Balance contact guard  -LB     Comment, Balance Pt sat on edge of bed with CGA for ~ 5 Mins.  During that time, the patient did continue coughing and had to lie back down  -LB     Row Name 03/22/23 0909          Motor Skills    Motor  Skills functional endurance  -LB     Functional Endurance Utilized stander for 10 mins; 8 mins with short rest break between.  While in stander had pt work on quad and glut contraction for ~10 secs each time 2 sets of 5.  Manual assist provided to prevent knee hyperextension.  -LB     Row Name 03/22/23 0909          Hip (Therapeutic Exercise)    Hip Strengthening (Therapeutic Exercise) bilateral;marching while seated  with assist  -LB     Row Name 03/22/23 0909          Positioning and Restraints    Pre-Treatment Position in bed  -LB     Post Treatment Position bed  -LB     In Bed call light within reach;encouraged to call for assist;exit alarm on;with family/caregiver  -LB           User Key  (r) = Recorded By, (t) = Taken By, (c) = Cosigned By    Initials Name Provider Type    LB Anisha Valderrama, PT Physical Therapist              Wound 03/08/23 1917 Right lateral chest Incision (Active)   Closure Liquid skin adhesive 03/21/23 2141   Base clean;dry 03/21/23 2141       Wound 03/11/23 1530 Other (See comments) other (see comments) pubis Abrasion (Active)   Dressing Appearance open to air 03/22/23 0843   Closure Open to air 03/22/23 0843   Base scab;red;pink 03/22/23 0843   Periwound dry 03/22/23 0843   [place]iw[place] Temperature warm 03/22/23 0843   [place]iwound Skin Turgor soft 03/22/23 0843   Dressing Care open to air 03/22/23 0843       Wound 03/20/23 1527 Bilateral upper gluteal MASD (Moisture associated skin damage) (Active)   Dressing Appearance open to air 03/22/23 0843   Closure None 03/22/23 0843   Base blanchable;red;dry 03/22/23 0843   Drainage Amount none 03/22/23 0843   Dressing Care skin barrier agent applied 03/22/23 0843     Physical Therapy Education     Title: PT OT SLP Therapies (In Progress)     Topic: Physical Therapy (Done)     Point: Mobility training (Done)     Learning Progress Summary           Patient Acceptance, E,D, NR,VU,DU by DP at 3/21/2023 1448    Acceptance, E, NR by ETHAN at 3/20/2023 1513     Acceptance, E,TB, VU,NR by KP at 3/18/2023 1643                   Point: Home exercise program (Done)     Learning Progress Summary           Patient Acceptance, E,D, NR,VU,DU by DP at 3/21/2023 1448    Acceptance, E, NR by JK at 3/20/2023 1513                   Point: Body mechanics (Done)     Learning Progress Summary           Patient Acceptance, E,D, NR,VU,DU by DP at 3/21/2023 1448                   Point: Precautions (Done)     Learning Progress Summary           Patient Acceptance, E,D, NR,VU,DU by DP at 3/21/2023 1448                               User Key     Initials Effective Dates Name Provider Type Discipline    JK 06/16/21 -  Juana Veloz, PT Physical Therapist PT    SAMANTHA 06/16/21 -  Yolanda Shannon, PT Physical Therapist PT    DP 08/24/21 -  Papa Marsh, PT Physical Therapist PT                PT Recommendation and Plan                          Time Calculation:      PT Charges     Row Name 03/22/23 1533 03/22/23 1530          Time Calculation    Start Time 1400  -LB 0830  -LB     Stop Time 1430  -LB 0900  -LB     Time Calculation (min) 30 min  -LB 30 min  -LB     PT Received On 03/22/23  -LB 03/22/23  -LB     PT - Next Appointment 03/23/23  -LB --           User Key  (r) = Recorded By, (t) = Taken By, (c) = Cosigned By    Initials Name Provider Type    LB Baehl, Anisha B, PT Physical Therapist                Therapy Charges for Today     Code Description Service Date Service Provider Modifiers Qty    08140088072  PT NEUROMUSC RE EDUCATION EA 15 MIN 3/22/2023 Baehl, Anisha B, PT GP 1    [id number]  PT THERAPEUTIC ACT EA 15 MIN 3/22/2023 Baehl, Anisha B, PT GP 2    [id number]  PT THER PROC EA 15 MIN 3/22/2023 Baehl, Anisha B, PT GP 1    [id number]  PT THER SUPP EA 15 MIN 3/22/2023 Baehl, Anisha B, PT GP 3               Patient was wearing a face mask during this therapy encounter. Therapist used appropriate personal protective equipment including mask and gloves.  Mask used was standard procedure  mask. Appropriate PPE was worn during the entire therapy session. Hand hygiene was completed before and after therapy session. Patient is not in enhanced droplet precautions.     blaine Valderrama, PT  3/22/2023

## 2023-03-22 NOTE — THERAPY TREATMENT NOTE
Inpatient Rehabilitation - Speech Language Pathology Treatment Note    [facility]     Patient Name: Louise GARCIA  : 1965  MRN: [id number]    Today's Date: 3/22/2023                   Admit Date: 3/17/2023       Visit Dx:      ICD-10-CM ICD-9-CM   1. 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)   • Situational mixed anxiety and depressive disorder   • Mass of middle lobe of right lung   • Oral candidiasis   • Empyema of pleura ([facility])   • MRSA bacteremia   • Idiopathic peripheral neuropathy       Past Medical History:   Diagnosis Date   • Degenerative disc disease, cervical    • Gestational diabetes    • H/O blood clots    • Hematemesis    • Seizures ([facility])    • Septic shock ([facility])        Past Surgical History:   Procedure Laterality Date   • APPENDECTOMY     • BACK SURGERY     • CHOLECYSTECTOMY     • ENDOSCOPY N/A 2016    Procedure: ESOPHAGOGASTRODUODENOSCOPY with biopsy;  Surgeon: Austen Brito MD;  Location: [facility] ENDOSCOPY;  Service:    • HYSTERECTOMY     • NECK SURGERY       approx 6 yrs ago   • THORACOSCOPY Right 3/8/2023    Procedure: THORACOSCOPY WITH DAVINCI ROBOT WITH COMPLETE DECORTICATION;  Surgeon: Chloe Cedillo MD;  Location: [facility] OR;  Service: Robotics - DaVinci;  Laterality: Right;   • TONSILLECTOMY     • TONSILLECTOMY AND ADENOIDECTOMY         SLP Recommendation and Plan                                                            SLP EVALUATION (last 72 hours)     SLP SLC Evaluation     Row Name 23 1500 23 0900 23 1300 23 1030 23 1000       Communication  Assessment/Intervention    Document Type therapy note (daily note)  -AL therapy note (daily note)  -AL therapy note (daily note)  -AL therapy note (daily note)  -AL therapy note (daily note)  -AB    Subjective Information -- -- -- -- no complaints  -AB    Patient Observations -- -- -- -- alert  -AB    Patient/Family/Caregiver Comments/Observations Pt participated well.  -AL Pt seen bedside. Coughing frequently during session. Pt reported voice feels close to normal today.  -AL Pt participated well. She reported confusion regarding therapy schedule. Stated she was surprised to have therapies two times a day.  -AL Pt participated well.  -AL pt seen in SLP office, cooperative, dysphonic and aphasic. note cognitive evaluation completed over weekend, this date, significant anomia at conversational level, difficulty in providing simple biographical information or sentence formation. vocal quality severely dysphonic with known glottic gap contributing.  goals initiated for expressive language and voice given significant contributing impact  -AB    Patient Effort -- -- -- -- good  -AB    Symptoms Noted During/After Treatment -- -- -- -- none  -AB       Pain    Additional Documentation -- -- -- -- Pain Scale: Numbers Pre/Post-Treatment (Group)  -AB       Pain Scale: Numbers Pre/Post-Treatment    Pretreatment Pain Rating 0/10 - no pain  -AL 0/10 - no pain  -AL 0/10 - no pain  -AL 0/10 - no pain  -AL 0/10 - no pain  -AB    Posttreatment Pain Rating -- -- -- -- 0/10 - no pain  -AB          User Key  (r) = Recorded By, (t) = Taken By, (c) = Cosigned By    Initials Name Effective Dates    Jacey Spencer, MS CCC-SLP 12/27/22 -     Nessa Kraus, MS CCC-SLP 06/16/21 -               Patient was intermittently wearing a face mask during this therapy encounter. Therapist used appropriate personal protective equipment including mask, eye protection and gloves.  Mask used was standard procedure mask. Appropriate PPE was worn  "during the entire therapy session. Hand hygiene was completed before and after therapy session. Patient is not in enhanced droplet precautions.                EDUCATION    The patient has been educated in the following areas:       Cognitive Impairment Communication Impairment.             SLP GOALS     Row Name 03/22/23 1500 03/22/23 0900 03/21/23 1300       Phonation Goal 1 (SLP)    Comment (Phonation Goal 1, SLP) Pt with moderate-severe hoarse vocal quality in PM session. Attempted to address voice; however, unable to achieve adequate vocal quality using resonance technique and exhibited continued coughing. Recommended resting voice this afternoon. Of note, later in session, pt with periods of improved vocal quality when not directly addressing voice.  -AL -- --       Attention Goal 1 (SLP)    Improve Attention by Goal 1 (SLP) complete sustained attention task;80%;with minimal cues (75-90%)  -AL complete sustained attention task;80%;with minimal cues (75-90%)  -AL --    Time Frame (Attention Goal 1, SLP) by discharge  -AL -- --    Progress/Outcomes (Attention Goal 1, SLP) goal ongoing  -AL goal ongoing  -AL goal ongoing  -AL    Comment (Attention Goal 1, SLP) Improved sustained and selective attention during logic puzzle task. Pt able to stay on task for 20 minutes without redirections.  -AL Written \"if/then\" directions: 20% with NO Cues, 60% with MOD cues, 100% with MAX cues. Pt with difficulty sustaining attention to task today.  -AL Pt required overall MIN-MOD cues for sustained attention to logic puzzle task.  -AL       Reasoning Goal 1 (SLP)    Improve Reasoning Through Goal 1 (SLP) complete deductive reasoning task;80%;with minimal cues (75-90%)  -AL -- complete deductive reasoning task;80%;with minimal cues (75-90%)  -AL    Time Frame (Reasoning Goal 1, SLP) 1 week  -AL -- 1 week  -AL    Progress/Outcomes (Reasoning Goal 1, SLP) good progress toward goal  -AL -- new goal  -AL    Comment (Reasoning Goal 1, " SLP) Moderate level logic puzzle: 45$ with NO cues, 100% with MIN-MAX cues.  -AL -- Diagnostic treatment: Pt completed moderate level logic puzzle with 10% accuracy with NO cues, 40% with MIN cues, 25% with MIN cues, 100% with MOD-MAX cues. Pt with difficutly sustaining attention to task. Suspect vision deficits, as pt had difficulty writing words in center of boxes; SLP wrote for patient as task progressed.  -AL    Row Name 23 1030 23 1000          (LTG) Patient will demonstrate functional swallow for    Diet Texture (Demonstrate functional swallow) -- regular textures  -AB     Liquid viscosity (Demonstrate functional swallow) -- thin liquids  -AB     [place] (Demonstrate functional swallow) -- independently (over 90% accuracy)  -AB     Progress/Outcomes (Demonstrate functional swallow) -- goal ongoing  -AB        Word Retrieval Skills Goal 1 (SLP)    Improve Word Retrieval Skills By Goal 1 (SLP) -- completing functional word finding tasks;90%;independently (over 90% accuracy)  -AB     Time Frame (Word Retrieval Goal 1, SLP) -- short term goal (STG)  -AB     Progress (Word Retrieval Skills Goal 1, SLP) -- 30%;with moderate cues (50-74%)  -AB     Progress/Outcomes (Word Retrieval Goal 1, SLP) -- new goal  -AB     Comment (Word Retrieval Goal 1, SLP) -- responsive naming - 60% MOD cues; confrontational namin% MOD cues; divergent namin in 1 minute concrete, 0 in 1 minute concrete. overall daily average - 33% MOD  -AB        Ability to Construct Phrase and Sentence Level Response Goal 1 (SLP)    Improve Ability to Construct Phrase and Sentence Level Responses By Goal 1 (SLP) -- answering question with phrase;constructing a sentence with a key word;90%;independently (over 90% accuracy)  -AB     Time Frame (Phrase and Sentence Level Response Goal 1, SLP) -- short term goal (STG)  -AB     Progress (Construct Phrase and Sentence Level Response Goal 1, SLP) -- 0%;with maximum cues (25-49%)  -AB      "Progress/Outcomes (Phrase and Sentence Level Response Goal 1, SLP) -- new goal  -AB     Comment (Phrase and Sentence Level Response Goal 1, SLP) -- significant anomia at sentence level frequent filler \"you know\" \"I mean it was,\" \"I like,\" when provided with cloze prompt of at least 3 words, generate 1-2 words to complete 0% successful in x5 opps presented, MAX cues  -AB        Respiratory Support Goal 1 (SLP)    Improve Respiratory Support Goal 1 (SLP) -- sustaining a vowel sound on exhalation;independently (over 90% accuracy)  -AB     Time Frame (Respiratory Support Goal 1, SLP) -- short term goal (STG)  -AB     Progress/Outcomes (Respiratory Support Goal 1, SLP) -- new goal  -AB     Comment (Respiratory Support Goal 1, SLP) -- see below - avg 2 seconds (MPT), 2.1 seconds (PUSH), 2.5 (PULL)  -AB        Phonation Goal 1 (SLP)    Improve Phonation By Goal 1 (SLP) -- using loud speech;90%;with minimal cues (75-90%)  -AB     Time Frame (Phonation Goal 1, SLP) -- short term goal (STG)  -AB     Progress/Outcomes (Phonation Goal 1, SLP) good progress toward goal  -AL new goal  -AB     Comment (Phonation Goal 1, SLP) Pt was stimulable to produce improved voice with use of frontal focus technique. She produced adequate vocal quality on humming /m/ with overall MOD-MAX cues, initial /m/ CV chanting (i.e. ma, mo, mi) with MOD-MAX cues and 2-syllable word chanting with MAX cues. Pt was unable to carry-over improved voice into speech; however, noted intermittent improved vocal quality when pt was telling a story at end of session. Pt presents with an overall moderate strained/hoarse vocal quality. Pt also noted to have frequent strong throat clearing; discussed use of swallow, rather than throat clear due to possible impact on vocal folds. Provided education regarding frontal tone focus and reducing tension in larynx, as prevous FEES showed reduced laryngeal closure and some hyperfunction.  -AL voice targeted through baseline " "measurements. with sustained phonation \"ah\" with no use of compensations: avg 68dB, 140Hz, 2 seconds; utilizing PUSH for adduction: 279Hz 69dB 2.1 seconds; 275hZ, 75Db utilizing PULL for adduction. overall quality improved with compensations to decrease breathiness and pitch breaks. unable to cue for carryover at word level or cue for longer MPT  -AB        Attention Goal 1 (SLP)    Improve Attention by Goal 1 (SLP) -- complete sustained attention task;80%;with minimal cues (75-90%)  -AB     Time Frame (Attention Goal 1, SLP) -- by discharge  -AB     Progress (Attention Goal 1, SLP) -- with maximum cues (25-49%)  -AB     Progress/Outcomes (Attention Goal 1, SLP) -- goal ongoing  -AB     Comment (Attention Goal 1, SLP) -- MAX cues to attend to naming tasks, adequate across voice  -AB        Memory Skills Goal 1 (SLP)    Improve Memory Skills Through Goal 1 (SLP) -- use memory strategies;use external memory aid;recall details of the day;80%;with moderate cues (50-74%)  -AB     Time Frame (Memory Skills Goal 1, SLP) -- by discharge  -AB     Progress (Memory Skills Goal 1, SLP) -- 30%;with maximum cues (25-49%)  -AB     Progress/Outcomes (Memory Skills Goal 1, SLP) -- goal ongoing  -AB     Comment (Memory Skills Goal 1, SLP) -- delayed recall of MD name, SLP name, therapies completed 33% MOD cues. do not increase with field of 2 options  -AB           User Key  (r) = Recorded By, (t) = Taken By, (c) = Cosigned By    Initials Name Provider Type    AB Jacey Monroe, MS CCC-SLP Speech and Language Pathologist    Nessa Kraus, MS CCC-SLP Speech and Language Pathologist                            Time Calculation:        Time Calculation- SLP     Row Name 03/22/23 1548 03/22/23 0939          Time Calculation- SLP    SLP Start Time 1500  -AL 0900  -AL     SLP Stop Time 1530  -AL 0930  -AL     SLP Time Calculation (min) 30 min  -AL 30 min  -AL           User Key  (r) = Recorded By, (t) = Taken By, (c) = Cosigned By "    Initials Name Provider Type    Nessa Kraus, MS CCC-SLP Speech and Language Pathologist                  Therapy Charges for Today     Code Description Service Date Service Provider Modifiers Qty    80820214067 HC ST DEV OF COGN SKILLS INITIAL 15 MIN 3/21/2023 Nessa Spangler, MS CCC-SLP  1    57966211891 HC ST DEV OF COGN SKILLS EACH ADDT'L 15 MIN 3/21/2023 Nessa Spangler, MS CCC-SLP  3    04906128051 HC ST DEV OF COGN SKILLS INITIAL 15 MIN 3/22/2023 Nessa Spangler, MS CCC-SLP  1    [id number] HC ST DEV OF COGN SKILLS EACH ADDT'L 15 MIN 3/22/2023 Nessa Spangler, MS CCC-SLP  3                           Nessa Spangler MS CCC-SLP  3/22/2023

## 2023-03-22 NOTE — PROGRESS NOTES
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   S/p IVIG x 5 days       SUBJECTIVE:  Seen and examined at bedside. Continues with BLE weakness. Reports post-nasal drip. Denies chest pain, sob, n/v, f/c.     OBJECTIVE:  AF, VSS  BP: (119-133)/(74-82) 133/82    Physical Exam     MENTAL STATUS -  AAO x3, Verbal, NAD.  HEENT- NCAT, SCLERAE ANICTERIC, CONJUNCTIVAE PINK, OP MOIST, NO JVD, EARS UNREMARKABLE EXTERNALLY  O2 1 L  LUNGS - DECREASED BREATH SOUNDS RIGHT BASE MORE SO THAN LEFT BASE  HEART- RRR, NO RUB, MURMUR, OR GALLOP  ABD - NORMOACTIVE BOWEL SOUNDS, SOFT, NT.  EXT - NO EDEMA OR CYANOSIS  MOTOR EXAM - R/L:  SHOULDER ABDUCTION 4/4 EF 5/5, WE 5/5, HAND INTRINSICS 4/4  HF 2/1, KE 3-/2-, ADF 4/4        Scheduled Meds:acetaminophen, 1,000 mg, Oral, TID  apixaban, 5 mg, Oral, Q12H  desvenlafaxine, 25 mg, Oral, Daily  folic acid, 1 mg, Oral, Daily  gabapentin, 300 mg, Oral, Q8H  guaiFENesin, 600 mg, Oral, Q12H  ipratropium-albuterol, 3 mL, Nebulization, 4x Daily - RT  lamoTRIgine, 200 mg, Oral, Daily  pantoprazole, 40 mg, Oral, Q AM  thiamine, 100 mg, Oral, Daily  vancomycin, 1,000 mg, Intravenous, Q24H  vitamin B-12, 500 mcg, Oral, Daily      Continuous Infusions:Pharmacy to dose vancomycin,       PRN Meds:.•  bisacodyl  •  melatonin  •  oxyCODONE  •  Pharmacy to dose vancomycin  •  polyethylene glycol  •  senna-docusate sodium  •  simethicone    Lab Results (last 24 hours)     Procedure Component Value Units Date/Time    CBC (No Diff) [[id number]]  (Abnormal) Collected: 03/22/23 0444    Specimen: Blood Updated: 03/22/23 0614     WBC 10.03 10*3/mm3      RBC 2.78 10*6/mm3      Hemoglobin 8.9 g/dL      Hematocrit 26.3 %      MCV 94.6 fL      MCH 32.0 pg      MCHC 33.8 g/dL      RDW 13.8 %      RDW-SD 47.6 fl      MPV 10.3 fL      Platelets 520 10*3/mm3     Basic Metabolic Panel [[id number]]  (Abnormal) Collected: 03/22/23 0444    Specimen: Blood Updated: 03/22/23 0636      Glucose 72 mg/dL      BUN 9 mg/dL      Creatinine 0.94 mg/dL      Sodium 137 mmol/L      Potassium 4.1 mmol/L      Comment: Slight hemolysis detected by analyzer. Results may be affected.        Chloride 101 mmol/L      CO2 26.0 mmol/L      Calcium 8.4 mg/dL      BUN/Creatinine Ratio 9.6     Anion Gap 10.0 mmol/L      eGFR 70.0 mL/min/1.73     Narrative:      GFR Normal >60  Chronic Kidney Disease <60  Kidney Failure <15            Imaging Results (Last 24 Hours)     ** No results found for the last 24 hours. 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   S/p IVIG x 5 days     Impaired mobility/impaired self care/ impaired cognition     R lung masslike infiltrate with cavitary lesions/pleural effusion   S/p thoracentesis - Blood cx and pleural fluid  positive for MRSA      chest tube placement given empyema, and she underwent thoracoscopy w/ decortication 3/8/23   -expected postoperative changes with pleural thickening and minimal pleural effusion.  The effusion is too small for intervention and will likely to continue to resolve with time.  Recommend continue good pulmonary hygiene with incentive spirometry hourly as well as increase activity/ambulation as tolerated.        mass on TTE with findings concerning for endocarditis - underwent MARIAMA 3/10/2023 which had no vegetation     RUE superficial thrombophlebitis concerning for septic phlebitis.        ID - continue 4 weeks of vancomycin as recommended by infectious disease dosed by pharmacy to achieve a trough level of 15-20 or area under the curve of 400-600 from last negative blood culture or last intervention which ever is the latest - to complete April 1, 2023     Left hip arthrocentesis March 16 to rule out any hip septic arthritis- no growth to date on fluid.    lower back pain - MRI of spine to rule out discitis or  osteomyelitis.  This did have known degenerative changes but  no infection.      DVT prophylaxis - SCDs / apixiban. History of LLE DVT - on Eliquis at home     Pain management - Tylenol 1,000 mg three times a day/ Celebrex 100 mg q 112 hours/ gabapentin 300 mg q 8 hours Oxycodone 5 mg q 4 hours prn  March 18 - DC Celebrex 2/2 PHYLLIS, and anemia     Anxiety/ muscle spasms - Diazepam 2 mg q 6 hours prn - JONES reviewed     Seizure disorder -Lamotrigine 200 mg daily    ABLA - March 18- Hgb 7.0 (7.3 on 3/16). Type, cross and transfuse 1 unit PRBCs. Pre-medicate with Tylenol and Benadryl. CBC in am.   March 19- Hgb 8.8 s/p 1 unit PRBcs. Hemoccult positive. On Eliquis for h/o DVT. Follow Hgb. May need GI work up.  March 20 - HGB 9.3 s/p transfusion  March 21-reviewed with internal medicine-hemoglobin stable after transfusion.  Iron studies consistent with inflammation.  No further work-up presently  March 22 - Hgb 8.9, stable    PHYLLIS - March 18- Creatinine 1.36 up from 1.12. On IV Vanc and Celebrex. DC Celebrex. BMP in am.  March 19- s/p 500ml NS bolus. Creatinine 1.31.    Post-nasal drip  -3/22 mucinex BID  -continue pulmonary hygeine    TEAM CONF - MARCH 21 - BED MOD X 2. TRANSFERS MAX 2. STAND PIVOT OR SQUAT PIVOT. NON-AMBULATORY. Saturday MARCH 18 GAIT 6 FEET PARALLEL BARS MOD MAX OF 2.   BATH MOD 1-2. LBD DEP. UBD MOD. EATING MIN. GROOMING MIN. TOILETING DEP.   The patient has difficulty remembering new information due to short-term memory impairments.  Initial evaluation 3.18, pt obtained a score 12/30 on SLUMS cognitive assessment indicating severe cognitive impairment/dementia, goals initiated for memory and sustaining attention  FEES completed 3.7 revealing glottic gap accounting for glottic insufficiency, dysphonia, hoarse/breathy quality, recommend targeting vocal function as appropriate d/t impact on intelligibility.    Regular/thin diet continued since FEES completion 3.7, although pt known to cough with and  without PO intake, may be contributed partially to ineffective VF closure.   DRESSING FORMER CHEST TUBE SITE. CONTINUES ON PUREWICK AT NIGHT. O2 AT 1-2 LITERS AT NIGHT.  DECREASED APPETITE. ABD X-RAY THIS AM SHOWS NON-OBSTRUCTIVE BOWEL GAS PATTERN.   ELOS - 4 WEEKS    Reviewed medications, vital signs, and recent lab work. Continue comprehensive inpatient rehabilitation program.    During rounds, used appropriate personal protective equipment including mask and gloves.  Additional gown if indicated.  Mask used was standard procedure mask. Appropriate PPE was worn during the entire visit.  Hand hygiene was completed before and after.    Shin Seay DO    Patient seen and case discussed with Dr. Rider.

## 2023-03-22 NOTE — SIGNIFICANT NOTE
03/22/23 1326   OTHER   Discipline speech language pathologist   Rehab Time/Intention   Session Not Performed patient unavailable for treatment  (Attempted speech therapy at 1300; however, pt needed bathroom care. Plan to see patient for speech therapy later this afternoon.)

## 2023-03-22 NOTE — PLAN OF CARE
Goal Outcome Evaluation:  Plan of Care Reviewed With: patient        Progress: improving  Outcome Evaluation: Ms Soriano alert and oriented w/delayed response/word finding.  1L O2 @hs.  Spouse at bedside.  IV R forearm- IV vanc [id number].  All po meds whole with thins.  Poor appetite.  Nutrition encouraged.  Purewick in place.  No prn narcotic given this shift.

## 2023-03-23 ENCOUNTER — APPOINTMENT (OUTPATIENT)
Dept: GENERAL RADIOLOGY | Facility: HOSPITAL | Age: 58
DRG: 74 | End: 2023-03-23
Payer: COMMERCIAL

## 2023-03-23 LAB
ANION GAP SERPL CALCULATED.3IONS-SCNC: 8 MMOL/L (ref 5–15)
BUN SERPL-MCNC: 8 MG/DL (ref 6–20)
BUN/CREAT SERPL: 8.7 (ref 7–25)
CALCIUM SPEC-SCNC: 8.2 MG/DL (ref 8.6–10.5)
CHLORIDE SERPL-SCNC: 100 MMOL/L (ref 98–107)
CO2 SERPL-SCNC: 29 MMOL/L (ref 22–29)
CREAT SERPL-MCNC: 0.92 MG/DL (ref 0.57–1)
DEPRECATED RDW RBC AUTO: 46.5 FL (ref 37–54)
EGFRCR SERPLBLD CKD-EPI 2021: 71.9 ML/MIN/1.73
ERYTHROCYTE [DISTWIDTH] IN BLOOD BY AUTOMATED COUNT: 13.8 % (ref 12.3–15.4)
GLUCOSE SERPL-MCNC: 91 MG/DL (ref 65–99)
HCT VFR BLD AUTO: 25 % (ref 34–46.6)
HGB BLD-MCNC: 8.3 G/DL (ref 12–15.9)
MCH RBC QN AUTO: 31.3 PG (ref 26.6–33)
MCHC RBC AUTO-ENTMCNC: 33.2 G/DL (ref 31.5–35.7)
MCV RBC AUTO: 94.3 FL (ref 79–97)
PLATELET # BLD AUTO: 412 10*3/MM3 (ref 140–450)
PMV BLD AUTO: 9.8 FL (ref 6–12)
POTASSIUM SERPL-SCNC: 3.4 MMOL/L (ref 3.5–5.2)
RBC # BLD AUTO: 2.65 10*6/MM3 (ref 3.77–5.28)
SODIUM SERPL-SCNC: 137 MMOL/L (ref 136–145)
VANCOMYCIN TROUGH SERPL-MCNC: 15.6 MCG/ML (ref 5–20)
WBC NRBC COR # BLD: 10.56 10*3/MM3 (ref 3.4–10.8)

## 2023-03-23 PROCEDURE — 80048 BASIC METABOLIC PNL TOTAL CA: CPT | Performed by: PHYSICAL MEDICINE & REHABILITATION

## 2023-03-23 PROCEDURE — 94760 N-INVAS EAR/PLS OXIMETRY 1: CPT

## 2023-03-23 PROCEDURE — 85027 COMPLETE CBC AUTOMATED: CPT | Performed by: PHYSICAL MEDICINE & REHABILITATION

## 2023-03-23 PROCEDURE — 94799 UNLISTED PULMONARY SVC/PX: CPT

## 2023-03-23 PROCEDURE — 80202 ASSAY OF VANCOMYCIN: CPT | Performed by: PHYSICAL MEDICINE & REHABILITATION

## 2023-03-23 PROCEDURE — 97530 THERAPEUTIC ACTIVITIES: CPT

## 2023-03-23 PROCEDURE — 94761 N-INVAS EAR/PLS OXIMETRY MLT: CPT

## 2023-03-23 PROCEDURE — 94664 DEMO&/EVAL PT USE INHALER: CPT

## 2023-03-23 PROCEDURE — 97110 THERAPEUTIC EXERCISES: CPT

## 2023-03-23 PROCEDURE — 25010000002 VANCOMYCIN PER 500 MG: Performed by: PHYSICAL MEDICINE & REHABILITATION

## 2023-03-23 PROCEDURE — 71046 X-RAY EXAM CHEST 2 VIEWS: CPT

## 2023-03-23 PROCEDURE — 97112 NEUROMUSCULAR REEDUCATION: CPT

## 2023-03-23 PROCEDURE — 97129 THER IVNTJ 1ST 15 MIN: CPT

## 2023-03-23 PROCEDURE — 97535 SELF CARE MNGMENT TRAINING: CPT

## 2023-03-23 PROCEDURE — 97130 THER IVNTJ EA ADDL 15 MIN: CPT

## 2023-03-23 RX ORDER — CHOLECALCIFEROL (VITAMIN D3) 125 MCG
5 CAPSULE ORAL NIGHTLY
Status: DISCONTINUED | OUTPATIENT
Start: 2023-03-23 | End: 2023-04-21 | Stop reason: HOSPADM

## 2023-03-23 RX ADMIN — APIXABAN 5 MG: 5 TABLET, FILM COATED ORAL at 09:33

## 2023-03-23 RX ADMIN — GABAPENTIN 300 MG: 300 CAPSULE ORAL at 05:11

## 2023-03-23 RX ADMIN — GABAPENTIN 300 MG: 300 CAPSULE ORAL at 14:32

## 2023-03-23 RX ADMIN — ACETAMINOPHEN 1000 MG: 325 TABLET ORAL at 21:54

## 2023-03-23 RX ADMIN — DESVENLAFAXINE SUCCINATE 25 MG: 25 TABLET, EXTENDED RELEASE ORAL at 09:34

## 2023-03-23 RX ADMIN — PANTOPRAZOLE SODIUM 40 MG: 40 TABLET, DELAYED RELEASE ORAL at 05:11

## 2023-03-23 RX ADMIN — IPRATROPIUM BROMIDE AND ALBUTEROL SULFATE 3 ML: 2.5; .5 SOLUTION RESPIRATORY (INHALATION) at 11:28

## 2023-03-23 RX ADMIN — IPRATROPIUM BROMIDE AND ALBUTEROL SULFATE 3 ML: 2.5; .5 SOLUTION RESPIRATORY (INHALATION) at 21:19

## 2023-03-23 RX ADMIN — LAMOTRIGINE 200 MG: 100 TABLET ORAL at 09:33

## 2023-03-23 RX ADMIN — Medication 5 MG: at 21:54

## 2023-03-23 RX ADMIN — Medication 500 MCG: at 09:33

## 2023-03-23 RX ADMIN — ACETAMINOPHEN 1000 MG: 325 TABLET ORAL at 16:06

## 2023-03-23 RX ADMIN — Medication 100 MG: at 09:33

## 2023-03-23 RX ADMIN — Medication 1 MG: at 09:33

## 2023-03-23 RX ADMIN — ACETAMINOPHEN 1000 MG: 325 TABLET ORAL at 09:33

## 2023-03-23 RX ADMIN — IPRATROPIUM BROMIDE AND ALBUTEROL SULFATE 3 ML: 2.5; .5 SOLUTION RESPIRATORY (INHALATION) at 07:06

## 2023-03-23 RX ADMIN — IPRATROPIUM BROMIDE AND ALBUTEROL SULFATE 3 ML: 2.5; .5 SOLUTION RESPIRATORY (INHALATION) at 15:51

## 2023-03-23 RX ADMIN — GABAPENTIN 300 MG: 300 CAPSULE ORAL at 21:54

## 2023-03-23 RX ADMIN — VANCOMYCIN HYDROCHLORIDE 1000 MG: 1 INJECTION, SOLUTION INTRAVENOUS at 22:33

## 2023-03-23 RX ADMIN — APIXABAN 5 MG: 5 TABLET, FILM COATED ORAL at 21:54

## 2023-03-23 NOTE — THERAPY TREATMENT NOTE
Inpatient Rehabilitation - Physical Therapy Treatment Note       [facility]     Patient Name: Louise GARCIA  : 1965  MRN: [id number]    Today's Date: 3/23/2023                    Admit Date: 3/17/2023      Visit Dx:     ICD-10-CM ICD-9-CM   1. 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)   • Situational mixed anxiety and depressive disorder   • Mass of middle lobe of right lung   • Oral candidiasis   • Empyema of pleura (HCC)   • MRSA bacteremia   • Idiopathic peripheral neuropathy       Past Medical History:   Diagnosis Date   • Degenerative disc disease, cervical    • Gestational diabetes    • H/O blood clots    • Hematemesis    • Seizures ([facility])    • Septic shock (HCC)        Past Surgical History:   Procedure Laterality Date   • APPENDECTOMY     • BACK SURGERY     • CHOLECYSTECTOMY     • ENDOSCOPY N/A 2016    Procedure: ESOPHAGOGASTRODUODENOSCOPY with biopsy;  Surgeon: Austen Brito MD;  Location: [facility] ENDOSCOPY;  Service:    • HYSTERECTOMY     • NECK SURGERY       approx 6 yrs ago   • THORACOSCOPY Right 3/8/2023    Procedure: THORACOSCOPY WITH DAVINCI ROBOT WITH COMPLETE DECORTICATION;  Surgeon: Chloe Cedillo MD;  Location: [facility] OR;  Service: Robotics - DaVinci;  Laterality: Right;   • TONSILLECTOMY     • TONSILLECTOMY AND ADENOIDECTOMY         PT ASSESSMENT (last 12 hours)     IRF PT Evaluation and Treatment     Row Name 23 1500          PT Time and Intention    Document Type daily treatment  -AE     Mode of Treatment individual therapy;physical therapy  -AE     Patient/Family/Caregiver  Comments/Observations pt upset about having BM in bed this AM,  at bedside  -AE     Row Name 03/23/23 1500          General Information    Patient Profile Reviewed yes  -AE     General Observations of Patient nervous about chest xray that occurredthis PM. excited about improved quad strength/ROM in LLE  -AE     Existing Precautions/Restrictions fall;other (see comments)  contact  -AE     Limitations/Impairments safety/cognitive  -AE     Comment, General Information contact 2/2 MRSA  -AE     Row Name 03/23/23 1500          Pain Assessment    Pretreatment Pain Rating 3/10  -AE     Posttreatment Pain Rating 3/10  -AE     Pain Location - Side/Orientation Right  -AE     Pain Location lateral  -AE     Pain Location - chest  -AE     Row Name 03/23/23 1500          Bed-Chair Transfer    Bed-Chair [place] (Transfers) moderate assist (50% patient effort);2 person assist  -AE     Assistive Device (Bed-Chair Transfers) wheelchair  -AE     Comment, (Bed-Chair Transfer) squat pivot  -AE     Row Name 03/23/23 1500          Chair-Bed Transfer    Chair-Bed [place] (Transfers) moderate assist (50% patient effort);verbal cues;2 person assist  -AE     Assistive Device (Chair-Bed Transfers) wheelchair  -AE     Comment, (Chair-Bed Transfer) squat pivot. cues to reach for arm rest or bed rail  -AE     Row Name 03/23/23 1500          Sit-Stand Transfer    Sit-Stand [place] (Transfers) moderate assist (50% patient effort);maximum assist (25% patient effort);1 person assist  -AE     Assistive Device (Sit-Stand Transfers) parallel bars  -AE     Comment, (Sit-Stand Transfer) 4-5 sit<> // bars. better with pulling and cues to reach far forward for anterior weight shifting  -AE     Row Name 03/23/23 1500          Stand-Sit Transfer    Stand-Sit [place] (Transfers) maximum assist (25% patient effort)  -AE     Assistive Device (Stand-Sit Transfers) parallel bars  -AE     Row Name 03/23/23 1500          Balance     Balance Interventions standing;supported;UE activity with balance activity;weight shifting activity  -AE     Comment, Balance standing frame x 10 min while anterior and lateral weight shifting reaching for bean bags. had difficulty reaching with RUE. Bianca given throughout  -AE     Row Name 03/23/23 1500          Positioning and Restraints    Pre-Treatment Position in bed  -AE     Post Treatment Position bed  -AE     In Bed supine;call light within reach;encouraged to call for assist;exit alarm on  -AE           User Key  (r) = Recorded By, (t) = Taken By, (c) = Cosigned By    Initials Name Provider Type    AE Deanna Carr, PT Physical Therapist              Wound 03/08/23 1917 Right lateral chest Incision (Active)   Dressing Appearance dry;intact 03/23/23 0945   Drainage Amount none 03/22/23 2003       Wound 03/11/23 1530 Other (See comments) other (see comments) pubis Abrasion (Active)   Dressing Appearance open to air 03/22/23 2003   Closure Open to air 03/23/23 0945   Base scab;red;pink 03/23/23 0945   Periwound dry 03/23/23 0945   Periwound Temperature warm 03/23/23 0945   Periwound Skin Turgor soft 03/23/23 0945   Dressing Care open to air 03/23/23 0945       Wound 03/20/23 1527 Bilateral upper gluteal MASD (Moisture associated skin damage) (Active)   Dressing Appearance open to air 03/22/23 2003   Closure None 03/23/23 0945   Base blanchable;red;dry 03/23/23 0945   Drainage Amount none 03/23/23 0945   Dressing Care skin barrier agent applied 03/23/23 0945     Physical Therapy Education     Title: PT OT SLP Therapies (In Progress)     Topic: Physical Therapy (Done)     Point: Mobility training (Done)     Learning Progress Summary           Patient Acceptance, E,D, NR,VU,DU by DP at 3/21/2023 1448    Acceptance, E, NR by JK at 3/20/2023 1513    Acceptance, E,TB, VU,NR by KP at 3/18/2023 1643                   Point: Home exercise program (Done)     Learning Progress Summary           Patient Acceptance,  E,D, NR,VU,DU by DP at 3/21/2023 1448    Acceptance, E, NR by JK at 3/20/2023 1513                   Point: Body mechanics (Done)     Learning Progress Summary           Patient Acceptance, E,D, NR,VU,DU by DP at 3/21/2023 1448                   Point: Precautions (Done)     Learning Progress Summary           Patient Acceptance, E,D, NR,VU,DU by DP at 3/21/2023 1448                               User Key     Initials Effective Dates Name Provider Type Discipline    JK 06/16/21 -  Juana Veloz, PT Physical Therapist PT    KP 06/16/21 -  Yolanda Shannon, PT Physical Therapist PT    DP 08/24/21 -  Papa Marsh PT Physical Therapist PT                PT Recommendation and Plan                          Time Calculation:      PT Charges     Row Name 03/23/23 1527 03/23/23 1524          Time Calculation    Start Time 1400  -AE 0830  -AE     Stop Time 1430  -AE 0900  -AE     Time Calculation (min) 30 min  -AE 30 min  -AE     PT Received On 03/23/23  -AE 03/23/23  -AE     PT - Next Appointment -- 03/24/23  -AE        Time Calculation- PT    Total Timed Code Minutes- PT 30 minute(s)  -AE 30 minute(s)  -AE           User Key  (r) = Recorded By, (t) = Taken By, (c) = Cosigned By    Initials Name Provider Type    AE Deanna Carr, ALBERTO Physical Therapist                Therapy Charges for Today     Code Description Service Date Service Provider Modifiers Qty    60992198091 HC PT THERAPEUTIC ACT EA 15 MIN 3/23/2023 Deanna Carr, PT GP 2    07526382554 HC PT THER PROC EA 15 MIN 3/23/2023 Deanna Carr PT GP 2                   Deanna Carr PT  3/23/2023

## 2023-03-23 NOTE — PROGRESS NOTES
"Nutrition Services    Patient Name:  Louise GARCIA  YOB: 1965  MRN: [id number]  Admit Date:  3/17/2023    Assessment Date:  03/23/23    FOLLOW UP NOTE - CLINICAL NUTRITION    Comments:  Visited pt in room who reported improved appetite c/w EMR review; % x 2 most recent meals. Pt stated she is able to eat a little between meals and continues to drink ONS. Pt's  reported Boost not coming on all meal trays. Confirmed pt's supplement order is in the order set. Pt's  frustrated that pt did not get to lay down when requested this AM. D/w NA on floor of pt's 's greivances.       RD will continue to follow-up, per protocol.      Encounter Information        Reason for Encounter Follow-up   Current Issues Idiopathic peripheral neuropathy     Current Nutrition Orders & Evaluation of Intake       Oral Nutrition     Current PO Diet Diet: Regular/House Diet; Texture: Regular Texture (IDDSI 7); Fluid Consistency: Thin (IDDSI 0)   Supplement Boost Plus TID   PO Evaluation    % PO Intake/# of days Varying %.     Factors Affecting Intake  constipation, decreased appetite, early satiety      Anthropometrics         Height   Weight Height: 157.5 cm (62\")  Weight: 63.8 kg (140 lb 10.5 oz) (03/17/23 2040)   BMI kg/m2 Body mass index is 25.73 kg/m².   Weight trend No new weight available     Physical Findings          Physical Appearance disheveled , oriented fatigued   Oral/Mouth Cavity teeth missing   Edema  1+ (trace), 2+ (mild)   Gastrointestinal hyperactive bowel sounds, last bowel movement:3/23   Skin  MASD, surgical incision, abrasion   Tubes/Drains none     Labs       Pertinent Labs Reviewed, listed below     Results from last 7 days   Lab Units 03/23/23  0718 03/22/23  0444 03/21/23  0637   SODIUM mmol/L 137 137 138   POTASSIUM mmol/L 3.4* 4.1 3.8   CHLORIDE mmol/L 100 101 98   CO2 mmol/L 29.0 26.0 26.5   BUN mg/dL 8 9 9   CREATININE mg/dL 0.92 0.94 1.22*   CALCIUM mg/dL 8.2* 8.4* " 9.0   GLUCOSE mg/dL 91 72 91     Results from last 7 days   Lab Units 03/23/23  0718   HEMOGLOBIN g/dL 8.3*   HEMATOCRIT % 25.0*   WBC 10*3/mm3 10.56     Results from last 7 days   Lab Units 03/23/23  0718 03/22/23  0444 03/21/23  0637 03/20/23  0714 03/19/23  0433   PLATELETS 10*3/mm3 412 520* 650* 621* 664*     COVID19   Date Value Ref Range Status   03/03/2023 Not Detected Not Detected - Ref. Range Final     Lab Results   Component Value Date    HGBA1C 5.10 02/20/2023          Medications           Scheduled Medications acetaminophen, 1,000 mg, Oral, TID  apixaban, 5 mg, Oral, Q12H  desvenlafaxine, 25 mg, Oral, Daily  folic acid, 1 mg, Oral, Daily  gabapentin, 300 mg, Oral, Q8H  ipratropium-albuterol, 3 mL, Nebulization, 4x Daily - RT  lamoTRIgine, 200 mg, Oral, Daily  melatonin, 5 mg, Oral, Nightly  pantoprazole, 40 mg, Oral, Q AM  thiamine, 100 mg, Oral, Daily  vancomycin, 1,000 mg, Intravenous, Q24H  vitamin B-12, 500 mcg, Oral, Daily       Infusions Pharmacy to dose vancomycin,        PRN Medications •  bisacodyl  •  Pharmacy to dose vancomycin  •  polyethylene glycol  •  senna-docusate sodium  •  simethicone     PLAN OF CARE  Intervention Goal        Intervention Goal(s) Maintain nutrition status, Tolerate PO , Increase intake, Maintain weight, No significant weight loss and PO intake goal %: 75%     Nutrition Intervention        RD Action Advise available snack, Encourage intake, Follow Tx Progress and Care plan reviewed     Prescription        Diet Prescription    Supplement Prescription    EN/PN Prescription    Prescription Ordered Continue same per protocol   --  Monitor/Evaluation        Monitor Per protocol, PO intake, Supplement intake, Weight, GI status, Symptoms   Discharge Plan Pending clinical course   Education Will educate as needed       Electronically signed by:  Silke Arias  03/23/23 13:58 EDT

## 2023-03-23 NOTE — THERAPY TREATMENT NOTE
Inpatient Rehabilitation - Occupational Therapy Treatment Note    [facility]     Patient Name: Louise GARCIA  : 1965  MRN: [id number]    Today's Date: 3/23/2023                 Admit Date: 3/17/2023         ICD-10-CM ICD-9-CM   1. 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)   • Situational mixed anxiety and depressive disorder   • Mass of middle lobe of right lung   • Oral candidiasis   • Empyema of pleura (HCC)   • MRSA bacteremia   • Idiopathic peripheral neuropathy       Past Medical History:   Diagnosis Date   • Degenerative disc disease, cervical    • Gestational diabetes    • H/O blood clots    • Hematemesis    • Seizures ([facility])    • Septic shock ([facility])        Past Surgical History:   Procedure Laterality Date   • APPENDECTOMY     • BACK SURGERY     • CHOLECYSTECTOMY     • ENDOSCOPY N/A 2016    Procedure: ESOPHAGOGASTRODUODENOSCOPY with biopsy;  Surgeon: Austen Brito MD;  Location: [facility] ENDOSCOPY;  Service:    • HYSTERECTOMY     • NECK SURGERY       approx 6 yrs ago   • THORACOSCOPY Right 3/8/2023    Procedure: THORACOSCOPY WITH DAVINCI ROBOT WITH COMPLETE DECORTICATION;  Surgeon: Chloe Cedilol MD;  Location: [facility] OR;  Service: Robotics - DaVinci;  Laterality: Right;   • TONSILLECTOMY     • TONSILLECTOMY AND ADENOIDECTOMY               [facility] OT ASSESSMENT FLOWSHEET (last 12 hours)     IRF OT Evaluation and Treatment     Row Name 23 1459          OT Time and Intention    Document Type daily treatment  -AF     Mode of Treatment occupational therapy  -AF     Patient Effort good  -AF     Row Name 23  1457          General Information    Patient/Family/Caregiver Comments/Observations pt sitting upin w/c in AM and PM sessions  -AF     General Observations of Patient states that she didn't sleep at all last night  was alert during both sessions and followed one step directions  -AF     Existing Precautions/Restrictions fall  contact precautions  -AF     Row Name 03/23/23 1459          Pain Assessment    Pretreatment Pain Rating 0/10 - no pain  -AF     Posttreatment Pain Rating 0/10 - no pain  -AF     Row Name 03/23/23 1459          Cognition/Psychosocial    Affect/Mental Status (Cognition) confused;flat/blunted affect  -AF     Orientation Status (Cognition) oriented to;person;place  -AF     Follows Commands (Cognition) follows one-step commands;50-74% accuracy;delayed response/completion;increased processing time needed;initiation impaired;repetition of directions required;verbal cues/prompting required  -AF     Personal Safety Interventions fall prevention program maintained;gait belt;nonskid shoes/slippers when out of bed  -AF     Cognitive Function memory deficit;safety deficit  -AF     Comment, Cognitive Interventions during PM session pt was able to recall rules for familiar game of sequence  -AF     Row Name 03/23/23 1459          Bathing    [place] Level (Bathing) bathing skills;lower body;upper body;moderate assist (50% patient effort);verbal cues  -AF     Position (Bathing) sink side;supported sitting;supported standing  -AF     Comment (Bathing) seated on commode bathed [place] area and bottom with MIN for balance, worked on figure four leg crossing with bathing tasks  -AF     Row Name 03/23/23 1459          Upper Body Dressing    [place] Level (Upper Body Dressing) upper body dressing skills;set up assistance;verbal cues  -AF     Position (Upper Body Dressing) supported sitting  -AF     Set-up Assistance (Upper Body Dressing) obtain clothing  -AF     Comment (Upper Body Dressing) w/c level  -AF      Row Name 03/23/23 1459          Lower Body Dressing    [place] Level (Lower Body Dressing) doff;don;pants/bottoms;shoes/slippers;underwear;socks;maximum assist (25% patient effort);verbal cues  -AF     Position (Lower Body Dressing) supported sitting;supported standing  -AF     Comment (Lower Body Dressing) able to thread pants with figure four crossing to start. able to kick of shoes  -AF     Row Name 03/23/23 1459          Grooming    [place] Level (Grooming) grooming skills;set up;minimum assist (75% patient effort);verbal cues  -AF     Position (Grooming) supported sitting;sink side  -AF     Comment (Grooming) w/c level  -AF     Row Name 03/23/23 1459          Toileting    [place] Level (Toileting) toileting skills;maximum assist (25% patient effort);verbal cues  -AF     Assistive Device Use (Toileting) grab bar/safety frame;raised toilet seat  -AF     Comment (Toileting) A x 2 for standing  -AF     Row Name 03/23/23 1459          Bed Mobility    Sit-Supine [place] (Bed Mobility) maximum assist (25% patient effort)  on to stretcher  -AF     Row Name 03/23/23 1459          Transfer Assessment/Treatment    Comment, (Transfers) sit pivot EOM <> w/c MOD A x 2  -AF     Row Name 03/23/23 1459          Sit-Stand Transfer    Sit-Stand [place] (Transfers) maximum assist (25% patient effort)  -AF     Comment, (Sit-Stand Transfer) with grab bars  -AF     Row Name 03/23/23 1459          Stand-Sit Transfer    Stand-Sit [place] (Transfers) maximum assist (25% patient effort)  -AF     Comment, (Stand-Sit Transfer) with grab bars  -AF     Row Name 03/23/23 1459          Toilet Transfer    Type (Toilet Transfer) squat pivot  -AF     [place] Level (Toilet Transfer) 2 person assist;moderate assist (50% patient effort);verbal cues  -AF     Assistive Device (Toilet Transfer) commode  -AF     Row Name 03/23/23 1459          Balance    Static Sitting Balance contact guard  -AF     Dynamic Sitting  Balance contact guard  -AF     Comment, Balance sat unsupported on EOM to participated in BUE FMC task of sequnece, increased time with visually scanning. able to sit for 20 mins without rest break. increased core endurance noted [place] with UE movement  -AF     Row Name 03/23/23 1459          Positioning and Restraints    Pre-Treatment Position sitting in chair/recliner  -AF     Post Treatment Position wheelchair  -AF     In Wheelchair sitting;call light within reach;encouraged to call for assist;exit alarm on  in AM session with  present  -AF     Other Position --  on stretcher in PM session  -AF           User Key  (r) = Recorded By, (t) = Taken By, (c) = Cosigned By    Initials Name Effective Dates    AF Heaven Villareal, OTR 06/16/21 -                  Occupational Therapy Education     Title: PT OT SLP Therapies (In Progress)     Topic: Occupational Therapy (Not Started)     Point: ADL training (Not Started)     Description:   Instruct learner(s) on proper safety adaptation and remediation techniques during self care or transfers.   Instruct in proper use of assistive devices.              Learner Progress:  Not documented in this visit.          Point: Home exercise program (Not Started)     Description:   Instruct learner(s) on appropriate technique for monitoring, assisting and/or progressing therapeutic exercises/activities.              Learner Progress:  Not documented in this visit.          Point: Precautions (Not Started)     Description:   Instruct learner(s) on prescribed precautions during self-care and functional transfers.              Learner Progress:  Not documented in this visit.          Point: Body mechanics (Not Started)     Description:   Instruct learner(s) on proper positioning and spine alignment during self-care, functional mobility activities and/or exercises.              Learner Progress:  Not documented in this visit.                                OT Recommendation and Plan                          Time Calculation:      Time Calculation- OT     Row Name 03/23/23 1518 03/23/23 1515          Time Calculation- OT    OT Start Time 1330  -AF 0930  -AF     OT Stop Time 1400  -AF 1000  -AF     OT Time Calculation (min) 30 min  -AF 30 min  -AF           User Key  (r) = Recorded By, (t) = Taken By, (c) = Cosigned By    Initials Name Provider Type    AF Heaven Villareal, OTR Occupational Therapist              Therapy Charges for Today     Code Description Service Date Service Provider Modifiers Qty    87204270047 HC OT SELF CARE/MGMT/TRAIN EA 15 MIN 3/22/2023 Heaven Villareal, OTR GO 2    82199151131 HC OT THER SUPP EA 15 MIN 3/22/2023 Heaven Villareal, OTR GO 3    40789954545 HC OT NEUROMUSC RE EDUCATION EA 15 MIN 3/22/2023 Heaven Villareal, OTR GO 2    13293283120 HC OT SELF CARE/MGMT/TRAIN EA 15 MIN 3/23/2023 Heaven Villareal, OTR GO 2    51707232181 HC OT THER SUPP EA 15 MIN 3/23/2023 Heaven Villareal, OTR GO 1    32219413333 HC OT NEUROMUSC RE EDUCATION EA 15 MIN 3/23/2023 Heaven Villareal, OTR GO 2                   MARY ANN Gunn  3/23/2023

## 2023-03-23 NOTE — PLAN OF CARE
Goal Outcome Evaluation:      Slept fairly well. Assisted with turns. Family member stayed at bedside. Purewick applied for urgency & incontinence. Was Incontinent/ continent of bowels tonight, used bedpan. No c/o pain. Vancomycin trough scheduled before hanging IV Vancomycin. 3-23-23 at 2200. S/L flushed & capped to Rt. Forearm. Followed Contact Isolation. On seizure precautions, side rails padded, but no seizure activity.

## 2023-03-23 NOTE — PROGRESS NOTES
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   S/p IVIG x 5 days       SUBJECTIVE:    She had a pretty significant coughing spell that she describes yesterday afternoon.  Brought up just whitish sputum.  Comfortable with her breathing today.  She feels that guaifenesin keeps her up and requested it be discontinued.  On 1 L nasal cannula oxygen at night, on room air during the day.  She feels her balance and strength is showing some improvement over time      OBJECTIVE:  AF, VSS  BP: (113-143)/(67-77) 143/73    Physical Exam     MENTAL STATUS -  AAO x3, Verbal, NAD.  HEENT- NCAT, SCLERAE ANICTERIC, CONJUNCTIVAE PINK, OP MOIST, NO JVD, EARS UNREMARKABLE EXTERNALLY    LUNGS - DECREASED BREATH SOUNDS RIGHT BASE MORE SO THAN LEFT BASE  HEART- RRR, NO RUB, MURMUR, OR GALLOP  ABD - NORMOACTIVE BOWEL SOUNDS, SOFT, NT.  EXT - NO EDEMA OR CYANOSIS  MOTOR EXAM - R/L:  SHOULDER ABDUCTION 4/4 EF 5/5, WE 5/5, HAND INTRINSICS 4/4  HF 2/1, KE 3-/2-, ADF 4/4        Scheduled Meds:acetaminophen, 1,000 mg, Oral, TID  apixaban, 5 mg, Oral, Q12H  desvenlafaxine, 25 mg, Oral, Daily  folic acid, 1 mg, Oral, Daily  gabapentin, 300 mg, Oral, Q8H  ipratropium-albuterol, 3 mL, Nebulization, 4x Daily - RT  lamoTRIgine, 200 mg, Oral, Daily  melatonin, 5 mg, Oral, Nightly  pantoprazole, 40 mg, Oral, Q AM  thiamine, 100 mg, Oral, Daily  vancomycin, 1,000 mg, Intravenous, Q24H  vitamin B-12, 500 mcg, Oral, Daily      Continuous Infusions:Pharmacy to dose vancomycin,       PRN Meds:.•  bisacodyl  •  oxyCODONE  •  Pharmacy to dose vancomycin  •  polyethylene glycol  •  senna-docusate sodium  •  simethicone    Lab Results (last 24 hours)     Procedure Component Value Units Date/Time    CBC (No Diff) [[id number]]  (Abnormal) Collected: 03/23/23 0718    Specimen: Blood Updated: 03/23/23 0803     WBC 10.56 10*3/mm3      RBC 2.65 10*6/mm3      Hemoglobin 8.3 g/dL      Hematocrit 25.0 %      MCV 94.3 fL       MCH 31.3 pg      MCHC 33.2 g/dL      RDW 13.8 %      RDW-SD 46.5 fl      MPV 9.8 fL      Platelets 412 10*3/mm3     Basic Metabolic Panel [[id number]]  (Abnormal) Collected: 03/23/23 0718    Specimen: Blood Updated: 03/23/23 0827     Glucose 91 mg/dL      BUN 8 mg/dL      Creatinine 0.92 mg/dL      Sodium 137 mmol/L      Potassium 3.4 mmol/L      Chloride 100 mmol/L      CO2 29.0 mmol/L      Calcium 8.2 mg/dL      BUN/Creatinine Ratio 8.7     Anion Gap 8.0 mmol/L      eGFR 71.9 mL/min/1.73     Narrative:      GFR Normal >60  Chronic Kidney Disease <60  Kidney Failure <15            Imaging Results (Last 24 Hours)     ** No results found for the last 24 hours. 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   S/p IVIG x 5 days     Impaired mobility/impaired self care/ impaired cognition     R lung masslike infiltrate with cavitary lesions/pleural effusion   S/p thoracentesis - Blood cx and pleural fluid  positive for MRSA      chest tube placement given empyema, and she underwent thoracoscopy w/ decortication 3/8/23   -expected postoperative changes with pleural thickening and minimal pleural effusion.  The effusion is too small for intervention and will likely to continue to resolve with time.  Recommend continue good pulmonary hygiene with incentive spirometry hourly as well as increase activity/ambulation as tolerated.  March 23-appears decreased breath sounds more noticeable today on the right compared to the left.  Check follow-up chest x-ray.  On room air during the day, 1 L at night.        mass on TTE with findings concerning for endocarditis - underwent MARIAMA 3/10/2023 which had no vegetation     RUE superficial thrombophlebitis concerning for septic phlebitis.        ID - continue 4 weeks of vancomycin as recommended by infectious disease dosed by pharmacy to achieve a  trough level of 15-20 or area under the curve of 400-600 from last negative blood culture or last intervention which ever is the latest - to complete April 1, 2023     Left hip arthrocentesis March 16 to rule out any hip septic arthritis- no growth to date on fluid.    lower back pain - MRI of spine to rule out discitis or osteomyelitis.  This did have known degenerative changes but  no infection.      DVT prophylaxis - SCDs / apixiban. History of LLE DVT - on Eliquis at home     Pain management - Tylenol 1,000 mg three times a day/ Celebrex 100 mg q 112 hours/ gabapentin 300 mg q 8 hours Oxycodone 5 mg q 4 hours prn  March 18 - DC Celebrex 2/2 PHYLLIS, and anemia     Anxiety/ muscle spasms - Diazepam 2 mg q 6 hours prn - JONES reviewed     Seizure disorder -Lamotrigine 200 mg daily    ABLA - March 18- Hgb 7.0 (7.3 on 3/16). Type, cross and transfuse 1 unit PRBCs. Pre-medicate with Tylenol and Benadryl. CBC in am.   March 19- Hgb 8.8 s/p 1 unit PRBcs. Hemoccult positive. On Eliquis for h/o DVT. Follow Hgb. May need GI work up.  March 20 - HGB 9.3 s/p transfusion  March 21-reviewed with internal medicine-hemoglobin stable after transfusion.  Iron studies consistent with inflammation.  No further work-up presently  March 22 - Hgb 8.9, stable  March 23-hemoglobin trend 8.3.  Continue to follow.  Recheck tomorrow    PHYLLIS - March 18- Creatinine 1.36 up from 1.12. On IV Vanc and Celebrex. DC Celebrex. BMP in am.  March 19- s/p 500ml NS bolus. Creatinine 1.31.  March 23-creatinine 0.92        TEAM CONF - MARCH 21 - BED MOD X 2. TRANSFERS MAX 2. STAND PIVOT OR SQUAT PIVOT. NON-AMBULATORY. Saturday MARCH 18 GAIT 6 FEET PARALLEL BARS MOD MAX OF 2.   BATH MOD 1-2. LBD DEP. UBD MOD. EATING MIN. GROOMING MIN. TOILETING DEP.   The patient has difficulty remembering new information due to short-term memory impairments.  Initial evaluation 3.18, pt obtained a score 12/30 on SLUMS cognitive assessment indicating severe cognitive  impairment/dementia, goals initiated for memory and sustaining attention  FEES completed 3.7 revealing glottic gap accounting for glottic insufficiency, dysphonia, hoarse/breathy quality, recommend targeting vocal function as appropriate d/t impact on intelligibility.    Regular/thin diet continued since FEES completion 3.7, although pt known to cough with and without PO intake, may be contributed partially to ineffective VF closure.   DRESSING FORMER CHEST TUBE SITE. CONTINUES ON PUREWICK AT NIGHT. O2 AT 1-2 LITERS AT NIGHT.  DECREASED APPETITE. ABD X-RAY THIS AM SHOWS NON-OBSTRUCTIVE BOWEL GAS PATTERN.   ELOS - 4 WEEKS    Reviewed medications, vital signs, and recent lab work. Continue comprehensive inpatient rehabilitation program.    During rounds, used appropriate personal protective equipment including mask and gloves.  Additional gown if indicated.  Mask used was standard procedure mask. Appropriate PPE was worn during the entire visit.  Hand hygiene was completed before and after.      Ryley Rider MD

## 2023-03-23 NOTE — THERAPY TREATMENT NOTE
Inpatient Rehabilitation - Speech Language Pathology Treatment Note    [facility]     Patient Name: Louise GARCIA  : 1965  MRN: [id number]    Today's Date: 3/23/2023                   Admit Date: 3/17/2023       Visit Dx:      ICD-10-CM ICD-9-CM   1. Impaired functional mobility, balance, gait, and endurance  Z74.09 V49.89       Patient Active Problem List   Diagnosis   • Sepsis (HCC)   • Neck pain, chronic   • Upper back pain   • Paresthesia   • Cervical myelopathy (HCC)   • Lower extremity weakness   • History of blood clots   • Chronic anticoagulation   • Seizures (HCC)   • Anemia   • GERD (gastroesophageal reflux disease)   • Guillain-Keavy syndrome (HCC)   • Situational mixed anxiety and depressive disorder   • Mass of middle lobe of right lung   • Oral candidiasis   • Empyema of pleura ([facility])   • MRSA bacteremia   • Idiopathic peripheral neuropathy       Past Medical History:   Diagnosis Date   • Degenerative disc disease, cervical    • Gestational diabetes    • H/O blood clots    • Hematemesis    • Seizures ([facility])    • Septic shock ([facility])        Past Surgical History:   Procedure Laterality Date   • APPENDECTOMY     • BACK SURGERY     • CHOLECYSTECTOMY     • ENDOSCOPY N/A 2016    Procedure: ESOPHAGOGASTRODUODENOSCOPY with biopsy;  Surgeon: Austen Brito MD;  Location: [facility] ENDOSCOPY;  Service:    • HYSTERECTOMY     • NECK SURGERY       approx 6 yrs ago   • THORACOSCOPY Right 3/8/2023    Procedure: THORACOSCOPY WITH DAVINCI ROBOT WITH COMPLETE DECORTICATION;  Surgeon: Chloe Cedillo MD;  Location: [facility] OR;  Service: Robotics - DaVinci;  Laterality: Right;   • TONSILLECTOMY     • TONSILLECTOMY AND ADENOIDECTOMY         SLP Recommendation and Plan                                                            SLP EVALUATION (last 72 hours)     SLP SLC Evaluation     Row Name 23 1300 23 1030 23 1500 23 0900 23 1300       Communication  Assessment/Intervention    Document Type therapy note (daily note)  -AL therapy note (daily note)  -AL therapy note (daily note)  -AL therapy note (daily note)  -AL therapy note (daily note)  -AL    Patient/Family/Caregiver Comments/Observations Pt participated well.  -AL Pt reported she did not sleep last night. Required initial MOD cues for alertness, then able to sustain alertness for remainder of session.  -AL Pt participated well.  -AL Pt seen bedside. Coughing frequently during session. Pt reported voice feels close to normal today.  -AL Pt participated well. She reported confusion regarding therapy schedule. Stated she was surprised to have therapies two times a day.  -AL       Pain Scale: Numbers Pre/Post-Treatment    Pretreatment Pain Rating 0/10 - no pain  -AL 0/10 - no pain  -AL 0/10 - no pain  -AL 0/10 - no pain  -AL 0/10 - no pain  -AL    Row Name 03/21/23 1030                   Communication Assessment/Intervention    Document Type therapy note (daily note)  -AL        Patient/Family/Caregiver Comments/Observations Pt participated well.  -AL           Pain Scale: Numbers Pre/Post-Treatment    Pretreatment Pain Rating 0/10 - no pain  -AL              User Key  (r) = Recorded By, (t) = Taken By, (c) = Cosigned By    Initials Name Effective Dates    Nessa Kraus, MS CCC-SLP 06/16/21 -                Patient was not wearing a face mask during this therapy encounter. Therapist used appropriate personal protective equipment including mask, eye protection and gloves.  Mask used was standard procedure mask. Appropriate PPE was worn during the entire therapy session. Hand hygiene was completed before and after therapy session. Patient is not in enhanced droplet precautions.             EDUCATION    The patient has been educated in the following areas:       Cognitive Impairment Communication Impairment.             SLP GOALS     Row Name 03/23/23 1300 03/23/23 1030 03/22/23 1500       Phonation Goal 1  "(SLP)    Comment (Phonation Goal 1, SLP) -- -- Pt with moderate-severe hoarse vocal quality in PM session. Attempted to address voice; however, unable to achieve adequate vocal quality using resonance technique and exhibited continued coughing. Recommended resting voice this afternoon. Of note, later in session, pt with periods of improved vocal quality when not directly addressing voice.  -AL       Attention Goal 1 (SLP)    Improve Attention by Goal 1 (SLP) -- -- complete sustained attention task;80%;with minimal cues (75-90%)  -AL    Time Frame (Attention Goal 1, SLP) -- -- by discharge  -AL    Progress/Outcomes (Attention Goal 1, SLP) -- -- goal ongoing  -AL    Comment (Attention Goal 1, SLP) -- Calendar task: 40% with NO cues, 90% with MIN cues, 100% wtih MAX cues  -AL Improved sustained and selective attention during logic puzzle task. Pt able to stay on task for 20 minutes without redirections.  -AL       Memory Skills Goal 1 (SLP)    Improve Memory Skills Through Goal 1 (SLP) use memory strategies;use external memory aid;recall details of the day;80%;with moderate cues (50-74%)  -AL -- --    Progress/Outcomes (Memory Skills Goal 1, SLP) goal ongoing  -AL -- --    Comment (Memory Skills Goal 1, SLP) Oriented to time with NO cues. Immediate memory for voicemails: 80% with NO cues, 100% with MIN cues.  -AL Pt required MOD cues for orientation to month, MAX cues for orientation to date and SUSAN and MIN cues for orienation to year. Pt with increased confusion today. Unable to state name of hospital (stated \"[facility]\" rather than \"[facility]\").  -AL --       Reasoning Goal 1 (SLP)    Improve Reasoning Through Goal 1 (SLP) complete deductive reasoning task;80%;with minimal cues (75-90%)  -AL -- complete deductive reasoning task;80%;with minimal cues (75-90%)  -AL    Time Frame (Reasoning Goal 1, SLP) 1 week  -AL -- 1 week  -AL    Progress/Outcomes (Reasoning Goal 1, SLP) good progress toward goal  -AL -- good progress " "toward goal  -AL    Comment (Reasoning Goal 1, SLP) Moderate level logic puzzle: 10% with NO cues, 50% with MIN cues, 100% with MOD-MAX cues.  -AL -- Moderate level logic puzzle: 45$ with NO cues, 100% with MIN-MAX cues.  -AL    Row Name 03/22/23 0900 03/21/23 1300 03/21/23 1030       Phonation Goal 1 (SLP)    Progress/Outcomes (Phonation Goal 1, SLP) -- -- good progress toward goal  -AL    Comment (Phonation Goal 1, SLP) -- -- Pt was stimulable to produce improved voice with use of frontal focus technique. She produced adequate vocal quality on humming /m/ with overall MOD-MAX cues, initial /m/ CV chanting (i.e. ma, mo, mi) with MOD-MAX cues and 2-syllable word chanting with MAX cues. Pt was unable to carry-over improved voice into speech; however, noted intermittent improved vocal quality when pt was telling a story at end of session. Pt presents with an overall moderate strained/hoarse vocal quality. Pt also noted to have frequent strong throat clearing; discussed use of swallow, rather than throat clear due to possible impact on vocal folds. Provided education regarding frontal tone focus and reducing tension in larynx, as prevous FEES showed reduced laryngeal closure and some hyperfunction.  -AL       Attention Goal 1 (SLP)    Improve Attention by Goal 1 (SLP) complete sustained attention task;80%;with minimal cues (75-90%)  -AL -- --    Progress/Outcomes (Attention Goal 1, SLP) goal ongoing  -AL goal ongoing  -AL --    Comment (Attention Goal 1, SLP) Written \"if/then\" directions: 20% with NO Cues, 60% with MOD cues, 100% with MAX cues. Pt with difficulty sustaining attention to task today.  -AL Pt required overall MIN-MOD cues for sustained attention to logic puzzle task.  -AL --       Reasoning Goal 1 (SLP)    Improve Reasoning Through Goal 1 (SLP) -- complete deductive reasoning task;80%;with minimal cues (75-90%)  -AL --    Time Frame (Reasoning Goal 1, SLP) -- 1 week  -AL --    Progress/Outcomes (Reasoning " Goal 1, SLP) -- new goal  -AL --    Comment (Reasoning Goal 1, SLP) -- Diagnostic treatment: Pt completed moderate level logic puzzle with 10% accuracy with NO cues, 40% with MIN cues, 25% with MIN cues, 100% with MOD-MAX cues. Pt with difficutly sustaining attention to task. Suspect vision deficits, as pt had difficulty writing words in center of boxes; SLP wrote for patient as task progressed.  -AL --          User Key  (r) = Recorded By, (t) = Taken By, (c) = Cosigned By    Initials Name Provider Type    Nessa Kraus MS CCC-SLP Speech and Language Pathologist                            Time Calculation:        Time Calculation- SLP     Row Name 03/23/23 1331 03/23/23 1149          Time Calculation- SLP    SLP Start Time 1300  -AL 1030  -AL     SLP Stop Time 1330  -AL 1100  -AL     SLP Time Calculation (min) 30 min  -AL 30 min  -AL           User Key  (r) = Recorded By, (t) = Taken By, (c) = Cosigned By    Initials Name Provider Type    Nessa Kraus MS CCC-SLP Speech and Language Pathologist                  Therapy Charges for Today     Code Description Service Date Service Provider Modifiers Qty    03821997319 HC ST DEV OF COGN SKILLS INITIAL 15 MIN 3/22/2023 Nessa Spangler, MS CCC-SLP  1    81992139456 HC ST DEV OF COGN SKILLS EACH ADDT'L 15 MIN 3/22/2023 Nessa Spangler, MS CCC-SLP  3    47914435359 HC ST DEV OF COGN SKILLS INITIAL 15 MIN 3/23/2023 Nessa Spangler, MS CCC-SLP  1    89818119829 HC ST DEV OF COGN SKILLS EACH ADDT'L 15 MIN 3/23/2023 Nessa Spangler MS CCC-SLP  3                           Nessa Spangler MS CCC-SLP  3/23/2023

## 2023-03-24 LAB
ANION GAP SERPL CALCULATED.3IONS-SCNC: 9.3 MMOL/L (ref 5–15)
BUN SERPL-MCNC: 7 MG/DL (ref 6–20)
BUN/CREAT SERPL: 7.7 (ref 7–25)
CALCIUM SPEC-SCNC: 8.7 MG/DL (ref 8.6–10.5)
CHLORIDE SERPL-SCNC: 98 MMOL/L (ref 98–107)
CO2 SERPL-SCNC: 27.7 MMOL/L (ref 22–29)
CREAT SERPL-MCNC: 0.91 MG/DL (ref 0.57–1)
DEPRECATED RDW RBC AUTO: 46.3 FL (ref 37–54)
EGFRCR SERPLBLD CKD-EPI 2021: 72.8 ML/MIN/1.73
ERYTHROCYTE [DISTWIDTH] IN BLOOD BY AUTOMATED COUNT: 13.8 % (ref 12.3–15.4)
GEN 5 2HR TROPONIN T REFLEX: 24 NG/L
GLUCOSE SERPL-MCNC: 142 MG/DL (ref 65–99)
HCT VFR BLD AUTO: 25.3 % (ref 34–46.6)
HGB BLD-MCNC: 8.6 G/DL (ref 12–15.9)
MCH RBC QN AUTO: 31.9 PG (ref 26.6–33)
MCHC RBC AUTO-ENTMCNC: 34 G/DL (ref 31.5–35.7)
MCV RBC AUTO: 93.7 FL (ref 79–97)
PLATELET # BLD AUTO: 349 10*3/MM3 (ref 140–450)
PMV BLD AUTO: 9.7 FL (ref 6–12)
POTASSIUM SERPL-SCNC: 3.3 MMOL/L (ref 3.5–5.2)
QT INTERVAL: 353 MS
RBC # BLD AUTO: 2.7 10*6/MM3 (ref 3.77–5.28)
SODIUM SERPL-SCNC: 135 MMOL/L (ref 136–145)
TROPONIN T DELTA: -1 NG/L
TROPONIN T SERPL HS-MCNC: 25 NG/L
WBC NRBC COR # BLD: 10.39 10*3/MM3 (ref 3.4–10.8)

## 2023-03-24 PROCEDURE — 97535 SELF CARE MNGMENT TRAINING: CPT

## 2023-03-24 PROCEDURE — 99024 POSTOP FOLLOW-UP VISIT: CPT | Performed by: NURSE PRACTITIONER

## 2023-03-24 PROCEDURE — 94799 UNLISTED PULMONARY SVC/PX: CPT

## 2023-03-24 PROCEDURE — 94664 DEMO&/EVAL PT USE INHALER: CPT

## 2023-03-24 PROCEDURE — 94761 N-INVAS EAR/PLS OXIMETRY MLT: CPT

## 2023-03-24 PROCEDURE — 80048 BASIC METABOLIC PNL TOTAL CA: CPT | Performed by: PHYSICAL MEDICINE & REHABILITATION

## 2023-03-24 PROCEDURE — 93010 ELECTROCARDIOGRAM REPORT: CPT | Performed by: INTERNAL MEDICINE

## 2023-03-24 PROCEDURE — 97129 THER IVNTJ 1ST 15 MIN: CPT

## 2023-03-24 PROCEDURE — 97530 THERAPEUTIC ACTIVITIES: CPT

## 2023-03-24 PROCEDURE — 84484 ASSAY OF TROPONIN QUANT: CPT | Performed by: PHYSICAL MEDICINE & REHABILITATION

## 2023-03-24 PROCEDURE — 25010000002 VANCOMYCIN PER 500 MG: Performed by: PHYSICAL MEDICINE & REHABILITATION

## 2023-03-24 PROCEDURE — 97110 THERAPEUTIC EXERCISES: CPT

## 2023-03-24 PROCEDURE — 97130 THER IVNTJ EA ADDL 15 MIN: CPT

## 2023-03-24 PROCEDURE — 85027 COMPLETE CBC AUTOMATED: CPT | Performed by: PHYSICAL MEDICINE & REHABILITATION

## 2023-03-24 PROCEDURE — 93005 ELECTROCARDIOGRAM TRACING: CPT | Performed by: PHYSICAL MEDICINE & REHABILITATION

## 2023-03-24 RX ORDER — ACETAMINOPHEN 325 MG/1
650 TABLET ORAL EVERY 6 HOURS PRN
Status: COMPLETED | OUTPATIENT
Start: 2023-03-24 | End: 2023-04-17

## 2023-03-24 RX ORDER — POTASSIUM CHLORIDE 750 MG/1
40 TABLET, FILM COATED, EXTENDED RELEASE ORAL ONCE
Status: COMPLETED | OUTPATIENT
Start: 2023-03-24 | End: 2023-03-24

## 2023-03-24 RX ORDER — OXYCODONE HYDROCHLORIDE 5 MG/1
2.5 TABLET ORAL EVERY 4 HOURS PRN
Status: DISPENSED | OUTPATIENT
Start: 2023-03-24 | End: 2023-04-01

## 2023-03-24 RX ADMIN — Medication 100 MG: at 08:43

## 2023-03-24 RX ADMIN — IPRATROPIUM BROMIDE AND ALBUTEROL SULFATE 3 ML: 2.5; .5 SOLUTION RESPIRATORY (INHALATION) at 10:29

## 2023-03-24 RX ADMIN — OXYCODONE HYDROCHLORIDE 2.5 MG: 5 TABLET ORAL at 14:02

## 2023-03-24 RX ADMIN — APIXABAN 5 MG: 5 TABLET, FILM COATED ORAL at 22:34

## 2023-03-24 RX ADMIN — IPRATROPIUM BROMIDE AND ALBUTEROL SULFATE 3 ML: 2.5; .5 SOLUTION RESPIRATORY (INHALATION) at 20:00

## 2023-03-24 RX ADMIN — GABAPENTIN 300 MG: 300 CAPSULE ORAL at 13:36

## 2023-03-24 RX ADMIN — VANCOMYCIN HYDROCHLORIDE 1000 MG: 1 INJECTION, SOLUTION INTRAVENOUS at 22:34

## 2023-03-24 RX ADMIN — DESVENLAFAXINE SUCCINATE 25 MG: 25 TABLET, EXTENDED RELEASE ORAL at 08:43

## 2023-03-24 RX ADMIN — GABAPENTIN 300 MG: 300 CAPSULE ORAL at 22:35

## 2023-03-24 RX ADMIN — Medication 500 MCG: at 08:43

## 2023-03-24 RX ADMIN — APIXABAN 5 MG: 5 TABLET, FILM COATED ORAL at 08:43

## 2023-03-24 RX ADMIN — Medication 1 MG: at 08:42

## 2023-03-24 RX ADMIN — IPRATROPIUM BROMIDE AND ALBUTEROL SULFATE 3 ML: 2.5; .5 SOLUTION RESPIRATORY (INHALATION) at 06:45

## 2023-03-24 RX ADMIN — LAMOTRIGINE 200 MG: 100 TABLET ORAL at 08:42

## 2023-03-24 RX ADMIN — IPRATROPIUM BROMIDE AND ALBUTEROL SULFATE 3 ML: 2.5; .5 SOLUTION RESPIRATORY (INHALATION) at 15:01

## 2023-03-24 RX ADMIN — POTASSIUM CHLORIDE 40 MEQ: 750 TABLET, EXTENDED RELEASE ORAL at 13:36

## 2023-03-24 RX ADMIN — GABAPENTIN 300 MG: 300 CAPSULE ORAL at 05:42

## 2023-03-24 RX ADMIN — PANTOPRAZOLE SODIUM 40 MG: 40 TABLET, DELAYED RELEASE ORAL at 05:43

## 2023-03-24 NOTE — THERAPY TREATMENT NOTE
Inpatient Rehabilitation - Occupational Therapy Treatment Note    [facility]     Patient Name: Louise GARCIA  : 1965  MRN: [id number]    Today's Date: 3/24/2023                 Admit Date: 3/17/2023         ICD-10-CM ICD-9-CM   1. Impaired functional mobility, balance, gait, and endurance  Z74.09 V49.89       Patient Active Problem List   Diagnosis   • Sepsis (HCC)   • Neck pain, chronic   • Upper back pain   • Paresthesia   • Cervical myelopathy (HCC)   • Lower extremity weakness   • History of blood clots   • Chronic anticoagulation   • Seizures (HCC)   • Anemia   • GERD (gastroesophageal reflux disease)   • Guillain-Oconto syndrome (HCC)   • Situational mixed anxiety and depressive disorder   • Mass of middle lobe of right lung   • Oral candidiasis   • Empyema of pleura (HCC)   • MRSA bacteremia   • Idiopathic peripheral neuropathy       Past Medical History:   Diagnosis Date   • Degenerative disc disease, cervical    • Gestational diabetes    • H/O blood clots    • Hematemesis    • Seizures ([facility])    • Septic shock ([facility])        Past Surgical History:   Procedure Laterality Date   • APPENDECTOMY     • BACK SURGERY     • CHOLECYSTECTOMY     • ENDOSCOPY N/A 2016    Procedure: ESOPHAGOGASTRODUODENOSCOPY with biopsy;  Surgeon: Austen Brito MD;  Location: [facility] ENDOSCOPY;  Service:    • HYSTERECTOMY     • NECK SURGERY       approx 6 yrs ago   • THORACOSCOPY Right 3/8/2023    Procedure: THORACOSCOPY WITH DAVINCI ROBOT WITH COMPLETE DECORTICATION;  Surgeon: Chloe Cedillo MD;  Location: [facility] OR;  Service: Robotics - DaVinci;  Laterality: Right;   • TONSILLECTOMY     • TONSILLECTOMY AND ADENOIDECTOMY               [facility] OT ASSESSMENT FLOWSHEET (last 12 hours)     [facility] OT Evaluation and Treatment     Row Name 23 1532          OT Time and Intention    Document Type daily treatment  -AF     Mode of Treatment occupational therapy  -AF     Symptoms Noted During/After Treatment fatigue  -AF      Row Name 03/24/23 1532          General Information    Patient/Family/Caregiver Comments/Observations pt sitting up in bed in AM and PM session, per KRUPA and MD bed level therapy only today  -AF     Existing Precautions/Restrictions fall;other (see comments)  contact  -AF     Row Name 03/24/23 1532          Pain Assessment    Pretreatment Pain Rating 7/10  -AF     Posttreatment Pain Rating 4/10  -AF     Pain Location - Side/Orientation Right  -AF     Pain Location - --  chest/trunk  -AF     Row Name 03/24/23 1532          Cognition/Psychosocial    Affect/Mental Status (Cognition) confused;flat/blunted affect  -AF     Orientation Status (Cognition) oriented to;person;place  -AF     Follows Commands (Cognition) follows one-step commands;50-74% accuracy;delayed response/completion;increased processing time needed;initiation impaired;repetition of directions required;verbal cues/prompting required  -AF     Personal Safety Interventions fall prevention program maintained;gait belt;nonskid shoes/slippers when out of bed  -AF     Row Name 03/24/23 1532          Grooming    [place] Level (Grooming) grooming skills;set up;hair care, combing/brushing;oral care regimen  -AF     Position (Grooming) supported sitting  -AF     Row Name 03/24/23 1532          Bed Mobility    Supine-Sit [place] (Bed Mobility) contact guard;minimum assist (75% patient effort)  -AF     Sit-Supine [place] (Bed Mobility) maximum assist (25% patient effort)  -AF     Assistive Device (Bed Mobility) bed rails  -AF     Row Name 03/24/23 1532          Transfer Assessment/Treatment    Comment, (Transfers) bed level therapy only today per KRUPA and MD  -AF     Row Name 03/24/23 1532          Shoulder (Therapeutic Exercise)    Shoulder (Therapeutic Exercise) strengthening exercise  -AF     Shoulder Strengthening (Therapeutic Exercise) bilateral;scapular stabilization;sitting;1 lb free weight;10 repetitions;internal rotation;external rotation   -AF     Row Name 03/24/23 1532          Elbow/Forearm (Therapeutic Exercise)    Elbow/Forearm (Therapeutic Exercise) strengthening exercise  -AF     Elbow/Forearm Strengthening (Therapeutic Exercise) bilateral;flexion;extension;supination;pronation;sitting;1 lb free weight;10 repetitions  -AF     Row Name 03/24/23 1532          Balance    Dynamic Sitting Balance contact guard  -AF     Comment, Balance pt sat on EOB monitoring heart rate 110-115 with all activities. pt particpated in funciontal reaching task of cleaning off her tray table and wiping it down then placing items back with CGA for balance and 1 rest break. also workblaine roberson reaching outside her DASHA CGA  -AF     Row Name 03/24/23 1532          Positioning and Restraints    Pre-Treatment Position in bed  -AF     Post Treatment Position bed  -AF     In Bed supine;call light within reach;encouraged to call for assist;exit alarm on  in AM and PM sessions  -AF           User Key  (r) = Recorded By, (t) = Taken By, (c) = Cosigned By    Initials Name Effective Dates    AF Heaven Villareal, OTR 06/16/21 -                  Occupational Therapy Education     Title: PT OT SLP Therapies (In Progress)     Topic: Occupational Therapy (Not Started)     Point: ADL training (Not Started)     Description:   Instruct learner(s) on proper safety adaptation and remediation techniques during self care or transfers.   Instruct in proper use of assistive devices.              Learner Progress:  Not documented in this visit.          Point: Home exercise program (Not Started)     Description:   Instruct learner(s) on appropriate technique for monitoring, assisting and/or progressing therapeutic exercises/activities.              Learner Progress:  Not documented in this visit.          Point: Precautions (Not Started)     Description:   Instruct learner(s) on prescribed precautions during self-care and functional transfers.              Learner Progress:  Not documented in this visit.           Point: Body mechanics (Not Started)     Description:   Instruct learner(s) on proper positioning and spine alignment during self-care, functional mobility activities and/or exercises.              Learner Progress:  Not documented in this visit.                                OT Recommendation and Plan                         Time Calculation:      Time Calculation- OT     Row Name 03/24/23 1536 03/24/23 1535          Time Calculation- OT    OT Start Time 1330  -AF 1000  -AF     OT Stop Time 1400  -AF 1030  -AF     OT Time Calculation (min) 30 min  -AF 30 min  -AF           User Key  (r) = Recorded By, (t) = Taken By, (c) = Cosigned By    Initials Name Provider Type    AF Heaven Villareal, OTR Occupational Therapist              Therapy Charges for Today     Code Description Service Date Service Provider Modifiers Qty    92086662807 HC OT SELF CARE/MGMT/TRAIN EA 15 MIN 3/23/2023 Heaven Villareal, OTR GO 2    49292597995 HC OT THER SUPP EA 15 MIN 3/23/2023 Heaven Villareal, OTR GO 1    16401278583 HC OT NEUROMUSC RE EDUCATION EA 15 MIN 3/23/2023 Heaven Villareal, OTR GO 2    29705046454 HC OT SELF CARE/MGMT/TRAIN EA 15 MIN 3/24/2023 Heaven Villareal, OTR GO 2    63359029197 HC OT THER PROC EA 15 MIN 3/24/2023 Heaven Villareal, OTR GO 1    53292556377 HC OT THERAPEUTIC ACT EA 15 MIN 3/24/2023 Heaven Villareal, OTR GO 1                   MARY ANN Gunn  3/24/2023

## 2023-03-24 NOTE — SIGNIFICANT NOTE
Checked on patient 3x today. First attempt very lethargic, unable to arouse to verbal or tactile stimuli including sternal rub. 2nd attempt patient still lethargic but unable to open eyes or follow commands. 3rd attempt patient more alert with less jumbled speech. Able to perform 6 min of therex including 2 x 5 SLR and KTC. However then became fatigued and no longer following commands. Stated he just wanted to sleep. Will attempt to see 3/25, awaiting palliative consult and updated treatment goals.

## 2023-03-24 NOTE — PROGRESS NOTES
[facility] Clinical Pharmacy Services: Vancomycin Level Monitoring Note    Louise GARCIA is a 58 y.o. female who is on day 21/28 of pharmacy to dose vancomycin for  Empyema .    Estimated Creatinine Clearance: 58.4 mL/min (by C-G formula based on SCr of 0.91 mg/dL).    Current Vanc Dose: 1000 mg IV every  24 hours  Results from last 7 days   Lab Units 03/23/23 2126 03/20/23  2357 03/18/23 2126   VANCOMYCIN TR mcg/mL 15.60 12.70 21.70*   Predicted AUC at current dose:427 mg/L.hr  Next Level Date and Time: None ordered at this time    Assessment/Plan     Current Vancomycin Dose: 1000mg IV every  24 hours; provides a predicted AUC of 427 mg/L.hr   Next Level Date and Time: Will not order another trough at this time as renal function has stabilized and patient has remained therapeutic on current dose    Patient is currently getting daily BMPs drawn so will follow renal function and if renal function changes, will re-evaluate and obtain another vanc trough if needed  We will continue to monitor patient changes and renal function     Zoran King [facility]  Clinical Pharmacist

## 2023-03-24 NOTE — PROGRESS NOTES
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   S/p IVIG x 5 days       SUBJECTIVE:  Seen and examined at bedside. Reported right-sided chest pain this am. Does not radiate to the left side/face/arm. Pain worse with palpation and BUE strength testing. EKG obtained showing sinus tachycardia, otherwise vitals stable. Stat troponin and labs pending. Continues on 1 L via NC. Denies shortness of breath, n/v, f/c.     OBJECTIVE:  AF, VSS  BP: (115-139)/(68-82) 136/75    Physical Exam     MENTAL STATUS -  AAO x3, Verbal, NAD.  HEENT- NCAT, SCLERAE ANICTERIC, CONJUNCTIVAE PINK, OP MOIST, NO JVD, EARS UNREMARKABLE EXTERNALLY    LUNGS - DECREASED BREATH SOUNDS RIGHT BASE MORE SO THAN LEFT BASE  HEART- Tachycardic, no murmur  Chest wall TTP R>L, slightly erythematous non-fluctuating area overlying rib on right chest wall TTP, R-side chest tube sites well healing  ABD - NORMOACTIVE BOWEL SOUNDS, SOFT, NT.  EXT - NO EDEMA OR CYANOSIS  MOTOR EXAM - R/L:  SHOULDER ABDUCTION 4/4 EF 5/5, WE 5/5, HAND INTRINSICS 4/4  HF 2/1, KE 3-/2-, ADF 4/4        Scheduled Meds:apixaban, 5 mg, Oral, Q12H  desvenlafaxine, 25 mg, Oral, Daily  folic acid, 1 mg, Oral, Daily  gabapentin, 300 mg, Oral, Q8H  ipratropium-albuterol, 3 mL, Nebulization, 4x Daily - RT  lamoTRIgine, 200 mg, Oral, Daily  melatonin, 5 mg, Oral, Nightly  pantoprazole, 40 mg, Oral, Q AM  thiamine, 100 mg, Oral, Daily  vancomycin, 1,000 mg, Intravenous, Q24H  vitamin B-12, 500 mcg, Oral, Daily      Continuous Infusions:Pharmacy to dose vancomycin,       PRN Meds:.•  acetaminophen  •  bisacodyl  •  oxyCODONE  •  Pharmacy to dose vancomycin  •  polyethylene glycol  •  senna-docusate sodium  •  simethicone    Lab Results (last 24 hours)     Procedure Component Value Units Date/Time    Vancomycin, Trough Please draw level 30 minutes prior to hanging the 2200 dose.  Be sure vanc is not running prior to drawing level.  Thank you! [[id number]]   (Normal) Collected: 03/23/23 2126    Specimen: Blood Updated: 03/23/23 2227     Vancomycin Trough 15.60 mcg/mL     Narrative:      Therapeutic Ranges for Vancomycin    Vancomycin Random   5.0-40.0 mcg/mL  Vancomycin Trough   5.0-20.0 mcg/mL  Vancomycin Peak     20.0-40.0 mcg/mL    CBC (No Diff) [[id number]]  (Abnormal) Collected: 03/24/23 0822    Specimen: Blood Updated: 03/24/23 0840     WBC 10.39 10*3/mm3      RBC 2.70 10*6/mm3      Hemoglobin 8.6 g/dL      Hematocrit 25.3 %      MCV 93.7 fL      MCH 31.9 pg      MCHC 34.0 g/dL      RDW 13.8 %      RDW-SD 46.3 fl      MPV 9.7 fL      Platelets 349 10*3/mm3     Basic Metabolic Panel [[id number]]  (Abnormal) Collected: 03/24/23 0822    Specimen: Blood Updated: 03/24/23 0856     Glucose 142 mg/dL      BUN 7 mg/dL      Creatinine 0.91 mg/dL      Sodium 135 mmol/L      Potassium 3.3 mmol/L      Chloride 98 mmol/L      CO2 27.7 mmol/L      Calcium 8.7 mg/dL      BUN/Creatinine Ratio 7.7     Anion Gap 9.3 mmol/L      eGFR 72.8 mL/min/1.73     Narrative:      GFR Normal >60  Chronic Kidney Disease <60  Kidney Failure <15      High Sensitivity Troponin T [[id number]]  (Abnormal) Collected: 03/24/23 0822    Specimen: Blood Updated: 03/24/23 0856     HS Troponin T 25 ng/L     Narrative:      High Sensitive Troponin T Reference Range:  <10.0 ng/L- Negative Female for AMI  <15.0 ng/L- Negative Male for AMI  >=10 - Abnormal Female indicating possible myocardial injury.  >=15 - Abnormal Male indicating possible myocardial injury.   Clinicians would have to utilize clinical acumen, EKG, Troponin, and serial changes to determine if it is an Acute Myocardial Infarction or myocardial injury due to an underlying chronic condition.         High Sensitivity Troponin T 2Hr [[id number]]  (Abnormal) Collected: 03/24/23 1032    Specimen: Blood Updated: 03/24/23 1127     HS Troponin T 24 ng/L      Troponin T Delta -1 ng/L     Narrative:      High Sensitive Troponin T Reference Range:  <10.0  ng/L- Negative Female for AMI  <15.0 ng/L- Negative Male for AMI  >=10 - Abnormal Female indicating possible myocardial injury.  >=15 - Abnormal Male indicating possible myocardial injury.   Clinicians would have to utilize clinical acumen, EKG, Troponin, and serial changes to determine if it is an Acute Myocardial Infarction or myocardial injury due to an underlying chronic condition.               Imaging Results (Last 24 Hours)     Procedure Component Value Units Date/Time    XR Chest PA & Lateral [[id number]] Collected: 03/23/23 1429     Updated: 03/23/23 1435    Narrative:      XR CHEST PA AND LATERAL-     HISTORY: Female who is 58 years-old,  decreased breath sounds     TECHNIQUE: Frontal and lateral views of the chest     COMPARISON: 03/16/2023     FINDINGS: The heart size is borderline. Pulmonary vasculature is  unremarkable. Bilateral pulmonary opacities appear mildly increased.  Right pleural effusion appears similar to prior exam. No pneumothorax.  No acute osseous process.       Impression:      Mild partial improvement, continued follow-up suggested.     This report was finalized on 3/23/2023 2:32 PM by Dr. Kurt Garrison M.D.             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   S/p IVIG x 5 days     Impaired mobility/impaired self care/ impaired cognition     R lung masslike infiltrate with cavitary lesions/pleural effusion   S/p thoracentesis - Blood cx and pleural fluid  positive for MRSA      chest tube placement given empyema, and she underwent thoracoscopy w/ decortication 3/8/23   -expected postoperative changes with pleural thickening and minimal pleural effusion.  The effusion is too small for intervention and will likely to continue to resolve with time.  Recommend continue good pulmonary hygiene with incentive spirometry hourly as well as increase  activity/ambulation as tolerated.  March 23-appears decreased breath sounds more noticeable today on the right compared to the left.  Check follow-up chest x-ray.  On room air during the day, 1 L at night.  March 24- CXR relatively unchanged from previous, reached out to CT surgery given planned outpatient f/u on 3/28, no further imaging planned at this time as patient afebrile with normal WBC, monitor     Right-sided Chest Pain 3/24  -EKG sinus tach  -HS Troponin 25 > 24  -consider Cardiology consult if worsening  -pain reproducible with palpation, pain likely postoperative neuralgia as indicated vs. possible costochondritis, continue gabapentin, ice/heat, monitor    Hypokalemia  -3/24 K 3.3, repleted    mass on TTE with findings concerning for endocarditis - underwent MARIAMA 3/10/2023 which had no vegetation     RUE superficial thrombophlebitis concerning for septic phlebitis.        ID - continue 4 weeks of vancomycin as recommended by infectious disease dosed by pharmacy to achieve a trough level of 15-20 or area under the curve of 400-600 from last negative blood culture or last intervention which ever is the latest - to complete April 1, 2023     Left hip arthrocentesis March 16 to rule out any hip septic arthritis- no growth to date on fluid.    lower back pain - MRI of spine to rule out discitis or osteomyelitis.  This did have known degenerative changes but  no infection.      DVT prophylaxis - SCDs / apixiban. History of LLE DVT - on Eliquis at home     Pain management - Tylenol 1,000 mg three times a day/ Celebrex 100 mg q 112 hours/ gabapentin 300 mg q 8 hours Oxycodone 5 mg q 4 hours prn  March 18 - DC Celebrex 2/2 PHYLLIS, and anemia     Anxiety/ muscle spasms - Diazepam 2 mg q 6 hours prn - [facility] reviewed     Seizure disorder -Lamotrigine 200 mg daily    ABLA - March 18- Hgb 7.0 (7.3 on 3/16). Type, cross and transfuse 1 unit PRBCs. Pre-medicate with Tylenol and Benadryl. CBC in am.   March 19- Hgb 8.8 s/p  1 unit PRBcs. Hemoccult positive. On Eliquis for h/o DVT. Follow Hgb. May need GI work up.  March 20 - HGB 9.3 s/p transfusion  March 21-reviewed with internal medicine-hemoglobin stable after transfusion.  Iron studies consistent with inflammation.  No further work-up presently  March 22 - Hgb 8.9, stable  March 23-hemoglobin trend 8.3.  Continue to follow.  Recheck tomorrow    PHYLLIS - March 18- Creatinine 1.36 up from 1.12. On IV Vanc and Celebrex. DC Celebrex. BMP in am.  March 19- s/p 500ml NS bolus. Creatinine 1.31.  March 23-creatinine 0.92    TEAM CONF - MARCH 21 - BED MOD X 2. TRANSFERS MAX 2. STAND PIVOT OR SQUAT PIVOT. NON-AMBULATORY. Saturday MARCH 18 GAIT 6 FEET PARALLEL BARS MOD MAX OF 2.   BATH MOD 1-2. LBD DEP. UBD MOD. EATING MIN. GROOMING MIN. TOILETING DEP.   The patient has difficulty remembering new information due to short-term memory impairments.  Initial evaluation 3.18, pt obtained a score 12/30 on SLUMS cognitive assessment indicating severe cognitive impairment/dementia, goals initiated for memory and sustaining attention  FEES completed 3.7 revealing glottic gap accounting for glottic insufficiency, dysphonia, hoarse/breathy quality, recommend targeting vocal function as appropriate d/t impact on intelligibility.    Regular/thin diet continued since FEES completion 3.7, although pt known to cough with and without PO intake, may be contributed partially to ineffective VF closure.   DRESSING FORMER CHEST TUBE SITE. CONTINUES ON PUREWICK AT NIGHT. O2 AT 1-2 LITERS AT NIGHT.  DECREASED APPETITE. ABD X-RAY THIS AM SHOWS NON-OBSTRUCTIVE BOWEL GAS PATTERN.   ELOS - 4 WEEKS    Reviewed medications, vital signs, and recent lab work. Continue comprehensive inpatient rehabilitation program.    During rounds, used appropriate personal protective equipment including mask and gloves.  Additional gown if indicated.  Mask used was standard procedure mask. Appropriate PPE was worn during the entire visit.   Hand hygiene was completed before and after.    Shin Seay DO    Patient seen and case discussed with Dr. Rider.

## 2023-03-24 NOTE — PROGRESS NOTES
Patient seen and assessed.  She reports some pain around her surgical sites site which radiates across her chest, anteriorly.  She reports it is somewhat fiery in nature and she occasionally feels heat.  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  Would continue gabapentin.  May apply heat or ice to the site for patient comfort.  The bulging she feels from her abdomen is likely the lack of nerve innervation into the abdominal muscles which will improve over the next several months.  Chest tube site was apparently draining minimal amount of fluid.  I would clean site with peroxide twice daily.  Leave open to air is much as possible, or if draining may place dry gauze and paper tape over the site.  Discussed with patient, RN and Dr. Rider.  I will move patient's postoperative appointment with Dr. Cedillo out a couple of weeks while she is in rehab.  Please call at any time if needed.  Milana Hicks, APRN   [phone]

## 2023-03-24 NOTE — PROGRESS NOTES
Inpatient Rehabilitation Plan of Care Note    Plan of Care  Care Plan Reviewed - No updates at this time.    Sphincter Control    Performed Intervention(s)  Bladder/bowel training program  Monitor intake and output  Use incontinence products/equipment      Psychosocial    Performed Intervention(s)  Verbalizes needs and concerns  Support from family/peer groups      Body Systems    Performed Intervention(s)  Perform active and passive ROM  Frequent position changes      Safety    Performed Intervention(s)  Safety Rounds  Bed alarm/Chair alarm  Items within reach    Signed by: Ghada Osborne RN

## 2023-03-24 NOTE — PLAN OF CARE
Goal Outcome Evaluation:  Plan of Care Reviewed With: patient        Progress: improving   Patient is cooperative. Alert and oriented x 4. Forgetful at times. On 1 liter of oxygen via nasal cannula during the night. RA during the day. Using a purewick during the night. Continent and incontinent of B/B. She complained of right flank pain radiating across upper abdomen to the mid sternum area. Pain increasing with coughing. Neb treatment given.  EKG and troponin level ordered. On bed rest today.Tachycardia during therapies. Thoracic surgery consulted for right flank pain. Received order to apply peroxide to old drain side and keep open to air if no drainage. PRN pain medication given with positive result. Will continue to monitor.

## 2023-03-24 NOTE — PLAN OF CARE
Problem: Rehabilitation (IRF) Plan of Care  Goal: Plan of Care Review  Outcome: Ongoing, Progressing  Flowsheets (Taken 3/24/2023 0239)  Progress: improving  Plan of Care Reviewed With: patient  Outcome Evaluation: Patient A&Ox4 calm and cooperative.  at bedside. Pt appears to be sleeping well tonight. On IV vancomycin. R chest dressing intact. Using purewick at HS. No c.o any pain tonight. No unsafe behavior.

## 2023-03-24 NOTE — PLAN OF CARE
Goal Outcome Evaluation:  Plan of Care Reviewed With: patient        Progress: improving  Outcome Evaluation: Pt has been up in a WC most of the shift. No pain. LEs weak. Right lateral chest intact with dry dressing.  at bedisde.

## 2023-03-24 NOTE — THERAPY TREATMENT NOTE
Inpatient Rehabilitation - Physical Therapy Treatment Note       [facility]     Patient Name: Louise GARCIA  : 1965  MRN: [id number]    Today's Date: 3/24/2023                    Admit Date: 3/17/2023      Visit Dx:     ICD-10-CM ICD-9-CM   1. Impaired functional mobility, balance, gait, and endurance  Z74.09 V49.89       Patient Active Problem List   Diagnosis   • Sepsis (HCC)   • Neck pain, chronic   • Upper back pain   • Paresthesia   • Cervical myelopathy (HCC)   • Lower extremity weakness   • History of blood clots   • Chronic anticoagulation   • Seizures (HCC)   • Anemia   • GERD (gastroesophageal reflux disease)   • Guillain-Lancaster syndrome (HCC)   • Situational mixed anxiety and depressive disorder   • Mass of middle lobe of right lung   • Oral candidiasis   • Empyema of pleura (HCC)   • MRSA bacteremia   • Idiopathic peripheral neuropathy       Past Medical History:   Diagnosis Date   • Degenerative disc disease, cervical    • Gestational diabetes    • H/O blood clots    • Hematemesis    • Seizures ([facility])    • Septic shock (HCC)        Past Surgical History:   Procedure Laterality Date   • APPENDECTOMY     • BACK SURGERY     • CHOLECYSTECTOMY     • ENDOSCOPY N/A 2016    Procedure: ESOPHAGOGASTRODUODENOSCOPY with biopsy;  Surgeon: Austen Brito MD;  Location: [facility] ENDOSCOPY;  Service:    • HYSTERECTOMY     • NECK SURGERY       approx 6 yrs ago   • THORACOSCOPY Right 3/8/2023    Procedure: THORACOSCOPY WITH DAVINCI ROBOT WITH COMPLETE DECORTICATION;  Surgeon: Chloe Cedillo MD;  Location: [facility] OR;  Service: Robotics - DaVinci;  Laterality: Right;   • TONSILLECTOMY     • TONSILLECTOMY AND ADENOIDECTOMY         PT ASSESSMENT (last 12 hours)     IRF PT Evaluation and Treatment     Row Name 23 1400          PT Time and Intention    Document Type daily treatment  -AE     Mode of Treatment individual therapy;physical therapy  -AE     Patient/Family/Caregiver  Comments/Observations pt seen bedside today 2/2 episode of chest pain and elevated HR resting at 115-116bpm. MD aware and requesting bed level only. awaiting cardio but MD stated cleared for OOB activity 3/25  -AE     Row Name 03/24/23 1400          General Information    Patient Profile Reviewed yes  -AE     Existing Precautions/Restrictions fall;other (see comments)  contact  -AE     Limitations/Impairments safety/cognitive  -AE     Comment, General Information contact 2/2 mrsa  -AE     Row Name 03/24/23 1400          Pain Assessment    Pretreatment Pain Rating 4/10  -AE     Posttreatment Pain Rating 4/10  -AE     Pain Location - Side/Orientation Right  -AE     Pain Location incisional  -AE     Pain Location - chest  -AE     Pre/Posttreatment Pain Comment incisional pain at rest worse with coughing, complains of heat  -AE     Row Name 03/24/23 1400          Cognition/Psychosocial    Affect/Mental Status (Cognition) confused;flat/blunted affect  -AE     Orientation Status (Cognition) oriented to;person;place  -AE     Follows Commands (Cognition) follows one-step commands;50-74% accuracy;delayed response/completion;increased processing time needed;initiation impaired;repetition of directions required;verbal cues/prompting required  -AE     Personal Safety Interventions fall prevention program maintained;gait belt;muscle strengthening facilitated;nonskid shoes/slippers when out of bed;supervised activity  -AE     Row Name 03/24/23 1400          Bed Mobility    Rolling Left [place] (Bed Mobility) contact guard;minimum assist (75% patient effort)  -AE     Rolling Right [place] (Bed Mobility) contact guard;minimum assist (75% patient effort)  -AE     Supine-Sit [place] (Bed Mobility) contact guard;minimum assist (75% patient effort)  -AE     Assistive Device (Bed Mobility) bed rails  -AE     Comment, (Bed Mobility) long sitting in bed x 3 pulling on rails  spent AM session bed mobility rolling 5-6 times  while stripping bed sheets, performing brief hygiene, bathing, and dressing tasks  -AE     Row Name 03/24/23 1400          Hip (Therapeutic Exercise)    Hip Strengthening (Therapeutic Exercise) supine;bilateral;external rotation;3 sets;5 repetitions  -AE     Row Name 03/24/23 1400          Knee (Therapeutic Exercise)    Knee (Therapeutic Exercise) isometric exercises  -AE     Knee Isometrics (Therapeutic Exercise) bilateral;quad sets;2 sets;10 repetitions  -AE     Knee Strengthening (Therapeutic Exercise) bilateral;SAQ (short arc quad);2 sets;5 repetitions  -AE     Row Name 03/24/23 1400          Ankle (Therapeutic Exercise)    Ankle AROM (Therapeutic Exercise) bilateral;dorsiflexion;plantarflexion;10 repetitions  -AE     Row Name 03/24/23 1400          Positioning and Restraints    Pre-Treatment Position in bed  -AE     Post Treatment Position bed  -AE           User Key  (r) = Recorded By, (t) = Taken By, (c) = Cosigned By    Initials Name Provider Type    AE Deanna Carr, PT Physical Therapist              Wound 03/08/23 1917 Right lateral chest Incision (Active)   Dressing Appearance dry;intact 03/24/23 0741   Closure None 03/24/23 0741   Base pink 03/24/23 0741   Periwound Temperature warm 03/24/23 0741   Periwound Skin Turgor soft 03/24/23 0741   Drainage Amount none 03/24/23 0741   Dressing Care dressing changed 03/24/23 0741       Wound 03/11/23 1530 Other (See comments) other (see comments) pubis Abrasion (Active)   Dressing Appearance open to air 03/24/23 0741   Closure Open to air 03/24/23 0741   Base scab;dry;pink 03/24/23 0741   Periwound dry 03/24/23 0741   Periwound Temperature warm 03/24/23 0741   Periwound Skin Turgor soft 03/24/23 0741   Dressing Care open to air 03/24/23 0741   Periwound Care barrier ointment applied 03/24/23 0741       Wound 03/20/23 1527 Bilateral upper gluteal MASD (Moisture associated skin damage) (Active)   Dressing Appearance open to air 03/24/23 0741   Closure None  03/24/23 0741   Base blanchable;red;pink 03/24/23 0741   Drainage Amount none 03/24/23 0741   Care, Wound cleansed with;soap and water;barrier applied 03/24/23 0741   Dressing Care skin barrier agent applied 03/24/23 0741     Physical Therapy Education     Title: PT OT SLP Therapies (In Progress)     Topic: Physical Therapy (Done)     Point: Mobility training (Done)     Learning Progress Summary           Patient Acceptance, E,D, NR,VU,DU by DP at 3/21/2023 1448    Acceptance, E, NR by JK at 3/20/2023 1513    Acceptance, E,TB, VU,NR by SAMANTHA at 3/18/2023 1643                   Point: Home exercise program (Done)     Learning Progress Summary           Patient Acceptance, E,D, NR,VU,DU by LOLIS at 3/21/2023 1448    Acceptance, E, NR by ETHAN at 3/20/2023 1513                   Point: Body mechanics (Done)     Learning Progress Summary           Patient Acceptance, E,D, NR,VU,DU by DP at 3/21/2023 1448                   Point: Precautions (Done)     Learning Progress Summary           Patient Acceptance, E,D, NR,VU,DU by DP at 3/21/2023 1448                               User Key     Initials Effective Dates Name Provider Type Discipline    ETHAN 06/16/21 -  Juana Veloz, PT Physical Therapist PT     06/16/21 -  Yolanda Shannon, PT Physical Therapist PT    DP 08/24/21 -  Papa Marsh, PT Physical Therapist PT                PT Recommendation and Plan                          Time Calculation:      PT Charges     Row Name 03/24/23 1514 03/24/23 1509 03/24/23 1508       Time Calculation    Start Time 1400  -AE -- 0830  -AE    Stop Time 1430  -AE -- 0900  -AE    Time Calculation (min) 30 min  -AE -- 30 min  -AE    PT Received On 03/24/23  -AE -- 03/24/23  -AE    PT - Next Appointment 03/25/23  -AE 03/25/23  -AE 03/25/23  -AE       Time Calculation- PT    Total Timed Code Minutes- PT 30 minute(s)  -AE -- 30 minute(s)  -AE          User Key  (r) = Recorded By, (t) = Taken By, (c) = Cosigned By    Initials Name Provider Type     AE Deanna Carr, PT Physical Therapist                Therapy Charges for Today     Code Description Service Date Service Provider Modifiers Qty    51693946052 HC PT THERAPEUTIC ACT EA 15 MIN 3/23/2023 Deanna Carr, PT GP 2    03151100406 HC PT THER PROC EA 15 MIN 3/23/2023 Deanna Carr, PT GP 2    82206706651 HC PT THER PROC EA 15 MIN 3/24/2023 Deanna Carr, PT GP 2    05120192905 HC PT THERAPEUTIC ACT EA 15 MIN 3/24/2023 Deanna Carr, PT GP 2    15796813475 HC PT THER SUPP EA 15 MIN 3/24/2023 Deanna Carr, PT GP 2                   Deanna Carr, PT  3/24/2023

## 2023-03-24 NOTE — PROGRESS NOTES
Inpatient Rehabilitation Plan of Care Note    Plan of Care  Care Plan Reviewed - No updates at this time.    Sphincter Control    [RN] Bladder Management(Active)  Current Status(03/24/2023): Bladder urgency  Weekly Goal(03/28/2023): decrease in episodes of incontinence  Discharge Goal: Continent 100%    [RN] Bowel Management(Active)  Current Status(03/24/2023): Patient is 100% continent of bowel  Weekly Goal(03/28/2023): Patient will maintain a daily bowel pattern.  Discharge Goal: Patient will maintain 100% continence of bowel    Performed Intervention(s)  Bladder/bowel training program  Monitor intake and output  Use incontinence products/equipment      Psychosocial    [RN] Coping/Adjustment(Active)  Current Status(03/24/2023): Anxiety r/t uncertainty of disease course  Weekly Goal(03/28/2023): Allow opportunity to express concerns regarding current  status  Discharge Goal: Patient/family will verbalize decreased feelings of anxiety.    Performed Intervention(s)  Verbalizes needs and concerns  Support from family/peer groups      Body Systems    [RN] Neurological(Active)  Current Status(03/24/2023): Impaired physical mobility r/t decreased muscle  strength/control and limited ROM  Weekly Goal(03/28/2023): Patient will have increased ROM  Discharge Goal: Patient will have improved strength and function of BLE and B  hands.    Performed Intervention(s)  Perform active and passive ROM  Frequent position changes      Safety    [RN] Potential for Injury(Active)  Current Status(03/24/2023): Risk for falls  Weekly Goal(03/28/2023): Family/Caregivers regarding safety precautions and need  for close supervision  Discharge Goal: Patient/family will be aware of risk of fall and safety in the  home setting.    Performed Intervention(s)  Safety Rounds  Bed alarm/Chair alarm  Items within reach    Signed by: Agustin Koroma RN

## 2023-03-25 LAB
ANION GAP SERPL CALCULATED.3IONS-SCNC: 8.8 MMOL/L (ref 5–15)
BUN SERPL-MCNC: 7 MG/DL (ref 6–20)
BUN/CREAT SERPL: 7.1 (ref 7–25)
CALCIUM SPEC-SCNC: 8.7 MG/DL (ref 8.6–10.5)
CHLORIDE SERPL-SCNC: 100 MMOL/L (ref 98–107)
CO2 SERPL-SCNC: 28.2 MMOL/L (ref 22–29)
CREAT SERPL-MCNC: 0.99 MG/DL (ref 0.57–1)
DEPRECATED RDW RBC AUTO: 46.5 FL (ref 37–54)
EGFRCR SERPLBLD CKD-EPI 2021: 65.8 ML/MIN/1.73
ERYTHROCYTE [DISTWIDTH] IN BLOOD BY AUTOMATED COUNT: 13.7 % (ref 12.3–15.4)
GLUCOSE SERPL-MCNC: 88 MG/DL (ref 65–99)
HCT VFR BLD AUTO: 25.7 % (ref 34–46.6)
HGB BLD-MCNC: 8.5 G/DL (ref 12–15.9)
MCH RBC QN AUTO: 31.1 PG (ref 26.6–33)
MCHC RBC AUTO-ENTMCNC: 33.1 G/DL (ref 31.5–35.7)
MCV RBC AUTO: 94.1 FL (ref 79–97)
PLATELET # BLD AUTO: 370 10*3/MM3 (ref 140–450)
PMV BLD AUTO: 10.4 FL (ref 6–12)
POTASSIUM SERPL-SCNC: 3.9 MMOL/L (ref 3.5–5.2)
RBC # BLD AUTO: 2.73 10*6/MM3 (ref 3.77–5.28)
SODIUM SERPL-SCNC: 137 MMOL/L (ref 136–145)
WBC NRBC COR # BLD: 10.07 10*3/MM3 (ref 3.4–10.8)

## 2023-03-25 PROCEDURE — 94799 UNLISTED PULMONARY SVC/PX: CPT

## 2023-03-25 PROCEDURE — 97530 THERAPEUTIC ACTIVITIES: CPT

## 2023-03-25 PROCEDURE — 80048 BASIC METABOLIC PNL TOTAL CA: CPT | Performed by: PHYSICAL MEDICINE & REHABILITATION

## 2023-03-25 PROCEDURE — 85027 COMPLETE CBC AUTOMATED: CPT | Performed by: PHYSICAL MEDICINE & REHABILITATION

## 2023-03-25 PROCEDURE — 25010000002 VANCOMYCIN PER 500 MG: Performed by: PHYSICAL MEDICINE & REHABILITATION

## 2023-03-25 RX ADMIN — GABAPENTIN 300 MG: 300 CAPSULE ORAL at 13:22

## 2023-03-25 RX ADMIN — IPRATROPIUM BROMIDE AND ALBUTEROL SULFATE 3 ML: 2.5; .5 SOLUTION RESPIRATORY (INHALATION) at 16:32

## 2023-03-25 RX ADMIN — IPRATROPIUM BROMIDE AND ALBUTEROL SULFATE 3 ML: 2.5; .5 SOLUTION RESPIRATORY (INHALATION) at 11:27

## 2023-03-25 RX ADMIN — Medication 5 MG: at 22:39

## 2023-03-25 RX ADMIN — GABAPENTIN 300 MG: 300 CAPSULE ORAL at 06:27

## 2023-03-25 RX ADMIN — Medication 1 MG: at 09:27

## 2023-03-25 RX ADMIN — IPRATROPIUM BROMIDE AND ALBUTEROL SULFATE 3 ML: 2.5; .5 SOLUTION RESPIRATORY (INHALATION) at 20:19

## 2023-03-25 RX ADMIN — DESVENLAFAXINE SUCCINATE 25 MG: 25 TABLET, EXTENDED RELEASE ORAL at 09:26

## 2023-03-25 RX ADMIN — APIXABAN 5 MG: 5 TABLET, FILM COATED ORAL at 09:26

## 2023-03-25 RX ADMIN — LAMOTRIGINE 200 MG: 100 TABLET ORAL at 09:27

## 2023-03-25 RX ADMIN — Medication 500 MCG: at 09:26

## 2023-03-25 RX ADMIN — APIXABAN 5 MG: 5 TABLET, FILM COATED ORAL at 22:39

## 2023-03-25 RX ADMIN — PANTOPRAZOLE SODIUM 40 MG: 40 TABLET, DELAYED RELEASE ORAL at 06:27

## 2023-03-25 RX ADMIN — GABAPENTIN 300 MG: 300 CAPSULE ORAL at 22:39

## 2023-03-25 RX ADMIN — Medication 100 MG: at 09:27

## 2023-03-25 RX ADMIN — OXYCODONE HYDROCHLORIDE 2.5 MG: 5 TABLET ORAL at 13:22

## 2023-03-25 RX ADMIN — IPRATROPIUM BROMIDE AND ALBUTEROL SULFATE 3 ML: 2.5; .5 SOLUTION RESPIRATORY (INHALATION) at 07:18

## 2023-03-25 RX ADMIN — VANCOMYCIN HYDROCHLORIDE 1000 MG: 1 INJECTION, SOLUTION INTRAVENOUS at 22:40

## 2023-03-25 NOTE — PLAN OF CARE
Goal Outcome Evaluation:  Plan of Care Reviewed With: patient        Progress: improving   A/o x4, 1L NC, VSS w/ slightly elevated temp. Pills whole in applesauce. Complaining of pain/soreness in L foot. Oxycodone 2.5 administered x1. WCTM.

## 2023-03-25 NOTE — PROGRESS NOTES
Inpatient Rehabilitation Plan of Care Note    Plan of Care  Care Plan Reviewed - No updates at this time.    Sphincter Control    Performed Intervention(s)  Bladder/bowel training program  Monitor intake and output  Use incontinence products/equipment      Psychosocial    Performed Intervention(s)  Verbalizes needs and concerns  Support from family/peer groups      Body Systems    Performed Intervention(s)  Perform active and passive ROM  Frequent position changes      Safety    Performed Intervention(s)  Safety Rounds  Bed alarm/Chair alarm  Items within reach    Signed by: Lorin He RN

## 2023-03-25 NOTE — THERAPY TREATMENT NOTE
Inpatient Rehabilitation - Physical Therapy Treatment Note       [facility]     Patient Name: Louise GARCIA  : 1965  MRN: [id number]    Today's Date: 3/25/2023                    Admit Date: 3/17/2023      Visit Dx:     ICD-10-CM ICD-9-CM   1. 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)   • Situational mixed anxiety and depressive disorder   • Mass of middle lobe of right lung   • Oral candidiasis   • Empyema of pleura (HCC)   • MRSA bacteremia   • Idiopathic peripheral neuropathy       Past Medical History:   Diagnosis Date   • Degenerative disc disease, cervical    • Gestational diabetes    • H/O blood clots    • Hematemesis    • Seizures (HCC)    • Septic shock (HCC)        Past Surgical History:   Procedure Laterality Date   • APPENDECTOMY     • BACK SURGERY     • CHOLECYSTECTOMY     • ENDOSCOPY N/A 2016    Procedure: ESOPHAGOGASTRODUODENOSCOPY with biopsy;  Surgeon: Austen Brito MD;  Location: [facility] ENDOSCOPY;  Service:    • HYSTERECTOMY     • NECK SURGERY       approx 6 yrs ago   • THORACOSCOPY Right 3/8/2023    Procedure: THORACOSCOPY WITH DAVINCI ROBOT WITH COMPLETE DECORTICATION;  Surgeon: Clhoe Cedillo MD;  Location: [facility] OR;  Service: Robotics - DaVinci;  Laterality: Right;   • TONSILLECTOMY     • TONSILLECTOMY AND ADENOIDECTOMY         PT ASSESSMENT (last 12 hours)     IRF PT Evaluation and Treatment     Row Name 23 4740          PT Time and Intention    Document Type daily treatment  -EE     Mode of Treatment physical therapy  -EE     Patient/Family/Caregiver Comments/Observations Pt supine in  bed, agreable to PT.  -EE     Row Name 03/25/23 1250          General Information    Existing Precautions/Restrictions fall;other (see comments)  contact isolation  -EE     Limitations/Impairments safety/cognitive  -EE     Row Name 03/25/23 1250          Pain Assessment    Pretreatment Pain Rating 7/10  -EE     Posttreatment Pain Rating 7/10  -EE     Pain Location - Side/Orientation Left  -EE     Pain Location lower  -EE     Pain Location - extremity  -EE     Pre/Posttreatment Pain Comment pt provided rest breaks as needed throughout session; RNBoo, notified pt requesting pain meds for L LE pain.  -EE     Row Name 03/25/23 1250          Cognition/Psychosocial    Affect/Mental Status (Cognition) flat/blunted affect  -EE     Orientation Status (Cognition) oriented to;place;person  -EE     Follows Commands (Cognition) follows one-step commands;75-90% accuracy;repetition of directions required;verbal cues/prompting required;increased processing time needed  -EE     Personal Safety Interventions fall prevention program maintained;gait belt;muscle strengthening facilitated;nonskid shoes/slippers when out of bed;supervised activity  -EE     Cognitive Function memory deficit;safety deficit  -EE     Safety Deficit (Cognition) insight into deficits/self-awareness;safety precautions follow-through/compliance  -EE     Row Name 03/25/23 1250          Bed Mobility    Supine-Sit [place] (Bed Mobility) moderate assist (50% patient effort);verbal cues  -EE     Bed Mobility, Safety Issues decreased use of arms for pushing/pulling;decreased use of legs for bridging/pushing;impaired trunk control for bed mobility  -EE     Assistive Device (Bed Mobility) bed rails;head of bed elevated  -EE     Row Name 03/25/23 1250          Sit-Stand Transfer    Sit-Stand [place] (Transfers) moderate assist (50% patient effort);verbal cues;nonverbal cues (demo/gesture);1 person assist;2 person assist  x1 assist in // bars, x2 assist to  rwx  -EE     Assistive Device (Sit-Stand Transfers) parallel bars;walker, front-wheeled  -EE     Comment, (Sit-Stand Transfer) STS x3 in // bars, then x 1 to rwx with cues for hand placement.  -EE     Row Name 03/25/23 1250          Stand-Sit Transfer    Stand-Sit [place] (Transfers) moderate assist (50% patient effort);verbal cues;nonverbal cues (demo/gesture)  -EE     Assistive Device (Stand-Sit Transfers) wheelchair  -EE     Comment, (Stand-Sit Transfer) cues for hand placement  -EE     Row Name 03/25/23 1250          Gait/Stairs (Locomotion)    [place] Level (Gait) minimum assist (75% patient effort);maximum assist (25% patient effort);2 person assist;verbal cues;nonverbal cues (demo/gesture)  -EE     Assistive Device (Gait) walker, front-wheeled;parallel bars  close follow with WC for safety  -EE     Distance in Feet (Gait) 6' x 2 in // bars, 15' x1 with rwx  -EE     Pattern (Gait) step-through;step-to  -EE     Deviations/Abnormal Patterns (Gait) feder[place] decreased;bilateral deviations;base of support, narrow;festinating/shuffling;stride length decreased;weight shifting decreased;left sided deviations;antalgic  -EE     Bilateral Gait Deviations heel strike decreased;weight shift ability decreased;knee buckling bilaterally  -EE     Left Sided Gait Deviations decreased knee extension  -EE     Right Sided Gait Deviations decreased knee extension  -EE     Gait Assessment/Intervention Began ambulatioh in // bars with min A x2. Therapist initially loosely blocking L knee in stance due to pain; however, pt progressed to ambulation without assist required for blocking B knees. Pt able to advance B LEs independently. Vc's for upright. On last trial, pt ambulated overground with rwx. Pt ambulated 15' with min A x2, then B knes buckled suddenly due to fatigue. Pt required max A x2 to safely recover to standing posture, then was slowly assisted to seated position in WC.  -EE     Row Name 03/25/23 1250           Safety Issues, Functional Mobility    Impairments Affecting Function (Mobility) balance;cognition;endurance/activity tolerance;postural/trunk control;pain;strength  -EE     Row Name 03/25/23 1250          Balance    Balance Interventions static;standing;supported;weight shifting activity  -EE     Comment, Balance Static standing in // bars x3 trials with min/mod A and B UE support on bars. Initially static standing, then progressed to slow, lateral weight shifting from R LE to L LE with vc's and tc's.  -EE     Row Name 03/25/23 1250          Positioning and Restraints    Pre-Treatment Position in bed  -EE     Post Treatment Position wheelchair  -EE     In Wheelchair sitting;call light within reach;encouraged to call for assist;exit alarm on;notified nsg  -EE     Row Name 03/25/23 1250          Vital Signs    Pretreatment Heart Rate (beats/min) 108  -EE     Intratreatment Heart Rate (beats/min) 120  -EE     Posttreatment Heart Rate (beats/min) 110  -EE     Pre SpO2 (%) 98  -EE     O2 Delivery Pre Treatment supplemental O2  -EE     Intra SpO2 (%) 91  -EE     O2 Delivery Intra Treatment room air  -EE     Post SpO2 (%) 94  -EE     O2 Delivery Post Treatment room air  -EE     Pre Patient Position Supine  -EE     Intra Patient Position Standing  -EE     Post Patient Position Sitting  -EE           User Key  (r) = Recorded By, (t) = Taken By, (c) = Cosigned By    Initials Name Provider Type    EE Melinda Mann, PT Physical Therapist              Wound 03/08/23 1917 Right lateral chest Incision (Active)   Dressing Appearance open to air 03/24/23 2234   Closure Other (Comment) 03/24/23 2234   Base dry;pink 03/24/23 2234   Periwound other (see comments) 03/24/23 2234   Drainage Amount none 03/24/23 2234   Care, Wound cleansed with;other (see comments) 03/24/23 2234   Dressing Care open to air 03/24/23 2234       Wound 03/20/23 1527 Bilateral upper gluteal MASD (Moisture associated skin damage) (Active)   Dressing Appearance  open to air 03/24/23 2234   Base blanchable;red;pink 03/24/23 2234   Drainage Amount none 03/24/23 2234     Physical Therapy Education     Title: PT OT SLP Therapies (In Progress)     Topic: Physical Therapy (Done)     Point: Mobility training (Done)     Learning Progress Summary           Patient Acceptance, E,D, VU,NR by EE at 3/25/2023 1423    Acceptance, E,D, NR,VU,DU by DP at 3/21/2023 1448    Acceptance, E, NR by JK at 3/20/2023 1513    Acceptance, E,TB, VU,NR by KP at 3/18/2023 1643                   Point: Home exercise program (Done)     Learning Progress Summary           Patient Acceptance, E,D, NR,VU,DU by DP at 3/21/2023 1448    Acceptance, E, NR by JK at 3/20/2023 1513                   Point: Body mechanics (Done)     Learning Progress Summary           Patient Acceptance, E,D, VU,NR by EE at 3/25/2023 1423    Acceptance, E,D, NR,VU,DU by DP at 3/21/2023 1448                   Point: Precautions (Done)     Learning Progress Summary           Patient Acceptance, E,D, VU,NR by EE at 3/25/2023 1423    Acceptance, E,D, NR,VU,DU by DP at 3/21/2023 1448                               User Key     Initials Effective Dates Name Provider Type Discipline    EE 06/16/21 -  Melinda Mann, PT Physical Therapist PT    CLOVERK 06/16/21 -  Juana Veloz, PT Physical Therapist PT    KP 06/16/21 -  Yolanda Shannon, PT Physical Therapist PT    DP 08/24/21 -  Papa Marsh, PT Physical Therapist PT                PT Recommendation and Plan                          Time Calculation:      PT Charges     Row Name 03/25/23 1423             Time Calculation    Start Time 1245  -EE      Stop Time 1315  -EE      Time Calculation (min) 30 min  -EE      PT Received On 03/25/23  -EE      PT - Next Appointment 03/27/23  -EE         Time Calculation- PT    Total Timed Code Minutes- PT 30 minute(s)  -EE            User Key  (r) = Recorded By, (t) = Taken By, (c) = Cosigned By    Initials Name Provider Type    EE Melinda Mann, PT  Physical Therapist                Therapy Charges for Today     Code Description Service Date Service Provider Modifiers Qty    [id number]  PT THERAPEUTIC ACT EA 15 MIN 3/25/2023 Melinda Mann, PT GP 2    [id number]  PT THER SUPP EA 15 MIN 3/25/2023 Melinda Mann, PT GP 2                   Melinda Mann, PT  3/25/2023

## 2023-03-25 NOTE — PLAN OF CARE
Goal Outcome Evaluation:  Plan of Care Reviewed With: patient        Progress: improving  Outcome Evaluation: Pt states is sleeping well. Peroxide applied to R previous chest tube sites. No drainage noted, DHIRAJ. Red area noted below incisions. O2 1L n/c. Purewick at night.

## 2023-03-25 NOTE — PROGRESS NOTES
[facility]     Progress Note    Patient Name: Louise GARCIA  : 1965  MRN: [id number]  Primary Care Physician:  Chloe Schaefer APRN  Date of admission: 3/17/2023    Subjective   Subjective     Chief Complaint:   Subacute motor predominant idiopathic peripheral neuropathy with Paraparesis and Areflexia.   S/p IVIG x 5 days  R lung masslike infiltrate with cavitary lesions/pleural effusion   S/p thoracentesis - Blood cx and pleural fluid  positive for MRSA     History of Present Illness  Patient Reports right chest wall pain improved and not worsening. No soa/cp/palpitations. Tolerating rehab sessions.  Sometimes gait belt rubs on her right chest wall incisions and that is tender.    Review of Systems    Objective   Objective     Vitals:   Temp:  [97.8 °F (36.6 °C)-100.3 °F (37.9 °C)] 100.3 °F (37.9 °C)  Heart Rate:  [100-110] 110  Resp:  [18-22] 18  BP: (119-138)/(80-82) 130/82  Flow (L/min):  [1] 1    Physical Exam   MENTAL STATUS -  AAO x3, Verbal  Gen - nad, son at bedside  HEENT- NCAT, SCLERAE ANICTERIC, CONJUNCTIVAE PINK, OP MOIST, NO JVD, EARS UNREMARKABLE EXTERNALLY     LUNGS - DECREASED BREATH SOUNDS RIGHT BASE MORE SO THAN LEFT BASE  HEART- Tachycardic, no murmur  Chest wall TTP Rt side, incisions c/d/i with no warmth/erythema  ABD - NORMOACTIVE BOWEL SOUNDS, SOFT, NT.  EXT - NO EDEMA OR CYANOSIS  MOTOR EXAM - maex4     Result Review    Result Review:  I have personally reviewed the results from the time of this admission to 3/25/2023 15:31 EDT and agree with these findings:  []  Laboratory list / accordion  []  Microbiology  []  Radiology  []  EKG/Telemetry   []  Cardiology/Vascular   []  Pathology  []  Old records  []  Other:  Most notable findings include:   Results from last 7 days   Lab Units 23  0640 23  0822 23  0718   WBC 10*3/mm3 10.07 10.39 10.56   HEMOGLOBIN g/dL 8.5* 8.6* 8.3*   HEMATOCRIT % 25.7* 25.3* 25.0*   PLATELETS 10*3/mm3 370 349 412     Results from last 7  days   Lab Units 03/25/23  0640 03/24/23  0822 03/23/23  0718   SODIUM mmol/L 137 135* 137   POTASSIUM mmol/L 3.9 3.3* 3.4*   CHLORIDE mmol/L 100 98 100   CO2 mmol/L 28.2 27.7 29.0   BUN mg/dL 7 7 8   CREATININE mg/dL 0.99 0.91 0.92   GLUCOSE mg/dL 88 142* 91   CALCIUM mg/dL 8.7 8.7 8.2*     Scheduled Meds:apixaban, 5 mg, Oral, Q12H  desvenlafaxine, 25 mg, Oral, Daily  folic acid, 1 mg, Oral, Daily  gabapentin, 300 mg, Oral, Q8H  ipratropium-albuterol, 3 mL, Nebulization, 4x Daily - RT  lamoTRIgine, 200 mg, Oral, Daily  melatonin, 5 mg, Oral, Nightly  pantoprazole, 40 mg, Oral, Q AM  thiamine, 100 mg, Oral, Daily  vancomycin, 1,000 mg, Intravenous, Q24H  vitamin B-12, 500 mcg, Oral, Daily      Continuous Infusions:Pharmacy to dose vancomycin,       PRN Meds:.•  acetaminophen  •  bisacodyl  •  oxyCODONE  •  Pharmacy to dose vancomycin  •  polyethylene glycol  •  senna-docusate sodium  •  simethicone        Assessment & Plan   Assessment / Plan     Brief Patient Summary:  Louise GARCIA is a 58 y.o. 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   S/p IVIG x 5 days     Impaired mobility/impaired self care/ impaired cognition     R lung masslike infiltrate with cavitary lesions/pleural effusion   S/p thoracentesis - Blood cx and pleural fluid  positive for MRSA      chest tube placement given empyema, and she underwent thoracoscopy w/ decortication 3/8/23   -expected postoperative changes with pleural thickening and minimal pleural effusion.  The effusion is too small for intervention and will likely to continue to resolve with time.  Recommend continue good pulmonary hygiene with incentive  spirometry hourly as well as increase activity/ambulation as tolerated.  March 23-appears decreased breath sounds more noticeable today on the right compared to the left.  Check follow-up chest x-ray.  On room air during the day, 1 L at night.  March 24- CXR relatively unchanged from previous, reached out to CT surgery given planned outpatient f/u on 3/28, no further imaging planned at this time as patient afebrile with normal WBC, monitor  March 25 - wbc stable, incision sites c/d/i     Right-sided Chest Pain 3/24  -EKG sinus tach  -HS Troponin 25 > 24  -consider Cardiology consult if worsening  -pain reproducible with palpation, pain likely postoperative neuralgia as indicated vs. possible costochondritis, continue gabapentin, ice/heat, monitor  3/25 - CT surgery saw patient on 3/24 with no concerning findings. Possibly intercostal neuralgia. Patient reports improvement today.     Hypokalemia  -3/24 K 3.3, repleted  3/25 - K 3.9, successfully repleted yesterday     mass on TTE with findings concerning for endocarditis - underwent MARIAMA 3/10/2023 which had no vegetation     RUE superficial thrombophlebitis concerning for septic phlebitis.        ID - continue 4 weeks of vancomycin as recommended by infectious disease dosed by pharmacy to achieve a trough level of 15-20 or area under the curve of 400-600 from last negative blood culture or last intervention which ever is the latest - to complete April 1, 2023     Left hip arthrocentesis March 16 to rule out any hip septic arthritis- no growth to date on fluid.    lower back pain - MRI of spine to rule out discitis or osteomyelitis.  This did have known degenerative changes but  no infection.      DVT prophylaxis - SCDs / apixiban. History of LLE DVT - on Eliquis at home     Pain management - Tylenol 1,000 mg three times a day/ Celebrex 100 mg q 112 hours/ gabapentin 300 mg q 8 hours Oxycodone 5 mg q 4 hours prn  March 18 - DC Celebrex 2/2 PHYLLIS, and anemia      Anxiety/  muscle spasms - Diazepam 2 mg q 6 hours prn - JONES reviewed     Seizure disorder -Lamotrigine 200 mg daily     ABLA - March 18- Hgb 7.0 (7.3 on 3/16). Type, cross and transfuse 1 unit PRBCs. Pre-medicate with Tylenol and Benadryl. CBC in am.   March 19- Hgb 8.8 s/p 1 unit PRBcs. Hemoccult positive. On Eliquis for h/o DVT. Follow Hgb. May need GI work up.  March 20 - HGB 9.3 s/p transfusion  March 21-reviewed with internal medicine-hemoglobin stable after transfusion.  Iron studies consistent with inflammation.  No further work-up presently  March 22 - Hgb 8.9, stable  March 23-hemoglobin trend 8.3.  Continue to follow.  Recheck tomorrow  March 25 - Hb 8.5     PHYLLIS - March 18- Creatinine 1.36 up from 1.12. On IV Vanc and Celebrex. DC Celebrex. BMP in am.  March 19- s/p 500ml NS bolus. Creatinine 1.31.  March 23-creatinine 0.92  March 25 - creat stable    DVT prophylaxis:  Medical and mechanical DVT prophylaxis orders are present.    CODE STATUS:    Level Of Support Discussed With: Patient  Code Status (Patient has no pulse and is not breathing): CPR (Attempt to Resuscitate)  Medical Interventions (Patient has pulse or is breathing): Full Support        Yu Hernandez MD

## 2023-03-26 LAB
ANION GAP SERPL CALCULATED.3IONS-SCNC: 7.5 MMOL/L (ref 5–15)
BUN SERPL-MCNC: 7 MG/DL (ref 6–20)
BUN/CREAT SERPL: 7.3 (ref 7–25)
CALCIUM SPEC-SCNC: 8.8 MG/DL (ref 8.6–10.5)
CHLORIDE SERPL-SCNC: 98 MMOL/L (ref 98–107)
CO2 SERPL-SCNC: 29.5 MMOL/L (ref 22–29)
CREAT SERPL-MCNC: 0.96 MG/DL (ref 0.57–1)
DEPRECATED RDW RBC AUTO: 48.3 FL (ref 37–54)
EGFRCR SERPLBLD CKD-EPI 2021: 68.3 ML/MIN/1.73
ERYTHROCYTE [DISTWIDTH] IN BLOOD BY AUTOMATED COUNT: 14.1 % (ref 12.3–15.4)
GLUCOSE SERPL-MCNC: 98 MG/DL (ref 65–99)
HCT VFR BLD AUTO: 26.2 % (ref 34–46.6)
HGB BLD-MCNC: 8.2 G/DL (ref 12–15.9)
MCH RBC QN AUTO: 29.7 PG (ref 26.6–33)
MCHC RBC AUTO-ENTMCNC: 31.3 G/DL (ref 31.5–35.7)
MCV RBC AUTO: 94.9 FL (ref 79–97)
PLATELET # BLD AUTO: 393 10*3/MM3 (ref 140–450)
PMV BLD AUTO: 10.4 FL (ref 6–12)
POTASSIUM SERPL-SCNC: 3.8 MMOL/L (ref 3.5–5.2)
RBC # BLD AUTO: 2.76 10*6/MM3 (ref 3.77–5.28)
SODIUM SERPL-SCNC: 135 MMOL/L (ref 136–145)
WBC NRBC COR # BLD: 10.58 10*3/MM3 (ref 3.4–10.8)

## 2023-03-26 PROCEDURE — 80048 BASIC METABOLIC PNL TOTAL CA: CPT | Performed by: PHYSICAL MEDICINE & REHABILITATION

## 2023-03-26 PROCEDURE — 85027 COMPLETE CBC AUTOMATED: CPT | Performed by: PHYSICAL MEDICINE & REHABILITATION

## 2023-03-26 PROCEDURE — 94799 UNLISTED PULMONARY SVC/PX: CPT

## 2023-03-26 PROCEDURE — 25010000002 VANCOMYCIN PER 500 MG: Performed by: PHYSICAL MEDICINE & REHABILITATION

## 2023-03-26 RX ADMIN — APIXABAN 5 MG: 5 TABLET, FILM COATED ORAL at 21:18

## 2023-03-26 RX ADMIN — OXYCODONE HYDROCHLORIDE 2.5 MG: 5 TABLET ORAL at 00:45

## 2023-03-26 RX ADMIN — GABAPENTIN 300 MG: 300 CAPSULE ORAL at 21:18

## 2023-03-26 RX ADMIN — IPRATROPIUM BROMIDE AND ALBUTEROL SULFATE 3 ML: 2.5; .5 SOLUTION RESPIRATORY (INHALATION) at 14:52

## 2023-03-26 RX ADMIN — Medication 1 MG: at 07:59

## 2023-03-26 RX ADMIN — Medication 100 MG: at 07:58

## 2023-03-26 RX ADMIN — GABAPENTIN 300 MG: 300 CAPSULE ORAL at 05:41

## 2023-03-26 RX ADMIN — IPRATROPIUM BROMIDE AND ALBUTEROL SULFATE 3 ML: 2.5; .5 SOLUTION RESPIRATORY (INHALATION) at 10:39

## 2023-03-26 RX ADMIN — ACETAMINOPHEN 650 MG: 325 TABLET, FILM COATED ORAL at 21:18

## 2023-03-26 RX ADMIN — ZINC OXIDE 1 APPLICATION: 200 OINTMENT TOPICAL at 15:43

## 2023-03-26 RX ADMIN — VANCOMYCIN HYDROCHLORIDE 1000 MG: 1 INJECTION, SOLUTION INTRAVENOUS at 21:29

## 2023-03-26 RX ADMIN — ACETAMINOPHEN 650 MG: 325 TABLET, FILM COATED ORAL at 15:41

## 2023-03-26 RX ADMIN — OXYCODONE HYDROCHLORIDE 2.5 MG: 5 TABLET ORAL at 12:26

## 2023-03-26 RX ADMIN — APIXABAN 5 MG: 5 TABLET, FILM COATED ORAL at 07:59

## 2023-03-26 RX ADMIN — LAMOTRIGINE 200 MG: 100 TABLET ORAL at 07:58

## 2023-03-26 RX ADMIN — OXYCODONE HYDROCHLORIDE 2.5 MG: 5 TABLET ORAL at 16:35

## 2023-03-26 RX ADMIN — IPRATROPIUM BROMIDE AND ALBUTEROL SULFATE 3 ML: 2.5; .5 SOLUTION RESPIRATORY (INHALATION) at 20:12

## 2023-03-26 RX ADMIN — Medication 5 MG: at 21:18

## 2023-03-26 RX ADMIN — Medication 500 MCG: at 07:59

## 2023-03-26 RX ADMIN — PANTOPRAZOLE SODIUM 40 MG: 40 TABLET, DELAYED RELEASE ORAL at 05:41

## 2023-03-26 RX ADMIN — GABAPENTIN 300 MG: 300 CAPSULE ORAL at 13:05

## 2023-03-26 RX ADMIN — DESVENLAFAXINE SUCCINATE 25 MG: 25 TABLET, EXTENDED RELEASE ORAL at 07:59

## 2023-03-26 RX ADMIN — IPRATROPIUM BROMIDE AND ALBUTEROL SULFATE 3 ML: 2.5; .5 SOLUTION RESPIRATORY (INHALATION) at 07:25

## 2023-03-26 NOTE — PROGRESS NOTES
[facility]     Progress Note    Patient Name: Louise GARCIA  : 1965  MRN: [id number]  Primary Care Physician:  Chloe Schaefer APRN  Date of admission: 3/17/2023    Subjective   Subjective     Chief Complaint:   Subacute motor predominant idiopathic peripheral neuropathy with Paraparesis and Areflexia.   S/p IVIG x 5 days  R lung masslike infiltrate with cavitary lesions/pleural effusion   S/p thoracentesis - Blood cx and pleural fluid  positive for MRSA     History of Present Illness  Patient Reports soreness on buttocks. No f/c/n/v/soa  Tolerating rehab well    Review of Systems    Objective   Objective     Vitals:   Temp:  [98.2 °F (36.8 °C)-99.6 °F (37.6 °C)] 98.9 °F (37.2 °C)  Heart Rate:  [] 110  Resp:  [18] 18  BP: (111-124)/(68-78) 124/68  Flow (L/min):  [1] 1    Physical Exam     MENTAL STATUS -  AAO x3, Verbal  Gen - nad, son at bedside  HEENT- NCAT, SCLERAE ANICTERIC, CONJUNCTIVAE PINK, OP MOIST, NO JVD, EARS UNREMARKABLE EXTERNALLY     LUNGS - no resp distress, equal chest rise  HEART- Tachycardic, no murmur  Chest wall TTP Rt side, incisions c/d/i with no warmth/erythema  ABD - nd  Skin - Stage 1 on sacrum ~1cm diameter region  EXT - NO EDEMA OR CYANOSIS  MOTOR EXAM - maex4                Result Review    Result Review:  I have personally reviewed the results from the time of this admission to 3/26/2023 14:57 EDT and agree with these findings:  []  Laboratory list / accordion  []  Microbiology  []  Radiology  []  EKG/Telemetry   []  Cardiology/Vascular   []  Pathology  []  Old records  []  Other:  Most notable findings include:   Results from last 7 days   Lab Units 23  0528 23  0640 23  0822   WBC 10*3/mm3 10.58 10.07 10.39   HEMOGLOBIN g/dL 8.2* 8.5* 8.6*   HEMATOCRIT % 26.2* 25.7* 25.3*   PLATELETS 10*3/mm3 393 370 349     Results from last 7 days   Lab Units 23  0528 23  0640 23  0822   SODIUM mmol/L 135* 137 135*   POTASSIUM mmol/L 3.8 3.9 3.3*    CHLORIDE mmol/L 98 100 98   CO2 mmol/L 29.5* 28.2 27.7   BUN mg/dL 7 7 7   CREATININE mg/dL 0.96 0.99 0.91   GLUCOSE mg/dL 98 88 142*   CALCIUM mg/dL 8.8 8.7 8.7     Scheduled Meds:apixaban, 5 mg, Oral, Q12H  desvenlafaxine, 25 mg, Oral, Daily  folic acid, 1 mg, Oral, Daily  gabapentin, 300 mg, Oral, Q8H  ipratropium-albuterol, 3 mL, Nebulization, 4x Daily - RT  lamoTRIgine, 200 mg, Oral, Daily  melatonin, 5 mg, Oral, Nightly  pantoprazole, 40 mg, Oral, Q AM  thiamine, 100 mg, Oral, Daily  vancomycin, 1,000 mg, Intravenous, Q24H  vitamin B-12, 500 mcg, Oral, Daily      Continuous Infusions:Pharmacy to dose vancomycin,       PRN Meds:.•  acetaminophen  •  bisacodyl  •  hydrocortisone-bacitracin-zinc oxide-nystatin  •  oxyCODONE  •  Pharmacy to dose vancomycin  •  polyethylene glycol  •  senna-docusate sodium  •  simethicone        Assessment & Plan   Assessment / Plan     Brief Patient Summary:  Louise GARCIA is a 58 y.o. female     Active Hospital Problems:  Active Hospital Problems    Diagnosis    • **Idiopathic peripheral neuropathy    • MRSA bacteremia    • Guillain-Charlotte syndrome (HCC)    • Anemia    • Chronic anticoagulation    • History of blood clots    • Seizures (HCC)    • Paresthesia      Plan:   Subacute motor predominant idiopathic peripheral neuropathy with Paraparesis and Areflexia.   S/p IVIG x 5 days     Impaired mobility/impaired self care/ impaired cognition     R lung masslike infiltrate with cavitary lesions/pleural effusion   S/p thoracentesis - Blood cx and pleural fluid  positive for MRSA      chest tube placement given empyema, and she underwent thoracoscopy w/ decortication 3/8/23   -expected postoperative changes with pleural thickening and minimal pleural effusion.  The effusion is too small for intervention and will likely to continue to resolve with time.  Recommend continue good pulmonary hygiene with incentive spirometry hourly as well as increase activity/ambulation as  tolerated.  March 23-appears decreased breath sounds more noticeable today on the right compared to the left.  Check follow-up chest x-ray.  On room air during the day, 1 L at night.  March 24- CXR relatively unchanged from previous, reached out to CT surgery given planned outpatient f/u on 3/28, no further imaging planned at this time as patient afebrile with normal WBC, monitor  March 25 - wbc stable, incision sites c/d/i     Right-sided Chest Pain 3/24  -EKG sinus tach  -HS Troponin 25 > 24  -consider Cardiology consult if worsening  -pain reproducible with palpation, pain likely postoperative neuralgia as indicated vs. possible costochondritis, continue gabapentin, ice/heat, monitor  3/25 - CT surgery saw patient on 3/24 with no concerning findings. Possibly intercostal neuralgia. Patient reports improvement today.     Hypokalemia  -3/24 K 3.3, repleted  3/25 - K 3.9, successfully repleted yesterday     mass on TTE with findings concerning for endocarditis - underwent MARIAMA 3/10/2023 which had no vegetation     RUE superficial thrombophlebitis concerning for septic phlebitis.        ID - continue 4 weeks of vancomycin as recommended by infectious disease dosed by pharmacy to achieve a trough level of 15-20 or area under the curve of 400-600 from last negative blood culture or last intervention which ever is the latest - to complete April 1, 2023     Left hip arthrocentesis March 16 to rule out any hip septic arthritis- no growth to date on fluid.    lower back pain - MRI of spine to rule out discitis or osteomyelitis.  This did have known degenerative changes but  no infection.      DVT prophylaxis - SCDs / apixiban. History of LLE DVT - on Eliquis at home     Pain management - Tylenol 1,000 mg three times a day/ Celebrex 100 mg q 112 hours/ gabapentin 300 mg q 8 hours Oxycodone 5 mg q 4 hours prn  March 18 - DC Celebrex 2/2 PHYLLIS, and anemia      Anxiety/ muscle spasms - Diazepam 2 mg q 6 hours prn - JONES  reviewed     Seizure disorder -Lamotrigine 200 mg daily     ABLA - March 18- Hgb 7.0 (7.3 on 3/16). Type, cross and transfuse 1 unit PRBCs. Pre-medicate with Tylenol and Benadryl. CBC in am.   March 19- Hgb 8.8 s/p 1 unit PRBcs. Hemoccult positive. On Eliquis for h/o DVT. Follow Hgb. May need GI work up.  March 20 - HGB 9.3 s/p transfusion  March 21-reviewed with internal medicine-hemoglobin stable after transfusion.  Iron studies consistent with inflammation.  No further work-up presently  March 22 - Hgb 8.9, stable  March 23-hemoglobin trend 8.3.  Continue to follow.  Recheck tomorrow  March 25 - Hb 8.5  March 26 - Hb 8.2     PHYLLIS - March 18- Creatinine 1.36 up from 1.12. On IV Vanc and Celebrex. DC Celebrex. BMP in am.  March 19- s/p 500ml NS bolus. Creatinine 1.31.  March 23-creatinine 0.92  March 25 & 26 - creat stable    Skin -   3/26 - add magic barrier for sacral pressure    DVT prophylaxis:  Medical and mechanical DVT prophylaxis orders are present.    CODE STATUS:    Level Of Support Discussed With: Patient  Code Status (Patient has no pulse and is not breathing): CPR (Attempt to Resuscitate)  Medical Interventions (Patient has pulse or is breathing): Full Support      Yu Hernandez MD

## 2023-03-26 NOTE — PLAN OF CARE
Goal Outcome Evaluation:  Plan of Care Reviewed With: patient        Progress: no change   Patient is calm and cooperative. Alert and oriented. Forgetful. She is using the call light for assistance. 1- 2 person assist. Using the bedpan. Continent of B/B. She had a small BM today. Complaining of left foot pain and right flank pain. PRN pain medication given BLE elevated. She is taking her medication whole in apple sauce. Old drainage side at right flank cleansed with hydrogen peroxide and covered with dry gauze because of scant amount of serosanguinous drainage. Spouse at bedside.

## 2023-03-26 NOTE — PLAN OF CARE
Goal Outcome Evaluation:  Plan of Care Reviewed With: patient        Progress: improving  Outcome Evaluation: Pt took pain med and used ice pack to L anterior foot with relief voiced. R previous chest tube sites cleaned with peroxide. 2x2 gauze applied to one of incisions due to sl. moist. No drainage noted. O2 1L n/c, nonproductive cough. Purewick at night. Continues on IV Vancomycin daily.

## 2023-03-27 LAB
ANION GAP SERPL CALCULATED.3IONS-SCNC: 10.7 MMOL/L (ref 5–15)
BUN SERPL-MCNC: 7 MG/DL (ref 6–20)
BUN/CREAT SERPL: 6.9 (ref 7–25)
CALCIUM SPEC-SCNC: 8.7 MG/DL (ref 8.6–10.5)
CHLORIDE SERPL-SCNC: 97 MMOL/L (ref 98–107)
CO2 SERPL-SCNC: 28.3 MMOL/L (ref 22–29)
CREAT SERPL-MCNC: 1.01 MG/DL (ref 0.57–1)
DEPRECATED RDW RBC AUTO: 48.9 FL (ref 37–54)
EGFRCR SERPLBLD CKD-EPI 2021: 64.3 ML/MIN/1.73
ERYTHROCYTE [DISTWIDTH] IN BLOOD BY AUTOMATED COUNT: 14.1 % (ref 12.3–15.4)
GLUCOSE SERPL-MCNC: 112 MG/DL (ref 65–99)
HCT VFR BLD AUTO: 24.9 % (ref 34–46.6)
HGB BLD-MCNC: 8.2 G/DL (ref 12–15.9)
MCH RBC QN AUTO: 31.7 PG (ref 26.6–33)
MCHC RBC AUTO-ENTMCNC: 32.9 G/DL (ref 31.5–35.7)
MCV RBC AUTO: 96.1 FL (ref 79–97)
PLATELET # BLD AUTO: 341 10*3/MM3 (ref 140–450)
PMV BLD AUTO: 10.5 FL (ref 6–12)
POTASSIUM SERPL-SCNC: 3.9 MMOL/L (ref 3.5–5.2)
RBC # BLD AUTO: 2.59 10*6/MM3 (ref 3.77–5.28)
SODIUM SERPL-SCNC: 136 MMOL/L (ref 136–145)
WBC NRBC COR # BLD: 10.98 10*3/MM3 (ref 3.4–10.8)

## 2023-03-27 PROCEDURE — 94664 DEMO&/EVAL PT USE INHALER: CPT

## 2023-03-27 PROCEDURE — 97129 THER IVNTJ 1ST 15 MIN: CPT

## 2023-03-27 PROCEDURE — 80048 BASIC METABOLIC PNL TOTAL CA: CPT | Performed by: PHYSICAL MEDICINE & REHABILITATION

## 2023-03-27 PROCEDURE — 97535 SELF CARE MNGMENT TRAINING: CPT

## 2023-03-27 PROCEDURE — 94799 UNLISTED PULMONARY SVC/PX: CPT

## 2023-03-27 PROCEDURE — 25010000002 VANCOMYCIN PER 500 MG: Performed by: PHYSICAL MEDICINE & REHABILITATION

## 2023-03-27 PROCEDURE — 97530 THERAPEUTIC ACTIVITIES: CPT

## 2023-03-27 PROCEDURE — 97110 THERAPEUTIC EXERCISES: CPT

## 2023-03-27 PROCEDURE — 85027 COMPLETE CBC AUTOMATED: CPT | Performed by: PHYSICAL MEDICINE & REHABILITATION

## 2023-03-27 PROCEDURE — 94760 N-INVAS EAR/PLS OXIMETRY 1: CPT

## 2023-03-27 PROCEDURE — 94761 N-INVAS EAR/PLS OXIMETRY MLT: CPT

## 2023-03-27 PROCEDURE — 97130 THER IVNTJ EA ADDL 15 MIN: CPT

## 2023-03-27 RX ADMIN — IPRATROPIUM BROMIDE AND ALBUTEROL SULFATE 3 ML: 2.5; .5 SOLUTION RESPIRATORY (INHALATION) at 15:26

## 2023-03-27 RX ADMIN — APIXABAN 5 MG: 5 TABLET, FILM COATED ORAL at 19:48

## 2023-03-27 RX ADMIN — VANCOMYCIN HYDROCHLORIDE 1000 MG: 1 INJECTION, SOLUTION INTRAVENOUS at 21:21

## 2023-03-27 RX ADMIN — ACETAMINOPHEN 650 MG: 325 TABLET, FILM COATED ORAL at 19:47

## 2023-03-27 RX ADMIN — Medication 1 MG: at 09:11

## 2023-03-27 RX ADMIN — IPRATROPIUM BROMIDE AND ALBUTEROL SULFATE 3 ML: 2.5; .5 SOLUTION RESPIRATORY (INHALATION) at 20:14

## 2023-03-27 RX ADMIN — LAMOTRIGINE 200 MG: 100 TABLET ORAL at 09:11

## 2023-03-27 RX ADMIN — APIXABAN 5 MG: 5 TABLET, FILM COATED ORAL at 09:11

## 2023-03-27 RX ADMIN — Medication 100 MG: at 09:11

## 2023-03-27 RX ADMIN — GABAPENTIN 300 MG: 300 CAPSULE ORAL at 06:08

## 2023-03-27 RX ADMIN — Medication 500 MCG: at 09:11

## 2023-03-27 RX ADMIN — IPRATROPIUM BROMIDE AND ALBUTEROL SULFATE 3 ML: 2.5; .5 SOLUTION RESPIRATORY (INHALATION) at 07:58

## 2023-03-27 RX ADMIN — PANTOPRAZOLE SODIUM 40 MG: 40 TABLET, DELAYED RELEASE ORAL at 06:08

## 2023-03-27 RX ADMIN — Medication 5 MG: at 19:47

## 2023-03-27 RX ADMIN — GABAPENTIN 300 MG: 300 CAPSULE ORAL at 19:48

## 2023-03-27 RX ADMIN — DESVENLAFAXINE SUCCINATE 25 MG: 25 TABLET, EXTENDED RELEASE ORAL at 09:11

## 2023-03-27 RX ADMIN — GABAPENTIN 300 MG: 300 CAPSULE ORAL at 15:05

## 2023-03-27 NOTE — THERAPY TREATMENT NOTE
Inpatient Rehabilitation - Occupational Therapy Treatment Note    [facility]     Patient Name: Louise GARCIA  : 1965  MRN: [id number]    Today's Date: 3/27/2023                 Admit Date: 3/17/2023         ICD-10-CM ICD-9-CM   1. Impaired functional mobility, balance, gait, and endurance  Z74.09 V49.89       Patient Active Problem List   Diagnosis   • Sepsis (HCC)   • Neck pain, chronic   • Upper back pain   • Paresthesia   • Cervical myelopathy (HCC)   • Lower extremity weakness   • History of blood clots   • Chronic anticoagulation   • Seizures (HCC)   • Anemia   • GERD (gastroesophageal reflux disease)   • Guillain-Dewitt syndrome (HCC)   • Situational mixed anxiety and depressive disorder   • Mass of middle lobe of right lung   • Oral candidiasis   • Empyema of pleura (HCC)   • MRSA bacteremia   • Idiopathic peripheral neuropathy       Past Medical History:   Diagnosis Date   • Degenerative disc disease, cervical    • Gestational diabetes    • H/O blood clots    • Hematemesis    • Seizures (Former[facility])    • Septic shock (Former[facility])        Past Surgical History:   Procedure Laterality Date   • APPENDECTOMY     • BACK SURGERY     • CHOLECYSTECTOMY     • ENDOSCOPY N/A 2016    Procedure: ESOPHAGOGASTRODUODENOSCOPY with biopsy;  Surgeon: Austen Brito MD;  Location: [facility] ENDOSCOPY;  Service:    • HYSTERECTOMY     • NECK SURGERY       approx 6 yrs ago   • THORACOSCOPY Right 3/8/2023    Procedure: THORACOSCOPY WITH DAVINCI ROBOT WITH COMPLETE DECORTICATION;  Surgeon: Chloe Cedillo MD;  Location: [facility] OR;  Service: Robotics - DaVinci;  Laterality: Right;   • TONSILLECTOMY     • TONSILLECTOMY AND ADENOIDECTOMY               [facility] OT ASSESSMENT FLOWSHEET (last 12 hours)     IRF OT Evaluation and Treatment     Row Name 23 1215          OT Time and Intention    Document Type daily treatment  -AF     Mode of Treatment occupational therapy  -AF     Patient Effort good  -AF     Row Name 23  1215          General Information    Patient/Family/Caregiver Comments/Observations pt sitting upin w/c in room with  present  -AF     Existing Precautions/Restrictions fall;other (see comments)  contact precautions  -AF     Row Name 03/27/23 1215          Pain Assessment    Pretreatment Pain Rating 4/10  -AF     Posttreatment Pain Rating 5/10  -AF     Pain Location - Side/Orientation Left  -AF     Pain Location lower  -AF     Pain Location - extremity  -AF     Pre/Posttreatment Pain Comment had pain with figure 4 crossing during ADL tasks  -AF     Row Name 03/27/23 1215          Cognition/Psychosocial    Affect/Mental Status (Cognition) flat/blunted affect  -AF     Orientation Status (Cognition) oriented to;place;person  -AF     Follows Commands (Cognition) follows one-step commands;over 90% accuracy;increased processing time needed  -AF     Personal Safety Interventions fall prevention program maintained;gait belt;nonskid shoes/slippers when out of bed  -AF     Comment, Cognition MIN vc's for sequenceing ADL tasks  -AF     Row Name 03/27/23 1215          Bathing    [place] Level (Bathing) bathing skills;lower body;upper body;minimum assist (75% patient effort);verbal cues  -AF     Position (Bathing) sink side;supported sitting;supported standing  -AF     Set-up Assistance (Bathing) obtain supplies  -AF     Row Name 03/27/23 1215          Upper Body Dressing    [place] Level (Upper Body Dressing) upper body dressing skills;minimum assist (75% or more patient effort);set up assistance  -AF     Position (Upper Body Dressing) supported sitting  -AF     Row Name 03/27/23 1215          Lower Body Dressing    [place] Level (Lower Body Dressing) doff;don;pants/bottoms;shoes/slippers;socks;moderate assist (50% patient effort);verbal cues  -AF     Position (Lower Body Dressing) supported sitting;supported standing  -AF     Comment (Lower Body Dressing) worked on figure 4 leg crossing, increased time  and therapist assistance to hold LE into position  -AF     Row Name 03/27/23 1215          Grooming    [place] Level (Grooming) grooming skills;set up;hair care, combing/brushing;oral care regimen  -AF     Position (Grooming) supported sitting  -AF     Row Name 03/27/23 1215          Transfer Assessment/Treatment    Comment, (Transfers) sit to stand during ADL tasks with MOD A worked on pushing up from w/c with LUE and holding on to grab bar with RUE  -AF     Row Name 03/27/23 1215          Balance    Static Sitting Balance standby assist  -AF     Dynamic Sitting Balance contact guard  with forward flexion during bathing tasks  -AF     Comment, Balance unable to let go to assist with LBD/bathing tasks  -AF     Row Name 03/27/23 1215          Positioning and Restraints    Pre-Treatment Position sitting in chair/recliner  -AF     Post Treatment Position wheelchair  -AF     In Wheelchair sitting;call light within reach;encouraged to call for assist;exit alarm on  -AF           User Key  (r) = Recorded By, (t) = Taken By, (c) = Cosigned By    Initials Name Effective Dates    AF Heaven Villareal, OTR 06/16/21 -                  Occupational Therapy Education     Title: PT OT SLP Therapies (In Progress)     Topic: Occupational Therapy (Not Started)     Point: ADL training (Not Started)     Description:   Instruct learner(s) on proper safety adaptation and remediation techniques during self care or transfers.   Instruct in proper use of assistive devices.              Learner Progress:  Not documented in this visit.          Point: Home exercise program (Not Started)     Description:   Instruct learner(s) on appropriate technique for monitoring, assisting and/or progressing therapeutic exercises/activities.              Learner Progress:  Not documented in this visit.          Point: Precautions (Not Started)     Description:   Instruct learner(s) on prescribed precautions during self-care and functional transfers.               Learner Progress:  Not documented in this visit.          Point: Body mechanics (Not Started)     Description:   Instruct learner(s) on proper positioning and spine alignment during self-care, functional mobility activities and/or exercises.              Learner Progress:  Not documented in this visit.                                OT Recommendation and Plan                         Time Calculation:      Time Calculation- OT     Row Name 03/27/23 1219             Time Calculation- OT    OT Start Time 1000  -AF      OT Stop Time 1030  -AF      OT Time Calculation (min) 30 min  -AF            User Key  (r) = Recorded By, (t) = Taken By, (c) = Cosigned By    Initials Name Provider Type    AF Heaven Villareal, OTNAIMA Occupational Therapist              Therapy Charges for Today     Code Description Service Date Service Provider Modifiers Qty    [id number] HC OT SELF CARE/MGMT/TRAIN EA 15 MIN 3/27/2023 Heaven Villareal OTR GO 2                   MARY ANN Gunn  3/27/2023

## 2023-03-27 NOTE — PROGRESS NOTES
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   S/p IVIG x 5 days       SUBJECTIVE:    Patient seen in physical therapy gym working on standing activities in the [employer].  Her balance is showing some improvement.  Denies any new weakness.  Denies any chest pain or shortness of air this morning.  Tolerating activities.  Brighter affect     OBJECTIVE:  AF, VSS  BP: (123-129)/(68-81) 129/81    Physical Exam     MENTAL STATUS -  AAO x3, Verbal, NAD.  HEENT- NCAT, SCLERAE ANICTERIC, CONJUNCTIVAE PINK, OP MOIST, NO JVD, EARS UNREMARKABLE EXTERNALLY    LUNGS - DECREASED BREATH SOUNDS RIGHT BASE MORE SO THAN LEFT BASE  HEART-regular rate and rhythm.  No murmur      ABD - NORMOACTIVE BOWEL SOUNDS, SOFT, NT.  EXT - NO EDEMA OR CYANOSIS  MOTOR EXAM -  Working on balance activities in the [employer]        Scheduled Meds:apixaban, 5 mg, Oral, Q12H  desvenlafaxine, 25 mg, Oral, Daily  folic acid, 1 mg, Oral, Daily  gabapentin, 300 mg, Oral, Q8H  ipratropium-albuterol, 3 mL, Nebulization, 4x Daily - RT  lamoTRIgine, 200 mg, Oral, Daily  melatonin, 5 mg, Oral, Nightly  pantoprazole, 40 mg, Oral, Q AM  thiamine, 100 mg, Oral, Daily  vancomycin, 1,000 mg, Intravenous, Q24H  vitamin B-12, 500 mcg, Oral, Daily      Continuous Infusions:Pharmacy to dose vancomycin,       PRN Meds:.•  acetaminophen  •  bisacodyl  •  hydrocortisone-bacitracin-zinc oxide-nystatin  •  oxyCODONE  •  Pharmacy to dose vancomycin  •  polyethylene glycol  •  senna-docusate sodium  •  simethicone    Lab Results (last 24 hours)     Procedure Component Value Units Date/Time    CBC (No Diff) [[id number]]  (Abnormal) Collected: 03/27/23 0749    Specimen: Blood Updated: 03/27/23 0838     WBC 10.98 10*3/mm3      RBC 2.59 10*6/mm3      Hemoglobin 8.2 g/dL      Hematocrit 24.9 %      MCV 96.1 fL      MCH 31.7 pg      MCHC 32.9 g/dL      RDW 14.1 %      RDW-SD 48.9 fl      MPV 10.5 fL      Platelets 341 10*3/mm3      Basic Metabolic Panel [[id number]]  (Abnormal) Collected: 03/27/23 0749    Specimen: Blood Updated: 03/27/23 0857     Glucose 112 mg/dL      BUN 7 mg/dL      Creatinine 1.01 mg/dL      Sodium 136 mmol/L      Potassium 3.9 mmol/L      Chloride 97 mmol/L      CO2 28.3 mmol/L      Calcium 8.7 mg/dL      BUN/Creatinine Ratio 6.9     Anion Gap 10.7 mmol/L      eGFR 64.3 mL/min/1.73     Narrative:      GFR Normal >60  Chronic Kidney Disease <60  Kidney Failure <15            Imaging Results (Last 24 Hours)     ** No results found for the last 24 hours. 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   S/p IVIG x 5 days     Impaired mobility/impaired self care/ impaired cognition     R lung masslike infiltrate with cavitary lesions/pleural effusion   S/p thoracentesis - Blood cx and pleural fluid  positive for MRSA      chest tube placement given empyema, and she underwent thoracoscopy w/ decortication 3/8/23   -expected postoperative changes with pleural thickening and minimal pleural effusion.  The effusion is too small for intervention and will likely to continue to resolve with time.  Recommend continue good pulmonary hygiene with incentive spirometry hourly as well as increase activity/ambulation as tolerated.  March 23-appears decreased breath sounds more noticeable today on the right compared to the left.  Check follow-up chest x-ray.  On room air during the day, 1 L at night.  March 24- CXR relatively unchanged from previous, reached out to CT surgery given planned outpatient f/u on 3/28, no further imaging planned at this time as patient afebrile with normal WBC, monitor     Right-sided Chest Pain 3/24  -EKG sinus tach  -HS Troponin 25 > 24  -consider Cardiology consult if worsening  -pain reproducible with palpation, pain likely postoperative neuralgia as indicated vs. possible  costochondritis, continue gabapentin, ice/heat, monitor    Hypokalemia  -3/24 K 3.3, repleted    mass on TTE with findings concerning for endocarditis - underwent MARIAMA 3/10/2023 which had no vegetation     RUE superficial thrombophlebitis concerning for septic phlebitis.        ID - continue 4 weeks of vancomycin as recommended by infectious disease dosed by pharmacy to achieve a trough level of 15-20 or area under the curve of 400-600 from last negative blood culture or last intervention which ever is the latest - to complete April 1, 2023     Left hip arthrocentesis March 16 to rule out any hip septic arthritis- no growth to date on fluid.    lower back pain - MRI of spine to rule out discitis or osteomyelitis.  This did have known degenerative changes but  no infection.      DVT prophylaxis - SCDs / apixiban. History of LLE DVT - on Eliquis at home     Pain management - Tylenol 1,000 mg three times a day/ Celebrex 100 mg q 112 hours/ gabapentin 300 mg q 8 hours Oxycodone 5 mg q 4 hours prn  March 18 - DC Celebrex 2/2 PHYLLIS, and anemia     Anxiety/ muscle spasms - Diazepam 2 mg q 6 hours prn - JONES reviewed     Seizure disorder -Lamotrigine 200 mg daily    ABLA - March 18- Hgb 7.0 (7.3 on 3/16). Type, cross and transfuse 1 unit PRBCs. Pre-medicate with Tylenol and Benadryl. CBC in am.   March 19- Hgb 8.8 s/p 1 unit PRBcs. Hemoccult positive. On Eliquis for h/o DVT. Follow Hgb. May need GI work up.  March 20 - HGB 9.3 s/p transfusion  March 21-reviewed with internal medicine-hemoglobin stable after transfusion.  Iron studies consistent with inflammation.  No further work-up presently  March 22 - Hgb 8.9, stable  March 23-hemoglobin trend 8.3.  Continue to follow.  Recheck tomorrow    PHYLLIS - March 18- Creatinine 1.36 up from 1.12. On IV Vanc and Celebrex. DC Celebrex. BMP in am.  March 19- s/p 500ml NS bolus. Creatinine 1.31.  March 23-creatinine 0.92    TEAM CONF - MARCH 21 - BED MOD X 2. TRANSFERS MAX 2. STAND PIVOT  OR SQUAT PIVOT. NON-AMBULATORY. Saturday MARCH 18 GAIT 6 FEET PARALLEL BARS MOD MAX OF 2.   BATH MOD 1-2. LBD DEP. UBD MOD. EATING MIN. GROOMING MIN. TOILETING DEP.   The patient has difficulty remembering new information due to short-term memory impairments.  Initial evaluation 3.18, pt obtained a score 12/30 on SLUMS cognitive assessment indicating severe cognitive impairment/dementia, goals initiated for memory and sustaining attention  FEES completed 3.7 revealing glottic gap accounting for glottic insufficiency, dysphonia, hoarse/breathy quality, recommend targeting vocal function as appropriate d/t impact on intelligibility.    Regular/thin diet continued since FEES completion 3.7, although pt known to cough with and without PO intake, may be contributed partially to ineffective VF closure.   DRESSING FORMER CHEST TUBE SITE. CONTINUES ON PUREWICK AT NIGHT. O2 AT 1-2 LITERS AT NIGHT.  DECREASED APPETITE. ABD X-RAY THIS AM SHOWS NON-OBSTRUCTIVE BOWEL GAS PATTERN.   ELOS - 4 WEEKS    Reviewed medications, vital signs, and recent lab work. Continue comprehensive inpatient rehabilitation program.    During rounds, used appropriate personal protective equipment including mask and gloves.  Additional gown if indicated.  Mask used was standard procedure mask. Appropriate PPE was worn during the entire visit.  Hand hygiene was completed before and after.        Ryley Rider MD

## 2023-03-27 NOTE — THERAPY TREATMENT NOTE
Inpatient Rehabilitation - Occupational Therapy Treatment Note    [facility]     Patient Name: Louise GARCIA  : 1965  MRN: [id number]    Today's Date: 3/27/2023                 Admit Date: 3/17/2023         ICD-10-CM ICD-9-CM   1. 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)   • Situational mixed anxiety and depressive disorder   • Mass of middle lobe of right lung   • Oral candidiasis   • Empyema of pleura (HCC)   • MRSA bacteremia   • Idiopathic peripheral neuropathy       Past Medical History:   Diagnosis Date   • Degenerative disc disease, cervical    • Gestational diabetes    • H/O blood clots    • Hematemesis    • Seizures (HCC)    • Septic shock ([facility])        Past Surgical History:   Procedure Laterality Date   • APPENDECTOMY     • BACK SURGERY     • CHOLECYSTECTOMY     • ENDOSCOPY N/A 2016    Procedure: ESOPHAGOGASTRODUODENOSCOPY with biopsy;  Surgeon: Austen Brito MD;  Location: [facility] ENDOSCOPY;  Service:    • HYSTERECTOMY     • NECK SURGERY       approx 6 yrs ago   • THORACOSCOPY Right 3/8/2023    Procedure: THORACOSCOPY WITH DAVINCI ROBOT WITH COMPLETE DECORTICATION;  Surgeon: Chloe Cedillo MD;  Location: [facility] OR;  Service: Robotics - DaVinci;  Laterality: Right;   • TONSILLECTOMY     • TONSILLECTOMY AND ADENOIDECTOMY               [facility] OT ASSESSMENT FLOWSHEET (last 12 hours)     [facility] OT Evaluation and Treatment     Row Name 23 1432 23 1215       OT Time and Intention    Document Type daily treatment  -AF daily treatment  -AF    Mode of Treatment occupational therapy  -AF occupational therapy   -AF    Patient Effort good  -AF good  -AF    Row Name 03/27/23 1432 03/27/23 1215       General Information    Patient/Family/Caregiver Comments/Observations pt supine in bed with NSG  -AF pt sitting upin w/c in room with  present  -AF    Existing Precautions/Restrictions fall;other (see comments)  contact precautions  -AF fall;other (see comments)  contact precautions  -AF    Row Name 03/27/23 1432 03/27/23 1215       Pain Assessment    Pretreatment Pain Rating 4/10  -AF 4/10  -AF    Posttreatment Pain Rating 4/10  -AF 5/10  -AF    Pain Location - Side/Orientation Left  -AF Left  -AF    Pain Location lower  -AF lower  -AF    Pain Location - extremity  -AF extremity  -AF    Pre/Posttreatment Pain Comment -- had pain with figure 4 crossing during ADL tasks  -AF    Row Name 03/27/23 1432 03/27/23 1215       Cognition/Psychosocial    Affect/Mental Status (Cognition) flat/blunted affect  -AF flat/blunted affect  -AF    Orientation Status (Cognition) oriented to;place;person  -AF oriented to;place;person  -AF    Follows Commands (Cognition) follows one-step commands;over 90% accuracy;increased processing time needed  -AF follows one-step commands;over 90% accuracy;increased processing time needed  -AF    Personal Safety Interventions fall prevention program maintained;gait belt;nonskid shoes/slippers when out of bed  -AF fall prevention program maintained;gait belt;nonskid shoes/slippers when out of bed  -AF    Comment, Cognition -- MIN vc's for sequenceing ADL tasks  -AF    Row Name 03/27/23 1215          Bathing    [place] Level (Bathing) bathing skills;lower body;upper body;minimum assist (75% patient effort);verbal cues  -AF     Position (Bathing) sink side;supported sitting;supported standing  -AF     Set-up Assistance (Bathing) obtain supplies  -AF     Row Name 03/27/23 1215          Upper Body Dressing    [place] Level (Upper Body Dressing) upper body dressing skills;minimum assist (75% or more  patient effort);set up assistance  -AF     Position (Upper Body Dressing) supported sitting  -AF     Row Name 03/27/23 1215          Lower Body Dressing    [place] Level (Lower Body Dressing) doff;don;pants/bottoms;shoes/slippers;socks;moderate assist (50% patient effort);verbal cues  -AF     Position (Lower Body Dressing) supported sitting;supported standing  -AF     Comment (Lower Body Dressing) worked on figure 4 leg crossing, increased time and therapist assistance to hold LE into position  -AF     Row Name 03/27/23 1215          Grooming    [place] Level (Grooming) grooming skills;set up;hair care, combing/brushing;oral care regimen  -AF     Position (Grooming) supported sitting  -AF     Row Name 03/27/23 1432          Bed Mobility    Supine-Sit [place] (Bed Mobility) standby assist  -AF     Assistive Device (Bed Mobility) bed rails  -AF     Row Name 03/27/23 1432 03/27/23 1215       Transfer Assessment/Treatment    Comment, (Transfers) sit pivot EOM <> w/c MOD A x 2  -AF sit to stand during ADL tasks with MOD A worked on pushing up from w/c with LUE and holding on to grab bar with RUE  -AF    Row Name 03/27/23 1432          Bed-Chair Transfer    Bed-Chair [place] (Transfers) moderate assist (50% patient effort);2 person assist  -AF     Assistive Device (Bed-Chair Transfers) wheelchair  -AF     Comment, (Bed-Chair Transfer) sit pivot  -AF     Row Name 03/27/23 1432          Shoulder (Therapeutic Exercise)    Shoulder Strengthening (Therapeutic Exercise) bilateral;flexion;extension;external rotation;internal rotation;horizontal aBduction/aDduction;scapular stabilization;sitting;1 lb free weight;10 repetitions;2 sets  seated unsupported on EOM  -AF     Row Name 03/27/23 1432          Elbow/Forearm (Therapeutic Exercise)    Elbow/Forearm Strengthening (Therapeutic Exercise) bilateral;extension;flexion;supination;pronation;sitting;1 lb free weight;10 repetitions;2 sets  -AF     Row Name 03/27/23  1432 03/27/23 1215       Balance    Static Sitting Balance standby assist  -AF standby assist  -AF    Dynamic Sitting Balance contact guard  seated on EOM with UB exs  -AF contact guard  with forward flexion during bathing tasks  -AF    Comment, Balance -- unable to let go to assist with LBD/bathing tasks  -AF    Row Name 03/27/23 1432 03/27/23 1215       Positioning and Restraints    Pre-Treatment Position in bed  -AF sitting in chair/recliner  -AF    Post Treatment Position wheelchair  -AF wheelchair  -AF    In Wheelchair sitting;exit alarm on;with PT  -AF sitting;call light within reach;encouraged to call for assist;exit alarm on  -AF          User Key  (r) = Recorded By, (t) = Taken By, (c) = Cosigned By    Initials Name Effective Dates    AF Heaven Villareal, OTR 06/16/21 -                  Occupational Therapy Education     Title: PT OT SLP Therapies (In Progress)     Topic: Occupational Therapy (Not Started)     Point: ADL training (Not Started)     Description:   Instruct learner(s) on proper safety adaptation and remediation techniques during self care or transfers.   Instruct in proper use of assistive devices.              Learner Progress:  Not documented in this visit.          Point: Home exercise program (Not Started)     Description:   Instruct learner(s) on appropriate technique for monitoring, assisting and/or progressing therapeutic exercises/activities.              Learner Progress:  Not documented in this visit.          Point: Precautions (Not Started)     Description:   Instruct learner(s) on prescribed precautions during self-care and functional transfers.              Learner Progress:  Not documented in this visit.          Point: Body mechanics (Not Started)     Description:   Instruct learner(s) on proper positioning and spine alignment during self-care, functional mobility activities and/or exercises.              Learner Progress:  Not documented in this visit.                                 OT Recommendation and Plan                         Time Calculation:      Time Calculation- OT     Row Name 03/27/23 1435 03/27/23 1219          Time Calculation- OT    OT Start Time 1330  -AF 1000  -AF     OT Stop Time 1400  -AF 1030  -AF     OT Time Calculation (min) 30 min  -AF 30 min  -AF           User Key  (r) = Recorded By, (t) = Taken By, (c) = Cosigned By    Initials Name Provider Type    AF Heaven Villareal, OTR Occupational Therapist              Therapy Charges for Today     Code Description Service Date Service Provider Modifiers Qty    78138684747 HC OT SELF CARE/MGMT/TRAIN EA 15 MIN 3/27/2023 Heaven Villareal OTR GO 2    13805182745 HC OT SELF CARE/MGMT/TRAIN EA 15 MIN 3/27/2023 Heaven Villareal OTR GO 1    49807299679 HC OT THERAPEUTIC ACT EA 15 MIN 3/27/2023 Heaven Villareal, OTR GO 1                   MARY ANN Gunn  3/27/2023

## 2023-03-27 NOTE — PROGRESS NOTES
Inpatient Rehabilitation Functional Measures Assessment and Plan of Care    Plan of Care  Updated Problems/Interventions  Self Care    [OT] Bathing(Active)  Current Status(03/27/2023): Mod A  Weekly Goal(04/04/2023): MIN  Discharge Goal: CGA    [OT] Dressing (Lower)(Active)  Current Status(03/27/2023): MAX  Weekly Goal(04/04/2023): MOD  Discharge Goal: MIN    [OT] Dressing (Upper)(Active)  Current Status(03/27/2023): MIN  Weekly Goal(04/04/2023): CGA  Discharge Goal: Set up    [OT] Eating(Active)  Current Status(03/27/2023): set up  Weekly Goal(04/04/2023): set up  Discharge Goal: Mod I    [OT] Grooming(Active)  Current Status(03/27/2023): set up  Weekly Goal(04/04/2023): supervision  Discharge Goal: supervision    [OT] Toileting(Active)  Current Status(03/27/2023): MOD A x 2  Weekly Goal(04/04/2023): MOD  Discharge Goal: MIN/CGA        Mobility    [OT] Tub/Shower Transfers(Active)  Current Status(03/27/2023): TBA  Weekly Goal(04/04/2023): Mod A x2  Discharge Goal: CGA    [OT] Toilet Transfers(Active)  Current Status(03/27/2023): MOD A X 2  Weekly Goal(04/04/2023): MOD  Discharge Goal: CGA    Functional Measures  BA Eating:  Branch  BA Grooming: Branch  BA Bathing:  Branch  BA Upper Body Dressing:  Branch  BA Lower Body Dressing:  Branch  BA Toileting:  Branch    BA Bladder Management  Level of Assistance:  Branch  Frequency/Number of Accidents this Shift:  Branch    BA Bowel Management  Level of Assistance: Branch  Frequency/Number of Accidents this Shift: Branch    BA Bed/Chair/Wheelchair Transfer:  Branch  BA Toilet Transfer:  Branch  BA Tub/Shower Transfer:  Branch    Previously Documented Mode of Locomotion at Discharge: Field  BA Expected Mode of Locomotion at Discharge: Branch  BA Walk/Wheelchair:  Branch  BA Stairs:  Branch    BA Comprehension:  Branch  BA Expression:  Branch  BA Social Interaction:  Branch  BA Problem Solving:  Branch  BA Memory:  Branch    Therapy Mode  Minutes  Occupational Therapy: Branch  Physical Therapy: Branch  Speech Language Pathology:  Branch    Signed by: Heaven Villareal OT

## 2023-03-27 NOTE — PROGRESS NOTES
"Nutrition Services    Patient Name:  Louise GARCIA  YOB: 1965  MRN: [id number]  Admit Date:  3/17/2023    Assessment Date:  03/27/23    FOLLOW UP NOTE - CLINICAL NUTRITION    Comments:  Visited pt in room who stated that intakes are 'ok', but mentioned that she drinks Boost. Pt's  in room shook his head no, reporting 10% of meals consumed and no Boost intake. RD encouraged pt to eat between meals to improve appetite and drink Boost at each meal for optimal nutrition. Per EMR, PO declined to 25-50% of recent meals after having shown improvement last week. Some drainage from chest tube sites. Last BM 3/27. Will CTM intakes closely.     RD will continue to follow-up, per protocol.      Encounter Information        Reason for Encounter Follow-up   Current Issues Idiopathic peripheral neuropathy     Current Nutrition Orders & Evaluation of Intake       Oral Nutrition     Current PO Diet Diet: Regular/House Diet; Texture: Regular Texture (IDDSI 7); Fluid Consistency: Thin (IDDSI 0)   Supplement Boost Plus TID   PO Evaluation    % PO Intake/# of days Varying 25-50%.     Factors Affecting Intake  constipation, decreased appetite, early satiety      Anthropometrics         Height   Weight Height: 157.5 cm (62\")  Weight: 63.8 kg (140 lb 10.5 oz) (03/17/23 2040)   BMI kg/m2 Body mass index is 25.73 kg/m².   Weight trend No new weight available     Physical Findings          Physical Appearance disheveled , oriented fatigued   Oral/Mouth Cavity teeth missing   Edema  1+ (trace), 2+ (mild)   Gastrointestinal hyperactive bowel sounds, last bowel movement:3/27   Skin  MASD, surgical incision, abrasion   Tubes/Drains none     Labs       Pertinent Labs Reviewed, listed below     Results from last 7 days   Lab Units 03/27/23  0749 03/26/23  0528 03/25/23  0640   SODIUM mmol/L 136 135* 137   POTASSIUM mmol/L 3.9 3.8 3.9   CHLORIDE mmol/L 97* 98 100   CO2 mmol/L 28.3 29.5* 28.2   BUN mg/dL 7 7 7   CREATININE mg/dL " 1.01* 0.96 0.99   CALCIUM mg/dL 8.7 8.8 8.7   GLUCOSE mg/dL 112* 98 88     Results from last 7 days   Lab Units 03/27/23  0749   HEMOGLOBIN g/dL 8.2*   HEMATOCRIT % 24.9*   WBC 10*3/mm3 10.98*     Results from last 7 days   Lab Units 03/27/23  0749 03/26/23  0528 03/25/23  0640 03/24/23  0822 03/23/23  0718   PLATELETS 10*3/mm3 341 393 370 349 412     COVID19   Date Value Ref Range Status   03/03/2023 Not Detected Not Detected - Ref. Range Final     Lab Results   Component Value Date    HGBA1C 5.10 02/20/2023          Medications           Scheduled Medications apixaban, 5 mg, Oral, Q12H  desvenlafaxine, 25 mg, Oral, Daily  folic acid, 1 mg, Oral, Daily  gabapentin, 300 mg, Oral, Q8H  ipratropium-albuterol, 3 mL, Nebulization, 4x Daily - RT  lamoTRIgine, 200 mg, Oral, Daily  melatonin, 5 mg, Oral, Nightly  pantoprazole, 40 mg, Oral, Q AM  thiamine, 100 mg, Oral, Daily  vancomycin, 1,000 mg, Intravenous, Q24H  vitamin B-12, 500 mcg, Oral, Daily       Infusions Pharmacy to dose vancomycin,        PRN Medications •  acetaminophen  •  bisacodyl  •  hydrocortisone-bacitracin-zinc oxide-nystatin  •  oxyCODONE  •  Pharmacy to dose vancomycin  •  polyethylene glycol  •  senna-docusate sodium  •  simethicone     PLAN OF CARE  Intervention Goal        Intervention Goal(s) Maintain nutrition status, Tolerate PO , Increase intake, Maintain weight, No significant weight loss and PO intake goal %: 75%     Nutrition Intervention        RD Action Advise available snack, Encourage intake, Follow Tx Progress and Care plan reviewed     Prescription        Diet Prescription    Supplement Prescription    EN/PN Prescription    Prescription Ordered Continue same per protocol   --  Monitor/Evaluation        Monitor Per protocol, PO intake, Supplement intake, Weight, GI status, Symptoms   Discharge Plan Pending clinical course   Education Will educate as needed       Electronically signed by:  Silke Arias  03/27/23 15:22 EDT

## 2023-03-27 NOTE — PLAN OF CARE
Goal Outcome Evaluation:  Plan of Care Reviewed With: patient        Progress: improving  Outcome Evaluation: Pt is AOX3, cooperative, cont BB. Meds whole in applesauce. IV to RFA Vanc every 24 hours (2200). No c/o of pain. Elevated leg and heels are floating off of bed. Spouse remains at bedside this shift. No unsafe behavior. Appears to be sleeping well.

## 2023-03-27 NOTE — THERAPY TREATMENT NOTE
Inpatient Rehabilitation - Speech Language Pathology Treatment Note    [facility]     Patient Name: Louise GARCIA  : 1965  MRN: [id number]    Today's Date: 3/27/2023                   Admit Date: 3/17/2023       Visit Dx:      ICD-10-CM ICD-9-CM   1. 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)   • Situational mixed anxiety and depressive disorder   • Mass of middle lobe of right lung   • Oral candidiasis   • Empyema of pleura (HCC)   • MRSA bacteremia   • Idiopathic peripheral neuropathy       Past Medical History:   Diagnosis Date   • Degenerative disc disease, cervical    • Gestational diabetes    • H/O blood clots    • Hematemesis    • Seizures (HCC)    • Septic shock (HCC)        Past Surgical History:   Procedure Laterality Date   • APPENDECTOMY     • BACK SURGERY     • CHOLECYSTECTOMY     • ENDOSCOPY N/A 2016    Procedure: ESOPHAGOGASTRODUODENOSCOPY with biopsy;  Surgeon: Austen Brito MD;  Location: [facility] ENDOSCOPY;  Service:    • HYSTERECTOMY     • NECK SURGERY       approx 6 yrs ago   • THORACOSCOPY Right 3/8/2023    Procedure: THORACOSCOPY WITH DAVINCI ROBOT WITH COMPLETE DECORTICATION;  Surgeon: Chloe Cedillo MD;  Location: [facility] OR;  Service: Robotics - DaVinci;  Laterality: Right;   • TONSILLECTOMY     • TONSILLECTOMY AND ADENOIDECTOMY  1975       SLP Recommendation and Plan  Patient was wearing a face mask during this therapy encounter. Therapist used appropriate personal protective equipment including mask, eye protection and gloves.  Mask used was standard procedure mask. Appropriate PPE was worn during  the entire therapy session. Hand hygiene was completed before and after therapy session. Patient is not in enhanced droplet precautions.               SLP EVALUATION (last 72 hours)     SLP SLC Evaluation     Row Name 03/27/23 1100 03/27/23 0900                Communication Assessment/Intervention    Document Type therapy note (daily note)  -AL therapy note (daily note)  -AL       Patient/Family/Caregiver Comments/Observations Pt required MIN cues for alertness. Noted to have moderate hoarse vocal quality today.  -AL Pt participated well.  -AL          Pain Scale: Numbers Pre/Post-Treatment    Pretreatment Pain Rating 0/10 - no pain  -AL 0/10 - no pain  -AL             User Key  (r) = Recorded By, (t) = Taken By, (c) = Cosigned By    Initials Name Effective Dates    eNssa Kraus, MS CCC-SLP 06/16/21 -                    EDUCATION    The patient has been educated in the following areas:       Cognitive Impairment Communication Impairment.             SLP GOALS     Row Name 03/27/23 1100 03/27/23 0900          Attention Goal 1 (SLP)    Improve Attention by Goal 1 (SLP) -- complete sustained attention task;80%;with minimal cues (75-90%)  -AL     Time Frame (Attention Goal 1, SLP) -- by discharge  -AL     Progress/Outcomes (Attention Goal 1, SLP) -- good progress toward goal  -AL     Comment (Attention Goal 1, SLP) -- Attention to detail for calendar: 7/8 with NO cues, 8/8 with MIN cues  -AL        Memory Skills Goal 1 (SLP)    Improve Memory Skills Through Goal 1 (SLP) use memory strategies;use external memory aid;recall details of the day;80%;with moderate cues (50-74%)  -AL use memory strategies;use external memory aid;recall details of the day;80%;with moderate cues (50-74%)  -AL     Time Frame (Memory Skills Goal 1, SLP) by discharge  -AL by discharge  -AL     Progress/Outcomes (Memory Skills Goal 1, SLP) goal ongoing  -AL goal ongoing  -AL     Comment (Memory Skills Goal 1, SLP) Immediate memory for  voicemails: 30% with NO cues, 100% wtih MIN-MOD cues.  -AL Oriented to year with NO cues; required MIN cues for month, SUSAN and date. Oriented to place with NO cues. Recalled 3/3 errands after 12 minutes with NO cues.  -AL        Organizational Skills Goal 1 (SLP)    Improve Thought Organization Through Goal 1 (SLP) -- completing a divergent naming task;80%;with minimal cues (75-90%)  -AL     Time Frame (Thought Organization Skills Goal 1, SLP) -- 1 week  -AL     Progress/Outcomes (Thought Organization Skills Goal 1, SLP) -- good progress toward goal  -AL     Comment (Thought Organization Skills Goal 1, SLP) -- Smelly items: 4 with NO cues in 1 minute, 8 with MIN-MOD cues  -AL        Reasoning Goal 1 (SLP)    Improve Reasoning Through Goal 1 (SLP) complete deductive reasoning task;80%;with minimal cues (75-90%)  -AL --     Time Frame (Reasoning Goal 1, SLP) 1 week  -AL --     Progress/Outcomes (Reasoning Goal 1, SLP) good progress toward goal  -AL --     Comment (Reasoning Goal 1, SLP) Moderate level logic puzzle: 70% iwth NO cues, 100% wtih MIN-MOD cues  -AL --           User Key  (r) = Recorded By, (t) = Taken By, (c) = Cosigned By    Initials Name Provider Type    Nessa Kraus MS CCC-SLP Speech and Language Pathologist                            Time Calculation:        Time Calculation- SLP     Row Name 03/27/23 1159 03/27/23 1158          Time Calculation- SLP    SLP Start Time 1100  -AL 0900  -AL     SLP Stop Time 1130  -AL 0930  -AL     SLP Time Calculation (min) 30 min  -AL 30 min  -AL     SLP Received On 03/27/23  -AL --           User Key  (r) = Recorded By, (t) = Taken By, (c) = Cosigned By    Initials Name Provider Type    Nessa Kraus MS CCC-SLP Speech and Language Pathologist                  Therapy Charges for Today     Code Description Service Date Service Provider Modifiers Qty    61596220802  ST DEV OF COGN SKILLS INITIAL 15 MIN 3/27/2023 Nessa Spangler MS CCC-SLP  1     [id number] [facility] DEV OF COGN SKILLS EACH ADDT'L 15 MIN 3/27/2023 Nessa Spangler, MS CCC-SLP  3                           Nessa Spangler, MS CCC-SLP  3/27/2023

## 2023-03-27 NOTE — THERAPY TREATMENT NOTE
Inpatient Rehabilitation - Physical Therapy Treatment Note       [facility]     Patient Name: Louise GARCIA  : 1965  MRN: [id number]    Today's Date: 3/27/2023                    Admit Date: 3/17/2023      Visit Dx:     ICD-10-CM ICD-9-CM   1. 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)   • Situational mixed anxiety and depressive disorder   • Mass of middle lobe of right lung   • Oral candidiasis   • Empyema of pleura (HCC)   • MRSA bacteremia   • Idiopathic peripheral neuropathy       Past Medical History:   Diagnosis Date   • Degenerative disc disease, cervical    • Gestational diabetes    • H/O blood clots    • Hematemesis    • Seizures (HCC)    • Septic shock (HCC)        Past Surgical History:   Procedure Laterality Date   • APPENDECTOMY     • BACK SURGERY     • CHOLECYSTECTOMY     • ENDOSCOPY N/A 2016    Procedure: ESOPHAGOGASTRODUODENOSCOPY with biopsy;  Surgeon: Austen Brito MD;  Location: [facility] ENDOSCOPY;  Service:    • HYSTERECTOMY     • NECK SURGERY       approx 6 yrs ago   • THORACOSCOPY Right 3/8/2023    Procedure: THORACOSCOPY WITH DAVINCI ROBOT WITH COMPLETE DECORTICATION;  Surgeon: Chloe Cedillo MD;  Location: [facility] OR;  Service: Robotics - DaVinci;  Laterality: Right;   • TONSILLECTOMY     • TONSILLECTOMY AND ADENOIDECTOMY         PT ASSESSMENT (last 12 hours)     IRF PT Evaluation and Treatment     Row Name 23 0806          PT Time and Intention    Document Type daily treatment  -DP     Mode of Treatment individual therapy;physical therapy  -DP     Patient/Family/Caregiver  Comments/Observations Pt supine in bed upon PT arrival  -DP     Row Name 03/27/23 0806          General Information    Patient Profile Reviewed yes  -DP     Existing Precautions/Restrictions fall;other (see comments)  contact isolation  -DP     Row Name 03/27/23 0806          Pain Assessment    Pretreatment Pain Rating 6/10  -DP     Posttreatment Pain Rating 6/10  -DP     Pain Location - Side/Orientation Left  -DP     Pain Location lower  -DP     Pain Location - extremity  -DP     Row Name 03/27/23 0806          Cognition/Psychosocial    Affect/Mental Status (Cognition) flat/blunted affect  -DP     Orientation Status (Cognition) oriented to;place;person  -DP     Follows Commands (Cognition) follows one-step commands;75-90% accuracy;repetition of directions required;verbal cues/prompting required;increased processing time needed  -DP     Personal Safety Interventions safety round/check completed;elopement precautions initiated;fall prevention program maintained;gait belt;muscle strengthening facilitated;nonskid shoes/slippers when out of bed;supervised activity  -DP     Row Name 03/27/23 0806          Bed Mobility    Rolling Left [place] (Bed Mobility) minimum assist (75% patient effort)  -DP     Rolling Right [place] (Bed Mobility) standby assist  -DP     Scooting/Bridging [place] (Bed Mobility) 2 person assist;maximum assist (25% patient effort)  -DP     Supine-Sit [place] (Bed Mobility) standby assist  -DP     Sit-Supine [place] (Bed Mobility) minimum assist (75% patient effort)  -DP     Bed Mobility, Safety Issues decreased use of arms for pushing/pulling;decreased use of legs for bridging/pushing;impaired trunk control for bed mobility  -DP     Assistive Device (Bed Mobility) bed rails;head of bed elevated  -DP     Comment, (Bed Mobility) dressing LE in bed with modA  -DP     Row Name 03/27/23 0806          Bed-Chair Transfer    Bed-Chair [place] (Transfers) moderate assist (50%  patient effort);2 person assist  -DP     Assistive Device (Bed-Chair Transfers) wheelchair  -DP     Row Name 03/27/23 0806          Chair-Bed Transfer    Chair-Bed [place] (Transfers) moderate assist (50% patient effort)  -DP     Assistive Device (Chair-Bed Transfers) wheelchair  -DP     Row Name 03/27/23 0806          Sit-Stand Transfer    Sit-Stand [place] (Transfers) moderate assist (50% patient effort);verbal cues;nonverbal cues (demo/gesture);1 person assist;2 person assist  -DP     Assistive Device (Sit-Stand Transfers) parallel bars;walker, front-wheeled  -DP     Comment, (Sit-Stand Transfer) In the PM session the pt was unable to straighten knee  -DP     Row Name 03/27/23 0806          Stand-Sit Transfer    Stand-Sit [place] (Transfers) moderate assist (50% patient effort);verbal cues;nonverbal cues (demo/gesture)  -DP     Assistive Device (Stand-Sit Transfers) wheelchair  -DP     Row Name 03/27/23 0806          Gait/Stairs (Locomotion)    [place] Level (Gait) minimum assist (75% patient effort)  -DP     Assistive Device (Gait) --  // bars  -DP     Distance in Feet (Gait) 8'x1 in // bars, 6' with FWW  -DP     Pattern (Gait) step-through;step-to  -DP     Deviations/Abnormal Patterns (Gait) federico decreased;bilateral deviations;base of support, narrow;festinating/shuffling;stride length decreased;weight shifting decreased;left sided deviations;antalgic  -DP     Bilateral Gait Deviations heel strike decreased;weight shift ability decreased;knee buckling bilaterally  -DP     Left Sided Gait Deviations decreased knee extension  -DP     Right Sided Gait Deviations decreased knee extension  -DP     Row Name 03/27/23 0806          Safety Issues, Functional Mobility    Impairments Affecting Function (Mobility) balance;cognition;endurance/activity tolerance;postural/trunk control;pain;strength  -DP     Row Name 03/27/23 0806          Motor Skills    Therapeutic Exercise hip;knee;ankle  -DP     Row  Name 03/27/23 0806          Hip (Therapeutic Exercise)    Hip Isometrics (Therapeutic Exercise) bilateral;aBduction;aDduction;gluteal sets;hamstring sets;10 repetitions;3 second hold  -DP     Row Name 03/27/23 0806          Knee (Therapeutic Exercise)    Knee Isometrics (Therapeutic Exercise) bilateral;gluteal sets;quad sets;10 repetitions;3 second hold  -DP     Knee Strengthening (Therapeutic Exercise) bilateral;hamstring curls;resistance band;yellow;10 repetitions  -DP     Row Name 03/27/23 0806          Ankle (Therapeutic Exercise)    Ankle AROM (Therapeutic Exercise) bilateral;dorsiflexion;sitting;10 repetitions  -DP     Row Name 03/27/23 0806          Positioning and Restraints    Pre-Treatment Position in bed  -DP     Post Treatment Position bed  -DP     In Bed supine;call light within reach;encouraged to call for assist;exit alarm on  -DP           User Key  (r) = Recorded By, (t) = Taken By, (c) = Cosigned By    Initials Name Provider Type    DP Papa Marsh, PT Physical Therapist              Wound 03/08/23 1917 Right lateral chest Incision (Active)   Dressing Appearance open to air 03/27/23 0758   Closure Open to air 03/27/23 0758   Base pink 03/27/23 0758   Periwound pink 03/26/23 2129   Periwound Temperature warm 03/26/23 2129   Periwound Skin Turgor soft 03/26/23 2129   Drainage Amount none 03/27/23 0758       Wound 03/11/23 1530 Other (See comments) other (see comments) pubis Abrasion (Active)   Dressing Appearance open to air 03/27/23 1330   Closure Open to air 03/26/23 2129   Base dry;pink 03/27/23 1330   Drainage Amount none 03/27/23 1330   Dressing Care open to air 03/27/23 1330   Periwound Care barrier ointment applied 03/27/23 1330       Wound 03/20/23 1527 Bilateral upper gluteal MASD (Moisture associated skin damage) (Active)   Dressing Appearance open to air 03/27/23 1330   Closure None 03/27/23 1330   Base blanchable;pink;red 03/27/23 1330   Drainage Amount none 03/27/23 1330   Care, Wound  cleansed with;soap and water 03/27/23 1330   Dressing Care skin barrier agent applied 03/27/23 1330   Periwound Care barrier ointment applied 03/27/23 1330     Physical Therapy Education     Title: PT OT SLP Therapies (In Progress)     Topic: Physical Therapy (Done)     Point: Mobility training (Done)     Learning Progress Summary           Patient Acceptance, E,D, VU,DU by DP at 3/27/2023 1601    Acceptance, E,D, VU,NR by EE at 3/25/2023 1423    Acceptance, E,D, NR,VU,DU by DP at 3/21/2023 1448    Acceptance, E, NR by JK at 3/20/2023 1513    Acceptance, E,TB, VU,NR by SAMANTHA at 3/18/2023 1643                   Point: Home exercise program (Done)     Learning Progress Summary           Patient Acceptance, E,D, VU,DU by DP at 3/27/2023 1601    Acceptance, E,D, NR,VU,DU by DP at 3/21/2023 1448    Acceptance, E, NR by JK at 3/20/2023 1513                   Point: Body mechanics (Done)     Learning Progress Summary           Patient Acceptance, E,D, VU,DU by DP at 3/27/2023 1601    Acceptance, E,D, VU,NR by EE at 3/25/2023 1423    Acceptance, E,D, NR,VU,DU by DP at 3/21/2023 1448                   Point: Precautions (Done)     Learning Progress Summary           Patient Acceptance, E,D, VU,DU by DP at 3/27/2023 1601    Acceptance, E,D, VU,NR by EE at 3/25/2023 1423    Acceptance, E,D, NR,VU,DU by DP at 3/21/2023 1448                               User Key     Initials Effective Dates Name Provider Type Discipline    EE 06/16/21 -  Melinda Mann, PT Physical Therapist PT    CLOVERK 06/16/21 -  Juana Veloz, PT Physical Therapist PT    KP 06/16/21 -  Yolanda Shannon, PT Physical Therapist PT    DP 08/24/21 -  Papa Marsh, PT Physical Therapist PT                PT Recommendation and Plan                          Time Calculation:      PT Charges     Row Name 03/27/23 1602 03/27/23 1601          Time Calculation    Start Time 1400  -DP 0830  -DP     Stop Time 1430  -DP 0900  -DP     Time Calculation (min) 30 min  -DP 30 min   -DP     PT Received On -- 03/27/23  -DP     PT - Next Appointment -- 03/28/23  -DP        Time Calculation- PT    Total Timed Code Minutes- PT 30 minute(s)  -DP 30 minute(s)  -DP           User Key  (r) = Recorded By, (t) = Taken By, (c) = Cosigned By    Initials Name Provider Type    Papa Traylor PT Physical Therapist                Therapy Charges for Today     Code Description Service Date Service Provider Modifiers Qty    16492572789  PT THERAPEUTIC ACT EA 15 MIN 3/27/2023 Papa Marsh, PT GP 2    [id number]  PT THER PROC EA 15 MIN 3/27/2023 Papa Marsh, PT GP 2    [id number]  PT THER SUPP EA 15 MIN 3/27/2023 Papa Marsh, PT GP 1              Patient was wearing a face mask during this therapy encounter. Therapist used appropriate personal protective equipment including mask and gloves.  Mask used was standard procedure mask. Appropriate PPE was worn during the entire therapy session. Hand hygiene was completed before and after therapy session. Patient is not in enhanced droplet precautions.         Papa Marsh PT  3/27/2023

## 2023-03-27 NOTE — PROGRESS NOTES
Inpatient Rehabilitation Plan of Care Note    Plan of Care  Updated Problems/Interventions  Mobility    [PT] Stairs(Active)  Current Status(03/27/2023): TBA  Weekly Goal(03/31/2023):  Discharge Goal: 3, B rails, Min/CG    [PT] Walk(Active)  Current Status(03/27/2023): 15' minAx2 VC with w/c follow  Weekly Goal(03/31/2023): PT only  Discharge Goal: 150', RW, CG    [PT] Bed/Chair/Wheelchair(Active)  Current Status(03/27/2023): ModA, stand pivot or squat with tsf bd  Weekly Goal(03/31/2023): Bianca, stand pivot  Discharge Goal: Min,  rwx    [PT] Bed Mobility(Active)  Current Status(03/27/2023): Min to CGA, HOB elev  Weekly Goal(03/31/2023): SBA  Discharge Goal: Mod Indep    Signed by: Papa Marsh, PT

## 2023-03-27 NOTE — PROGRESS NOTES
Inpatient Rehabilitation Plan of Care Note    Plan of Care  Care Plan Reviewed - Updates as Follows    Sphincter Control    [RN] Bladder Management(Active)  Current Status(03/27/2023): Bladder urgency  Weekly Goal(03/28/2023): decrease in episodes of incontinence  Discharge Goal: Continent 100%    [RN] Bowel Management(Active)  Current Status(03/27/2023): Patient is 100% continent of bowel  Weekly Goal(04/03/2023): Patient will maintain a daily bowel pattern.  Discharge Goal: Patient will maintain 100% continence of bowel    Performed Intervention(s)  Bladder/bowel training program  Monitor intake and output  Use incontinence products/equipment      Psychosocial    [RN] Coping/Adjustment(Active)  Current Status(03/27/2023): Anxiety r/t uncertainty of disease course  Weekly Goal(04/03/2023): Allow opportunity to express concerns regarding current  status  Discharge Goal: Patient/family will verbalize decreased feelings of anxiety.    Performed Intervention(s)  Verbalizes needs and concerns  Support from family/peer groups      Body Systems    [RN] Neurological(Active)  Current Status(03/27/2023): Impaired physical mobility r/t decreased muscle  strength/control and limited ROM  Weekly Goal(04/04/2023): Patient will have increased ROM  Discharge Goal: Patient will have improved strength and function of BLE and B  hands.    Performed Intervention(s)  Perform active and passive ROM  Frequent position changes      Safety    [RN] Potential for Injury(Active)  Current Status(03/27/2023): Risk for falls  Weekly Goal(03/28/2023): Family/Caregivers regarding safety precautions and need  for close supervision  Discharge Goal: Patient/family will be aware of risk of fall and safety in the  home setting.    Performed Intervention(s)  Safety Rounds  Bed alarm/Chair alarm  Items within reach    Signed by: Rola Yan RN

## 2023-03-27 NOTE — PROGRESS NOTES
"[facility] Clinical Pharmacy Services: Vancomycin Monitoring Note    Louise GARCIA is a 58 y.o. female who is on pharmacy to dose vancomycin through 4/1/23 for  empyema .    Previous Vancomycin Dose: 1000 mg IV every 24 hours    Results from last 7 days   Lab Units 03/23/23  2126 03/20/23  2357   VANCOMYCIN TR mcg/mL 15.60 12.70     Vitals/Labs  Ht: 157.5 cm (62\"); Wt: 63.8 kg (140 lb 10.5 oz)   Temp Readings from Last 1 Encounters:   03/27/23 97.7 °F (36.5 °C) (Oral)     Estimated Creatinine Clearance: 52.6 mL/min (A) (by C-G formula based on SCr of 1.01 mg/dL (H)).     Results from last 7 days   Lab Units 03/27/23  0749 03/26/23  0528 03/25/23  0640   CREATININE mg/dL 1.01* 0.96 0.99   WBC 10*3/mm3 10.98* 10.58 10.07     Assessment/Plan    Pt remain afebrile and SCr is stable    Current Vancomycin Dose: 1000 mg IV every 24 hours; provides a predicted  mg/L.hr   Next Level Date and Time: None planned at this time  We will continue to monitor patient changes and renal function     Thank you for involving pharmacy in this patient's care. Please contact pharmacy with any questions or concerns.       Zoran King [facility]  Clinical Pharmacist  "

## 2023-03-27 NOTE — PLAN OF CARE
Goal Outcome Evaluation:  Plan of Care Reviewed With: patient, spouse        Progress: no change  Outcome Evaluation: A&Ox3. Cooperative. Cont B&B. Meds whole in applesauce. See MAR for prn meds. IV to RFA for IV Vancomycin. C/o left foot pain. Elevated leg and floated heels off of bed. Spouse remains at bedside this shift.  No unsafe behavior. Appears to be sleeping well.

## 2023-03-28 LAB
ANION GAP SERPL CALCULATED.3IONS-SCNC: 8 MMOL/L (ref 5–15)
BUN SERPL-MCNC: 7 MG/DL (ref 6–20)
BUN/CREAT SERPL: 7.4 (ref 7–25)
CALCIUM SPEC-SCNC: 8.5 MG/DL (ref 8.6–10.5)
CHLORIDE SERPL-SCNC: 96 MMOL/L (ref 98–107)
CO2 SERPL-SCNC: 29 MMOL/L (ref 22–29)
CREAT SERPL-MCNC: 0.95 MG/DL (ref 0.57–1)
DEPRECATED RDW RBC AUTO: 45.3 FL (ref 37–54)
EGFRCR SERPLBLD CKD-EPI 2021: 69.2 ML/MIN/1.73
ERYTHROCYTE [DISTWIDTH] IN BLOOD BY AUTOMATED COUNT: 13.5 % (ref 12.3–15.4)
GLUCOSE SERPL-MCNC: 91 MG/DL (ref 65–99)
HCT VFR BLD AUTO: 24.3 % (ref 34–46.6)
HGB BLD-MCNC: 8.2 G/DL (ref 12–15.9)
MCH RBC QN AUTO: 31.3 PG (ref 26.6–33)
MCHC RBC AUTO-ENTMCNC: 33.7 G/DL (ref 31.5–35.7)
MCV RBC AUTO: 92.7 FL (ref 79–97)
PLATELET # BLD AUTO: 424 10*3/MM3 (ref 140–450)
PMV BLD AUTO: 10.5 FL (ref 6–12)
POTASSIUM SERPL-SCNC: 3.8 MMOL/L (ref 3.5–5.2)
RBC # BLD AUTO: 2.62 10*6/MM3 (ref 3.77–5.28)
SODIUM SERPL-SCNC: 133 MMOL/L (ref 136–145)
WBC NRBC COR # BLD: 9.14 10*3/MM3 (ref 3.4–10.8)

## 2023-03-28 PROCEDURE — 94664 DEMO&/EVAL PT USE INHALER: CPT

## 2023-03-28 PROCEDURE — 97129 THER IVNTJ 1ST 15 MIN: CPT

## 2023-03-28 PROCEDURE — 97535 SELF CARE MNGMENT TRAINING: CPT

## 2023-03-28 PROCEDURE — 97130 THER IVNTJ EA ADDL 15 MIN: CPT

## 2023-03-28 PROCEDURE — 80048 BASIC METABOLIC PNL TOTAL CA: CPT | Performed by: PHYSICAL MEDICINE & REHABILITATION

## 2023-03-28 PROCEDURE — 97110 THERAPEUTIC EXERCISES: CPT

## 2023-03-28 PROCEDURE — 97530 THERAPEUTIC ACTIVITIES: CPT

## 2023-03-28 PROCEDURE — 94799 UNLISTED PULMONARY SVC/PX: CPT

## 2023-03-28 PROCEDURE — 25010000002 VANCOMYCIN PER 500 MG: Performed by: PHYSICAL MEDICINE & REHABILITATION

## 2023-03-28 PROCEDURE — 85027 COMPLETE CBC AUTOMATED: CPT | Performed by: PHYSICAL MEDICINE & REHABILITATION

## 2023-03-28 PROCEDURE — 97112 NEUROMUSCULAR REEDUCATION: CPT

## 2023-03-28 RX ADMIN — IPRATROPIUM BROMIDE AND ALBUTEROL SULFATE 3 ML: 2.5; .5 SOLUTION RESPIRATORY (INHALATION) at 15:19

## 2023-03-28 RX ADMIN — DESVENLAFAXINE SUCCINATE 25 MG: 25 TABLET, EXTENDED RELEASE ORAL at 09:25

## 2023-03-28 RX ADMIN — GABAPENTIN 300 MG: 300 CAPSULE ORAL at 19:51

## 2023-03-28 RX ADMIN — OXYCODONE HYDROCHLORIDE 2.5 MG: 5 TABLET ORAL at 15:33

## 2023-03-28 RX ADMIN — LAMOTRIGINE 200 MG: 100 TABLET ORAL at 09:25

## 2023-03-28 RX ADMIN — APIXABAN 5 MG: 5 TABLET, FILM COATED ORAL at 19:51

## 2023-03-28 RX ADMIN — IPRATROPIUM BROMIDE AND ALBUTEROL SULFATE 3 ML: 2.5; .5 SOLUTION RESPIRATORY (INHALATION) at 07:12

## 2023-03-28 RX ADMIN — Medication 500 MCG: at 09:25

## 2023-03-28 RX ADMIN — VANCOMYCIN HYDROCHLORIDE 1000 MG: 1 INJECTION, SOLUTION INTRAVENOUS at 21:35

## 2023-03-28 RX ADMIN — IPRATROPIUM BROMIDE AND ALBUTEROL SULFATE 3 ML: 2.5; .5 SOLUTION RESPIRATORY (INHALATION) at 19:56

## 2023-03-28 RX ADMIN — APIXABAN 5 MG: 5 TABLET, FILM COATED ORAL at 09:25

## 2023-03-28 RX ADMIN — Medication 5 MG: at 19:51

## 2023-03-28 RX ADMIN — IPRATROPIUM BROMIDE AND ALBUTEROL SULFATE 3 ML: 2.5; .5 SOLUTION RESPIRATORY (INHALATION) at 10:46

## 2023-03-28 RX ADMIN — GABAPENTIN 300 MG: 300 CAPSULE ORAL at 05:38

## 2023-03-28 RX ADMIN — Medication 1 MG: at 09:25

## 2023-03-28 RX ADMIN — Medication 100 MG: at 09:25

## 2023-03-28 RX ADMIN — PANTOPRAZOLE SODIUM 40 MG: 40 TABLET, DELAYED RELEASE ORAL at 05:38

## 2023-03-28 RX ADMIN — GABAPENTIN 300 MG: 300 CAPSULE ORAL at 14:29

## 2023-03-28 NOTE — PLAN OF CARE
Problem: Rehabilitation (IRF) Plan of Care  Goal: Plan of Care Review  Outcome: Ongoing, Progressing  Flowsheets (Taken 3/28/2023 0322)  Progress: improving  Plan of Care Reviewed With: patient  Outcome Evaluation: Pt is A&Ox4, cooperative, pt did c/o B thigh soreness at beginning of shift and requested tylenol, that was effective, meds whole w/ applesauce, pt remains on IV Vanc q24h, has IV R FA, up w/ asst x1, spouse at bedside, pt sleeping well, will continue to monitor.   Goal Outcome Evaluation:  Plan of Care Reviewed With: patient        Progress: improving  Outcome Evaluation: Pt is A&Ox4, cooperative, pt did c/o B thigh soreness at beginning of shift and requested tylenol, that was effective, meds whole w/ applesauce, pt remains on IV Vanc q24h, has IV R FA, up w/ asst x1, spouse at bedside, pt sleeping well, will continue to monitor.

## 2023-03-28 NOTE — THERAPY TREATMENT NOTE
Inpatient Rehabilitation - Speech Language Pathology Treatment Note    [facility]     Patient Name: Louise GARCIA  : 1965  MRN: [id number]    Today's Date: 3/28/2023                   Admit Date: 3/17/2023       Visit Dx:      ICD-10-CM ICD-9-CM   1. Impaired functional mobility, balance, gait, and endurance  Z74.09 V49.89       Patient Active Problem List   Diagnosis   • Sepsis (HCC)   • Neck pain, chronic   • Upper back pain   • Paresthesia   • Cervical myelopathy (HCC)   • Lower extremity weakness   • History of blood clots   • Chronic anticoagulation   • Seizures (HCC)   • Anemia   • GERD (gastroesophageal reflux disease)   • Guillain-Elko syndrome (HCC)   • Situational mixed anxiety and depressive disorder   • Mass of middle lobe of right lung   • Oral candidiasis   • Empyema of pleura (HCC)   • MRSA bacteremia   • Idiopathic peripheral neuropathy       Past Medical History:   Diagnosis Date   • Degenerative disc disease, cervical    • Gestational diabetes    • H/O blood clots    • Hematemesis    • Seizures (HCC)    • Septic shock (HCC)        Past Surgical History:   Procedure Laterality Date   • APPENDECTOMY     • BACK SURGERY     • CHOLECYSTECTOMY     • ENDOSCOPY N/A 2016    Procedure: ESOPHAGOGASTRODUODENOSCOPY with biopsy;  Surgeon: Austen Brito MD;  Location: [facility] ENDOSCOPY;  Service:    • HYSTERECTOMY     • NECK SURGERY       approx 6 yrs ago   • THORACOSCOPY Right 3/8/2023    Procedure: THORACOSCOPY WITH DAVINCI ROBOT WITH COMPLETE DECORTICATION;  Surgeon: Chloe Cedillo MD;  Location: [facility] OR;  Service: Robotics - DaVinci;  Laterality: Right;   • TONSILLECTOMY     • TONSILLECTOMY AND ADENOIDECTOMY       Patient was wearing a face mask during this therapy encounter. Therapist used appropriate personal protective equipment including mask, eye protection and gloves.  Mask used was standard procedure mask. Appropriate PPE was worn during the entire therapy session.  Hand hygiene was completed before and after therapy session. Patient is not in enhanced droplet precautions.             SLP Recommendation and Plan                                                            SLP EVALUATION (last 72 hours)     SLP SLC Evaluation     Row Name 03/28/23 1100 03/28/23 0930 03/27/23 1100 03/27/23 0900          Communication Assessment/Intervention    Document Type therapy note (daily note)  -AL therapy note (daily note)  -AL therapy note (daily note)  -AL therapy note (daily note)  -AL     Patient/Family/Caregiver Comments/Observations Pt is pleasant and cooperative.  -AL Pt participated well.  -AL Pt required MIN cues for alertness.  -AL Pt participated well.  -AL        Pain Scale: Numbers Pre/Post-Treatment    Pretreatment Pain Rating 0/10 - no pain  -AL 0/10 - no pain  -AL 0/10 - no pain  -AL 0/10 - no pain  -AL           User Key  (r) = Recorded By, (t) = Taken By, (c) = Cosigned By    Initials Name Effective Dates    Nessa Kraus, MS CCC-SLP 06/16/21 -                    EDUCATION    The patient has been educated in the following areas:       Cognitive Impairment.             SLP GOALS     Row Name 03/28/23 1100 03/28/23 0930 03/27/23 1100       Attention Goal 1 (SLP)    Improve Attention by Goal 1 (SLP) complete sustained attention task;80%;with minimal cues (75-90%)  -AL -- --    Time Frame (Attention Goal 1, SLP) by discharge  -AL -- --    Progress/Outcomes (Attention Goal 1, SLP) good progress toward goal  -AL -- --    Comment (Attention Goal 1, SLP) Pt required NO cues for sustained attention during session.  -AL -- --       Memory Skills Goal 1 (SLP)    Improve Memory Skills Through Goal 1 (SLP) use memory strategies;use external memory aid;recall details of the day;80%;with moderate cues (50-74%)  -AL use memory strategies;use external memory aid;recall details of the day;80%;with moderate cues (50-74%)  -AL use memory strategies;use external memory aid;recall details  of the day;80%;with moderate cues (50-74%)  -AL    Time Frame (Memory Skills Goal 1, SLP) by discharge  -AL by discharge  -AL by discharge  -AL    Progress/Outcomes (Memory Skills Goal 1, SLP) good progress toward goal  -AL good progress toward goal  -AL goal ongoing  -AL    Comment (Memory Skills Goal 1, SLP) Immediate memory for voicemails: 95% with NO cues, 100% with MIN cues.  -AL Oriented to month, year and SUSAN with NO cues; required MOD cues for date. Recalled 2/3 errands after 15 minutes with NO cues, 3/3 with MOD cues.  -AL Immediate memory for voicemails: 30% with NO cues, 100% wtih MIN-MOD cues.  -AL       Organizational Skills Goal 1 (SLP)    Improve Thought Organization Through Goal 1 (SLP) completing a divergent naming task;80%;with minimal cues (75-90%)  -AL -- --    Time Frame (Thought Organization Skills Goal 1, SLP) 1 week  -AL -- --    Progress/Outcomes (Thought Organization Skills Goal 1, SLP) good progress toward goal  -AL -- --    Comment (Thought Organization Skills Goal 1, SLP) Sharp items: 3 with NO cues, 5 with MOD cues. Round items: 5 in1 minute with NO cues, 10 with MIN-MOD cues.  -AL -- --       Reasoning Goal 1 (SLP)    Improve Reasoning Through Goal 1 (SLP) complete deductive reasoning task;80%;with minimal cues (75-90%)  -AL complete deductive reasoning task;80%;with minimal cues (75-90%)  -AL complete deductive reasoning task;80%;with minimal cues (75-90%)  -AL    Time Frame (Reasoning Goal 1, SLP) 1 week  -AL 1 week  -AL 1 week  -AL    Progress/Outcomes (Reasoning Goal 1, SLP) good progress toward goal  -AL good progress toward goal  -AL good progress toward goal  -AL    Comment (Reasoning Goal 1, SLP) Moderate level logic puzzle (continued from previous session): 50% with NO Cues, 100% with MIN-MAX cues.  -AL Initiated moderate level logic puzzle: 10/15 with NO cues, 15/15 with MOD-MAX cues.  -AL Moderate level logic puzzle: 70% iwth NO cues, 100% wtih MIN-MOD cues  -AL    Row Name  03/27/23 0900             Attention Goal 1 (SLP)    Improve Attention by Goal 1 (SLP) complete sustained attention task;80%;with minimal cues (75-90%)  -AL      Time Frame (Attention Goal 1, SLP) by discharge  -AL      Progress/Outcomes (Attention Goal 1, SLP) good progress toward goal  -AL      Comment (Attention Goal 1, SLP) Attention to detail for calendar: 7/8 with NO cues, 8/8 with MIN cues  -AL         Memory Skills Goal 1 (SLP)    Improve Memory Skills Through Goal 1 (SLP) use memory strategies;use external memory aid;recall details of the day;80%;with moderate cues (50-74%)  -AL      Time Frame (Memory Skills Goal 1, SLP) by discharge  -AL      Progress/Outcomes (Memory Skills Goal 1, SLP) goal ongoing  -AL      Comment (Memory Skills Goal 1, SLP) Oriented to year with NO cues; required MIN cues for month, SUSAN and date. Oriented to place with NO cues. Recalled 3/3 errands after 12 minutes with NO cues.  -AL         Organizational Skills Goal 1 (SLP)    Improve Thought Organization Through Goal 1 (SLP) completing a divergent naming task;80%;with minimal cues (75-90%)  -AL      Time Frame (Thought Organization Skills Goal 1, SLP) 1 week  -AL      Progress/Outcomes (Thought Organization Skills Goal 1, SLP) good progress toward goal  -AL      Comment (Thought Organization Skills Goal 1, SLP) Smelly items: 4 with NO cues in 1 minute, 8 with MIN-MOD cues  -AL            User Key  (r) = Recorded By, (t) = Taken By, (c) = Cosigned By    Initials Name Provider Type    Nessa Kraus MS CCC-SLP Speech and Language Pathologist                            Time Calculation:        Time Calculation- SLP     Row Name 03/28/23 1205 03/28/23 1000          Time Calculation- SLP    SLP Start Time 1100  -AL 0930  -AL     SLP Stop Time 1130  -AL 1000  -AL     SLP Time Calculation (min) 30 min  -AL 30 min  -AL           User Key  (r) = Recorded By, (t) = Taken By, (c) = Cosigned By    Initials Name Provider Type    AL  Nessa Spangler, MS CCC-SLP Speech and Language Pathologist                  Therapy Charges for Today     Code Description Service Date Service Provider Modifiers Qty    46476962895 HC ST DEV OF COGN SKILLS INITIAL 15 MIN 3/27/2023 Nessa Spangler, MS CCC-SLP  1    36768474986 HC ST DEV OF COGN SKILLS EACH ADDT'L 15 MIN 3/27/2023 Nessa Spangler, MS CCC-SLP  3    87117569806 HC ST DEV OF COGN SKILLS INITIAL 15 MIN 3/28/2023 Nessa Spangler, MS CCC-SLP  1    22311286029 HC ST DEV OF COGN SKILLS EACH ADDT'L 15 MIN 3/28/2023 Nessa Spangler, MS CCC-SLP  3                           Nessa Spangler MS CCC-SLP  3/28/2023

## 2023-03-28 NOTE — PROGRESS NOTES
Reviewed team conference report regarding progress, weekly and d/c goals, and ELOS with patient and .  reported he has been helping patient with bedpan as it usually is taking too long for nursing staff to get to her. Patient stated she does commode transfers with OT but has not been doing with nursing staff. Discussed needing to work on this for home. Will discuss with OT and NSG. Will follow and assist with plans.

## 2023-03-28 NOTE — PROGRESS NOTES
Inpatient Rehabilitation Plan of Care Note    Plan of Care  Care Plan Reviewed - No updates at this time.    Sphincter Control    Performed Intervention(s)  Bladder/bowel training program  Monitor intake and output  Use incontinence products/equipment      Psychosocial    Performed Intervention(s)  Verbalizes needs and concerns  Support from family/peer groups      Body Systems    Performed Intervention(s)  Perform active and passive ROM  Frequent position changes      Safety    Performed Intervention(s)  Safety Rounds  Bed alarm/Chair alarm  Items within reach    Signed by: Helen Weston RN

## 2023-03-28 NOTE — PROGRESS NOTES
Inpatient Rehabilitation Plan of Care Note    Plan of Care  Care Plan Reviewed - No updates at this time.    Sphincter Control    Performed Intervention(s)  Bladder/bowel training program  Monitor intake and output  Use incontinence products/equipment      Psychosocial    Performed Intervention(s)  Verbalizes needs and concerns  Support from family/peer groups      Body Systems    Performed Intervention(s)  Perform active and passive ROM  Frequent position changes      Safety    Performed Intervention(s)  Safety Rounds  Bed alarm/Chair alarm  Items within reach    Signed by: Rola Yan RN

## 2023-03-28 NOTE — PROGRESS NOTES
Case Management  Inpatient Rehabilitation Team Conference    Conference Date/Time: 3/28/2023 8:02:17 AM    Team Conference Attendees:  Dr. Ryley King, Pharmacist  Mariel Rodriguez, ISIDROW  Papa Marsh, PT  Heaven Villareal, OT  Nessa Spangler, SLP  Mariel Cantu, CTRS  Ana Burdick, RN  Rola Yan, RN   Silke Arias, Dietician    Demographics            Age: 58Y            Gender: Female    Admission Date: 3/17/2023 8:27:00 PM  Rehabilitation Diagnosis:  Idiopathic peripheral neuropathy  Past Medical History: Past Medical History:  DiagnosisDate  ?Degenerative disc disease, cervical?  ?Gestational diabetes?  ?H/O blood clots?  ?Hematemesis?  ?Seizures (HCC)?  ?Septic shock (HCC)?    ?  ?  Surgical History  Past Surgical History:  ProcedureLateralityDate  ?APPENDECTOMY??  ?BACK SURGERY??  ?CHOLECYSTECTOMY??  ?ENDOSCOPYN/A8/30/2016  ?Procedure: ESOPHAGOGASTRODUODENOSCOPY with biopsy;  Surgeon: Austen Brito MD;  Location: [facility] ENDOSCOPY;  Service:  ?HYSTERECTOMY??  ?NECK SURGERY??  ? approx 6 yrs ago  ?THORACOSCOPYRight3/8/2023  ?Procedure: THORACOSCOPY WITH DAVINCI ROBOT WITH COMPLETE DECORTICATION;  Surgeon: Chloe Cedillo MD;  Location: [facility] OR;  Service: Robotics -  DaVinci;  Laterality: Right;  ?TONSILLECTOMY??  ?TONSILLECTOMY AND ADENOIDECTOMY?1975    ?      Plan of Care  Anticipated Discharge Date/Estimated Length of Stay: ELOS: 4 weeks  Anticipated Discharge Destination: Community discharge with assistance  Discharge Plan : Patient lives with  in  travel trailer. 3 fold down  steps with rail to enter.  D/C plan is home with .  not currently working.  Medical Necessity Expected Level Rationale: Goals areindeterminate.  Rehabilitation prognosis indeterminate.  Medical prognosis indeterminate.  Intensity and Duration: an average of 3 hours/5 days per week  Medical Supervision and 24 Hour Rehab Nursing: x  Physical Therapy: x  PT Intensity/Duration: 1 hour / day,  5 days / week, for approximately  indetermine length of stay.  Occupational Therapy: x  OT Intensity/Duration: 1 hour / day, 5 days / week, for approximately  indetermine length of stay.  Speech and Language Therapy: x  SLP Intensity/Duration: 1 hour / day, 5 days / week, for approximately  indetermine length of stay.  Social Work: x  Therapeutic Recreation: x  Psychology: x  Registered Dietician: x  Updated (if changes indicated)    Anticipated Discharge Date/Estimated Length of Stay:   ELOS: 3 weeks    Based on the patient's medical and functional status, their prognosis and  expected level of functional improvement is: Goals are to achieve a level of CGA  w/ ADL's and mobility..  Rehabilitation prognosis fair.  Medical prognosis fair.      Interdisciplinary Problem/Goals/Status    All Rehab Problems:  Sphincter Control    [RN] Bladder Management(Active)  Current Status(03/27/2023): Bladder urgency  Weekly Goal(03/28/2023): decrease in episodes of incontinence  Discharge Goal: Continent 100%    [RN] Bowel Management(Active)  Current Status(03/27/2023): Patient is 100% continent of bowel  Weekly Goal(04/03/2023): Patient will maintain a daily bowel pattern.  Discharge Goal: Patient will maintain 100% continence of bowel        Psychosocial    [RN] Coping/Adjustment(Active)  Current Status(03/27/2023): Anxiety r/t uncertainty of disease course  Weekly Goal(04/03/2023): Allow opportunity to express concerns regarding current  status  Discharge Goal: Patient/family will verbalize decreased feelings of anxiety.        Body Systems    [RN] Neurological(Active)  Current Status(03/27/2023): Impaired physical mobility r/t decreased muscle  strength/control and limited ROM  Weekly Goal(04/04/2023): Patient will have increased ROM  Discharge Goal: Patient will have improved strength and function of BLE and B  hands.        Safety    [RN] Potential for Injury(Active)  Current Status(03/27/2023): Risk for falls  Weekly  Goal(03/28/2023): Family/Caregivers regarding safety precautions and need  for close supervision  Discharge Goal: Patient/family will be aware of risk of fall and safety in the  home setting.        Cognition    [ST] Memory(Active)  Current Status(03/27/2023): Pt now able to recall 3 errands after 10 minutes  with NO cues.  Weekly Goal(04/04/2023): Will recall 3 errands after 15 minutes with NO cues.  Discharge Goal: The patient will improve memory necessary for home-based  participation when given appropriate supervision and cuing.        Self Care    [OT] Bathing(Active)  Current Status(03/27/2023): Mod A  Weekly Goal(04/04/2023): MIN  Discharge Goal: CGA    [OT] Dressing (Lower)(Active)  Current Status(03/27/2023): MAX  Weekly Goal(04/04/2023): MOD  Discharge Goal: MIN    [OT] Dressing (Upper)(Active)  Current Status(03/27/2023): MIN  Weekly Goal(04/04/2023): CGA  Discharge Goal: Set up    [OT] Eating(Active)  Current Status(03/27/2023): set up  Weekly Goal(04/04/2023): set up  Discharge Goal: Mod I    [OT] Grooming(Active)  Current Status(03/27/2023): set up  Weekly Goal(04/04/2023): supervision  Discharge Goal: supervision    [OT] Toileting(Active)  Current Status(03/27/2023): MOD A x 2  Weekly Goal(04/04/2023): MOD  Discharge Goal: MIN/CGA        Mobility    [OT] Tub/Shower Transfers(Active)  Current Status(03/27/2023): TBA  Weekly Goal(04/04/2023): Mod A x2  Discharge Goal: CGA    [OT] Bed/Chair/Wheelchair(Active)  Current Status(03/18/2023): Max A x2  Weekly Goal(03/25/2023): Mod A x2  Discharge Goal: CGA/Min A x1    [OT] Toilet Transfers(Active)  Current Status(03/27/2023): MOD A X 2  Weekly Goal(04/04/2023): MOD  Discharge Goal: CGA    [PT] Stairs(Active)  Current Status(03/27/2023): TBA  Weekly Goal(03/31/2023):  Discharge Goal: 3, B rails, Min/CG    [PT] Walk(Active)  Current Status(03/27/2023): 15' using FWW minAx2 VC with w/c follow  Weekly Goal(03/31/2023): PT only  Discharge Goal: 150', RW,  CG    [PT] Bed/Chair/Wheelchair(Active)  Current Status(03/27/2023): ModA, stand pivot or squat with tsf bd  Weekly Goal(03/31/2023): Bianca, stand pivot  Discharge Goal: Min,  rwx    [PT] Bed Mobility(Active)  Current Status(03/27/2023): Min to CGA, HOB elev  Weekly Goal(03/31/2023): SBA  Discharge Goal: Mod Indep        Communication    [ST] Expression(Resolved)  Current Status(03/27/2023): No overt word finding difficulty observed in  conversation.  Weekly Goal(03/28/2023): Goal met  Discharge Goal: Functional language to express wants, needs, ideas, feelings,  concepts with 100% accuracy NO cues for compensations    [ST] Voice(Active)  Current Status(03/27/2023): Presents with moderate hoarse vocal quality.  Improved vocal intensity.  Weekly Goal(04/04/2023): Reduced vocal abuse behaviors (strong habitual throat  clearing) with MIN cues.  Discharge Goal: Functional voice for utilization in all settings, 90%  intelligibility NO cues for compensatory strategies        Comments: 3/21 Bed mob Mod A x 2 w/ rails and HOB elevated, Max A x 2 for stand  pivot or squat pivot transfer, Max A x 2 for toilet transfer.  Dep for  toileting.  Difficulty with remembering new information due to short term memory  impairments.  Word finding tasks are 50% w/ mod cues.  Bladder urgency,  continent of bowel.    3/28 Bed Mob Min to CGA w/ HOB elevated.  Mod A for stand pivot or squat with  transfers to bed.  Amb 15' using FWW Min A x 2 VC's w/ w/c follow.  Mod A x 2  for toilet transfer.  Mod A bathing, Mod A x 2 toileting.  Moderate hoarse vocal  quality.  No overt word finding observed in conversation.  Bladder urgency,  continent of bowel.    Signed by: Ana Burdick RN    Physician CoSigned By: Ryley Rider 03/28/2023 08:55:34

## 2023-03-28 NOTE — PROGRESS NOTES
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   S/p IVIG x 5 days       SUBJECTIVE:    She continues with fatigue.  Right-sided rib pain is unchanged.  Does feel that she is making a lot of progress with her balance.  Strength overall about the same.  Participating in therapies.       OBJECTIVE:  AF, VSS  BP: (106-113)/(65-74) 111/74    Physical Exam     MENTAL STATUS -  AAO x3, Verbal, NAD.  HEENT- NCAT, SCLERAE ANICTERIC, CONJUNCTIVAE PINK, OP MOIST, NO JVD, EARS UNREMARKABLE EXTERNALLY    LUNGS - DECREASED BREATH SOUNDS RIGHT BASE MORE SO THAN LEFT BASE  HEART-regular rate and rhythm.  No murmur      ABD - NORMOACTIVE BOWEL SOUNDS, SOFT, NT.  EXT - NO EDEMA OR CYANOSIS  MOTOR EXAM -bilateral lower extremity greater than upper extremity weakness           Scheduled Meds:apixaban, 5 mg, Oral, Q12H  desvenlafaxine, 25 mg, Oral, Daily  folic acid, 1 mg, Oral, Daily  gabapentin, 300 mg, Oral, Q8H  ipratropium-albuterol, 3 mL, Nebulization, 4x Daily - RT  lamoTRIgine, 200 mg, Oral, Daily  melatonin, 5 mg, Oral, Nightly  pantoprazole, 40 mg, Oral, Q AM  thiamine, 100 mg, Oral, Daily  vancomycin, 1,000 mg, Intravenous, Q24H  vitamin B-12, 500 mcg, Oral, Daily      Continuous Infusions:Pharmacy to dose vancomycin,       PRN Meds:.•  acetaminophen  •  bisacodyl  •  hydrocortisone-bacitracin-zinc oxide-nystatin  •  oxyCODONE  •  Pharmacy to dose vancomycin  •  polyethylene glycol  •  senna-docusate sodium  •  simethicone    Lab Results (last 24 hours)     Procedure Component Value Units Date/Time    CBC (No Diff) [[id number]]  (Abnormal) Collected: 03/28/23 0721    Specimen: Blood Updated: 03/28/23 0808     WBC 9.14 10*3/mm3      RBC 2.62 10*6/mm3      Hemoglobin 8.2 g/dL      Hematocrit 24.3 %      MCV 92.7 fL      MCH 31.3 pg      MCHC 33.7 g/dL      RDW 13.5 %      RDW-SD 45.3 fl      MPV 10.5 fL      Platelets 424 10*3/mm3     Basic Metabolic Panel [[id number]]  (Abnormal)  Collected: 03/28/23 0721    Specimen: Blood Updated: 03/28/23 0825     Glucose 91 mg/dL      BUN 7 mg/dL      Creatinine 0.95 mg/dL      Sodium 133 mmol/L      Potassium 3.8 mmol/L      Chloride 96 mmol/L      CO2 29.0 mmol/L      Calcium 8.5 mg/dL      BUN/Creatinine Ratio 7.4     Anion Gap 8.0 mmol/L      eGFR 69.2 mL/min/1.73     Narrative:      GFR Normal >60  Chronic Kidney Disease <60  Kidney Failure <15            Imaging Results (Last 24 Hours)     ** No results found for the last 24 hours. 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   S/p IVIG x 5 days     Impaired mobility/impaired self care/ impaired cognition     R lung masslike infiltrate with cavitary lesions/pleural effusion   S/p thoracentesis - Blood cx and pleural fluid  positive for MRSA      chest tube placement given empyema, and she underwent thoracoscopy w/ decortication 3/8/23   -expected postoperative changes with pleural thickening and minimal pleural effusion.  The effusion is too small for intervention and will likely to continue to resolve with time.  Recommend continue good pulmonary hygiene with incentive spirometry hourly as well as increase activity/ambulation as tolerated.  March 23-appears decreased breath sounds more noticeable today on the right compared to the left.  Check follow-up chest x-ray.  On room air during the day, 1 L at night.  March 24- CXR relatively unchanged from previous, reached out to CT surgery given planned outpatient f/u on 3/28, no further imaging planned at this time as patient afebrile with normal WBC, monitor     Right-sided Chest Pain 3/24  -EKG sinus tach  -HS Troponin 25 > 24  -consider Cardiology consult if worsening  -pain reproducible with palpation, pain likely postoperative neuralgia as indicated vs. possible costochondritis, continue gabapentin, ice/heat,  monitor    Hypokalemia  -3/24 K 3.3, repleted    mass on TTE with findings concerning for endocarditis - underwent MARIAMA 3/10/2023 which had no vegetation     RUE superficial thrombophlebitis concerning for septic phlebitis.        ID - continue 4 weeks of vancomycin as recommended by infectious disease dosed by pharmacy to achieve a trough level of 15-20 or area under the curve of 400-600 from last negative blood culture or last intervention which ever is the latest - to complete April 1, 2023     Left hip arthrocentesis March 16 to rule out any hip septic arthritis- no growth to date on fluid.    lower back pain - MRI of spine to rule out discitis or osteomyelitis.  This did have known degenerative changes but  no infection.      DVT prophylaxis - SCDs / apixiban. History of LLE DVT - on Eliquis at home     Pain management - Tylenol 1,000 mg three times a day/ Celebrex 100 mg q 112 hours/ gabapentin 300 mg q 8 hours Oxycodone 5 mg q 4 hours prn  March 18 - DC Celebrex 2/2 PHYLLIS, and anemia     Anxiety/ muscle spasms - Diazepam 2 mg q 6 hours prn - JONES reviewed     Seizure disorder -Lamotrigine 200 mg daily    ABLA - March 18- Hgb 7.0 (7.3 on 3/16). Type, cross and transfuse 1 unit PRBCs. Pre-medicate with Tylenol and Benadryl. CBC in am.   March 19- Hgb 8.8 s/p 1 unit PRBcs. Hemoccult positive. On Eliquis for h/o DVT. Follow Hgb. May need GI work up.  March 20 - HGB 9.3 s/p transfusion  March 21-reviewed with internal medicine-hemoglobin stable after transfusion.  Iron studies consistent with inflammation.  No further work-up presently  March 22 - Hgb 8.9, stable  March 23-hemoglobin trend 8.3.  Continue to follow.  Recheck tomorrow    PHYLLIS - March 18- Creatinine 1.36 up from 1.12. On IV Vanc and Celebrex. DC Celebrex. BMP in am.  March 19- s/p 500ml NS bolus. Creatinine 1.31.  March 23-creatinine 0.92    TEAM CONF - MARCH 21 - BED MOD X 2. TRANSFERS MAX 2. STAND PIVOT OR SQUAT PIVOT. NON-AMBULATORY. Saturday MARCH  18 GAIT 6 FEET PARALLEL BARS MOD MAX OF 2.   BATH MOD 1-2. LBD DEP. UBD MOD. EATING MIN. GROOMING MIN. TOILETING DEP.   The patient has difficulty remembering new information due to short-term memory impairments.  Initial evaluation 3.18, pt obtained a score 12/30 on SLUMS cognitive assessment indicating severe cognitive impairment/dementia, goals initiated for memory and sustaining attention  FEES completed 3.7 revealing glottic gap accounting for glottic insufficiency, dysphonia, hoarse/breathy quality, recommend targeting vocal function as appropriate d/t impact on intelligibility.    Regular/thin diet continued since FEES completion 3.7, although pt known to cough with and without PO intake, may be contributed partially to ineffective VF closure.   DRESSING FORMER CHEST TUBE SITE. CONTINUES ON PUREWICK AT NIGHT. O2 AT 1-2 LITERS AT NIGHT.  DECREASED APPETITE. ABD X-RAY THIS AM SHOWS NON-OBSTRUCTIVE BOWEL GAS PATTERN.   ELOS - 4 WEEKS    TEAM CONF - MARCH 28 - BED MIN CTG. TRANSFER MOD ASSIST STAND PIVOT OR SQUAT PIVOT. FATIGUES IN AFERNOONS. GAIT 15 FEET RW MIN X 2. FLUCTUATING ORIENTATION. BETTER DETAIL TO TASK. MIN MOD CUES FOR REASONING TASKS. DYSPHONIA FROM COUGHING.  SLP REVIEWED VOICE HYGIENE, NOT TO DO HARSH THROAT CLEAR. BATH MOD. LBD MAX. UBD MIN. TOILETING MOD X 2. CONTINENT/INCONTINENT BLADDER. RIGHT CHEST TUBE / THORACOSCOPY SITE CARE. VARIABLE INTAKE.   ELOS - 3 WEEKS        During rounds, used appropriate personal protective equipment including mask and gloves.  Additional gown if indicated.  Mask used was standard procedure mask. Appropriate PPE was worn during the entire visit.  Hand hygiene was completed before and after.        Ryley Rider MD

## 2023-03-28 NOTE — PROGRESS NOTES
TEAM CONF - MARCH 28 - BED MIN CTG. TRANSFER MOD ASSIST STAND PIVOT OR SQUAT PIVOT. FATIGUES IN AFERNOONS. GAIT 15 FEET RW MIN X 2. FLUCTUATING ORIENTATION. BETTER DETAIL TO TASK. MIN MOD CUES FOR REASONING TASKS. DYSPHONIA FROM COUGHING.  SLP REVIEWED VOICE HYGIENE, NOT TO DO HARSH THROAT CLEAR. BATH MOD. LBD MAX. UBD MIN. TOILETING MOD X 2. CONTINENT/INCONTINENT BLADDER. RIGHT CHEST TUBE / THORACOSCOPY SITE CARE. VARIABLE INTAKE.   ELOS - 3 WEEKS

## 2023-03-28 NOTE — PLAN OF CARE
Goal Outcome Evaluation:  Plan of Care Reviewed With: patient        Progress: improving  Outcome Evaluation: Pt is AOX3-4 at times, cooperative. Meds whole with applesauce, pt on IV vanc q24 hrs, has IV RFA, up with assist X1,  at bedside, sleeping well.

## 2023-03-28 NOTE — THERAPY TREATMENT NOTE
Inpatient Rehabilitation - Occupational Therapy Treatment Note    [facility]     Patient Name: Louise GARCIA  : 1965  MRN: [id number]    Today's Date: 3/28/2023                 Admit Date: 3/17/2023         ICD-10-CM ICD-9-CM   1. Impaired functional mobility, balance, gait, and endurance  Z74.09 V49.89       Patient Active Problem List   Diagnosis   • Sepsis (HCC)   • Neck pain, chronic   • Upper back pain   • Paresthesia   • Cervical myelopathy (HCC)   • Lower extremity weakness   • History of blood clots   • Chronic anticoagulation   • Seizures (HCC)   • Anemia   • GERD (gastroesophageal reflux disease)   • Guillain-Decker syndrome (HCC)   • Situational mixed anxiety and depressive disorder   • Mass of middle lobe of right lung   • Oral candidiasis   • Empyema of pleura (HCC)   • MRSA bacteremia   • Idiopathic peripheral neuropathy       Past Medical History:   Diagnosis Date   • Degenerative disc disease, cervical    • Gestational diabetes    • H/O blood clots    • Hematemesis    • Seizures ([facility])    • Septic shock ([facility])        Past Surgical History:   Procedure Laterality Date   • APPENDECTOMY     • BACK SURGERY     • CHOLECYSTECTOMY     • ENDOSCOPY N/A 2016    Procedure: ESOPHAGOGASTRODUODENOSCOPY with biopsy;  Surgeon: Austne Brito MD;  Location: [facility] ENDOSCOPY;  Service:    • HYSTERECTOMY     • NECK SURGERY       approx 6 yrs ago   • THORACOSCOPY Right 3/8/2023    Procedure: THORACOSCOPY WITH DAVINCI ROBOT WITH COMPLETE DECORTICATION;  Surgeon: Chloe Cedillo MD;  Location: [facility] OR;  Service: Robotics - DaVinci;  Laterality: Right;   • TONSILLECTOMY     • TONSILLECTOMY AND ADENOIDECTOMY               Merged with [facility] OT ASSESSMENT FLOWSHEET (last 12 hours)     IRF OT Evaluation and Treatment     Row Name 23 1156          OT Time and Intention    Document Type daily treatment  -AF     Mode of Treatment occupational therapy  -AF     Patient Effort good  -AF     Row Name 23  1156          General Information    Patient/Family/Caregiver Comments/Observations pt sitting up in w/c after SLP session  -AF     General Observations of Patient excited about a shower  -AF     Existing Precautions/Restrictions fall  contact precautions  -AF     Row Name 03/28/23 1156          Pain Assessment    Pretreatment Pain Rating 0/10 - no pain  -AF     Posttreatment Pain Rating 0/10 - no pain  -AF     Row Name 03/28/23 1156          Cognition/Psychosocial    Affect/Mental Status (Cognition) flat/blunted affect  -AF     Orientation Status (Cognition) oriented to;place;person  -AF     Follows Commands (Cognition) follows one-step commands;over 90% accuracy;increased processing time needed  -AF     Personal Safety Interventions fall prevention program maintained;gait belt;nonskid shoes/slippers when out of bed  -AF     Row Name 03/28/23 1156          Bathing    [place] Level (Bathing) bathing skills;lower body;upper body;minimum assist (75% patient effort);verbal cues;contact guard assist  -AF     Assistive Device (Bathing) grab bar/tub rail;hand held shower spray hose;tub bench  -AF     Position (Bathing) supported sitting;supported standing  -AF     Set-up Assistance (Bathing) obtain supplies  -AF     Row Name 03/28/23 1156          Upper Body Dressing    [place] Level (Upper Body Dressing) upper body dressing skills;minimum assist (75% or more patient effort)  -AF     Position (Upper Body Dressing) supported sitting  -AF     Row Name 03/28/23 1156          Lower Body Dressing    [place] Level (Lower Body Dressing) doff;don;pants/bottoms;shoes/slippers;socks;moderate assist (50% patient effort);verbal cues  -AF     Position (Lower Body Dressing) supported sitting;supported standing  -AF     Row Name 03/28/23 1156          Grooming    [place] Level (Grooming) grooming skills;set up  -AF     Position (Grooming) supported sitting  -AF     Row Name 03/28/23 1156          Transfer  Assessment/Treatment    Transfers shower transfer  -AF     Comment, (Transfers) sit to  shower with grab bar use MOD A x 1  -AF     Row Name 03/28/23 1156          Shower Transfer    Type (Shower Transfer) squat pivot  -AF     [place] Level (Shower Transfer) 2 person assist;moderate assist (50% patient effort)  -AF     Assistive Device (Shower Transfer) grab bar, tub/shower;tub bench;wheelchair  -AF     Comment, (Shower Transfer) sit pivot  -AF     Row Name 03/28/23 1156          Balance    Static Sitting Balance standby assist  -AF     Dynamic Sitting Balance contact guard  with ADL tasks  -AF     Row Name 03/28/23 1156          Positioning and Restraints    Pre-Treatment Position sitting in chair/recliner  -AF     Post Treatment Position wheelchair  -AF     In Wheelchair sitting;call light within reach;encouraged to call for assist;exit alarm on  in room  -AF           User Key  (r) = Recorded By, (t) = Taken By, (c) = Cosigned By    Initials Name Effective Dates    AF Heaven Villareal, OTR 06/16/21 -                  Occupational Therapy Education     Title: PT OT SLP Therapies (In Progress)     Topic: Occupational Therapy (Not Started)     Point: ADL training (Not Started)     Description:   Instruct learner(s) on proper safety adaptation and remediation techniques during self care or transfers.   Instruct in proper use of assistive devices.              Learner Progress:  Not documented in this visit.          Point: Home exercise program (Not Started)     Description:   Instruct learner(s) on appropriate technique for monitoring, assisting and/or progressing therapeutic exercises/activities.              Learner Progress:  Not documented in this visit.          Point: Precautions (Not Started)     Description:   Instruct learner(s) on prescribed precautions during self-care and functional transfers.              Learner Progress:  Not documented in this visit.          Point: Body mechanics (Not  Started)     Description:   Instruct learner(s) on proper positioning and spine alignment during self-care, functional mobility activities and/or exercises.              Learner Progress:  Not documented in this visit.                                OT Recommendation and Plan                         Time Calculation:      Time Calculation- OT     Row Name 03/28/23 1202             Time Calculation- OT    OT Start Time 1000  -AF      OT Stop Time 1030  -AF      OT Time Calculation (min) 30 min  -AF            User Key  (r) = Recorded By, (t) = Taken By, (c) = Cosigned By    Initials Name Provider Type    AF Heaven Villareal, OTNAIMA Occupational Therapist              Therapy Charges for Today     Code Description Service Date Service Provider Modifiers Qty    67716171813 HC OT SELF CARE/MGMT/TRAIN EA 15 MIN 3/27/2023 Heaven Villareal OTR GO 2    70274274874 HC OT SELF CARE/MGMT/TRAIN EA 15 MIN 3/27/2023 Heaven Villareal, OTR GO 1    72454604896 HC OT THERAPEUTIC ACT EA 15 MIN 3/27/2023 Heaven Villareal, OTR GO 1    30266304106 HC OT SELF CARE/MGMT/TRAIN EA 15 MIN 3/28/2023 Heaven Villareal OTNAIMA GO 2    08493712403 HC OT THER SUPP EA 15 MIN 3/28/2023 Heaven Villareal, OTR GO 2                   MARY ANN Gunn  3/28/2023

## 2023-03-28 NOTE — THERAPY TREATMENT NOTE
Inpatient Rehabilitation - Occupational Therapy Treatment Note    [facility]     Patient Name: Louise GARCIA  : 1965  MRN: [id number]    Today's Date: 3/28/2023                 Admit Date: 3/17/2023         ICD-10-CM ICD-9-CM   1. 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)   • Situational mixed anxiety and depressive disorder   • Mass of middle lobe of right lung   • Oral candidiasis   • Empyema of pleura (HCC)   • MRSA bacteremia   • Idiopathic peripheral neuropathy       Past Medical History:   Diagnosis Date   • Degenerative disc disease, cervical    • Gestational diabetes    • H/O blood clots    • Hematemesis    • Seizures ([facility])    • Septic shock ([facility])        Past Surgical History:   Procedure Laterality Date   • APPENDECTOMY     • BACK SURGERY     • CHOLECYSTECTOMY     • ENDOSCOPY N/A 2016    Procedure: ESOPHAGOGASTRODUODENOSCOPY with biopsy;  Surgeon: Austen Brito MD;  Location: [facility] ENDOSCOPY;  Service:    • HYSTERECTOMY     • NECK SURGERY       approx 6 yrs ago   • THORACOSCOPY Right 3/8/2023    Procedure: THORACOSCOPY WITH DAVINCI ROBOT WITH COMPLETE DECORTICATION;  Surgeon: Chloe Cedillo MD;  Location: [facility] OR;  Service: Robotics - DaVinci;  Laterality: Right;   • TONSILLECTOMY     • TONSILLECTOMY AND ADENOIDECTOMY               [facility] OT ASSESSMENT FLOWSHEET (last 12 hours)     IRF OT Evaluation and Treatment     Row Name 23 1511 23 1156       OT Time and Intention    Document Type daily treatment  -AF daily treatment  -AF    Mode of Treatment occupational therapy  -AF occupational therapy   -AF    Patient Effort good  -AF good  -AF    Row Name 03/28/23 1511 03/28/23 1156       General Information    Patient/Family/Caregiver Comments/Observations pt supine in bed with  present  -AF pt sitting up in w/c after SLP session  -AF    General Observations of Patient discussed getting up for dinner  -AF excited about a shower  -AF    Existing Precautions/Restrictions fall  contact precautions  -AF fall  contact precautions  -AF    Row Name 03/28/23 1511 03/28/23 1156       Pain Assessment    Pretreatment Pain Rating 0/10 - no pain  -AF 0/10 - no pain  -AF    Posttreatment Pain Rating 0/10 - no pain  -AF 0/10 - no pain  -AF    Row Name 03/28/23 1511 03/28/23 1156       Cognition/Psychosocial    Affect/Mental Status (Cognition) flat/blunted affect  -AF flat/blunted affect  -AF    Orientation Status (Cognition) oriented to;place;person  -AF oriented to;place;person  -AF    Follows Commands (Cognition) follows one-step commands;over 90% accuracy;increased processing time needed  -AF follows one-step commands;over 90% accuracy;increased processing time needed  -AF    Personal Safety Interventions fall prevention program maintained;gait belt;nonskid shoes/slippers when out of bed  -AF fall prevention program maintained;gait belt;nonskid shoes/slippers when out of bed  -AF    Row Name 03/28/23 1156          Bathing    [place] Level (Bathing) bathing skills;lower body;upper body;minimum assist (75% patient effort);verbal cues;contact guard assist  -AF     Assistive Device (Bathing) grab bar/tub rail;hand held shower spray hose;tub bench  -AF     Position (Bathing) supported sitting;supported standing  -AF     Set-up Assistance (Bathing) obtain supplies  -AF     Row Name 03/28/23 1156          Upper Body Dressing    [place] Level (Upper Body Dressing) upper body dressing skills;minimum assist (75% or more patient effort)  -AF     Position (Upper Body Dressing) supported sitting  -AF     Row Name  03/28/23 1156          Lower Body Dressing    [place] Level (Lower Body Dressing) doff;don;pants/bottoms;shoes/slippers;socks;moderate assist (50% patient effort);verbal cues  -AF     Position (Lower Body Dressing) supported sitting;supported standing  -AF     Row Name 03/28/23 1156          Grooming    [place] Level (Grooming) grooming skills;set up  -AF     Position (Grooming) supported sitting  -AF     Row Name 03/28/23 1511          Bed Mobility    Supine-Sit [place] (Bed Mobility) standby assist  -AF     Comment, (Bed Mobility) worked on kicking off her covers required MIN A  -AF     Row Name 03/28/23 1511 03/28/23 1156       Transfer Assessment/Treatment    Transfers -- shower transfer  -AF    Comment, (Transfers) EOM <> w/c sit pivot MOD A x 2, practiced scooting on mat and leaning forward pushing through her BLEs  -AF sit to  shower with grab bar use MOD A x 1  -AF    Row Name 03/28/23 1511          Bed-Chair Transfer    Bed-Chair [place] (Transfers) 2 person assist;moderate assist (50% patient effort)  -AF     Assistive Device (Bed-Chair Transfers) wheelchair  -AF     Comment, (Bed-Chair Transfer) sit pivot  -AF     Row Name 03/28/23 1156          Shower Transfer    Type (Shower Transfer) squat pivot  -AF     [place] Level (Shower Transfer) 2 person assist;moderate assist (50% patient effort)  -AF     Assistive Device (Shower Transfer) grab bar, tub/shower;tub bench;wheelchair  -AF     Comment, (Shower Transfer) sit pivot  -AF     Row Name 03/28/23 1511          Shoulder (Therapeutic Exercise)    Shoulder Strengthening (Therapeutic Exercise) bilateral;horizontal aBduction/aDduction;scapular stabilization;sitting;1 lb free weight;20 repititions;2 sets  seated unsupported on EOM  -AF     Row Name 03/28/23 1511 03/28/23 1156       Balance    Static Sitting Balance standby assist  -AF standby assist  -AF    Dynamic Sitting Balance -- contact guard  with ADL tasks  -AF     Comment, Balance standing balance BUE reaching forward able to maintain balance and return to upright posture after leaning forward with out UE support. pt used hand gripper to remove wooden pegs from board with MOD difficulty  -AF --    Row Name 03/28/23 1511 03/28/23 1156       Positioning and Restraints    Pre-Treatment Position in bed  -AF sitting in chair/recliner  -AF    Post Treatment Position wheelchair  -AF wheelchair  -AF    In Wheelchair sitting;call light within reach;encouraged to call for assist;exit alarm on;with family/caregiver  in room with   -AF sitting;call light within reach;encouraged to call for assist;exit alarm on  in room  -AF          User Key  (r) = Recorded By, (t) = Taken By, (c) = Cosigned By    Initials Name Effective Dates    AF Heaven Villareal, OTR 06/16/21 -                  Occupational Therapy Education     Title: PT OT SLP Therapies (In Progress)     Topic: Occupational Therapy (Not Started)     Point: ADL training (Not Started)     Description:   Instruct learner(s) on proper safety adaptation and remediation techniques during self care or transfers.   Instruct in proper use of assistive devices.              Learner Progress:  Not documented in this visit.          Point: Home exercise program (Not Started)     Description:   Instruct learner(s) on appropriate technique for monitoring, assisting and/or progressing therapeutic exercises/activities.              Learner Progress:  Not documented in this visit.          Point: Precautions (Not Started)     Description:   Instruct learner(s) on prescribed precautions during self-care and functional transfers.              Learner Progress:  Not documented in this visit.          Point: Body mechanics (Not Started)     Description:   Instruct learner(s) on proper positioning and spine alignment during self-care, functional mobility activities and/or exercises.              Learner Progress:  Not documented in this visit.                                 OT Recommendation and Plan                         Time Calculation:      Time Calculation- OT     Row Name 03/28/23 1519 03/28/23 1202          Time Calculation- OT    OT Start Time 1330  -AF 1000  -AF     OT Stop Time 1400  -AF 1030  -AF     OT Time Calculation (min) 30 min  -AF 30 min  -AF           User Key  (r) = Recorded By, (t) = Taken By, (c) = Cosigned By    Initials Name Provider Type    AF Heaven Villareal, OTR Occupational Therapist              Therapy Charges for Today     Code Description Service Date Service Provider Modifiers Qty    42612066786 HC OT SELF CARE/MGMT/TRAIN EA 15 MIN 3/27/2023 Heaven Villareal, OTR GO 2    32038715698 HC OT SELF CARE/MGMT/TRAIN EA 15 MIN 3/27/2023 Heaven Villareal, OTR GO 1    19280063464 HC OT THERAPEUTIC ACT EA 15 MIN 3/27/2023 Heaven Villareal, OTR GO 1    25253565466 HC OT SELF CARE/MGMT/TRAIN EA 15 MIN 3/28/2023 Heaven Villareal, OTR GO 2    64289639348 HC OT THER SUPP EA 15 MIN 3/28/2023 Heaven Villareal, OTR GO 2    30156421549 HC OT NEUROMUSC RE EDUCATION EA 15 MIN 3/28/2023 Heaven Villareal, OTR GO 2                   MARY ANN Gunn  3/28/2023

## 2023-03-28 NOTE — THERAPY TREATMENT NOTE
Inpatient Rehabilitation - Physical Therapy Treatment Note       [facility]     Patient Name: Louise GARCIA  : 1965  MRN: [id number]    Today's Date: 3/28/2023                    Admit Date: 3/17/2023      Visit Dx:     ICD-10-CM ICD-9-CM   1. 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)   • Situational mixed anxiety and depressive disorder   • Mass of middle lobe of right lung   • Oral candidiasis   • Empyema of pleura (HCC)   • MRSA bacteremia   • Idiopathic peripheral neuropathy       Past Medical History:   Diagnosis Date   • Degenerative disc disease, cervical    • Gestational diabetes    • H/O blood clots    • Hematemesis    • Seizures (HCC)    • Septic shock (HCC)        Past Surgical History:   Procedure Laterality Date   • APPENDECTOMY     • BACK SURGERY     • CHOLECYSTECTOMY     • ENDOSCOPY N/A 2016    Procedure: ESOPHAGOGASTRODUODENOSCOPY with biopsy;  Surgeon: Austen Brito MD;  Location: [facility] ENDOSCOPY;  Service:    • HYSTERECTOMY     • NECK SURGERY       approx 6 yrs ago   • THORACOSCOPY Right 3/8/2023    Procedure: THORACOSCOPY WITH DAVINCI ROBOT WITH COMPLETE DECORTICATION;  Surgeon: Chloe Cedillo MD;  Location: [facility] OR;  Service: Robotics - DaVinci;  Laterality: Right;   • TONSILLECTOMY     • TONSILLECTOMY AND ADENOIDECTOMY         PT ASSESSMENT (last 12 hours)     IRF PT Evaluation and Treatment     Row Name 23 0747          PT Time and Intention    Document Type daily treatment  -DP     Mode of Treatment individual therapy;physical therapy  -DP     Patient/Family/Caregiver  Comments/Observations Pt supine in bed upon PT arrival  -DP     Row Name 03/28/23 0796          General Information    Patient Profile Reviewed yes  -DP     Existing Precautions/Restrictions fall;other (see comments)  contact precautions  -DP     Row Name 03/28/23 0781          Pain Assessment    Pretreatment Pain Rating 0/10 - no pain  -DP     Posttreatment Pain Rating 0/10 - no pain  -DP     Pain Location - Side/Orientation Left  -DP     Pain Location lower  -DP     Pain Location - extremity  -DP     Pre/Posttreatment Pain Comment Pt said she only had pain t surgical site when she coughs  -DP     Row Name 03/28/23 0702          Cognition/Psychosocial    Affect/Mental Status (Cognition) flat/blunted affect  -DP     Orientation Status (Cognition) oriented to;place;person  -DP     Follows Commands (Cognition) follows one-step commands;over 90% accuracy;increased processing time needed  -DP     Personal Safety Interventions safety round/check completed;elopement precautions initiated;fall prevention program maintained;gait belt;muscle strengthening facilitated;nonskid shoes/slippers when out of bed;supervised activity  -DP     Row Name 03/28/23 0756          Bed Mobility    Rolling Right [place] (Bed Mobility) standby assist  -DP     Supine-Sit [place] (Bed Mobility) standby assist  -DP     Bed Mobility, Safety Issues decreased use of arms for pushing/pulling;decreased use of legs for bridging/pushing;impaired trunk control for bed mobility  -DP     Assistive Device (Bed Mobility) bed rails  -DP     Row Name 03/28/23 0752          Bed-Chair Transfer    Bed-Chair [place] (Transfers) moderate assist (50% patient effort);2 person assist  -DP     Assistive Device (Bed-Chair Transfers) wheelchair  -DP     Row Name 03/28/23 0793          Sit-Stand Transfer    Sit-Stand [place] (Transfers) moderate assist (50% patient effort)  -DP     Assistive Device (Sit-Stand Transfers) parallel bars;walker,  front-wheeled  -DP     Row Name 03/28/23 0747          Stand-Sit Transfer    Stand-Sit [place] (Transfers) moderate assist (50% patient effort);verbal cues;nonverbal cues (demo/gesture)  -DP     Assistive Device (Stand-Sit Transfers) wheelchair  -DP     Row Name 03/28/23 0747          Gait/Stairs (Locomotion)    [place] Level (Gait) minimum assist (75% patient effort)  -DP     Assistive Device (Gait) walker, front-wheeled;parallel bars  close follow for w/c  -DP     Distance in Feet (Gait) 8' in // bars, 30' and 60' with FWW  -DP     Pattern (Gait) step-through;step-to  -DP     Deviations/Abnormal Patterns (Gait) [place] decreased;bilateral deviations;base of support, narrow;festinating/shuffling;stride length decreased;weight shifting decreased;left sided deviations;antalgic  -DP     Bilateral Gait Deviations heel strike decreased;weight shift ability decreased;knee buckling bilaterally  -DP     Left Sided Gait Deviations decreased knee extension  -DP     Right Sided Gait Deviations decreased knee extension  -DP     Row Name 03/28/23 0747          Safety Issues, Functional Mobility    Impairments Affecting Function (Mobility) balance;cognition;endurance/activity tolerance;postural/trunk control;pain;strength  -DP     Row Name 03/28/23 0747          Hip (Therapeutic Exercise)    Hip Isometrics (Therapeutic Exercise) aBduction;aDduction;sitting;10 repetitions;3 second hold  -DP     Hip Strengthening (Therapeutic Exercise) bilateral;marching while seated;10 repetitions  -DP     Row Name 03/28/23 0747          Knee (Therapeutic Exercise)    Knee AAROM (Therapeutic Exercise) left;extension;sitting;10 repetitions  -DP     Knee Strengthening (Therapeutic Exercise) right;LAQ (long arc quad);sitting;bilateral;hamstring curls;yellow;resistance band;10 repetitions  -DP     Row Name 03/28/23 0747          Ankle (Therapeutic Exercise)    Ankle AROM (Therapeutic Exercise) bilateral;dorsiflexion;plantarflexion;10  repetitions  -DP     Row Name 03/28/23 0747          Positioning and Restraints    Pre-Treatment Position in bed  -DP     Post Treatment Position wheelchair  -DP     In Wheelchair sitting;exit alarm on;with SLP  -DP           User Key  (r) = Recorded By, (t) = Taken By, (c) = Cosigned By    Initials Name Provider Type    DP Papa Marsh, ALBERTO Physical Therapist              Wound 03/08/23 1917 Right lateral chest Incision (Active)   Dressing Appearance open to air 03/27/23 2000   Closure Open to air 03/28/23 0931   Base pink 03/28/23 0931   Drainage Amount none 03/28/23 0931       Wound 03/11/23 1530 Other (See comments) other (see comments) pubis Abrasion (Active)   Dressing Appearance open to air 03/27/23 2000   Closure Open to air 03/27/23 2000   Base dry;pink 03/27/23 2000   Drainage Amount none 03/28/23 0931   Dressing Care open to air 03/27/23 2000   Periwound Care barrier ointment applied 03/27/23 2000       Wound 03/20/23 1527 Bilateral upper gluteal MASD (Moisture associated skin damage) (Active)   Dressing Appearance open to air 03/27/23 2000   Closure Open to air 03/27/23 2000   Base blanchable;pink;red 03/27/23 2000   Drainage Amount none 03/28/23 0931   Care, Wound cleansed with;soap and water 03/27/23 1330   Dressing Care skin barrier agent applied 03/27/23 2000   Periwound Care barrier ointment applied 03/27/23 2000     Physical Therapy Education     Title: PT OT SLP Therapies (In Progress)     Topic: Physical Therapy (Done)     Point: Mobility training (Done)     Learning Progress Summary           Patient Nonacceptance, E,D, VU,DU by DP at 3/28/2023 1144    Acceptance, E,D, VU,DU by DP at 3/27/2023 1601    Acceptance, E,D, VU,NR by EE at 3/25/2023 1423    Acceptance, E,D, NR,VU,DU by DP at 3/21/2023 1448    Acceptance, E, NR by JK at 3/20/2023 1513    Acceptance, E,TB, VU,NR by KP at 3/18/2023 1643                   Point: Home exercise program (Done)     Learning Progress Summary           Patient  Nonacceptance, E,D, VU,DU by DP at 3/28/2023 1144    Acceptance, E,D, VU,DU by DP at 3/27/2023 1601    Acceptance, E,D, NR,VU,DU by DP at 3/21/2023 1448    Acceptance, E, NR by JK at 3/20/2023 1513                   Point: Body mechanics (Done)     Learning Progress Summary           Patient Nonacceptance, E,D, VU,DU by DP at 3/28/2023 1144    Acceptance, E,D, VU,DU by DP at 3/27/2023 1601    Acceptance, E,D, VU,NR by EE at 3/25/2023 1423    Acceptance, E,D, NR,VU,DU by DP at 3/21/2023 1448                   Point: Precautions (Done)     Learning Progress Summary           Patient Nonacceptance, E,D, VU,DU by DP at 3/28/2023 1144    Acceptance, E,D, VU,DU by DP at 3/27/2023 1601    Acceptance, E,D, VU,NR by EE at 3/25/2023 1423    Acceptance, E,D, NR,VU,DU by DP at 3/21/2023 1448                               User Key     Initials Effective Dates Name Provider Type Discipline    EE 06/16/21 -  Melinda Mann, PT Physical Therapist PT    JK 06/16/21 -  Juana Veloz, PT Physical Therapist PT     06/16/21 -  Yolanda Shannon, PT Physical Therapist PT    DP 08/24/21 -  Papa Marsh, ALBERTO Physical Therapist PT                PT Recommendation and Plan                          Time Calculation:      PT Charges     Row Name 03/28/23 1144             Time Calculation    Start Time 0800  -DP      Stop Time 0900  -DP      Time Calculation (min) 60 min  -DP      PT Received On 03/28/23  -DP      PT - Next Appointment 03/29/23  -DP         Time Calculation- PT    Total Timed Code Minutes- PT 60 minute(s)  -DP            User Key  (r) = Recorded By, (t) = Taken By, (c) = Cosigned By    Initials Name Provider Type    DP Papa Marsh, PT Physical Therapist                Therapy Charges for Today     Code Description Service Date Service Provider Modifiers Qty    28047090153 HC PT THERAPEUTIC ACT EA 15 MIN 3/27/2023 Papa Marsh, PT GP 2    31152902821 HC PT THER PROC EA 15 MIN 3/27/2023 Papa Marsh, PT GP 2     [id number]  PT THER SUPP EA 15 MIN 3/27/2023 Papa Marsh, PT GP 1    [id number]  PT THERAPEUTIC ACT EA 15 MIN 3/28/2023 Papa Marsh, PT GP 2    07048596177 HC PT THER PROC EA 15 MIN 3/28/2023 SalmaPapa reed, PT GP 2    [id number]  PT THER SUPP EA 15 MIN 3/28/2023 Ppaa Marsh, PT GP 1              Patient was wearing a face mask during this therapy encounter. Therapist used appropriate personal protective equipment including mask and gloves.  Mask used was standard procedure mask. Appropriate PPE was worn during the entire therapy session. Hand hygiene was completed before and after therapy session. Patient is not in enhanced droplet precautions.         Papa Marsh, PT  3/28/2023

## 2023-03-29 LAB
ANION GAP SERPL CALCULATED.3IONS-SCNC: 6.7 MMOL/L (ref 5–15)
BUN SERPL-MCNC: 7 MG/DL (ref 6–20)
BUN/CREAT SERPL: 6.9 (ref 7–25)
CALCIUM SPEC-SCNC: 9 MG/DL (ref 8.6–10.5)
CHLORIDE SERPL-SCNC: 96 MMOL/L (ref 98–107)
CO2 SERPL-SCNC: 31.3 MMOL/L (ref 22–29)
CREAT SERPL-MCNC: 1.02 MG/DL (ref 0.57–1)
DEPRECATED RDW RBC AUTO: 47.7 FL (ref 37–54)
EGFRCR SERPLBLD CKD-EPI 2021: 63.5 ML/MIN/1.73
ERYTHROCYTE [DISTWIDTH] IN BLOOD BY AUTOMATED COUNT: 14 % (ref 12.3–15.4)
GLUCOSE SERPL-MCNC: 97 MG/DL (ref 65–99)
HCT VFR BLD AUTO: 24.6 % (ref 34–46.6)
HGB BLD-MCNC: 8.1 G/DL (ref 12–15.9)
MCH RBC QN AUTO: 31.5 PG (ref 26.6–33)
MCHC RBC AUTO-ENTMCNC: 32.9 G/DL (ref 31.5–35.7)
MCV RBC AUTO: 95.7 FL (ref 79–97)
PLATELET # BLD AUTO: 501 10*3/MM3 (ref 140–450)
PMV BLD AUTO: 9.9 FL (ref 6–12)
POTASSIUM SERPL-SCNC: 3.7 MMOL/L (ref 3.5–5.2)
RBC # BLD AUTO: 2.57 10*6/MM3 (ref 3.77–5.28)
SODIUM SERPL-SCNC: 134 MMOL/L (ref 136–145)
WBC NRBC COR # BLD: 9.72 10*3/MM3 (ref 3.4–10.8)

## 2023-03-29 PROCEDURE — 94799 UNLISTED PULMONARY SVC/PX: CPT

## 2023-03-29 PROCEDURE — 97535 SELF CARE MNGMENT TRAINING: CPT

## 2023-03-29 PROCEDURE — 97530 THERAPEUTIC ACTIVITIES: CPT

## 2023-03-29 PROCEDURE — 25010000002 VANCOMYCIN PER 500 MG: Performed by: PHYSICAL MEDICINE & REHABILITATION

## 2023-03-29 PROCEDURE — 85027 COMPLETE CBC AUTOMATED: CPT | Performed by: PHYSICAL MEDICINE & REHABILITATION

## 2023-03-29 PROCEDURE — 97110 THERAPEUTIC EXERCISES: CPT

## 2023-03-29 PROCEDURE — 80048 BASIC METABOLIC PNL TOTAL CA: CPT | Performed by: PHYSICAL MEDICINE & REHABILITATION

## 2023-03-29 PROCEDURE — 97129 THER IVNTJ 1ST 15 MIN: CPT

## 2023-03-29 PROCEDURE — 97130 THER IVNTJ EA ADDL 15 MIN: CPT

## 2023-03-29 RX ADMIN — DESVENLAFAXINE SUCCINATE 25 MG: 25 TABLET, EXTENDED RELEASE ORAL at 07:36

## 2023-03-29 RX ADMIN — Medication 500 MCG: at 07:35

## 2023-03-29 RX ADMIN — LAMOTRIGINE 200 MG: 100 TABLET ORAL at 07:35

## 2023-03-29 RX ADMIN — APIXABAN 5 MG: 5 TABLET, FILM COATED ORAL at 07:35

## 2023-03-29 RX ADMIN — IPRATROPIUM BROMIDE AND ALBUTEROL SULFATE 3 ML: 2.5; .5 SOLUTION RESPIRATORY (INHALATION) at 11:55

## 2023-03-29 RX ADMIN — IPRATROPIUM BROMIDE AND ALBUTEROL SULFATE 3 ML: 2.5; .5 SOLUTION RESPIRATORY (INHALATION) at 14:47

## 2023-03-29 RX ADMIN — Medication 100 MG: at 07:36

## 2023-03-29 RX ADMIN — Medication 5 MG: at 21:34

## 2023-03-29 RX ADMIN — GABAPENTIN 300 MG: 300 CAPSULE ORAL at 13:32

## 2023-03-29 RX ADMIN — APIXABAN 5 MG: 5 TABLET, FILM COATED ORAL at 21:34

## 2023-03-29 RX ADMIN — IPRATROPIUM BROMIDE AND ALBUTEROL SULFATE 3 ML: 2.5; .5 SOLUTION RESPIRATORY (INHALATION) at 20:21

## 2023-03-29 RX ADMIN — Medication 1 MG: at 07:36

## 2023-03-29 RX ADMIN — GABAPENTIN 300 MG: 300 CAPSULE ORAL at 05:35

## 2023-03-29 RX ADMIN — VANCOMYCIN HYDROCHLORIDE 1000 MG: 1 INJECTION, SOLUTION INTRAVENOUS at 21:34

## 2023-03-29 RX ADMIN — GABAPENTIN 300 MG: 300 CAPSULE ORAL at 21:34

## 2023-03-29 RX ADMIN — PANTOPRAZOLE SODIUM 40 MG: 40 TABLET, DELAYED RELEASE ORAL at 05:35

## 2023-03-29 NOTE — PLAN OF CARE
Problem: Rehabilitation (IRF) Plan of Care  Goal: Plan of Care Review  Outcome: Ongoing, Progressing  Flowsheets (Taken 3/29/2023 0359)  Progress: improving  Plan of Care Reviewed With: patient  Outcome Evaluation: Pt is A&Ox4, cooperative, meds whole w/ applesauce, pt did have some abd pain but was medicated by dayshift, offered her additional pain meds but pt states she was better, IV to R FA, pt getting IV abx q24h, BM 3/28, up w/ asst x1, cont of B/B, spouse at bedside and wants to be checked off by PT to begin learning to transfer her, pt is resting well tonight, will continue to monitor.   Goal Outcome Evaluation:  Plan of Care Reviewed With: patient        Progress: improving  Outcome Evaluation: Pt is A&Ox4, cooperative, meds whole w/ applesauce, pt did have some abd pain but was medicated by dayshift, offered her additional pain meds but pt states she was better, IV to R FA, pt getting IV abx q24h, BM 3/28, up w/ asst x1, cont of B/B, spouse at bedside and wants to be checked off by PT to begin learning to transfer her, pt is resting well tonight, will continue to monitor.

## 2023-03-29 NOTE — PROGRESS NOTES
Inpatient Rehabilitation Plan of Care Note    Plan of Care  Care Plan Reviewed - No updates at this time.    Sphincter Control    [RN] Bladder Management(Active)  Current Status(03/28/2023): Bladder urgency  Weekly Goal(04/03/2023): decrease in episodes of incontinence  Discharge Goal: Continent 100%    Performed Intervention(s)  Bladder/bowel training program  Monitor intake and output  Use incontinence products/equipment      Safety    [RN] Potential for Injury(Active)  Current Status(03/28/2023): Risk for falls  Weekly Goal(04/03/2023): Family/Caregivers regarding safety precautions and need  for close supervision  Discharge Goal: Patient/family will be aware of risk of fall and safety in the  home setting.    Performed Intervention(s)  Safety Rounds  Bed alarm/Chair alarm  Items within reach      Psychosocial    Performed Intervention(s)  Verbalizes needs and concerns  Support from family/peer groups      Body Systems    Performed Intervention(s)  Perform active and passive ROM  Frequent position changes    Signed by: Helen Weston RN

## 2023-03-29 NOTE — THERAPY TREATMENT NOTE
Inpatient Rehabilitation - Speech Language Pathology Treatment Note    [facility]     Patient Name: Louise GARCIA  : 1965  MRN: [id number]    Today's Date: 3/29/2023                   Admit Date: 3/17/2023       Visit Dx:      ICD-10-CM ICD-9-CM   1. 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)   • Situational mixed anxiety and depressive disorder   • Mass of middle lobe of right lung   • Oral candidiasis   • Empyema of pleura ([facility])   • MRSA bacteremia   • Idiopathic peripheral neuropathy       Past Medical History:   Diagnosis Date   • Degenerative disc disease, cervical    • Gestational diabetes    • H/O blood clots    • Hematemesis    • Seizures ([facility])    • Septic shock ([facility])        Past Surgical History:   Procedure Laterality Date   • APPENDECTOMY     • BACK SURGERY     • CHOLECYSTECTOMY     • ENDOSCOPY N/A 2016    Procedure: ESOPHAGOGASTRODUODENOSCOPY with biopsy;  Surgeon: Austen Brito MD;  Location: [facility] ENDOSCOPY;  Service:    • HYSTERECTOMY     • NECK SURGERY       approx 6 yrs ago   • THORACOSCOPY Right 3/8/2023    Procedure: THORACOSCOPY WITH DAVINCI ROBOT WITH COMPLETE DECORTICATION;  Surgeon: Chloe Cedillo MD;  Location: [facility] MAIN OR;  Service: Robotics - DaVinci;  Laterality: Right;   • TONSILLECTOMY     • TONSILLECTOMY AND ADENOIDECTOMY         SLP Recommendation and Plan                                                            SLP EVALUATION (last 72 hours)     SLP SLC Evaluation     Row Name 23 1000 23 0900 23 1100 23 0930 23 1100       Communication  Assessment/Intervention    Document Type therapy note (daily note)  -AL therapy note (daily note)  -AL therapy note (daily note)  -AL therapy note (daily note)  -AL therapy note (daily note)  -AL    Patient/Family/Caregiver Comments/Observations Pt participated well.  -AL Pt reported not sleeping well last night due to coughing.  -AL Pt is pleasant and cooperative.  -AL Pt participated well.  -AL Pt required MIN cues for alertness.  -AL       Pain Scale: Numbers Pre/Post-Treatment    Pretreatment Pain Rating 0/10 - no pain  -AL 0/10 - no pain  -AL 0/10 - no pain  -AL 0/10 - no pain  -AL 0/10 - no pain  -AL    Row Name 03/27/23 0900                   Communication Assessment/Intervention    Document Type therapy note (daily note)  -AL        Patient/Family/Caregiver Comments/Observations Pt participated well.  -AL           Pain Scale: Numbers Pre/Post-Treatment    Pretreatment Pain Rating 0/10 - no pain  -AL              User Key  (r) = Recorded By, (t) = Taken By, (c) = Cosigned By    Initials Name Effective Dates    Nessa Krasu, MS CCC-SLP 06/16/21 -                Patient was wearing a face mask during this therapy encounter. Therapist used appropriate personal protective equipment including mask, eye protection and gloves.  Mask used was standard procedure mask. Appropriate PPE was worn during the entire therapy session. Hand hygiene was completed before and after therapy session. Patient is not in enhanced droplet precautions.               EDUCATION    The patient has been educated in the following areas:       Cognitive Impairment.             SLP GOALS     Row Name 03/29/23 1000 03/29/23 0900 03/28/23 1100       Attention Goal 1 (SLP)    Improve Attention by Goal 1 (SLP) complete sustained attention task;80%;with minimal cues (75-90%)  -AL -- complete sustained attention task;80%;with minimal cues (75-90%)  -AL    Time Frame (Attention Goal 1, SLP) by discharge  -AL -- by discharge  -AL     Progress/Outcomes (Attention Goal 1, SLP) good progress toward goal  -AL -- good progress toward goal  -AL    Comment (Attention Goal 1, SLP) Calendar task: Initiated task: pt required MIN-MOD cues to complete 2 trials. Will continue in next session.  -AL -- Pt required NO cues for sustained attention during session.  -AL       Memory Skills Goal 1 (SLP)    Improve Memory Skills Through Goal 1 (SLP) use memory strategies;use external memory aid;recall details of the day;80%;with moderate cues (50-74%)  -AL -- use memory strategies;use external memory aid;recall details of the day;80%;with moderate cues (50-74%)  -AL    Time Frame (Memory Skills Goal 1, SLP) by discharge  -AL -- by discharge  -AL    Progress/Outcomes (Memory Skills Goal 1, SLP) good progress toward goal  -AL -- good progress toward goal  -AL    Comment (Memory Skills Goal 1, SLP) Oriented to month, year, date and SUSAN with NO cues.  -AL -- Immediate memory for voicemails: 95% with NO cues, 100% with MIN cues.  -AL       Organizational Skills Goal 1 (SLP)    Improve Thought Organization Through Goal 1 (SLP) -- -- completing a divergent naming task;80%;with minimal cues (75-90%)  -AL    Time Frame (Thought Organization Skills Goal 1, SLP) -- -- 1 week  -AL    Progress/Outcomes (Thought Organization Skills Goal 1, SLP) -- -- good progress toward goal  -AL    Comment (Thought Organization Skills Goal 1, SLP) -- -- Sharp items: 3 with NO cues, 5 with MOD cues. Round items: 5 in1 minute with NO cues, 10 with MIN-MOD cues.  -AL       Reasoning Goal 1 (SLP)    Improve Reasoning Through Goal 1 (SLP) complete deductive reasoning task;80%;with minimal cues (75-90%)  -AL complete deductive reasoning task;80%;with minimal cues (75-90%)  -AL complete deductive reasoning task;80%;with minimal cues (75-90%)  -AL    Time Frame (Reasoning Goal 1, SLP) 1 week  -AL 1 week  -AL 1 week  -AL    Progress/Outcomes (Reasoning Goal 1, SLP) good progress toward goal  -AL good  progress toward goal  -AL good progress toward goal  -AL    Comment (Reasoning Goal 1, SLP) Finished logic puzzle: overall 75% accurate with NO cues, 100% with MIN-MAX cues.  -AL Moderate level logic puzzle: 80% with NO cues, 100% with MIN-MAX cues thus far. Plan to complete in next session.  -AL Moderate level logic puzzle (continued from previous session): 50% with NO Cues, 100% with MIN-MAX cues.  -AL    Row Name 03/28/23 0930 03/27/23 1100 03/27/23 0900       Attention Goal 1 (SLP)    Improve Attention by Goal 1 (SLP) -- -- complete sustained attention task;80%;with minimal cues (75-90%)  -AL    Time Frame (Attention Goal 1, SLP) -- -- by discharge  -AL    Progress/Outcomes (Attention Goal 1, SLP) -- -- good progress toward goal  -AL    Comment (Attention Goal 1, SLP) -- -- Attention to detail for calendar: 7/8 with NO cues, 8/8 with MIN cues  -AL       Memory Skills Goal 1 (SLP)    Improve Memory Skills Through Goal 1 (SLP) use memory strategies;use external memory aid;recall details of the day;80%;with moderate cues (50-74%)  -AL use memory strategies;use external memory aid;recall details of the day;80%;with moderate cues (50-74%)  -AL use memory strategies;use external memory aid;recall details of the day;80%;with moderate cues (50-74%)  -AL    Time Frame (Memory Skills Goal 1, SLP) by discharge  -AL by discharge  -AL by discharge  -AL    Progress/Outcomes (Memory Skills Goal 1, SLP) good progress toward goal  -AL goal ongoing  -AL goal ongoing  -AL    Comment (Memory Skills Goal 1, SLP) Oriented to month, year and SUSAN with NO cues; required MOD cues for date. Recalled 2/3 errands after 15 minutes with NO cues, 3/3 with MOD cues.  -AL Immediate memory for voicemails: 30% with NO cues, 100% wtih MIN-MOD cues.  -AL Oriented to year with NO cues; required MIN cues for month, SUSAN and date. Oriented to place with NO cues. Recalled 3/3 errands after 12 minutes with NO cues.  -AL       Organizational Skills Goal 1  (SLP)    Improve Thought Organization Through Goal 1 (SLP) -- -- completing a divergent naming task;80%;with minimal cues (75-90%)  -AL    Time Frame (Thought Organization Skills Goal 1, SLP) -- -- 1 week  -AL    Progress/Outcomes (Thought Organization Skills Goal 1, SLP) -- -- good progress toward goal  -AL    Comment (Thought Organization Skills Goal 1, SLP) -- -- Smelly items: 4 with NO cues in 1 minute, 8 with MIN-MOD cues  -AL       Reasoning Goal 1 (SLP)    Improve Reasoning Through Goal 1 (SLP) complete deductive reasoning task;80%;with minimal cues (75-90%)  -AL complete deductive reasoning task;80%;with minimal cues (75-90%)  -AL --    Time Frame (Reasoning Goal 1, SLP) 1 week  -AL 1 week  -AL --    Progress/Outcomes (Reasoning Goal 1, SLP) good progress toward goal  -AL good progress toward goal  -AL --    Comment (Reasoning Goal 1, SLP) Initiated moderate level logic puzzle: 10/15 with NO cues, 15/15 with MOD-MAX cues.  -AL Moderate level logic puzzle: 70% iwth NO cues, 100% wtih MIN-MOD cues  -AL --          User Key  (r) = Recorded By, (t) = Taken By, (c) = Cosigned By    Initials Name Provider Type    Nessa Kraus MS CCC-SLP Speech and Language Pathologist                            Time Calculation:        Time Calculation- SLP     Row Name 03/29/23 1031 03/29/23 1008          Time Calculation- [facility]    SLP Start Time 1000  -AL 0900  -AL     SLP Stop Time 1030  -AL 0930  -AL     SLP Time Calculation (min) 30 min  -AL 30 min  -AL           User Key  (r) = Recorded By, (t) = Taken By, (c) = Cosigned By    Initials Name Provider Type    Nessa Kraus MS CCC-SLP Speech and Language Pathologist                  Therapy Charges for Today     Code Description Service Date Service Provider Modifiers Qty    81163199228 HC ST DEV OF COGN SKILLS INITIAL 15 MIN 3/28/2023 Nessa Spangler MS CCC-SLP  1    98394767933 HC ST DEV OF COGN SKILLS EACH ADDT'L 15 MIN 3/28/2023 Nessa Spangler MS  CCC-SLP  3    [id number]  ST DEV OF COGN SKILLS INITIAL 15 MIN 3/29/2023 Nessa Spangler, MS CCC-SLP  1    [id number]  ST DEV OF COGN SKILLS EACH ADDT'L 15 MIN 3/29/2023 Nessa Spangler, MS CCC-SLP  3                           Nessa Spangler, MS CCC-SLP  3/29/2023

## 2023-03-29 NOTE — THERAPY TREATMENT NOTE
Inpatient Rehabilitation - Occupational Therapy Treatment Note    [facility]     Patient Name: Louise GARCIA  : 1965  MRN: [id number]    Today's Date: 3/29/2023                 Admit Date: 3/17/2023         ICD-10-CM ICD-9-CM   1. 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)   • Situational mixed anxiety and depressive disorder   • Mass of middle lobe of right lung   • Oral candidiasis   • Empyema of pleura (HCC)   • MRSA bacteremia   • Idiopathic peripheral neuropathy       Past Medical History:   Diagnosis Date   • Degenerative disc disease, cervical    • Gestational diabetes    • H/O blood clots    • Hematemesis    • Seizures ([facility])    • Septic shock ([facility])        Past Surgical History:   Procedure Laterality Date   • APPENDECTOMY     • BACK SURGERY     • CHOLECYSTECTOMY     • ENDOSCOPY N/A 2016    Procedure: ESOPHAGOGASTRODUODENOSCOPY with biopsy;  Surgeon: Austen Brito MD;  Location: [facility] ENDOSCOPY;  Service:    • HYSTERECTOMY     • NECK SURGERY       approx 6 yrs ago   • THORACOSCOPY Right 3/8/2023    Procedure: THORACOSCOPY WITH DAVINCI ROBOT WITH COMPLETE DECORTICATION;  Surgeon: Chloe Cedillo MD;  Location: [facility] OR;  Service: Robotics - DaVinci;  Laterality: Right;   • TONSILLECTOMY     • TONSILLECTOMY AND ADENOIDECTOMY               [facility] OT ASSESSMENT FLOWSHEET (last 12 hours)     IRF OT Evaluation and Treatment     Row Name 23 1510          OT Time and Intention    Document Type daily treatment  -AF     Mode of Treatment occupational therapy  -AF     Patient Effort good  -AF     Row Name 23  1510          General Information    Patient/Family/Caregiver Comments/Observations pt sitting up in w/c in AM and supine in bed in PM  -AF     General Observations of Patient discussed being up in w/c for all meals and not sitting on the side of the bed, also OT placed drop arm BSC in room for nighttime use with NSG, educated on still using 2 people for transfers and not leaving patient alone in the bathroom  -AF     Existing Precautions/Restrictions fall  contact precautions  -AF     Row Name 03/29/23 1510          Pain Assessment    Pretreatment Pain Rating 0/10 - no pain  -AF     Posttreatment Pain Rating 0/10 - no pain  -AF     Row Name 03/29/23 1510          Cognition/Psychosocial    Affect/Mental Status (Cognition) flat/blunted affect  -AF     Orientation Status (Cognition) oriented to;place;person  -AF     Follows Commands (Cognition) follows one-step commands;over 90% accuracy;increased processing time needed  -AF     Personal Safety Interventions fall prevention program maintained;gait belt;nonskid shoes/slippers when out of bed  -AF     Row Name 03/29/23 1510          Bathing    [place] Level (Bathing) bathing skills;lower body;upper body;minimum assist (75% patient effort);verbal cues;moderate assist (50% patient effort)  -AF     Position (Bathing) sink side;supported sitting;supported standing  -AF     Set-up Assistance (Bathing) obtain supplies  -AF     Comment (Bathing) pt required assistance to lower back into the chair after trying to stand and then sliding too far forward in her chair  -AF     Row Name 03/29/23 1510          Upper Body Dressing    [place] Level (Upper Body Dressing) upper body dressing skills;set up assistance  -AF     Position (Upper Body Dressing) supported sitting  -AF     Set-up Assistance (Upper Body Dressing) obtain clothing  -AF     Row Name 03/29/23 1510          Lower Body Dressing    [place] Level (Lower Body Dressing) doff;don;shoes/slippers;socks;moderate  assist (50% patient effort);verbal cues  -AF     Position (Lower Body Dressing) supported sitting;long sitting  -AF     Comment (Lower Body Dressing) in w/c in AM, long sitting in bed in PM donning/doffing shoes, increased time with these tasks  -AF     Row Name 03/29/23 1510          Grooming    [place] Level (Grooming) grooming skills;set up  -AF     Position (Grooming) supported sitting  -AF     Comment (Grooming) w/c level  -AF     Row Name 03/29/23 1510          Bed Mobility    Supine-Sit [place] (Bed Mobility) standby assist  -AF     Comment, (Bed Mobility) MIN to hlep move covers off of her legs bed level  -AF     Row Name 03/29/23 1510          Transfer Assessment/Treatment    Comment, (Transfers) sit to stand at sink MOD X 1 with first stand then A x 2 with second stand legs felt wek and not able to hold her up  -AF     Row Name 03/29/23 1510          Bed-Chair Transfer    Bed-Chair C[place] (Transfers) moderate assist (50% patient effort);2 person assist  sit pivot in PM session  -AF     Assistive Device (Bed-Chair Transfers) wheelchair  -AF     Row Name 03/29/23 1510          Motor Skills    Therapeutic Exercise aerobic  -AF     Row Name 03/29/23 1510          Shoulder (Therapeutic Exercise)    Shoulder Strengthening (Therapeutic Exercise) bilateral;scapular stabilization;external rotation;internal rotation;sitting;2 lb free weight;15 repititions;2 sets  -AF     Row Name 03/29/23 1510          Elbow/Forearm (Therapeutic Exercise)    Elbow/Forearm Strengthening (Therapeutic Exercise) bilateral;flexion;extension;supination;pronation;sitting;2 lb free weight;1 lb free weight;15 repititions;2 sets  -AF     Row Name 03/29/23 1510          Aerobic Exercise    Type (Aerobic Exercise) arm bike;for 3 minutes  -AF     Comment, Aerobic Exercise (Therapeutic Exercise) seated w/c level required one rest break at 2 mins  -AF     Row Name 03/29/23 1510          Balance    Static Sitting Balance standby  assist  -AF     Static Standing Balance moderate assist  -AF     Row Name 03/29/23 1510          Positioning and Restraints    Pre-Treatment Position sitting in chair/recliner  -AF     Post Treatment Position wheelchair  -AF     In Wheelchair sitting;call light within reach;encouraged to call for assist;exit alarm on  in AM and Pm sessions  -AF           User Key  (r) = Recorded By, (t) = Taken By, (c) = Cosigned By    Initials Name Effective Dates    AF Heaven Villareal, OTR 06/16/21 -                  Occupational Therapy Education     Title: PT OT SLP Therapies (In Progress)     Topic: Occupational Therapy (Not Started)     Point: ADL training (Not Started)     Description:   Instruct learner(s) on proper safety adaptation and remediation techniques during self care or transfers.   Instruct in proper use of assistive devices.              Learner Progress:  Not documented in this visit.          Point: Home exercise program (Not Started)     Description:   Instruct learner(s) on appropriate technique for monitoring, assisting and/or progressing therapeutic exercises/activities.              Learner Progress:  Not documented in this visit.          Point: Precautions (Not Started)     Description:   Instruct learner(s) on prescribed precautions during self-care and functional transfers.              Learner Progress:  Not documented in this visit.          Point: Body mechanics (Not Started)     Description:   Instruct learner(s) on proper positioning and spine alignment during self-care, functional mobility activities and/or exercises.              Learner Progress:  Not documented in this visit.                                OT Recommendation and Plan                         Time Calculation:      Time Calculation- OT     Row Name 03/29/23 1518 03/29/23 1517 03/29/23 0722       Time Calculation- OT    OT Start Time 1330  -AF 1030  -AF --    OT Stop Time 1400  -AF 1100  -AF --    OT Time Calculation (min) 30 min   -AF 30 min  -AF --    OT Goal Re-Cert Due Date -- -- 04/03/23  -AF          User Key  (r) = Recorded By, (t) = Taken By, (c) = Cosigned By    Initials Name Provider Type    Heaven Reynolds, OTNAIMA Occupational Therapist              Therapy Charges for Today     Code Description Service Date Service Provider Modifiers Qty    24073084357 HC OT SELF CARE/MGMT/TRAIN EA 15 MIN 3/28/2023 Heaven Villareal, OTR GO 2    32245055597 HC OT THER SUPP EA 15 MIN 3/28/2023 Heaven Villareal, OTR GO 2    85293072084 HC OT NEUROMUSC RE EDUCATION EA 15 MIN 3/28/2023 Heaven Villareal, OTR GO 2    70122386943 HC OT SELF CARE/MGMT/TRAIN EA 15 MIN 3/29/2023 Heaven Villareal, OTR GO 3    21502629415 HC OT THER SUPP EA 15 MIN 3/29/2023 Heaven Villareal, OTR GO 3    08864421697 HC OT THER PROC EA 15 MIN 3/29/2023 Heaven Villareal, OTR GO 1                   MARY ANN Gunn  3/29/2023   stated/actual

## 2023-03-29 NOTE — PROGRESS NOTES
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   S/p IVIG x 5 days       SUBJECTIVE:  Seen and examined in gym. Walking 70 ft with PT, showing continued improvement. Continues with purewick, however would like to discontinue it soon. Reports recent BM. Denies chest pain, sob, n/v, f/c.        OBJECTIVE:  AF, VSS  BP: (107-141)/(64-74) 141/73    Physical Exam     MENTAL STATUS -  AAO x3, Verbal, NAD.  HEENT- NCAT, SCLERAE ANICTERIC, CONJUNCTIVAE PINK, OP MOIST, NO JVD, EARS UNREMARKABLE EXTERNALLY    LUNGS - DECREASED BREATH SOUNDS RIGHT BASE MORE SO THAN LEFT BASE  HEART-regular rate and rhythm.  No murmur      ABD - NORMOACTIVE BOWEL SOUNDS, SOFT, NT.  EXT - NO EDEMA OR CYANOSIS  MOTOR EXAM -bilateral lower extremity greater than upper extremity weakness           Scheduled Meds:apixaban, 5 mg, Oral, Q12H  desvenlafaxine, 25 mg, Oral, Daily  folic acid, 1 mg, Oral, Daily  gabapentin, 300 mg, Oral, Q8H  ipratropium-albuterol, 3 mL, Nebulization, 4x Daily - RT  lamoTRIgine, 200 mg, Oral, Daily  melatonin, 5 mg, Oral, Nightly  pantoprazole, 40 mg, Oral, Q AM  thiamine, 100 mg, Oral, Daily  vancomycin, 1,000 mg, Intravenous, Q24H  vitamin B-12, 500 mcg, Oral, Daily      Continuous Infusions:Pharmacy to dose vancomycin,       PRN Meds:.•  acetaminophen  •  bisacodyl  •  hydrocortisone-bacitracin-zinc oxide-nystatin  •  oxyCODONE  •  Pharmacy to dose vancomycin  •  polyethylene glycol  •  senna-docusate sodium  •  simethicone    Lab Results (last 24 hours)     Procedure Component Value Units Date/Time    CBC (No Diff) [[id number]]  (Abnormal) Collected: 03/29/23 0549    Specimen: Blood Updated: 03/29/23 0623     WBC 9.72 10*3/mm3      RBC 2.57 10*6/mm3      Hemoglobin 8.1 g/dL      Hematocrit 24.6 %      MCV 95.7 fL      MCH 31.5 pg      MCHC 32.9 g/dL      RDW 14.0 %      RDW-SD 47.7 fl      MPV 9.9 fL      Platelets 501 10*3/mm3     Basic Metabolic Panel [[id number]]   (Abnormal) Collected: 03/29/23 0549    Specimen: Blood Updated: 03/29/23 0646     Glucose 97 mg/dL      BUN 7 mg/dL      Creatinine 1.02 mg/dL      Sodium 134 mmol/L      Potassium 3.7 mmol/L      Chloride 96 mmol/L      CO2 31.3 mmol/L      Calcium 9.0 mg/dL      BUN/Creatinine Ratio 6.9     Anion Gap 6.7 mmol/L      eGFR 63.5 mL/min/1.73     Narrative:      GFR Normal >60  Chronic Kidney Disease <60  Kidney Failure <15            Imaging Results (Last 24 Hours)     ** No results found for the last 24 hours. 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   S/p IVIG x 5 days     Impaired mobility/impaired self care/ impaired cognition     R lung masslike infiltrate with cavitary lesions/pleural effusion   S/p thoracentesis - Blood cx and pleural fluid  positive for MRSA      chest tube placement given empyema, and she underwent thoracoscopy w/ decortication 3/8/23   -expected postoperative changes with pleural thickening and minimal pleural effusion.  The effusion is too small for intervention and will likely to continue to resolve with time.  Recommend continue good pulmonary hygiene with incentive spirometry hourly as well as increase activity/ambulation as tolerated.  March 23-appears decreased breath sounds more noticeable today on the right compared to the left.  Check follow-up chest x-ray.  On room air during the day, 1 L at night.  March 24- CXR relatively unchanged from previous, reached out to CT surgery given planned outpatient f/u on 3/28, no further imaging planned at this time as patient afebrile with normal WBC, monitor     Right-sided Chest Pain 3/24  -EKG sinus tach  -HS Troponin 25 > 24  -consider Cardiology consult if worsening  -pain reproducible with palpation, pain likely postoperative neuralgia as indicated vs. possible costochondritis, continue gabapentin,  ice/heat, monitor    Hypokalemia  -3/24 K 3.3, repleted  -3/29 K 3.7, resolved    mass on TTE with findings concerning for endocarditis - underwent MARIAMA 3/10/2023 which had no vegetation     RUE superficial thrombophlebitis concerning for septic phlebitis.        ID - continue 4 weeks of vancomycin as recommended by infectious disease dosed by pharmacy to achieve a trough level of 15-20 or area under the curve of 400-600 from last negative blood culture or last intervention which ever is the latest - to complete April 1, 2023     Left hip arthrocentesis March 16 to rule out any hip septic arthritis- no growth to date on fluid.    lower back pain - MRI of spine to rule out discitis or osteomyelitis.  This did have known degenerative changes but  no infection.      DVT prophylaxis - SCDs / apixiban. History of LLE DVT - on Eliquis at home     Pain management - Tylenol 1,000 mg three times a day/ Celebrex 100 mg q 112 hours/ gabapentin 300 mg q 8 hours Oxycodone 5 mg q 4 hours prn  March 18 - DC Celebrex 2/2 PHYLLIS, and anemia     Anxiety/ muscle spasms - Diazepam 2 mg q 6 hours prn - JONES reviewed     Seizure disorder -Lamotrigine 200 mg daily    ABLA - March 18- Hgb 7.0 (7.3 on 3/16). Type, cross and transfuse 1 unit PRBCs. Pre-medicate with Tylenol and Benadryl. CBC in am.   March 19- Hgb 8.8 s/p 1 unit PRBcs. Hemoccult positive. On Eliquis for h/o DVT. Follow Hgb. May need GI work up.  March 20 - HGB 9.3 s/p transfusion  March 21-reviewed with internal medicine-hemoglobin stable after transfusion.  Iron studies consistent with inflammation.  No further work-up presently  March 22 - Hgb 8.9, stable  March 23-hemoglobin trend 8.3.  Continue to follow.  Recheck tomorrow  March 29 - Hgb 8.1, stable    PHYLLIS - March 18- Creatinine 1.36 up from 1.12. On IV Vanc and Celebrex. DC Celebrex. BMP in am.  March 19- s/p 500ml NS bolus. Creatinine 1.31.  March 23-creatinine 0.92    TEAM CONF - MARCH 21 - BED MOD X 2. TRANSFERS MAX  2. STAND PIVOT OR SQUAT PIVOT. NON-AMBULATORY. Saturday MARCH 18 GAIT 6 FEET PARALLEL BARS MOD MAX OF 2.   BATH MOD 1-2. LBD DEP. UBD MOD. EATING MIN. GROOMING MIN. TOILETING DEP.   The patient has difficulty remembering new information due to short-term memory impairments.  Initial evaluation 3.18, pt obtained a score 12/30 on SLUMS cognitive assessment indicating severe cognitive impairment/dementia, goals initiated for memory and sustaining attention  FEES completed 3.7 revealing glottic gap accounting for glottic insufficiency, dysphonia, hoarse/breathy quality, recommend targeting vocal function as appropriate d/t impact on intelligibility.    Regular/thin diet continued since FEES completion 3.7, although pt known to cough with and without PO intake, may be contributed partially to ineffective VF closure.   DRESSING FORMER CHEST TUBE SITE. CONTINUES ON PUREWICK AT NIGHT. O2 AT 1-2 LITERS AT NIGHT.  DECREASED APPETITE. ABD X-RAY THIS AM SHOWS NON-OBSTRUCTIVE BOWEL GAS PATTERN.   ELOS - 4 WEEKS    TEAM CONF - MARCH 28 - BED MIN CTG. TRANSFER MOD ASSIST STAND PIVOT OR SQUAT PIVOT. FATIGUES IN AFERNOONS. GAIT 15 FEET RW MIN X 2. FLUCTUATING ORIENTATION. BETTER DETAIL TO TASK. MIN MOD CUES FOR REASONING TASKS. DYSPHONIA FROM COUGHING.  SLP REVIEWED VOICE HYGIENE, NOT TO DO HARSH THROAT CLEAR. BATH MOD. LBD MAX. UBD MIN. TOILETING MOD X 2. CONTINENT/INCONTINENT BLADDER. RIGHT CHEST TUBE / THORACOSCOPY SITE CARE. VARIABLE INTAKE.   ELOS - 3 WEEKS    During rounds, used appropriate personal protective equipment including mask and gloves.  Additional gown if indicated.  Mask used was standard procedure mask. Appropriate PPE was worn during the entire visit.  Hand hygiene was completed before and after.    Shin Seay DO    Patient seen and case discussed with Dr. Rider.

## 2023-03-29 NOTE — PLAN OF CARE
Goal Outcome Evaluation:  Plan of Care Reviewed With: patient        Progress: improving   Patient is calm and cooperative. Alert and oriented. Forgetful at times. She is using the call light for assistance. BLE weak. 2 person assist. Encouraged to turn and repositioning self. Right lateral punctures cleaned with hydrogen peroxide. She participated in therapies. Will continue to monitor.

## 2023-03-29 NOTE — THERAPY PROGRESS REPORT/RE-CERT
Inpatient Rehabilitation - Physical Therapy Progress Note and Treatment Note       [facility]     Patient Name: Louise GARCIA  : 1965  MRN: [id number]    Today's Date: 3/29/2023                    Admit Date: 3/17/2023      Visit Dx:     ICD-10-CM ICD-9-CM   1. 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)   • Situational mixed anxiety and depressive disorder   • Mass of middle lobe of right lung   • Oral candidiasis   • Empyema of pleura ([facility])   • MRSA bacteremia   • Idiopathic peripheral neuropathy       Past Medical History:   Diagnosis Date   • Degenerative disc disease, cervical    • Gestational diabetes    • H/O blood clots    • Hematemesis    • Seizures ([facility])    • Septic shock (HCC)        Past Surgical History:   Procedure Laterality Date   • APPENDECTOMY     • BACK SURGERY     • CHOLECYSTECTOMY     • ENDOSCOPY N/A 2016    Procedure: ESOPHAGOGASTRODUODENOSCOPY with biopsy;  Surgeon: Austen Brito MD;  Location: [facility] ENDOSCOPY;  Service:    • HYSTERECTOMY     • NECK SURGERY       approx 6 yrs ago   • THORACOSCOPY Right 3/8/2023    Procedure: THORACOSCOPY WITH DAVINCI ROBOT WITH COMPLETE DECORTICATION;  Surgeon: Chloe Cedillo MD;  Location: [facility] OR;  Service: Robotics - DaVinci;  Laterality: Right;   • TONSILLECTOMY     • TONSILLECTOMY AND ADENOIDECTOMY         PT ASSESSMENT (last 12 hours)     IRF PT Evaluation and Treatment     Row Name 23 0751          PT Time and Intention    Document Type daily treatment;progress note  -DP     Mode of Treatment individual therapy;physical therapy  -DP      Patient/Family/Caregiver Comments/Observations pt SO stated that pt sat up EOB without anyone and said maybe to increase the bed alarm level  -DP     Row Name 03/29/23 0751          General Information    Patient Profile Reviewed yes  -DP     Existing Precautions/Restrictions fall  contact precautions  -DP     Row Name 03/29/23 0751          Pain Assessment    Pretreatment Pain Rating 0/10 - no pain  -DP     Posttreatment Pain Rating 0/10 - no pain  -DP     Row Name 03/29/23 0751          Cognition/Psychosocial    Affect/Mental Status (Cognition) flat/blunted affect  -DP     Orientation Status (Cognition) oriented to;place;person  -DP     Follows Commands (Cognition) follows one-step commands;over 90% accuracy;increased processing time needed  -DP     Personal Safety Interventions safety round/check completed;elopement precautions initiated;fall prevention program maintained;gait belt;muscle strengthening facilitated;nonskid shoes/slippers when out of bed  -DP     Row Name 03/29/23 0751          Bed Mobility    Rolling Right [place] (Bed Mobility) standby assist  -DP     Supine-Sit [place] (Bed Mobility) standby assist  -DP     Sit-Supine [place] (Bed Mobility) minimum assist (75% patient effort)  -DP     Bed Mobility, Safety Issues decreased use of arms for pushing/pulling;decreased use of legs for bridging/pushing;impaired trunk control for bed mobility  -DP     Assistive Device (Bed Mobility) bed rails  -DP     Comment, (Bed Mobility) from bed and mat  -DP     Row Name 03/29/23 0751          Bed-Chair Transfer    Bed-Chair [place] (Transfers) moderate assist (50% patient effort);maximum assist (25% patient effort)  -DP     Assistive Device (Bed-Chair Transfers) wheelchair  -DP     Comment, (Bed-Chair Transfer) Pt with squat pivot the pt is maxA today. Trialed a stand pivot with the pt and she was able to performed with modA to Bianca  -DP     Row Name 03/29/23 0751          Chair-Bed Transfer     Chair-Bed [place] (Transfers) minimum assist (75% patient effort);moderate assist (50% patient effort)  -DP     Assistive Device (Chair-Bed Transfers) wheelchair;walker, front-wheeled  -DP     Row Name 03/29/23 0751          Sit-Stand Transfer    Sit-Stand [place] (Transfers) moderate assist (50% patient effort)  -DP     Assistive Device (Sit-Stand Transfers) parallel bars;walker, front-wheeled  -DP     Row Name 03/29/23 0751          Stand-Sit Transfer    Stand-Sit [place] (Transfers) moderate assist (50% patient effort);verbal cues;nonverbal cues (demo/gesture)  -DP     Assistive Device (Stand-Sit Transfers) wheelchair  -DP     Row Name 03/29/23 0751          Gait/Stairs (Locomotion)    [place] Level (Gait) minimum assist (75% patient effort);moderate assist (50% patient effort)  -DP     Assistive Device (Gait) walker, front-wheeled;parallel bars  -DP     Distance in Feet (Gait) 8' in // bars, 25' and 70'x1 with FWW  -DP     Pattern (Gait) step-through;step-to  -DP     Deviations/Abnormal Patterns (Gait) [place] decreased;bilateral deviations;base of support, narrow;festinating/shuffling;stride length decreased;weight shifting decreased;left sided deviations;antalgic  -DP     Bilateral Gait Deviations heel strike decreased;weight shift ability decreased;knee buckling bilaterally  -DP     Left Sided Gait Deviations decreased knee extension  -DP     Right Sided Gait Deviations decreased knee extension  -DP     Comment, (Gait/Stairs) required modA due to heavy bilat knee buckle today on first walk with FWW but able to rest then ambulate for 70 feet with FWW however she did perform with increased ataxia  -DP     Row Name 03/29/23 0751          Safety Issues, Functional Mobility    Impairments Affecting Function (Mobility) balance;cognition;endurance/activity tolerance;postural/trunk control;pain;strength  -DP     Row Name 03/29/23 0751          Motor Skills    Therapeutic Exercise core strength   -DP     Row Name 03/29/23 0751          Hip (Therapeutic Exercise)    Hip Isometrics (Therapeutic Exercise) bilateral;gluteal sets;sitting;aBduction;aDduction;10 repetitions;3 second hold  -DP     Row Name 03/29/23 0751          Knee (Therapeutic Exercise)    Knee AAROM (Therapeutic Exercise) bilateral;flexion;10 repetitions  -DP     Knee Strengthening (Therapeutic Exercise) bilateral;hamstring curls;sitting;resistance band;yellow;10 repetitions  -DP     Row Name 03/29/23 0751          Core Strength (Therapeutic Exercise)    Core Strength (Therapeutic Exercise) bridging, bilateral lower extremities  -DP     Comment (Core Strength Therapeutic Exercise) 2x10  -DP     Row Name 03/29/23 0751          Positioning and Restraints    Pre-Treatment Position in bed  -DP     Post Treatment Position wheelchair  -DP     In Wheelchair sitting;call light within reach;encouraged to call for assist;exit alarm on;with family/caregiver  -DP     Row Name 03/29/23 0751          Weekly Progress Summary (PT)    Weekly Progress Summary (PT) Pt has made good progress toward STG this week as she is currently SBA to perform supine to sit as long as she has a bed rail and Bianca to get LE's back in to bed. Pt is min/modA with transfers. She does require modA to maxA with squat pivot transfers but she trialed stand pivot transfer today with FWW with Bianca to modA. Pt has ambulated up to 70 feet as of today but still demonstrates strong bilat knee buckle but worse on LLE at times that requires a w/c follow during gait. Pt is expected to continue to progress toward rehab goals and to adress impairments above and improve overall QoL.  -DP     Row Name 03/29/23 0751          IRF PT Goals    Bed Mobility Goal Selection (PT-IRF) bed mobility, PT goal 1;bed mobility, PT goal 2  -DP     Transfer Goal Selection (PT-IRF) transfers, PT goal 1;transfers, PT goal 2;transfers, PT goal 3;transfers, PT goal 4  -DP     Gait (Walking Locomotion) Goal Selection  (PT-IRF) gait, PT goal 1;gait, PT goal 2  -DP     Stairs Goal Selection (PT-IRF) stairs, PT goal 2;stairs, PT goal 1  -DP     Strength Goal Selection (PT-IRF) strength, PT goal 1 (free text)  -DP     Balance Goal Selection (PT) balance, PT goal 1  -DP     Row Name 03/29/23 0751          Bed Mobility Goal 1 (PT-IRF)    Activity/Assistive Device (Bed Mobility Goal 1, PT-IRF) bed mobility activities, all  -DP     [place] Level (Bed Mobility Goal 1, PT-IRF) minimum assist (75% or more patient effort)  -DP     Time Frame (Bed Mobility Goal 1, PT-IRF) short-term goal (STG);1 week  -DP     Progress/Outcomes (Bed Mobility Goal 1, PT-IRF) good progress toward goal;goal ongoing  -DP     Row Name 03/29/23 0751          Bed Mobility Goal 2 (PT-IRF)    Activity/Assistive Device (Bed Mobility Goal 2, PT-IRF) bed mobility activities, all  -DP     [place] Level (Bed Mobility Goal 2, PT-IRF) supervision required  -DP     Time Frame (Bed Mobility Goal 2, PT-IRF) long-term goal (LTG);4 weeks  -DP     Progress/Outcomes (Bed Mobility Goal 2, PT-IRF) goal ongoing  -DP     Row Name 03/29/23 0751          Transfer Goal 1 (PT-IRF)    Activity/Assistive Device (Transfer Goal 1, PT-IRF) bed-to-chair/chair-to-bed  -DP     [place] Level (Transfer Goal 1, PT-IRF) minimum assist (75% or more patient effort)  -DP     Time Frame (Transfer Goal 1, PT-IRF) short-term goal (STG);2 weeks  -DP     Progress/Outcomes (Transfer Goal 1, PT-IRF) good progress toward goal;goal ongoing  -DP     Row Name 03/29/23 0751          Transfer Goal 2 (PT-IRF)    Activity/Assistive Device (Transfer Goal 2, PT-IRF) all transfers  AAD  -DP     [place] Level (Transfer Goal 2, PT-IRF) supervision required  -DP     Time Frame (Transfer Goal 2, PT-IRF) long-term goal (LTG);4 weeks  -DP     Progress/Outcomes (Transfer Goal 2, PT-IRF) goal ongoing  -DP     Row Name 03/29/23 0751          Transfer Goal 3 (PT-IRF)    Activity/Assistive Device (Transfer Goal 3,  PT-IRF) car transfer  AAD  -DP     [place] Level (Transfer Goal 3, PT-IRF) contact guard required  -DP     Time Frame (Transfer Goal 3, PT-IRF) 4 weeks;long-term goal (LTG)  -DP     Progress/Outcomes (Transfer Goal 3, PT-IRF) goal ongoing  -DP     Row Name 03/29/23 0751          Gait/Walking Locomotion Goal 1 (PT-IRF)    Activity/Assistive Device (Gait/Walking Locomotion Goal 1, PT-IRF) gait (walking locomotion);walker, rolling  -DP     Gait/Walking Locomotion Distance Goal 1 (PT-IRF) 20  -DP     [place] Level (Gait/Walking Locomotion Goal 1, PT-IRF) moderate assist (50-74% patient effort);tactile cues required  -DP     Time Frame (Gait/Walking Locomotion Goal 1, PT-IRF) 2 weeks;short-term goal (STG)  -DP     Progress/Outcomes (Gait/Walking Locomotion Goal 1, PT-IRF) goal met  -DP     Row Name 03/29/23 0751          Gait/Walking Locomotion Goal 2 (PT-IRF)    Activity/Assistive Device (Gait/Walking Locomotion Goal 2, PT-IRF) gait (walking locomotion);walker, rolling  or AAD  -DP     Gait/Walking Locomotion Distance Goal 2 (PT-IRF) 150  -DP     [place] Level (Gait/Walking Locomotion Goal 2, PT-IRF) contact guard required  -DP     Time Frame (Gait/Walking Locomotion Goal 2, PT-IRF) long-term goal (LTG);4 weeks  -DP     Progress/Outcomes (Gait/Walking Locomotion Goal 2, PT-IRF) goal ongoing  -DP     Row Name 03/29/23 0751          Stairs Goal 1 (PT-IRF)    Activity/Assistive Device (Stairs Goal 1, PT-IRF) stairs, all skills;using handrail, left;using handrail, right  -DP     Number of Stairs (Stairs Goal 1, PT-IRF) 3  -DP     [place] Level (Stairs Goal 1, PT-IRF) contact guard required  -DP     Progress/Outcomes (Stairs Goal 1, PT-IRF) goal ongoing  -DP     Row Name 03/29/23 0751          Strength Goal 1 (PT-IRF)    Strength Goal 1 (PT-IRF) Pt to demonstrate B LE strength of at leat 3/5 throughout.  -DP     Time Frame (Strength Goal 1, PT-IRF) long-term goal (LTG);4 weeks  -DP     Progress/Outcomes  (Strength Goal 1, PT-IRF) goal ongoing  -DP     Row Name 03/29/23 0751          Balance Goal 1 (PT)    Activity/Assistive Device (Balance Goal 1, PT) sitting, static  -DP     [place] Level/Cues Needed (Balance Goal 1, PT) conditional independence  -DP     Time Frame (Balance Goal 1, PT) short term goal (STG);2 weeks  -DP     Progress/Outcomes (Balance Goal 1, PT) goal ongoing  -DP           User Key  (r) = Recorded By, (t) = Taken By, (c) = Cosigned By    Initials Name Provider Type    Papa Traylor, PT Physical Therapist              Wound 03/08/23 1917 Right lateral chest Incision (Active)   Dressing Appearance dried drainage 03/29/23 1058   Closure None 03/29/23 1058   Base pink 03/29/23 1058   Periwound pink 03/29/23 1058   Periwound Temperature warm 03/29/23 1058   Periwound Skin Turgor soft 03/29/23 1058   Drainage Amount none 03/29/23 1058   Care, Wound cleansed with 03/29/23 1058   Dressing Care dressing applied 03/29/23 1058       Wound 03/11/23 1530 Other (See comments) other (see comments) pubis Abrasion (Active)   Dressing Appearance open to air 03/29/23 1058   Closure Open to air 03/29/23 1058   Base dry;pink 03/29/23 1058   Drainage Amount none 03/29/23 1058   Dressing Care open to air 03/29/23 1058   Periwound Care barrier ointment applied 03/29/23 1058       Wound 03/20/23 1527 Bilateral upper gluteal MASD (Moisture associated skin damage) (Active)   Dressing Appearance open to air 03/29/23 1058   Closure Open to air 03/29/23 1058   Base blanchable;pink 03/29/23 1058   Periwound Temperature warm 03/29/23 1058   Periwound Skin Turgor soft 03/29/23 1058   Edges irregular 03/29/23 1058   Drainage Amount none 03/29/23 1058   Care, Wound cleansed with;soap and water 03/29/23 1058   Dressing Care skin barrier agent applied 03/29/23 1058   Periwound Care barrier ointment applied 03/29/23 1058     Physical Therapy Education     Title: PT OT SLP Therapies (In Progress)     Topic: Physical Therapy  (Done)     Point: Mobility training (Done)     Learning Progress Summary           Patient Acceptance, E,D, VU,DU by DP at 3/29/2023 1148    Nonacceptance, E,D, VU,DU by DP at 3/28/2023 1144    Acceptance, E,D, VU,DU by DP at 3/27/2023 1601    Acceptance, E,D, VU,NR by EE at 3/25/2023 1423    Acceptance, E,D, NR,VU,DU by DP at 3/21/2023 1448    Acceptance, E, NR by JK at 3/20/2023 1513    Acceptance, E,TB, VU,NR by SAMANTHA at 3/18/2023 1643                   Point: Home exercise program (Done)     Learning Progress Summary           Patient Acceptance, E,D, VU,DU by DP at 3/29/2023 1148    Nonacceptance, E,D, VU,DU by DP at 3/28/2023 1144    Acceptance, E,D, VU,DU by DP at 3/27/2023 1601    Acceptance, E,D, NR,VU,DU by DP at 3/21/2023 1448    Acceptance, E, NR by JK at 3/20/2023 1513                   Point: Body mechanics (Done)     Learning Progress Summary           Patient Acceptance, E,D, VU,DU by DP at 3/29/2023 1148    Nonacceptance, E,D, VU,DU by DP at 3/28/2023 1144    Acceptance, E,D, VU,DU by DP at 3/27/2023 1601    Acceptance, E,D, VU,NR by EE at 3/25/2023 1423    Acceptance, E,D, NR,VU,DU by DP at 3/21/2023 1448                   Point: Precautions (Done)     Learning Progress Summary           Patient Acceptance, E,D, VU,DU by DP at 3/29/2023 1148    Nonacceptance, E,D, VU,DU by DP at 3/28/2023 1144    Acceptance, E,D, VU,DU by DP at 3/27/2023 1601    Acceptance, E,D, VU,NR by EE at 3/25/2023 1423    Acceptance, E,D, NR,VU,DU by DP at 3/21/2023 1448                               User Key     Initials Effective Dates Name Provider Type Discipline    EE 06/16/21 -  Melinda Mann, PT Physical Therapist PT    JK 06/16/21 -  Juana Veloz, PT Physical Therapist PT    KP 06/16/21 -  Yolanda Shannon, PT Physical Therapist PT    DP 08/24/21 -  Papa Marsh, PT Physical Therapist PT                PT Recommendation and Plan                          Time Calculation:      PT Charges     Row Name 03/29/23 1147              Time Calculation    Start Time 0800  -DP      Stop Time 0900  -DP      Time Calculation (min) 60 min  -DP      PT Received On 03/29/23  -DP      PT - Next Appointment 03/30/23  -DP         Time Calculation- PT    Total Timed Code Minutes- PT 60 minute(s)  -DP            User Key  (r) = Recorded By, (t) = Taken By, (c) = Cosigned By    Initials Name Provider Type    DP Papa Marsh, PT Physical Therapist                Therapy Charges for Today     Code Description Service Date Service Provider Modifiers Qty    23190004418 HC PT THERAPEUTIC ACT EA 15 MIN 3/28/2023 Salma, Papa, PT GP 2    47531530251 HC PT THER PROC EA 15 MIN 3/28/2023 Salma, Papa, PT GP 2    08334804757 HC PT THER SUPP EA 15 MIN 3/28/2023 Salma, Papa, PT GP 1    56390342065 HC PT THERAPEUTIC ACT EA 15 MIN 3/29/2023 Salma, Papa, PT GP 2    88115963661 HC PT THER PROC EA 15 MIN 3/29/2023 Salma, Papa, PT GP 2    64186284235 HC PT THER SUPP EA 15 MIN 3/29/2023 Salma, Papa, PT GP 1              Patient was wearing a face mask during this therapy encounter. Therapist used appropriate personal protective equipment including mask and gloves.  Mask used was standard procedure mask. Appropriate PPE was worn during the entire therapy session. Hand hygiene was completed before and after therapy session. Patient is not in enhanced droplet precautions.         Papa Marsh PT  3/29/2023

## 2023-03-29 NOTE — PROGRESS NOTES
Inpatient Rehabilitation Plan of Care Note    Plan of Care  Care Plan Reviewed - No updates at this time.    Sphincter Control    [RN] Bladder Management(Active)  Current Status(03/29/2023): Bladder urgency  Weekly Goal(04/06/2023): decrease in episodes of incontinence  Discharge Goal: Continent 100%    [RN] Bowel Management(Active)  Current Status(03/29/2023): Patient is 100% continent of bowel  Weekly Goal(04/06/2023): Patient will maintain a daily bowel pattern.  Discharge Goal: Patient will maintain 100% continence of bowel    Performed Intervention(s)  Bladder/bowel training program  Monitor intake and output  Use incontinence products/equipment      Psychosocial    [RN] Coping/Adjustment(Active)  Current Status(03/29/2023): Anxiety r/t uncertainty of disease course  Weekly Goal(04/06/2023): Allow opportunity to express concerns regarding current  status  Discharge Goal: Patient/family will verbalize decreased feelings of anxiety.    Performed Intervention(s)  Verbalizes needs and concerns  Support from family/peer groups      Body Systems    [RN] Neurological(Active)  Current Status(03/29/2023): Impaired physical mobility r/t decreased muscle  strength/control and limited ROM  Weekly Goal(04/06/2023): Patient will have increased ROM  Discharge Goal: Patient will have improved strength and function of BLE and B  hands.    Performed Intervention(s)  Perform active and passive ROM  Frequent position changes      Safety    [RN] Potential for Injury(Active)  Current Status(03/29/2023): Risk for falls  Weekly Goal(04/06/2023): Family/Caregivers regarding safety precautions and need  for close supervision  Discharge Goal: Patient/family will be aware of risk of fall and safety in the  home setting.    Performed Intervention(s)  Safety Rounds  Bed alarm/Chair alarm  Items within reach    Signed by: Sandra Tanner RN

## 2023-03-30 PROCEDURE — 97130 THER IVNTJ EA ADDL 15 MIN: CPT

## 2023-03-30 PROCEDURE — 97535 SELF CARE MNGMENT TRAINING: CPT

## 2023-03-30 PROCEDURE — 97530 THERAPEUTIC ACTIVITIES: CPT

## 2023-03-30 PROCEDURE — 97110 THERAPEUTIC EXERCISES: CPT

## 2023-03-30 PROCEDURE — 97112 NEUROMUSCULAR REEDUCATION: CPT

## 2023-03-30 PROCEDURE — 25010000002 VANCOMYCIN PER 500 MG: Performed by: PHYSICAL MEDICINE & REHABILITATION

## 2023-03-30 PROCEDURE — 94799 UNLISTED PULMONARY SVC/PX: CPT

## 2023-03-30 PROCEDURE — 97129 THER IVNTJ 1ST 15 MIN: CPT

## 2023-03-30 PROCEDURE — 94761 N-INVAS EAR/PLS OXIMETRY MLT: CPT

## 2023-03-30 RX ADMIN — Medication 100 MG: at 07:18

## 2023-03-30 RX ADMIN — PANTOPRAZOLE SODIUM 40 MG: 40 TABLET, DELAYED RELEASE ORAL at 06:06

## 2023-03-30 RX ADMIN — IPRATROPIUM BROMIDE AND ALBUTEROL SULFATE 3 ML: 2.5; .5 SOLUTION RESPIRATORY (INHALATION) at 12:06

## 2023-03-30 RX ADMIN — Medication 500 MCG: at 07:17

## 2023-03-30 RX ADMIN — Medication 5 MG: at 19:59

## 2023-03-30 RX ADMIN — OXYCODONE HYDROCHLORIDE 2.5 MG: 5 TABLET ORAL at 14:57

## 2023-03-30 RX ADMIN — IPRATROPIUM BROMIDE AND ALBUTEROL SULFATE 3 ML: 2.5; .5 SOLUTION RESPIRATORY (INHALATION) at 07:08

## 2023-03-30 RX ADMIN — VANCOMYCIN HYDROCHLORIDE 1000 MG: 1 INJECTION, SOLUTION INTRAVENOUS at 21:51

## 2023-03-30 RX ADMIN — APIXABAN 5 MG: 5 TABLET, FILM COATED ORAL at 19:59

## 2023-03-30 RX ADMIN — GABAPENTIN 300 MG: 300 CAPSULE ORAL at 21:51

## 2023-03-30 RX ADMIN — LAMOTRIGINE 200 MG: 100 TABLET ORAL at 07:18

## 2023-03-30 RX ADMIN — IPRATROPIUM BROMIDE AND ALBUTEROL SULFATE 3 ML: 2.5; .5 SOLUTION RESPIRATORY (INHALATION) at 21:28

## 2023-03-30 RX ADMIN — ACETAMINOPHEN 650 MG: 325 TABLET, FILM COATED ORAL at 16:32

## 2023-03-30 RX ADMIN — Medication 1 MG: at 07:17

## 2023-03-30 RX ADMIN — OXYCODONE HYDROCHLORIDE 2.5 MG: 5 TABLET ORAL at 20:00

## 2023-03-30 RX ADMIN — APIXABAN 5 MG: 5 TABLET, FILM COATED ORAL at 07:18

## 2023-03-30 RX ADMIN — DESVENLAFAXINE SUCCINATE 25 MG: 25 TABLET, EXTENDED RELEASE ORAL at 07:17

## 2023-03-30 RX ADMIN — GABAPENTIN 300 MG: 300 CAPSULE ORAL at 06:06

## 2023-03-30 RX ADMIN — GABAPENTIN 300 MG: 300 CAPSULE ORAL at 14:57

## 2023-03-30 NOTE — THERAPY TREATMENT NOTE
Inpatient Rehabilitation - Speech Language Pathology Treatment Note    [facility]     Patient Name: Louise GARCIA  : 1965  MRN: [id number]    Today's Date: 3/30/2023                   Admit Date: 3/17/2023       Visit Dx:      ICD-10-CM ICD-9-CM   1. Impaired functional mobility, balance, gait, and endurance  Z74.09 V49.89       Patient Active Problem List   Diagnosis   • Sepsis (HCC)   • Neck pain, chronic   • Upper back pain   • Paresthesia   • Cervical myelopathy (HCC)   • Lower extremity weakness   • History of blood clots   • Chronic anticoagulation   • Seizures (HCC)   • Anemia   • GERD (gastroesophageal reflux disease)   • Guillain-Brunswick syndrome (HCC)   • Situational mixed anxiety and depressive disorder   • Mass of middle lobe of right lung   • Oral candidiasis   • Empyema of pleura ([facility])   • MRSA bacteremia   • Idiopathic peripheral neuropathy       Past Medical History:   Diagnosis Date   • Degenerative disc disease, cervical    • Gestational diabetes    • H/O blood clots    • Hematemesis    • Seizures ([facility])    • Septic shock ([facility])        Past Surgical History:   Procedure Laterality Date   • APPENDECTOMY     • BACK SURGERY     • CHOLECYSTECTOMY     • ENDOSCOPY N/A 2016    Procedure: ESOPHAGOGASTRODUODENOSCOPY with biopsy;  Surgeon: Austen Brito MD;  Location: [facility] ENDOSCOPY;  Service:    • HYSTERECTOMY     • NECK SURGERY       approx 6 yrs ago   • THORACOSCOPY Right 3/8/2023    Procedure: THORACOSCOPY WITH DAVINCI ROBOT WITH COMPLETE DECORTICATION;  Surgeon: Chloe Cedillo MD;  Location: [facility] OR;  Service: Robotics - DaVinci;  Laterality: Right;   • TONSILLECTOMY     • TONSILLECTOMY AND ADENOIDECTOMY         SLP Recommendation and Plan                                                            SLP EVALUATION (last 72 hours)     SLP SLC Evaluation     Row Name 23 1400 23 0900 23 1000 23 0900 23 1100       Communication  Assessment/Intervention    Document Type therapy note (daily note)  -SR therapy note (daily note)  -AL therapy note (daily note)  -AL therapy note (daily note)  -AL therapy note (daily note)  -AL    Subjective Information no complaints  -SR -- -- -- --    Patient Observations alert;cooperative;agree to therapy  -SR -- -- -- --    Patient/Family/Caregiver Comments/Observations -- Pt participated well.  -AL Pt participated well.  -AL Pt reported not sleeping well last night due to coughing.  -AL Pt is pleasant and cooperative.  -AL    Patient Effort good  -SR -- -- -- --    Symptoms Noted During/After Treatment none  -SR -- -- -- --       Pain Scale: Numbers Pre/Post-Treatment    Pretreatment Pain Rating 0/10 - no pain  -SR 0/10 - no pain  -AL 0/10 - no pain  -AL 0/10 - no pain  -AL 0/10 - no pain  -AL    Posttreatment Pain Rating 0/10 - no pain  -SR -- -- -- --    Row Name 03/28/23 0930                   Communication Assessment/Intervention    Document Type therapy note (daily note)  -AL        Patient/Family/Caregiver Comments/Observations Pt participated well.  -AL           Pain Scale: Numbers Pre/Post-Treatment    Pretreatment Pain Rating 0/10 - no pain  -AL              User Key  (r) = Recorded By, (t) = Taken By, (c) = Cosigned By    Initials Name Effective Dates    Nessa Kraus, MS CCC-SLP 06/16/21 -     SR Milana Lemus CCC-SLP 11/10/22 -                    EDUCATION    The patient has been educated in the following areas:       Cognitive Impairment.             SLP GOALS     Row Name 03/30/23 1400 03/30/23 0900 03/29/23 1000       Attention Goal 1 (SLP)    Improve Attention by Goal 1 (SLP) complete sustained attention task;80%;with minimal cues (75-90%)  -SR -- complete sustained attention task;80%;with minimal cues (75-90%)  -AL    Time Frame (Attention Goal 1, SLP) by discharge  -SR -- by discharge  -AL    Progress/Outcomes (Attention Goal 1, SLP) good progress toward goal  -SR -- good progress  toward goal  -AL    Comment (Attention Goal 1, SLP) Problem solving/planning targeted this date. Given a vacation/traveling scenerio, patient utilized the 5-day weather forecast to create a packing list with essential items. She required min verbal cues throughout the task. Patient provided appropriate rationale for each item on the list (i.e, rain coat/umbrella needed due to rain on Friday).  During a following activity, patient answered questions about events on a monthly calendar/daily schedule with 80% acc given no cues.  -SR -- Calendar task: Initiated task: pt required MIN-MOD cues to complete 2 trials. Will continue in next session.  -AL       Memory Skills Goal 1 (SLP)    Improve Memory Skills Through Goal 1 (SLP) -- use memory strategies;use external memory aid;recall details of the day;80%;with moderate cues (50-74%)  -AL use memory strategies;use external memory aid;recall details of the day;80%;with moderate cues (50-74%)  -AL    Time Frame (Memory Skills Goal 1, SLP) -- by discharge  -AL by discharge  -AL    Progress/Outcomes (Memory Skills Goal 1, SLP) -- good progress toward goal  -AL good progress toward goal  -AL    Comment (Memory Skills Goal 1, SLP) -- Recalled 3/3 errands after 10 minutes with NO cues. Goal met x1.  -AL Oriented to month, year, date and SUSAN with NO cues.  -AL       Reasoning Goal 1 (SLP)    Improve Reasoning Through Goal 1 (SLP) -- complete deductive reasoning task;80%;with minimal cues (75-90%)  -AL complete deductive reasoning task;80%;with minimal cues (75-90%)  -AL    Time Frame (Reasoning Goal 1, SLP) -- 1 week  -AL 1 week  -AL    Progress/Outcomes (Reasoning Goal 1, SLP) -- goal ongoing  -AL good progress toward goal  -AL    Comment (Reasoning Goal 1, SLP) -- Moderate level logic puzzle task: 20% with NO cues, 60% with MIN cues, 100% with MOD cues.  -AL Finished logic puzzle: overall 75% accurate with NO cues, 100% with MIN-MAX cues.  -AL    Row Name 03/29/23 0900 03/28/23  1100 03/28/23 0930       Attention Goal 1 (SLP)    Improve Attention by Goal 1 (SLP) -- complete sustained attention task;80%;with minimal cues (75-90%)  -AL --    Time Frame (Attention Goal 1, SLP) -- by discharge  -AL --    Progress/Outcomes (Attention Goal 1, SLP) -- good progress toward goal  -AL --    Comment (Attention Goal 1, SLP) -- Pt required NO cues for sustained attention during session.  -AL --       Memory Skills Goal 1 (SLP)    Improve Memory Skills Through Goal 1 (SLP) -- use memory strategies;use external memory aid;recall details of the day;80%;with moderate cues (50-74%)  -AL use memory strategies;use external memory aid;recall details of the day;80%;with moderate cues (50-74%)  -AL    Time Frame (Memory Skills Goal 1, SLP) -- by discharge  -AL by discharge  -AL    Progress/Outcomes (Memory Skills Goal 1, SLP) -- good progress toward goal  -AL good progress toward goal  -AL    Comment (Memory Skills Goal 1, SLP) -- Immediate memory for voicemails: 95% with NO cues, 100% with MIN cues.  -AL Oriented to month, year and SUSAN with NO cues; required MOD cues for date. Recalled 2/3 errands after 15 minutes with NO cues, 3/3 with MOD cues.  -AL       Organizational Skills Goal 1 (SLP)    Improve Thought Organization Through Goal 1 (SLP) -- completing a divergent naming task;80%;with minimal cues (75-90%)  -AL --    Time Frame (Thought Organization Skills Goal 1, SLP) -- 1 week  -AL --    Progress/Outcomes (Thought Organization Skills Goal 1, SLP) -- good progress toward goal  -AL --    Comment (Thought Organization Skills Goal 1, SLP) -- Sharp items: 3 with NO cues, 5 with MOD cues. Round items: 5 in1 minute with NO cues, 10 with MIN-MOD cues.  -AL --       Reasoning Goal 1 (SLP)    Improve Reasoning Through Goal 1 (SLP) complete deductive reasoning task;80%;with minimal cues (75-90%)  -AL complete deductive reasoning task;80%;with minimal cues (75-90%)  -AL complete deductive reasoning task;80%;with  minimal cues (75-90%)  -AL    Time Frame (Reasoning Goal 1, SLP) 1 week  -AL 1 week  -AL 1 week  -AL    Progress/Outcomes (Reasoning Goal 1, SLP) good progress toward goal  -AL good progress toward goal  -AL good progress toward goal  -AL    Comment (Reasoning Goal 1, SLP) Moderate level logic puzzle: 80% with NO cues, 100% with MIN-MAX cues thus far. Plan to complete in next session.  -AL Moderate level logic puzzle (continued from previous session): 50% with NO Cues, 100% with MIN-MAX cues.  -AL Initiated moderate level logic puzzle: 10/15 with NO cues, 15/15 with MOD-MAX cues.  -AL          User Key  (r) = Recorded By, (t) = Taken By, (c) = Cosigned By    Initials Name Provider Type    Nessa Kraus MS CCC-SLP Speech and Language Pathologist     Milana Lemus CCC-SLP Speech and Language Pathologist                            Time Calculation:        Time Calculation- SLP     Row Name 03/30/23 1443 03/30/23 1147          Time Calculation- SLP    SLP Start Time 1400  -SR 0900  -AL     SLP Stop Time 1430  -SR 0930  -AL     SLP Time Calculation (min) 30 min  -SR 30 min  -AL           User Key  (r) = Recorded By, (t) = Taken By, (c) = Cosigned By    Initials Name Provider Type    Nessa Kraus MS CCC-SLP Speech and Language Pathologist     Milana Lemus CCC-SLP Speech and Language Pathologist                  Therapy Charges for Today     Code Description Service Date Service Provider Modifiers Qty    [id number] [facility] DEV OF COGN SKILLS EACH ADDT'L 15 MIN 3/30/2023 Milana Lemus CCC-SLP  2                           Milana Lemus CCCLA NENA  3/30/2023

## 2023-03-30 NOTE — PROGRESS NOTES
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   S/p IVIG x 5 days       SUBJECTIVE:    She continues to have some baseline cough.  Right chest discomfort shows some improvement.  Comfortable with her breathing.  Met with the dietitian on food choices.  She is doing better with her mobility but continues with significant weakness in the left lower extremity.     OBJECTIVE:  AF, VSS  BP: (129-143)/(61-68) 143/68    Physical Exam     MENTAL STATUS -  AAO x3, Verbal, NAD.  HEENT- NCAT, SCLERAE ANICTERIC, CONJUNCTIVAE PINK, OP MOIST, NO JVD, EARS UNREMARKABLE EXTERNALLY    LUNGS - DECREASED BREATH SOUNDS RIGHT BASE MORE SO THAN LEFT BASE  HEART-regular rate and rhythm.  No murmur      ABD - NORMOACTIVE BOWEL SOUNDS, SOFT, NT.  EXT - NO EDEMA OR CYANOSIS  MOTOR EXAM -right/left: Shoulder abduction 4+/4+, elbow flexion 5/5, wrist extension 5/5, finger flexion 5/5, hip flexion 3/1, knee extension 4/2-, ankle dorsiflexion 4/2           Scheduled Meds:apixaban, 5 mg, Oral, Q12H  desvenlafaxine, 25 mg, Oral, Daily  folic acid, 1 mg, Oral, Daily  gabapentin, 300 mg, Oral, Q8H  ipratropium-albuterol, 3 mL, Nebulization, 4x Daily - RT  lamoTRIgine, 200 mg, Oral, Daily  melatonin, 5 mg, Oral, Nightly  pantoprazole, 40 mg, Oral, Q AM  thiamine, 100 mg, Oral, Daily  vancomycin, 1,000 mg, Intravenous, Q24H  vitamin B-12, 500 mcg, Oral, Daily      Continuous Infusions:Pharmacy to dose vancomycin,       PRN Meds:.•  acetaminophen  •  bisacodyl  •  hydrocortisone-bacitracin-zinc oxide-nystatin  •  oxyCODONE  •  Pharmacy to dose vancomycin  •  polyethylene glycol  •  senna-docusate sodium  •  simethicone    Lab Results (last 24 hours)     ** No results found for the last 24 hours. **          Imaging Results (Last 24 Hours)     ** No results found for the last 24 hours. **          ASSESSMENT AND PLAN     Diagnosis     • **Idiopathic peripheral neuropathy        Subacute motor predominant  idiopathic peripheral neuropathy with Paraparesis and Areflexia.   S/p IVIG x 5 days  March 30-shows improvement with her strength proximally the upper extremities.  Improvement with her hand strength.  Improved strength in the right lower extremity but continues with profound weakness in the left lower extremity.  With weakness of the left ankle, will look at probable AFO for gait activities.  Transfers are moderate assist.  Ambulated with a rolling walker up to 70 feet with gait deviations minimal moderate assist      Impaired mobility/impaired self care/ impaired cognition     R lung masslike infiltrate with cavitary lesions/pleural effusion   S/p thoracentesis - Blood cx and pleural fluid  positive for MRSA      chest tube placement given empyema, and she underwent thoracoscopy w/ decortication 3/8/23   -expected postoperative changes with pleural thickening and minimal pleural effusion.  The effusion is too small for intervention and will likely to continue to resolve with time.  Recommend continue good pulmonary hygiene with incentive spirometry hourly as well as increase activity/ambulation as tolerated.  March 23-appears decreased breath sounds more noticeable today on the right compared to the left.  Check follow-up chest x-ray.  On room air during the day, 1 L at night.  March 24- CXR relatively unchanged from previous, reached out to CT surgery given planned outpatient f/u on 3/28, no further imaging planned at this time as patient afebrile with normal WBC, monitor     Right-sided Chest Pain 3/24  -EKG sinus tach  -HS Troponin 25 > 24  -consider Cardiology consult if worsening  -pain reproducible with palpation, pain likely postoperative neuralgia as indicated vs. possible costochondritis, continue gabapentin, ice/heat, monitor    Hypokalemia  -3/24 K 3.3, repleted  -3/29 K 3.7, resolved    mass on TTE with findings concerning for endocarditis - underwent MARIAMA 3/10/2023 which had no vegetation     RUE  superficial thrombophlebitis concerning for septic phlebitis.        ID - continue 4 weeks of vancomycin as recommended by infectious disease dosed by pharmacy to achieve a trough level of 15-20 or area under the curve of 400-600 from last negative blood culture or last intervention which ever is the latest - to complete April 1, 2023     Left hip arthrocentesis March 16 to rule out any hip septic arthritis- no growth to date on fluid.    lower back pain - MRI of spine to rule out discitis or osteomyelitis.  This did have known degenerative changes but  no infection.      DVT prophylaxis - SCDs / apixiban. History of LLE DVT - on Eliquis at home     Pain management - Tylenol 1,000 mg three times a day/ Celebrex 100 mg q 112 hours/ gabapentin 300 mg q 8 hours Oxycodone 5 mg q 4 hours prn  March 18 - DC Celebrex 2/2 PHYLLIS, and anemia     Anxiety/ muscle spasms - Diazepam 2 mg q 6 hours prn - JONES reviewed     Seizure disorder -Lamotrigine 200 mg daily    ABLA - March 18- Hgb 7.0 (7.3 on 3/16). Type, cross and transfuse 1 unit PRBCs. Pre-medicate with Tylenol and Benadryl. CBC in am.   March 19- Hgb 8.8 s/p 1 unit PRBcs. Hemoccult positive. On Eliquis for h/o DVT. Follow Hgb. May need GI work up.  March 20 - HGB 9.3 s/p transfusion  March 21-reviewed with internal medicine-hemoglobin stable after transfusion.  Iron studies consistent with inflammation.  No further work-up presently  March 22 - Hgb 8.9, stable  March 23-hemoglobin trend 8.3.  Continue to follow.  Recheck tomorrow  March 29 - Hgb 8.1, stable    PHYLLIS - March 18- Creatinine 1.36 up from 1.12. On IV Vanc and Celebrex. DC Celebrex. BMP in am.  March 19- s/p 500ml NS bolus. Creatinine 1.31.  March 23-creatinine 0.92    TEAM CONF - MARCH 21 - BED MOD X 2. TRANSFERS MAX 2. STAND PIVOT OR SQUAT PIVOT. NON-AMBULATORY. Saturday MARCH 18 GAIT 6 FEET PARALLEL BARS MOD MAX OF 2.   BATH MOD 1-2. LBD DEP. UBD MOD. EATING MIN. GROOMING MIN. TOILETING DEP.   The patient  has difficulty remembering new information due to short-term memory impairments.  Initial evaluation 3.18, pt obtained a score 12/30 on SLUMS cognitive assessment indicating severe cognitive impairment/dementia, goals initiated for memory and sustaining attention  FEES completed 3.7 revealing glottic gap accounting for glottic insufficiency, dysphonia, hoarse/breathy quality, recommend targeting vocal function as appropriate d/t impact on intelligibility.    Regular/thin diet continued since FEES completion 3.7, although pt known to cough with and without PO intake, may be contributed partially to ineffective VF closure.   DRESSING FORMER CHEST TUBE SITE. CONTINUES ON PUREWICK AT NIGHT. O2 AT 1-2 LITERS AT NIGHT.  DECREASED APPETITE. ABD X-RAY THIS AM SHOWS NON-OBSTRUCTIVE BOWEL GAS PATTERN.   ELOS - 4 WEEKS    TEAM CONF - MARCH 28 - BED MIN CTG. TRANSFER MOD ASSIST STAND PIVOT OR SQUAT PIVOT. FATIGUES IN AFERNOONS. GAIT 15 FEET RW MIN X 2. FLUCTUATING ORIENTATION. BETTER DETAIL TO TASK. MIN MOD CUES FOR REASONING TASKS. DYSPHONIA FROM COUGHING.  SLP REVIEWED VOICE HYGIENE, NOT TO DO HARSH THROAT CLEAR. BATH MOD. LBD MAX. UBD MIN. TOILETING MOD X 2. CONTINENT/INCONTINENT BLADDER. RIGHT CHEST TUBE / THORACOSCOPY SITE CARE. VARIABLE INTAKE.   ELOS - 3 WEEKS    During rounds, used appropriate personal protective equipment including mask and gloves.  Additional gown if indicated.  Mask used was standard procedure mask. Appropriate PPE was worn during the entire visit.  Hand hygiene was completed before and after.    Shin Seay DO    Patient seen and case discussed with Dr. Rider.

## 2023-03-30 NOTE — PLAN OF CARE
Problem: Rehabilitation ([facility]) Plan of Care  Goal: Plan of Care Review  Outcome: Ongoing, Progressing  Flowsheets (Taken 3/30/2023 0405)  Outcome Evaluation: No acute events overnight. Pt AO4. On RA. No c/o pain, or N/V. 1 BM overnight. PO meds given in applesauce.   Goal Outcome Evaluation:              Outcome Evaluation: No acute events overnight. Pt AO4. On RA. No c/o pain, or N/V. 1 BM overnight. PO meds given in applesauce.

## 2023-03-30 NOTE — PROGRESS NOTES
"Nutrition Services    Patient Name:  Louise GARCIA  YOB: 1965  MRN: [id number]  Admit Date:  3/17/2023    Assessment Date:  03/30/23    FOLLOW UP NOTE - CLINICAL NUTRITION    Comments:  Visited pt to address poor intakes, per Dr. Rider's request.  in room at time of visit. Pt reported she is not a big eater, especially for breakfast, and stated she ate 50% of burger and some tots at lunch today which is good for her, even PTA. Pt thinks appetite is slightly improved. Took pt preferences and will adjust menu appropriately. Pt has an always available menu in room. Encouraged pt to prioritize intakes just as she would medicine or therapies and to drink Boost at meals she doesn't eat much of. Promoted snacks between meals in addition to liquid calories when appetite is low (ie juice). Pt agreeable to having hari crackers/crackers in bag in room to consume b/w meals; will attain for pt.     Recommendations:   1. Adding OJ at breakfast, daily; cranberry juice at L, daily.   2. Encouraged Boost consumption TID (especially if low meal intakes) and snacks b/w meals.     RD will continue to follow-up, per protocol.      Encounter Information        Reason for Encounter Follow-up   Current Issues Idiopathic peripheral neuropathy     Current Nutrition Orders & Evaluation of Intake       Oral Nutrition     Current PO Diet Diet: Regular/House Diet; Texture: Regular Texture (IDDSI 7); Fluid Consistency: Thin (IDDSI 0)   Supplement Boost Plus TID   PO Evaluation    % PO Intake/# of days Varying 25-75%.     Factors Affecting Intake  constipation, decreased appetite, early satiety      Anthropometrics         Height   Weight Height: 157.5 cm (62\")  Weight: 63.8 kg (140 lb 10.5 oz) (03/17/23 2040)   BMI kg/m2 Body mass index is 25.73 kg/m².   Weight trend No new weight available     Physical Findings          Physical Appearance alert, oriented fatigued   Oral/Mouth Cavity teeth missing   Edema  1+ (trace), 2+ " (mild)   Gastrointestinal hyperactive bowel sounds, last bowel movement:3/29   Skin  MASD, surgical incision, abrasion   Tubes/Drains none     Labs       Pertinent Labs Reviewed, listed below     Results from last 7 days   Lab Units 03/29/23  0549 03/28/23  0721 03/27/23  0749   SODIUM mmol/L 134* 133* 136   POTASSIUM mmol/L 3.7 3.8 3.9   CHLORIDE mmol/L 96* 96* 97*   CO2 mmol/L 31.3* 29.0 28.3   BUN mg/dL 7 7 7   CREATININE mg/dL 1.02* 0.95 1.01*   CALCIUM mg/dL 9.0 8.5* 8.7   GLUCOSE mg/dL 97 91 112*     Results from last 7 days   Lab Units 03/29/23  0549   HEMOGLOBIN g/dL 8.1*   HEMATOCRIT % 24.6*   WBC 10*3/mm3 9.72     Results from last 7 days   Lab Units 03/29/23  0549 03/28/23  0721 03/27/23  0749 03/26/23  0528 03/25/23  0640   PLATELETS 10*3/mm3 501* 424 341 393 370     COVID19   Date Value Ref Range Status   03/03/2023 Not Detected Not Detected - Ref. Range Final     Lab Results   Component Value Date    HGBA1C 5.10 02/20/2023          Medications           Scheduled Medications apixaban, 5 mg, Oral, Q12H  desvenlafaxine, 25 mg, Oral, Daily  folic acid, 1 mg, Oral, Daily  gabapentin, 300 mg, Oral, Q8H  ipratropium-albuterol, 3 mL, Nebulization, 4x Daily - RT  lamoTRIgine, 200 mg, Oral, Daily  melatonin, 5 mg, Oral, Nightly  pantoprazole, 40 mg, Oral, Q AM  thiamine, 100 mg, Oral, Daily  vancomycin, 1,000 mg, Intravenous, Q24H  vitamin B-12, 500 mcg, Oral, Daily       Infusions Pharmacy to dose vancomycin,        PRN Medications •  acetaminophen  •  bisacodyl  •  hydrocortisone-bacitracin-zinc oxide-nystatin  •  oxyCODONE  •  Pharmacy to dose vancomycin  •  polyethylene glycol  •  senna-docusate sodium  •  simethicone     PLAN OF CARE  Intervention Goal        Intervention Goal(s) Maintain nutrition status, Tolerate PO , Increase intake, Maintain weight, No significant weight loss and PO intake goal %: 75%     Nutrition Intervention        RD Action Advise available snack, Encourage intake, Follow Tx  Progress and Care plan reviewed     Prescription        Diet Prescription OJ @ B; Cranberry juice @ L.   Supplement Prescription    EN/PN Prescription    Prescription Ordered Yes   --  Monitor/Evaluation        Monitor Per protocol, PO intake, Supplement intake, Weight, GI status, Symptoms   Discharge Plan Pending clinical course   Education Will educate as needed       Electronically signed by:  Silke Arias  03/30/23 14:37 EDT

## 2023-03-30 NOTE — THERAPY TREATMENT NOTE
Inpatient Rehabilitation - Physical Therapy Treatment Note       [facility]     Patient Name: Louise GARCIA  : 1965  MRN: [id number]    Today's Date: 3/30/2023                    Admit Date: 3/17/2023      Visit Dx:     ICD-10-CM ICD-9-CM   1. 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)   • MRSA bacteremia   • Idiopathic peripheral neuropathy       Past Medical History:   Diagnosis Date   • Degenerative disc disease, cervical    • Gestational diabetes    • H/O blood clots    • Hematemesis    • Seizures ([facility])    • Septic shock (HCC)        Past Surgical History:   Procedure Laterality Date   • APPENDECTOMY     • BACK SURGERY     • CHOLECYSTECTOMY     • ENDOSCOPY N/A 2016    Procedure: ESOPHAGOGASTRODUODENOSCOPY with biopsy;  Surgeon: Austen Brito MD;  Location: [facility] ENDOSCOPY;  Service:    • HYSTERECTOMY     • NECK SURGERY       approx 6 yrs ago   • THORACOSCOPY Right 3/8/2023    Procedure: THORACOSCOPY WITH DAVINCI ROBOT WITH COMPLETE DECORTICATION;  Surgeon: Chloe Cedillo MD;  Location: [facility] OR;  Service: Robotics - DaVinci;  Laterality: Right;   • TONSILLECTOMY     • TONSILLECTOMY AND ADENOIDECTOMY         PT ASSESSMENT (last 12 hours)     IRF PT Evaluation and Treatment     Row Name 23 0897          PT Time and Intention    Document Type daily treatment  -MG     Mode of Treatment individual therapy;physical therapy  -MG     Patient/Family/Caregiver  "Comments/Observations Pt sitting UI, ready for PT, states \"I'm tired of sitting in this chair.\". In PM pt stating LLE having \"more tingles\" than RLE.  -MG     Total Minutes, Physical Therapy 60  -MG     Row Name 03/30/23 0830          General Information    Patient Profile Reviewed yes  -MG     Existing Precautions/Restrictions fall  Contact precautions  -MG     Limitations/Impairments safety/cognitive  -MG     Row Name 03/30/23 0830          Pain Assessment    Pretreatment Pain Rating 0/10 - no pain  -MG     Posttreatment Pain Rating 0/10 - no pain  -MG     Pre/Posttreatment Pain Comment 4/10 in PM at R ribs  -MG     Row Name 03/30/23 0830          Pain Scale: Word Pre/Post-Treatment    Pain Intervention(s) Medication (See MAR);Repositioned;Ambulation/increased activity;Rest  -MG     Row Name 03/30/23 0830          Cognition/Psychosocial    Affect/Mental Status (Cognition) flat/blunted affect  -MG     Orientation Status (Cognition) oriented to;place;person  -MG     Follows Commands (Cognition) follows one-step commands;over 90% accuracy;increased processing time needed  -MG     Personal Safety Interventions fall prevention program maintained;gait belt;muscle strengthening facilitated;nonskid shoes/slippers when out of bed;supervised activity  -MG     Cognitive Function memory deficit;safety deficit  -MG     Memory Deficit (Cognition) episodic memory;procedural memory  -MG     Safety Deficit (Cognition) ability to follow commands;safety precautions awareness;safety precautions follow-through/compliance  -MG     Row Name 03/30/23 0830          Transfer Assessment/Treatment    Transfers sit-stand transfer;stand-sit transfer  -MG     Comment, (Transfers) AM: STS x2 Mod-MaxA from wc to RW, demos trunk/hip flexion and B knee flexion w/ inability to extend hips/knees. PM: STS x5 from elevated mat surface, ~2in higher than wc cushion. STS 1 w/ PT in front blocking B feet, pt had B knee buckle and inability to flex hips " back to sit, had a second person assist w/ safe lowering to mat. STS 2-5 PT/PT tech on each side of pt blocking feet and knees, stood to RW w/ Min/ModA. Cues for hand placement and eccentric lowering.  -MG     Row Name 03/30/23 0830          Chair-Bed Transfer    Chair-Bed Wells (Transfers) moderate assist (50% patient effort);verbal cues;nonverbal cues (demo/gesture)  -MG     Assistive Device (Chair-Bed Transfers) wheelchair;walker, front-wheeled  -MG     Comment, (Chair-Bed Transfer) Stand pivot wc to mat  -MG     Row Name 03/30/23 0830          Sit-Stand Transfer    Sit-Stand [place] (Transfers) moderate assist (50% patient effort)  -MG     Assistive Device (Sit-Stand Transfers) walker, front-wheeled;wheelchair  -MG     Row Name 03/30/23 0830          Stand-Sit Transfer    Stand-Sit Wells (Transfers) moderate assist (50% patient effort);verbal cues;nonverbal cues (demo/gesture)  -MG     Assistive Device (Stand-Sit Transfers) wheelchair;walker, front-wheeled  -MG     Row Name 03/30/23 0830          Toilet Transfer    Type (Toilet Transfer) squat pivot  -MG     Wells Level (Toilet Transfer) moderate assist (50% patient effort);2 person assist;verbal cues;nonverbal cues (demo/gesture)  -MG     Assistive Device (Toilet Transfer) commode;grab bars/safety frame;wheelchair  -MG     Comment, (Toilet Transfer) B feet/knees blocked. STS x2 from commode for donning/doffing LE clothing. Set-up to I for pericare. Max-DepA for don/doff LE clothing.  -MG     Row Name 03/30/23 0830          Safety Issues, Functional Mobility    Impairments Affecting Function (Mobility) balance;cognition;endurance/activity tolerance;postural/trunk control;pain;strength  -MG     Row Name 03/30/23 0830          Balance    Static Sitting Balance standby assist  -MG     Dynamic Sitting Balance standby assist  -MG     Position, Sitting Balance unsupported;sitting edge of mat  -MG     Sit to Stand Dynamic Balance minimal  assist;moderate assist;verbal cues;non-verbal cues (demo/gesture);2-person assist  -MG     Static Standing Balance minimal assist;moderate assist;2-person assist  -MG     Dynamic Standing Balance minimal assist;moderate assist;2-person assist;verbal cues;non-verbal cues (demo/gesture)  -MG     Position/Device Used, Standing Balance supported;walker, front-wheeled  -MG     Balance Interventions standing;sit to stand;supported;highly challenging;weight shifting activity  -MG     Comment, Balance PM: STS 2-5 static standing for ~1-2 min each. Stand 4 weight shifting L<>R. Stand 5 weight shifting L<>R and stepping fwd x5 w/ LLE. B feet and knees blocked. Assist given at pelvis for weight shifting. Multiple cues for upright posture, anterior weight shift, hips fwd and shoulders relaxed.  -MG     Row Name 03/30/23 0830          Hip (Therapeutic Exercise)    Hip Strengthening (Therapeutic Exercise) bilateral;marching while seated;10 repetitions  -MG     Row Name 03/30/23 0830          Knee (Therapeutic Exercise)    Knee Strengthening (Therapeutic Exercise) bilateral;LAQ (long arc quad);10 repetitions  Limited knee extension. Gave tactile cue to hit PT hand at < full extension and pt unable to complete. Has full PROM. Attempted HC w/ YTB, but pt unable to flex knee more than 5 degrees.  -MG     Row Name 03/30/23 0830          Ankle (Therapeutic Exercise)    Ankle AROM (Therapeutic Exercise) bilateral;dorsiflexion;plantarflexion;10 repetitions  -MG     Row Name 03/30/23 0830          Positioning and Restraints    Pre-Treatment Position sitting in chair/recliner  -MG     Post Treatment Position wheelchair  PM: with OT  -MG     In Wheelchair sitting;exit alarm on;with SLP;encouraged to call for assist  -MG           User Key  (r) = Recorded By, (t) = Taken By, (c) = Cosigned By    Initials Name Provider Type    MG Yu Villalobos, PT Physical Therapist              Wound 03/08/23 1917 Right lateral chest Incision (Active)    Dressing Appearance dried drainage 03/30/23 0723   Closure None 03/30/23 0723   Base pink 03/30/23 0723   Periwound pink;redness 03/30/23 0723   Periwound Temperature warm 03/30/23 0723   Periwound Skin Turgor soft 03/30/23 0723   Drainage Characteristics/Odor serosanguineous 03/30/23 0600   Drainage Amount none 03/30/23 0723   Care, Wound cleansed with 03/30/23 0723   Periwound Care dry periwound area maintained 03/30/23 0600       Wound 03/11/23 1530 Other (See comments) other (see comments) pubis Abrasion (Active)   Dressing Appearance open to air 03/30/23 0723   Closure Open to air 03/30/23 0723   Base dry;pink 03/30/23 0723   Periwound dry 03/30/23 0723   Periwound Temperature warm 03/30/23 0723   Periwound Skin Turgor soft 03/30/23 0723   Drainage Amount none 03/30/23 0723   Dressing Care open to air 03/30/23 0723   Periwound Care barrier ointment applied 03/30/23 0723       Wound 03/20/23 1527 Bilateral upper gluteal MASD (Moisture associated skin damage) (Active)   Dressing Appearance open to air 03/30/23 0723   Closure Open to air 03/30/23 0723   Base blanchable;pink;dry 03/30/23 0723   Periwound Temperature warm 03/30/23 0723   Periwound Skin Turgor soft 03/30/23 0723   Edges irregular 03/30/23 0723   Care, Wound cleansed with;soap and water 03/30/23 0723   Dressing Care skin barrier agent applied 03/30/23 0723   Periwound Care barrier ointment applied 03/30/23 0723     Physical Therapy Education     Title: PT OT SLP Therapies (In Progress)     Topic: Physical Therapy (Done)     Point: Mobility training (Done)     Learning Progress Summary           Patient Acceptance, E, VU,DU by MG at 3/30/2023 0956    Acceptance, E,D, VU,DU by DP at 3/29/2023 1148    Nonacceptance, E,D, VU,DU by DP at 3/28/2023 1144    Acceptance, E,D, VU,DU by DP at 3/27/2023 1601    Acceptance, E,D, VU,NR by POWER at 3/25/2023 1423    Acceptance, E,D, NR,VU,DU by DP at 3/21/2023 1448    Acceptance, E, NR by ETHAN at 3/20/2023 1513     Acceptance, E,TB, VU,NR by KP at 3/18/2023 1643                   Point: Home exercise program (Done)     Learning Progress Summary           Patient Acceptance, E, VU,DU by MG at 3/30/2023 0956    Acceptance, E,D, VU,DU by DP at 3/29/2023 1148    Nonacceptance, E,D, VU,DU by DP at 3/28/2023 1144    Acceptance, E,D, VU,DU by DP at 3/27/2023 1601    Acceptance, E,D, NR,VU,DU by DP at 3/21/2023 1448    Acceptance, E, NR by JK at 3/20/2023 1513                   Point: Body mechanics (Done)     Learning Progress Summary           Patient Acceptance, E, VU,DU by MG at 3/30/2023 0956    Acceptance, E,D, VU,DU by DP at 3/29/2023 1148    Nonacceptance, E,D, VU,DU by DP at 3/28/2023 1144    Acceptance, E,D, VU,DU by DP at 3/27/2023 1601    Acceptance, E,D, VU,NR by EE at 3/25/2023 1423    Acceptance, E,D, NR,VU,DU by DP at 3/21/2023 1448                   Point: Precautions (Done)     Learning Progress Summary           Patient Acceptance, E, VU,DU by MG at 3/30/2023 0956    Acceptance, E,D, VU,DU by DP at 3/29/2023 1148    Nonacceptance, E,D, VU,DU by DP at 3/28/2023 1144    Acceptance, E,D, VU,DU by DP at 3/27/2023 1601    Acceptance, E,D, VU,NR by EE at 3/25/2023 1423    Acceptance, E,D, NR,VU,DU by DP at 3/21/2023 1448                               User Key     Initials Effective Dates Name Provider Type Discipline    EE 06/16/21 -  Melinda Mann, PT Physical Therapist PT    JK 06/16/21 -  Juana Veloz, PT Physical Therapist PT    KP 06/16/21 -  Yolanda Shannon, PT Physical Therapist PT    MG 05/24/22 -  Yu Villalobos, PT Physical Therapist PT    DP 08/24/21 -  Papa Marsh, PT Physical Therapist PT                PT Recommendation and Plan                          Time Calculation:      PT Charges     Row Name 03/30/23 1606 03/30/23 1605 03/30/23 1216       Time Calculation    Start Time -- 1300  -MG 0830  -MG    Stop Time -- 1330  -MG 0900  -MG    Time Calculation (min) -- 30 min  -MG 30 min  -MG    PT Received  On -- -- 03/30/23  -MG    PT - Next Appointment -- -- 03/31/23  -MG    PT Goal Re-Cert Due Date 04/06/23  -MG -- 03/31/23  -MG          User Key  (r) = Recorded By, (t) = Taken By, (c) = Cosigned By    Initials Name Provider Type    MG Yu Villalobos, PT Physical Therapist                Therapy Charges for Today     Code Description Service Date Service Provider Modifiers Qty    [id number]  PT THERAPEUTIC ACT EA 15 MIN 3/30/2023 Yu Villalobos, PT GP 1    [id number]  PT THER PROC EA 15 MIN 3/30/2023 Yu Villaloobs, PT GP 1    [id number]  PT NEUROMUSC RE EDUCATION EA 15 MIN 3/30/2023 Yu Villalobos, PT GP 1    [id number]  PT THERAPEUTIC ACT EA 15 MIN 3/30/2023 Yu Villalobos, PT GP 1                   Yu Villalobos, PT  3/30/2023

## 2023-03-30 NOTE — THERAPY TREATMENT NOTE
Inpatient Rehabilitation - Speech Language Pathology Treatment Note    [facility]     Patient Name: Louise GARCIA  : 1965  MRN: [id number]    Today's Date: 3/30/2023                   Admit Date: 3/17/2023       Visit Dx:      ICD-10-CM ICD-9-CM   1. 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)   • Situational mixed anxiety and depressive disorder   • Mass of middle lobe of right lung   • Oral candidiasis   • Empyema of pleura ([facility])   • MRSA bacteremia   • Idiopathic peripheral neuropathy       Past Medical History:   Diagnosis Date   • Degenerative disc disease, cervical    • Gestational diabetes    • H/O blood clots    • Hematemesis    • Seizures ([facility])    • Septic shock ([facility])        Past Surgical History:   Procedure Laterality Date   • APPENDECTOMY     • BACK SURGERY     • CHOLECYSTECTOMY     • ENDOSCOPY N/A 2016    Procedure: ESOPHAGOGASTRODUODENOSCOPY with biopsy;  Surgeon: Austen Brito MD;  Location: [facility] ENDOSCOPY;  Service:    • HYSTERECTOMY     • NECK SURGERY       approx 6 yrs ago   • THORACOSCOPY Right 3/8/2023    Procedure: THORACOSCOPY WITH DAVINCI ROBOT WITH COMPLETE DECORTICATION;  Surgeon: Chloe Cedillo MD;  Location: [facility] OR;  Service: Robotics - DaVinci;  Laterality: Right;   • TONSILLECTOMY     • TONSILLECTOMY AND ADENOIDECTOMY         SLP Recommendation and Plan                                                            SLP EVALUATION (last 72 hours)     SLP SLC Evaluation     Row Name 23 1400 23 0900 23 1000 23 0900 23 1100       Communication  Assessment/Intervention    Document Type therapy note (daily note)  -SR therapy note (daily note)  -AL therapy note (daily note)  -AL therapy note (daily note)  -AL therapy note (daily note)  -AL    Subjective Information no complaints  -SR -- -- -- --    Patient Observations alert;cooperative;agree to therapy  -SR -- -- -- --    Patient/Family/Caregiver Comments/Observations -- Pt participated well.  -AL Pt participated well.  -AL Pt reported not sleeping well last night due to coughing.  -AL Pt is pleasant and cooperative.  -AL    Patient Effort good  -SR -- -- -- --    Symptoms Noted During/After Treatment none  -SR -- -- -- --       Pain Scale: Numbers Pre/Post-Treatment    Pretreatment Pain Rating 0/10 - no pain  -SR 0/10 - no pain  -AL 0/10 - no pain  -AL 0/10 - no pain  -AL 0/10 - no pain  -AL    Posttreatment Pain Rating 0/10 - no pain  -SR -- -- -- --    Row Name 03/28/23 0930                   Communication Assessment/Intervention    Document Type therapy note (daily note)  -AL        Patient/Family/Caregiver Comments/Observations Pt participated well.  -AL           Pain Scale: Numbers Pre/Post-Treatment    Pretreatment Pain Rating 0/10 - no pain  -AL              User Key  (r) = Recorded By, (t) = Taken By, (c) = Cosigned By    Initials Name Effective Dates    AL Nessa Spangler, MS CCC-SLP 06/16/21 -     SR Mariah Milana, CCC-SLP 11/10/22 -                Patient was wearing a face mask during this therapy encounter. Therapist used appropriate personal protective equipment including mask, eye protection and gloves.  Mask used was standard procedure mask. Appropriate PPE was worn during the entire therapy session. Hand hygiene was completed before and after therapy session. Patient is not in enhanced droplet precautions.               EDUCATION    The patient has been educated in the following areas:       Cognitive Impairment Communication Impairment.             SLP GOALS     Row Name 03/30/23 1400  03/30/23 0900 03/29/23 1000       Attention Goal 1 (SLP)    Improve Attention by Goal 1 (SLP) complete sustained attention task;80%;with minimal cues (75-90%)  -SR -- complete sustained attention task;80%;with minimal cues (75-90%)  -AL    Time Frame (Attention Goal 1, SLP) by discharge  -SR -- by discharge  -AL    Progress/Outcomes (Attention Goal 1, SLP) good progress toward goal  -SR -- good progress toward goal  -AL    Comment (Attention Goal 1, SLP) Problem solving/planning targeted this date. Given a vacation/traveling scenerio, patient utilized the 5-day weather forecast to create a packing list with essential items. She required min verbal cues throughout the task. Patient provided appropriate rationale for each item on the list (i.e, rain coat/umbrella needed due to rain on Friday).  During a following activity, patient answered questions about events on a monthly calendar/daily schedule with 80% acc given no cues.  -SR -- Calendar task: Initiated task: pt required MIN-MOD cues to complete 2 trials. Will continue in next session.  -AL       Memory Skills Goal 1 (SLP)    Improve Memory Skills Through Goal 1 (SLP) -- use memory strategies;use external memory aid;recall details of the day;80%;with moderate cues (50-74%)  -AL use memory strategies;use external memory aid;recall details of the day;80%;with moderate cues (50-74%)  -AL    Time Frame (Memory Skills Goal 1, SLP) -- by discharge  -AL by discharge  -AL    Progress/Outcomes (Memory Skills Goal 1, SLP) -- good progress toward goal  -AL good progress toward goal  -AL    Comment (Memory Skills Goal 1, SLP) -- Recalled 3/3 errands after 10 minutes with NO cues. Goal met x1.  -AL Oriented to month, year, date and SUSAN with NO cues.  -AL       Reasoning Goal 1 (SLP)    Improve Reasoning Through Goal 1 (SLP) -- complete deductive reasoning task;80%;with minimal cues (75-90%)  -AL complete deductive reasoning task;80%;with minimal cues (75-90%)  -AL    Time  Frame (Reasoning Goal 1, SLP) -- 1 week  -AL 1 week  -AL    Progress/Outcomes (Reasoning Goal 1, SLP) -- goal ongoing  -AL good progress toward goal  -AL    Comment (Reasoning Goal 1, SLP) -- Moderate level logic puzzle task: 20% with NO cues, 60% with MIN cues, 100% with MOD cues.  -AL Finished logic puzzle: overall 75% accurate with NO cues, 100% with MIN-MAX cues.  -AL    Row Name 03/29/23 0900 03/28/23 1100 03/28/23 0930       Attention Goal 1 (SLP)    Improve Attention by Goal 1 (SLP) -- complete sustained attention task;80%;with minimal cues (75-90%)  -AL --    Time Frame (Attention Goal 1, SLP) -- by discharge  -AL --    Progress/Outcomes (Attention Goal 1, SLP) -- good progress toward goal  -AL --    Comment (Attention Goal 1, SLP) -- Pt required NO cues for sustained attention during session.  -AL --       Memory Skills Goal 1 (SLP)    Improve Memory Skills Through Goal 1 (SLP) -- use memory strategies;use external memory aid;recall details of the day;80%;with moderate cues (50-74%)  -AL use memory strategies;use external memory aid;recall details of the day;80%;with moderate cues (50-74%)  -AL    Time Frame (Memory Skills Goal 1, SLP) -- by discharge  -AL by discharge  -AL    Progress/Outcomes (Memory Skills Goal 1, SLP) -- good progress toward goal  -AL good progress toward goal  -AL    Comment (Memory Skills Goal 1, SLP) -- Immediate memory for voicemails: 95% with NO cues, 100% with MIN cues.  -AL Oriented to month, year and SUSAN with NO cues; required MOD cues for date. Recalled 2/3 errands after 15 minutes with NO cues, 3/3 with MOD cues.  -AL       Organizational Skills Goal 1 (SLP)    Improve Thought Organization Through Goal 1 (SLP) -- completing a divergent naming task;80%;with minimal cues (75-90%)  -AL --    Time Frame (Thought Organization Skills Goal 1, SLP) -- 1 week  -AL --    Progress/Outcomes (Thought Organization Skills Goal 1, SLP) -- good progress toward goal  -AL --    Comment (Thought  Organization Skills Goal 1, SLP) -- Sharp items: 3 with NO cues, 5 with MOD cues. Round items: 5 in1 minute with NO cues, 10 with MIN-MOD cues.  -AL --       Reasoning Goal 1 (SLP)    Improve Reasoning Through Goal 1 (SLP) complete deductive reasoning task;80%;with minimal cues (75-90%)  -AL complete deductive reasoning task;80%;with minimal cues (75-90%)  -AL complete deductive reasoning task;80%;with minimal cues (75-90%)  -AL    Time Frame (Reasoning Goal 1, SLP) 1 week  -AL 1 week  -AL 1 week  -AL    Progress/Outcomes (Reasoning Goal 1, SLP) good progress toward goal  -AL good progress toward goal  -AL good progress toward goal  -AL    Comment (Reasoning Goal 1, SLP) Moderate level logic puzzle: 80% with NO cues, 100% with MIN-MAX cues thus far. Plan to complete in next session.  -AL Moderate level logic puzzle (continued from previous session): 50% with NO Cues, 100% with MIN-MAX cues.  -AL Initiated moderate level logic puzzle: 10/15 with NO cues, 15/15 with MOD-MAX cues.  -AL          User Key  (r) = Recorded By, (t) = Taken By, (c) = Cosigned By    Initials Name Provider Type    Nessa Kraus MS CCC-SLP Speech and Language Pathologist    Milana Trujillo CCC-SLP Speech and Language Pathologist                            Time Calculation:        Time Calculation- SLP     Row Name 03/30/23 1443 03/30/23 1147          Time Calculation- [facility]    SLP Start Time 1400  -SR 0900  -AL     SLP Stop Time 1430  -SR 0930  -AL     SLP Time Calculation (min) 30 min  -SR 30 min  -AL           User Key  (r) = Recorded By, (t) = Taken By, (c) = Cosigned By    Initials Name Provider Type    Nessa Kraus MS CCC-SLP Speech and Language Pathologist    Milana Trujillo CCC-SLP Speech and Language Pathologist                  Therapy Charges for Today     Code Description Service Date Service Provider Modifiers Qty    [id number]  ST DEV OF COGN SKILLS INITIAL 15 MIN 3/29/2023 Nessa Spangler MS CCC-SLP  1     [id number]  ST DEV OF COGN SKILLS EACH ADDT'L 15 MIN 3/29/2023 Nessa Spangler, MS CCC-SLP  3    [id number] HC ST DEV OF COGN SKILLS INITIAL 15 MIN 3/30/2023 Nessa Spangler, MS CCC-SLP  1    [id number]  ST DEV OF COGN SKILLS EACH ADDT'L 15 MIN 3/30/2023 Nessa Spangler, MS CCC-SLP  1                           Nessa Spangler MS CCC-SLP  3/30/2023

## 2023-03-30 NOTE — PLAN OF CARE
Goal Outcome Evaluation:  Plan of Care Reviewed With: patient        Progress: improving   Patient is calm and cooperative. Alert and oriented to person, place, and time. Forgetful. Using the call light for assistance. 2 person assist. Severe weakness to BLE. PRN pain medication given for left foot pain. She is taking her medication whole in apple sauce. On room air during the day. Vital signs stable. Will continue to monitor.

## 2023-03-30 NOTE — THERAPY TREATMENT NOTE
Inpatient Rehabilitation - Occupational Therapy Treatment Note    [facility]     Patient Name: Louise GARCIA  : 1965  MRN: [id number]    Today's Date: 3/30/2023                 Admit Date: 3/17/2023         ICD-10-CM ICD-9-CM   1. Impaired functional mobility, balance, gait, and endurance  Z74.09 V49.89       Patient Active Problem List   Diagnosis   • Sepsis (HCC)   • Neck pain, chronic   • Upper back pain   • Paresthesia   • Cervical myelopathy (HCC)   • Lower extremity weakness   • History of blood clots   • Chronic anticoagulation   • Seizures (HCC)   • Anemia   • GERD (gastroesophageal reflux disease)   • Guillain-Parsons syndrome (HCC)   • Situational mixed anxiety and depressive disorder   • Mass of middle lobe of right lung   • Oral candidiasis   • Empyema of pleura (HCC)   • MRSA bacteremia   • Idiopathic peripheral neuropathy       Past Medical History:   Diagnosis Date   • Degenerative disc disease, cervical    • Gestational diabetes    • H/O blood clots    • Hematemesis    • Seizures ([facility])    • Septic shock ([facility])        Past Surgical History:   Procedure Laterality Date   • APPENDECTOMY     • BACK SURGERY     • CHOLECYSTECTOMY     • ENDOSCOPY N/A 2016    Procedure: ESOPHAGOGASTRODUODENOSCOPY with biopsy;  Surgeon: Austen Brito MD;  Location: [facility] ENDOSCOPY;  Service:    • HYSTERECTOMY     • NECK SURGERY       approx 6 yrs ago   • THORACOSCOPY Right 3/8/2023    Procedure: THORACOSCOPY WITH DAVINCI ROBOT WITH COMPLETE DECORTICATION;  Surgeon: Chloe Cedillo MD;  Location: [facility] OR;  Service: Robotics - DaVinci;  Laterality: Right;   • TONSILLECTOMY     • TONSILLECTOMY AND ADENOIDECTOMY               [facility] OT ASSESSMENT FLOWSHEET (last 12 hours)     [facility] OT Evaluation and Treatment     Row Name 23 1526          OT Time and Intention    Document Type daily treatment  -AF     Mode of Treatment occupational therapy  -AF     Patient Effort good  -AF     Symptoms Noted  During/After Treatment fatigue  -AF     Row Name 03/30/23 1526          General Information    Patient/Family/Caregiver Comments/Observations pt sitting up in bed in AM and seated on EOM in PM with PT session  -AF     General Observations of Patient states feeling tired today, pt staets that she sat up for breakfast this morning  -AF     Existing Precautions/Restrictions fall  contact precautions  -AF     Row Name 03/30/23 1526          Pain Assessment    Pretreatment Pain Rating 0/10 - no pain  -AF     Posttreatment Pain Rating 0/10 - no pain  -AF     Row Name 03/30/23 1526          Cognition/Psychosocial    Affect/Mental Status (Cognition) flat/blunted affect  -AF     Orientation Status (Cognition) oriented to;place;person  -AF     Follows Commands (Cognition) follows one-step commands;over 90% accuracy;increased processing time needed  -AF     Personal Safety Interventions fall prevention program maintained;gait belt;nonskid shoes/slippers when out of bed  -AF     Row Name 03/30/23 1526          Bathing    [place] Level (Bathing) bathing skills;lower body;upper body;minimum assist (75% patient effort);moderate assist (50% patient effort);verbal cues  -AF     Position (Bathing) sink side;supported sitting;supported standing  -AF     Row Name 03/30/23 1526          Upper Body Dressing    [place] Level (Upper Body Dressing) upper body dressing skills;set up assistance  -AF     Position (Upper Body Dressing) supported sitting  -AF     Comment (Upper Body Dressing) w/c level  -AF     Row Name 03/30/23 1526          Lower Body Dressing    Comment (Lower Body Dressing) wanted to keep same pants on, worked on static standing  -AF     Row Name 03/30/23 1526          Grooming    [place] Level (Grooming) grooming skills;set up  -AF     Position (Grooming) supported sitting  -AF     Comment (Grooming) w/c level  -AF     Row Name 03/30/23 1526          Bed Mobility    Supine-Sit [place] (Bed Mobility)  standby assist  with increased time  -AF     Assistive Device (Bed Mobility) bed rails  -AF     Row Name 03/30/23 1526          Transfer Assessment/Treatment    Comment, (Transfers) sit to stand at grab bar with LBD tasks MOD A x 2, EOM back to w/c sit pivot MOD A x 2  -AF     Row Name 03/30/23 1526          Bed-Chair Transfer    Bed-Chair [place] (Transfers) 2 person assist;moderate assist (50% patient effort)  -AF     Assistive Device (Bed-Chair Transfers) wheelchair  -AF     Comment, (Bed-Chair Transfer) sit pivot  -AF     Row Name 03/30/23 1526          Shoulder (Therapeutic Exercise)    Shoulder Strengthening (Therapeutic Exercise) bilateral;scapular stabilization;external rotation;internal rotation;sitting;15 repititions;2 sets  #1.5 wrist weights  -AF     Row Name 03/30/23 1526          Balance    Comment, Balance seated on EOM worked on trunk activation with dual tasking with cognitive task of card sorting. pt worked on weight shifting and using BUEs to place cards with clothepins with on dowel mary states difficulty and fatigue with completing task  -AF     Row Name 03/30/23 1526          Positioning and Restraints    Pre-Treatment Position in bed  -AF     Post Treatment Position wheelchair  -AF     In Wheelchair sitting;call light within reach;encouraged to call for assist;exit alarm on;with family/caregiver  in AM and PM sessions  -AF           User Key  (r) = Recorded By, (t) = Taken By, (c) = Cosigned By    Initials Name Effective Dates    AF Heaven Villareal, OTR 06/16/21 -                  Occupational Therapy Education     Title: PT OT SLP Therapies (In Progress)     Topic: Occupational Therapy (Not Started)     Point: ADL training (Not Started)     Description:   Instruct learner(s) on proper safety adaptation and remediation techniques during self care or transfers.   Instruct in proper use of assistive devices.              Learner Progress:  Not documented in this visit.          Point: Home  exercise program (Not Started)     Description:   Instruct learner(s) on appropriate technique for monitoring, assisting and/or progressing therapeutic exercises/activities.              Learner Progress:  Not documented in this visit.          Point: Precautions (Not Started)     Description:   Instruct learner(s) on prescribed precautions during self-care and functional transfers.              Learner Progress:  Not documented in this visit.          Point: Body mechanics (Not Started)     Description:   Instruct learner(s) on proper positioning and spine alignment during self-care, functional mobility activities and/or exercises.              Learner Progress:  Not documented in this visit.                                OT Recommendation and Plan                         Time Calculation:      Time Calculation- OT     Row Name 03/30/23 1532 03/30/23 1531          Time Calculation- OT    OT Start Time 1330  -AF 1030  -AF     OT Stop Time 1400  -AF 1100  -AF     OT Time Calculation (min) 30 min  -AF 30 min  -AF           User Key  (r) = Recorded By, (t) = Taken By, (c) = Cosigned By    Initials Name Provider Type    AF Heaven Villareal, OTR Occupational Therapist              Therapy Charges for Today     Code Description Service Date Service Provider Modifiers Qty    71359007426 HC OT SELF CARE/MGMT/TRAIN EA 15 MIN 3/29/2023 Heaven Villareal, OTR GO 3    14491857884 HC OT THER SUPP EA 15 MIN 3/29/2023 Heaven Villareal, OTR GO 3    74169422639 HC OT THER PROC EA 15 MIN 3/29/2023 Heaven Villareal, OTR GO 1    45874231570 HC OT SELF CARE/MGMT/TRAIN EA 15 MIN 3/30/2023 Heaven Villareal, OTR GO 2    49829593952 HC OT THER SUPP EA 15 MIN 3/30/2023 Heaven Villareal, OTR GO 2    21953446158 HC OT NEUROMUSC RE EDUCATION EA 15 MIN 3/30/2023 Heaven Villareal, OTR GO 2                   Heaven Villareal OTNAIMA  3/30/2023

## 2023-03-31 PROCEDURE — 97116 GAIT TRAINING THERAPY: CPT

## 2023-03-31 PROCEDURE — 94799 UNLISTED PULMONARY SVC/PX: CPT

## 2023-03-31 PROCEDURE — 97129 THER IVNTJ 1ST 15 MIN: CPT

## 2023-03-31 PROCEDURE — 97130 THER IVNTJ EA ADDL 15 MIN: CPT

## 2023-03-31 PROCEDURE — 97535 SELF CARE MNGMENT TRAINING: CPT

## 2023-03-31 PROCEDURE — 25010000002 VANCOMYCIN PER 500 MG: Performed by: PHYSICAL MEDICINE & REHABILITATION

## 2023-03-31 PROCEDURE — 97530 THERAPEUTIC ACTIVITIES: CPT

## 2023-03-31 RX ADMIN — APIXABAN 5 MG: 5 TABLET, FILM COATED ORAL at 08:25

## 2023-03-31 RX ADMIN — LAMOTRIGINE 200 MG: 100 TABLET ORAL at 08:25

## 2023-03-31 RX ADMIN — APIXABAN 5 MG: 5 TABLET, FILM COATED ORAL at 20:01

## 2023-03-31 RX ADMIN — Medication 1 MG: at 08:25

## 2023-03-31 RX ADMIN — OXYCODONE HYDROCHLORIDE 2.5 MG: 5 TABLET ORAL at 16:39

## 2023-03-31 RX ADMIN — GABAPENTIN 300 MG: 300 CAPSULE ORAL at 05:24

## 2023-03-31 RX ADMIN — GABAPENTIN 300 MG: 300 CAPSULE ORAL at 14:12

## 2023-03-31 RX ADMIN — Medication 100 MG: at 08:25

## 2023-03-31 RX ADMIN — IPRATROPIUM BROMIDE AND ALBUTEROL SULFATE 3 ML: 2.5; .5 SOLUTION RESPIRATORY (INHALATION) at 15:19

## 2023-03-31 RX ADMIN — IPRATROPIUM BROMIDE AND ALBUTEROL SULFATE 3 ML: 2.5; .5 SOLUTION RESPIRATORY (INHALATION) at 07:14

## 2023-03-31 RX ADMIN — IPRATROPIUM BROMIDE AND ALBUTEROL SULFATE 3 ML: 2.5; .5 SOLUTION RESPIRATORY (INHALATION) at 20:37

## 2023-03-31 RX ADMIN — ACETAMINOPHEN 650 MG: 325 TABLET, FILM COATED ORAL at 20:02

## 2023-03-31 RX ADMIN — IPRATROPIUM BROMIDE AND ALBUTEROL SULFATE 3 ML: 2.5; .5 SOLUTION RESPIRATORY (INHALATION) at 12:37

## 2023-03-31 RX ADMIN — DESVENLAFAXINE SUCCINATE 25 MG: 25 TABLET, EXTENDED RELEASE ORAL at 08:25

## 2023-03-31 RX ADMIN — PANTOPRAZOLE SODIUM 40 MG: 40 TABLET, DELAYED RELEASE ORAL at 05:24

## 2023-03-31 RX ADMIN — Medication 5 MG: at 20:01

## 2023-03-31 RX ADMIN — OXYCODONE HYDROCHLORIDE 2.5 MG: 5 TABLET ORAL at 20:55

## 2023-03-31 RX ADMIN — Medication 500 MCG: at 08:25

## 2023-03-31 RX ADMIN — VANCOMYCIN HYDROCHLORIDE 1000 MG: 1 INJECTION, SOLUTION INTRAVENOUS at 21:02

## 2023-03-31 RX ADMIN — GABAPENTIN 300 MG: 300 CAPSULE ORAL at 20:55

## 2023-03-31 NOTE — PLAN OF CARE
Goal Outcome Evaluation:  Plan of Care Reviewed With: patient           Outcome Evaluation: pt is assist X2, gait belt, worked with therapy, alert and oriented X4, RA, meds whole with apple sauce, seizure precautions. will cont to monitor  Problem: Rehabilitation (IRF) Plan of Care  Goal: Plan of Care Review  Outcome: Ongoing, Progressing  Flowsheets (Taken 3/31/2023 1505)  Plan of Care Reviewed With: patient  Outcome Evaluation: pt is assist X2, gait belt, worked with therapy, alert and oriented X4, RA, meds whole with apple sauce, seizure precautions. will cont to monitor  Goal: Patient-Specific Goal (Individualized)  Outcome: Ongoing, Progressing  Goal: Absence of New-Onset Illness or Injury  Outcome: Ongoing, Progressing  Intervention: Prevent Fall and Fall Injury  Recent Flowsheet Documentation  Taken 3/31/2023 1400 by Anisha Braun RN  Safety Promotion/Fall Prevention:   activity supervised   assistive device/personal items within reach   clutter free environment maintained   fall prevention program maintained   nonskid shoes/slippers when out of bed   room organization consistent   safety round/check completed  Taken 3/31/2023 1237 by Anisha Braun RN  Safety Promotion/Fall Prevention:   activity supervised   assistive device/personal items within reach   clutter free environment maintained   fall prevention program maintained   nonskid shoes/slippers when out of bed   room organization consistent   safety round/check completed  Taken 3/31/2023 1026 by Anisha Braun RN  Safety Promotion/Fall Prevention: (therapy) patient off unit  Taken 3/31/2023 0827 by Anisha Braun RN  Safety Promotion/Fall Prevention:   activity supervised   assistive device/personal items within reach   clutter free environment maintained   fall prevention program maintained   nonskid shoes/slippers when out of bed   room organization consistent   safety round/check completed  Intervention: Prevent Infection  Recent Flowsheet  Documentation  Taken 3/31/2023 1400 by Anisha Braun RN  Infection Prevention: single patient room provided  Taken 3/31/2023 1237 by Anisha Braun RN  Infection Prevention: single patient room provided  Taken 3/31/2023 0827 by Anisha Braun RN  Infection Prevention: single patient room provided  Intervention: Prevent VTE (Venous Thromboembolism)  Recent Flowsheet Documentation  Taken 3/31/2023 0827 by Anisha Braun RN  VTE Prevention/Management:   bilateral   sequential compression devices off  Goal: Optimal Comfort and Wellbeing  Outcome: Ongoing, Progressing  Goal: Home and Community Transition Plan Established  Outcome: Ongoing, Progressing

## 2023-03-31 NOTE — PLAN OF CARE
Goal Outcome Evaluation:  Plan of Care Reviewed With: patient             Problem: Rehabilitation (IRF) Plan of Care  Goal: Plan of Care Review  Outcome: Ongoing, Progressing  Flowsheets (Taken 3/31/2023 0134)  Plan of Care Reviewed With: patient  Outcome Evaluation:  Louise is alert and oriented x 4. Calm and pleasant. Meds whole PO with thins. Continues on seizure precautions. BLE weakness continues. Continent of bowel and bladder. Does and urgency and frequency; wears Purewick at HS. Barrier ointment applied to buttocks r/t pink and blanchable. RFA IV patent; continues on IV vanc, tolerated well. PRN nadir administered at HS with relief.  at bedside. No unsafe behaviors. Resting well with eyes closed. call light within reach.

## 2023-03-31 NOTE — PROGRESS NOTES
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   S/p IVIG x 5 days       SUBJECTIVE:  Seen and examined at bedside. Continues with cough, however states associated right chest discomfort is improving. Wants to try to move away from using nocturnal purewick soon. Reports recent BM. Denies chest pain, sob, n/v, f/c.      OBJECTIVE:  AF, VSS  BP: ()/(58-68) 99/62    Physical Exam     MENTAL STATUS -  AAO x3, Verbal, NAD.  HEENT- NCAT, SCLERAE ANICTERIC, CONJUNCTIVAE PINK, OP MOIST, NO JVD, EARS UNREMARKABLE EXTERNALLY    LUNGS - DECREASED BREATH SOUNDS RIGHT BASE MORE SO THAN LEFT BASE  HEART-regular rate and rhythm.  No murmur      ABD - NORMOACTIVE BOWEL SOUNDS, SOFT, NT.  EXT - NO EDEMA OR CYANOSIS  MOTOR EXAM -right/left: Shoulder abduction 4+/4+, elbow flexion 5/5, wrist extension 5/5, finger flexion 5/5, hip flexion 3/1, knee extension 4/2-, ankle dorsiflexion 4/2           Scheduled Meds:apixaban, 5 mg, Oral, Q12H  desvenlafaxine, 25 mg, Oral, Daily  folic acid, 1 mg, Oral, Daily  gabapentin, 300 mg, Oral, Q8H  ipratropium-albuterol, 3 mL, Nebulization, 4x Daily - RT  lamoTRIgine, 200 mg, Oral, Daily  melatonin, 5 mg, Oral, Nightly  pantoprazole, 40 mg, Oral, Q AM  thiamine, 100 mg, Oral, Daily  vancomycin, 1,000 mg, Intravenous, Q24H  vitamin B-12, 500 mcg, Oral, Daily      Continuous Infusions:Pharmacy to dose vancomycin,       PRN Meds:.•  acetaminophen  •  bisacodyl  •  hydrocortisone-bacitracin-zinc oxide-nystatin  •  oxyCODONE  •  Pharmacy to dose vancomycin  •  polyethylene glycol  •  senna-docusate sodium  •  simethicone    Lab Results (last 24 hours)     ** No results found for the last 24 hours. **          Imaging Results (Last 24 Hours)     ** No results found for the last 24 hours. **          ASSESSMENT AND PLAN     Diagnosis     • **Idiopathic peripheral neuropathy        Subacute motor predominant idiopathic peripheral  neuropathy with Paraparesis and Areflexia.   S/p IVIG x 5 days  March 30-shows improvement with her strength proximally the upper extremities.  Improvement with her hand strength.  Improved strength in the right lower extremity but continues with profound weakness in the left lower extremity.  With weakness of the left ankle, will look at probable AFO for gait activities.  Transfers are moderate assist.  Ambulated with a rolling walker up to 70 feet with gait deviations minimal moderate assist      Impaired mobility/impaired self care/ impaired cognition     R lung masslike infiltrate with cavitary lesions/pleural effusion   S/p thoracentesis - Blood cx and pleural fluid  positive for MRSA      chest tube placement given empyema, and she underwent thoracoscopy w/ decortication 3/8/23   -expected postoperative changes with pleural thickening and minimal pleural effusion.  The effusion is too small for intervention and will likely to continue to resolve with time.  Recommend continue good pulmonary hygiene with incentive spirometry hourly as well as increase activity/ambulation as tolerated.  March 23-appears decreased breath sounds more noticeable today on the right compared to the left.  Check follow-up chest x-ray.  On room air during the day, 1 L at night.  March 24- CXR relatively unchanged from previous, reached out to CT surgery given planned outpatient f/u on 3/28, no further imaging planned at this time as patient afebrile with normal WBC, monitor     Right-sided Chest Pain 3/24  -EKG sinus tach  -HS Troponin 25 > 24  -consider Cardiology consult if worsening  -pain reproducible with palpation, pain likely postoperative neuralgia as indicated vs. possible costochondritis, continue gabapentin, ice/heat, monitor    Hypokalemia  -3/24 K 3.3, repleted  -3/29 K 3.7, resolved    mass on TTE with findings concerning for endocarditis - underwent MARIAMA 3/10/2023 which had no vegetation     RUE superficial  thrombophlebitis concerning for septic phlebitis.        ID - continue 4 weeks of vancomycin as recommended by infectious disease dosed by pharmacy to achieve a trough level of 15-20 or area under the curve of 400-600 from last negative blood culture or last intervention which ever is the latest - to complete April 1, 2023     Left hip arthrocentesis March 16 to rule out any hip septic arthritis- no growth to date on fluid.    lower back pain - MRI of spine to rule out discitis or osteomyelitis.  This did have known degenerative changes but  no infection.      DVT prophylaxis - SCDs / apixiban. History of LLE DVT - on Eliquis at home     Pain management - Tylenol 1,000 mg three times a day/ Celebrex 100 mg q 112 hours/ gabapentin 300 mg q 8 hours Oxycodone 5 mg q 4 hours prn  March 18 - DC Celebrex 2/2 PHYLLIS, and anemia     Anxiety/ muscle spasms - Diazepam 2 mg q 6 hours prn - JONES reviewed     Seizure disorder -Lamotrigine 200 mg daily    ABLA - March 18- Hgb 7.0 (7.3 on 3/16). Type, cross and transfuse 1 unit PRBCs. Pre-medicate with Tylenol and Benadryl. CBC in am.   March 19- Hgb 8.8 s/p 1 unit PRBcs. Hemoccult positive. On Eliquis for h/o DVT. Follow Hgb. May need GI work up.  March 20 - HGB 9.3 s/p transfusion  March 21-reviewed with internal medicine-hemoglobin stable after transfusion.  Iron studies consistent with inflammation.  No further work-up presently  March 22 - Hgb 8.9, stable  March 23-hemoglobin trend 8.3.  Continue to follow.  Recheck tomorrow  March 29 - Hgb 8.1, stable    PHYLLIS - March 18- Creatinine 1.36 up from 1.12. On IV Vanc and Celebrex. DC Celebrex. BMP in am.  March 19- s/p 500ml NS bolus. Creatinine 1.31.  March 23-creatinine 0.92    TEAM CONF - MARCH 21 - BED MOD X 2. TRANSFERS MAX 2. STAND PIVOT OR SQUAT PIVOT. NON-AMBULATORY. Saturday MARCH 18 GAIT 6 FEET PARALLEL BARS MOD MAX OF 2.   BATH MOD 1-2. LBD DEP. UBD MOD. EATING MIN. GROOMING MIN. TOILETING DEP.   The patient has  difficulty remembering new information due to short-term memory impairments.  Initial evaluation 3.18, pt obtained a score 12/30 on SLUMS cognitive assessment indicating severe cognitive impairment/dementia, goals initiated for memory and sustaining attention  FEES completed 3.7 revealing glottic gap accounting for glottic insufficiency, dysphonia, hoarse/breathy quality, recommend targeting vocal function as appropriate d/t impact on intelligibility.    Regular/thin diet continued since FEES completion 3.7, although pt known to cough with and without PO intake, may be contributed partially to ineffective VF closure.   DRESSING FORMER CHEST TUBE SITE. CONTINUES ON PUREWICK AT NIGHT. O2 AT 1-2 LITERS AT NIGHT.  DECREASED APPETITE. ABD X-RAY THIS AM SHOWS NON-OBSTRUCTIVE BOWEL GAS PATTERN.   ELOS - 4 WEEKS    TEAM CONF - MARCH 28 - BED MIN CTG. TRANSFER MOD ASSIST STAND PIVOT OR SQUAT PIVOT. FATIGUES IN AFERNOONS. GAIT 15 FEET RW MIN X 2. FLUCTUATING ORIENTATION. BETTER DETAIL TO TASK. MIN MOD CUES FOR REASONING TASKS. DYSPHONIA FROM COUGHING.  SLP REVIEWED VOICE HYGIENE, NOT TO DO HARSH THROAT CLEAR. BATH MOD. LBD MAX. UBD MIN. TOILETING MOD X 2. CONTINENT/INCONTINENT BLADDER. RIGHT CHEST TUBE / THORACOSCOPY SITE CARE. VARIABLE INTAKE.   ELOS - 3 WEEKS    During rounds, used appropriate personal protective equipment including mask and gloves.  Additional gown if indicated.  Mask used was standard procedure mask. Appropriate PPE was worn during the entire visit.  Hand hygiene was completed before and after.    Shin Seay DO    Patient seen and case discussed with Dr. Rider.

## 2023-03-31 NOTE — PROGRESS NOTES
Inpatient Rehabilitation Plan of Care Note    Plan of Care  Care Plan Reviewed - No updates at this time.    Sphincter Control    [RN] Bladder Management(Active)  Current Status(03/31/2023): Bladder urgency  Weekly Goal(04/06/2023): decrease in episodes of incontinence  Discharge Goal: Continent 100%    [RN] Bowel Management(Active)  Current Status(03/31/2023): Patient is 100% continent of bowel  Weekly Goal(04/06/2023): Patient will maintain a daily bowel pattern.  Discharge Goal: Patient will maintain 100% continence of bowel    Performed Intervention(s)  Bladder/bowel training program  Monitor intake and output  Use incontinence products/equipment      Psychosocial    [RN] Coping/Adjustment(Active)  Current Status(03/31/2023): Anxiety r/t uncertainty of disease course  Weekly Goal(04/06/2023): Allow opportunity to express concerns regarding current  status  Discharge Goal: Patient/family will verbalize decreased feelings of anxiety.    Performed Intervention(s)  Verbalizes needs and concerns  Support from family/peer groups      Body Systems    [RN] Neurological(Active)  Current Status(03/31/2023): Impaired physical mobility r/t decreased muscle  strength/control and limited ROM  Weekly Goal(04/06/2023): Patient will have increased ROM  Discharge Goal: Patient will have improved strength and function of BLE and B  hands.    Performed Intervention(s)  Perform active and passive ROM  Frequent position changes      Safety    [RN] Potential for Injury(Active)  Current Status(03/31/2023): Risk for falls  Weekly Goal(04/06/2023): Family/Caregivers regarding safety precautions and need  for close supervision  Discharge Goal: Patient/family will be aware of risk of fall and safety in the  home setting.    Performed Intervention(s)  Safety Rounds  Bed alarm/Chair alarm  Items within reach    Signed by: Val Belcher RN

## 2023-03-31 NOTE — THERAPY TREATMENT NOTE
Inpatient Rehabilitation - Physical Therapy Treatment Note       [facility]     Patient Name: Louise GARCIA  : 1965  MRN: [id number]    Today's Date: 3/31/2023                    Admit Date: 3/17/2023      Visit Dx:     ICD-10-CM ICD-9-CM   1. 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)   • Situational mixed anxiety and depressive disorder   • Mass of middle lobe of right lung   • Oral candidiasis   • Empyema of pleura ([facility])   • MRSA bacteremia   • Idiopathic peripheral neuropathy       Past Medical History:   Diagnosis Date   • Degenerative disc disease, cervical    • Gestational diabetes    • H/O blood clots    • Hematemesis    • Seizures ([facility])    • Septic shock (HCC)        Past Surgical History:   Procedure Laterality Date   • APPENDECTOMY     • BACK SURGERY     • CHOLECYSTECTOMY     • ENDOSCOPY N/A 2016    Procedure: ESOPHAGOGASTRODUODENOSCOPY with biopsy;  Surgeon: Austen Brito MD;  Location: [facility] ENDOSCOPY;  Service:    • HYSTERECTOMY     • NECK SURGERY       approx 6 yrs ago   • THORACOSCOPY Right 3/8/2023    Procedure: THORACOSCOPY WITH DAVINCI ROBOT WITH COMPLETE DECORTICATION;  Surgeon: Chloe Cedillo MD;  Location: [facility] OR;  Service: Robotics - DaVinci;  Laterality: Right;   • TONSILLECTOMY     • TONSILLECTOMY AND ADENOIDECTOMY         PT ASSESSMENT (last 12 hours)     IRF PT Evaluation and Treatment     Row Name 23 0830          PT Time and Intention    Document Type daily treatment  -MG     Mode of Treatment individual therapy;physical therapy  -MG     Patient/Family/Caregiver  Comments/Observations AM: Pt UIC receiving morning meds. Pt states not having good sleep overnight due to increased LLE pain and burning 7/10. PM: Pt stating decreaesd LLE pain.  -MG     Total Minutes, Physical Therapy 60  -MG     Row Name 03/31/23 0830          General Information    Patient Profile Reviewed yes  -MG     General Observations of Patient Pt c/o L proximal calf pain to touch. No redness, swelling or warmth noted. Pt has h/o DVT to L calf; pt stating roughly one year ago.  -MG     Existing Precautions/Restrictions fall  -MG     Limitations/Impairments safety/cognitive  -MG     Comment, General Information L AFO in black shoe on counter w/ sticky note on it.  -MG     Row Name 03/31/23 0830          Pain Assessment    Pretreatment Pain Rating 3/10  -MG     Posttreatment Pain Rating 5/10  -MG     Pain Location - Side/Orientation Left  -MG     Pain Location lower  -MG     Pain Location - extremity  -MG     Row Name 03/31/23 0830          Pain Scale: Word Pre/Post-Treatment    Pain Intervention(s) Medication (See MAR);Repositioned;Ambulation/increased activity;Rest  -MG     Row Name 03/31/23 0830          Cognition/Psychosocial    Affect/Mental Status (Cognition) flat/blunted affect  -MG     Orientation Status (Cognition) oriented x 3  -MG     Follows Commands (Cognition) follows one-step commands;over 90% accuracy;increased processing time needed  -MG     Personal Safety Interventions fall prevention program maintained;gait belt;muscle strengthening facilitated;nonskid shoes/slippers when out of bed;supervised activity  -MG     Row Name 03/31/23 0830          Sit-Stand Transfer    Sit-Stand [place] (Transfers) minimum assist (75% patient effort);moderate assist (50% patient effort);verbal cues  -MG     Assistive Device (Sit-Stand Transfers) parallel bars  -MG     Row Name 03/31/23 0830          Stand-Sit Transfer    Stand-Sit [place] (Transfers) minimum assist (75% patient effort)  -MG      Assistive Device (Stand-Sit Transfers) parallel bars  -MG     Row Name 03/31/23 0830          Gait/Stairs (Locomotion)    [place] Level (Gait) minimum assist (75% patient effort);moderate assist (50% patient effort);2 person assist;1 person to manage equipment  WC follow  -MG     Assistive Device (Gait) parallel bars  -MG     Distance in Feet (Gait) 8' x2, PM: 8' x3  -MG     Pattern (Gait) step-through;step-to  -MG     Deviations/Abnormal Patterns (Gait) federico decreased;bilateral deviations;base of support, narrow;festinating/shuffling;stride length decreased;weight shifting decreased;left sided deviations;antalgic  -MG     Bilateral Gait Deviations heel strike decreased;weight shift ability decreased;knee buckling bilaterally  Hip abduction with each step.  -MG     Left Sided Gait Deviations decreased knee extension  -MG     Right Sided Gait Deviations decreased knee extension  -MG     Gait Assessment/Intervention AM: B knees blocked when coming to stand. Alternating blocking the opposite knee while ambulating. No knee buckling noted. Pt abducting each LE with each step resulting in a wide stance and increased L knee pain. Pt hinging at hips requring cueing for upright posture and intermittent assist at pelvis to maintain upright posture. PM: L AFO donned for trial. Colored dots placed on floor for visual cue to keep pts foot placement in midline/neutral. Continued posture and sequencing cueing. Also cued for hand placement along bars and knee extension when in stance on LLE. No knee buckling noted. Improved mechanics and smoothness of sequencing last gait bout.  -MG     Row Name 03/31/23 0830          Safety Issues, Functional Mobility    Impairments Affecting Function (Mobility) balance;cognition;endurance/activity tolerance;postural/trunk control;pain;strength  -MG     Row Name 03/31/23 0830          Hip (Therapeutic Exercise)    Hip Strengthening (Therapeutic Exercise) bilateral;marching while seated;10  repetitions  -MG     Row Name 03/31/23 0830          Knee (Therapeutic Exercise)    Knee Strengthening (Therapeutic Exercise) bilateral;LAQ (long arc quad);10 repetitions  -MG     Row Name 03/31/23 0830          Ankle (Therapeutic Exercise)    Ankle AROM (Therapeutic Exercise) bilateral;dorsiflexion;plantarflexion;10 repetitions  -MG     Row Name 03/31/23 0830          Positioning and Restraints    Pre-Treatment Position sitting in chair/recliner  -MG     Post Treatment Position wheelchair  -MG     In Wheelchair sitting;call light within reach;encouraged to call for assist;exit alarm on;with SLP  -MG           User Key  (r) = Recorded By, (t) = Taken By, (c) = Cosigned By    Initials Name Provider Type    MG Yu Villalobos, PT Physical Therapist              Wound 03/08/23 1917 Right lateral chest Incision (Active)   Dressing Appearance moist drainage 03/30/23 2000   Care, Wound cleansed with;sterile normal saline 03/30/23 2000   Dressing Care dressing changed;gauze 03/30/23 2000   Periwound Care dry periwound area maintained 03/30/23 2000       Wound 03/11/23 1530 Other (See comments) other (see comments) pubis Abrasion (Active)   Dressing Appearance open to air 03/31/23 0827       Wound 03/20/23 1527 Bilateral upper gluteal MASD (Moisture associated skin damage) (Active)   Dressing Appearance open to air 03/30/23 2000   Closure Open to air 03/31/23 0827   Base blanchable;clean;moist;pink 03/31/23 0827   Drainage Amount none 03/31/23 0827   Care, Wound cleansed with;soap and water;barrier applied 03/30/23 2000   Dressing Care open to air 03/31/23 0827   Periwound Care barrier ointment applied 03/30/23 2000     Physical Therapy Education     Title: PT OT SLP Therapies (In Progress)     Topic: Physical Therapy (Done)     Point: Mobility training (Done)     Learning Progress Summary           Patient Acceptance, E, VU,DU by MG at 3/31/2023 0825    Acceptance, E, VU,DU by MG at 3/30/2023 0956    Acceptance, E,D, VU,DU  by DP at 3/29/2023 1148    Nonacceptance, E,D, VU,DU by DP at 3/28/2023 1144    Acceptance, E,D, VU,DU by DP at 3/27/2023 1601    Acceptance, E,D, VU,NR by EE at 3/25/2023 1423    Acceptance, E,D, NR,VU,DU by DP at 3/21/2023 1448    Acceptance, E, NR by JK at 3/20/2023 1513    Acceptance, E,TB, VU,NR by KP at 3/18/2023 1643                   Point: Home exercise program (Done)     Learning Progress Summary           Patient Acceptance, E, VU,DU by MG at 3/31/2023 0825    Acceptance, E, VU,DU by MG at 3/30/2023 0956    Acceptance, E,D, VU,DU by DP at 3/29/2023 1148    Nonacceptance, E,D, VU,DU by DP at 3/28/2023 1144    Acceptance, E,D, VU,DU by DP at 3/27/2023 1601    Acceptance, E,D, NR,VU,DU by DP at 3/21/2023 1448    Acceptance, E, NR by JK at 3/20/2023 1513                   Point: Body mechanics (Done)     Learning Progress Summary           Patient Acceptance, E, VU,DU by MG at 3/31/2023 0825    Acceptance, E, VU,DU by MG at 3/30/2023 0956    Acceptance, E,D, VU,DU by DP at 3/29/2023 1148    Nonacceptance, E,D, VU,DU by DP at 3/28/2023 1144    Acceptance, E,D, VU,DU by DP at 3/27/2023 1601    Acceptance, E,D, VU,NR by EE at 3/25/2023 1423    Acceptance, E,D, NR,VU,DU by DP at 3/21/2023 1448                   Point: Precautions (Done)     Learning Progress Summary           Patient Acceptance, E, VU,DU by MG at 3/31/2023 0825    Acceptance, E, VU,DU by MG at 3/30/2023 0956    Acceptance, E,D, VU,DU by DP at 3/29/2023 1148    Nonacceptance, E,D, VU,DU by DP at 3/28/2023 1144    Acceptance, E,D, VU,DU by DP at 3/27/2023 1601    Acceptance, E,D, VU,NR by EE at 3/25/2023 1423    Acceptance, E,D, NR,VU,DU by DP at 3/21/2023 1448                               User Key     Initials Effective Dates Name Provider Type Discipline     06/16/21 -  Melinda Mann, PT Physical Therapist PT    JK 06/16/21 -  Juana Veloz, PT Physical Therapist PT    KP 06/16/21 -  Yolanda Shannon, PT Physical Therapist PT    MG 05/24/22 -   Yu Villalobos, PT Physical Therapist PT    DP 08/24/21 -  Papa Marsh, PT Physical Therapist PT                PT Recommendation and Plan                          Time Calculation:      PT Charges     Row Name 03/31/23 1625 03/31/23 1152          Time Calculation    Start Time 1300  -MG 0830  -MG     Stop Time 1330  -MG 0900  -MG     Time Calculation (min) 30 min  -MG 30 min  -MG     PT Received On -- 03/31/23  -MG     PT - Next Appointment -- 04/01/23  -MG           User Key  (r) = Recorded By, (t) = Taken By, (c) = Cosigned By    Initials Name Provider Type    MG Yu Villalobos, PT Physical Therapist                Therapy Charges for Today     Code Description Service Date Service Provider Modifiers Qty    38138295386 HC PT THERAPEUTIC ACT EA 15 MIN 3/30/2023 Yu Villalobos, PT GP 1    18279051750 HC PT THER PROC EA 15 MIN 3/30/2023 Yu Villalobos, PT GP 1    96371530950 HC PT NEUROMUSC RE EDUCATION EA 15 MIN 3/30/2023 Yu Villalobos, PT GP 1    86732149053 HC PT THERAPEUTIC ACT EA 15 MIN 3/30/2023 Yu Villalobos, PT GP 1    89798675292 HC GAIT TRAINING EA 15 MIN 3/31/2023 Yu Villalobos, PT GP 1    70373079498 HC PT THERAPEUTIC ACT EA 15 MIN 3/31/2023 Yu Villalobos, PT GP 1    90309684034 HC GAIT TRAINING EA 15 MIN 3/31/2023 Yu Villalobos, PT GP 1    71364602919 HC PT THERAPEUTIC ACT EA 15 MIN 3/31/2023 Yu Villalobos, PT GP 1                   Yu Villalobos, PT  3/31/2023

## 2023-03-31 NOTE — THERAPY TREATMENT NOTE
Inpatient Rehabilitation - Occupational Therapy Treatment Note    [facility]     Patient Name: Louise GARCIA  : 1965  MRN: [id number]    Today's Date: 3/31/2023                 Admit Date: 3/17/2023         ICD-10-CM ICD-9-CM   1. 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)   • Situational mixed anxiety and depressive disorder   • Mass of middle lobe of right lung   • Oral candidiasis   • Empyema of pleura (HCC)   • MRSA bacteremia   • Idiopathic peripheral neuropathy       Past Medical History:   Diagnosis Date   • Degenerative disc disease, cervical    • Gestational diabetes    • H/O blood clots    • Hematemesis    • Seizures ([facility])    • Septic shock ([facility])        Past Surgical History:   Procedure Laterality Date   • APPENDECTOMY     • BACK SURGERY     • CHOLECYSTECTOMY     • ENDOSCOPY N/A 2016    Procedure: ESOPHAGOGASTRODUODENOSCOPY with biopsy;  Surgeon: Austen Brito MD;  Location: [facility] ENDOSCOPY;  Service:    • HYSTERECTOMY     • NECK SURGERY       approx 6 yrs ago   • THORACOSCOPY Right 3/8/2023    Procedure: THORACOSCOPY WITH DAVINCI ROBOT WITH COMPLETE DECORTICATION;  Surgeon: Chloe Cedillo MD;  Location: [facility] OR;  Service: Robotics - DaVinci;  Laterality: Right;   • TONSILLECTOMY     • TONSILLECTOMY AND ADENOIDECTOMY               [facility] OT ASSESSMENT FLOWSHEET (last 12 hours)     IRF OT Evaluation and Treatment     Row Name 23 1156          OT Time and Intention    Document Type daily treatment  -AF     Mode of Treatment occupational therapy  -AF     Patient Effort good  -AF     Row Name 23  1156          General Information    Patient/Family/Caregiver Comments/Observations pt sitting up in w/c in room  present  -AF     Existing Precautions/Restrictions fall  contact precautions  -AF     Row Name 03/31/23 1156          Pain Assessment    Pretreatment Pain Rating 0/10 - no pain  -AF     Posttreatment Pain Rating 4/10  -AF     Pain Location - Side/Orientation Left  -AF     Pain Location lower  -AF     Pain Location - extremity  -AF     Row Name 03/31/23 1156          Cognition/Psychosocial    Affect/Mental Status (Cognition) flat/blunted affect  -AF     Orientation Status (Cognition) oriented x 3  -AF     Follows Commands (Cognition) follows one-step commands;over 90% accuracy;increased processing time needed  -AF     Personal Safety Interventions fall prevention program maintained;gait belt;nonskid shoes/slippers when out of bed  -AF     Cognitive Function memory deficit;safety deficit  -AF     Safety Deficit (Cognition) ability to follow commands;insight into deficits/self-awareness  -AF     Row Name 03/31/23 1156          Bathing    [place] Level (Bathing) bathing skills;lower body;upper body;minimum assist (75% patient effort);verbal cues  -AF     Assistive Device (Bathing) grab bar/tub rail;hand held shower spray hose;tub bench  -AF     Position (Bathing) supported sitting;supported standing  -AF     Set-up Assistance (Bathing) obtain supplies  -AF     Row Name 03/31/23 1156          Upper Body Dressing    [place] Level (Upper Body Dressing) upper body dressing skills;set up assistance;contact guard  -AF     Position (Upper Body Dressing) supported sitting  -AF     Set-up Assistance (Upper Body Dressing) obtain clothing  -AF     Row Name 03/31/23 1156          Lower Body Dressing    [place] Level (Lower Body Dressing) don;doff;pants/bottoms;shoes/slippers;socks;moderate assist (50% patient effort);verbal cues  -AF     Position (Lower Body Dressing) supported sitting;supported  standing  -AF     Row Name 03/31/23 1156          Grooming    [place] Level (Grooming) grooming skills;set up  -AF     Position (Grooming) supported sitting  -AF     Comment (Grooming) w/c level used blowdryer seated w/c level  -AF     Row Name 03/31/23 1156          Sit-Stand Transfer    Sit-Stand [place] (Transfers) minimum assist (75% patient effort);moderate assist (50% patient effort)  -AF     Comment, (Sit-Stand Transfer) with grab bars in shower  -AF     Row Name 03/31/23 1156          Stand-Sit Transfer    Stand-Sit [place] (Transfers) minimum assist (75% patient effort);moderate assist (50% patient effort)  -AF     Comment, (Stand-Sit Transfer) in shower with grab bar use  -AF     Row Name 03/31/23 1156          Shower Transfer    Type (Shower Transfer) squat pivot  -AF     [place] Level (Shower Transfer) 2 person assist;moderate assist (50% patient effort)  -AF     Assistive Device (Shower Transfer) grab bar, tub/shower;tub bench;wheelchair  -AF     Row Name 03/31/23 1156          Balance    Static Sitting Balance standby assist  -AF     Dynamic Sitting Balance contact guard;standby assist  -AF     Static Standing Balance moderate assist;minimal assist  -AF     Row Name 03/31/23 1156          Positioning and Restraints    Pre-Treatment Position sitting in chair/recliner  -AF     Post Treatment Position wheelchair  -AF     In Wheelchair sitting;call light within reach;encouraged to call for assist;exit alarm on;with family/caregiver  in room with  present  -AF           User Key  (r) = Recorded By, (t) = Taken By, (c) = Cosigned By    Initials Name Effective Dates    AF Heaven Villareal, OTR 06/16/21 -                  Occupational Therapy Education     Title: PT OT SLP Therapies (In Progress)     Topic: Occupational Therapy (Not Started)     Point: ADL training (Not Started)     Description:   Instruct learner(s) on proper safety adaptation and remediation techniques during self  care or transfers.   Instruct in proper use of assistive devices.              Learner Progress:  Not documented in this visit.          Point: Home exercise program (Not Started)     Description:   Instruct learner(s) on appropriate technique for monitoring, assisting and/or progressing therapeutic exercises/activities.              Learner Progress:  Not documented in this visit.          Point: Precautions (Not Started)     Description:   Instruct learner(s) on prescribed precautions during self-care and functional transfers.              Learner Progress:  Not documented in this visit.          Point: Body mechanics (Not Started)     Description:   Instruct learner(s) on proper positioning and spine alignment during self-care, functional mobility activities and/or exercises.              Learner Progress:  Not documented in this visit.                                OT Recommendation and Plan                         Time Calculation:      Time Calculation- OT     Row Name 03/31/23 1206             Time Calculation- OT    OT Start Time 1030  -AF      OT Stop Time 1130  -AF      OT Time Calculation (min) 60 min  -AF            User Key  (r) = Recorded By, (t) = Taken By, (c) = Cosigned By    Initials Name Provider Type    AF Heaven Villareal, OTR Occupational Therapist              Therapy Charges for Today     Code Description Service Date Service Provider Modifiers Qty    55512004333 HC OT SELF CARE/MGMT/TRAIN EA 15 MIN 3/30/2023 Heaven Villareal OTR GO 2    48380291147 HC OT THER SUPP EA 15 MIN 3/30/2023 Heaven Villareal OTR GO 2    69931916938 HC OT NEUROMUSC RE EDUCATION EA 15 MIN 3/30/2023 Heaven Villareal OTR GO 2    35651043992 HC OT SELF CARE/MGMT/TRAIN EA 15 MIN 3/31/2023 Heaven Villareal OTR GO 4    96430271920 HC OT THER SUPP EA 15 MIN 3/31/2023 Heaven Villareal, OTR GO 2                   MARY ANN Gunn  3/31/2023

## 2023-03-31 NOTE — THERAPY TREATMENT NOTE
Inpatient Rehabilitation - Speech Language Pathology Treatment Note    [facility]     Patient Name: Louise GARCIA  : 1965  MRN: [id number]    Today's Date: 3/31/2023                   Admit Date: 3/17/2023       Visit Dx:      ICD-10-CM ICD-9-CM   1. 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)   • Situational mixed anxiety and depressive disorder   • Mass of middle lobe of right lung   • Oral candidiasis   • Empyema of pleura ([facility])   • MRSA bacteremia   • Idiopathic peripheral neuropathy       Past Medical History:   Diagnosis Date   • Degenerative disc disease, cervical    • Gestational diabetes    • H/O blood clots    • Hematemesis    • Seizures ([facility])    • Septic shock ([facility])        Past Surgical History:   Procedure Laterality Date   • APPENDECTOMY     • BACK SURGERY     • CHOLECYSTECTOMY     • ENDOSCOPY N/A 2016    Procedure: ESOPHAGOGASTRODUODENOSCOPY with biopsy;  Surgeon: Austen Brito MD;  Location: [facility] ENDOSCOPY;  Service:    • HYSTERECTOMY     • NECK SURGERY       approx 6 yrs ago   • THORACOSCOPY Right 3/8/2023    Procedure: THORACOSCOPY WITH DAVINCI ROBOT WITH COMPLETE DECORTICATION;  Surgeon: Chloe Cedillo MD;  Location: [facility] OR;  Service: Robotics - DaVinci;  Laterality: Right;   • TONSILLECTOMY     • TONSILLECTOMY AND ADENOIDECTOMY         SLP Recommendation and Plan                                                            SLP EVALUATION (last 72 hours)     SLP [facility] Evaluation     Row Name 23 1000 23 0900 23 1400 23 0900 23 1000       Communication  Assessment/Intervention    Document Type therapy note (daily note)  -AL therapy note (daily note)  -AL therapy note (daily note)  -SR therapy note (daily note)  -AL therapy note (daily note)  -AL    Subjective Information -- -- no complaints  -SR -- --    Patient Observations -- -- alert;cooperative;agree to therapy  -SR -- --    Patient/Family/Caregiver Comments/Observations Pt participated well.  -AL Pt is pleasant and cooperative.  -AL -- Pt participated well.  -AL Pt participated well.  -AL    Patient Effort -- -- good  -SR -- --    Symptoms Noted During/After Treatment -- -- none  -SR -- --       Pain Scale: Numbers Pre/Post-Treatment    Pretreatment Pain Rating 0/10 - no pain  -AL 0/10 - no pain  -AL 0/10 - no pain  -SR 0/10 - no pain  -AL 0/10 - no pain  -AL    Posttreatment Pain Rating -- -- 0/10 - no pain  -SR -- --    Row Name 03/29/23 0900 03/28/23 1100                Communication Assessment/Intervention    Document Type therapy note (daily note)  -AL therapy note (daily note)  -AL       Patient/Family/Caregiver Comments/Observations Pt reported not sleeping well last night due to coughing.  -AL Pt is pleasant and cooperative.  -AL          Pain Scale: Numbers Pre/Post-Treatment    Pretreatment Pain Rating 0/10 - no pain  -AL 0/10 - no pain  -AL             User Key  (r) = Recorded By, (t) = Taken By, (c) = Cosigned By    Initials Name Effective Dates    Nessa Kraus, MS CCC-SLP 06/16/21 -     SR Milana Lemus CCC-SLP 11/10/22 -               Patient was wearing a face mask during this therapy encounter. Therapist used appropriate personal protective equipment including mask, eye protection and gloves.  Mask used was standard procedure mask. Appropriate PPE was worn during the entire therapy session. Hand hygiene was completed before and after therapy session. Patient is not in enhanced droplet precautions.                EDUCATION    The patient has been educated in the following areas:        Cognitive Impairment.             SLP GOALS     Row Name 03/31/23 1000 03/31/23 0900 03/30/23 1400       Attention Goal 1 (SLP)    Improve Attention by Goal 1 (SLP) complete sustained attention task;80%;with minimal cues (75-90%)  -AL -- complete sustained attention task;80%;with minimal cues (75-90%)  -SR    Time Frame (Attention Goal 1, SLP) by discharge  -AL -- by discharge  -SR    Progress/Outcomes (Attention Goal 1, SLP) goal ongoing  -AL -- good progress toward goal  -SR    Comment (Attention Goal 1, SLP) Attention to detail and sustained attention for medicine management with pill box: 20% with NO cues, 60% with MIN cues, 100% with MAX cues. Pt exhibited confusion regarding set-up of box and had difficulty understanding that some medicine doses are different since she has been in hospital.  -AL -- Problem solving/planning targeted this date. Given a vacation/traveling scenerio, patient utilized the 5-day weather forecast to create a packing list with essential items. She required min verbal cues throughout the task. Patient provided appropriate rationale for each item on the list (i.e, rain coat/umbrella needed due to rain on Friday).  During a following activity, patient answered questions about events on a monthly calendar/daily schedule with 80% acc given no cues.  -SR       Memory Skills Goal 1 (SLP)    Progress/Outcomes (Memory Skills Goal 1, SLP) -- good progress toward goal  -AL --    Comment (Memory Skills Goal 1, SLP) -- Inferencing voicemails (immediate memory): 70% with NO cues, 100% with MIN cues.  -AL --       Reasoning Goal 1 (SLP)    Improve Reasoning Through Goal 1 (SLP) -- complete deductive reasoning task;80%;with minimal cues (75-90%)  -AL --    Progress/Outcomes (Reasoning Goal 1, SLP) -- good progress toward goal  -AL --    Comment (Reasoning Goal 1, SLP) -- Tour [place]: 3/7 with NO cues, 7/7 with MIN-MOD cues.  -AL --    Row Name 03/30/23 0900 03/29/23 1000 03/29/23 0900        Attention Goal 1 (SLP)    Improve Attention by Goal 1 (SLP) -- complete sustained attention task;80%;with minimal cues (75-90%)  -AL --    Time Frame (Attention Goal 1, SLP) -- by discharge  -AL --    Progress/Outcomes (Attention Goal 1, SLP) -- good progress toward goal  -AL --    Comment (Attention Goal 1, SLP) -- Calendar task: Initiated task: pt required MIN-MOD cues to complete 2 trials. Will continue in next session.  -AL --       Memory Skills Goal 1 (SLP)    Improve Memory Skills Through Goal 1 (SLP) use memory strategies;use external memory aid;recall details of the day;80%;with moderate cues (50-74%)  -AL use memory strategies;use external memory aid;recall details of the day;80%;with moderate cues (50-74%)  -AL --    Time Frame (Memory Skills Goal 1, SLP) by discharge  -AL by discharge  -AL --    Progress/Outcomes (Memory Skills Goal 1, SLP) good progress toward goal  -AL good progress toward goal  -AL --    Comment (Memory Skills Goal 1, SLP) Recalled 3/3 errands after 10 minutes with NO cues. Goal met x1.  -AL Oriented to month, year, date and SUSAN with NO cues.  -AL --       Reasoning Goal 1 (SLP)    Improve Reasoning Through Goal 1 (SLP) complete deductive reasoning task;80%;with minimal cues (75-90%)  -AL complete deductive reasoning task;80%;with minimal cues (75-90%)  -AL complete deductive reasoning task;80%;with minimal cues (75-90%)  -AL    Time Frame (Reasoning Goal 1, SLP) 1 week  -AL 1 week  -AL 1 week  -AL    Progress/Outcomes (Reasoning Goal 1, SLP) goal ongoing  -AL good progress toward goal  -AL good progress toward goal  -AL    Comment (Reasoning Goal 1, SLP) Moderate level logic puzzle task: 20% with NO cues, 60% with MIN cues, 100% with MOD cues.  -AL Finished logic puzzle: overall 75% accurate with NO cues, 100% with MIN-MAX cues.  -AL Moderate level logic puzzle: 80% with NO cues, 100% with MIN-MAX cues thus far. Plan to complete in next session.  -AL    Row Name 03/28/23 1100              Attention Goal 1 (SLP)    Improve Attention by Goal 1 (SLP) complete sustained attention task;80%;with minimal cues (75-90%)  -AL      Time Frame (Attention Goal 1, SLP) by discharge  -AL      Progress/Outcomes (Attention Goal 1, SLP) good progress toward goal  -AL      Comment (Attention Goal 1, SLP) Pt required NO cues for sustained attention during session.  -AL         Memory Skills Goal 1 (SLP)    Improve Memory Skills Through Goal 1 (SLP) use memory strategies;use external memory aid;recall details of the day;80%;with moderate cues (50-74%)  -AL      Time Frame (Memory Skills Goal 1, SLP) by discharge  -AL      Progress/Outcomes (Memory Skills Goal 1, SLP) good progress toward goal  -AL      Comment (Memory Skills Goal 1, SLP) Immediate memory for voicemails: 95% with NO cues, 100% with MIN cues.  -AL         Organizational Skills Goal 1 (SLP)    Improve Thought Organization Through Goal 1 (SLP) completing a divergent naming task;80%;with minimal cues (75-90%)  -AL      Time Frame (Thought Organization Skills Goal 1, SLP) 1 week  -AL      Progress/Outcomes (Thought Organization Skills Goal 1, SLP) good progress toward goal  -AL      Comment (Thought Organization Skills Goal 1, SLP) Sharp items: 3 with NO cues, 5 with MOD cues. Round items: 5 in1 minute with NO cues, 10 with MIN-MOD cues.  -AL         Reasoning Goal 1 (SLP)    Improve Reasoning Through Goal 1 (SLP) complete deductive reasoning task;80%;with minimal cues (75-90%)  -AL      Time Frame (Reasoning Goal 1, SLP) 1 week  -AL      Progress/Outcomes (Reasoning Goal 1, SLP) good progress toward goal  -AL      Comment (Reasoning Goal 1, SLP) Moderate level logic puzzle (continued from previous session): 50% with NO Cues, 100% with MIN-MAX cues.  -AL            User Key  (r) = Recorded By, (t) = Taken By, (c) = Cosigned By    Initials Name Provider Type    Nessa Kraus, MS CCC-SLP Speech and Language Pathologist    Milana Trujillo, CCC-SLP  Speech and Language Pathologist                            Time Calculation:        Time Calculation- SLP     Row Name 03/31/23 1047 03/31/23 0926          Time Calculation- SLP    SLP Start Time 1000  -AL 0900  -AL     SLP Stop Time 1030  -AL 0930  -AL     SLP Time Calculation (min) 30 min  -AL 30 min  -AL           User Key  (r) = Recorded By, (t) = Taken By, (c) = Cosigned By    Initials Name Provider Type    Nessa Kraus, MS CCC-SLP Speech and Language Pathologist                  Therapy Charges for Today     Code Description Service Date Service Provider Modifiers Qty    94356765138 HC ST DEV OF COGN SKILLS INITIAL 15 MIN 3/30/2023 Nessa Spangler, MS CCC-SLP  1    63930661775 HC ST DEV OF COGN SKILLS EACH ADDT'L 15 MIN 3/30/2023 Nessa Spangler, MS CCC-SLP  1    83528040773 HC ST DEV OF COGN SKILLS INITIAL 15 MIN 3/31/2023 Nessa Spangler, MS CCC-SLP  1    02151984006 HC ST DEV OF COGN SKILLS EACH ADDT'L 15 MIN 3/31/2023 Nessa Spangler, MS CCC-SLP  3                           Nessa Spangler MS CCC-SLP  3/31/2023

## 2023-04-01 LAB — FUNGUS WND CULT: NORMAL

## 2023-04-01 PROCEDURE — 97530 THERAPEUTIC ACTIVITIES: CPT

## 2023-04-01 PROCEDURE — 94799 UNLISTED PULMONARY SVC/PX: CPT

## 2023-04-01 RX ORDER — OXYCODONE HYDROCHLORIDE 5 MG/1
2.5 TABLET ORAL EVERY 4 HOURS PRN
Status: DISPENSED | OUTPATIENT
Start: 2023-04-01 | End: 2023-04-10

## 2023-04-01 RX ADMIN — IPRATROPIUM BROMIDE AND ALBUTEROL SULFATE 3 ML: 2.5; .5 SOLUTION RESPIRATORY (INHALATION) at 11:05

## 2023-04-01 RX ADMIN — GABAPENTIN 300 MG: 300 CAPSULE ORAL at 20:38

## 2023-04-01 RX ADMIN — APIXABAN 5 MG: 5 TABLET, FILM COATED ORAL at 08:21

## 2023-04-01 RX ADMIN — DESVENLAFAXINE SUCCINATE 25 MG: 25 TABLET, EXTENDED RELEASE ORAL at 08:21

## 2023-04-01 RX ADMIN — GABAPENTIN 300 MG: 300 CAPSULE ORAL at 05:39

## 2023-04-01 RX ADMIN — APIXABAN 5 MG: 5 TABLET, FILM COATED ORAL at 20:38

## 2023-04-01 RX ADMIN — LAMOTRIGINE 200 MG: 100 TABLET ORAL at 08:21

## 2023-04-01 RX ADMIN — IPRATROPIUM BROMIDE AND ALBUTEROL SULFATE 3 ML: 2.5; .5 SOLUTION RESPIRATORY (INHALATION) at 07:10

## 2023-04-01 RX ADMIN — ACETAMINOPHEN 650 MG: 325 TABLET, FILM COATED ORAL at 08:27

## 2023-04-01 RX ADMIN — Medication 100 MG: at 08:21

## 2023-04-01 RX ADMIN — Medication 500 MCG: at 08:21

## 2023-04-01 RX ADMIN — IPRATROPIUM BROMIDE AND ALBUTEROL SULFATE 3 ML: 2.5; .5 SOLUTION RESPIRATORY (INHALATION) at 15:15

## 2023-04-01 RX ADMIN — Medication 1 MG: at 08:21

## 2023-04-01 RX ADMIN — GABAPENTIN 300 MG: 300 CAPSULE ORAL at 14:28

## 2023-04-01 RX ADMIN — Medication 5 MG: at 20:38

## 2023-04-01 RX ADMIN — OXYCODONE HYDROCHLORIDE 2.5 MG: 5 TABLET ORAL at 20:38

## 2023-04-01 RX ADMIN — OXYCODONE HYDROCHLORIDE 2.5 MG: 5 TABLET ORAL at 05:39

## 2023-04-01 RX ADMIN — OXYCODONE HYDROCHLORIDE 2.5 MG: 5 TABLET ORAL at 14:32

## 2023-04-01 RX ADMIN — OXYCODONE HYDROCHLORIDE 2.5 MG: 5 TABLET ORAL at 10:04

## 2023-04-01 RX ADMIN — PANTOPRAZOLE SODIUM 40 MG: 40 TABLET, DELAYED RELEASE ORAL at 05:39

## 2023-04-01 RX ADMIN — IPRATROPIUM BROMIDE AND ALBUTEROL SULFATE 3 ML: 2.5; .5 SOLUTION RESPIRATORY (INHALATION) at 20:04

## 2023-04-01 NOTE — PLAN OF CARE
Goal Outcome Evaluation:  Plan of Care Reviewed With: patient           Outcome Evaluation: pt worked with therapy today, brace on left leg, purewick at night, seizure precautions in place, iv antibotics complete, nadir x2 this shift, will cont to monitor  Problem: Rehabilitation ([facility]) Plan of Care  Goal: Plan of Care Review  Outcome: Ongoing, Progressing  Flowsheets (Taken 4/1/2023 1450)  Plan of Care Reviewed With: patient  Outcome Evaluation: pt worked with therapy today, brace on left leg, purewick at night, seizure precautions in place, iv antibotics complete, nadir x2 this shift, will cont to monitor  Goal: Patient-Specific Goal (Individualized)  Outcome: Ongoing, Progressing  Goal: Absence of New-Onset Illness or Injury  Outcome: Ongoing, Progressing  Intervention: Prevent Fall and Fall Injury  Recent Flowsheet Documentation  Taken 4/1/2023 1424 by Anisha Braun RN  Safety Promotion/Fall Prevention:   activity supervised   assistive device/personal items within reach   clutter free environment maintained   fall prevention program maintained   nonskid shoes/slippers when out of bed   room organization consistent   safety round/check completed  Taken 4/1/2023 1214 by Anisha Braun, RN  Safety Promotion/Fall Prevention:   activity supervised   assistive device/personal items within reach   clutter free environment maintained   fall prevention program maintained   nonskid shoes/slippers when out of bed   room organization consistent   safety round/check completed  Taken 4/1/2023 1013 by Anisha Braun, RN  Safety Promotion/Fall Prevention:   activity supervised   assistive device/personal items within reach   clutter free environment maintained   fall prevention program maintained   nonskid shoes/slippers when out of bed   room organization consistent   safety round/check completed  Taken 4/1/2023 0816 by Anisha Braun, RN  Safety Promotion/Fall Prevention:   activity supervised   assistive device/personal  items within reach   clutter free environment maintained   fall prevention program maintained   nonskid shoes/slippers when out of bed   room organization consistent   safety round/check completed  Intervention: Prevent Infection  Recent Flowsheet Documentation  Taken 4/1/2023 1424 by Anisha Braun RN  Infection Prevention: single patient room provided  Taken 4/1/2023 1214 by Anisha Braun RN  Infection Prevention: single patient room provided  Taken 4/1/2023 1013 by Anisha Braun RN  Infection Prevention: single patient room provided  Taken 4/1/2023 0816 by Anisha Braun RN  Infection Prevention: single patient room provided  Intervention: Prevent VTE (Venous Thromboembolism)  Recent Flowsheet Documentation  Taken 4/1/2023 0816 by Anisha Braun RN  VTE Prevention/Management:   bilateral   sequential compression devices off  Goal: Optimal Comfort and Wellbeing  Outcome: Ongoing, Progressing  Goal: Home and Community Transition Plan Established  Outcome: Ongoing, Progressing

## 2023-04-01 NOTE — PROGRESS NOTES
Inpatient Rehabilitation Plan of Care Note    Plan of Care  Care Plan Reviewed - Updates as Follows    Sphincter Control    [RN] Bladder Management(Active)  Current Status(04/01/2023): Bladder urgency  Weekly Goal(04/06/2023): decrease in episodes of incontinence  Discharge Goal: Continent 100%    [RN] Bowel Management(Active)  Current Status(04/01/2023): Patient is 100% continent of bowel  Weekly Goal(04/06/2023): Patient will maintain a daily bowel pattern.  Discharge Goal: Patient will maintain 100% continence of bowel    Performed Intervention(s)  Bladder/bowel training program  Monitor intake and output  Use incontinence products/equipment      Psychosocial    [RN] Coping/Adjustment(Active)  Current Status(04/01/2023): Anxiety r/t uncertainty of disease course  Weekly Goal(04/06/2023): Allow opportunity to express concerns regarding current  status  Discharge Goal: Patient/family will verbalize decreased feelings of anxiety.    Performed Intervention(s)  Verbalizes needs and concerns  Support from family/peer groups      Body Systems    [RN] Neurological(Active)  Current Status(04/01/2023): Impaired physical mobility r/t decreased muscle  strength/control and limited ROM  Weekly Goal(04/06/2023): Patient will have increased ROM  Discharge Goal: Patient will have improved strength and function of BLE and B  hands.    Performed Intervention(s)  Perform active and passive ROM  Frequent position changes      Safety    [RN] Potential for Injury(Active)  Current Status(04/01/2023): Risk for falls  Weekly Goal(04/06/2023): Family/Caregivers regarding safety precautions and need  for close supervision  Discharge Goal: Patient/family will be aware of risk of fall and safety in the  home setting.    Performed Intervention(s)  Safety Rounds  Bed alarm/Chair alarm  Items within reach    Signed by: Val Belcher RN

## 2023-04-01 NOTE — THERAPY TREATMENT NOTE
Inpatient Rehabilitation - Physical Therapy Treatment Note       [facility]     Patient Name: Louise GARCIA  : 1965  MRN: [id number]    Today's Date: 2023                    Admit Date: 3/17/2023      Visit Dx:     ICD-10-CM ICD-9-CM   1. Impaired functional mobility, balance, gait, and endurance  Z74.09 V49.89       Patient Active Problem List   Diagnosis   • Sepsis   • Neck pain, chronic   • Upper back pain   • Paresthesia   • Cervical myelopathy   • Lower extremity weakness   • History of blood clots   • Chronic anticoagulation   • Seizures   • Anemia   • GERD (gastroesophageal reflux disease)   • Guillain-[place] syndrome   • Situational mixed anxiety and depressive disorder   • Mass of middle lobe of right lung   • Oral candidiasis   • Empyema of pleura   • MRSA bacteremia   • Idiopathic peripheral neuropathy       Past Medical History:   Diagnosis Date   • Degenerative disc disease, cervical    • Gestational diabetes    • H/O blood clots    • Hematemesis    • Seizures    • Septic shock        Past Surgical History:   Procedure Laterality Date   • APPENDECTOMY     • BACK SURGERY     • CHOLECYSTECTOMY     • ENDOSCOPY N/A 2016    Procedure: ESOPHAGOGASTRODUODENOSCOPY with biopsy;  Surgeon: Austen Brito MD;  Location: [facility] ENDOSCOPY;  Service:    • HYSTERECTOMY     • NECK SURGERY       approx 6 yrs ago   • THORACOSCOPY Right 3/8/2023    Procedure: THORACOSCOPY WITH DAVINCI ROBOT WITH COMPLETE DECORTICATION;  Surgeon: Chloe Cedillo MD;  Location: [facility] OR;  Service: Robotics - DaVinci;  Laterality: Right;   • TONSILLECTOMY     • TONSILLECTOMY AND ADENOIDECTOMY         PT ASSESSMENT (last 12 hours)     IRF PT Evaluation and Treatment     Row Name 23 1333          PT Time and Intention    Document Type daily treatment  -DP     Mode of Treatment individual therapy;physical therapy  -DP     Patient/Family/Caregiver Comments/Observations Pt supine in bed upon PT arrival  -DP      Row Name 04/01/23 1333          General Information    Patient Profile Reviewed yes  -DP     Existing Precautions/Restrictions fall  contact precautions  -DP     Row Name 04/01/23 1333          Pain Assessment    Pretreatment Pain Rating 6/10  -DP     Posttreatment Pain Rating 6/10  -DP     Pain Location - Side/Orientation Left  -DP     Pain Location lower  -DP     Pain Location - extremity  -DP     Pre/Posttreatment Pain Comment Pt stated pain in L groin area and R shoulder  -DP     Row Name 04/01/23 1333          Cognition/Psychosocial    Affect/Mental Status (Cognition) flat/blunted affect  -DP     Orientation Status (Cognition) oriented x 3  -DP     Follows Commands (Cognition) follows one-step commands;over 90% accuracy;increased processing time needed  -DP     Personal Safety Interventions safety round/check completed;elopement precautions initiated;fall prevention program maintained;gait belt;muscle strengthening facilitated;nonskid shoes/slippers when out of bed;supervised activity  -DP     Row Name 04/01/23 1333          Bed Mobility    Rolling Left [place] (Bed Mobility) minimum assist (75% patient effort)  -DP     Rolling Right [place] (Bed Mobility) minimum assist (75% patient effort)  -DP     Supine-Sit [place] (Bed Mobility) minimum assist (75% patient effort)  -DP     Bed Mobility, Safety Issues decreased use of arms for pushing/pulling;decreased use of legs for bridging/pushing;impaired trunk control for bed mobility  -DP     Assistive Device (Bed Mobility) bed rails  -DP     Comment, (Bed Mobility) assisted donning pt's pants,socks and shoes in bed  -DP     Row Name 04/01/23 1333          Bed-Chair Transfer    Bed-Chair [place] (Transfers) 2 person assist;moderate assist (50% patient effort)  -DP     Assistive Device (Bed-Chair Transfers) wheelchair  -DP     Row Name 04/01/23 1333          Sit-Stand Transfer    Sit-Stand [place] (Transfers) minimum assist (75% patient  effort);moderate assist (50% patient effort)  -DP     Assistive Device (Sit-Stand Transfers) parallel bars  -DP     Row Name 04/01/23 1333          Stand-Sit Transfer    Stand-Sit [place] (Transfers) minimum assist (75% patient effort);moderate assist (50% patient effort)  -DP     Assistive Device (Stand-Sit Transfers) parallel bars  -DP     Row Name 04/01/23 1333          Gait/Stairs (Locomotion)    [place] Level (Gait) minimum assist (75% patient effort);moderate assist (50% patient effort);2 person assist;1 person to manage equipment  w/c follow  -DP     Assistive Device (Gait) parallel bars  -DP     Distance in Feet (Gait) 8'x2  -DP     Pattern (Gait) step-through;step-to  -DP     Deviations/Abnormal Patterns (Gait) federico decreased;bilateral deviations;base of support, narrow;festinating/shuffling;stride length decreased;weight shifting decreased;left sided deviations;antalgic  -DP     Bilateral Gait Deviations heel strike decreased;weight shift ability decreased;knee buckling bilaterally  -DP     Left Sided Gait Deviations decreased knee extension  -DP     Right Sided Gait Deviations decreased knee extension  -DP     Comment, (Gait/Stairs) increased buckling on RLE today with min/mod knee block but able to advance RLE CGA. used R AFO with anterior shelf for all gait performed todau  -DP     Row Name 04/01/23 1333          Safety Issues, Functional Mobility    Impairments Affecting Function (Mobility) balance;cognition;endurance/activity tolerance;postural/trunk control;pain;strength  -DP     Row Name 04/01/23 1333          Knee (Therapeutic Exercise)    Knee Strengthening (Therapeutic Exercise) bilateral;hamstring curls;sitting;10 repetitions;yellow;resistance band  -DP     Row Name 04/01/23 1333          Positioning and Restraints    Pre-Treatment Position in bed  -DP     Post Treatment Position wheelchair  -DP     In Wheelchair sitting;call light within reach;encouraged to call for assist;exit  alarm on  -DP           User Key  (r) = Recorded By, (t) = Taken By, (c) = Cosigned By    Initials Name Provider Type    Papa Traylor, PT Physical Therapist              Wound 03/08/23 1917 Right lateral chest Incision (Active)   Dressing Appearance dry;intact 03/31/23 2055   Closure FÉLIX 04/01/23 0816   Base dressing in place, unable to visualize 04/01/23 0816   Dressing Care dressing reinforced;foam 03/31/23 2055       Wound 03/11/23 1530 Other (See comments) other (see comments) pubis Abrasion (Active)   Dressing Care open to air 03/31/23 2055       Wound 03/20/23 1527 Bilateral upper gluteal MASD (Moisture associated skin damage) (Active)   Dressing Appearance open to air 03/31/23 2055   Closure Open to air 04/01/23 0816   Base blanchable;clean;moist;pink 04/01/23 0816   Care, Wound cleansed with;soap and water 03/31/23 2055   Dressing Care open to air 03/31/23 2055   Periwound Care barrier ointment applied 03/31/23 2055     Physical Therapy Education     Title: PT OT SLP Therapies (In Progress)     Topic: Physical Therapy (Done)     Point: Mobility training (Done)     Learning Progress Summary           Patient Acceptance, E,D, VU,DU by DP at 4/1/2023 1352    Acceptance, E, VU,DU by MG at 3/31/2023 0825    Acceptance, E, VU,DU by MG at 3/30/2023 0956    Acceptance, E,D, VU,DU by DP at 3/29/2023 1148    Nonacceptance, E,D, VU,DU by DP at 3/28/2023 1144    Acceptance, E,D, VU,DU by DP at 3/27/2023 1601    Acceptance, E,D, VU,NR by EE at 3/25/2023 1423    Acceptance, E,D, NR,VU,DU by DP at 3/21/2023 1448    Acceptance, E, NR by JK at 3/20/2023 1513    Acceptance, E,TB, VU,NR by KP at 3/18/2023 1643                   Point: Home exercise program (Done)     Learning Progress Summary           Patient Acceptance, E,D, VU,DU by DP at 4/1/2023 1352    Acceptance, E, VU,DU by MG at 3/31/2023 0825    Acceptance, E, VU,DU by MG at 3/30/2023 0956    Acceptance, E,D, VU,DU by DP at 3/29/2023 1148    Nonacceptance, E,D,  VU,DU by DP at 3/28/2023 1144    Acceptance, E,D, VU,DU by DP at 3/27/2023 1601    Acceptance, E,D, NR,VU,DU by DP at 3/21/2023 1448    Acceptance, E, NR by JK at 3/20/2023 1513                   Point: Body mechanics (Done)     Learning Progress Summary           Patient Acceptance, E,D, VU,DU by DP at 4/1/2023 1352    Acceptance, E, VU,DU by MG at 3/31/2023 0825    Acceptance, E, VU,DU by MG at 3/30/2023 0956    Acceptance, E,D, VU,DU by DP at 3/29/2023 1148    Nonacceptance, E,D, VU,DU by DP at 3/28/2023 1144    Acceptance, E,D, VU,DU by DP at 3/27/2023 1601    Acceptance, E,D, VU,NR by EE at 3/25/2023 1423    Acceptance, E,D, NR,VU,DU by DP at 3/21/2023 1448                   Point: Precautions (Done)     Learning Progress Summary           Patient Acceptance, E,D, VU,DU by DP at 4/1/2023 1352    Acceptance, E, VU,DU by MG at 3/31/2023 0825    Acceptance, E, VU,DU by MG at 3/30/2023 0956    Acceptance, E,D, VU,DU by DP at 3/29/2023 1148    Nonacceptance, E,D, VU,DU by DP at 3/28/2023 1144    Acceptance, E,D, VU,DU by DP at 3/27/2023 1601    Acceptance, E,D, VU,NR by EE at 3/25/2023 1423    Acceptance, E,D, NR,VU,DU by DP at 3/21/2023 1448                               User Key     Initials Effective Dates Name Provider Type Discipline    EE 06/16/21 -  Melinda Mann, PT Physical Therapist PT    JK 06/16/21 -  Juana Veloz, PT Physical Therapist PT    KP 06/16/21 -  Yolanda Shannon, PT Physical Therapist PT    MG 05/24/22 -  Yu Villalobos, PT Physical Therapist PT    DP 08/24/21 -  Papa Marsh, PT Physical Therapist PT                PT Recommendation and Plan                          Time Calculation:      PT Charges     Row Name 04/01/23 1352             Time Calculation    Start Time 1300  -DP      Stop Time 1335  -DP      Time Calculation (min) 35 min  -DP      PT Received On 04/01/23  -DP      PT - Next Appointment 04/03/23  -DP         Time Calculation- PT    Total Timed Code Minutes- PT 35 minute(s)   -DP            User Key  (r) = Recorded By, (t) = Taken By, (c) = Cosigned By    Initials Name Provider Type    Papa Traylor PT Physical Therapist                Therapy Charges for Today     Code Description Service Date Service Provider Modifiers Qty    [id number]  PT THERAPEUTIC ACT EA 15 MIN 4/1/2023 Papa Marsh PT GP 2    [id number]  PT THER SUPP EA 15 MIN 4/1/2023 Papa Marsh PT GP 1              Patient was wearing a face mask during this therapy encounter. Therapist used appropriate personal protective equipment including mask and gloves.  Mask used was standard procedure mask. Appropriate PPE was worn during the entire therapy session. Hand hygiene was completed before and after therapy session. Patient is not in enhanced droplet precautions.         Papa Marsh PT  4/1/2023

## 2023-04-01 NOTE — PLAN OF CARE
Goal Outcome Evaluation:  Plan of Care Reviewed With: patient             Problem: Rehabilitation (IRF) Plan of Care  Goal: Plan of Care Review  Outcome: Ongoing, Progressing  Flowsheets (Taken 4/1/2023 0121)  Plan of Care Reviewed With: patient  Outcome Evaluation:  Louise is alert and orienred x 4. Calm and pleasant with care. Meds whole PO with AS. Continent of bowel and bladder. Calls out for bedpan at night. Has been using Purewick at HS, cream applied to buttocks r/t pink and flaky. Continues with numbness and weakness to BLE. Assist x 2 during transfers. Seizure precautions in place. Finished last IV abx this evening  IV patent. PRN Amina administered at 2055. No unsafe behaviors.  at bedside. Call light within reach.

## 2023-04-01 NOTE — PROGRESS NOTES
" [facility]     Progress Note    Patient Name: Louise GARCIA  : 1965  MRN: [id number]  Primary Care Physician:  Chloe Schaefer APRN  Date of admission: 3/17/2023    Subjective  Pt is awake and alert. Pt c/o L medial thigh pain with  Resisted hip adduction. Onset yesterday with \"rough\" transfer.??  Subjective     Chief Complaint: same    History of Present Illness  Patient Reports     Review of Systems    Objective  exam unchanged. Calves soft and NT. As above. resp unlabored and regular. No swelling, bruising heat at medial L thigh.   Objective     Vitals:   Temp:  [97.9 °F (36.6 °C)-98.6 °F (37 °C)] 98.6 °F (37 °C)  Heart Rate:  [88-97] 97  Resp:  [16-20] 18  BP: ()/(55-63) 100/61  Flow (L/min):  [2] 2    Physical Exam     Result Review    Result Review:  I have personally reviewed the results from the time of this admission to 2023 13:15 EDT and agree with these findings:  []  Laboratory list / accordion  []  Microbiology  []  Radiology  []  EKG/Telemetry   []  Cardiology/Vascular   []  Pathology  []  Old records  []  Other:  Most notable findings include: No new labs to review today.       Assessment & Plan  Continue to prepare for dc. Pt remains medically stable and is making fxnl progress toward dc goals.   Assessment / Plan     Brief Patient Summary:  Louise GARCIA is a 58 y.o. female who     Active Hospital Problems:  Active Hospital Problems    Diagnosis    • **Idiopathic peripheral neuropathy    • MRSA bacteremia    • Guillain-[place] syndrome    • Anemia    • Chronic anticoagulation    • History of blood clots    • Seizures    • Paresthesia      Plan:       DVT prophylaxis:  Medical and mechanical DVT prophylaxis orders are present.    CODE STATUS:    Level Of Support Discussed With: Patient  Code Status (Patient has no pulse and is not breathing): CPR (Attempt to Resuscitate)  Medical Interventions (Patient has pulse or is breathing): Full Support    Disposition:  I expect patient to be " discharged .    Deniz Ritchie MD

## 2023-04-02 PROCEDURE — 94799 UNLISTED PULMONARY SVC/PX: CPT

## 2023-04-02 PROCEDURE — 94760 N-INVAS EAR/PLS OXIMETRY 1: CPT

## 2023-04-02 PROCEDURE — 94664 DEMO&/EVAL PT USE INHALER: CPT

## 2023-04-02 RX ADMIN — OXYCODONE HYDROCHLORIDE 2.5 MG: 5 TABLET ORAL at 04:05

## 2023-04-02 RX ADMIN — OXYCODONE HYDROCHLORIDE 2.5 MG: 5 TABLET ORAL at 13:48

## 2023-04-02 RX ADMIN — GABAPENTIN 300 MG: 300 CAPSULE ORAL at 13:50

## 2023-04-02 RX ADMIN — Medication 5 MG: at 21:08

## 2023-04-02 RX ADMIN — IPRATROPIUM BROMIDE AND ALBUTEROL SULFATE 3 ML: 2.5; .5 SOLUTION RESPIRATORY (INHALATION) at 20:12

## 2023-04-02 RX ADMIN — OXYCODONE HYDROCHLORIDE 2.5 MG: 5 TABLET ORAL at 08:24

## 2023-04-02 RX ADMIN — GABAPENTIN 300 MG: 300 CAPSULE ORAL at 21:08

## 2023-04-02 RX ADMIN — IPRATROPIUM BROMIDE AND ALBUTEROL SULFATE 3 ML: 2.5; .5 SOLUTION RESPIRATORY (INHALATION) at 07:01

## 2023-04-02 RX ADMIN — APIXABAN 5 MG: 5 TABLET, FILM COATED ORAL at 08:24

## 2023-04-02 RX ADMIN — Medication 1 MG: at 08:25

## 2023-04-02 RX ADMIN — LAMOTRIGINE 200 MG: 100 TABLET ORAL at 08:24

## 2023-04-02 RX ADMIN — OXYCODONE HYDROCHLORIDE 2.5 MG: 5 TABLET ORAL at 21:08

## 2023-04-02 RX ADMIN — IPRATROPIUM BROMIDE AND ALBUTEROL SULFATE 3 ML: 2.5; .5 SOLUTION RESPIRATORY (INHALATION) at 11:19

## 2023-04-02 RX ADMIN — APIXABAN 5 MG: 5 TABLET, FILM COATED ORAL at 21:08

## 2023-04-02 RX ADMIN — Medication 500 MCG: at 08:24

## 2023-04-02 RX ADMIN — Medication 100 MG: at 08:24

## 2023-04-02 RX ADMIN — IPRATROPIUM BROMIDE AND ALBUTEROL SULFATE 3 ML: 2.5; .5 SOLUTION RESPIRATORY (INHALATION) at 14:53

## 2023-04-02 RX ADMIN — OXYCODONE HYDROCHLORIDE 2.5 MG: 5 TABLET ORAL at 17:37

## 2023-04-02 RX ADMIN — GABAPENTIN 300 MG: 300 CAPSULE ORAL at 05:33

## 2023-04-02 RX ADMIN — PANTOPRAZOLE SODIUM 40 MG: 40 TABLET, DELAYED RELEASE ORAL at 05:33

## 2023-04-02 RX ADMIN — DESVENLAFAXINE SUCCINATE 25 MG: 25 TABLET, EXTENDED RELEASE ORAL at 08:24

## 2023-04-02 NOTE — PLAN OF CARE
Goal Outcome Evaluation:  Plan of Care Reviewed With: patient           Outcome Evaluation: pt alert and oriented X4, RA, meds whole in applesauce, pt up in chair for breakfast, seizure precautions in place, nadir given X2 this shift, will cont to monitor  Problem: Rehabilitation (IRF) Plan of Care  Goal: Plan of Care Review  Outcome: Ongoing, Progressing  Flowsheets (Taken 4/2/2023 1416)  Plan of Care Reviewed With: patient  Outcome Evaluation: pt alert and oriented X4, RA, meds whole in applesauce, pt up in chair for breakfast, seizure precautions in place, nadir given X2 this shift, will cont to monitor  Goal: Patient-Specific Goal (Individualized)  Outcome: Ongoing, Progressing  Goal: Absence of New-Onset Illness or Injury  Outcome: Ongoing, Progressing  Intervention: Prevent Fall and Fall Injury  Recent Flowsheet Documentation  Taken 4/2/2023 1413 by Anisha Braun, RN  Safety Promotion/Fall Prevention:   activity supervised   assistive device/personal items within reach   clutter free environment maintained   fall prevention program maintained   nonskid shoes/slippers when out of bed   room organization consistent   safety round/check completed  Taken 4/2/2023 1205 by Anisha Braun, RN  Safety Promotion/Fall Prevention:   activity supervised   assistive device/personal items within reach   clutter free environment maintained   fall prevention program maintained   nonskid shoes/slippers when out of bed   room organization consistent   safety round/check completed  Taken 4/2/2023 1037 by Ainsha Braun, RN  Safety Promotion/Fall Prevention:   activity supervised   assistive device/personal items within reach   clutter free environment maintained   fall prevention program maintained   nonskid shoes/slippers when out of bed   room organization consistent   safety round/check completed  Taken 4/2/2023 0901 by Anisha Braun, RN  Safety Promotion/Fall Prevention:   activity supervised   assistive device/personal  items within reach   clutter free environment maintained   fall prevention program maintained   nonskid shoes/slippers when out of bed   room organization consistent   safety round/check completed  Intervention: Prevent Infection  Recent Flowsheet Documentation  Taken 4/2/2023 1413 by Anisha Braun RN  Infection Prevention: single patient room provided  Taken 4/2/2023 1205 by Anisha Braun RN  Infection Prevention: single patient room provided  Taken 4/2/2023 1037 by Anisha Braun RN  Infection Prevention: single patient room provided  Taken 4/2/2023 0901 by Anisha Braun RN  Infection Prevention: single patient room provided  Intervention: Prevent VTE (Venous Thromboembolism)  Recent Flowsheet Documentation  Taken 4/2/2023 0901 by Anisha Braun RN  VTE Prevention/Management:   bilateral   sequential compression devices off  Goal: Optimal Comfort and Wellbeing  Outcome: Ongoing, Progressing  Goal: Home and Community Transition Plan Established  Outcome: Ongoing, Progressing

## 2023-04-02 NOTE — PROGRESS NOTES
[facility]     Progress Note    Patient Name: Louise GARCIA  : 1965  MRN: [id number]  Primary Care Physician:  Chloe Schaefer APRN  Date of admission: 3/17/2023    Subjective  P)t is awake and alert. She is feeling better today and is encouraged with her performance in tx yesterday,  Subjective     Chief Complaint: same  History of Present Illness  Patient Reports     Review of Systems    Objective  exam unchanged. Calves soft and NT. Resp unlabored and regular  Objective     Vitals:   Temp:  [96.5 °F (35.8 °C)-97.9 °F (36.6 °C)] 97.9 °F (36.6 °C)  Heart Rate:  [] 100  Resp:  [18-20] 18  BP: (113-120)/(65-71) 113/65  Flow (L/min):  [0.5-2] 0.5    Physical Exam     Result Review    Result Review:  I have personally reviewed the results from the time of this admission to 2023 13:30 EDT and agree with these findings:  []  Laboratory list / accordion  []  Microbiology  []  Radiology  []  EKG/Telemetry   []  Cardiology/Vascular   []  Pathology  []  Old records  []  Other:  Most notable findings include:No new labs to review.       Assessment & Plan Continue to prepare for dc.   Assessment / Plan     Brief Patient Summary:  Louise GARCIA is a 58 y.o. female wh    Active Hospital Problems:  Active Hospital Problems    Diagnosis    • **Idiopathic peripheral neuropathy    • MRSA bacteremia    • Guillain-[place] syndrome    • Anemia    • Chronic anticoagulation    • History of blood clots    • Seizures    • Paresthesia      Plan:       DVT prophylaxis:  Medical and mechanical DVT prophylaxis orders are present.    CODE STATUS:    Level Of Support Discussed With: Patient  Code Status (Patient has no pulse and is not breathing): CPR (Attempt to Resuscitate)  Medical Interventions (Patient has pulse or is breathing): Full Support    Disposition:  I expect patient to be discharged .    Deniz Ritchie MD

## 2023-04-02 NOTE — PLAN OF CARE
Goal Outcome Evaluation:  Plan of Care Reviewed With: patient             Problem: Rehabilitation (IRF) Plan of Care  Goal: Plan of Care Review  Outcome: Ongoing, Progressing  Flowsheets (Taken 4/2/2023 0135)  Plan of Care Reviewed With: patient  Outcome Evaluation: Louise is alert and oriented x4. Appeared in great mood this evening. Meds whole PO with thins. Continent of bowel and bladder. Does have frequency and urgency during the day, wears Purewick at night. Seizure precautions in place. Dressing changed to right lateral chest with small drainage. Requested oxygen this evening. 0.5 O2 nc placed. PRN Amina administered at 2038. Turned and repositioned q2 during rounds. No unsafe behaviors. Call light within reach.

## 2023-04-02 NOTE — PROGRESS NOTES
Inpatient Rehabilitation Plan of Care Note    Plan of Care  Care Plan Reviewed - No updates at this time.    Sphincter Control    [RN] Bladder Management(Active)  Current Status(04/01/2023): Bladder urgency  Weekly Goal(04/06/2023): decrease in episodes of incontinence  Discharge Goal: Continent 100%    [RN] Bowel Management(Active)  Current Status(04/01/2023): Patient is 100% continent of bowel  Weekly Goal(04/06/2023): Patient will maintain a daily bowel pattern.  Discharge Goal: Patient will maintain 100% continence of bowel    Performed Intervention(s)  Bladder/bowel training program  Monitor intake and output  Use incontinence products/equipment      Psychosocial    [RN] Coping/Adjustment(Active)  Current Status(04/01/2023): Anxiety r/t uncertainty of disease course  Weekly Goal(04/06/2023): Allow opportunity to express concerns regarding current  status  Discharge Goal: Patient/family will verbalize decreased feelings of anxiety.    Performed Intervention(s)  Verbalizes needs and concerns  Support from family/peer groups      Body Systems    [RN] Neurological(Active)  Current Status(04/01/2023): Impaired physical mobility r/t decreased muscle  strength/control and limited ROM  Weekly Goal(04/06/2023): Patient will have increased ROM  Discharge Goal: Patient will have improved strength and function of BLE and B  hands.    Performed Intervention(s)  Perform active and passive ROM  Frequent position changes      Safety    [RN] Potential for Injury(Active)  Current Status(04/01/2023): Risk for falls  Weekly Goal(04/06/2023): Family/Caregivers regarding safety precautions and need  for close supervision  Discharge Goal: Patient/family will be aware of risk of fall and safety in the  home setting.    Performed Intervention(s)  Safety Rounds  Bed alarm/Chair alarm  Items within reach    Signed by: Val Belcher RN

## 2023-04-03 LAB
ANION GAP SERPL CALCULATED.3IONS-SCNC: 6.4 MMOL/L (ref 5–15)
BASOPHILS # BLD AUTO: 0.09 10*3/MM3 (ref 0–0.2)
BASOPHILS NFR BLD AUTO: 1.1 % (ref 0–1.5)
BUN SERPL-MCNC: 10 MG/DL (ref 6–20)
BUN/CREAT SERPL: 9.4 (ref 7–25)
CALCIUM SPEC-SCNC: 9.7 MG/DL (ref 8.6–10.5)
CHLORIDE SERPL-SCNC: 95 MMOL/L (ref 98–107)
CO2 SERPL-SCNC: 31.6 MMOL/L (ref 22–29)
CREAT SERPL-MCNC: 1.06 MG/DL (ref 0.57–1)
DEPRECATED RDW RBC AUTO: 46.8 FL (ref 37–54)
EGFRCR SERPLBLD CKD-EPI 2021: 60.6 ML/MIN/1.73
EOSINOPHIL # BLD AUTO: 0.05 10*3/MM3 (ref 0–0.4)
EOSINOPHIL NFR BLD AUTO: 0.6 % (ref 0.3–6.2)
ERYTHROCYTE [DISTWIDTH] IN BLOOD BY AUTOMATED COUNT: 13.9 % (ref 12.3–15.4)
GLUCOSE SERPL-MCNC: 91 MG/DL (ref 65–99)
HCT VFR BLD AUTO: 25 % (ref 34–46.6)
HGB BLD-MCNC: 8.3 G/DL (ref 12–15.9)
IMM GRANULOCYTES # BLD AUTO: 0.1 10*3/MM3 (ref 0–0.05)
IMM GRANULOCYTES NFR BLD AUTO: 1.2 % (ref 0–0.5)
LYMPHOCYTES # BLD AUTO: 2.2 10*3/MM3 (ref 0.7–3.1)
LYMPHOCYTES NFR BLD AUTO: 27 % (ref 19.6–45.3)
MCH RBC QN AUTO: 30.9 PG (ref 26.6–33)
MCHC RBC AUTO-ENTMCNC: 33.2 G/DL (ref 31.5–35.7)
MCV RBC AUTO: 92.9 FL (ref 79–97)
MONOCYTES # BLD AUTO: 0.84 10*3/MM3 (ref 0.1–0.9)
MONOCYTES NFR BLD AUTO: 10.3 % (ref 5–12)
NEUTROPHILS NFR BLD AUTO: 4.87 10*3/MM3 (ref 1.7–7)
NEUTROPHILS NFR BLD AUTO: 59.8 % (ref 42.7–76)
NRBC BLD AUTO-RTO: 0 /100 WBC (ref 0–0.2)
PLATELET # BLD AUTO: 359 10*3/MM3 (ref 140–450)
PMV BLD AUTO: 9.9 FL (ref 6–12)
POTASSIUM SERPL-SCNC: 3.9 MMOL/L (ref 3.5–5.2)
RBC # BLD AUTO: 2.69 10*6/MM3 (ref 3.77–5.28)
SODIUM SERPL-SCNC: 133 MMOL/L (ref 136–145)
WBC NRBC COR # BLD: 8.15 10*3/MM3 (ref 3.4–10.8)

## 2023-04-03 PROCEDURE — 97110 THERAPEUTIC EXERCISES: CPT

## 2023-04-03 PROCEDURE — 97129 THER IVNTJ 1ST 15 MIN: CPT

## 2023-04-03 PROCEDURE — 97112 NEUROMUSCULAR REEDUCATION: CPT

## 2023-04-03 PROCEDURE — 97130 THER IVNTJ EA ADDL 15 MIN: CPT

## 2023-04-03 PROCEDURE — 97116 GAIT TRAINING THERAPY: CPT

## 2023-04-03 PROCEDURE — 94799 UNLISTED PULMONARY SVC/PX: CPT

## 2023-04-03 PROCEDURE — 94664 DEMO&/EVAL PT USE INHALER: CPT

## 2023-04-03 PROCEDURE — 94760 N-INVAS EAR/PLS OXIMETRY 1: CPT

## 2023-04-03 PROCEDURE — 85025 COMPLETE CBC W/AUTO DIFF WBC: CPT | Performed by: PHYSICAL MEDICINE & REHABILITATION

## 2023-04-03 PROCEDURE — 97530 THERAPEUTIC ACTIVITIES: CPT

## 2023-04-03 PROCEDURE — 97535 SELF CARE MNGMENT TRAINING: CPT

## 2023-04-03 PROCEDURE — 80048 BASIC METABOLIC PNL TOTAL CA: CPT | Performed by: PHYSICAL MEDICINE & REHABILITATION

## 2023-04-03 RX ADMIN — Medication 5 MG: at 21:29

## 2023-04-03 RX ADMIN — PANTOPRAZOLE SODIUM 40 MG: 40 TABLET, DELAYED RELEASE ORAL at 05:25

## 2023-04-03 RX ADMIN — ACETAMINOPHEN 650 MG: 325 TABLET, FILM COATED ORAL at 17:20

## 2023-04-03 RX ADMIN — IPRATROPIUM BROMIDE AND ALBUTEROL SULFATE 3 ML: 2.5; .5 SOLUTION RESPIRATORY (INHALATION) at 21:09

## 2023-04-03 RX ADMIN — Medication 100 MG: at 08:07

## 2023-04-03 RX ADMIN — GABAPENTIN 300 MG: 300 CAPSULE ORAL at 21:29

## 2023-04-03 RX ADMIN — APIXABAN 5 MG: 5 TABLET, FILM COATED ORAL at 21:29

## 2023-04-03 RX ADMIN — Medication 500 MCG: at 08:07

## 2023-04-03 RX ADMIN — OXYCODONE HYDROCHLORIDE 2.5 MG: 5 TABLET ORAL at 05:27

## 2023-04-03 RX ADMIN — IPRATROPIUM BROMIDE AND ALBUTEROL SULFATE 3 ML: 2.5; .5 SOLUTION RESPIRATORY (INHALATION) at 15:20

## 2023-04-03 RX ADMIN — OXYCODONE HYDROCHLORIDE 2.5 MG: 5 TABLET ORAL at 21:38

## 2023-04-03 RX ADMIN — OXYCODONE HYDROCHLORIDE 2.5 MG: 5 TABLET ORAL at 09:33

## 2023-04-03 RX ADMIN — GABAPENTIN 300 MG: 300 CAPSULE ORAL at 05:25

## 2023-04-03 RX ADMIN — IPRATROPIUM BROMIDE AND ALBUTEROL SULFATE 3 ML: 2.5; .5 SOLUTION RESPIRATORY (INHALATION) at 07:10

## 2023-04-03 RX ADMIN — IPRATROPIUM BROMIDE AND ALBUTEROL SULFATE 3 ML: 2.5; .5 SOLUTION RESPIRATORY (INHALATION) at 10:56

## 2023-04-03 RX ADMIN — ACETAMINOPHEN 650 MG: 325 TABLET, FILM COATED ORAL at 08:11

## 2023-04-03 RX ADMIN — LAMOTRIGINE 200 MG: 100 TABLET ORAL at 08:07

## 2023-04-03 RX ADMIN — OXYCODONE HYDROCHLORIDE 2.5 MG: 5 TABLET ORAL at 17:19

## 2023-04-03 RX ADMIN — DESVENLAFAXINE SUCCINATE 25 MG: 25 TABLET, EXTENDED RELEASE ORAL at 08:07

## 2023-04-03 RX ADMIN — OXYCODONE HYDROCHLORIDE 2.5 MG: 5 TABLET ORAL at 13:34

## 2023-04-03 RX ADMIN — GABAPENTIN 300 MG: 300 CAPSULE ORAL at 13:33

## 2023-04-03 RX ADMIN — Medication 1 MG: at 08:07

## 2023-04-03 RX ADMIN — APIXABAN 5 MG: 5 TABLET, FILM COATED ORAL at 08:07

## 2023-04-03 NOTE — PROGRESS NOTES
Inpatient Rehabilitation Plan of Care Note    Plan of Care  Care Plan Reviewed - No updates at this time.    Sphincter Control    [RN] Bladder Management(Active)  Current Status(04/02/2023): Bladder urgency  Weekly Goal(04/06/2023): decrease in episodes of incontinence  Discharge Goal: Continent 100%    [RN] Bowel Management(Active)  Current Status(04/02/2023): Patient is 100% continent of bowel  Weekly Goal(04/06/2023): Patient will maintain a daily bowel pattern.  Discharge Goal: Patient will maintain 100% continence of bowel    Performed Intervention(s)  Bladder/bowel training program  Monitor intake and output  Use incontinence products/equipment      Psychosocial    [RN] Coping/Adjustment(Active)  Current Status(04/02/2023): Anxiety r/t uncertainty of disease course  Weekly Goal(04/06/2023): Allow opportunity to express concerns regarding current  status  Discharge Goal: Patient/family will verbalize decreased feelings of anxiety.    Performed Intervention(s)  Verbalizes needs and concerns  Support from family/peer groups      Body Systems    [RN] Neurological(Active)  Current Status(04/02/2023): Impaired physical mobility r/t decreased muscle  strength/control and limited ROM  Weekly Goal(04/06/2023): Patient will have increased ROM  Discharge Goal: Patient will have improved strength and function of BLE and B  hands.    Performed Intervention(s)  Perform active and passive ROM  Frequent position changes      Safety    [RN] Potential for Injury(Active)  Current Status(04/02/2023): Risk for falls  Weekly Goal(04/06/2023): Family/Caregivers regarding safety precautions and need  for close supervision  Discharge Goal: Patient/family will be aware of risk of fall and safety in the  home setting.    Performed Intervention(s)  Safety Rounds  Bed alarm/Chair alarm  Items within reach    Signed by: Val Belcher RN

## 2023-04-03 NOTE — PROGRESS NOTES
Inpatient Rehabilitation Functional Measures Assessment and Plan of Care    Plan of Care  Updated Problems/Interventions  Cognition    [ST] Memory(Active)  Current Status(04/03/2023): Pt with improved ability to recall salient events  from day.  Weekly Goal(04/11/2023): Will recall 3 errands after 15 minutes with NO cues.  Discharge Goal: The patient will improve memory necessary for home-based  participation when given appropriate supervision and cuing.        Communication    [ST] Voice(Active)  Current Status(04/03/2023): Now presents with mild-moderate hoarse vocal  quality. Improved vocal intensity.  Weekly Goal(04/11/2023): Reduced vocal abuse behaviors (strong habitual throat  clearing) with MIN cues.  Discharge Goal: Functional voice for utilization in all settings, 90%  intelligibility NO cues for compensatory strategies    Signed by: Nessa Spangler, SLP

## 2023-04-03 NOTE — PROGRESS NOTES
"Nutrition Services    Patient Name:  Louise GARCIA  YOB: 1965  MRN: [id number]  Admit Date:  3/17/2023    Assessment Date:  04/03/23    FOLLOW UP NOTE - CLINICAL NUTRITION    Comments:  Visited pt in room;  at bedside. Pt's  reported pt had a good lunch; pt reports 75% PO at lunch today. Pt states she hasn't tried eating between meals yet (to promote appetite) d/t not having milk to dip her hari crackers in. Attained 2 milk cartons for pt's  to store on ice in his cooler in pt's room for today. Advised pt and pt's  to request milk (in addition to any other snacks), when desired, from any care team member. Continued to encourage increased intakes, boost acceptance, and snacks between meals.     RD will continue to follow-up, per protocol.      Encounter Information        Reason for Encounter Follow-up   Current Issues Idiopathic peripheral neuropathy     Current Nutrition Orders & Evaluation of Intake       Oral Nutrition     Current PO Diet Diet: Regular/House Diet; Texture: Regular Texture (IDDSI 7); Fluid Consistency: Thin (IDDSI 0)   Supplement Boost Plus TID   PO Evaluation    % PO Intake/# of days 50% of most recent meals    Factors Affecting Intake  constipation, decreased appetite, early satiety      Anthropometrics         Height   Weight Height: 157.5 cm (62\")  Weight: 63.8 kg (140 lb 10.5 oz) (03/17/23 2040)   BMI kg/m2 Body mass index is 25.73 kg/m².   Weight trend No new weight available     Physical Findings          Physical Appearance alert, oriented fatigued   Oral/Mouth Cavity teeth missing   Edema  1+ (trace), 2+ (mild)   Gastrointestinal hyperactive bowel sounds, last bowel movement:4/3   Skin  MASD, surgical incision, abrasion   Tubes/Drains none     Labs       Pertinent Labs Reviewed, listed below     Results from last 7 days   Lab Units 04/03/23  0657 03/29/23  0549 03/28/23  0721   SODIUM mmol/L 133* 134* 133*   POTASSIUM mmol/L 3.9 3.7 3.8 "   CHLORIDE mmol/L 95* 96* 96*   CO2 mmol/L 31.6* 31.3* 29.0   BUN mg/dL 10 7 7   CREATININE mg/dL 1.06* 1.02* 0.95   CALCIUM mg/dL 9.7 9.0 8.5*   GLUCOSE mg/dL 91 97 91     Results from last 7 days   Lab Units 04/03/23  0657   HEMOGLOBIN g/dL 8.3*   HEMATOCRIT % 25.0*   WBC 10*3/mm3 8.15     Results from last 7 days   Lab Units 04/03/23  0657 03/29/23  0549 03/28/23  0721   PLATELETS 10*3/mm3 359 501* 424     COVID19   Date Value Ref Range Status   03/03/2023 Not Detected Not Detected - Ref. Range Final     Lab Results   Component Value Date    HGBA1C 5.10 02/20/2023          Medications           Scheduled Medications apixaban, 5 mg, Oral, Q12H  desvenlafaxine, 25 mg, Oral, Daily  folic acid, 1 mg, Oral, Daily  gabapentin, 300 mg, Oral, Q8H  ipratropium-albuterol, 3 mL, Nebulization, 4x Daily - RT  lamoTRIgine, 200 mg, Oral, Daily  melatonin, 5 mg, Oral, Nightly  pantoprazole, 40 mg, Oral, Q AM  thiamine, 100 mg, Oral, Daily  vitamin B-12, 500 mcg, Oral, Daily       Infusions     PRN Medications •  acetaminophen  •  bisacodyl  •  hydrocortisone-bacitracin-zinc oxide-nystatin  •  oxyCODONE  •  polyethylene glycol  •  senna-docusate sodium  •  simethicone     PLAN OF CARE  Intervention Goal        Intervention Goal(s) Maintain nutrition status, Tolerate PO , Increase intake, Maintain weight, No significant weight loss and PO intake goal %: 75%     Nutrition Intervention        RD Action Advise available snack, Encourage intake, Follow Tx Progress and Care plan reviewed     Prescription        Diet Prescription    Supplement Prescription    EN/PN Prescription    Prescription Ordered No changes at this time   --  Monitor/Evaluation        Monitor Per protocol, PO intake, Supplement intake, Weight, GI status, Symptoms   Discharge Plan Pending clinical course   Education Will educate as needed       Electronically signed by:  Silke Arias  04/03/23 15:10 EDT

## 2023-04-03 NOTE — THERAPY TREATMENT NOTE
Inpatient Rehabilitation - Occupational Therapy Treatment Note    [facility]     Patient Name: Louise GARCIA  : 1965  MRN: [id number]    Today's Date: 4/3/2023                 Admit Date: 3/17/2023         ICD-10-CM ICD-9-CM   1. Impaired functional mobility, balance, gait, and endurance  Z74.09 V49.89       Patient Active Problem List   Diagnosis   • Sepsis   • Neck pain, chronic   • Upper back pain   • Paresthesia   • Cervical myelopathy   • Lower extremity weakness   • History of blood clots   • Chronic anticoagulation   • Seizures   • Anemia   • GERD (gastroesophageal reflux disease)   • Guillain-Foster syndrome   • Situational mixed anxiety and depressive disorder   • Mass of middle lobe of right lung   • Oral candidiasis   • Empyema of pleura   • MRSA bacteremia   • Idiopathic peripheral neuropathy       Past Medical History:   Diagnosis Date   • Degenerative disc disease, cervical    • Gestational diabetes    • H/O blood clots    • Hematemesis    • Seizures    • Septic shock        Past Surgical History:   Procedure Laterality Date   • APPENDECTOMY     • BACK SURGERY     • CHOLECYSTECTOMY     • ENDOSCOPY N/A 2016    Procedure: ESOPHAGOGASTRODUODENOSCOPY with biopsy;  Surgeon: Austen Brito MD;  Location: [facility] ENDOSCOPY;  Service:    • HYSTERECTOMY     • NECK SURGERY       approx 6 yrs ago   • THORACOSCOPY Right 3/8/2023    Procedure: THORACOSCOPY WITH DAVINCI ROBOT WITH COMPLETE DECORTICATION;  Surgeon: Chloe Cedillo MD;  Location: [facility] OR;  Service: Robotics - DaVinci;  Laterality: Right;   • TONSILLECTOMY     • TONSILLECTOMY AND ADENOIDECTOMY               [facility] OT ASSESSMENT FLOWSHEET (last 12 hours)     IRF OT Evaluation and Treatment     Row Name 23 1542          OT Time and Intention    Document Type daily treatment  -AF     Mode of Treatment occupational therapy  -AF     Patient Effort good  -AF     Row Name 23 1549          General Information     Patient/Family/Caregiver Comments/Observations pt sitting up in w/c after PT session  -AF     Existing Precautions/Restrictions fall  contact precautions  -AF     Row Name 04/03/23 1542          Pain Assessment    Pretreatment Pain Rating 4/10  -AF     Posttreatment Pain Rating 5/10  -AF     Pain Location - Side/Orientation Left  -AF     Pre/Posttreatment Pain Comment states having pain in L side shoulder, hip, leg, NSG aware and gave pain medicaiton  -AF     Row Name 04/03/23 1542          Cognition/Psychosocial    Affect/Mental Status (Cognition) flat/blunted affect  -AF     Orientation Status (Cognition) oriented x 3  -AF     Follows Commands (Cognition) follows one-step commands;over 90% accuracy;increased processing time needed  -AF     Personal Safety Interventions fall prevention program maintained;gait belt;nonskid shoes/slippers when out of bed  -AF     Cognitive Function memory deficit;safety deficit  -AF     Memory Deficit (Cognition) episodic memory;procedural memory  -AF     Safety Deficit (Cognition) insight into deficits/self-awareness;ability to follow commands  -AF     Comment, Cognitive Interventions increased processing time for visual perceptual task with small peg and peg board activity  -AF     Row Name 04/03/23 1542          Grooming    [place] Level (Grooming) grooming skills;set up  -AF     Position (Grooming) supported sitting  -AF     Comment (Grooming) MIN for pony tail unable to complete last loop for hair to stay in place  -AF     Row Name 04/03/23 1542          Motor Skills    Coordination fine motor deficit;bilateral;upper extremity;minimal impairment;moderate impairment  with incresaed time pt partiicpated in in hand manipulation task with small pegs and peg board working on tip to tip grasp, pt states that her hands are still feeling numb but better and stronger  -AF     Functional Endurance fair, required rest breaks this afternoon during longer session  -AF     Row Name  04/03/23 1542          Aerobic Exercise    Type (Aerobic Exercise) arm bike;for 2 minutes  x 2 sets with 2 min rest break between set  -AF     Row Name 04/03/23 1542          Balance    Static Sitting Balance standby assist  -AF     Row Name 04/03/23 1542          Positioning and Restraints    Pre-Treatment Position sitting in chair/recliner  -AF     Post Treatment Position wheelchair  -AF     In Wheelchair sitting;exit alarm on;with SLP  -AF           User Key  (r) = Recorded By, (t) = Taken By, (c) = Cosigned By    Initials Name Effective Dates    AF Heaven Villareal, OTR 06/16/21 -                  Occupational Therapy Education     Title: PT OT SLP Therapies (In Progress)     Topic: Occupational Therapy (Not Started)     Point: ADL training (Not Started)     Description:   Instruct learner(s) on proper safety adaptation and remediation techniques during self care or transfers.   Instruct in proper use of assistive devices.              Learner Progress:  Not documented in this visit.          Point: Home exercise program (Not Started)     Description:   Instruct learner(s) on appropriate technique for monitoring, assisting and/or progressing therapeutic exercises/activities.              Learner Progress:  Not documented in this visit.          Point: Precautions (Not Started)     Description:   Instruct learner(s) on prescribed precautions during self-care and functional transfers.              Learner Progress:  Not documented in this visit.          Point: Body mechanics (Not Started)     Description:   Instruct learner(s) on proper positioning and spine alignment during self-care, functional mobility activities and/or exercises.              Learner Progress:  Not documented in this visit.                                OT Recommendation and Plan                         Time Calculation:      Time Calculation- OT     Row Name 04/03/23 1547             Time Calculation- OT    OT Start Time 1330  -      OT  Stop Time 1430  -AF      OT Time Calculation (min) 60 min  -AF            User Key  (r) = Recorded By, (t) = Taken By, (c) = Cosigned By    Initials Name Provider Type    Heaven Reynolds OTNAIMA Occupational Therapist              Therapy Charges for Today     Code Description Service Date Service Provider Modifiers Qty    79314464403 HC OT SELF CARE/MGMT/TRAIN EA 15 MIN 4/3/2023 Heaven Villareal OTR GO 1    28118167722  OT THER PROC EA 15 MIN 4/3/2023 Heaven Villareal OTNAIMA GO 1    [id number]  OT NEUROMUSC RE EDUCATION EA 15 MIN 4/3/2023 Heaven Villareal OTR GO 2                   MARY ANN Gunn  4/3/2023

## 2023-04-03 NOTE — THERAPY TREATMENT NOTE
Inpatient Rehabilitation - Physical Therapy Treatment Note       [facility]     Patient Name: Louise GARCIA  : 1965  MRN: [id number]    Today's Date: 4/3/2023                    Admit Date: 3/17/2023      Visit Dx:     ICD-10-CM ICD-9-CM   1. Impaired functional mobility, balance, gait, and endurance  Z74.09 V49.89       Patient Active Problem List   Diagnosis   • Sepsis   • Neck pain, chronic   • Upper back pain   • Paresthesia   • Cervical myelopathy   • Lower extremity weakness   • History of blood clots   • Chronic anticoagulation   • Seizures   • Anemia   • GERD (gastroesophageal reflux disease)   • Guillain-Lorain syndrome   • Situational mixed anxiety and depressive disorder   • Mass of middle lobe of right lung   • Oral candidiasis   • Empyema of pleura   • MRSA bacteremia   • Idiopathic peripheral neuropathy       Past Medical History:   Diagnosis Date   • Degenerative disc disease, cervical    • Gestational diabetes    • H/O blood clots    • Hematemesis    • Seizures    • Septic shock        Past Surgical History:   Procedure Laterality Date   • APPENDECTOMY     • BACK SURGERY     • CHOLECYSTECTOMY     • ENDOSCOPY N/A 2016    Procedure: ESOPHAGOGASTRODUODENOSCOPY with biopsy;  Surgeon: Austen Brito MD;  Location: [facility] ENDOSCOPY;  Service:    • HYSTERECTOMY     • NECK SURGERY       approx 6 yrs ago   • THORACOSCOPY Right 3/8/2023    Procedure: THORACOSCOPY WITH DAVINCI ROBOT WITH COMPLETE DECORTICATION;  Surgeon: Chloe Cedillo MD;  Location: [facility] OR;  Service: Robotics - DaVinci;  Laterality: Right;   • TONSILLECTOMY     • TONSILLECTOMY AND ADENOIDECTOMY         PT ASSESSMENT (last 12 hours)     IRF PT Evaluation and Treatment     Row Name 23 1100          PT Time and Intention    Document Type daily treatment  -AE     Mode of Treatment individual therapy;physical therapy  -AE     Patient/Family/Caregiver Comments/Observations pt complaining of LLE pain/soreness  and L shoulder soreness  -AE     Row Name 04/03/23 1100          General Information    Patient Profile Reviewed yes  -AE     Row Name 04/03/23 1100          Pain Assessment    Pretreatment Pain Rating 8/10  -AE     Posttreatment Pain Rating 8/10  -AE     Pain Location - Side/Orientation Left  -AE     Pain Location lower  -AE     Pain Location - extremity  -AE     Row Name 04/03/23 1100          Cognition/Psychosocial    Affect/Mental Status (Cognition) flat/blunted affect  -AE     Orientation Status (Cognition) oriented x 3  -AE     Follows Commands (Cognition) follows one-step commands;over 90% accuracy;increased processing time needed  -AE     Personal Safety Interventions fall prevention program maintained;gait belt;muscle strengthening facilitated;nonskid shoes/slippers when out of bed;supervised activity  -AE     Cognitive Function memory deficit;safety deficit  -AE     Memory Deficit (Cognition) episodic memory;procedural memory  -AE     Row Name 04/03/23 1100          Bed Mobility    Supine-Sit [place] (Bed Mobility) minimum assist (75% patient effort);1 person assist  -AE     Comment, (Bed Mobility) R lateral lean initially upon sitting, cues to reach for foot of bed. improved sitting balance to SBA x 5 min on EOB while performing hygiene tasks. pt impulsive and tends to start before proper setup. max cues for hand placement and sequencing  -AE     Row Name 04/03/23 1100          Transfer Assessment/Treatment    Comment, (Transfers) more assist with transfers in AM today, pain with weight bearing through LLE and LUE. did much better in PM with less pain  -AE     Row Name 04/03/23 1100          Bed-Chair Transfer    Bed-Chair [place] (Transfers) moderate assist (50% patient effort);maximum assist (25% patient effort)  -AE     Assistive Device (Bed-Chair Transfers) wheelchair  -AE     Comment, (Bed-Chair Transfer) squat pivot  -AE     Row Name 04/03/23 1100          Chair-Bed Transfer    Chair-Bed  [place] (Transfers) moderate assist (50% patient effort);maximum assist (25% patient effort)  -AE     Assistive Device (Chair-Bed Transfers) wheelchair  -AE     Row Name 04/03/23 1100          Gait/Stairs (Locomotion)    [place] Level (Gait) minimum assist (75% patient effort);moderate assist (50% patient effort);2 person assist;1 person to manage equipment  -AE     Assistive Device (Gait) parallel bars;walker, front-wheeled  -AE     Distance in Feet (Gait) 8' // bars, 6 small shuffled steps FWW  -AE     Pattern (Gait) step-through;step-to  -AE     Deviations/Abnormal Patterns (Gait) federico decreased;bilateral deviations;base of support, narrow;festinating/shuffling;stride length decreased;weight shifting decreased;left sided deviations;antalgic  -AE     Bilateral Gait Deviations heel strike decreased;weight shift ability decreased;knee buckling bilaterally  -AE     Left Sided Gait Deviations decreased knee extension  -AE     Right Sided Gait Deviations decreased knee extension  -AE     Gait Assessment/Intervention in // bars pt able to maintain more upright postures, abducted legs with hyperextension of B knees. in FWW pt had increased difficulty maintaining upright posture with L knee buckling and only to perform step to pattern with FWW  -AE     Row Name 04/03/23 1100          Balance    Static Sitting Balance standby assist;contact guard  -AE     Position, Sitting Balance unsupported;sitting edge of bed  -AE     Row Name 04/03/23 1100          Knee (Therapeutic Exercise)    Knee Strengthening (Therapeutic Exercise) bilateral;LAQ (long arc quad);10 repetitions;5 second hold  -AE     Row Name 04/03/23 1100          Core Strength (Therapeutic Exercise)    Comment (Core Strength Therapeutic Exercise) anterior weight shifting with therapist in front helping to pull forward 3 x 5 with 5 sec holds. wc push ups. requiring mod/maxA to clear glutes 2-3 in from wc. very fatigued/weak today  -AE     Row Name  04/03/23 1100          Positioning and Restraints    Pre-Treatment Position in bed  -AE     Post Treatment Position wheelchair  -AE     In Wheelchair sitting;call light within reach;encouraged to call for assist;exit alarm on  -AE           User Key  (r) = Recorded By, (t) = Taken By, (c) = Cosigned By    Initials Name Provider Type    AE Deanna Carr, PT Physical Therapist              Wound 03/08/23 1917 Right lateral chest Incision (Active)   Dressing Appearance dry;intact 04/03/23 0807   Closure Liquid skin adhesive 04/03/23 0807   Base clean;dry 04/03/23 0807   Drainage Amount none 04/03/23 0807   Care, Wound cleansed with;sterile normal saline 04/03/23 0807   Dressing Care dressing changed 04/03/23 0807   Periwound Care dry periwound area maintained 04/03/23 0807       Wound 03/11/23 1530 Other (See comments) other (see comments) pubis Abrasion (Active)   Dressing Appearance open to air 04/03/23 0807   Closure Open to air 04/03/23 0807   Base red;scab 04/03/23 0807   Drainage Amount none 04/03/23 0807   Dressing Care open to air 04/03/23 0807       Wound 03/20/23 1527 Bilateral upper gluteal MASD (Moisture associated skin damage) (Active)   Dressing Appearance open to air 04/03/23 0807   Closure Open to air 04/03/23 0807   Base blanchable;clean;dry 04/03/23 0807   Drainage Amount none 04/03/23 0807   Care, Wound cleansed with;soap and water 04/03/23 0807   Dressing Care open to air 04/03/23 0807   Periwound Care barrier ointment applied 04/03/23 0807     Physical Therapy Education     Title: PT OT SLP Therapies (In Progress)     Topic: Physical Therapy (Done)     Point: Mobility training (Done)     Learning Progress Summary           Patient Acceptance, E,D, VU,DU by DP at 4/1/2023 1352    Acceptance, E, VU,DU by MG at 3/31/2023 0825    Acceptance, E, VU,DU by MG at 3/30/2023 0956    Acceptance, E,D, VU,DU by DP at 3/29/2023 1148    Nonacceptance, E,D, VU,DU by DP at 3/28/2023 1144    Eliazar, EJUDIT,  VU,DU by DP at 3/27/2023 1601    Acceptance, E,D, VU,NR by EE at 3/25/2023 1423    Acceptance, E,D, NR,VU,DU by DP at 3/21/2023 1448    Acceptance, E, NR by JK at 3/20/2023 1513    Acceptance, E,TB, VU,NR by KP at 3/18/2023 1643                   Point: Home exercise program (Done)     Learning Progress Summary           Patient Acceptance, E,D, VU,DU by DP at 4/1/2023 1352    Acceptance, E, VU,DU by MG at 3/31/2023 0825    Acceptance, E, VU,DU by MG at 3/30/2023 0956    Acceptance, E,D, VU,DU by DP at 3/29/2023 1148    Nonacceptance, E,D, VU,DU by DP at 3/28/2023 1144    Acceptance, E,D, VU,DU by DP at 3/27/2023 1601    Acceptance, E,D, NR,VU,DU by DP at 3/21/2023 1448    Acceptance, E, NR by JK at 3/20/2023 1513                   Point: Body mechanics (Done)     Learning Progress Summary           Patient Acceptance, E,D, VU,DU by DP at 4/1/2023 1352    Acceptance, E, VU,DU by MG at 3/31/2023 0825    Acceptance, E, VU,DU by MG at 3/30/2023 0956    Acceptance, E,D, VU,DU by DP at 3/29/2023 1148    Nonacceptance, E,D, VU,DU by DP at 3/28/2023 1144    Acceptance, E,D, VU,DU by DP at 3/27/2023 1601    Acceptance, E,D, VU,NR by EE at 3/25/2023 1423    Acceptance, E,D, NR,VU,DU by DP at 3/21/2023 1448                   Point: Precautions (Done)     Learning Progress Summary           Patient Acceptance, E,D, VU,DU by DP at 4/1/2023 1352    Acceptance, E, VU,DU by MG at 3/31/2023 0825    Acceptance, E, VU,DU by MG at 3/30/2023 0956    Acceptance, E,D, VU,DU by DP at 3/29/2023 1148    Nonacceptance, E,D, VU,DU by DP at 3/28/2023 1144    Acceptance, E,D, VU,DU by DP at 3/27/2023 1601    Acceptance, E,D, VU,NR by EE at 3/25/2023 1423    Acceptance, E,D, NR,VU,DU by DP at 3/21/2023 1448                               User Key     Initials Effective Dates Name Provider Type Discipline    EE 06/16/21 -  Melinda Mann, PT Physical Therapist PT    JK 06/16/21 -  Juana Veloz, PT Physical Therapist PT    KP 06/16/21 -  Shiva,  Yolanda FUNES, PT Physical Therapist PT    MG 05/24/22 -  Yu Villalobos, PT Physical Therapist PT    DP 08/24/21 -  Papa Marsh PT Physical Therapist PT                PT Recommendation and Plan                          Time Calculation:      PT Charges     Row Name 04/03/23 1518 04/03/23 1516          Time Calculation    Start Time 1300  -AE 0830  -AE     Stop Time 1330  -AE 0900  -AE     Time Calculation (min) 30 min  -AE 30 min  -AE     PT Received On 04/03/23  -AE 04/03/23  -AE     PT - Next Appointment 04/04/23  -AE 04/04/23  -AE        Time Calculation- PT    Total Timed Code Minutes- PT 30 minute(s)  -AE 30 minute(s)  -AE           User Key  (r) = Recorded By, (t) = Taken By, (c) = Cosigned By    Initials Name Provider Type    AE Deanna Carr, ALBERTO Physical Therapist                Therapy Charges for Today     Code Description Service Date Service Provider Modifiers Qty    38753914211 HC PT THERAPEUTIC ACT EA 15 MIN 4/3/2023 Deanna Carr, PT GP 2    55419896498 HC PT THER PROC EA 15 MIN 4/3/2023 Deanna Carr, PT GP 1    00395179257 HC GAIT TRAINING EA 15 MIN 4/3/2023 Deanna Carr, PT GP 1    60823939971 HC PT THER SUPP EA 15 MIN 4/3/2023 Deanna Carr, PT GP 2                   Deanna Carr PT  4/3/2023

## 2023-04-03 NOTE — PROGRESS NOTES
Inpatient Rehabilitation Functional Measures Assessment and Plan of Care    Plan of Care  Updated Problems/Interventions  Self Care    [OT] Bathing(Active)  Current Status(04/03/2023): MIN  Weekly Goal(04/11/2023): CGA  Discharge Goal: CGA    [OT] Dressing (Lower)(Active)  Current Status(04/03/2023): MOD  Weekly Goal(04/11/2023): MIN  Discharge Goal: MIN    [OT] Dressing (Upper)(Active)  Current Status(04/03/2023): set up  Weekly Goal(04/11/2023): set up  Discharge Goal: Set up    [OT] Eating(Active)  Current Status(04/03/2023): set up  Weekly Goal(04/11/2023): set up  Discharge Goal: Mod I    [OT] Grooming(Active)  Current Status(04/03/2023): set up  Weekly Goal(04/11/2023): supervision  Discharge Goal: supervision    [OT] Toileting(Active)  Current Status(04/03/2023): MOD A x 2  Weekly Goal(04/11/2023): MOD  Discharge Goal: MIN/CGA        Mobility    [OT] Tub/Shower Transfers(Active)  Current Status(04/03/2023): MOD A x 2  Weekly Goal(04/11/2023): MOD  Discharge Goal: CGA    [OT] Toilet Transfers(Active)  Current Status(04/03/2023): MOD A X 2  Weekly Goal(04/11/2023): MOD  Discharge Goal: CGA    Functional Measures  BA Eating:  Branch  BA Grooming: Branch  BA Bathing:  Branch  BA Upper Body Dressing:  Branch  BA Lower Body Dressing:  Branch  BA Toileting:  Branch    BA Bladder Management  Level of Assistance:  Branch  Frequency/Number of Accidents this Shift:  Branch    BA Bowel Management  Level of Assistance: Branch  Frequency/Number of Accidents this Shift: Branch    BA Bed/Chair/Wheelchair Transfer:  Branch  BA Toilet Transfer:  Branch  BA Tub/Shower Transfer:  Branch    Previously Documented Mode of Locomotion at Discharge: Field  BA Expected Mode of Locomotion at Discharge: Branch  BA Walk/Wheelchair:  Branch  BA Stairs:  Branch    BA Comprehension:  Branch  BA Expression:  Branch  BA Social Interaction:  Branch  BA Problem Solving:  Branch  BA Memory:  Branch    Therapy Mode  Minutes  Occupational Therapy: Branch  Physical Therapy: Branch  Speech Language Pathology:  Branch    Signed by: Heaven Villareal OT

## 2023-04-03 NOTE — PROGRESS NOTES
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   S/p IVIG x 5 days       SUBJECTIVE:    Patient tolerating therapies.  Feels she is making some progress.  Right rib pain about the same.  Breathing about the same.  Weakness in the left leg unchanged.     OBJECTIVE:  AF, VSS  BP: ()/(64-78) 125/78    Physical Exam     MENTAL STATUS -  AAO x3, Verbal, NAD.  HEENT- NCAT, SCLERAE ANICTERIC, CONJUNCTIVAE PINK, OP MOIST, NO JVD, EARS UNREMARKABLE EXTERNALLY    LUNGS - DECREASED BREATH SOUNDS RIGHT BASE MORE SO THAN LEFT BASE  HEART-regular rate and rhythm.  No murmur      ABD - NORMOACTIVE BOWEL SOUNDS, SOFT, NT.  EXT - NO EDEMA OR CYANOSIS  MOTOR EXAM -right/left: Shoulder abduction 4+/4+, elbow flexion 5/5, wrist extension 5/5, finger flexion 5/5, hip flexion 3/1, knee extension 4/2-, ankle dorsiflexion 4/4-  Better effort with left ankle dorsiflexion today           Scheduled Meds:apixaban, 5 mg, Oral, Q12H  desvenlafaxine, 25 mg, Oral, Daily  folic acid, 1 mg, Oral, Daily  gabapentin, 300 mg, Oral, Q8H  ipratropium-albuterol, 3 mL, Nebulization, 4x Daily - RT  lamoTRIgine, 200 mg, Oral, Daily  melatonin, 5 mg, Oral, Nightly  pantoprazole, 40 mg, Oral, Q AM  thiamine, 100 mg, Oral, Daily  vitamin B-12, 500 mcg, Oral, Daily      Continuous Infusions:   PRN Meds:.•  acetaminophen  •  bisacodyl  •  hydrocortisone-bacitracin-zinc oxide-nystatin  •  oxyCODONE  •  polyethylene glycol  •  senna-docusate sodium  •  simethicone    Lab Results (last 24 hours)     Procedure Component Value Units Date/Time    Basic Metabolic Panel [[id number]]  (Abnormal) Collected: 04/03/23 0657    Specimen: Blood Updated: 04/03/23 0745     Glucose 91 mg/dL      BUN 10 mg/dL      Creatinine 1.06 mg/dL      Sodium 133 mmol/L      Potassium 3.9 mmol/L      Chloride 95 mmol/L      CO2 31.6 mmol/L      Calcium 9.7 mg/dL      BUN/Creatinine Ratio 9.4     Anion Gap 6.4 mmol/L      eGFR 60.6 mL/min/1.73      Narrative:      GFR Normal >60  Chronic Kidney Disease <60  Kidney Failure <15      CBC & Differential [[id number]]  (Abnormal) Collected: 04/03/23 0657    Specimen: Blood Updated: 04/03/23 0728    Narrative:      The following orders were created for panel order CBC & Differential.  Procedure                               Abnormality         Status                     ---------                               -----------         ------                     CBC Auto Differential[[id number]]        Abnormal            Final result                 Please view results for these tests on the individual orders.    CBC Auto Differential [[id number]]  (Abnormal) Collected: 04/03/23 0657    Specimen: Blood Updated: 04/03/23 0728     WBC 8.15 10*3/mm3      RBC 2.69 10*6/mm3      Hemoglobin 8.3 g/dL      Hematocrit 25.0 %      MCV 92.9 fL      MCH 30.9 pg      MCHC 33.2 g/dL      RDW 13.9 %      RDW-SD 46.8 fl      MPV 9.9 fL      Platelets 359 10*3/mm3      Neutrophil % 59.8 %      Lymphocyte % 27.0 %      Monocyte % 10.3 %      Eosinophil % 0.6 %      Basophil % 1.1 %      Immature Grans % 1.2 %      Neutrophils, Absolute 4.87 10*3/mm3      Lymphocytes, Absolute 2.20 10*3/mm3      Monocytes, Absolute 0.84 10*3/mm3      Eosinophils, Absolute 0.05 10*3/mm3      Basophils, Absolute 0.09 10*3/mm3      Immature Grans, Absolute 0.10 10*3/mm3      nRBC 0.0 /100 WBC           Imaging Results (Last 24 Hours)     ** No results found for the last 24 hours. 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   S/p IVIG x 5 days  March 30-shows improvement with her strength proximally the upper extremities.  Improvement with her hand strength.  Improved strength in the right lower extremity but continues with profound weakness in the left lower extremity.  With weakness of the left ankle, will look at  probable AFO for gait activities.  Transfers are moderate assist.  Ambulated with a rolling walker up to 70 feet with gait deviations minimal moderate assist      Impaired mobility/impaired self care/ impaired cognition     R lung masslike infiltrate with cavitary lesions/pleural effusion   S/p thoracentesis - Blood cx and pleural fluid  positive for MRSA      chest tube placement given empyema, and she underwent thoracoscopy w/ decortication 3/8/23   -expected postoperative changes with pleural thickening and minimal pleural effusion.  The effusion is too small for intervention and will likely to continue to resolve with time.  Recommend continue good pulmonary hygiene with incentive spirometry hourly as well as increase activity/ambulation as tolerated.  March 23-appears decreased breath sounds more noticeable today on the right compared to the left.  Check follow-up chest x-ray.  On room air during the day, 1 L at night.  March 24- CXR relatively unchanged from previous, reached out to CT surgery given planned outpatient f/u on 3/28, no further imaging planned at this time as patient afebrile with normal WBC, monitor     Right-sided Chest Pain 3/24  -EKG sinus tach  -HS Troponin 25 > 24  -consider Cardiology consult if worsening  -pain reproducible with palpation, pain likely postoperative neuralgia as indicated vs. possible costochondritis, continue gabapentin, ice/heat, monitor    Hypokalemia  -3/24 K 3.3, repleted  -3/29 K 3.7, resolved    mass on TTE with findings concerning for endocarditis - underwent MARIAMA 3/10/2023 which had no vegetation     RUE superficial thrombophlebitis concerning for septic phlebitis.        ID - continue 4 weeks of vancomycin as recommended by infectious disease dosed by pharmacy to achieve a trough level of 15-20 or area under the curve of 400-600 from last negative blood culture or last intervention which ever is the latest - to complete April 1, 2023     Left hip arthrocentesis  March 16 to rule out any hip septic arthritis- no growth to date on fluid.    lower back pain - MRI of spine to rule out discitis or osteomyelitis.  This did have known degenerative changes but  no infection.      DVT prophylaxis - SCDs / apixiban. History of LLE DVT - on Eliquis at home     Pain management - Tylenol 1,000 mg three times a day/ Celebrex 100 mg q 112 hours/ gabapentin 300 mg q 8 hours Oxycodone 5 mg q 4 hours prn  March 18 - DC Celebrex 2/2 PHYLLIS, and anemia     Anxiety/ muscle spasms - Diazepam 2 mg q 6 hours prn - JONES reviewed     Seizure disorder -Lamotrigine 200 mg daily    ABLA - March 18- Hgb 7.0 (7.3 on 3/16). Type, cross and transfuse 1 unit PRBCs. Pre-medicate with Tylenol and Benadryl. CBC in am.   March 19- Hgb 8.8 s/p 1 unit PRBcs. Hemoccult positive. On Eliquis for h/o DVT. Follow Hgb. May need GI work up.  March 20 - HGB 9.3 s/p transfusion  March 21-reviewed with internal medicine-hemoglobin stable after transfusion.  Iron studies consistent with inflammation.  No further work-up presently  March 22 - Hgb 8.9, stable  March 23-hemoglobin trend 8.3.  Continue to follow.  Recheck tomorrow  March 29 - Hgb 8.1, stable    PHYLLIS - March 18- Creatinine 1.36 up from 1.12. On IV Vanc and Celebrex. DC Celebrex. BMP in am.  March 19- s/p 500ml NS bolus. Creatinine 1.31.  March 23-creatinine 0.92    TEAM CONF - MARCH 21 - BED MOD X 2. TRANSFERS MAX 2. STAND PIVOT OR SQUAT PIVOT. NON-AMBULATORY. Saturday MARCH 18 GAIT 6 FEET PARALLEL BARS MOD MAX OF 2.   BATH MOD 1-2. LBD DEP. UBD MOD. EATING MIN. GROOMING MIN. TOILETING DEP.   The patient has difficulty remembering new information due to short-term memory impairments.  Initial evaluation 3.18, pt obtained a score 12/30 on SLUMS cognitive assessment indicating severe cognitive impairment/dementia, goals initiated for memory and sustaining attention  FEES completed 3.7 revealing glottic gap accounting for glottic insufficiency, dysphonia,  hoarse/breathy quality, recommend targeting vocal function as appropriate d/t impact on intelligibility.    Regular/thin diet continued since FEES completion 3.7, although pt known to cough with and without PO intake, may be contributed partially to ineffective VF closure.   DRESSING FORMER CHEST TUBE SITE. CONTINUES ON PUREWICK AT NIGHT. O2 AT 1-2 LITERS AT NIGHT.  DECREASED APPETITE. ABD X-RAY THIS AM SHOWS NON-OBSTRUCTIVE BOWEL GAS PATTERN.   ELOS - 4 WEEKS    TEAM CONF - MARCH 28 - BED MIN CTG. TRANSFER MOD ASSIST STAND PIVOT OR SQUAT PIVOT. FATIGUES IN AFERNOONS. GAIT 15 FEET RW MIN X 2. FLUCTUATING ORIENTATION. BETTER DETAIL TO TASK. MIN MOD CUES FOR REASONING TASKS. DYSPHONIA FROM COUGHING.  SLP REVIEWED VOICE HYGIENE, NOT TO DO HARSH THROAT CLEAR. BATH MOD. LBD MAX. UBD MIN. TOILETING MOD X 2. CONTINENT/INCONTINENT BLADDER. RIGHT CHEST TUBE / THORACOSCOPY SITE CARE. VARIABLE INTAKE.   ELOS - 3 WEEKS    During rounds, used appropriate personal protective equipment including mask and gloves.  Additional gown if indicated.  Mask used was standard procedure mask. Appropriate PPE was worn during the entire visit.  Hand hygiene was completed before and after.    Ryley Rider MD

## 2023-04-03 NOTE — PLAN OF CARE
Goal Outcome Evaluation:  Plan of Care Reviewed With: patient             Problem: Rehabilitation ([facility]) Plan of Care  Goal: Plan of Care Review  Outcome: Ongoing, Progressing  Flowsheets (Taken 4/3/2023 0157)  Plan of Care Reviewed With: patient  Outcome Evaluation: Louise is alert and oriented x 4. Quiet, pleasant. Takes meds whole PO with AS, thins. Continent of bowel and bladder. Has been using bedpan and purewick at HS. IV to RFA. Asked for o2 nc this evening for comfort. Seizure precautions in place. Repositioned q2 and as needed. PRN Amina administered at 2108 r/t abd discomfort caused from coughing per pt. No unsafe behaviors. Call light within reach.

## 2023-04-03 NOTE — THERAPY TREATMENT NOTE
Inpatient Rehabilitation - Speech Language Pathology Treatment Note    [facility]     Patient Name: Louise GARCIA  : 1965  MRN: [id number]    Today's Date: 4/3/2023                   Admit Date: 3/17/2023       Visit Dx:      ICD-10-CM ICD-9-CM   1. Impaired functional mobility, balance, gait, and endurance  Z74.09 V49.89       Patient Active Problem List   Diagnosis   • Sepsis   • Neck pain, chronic   • Upper back pain   • Paresthesia   • Cervical myelopathy   • Lower extremity weakness   • History of blood clots   • Chronic anticoagulation   • Seizures   • Anemia   • GERD (gastroesophageal reflux disease)   • Guillain-[place] syndrome   • Situational mixed anxiety and depressive disorder   • Mass of middle lobe of right lung   • Oral candidiasis   • Empyema of pleura   • MRSA bacteremia   • Idiopathic peripheral neuropathy       Past Medical History:   Diagnosis Date   • Degenerative disc disease, cervical    • Gestational diabetes    • H/O blood clots    • Hematemesis    • Seizures    • Septic shock        Past Surgical History:   Procedure Laterality Date   • APPENDECTOMY     • BACK SURGERY     • CHOLECYSTECTOMY     • ENDOSCOPY N/A 2016    Procedure: ESOPHAGOGASTRODUODENOSCOPY with biopsy;  Surgeon: Austen Brito MD;  Location: [facility] ENDOSCOPY;  Service:    • HYSTERECTOMY     • NECK SURGERY       approx 6 yrs ago   • THORACOSCOPY Right 3/8/2023    Procedure: THORACOSCOPY WITH DAVINCI ROBOT WITH COMPLETE DECORTICATION;  Surgeon: Chloe Cedillo MD;  Location: [facility] OR;  Service: Robotics - DaVinci;  Laterality: Right;   • TONSILLECTOMY     • TONSILLECTOMY AND ADENOIDECTOMY       Patient was wearing a face mask during this therapy encounter. Therapist used appropriate personal protective equipment including mask, eye protection and gloves.  Mask used was standard procedure mask. Appropriate PPE was worn during the entire therapy session. Hand hygiene was completed before and after  "therapy session. Patient is not in enhanced droplet precautions.               SLP Recommendation and Plan                                                            SLP EVALUATION (last 72 hours)     SLP SLC Evaluation     Row Name 04/03/23 1430 04/03/23 0930                Communication Assessment/Intervention    Document Type therapy note (daily note)  -AL therapy note (daily note)  -AL       Patient/Family/Caregiver Comments/Observations Pt participated well. She reported feeling \"foggy\" with her thinking due to pain medicine today.  -AL Pt participated well.  -AL          Pain Scale: Numbers Pre/Post-Treatment    Pretreatment Pain Rating 0/10 - no pain  -AL --       Pre/Posttreatment Pain Comment -- Received pain med from nurse at beginning of session. Pt reported left shoulder and foot; pt did not rate pain.  -AL             User Key  (r) = Recorded By, (t) = Taken By, (c) = Cosigned By    Initials Name Effective Dates    Nessa Kraus, MS CCC-SLP 06/16/21 -                    EDUCATION    The patient has been educated in the following areas:       Cognitive Impairment.             SLP GOALS     Row Name 04/03/23 1430 04/03/23 0917          Organizational Skills Goal 1 (SLP)    Improve Thought Organization Through Goal 1 (SLP) completing a divergent naming task;80%;with minimal cues (75-90%)  -AL --     Time Frame (Thought Organization Skills Goal 1, SLP) 1 week  -AL --     Progress/Outcomes (Thought Organization Skills Goal 1, SLP) goal ongoing  -AL --     Comment (Thought Organization Skills Goal 1, SLP) Expensive: 3 items with NO cues, 8 with MOD-MAX cues  -AL --        Reasoning Goal 1 (SLP)    Improve Reasoning Through Goal 1 (SLP) -- complete deductive reasoning task;80%;with minimal cues (75-90%)  -AL     Time Frame (Reasoning Goal 1, SLP) -- 1 week  -AL     Progress/Outcomes (Reasoning Goal 1, SLP) -- good progress toward goal  -AL     Comment (Reasoning Goal 1, SLP) -- Ebonie's Schedule: 3/7 with " NO cues, 7/7 with MIN-MOD cues. Inferencing from voicemails: 70% with NO cues, 100% with MIN cues.  -AL        Functional Math Skills Goal 1 (SLP)    Improve Functional Math Skills Through Goal 1 (SLP) complete functional math task;80%;with minimal cues (75-90%)  -AL --     Time Frame (Functional Math Skills Goal 1, SLP) 1 week  -AL --     Progress/Outcomes (Functional Math Skills Goal 1, SLP) new goal  -AL --     Comment (Functional Math Skills Goal 1, SLP) Temporal problem solving related to cokingtimes: 1/7 with NO cues, 2/7 with MIN cues, 5/7 with MOD cues, 7/7 with MAX cues. Pt required MOD-MAX cues to do retrograde 1 hour temporal problem solving.  -AL --           User Key  (r) = Recorded By, (t) = Taken By, (c) = Cosigned By    Initials Name Provider Type    Nessa Kraus MS CCC-SLP Speech and Language Pathologist                            Time Calculation:        Time Calculation- SLP     Row Name 04/03/23 1510 04/03/23 0957          Time Calculation- SLP    SLP Start Time 1430  -AL 0930  -AL     SLP Stop Time 1500  -AL 1000  -AL     SLP Time Calculation (min) 30 min  -AL 30 min  -AL     SLP Received On 04/03/23  -AL --           User Key  (r) = Recorded By, (t) = Taken By, (c) = Cosigned By    Initials Name Provider Type    Nessa Kraus MS CCC-SLP Speech and Language Pathologist                  Therapy Charges for Today     Code Description Service Date Service Provider Modifiers Qty    26825877027 HC ST DEV OF COGN SKILLS INITIAL 15 MIN 4/3/2023 Nessa Spangler MS CCC-SLP  1    89347382810 HC ST DEV OF COGN SKILLS EACH ADDT'L 15 MIN 4/3/2023 Nessa Spangler MS CCC-SLP  1    04525242320 HC ST DEV OF COGN SKILLS EACH ADDT'L 15 MIN 4/3/2023 Nessa Spangler MS CCC-SLP  2                           Nessa Spangler MS CCC-SLP  4/3/2023

## 2023-04-03 NOTE — PLAN OF CARE
Goal Outcome Evaluation:           Progress: improving  Outcome Evaluation: A&OX4.Forgetful. Difficulty with word-finding. Idiopathic peripheral neuropathy. Full code. Hx. chronic anemia and seizures. Daily weight. 0.5 L O2 at nite, for comfort.  Assistx2 with the wheelchair. Very weak in the legs. Continent and incontinent of B&B. Last BM today. Wears a brief. Purewick at night. On scheduled gabapentin and tylenol. Has PRN Aura 2.5 mg. IV removed from R. arm.  Seizure precautions. Siderails padded. On-going pain is an issue. Pain meds help. Diet: Reg, thins. Not eating much at meals. Is drinking boost shakes. Has napped on and off in between and after therapies. Runs tachy.  at bedside. Participated fully in therapy.

## 2023-04-03 NOTE — THERAPY TREATMENT NOTE
Inpatient Rehabilitation - Speech Language Pathology Progress Note    [facility]     Patient Name: Louise GARCIA  : 1965  MRN: [id number]    Today's Date: 4/3/2023                   Admit Date: 3/17/2023       Visit Dx:      ICD-10-CM ICD-9-CM   1. Impaired functional mobility, balance, gait, and endurance  Z74.09 V49.89       Patient Active Problem List   Diagnosis   • Sepsis   • Neck pain, chronic   • Upper back pain   • Paresthesia   • Cervical myelopathy   • Lower extremity weakness   • History of blood clots   • Chronic anticoagulation   • Seizures   • Anemia   • GERD (gastroesophageal reflux disease)   • Guillain-[place] syndrome   • Situational mixed anxiety and depressive disorder   • Mass of middle lobe of right lung   • Oral candidiasis   • Empyema of pleura   • MRSA bacteremia   • Idiopathic peripheral neuropathy       Past Medical History:   Diagnosis Date   • Degenerative disc disease, cervical    • Gestational diabetes    • H/O blood clots    • Hematemesis    • Seizures    • Septic shock        Past Surgical History:   Procedure Laterality Date   • APPENDECTOMY     • BACK SURGERY     • CHOLECYSTECTOMY     • ENDOSCOPY N/A 2016    Procedure: ESOPHAGOGASTRODUODENOSCOPY with biopsy;  Surgeon: Austen Brito MD;  Location[facility] ENDOSCOPY;  Service:    • HYSTERECTOMY     • NECK SURGERY       approx 6 yrs ago   • THORACOSCOPY Right 3/8/2023    Procedure: THORACOSCOPY WITH DAVINCI ROBOT WITH COMPLETE DECORTICATION;  Surgeon: Chloe Cedillo MD;  Location: [facility] OR;  Service: Robotics - DaVinci;  Laterality: Right;   • TONSILLECTOMY     • TONSILLECTOMY AND ADENOIDECTOMY         SLP Recommendation and Plan  Pt is making good progress toward cognitive-communication goals. She now presents with an overall mild-moderate cognitive-communication deficit. She is now oriented to month, year and SUSAN with NO cues; requires intermittent cues for date. She met goal for recalling 3 errands  after 10 minutes with NO cues. She recalls a complex paragraph immediately with MIN cues. She completes high-level deductive reasoning tasks with MIN-MOD cues. She completes medicine management with pill box with MIN-MAX cues. Pt completes abstract divergent thinking tasks with MOD cues. She now presents with mild-moderate hoarse vocal quality. Recommend continued intensive inpatient speech therapy to improve cognitive-communication skills and increase her level of independence for home environment.       SLP EVALUATION (last 72 hours)     SLP SLC Evaluation     Row Name 04/03/23 0930                   Communication Assessment/Intervention    Document Type therapy note (daily note)  -AL        Patient/Family/Caregiver Comments/Observations Pt participated well.  -AL           Pain Scale: Numbers Pre/Post-Treatment    Pre/Posttreatment Pain Comment Received pain med from nurse at beginning of session. Pt reported left shoulder and foot; pt did not rate pain.  -AL              User Key  (r) = Recorded By, (t) = Taken By, (c) = Cosigned By    Initials Name Effective Dates    Nessa Kraus MS CCC-SLP 06/16/21 -                    EDUCATION    The patient has been educated in the following areas:       Cognitive Impairment Communication Impairment.             SLP GOALS     Row Name 04/03/23 0930             Reasoning Goal 1 (SLP)    Improve Reasoning Through Goal 1 (SLP) complete deductive reasoning task;80%;with minimal cues (75-90%)  -AL      Time Frame (Reasoning Goal 1, SLP) 1 week  -AL      Progress/Outcomes (Reasoning Goal 1, SLP) good progress toward goal  -AL      Comment (Reasoning Goal 1, SLP) Ebonie's Schedule: 3/7 with NO cues, 7/7 with MIN-MOD cues. Inferencing from voicemails: 70% with NO cues, 100% with MIN cues.  -AL            User Key  (r) = Recorded By, (t) = Taken By, (c) = Cosigned By    Initials Name Provider Type    Nessa Kraus MS CCC-SLP Speech and Language Pathologist                             Time Calculation:        Time Calculation- SLP     Row Name 04/03/23 0957             Time Calculation- SLP    SLP Start Time 0930  -AL      SLP Stop Time 1000  -AL      SLP Time Calculation (min) 30 min  -AL            User Key  (r) = Recorded By, (t) = Taken By, (c) = Cosigned By    Initials Name Provider Type    Nessa Kraus MS CCC-SLP Speech and Language Pathologist                  Therapy Charges for Today     Code Description Service Date Service Provider Modifiers Qty    36622427536 HC ST DEV OF COGN SKILLS INITIAL 15 MIN 4/3/2023 Nessa Spangler MS CCC-SLP  1    29487242458 HC ST DEV OF COGN SKILLS EACH ADDT'L 15 MIN 4/3/2023 Nessa Spangler MS CCC-SLP  1                           Nessa Spangler MS CCC-SLP  4/3/2023

## 2023-04-04 PROCEDURE — 97112 NEUROMUSCULAR REEDUCATION: CPT

## 2023-04-04 PROCEDURE — 97530 THERAPEUTIC ACTIVITIES: CPT

## 2023-04-04 PROCEDURE — 94799 UNLISTED PULMONARY SVC/PX: CPT

## 2023-04-04 PROCEDURE — 97140 MANUAL THERAPY 1/> REGIONS: CPT

## 2023-04-04 PROCEDURE — 94664 DEMO&/EVAL PT USE INHALER: CPT

## 2023-04-04 PROCEDURE — 97535 SELF CARE MNGMENT TRAINING: CPT

## 2023-04-04 PROCEDURE — 97130 THER IVNTJ EA ADDL 15 MIN: CPT

## 2023-04-04 PROCEDURE — 97129 THER IVNTJ 1ST 15 MIN: CPT

## 2023-04-04 RX ADMIN — GABAPENTIN 300 MG: 300 CAPSULE ORAL at 21:19

## 2023-04-04 RX ADMIN — OXYCODONE HYDROCHLORIDE 2.5 MG: 5 TABLET ORAL at 14:11

## 2023-04-04 RX ADMIN — LAMOTRIGINE 200 MG: 100 TABLET ORAL at 08:08

## 2023-04-04 RX ADMIN — GABAPENTIN 300 MG: 300 CAPSULE ORAL at 05:37

## 2023-04-04 RX ADMIN — Medication 500 MCG: at 08:09

## 2023-04-04 RX ADMIN — OXYCODONE HYDROCHLORIDE 2.5 MG: 5 TABLET ORAL at 21:19

## 2023-04-04 RX ADMIN — APIXABAN 5 MG: 5 TABLET, FILM COATED ORAL at 21:28

## 2023-04-04 RX ADMIN — DESVENLAFAXINE SUCCINATE 25 MG: 25 TABLET, EXTENDED RELEASE ORAL at 08:09

## 2023-04-04 RX ADMIN — Medication 100 MG: at 08:09

## 2023-04-04 RX ADMIN — ACETAMINOPHEN 650 MG: 325 TABLET, FILM COATED ORAL at 15:34

## 2023-04-04 RX ADMIN — IPRATROPIUM BROMIDE AND ALBUTEROL SULFATE 3 ML: 2.5; .5 SOLUTION RESPIRATORY (INHALATION) at 20:40

## 2023-04-04 RX ADMIN — Medication 5 MG: at 21:19

## 2023-04-04 RX ADMIN — ACETAMINOPHEN 650 MG: 325 TABLET, FILM COATED ORAL at 08:09

## 2023-04-04 RX ADMIN — IPRATROPIUM BROMIDE AND ALBUTEROL SULFATE 3 ML: 2.5; .5 SOLUTION RESPIRATORY (INHALATION) at 06:53

## 2023-04-04 RX ADMIN — Medication 1 MG: at 08:09

## 2023-04-04 RX ADMIN — APIXABAN 5 MG: 5 TABLET, FILM COATED ORAL at 09:02

## 2023-04-04 RX ADMIN — IPRATROPIUM BROMIDE AND ALBUTEROL SULFATE 3 ML: 2.5; .5 SOLUTION RESPIRATORY (INHALATION) at 15:52

## 2023-04-04 RX ADMIN — PANTOPRAZOLE SODIUM 40 MG: 40 TABLET, DELAYED RELEASE ORAL at 05:37

## 2023-04-04 RX ADMIN — OXYCODONE HYDROCHLORIDE 2.5 MG: 5 TABLET ORAL at 08:09

## 2023-04-04 RX ADMIN — GABAPENTIN 300 MG: 300 CAPSULE ORAL at 14:11

## 2023-04-04 NOTE — PLAN OF CARE
Problem: Rehabilitation (IRF) Plan of Care  Goal: Plan of Care Review  Outcome: Ongoing, Progressing  Flowsheets (Taken 4/4/2023 0322)  Progress: improving  Plan of Care Reviewed With:   patient   spouse  Goal: Patient-Specific Goal (Individualized)  Outcome: Ongoing, Progressing  Goal: Absence of New-Onset Illness or Injury  Outcome: Ongoing, Progressing  Intervention: Prevent Fall and Fall Injury  Recent Flowsheet Documentation  Taken 4/3/2023 2138 by Jose Carrillo RN  Safety Promotion/Fall Prevention:   activity supervised   assistive device/personal items within reach   clutter free environment maintained   fall prevention program maintained   lighting adjusted   nonskid shoes/slippers when out of bed   room organization consistent   safety round/check completed   muscle strengthening facilitated  Intervention: Prevent Infection  Recent Flowsheet Documentation  Taken 4/3/2023 2138 by Jose Carrillo RN  Infection Prevention:   environmental surveillance performed   hand hygiene promoted   single patient room provided  Intervention: Prevent VTE (Venous Thromboembolism)  Recent Flowsheet Documentation  Taken 4/3/2023 2138 by Jose Carrillo RN  VTE Prevention/Management:   bilateral   sequential compression devices off   patient refused intervention  Goal: Optimal Comfort and Wellbeing  Outcome: Ongoing, Progressing  Goal: Home and Community Transition Plan Established  Outcome: Ongoing, Progressing     Problem: Seizure Disorder Comorbidity  Goal: Maintenance of Seizure Control  Outcome: Ongoing, Progressing  Intervention: Maintain Seizure-Symptom Control  Recent Flowsheet Documentation  Taken 4/3/2023 2138 by Jose Carrillo RN  Seizure Precautions:   activity supervised   clutter-free environment maintained     Problem: Skin Injury Risk Increased  Goal: Skin Health and Integrity  Outcome: Ongoing, Progressing  Intervention: Promote and Optimize Oral Intake  Recent Flowsheet Documentation  Taken  4/3/2023 2138 by Jose Carrillo RN  Oral Nutrition Promotion:   physical activity promoted   social interaction promoted  Intervention: Optimize Skin Protection  Recent Flowsheet Documentation  Taken 4/3/2023 2138 by Jose Carrillo RN  Pressure Reduction Techniques:   frequent weight shift encouraged   heels elevated off bed   weight shift assistance provided  Head of Bed (HOB) Positioning: HOB lowered  Pressure Reduction Devices: positioning supports utilized  Skin Protection:   adhesive use limited   skin sealant/moisture barrier applied     Problem: Fall Injury Risk  Goal: Absence of Fall and Fall-Related Injury  Outcome: Ongoing, Progressing  Intervention: Identify and Manage Contributors  Recent Flowsheet Documentation  Taken 4/3/2023 2138 by Jose Carrillo RN  Medication Review/Management: medications reviewed  Self-Care Promotion: independence encouraged  Intervention: Promote Injury-Free Environment  Recent Flowsheet Documentation  Taken 4/3/2023 2138 by Jose Carrillo RN  Safety Promotion/Fall Prevention:   activity supervised   assistive device/personal items within reach   clutter free environment maintained   fall prevention program maintained   lighting adjusted   nonskid shoes/slippers when out of bed   room organization consistent   safety round/check completed   muscle strengthening facilitated   Goal Outcome Evaluation:  Plan of Care Reviewed With: patient, spouse        Progress: improving

## 2023-04-04 NOTE — PROGRESS NOTES
Inpatient Rehabilitation Plan of Care Note    Plan of Care  Updated Problems/Interventions  Mobility    [PT] Stairs(Active)  Current Status(04/03/2023): TBA  Weekly Goal(04/10/2023):  Discharge Goal: 3, B rails, Min/CG    [PT] Walk(Active)  Current Status(04/03/2023): 5' using FWW minAx2 VC with w/c follow  Weekly Goal(04/10/2023): PT only  Discharge Goal: 30ft Bianca rwx    [PT] Bed/Chair/Wheelchair(Active)  Current Status(04/03/2023): ModA/max, stand pivot or squat with tsf bd  Weekly Goal(04/10/2023): min/mod, stand pivot  Discharge Goal: Min,  rwx    [PT] Bed Mobility(Active)  Current Status(03/27/2023): Min/mod to CGA, HOB elev  Weekly Goal(03/31/2023): SBA  Discharge Goal: CGA    Signed by: Deanna Carr PT

## 2023-04-04 NOTE — PROGRESS NOTES
LOS: 18 days   Patient Care Team:  Chloe Schaefer APRN as PCP - General (Family Medicine)  Mikhail Glez MD as Consulting Physician (Neurology)      Patient Name: ELEONORA GARCIA  Patient : 1965    ADMITTING DIAGNOSIS:  Diagnoses    1. 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   S/p IVIG x 5 days    SUBJECTIVE:  Patient is seen and examined in therapy. She is tolerating therapy well. She does not want to stop using the purewick but states that she cannot afford a home purewick so she will have to stop using it at some point. Receptive to trying without the purewick tonight. No acute complaints.     REVIEW OF SYSTEMS:    General: denies weight change, fatigue, weakness, fever, chills, night sweats.   Skin: denies itching, rashes, sores and bruises.   Neurologic: denies headache, nausea, vomiting, or visual changes.   HEENT:  denies discharge, sore throat, allergies, and congestion.   Respiratory: denies shortness of breath, wheeze, cough and hemoptysis.   Cardiac:  denies chest pain or palpitations.   Gastrointestinal:  denies changes in appetite, nausea, vomiting, dysphagia, abdominal pain, constipation, or diarrhea.   Urinary:  denies urgency and frequency.  Musculoskeletal:  denies muscle weakness, pain, or joint stiffness.    OBJECTIVE:    Vitals:    23 1425   BP: 93/58   Pulse: 87   Resp: 16   Temp: 98 °F (36.7 °C)   SpO2: 97%       PHYSICAL EXAM:   General: pleasant, in no acute distress  HEENT: NCAT, sclera anicteric, conjunctiva pink, mucoa moist  Cardiovascular: RRR, +S1+S2, no obvious m/g/r  Lungs: CTAB with somewhat diminished breath sounds on the right lung base, no rhonchi or wheezing  Abdomen: normoactive bowel sounds, soft, non tender to palpation  Extremities: no peripheral edema  Skin: exposed surfaces of skin warm, intact, without erythema  Neuro: awake alert and  oriented to person, place, time, and situation, CN II-XII grossly intact  MSK: antigravity strength throughout      MEDICATIONS  Scheduled Meds:  apixaban, 5 mg, Oral, Q12H  desvenlafaxine, 25 mg, Oral, Daily  folic acid, 1 mg, Oral, Daily  gabapentin, 300 mg, Oral, Q8H  ipratropium-albuterol, 3 mL, Nebulization, 4x Daily - RT  lamoTRIgine, 200 mg, Oral, Daily  melatonin, 5 mg, Oral, Nightly  pantoprazole, 40 mg, Oral, Q AM  thiamine, 100 mg, Oral, Daily  vitamin B-12, 500 mcg, Oral, Daily        Continuous Infusions:       PRN Meds:  •  acetaminophen  •  bisacodyl  •  hydrocortisone-bacitracin-zinc oxide-nystatin  •  oxyCODONE  •  polyethylene glycol  •  senna-docusate sodium  •  simethicone      RESULTS:  No results found for: POCGLU  Results from last 7 days   Lab Units 04/03/23  0657 03/29/23  0549   WBC 10*3/mm3 8.15 9.72   HEMOGLOBIN g/dL 8.3* 8.1*   HEMATOCRIT % 25.0* 24.6*   PLATELETS 10*3/mm3 359 501*     Results from last 7 days   Lab Units 04/03/23  0657 03/29/23  0549   SODIUM mmol/L 133* 134*   POTASSIUM mmol/L 3.9 3.7   CHLORIDE mmol/L 95* 96*   CO2 mmol/L 31.6* 31.3*   BUN mg/dL 10 7   CREATININE mg/dL 1.06* 1.02*   CALCIUM mg/dL 9.7 9.0   GLUCOSE mg/dL 91 97           ASSESSMENT and PLAN:    Idiopathic peripheral neuropathy    Paresthesia    History of blood clots    Chronic anticoagulation    Seizures    Anemia    Guillain-[place] syndrome    MRSA bacteremia       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   S/p IVIG x 5 days  March 30-shows improvement with her strength proximally the upper extremities.  Improvement with her hand strength.  Improved strength in the right lower extremity but continues with profound weakness in the left lower extremity.  With weakness of the left ankle, will look at probable AFO for gait activities.  Transfers are moderate assist.  Ambulated with a rolling walker up to 70 feet with gait deviations minimal moderate  assist      Impaired mobility/impaired self care/ impaired cognition     R lung masslike infiltrate with cavitary lesions/pleural effusion   S/p thoracentesis - Blood cx and pleural fluid  positive for MRSA      chest tube placement given empyema, and she underwent thoracoscopy w/ decortication 3/8/23   -expected postoperative changes with pleural thickening and minimal pleural effusion.  The effusion is too small for intervention and will likely to continue to resolve with time.  Recommend continue good pulmonary hygiene with incentive spirometry hourly as well as increase activity/ambulation as tolerated.  March 23-appears decreased breath sounds more noticeable today on the right compared to the left.  Check follow-up chest x-ray.  On room air during the day, 1 L at night.  March 24- CXR relatively unchanged from previous, reached out to CT surgery given planned outpatient f/u on 3/28, no further imaging planned at this time as patient afebrile with normal WBC, monitor     Right-sided Chest Pain 3/24  -EKG sinus tach  -HS Troponin 25 > 24  -consider Cardiology consult if worsening  -pain reproducible with palpation, pain likely postoperative neuralgia as indicated vs. possible costochondritis, continue gabapentin, ice/heat, monitor     Hypokalemia  -3/24 K 3.3, repleted  -3/29 K 3.7, resolved     mass on TTE with findings concerning for endocarditis - underwent MARIAMA 3/10/2023 which had no vegetation  RUE superficial thrombophlebitis concerning for septic phlebitis.     ID - continue 4 weeks of vancomycin as recommended by infectious disease dosed by pharmacy to achieve a trough level of 15-20 or area under the curve of 400-600 from last negative blood culture or last intervention which ever is the latest - to complete April 1, 2023     Left hip arthrocentesis March 16 to rule out any hip septic arthritis- no growth to date on fluid.    lower back pain - MRI of spine to rule out discitis or osteomyelitis.  This did  have known degenerative changes but  no infection.      DVT prophylaxis - SCDs / apixiban. History of LLE DVT - on Eliquis at home     Pain management   - Tylenol 1,000 mg three times a day/ Celebrex 100 mg q 112 hours/ gabapentin 300 mg q 8 hours Oxycodone 5 mg q 4 hours prn  March 18 - DC Celebrex 2/2 PHYLLIS, and anemia      Anxiety/ muscle spasms - Diazepam 2 mg q 6 hours prn - JONES reviewed     Seizure disorder - Lamotrigine 200 mg daily     ABLA   - March 18- Hgb 7.0 (7.3 on 3/16). Type, cross and transfuse 1 unit PRBCs. Pre-medicate with Tylenol and Benadryl. CBC in am.   March 19- Hgb 8.8 s/p 1 unit PRBcs. Hemoccult positive. On Eliquis for h/o DVT. Follow Hgb. May need GI work up.  March 20 - HGB 9.3 s/p transfusion  March 21-reviewed with internal medicine-hemoglobin stable after transfusion.  Iron studies consistent with inflammation.  No further work-up presently  March 22 - Hgb 8.9, stable  March 23-hemoglobin trend 8.3.  Continue to follow.  Recheck tomorrow  March 29 - Hgb 8.1, stable     PHYLLIS - March 18- Creatinine 1.36 up from 1.12. On IV Vanc and Celebrex. DC Celebrex. BMP in am.  March 19- s/p 500ml NS bolus. Creatinine 1.31.  March 23-creatinine 0.92     TEAM CONF - MARCH 21 - BED MOD X 2. TRANSFERS MAX 2. STAND PIVOT OR SQUAT PIVOT. NON-AMBULATORY. Saturday MARCH 18 GAIT 6 FEET PARALLEL BARS MOD MAX OF 2.   BATH MOD 1-2. LBD DEP. UBD MOD. EATING MIN. GROOMING MIN. TOILETING DEP.   The patient has difficulty remembering new information due to short-term memory impairments.  Initial evaluation 3.18, pt obtained a score 12/30 on SLUMS cognitive assessment indicating severe cognitive impairment/dementia, goals initiated for memory and sustaining attention  FEES completed 3.7 revealing glottic gap accounting for glottic insufficiency, dysphonia, hoarse/breathy quality, recommend targeting vocal function as appropriate d/t impact on intelligibility.    Regular/thin diet continued since FEES completion  3.7, although pt known to cough with and without PO intake, may be contributed partially to ineffective VF closure.   DRESSING FORMER CHEST TUBE SITE. CONTINUES ON PUREWICK AT NIGHT. O2 AT 1-2 LITERS AT NIGHT.  DECREASED APPETITE. ABD X-RAY THIS AM SHOWS NON-OBSTRUCTIVE BOWEL GAS PATTERN.   ELOS - 4 WEEKS     TEAM CONF - MARCH 28 - BED MIN CTG. TRANSFER MOD ASSIST STAND PIVOT OR SQUAT PIVOT. FATIGUES IN AFERNOONS. GAIT 15 FEET RW MIN X 2. FLUCTUATING ORIENTATION. BETTER DETAIL TO TASK. MIN MOD CUES FOR REASONING TASKS. DYSPHONIA FROM COUGHING.  SLP REVIEWED VOICE HYGIENE, NOT TO DO HARSH THROAT CLEAR. BATH MOD. LBD MAX. UBD MIN. TOILETING MOD X 2. CONTINENT/INCONTINENT BLADDER. RIGHT CHEST TUBE / THORACOSCOPY SITE CARE. VARIABLE INTAKE.   ELOS - 3 WEEKS    TEAM CONF - APRIL 4 - CONTINUES WEAK IN LLE. STRONGER IN BUE AND RIGHT KNEE EXTENSION. BED MIN MOD TO CTG. TRANSFERS MOD MAX STAND PIVOT OR SQUAT PIVOT. WORKED ON GAIT IN PARALLEL BARS MIN MOD OF 2 PERSON. CAN DO 15 FEET IF NOT SO ANXIOUS. TOILET AND SHOWER TRANSFERS MOD ASSIST. DROP ARM BEDSIDE COMMODE. AT NIGHT USING BEDPAN. USING PUREWICK AT NIGHT. BATH MIN. LBD MOD. UBD SET UP.TOILETING MOD MAX 2.    Pt with improved ability to recall salient events  from day.Now presents with mild-moderate hoarse vocal  quality. Improved vocal intensity.  ADDRSSING DYSPHONIA. DIETICIAN REVIEWED FOOD CHOICES. GABAPENTIN FOR RIGHT RIB PAIN/INTERCOSTAL PAIN.CONTINENT BOWEL AND BLADDER WITH URGENCY.   ELOS - 2-3 WEEKS    CODE STATUS:  Level Of Support Discussed With: Patient  Code Status (Patient has no pulse and is not breathing): CPR (Attempt to Resuscitate)  Medical Interventions (Patient has pulse or is breathing): Full Support      Admission Status: Continues to meet requirements for inpatient admission.     Patient seen and evaluated with attending physician, Dr. Rider. Please see attestation.     Olive Torres, DO  Physical Medicine and Rehabilitation   PGY-2    During  rounds, used appropriate personal protective equipment including mask and gloves.  Additional gown if indicated.  Mask used was standard procedure mask. Appropriate PPE was worn during the entire visit.  Hand hygiene was completed before and after.

## 2023-04-04 NOTE — PROGRESS NOTES
TEAM CONF - APRIL 4 - CONTINUES WEAK IN LLE. STRONGER IN BUE AND RIGHT KNEE EXTENSION. BED MIN MOD TO CTG. TRANSFERS MOD MAX STAND PIVOT OR SQUAT PIVOT. WORKED ON GAIT IN PARALLEL BARS MIN MOD OF 2 PERSON. CAN DO 15 FEET IF NOT SO ANXIOUS. TOILET AND SHOWER TRANSFERS MOD ASSIST. DROP ARM BEDSIDE COMMODE. AT NIGHT USING BEDPAN. USING PUREWICK AT NIGHT. BATH MIN. LBD MOD. UBD SET UP.TOILETING MOD MAX 2.    Pt with improved ability to recall salient events  from day.Now presents with mild-moderate hoarse vocal  quality. Improved vocal intensity.  ADDRSSING DYSPHONIA. DIETICIAN REVIEWED FOOD CHOICES. GABAPENTIN FOR RIGHT RIB PAIN/INTERCOSTAL PAIN.CONTINENT BOWEL AND BLADDER WITH URGENCY.   ELOS - 2-3 WEEKS

## 2023-04-04 NOTE — THERAPY TREATMENT NOTE
Inpatient Rehabilitation - Occupational Therapy Treatment Note    [facility]     Patient Name: Louise GARCIA  : 1965  MRN: [id number]    Today's Date: 2023                 Admit Date: 3/17/2023         ICD-10-CM ICD-9-CM   1. Impaired functional mobility, balance, gait, and endurance  Z74.09 V49.89       Patient Active Problem List   Diagnosis   • Sepsis   • Neck pain, chronic   • Upper back pain   • Paresthesia   • Cervical myelopathy   • Lower extremity weakness   • History of blood clots   • Chronic anticoagulation   • Seizures   • Anemia   • GERD (gastroesophageal reflux disease)   • Guillain-[place] syndrome   • Situational mixed anxiety and depressive disorder   • Mass of middle lobe of right lung   • Oral candidiasis   • Empyema of pleura   • MRSA bacteremia   • Idiopathic peripheral neuropathy       Past Medical History:   Diagnosis Date   • Degenerative disc disease, cervical    • Gestational diabetes    • H/O blood clots    • Hematemesis    • Seizures    • Septic shock        Past Surgical History:   Procedure Laterality Date   • APPENDECTOMY     • BACK SURGERY     • CHOLECYSTECTOMY     • ENDOSCOPY N/A 2016    Procedure: ESOPHAGOGASTRODUODENOSCOPY with biopsy;  Surgeon: Austen Brito MD;  Location: [facility] ENDOSCOPY;  Service:    • HYSTERECTOMY     • NECK SURGERY       approx 6 yrs ago   • THORACOSCOPY Right 3/8/2023    Procedure: THORACOSCOPY WITH DAVINCI ROBOT WITH COMPLETE DECORTICATION;  Surgeon: Chloe Cedillo MD;  Location: [facility] OR;  Service: Robotics - DaVinci;  Laterality: Right;   • TONSILLECTOMY     • TONSILLECTOMY AND ADENOIDECTOMY               [facility] OT ASSESSMENT FLOWSHEET (last 12 hours)     IRF OT Evaluation and Treatment     Row Name 23 1415          OT Time and Intention    Document Type daily treatment  -AF     Mode of Treatment occupational therapy  -AF     Patient Effort good  -AF     Row Name 23 1415          General Information     Patient/Family/Caregiver Comments/Observations pt sitting up in w/c in room with  present  -AF     General Observations of Patient discussed stairs into RV and acess to her bathroom  -AF     Existing Precautions/Restrictions fall  contact precautions  -AF     Row Name 04/04/23 1415          Pain Assessment    Pretreatment Pain Rating 0/10 - no pain  -AF     Posttreatment Pain Rating 0/10 - no pain  -AF     Pre/Posttreatment Pain Comment states feeling better than yesterday  -AF     Row Name 04/04/23 1415          Cognition/Psychosocial    Affect/Mental Status (Cognition) flat/blunted affect  -AF     Orientation Status (Cognition) oriented x 3  -AF     Follows Commands (Cognition) follows one-step commands;over 90% accuracy;increased processing time needed  -AF     Personal Safety Interventions fall prevention program maintained;gait belt;nonskid shoes/slippers when out of bed  -AF     Cognitive Function memory deficit;safety deficit  -AF     Memory Deficit (Cognition) episodic memory;procedural memory  -AF     Safety Deficit (Cognition) insight into deficits/self-awareness  -AF     Comment, Cognitive Interventions increased processing time, dual tasking is difficulty  -AF     Row Name 04/04/23 1415          Bathing    [place] Level (Bathing) bathing skills;lower body;upper body;minimum assist (75% patient effort);verbal cues  -AF     Assistive Device (Bathing) grab bar/tub rail;hand held shower spray hose;tub bench  -AF     Position (Bathing) supported sitting;supported standing  -AF     Row Name 04/04/23 1415          Upper Body Dressing    [place] Level (Upper Body Dressing) upper body dressing skills;set up assistance  -AF     Position (Upper Body Dressing) supported sitting  -AF     Row Name 04/04/23 1415          Lower Body Dressing    [place] Level (Lower Body Dressing) doff;don;pants/bottoms;shoes/slippers;socks;moderate assist (50% patient effort);maximum assist (25% patient effort)   -AF     Position (Lower Body Dressing) supported standing;supported sitting  -AF     Comment (Lower Body Dressing) difficult with figure four crossing with LBD tasks  -AF     Row Name 04/04/23 1415          Grooming    [place] Level (Grooming) grooming skills;set up  -AF     Position (Grooming) supported sitting  -AF     Comment (Grooming) w/c level  -AF     Row Name 04/04/23 1415          Bed Mobility    Comment, (Bed Mobility) up in w/c  -AF     Row Name 04/04/23 1415          Transfer Assessment/Treatment    Comment, (Transfers) sit pivot transfer EOM <> w/c MOD/MAX A x 1, sit to stand ins hower with grab bar use MOD A  -AF     Row Name 04/04/23 1415          Shower Transfer    Type (Shower Transfer) squat pivot  -AF     [place] Level (Shower Transfer) 2 person assist;moderate assist (50% patient effort)  -AF     Assistive Device (Shower Transfer) grab bar, tub/shower;tub bench;wheelchair  -AF     Comment, (Shower Transfer) sit pivot  -AF     Row Name 04/04/23 1415          Shoulder (Therapeutic Exercise)    Shoulder Strengthening (Therapeutic Exercise) bilateral;external rotation;internal rotation;sitting;2 sets;10 repetitions  #1.5 wrist weights  -AF     Row Name 04/04/23 1415          Elbow/Forearm (Therapeutic Exercise)    Elbow/Forearm Strengthening (Therapeutic Exercise) bilateral;flexion;extension;sitting;10 repetitions;2 sets  #1.5 wrist weights  -AF     Row Name 04/04/23 1415          Balance    Comment, Balance functional reaching tasks BUE with pegs/resistive peg board placed at waist hieght to increased forward flexion and engage core during activity. pt woarked on lateral reaching during task and crossing midline CGA provided during forward flexion tasks  -AF     Row Name 04/04/23 1415          Positioning and Restraints    Pre-Treatment Position sitting in chair/recliner  -AF     Post Treatment Position wheelchair  -AF     In Wheelchair sitting;call light within reach;encouraged to call  for assist;exit alarm on;with family/caregiver;with PT  with  in room in AM with PT in PM  -AF           User Key  (r) = Recorded By, (t) = Taken By, (c) = Cosigned By    Initials Name Effective Dates    AF Heaven Villareal, OTR 06/16/21 -                  Occupational Therapy Education     Title: PT OT SLP Therapies (Done)     Topic: Occupational Therapy (Done)     Point: ADL training (Done)     Description:   Instruct learner(s) on proper safety adaptation and remediation techniques during self care or transfers.   Instruct in proper use of assistive devices.              Learning Progress Summary           Patient Acceptance, E, VU by WN at 4/4/2023 0322   Family Acceptance, E, VU by WN at 4/4/2023 0322                   Point: Home exercise program (Done)     Description:   Instruct learner(s) on appropriate technique for monitoring, assisting and/or progressing therapeutic exercises/activities.              Learning Progress Summary           Patient Acceptance, E, VU by WN at 4/4/2023 0322   Family Acceptance, E, VU by WN at 4/4/2023 0322                   Point: Precautions (Done)     Description:   Instruct learner(s) on prescribed precautions during self-care and functional transfers.              Learning Progress Summary           Patient Acceptance, E, VU by WN at 4/4/2023 0322   Family Acceptance, E, VU by WN at 4/4/2023 0322                   Point: Body mechanics (Done)     Description:   Instruct learner(s) on proper positioning and spine alignment during self-care, functional mobility activities and/or exercises.              Learning Progress Summary           Patient Acceptance, E, VU by WN at 4/4/2023 0322   Family Acceptance, E, VU by WN at 4/4/2023 0322                               User Key     Initials Effective Dates Name Provider Type Discipline     08/23/22 -  Jose Carrillo, RN Registered Nurse Nurse                    OT Recommendation and Plan                         Time  Calculation:      Time Calculation- OT     Row Name 04/04/23 1424 04/04/23 1422          Time Calculation- OT    OT Start Time 1230  -AF 1030  -AF     OT Stop Time 1300  -AF 1100  -AF     OT Time Calculation (min) 30 min  -AF 30 min  -AF           User Key  (r) = Recorded By, (t) = Taken By, (c) = Cosigned By    Initials Name Provider Type    AF Heaven Villareal, OTR Occupational Therapist              Therapy Charges for Today     Code Description Service Date Service Provider Modifiers Qty    65028842653 HC OT SELF CARE/MGMT/TRAIN EA 15 MIN 4/3/2023 Heaven Villareal, OTR GO 1    20799996190 HC OT THER PROC EA 15 MIN 4/3/2023 Heaven Villareal, OTR GO 1    73936989463 HC OT NEUROMUSC RE EDUCATION EA 15 MIN 4/3/2023 Heaven Villareal, OTR GO 2    98908294488 HC OT SELF CARE/MGMT/TRAIN EA 15 MIN 4/4/2023 Heaven Villareal, OTR GO 2    01265176192 HC OT THER SUPP EA 15 MIN 4/4/2023 Heaven Villareal, OTR GO 2    03159017438 HC OT NEUROMUSC RE EDUCATION EA 15 MIN 4/4/2023 Heaven Villareal, OTR GO 2                   MARY ANN Gunn  4/4/2023

## 2023-04-04 NOTE — PLAN OF CARE
Goal Outcome Evaluation:           Progress: improving  Outcome Evaluation: A&OX4. Forgetful. Difficulty with word-finding at times. Idiopathic peripheral neuropathy. Full code. Hx. chronic anemia and seizures. Daily weight. 0.5 L O2 at nite, for comfort.  Assistx2 with the wheelchair. Very weak in the legs. Continent and incontinent of B&B. Last BM 4/3. Wears a brief. Purewick at night. On scheduled gabapentin and tylenol. Has PRN Aura 2.5 mg. IV removed from R. arm yesterday. Seizure precautions. Siderails padded. On-going pain is better today. Diet: Reg, thins. Not eating much at meals. Is drinking boost shakes. Has napped on and off in between and after therapies. Runs tachy.  at bedside. Participated fully in therapy. Sat in a chair and was more alert, engaged, and awake today. Uses a bedpan. Wears glasses.

## 2023-04-04 NOTE — PROGRESS NOTES
Inpatient Rehabilitation Plan of Care Note    Plan of Care  Care Plan Reviewed - No updates at this time.    Sphincter Control    Performed Intervention(s)  Bladder/bowel training program  Monitor intake and output  Use incontinence products/equipment      Psychosocial    Performed Intervention(s)  Verbalizes needs and concerns  Support from family/peer groups      Body Systems    Performed Intervention(s)  Perform active and passive ROM  Frequent position changes      Safety    Performed Intervention(s)  Safety Rounds  Bed alarm/Chair alarm  Items within reach    Signed by: Jose Carrillo RN

## 2023-04-04 NOTE — THERAPY PROGRESS REPORT/RE-CERT
Inpatient Rehabilitation - Physical Therapy Progress Note       [facility]     Patient Name: Louise GARCIA  : 1965  MRN: [id number]    Today's Date: 2023                    Admit Date: 3/17/2023      Visit Dx:     ICD-10-CM ICD-9-CM   1. Impaired functional mobility, balance, gait, and endurance  Z74.09 V49.89       Patient Active Problem List   Diagnosis   • Sepsis   • Neck pain, chronic   • Upper back pain   • Paresthesia   • Cervical myelopathy   • Lower extremity weakness   • History of blood clots   • Chronic anticoagulation   • Seizures   • Anemia   • GERD (gastroesophageal reflux disease)   • Guillain-[place] syndrome   • Situational mixed anxiety and depressive disorder   • Mass of middle lobe of right lung   • Oral candidiasis   • Empyema of pleura   • MRSA bacteremia   • Idiopathic peripheral neuropathy       Past Medical History:   Diagnosis Date   • Degenerative disc disease, cervical    • Gestational diabetes    • H/O blood clots    • Hematemesis    • Seizures    • Septic shock        Past Surgical History:   Procedure Laterality Date   • APPENDECTOMY     • BACK SURGERY     • CHOLECYSTECTOMY     • ENDOSCOPY N/A 2016    Procedure: ESOPHAGOGASTRODUODENOSCOPY with biopsy;  Surgeon: Austen Brito MD;  Location: [facility] ENDOSCOPY;  Service:    • HYSTERECTOMY     • NECK SURGERY       approx 6 yrs ago   • THORACOSCOPY Right 3/8/2023    Procedure: THORACOSCOPY WITH DAVINCI ROBOT WITH COMPLETE DECORTICATION;  Surgeon: Chloe Cedillo MD;  Location: [facility] OR;  Service: Robotics - DaVinci;  Laterality: Right;   • TONSILLECTOMY     • TONSILLECTOMY AND ADENOIDECTOMY         PT ASSESSMENT (last 12 hours)     IRF PT Evaluation and Treatment     Row Name 23 0800          PT Time and Intention    Document Type progress note  -AE     Mode of Treatment individual therapy;physical therapy  -AE     Patient/Family/Caregiver Comments/Observations complaining of feeling groggy 2/2 pain  meds  -AE     Row Name 04/04/23 0800          General Information    Patient Profile Reviewed yes  -AE     Existing Precautions/Restrictions --  contact precautions  -AE     Limitations/Impairments safety/cognitive  -AE     Row Name 04/04/23 0800          Pain Assessment    Pretreatment Pain Rating 4/10  -AE     Posttreatment Pain Rating 5/10  -AE     Pain Location - Side/Orientation Left  -AE     Pain Location lower  -AE     Pain Location - extremity  -AE     Row Name 04/04/23 0800          Cognition/Psychosocial    Affect/Mental Status (Cognition) flat/blunted affect  -AE     Orientation Status (Cognition) oriented x 3  -AE     Follows Commands (Cognition) follows one-step commands;over 90% accuracy;increased processing time needed  -AE     Personal Safety Interventions fall prevention program maintained;gait belt;muscle strengthening facilitated;nonskid shoes/slippers when out of bed;supervised activity  -AE     Cognitive Function memory deficit;safety deficit  -AE     Memory Deficit (Cognition) episodic memory;procedural memory  -AE     Row Name 04/04/23 0800          Bed Mobility    Supine-Sit [place] (Bed Mobility) minimum assist (75% patient effort)  -AE     Row Name 04/04/23 0800          Chair-Bed Transfer    Chair-Bed [place] (Transfers) moderate assist (50% patient effort);maximum assist (25% patient effort)  -AE     Assistive Device (Chair-Bed Transfers) wheelchair  -AE     Comment, (Chair-Bed Transfer) squat pivot  -AE     Row Name 04/04/23 0800          Sit-Stand Transfer    Sit-Stand [place] (Transfers) minimum assist (75% patient effort);moderate assist (50% patient effort)  -AE     Assistive Device (Sit-Stand Transfers) parallel bars  -AE     Comment, (Sit-Stand Transfer) performed ~5x with varying hand placement pulling vs L hand pulling, R hand pushing from wc. varying assist between min/modA with cues for anterior weight shifting, speed, and proper breathing  performed 3x with  "weighted 8# walker. becomes anxious when having to let go of wheelchair to reach for walker and \"hovers\" requiring modA to recover  -AE     Row Name 04/04/23 0800          Stand-Sit Transfer    Stand-Sit [place] (Transfers) minimum assist (75% patient effort);moderate assist (50% patient effort)  -AE     Assistive Device (Stand-Sit Transfers) parallel bars  -AE     Row Name 04/04/23 0800          Gait/Stairs (Locomotion)    [place] Level (Gait) contact guard;minimum assist (75% patient effort);1 person assist  -AE     Assistive Device (Gait) parallel bars  -AE     Distance in Feet (Gait) 8' // bars  -AE     Pattern (Gait) step-through;step-to  -AE     Deviations/Abnormal Patterns (Gait) feder[place] decreased;bilateral deviations;base of support, narrow;festinating/shuffling;stride length decreased;weight shifting decreased;left sided deviations;antalgic  -AE     Bilateral Gait Deviations heel strike decreased;weight shift ability decreased;knee buckling bilaterally  -AE     Left Sided Gait Deviations decreased knee extension  -AE     Right Sided Gait Deviations decreased knee extension  -AE     Row Name 04/04/23 0800          Balance    Static Standing Balance minimal assist  -AE     Position/Device Used, Standing Balance walker, front-wheeled;parallel bars  -AE     Comment, Balance standing alternating heel taps in // bars 5 reps with no episodes of knee buckling. cues for glute activation and upright posture  static standing // bars 3 x 1 min duration with minimal lateral weight shifting  -AE     Row Name 04/04/23 0800          Modality Intervention    Additional Documentation Modality Treatment (Group)  -AE     Row Name 04/04/23 0800          Modality Treatment    Treatment Location 2 (Modality) lower leg/calf  -AE     Modality Performed other (see comments)  STM, skin rolling  -AE     Comment, Modality Treatment reduced pain in lower leg  -AE     Row Name 04/04/23 0800          Positioning and Restraints "    Pre-Treatment Position sitting in chair/recliner  -AE     Post Treatment Position wheelchair  -AE     In Wheelchair sitting;call light within reach;encouraged to call for assist;exit alarm on;with family/caregiver  -AE     Row Name 04/04/23 0800          Weekly Progress Summary (PT)    Functional Goal Overall Progress (PT) progressing toward functional goals as expected  -AE     Weekly Progress Summary (PT) Pt has improved gait training to ambulating in // bars with CGA/Bianca and decreased knee buckling. Has consistently performed more standing activities/balance exercise with FWW including ambulating 5-15ft consistently with FWW. Still struggles with generalized weakness requiring modA for transfers, pain in LLE and anxiety with all mobility. Skilled PT needed to address above impairments. DC recs include 24hr assist.  -AE     Impairments Still Limiting Function (PT) balance impairment;cognitive impairment;coordination impairment;flexibility decreased;functional activity tolerance impairment;inability to direct own health care;motor control impaired;muscle tone abnormality;pain;postural control impaired;range of motion deficit;sensory impairment;strength deficit  -AE     Recommendations (PT) 24hr supervision/assist  -AE     Plan for Continued Service After Discharge (PT) HHPT vs SNU pending on environmental limitations  -AE     Row Name 04/04/23 0800          Bed Mobility Goal 1 (PT-IRF)    Activity/Assistive Device (Bed Mobility Goal 1, PT-IRF) bed mobility activities, all  -AE     [place] Level (Bed Mobility Goal 1, PT-IRF) minimum assist (75% or more patient effort)  -AE     Time Frame (Bed Mobility Goal 1, PT-IRF) 1 week  -AE     Progress/Outcomes (Bed Mobility Goal 1, PT-IRF) continuing progress toward goal;other (see comments)  currently performing inconsistently. varies between min<>modA with rails  -AE     Row Name 04/04/23 0800          Bed Mobility Goal 2 (PT-IRF)    Activity/Assistive Device (Bed  Mobility Goal 2, PT-IRF) bed mobility activities, all  -AE     [place] Level (Bed Mobility Goal 2, PT-IRF) supervision required  -AE     Time Frame (Bed Mobility Goal 2, PT-IRF) 3 weeks  -AE     Progress/Outcomes (Bed Mobility Goal 2, PT-IRF) continuing progress toward goal  -AE     Row Name 04/04/23 0800          Transfer Goal 1 (PT-IRF)    Activity/Assistive Device (Transfer Goal 1, PT-IRF) bed-to-chair/chair-to-bed  -AE     [place] Level (Transfer Goal 1, PT-IRF) minimum assist (75% or more patient effort)  -AE     Time Frame (Transfer Goal 1, PT-IRF) short-term goal (STG);2 weeks  -AE     Progress/Outcomes (Transfer Goal 1, PT-IRF) continuing progress toward goal  -AE     Row Name 04/04/23 0800          Transfer Goal 2 (PT-IRF)    Activity/Assistive Device (Transfer Goal 2, PT-IRF) all transfers  -AE     [place] Level (Transfer Goal 2, PT-IRF) supervision required  -AE     Progress/Outcomes (Transfer Goal 2, PT-IRF) goal no longer appropriate  -AE     Row Name 04/04/23 0800          Transfer Goal 3 (PT-IRF)    Activity/Assistive Device (Transfer Goal 3, PT-IRF) car transfer  -AE     [place] Level (Transfer Goal 3, PT-IRF) minimum assist (75% or more patient effort)  -AE     Time Frame (Transfer Goal 3, PT-IRF) 3 weeks  -AE     Progress/Outcomes (Transfer Goal 3, PT-IRF) goal revised this date;continuing progress toward goal  -AE     Row Name 04/04/23 0800          Gait/Walking Locomotion Goal 1 (PT-IRF)    Activity/Assistive Device (Gait/Walking Locomotion Goal 1, PT-IRF) gait (walking locomotion);walker, rolling  -AE     Gait/Walking Locomotion Distance Goal 1 (PT-IRF) 20  -AE     [place] Level (Gait/Walking Locomotion Goal 1, PT-IRF) moderate assist (50-74% patient effort);tactile cues required  -AE     Time Frame (Gait/Walking Locomotion Goal 1, PT-IRF) 2 weeks;short-term goal (STG)  -AE     Progress/Outcomes (Gait/Walking Locomotion Goal 1, PT-IRF) continuing progress toward  goal;other (see comments)  only performed once but has not been able to perform since, goal revisited and continue to address during POC  -AE     Row Name 04/04/23 0800          Gait/Walking Locomotion Goal 2 (PT-IRF)    Activity/Assistive Device (Gait/Walking Locomotion Goal 2, PT-IRF) gait (walking locomotion);walker, rolling  -AE     Gait/Walking Locomotion Distance Goal 2 (PT-IRF) 150  -AE     [place] Level (Gait/Walking Locomotion Goal 2, PT-IRF) contact guard required  -AE     Progress/Outcomes (Gait/Walking Locomotion Goal 2, PT-IRF) goal no longer appropriate  -AE     Row Name 04/04/23 0800          Stairs Goal 1 (PT-IRF)    Activity/Assistive Device (Stairs Goal 1, PT-IRF) stairs, all skills;using handrail, left;using handrail, right  -AE     Number of Stairs (Stairs Goal 1, PT-IRF) 3  -AE     [place] Level (Stairs Goal 1, PT-IRF) minimum assist (75% or more patient effort)  -AE     Time Frame (Stairs Goal 1, PT-IRF) by discharge;3 weeks  -AE     Progress/Outcomes (Stairs Goal 1, PT-IRF) goal revised this date;continuing progress toward goal  -AE           User Key  (r) = Recorded By, (t) = Taken By, (c) = Cosigned By    Initials Name Provider Type    AE Deanna Carr, PT Physical Therapist              Wound 03/08/23 1917 Right lateral chest Incision (Active)   Dressing Appearance dry;intact 04/04/23 0809   Closure Liquid skin adhesive 04/04/23 0809   Base clean;dry 04/04/23 0809   Periwound pink 04/04/23 0809   Drainage Amount none 04/04/23 0809   Care, Wound cleansed with;soap and water;antimicrobial agent applied 04/04/23 0809   Dressing Care dressing changed;gauze, dry 04/04/23 0809   Periwound Care dry periwound area maintained 04/04/23 0809       Wound 03/11/23 1530 Other (See comments) other (see comments) pubis Abrasion (Active)   Dressing Appearance open to air 04/04/23 0809   Closure Open to air 04/04/23 0809   Base red;scab 04/04/23 0809   Drainage Amount none 04/04/23 0809    Dressing Care open to air 04/04/23 0809       Wound 03/20/23 1527 Bilateral upper gluteal MASD (Moisture associated skin damage) (Active)   Closure Open to air 04/04/23 0809   Base blanchable;clean;dry 04/04/23 0809   Drainage Amount none 04/04/23 0809   Care, Wound cleansed with;soap and water 04/04/23 0809   Dressing Care open to air 04/04/23 0809   Periwound Care barrier ointment applied 04/04/23 0809     Physical Therapy Education     Title: PT OT SLP Therapies (Done)     Topic: Physical Therapy (Done)     Point: Mobility training (Done)     Learning Progress Summary           Patient Acceptance, E, VU by WN at 4/4/2023 0322    Acceptance, E,D, VU,DU by DP at 4/1/2023 1352    Acceptance, E, VU,DU by MG at 3/31/2023 0825    Acceptance, E, VU,DU by MG at 3/30/2023 0956    Acceptance, E,D, VU,DU by DP at 3/29/2023 1148    Nonacceptance, E,D, VU,DU by DP at 3/28/2023 1144    Acceptance, E,D, VU,DU by DP at 3/27/2023 1601    Acceptance, E,D, VU,NR by EE at 3/25/2023 1423    Acceptance, E,D, NR,VU,DU by DP at 3/21/2023 1448    Acceptance, E, NR by JK at 3/20/2023 1513    Acceptance, E,TB, VU,NR by KP at 3/18/2023 1643   Family Acceptance, E, VU by WN at 4/4/2023 0322                   Point: Home exercise program (Done)     Learning Progress Summary           Patient Acceptance, E, VU by WN at 4/4/2023 0322    Acceptance, E,D, VU,DU by DP at 4/1/2023 1352    Acceptance, E, VU,DU by MG at 3/31/2023 0825    Acceptance, E, VU,DU by MG at 3/30/2023 0956    Acceptance, E,D, VU,DU by DP at 3/29/2023 1148    Nonacceptance, E,D, VU,DU by DP at 3/28/2023 1144    Acceptance, E,D, VU,DU by DP at 3/27/2023 1601    Acceptance, E,D, NR,VU,DU by LOLIS at 3/21/2023 1448    Acceptance, E, NR by CLOVERK at 3/20/2023 1513   Family Acceptance, E, VU by WN at 4/4/2023 0322                   Point: Body mechanics (Done)     Learning Progress Summary           Patient Acceptance, E, VU by YVONNE at 4/4/2023 0322    Acceptance, E,D, VU,DU by LOLIS at  4/1/2023 1352    Acceptance, E, VU,DU by MG at 3/31/2023 0825    Acceptance, E, VU,DU by MG at 3/30/2023 0956    Acceptance, E,D, VU,DU by DP at 3/29/2023 1148    Nonacceptance, E,D, VU,DU by DP at 3/28/2023 1144    Acceptance, E,D, VU,DU by DP at 3/27/2023 1601    Acceptance, E,D, VU,NR by EE at 3/25/2023 1423    Acceptance, E,D, NR,VU,DU by DP at 3/21/2023 1448   Family Acceptance, E, VU by WN at 4/4/2023 0322                   Point: Precautions (Done)     Learning Progress Summary           Patient Acceptance, E, VU by WN at 4/4/2023 0322    Acceptance, E,D, VU,DU by DP at 4/1/2023 1352    Acceptance, E, VU,DU by MG at 3/31/2023 0825    Acceptance, E, VU,DU by MG at 3/30/2023 0956    Acceptance, E,D, VU,DU by DP at 3/29/2023 1148    Nonacceptance, E,D, VU,DU by DP at 3/28/2023 1144    Acceptance, E,D, VU,DU by DP at 3/27/2023 1601    Acceptance, E,D, VU,NR by EE at 3/25/2023 1423    Acceptance, E,D, NR,VU,DU by DP at 3/21/2023 1448   Family Acceptance, E, VU by WN at 4/4/2023 0322                               User Key     Initials Effective Dates Name Provider Type Discipline    EE 06/16/21 -  Melinda Mann, PT Physical Therapist PT    JK 06/16/21 -  Juana Veloz, PT Physical Therapist PT    KP 06/16/21 -  Yolanda Shannon, PT Physical Therapist PT    MG 05/24/22 -  Yu Villalobos, PT Physical Therapist PT    WN 08/23/22 -  Jose Carrillo RN Registered Nurse Nurse    DP 08/24/21 -  Papa Marsh, PT Physical Therapist PT                PT Recommendation and Plan                          Time Calculation:      PT Charges     Row Name 04/04/23 1528 04/04/23 1208          Time Calculation    Start Time 1300  -AE 0830  -AE     Stop Time 1330  -AE 0900  -AE     Time Calculation (min) 30 min  -AE 30 min  -AE     PT Received On 04/04/23  -AE 04/04/23  -AE     PT - Next Appointment 04/05/23  -AE 04/05/23  -AE        Time Calculation- PT    Total Timed Code Minutes- PT 30 minute(s)  -AE 30 minute(s)  -AE            User Key  (r) = Recorded By, (t) = Taken By, (c) = Cosigned By    Initials Name Provider Type    AE Deanna Carr, PT Physical Therapist                Therapy Charges for Today     Code Description Service Date Service Provider Modifiers Qty    50212494305 HC PT THERAPEUTIC ACT EA 15 MIN 4/3/2023 Deanna Carr, PT GP 2    01956050160 HC PT THER PROC EA 15 MIN 4/3/2023 Deanna Carr, PT GP 1    28999764164 HC GAIT TRAINING EA 15 MIN 4/3/2023 Deanna Carr, PT GP 1    08623919309 HC PT THER SUPP EA 15 MIN 4/3/2023 Deanna Carr, PT GP 2    37400600054 HC PT THERAPEUTIC ACT EA 15 MIN 4/4/2023 Deanna Carr, PT GP 2    55033313826 HC PT MANUAL THERAPY EA 15 MIN 4/4/2023 Deanna Carr, PT  1    52481322902 HC PT NEUROMUSC RE EDUCATION EA 15 MIN 4/4/2023 Deanna Carr, PT  1                   Deanna Carr PT  4/4/2023

## 2023-04-04 NOTE — THERAPY TREATMENT NOTE
Inpatient Rehabilitation - Speech Language Pathology Treatment Note    [facility]     Patient Name: Louise GARCIA  : 1965  MRN: [id number]    Today's Date: 2023                   Admit Date: 3/17/2023       Visit Dx:      ICD-10-CM ICD-9-CM   1. Impaired functional mobility, balance, gait, and endurance  Z74.09 V49.89       Patient Active Problem List   Diagnosis   • Sepsis   • Neck pain, chronic   • Upper back pain   • Paresthesia   • Cervical myelopathy   • Lower extremity weakness   • History of blood clots   • Chronic anticoagulation   • Seizures   • Anemia   • GERD (gastroesophageal reflux disease)   • Guillain-Trenton syndrome   • Situational mixed anxiety and depressive disorder   • Mass of middle lobe of right lung   • Oral candidiasis   • Empyema of pleura   • MRSA bacteremia   • Idiopathic peripheral neuropathy       Past Medical History:   Diagnosis Date   • Degenerative disc disease, cervical    • Gestational diabetes    • H/O blood clots    • Hematemesis    • Seizures    • Septic shock        Past Surgical History:   Procedure Laterality Date   • APPENDECTOMY     • BACK SURGERY     • CHOLECYSTECTOMY     • ENDOSCOPY N/A 2016    Procedure: ESOPHAGOGASTRODUODENOSCOPY with biopsy;  Surgeon: Austen Brito MD;  Location: [facility] ENDOSCOPY;  Service:    • HYSTERECTOMY     • NECK SURGERY       approx 6 yrs ago   • THORACOSCOPY Right 3/8/2023    Procedure: THORACOSCOPY WITH DAVINCI ROBOT WITH COMPLETE DECORTICATION;  Surgeon: Chloe Cedillo MD;  Location: [facility] OR;  Service: Robotics - DaVinci;  Laterality: Right;   • TONSILLECTOMY     • TONSILLECTOMY AND ADENOIDECTOMY         SLP Recommendation and Plan                                                            SLP EVALUATION (last 72 hours)     SLP SLC Evaluation     Row Name 23 1430 23 0930 23 1430 23          Communication Assessment/Intervention    Document Type therapy note (daily note)  -AL  "therapy note (daily note)  -AL therapy note (daily note)  -AL therapy note (daily note)  -AL     Patient/Family/Caregiver Comments/Observations Pt participated well.  -AL Pt participated well. She reported pain medicine is impacting her ability to concentrate.  -AL Pt participated well. She reported feeling \"foggy\" with her thinking due to pain medicine today.  -AL Pt participated well.  -AL        Pain Scale: Numbers Pre/Post-Treatment    Pretreatment Pain Rating 4/10  -AL 0/10 - no pain  -AL 0/10 - no pain  -AL --     Posttreatment Pain Rating 4/10  -AL -- -- --     Pre/Posttreatment Pain Comment left leg and foot  -AL -- -- Received pain med from nurse at beginning of session. Pt reported left shoulder and foot; pt did not rate pain.  -AL           User Key  (r) = Recorded By, (t) = Taken By, (c) = Cosigned By    Initials Name Effective Dates    FLORENTINO Spangler Nessa Nguyen, MS CCC-SLP 06/16/21 -                Patient was wearing a face mask during this therapy encounter. Therapist used appropriate personal protective equipment including mask, eye protection and gloves.  Mask used was standard procedure mask. Appropriate PPE was worn during the entire therapy session. Hand hygiene was completed before and after therapy session. Patient is not in enhanced droplet precautions.               EDUCATION    The patient has been educated in the following areas:       Cognitive Impairment.             SLP GOALS     Row Name 04/04/23 1430 04/04/23 0930 04/03/23 1430       Attention Goal 1 (SLP)    Improve Attention by Goal 1 (SLP) complete sustained attention task;80%;with minimal cues (75-90%)  -AL -- --    Time Frame (Attention Goal 1, SLP) by discharge  -AL -- --    Progress/Outcomes (Attention Goal 1, SLP) goal ongoing  -AL -- --    Comment (Attention Goal 1, SLP) Attention to detail and selective attention for moderate level written directions: 20% with NO cues, 40% with MIN cues, 100% wtih MOD cues.  -AL -- --       Memory " Skills Goal 1 (SLP)    Improve Memory Skills Through Goal 1 (SLP) use memory strategies;use external memory aid;recall details of the day;80%;with moderate cues (50-74%)  -AL -- --    Time Frame (Memory Skills Goal 1, SLP) 1 week  -AL -- --    Progress/Outcomes (Memory Skills Goal 1, SLP) good progress toward goal  -AL good progress toward goal  -AL --    Comment (Memory Skills Goal 1, SLP) Pt required NO repetitions for immediate memory when listening to voicemails. Orientation to time: 100% with NO cues  -AL Recalled 2/3 errands after 20 minutes with NO cues, 3/3 with MOD cues.  -AL --       Organizational Skills Goal 1 (SLP)    Improve Thought Organization Through Goal 1 (SLP) -- -- completing a divergent naming task;80%;with minimal cues (75-90%)  -AL    Time Frame (Thought Organization Skills Goal 1, SLP) -- -- 1 week  -AL    Progress/Outcomes (Thought Organization Skills Goal 1, SLP) -- -- goal ongoing  -AL    Comment (Thought Organization Skills Goal 1, SLP) -- -- Expensive: 3 items with NO cues, 8 with MOD-MAX cues  -AL       Reasoning Goal 1 (SLP)    Improve Reasoning Through Goal 1 (SLP) -- complete deductive reasoning task;80%;with minimal cues (75-90%)  -AL --    Time Frame (Reasoning Goal 1, SLP) -- 1 week  -AL --    Progress/Outcomes (Reasoning Goal 1, SLP) -- good progress toward goal  -AL --    Comment (Reasoning Goal 1, SLP) Inferencing for voicemails: 100% with NO cues.  -AL Garden plot task: 50% with NO cues, 100% with MIN-MOD cues. Pt with difficulty scanning and reading today; she attributed difficulty to having taken pain medicine.  -AL --       Functional Math Skills Goal 1 (SLP)    Improve Functional Math Skills Through Goal 1 (SLP) -- -- complete functional math task;80%;with minimal cues (75-90%)  -AL    Time Frame (Functional Math Skills Goal 1, SLP) -- -- 1 week  -AL    Progress/Outcomes (Functional Math Skills Goal 1, SLP) -- -- new goal  -AL    Comment (Functional Math Skills Goal 1,  SLP) -- -- Temporal problem solving related to cokingtimes: 1/7 with NO cues, 2/7 with MIN cues, 5/7 with MOD cues, 7/7 with MAX cues. Pt required MOD-MAX cues to do retrograde 1 hour temporal problem solving.  -AL    Row Name 04/03/23 0930             Reasoning Goal 1 (SLP)    Improve Reasoning Through Goal 1 (SLP) complete deductive reasoning task;80%;with minimal cues (75-90%)  -AL      Time Frame (Reasoning Goal 1, SLP) 1 week  -AL      Progress/Outcomes (Reasoning Goal 1, SLP) good progress toward goal  -AL      Comment (Reasoning Goal 1, SLP) Ebonie's Schedule: 3/7 with NO cues, 7/7 with MIN-MOD cues. Inferencing from voicemails: 70% with NO cues, 100% with MIN cues.  -AL            User Key  (r) = Recorded By, (t) = Taken By, (c) = Cosigned By    Initials Name Provider Type    Nessa Kraus MS CCC-SLP Speech and Language Pathologist                            Time Calculation:        Time Calculation- SLP     Row Name 04/04/23 1515 04/04/23 0956          Time Calculation- SLP    SLP Start Time 1430  -AL 0930  -AL     SLP Stop Time 1500  -AL 1000  -AL     SLP Time Calculation (min) 30 min  -AL 30 min  -AL           User Key  (r) = Recorded By, (t) = Taken By, (c) = Cosigned By    Initials Name Provider Type    Nessa Kraus MS CCC-SLP Speech and Language Pathologist                  Therapy Charges for Today     Code Description Service Date Service Provider Modifiers Qty    46379434190 HC ST DEV OF COGN SKILLS INITIAL 15 MIN 4/3/2023 Nessa Spangler MS CCC-SLP  1    04152139632 HC ST DEV OF COGN SKILLS EACH ADDT'L 15 MIN 4/3/2023 Nessa Spangler MS CCC-SLP  1    51869092038 HC ST DEV OF COGN SKILLS EACH ADDT'L 15 MIN 4/3/2023 Nessa Spangler MS CCC-SLP  2    07701300824 HC ST DEV OF COGN SKILLS INITIAL 15 MIN 4/4/2023 Nessa Spangler MS CCC-SLP  1    84259362812 HC ST DEV OF COGN SKILLS EACH ADDT'L 15 MIN 4/4/2023 Nessa Spangler, MS CCC-SLP  3                           Nessa Spangler,  MS CCC-SLP  4/4/2023

## 2023-04-04 NOTE — PROGRESS NOTES
Reviewed team conference report regarding current status/progress, weekly and d/c goals, and ELOS with patient and . Discussed steps to get into their trailer and steps to get up to bedroom.  stated he can build a ramp for outside steps but stated patient needs to be able to get up the two steps to bedroom. Discussed insurance updates on weekly basis and now going to physician reviewer. Discussed next update due on Monday, 4/10, and need to show progress. Scheduled family conference for Friday, 4/7 at 1:30 p.m. so can discuss patient's status and goals needed to work on for going home. They are agreeable to plan.

## 2023-04-04 NOTE — PROGRESS NOTES
Case Management  Inpatient Rehabilitation Team Conference    Conference Date/Time: 4/4/2023 8:17:56 AM    Team Conference Attendees:  Dillan Porter, Pharmacist  Mariel Rodriguez, OZZIE Carr, PT  Heaven Villareal, OT  Nessa Spangler, SLP  Mariel Cantu, CTRS  Ana Burdick, VIKA    Demographics            Age: 58Y            Gender: Female    Admission Date: 3/17/2023 8:27:00 PM  Rehabilitation Diagnosis:  Idiopathic peripheral neuropathy  Past Medical History: Past Medical History:  DiagnosisDate  ?Degenerative disc disease, cervical?  ?Gestational diabetes?  ?H/O blood clots?  ?Hematemesis?  ?Seizures (HCC)?  ?Septic shock (HCC)?    ?  ?  Surgical History  Past Surgical History:  ProcedureLateralityDate  ?APPENDECTOMY??  ?BACK SURGERY??  ?CHOLECYSTECTOMY??  ?ENDOSCOPYN/A8/30/2016  ?Procedure: ESOPHAGOGASTRODUODENOSCOPY with biopsy;  Surgeon: Austen Brito MD;  Location: [facility] ENDOSCOPY;  Service:  ?HYSTERECTOMY??  ?NECK SURGERY??  ? approx 6 yrs ago  ?THORACOSCOPYRight3/8/2023  ?Procedure: THORACOSCOPY WITH DAVINCI ROBOT WITH COMPLETE DECORTICATION;  Surgeon: Chloe Cedillo MD;  Location: [facility] OR;  Service: Robotics -  DaVinci;  Laterality: Right;  ?TONSILLECTOMY??  ?TONSILLECTOMY AND ADENOIDECTOMY?1975    ?      Plan of Care  Anticipated Discharge Date/Estimated Length of Stay: ELOS: 3 weeks  Anticipated Discharge Destination: Community discharge with assistance  Discharge Plan : Patient lives with  in  travel trailer. 3 fold down  steps with rail to enter.  D/C plan is home with .  not currently working.  Medical Necessity Expected Level Rationale: Goals are to achieve a level of CGA  w/ ADL's and mobility..  Rehabilitation prognosis fair.  Medical prognosis fair.  Intensity and Duration: an average of 3 hours/5 days per week  Medical Supervision and 24 Hour Rehab Nursing: x  Physical Therapy: x  PT Intensity/Duration: 1 hour /  day, 5 days / week, for approximately  indetermine length of stay.  Occupational Therapy: x  OT Intensity/Duration: 1 hour / day, 5 days / week, for approximately  indetermine length of stay.  Speech and Language Therapy: x  SLP Intensity/Duration: 1 hour / day, 5 days / week, for approximately  indetermine length of stay.  Social Work: x  Therapeutic Recreation: x  Psychology: x  Registered Dietician: x  Updated (if changes indicated)    Anticipated Discharge Date/Estimated Length of Stay:   ELOS: 2-3 weeks    Based on the patient's medical and functional status, their prognosis and  expected level of functional improvement is: Goals are to achieve a level of CGA  w/ ADL's and mobility..  Rehabilitation prognosis fair.  Medical prognosis fair.      Interdisciplinary Problem/Goals/Status    All Rehab Problems:  Sphincter Control    [RN] Bladder Management(Active)  Current Status(04/02/2023): Bladder urgency  Weekly Goal(04/06/2023): decrease in episodes of incontinence  Discharge Goal: Continent 100%    [RN] Bowel Management(Active)  Current Status(04/02/2023): Patient is 100% continent of bowel  Weekly Goal(04/06/2023): Patient will maintain a daily bowel pattern.  Discharge Goal: Patient will maintain 100% continence of bowel        Psychosocial    [RN] Coping/Adjustment(Active)  Current Status(04/02/2023): Anxiety r/t uncertainty of disease course  Weekly Goal(04/06/2023): Allow opportunity to express concerns regarding current  status  Discharge Goal: Patient/family will verbalize decreased feelings of anxiety.        Body Systems    [RN] Neurological(Active)  Current Status(04/02/2023): Impaired physical mobility r/t decreased muscle  strength/control and limited ROM  Weekly Goal(04/06/2023): Patient will have increased ROM  Discharge Goal: Patient will have improved strength and function of BLE and B  hands.        Safety    [RN] Potential for Injury(Active)  Current Status(04/02/2023): Risk for falls  Weekly  Goal(04/06/2023): Family/Caregivers regarding safety precautions and need  for close supervision  Discharge Goal: Patient/family will be aware of risk of fall and safety in the  home setting.        Cognition    [ST] Memory(Active)  Current Status(04/03/2023): Pt with improved ability to recall salient events  from day.  Weekly Goal(04/11/2023): Will recall 3 errands after 15 minutes with NO cues.  Discharge Goal: The patient will improve memory necessary for home-based  participation when given appropriate supervision and cuing.        Self Care    [OT] Bathing(Active)  Current Status(04/03/2023): MIN  Weekly Goal(04/11/2023): CGA  Discharge Goal: CGA    [OT] Dressing (Lower)(Active)  Current Status(04/03/2023): MOD  Weekly Goal(04/11/2023): MIN  Discharge Goal: MIN    [OT] Dressing (Upper)(Active)  Current Status(04/03/2023): set up  Weekly Goal(04/11/2023): set up  Discharge Goal: Set up    [OT] Eating(Active)  Current Status(04/03/2023): set up  Weekly Goal(04/11/2023): set up  Discharge Goal: Mod I    [OT] Grooming(Active)  Current Status(04/03/2023): set up  Weekly Goal(04/11/2023): supervision  Discharge Goal: supervision    [OT] Toileting(Active)  Current Status(04/03/2023): MOD A x 2  Weekly Goal(04/11/2023): MOD  Discharge Goal: MIN/CGA        Mobility    [OT] Tub/Shower Transfers(Active)  Current Status(04/03/2023): MOD A x 2  Weekly Goal(04/11/2023): MOD  Discharge Goal: CGA    [OT] Bed/Chair/Wheelchair(Active)  Current Status(03/18/2023): Max A x2  Weekly Goal(03/25/2023): Mod A x2  Discharge Goal: CGA/Min A x1    [OT] Toilet Transfers(Active)  Current Status(04/03/2023): MOD A X 2  Weekly Goal(04/11/2023): MOD  Discharge Goal: CGA    [PT] Stairs(Active)  Current Status(04/03/2023): TBA  Weekly Goal(04/10/2023):  Discharge Goal: 3, B rails, Min/CG    [PT] Walk(Active)  Current Status(04/03/2023): 5' using FWW minAx2 VC with w/c follow  Weekly Goal(04/10/2023): PT only  Discharge Goal: 30ft Bianca  rwx    [PT] Bed/Chair/Wheelchair(Active)  Current Status(04/03/2023): ModA/max, stand pivot or squat with tsf bd  Weekly Goal(04/10/2023): min/mod, stand pivot  Discharge Goal: Min,  rwx    [PT] Bed Mobility(Active)  Current Status(03/27/2023): Min/mod to CGA, HOB elev  Weekly Goal(03/31/2023): SBA  Discharge Goal: CGA        Communication    [ST] Expression(Resolved)  Current Status(03/27/2023): No overt word finding difficulty observed in  conversation.  Weekly Goal(03/28/2023): Goal met  Discharge Goal: Functional language to express wants, needs, ideas, feelings,  concepts with 100% accuracy NO cues for compensations    [ST] Voice(Active)  Current Status(04/03/2023): Now presents with mild-moderate hoarse vocal  quality. Improved vocal intensity.  Weekly Goal(04/11/2023): Reduced vocal abuse behaviors (strong habitual throat  clearing) with MIN cues.  Discharge Goal: Functional voice for utilization in all settings, 90%  intelligibility NO cues for compensatory strategies        Comments: 3/21 Bed mob Mod A x 2 w/ rails and HOB elevated, Max A x 2 for stand  pivot or squat pivot transfer, Max A x 2 for toilet transfer.  Dep for  toileting.  Difficulty with remembering new information due to short term memory  impairments.  Word finding tasks are 50% w/ mod cues.  Bladder urgency,  continent of bowel.    3/28 Bed Mob Min to CGA w/ HOB elevated.  Mod A for stand pivot or squat with  transfers to bed.  Amb 15' using FWW Min A x 2 VC's w/ w/c follow.  Mod A x 2  for toilet transfer.  Mod A bathing, Mod A x 2 toileting.  Moderate hoarse vocal  quality.  No overt word finding observed in conversation.  Bladder urgency,  continent of bowel.    4/4 Bed mob Min / Mod to CGA w/ HOB elevated.  Mod / Max w/ stand pivot or squat  pivot transfers, amb 5 ' using FWW Min A x2 VC w/ w/c follow.  Mod A x 2 toilet  transfer and shower transfer.  Mild - Mod hoarse vocal quality.  Bladder urgency  and continent of bowel.  Anxiety.   Getting family conference scheduled.    Signed by: Ana Burdick RN    Physician CoSigned By: Ryley Rider 04/04/2023 08:32:48

## 2023-04-05 LAB
FUNGUS WND CULT: NORMAL

## 2023-04-05 PROCEDURE — 97112 NEUROMUSCULAR REEDUCATION: CPT

## 2023-04-05 PROCEDURE — 97110 THERAPEUTIC EXERCISES: CPT

## 2023-04-05 PROCEDURE — 97530 THERAPEUTIC ACTIVITIES: CPT

## 2023-04-05 PROCEDURE — 97130 THER IVNTJ EA ADDL 15 MIN: CPT

## 2023-04-05 PROCEDURE — 97535 SELF CARE MNGMENT TRAINING: CPT

## 2023-04-05 PROCEDURE — 94799 UNLISTED PULMONARY SVC/PX: CPT

## 2023-04-05 PROCEDURE — 97116 GAIT TRAINING THERAPY: CPT

## 2023-04-05 PROCEDURE — 97129 THER IVNTJ 1ST 15 MIN: CPT

## 2023-04-05 RX ADMIN — APIXABAN 5 MG: 5 TABLET, FILM COATED ORAL at 08:08

## 2023-04-05 RX ADMIN — APIXABAN 5 MG: 5 TABLET, FILM COATED ORAL at 19:27

## 2023-04-05 RX ADMIN — GABAPENTIN 300 MG: 300 CAPSULE ORAL at 14:25

## 2023-04-05 RX ADMIN — ACETAMINOPHEN 650 MG: 325 TABLET, FILM COATED ORAL at 15:21

## 2023-04-05 RX ADMIN — DESVENLAFAXINE SUCCINATE 25 MG: 25 TABLET, EXTENDED RELEASE ORAL at 08:08

## 2023-04-05 RX ADMIN — PANTOPRAZOLE SODIUM 40 MG: 40 TABLET, DELAYED RELEASE ORAL at 05:12

## 2023-04-05 RX ADMIN — Medication 100 MG: at 08:08

## 2023-04-05 RX ADMIN — Medication 500 MCG: at 08:08

## 2023-04-05 RX ADMIN — IPRATROPIUM BROMIDE AND ALBUTEROL SULFATE 3 ML: 2.5; .5 SOLUTION RESPIRATORY (INHALATION) at 07:17

## 2023-04-05 RX ADMIN — OXYCODONE HYDROCHLORIDE 2.5 MG: 5 TABLET ORAL at 19:28

## 2023-04-05 RX ADMIN — IPRATROPIUM BROMIDE AND ALBUTEROL SULFATE 3 ML: 2.5; .5 SOLUTION RESPIRATORY (INHALATION) at 11:12

## 2023-04-05 RX ADMIN — Medication 1 MG: at 08:08

## 2023-04-05 RX ADMIN — Medication 5 MG: at 19:27

## 2023-04-05 RX ADMIN — LAMOTRIGINE 200 MG: 100 TABLET ORAL at 08:08

## 2023-04-05 RX ADMIN — IPRATROPIUM BROMIDE AND ALBUTEROL SULFATE 3 ML: 2.5; .5 SOLUTION RESPIRATORY (INHALATION) at 15:29

## 2023-04-05 RX ADMIN — GABAPENTIN 300 MG: 300 CAPSULE ORAL at 19:27

## 2023-04-05 RX ADMIN — IPRATROPIUM BROMIDE AND ALBUTEROL SULFATE 3 ML: 2.5; .5 SOLUTION RESPIRATORY (INHALATION) at 19:43

## 2023-04-05 RX ADMIN — OXYCODONE HYDROCHLORIDE 2.5 MG: 5 TABLET ORAL at 15:21

## 2023-04-05 RX ADMIN — GABAPENTIN 300 MG: 300 CAPSULE ORAL at 05:12

## 2023-04-05 NOTE — PROGRESS NOTES
Inpatient Rehabilitation Plan of Care Note    Plan of Care  Care Plan Reviewed - No updates at this time.    Sphincter Control    Performed Intervention(s)  Bladder/bowel training program  Monitor intake and output  Use incontinence products/equipment      Psychosocial    Performed Intervention(s)  Verbalizes needs and concerns  Support from family/peer groups      Body Systems    Performed Intervention(s)  Perform active and passive ROM  Frequent position changes      Safety    Performed Intervention(s)  Safety Rounds  Bed alarm/Chair alarm  Items within reach    Signed by: Lindsay Beck RN

## 2023-04-05 NOTE — THERAPY TREATMENT NOTE
Inpatient Rehabilitation - Speech Language Pathology Treatment Note    [facility]     Patient Name: Louise GARCIA  : 1965  MRN: [id number]    Today's Date: 2023                   Admit Date: 3/17/2023       Visit Dx:      ICD-10-CM ICD-9-CM   1. Impaired functional mobility, balance, gait, and endurance  Z74.09 V49.89       Patient Active Problem List   Diagnosis   • Sepsis   • Neck pain, chronic   • Upper back pain   • Paresthesia   • Cervical myelopathy   • Lower extremity weakness   • History of blood clots   • Chronic anticoagulation   • Seizures   • Anemia   • GERD (gastroesophageal reflux disease)   • Guillain-[place] syndrome   • Situational mixed anxiety and depressive disorder   • Mass of middle lobe of right lung   • Oral candidiasis   • Empyema of pleura   • MRSA bacteremia   • Idiopathic peripheral neuropathy       Past Medical History:   Diagnosis Date   • Degenerative disc disease, cervical    • Gestational diabetes    • H/O blood clots    • Hematemesis    • Seizures    • Septic shock        Past Surgical History:   Procedure Laterality Date   • APPENDECTOMY     • BACK SURGERY     • CHOLECYSTECTOMY     • ENDOSCOPY N/A 2016    Procedure: ESOPHAGOGASTRODUODENOSCOPY with biopsy;  Surgeon: Austen Brito MD;  Location: [facility] ENDOSCOPY;  Service:    • HYSTERECTOMY     • NECK SURGERY       approx 6 yrs ago   • THORACOSCOPY Right 3/8/2023    Procedure: THORACOSCOPY WITH DAVINCI ROBOT WITH COMPLETE DECORTICATION;  Surgeon: Chloe Cedillo MD;  Location: [facility] OR;  Service: Robotics - DaVinci;  Laterality: Right;   • TONSILLECTOMY     • TONSILLECTOMY AND ADENOIDECTOMY         SLP Recommendation and Plan                                                            SLP EVALUATION (last 72 hours)     SLP SLC Evaluation     Row Name 23 1430 23 0930 23 1430 23 0930 23 1430       Communication Assessment/Intervention    Document Type therapy note (daily  "note)  -AL therapy note (daily note)  -AL therapy note (daily note)  -AL therapy note (daily note)  -AL therapy note (daily note)  -AL    Patient/Family/Caregiver Comments/Observations Pt in good spirits today.  -AL Pt participated well.  -AL Pt participated well.  -AL Pt participated well. She reported pain medicine is impacting her ability to concentrate.  -AL Pt participated well. She reported feeling \"foggy\" with her thinking due to pain medicine today.  -AL       Pain Scale: Numbers Pre/Post-Treatment    Pretreatment Pain Rating 0/10 - no pain  -AL 0/10 - no pain  -AL 4/10  -AL 0/10 - no pain  -AL 0/10 - no pain  -AL    Posttreatment Pain Rating -- -- 4/10  -AL -- --    Pre/Posttreatment Pain Comment -- -- left leg and foot  -AL -- --    Row Name 04/03/23 0930                   Communication Assessment/Intervention    Document Type therapy note (daily note)  -AL        Patient/Family/Caregiver Comments/Observations Pt participated well.  -AL           Pain Scale: Numbers Pre/Post-Treatment    Pre/Posttreatment Pain Comment Received pain med from nurse at beginning of session. Pt reported left shoulder and foot; pt did not rate pain.  -AL              User Key  (r) = Recorded By, (t) = Taken By, (c) = Cosigned By    Initials Name Effective Dates    Nessa Kraus, MS CCC-SLP 06/16/21 -                Patient was not wearing a face mask during this therapy encounter. Therapist used appropriate personal protective equipment including mask, eye protection and gloves.  Mask used was standard procedure mask. Appropriate PPE was worn during the entire therapy session. Hand hygiene was completed before and after therapy session. Patient is not in enhanced droplet precautions.             EDUCATION    The patient has been educated in the following areas:       Cognitive Impairment Communication Impairment.             SLP GOALS     Row Name 04/05/23 1430 04/05/23 0930 04/04/23 1430       Phonation Goal 1 (SLP)    " Progress/Outcomes (Phonation Goal 1, SLP) good progress toward goal  -AL good progress toward goal  -AL --    Comment (Phonation Goal 1, SLP) Pt participated in frontal tone focus exercises. She completed humming /m/, initial /m/ CV chanting and 2 syllabe initial /m/ chanting with overall MOD-MAX cues; improved vocal quality noted when attempting higher pitch. Also, benefited from yawning to reduce hyperfunction. Pt exhibited intermittent adequate vocal quality in conversation following exercises. Improved vocal quality noted when pt was smiling/joking and naturally using increased pitch inflection.  -AL Pt with overall moderate hoarse vocal quality, with intermittent periods of adequate vocal quality. Pt was stimulable to produce adequate voice with frontal tone focus (humming, initial /m/ CV combination chanting) and yawning/relaxation. Pt also participated in exercises for diaphragmatic breathing. Upon return to her room, pt with mild hoarse quality.  -AL --       Attention Goal 1 (SLP)    Improve Attention by Goal 1 (SLP) -- -- complete sustained attention task;80%;with minimal cues (75-90%)  -AL    Time Frame (Attention Goal 1, SLP) -- -- by discharge  -AL    Progress/Outcomes (Attention Goal 1, SLP) -- -- goal ongoing  -AL    Comment (Attention Goal 1, SLP) -- -- Attention to detail and selective attention for moderate level written directions: 20% with NO cues, 40% with MIN cues, 100% wtih MOD cues.  -AL       Memory Skills Goal 1 (SLP)    Improve Memory Skills Through Goal 1 (SLP) -- -- use memory strategies;use external memory aid;recall details of the day;80%;with moderate cues (50-74%)  -AL    Time Frame (Memory Skills Goal 1, SLP) -- -- 1 week  -AL    Progress/Outcomes (Memory Skills Goal 1, SLP) -- -- good progress toward goal  -AL    Comment (Memory Skills Goal 1, SLP) -- -- Pt required NO repetitions for immediate memory when listening to voicemails. Orientation to time: 100% with NO cues  -AL        Reasoning Goal 1 (SLP)    Comment (Reasoning Goal 1, SLP) Kitchen shelves visuospatial reasoning task: 50% with NO cues, 90% with MIN cues, 100% with MAX cues  -AL -- Inferencing for voicemails: 100% with NO cues.  -AL    Row Name 04/04/23 0930 04/03/23 1430 04/03/23 0930       Memory Skills Goal 1 (SLP)    Progress/Outcomes (Memory Skills Goal 1, SLP) good progress toward goal  -AL -- --    Comment (Memory Skills Goal 1, SLP) Recalled 2/3 errands after 20 minutes with NO cues, 3/3 with MOD cues.  -AL -- --       Organizational Skills Goal 1 (SLP)    Improve Thought Organization Through Goal 1 (SLP) -- completing a divergent naming task;80%;with minimal cues (75-90%)  -AL --    Time Frame (Thought Organization Skills Goal 1, SLP) -- 1 week  -AL --    Progress/Outcomes (Thought Organization Skills Goal 1, SLP) -- goal ongoing  -AL --    Comment (Thought Organization Skills Goal 1, SLP) -- Expensive: 3 items with NO cues, 8 with MOD-MAX cues  -AL --       Reasoning Goal 1 (SLP)    Improve Reasoning Through Goal 1 (SLP) complete deductive reasoning task;80%;with minimal cues (75-90%)  -AL -- complete deductive reasoning task;80%;with minimal cues (75-90%)  -AL    Time Frame (Reasoning Goal 1, SLP) 1 week  -AL -- 1 week  -AL    Progress/Outcomes (Reasoning Goal 1, SLP) good progress toward goal  -AL -- good progress toward goal  -AL    Comment (Reasoning Goal 1, SLP) [place] plot task: 50% with NO cues, 100% with MIN-MOD cues. Pt with difficulty scanning and reading today; she attributed difficulty to having taken pain medicine.  -AL -- Ebonie's Schedule: 3/7 with NO cues, 7/7 with MIN-MOD cues. Inferencing from voicemails: 70% with NO cues, 100% with MIN cues.  -AL       Functional Math Skills Goal 1 (SLP)    Improve Functional Math Skills Through Goal 1 (SLP) -- complete functional math task;80%;with minimal cues (75-90%)  -AL --    Time Frame (Functional Math Skills Goal 1, SLP) -- 1 week  -AL --    Progress/Outcomes  (Functional Math Skills Goal 1, SLP) -- new goal  -AL --    Comment (Functional Math Skills Goal 1, SLP) -- Temporal problem solving related to cokingtimes: 1/7 with NO cues, 2/7 with MIN cues, 5/7 with MOD cues, 7/7 with MAX cues. Pt required MOD-MAX cues to do retrograde 1 hour temporal problem solving.  -AL --          User Key  (r) = Recorded By, (t) = Taken By, (c) = Cosigned By    Initials Name Provider Type    Nessa Kraus MS CCC-SLP Speech and Language Pathologist                            Time Calculation:        Time Calculation- SLP     Row Name 04/05/23 1531 04/05/23 1155          Time Calculation- SLP    SLP Start Time 1430  -AL 0930  -AL     SLP Stop Time 1500  -AL 1000  -AL     SLP Time Calculation (min) 30 min  -AL 30 min  -AL           User Key  (r) = Recorded By, (t) = Taken By, (c) = Cosigned By    Initials Name Provider Type    Nessa Kraus MS CCC-SLP Speech and Language Pathologist                  Therapy Charges for Today     Code Description Service Date Service Provider Modifiers Qty    37871354088 HC ST DEV OF COGN SKILLS INITIAL 15 MIN 4/4/2023 Nessa Spangler MS CCC-SLP  1    05572032873 HC ST DEV OF COGN SKILLS EACH ADDT'L 15 MIN 4/4/2023 Nessa Spangler MS CCC-SLP  3    00904370917 HC ST DEV OF COGN SKILLS INITIAL 15 MIN 4/5/2023 Nesas Spangler MS CCC-SLP  1    72847088680 HC ST DEV OF COGN SKILLS EACH ADDT'L 15 MIN 4/5/2023 Nessa Spangler MS CCC-SLP  3                           Nessa Spangler MS CCC-SLP  4/5/2023

## 2023-04-05 NOTE — THERAPY TREATMENT NOTE
Inpatient Rehabilitation - Occupational Therapy Treatment Note    [facility]     Patient Name: Louise GARCIA  : 1965  MRN: [id number]    Today's Date: 2023                 Admit Date: 3/17/2023         ICD-10-CM ICD-9-CM   1. Impaired functional mobility, balance, gait, and endurance  Z74.09 V49.89       Patient Active Problem List   Diagnosis   • Sepsis   • Neck pain, chronic   • Upper back pain   • Paresthesia   • Cervical myelopathy   • Lower extremity weakness   • History of blood clots   • Chronic anticoagulation   • Seizures   • Anemia   • GERD (gastroesophageal reflux disease)   • Guillain-[place] syndrome   • Situational mixed anxiety and depressive disorder   • Mass of middle lobe of right lung   • Oral candidiasis   • Empyema of pleura   • MRSA bacteremia   • Idiopathic peripheral neuropathy       Past Medical History:   Diagnosis Date   • Degenerative disc disease, cervical    • Gestational diabetes    • H/O blood clots    • Hematemesis    • Seizures    • Septic shock        Past Surgical History:   Procedure Laterality Date   • APPENDECTOMY     • BACK SURGERY     • CHOLECYSTECTOMY     • ENDOSCOPY N/A 2016    Procedure: ESOPHAGOGASTRODUODENOSCOPY with biopsy;  Surgeon: Austen Brito MD;  Location: [facility] ENDOSCOPY;  Service:    • HYSTERECTOMY     • NECK SURGERY       approx 6 yrs ago   • THORACOSCOPY Right 3/8/2023    Procedure: THORACOSCOPY WITH DAVINCI ROBOT WITH COMPLETE DECORTICATION;  Surgeon: Chloe Cedillo MD;  Location: [facility] OR;  Service: Robotics - DaVinci;  Laterality: Right;   • TONSILLECTOMY     • TONSILLECTOMY AND ADENOIDECTOMY               [facility] OT ASSESSMENT FLOWSHEET (last 12 hours)     IRF OT Evaluation and Treatment     Row Name 23 1157          OT Time and Intention    Document Type daily treatment  -AF     Mode of Treatment individual therapy;occupational therapy  -AF     Patient Effort good  -AF     Row Name 23 1157          General  Information    Patient/Family/Caregiver Comments/Observations pt sitting up in w/c in room with  present  -AF     General Observations of Patient  present and observed BSC transfer <> w/c  -AF     Existing Precautions/Restrictions fall;other (see comments)  contact precautions  -AF     Row Name 04/05/23 1157          Pain Assessment    Pretreatment Pain Rating 0/10 - no pain  -AF     Posttreatment Pain Rating 0/10 - no pain  -AF     Row Name 04/05/23 1157          Cognition/Psychosocial    Affect/Mental Status (Cognition) flat/blunted affect  -AF     Orientation Status (Cognition) oriented x 3  -AF     Follows Commands (Cognition) follows one-step commands;over 90% accuracy;increased processing time needed  -AF     Personal Safety Interventions fall prevention program maintained;gait belt;nonskid shoes/slippers when out of bed  -AF     Cognitive Function memory deficit;safety deficit  -AF     Memory Deficit (Cognition) episodic memory;procedural memory  -AF     Safety Deficit (Cognition) insight into deficits/self-awareness  sequencing of steps  -AF     Comment, Cognition assistance with sequencing steps of commode transfers  -AF     Row Name 04/05/23 1157          Bathing    [place] Level (Bathing) bathing skills;lower body;upper body;minimum assist (75% patient effort);verbal cues  -AF     Position (Bathing) supported sitting;supported standing  -AF     Comment (Bathing) bathed bottom and [place] area seated on commode  -AF     Row Name 04/05/23 1157          Grooming    [place] Level (Grooming) grooming skills;set up  -AF     Position (Grooming) supported sitting  -AF     Row Name 04/05/23 1157          Toileting    [place] Level (Toileting) toileting skills;maximum assist (25% patient effort);verbal cues  -AF     Assistive Device Use (Toileting) raised toilet seat;grab bar/safety frame  -AF     Position (Toileting) supported sitting;supported standing  -AF     Comment (Toileting) a x 1  for balance and A x 1 for clothing management  -AF     Row Name 04/05/23 1157          Transfer Assessment/Treatment    Comment, (Transfers) sit to stand with grab bar use MOD A to lower safely fatigues quickly  -AF     Row Name 04/05/23 1157          Toilet Transfer    Type (Toilet Transfer) squat pivot  -AF     [place] Level (Toilet Transfer) moderate assist (50% patient effort);2 person assist  -AF     Assistive Device (Toilet Transfer) commode;grab bars/safety frame;wheelchair  -AF     Comment, (Toilet Transfer) also completed dry run transfer to drop arm BSC  -AF     Row Name 04/05/23 1157          Balance    Static Sitting Balance standby assist  -AF     Static Standing Balance minimal assist  unable to let go with hands to assist with any task  -AF     Row Name 04/05/23 1157          Positioning and Restraints    Pre-Treatment Position sitting in chair/recliner  -AF     Post Treatment Position wheelchair  -AF     In Wheelchair sitting;call light within reach;encouraged to call for assist;exit alarm on;with family/caregiver  in room with  present  -AF           User Key  (r) = Recorded By, (t) = Taken By, (c) = Cosigned By    Initials Name Effective Dates    AF Heaven Villareal, OTR 06/16/21 -                  Occupational Therapy Education     Title: PT OT SLP Therapies (Done)     Topic: Occupational Therapy (Done)     Point: ADL training (Done)     Description:   Instruct learner(s) on proper safety adaptation and remediation techniques during self care or transfers.   Instruct in proper use of assistive devices.              Learning Progress Summary           Patient Acceptance, E, VU by WN at 4/4/2023 2329    Acceptance, E, VU by WN at 4/4/2023 0322   Family Acceptance, E, VU by WN at 4/4/2023 2329    Acceptance, E, VU by WN at 4/4/2023 0322                   Point: Home exercise program (Done)     Description:   Instruct learner(s) on appropriate technique for monitoring, assisting and/or  progressing therapeutic exercises/activities.              Learning Progress Summary           Patient Acceptance, E, VU by WN at 4/4/2023 2329    Acceptance, E, VU by WN at 4/4/2023 0322   Family Acceptance, E, VU by WN at 4/4/2023 2329    Acceptance, E, VU by WN at 4/4/2023 0322                   Point: Precautions (Done)     Description:   Instruct learner(s) on prescribed precautions during self-care and functional transfers.              Learning Progress Summary           Patient Acceptance, E, VU by WN at 4/4/2023 2329    Acceptance, E, VU by WN at 4/4/2023 0322   Family Acceptance, E, VU by WN at 4/4/2023 2329    Acceptance, E, VU by WN at 4/4/2023 0322                   Point: Body mechanics (Done)     Description:   Instruct learner(s) on proper positioning and spine alignment during self-care, functional mobility activities and/or exercises.              Learning Progress Summary           Patient Acceptance, E, VU by WN at 4/4/2023 2329    Acceptance, E, VU by WN at 4/4/2023 0322   Family Acceptance, E, VU by WN at 4/4/2023 2329    Acceptance, E, VU by WN at 4/4/2023 0322                               User Key     Initials Effective Dates Name Provider Type Discipline     08/23/22 -  Jose Carrillo, RN Registered Nurse Nurse                    OT Recommendation and Plan                         Time Calculation:      Time Calculation- OT     Row Name 04/05/23 1205             Time Calculation- OT    OT Start Time 1030  -AF      OT Stop Time 1100  -AF      OT Time Calculation (min) 30 min  -AF            User Key  (r) = Recorded By, (t) = Taken By, (c) = Cosigned By    Initials Name Provider Type    AF Heaven Villareal, OTNAIMA Occupational Therapist              Therapy Charges for Today     Code Description Service Date Service Provider Modifiers Qty    06178043072 HC OT SELF CARE/MGMT/TRAIN EA 15 MIN 4/4/2023 Heaven Villareal OTNAIMA GO 2    48307477849 HC OT THER SUPP EA 15 MIN 4/4/2023 Heaven Villareal OTNAIMA GO  2    78590596725 HC OT NEUROMUSC RE EDUCATION EA 15 MIN 4/4/2023 Heaven Villareal, OTR GO 2    46584141490 HC OT SELF CARE/MGMT/TRAIN EA 15 MIN 4/5/2023 Heaven Villareal OTR GO 2    94255478459 HC OT THER SUPP EA 15 MIN 4/5/2023 Heaven Villareal, OTR GO 1                   MARY ANN Gunn  4/5/2023

## 2023-04-05 NOTE — PLAN OF CARE
Problem: Rehabilitation (IRF) Plan of Care  Goal: Plan of Care Review  Outcome: Ongoing, Progressing  Flowsheets (Taken 4/4/2023 2341)  Plan of Care Reviewed With:   patient   spouse  Goal: Patient-Specific Goal (Individualized)  Outcome: Ongoing, Progressing  Goal: Absence of New-Onset Illness or Injury  Outcome: Ongoing, Progressing  Intervention: Prevent Fall and Fall Injury  Recent Flowsheet Documentation  Taken 4/4/2023 2110 by Jose Carrillo RN  Safety Promotion/Fall Prevention:   activity supervised   assistive device/personal items within reach   clutter free environment maintained   fall prevention program maintained   lighting adjusted   muscle strengthening facilitated   nonskid shoes/slippers when out of bed   room organization consistent   safety round/check completed  Intervention: Prevent Infection  Recent Flowsheet Documentation  Taken 4/4/2023 2110 by Jose Carrillo RN  Infection Prevention:   cohorting utilized   environmental surveillance performed   hand hygiene promoted   single patient room provided  Intervention: Prevent VTE (Venous Thromboembolism)  Recent Flowsheet Documentation  Taken 4/4/2023 2110 by Jose Carrillo RN  VTE Prevention/Management:   bilateral   sequential compression devices off   patient refused intervention  Goal: Optimal Comfort and Wellbeing  Outcome: Ongoing, Progressing  Goal: Home and Community Transition Plan Established  Outcome: Ongoing, Progressing     Problem: Seizure Disorder Comorbidity  Goal: Maintenance of Seizure Control  Outcome: Ongoing, Progressing  Intervention: Maintain Seizure-Symptom Control  Recent Flowsheet Documentation  Taken 4/4/2023 2110 by Jose Carrillo RN  Seizure Precautions:   activity supervised   clutter-free environment maintained     Problem: Skin Injury Risk Increased  Goal: Skin Health and Integrity  Outcome: Ongoing, Progressing  Intervention: Promote and Optimize Oral Intake  Recent Flowsheet Documentation  Taken  4/4/2023 2110 by Jose Carrillo RN  Oral Nutrition Promotion:   physical activity promoted   social interaction promoted  Intervention: Optimize Skin Protection  Recent Flowsheet Documentation  Taken 4/4/2023 2110 by Jose Carrillo RN  Head of Bed (HOB) Positioning: HOB lowered     Problem: Fall Injury Risk  Goal: Absence of Fall and Fall-Related Injury  Outcome: Ongoing, Progressing  Intervention: Identify and Manage Contributors  Recent Flowsheet Documentation  Taken 4/4/2023 2110 by Jose Carrillo RN  Medication Review/Management: medications reviewed  Self-Care Promotion: independence encouraged  Intervention: Promote Injury-Free Environment  Recent Flowsheet Documentation  Taken 4/4/2023 2110 by Jose Carrillo RN  Safety Promotion/Fall Prevention:   activity supervised   assistive device/personal items within reach   clutter free environment maintained   fall prevention program maintained   lighting adjusted   muscle strengthening facilitated   nonskid shoes/slippers when out of bed   room organization consistent   safety round/check completed   Goal Outcome Evaluation:  Plan of Care Reviewed With: patient, spouse        Progress: improving

## 2023-04-05 NOTE — THERAPY TREATMENT NOTE
Inpatient Rehabilitation - Occupational Therapy Treatment Note    [facility]     Patient Name: Louise GARCIA  : 1965  MRN: [id number]    Today's Date: 2023                 Admit Date: 3/17/2023         ICD-10-CM ICD-9-CM   1. Impaired functional mobility, balance, gait, and endurance  Z74.09 V49.89       Patient Active Problem List   Diagnosis   • Sepsis   • Neck pain, chronic   • Upper back pain   • Paresthesia   • Cervical myelopathy   • Lower extremity weakness   • History of blood clots   • Chronic anticoagulation   • Seizures   • Anemia   • GERD (gastroesophageal reflux disease)   • Guillain-Leivasy syndrome   • Situational mixed anxiety and depressive disorder   • Mass of middle lobe of right lung   • Oral candidiasis   • Empyema of pleura   • MRSA bacteremia   • Idiopathic peripheral neuropathy       Past Medical History:   Diagnosis Date   • Degenerative disc disease, cervical    • Gestational diabetes    • H/O blood clots    • Hematemesis    • Seizures    • Septic shock        Past Surgical History:   Procedure Laterality Date   • APPENDECTOMY     • BACK SURGERY     • CHOLECYSTECTOMY     • ENDOSCOPY N/A 2016    Procedure: ESOPHAGOGASTRODUODENOSCOPY with biopsy;  Surgeon: Austen Brito MD;  Location: [facility] ENDOSCOPY;  Service:    • HYSTERECTOMY     • NECK SURGERY       approx 6 yrs ago   • THORACOSCOPY Right 3/8/2023    Procedure: THORACOSCOPY WITH DAVINCI ROBOT WITH COMPLETE DECORTICATION;  Surgeon: Chloe Cedillo MD;  Location: [facility] OR;  Service: Robotics - DaVinci;  Laterality: Right;   • TONSILLECTOMY     • TONSILLECTOMY AND ADENOIDECTOMY               [facility] OT ASSESSMENT FLOWSHEET (last 12 hours)     [facility] OT Evaluation and Treatment     Row Name 23 1410 23 1157       OT Time and Intention    Document Type daily treatment  -AF daily treatment  -AF    Mode of Treatment occupational therapy  -AF individual therapy;occupational therapy  -AF    Patient Effort  good  -AF good  -AF    Row Name 04/05/23 1410 04/05/23 1157       General Information    Patient/Family/Caregiver Comments/Observations pt sitting up in w/c in room with  present  -AF pt sitting up in w/c in room with  present  -AF    General Observations of Patient --  present and observed BSC transfer <> w/c  -AF    Existing Precautions/Restrictions fall;other (see comments)  contact precautions  -AF fall;other (see comments)  contact precautions  -AF    Row Name 04/05/23 1410 04/05/23 1157       Pain Assessment    Pretreatment Pain Rating 0/10 - no pain  -AF 0/10 - no pain  -AF    Posttreatment Pain Rating 0/10 - no pain  -AF 0/10 - no pain  -AF    Row Name 04/05/23 1410 04/05/23 1157       Cognition/Psychosocial    Affect/Mental Status (Cognition) flat/blunted affect  -AF flat/blunted affect  -AF    Orientation Status (Cognition) oriented x 3  -AF oriented x 3  -AF    Follows Commands (Cognition) follows one-step commands;over 90% accuracy;increased processing time needed  -AF follows one-step commands;over 90% accuracy;increased processing time needed  -AF    Personal Safety Interventions fall prevention program maintained;gait belt;nonskid shoes/slippers when out of bed  -AF fall prevention program maintained;gait belt;nonskid shoes/slippers when out of bed  -AF    Cognitive Function memory deficit;safety deficit  -AF memory deficit;safety deficit  -AF    Memory Deficit (Cognition) episodic memory;procedural memory  -AF episodic memory;procedural memory  -AF    Safety Deficit (Cognition) insight into deficits/self-awareness  -AF insight into deficits/self-awareness  sequencing of steps  -AF    Comment, Cognition -- assistance with sequencing steps of commode transfers  -AF    Row Name 04/05/23 1157          Bathing    [place] Level (Bathing) bathing skills;lower body;upper body;minimum assist (75% patient effort);verbal cues  -AF     Position (Bathing) supported sitting;supported  standing  -AF     Comment (Bathing) bathed bottom and [place] area seated on commode  -AF     Row Name 04/05/23 1157          Grooming    [place] Level (Grooming) grooming skills;set up  -AF     Position (Grooming) supported sitting  -AF     Row Name 04/05/23 1157          Toileting    [place] Level (Toileting) toileting skills;maximum assist (25% patient effort);verbal cues  -AF     Assistive Device Use (Toileting) raised toilet seat;grab bar/safety frame  -AF     Position (Toileting) supported sitting;supported standing  -AF     Comment (Toileting) a x 1 for balance and A x 1 for clothing management  -AF     Row Name 04/05/23 1410 04/05/23 1157       Transfer Assessment/Treatment    Comment, (Transfers) sit pivot transfer EOM <> w/c MAX/MOD A x 1  -AF sit to stand with grab bar use MOD A to lower safely fatigues quickly  -AF    Row Name 04/05/23 1157          Toilet Transfer    Type (Toilet Transfer) squat pivot  -AF     [place] Level (Toilet Transfer) moderate assist (50% patient effort);2 person assist  -AF     Assistive Device (Toilet Transfer) commode;grab bars/safety frame;wheelchair  -AF     Comment, (Toilet Transfer) also completed dry run transfer to drop arm BSC  -AF     Row Name 04/05/23 1410          Motor Skills    Motor Skills neuro-muscular function;motor control/coordination interventions  -AF     Coordination fine motor deficit;bilateral;upper extremity;moderate impairment  pt participated in untying knots and unstringing beads on shoe laces iwth MOD difficulty, compleed seated unsupported on the EOM, able to remove 6 knots and 6 beads  -AF     Motor Control/Coordination Interventions neuro-muscular re-education  -AF     Row Name 04/05/23 1410          Neuromuscular Re-education    Interventions (Neuromuscular Re-education) weight shifting;facilitation/inhibition  -AF     Positioning (Neuromuscular Re-education) sitting;unsupported  -AF     Comment (Neuromuscular Re-education) seated  unsupported on EOM with #1,5 wrist weight on RUE during brean bag toss and catch,  weight shifting to  bean bags on floor and laterally to janak R and L side of the mat and forward placed on a table. CGA at times for balance.  -AF     Row Name 04/05/23 1410 04/05/23 1157       Balance    Static Sitting Balance standby assist  -AF standby assist  -AF    Dynamic Sitting Balance contact guard;standby assist  -AF --    Static Standing Balance -- minimal assist  unable to let go with hands to assist with any task  -AF    Row Name 04/05/23 1410 04/05/23 1157       Positioning and Restraints    Pre-Treatment Position sitting in chair/recliner  -AF sitting in chair/recliner  -AF    Post Treatment Position wheelchair  -AF wheelchair  -AF    In Wheelchair sitting;exit alarm on;with PT  -AF sitting;call light within reach;encouraged to call for assist;exit alarm on;with family/caregiver  in room with  present  -AF          User Key  (r) = Recorded By, (t) = Taken By, (c) = Cosigned By    Initials Name Effective Dates    AF Heaven Villareal, OTR 06/16/21 -                  Occupational Therapy Education     Title: PT OT SLP Therapies (Done)     Topic: Occupational Therapy (Done)     Point: ADL training (Done)     Description:   Instruct learner(s) on proper safety adaptation and remediation techniques during self care or transfers.   Instruct in proper use of assistive devices.              Learning Progress Summary           Patient Acceptance, E, VU by WN at 4/4/2023 2329    Acceptance, E, VU by WN at 4/4/2023 0322   Family Acceptance, E, VU by WN at 4/4/2023 2329    Acceptance, E, VU by WN at 4/4/2023 0322                   Point: Home exercise program (Done)     Description:   Instruct learner(s) on appropriate technique for monitoring, assisting and/or progressing therapeutic exercises/activities.              Learning Progress Summary           Patient Acceptance, E, VU by WN at 4/4/2023 2329    Acceptance, E,  VU by WN at 4/4/2023 0322   Family Acceptance, E, VU by WN at 4/4/2023 2329    Acceptance, E, VU by WN at 4/4/2023 0322                   Point: Precautions (Done)     Description:   Instruct learner(s) on prescribed precautions during self-care and functional transfers.              Learning Progress Summary           Patient Acceptance, E, VU by WN at 4/4/2023 2329    Acceptance, E, VU by WN at 4/4/2023 0322   Family Acceptance, E, VU by WN at 4/4/2023 2329    Acceptance, E, VU by WN at 4/4/2023 0322                   Point: Body mechanics (Done)     Description:   Instruct learner(s) on proper positioning and spine alignment during self-care, functional mobility activities and/or exercises.              Learning Progress Summary           Patient Acceptance, E, VU by WN at 4/4/2023 2329    Acceptance, E, VU by WN at 4/4/2023 0322   Family Acceptance, E, VU by WN at 4/4/2023 2329    Acceptance, E, VU by WN at 4/4/2023 0322                               User Key     Initials Effective Dates Name Provider Type Discipline     08/23/22 -  Jose Carrillo, RN Registered Nurse Nurse                    OT Recommendation and Plan                         Time Calculation:      Time Calculation- OT     Row Name 04/05/23 1413 04/05/23 1205          Time Calculation- OT    OT Start Time 1230  -AF 1030  -AF     OT Stop Time 1300  -AF 1100  -AF     OT Time Calculation (min) 30 min  -AF 30 min  -AF           User Key  (r) = Recorded By, (t) = Taken By, (c) = Cosigned By    Initials Name Provider Type    AF Heaven Villareal OTR Occupational Therapist              Therapy Charges for Today     Code Description Service Date Service Provider Modifiers Qty    78162207118 HC OT SELF CARE/MGMT/TRAIN EA 15 MIN 4/4/2023 Heaven Villareal OTR GO 2    60397077216 HC OT THER SUPP EA 15 MIN 4/4/2023 Heaven Villareal, OTR GO 2    09271751237 HC OT NEUROMUSC RE EDUCATION EA 15 MIN 4/4/2023 Heaven Villareal, OTR GO 2    76744422004 HC OT SELF  CARE/MGMT/TRAIN EA 15 MIN 4/5/2023 Heaven Villareal, OTR GO 2    96293531263 HC OT THER SUPP EA 15 MIN 4/5/2023 Heaven Villareal OTR GO 1    49108218099 HC OT NEUROMUSC RE EDUCATION EA 15 MIN 4/5/2023 Heaven Villareal OTR GO 1    66853406614 HC OT THERAPEUTIC ACT EA 15 MIN 4/5/2023 Heaven Villareal, OTR GO 1                   MARY ANN Gunn  4/5/2023

## 2023-04-05 NOTE — THERAPY TREATMENT NOTE
Inpatient Rehabilitation - Speech Language Pathology Treatment Note    [facility]     Patient Name: Louise GARCIA  : 1965  MRN: [id number]    Today's Date: 2023                   Admit Date: 3/17/2023       Visit Dx:      ICD-10-CM ICD-9-CM   1. Impaired functional mobility, balance, gait, and endurance  Z74.09 V49.89       Patient Active Problem List   Diagnosis   • Sepsis   • Neck pain, chronic   • Upper back pain   • Paresthesia   • Cervical myelopathy   • Lower extremity weakness   • History of blood clots   • Chronic anticoagulation   • Seizures   • Anemia   • GERD (gastroesophageal reflux disease)   • Guillain-[place] syndrome   • Situational mixed anxiety and depressive disorder   • Mass of middle lobe of right lung   • Oral candidiasis   • Empyema of pleura   • MRSA bacteremia   • Idiopathic peripheral neuropathy       Past Medical History:   Diagnosis Date   • Degenerative disc disease, cervical    • Gestational diabetes    • H/O blood clots    • Hematemesis    • Seizures    • Septic shock        Past Surgical History:   Procedure Laterality Date   • APPENDECTOMY     • BACK SURGERY     • CHOLECYSTECTOMY     • ENDOSCOPY N/A 2016    Procedure: ESOPHAGOGASTRODUODENOSCOPY with biopsy;  Surgeon: Austen Brito MD;  Location: [facility] ENDOSCOPY;  Service:    • HYSTERECTOMY     • NECK SURGERY       approx 6 yrs ago   • THORACOSCOPY Right 3/8/2023    Procedure: THORACOSCOPY WITH DAVINCI ROBOT WITH COMPLETE DECORTICATION;  Surgeon: Chloe Cedillo MD;  Location: [facility] OR;  Service: Robotics - DaVinci;  Laterality: Right;   • TONSILLECTOMY     • TONSILLECTOMY AND ADENOIDECTOMY         SLP Recommendation and Plan                                                            SLP EVALUATION (last 72 hours)     SLP SLC Evaluation     Row Name 23 1100 23 1230            Communication Assessment/Intervention    Document Type therapy note (daily note)  -AL therapy note (daily note)   -AL       Patient/Family/Caregiver Comments/Observations Pt participated at bedside  -AL Pt participated at beside.  -AL          Pain Scale: Numbers Pre/Post-Treatment    Pretreatment Pain Rating 0/10 0/10 - no pain  -AL  --     Posttreatment Pain Rating  --  --     Pre/Posttreatment Pain Comment  -- --            User Key  (r) = Recorded By, (t) = Taken By, (c) = Cosigned By    Initials Name Effective Dates    AL Crys Nessa Nguyen, MS CCC-SLP 06/16/21 -                    EDUCATION    The patient has been educated in the following areas:       Cognitive Impairment.             SLP GOALS     Row Name 03/24/23          Attention Goal 1 (SLP)    Improve Attention by Goal 1 (SLP) complete sustained attention task;80%;with minimal cues (75-90%)  -AL -- --    Time Frame (Attention Goal 1, SLP) by discharge  -AL -- --    Progress/Outcomes (Attention Goal 1, SLP) goal ongoing  -AL -- --    Comment (Attention Goal 1, SLP)  Required overall MIN-MOD cues for sustained attention. -- --       Memory Skills Goal 1 (SLP)    Improve Memory Skills Through Goal 1 (SLP) use memory strategies;use external memory aid;recall details of the day;80%;with moderate cues (50-74%)  -AL -- --    Time Frame (Memory Skills Goal 1, SLP) 1 week  -AL -- --    Progress/Outcomes (Memory Skills Goal 1, SLP) good progress toward goal  -AL  --    Comment (Memory Skills Goal 1, SLP) Required MIN-MAX cues for orientation to time. Required MAX cues for place; no cues for city and state. Afternoon; required MIN-MAX cues for time. Recalled 3/3 errands after 5 minutes with NO cues.  -AL  --       Organizational Skills Goal 1 (SLP)    Improve Thought Organization Through Goal 1 (SLP) -- --     Time Frame (Thought Organization Skills Goal 1, SLP) -- --     Progress/Outcomes (Thought Organization Skills Goal 1, SLP) -- --     Comment (Thought Organization Skills Goal 1, SLP) --Listed 1 green item with NO cues in 1 minute, 7 with MIN-MOD cues. Drinks: 5 with NO  cues, 9 with MIN cues --        Reasoning Goal 1 (SLP)    Improve Reasoning Through Goal 1 (SLP) --  --    Time Frame (Reasoning Goal 1, SLP) --  --    Progress/Outcomes (Reasoning Goal 1, SLP) --  --    Comment (Reasoning Goal 1, SLP) Inferencing voicemails: 70% with NO cues, 100% with MIN cues Moderate level logic puzzle: 30% with NO cues, 100% with MIN-MAX cues. -AL  --       Functional Math Skills Goal 1 (SLP)    Improve Functional Math Skills Through Goal 1 (SLP) -- --     Time Frame (Functional Math Skills Goal 1, SLP) -- --     Progress/Outcomes (Functional Math Skills Goal 1, SLP) -- --     Comment (Functional Math Skills Goal 1, SLP) -- --     Row Name              Reasoning Goal 1 (SLP)    Improve Reasoning Through Goal 1 (SLP)       Time Frame (Reasoning Goal 1, SLP)       Progress/Outcomes (Reasoning Goal 1, SLP)       Comment (Reasoning Goal 1, SLP)             User Key  (r) = Recorded By, (t) = Taken By, (c) = Cosigned By    Initials Name Provider Type    Nessa Kraus MS CCC-SLP Speech and Language Pathologist                            Time Calculation:     Time Calculation- SLP      Row Name 04/04/23 1515 04/04/23 0956                  Time Calculation- SLP     SLP Start Time 1230  -AL 1100  -AL       SLP Stop Time 1300  -AL 1130  -AL       SLP Time Calculation (min) 30 min  -AL 30 min  -AL               Therapy Charges for Today     Code Description Service Date Service Provider Modifiers Qty    69499969651 HC ST DEV OF COGN SKILLS INITIAL 15 MIN 3/24/2023 Nessa Spangler MS CCC-SLP  1    31325290307 HC ST DEV OF COGN SKILLS EACH ADDT'L 15 MIN 3/24/2023 Nessa Spangler MS CCC-SLP  3                           Nessa Spangler MS CCC-SLP  4/5/2023

## 2023-04-05 NOTE — PROGRESS NOTES
LOS: 19 days   Patient Care Team:  Chloe Schaefer APRN as PCP - General (Family Medicine)  Mikhail Glez MD as Consulting Physician (Neurology)      Patient Name: ELEONORA GARCIA  Patient : 1965    ADMITTING DIAGNOSIS:  Diagnoses    1. 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   S/p IVIG x 5 days    SUBJECTIVE:     Still has some baseline cough but no change.  Overall comfortable with her breathing.  Feels that she is little bit stronger with the left leg today.  Feels she did better with standing activities today      REVIEW OF SYSTEMS:    General: denies weight change, fatigue, weakness, fever, chills, night sweats.   Skin: denies itching, rashes, sores and bruises.   Neurologic: denies headache, nausea, vomiting, or visual changes.   HEENT:  denies discharge, sore throat, allergies, and congestion.   Respiratory: denies shortness of breath, wheeze, cough and hemoptysis.   Cardiac:  denies chest pain or palpitations.   Gastrointestinal:  denies changes in appetite, nausea, vomiting, dysphagia, abdominal pain, constipation, or diarrhea.   Urinary:  denies urgency and frequency.  Musculoskeletal:  denies muscle weakness, pain, or joint stiffness.    OBJECTIVE:    Vitals:    23 1113   BP:    Pulse: 95   Resp: 18   Temp:    SpO2: 95%       PHYSICAL EXAM:   General: pleasant, in no acute distress  HEENT: NCAT, sclera anicteric, conjunctiva pink, mucoa moist  Cardiovascular: RRR, +S1+S2, no obvious m/g/r  Lungs: CTAB with somewhat diminished breath sounds on the right lung base, no rhonchi or wheezing  Abdomen: normoactive bowel sounds, soft, non tender to palpation  Extremities: no peripheral edema  Skin: exposed surfaces of skin warm, intact, without erythema  Neuro: awake alert and oriented to person, place, time, and situation, CN II-XII grossly intact  MSK: right/left: Shoulder  abduction 4+/4+, elbow flexion 5/5, wrist extension 5/5, finger flexion 5/5, hip flexion 3/2, knee extension 4/2, ankle dorsiflexion 4/4             MEDICATIONS  Scheduled Meds:  apixaban, 5 mg, Oral, Q12H  desvenlafaxine, 25 mg, Oral, Daily  folic acid, 1 mg, Oral, Daily  gabapentin, 300 mg, Oral, Q8H  ipratropium-albuterol, 3 mL, Nebulization, 4x Daily - RT  lamoTRIgine, 200 mg, Oral, Daily  melatonin, 5 mg, Oral, Nightly  pantoprazole, 40 mg, Oral, Q AM  thiamine, 100 mg, Oral, Daily  vitamin B-12, 500 mcg, Oral, Daily        Continuous Infusions:       PRN Meds:  •  acetaminophen  •  bisacodyl  •  hydrocortisone-bacitracin-zinc oxide-nystatin  •  oxyCODONE  •  polyethylene glycol  •  senna-docusate sodium  •  simethicone      RESULTS:  No results found for: POCGLU  Results from last 7 days   Lab Units 04/03/23  0657   WBC 10*3/mm3 8.15   HEMOGLOBIN g/dL 8.3*   HEMATOCRIT % 25.0*   PLATELETS 10*3/mm3 359     Results from last 7 days   Lab Units 04/03/23  0657   SODIUM mmol/L 133*   POTASSIUM mmol/L 3.9   CHLORIDE mmol/L 95*   CO2 mmol/L 31.6*   BUN mg/dL 10   CREATININE mg/dL 1.06*   CALCIUM mg/dL 9.7   GLUCOSE mg/dL 91           ASSESSMENT and PLAN:    Idiopathic peripheral neuropathy    Paresthesia    History of blood clots    Chronic anticoagulation    Seizures    Anemia    Guillain-Winslow syndrome    MRSA bacteremia       Subacute motor predominant idiopathic peripheral neuropathy with Paraparesis and Areflexia.   S/p IVIG x 5 days  March 30-shows improvement with her strength proximally the upper extremities.  Improvement with her hand strength.  Improved strength in the right lower extremity but continues with profound weakness in the left lower extremity.  With weakness of the left ankle, will look at probable AFO for gait activities.  Transfers are moderate assist.  Ambulated with a rolling walker up to 70 feet with gait deviations minimal moderate assist      Impaired mobility/impaired self care/ impaired  cognition  Left greater than right lower extremity weakness greater than bilateral upper extremity weakness  April 4-strength improved in the bilateral upper extremities and right lower extremity.Weakness  looks about the same the left lower extremity.  On the day she ambulated further last week she had utilized AFO on the left lower extremity  April 5-stronger with her left hip flexors and knee extensors today     R lung masslike infiltrate with cavitary lesions/pleural effusion   S/p thoracentesis - Blood cx and pleural fluid  positive for MRSA      chest tube placement given empyema, and she underwent thoracoscopy w/ decortication 3/8/23   -expected postoperative changes with pleural thickening and minimal pleural effusion.  The effusion is too small for intervention and will likely to continue to resolve with time.  Recommend continue good pulmonary hygiene with incentive spirometry hourly as well as increase activity/ambulation as tolerated.  March 23-appears decreased breath sounds more noticeable today on the right compared to the left.  Check follow-up chest x-ray.  On room air during the day, 1 L at night.  March 24- CXR relatively unchanged from previous, reached out to CT surgery given planned outpatient f/u on 3/28, no further imaging planned at this time as patient afebrile with normal WBC, monitor     Right-sided Chest Pain 3/24  -EKG sinus tach  -HS Troponin 25 > 24  -consider Cardiology consult if worsening  -pain reproducible with palpation, pain likely postoperative neuralgia as indicated vs. possible costochondritis, continue gabapentin, ice/heat, monitor     Hypokalemia  -3/24 K 3.3, repleted  -3/29 K 3.7, resolved     mass on TTE with findings concerning for endocarditis - underwent MARIAMA 3/10/2023 which had no vegetation  RUE superficial thrombophlebitis concerning for septic phlebitis.     ID - continue 4 weeks of vancomycin as recommended by infectious disease dosed by pharmacy to achieve a  trough level of 15-20 or area under the curve of 400-600 from last negative blood culture or last intervention which ever is the latest - to complete April 1, 2023     Left hip arthrocentesis March 16 to rule out any hip septic arthritis- no growth to date on fluid.    lower back pain - MRI of spine to rule out discitis or osteomyelitis.  This did have known degenerative changes but  no infection.      DVT prophylaxis - SCDs / apixiban. History of LLE DVT - on Eliquis at home     Pain management   - Tylenol 1,000 mg three times a day/ Celebrex 100 mg q 112 hours/ gabapentin 300 mg q 8 hours Oxycodone 5 mg q 4 hours prn  March 18 - DC Celebrex 2/2 PHYLLIS, and anemia      Anxiety/ muscle spasms - Diazepam 2 mg q 6 hours prn - JONES reviewed     Seizure disorder - Lamotrigine 200 mg daily     ABLA   - March 18- Hgb 7.0 (7.3 on 3/16). Type, cross and transfuse 1 unit PRBCs. Pre-medicate with Tylenol and Benadryl. CBC in am.   March 19- Hgb 8.8 s/p 1 unit PRBcs. Hemoccult positive. On Eliquis for h/o DVT. Follow Hgb. May need GI work up.  March 20 - HGB 9.3 s/p transfusion  March 21-reviewed with internal medicine-hemoglobin stable after transfusion.  Iron studies consistent with inflammation.  No further work-up presently  March 22 - Hgb 8.9, stable  March 23-hemoglobin trend 8.3.  Continue to follow.  Recheck tomorrow  March 29 - Hgb 8.1, stable     PHYLLIS - March 18- Creatinine 1.36 up from 1.12. On IV Vanc and Celebrex. DC Celebrex. BMP in am.  March 19- s/p 500ml NS bolus. Creatinine 1.31.  March 23-creatinine 0.92     TEAM CONF - MARCH 21 - BED MOD X 2. TRANSFERS MAX 2. STAND PIVOT OR SQUAT PIVOT. NON-AMBULATORY. Saturday MARCH 18 GAIT 6 FEET PARALLEL BARS MOD MAX OF 2.   BATH MOD 1-2. LBD DEP. UBD MOD. EATING MIN. GROOMING MIN. TOILETING DEP.   The patient has difficulty remembering new information due to short-term memory impairments.  Initial evaluation 3.18, pt obtained a score 12/30 on SLUMS cognitive assessment  indicating severe cognitive impairment/dementia, goals initiated for memory and sustaining attention  FEES completed 3.7 revealing glottic gap accounting for glottic insufficiency, dysphonia, hoarse/breathy quality, recommend targeting vocal function as appropriate d/t impact on intelligibility.    Regular/thin diet continued since FEES completion 3.7, although pt known to cough with and without PO intake, may be contributed partially to ineffective VF closure.   DRESSING FORMER CHEST TUBE SITE. CONTINUES ON PUREWICK AT NIGHT. O2 AT 1-2 LITERS AT NIGHT.  DECREASED APPETITE. ABD X-RAY THIS AM SHOWS NON-OBSTRUCTIVE BOWEL GAS PATTERN.   ELOS - 4 WEEKS     TEAM CONF - MARCH 28 - BED MIN CTG. TRANSFER MOD ASSIST STAND PIVOT OR SQUAT PIVOT. FATIGUES IN AFERNOONS. GAIT 15 FEET RW MIN X 2. FLUCTUATING ORIENTATION. BETTER DETAIL TO TASK. MIN MOD CUES FOR REASONING TASKS. DYSPHONIA FROM COUGHING.  SLP REVIEWED VOICE HYGIENE, NOT TO DO HARSH THROAT CLEAR. BATH MOD. LBD MAX. UBD MIN. TOILETING MOD X 2. CONTINENT/INCONTINENT BLADDER. RIGHT CHEST TUBE / THORACOSCOPY SITE CARE. VARIABLE INTAKE.   ELOS - 3 WEEKS    TEAM CONF - APRIL 4 - CONTINUES WEAK IN LLE. STRONGER IN BUE AND RIGHT KNEE EXTENSION. BED MIN MOD TO CTG. TRANSFERS MOD MAX STAND PIVOT OR SQUAT PIVOT. WORKED ON GAIT IN PARALLEL BARS MIN MOD OF 2 PERSON. CAN DO 15 FEET IF NOT SO ANXIOUS. TOILET AND SHOWER TRANSFERS MOD ASSIST. DROP ARM BEDSIDE COMMODE. AT NIGHT USING BEDPAN. USING PUREWICK AT NIGHT. BATH MIN. LBD MOD. UBD SET UP.TOILETING MOD MAX 2.    Pt with improved ability to recall salient events  from day.Now presents with mild-moderate hoarse vocal  quality. Improved vocal intensity.  ADDRSSING DYSPHONIA. DIETICIAN REVIEWED FOOD CHOICES. GABAPENTIN FOR RIGHT RIB PAIN/INTERCOSTAL PAIN.CONTINENT BOWEL AND BLADDER WITH URGENCY.   ELOS - 2-3 WEEKS    CODE STATUS:  Level Of Support Discussed With: Patient  Code Status (Patient has no pulse and is not breathing): CPR  (Attempt to Resuscitate)  Medical Interventions (Patient has pulse or is breathing): Full Support      Admission Status: Continues to meet requirements for inpatient admission.       Ryley Rider MD      During rounds, used appropriate personal protective equipment including mask and gloves.  Additional gown if indicated.  Mask used was standard procedure mask. Appropriate PPE was worn during the entire visit.  Hand hygiene was completed before and after.

## 2023-04-05 NOTE — PLAN OF CARE
Goal Outcome Evaluation:  Plan of Care Reviewed With: patient        Progress: improving  Outcome Evaluation: Assist x2. Blateral lover extremities very weak. Continent during the day. Medications with applesauce. Pain treated this afternoon per patient after therapies were finished for the day. Contact isolation in place. Coccyx red but blanchable, barrier cream applied. No unsafe behaviors noted. Participates with therapies.

## 2023-04-05 NOTE — THERAPY TREATMENT NOTE
Inpatient Rehabilitation - Physical Therapy Treatment Note       [facility]     Patient Name: Louise GARCIA  : 1965  MRN: [id number]    Today's Date: 2023                    Admit Date: 3/17/2023      Visit Dx:     ICD-10-CM ICD-9-CM   1. Impaired functional mobility, balance, gait, and endurance  Z74.09 V49.89       Patient Active Problem List   Diagnosis   • Sepsis   • Neck pain, chronic   • Upper back pain   • Paresthesia   • Cervical myelopathy   • Lower extremity weakness   • History of blood clots   • Chronic anticoagulation   • Seizures   • Anemia   • GERD (gastroesophageal reflux disease)   • Guillain-Viola syndrome   • Situational mixed anxiety and depressive disorder   • Mass of middle lobe of right lung   • Oral candidiasis   • Empyema of pleura   • MRSA bacteremia   • Idiopathic peripheral neuropathy       Past Medical History:   Diagnosis Date   • Degenerative disc disease, cervical    • Gestational diabetes    • H/O blood clots    • Hematemesis    • Seizures    • Septic shock        Past Surgical History:   Procedure Laterality Date   • APPENDECTOMY     • BACK SURGERY     • CHOLECYSTECTOMY     • ENDOSCOPY N/A 2016    Procedure: ESOPHAGOGASTRODUODENOSCOPY with biopsy;  Surgeon: Austen Brito MD;  Location: [facility] ENDOSCOPY;  Service:    • HYSTERECTOMY     • NECK SURGERY       approx 6 yrs ago   • THORACOSCOPY Right 3/8/2023    Procedure: THORACOSCOPY WITH DAVINCI ROBOT WITH COMPLETE DECORTICATION;  Surgeon: Chloe Cedillo MD;  Location: [facility] OR;  Service: Robotics - DaVinci;  Laterality: Right;   • TONSILLECTOMY     • TONSILLECTOMY AND ADENOIDECTOMY         PT ASSESSMENT (last 12 hours)     IRF PT Evaluation and Treatment     Row Name 23 0800          PT Time and Intention    Document Type daily treatment  -AE     Mode of Treatment individual therapy;physical therapy  -AE     Patient/Family/Caregiver Comments/Observations up in chair, dressed and eager for  therapy  -AE     Row Name 04/05/23 0800          General Information    Patient Profile Reviewed yes  -AE     General Observations of Patient  to come down during PM session  -AE     Existing Precautions/Restrictions fall;other (see comments)  contact precautions  -AE     Limitations/Impairments safety/cognitive  -AE     Row Name 04/05/23 0800          Pain Assessment    Pretreatment Pain Rating 4/10  -AE     Posttreatment Pain Rating 4/10  -AE     Pain Location - Side/Orientation Left  -AE     Pain Location lower  -AE     Pain Location - extremity  -AE     Pre/Posttreatment Pain Comment left foot and leg  -AE     Row Name 04/05/23 0800          Cognition/Psychosocial    Affect/Mental Status (Cognition) flat/blunted affect  -AE     Orientation Status (Cognition) oriented x 3  -AE     Follows Commands (Cognition) follows one-step commands;over 90% accuracy;increased processing time needed  -AE     Personal Safety Interventions fall prevention program maintained;gait belt;muscle strengthening facilitated;nonskid shoes/slippers when out of bed;supervised activity  -AE     Cognitive Function memory deficit;safety deficit  -AE     Memory Deficit (Cognition) episodic memory;procedural memory  -AE     Row Name 04/05/23 0800          Transfer Assessment/Treatment    Comment, (Transfers) pt appeared stronger in PM session today. still struggles with sit<>stand due to weakness and fear of letting go of wheelchair. cues to reach back for descent into chair  -AE     Row Name 04/05/23 0800          Bed-Chair Transfer    Bed-Chair [place] (Transfers) moderate assist (50% patient effort);maximum assist (25% patient effort)  -AE     Assistive Device (Bed-Chair Transfers) wheelchair  -AE     Row Name 04/05/23 0800          Chair-Bed Transfer    Chair-Bed [place] (Transfers) moderate assist (50% patient effort);maximum assist (25% patient effort)  -AE     Assistive Device (Chair-Bed Transfers) wheelchair  -AE     Row  "Name 04/05/23 0800          Sit-Stand Transfer    Sit-Stand [place] (Transfers) minimum assist (75% patient effort);moderate assist (50% patient effort)  -AE     Assistive Device (Sit-Stand Transfers) parallel bars;walker, front-wheeled  -AE     Row Name 04/05/23 0800          Stand-Sit Transfer    Stand-Sit [place] (Transfers) minimum assist (75% patient effort);moderate assist (50% patient effort)  -AE     Assistive Device (Stand-Sit Transfers) parallel bars;walker, front-wheeled  -AE     Row Name 04/05/23 0800          Gait/Stairs (Locomotion)    [place] Level (Gait) contact guard;minimum assist (75% patient effort);1 person assist  -AE     Assistive Device (Gait) parallel bars;walker, front-wheeled  -AE     Distance in Feet (Gait) 8' x 2 // bars, 20ft FWW  -AE     Pattern (Gait) step-through;step-to  -AE     Deviations/Abnormal Patterns (Gait) federico decreased;bilateral deviations;base of support, narrow;festinating/shuffling;stride length decreased;weight shifting decreased;left sided deviations;antalgic  -AE     Bilateral Gait Deviations heel strike decreased;weight shift ability decreased;knee buckling bilaterally  -AE     Left Sided Gait Deviations decreased knee extension  -AE     Right Sided Gait Deviations decreased knee extension  -AE     Gait Assessment/Intervention did much better with FWW today and less knee buckling. maintained upright posture. cues to \"lock\" L knee after every step with very minimal buckling today. performed with L AFO today. Likely could have walked further but will monitor soreness/fatigue and attempt to progress further tomorrow.  -AE     [place] Level (Stairs) minimum assist (75% patient effort);moderate assist (50% patient effort);2 person assist  -AE     Handrail Location (Stairs) both sides  -AE     Number of Steps (Stairs) 4in step x 2  -AE     Ascending Technique (Stairs) step-to-step  -AE     Descending Technique (Stairs) step-to-step  -AE     Stairs, " Impairments strength decreased;impaired balance;coordination impaired  -AE     Stairs Assessment/Intervention attempted to pull on rails to stand requiring modA x 2 due to little knee extension during transfer. then required Bianca x 2 and max cueing to ascend with RLE first and descend backwards with LLE  -AE     Row Name 04/05/23 0800          Hip (Therapeutic Exercise)    Hip Strengthening (Therapeutic Exercise) --  -AE     Row Name 04/05/23 0800          Knee (Therapeutic Exercise)    Knee Strengthening (Therapeutic Exercise) left;SAQ (short arc quad);right;SLR (straight leg raise);3 sets;5 repetitions  assist to keep LLE in hook-lying position. very weak for both therex  -AE     Row Name 04/05/23 0800          Core Strength (Therapeutic Exercise)    Core Strength (Therapeutic Exercise) --  -AE     Row Name 04/05/23 0800          Positioning and Restraints    Pre-Treatment Position sitting in chair/recliner  -AE     Post Treatment Position wheelchair  -AE     In Wheelchair sitting;call light within reach;encouraged to call for assist;exit alarm on  -AE           User Key  (r) = Recorded By, (t) = Taken By, (c) = Cosigned By    Initials Name Provider Type    AE Deanna Carr, PT Physical Therapist              Wound 03/08/23 1917 Right lateral chest Incision (Active)   Dressing Appearance dry;intact 04/05/23 0808   Closure Liquid skin adhesive 04/05/23 0808   Base clean;dry 04/05/23 0808   Drainage Amount none 04/05/23 0808   Periwound Care dry periwound area maintained 04/05/23 0808       Wound 03/11/23 1530 Other (See comments) other (see comments) pubis Abrasion (Active)   Dressing Appearance open to air 04/05/23 0808   Closure Open to air 04/05/23 0808   Base red;scab 04/05/23 0808   Drainage Amount none 04/05/23 0808   Dressing Care open to air 04/05/23 0808   Periwound Care barrier ointment applied 04/05/23 0808       Wound 03/20/23 1527 Bilateral upper gluteal MASD (Moisture associated skin damage)  (Active)   Dressing Appearance open to air 04/05/23 0808   Closure Open to air 04/05/23 0808   Base clean;dry;blanchable 04/05/23 0808   Drainage Amount none 04/05/23 0808   Care, Wound cleansed with;soap and water 04/05/23 0808   Dressing Care open to air 04/05/23 0808   Periwound Care barrier ointment applied 04/05/23 0808     Physical Therapy Education     Title: PT OT SLP Therapies (Done)     Topic: Physical Therapy (Done)     Point: Mobility training (Done)     Learning Progress Summary           Patient Acceptance, E, VU by WN at 4/4/2023 2329    Acceptance, E, VU by WN at 4/4/2023 0322    Acceptance, E,D, VU,DU by DP at 4/1/2023 1352    Acceptance, E, VU,DU by MG at 3/31/2023 0825    Acceptance, E, VU,DU by MG at 3/30/2023 0956    Acceptance, E,D, VU,DU by DP at 3/29/2023 1148    Nonacceptance, E,D, VU,DU by DP at 3/28/2023 1144    Acceptance, E,D, VU,DU by DP at 3/27/2023 1601    Acceptance, E,D, VU,NR by EE at 3/25/2023 1423    Acceptance, E,D, NR,VU,DU by DP at 3/21/2023 1448    Acceptance, E, NR by JK at 3/20/2023 1513    Acceptance, E,TB, VU,NR by KP at 3/18/2023 1643   Family Acceptance, E, VU by WN at 4/4/2023 2329    Acceptance, E, VU by WN at 4/4/2023 0322                   Point: Home exercise program (Done)     Learning Progress Summary           Patient Acceptance, E, VU by WN at 4/4/2023 2329    Acceptance, E, VU by WN at 4/4/2023 0322    Acceptance, E,D, VU,DU by DP at 4/1/2023 1352    Acceptance, E, VU,DU by MG at 3/31/2023 0825    Acceptance, E, VU,DU by MG at 3/30/2023 0956    Acceptance, E,D, VU,DU by DP at 3/29/2023 1148    Nonacceptance, E,D, VU,DU by DP at 3/28/2023 1144    Acceptance, E,D, VU,DU by DP at 3/27/2023 1601    Acceptance, E,D, NR,VU,DU by DP at 3/21/2023 1448    Acceptance, E, NR by JK at 3/20/2023 1513   Family Acceptance, E, VU by WN at 4/4/2023 2329    Acceptance, E, VU by WN at 4/4/2023 0322                   Point: Body mechanics (Done)     Learning Progress Summary            Patient Acceptance, E, VU by WN at 4/4/2023 2329    Acceptance, E, VU by WN at 4/4/2023 0322    Acceptance, E,D, VU,DU by DP at 4/1/2023 1352    Acceptance, E, VU,DU by MG at 3/31/2023 0825    Acceptance, E, VU,DU by MG at 3/30/2023 0956    Acceptance, E,D, VU,DU by DP at 3/29/2023 1148    Nonacceptance, E,D, VU,DU by DP at 3/28/2023 1144    Acceptance, E,D, VU,DU by DP at 3/27/2023 1601    Acceptance, E,D, VU,NR by EE at 3/25/2023 1423    Acceptance, E,D, NR,VU,DU by DP at 3/21/2023 1448   Family Acceptance, E, VU by WN at 4/4/2023 2329    Acceptance, E, VU by WN at 4/4/2023 0322                   Point: Precautions (Done)     Learning Progress Summary           Patient Acceptance, E, VU by WN at 4/4/2023 2329    Acceptance, E, VU by WN at 4/4/2023 0322    Acceptance, E,D, VU,DU by DP at 4/1/2023 1352    Acceptance, E, VU,DU by MG at 3/31/2023 0825    Acceptance, E, VU,DU by MG at 3/30/2023 0956    Acceptance, E,D, VU,DU by DP at 3/29/2023 1148    Nonacceptance, E,D, VU,DU by DP at 3/28/2023 1144    Acceptance, E,D, VU,DU by DP at 3/27/2023 1601    Acceptance, E,D, VU,NR by EE at 3/25/2023 1423    Acceptance, E,D, NR,VU,DU by DP at 3/21/2023 1448   Family Acceptance, E, VU by WN at 4/4/2023 2329    Acceptance, E, VU by WN at 4/4/2023 0322                               User Key     Initials Effective Dates Name Provider Type Discipline    EE 06/16/21 -  Melinda Mann, PT Physical Therapist PT    JK 06/16/21 -  Juana Veloz, PT Physical Therapist PT    KP 06/16/21 -  Yolanda Shannon, PT Physical Therapist PT    MG 05/24/22 -  Yu Villalobos, PT Physical Therapist PT    WN 08/23/22 -  Jose Carrillo, RN Registered Nurse Nurse    DP 08/24/21 -  Papa Marsh, PT Physical Therapist PT                PT Recommendation and Plan                          Time Calculation:      PT Charges     Row Name 04/05/23 1549 04/05/23 1218          Time Calculation    Start Time 1300  -AE 0830  -AE     Stop Time 1330  -AE  0900  -AE     Time Calculation (min) 30 min  -AE 30 min  -AE     PT Received On 04/05/23  -AE 04/05/23  -AE     PT - Next Appointment -- 04/06/23  -AE        Time Calculation- PT    Total Timed Code Minutes- PT 30 minute(s)  -AE 30 minute(s)  -AE           User Key  (r) = Recorded By, (t) = Taken By, (c) = Cosigned By    Initials Name Provider Type    AE Deanna Carr, PT Physical Therapist                Therapy Charges for Today     Code Description Service Date Service Provider Modifiers Qty    98445085368 HC PT THERAPEUTIC ACT EA 15 MIN 4/4/2023 Deanna Carr, PT GP 2    28576214807 HC PT MANUAL THERAPY EA 15 MIN 4/4/2023 Deanna Carr, PT GP 1    10347928927 HC PT NEUROMUSC RE EDUCATION EA 15 MIN 4/4/2023 Deanna Carr, PT GP 1    85363452107 HC PT THER PROC EA 15 MIN 4/5/2023 Deanna Carr, PT GP 1    18564904611 HC PT THERAPEUTIC ACT EA 15 MIN 4/5/2023 Deanna Carr, PT GP 1    41167138602 HC PT THER PROC EA 15 MIN 4/5/2023 Deanna Carr, PT GP 2    74055796816 HC GAIT TRAINING EA 15 MIN 4/5/2023 Deanna Carr, PT GP 1    95914989760 HC PT NEUROMUSC RE EDUCATION EA 15 MIN 4/5/2023 Deanna Carr, PT GP 1    13149020614 HC PT THER SUPP EA 15 MIN 4/5/2023 Deanna Carr, PT GP 2                   Deanna Carr PT  4/5/2023

## 2023-04-06 LAB
BASOPHILS # BLD AUTO: 0.08 10*3/MM3 (ref 0–0.2)
BASOPHILS NFR BLD AUTO: 0.9 % (ref 0–1.5)
DEPRECATED RDW RBC AUTO: 46.6 FL (ref 37–54)
EOSINOPHIL # BLD AUTO: 0.1 10*3/MM3 (ref 0–0.4)
EOSINOPHIL NFR BLD AUTO: 1.1 % (ref 0.3–6.2)
ERYTHROCYTE [DISTWIDTH] IN BLOOD BY AUTOMATED COUNT: 13.7 % (ref 12.3–15.4)
HCT VFR BLD AUTO: 26.6 % (ref 34–46.6)
HGB BLD-MCNC: 8.9 G/DL (ref 12–15.9)
IGA1 MFR SER: 274 MG/DL (ref 70–400)
IGG1 SER-MCNC: 2248 MG/DL (ref 700–1600)
IGM SERPL-MCNC: 123 MG/DL (ref 40–230)
IMM GRANULOCYTES # BLD AUTO: 0.07 10*3/MM3 (ref 0–0.05)
IMM GRANULOCYTES NFR BLD AUTO: 0.8 % (ref 0–0.5)
LYMPHOCYTES # BLD AUTO: 3.03 10*3/MM3 (ref 0.7–3.1)
LYMPHOCYTES NFR BLD AUTO: 32.8 % (ref 19.6–45.3)
MCH RBC QN AUTO: 31.2 PG (ref 26.6–33)
MCHC RBC AUTO-ENTMCNC: 33.5 G/DL (ref 31.5–35.7)
MCV RBC AUTO: 93.3 FL (ref 79–97)
MONOCYTES # BLD AUTO: 0.71 10*3/MM3 (ref 0.1–0.9)
MONOCYTES NFR BLD AUTO: 7.7 % (ref 5–12)
NEUTROPHILS NFR BLD AUTO: 5.26 10*3/MM3 (ref 1.7–7)
NEUTROPHILS NFR BLD AUTO: 56.7 % (ref 42.7–76)
NRBC BLD AUTO-RTO: 0 /100 WBC (ref 0–0.2)
PLATELET # BLD AUTO: 380 10*3/MM3 (ref 140–450)
PMV BLD AUTO: 10.7 FL (ref 6–12)
RBC # BLD AUTO: 2.85 10*6/MM3 (ref 3.77–5.28)
WBC NRBC COR # BLD: 9.25 10*3/MM3 (ref 3.4–10.8)

## 2023-04-06 PROCEDURE — 99232 SBSQ HOSP IP/OBS MODERATE 35: CPT | Performed by: PSYCHIATRY & NEUROLOGY

## 2023-04-06 PROCEDURE — 97112 NEUROMUSCULAR REEDUCATION: CPT

## 2023-04-06 PROCEDURE — 97116 GAIT TRAINING THERAPY: CPT

## 2023-04-06 PROCEDURE — 85025 COMPLETE CBC W/AUTO DIFF WBC: CPT | Performed by: PSYCHIATRY & NEUROLOGY

## 2023-04-06 PROCEDURE — 97530 THERAPEUTIC ACTIVITIES: CPT

## 2023-04-06 PROCEDURE — 94799 UNLISTED PULMONARY SVC/PX: CPT

## 2023-04-06 PROCEDURE — 82784 ASSAY IGA/IGD/IGG/IGM EACH: CPT | Performed by: PSYCHIATRY & NEUROLOGY

## 2023-04-06 PROCEDURE — 97535 SELF CARE MNGMENT TRAINING: CPT

## 2023-04-06 PROCEDURE — 63710000001 DIPHENHYDRAMINE PER 50 MG: Performed by: PSYCHIATRY & NEUROLOGY

## 2023-04-06 PROCEDURE — 97110 THERAPEUTIC EXERCISES: CPT

## 2023-04-06 PROCEDURE — 97129 THER IVNTJ 1ST 15 MIN: CPT

## 2023-04-06 PROCEDURE — 97130 THER IVNTJ EA ADDL 15 MIN: CPT

## 2023-04-06 PROCEDURE — 25010000002 IMMUNE GLOBULIN (HUMAN) 20 GM/200ML SOLUTION: Performed by: PSYCHIATRY & NEUROLOGY

## 2023-04-06 RX ORDER — DIPHENHYDRAMINE HYDROCHLORIDE 50 MG/ML
50 INJECTION INTRAMUSCULAR; INTRAVENOUS ONCE AS NEEDED
Status: DISCONTINUED | OUTPATIENT
Start: 2023-04-06 | End: 2023-04-18

## 2023-04-06 RX ORDER — ACETAMINOPHEN 325 MG/1
650 TABLET ORAL EVERY 24 HOURS
Status: COMPLETED | OUTPATIENT
Start: 2023-04-06 | End: 2023-04-10

## 2023-04-06 RX ORDER — FAMOTIDINE 10 MG/ML
20 INJECTION, SOLUTION INTRAVENOUS ONCE AS NEEDED
Status: DISCONTINUED | OUTPATIENT
Start: 2023-04-06 | End: 2023-04-18

## 2023-04-06 RX ORDER — DIPHENHYDRAMINE HCL 25 MG
25 CAPSULE ORAL EVERY 24 HOURS
Status: COMPLETED | OUTPATIENT
Start: 2023-04-06 | End: 2023-04-10

## 2023-04-06 RX ADMIN — ACETAMINOPHEN 650 MG: 325 TABLET ORAL at 15:45

## 2023-04-06 RX ADMIN — Medication 5 MG: at 19:29

## 2023-04-06 RX ADMIN — GABAPENTIN 300 MG: 300 CAPSULE ORAL at 13:32

## 2023-04-06 RX ADMIN — LAMOTRIGINE 200 MG: 100 TABLET ORAL at 08:52

## 2023-04-06 RX ADMIN — DESVENLAFAXINE SUCCINATE 25 MG: 25 TABLET, EXTENDED RELEASE ORAL at 08:53

## 2023-04-06 RX ADMIN — APIXABAN 5 MG: 5 TABLET, FILM COATED ORAL at 19:29

## 2023-04-06 RX ADMIN — OXYCODONE HYDROCHLORIDE 2.5 MG: 5 TABLET ORAL at 15:29

## 2023-04-06 RX ADMIN — IPRATROPIUM BROMIDE AND ALBUTEROL SULFATE 3 ML: 2.5; .5 SOLUTION RESPIRATORY (INHALATION) at 19:21

## 2023-04-06 RX ADMIN — GABAPENTIN 300 MG: 300 CAPSULE ORAL at 05:46

## 2023-04-06 RX ADMIN — Medication 1 MG: at 08:52

## 2023-04-06 RX ADMIN — OXYCODONE HYDROCHLORIDE 2.5 MG: 5 TABLET ORAL at 08:51

## 2023-04-06 RX ADMIN — OXYCODONE HYDROCHLORIDE 2.5 MG: 5 TABLET ORAL at 01:21

## 2023-04-06 RX ADMIN — APIXABAN 5 MG: 5 TABLET, FILM COATED ORAL at 08:52

## 2023-04-06 RX ADMIN — IMMUNE GLOBULIN (HUMAN) 20 G: 10 INJECTION INTRAVENOUS; SUBCUTANEOUS at 16:14

## 2023-04-06 RX ADMIN — Medication 100 MG: at 08:53

## 2023-04-06 RX ADMIN — DIPHENHYDRAMINE HYDROCHLORIDE 25 MG: 25 CAPSULE ORAL at 15:45

## 2023-04-06 RX ADMIN — IPRATROPIUM BROMIDE AND ALBUTEROL SULFATE 3 ML: 2.5; .5 SOLUTION RESPIRATORY (INHALATION) at 07:48

## 2023-04-06 RX ADMIN — IPRATROPIUM BROMIDE AND ALBUTEROL SULFATE 3 ML: 2.5; .5 SOLUTION RESPIRATORY (INHALATION) at 14:53

## 2023-04-06 RX ADMIN — PANTOPRAZOLE SODIUM 40 MG: 40 TABLET, DELAYED RELEASE ORAL at 05:47

## 2023-04-06 RX ADMIN — Medication 500 MCG: at 08:53

## 2023-04-06 RX ADMIN — GABAPENTIN 300 MG: 300 CAPSULE ORAL at 19:29

## 2023-04-06 NOTE — THERAPY TREATMENT NOTE
Inpatient Rehabilitation - Occupational Therapy Treatment Note    [facility]     Patient Name: Louise GARCIA  : 1965  MRN: [id number]    Today's Date: 2023                 Admit Date: 3/17/2023         ICD-10-CM ICD-9-CM   1. Impaired functional mobility, balance, gait, and endurance  Z74.09 V49.89       Patient Active Problem List   Diagnosis   • Sepsis   • Neck pain, chronic   • Upper back pain   • Paresthesia   • Cervical myelopathy   • Lower extremity weakness   • History of blood clots   • Chronic anticoagulation   • Seizures   • Anemia   • GERD (gastroesophageal reflux disease)   • Guillain-[place] syndrome   • Situational mixed anxiety and depressive disorder   • Mass of middle lobe of right lung   • Oral candidiasis   • Empyema of pleura   • MRSA bacteremia   • Idiopathic peripheral neuropathy       Past Medical History:   Diagnosis Date   • Degenerative disc disease, cervical    • Gestational diabetes    • H/O blood clots    • Hematemesis    • Seizures    • Septic shock        Past Surgical History:   Procedure Laterality Date   • APPENDECTOMY     • BACK SURGERY     • CHOLECYSTECTOMY     • ENDOSCOPY N/A 2016    Procedure: ESOPHAGOGASTRODUODENOSCOPY with biopsy;  Surgeon: Austen Brito MD;  Location: [facility] ENDOSCOPY;  Service:    • HYSTERECTOMY     • NECK SURGERY       approx 6 yrs ago   • THORACOSCOPY Right 3/8/2023    Procedure: THORACOSCOPY WITH DAVINCI ROBOT WITH COMPLETE DECORTICATION;  Surgeon: Chloe Cedillo MD;  Location: [facility] OR;  Service: Robotics - DaVinci;  Laterality: Right;   • TONSILLECTOMY     • TONSILLECTOMY AND ADENOIDECTOMY               [facility] OT ASSESSMENT FLOWSHEET (last 12 hours)     [facility] OT Evaluation and Treatment     Row Name 23 1223          OT Time and Intention    Document Type daily treatment  -KB     Mode of Treatment individual therapy;occupational therapy  -KB     Patient Effort good  -KB     Symptoms Noted During/After Treatment fatigue  VISUALLY SIGNIFICANT/ DEFERS: Informed patient that their cataract is visually significant and that surgery is recommended to improve patient's visual acuity and/or glare symptoms. RBAs of surgery discussed and questions answered. Patient defers surgery at this time. Will continue to monitor regularly for progression. Patient can call to schedule biometry and surgery within the next 6 months if desired.  -KB     Row Name 04/06/23 1223          General Information    Patient Profile Reviewed yes  -KB     Patient/Family/Caregiver Comments/Observations participated well  -KB     Existing Precautions/Restrictions fall  -KB     Limitations/Impairments safety/cognitive  -KB     Row Name 04/06/23 1223          Pain Assessment    Pretreatment Pain Rating 0/10 - no pain  -KB     Posttreatment Pain Rating 0/10 - no pain  -KB     Row Name 04/06/23 1223          Cognition/Psychosocial    Orientation Status (Cognition) oriented x 3  -KB     Follows Commands (Cognition) follows one-step commands  -KB     Personal Safety Interventions fall prevention program maintained  -KB     Cognitive Function memory deficit  -KB     Row Name 04/06/23 1223          Bathing    [place] Level (Bathing) bathing skills;upper body;minimum assist (75% patient effort)  -KB     Comment (Bathing) [place] area and bottom max assist  -KB     Row Name 04/06/23 1223          Upper Body Dressing    [place] Level (Upper Body Dressing) doff;don;set up assistance  -KB     Set-up Assistance (Upper Body Dressing) obtain clothing  -KB     Comment (Upper Body Dressing) wc level  -KB     Row Name 04/06/23 1223          Lower Body Dressing    [place] Level (Lower Body Dressing) doff;don;pants/bottoms  -KB     Position (Lower Body Dressing) supported sitting  -KB     Comment (Lower Body Dressing) max assist to pull up pants, stand  -KB     Row Name 04/06/23 1223          Grooming    [place] Level (Grooming) grooming skills;deodorant application;hair care, combing/brushing;oral care regimen;set up  -KB     Position (Grooming) supported sitting  -KB     Set-up Assistance (Grooming) obtain supplies  -KB     Comment (Grooming) wc level  -KB     Row Name 04/06/23 1223          Positioning and Restraints    Pre-Treatment Position sitting in chair/recliner  -KB     Post Treatment Position wheelchair  -KB     In Wheelchair call light within reach;encouraged  to call for assist;exit alarm on;with family/caregiver  -ALICE           User Key  (r) = Recorded By, (t) = Taken By, (c) = Cosigned By    Initials Name Effective Dates    Reena Guidry, WILI 01/03/23 -                  Occupational Therapy Education     Title: PT OT SLP Therapies (Done)     Topic: Occupational Therapy (Done)     Point: ADL training (Done)     Description:   Instruct learner(s) on proper safety adaptation and remediation techniques during self care or transfers.   Instruct in proper use of assistive devices.              Learning Progress Summary           Patient Acceptance, E, VU by WN at 4/4/2023 2329    Acceptance, E, VU by WN at 4/4/2023 0322   Family Acceptance, E, VU by WN at 4/4/2023 2329    Acceptance, E, VU by WN at 4/4/2023 0322                   Point: Home exercise program (Done)     Description:   Instruct learner(s) on appropriate technique for monitoring, assisting and/or progressing therapeutic exercises/activities.              Learning Progress Summary           Patient Acceptance, E, VU by WN at 4/4/2023 2329    Acceptance, E, VU by WN at 4/4/2023 0322   Family Acceptance, E, VU by WN at 4/4/2023 2329    Acceptance, E, VU by WN at 4/4/2023 0322                   Point: Precautions (Done)     Description:   Instruct learner(s) on prescribed precautions during self-care and functional transfers.              Learning Progress Summary           Patient Acceptance, E, VU by WN at 4/4/2023 2329    Acceptance, E, VU by WN at 4/4/2023 0322   Family Acceptance, E, VU by WN at 4/4/2023 2329    Acceptance, E, VU by WN at 4/4/2023 0322                   Point: Body mechanics (Done)     Description:   Instruct learner(s) on proper positioning and spine alignment during self-care, functional mobility activities and/or exercises.              Learning Progress Summary           Patient Acceptance, E, VU by WN at 4/4/2023 2329    Acceptance, E, VU by WN at 4/4/2023 0322   Family Acceptance, E,  VU by YVONNE at 4/4/2023 2329    Acceptance, E, VU by WN at 4/4/2023 0322                               User Key     Initials Effective Dates Name Provider Type Discipline    YVONNE 08/23/22 -  Jose Carrillo, RN Registered Nurse Nurse                    OT Recommendation and Plan                         Time Calculation:      Time Calculation- OT     Row Name 04/06/23 1030             Time Calculation- OT    OT Start Time 1030  -KB      OT Stop Time 1100  -KB      OT Time Calculation (min) 30 min  -KB            User Key  (r) = Recorded By, (t) = Taken By, (c) = Cosigned By    Initials Name Provider Type    KB Reena Tovar OT Occupational Therapist              Therapy Charges for Today     Code Description Service Date Service Provider Modifiers Qty    19012951040 HC OT SELF CARE/MGMT/TRAIN EA 15 MIN 4/6/2023 Reena Tovar OT GO 2                   Reena Tovar OT  4/6/2023

## 2023-04-06 NOTE — THERAPY TREATMENT NOTE
Inpatient Rehabilitation - Occupational Therapy Treatment Note    [facility]     Patient Name: Louise GARCIA  : 1965  MRN: [id number]    Today's Date: 2023                 Admit Date: 3/17/2023         ICD-10-CM ICD-9-CM   1. Impaired functional mobility, balance, gait, and endurance  Z74.09 V49.89       Patient Active Problem List   Diagnosis   • Sepsis   • Neck pain, chronic   • Upper back pain   • Paresthesia   • Cervical myelopathy   • Lower extremity weakness   • History of blood clots   • Chronic anticoagulation   • Seizures   • Anemia   • GERD (gastroesophageal reflux disease)   • Guillain-[place] syndrome   • Situational mixed anxiety and depressive disorder   • Mass of middle lobe of right lung   • Oral candidiasis   • Empyema of pleura   • MRSA bacteremia   • Idiopathic peripheral neuropathy       Past Medical History:   Diagnosis Date   • Degenerative disc disease, cervical    • Gestational diabetes    • H/O blood clots    • Hematemesis    • Seizures    • Septic shock        Past Surgical History:   Procedure Laterality Date   • APPENDECTOMY     • BACK SURGERY     • CHOLECYSTECTOMY     • ENDOSCOPY N/A 2016    Procedure: ESOPHAGOGASTRODUODENOSCOPY with biopsy;  Surgeon: Austen Brito MD;  Location: [facility] ENDOSCOPY;  Service:    • HYSTERECTOMY     • NECK SURGERY       approx 6 yrs ago   • THORACOSCOPY Right 3/8/2023    Procedure: THORACOSCOPY WITH DAVINCI ROBOT WITH COMPLETE DECORTICATION;  Surgeon: Chloe Cedillo MD;  Location: [facility] OR;  Service: Robotics - DaVinci;  Laterality: Right;   • TONSILLECTOMY     • TONSILLECTOMY AND ADENOIDECTOMY               [facility] OT ASSESSMENT FLOWSHEET (last 12 hours)     [facility] OT Evaluation and Treatment     Row Name 23 1529 23 1223       OT Time and Intention    Document Type discharge evaluation  -KB daily treatment  -KB    Mode of Treatment individual therapy;occupational therapy  -KB individual therapy;occupational therapy   -KB    Patient Effort good  -KB good  -KB    Symptoms Noted During/After Treatment -- fatigue  -KB    Row Name 04/06/23 1529 04/06/23 1223       General Information    Patient Profile Reviewed yes  -KB yes  -KB    Patient/Family/Caregiver Comments/Observations good participation  -KB participated well  -KB    Existing Precautions/Restrictions fall  -KB fall  -KB    Limitations/Impairments safety/cognitive  -KB safety/cognitive  -KB    Row Name 04/06/23 1529 04/06/23 1223       Pain Assessment    Pretreatment Pain Rating 0/10 - no pain  -KB 0/10 - no pain  -KB    Posttreatment Pain Rating 0/10 - no pain  -KB 0/10 - no pain  -KB    Row Name 04/06/23 1223          Cognition/Psychosocial    Orientation Status (Cognition) oriented x 3  -KB     Follows Commands (Cognition) follows one-step commands  -KB     Personal Safety Interventions fall prevention program maintained  -KB     Cognitive Function memory deficit  -KB     Row Name 04/06/23 1223          Bathing    [place] Level (Bathing) bathing skills;upper body;minimum assist (75% patient effort)  -KB     Comment (Bathing) [place] area and bottom max assist  -KB     Row Name 04/06/23 1223          Upper Body Dressing    [place] Level (Upper Body Dressing) doff;don;set up assistance  -KB     Set-up Assistance (Upper Body Dressing) obtain clothing  -KB     Comment (Upper Body Dressing) wc level  -KB     Row Name 04/06/23 1223          Lower Body Dressing    [place] Level (Lower Body Dressing) doff;don;pants/bottoms  -KB     Position (Lower Body Dressing) supported sitting  -KB     Comment (Lower Body Dressing) max assist to pull up pants, stand  -KB     Row Name 04/06/23 1223          Grooming    [place] Level (Grooming) grooming skills;deodorant application;hair care, combing/brushing;oral care regimen;set up  -KB     Position (Grooming) supported sitting  -KB     Set-up Assistance (Grooming) obtain supplies  -KB     Comment (Grooming) wc level  -KB      Row Name 04/06/23 1529          Bed-Chair Transfer    Bed-Chair [place] (Transfers) moderate assist (50% patient effort)  -     Assistive Device (Bed-Chair Transfers) wheelchair  -KB     Comment, (Bed-Chair Transfer) squat pivot  -     Row Name 04/06/23 1529          Motor Skills    Motor Skills neuro-muscular function  -     Motor Control/Coordination Interventions neuro-muscular re-education  -     Row Name 04/06/23 1529          Shoulder (Therapeutic Exercise)    Shoulder (Therapeutic Exercise) strengthening exercise  -     Shoulder Strengthening (Therapeutic Exercise) bilateral;flexion;aBduction;1 lb free weight;10 repetitions  -KB     Row Name 04/06/23 1529          Elbow/Forearm (Therapeutic Exercise)    Elbow/Forearm (Therapeutic Exercise) strengthening exercise  -     Elbow/Forearm Strengthening (Therapeutic Exercise) bilateral;1 lb free weight;flexion;extension;10 repetitions  -     Row Name 04/06/23 1529          Core Strength (Therapeutic Exercise)    Core Strength (Therapeutic Exercise) abdominal bracing;sitting;5 repetitions  -     Row Name 04/06/23 1529 04/06/23 1223       Positioning and Restraints    Pre-Treatment Position sitting in chair/recliner  -KB sitting in chair/recliner  -KB    Post Treatment Position wheelchair  -KB wheelchair  -KB    In Wheelchair call light within reach;encouraged to call for assist;exit alarm on;with family/caregiver  -KB call light within reach;encouraged to call for assist;exit alarm on;with family/caregiver  -KB          User Key  (r) = Recorded By, (t) = Taken By, (c) = Cosigned By    Initials Name Effective Dates    KB Reena Tovar OT 01/03/23 -                  Occupational Therapy Education     Title: PT OT SLP Therapies (Done)     Topic: Occupational Therapy (Done)     Point: ADL training (Done)     Description:   Instruct learner(s) on proper safety adaptation and remediation techniques during self care or transfers.   Instruct in proper  use of assistive devices.              Learning Progress Summary           Patient Acceptance, E, VU by WN at 4/4/2023 2329    Acceptance, E, VU by WN at 4/4/2023 0322   Family Acceptance, E, VU by WN at 4/4/2023 2329    Acceptance, E, VU by WN at 4/4/2023 0322                   Point: Home exercise program (Done)     Description:   Instruct learner(s) on appropriate technique for monitoring, assisting and/or progressing therapeutic exercises/activities.              Learning Progress Summary           Patient Acceptance, E, VU by WN at 4/4/2023 2329    Acceptance, E, VU by WN at 4/4/2023 0322   Family Acceptance, E, VU by WN at 4/4/2023 2329    Acceptance, E, VU by WN at 4/4/2023 0322                   Point: Precautions (Done)     Description:   Instruct learner(s) on prescribed precautions during self-care and functional transfers.              Learning Progress Summary           Patient Acceptance, E, VU by WN at 4/4/2023 2329    Acceptance, E, VU by WN at 4/4/2023 0322   Family Acceptance, E, VU by WN at 4/4/2023 2329    Acceptance, E, VU by WN at 4/4/2023 0322                   Point: Body mechanics (Done)     Description:   Instruct learner(s) on proper positioning and spine alignment during self-care, functional mobility activities and/or exercises.              Learning Progress Summary           Patient Acceptance, E, VU by WN at 4/4/2023 2329    Acceptance, E, VU by WN at 4/4/2023 0322   Family Acceptance, E, VU by WN at 4/4/2023 2329    Acceptance, E, VU by WN at 4/4/2023 0322                               User Key     Initials Effective Dates Name Provider Type Discipline     08/23/22 -  Jose Carrillo, RN Registered Nurse Nurse                    OT Recommendation and Plan                         Time Calculation:      Time Calculation- OT     Row Name 04/06/23 1330 04/06/23 1030          Time Calculation- OT    OT Start Time 1330  -KB 1030  -KB     OT Stop Time 1400  -KB 1100  -KB     OT Time  Calculation (min) 30 min  -KB 30 min  -KB           User Key  (r) = Recorded By, (t) = Taken By, (c) = Cosigned By    Initials Name Provider Type    Reena Guidry OT Occupational Therapist              Therapy Charges for Today     Code Description Service Date Service Provider Modifiers Qty    [id number]  OT SELF CARE/MGMT/TRAIN EA 15 MIN 4/6/2023 Reena Tovar OT GO 2    [id number]  OT THERAPEUTIC ACT EA 15 MIN 4/6/2023 Reena Tovar OT GO 2                   Reena Tovar OT  4/6/2023

## 2023-04-06 NOTE — THERAPY TREATMENT NOTE
Inpatient Rehabilitation - Physical Therapy Treatment Note       [facility]     Patient Name: Louise GARCIA  : 1965  MRN: [id number]    Today's Date: 2023                    Admit Date: 3/17/2023      Visit Dx:     ICD-10-CM ICD-9-CM   1. Impaired functional mobility, balance, gait, and endurance  Z74.09 V49.89       Patient Active Problem List   Diagnosis   • Sepsis   • Neck pain, chronic   • Upper back pain   • Paresthesia   • Cervical myelopathy   • Lower extremity weakness   • History of blood clots   • Chronic anticoagulation   • Seizures   • Anemia   • GERD (gastroesophageal reflux disease)   • Guillain-[place] syndrome   • Situational mixed anxiety and depressive disorder   • Mass of middle lobe of right lung   • Oral candidiasis   • Empyema of pleura   • MRSA bacteremia   • Idiopathic peripheral neuropathy       Past Medical History:   Diagnosis Date   • Degenerative disc disease, cervical    • Gestational diabetes    • H/O blood clots    • Hematemesis    • Seizures    • Septic shock        Past Surgical History:   Procedure Laterality Date   • APPENDECTOMY     • BACK SURGERY     • CHOLECYSTECTOMY     • ENDOSCOPY N/A 2016    Procedure: ESOPHAGOGASTRODUODENOSCOPY with biopsy;  Surgeon: Austen Brito MD;  Location: [facility] ENDOSCOPY;  Service:    • HYSTERECTOMY     • NECK SURGERY       approx 6 yrs ago   • THORACOSCOPY Right 3/8/2023    Procedure: THORACOSCOPY WITH DAVINCI ROBOT WITH COMPLETE DECORTICATION;  Surgeon: Chloe Cedillo MD;  Location: [facility] OR;  Service: Robotics - DaVinci;  Laterality: Right;   • TONSILLECTOMY     • TONSILLECTOMY AND ADENOIDECTOMY         PT ASSESSMENT (last 12 hours)     IRF PT Evaluation and Treatment     Row Name 23 0800          PT Time and Intention    Document Type daily treatment  -AE     Mode of Treatment individual therapy;physical therapy  -AE     Patient/Family/Caregiver Comments/Observations pt complaining of some soreness from  "yesterday, still reporting moderate pain but stating pain medicine makes her groggy  -AE     Row Name 04/06/23 0800          General Information    Patient Profile Reviewed yes  -AE     General Observations of Patient  present for PM session, complaining of 0/10 pain in PM  -AE     Existing Precautions/Restrictions fall;other (see comments)  contact precautions  -AE     Limitations/Impairments safety/cognitive  -AE     Row Name 04/06/23 0800          Pain Assessment    Pretreatment Pain Rating 0/10 - no pain  -AE     Posttreatment Pain Rating 0/10 - no pain  -AE     Pain Location - Side/Orientation Left  -AE     Pain Location lower  -AE     Pain Location - extremity  -AE     Row Name 04/06/23 0800          Cognition/Psychosocial    Affect/Mental Status (Cognition) flat/blunted affect  -AE     Orientation Status (Cognition) oriented x 3  -AE     Follows Commands (Cognition) follows one-step commands;over 90% accuracy;increased processing time needed  -AE     Personal Safety Interventions fall prevention program maintained;gait belt;muscle strengthening facilitated;nonskid shoes/slippers when out of bed;supervised activity  -AE     Cognitive Function memory deficit;safety deficit  -AE     Memory Deficit (Cognition) episodic memory;procedural memory  -AE     Row Name 04/06/23 0800          Bed Mobility    Supine-Sit [place] (Bed Mobility) standby assist  -AE     Assistive Device (Bed Mobility) bed rails  -AE     Row Name 04/06/23 0800          Transfer Assessment/Treatment    Comment, (Transfers) still varies with scooting/squat pivot. needs cues to lean forward, \"stand up\" and \"swing butt\"  -AE     Row Name 04/06/23 0800          Bed-Chair Transfer    Bed-Chair [place] (Transfers) moderate assist (50% patient effort)  -AE     Assistive Device (Bed-Chair Transfers) wheelchair  -AE     Comment, (Bed-Chair Transfer) squat pivot  -AE     Row Name 04/06/23 0800          Chair-Bed Transfer    Chair-Bed " "[place] (Transfers) moderate assist (50% patient effort)  -AE     Assistive Device (Chair-Bed Transfers) wheelchair  -AE     Row Name 04/06/23 0800          Sit-Stand Transfer    Sit-Stand [place] (Transfers) minimum assist (75% patient effort);moderate assist (50% patient effort)  -AE     Assistive Device (Sit-Stand Transfers) parallel bars;walker, front-wheeled  -AE     Comment, (Sit-Stand Transfer) gets fearful when letting go of wheelchair. performed ~5x in PM session  -AE     Row Name 04/06/23 0800          Stand-Sit Transfer    Stand-Sit [place] (Transfers) minimum assist (75% patient effort);moderate assist (50% patient effort)  -AE     Assistive Device (Stand-Sit Transfers) parallel bars;walker, front-wheeled  -AE     Row Name 04/06/23 0800          Gait/Stairs (Locomotion)    [place] Level (Gait) contact guard;minimum assist (75% patient effort);2 person assist  -AE     Assistive Device (Gait) parallel bars;walker, front-wheeled  -AE     Distance in Feet (Gait) 8' // bars CGA, 30ft FWW Bianca x 2  -AE     Pattern (Gait) step-through;step-to  -AE     Deviations/Abnormal Patterns (Gait) federico decreased;bilateral deviations;base of support, narrow;festinating/shuffling;stride length decreased;weight shifting decreased;left sided deviations;antalgic  -AE     Bilateral Gait Deviations heel strike decreased;weight shift ability decreased;knee buckling bilaterally  -AE     Left Sided Gait Deviations decreased knee extension  -AE     Right Sided Gait Deviations decreased knee extension  -AE     Gait Assessment/Intervention does better with warming up and static heel tapping to start. multiple episodes of L knee buckling requiring therapist assistance to maintain upright posture. required sitting rest break to regroup. responded well to keep L leg \"locked\" while stepping with RLE. performed 2 x 3 alternating heel taps with sitting rest break prior to gait training  -AE     Row Name 04/06/23 0800 "          Motor Skills    Therapeutic Exercise stretching  -AE     Row Name 04/06/23 0800          Hip (Therapeutic Exercise)    Hip Strengthening (Therapeutic Exercise) marching while seated;red;resistance band;3 sets;5 repetitions  -AE     Row Name 04/06/23 0800          Knee (Therapeutic Exercise)    Knee Strengthening (Therapeutic Exercise) left;SAQ (short arc quad);right;SLR (straight leg raise);3 sets;5 repetitions  sitting wc in PM LAQ 3 x 5. RLE with red TB  -AE     Row Name 04/06/23 0800          Core Strength (Therapeutic Exercise)    Core Strength (Therapeutic Exercise) bridging, bilateral lower extremities  -AE     Row Name 04/06/23 0800          Stretching (Therapeutic Exercise)    Stretching (Therapeutic Exercise) supine;hamstring strech;SKTC;3 repetitions  figure 4/SADIA stretch  -AE     Row Name 04/06/23 0800          Positioning and Restraints    Pre-Treatment Position sitting in chair/recliner  -AE     Post Treatment Position wheelchair  -AE     In Wheelchair sitting;call light within reach;encouraged to call for assist;exit alarm on;with family/caregiver  -AE           User Key  (r) = Recorded By, (t) = Taken By, (c) = Cosigned By    Initials Name Provider Type    AE Deanna Carr, PT Physical Therapist              Wound 03/08/23 1917 Right lateral chest Incision (Active)   Dressing Appearance dry;intact 04/06/23 0850   Closure Liquid skin adhesive 04/06/23 0850   Base clean;dry 04/06/23 0850   Drainage Amount none 04/06/23 0850       Wound 03/11/23 1530 Other (See comments) other (see comments) pubis Abrasion (Active)   Dressing Appearance open to air 04/05/23 1928   Closure Open to air 04/06/23 0850   Base red;scab 04/06/23 0850   Drainage Amount none 04/06/23 0850       Wound 03/20/23 1527 Bilateral upper gluteal MASD (Moisture associated skin damage) (Active)   Dressing Appearance open to air 04/05/23 1928   Closure Open to air 04/06/23 0850   Base clean;dry;blanchable 04/06/23 0850    Drainage Amount none 04/06/23 0850   Periwound Care barrier film applied 04/06/23 0850     Physical Therapy Education     Title: PT OT SLP Therapies (Done)     Topic: Physical Therapy (Done)     Point: Mobility training (Done)     Learning Progress Summary           Patient Acceptance, E, VU by WN at 4/4/2023 2329    Acceptance, E, VU by WN at 4/4/2023 0322    Acceptance, E,D, VU,DU by DP at 4/1/2023 1352    Acceptance, E, VU,DU by MG at 3/31/2023 0825    Acceptance, E, VU,DU by MG at 3/30/2023 0956    Acceptance, E,D, VU,DU by DP at 3/29/2023 1148    Nonacceptance, E,D, VU,DU by DP at 3/28/2023 1144    Acceptance, E,D, VU,DU by DP at 3/27/2023 1601    Acceptance, E,D, VU,NR by EE at 3/25/2023 1423    Acceptance, E,D, NR,VU,DU by DP at 3/21/2023 1448    Acceptance, E, NR by JK at 3/20/2023 1513    Acceptance, E,TB, VU,NR by KP at 3/18/2023 1643   Family Acceptance, E, VU by WN at 4/4/2023 2329    Acceptance, E, VU by WN at 4/4/2023 0322                   Point: Home exercise program (Done)     Learning Progress Summary           Patient Acceptance, E, VU by WN at 4/4/2023 2329    Acceptance, E, VU by WN at 4/4/2023 0322    Acceptance, E,D, VU,DU by DP at 4/1/2023 1352    Acceptance, E, VU,DU by MG at 3/31/2023 0825    Acceptance, E, VU,DU by MG at 3/30/2023 0956    Acceptance, E,D, VU,DU by DP at 3/29/2023 1148    Nonacceptance, E,D, VU,DU by DP at 3/28/2023 1144    Acceptance, E,D, VU,DU by DP at 3/27/2023 1601    Acceptance, E,D, NR,VU,DU by DP at 3/21/2023 1448    Acceptance, E, NR by JK at 3/20/2023 1513   Family Acceptance, E, VU by WN at 4/4/2023 2329    Acceptance, E, VU by WN at 4/4/2023 0322                   Point: Body mechanics (Done)     Learning Progress Summary           Patient Acceptance, E, VU by WN at 4/4/2023 2329    Acceptance, E, VU by WN at 4/4/2023 0322    Acceptance, E,D, VU,DU by DP at 4/1/2023 1352    Acceptance, E, VU,DU by MG at 3/31/2023 0825    Acceptance, E, VU,DU by MG at 3/30/2023  0956    Acceptance, E,D, VU,DU by DP at 3/29/2023 1148    Nonacceptance, E,D, VU,DU by DP at 3/28/2023 1144    Acceptance, E,D, VU,DU by DP at 3/27/2023 1601    Acceptance, E,D, VU,NR by EE at 3/25/2023 1423    Acceptance, E,D, NR,VU,DU by DP at 3/21/2023 1448   Family Acceptance, E, VU by WN at 4/4/2023 2329    Acceptance, E, VU by WN at 4/4/2023 0322                   Point: Precautions (Done)     Learning Progress Summary           Patient Acceptance, E, VU by WN at 4/4/2023 2329    Acceptance, E, VU by WN at 4/4/2023 0322    Acceptance, E,D, VU,DU by DP at 4/1/2023 1352    Acceptance, E, VU,DU by MG at 3/31/2023 0825    Acceptance, E, VU,DU by MG at 3/30/2023 0956    Acceptance, E,D, VU,DU by DP at 3/29/2023 1148    Nonacceptance, E,D, VU,DU by DP at 3/28/2023 1144    Acceptance, E,D, VU,DU by DP at 3/27/2023 1601    Acceptance, E,D, VU,NR by EE at 3/25/2023 1423    Acceptance, E,D, NR,VU,DU by DP at 3/21/2023 1448   Family Acceptance, E, VU by WN at 4/4/2023 2329    Acceptance, E, VU by WN at 4/4/2023 0322                               User Key     Initials Effective Dates Name Provider Type Discipline    EE 06/16/21 -  Melinda Mann, PT Physical Therapist PT    JK 06/16/21 -  Juana Veloz, PT Physical Therapist PT    KP 06/16/21 -  Yolanda Shannon, PT Physical Therapist PT    MG 05/24/22 -  Yu Villalobos, PT Physical Therapist PT    WN 08/23/22 -  Jose Carrillo RN Registered Nurse Nurse    DP 08/24/21 -  Papa Marsh, PT Physical Therapist PT                PT Recommendation and Plan                          Time Calculation:      PT Charges     Row Name 04/06/23 1329 04/06/23 1140          Time Calculation    Start Time 1230  -AE 0830  -AE     Stop Time 1300  -AE 0900  -AE     Time Calculation (min) 30 min  -AE 30 min  -AE     PT Received On 04/06/23  -AE 04/06/23  -AE     PT - Next Appointment 04/07/23  -AE 04/07/23  -AE     PT Goal Re-Cert Due Date -- 04/13/23  -AE        Time Calculation- PT     Total Timed Code Minutes- PT 30 minute(s)  -AE 30 minute(s)  -AE           User Key  (r) = Recorded By, (t) = Taken By, (c) = Cosigned By    Initials Name Provider Type    AE Deanna Carr, PT Physical Therapist                Therapy Charges for Today     Code Description Service Date Service Provider Modifiers Qty    86224634400 HC PT THER PROC EA 15 MIN 4/5/2023 Deanna Carr, PT GP 1    49669918594 HC PT THERAPEUTIC ACT EA 15 MIN 4/5/2023 Deanna Carr, PT GP 1    78426121870 HC PT THER PROC EA 15 MIN 4/5/2023 Deanna Carr, PT GP 2    05107243679 HC GAIT TRAINING EA 15 MIN 4/5/2023 Deanna Carr, PT GP 1    75790900071 HC PT NEUROMUSC RE EDUCATION EA 15 MIN 4/5/2023 Deanna Carr, PT GP 1    21741691000 HC PT THER SUPP EA 15 MIN 4/5/2023 Deanna Carr, PT GP 2    06172621137 HC PT THER PROC EA 15 MIN 4/6/2023 Deanna Carr, PT GP 1    21871055610 HC PT THERAPEUTIC ACT EA 15 MIN 4/6/2023 Deanna Carr, PT GP 1    60078391981 HC GAIT TRAINING EA 15 MIN 4/6/2023 Deanna Carr, PT GP 1    15519962180 HC PT NEUROMUSC RE EDUCATION EA 15 MIN 4/6/2023 Deanna Carr, PT GP 1    92401591834 HC PT THER SUPP EA 15 MIN 4/6/2023 Deanna Carr, PT GP 2                   Deanna Crar, PT  4/6/2023

## 2023-04-06 NOTE — PROGRESS NOTES
Pharmacy to Dose IVIG:      Pharmacy was consulted by Dr. Ryley Arce to repeat IVIG dosing for Louise Soriano.  Previous IVIG dose was 20g daily for 5 days from 2/27-3/3.  Reordered at this dose and duration along with PRN medications and acetaminophen and diphenhydramine pre-meds.  Thank you for the consult.     Zoran King, PharmD

## 2023-04-06 NOTE — PROGRESS NOTES
Patient Identification:  NAME:  Louise GARCIA  Age:  58 y.o.   Sex:  female   :  1965   MRN:  [id number]       Chief complaint: Myelopathy,  peripheral neuropathy  History of present illness: I have been reconsulted to see if she needs another round of IVIG and I believe the answer is yes.  Recall this patient did have tremendous improvement with IV steroids and then this was followed later with IVIG.      Past medical history:  Past Medical History:   Diagnosis Date   • Degenerative disc disease, cervical    • Gestational diabetes    • H/O blood clots    • Hematemesis    • Seizures    • Septic shock        Allergies:  Penicillins and Sulfa antibiotics    Home medications:  Medications Prior to Admission   Medication Sig Dispense Refill Last Dose   • apixaban (ELIQUIS) 5 MG tablet tablet Take 1 tablet by mouth 2 (Two) Times a Day.      • estrogens, conjugated,-methyltestosterone (ESTRATEST HS) 0.625-1.25 MG per tablet Take 1 tablet by mouth Daily.      • folic acid (FOLVITE) 1 MG tablet Take 1 tablet by mouth Daily.      • lamoTRIgine (LaMICtal) 100 MG tablet Take 1 tablet by mouth 2 (Two) Times a Day.      • montelukast (SINGULAIR) 10 MG tablet Take 1 tablet by mouth Every Night.      • vitamin B-12 (CYANOCOBALAMIN) 100 MCG tablet Take 50 mcg by mouth Daily.           Hospital medications:  apixaban, 5 mg, Oral, Q12H  desvenlafaxine, 25 mg, Oral, Daily  folic acid, 1 mg, Oral, Daily  gabapentin, 300 mg, Oral, Q8H  ipratropium-albuterol, 3 mL, Nebulization, 4x Daily - RT  lamoTRIgine, 200 mg, Oral, Daily  melatonin, 5 mg, Oral, Nightly  pantoprazole, 40 mg, Oral, Q AM  thiamine, 100 mg, Oral, Daily  vitamin B-12, 500 mcg, Oral, Daily      Pharmacy Consult - Pharmacy to dose,       •  acetaminophen  •  bisacodyl  •  hydrocortisone-bacitracin-zinc oxide-nystatin  •  oxyCODONE  •  Pharmacy Consult - Pharmacy to dose  •  polyethylene glycol  •  senna-docusate sodium  •  simethicone      Objective:  Vitals  Ranges:   Temp:  [97.3 °F (36.3 °C)-98.1 °F (36.7 °C)] 97.7 °F (36.5 °C)  Heart Rate:  [85-95] 85  Resp:  [16-18] 18  BP: (116-118)/(65-73) 117/73      Physical Exam: Normal cranial nerves II through VII tongue is midline.   strength is 5- out of 5 lower extremity strength 4+ out of 5.  Reflexes trace in the upper extremities absent lower extremities.  Toes downgoing    Results review:   I reviewed the patient's new clinical results.    Data review:  Lab Results (last 24 hours)     ** No results found for the last 24 hours. **           Imaging:  Imaging Results (Last 24 Hours)     ** No results found for the last 24 hours. **         PPE worn at all times washed before washed up afterwards disposed of everything properly properly was not within 6 feet of her for more than a few minutes during my exam    Assessment and Plan:     Patient very familiar to me presented a classic myelopathy but also had areflexia.  She has had a complete work-up and initially she responded excellently to steroids.  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  It is not clear if that helped or not compared to the steroids but I have been consulted to determine whether another course of IVIG would be indicated, and I believe that it is.    I have contacted pharmacy to find out the exact dose that she got the last time she had a course of IVIG and I will plan on repeating that.  (Same daily dose for the same number of days) the patient is okay with this      Ryley Arce MD  04/06/23  12:24 EDT

## 2023-04-06 NOTE — PROGRESS NOTES
"Nutrition Services    Patient Name:  Louise GARCIA  YOB: 1965  MRN: [id number]  Admit Date:  3/17/2023    Assessment Date:  04/06/23    FOLLOW UP NOTE - CLINICAL NUTRITION    Comments:  Visited pt in room;  at bedside. Pt reports improved appetites; Per EMR, intakes remain variable, % of meals, but overall are slightly improved from previous weeks. Pt didn't eat lunch on this day d/t grilled chicken arriving on tray, rather than requested grilled cheese. Pt agreeable to a Boost in replacement of lunch; gave pt Novasource Renal as this was the only vanilla (pt's preferred flavor) ONS offering on unit. Pt prefers flavor of Boost but did consume ~1/2 of Novasource carton when RD in room. Pt denies trying between-meal snacks, as was recommended at previous two visits. Pt's  is ordering customized meals at times for pt, using always available menu.     RD will continue to follow-up, per protocol.      Encounter Information        Reason for Encounter Follow-up   Current Issues Idiopathic peripheral neuropathy     Current Nutrition Orders & Evaluation of Intake       Oral Nutrition     Current PO Diet Diet: Regular/House Diet; Texture: Regular Texture (IDDSI 7); Fluid Consistency: Thin (IDDSI 0)   Supplement Boost Plus TID   PO Evaluation    % PO Intake/# of days Varying, trending up on average; PO %.    Factors Affecting Intake  constipation, decreased appetite, early satiety      Anthropometrics         Height   Weight Height: 157.5 cm (62\")  Weight: 63.8 kg (140 lb 10.5 oz) (03/17/23 2040)   BMI kg/m2 Body mass index is 25.73 kg/m².   Weight trend No new weight available     Physical Findings          Physical Appearance alert, oriented fatigued   Oral/Mouth Cavity teeth missing   Edema  1+ (trace), 2+ (mild)   Gastrointestinal normoactive, last bowel movement:4/6   Skin  MASD, surgical incision, abrasion   Tubes/Drains none     Labs       Pertinent Labs Reviewed, listed below "     Results from last 7 days   Lab Units 04/03/23  0657   SODIUM mmol/L 133*   POTASSIUM mmol/L 3.9   CHLORIDE mmol/L 95*   CO2 mmol/L 31.6*   BUN mg/dL 10   CREATININE mg/dL 1.06*   CALCIUM mg/dL 9.7   GLUCOSE mg/dL 91     Results from last 7 days   Lab Units 04/03/23  0657   HEMOGLOBIN g/dL 8.3*   HEMATOCRIT % 25.0*   WBC 10*3/mm3 8.15     Results from last 7 days   Lab Units 04/03/23  0657   PLATELETS 10*3/mm3 359     COVID19   Date Value Ref Range Status   03/03/2023 Not Detected Not Detected - Ref. Range Final     Lab Results   Component Value Date    HGBA1C 5.10 02/20/2023          Medications           Scheduled Medications apixaban, 5 mg, Oral, Q12H  desvenlafaxine, 25 mg, Oral, Daily  folic acid, 1 mg, Oral, Daily  gabapentin, 300 mg, Oral, Q8H  ipratropium-albuterol, 3 mL, Nebulization, 4x Daily - RT  lamoTRIgine, 200 mg, Oral, Daily  melatonin, 5 mg, Oral, Nightly  pantoprazole, 40 mg, Oral, Q AM  thiamine, 100 mg, Oral, Daily  vitamin B-12, 500 mcg, Oral, Daily       Infusions Pharmacy Consult - Pharmacy to dose,        PRN Medications •  acetaminophen  •  bisacodyl  •  hydrocortisone-bacitracin-zinc oxide-nystatin  •  oxyCODONE  •  Pharmacy Consult - Pharmacy to dose  •  polyethylene glycol  •  senna-docusate sodium  •  simethicone     PLAN OF CARE  Intervention Goal        Intervention Goal(s) Maintain nutrition status, Tolerate PO , Increase intake, Maintain weight, No significant weight loss and PO intake goal %: 75%     Nutrition Intervention        RD Action Advise available snack, Encourage intake, Follow Tx Progress and Care plan reviewed     Prescription        Diet Prescription    Supplement Prescription    EN/PN Prescription    Prescription Ordered No changes at this time   --  Monitor/Evaluation        Monitor Per protocol, PO intake, Supplement intake, Weight, GI status, Symptoms   Discharge Plan Pending clinical course   Education Will educate as needed       Electronically signed  by:  Silke Arias  04/06/23 12:58 EDT

## 2023-04-06 NOTE — PLAN OF CARE
Goal Outcome Evaluation:  Plan of Care Reviewed With: patient        Progress: improving  Outcome Evaluation: Pt did receive IVIG this shift. Weak LEs. Pain meds as needed. Transfering with 2 staff.  at [facility].

## 2023-04-06 NOTE — PLAN OF CARE
Goal Outcome Evaluation:     Slept well.  at bedside. Meds with applesauce. Rt. old Chest tube site dressings dry & intact. Applied purewick for urinary incontinence. Oxycodone given for legs, arms, & rib pain, with some relief.

## 2023-04-06 NOTE — THERAPY TREATMENT NOTE
Inpatient Rehabilitation - Speech Language Pathology Treatment Note    [facility]     Patient Name: Louise GARCIA  : 1965  MRN: [id number]    Today's Date: 2023                   Admit Date: 3/17/2023       Visit Dx:      ICD-10-CM ICD-9-CM   1. Impaired functional mobility, balance, gait, and endurance  Z74.09 V49.89       Patient Active Problem List   Diagnosis   • Sepsis   • Neck pain, chronic   • Upper back pain   • Paresthesia   • Cervical myelopathy   • Lower extremity weakness   • History of blood clots   • Chronic anticoagulation   • Seizures   • Anemia   • GERD (gastroesophageal reflux disease)   • Guillain-Yoder syndrome   • Situational mixed anxiety and depressive disorder   • Mass of middle lobe of right lung   • Oral candidiasis   • Empyema of pleura   • MRSA bacteremia   • Idiopathic peripheral neuropathy       Past Medical History:   Diagnosis Date   • Degenerative disc disease, cervical    • Gestational diabetes    • H/O blood clots    • Hematemesis    • Seizures    • Septic shock        Past Surgical History:   Procedure Laterality Date   • APPENDECTOMY     • BACK SURGERY     • CHOLECYSTECTOMY     • ENDOSCOPY N/A 2016    Procedure: ESOPHAGOGASTRODUODENOSCOPY with biopsy;  Surgeon: Austen Brito MD;  Location: [facility] ENDOSCOPY;  Service:    • HYSTERECTOMY     • NECK SURGERY       approx 6 yrs ago   • THORACOSCOPY Right 3/8/2023    Procedure: THORACOSCOPY WITH DAVINCI ROBOT WITH COMPLETE DECORTICATION;  Surgeon: Chloe Cedillo MD;  Location: [facility] OR;  Service: Robotics - DaVinci;  Laterality: Right;   • TONSILLECTOMY     • TONSILLECTOMY AND ADENOIDECTOMY         SLP Recommendation and Plan                                                            SLP EVALUATION (last 72 hours)     SLP SLC Evaluation     Row Name 23 1100 23 0930 23 1430 23 0930 23 1430       Communication Assessment/Intervention    Document Type therapy note (daily  "note)  -AL therapy note (daily note)  -AL therapy note (daily note)  -AL therapy note (daily note)  -AL therapy note (daily note)  -AL    Patient/Family/Caregiver Comments/Observations Pt participated well.  -AL Pt participated well.  -AL Pt in good spirits today.  -AL Pt participated well.  -AL Pt participated well.  -AL       Pain Scale: Numbers Pre/Post-Treatment    Pretreatment Pain Rating 0/10 - no pain  -AL 0/10 - no pain  -AL 0/10 - no pain  -AL 0/10 - no pain  -AL 4/10  -AL    Posttreatment Pain Rating -- -- -- -- 4/10  -AL    Pre/Posttreatment Pain Comment -- -- -- -- left leg and foot  -AL    Row Name 04/04/23 0930 04/03/23 1430                Communication Assessment/Intervention    Document Type therapy note (daily note)  -AL therapy note (daily note)  -AL       Patient/Family/Caregiver Comments/Observations Pt participated well. She reported pain medicine is impacting her ability to concentrate.  -AL Pt participated well. She reported feeling \"foggy\" with her thinking due to pain medicine today.  -AL          Pain Scale: Numbers Pre/Post-Treatment    Pretreatment Pain Rating 0/10 - no pain  -AL 0/10 - no pain  -AL             User Key  (r) = Recorded By, (t) = Taken By, (c) = Cosigned By    Initials Name Effective Dates    Nessa Kraus, MS CCC-SLP 06/16/21 -                Patient was not wearing a face mask during this therapy encounter. Therapist used appropriate personal protective equipment including mask, eye protection and gloves.  Mask used was standard procedure mask. Appropriate PPE was worn during the entire therapy session. Hand hygiene was completed before and after therapy session. Patient is not in enhanced droplet precautions.               EDUCATION    The patient has been educated in the following areas:       Cognitive Impairment Communication Impairment.             SLP GOALS     Row Name 04/06/23 1100 04/06/23 0930 04/05/23 1430       Phonation Goal 1 (SLP)    " "Progress/Outcomes (Phonation Goal 1, SLP) -- -- good progress toward goal  -AL    Comment (Phonation Goal 1, SLP) Pt exhibited improved vocal quality in conversation, reporting she is trying to use a louder voice; intermittent periods of mild-moderate hoarse/gravely quality.  -AL -- Pt participated in frontal tone focus exercises. She completed humming /m/, initial /m/ CV chanting and 2 syllabe initial /m/ chanting with overall MOD-MAX cues; improved vocal quality noted when attempting higher pitch. Also, benefited from yawning to reduce hyperfunction. Pt exhibited intermittent adequate vocal quality in conversation following exercises. Improved vocal quality noted when pt was smiling/joking and naturally using increased pitch inflection.  -AL       Attention Goal 1 (SLP)    Improve Attention by Goal 1 (SLP) complete sustained attention task;80%;with minimal cues (75-90%)  -AL -- --    Time Frame (Attention Goal 1, SLP) by discharge  -AL -- --    Progress/Outcomes (Attention Goal 1, SLP) goal ongoing  -AL -- --    Comment (Attention Goal 1, SLP) Moderately complex written directions: 50% with NO cues, 100% with MIN cues. Initiated teaching \"Blink\" card game.  -AL -- --       Memory Skills Goal 1 (SLP)    Improve Memory Skills Through Goal 1 (SLP) -- use memory strategies;use external memory aid;recall details of the day;80%;with moderate cues (50-74%)  -AL --    Time Frame (Memory Skills Goal 1, SLP) -- 1 week  -AL --    Progress/Outcomes (Memory Skills Goal 1, SLP) -- good progress toward goal  -AL --    Comment (Memory Skills Goal 1, SLP) -- Recalled 3/3 errands after 10 minutes with NO cues.  -AL --       Organizational Skills Goal 1 (SLP)    Improve Thought Organization Through Goal 1 (SLP) -- completing a divergent naming task;80%;with minimal cues (75-90%)  -AL --    Time Frame (Thought Organization Skills Goal 1, SLP) -- 1 week  -AL --    Progress/Outcomes (Thought Organization Skills Goal 1, SLP) -- goal " ongoing  -AL --    Comment (Thought Organization Skills Goal 1, SLP) -- Sweet items: 3 with NO cues, 8 with MIN-MOD uces. Salty items: 1 with NO cues, 7 with MOD cues.  -AL --       Reasoning Goal 1 (SLP)    Improve Reasoning Through Goal 1 (SLP) -- complete deductive reasoning task;80%;with minimal cues (75-90%)  -AL --    Time Frame (Reasoning Goal 1, SLP) -- 1 week  -AL --    Progress/Outcomes (Reasoning Goal 1, SLP) -- good progress toward goal  -AL --    Comment (Reasoning Goal 1, SLP) -- Closet organizing task: 40% with NO cues, 100% with MIN-MOD cues.  -AL Kitchen shelves visuospatial reasoning task: 50% with NO cues, 90% with MIN cues, 100% with MAX cues  -AL    Row Name 04/05/23 0930 04/04/23 1430 04/04/23 0930       Phonation Goal 1 (SLP)    Progress/Outcomes (Phonation Goal 1, SLP) good progress toward goal  -AL -- --    Comment (Phonation Goal 1, SLP) Pt with overall moderate hoarse vocal quality, with intermittent periods of adequate vocal quality. Pt was stimulable to produce adequate voice with frontal tone focus (humming, initial /m/ CV combination chanting) and yawning/relaxation. Pt also participated in exercises for diaphragmatic breathing. Upon return to her room, pt with mild hoarse quality.  -AL -- --       Attention Goal 1 (SLP)    Improve Attention by Goal 1 (SLP) -- complete sustained attention task;80%;with minimal cues (75-90%)  -AL --    Time Frame (Attention Goal 1, SLP) -- by discharge  -AL --    Progress/Outcomes (Attention Goal 1, SLP) -- goal ongoing  -AL --    Comment (Attention Goal 1, SLP) -- Attention to detail and selective attention for moderate level written directions: 20% with NO cues, 40% with MIN cues, 100% wtih MOD cues.  -AL --       Memory Skills Goal 1 (SLP)    Improve Memory Skills Through Goal 1 (SLP) -- use memory strategies;use external memory aid;recall details of the day;80%;with moderate cues (50-74%)  -AL --    Time Frame (Memory Skills Goal 1, SLP) -- 1 week   -AL --    Progress/Outcomes (Memory Skills Goal 1, SLP) -- good progress toward goal  -AL good progress toward goal  -AL    Comment (Memory Skills Goal 1, SLP) -- Pt required NO repetitions for immediate memory when listening to voicemails. Orientation to time: 100% with NO cues  -AL Recalled 2/3 errands after 20 minutes with NO cues, 3/3 with MOD cues.  -AL       Reasoning Goal 1 (SLP)    Improve Reasoning Through Goal 1 (SLP) -- -- complete deductive reasoning task;80%;with minimal cues (75-90%)  -AL    Time Frame (Reasoning Goal 1, SLP) -- -- 1 week  -AL    Progress/Outcomes (Reasoning Goal 1, SLP) -- -- good progress toward goal  -AL    Comment (Reasoning Goal 1, SLP) -- Inferencing for voicemails: 100% with NO cues.  -AL Garden plot task: 50% with NO cues, 100% with MIN-MOD cues. Pt with difficulty scanning and reading today; she attributed difficulty to having taken pain medicine.  -AL    Row Name 04/03/23 1430             Organizational Skills Goal 1 (SLP)    Improve Thought Organization Through Goal 1 (SLP) completing a divergent naming task;80%;with minimal cues (75-90%)  -AL      Time Frame (Thought Organization Skills Goal 1, SLP) 1 week  -AL      Progress/Outcomes (Thought Organization Skills Goal 1, SLP) goal ongoing  -AL      Comment (Thought Organization Skills Goal 1, SLP) Expensive: 3 items with NO cues, 8 with MOD-MAX cues  -AL         Functional Math Skills Goal 1 (SLP)    Improve Functional Math Skills Through Goal 1 (SLP) complete functional math task;80%;with minimal cues (75-90%)  -AL      Time Frame (Functional Math Skills Goal 1, SLP) 1 week  -AL      Progress/Outcomes (Functional Math Skills Goal 1, SLP) new goal  -AL      Comment (Functional Math Skills Goal 1, SLP) Temporal problem solving related to cokingtimes: 1/7 with NO cues, 2/7 with MIN cues, 5/7 with MOD cues, 7/7 with MAX cues. Pt required MOD-MAX cues to do retrograde 1 hour temporal problem solving.  -AL            User Key   (r) = Recorded By, (t) = Taken By, (c) = Cosigned By    Initials Name Provider Type    Nessa Kraus MS CCC-SLP Speech and Language Pathologist                            Time Calculation:        Time Calculation- SLP     Row Name 04/06/23 1201 04/06/23 1200          Time Calculation- SLP    SLP Start Time 1100  -AL 0930  -AL     SLP Stop Time 1130  -AL 1000  -AL     SLP Time Calculation (min) 30 min  -AL 30 min  -AL           User Key  (r) = Recorded By, (t) = Taken By, (c) = Cosigned By    Initials Name Provider Type    Nessa Kraus MS CCC-SLP Speech and Language Pathologist                  Therapy Charges for Today     Code Description Service Date Service Provider Modifiers Qty    97907701629 HC ST DEV OF COGN SKILLS INITIAL 15 MIN 4/5/2023 Nessa Spangler, MS CCC-SLP  1    25791678362 HC ST DEV OF COGN SKILLS EACH ADDT'L 15 MIN 4/5/2023 Nessa Spangler, MS CCC-SLP  3    06559483472 HC ST DEV OF COGN SKILLS INITIAL 15 MIN 4/6/2023 Nessa Spangler, MS CCC-SLP  1    30785999007 HC ST DEV OF COGN SKILLS EACH ADDT'L 15 MIN 4/6/2023 Nessa Spangler, MS CCC-SLP  3                           Nessa Spangler MS CCC-SLP  4/6/2023

## 2023-04-06 NOTE — PROGRESS NOTES
LOS: 20 days   Patient Care Team:  Chloe Schaefer APRN as PCP - General (Family Medicine)  Mikhail Glez MD as Consulting Physician (Neurology)      Patient Name: ELEONORA GARCIA  Patient : 1965    ADMITTING DIAGNOSIS:  Diagnoses    1. IMPAIRED FUNCTIONAL MOBILITY, BALANCE, GAIT, AND ENDURANCE           SUBJECTIVE:      REVIEW OF SYSTEMS:    General: denies weight change, fatigue, weakness, fever, chills, night sweats.   Skin: denies itching, rashes, sores and bruises.   Neurologic: denies headache, nausea, vomiting, or visual changes.   HEENT:  denies discharge, sore throat, allergies, and congestion.   Respiratory: denies shortness of breath, wheeze, cough and hemoptysis.   Cardiac:  denies chest pain or palpitations.   Gastrointestinal:  denies changes in appetite, nausea, vomiting, dysphagia, abdominal pain, constipation, or diarrhea.   Urinary:  denies urgency and frequency.  Musculoskeletal:  denies muscle weakness, pain, or joint stiffness.    OBJECTIVE:    Vitals:    23 1226   BP: 106/71   Pulse: 94   Resp: 16   Temp: 97.3 °F (36.3 °C)   SpO2: 97%       PHYSICAL EXAM:   General: pleasant, in no acute distress  HEENT: NCAT, sclera anicteric, conjunctiva pink, mucoa moist  Cardiovascular: RRR, +S1+S2, no obvious m/g/r  Lungs: CTAB, no rhonchi or wheezing  Abdomen: normoactive bowel sounds, soft, non tender to palpation  Extremities: no peripheral edema  Skin: exposed surfaces of skin warm, intact, without erythema  Neuro: awake alert and oriented to person, place, time, and situation, CN II-XII grossly intact  MSK: antigravity strength throughout      MEDICATIONS  Scheduled Meds:  apixaban, 5 mg, Oral, Q12H  desvenlafaxine, 25 mg, Oral, Daily  folic acid, 1 mg, Oral, Daily  gabapentin, 300 mg, Oral, Q8H  immune globulin (human), 20 g, Intravenous, Daily  ipratropium-albuterol, 3 mL, Nebulization, 4x Daily - RT  lamoTRIgine, 200 mg, Oral, Daily  melatonin, 5 mg, Oral,  Nightly  pantoprazole, 40 mg, Oral, Q AM  thiamine, 100 mg, Oral, Daily  vitamin B-12, 500 mcg, Oral, Daily        Continuous Infusions:  Pharmacy Consult - Pharmacy to dose,         PRN Meds:  •  acetaminophen  •  bisacodyl  •  hydrocortisone-bacitracin-zinc oxide-nystatin  •  oxyCODONE  •  Pharmacy Consult - Pharmacy to dose  •  polyethylene glycol  •  senna-docusate sodium  •  simethicone      RESULTS:  No results found for: POCGLU  Results from last 7 days   Lab Units 04/03/23  0657   WBC 10*3/mm3 8.15   HEMOGLOBIN g/dL 8.3*   HEMATOCRIT % 25.0*   PLATELETS 10*3/mm3 359     Results from last 7 days   Lab Units 04/03/23  0657   SODIUM mmol/L 133*   POTASSIUM mmol/L 3.9   CHLORIDE mmol/L 95*   CO2 mmol/L 31.6*   BUN mg/dL 10   CREATININE mg/dL 1.06*   CALCIUM mg/dL 9.7   GLUCOSE mg/dL 91           ASSESSMENT and PLAN:    Idiopathic peripheral neuropathy    Paresthesia    History of blood clots    Chronic anticoagulation    Seizures    Anemia    Guillain-Shafer syndrome    MRSA bacteremia    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   S/p IVIG x 5 days  March 30-shows improvement with her strength proximally the upper extremities.  Improvement with her hand strength.  Improved strength in the right lower extremity but continues with profound weakness in the left lower extremity.  With weakness of the left ankle, will look at probable AFO for gait activities.  Transfers are moderate assist.  Ambulated with a rolling walker up to 70 feet with gait deviations minimal moderate assist  -4/6-reconsulted neurology to see if she needs another round of IVIG.  Per their consult note she does and we will plan to give her the exact same course.      Impaired mobility/impaired self care/ impaired cognition  Left greater than right lower extremity weakness greater than bilateral upper extremity weakness  April 4-strength improved in the bilateral upper extremities and right lower  extremity.Weakness  looks about the same the left lower extremity.  On the day she ambulated further last week she had utilized AFO on the left lower extremity  April 5-stronger with her left hip flexors and knee extensors today  April 6- strength and coordination improving.  Per OT note she is needing set up assist for upper body dressing with max assist for lower body dressing, PT is noticing that the patient appears stronger and is limited by weakness and fear.  Moderate assist between chair and bed but min assist for sit to stand.  Very minimal knee buckling when walking with left AFO.  Making good progress overall.     R lung masslike infiltrate with cavitary lesions/pleural effusion   S/p thoracentesis - Blood cx and pleural fluid  positive for MRSA      chest tube placement given empyema, and she underwent thoracoscopy w/ decortication 3/8/23   -expected postoperative changes with pleural thickening and minimal pleural effusion.  The effusion is too small for intervention and will likely to continue to resolve with time.  Recommend continue good pulmonary hygiene with incentive spirometry hourly as well as increase activity/ambulation as tolerated.  March 23-appears decreased breath sounds more noticeable today on the right compared to the left.  Check follow-up chest x-ray.  On room air during the day, 1 L at night.  March 24- CXR relatively unchanged from previous, reached out to CT surgery given planned outpatient f/u on 3/28, no further imaging planned at this time as patient afebrile with normal WBC, monitor     Right-sided Chest Pain 3/24  -EKG sinus tach  -HS Troponin 25 > 24  -consider Cardiology consult if worsening  -pain reproducible with palpation, pain likely postoperative neuralgia as indicated vs. possible costochondritis, continue gabapentin, ice/heat, monitor     Hypokalemia  -3/24 K 3.3, repleted  -3/29 K 3.7, resolved     mass on TTE with findings concerning for endocarditis - underwent MARIAMA  3/10/2023 which had no vegetation  RUE superficial thrombophlebitis concerning for septic phlebitis.     ID - continue 4 weeks of vancomycin as recommended by infectious disease dosed by pharmacy to achieve a trough level of 15-20 or area under the curve of 400-600 from last negative blood culture or last intervention which ever is the latest - to complete April 1, 2023     Left hip arthrocentesis March 16 to rule out any hip septic arthritis- no growth to date on fluid.    lower back pain - MRI of spine to rule out discitis or osteomyelitis.  This did have known degenerative changes but  no infection.      DVT prophylaxis - SCDs / apixiban. History of LLE DVT - on Eliquis at home     Pain management   - Tylenol 1,000 mg three times a day/ Celebrex 100 mg q 112 hours/ gabapentin 300 mg q 8 hours Oxycodone 5 mg q 4 hours prn  March 18 - DC Celebrex 2/2 PHYLLIS, and anemia      Anxiety/ muscle spasms - Diazepam 2 mg q 6 hours prn - JONES reviewed     Seizure disorder - Lamotrigine 200 mg daily     ABLA   - March 18- Hgb 7.0 (7.3 on 3/16). Type, cross and transfuse 1 unit PRBCs. Pre-medicate with Tylenol and Benadryl. CBC in am.   March 19- Hgb 8.8 s/p 1 unit PRBcs. Hemoccult positive. On Eliquis for h/o DVT. Follow Hgb. May need GI work up.  March 20 - HGB 9.3 s/p transfusion  March 21-reviewed with internal medicine-hemoglobin stable after transfusion.  Iron studies consistent with inflammation.  No further work-up presently  March 22 - Hgb 8.9, stable  March 23-hemoglobin trend 8.3.  Continue to follow.  Recheck tomorrow  March 29 - Hgb 8.1, stable  April 6- hemoglobin 8.3.  Stable.     PHYLLIS - March 18- Creatinine 1.36 up from 1.12. On IV Vanc and Celebrex. DC Celebrex. BMP in am.  March 19- s/p 500ml NS bolus. Creatinine 1.31.  March 23-creatinine 0.92     TEAM CONF - MARCH 21 - BED MOD X 2. TRANSFERS MAX 2. STAND PIVOT OR SQUAT PIVOT. NON-AMBULATORY. Saturday MARCH 18 GAIT 6 FEET PARALLEL BARS MOD MAX OF 2.   BATH MOD  1-2. LBD DEP. UBD MOD. EATING MIN. GROOMING MIN. TOILETING DEP.   The patient has difficulty remembering new information due to short-term memory impairments.  Initial evaluation 3.18, pt obtained a score 12/30 on SLUMS cognitive assessment indicating severe cognitive impairment/dementia, goals initiated for memory and sustaining attention  FEES completed 3.7 revealing glottic gap accounting for glottic insufficiency, dysphonia, hoarse/breathy quality, recommend targeting vocal function as appropriate d/t impact on intelligibility.    Regular/thin diet continued since FEES completion 3.7, although pt known to cough with and without PO intake, may be contributed partially to ineffective VF closure.   DRESSING FORMER CHEST TUBE SITE. CONTINUES ON PUREWICK AT NIGHT. O2 AT 1-2 LITERS AT NIGHT.  DECREASED APPETITE. ABD X-RAY THIS AM SHOWS NON-OBSTRUCTIVE BOWEL GAS PATTERN.   ELOS - 4 WEEKS     TEAM CONF - MARCH 28 - BED MIN CTG. TRANSFER MOD ASSIST STAND PIVOT OR SQUAT PIVOT. FATIGUES IN AFERNOONS. GAIT 15 FEET RW MIN X 2. FLUCTUATING ORIENTATION. BETTER DETAIL TO TASK. MIN MOD CUES FOR REASONING TASKS. DYSPHONIA FROM COUGHING.  SLP REVIEWED VOICE HYGIENE, NOT TO DO HARSH THROAT CLEAR. BATH MOD. LBD MAX. UBD MIN. TOILETING MOD X 2. CONTINENT/INCONTINENT BLADDER. RIGHT CHEST TUBE / THORACOSCOPY SITE CARE. VARIABLE INTAKE.   ELOS - 3 WEEKS     TEAM CONF - APRIL 4 - CONTINUES WEAK IN LLE. STRONGER IN BUE AND RIGHT KNEE EXTENSION. BED MIN MOD TO CTG. TRANSFERS MOD MAX STAND PIVOT OR SQUAT PIVOT. WORKED ON GAIT IN PARALLEL BARS MIN MOD OF 2 PERSON. CAN DO 15 FEET IF NOT SO ANXIOUS. TOILET AND SHOWER TRANSFERS MOD ASSIST. DROP ARM BEDSIDE COMMODE. AT NIGHT USING BEDPAN. USING PUREWICK AT NIGHT. BATH MIN. LBD MOD. UBD SET UP.TOILETING MOD MAX 2.    Pt with improved ability to recall salient events  from day.Now presents with mild-moderate hoarse vocal  quality. Improved vocal intensity.  ADDRSSING DYSPHONIA. DIETICIAN REVIEWED  FOOD CHOICES. GABAPENTIN FOR RIGHT RIB PAIN/INTERCOSTAL PAIN.CONTINENT BOWEL AND BLADDER WITH URGENCY.   ELOS - 2-3 WEEKS    CODE STATUS:  Level Of Support Discussed With: Patient  Code Status (Patient has no pulse and is not breathing): CPR (Attempt to Resuscitate)  Medical Interventions (Patient has pulse or is breathing): Full Support      Admission Status: Continues to meet requirements for inpatient admission.     Patient seen and evaluated with attending physician, Dr. Rider. Please see attestation.     Olive Torres, DO  Physical Medicine and Rehabilitation   PGY-2    During rounds, used appropriate personal protective equipment including mask and gloves.  Additional gown if indicated.  Mask used was standard procedure mask. Appropriate PPE was worn during the entire visit.  Hand hygiene was completed before and after.

## 2023-04-06 NOTE — PROGRESS NOTES
Recreational Therapy Note    Patient Name: Louise GARCIA   MRN: [id number]    Therapeutic Recreation Eval and Treat (last 12 hours)     Therapeutic Recreation Eval & Treat     Row Name 04/06/23 1400       Therapeutic Recreation Participation    Recreation Therapy Participation games  -    Games other (see comments)  [facility]  -    Objectives of Recreation Participation increase;sense of autonomy by choosing level of participation;motivation and activity level through successful participation;positive attitudes leading to a healthy leisure lifestyle  -    Comment, Recreation Participation occ cues to recall directions and for scorekeeping  -    Recreation Therapy Summary of Participation active participation  -          User Key  (r) = Recorded By, (t) = Taken By, (c) = Cosigned By    Initials Name Provider Type    SS Mariel Cantu, CTRS Recreational Therapist                  DELVIN Wei  4/6/2023

## 2023-04-07 LAB
ANION GAP SERPL CALCULATED.3IONS-SCNC: 6.8 MMOL/L (ref 5–15)
BASOPHILS # BLD AUTO: 0.09 10*3/MM3 (ref 0–0.2)
BASOPHILS NFR BLD AUTO: 1.3 % (ref 0–1.5)
BUN SERPL-MCNC: 16 MG/DL (ref 6–20)
BUN/CREAT SERPL: 12.1 (ref 7–25)
CALCIUM SPEC-SCNC: 10.1 MG/DL (ref 8.6–10.5)
CHLORIDE SERPL-SCNC: 95 MMOL/L (ref 98–107)
CO2 SERPL-SCNC: 30.2 MMOL/L (ref 22–29)
CREAT SERPL-MCNC: 1.32 MG/DL (ref 0.57–1)
DEPRECATED RDW RBC AUTO: 46.8 FL (ref 37–54)
EGFRCR SERPLBLD CKD-EPI 2021: 46.6 ML/MIN/1.73
EOSINOPHIL # BLD AUTO: 0.09 10*3/MM3 (ref 0–0.4)
EOSINOPHIL NFR BLD AUTO: 1.3 % (ref 0.3–6.2)
ERYTHROCYTE [DISTWIDTH] IN BLOOD BY AUTOMATED COUNT: 13.8 % (ref 12.3–15.4)
GLUCOSE SERPL-MCNC: 118 MG/DL (ref 65–99)
HCT VFR BLD AUTO: 26.5 % (ref 34–46.6)
HGB BLD-MCNC: 8.6 G/DL (ref 12–15.9)
IMM GRANULOCYTES # BLD AUTO: 0.05 10*3/MM3 (ref 0–0.05)
IMM GRANULOCYTES NFR BLD AUTO: 0.7 % (ref 0–0.5)
LYMPHOCYTES # BLD AUTO: 2.43 10*3/MM3 (ref 0.7–3.1)
LYMPHOCYTES NFR BLD AUTO: 35.6 % (ref 19.6–45.3)
MCH RBC QN AUTO: 30.1 PG (ref 26.6–33)
MCHC RBC AUTO-ENTMCNC: 32.5 G/DL (ref 31.5–35.7)
MCV RBC AUTO: 92.7 FL (ref 79–97)
MONOCYTES # BLD AUTO: 0.55 10*3/MM3 (ref 0.1–0.9)
MONOCYTES NFR BLD AUTO: 8.1 % (ref 5–12)
NEUTROPHILS NFR BLD AUTO: 3.62 10*3/MM3 (ref 1.7–7)
NEUTROPHILS NFR BLD AUTO: 53 % (ref 42.7–76)
NRBC BLD AUTO-RTO: 0 /100 WBC (ref 0–0.2)
PLATELET # BLD AUTO: 357 10*3/MM3 (ref 140–450)
PMV BLD AUTO: 10.4 FL (ref 6–12)
POTASSIUM SERPL-SCNC: 3.9 MMOL/L (ref 3.5–5.2)
RBC # BLD AUTO: 2.86 10*6/MM3 (ref 3.77–5.28)
SODIUM SERPL-SCNC: 132 MMOL/L (ref 136–145)
WBC NRBC COR # BLD: 6.83 10*3/MM3 (ref 3.4–10.8)

## 2023-04-07 PROCEDURE — 97530 THERAPEUTIC ACTIVITIES: CPT

## 2023-04-07 PROCEDURE — 97130 THER IVNTJ EA ADDL 15 MIN: CPT

## 2023-04-07 PROCEDURE — 94799 UNLISTED PULMONARY SVC/PX: CPT

## 2023-04-07 PROCEDURE — 97535 SELF CARE MNGMENT TRAINING: CPT

## 2023-04-07 PROCEDURE — 85025 COMPLETE CBC W/AUTO DIFF WBC: CPT | Performed by: PHYSICAL MEDICINE & REHABILITATION

## 2023-04-07 PROCEDURE — 97112 NEUROMUSCULAR REEDUCATION: CPT

## 2023-04-07 PROCEDURE — 80048 BASIC METABOLIC PNL TOTAL CA: CPT | Performed by: PHYSICAL MEDICINE & REHABILITATION

## 2023-04-07 PROCEDURE — 97116 GAIT TRAINING THERAPY: CPT

## 2023-04-07 PROCEDURE — 25010000002 IMMUNE GLOBULIN (HUMAN) 20 GM/200ML SOLUTION: Performed by: PSYCHIATRY & NEUROLOGY

## 2023-04-07 PROCEDURE — 63710000001 DIPHENHYDRAMINE PER 50 MG: Performed by: PSYCHIATRY & NEUROLOGY

## 2023-04-07 PROCEDURE — 99232 SBSQ HOSP IP/OBS MODERATE 35: CPT | Performed by: PSYCHIATRY & NEUROLOGY

## 2023-04-07 PROCEDURE — 97129 THER IVNTJ 1ST 15 MIN: CPT

## 2023-04-07 RX ADMIN — IPRATROPIUM BROMIDE AND ALBUTEROL SULFATE 3 ML: 2.5; .5 SOLUTION RESPIRATORY (INHALATION) at 11:34

## 2023-04-07 RX ADMIN — OXYCODONE HYDROCHLORIDE 2.5 MG: 5 TABLET ORAL at 02:04

## 2023-04-07 RX ADMIN — PANTOPRAZOLE SODIUM 40 MG: 40 TABLET, DELAYED RELEASE ORAL at 05:52

## 2023-04-07 RX ADMIN — Medication 5 MG: at 21:20

## 2023-04-07 RX ADMIN — IMMUNE GLOBULIN (HUMAN) 20 G: 10 INJECTION INTRAVENOUS; SUBCUTANEOUS at 14:39

## 2023-04-07 RX ADMIN — IPRATROPIUM BROMIDE AND ALBUTEROL SULFATE 3 ML: 2.5; .5 SOLUTION RESPIRATORY (INHALATION) at 06:56

## 2023-04-07 RX ADMIN — Medication 500 MCG: at 08:38

## 2023-04-07 RX ADMIN — OXYCODONE HYDROCHLORIDE 2.5 MG: 5 TABLET ORAL at 14:16

## 2023-04-07 RX ADMIN — ACETAMINOPHEN 650 MG: 325 TABLET ORAL at 14:06

## 2023-04-07 RX ADMIN — IPRATROPIUM BROMIDE AND ALBUTEROL SULFATE 3 ML: 2.5; .5 SOLUTION RESPIRATORY (INHALATION) at 20:10

## 2023-04-07 RX ADMIN — LAMOTRIGINE 200 MG: 100 TABLET ORAL at 08:38

## 2023-04-07 RX ADMIN — DESVENLAFAXINE SUCCINATE 25 MG: 25 TABLET, EXTENDED RELEASE ORAL at 08:38

## 2023-04-07 RX ADMIN — GABAPENTIN 300 MG: 300 CAPSULE ORAL at 13:37

## 2023-04-07 RX ADMIN — GABAPENTIN 300 MG: 300 CAPSULE ORAL at 05:52

## 2023-04-07 RX ADMIN — GABAPENTIN 300 MG: 300 CAPSULE ORAL at 21:19

## 2023-04-07 RX ADMIN — APIXABAN 5 MG: 5 TABLET, FILM COATED ORAL at 21:20

## 2023-04-07 RX ADMIN — OXYCODONE HYDROCHLORIDE 2.5 MG: 5 TABLET ORAL at 21:29

## 2023-04-07 RX ADMIN — APIXABAN 5 MG: 5 TABLET, FILM COATED ORAL at 08:38

## 2023-04-07 RX ADMIN — Medication 100 MG: at 08:38

## 2023-04-07 RX ADMIN — ACETAMINOPHEN 650 MG: 325 TABLET, FILM COATED ORAL at 05:52

## 2023-04-07 RX ADMIN — DIPHENHYDRAMINE HYDROCHLORIDE 25 MG: 25 CAPSULE ORAL at 14:06

## 2023-04-07 RX ADMIN — IPRATROPIUM BROMIDE AND ALBUTEROL SULFATE 3 ML: 2.5; .5 SOLUTION RESPIRATORY (INHALATION) at 15:03

## 2023-04-07 RX ADMIN — Medication 1 MG: at 08:38

## 2023-04-07 NOTE — PLAN OF CARE
Goal Outcome Evaluation:  Plan of Care Reviewed With: patient        Progress: improving  Outcome Evaluation: Pt received IV IG today. Oxy IR 2.5mg given for pain. LE weakness. Transfering with 1-2 staff.

## 2023-04-07 NOTE — PROGRESS NOTES
LOS: 21 days   Patient Care Team:  Chloe Schaefer APRN as PCP - General (Family Medicine)  Mikhail Glez MD as Consulting Physician (Neurology)      Patient Name: ELEONORA GARCIA  Patient : 1965    ADMITTING DIAGNOSIS:  Diagnoses    1. IMPAIRED FUNCTIONAL MOBILITY, BALANCE, GAIT, AND ENDURANCE           SUBJECTIVE:    Patient reports that she is tolerating therapies.  Did have some sore throat after the IVIG yesterday, feels that sleeping with her mouth open contribute some, symptoms better this morning.  Comfortable with her breathing at rest.  Can still get some shortness of air with activities.  Weakness in the left leg is about the same.  Overall tolerated IVIG yesterday.  Patient reviewed with neurology service      REVIEW OF SYSTEMS:          OBJECTIVE:    Vitals:    23 1134   BP:    Pulse: 89   Resp: 15   Temp:    SpO2: 96%       PHYSICAL EXAM:   General: pleasant, in no acute distress  HEENT: NCAT, sclera anicteric, conjunctiva pink, mucoa moist  Cardiovascular: RRR, +S1+S2, no obvious m/g/r  Lungs: Decreased breath sounds at the right side  Abdomen: normoactive bowel sounds, soft, non tender to palpation  Extremities: no peripheral edema  Skin: exposed surfaces of skin warm, intact, without erythema  Neuro: awake alert and oriented to person, place, time, and situation, CN II-XII grossly intact  MSK:  MOTOR EXAM -right/left: Shoulder abduction 4+/4+, elbow flexion 5/5, wrist extension 5/5, finger flexion 5/5, hip flexion 3/1, knee extension 4/2, ankle dorsiflexion 4/4-      MEDICATIONS  Scheduled Meds:  acetaminophen, 650 mg, Oral, Q24H  apixaban, 5 mg, Oral, Q12H  desvenlafaxine, 25 mg, Oral, Daily  diphenhydrAMINE, 25 mg, Oral, Q24H  folic acid, 1 mg, Oral, Daily  gabapentin, 300 mg, Oral, Q8H  immune globulin (human), 20 g, Intravenous, Daily  ipratropium-albuterol, 3 mL, Nebulization, 4x Daily - RT  lamoTRIgine, 200 mg, Oral, Daily  melatonin, 5 mg, Oral, Nightly  pantoprazole, 40  mg, Oral, Q AM  thiamine, 100 mg, Oral, Daily  vitamin B-12, 500 mcg, Oral, Daily        Continuous Infusions:       PRN Meds:  •  acetaminophen  •  bisacodyl  •  diphenhydrAMINE  •  famotidine  •  hydrocortisone sodium succinate  •  hydrocortisone-bacitracin-zinc oxide-nystatin  •  oxyCODONE  •  polyethylene glycol  •  senna-docusate sodium  •  simethicone      RESULTS:  No results found for: POCGLU  Results from last 7 days   Lab Units 04/07/23  0843 04/06/23  1520 04/03/23  0657   WBC 10*3/mm3 6.83 9.25 8.15   HEMOGLOBIN g/dL 8.6* 8.9* 8.3*   HEMATOCRIT % 26.5* 26.6* 25.0*   PLATELETS 10*3/mm3 357 380 359     Results from last 7 days   Lab Units 04/07/23  0843 04/03/23  0657   SODIUM mmol/L 132* 133*   POTASSIUM mmol/L 3.9 3.9   CHLORIDE mmol/L 95* 95*   CO2 mmol/L 30.2* 31.6*   BUN mg/dL 16 10   CREATININE mg/dL 1.32* 1.06*   CALCIUM mg/dL 10.1 9.7   GLUCOSE mg/dL 118* 91           ASSESSMENT and PLAN:    Idiopathic peripheral neuropathy    Paresthesia    History of blood clots    Chronic anticoagulation    Seizures    Anemia    Guillain-Ranchita syndrome    MRSA bacteremia    Subacute motor predominant idiopathic peripheral neuropathy with Paraparesis and Areflexia.   S/p IVIG x 5 days  March 30-shows improvement with her strength proximally the upper extremities.  Improvement with her hand strength.  Improved strength in the right lower extremity but continues with profound weakness in the left lower extremity.  With weakness of the left ankle, will look at probable AFO for gait activities.  Transfers are moderate assist.  Ambulated with a rolling walker up to 70 feet with gait deviations minimal moderate assist  -4/6-reconsulted neurology to see if she needs another round of IVIG.  Follow-up assessment with neurology-Previous IVIG dose was 20g daily for 5 days from 2/27-3/3.  Plan is to repeat another course of IVIG      Impaired mobility/impaired self care/ impaired cognition  Left greater than right lower  extremity weakness greater than bilateral upper extremity weakness  April 4-strength improved in the bilateral upper extremities and right lower extremity.Weakness  looks about the same the left lower extremity.  On the day she ambulated further last week she had utilized AFO on the left lower extremity  April 5-stronger with her left hip flexors and knee extensors today  April 6- strength and coordination improving.  Per OT note she is needing set up assist for upper body dressing with max assist for lower body dressing, PT is noticing that the patient appears stronger and is limited by weakness and fear.  Moderate assist between chair and bed but min assist for sit to stand.  Very minimal knee buckling when walking with left AFO.  Making good progress overall.     R lung masslike infiltrate with cavitary lesions/pleural effusion   S/p thoracentesis - Blood cx and pleural fluid  positive for MRSA      chest tube placement given empyema, and she underwent thoracoscopy w/ decortication 3/8/23   -expected postoperative changes with pleural thickening and minimal pleural effusion.  The effusion is too small for intervention and will likely to continue to resolve with time.  Recommend continue good pulmonary hygiene with incentive spirometry hourly as well as increase activity/ambulation as tolerated.  March 23-appears decreased breath sounds more noticeable today on the right compared to the left.  Check follow-up chest x-ray.  On room air during the day, 1 L at night.  March 24- CXR relatively unchanged from previous, reached out to CT surgery given planned outpatient f/u on 3/28, no further imaging planned at this time as patient afebrile with normal WBC, monitor     Right-sided Chest Pain 3/24  -EKG sinus tach  -HS Troponin 25 > 24  -consider Cardiology consult if worsening  -pain reproducible with palpation, pain likely postoperative neuralgia as indicated vs. possible costochondritis, continue gabapentin,  ice/heat, monitor     Hypokalemia  -3/24 K 3.3, repleted  -3/29 K 3.7, resolved     mass on TTE with findings concerning for endocarditis - underwent MARIAMA 3/10/2023 which had no vegetation  RUE superficial thrombophlebitis concerning for septic phlebitis.     ID - continue 4 weeks of vancomycin as recommended by infectious disease dosed by pharmacy to achieve a trough level of 15-20 or area under the curve of 400-600 from last negative blood culture or last intervention which ever is the latest - to complete April 1, 2023     Left hip arthrocentesis March 16 to rule out any hip septic arthritis- no growth to date on fluid.    lower back pain - MRI of spine to rule out discitis or osteomyelitis.  This did have known degenerative changes but  no infection.      DVT prophylaxis - SCDs / apixiban. History of LLE DVT - on Eliquis at home     Pain management   - Tylenol 1,000 mg three times a day/ Celebrex 100 mg q 112 hours/ gabapentin 300 mg q 8 hours Oxycodone 5 mg q 4 hours prn  March 18 - DC Celebrex 2/2 PHYLLIS, and anemia      Anxiety/ muscle spasms - Diazepam 2 mg q 6 hours prn - JONES reviewed     Seizure disorder - Lamotrigine 200 mg daily     ABLA   - March 18- Hgb 7.0 (7.3 on 3/16). Type, cross and transfuse 1 unit PRBCs. Pre-medicate with Tylenol and Benadryl. CBC in am.   March 19- Hgb 8.8 s/p 1 unit PRBcs. Hemoccult positive. On Eliquis for h/o DVT. Follow Hgb. May need GI work up.  March 20 - HGB 9.3 s/p transfusion  March 21-reviewed with internal medicine-hemoglobin stable after transfusion.  Iron studies consistent with inflammation.  No further work-up presently  March 22 - Hgb 8.9, stable  March 23-hemoglobin trend 8.3.  Continue to follow.  Recheck tomorrow  March 29 - Hgb 8.1, stable  April 6- hemoglobin 8.3.  Stable.     PHYLLIS - March 18- Creatinine 1.36 up from 1.12. On IV Vanc and Celebrex. DC Celebrex. BMP in am.  March 19- s/p 500ml NS bolus. Creatinine 1.31.  March 23-creatinine 0.92     TEAM CONF -  MARCH 21 - BED MOD X 2. TRANSFERS MAX 2. STAND PIVOT OR SQUAT PIVOT. NON-AMBULATORY. Saturday MARCH 18 GAIT 6 FEET PARALLEL BARS MOD MAX OF 2.   BATH MOD 1-2. LBD DEP. UBD MOD. EATING MIN. GROOMING MIN. TOILETING DEP.   The patient has difficulty remembering new information due to short-term memory impairments.  Initial evaluation 3.18, pt obtained a score 12/30 on SLUMS cognitive assessment indicating severe cognitive impairment/dementia, goals initiated for memory and sustaining attention  FEES completed 3.7 revealing glottic gap accounting for glottic insufficiency, dysphonia, hoarse/breathy quality, recommend targeting vocal function as appropriate d/t impact on intelligibility.    Regular/thin diet continued since FEES completion 3.7, although pt known to cough with and without PO intake, may be contributed partially to ineffective VF closure.   DRESSING FORMER CHEST TUBE SITE. CONTINUES ON PUREWICK AT NIGHT. O2 AT 1-2 LITERS AT NIGHT.  DECREASED APPETITE. ABD X-RAY THIS AM SHOWS NON-OBSTRUCTIVE BOWEL GAS PATTERN.   ELOS - 4 WEEKS     TEAM CONF - MARCH 28 - BED MIN CTG. TRANSFER MOD ASSIST STAND PIVOT OR SQUAT PIVOT. FATIGUES IN AFERNOONS. GAIT 15 FEET RW MIN X 2. FLUCTUATING ORIENTATION. BETTER DETAIL TO TASK. MIN MOD CUES FOR REASONING TASKS. DYSPHONIA FROM COUGHING.  SLP REVIEWED VOICE HYGIENE, NOT TO DO HARSH THROAT CLEAR. BATH MOD. LBD MAX. UBD MIN. TOILETING MOD X 2. CONTINENT/INCONTINENT BLADDER. RIGHT CHEST TUBE / THORACOSCOPY SITE CARE. VARIABLE INTAKE.   ELOS - 3 WEEKS     TEAM CONF - APRIL 4 - CONTINUES WEAK IN LLE. STRONGER IN BUE AND RIGHT KNEE EXTENSION. BED MIN MOD TO CTG. TRANSFERS MOD MAX STAND PIVOT OR SQUAT PIVOT. WORKED ON GAIT IN PARALLEL BARS MIN MOD OF 2 PERSON. CAN DO 15 FEET IF NOT SO ANXIOUS. TOILET AND SHOWER TRANSFERS MOD ASSIST. DROP ARM BEDSIDE COMMODE. AT NIGHT USING BEDPAN. USING PUREWICK AT NIGHT. BATH MIN. LBD MOD. UBD SET UP.TOILETING MOD MAX 2.    Pt with improved ability to  recall salient events  from day.Now presents with mild-moderate hoarse vocal  quality. Improved vocal intensity.  ADDRSSING DYSPHONIA. DIETICIAN REVIEWED FOOD CHOICES. GABAPENTIN FOR RIGHT RIB PAIN/INTERCOSTAL PAIN.CONTINENT BOWEL AND BLADDER WITH URGENCY.   ELOS - 2-3 WEEKS    CODE STATUS:  Level Of Support Discussed With: Patient  Code Status (Patient has no pulse and is not breathing): CPR (Attempt to Resuscitate)  Medical Interventions (Patient has pulse or is breathing): Full Support      Admission Status: Continues to meet requirements for inpatient admission.           Ryley Rider MD    During rounds, used appropriate personal protective equipment including mask and gloves.  Additional gown if indicated.  Mask used was standard procedure mask. Appropriate PPE was worn during the entire visit.  Hand hygiene was completed before and after.

## 2023-04-07 NOTE — PROGRESS NOTES
Inpatient Rehabilitation Plan of Care Note    Plan of Care  Care Plan Reviewed - No updates at this time.    Sphincter Control    Performed Intervention(s)  Bladder/bowel training program  Monitor intake and output  Use incontinence products/equipment      Psychosocial    Performed Intervention(s)  Verbalizes needs and concerns  Support from family/peer groups      Body Systems    Performed Intervention(s)  Perform active and passive ROM  Frequent position changes      Safety    Performed Intervention(s)  Safety Rounds  Bed alarm/Chair alarm  Items within reach    Signed by: Candy Gonzalez RN

## 2023-04-07 NOTE — PROGRESS NOTES
Patient Identification:  NAME:  Louise GARCIA  Age:  58 y.o.   Sex:  female   :  1965   MRN:  [id number]       Chief complaint: Myelopathy, peripheral neuropathy    History of present illness: She is getting her booster round of IVIG for the next 4 days and is tolerating it well.  She still has some urinary incontinence but no bowel incontinence.  She did have urinary incontinence before all of this.  According to her .  Still has numbness in her fingers.      Past medical history:  Past Medical History:   Diagnosis Date   • Degenerative disc disease, cervical    • Gestational diabetes    • H/O blood clots    • Hematemesis    • Seizures    • Septic shock        Allergies:  Penicillins and Sulfa antibiotics    Home medications:  Medications Prior to Admission   Medication Sig Dispense Refill Last Dose   • apixaban (ELIQUIS) 5 MG tablet tablet Take 1 tablet by mouth 2 (Two) Times a Day.      • estrogens, conjugated,-methyltestosterone (ESTRATEST HS) 0.625-1.25 MG per tablet Take 1 tablet by mouth Daily.      • folic acid (FOLVITE) 1 MG tablet Take 1 tablet by mouth Daily.      • lamoTRIgine (LaMICtal) 100 MG tablet Take 1 tablet by mouth 2 (Two) Times a Day.      • montelukast (SINGULAIR) 10 MG tablet Take 1 tablet by mouth Every Night.      • vitamin B-12 (CYANOCOBALAMIN) 100 MCG tablet Take 50 mcg by mouth Daily.           Hospital medications:  acetaminophen, 650 mg, Oral, Q24H  apixaban, 5 mg, Oral, Q12H  desvenlafaxine, 25 mg, Oral, Daily  diphenhydrAMINE, 25 mg, Oral, Q24H  folic acid, 1 mg, Oral, Daily  gabapentin, 300 mg, Oral, Q8H  immune globulin (human), 20 g, Intravenous, Daily  ipratropium-albuterol, 3 mL, Nebulization, 4x Daily - RT  lamoTRIgine, 200 mg, Oral, Daily  melatonin, 5 mg, Oral, Nightly  pantoprazole, 40 mg, Oral, Q AM  thiamine, 100 mg, Oral, Daily  vitamin B-12, 500 mcg, Oral, Daily         •  acetaminophen  •  bisacodyl  •  diphenhydrAMINE  •  famotidine  •  hydrocortisone  sodium succinate  •  hydrocortisone-bacitracin-zinc oxide-nystatin  •  oxyCODONE  •  polyethylene glycol  •  senna-docusate sodium  •  simethicone      Objective:  Vitals Ranges:   Temp:  [97.3 °F (36.3 °C)-98.5 °F (36.9 °C)] 97.8 °F (36.6 °C)  Heart Rate:  [83-97] 89  Resp:  [15-20] 15  BP: ()/(51-74) 115/60      Physical Exam:  Awake alert normal cranial nerves II through VII fair  strength.  Able to kick out with both legs.  Reflexes absent toes downgoing    Results review:   I reviewed the patient's new clinical results.    Data review:  Lab Results (last 24 hours)     Procedure Component Value Units Date/Time    Basic Metabolic Panel [[id number]]  (Abnormal) Collected: 04/07/23 0843    Specimen: Blood Updated: 04/07/23 0956     Glucose 118 mg/dL      BUN 16 mg/dL      Creatinine 1.32 mg/dL      Sodium 132 mmol/L      Potassium 3.9 mmol/L      Chloride 95 mmol/L      CO2 30.2 mmol/L      Calcium 10.1 mg/dL      BUN/Creatinine Ratio 12.1     Anion Gap 6.8 mmol/L      eGFR 46.6 mL/min/1.73     Narrative:      GFR Normal >60  Chronic Kidney Disease <60  Kidney Failure <15      CBC & Differential [[id number]]  (Abnormal) Collected: 04/07/23 0843    Specimen: Blood Updated: 04/07/23 0916    Narrative:      The following orders were created for panel order CBC & Differential.  Procedure                               Abnormality         Status                     ---------                               -----------         ------                     CBC Auto Differential[[id number]]        Abnormal            Final result                 Please view results for these tests on the individual orders.    CBC Auto Differential [[id number]]  (Abnormal) Collected: 04/07/23 0843    Specimen: Blood Updated: 04/07/23 0916     WBC 6.83 10*3/mm3      RBC 2.86 10*6/mm3      Hemoglobin 8.6 g/dL      Hematocrit 26.5 %      MCV 92.7 fL      MCH 30.1 pg      MCHC 32.5 g/dL      RDW 13.8 %      RDW-SD 46.8 fl      MPV 10.4 fL       Platelets 357 10*3/mm3      Neutrophil % 53.0 %      Lymphocyte % 35.6 %      Monocyte % 8.1 %      Eosinophil % 1.3 %      Basophil % 1.3 %      Immature Grans % 0.7 %      Neutrophils, Absolute 3.62 10*3/mm3      Lymphocytes, Absolute 2.43 10*3/mm3      Monocytes, Absolute 0.55 10*3/mm3      Eosinophils, Absolute 0.09 10*3/mm3      Basophils, Absolute 0.09 10*3/mm3      Immature Grans, Absolute 0.05 10*3/mm3      nRBC 0.0 /100 WBC     IgG, IgA, IgM [[id number]]  (Abnormal) Collected: 04/06/23 1520    Specimen: Blood Updated: 04/06/23 1656     IgG 2,248 mg/dL      IgM 123 mg/dL      IgA 274 mg/dL     CBC & Differential [[id number]]  (Abnormal) Collected: 04/06/23 1520    Specimen: Blood Updated: 04/06/23 1635    Narrative:      The following orders were created for panel order CBC & Differential.  Procedure                               Abnormality         Status                     ---------                               -----------         ------                     CBC Auto Differential[[id number]]        Abnormal            Final result                 Please view results for these tests on the individual orders.    CBC Auto Differential [[id number]]  (Abnormal) Collected: 04/06/23 1520    Specimen: Blood Updated: 04/06/23 1635     WBC 9.25 10*3/mm3      RBC 2.85 10*6/mm3      Hemoglobin 8.9 g/dL      Hematocrit 26.6 %      MCV 93.3 fL      MCH 31.2 pg      MCHC 33.5 g/dL      RDW 13.7 %      RDW-SD 46.6 fl      MPV 10.7 fL      Platelets 380 10*3/mm3      Neutrophil % 56.7 %      Lymphocyte % 32.8 %      Monocyte % 7.7 %      Eosinophil % 1.1 %      Basophil % 0.9 %      Immature Grans % 0.8 %      Neutrophils, Absolute 5.26 10*3/mm3      Lymphocytes, Absolute 3.03 10*3/mm3      Monocytes, Absolute 0.71 10*3/mm3      Eosinophils, Absolute 0.10 10*3/mm3      Basophils, Absolute 0.08 10*3/mm3      Immature Grans, Absolute 0.07 10*3/mm3      nRBC 0.0 /100 WBC            Imaging:  Imaging Results (Last 24 Hours)      ** No results found for the last 24 hours. **             Assessment and Plan:     Myelopathy and peripheral neuropathy in combination.  Has been treated with steroids and IVIG and is now getting a booster round of IVIG and is doing very well.  She still has numbness in the tips of her fingers and numbness elsewhere.  Some urinary incontinence but according to her she had this even before this presentation.    Recall her CSF did show increased myelin basic protein as would be expected with a myelopathy, but there were no oligoclonal banding, IgG synthesis rate was normal and NMO testing was normal.  Note to the fact that the total protein in the CSF was only 23.4 goes against a peripheral demyelinating process such as Guillain-Barré syndrome  She does have some pericallosal white matter changes on the MRI scan but there is no history in this patient to suggest multiple sclerosis and has noted the CSF testing so far tends to go against that as well    In any event she is getting her booster round of IVIG and is doing well    Ryley Arce MD  04/07/23  12:10 EDT

## 2023-04-07 NOTE — THERAPY TREATMENT NOTE
Inpatient Rehabilitation - Speech Language Pathology Treatment Note    [facility]     Patient Name: Louise GARCIA  : 1965  MRN: [id number]    Today's Date: 2023                   Admit Date: 3/17/2023       Visit Dx:      ICD-10-CM ICD-9-CM   1. Impaired functional mobility, balance, gait, and endurance  Z74.09 V49.89       Patient Active Problem List   Diagnosis   • Sepsis   • Neck pain, chronic   • Upper back pain   • Paresthesia   • Cervical myelopathy   • Lower extremity weakness   • History of blood clots   • Chronic anticoagulation   • Seizures   • Anemia   • GERD (gastroesophageal reflux disease)   • Guillain-[place] syndrome   • Situational mixed anxiety and depressive disorder   • Mass of middle lobe of right lung   • Oral candidiasis   • Empyema of pleura   • MRSA bacteremia   • Idiopathic peripheral neuropathy       Past Medical History:   Diagnosis Date   • Degenerative disc disease, cervical    • Gestational diabetes    • H/O blood clots    • Hematemesis    • Seizures    • Septic shock        Past Surgical History:   Procedure Laterality Date   • APPENDECTOMY     • BACK SURGERY     • CHOLECYSTECTOMY     • ENDOSCOPY N/A 2016    Procedure: ESOPHAGOGASTRODUODENOSCOPY with biopsy;  Surgeon: Austen Brito MD;  Location: [facility] ENDOSCOPY;  Service:    • HYSTERECTOMY     • NECK SURGERY       approx 6 yrs ago   • THORACOSCOPY Right 3/8/2023    Procedure: THORACOSCOPY WITH DAVINCI ROBOT WITH COMPLETE DECORTICATION;  Surgeon: Chloe Cedillo MD;  Location: [facility] OR;  Service: Robotics - DaVinci;  Laterality: Right;   • TONSILLECTOMY     • TONSILLECTOMY AND ADENOIDECTOMY         SLP Recommendation and Plan                                                            SLP EVALUATION (last 72 hours)     SLP SLC Evaluation     Row Name 23 0930 23 1100 23 0930 23 1430 23 0930       Communication Assessment/Intervention    Document Type therapy note (daily  note)  -AL therapy note (daily note)  -AL therapy note (daily note)  -AL therapy note (daily note)  -AL therapy note (daily note)  -AL    Patient/Family/Caregiver Comments/Observations Pt participated well.  -AL Pt participated well.  -AL Pt participated well.  -AL Pt in good spirits today.  -AL Pt participated well.  -AL       Pain Scale: Numbers Pre/Post-Treatment    Pretreatment Pain Rating 0/10 - no pain  -AL 0/10 - no pain  -AL 0/10 - no pain  -AL 0/10 - no pain  -AL 0/10 - no pain  -AL    Row Name 04/04/23 1430                   Communication Assessment/Intervention    Document Type therapy note (daily note)  -AL        Patient/Family/Caregiver Comments/Observations Pt participated well.  -AL           Pain Scale: Numbers Pre/Post-Treatment    Pretreatment Pain Rating 4/10  -AL        Posttreatment Pain Rating 4/10  -AL        Pre/Posttreatment Pain Comment left leg and foot  -AL              User Key  (r) = Recorded By, (t) = Taken By, (c) = Cosigned By    Initials Name Effective Dates    Nessa Kraus, MS CCC-SLP 06/16/21 -                Patient was wearing a face mask during this therapy encounter. Therapist used appropriate personal protective equipment including mask, eye protection and gloves.  Mask used was standard procedure mask. Appropriate PPE was worn during the entire therapy session. Hand hygiene was completed before and after therapy session. Patient is not in enhanced droplet precautions.             EDUCATION    The patient has been educated in the following areas:       Cognitive Impairment.             SLP GOALS     Row Name 04/07/23 0930 04/06/23 1100 04/06/23 0930       Phonation Goal 1 (SLP)    Comment (Phonation Goal 1, SLP) -- Pt exhibited improved vocal quality in conversation, reporting she is trying to use a louder voice; intermittent periods of mild-moderate hoarse/gravely quality.  -AL --       Attention Goal 1 (SLP)    Improve Attention by Goal 1 (SLP) -- complete  "sustained attention task;80%;with minimal cues (75-90%)  -AL --    Time Frame (Attention Goal 1, SLP) -- by discharge  -AL --    Progress/Outcomes (Attention Goal 1, SLP) goal ongoing  -AL goal ongoing  -AL --    Comment (Attention Goal 1, SLP) 2-step written directions: 100% with NO cues. Alternating attention for \"Blink\" card sort task: required MIN cues x2 when sorting entire deck. Sorted 25 cards in 1 minute, 43 seconds with NO cues. Medicine management: 4/9 with NO Cues, 7/9 with MIN cues, 9/9 with MOD-MAX cues. Initially had decreased comprehension of task; however, performance improved as task progressed.  -AL Moderately complex written directions: 50% with NO cues, 100% with MIN cues. Initiated teaching \"Blink\" card game.  -AL --       Memory Skills Goal 1 (SLP)    Improve Memory Skills Through Goal 1 (SLP) -- -- use memory strategies;use external memory aid;recall details of the day;80%;with moderate cues (50-74%)  -AL    Time Frame (Memory Skills Goal 1, SLP) -- -- 1 week  -AL    Progress/Outcomes (Memory Skills Goal 1, SLP) -- -- good progress toward goal  -AL    Comment (Memory Skills Goal 1, SLP) -- -- Recalled 3/3 errands after 10 minutes with NO cues.  -AL       Organizational Skills Goal 1 (SLP)    Improve Thought Organization Through Goal 1 (SLP) -- -- completing a divergent naming task;80%;with minimal cues (75-90%)  -AL    Time Frame (Thought Organization Skills Goal 1, SLP) -- -- 1 week  -AL    Progress/Outcomes (Thought Organization Skills Goal 1, SLP) -- -- goal ongoing  -AL    Comment (Thought Organization Skills Goal 1, SLP) -- -- Sweet items: 3 with NO cues, 8 with MIN-MOD uces. Salty items: 1 with NO cues, 7 with MOD cues.  -AL       Reasoning Goal 1 (SLP)    Improve Reasoning Through Goal 1 (SLP) complete deductive reasoning task;80%;with minimal cues (75-90%)  -AL -- complete deductive reasoning task;80%;with minimal cues (75-90%)  -AL    Time Frame (Reasoning Goal 1, SLP) 1 week  -AL -- " 1 week  -AL    Progress/Outcomes (Reasoning Goal 1, SLP) good progress toward goal  -AL -- good progress toward goal  -AL    Comment (Reasoning Goal 1, SLP) [place] Show organizin/8 with NO cues, 3/8 with MIN cues, 7/8 with MOD cues, 8/8 with MAX cues.  -AL -- Closet organizing task: 40% with NO cues, 100% with MIN-MOD cues.  -AL    Row Name 23 1430 23 0930 23 1430       Phonation Goal 1 (SLP)    Progress/Outcomes (Phonation Goal 1, SLP) good progress toward goal  -AL good progress toward goal  -AL --    Comment (Phonation Goal 1, SLP) Pt participated in frontal tone focus exercises. She completed humming /m/, initial /m/ CV chanting and 2 syllabe initial /m/ chanting with overall MOD-MAX cues; improved vocal quality noted when attempting higher pitch. Also, benefited from yawning to reduce hyperfunction. Pt exhibited intermittent adequate vocal quality in conversation following exercises. Improved vocal quality noted when pt was smiling/joking and naturally using increased pitch inflection.  -AL Pt with overall moderate hoarse vocal quality, with intermittent periods of adequate vocal quality. Pt was stimulable to produce adequate voice with frontal tone focus (humming, initial /m/ CV combination chanting) and yawning/relaxation. Pt also participated in exercises for diaphragmatic breathing. Upon return to her room, pt with mild hoarse quality.  -AL --       Attention Goal 1 (SLP)    Improve Attention by Goal 1 (SLP) -- -- complete sustained attention task;80%;with minimal cues (75-90%)  -AL    Time Frame (Attention Goal 1, SLP) -- -- by discharge  -AL    Progress/Outcomes (Attention Goal 1, SLP) -- -- goal ongoing  -AL    Comment (Attention Goal 1, SLP) -- -- Attention to detail and selective attention for moderate level written directions: 20% with NO cues, 40% with MIN cues, 100% wtih MOD cues.  -AL       Memory Skills Goal 1 (SLP)    Improve Memory Skills Through Goal 1 (SLP) -- -- use  memory strategies;use external memory aid;recall details of the day;80%;with moderate cues (50-74%)  -AL    Time Frame (Memory Skills Goal 1, SLP) -- -- 1 week  -AL    Progress/Outcomes (Memory Skills Goal 1, SLP) -- -- good progress toward goal  -AL    Comment (Memory Skills Goal 1, SLP) -- -- Pt required NO repetitions for immediate memory when listening to voicemails. Orientation to time: 100% with NO cues  -AL       Reasoning Goal 1 (SLP)    Comment (Reasoning Goal 1, SLP) Kitchen shelves visuospatial reasoning task: 50% with NO cues, 90% with MIN cues, 100% with MAX cues  -AL -- Inferencing for voicemails: 100% with NO cues.  -AL          User Key  (r) = Recorded By, (t) = Taken By, (c) = Cosigned By    Initials Name Provider Type    Nessa Kraus MS CCC-SLP Speech and Language Pathologist                            Time Calculation:        Time Calculation- SLP     Row Name 04/07/23 1027             Time Calculation- SLP    SLP Start Time 0930  -AL      SLP Stop Time 1030  -AL      SLP Time Calculation (min) 60 min  -AL            User Key  (r) = Recorded By, (t) = Taken By, (c) = Cosigned By    Initials Name Provider Type    Nessa Kraus MS CCC-SLP Speech and Language Pathologist                  Therapy Charges for Today     Code Description Service Date Service Provider Modifiers Qty    13108127402 HC ST DEV OF COGN SKILLS INITIAL 15 MIN 4/6/2023 Nessa Spangler MS CCC-SLP  1    65459539789 HC ST DEV OF COGN SKILLS EACH ADDT'L 15 MIN 4/6/2023 Nessa Spangler MS CCC-SLP  3    70746624396 HC ST DEV OF COGN SKILLS INITIAL 15 MIN 4/7/2023 Nessa Spangler MS CCC-SLP  1    32718494140 HC ST DEV OF COGN SKILLS EACH ADDT'L 15 MIN 4/7/2023 Nessa Spangler MS CCC-SLP  3                           Nessa Spangler MS CCC-SLP  4/7/2023

## 2023-04-07 NOTE — PLAN OF CARE
Goal Outcome Evaluation:  Slept well.  at bedside. Patient turns some herself, but also assisted with turns.  Meds with applesauce. Rt. old Chest tube site dressings dry & intact. Applied purewick for urinary incontinence. Was continent of bowels tonight. Oxycodone given for legs, arms, & rib pain, with some relief.

## 2023-04-07 NOTE — THERAPY TREATMENT NOTE
Inpatient Rehabilitation - Physical Therapy Treatment Note       [facility]     Patient Name: Louise GARCIA  : 1965  MRN: [id number]    Today's Date: 2023                    Admit Date: 3/17/2023      Visit Dx:     ICD-10-CM ICD-9-CM   1. Impaired functional mobility, balance, gait, and endurance  Z74.09 V49.89       Patient Active Problem List   Diagnosis   • Sepsis   • Neck pain, chronic   • Upper back pain   • Paresthesia   • Cervical myelopathy   • Lower extremity weakness   • History of blood clots   • Chronic anticoagulation   • Seizures   • Anemia   • GERD (gastroesophageal reflux disease)   • Guillain-[place] syndrome   • Situational mixed anxiety and depressive disorder   • Mass of middle lobe of right lung   • Oral candidiasis   • Empyema of pleura   • MRSA bacteremia   • Idiopathic peripheral neuropathy       Past Medical History:   Diagnosis Date   • Degenerative disc disease, cervical    • Gestational diabetes    • H/O blood clots    • Hematemesis    • Seizures    • Septic shock        Past Surgical History:   Procedure Laterality Date   • APPENDECTOMY     • BACK SURGERY     • CHOLECYSTECTOMY     • ENDOSCOPY N/A 2016    Procedure: ESOPHAGOGASTRODUODENOSCOPY with biopsy;  Surgeon: Austen Brito MD;  Location: [facility] ENDOSCOPY;  Service:    • HYSTERECTOMY     • NECK SURGERY       approx 6 yrs ago   • THORACOSCOPY Right 3/8/2023    Procedure: THORACOSCOPY WITH DAVINCI ROBOT WITH COMPLETE DECORTICATION;  Surgeon: Chloe Cedillo MD;  Location: [facility] OR;  Service: Robotics - DaVinci;  Laterality: Right;   • TONSILLECTOMY     • TONSILLECTOMY AND ADENOIDECTOMY         PT ASSESSMENT (last 12 hours)     IRF PT Evaluation and Treatment     Row Name 23 1200          PT Time and Intention    Document Type daily treatment  -AE     Mode of Treatment individual therapy;physical therapy  -AE     Patient/Family/Caregiver Comments/Observations trying to wean off narcotic pain meds   -AE     Row Name 04/07/23 1200          General Information    Patient Profile Reviewed yes  -AE     General Observations of Patient  present for PM session and performed family conference requesting pictures and measurements of RV  -AE     Existing Precautions/Restrictions fall  -AE     Limitations/Impairments safety/cognitive  -AE     Row Name 04/07/23 1200          Pain Assessment    Pretreatment Pain Rating 2/10  -AE     Posttreatment Pain Rating 2/10  -AE     Pain Location - Side/Orientation Left  -AE     Pain Location lower  -AE     Pain Location - extremity  -AE     Row Name 04/07/23 1200          Cognition/Psychosocial    Affect/Mental Status (Cognition) flat/blunted affect  -AE     Orientation Status (Cognition) oriented x 3  -AE     Follows Commands (Cognition) follows one-step commands  -AE     Personal Safety Interventions fall prevention program maintained;gait belt;muscle strengthening facilitated;nonskid shoes/slippers when out of bed;supervised activity  -AE     Cognitive Function memory deficit  -AE     Memory Deficit (Cognition) episodic memory;procedural memory  -AE     Row Name 04/07/23 1200          Bed-Chair Transfer    Bed-Chair [place] (Transfers) moderate assist (50% patient effort)  -AE     Assistive Device (Bed-Chair Transfers) wheelchair  -AE     Comment, (Bed-Chair Transfer) squat pivot  -AE     Row Name 04/07/23 1200          Chair-Bed Transfer    Chair-Bed [place] (Transfers) moderate assist (50% patient effort)  -AE     Assistive Device (Chair-Bed Transfers) wheelchair  -AE     Comment, (Chair-Bed Transfer) squat pivot  -AE     Row Name 04/07/23 1200          Sit-Stand Transfer    Sit-Stand [place] (Transfers) moderate assist (50% patient effort)  -AE     Assistive Device (Sit-Stand Transfers) parallel bars  -AE     Comment, (Sit-Stand Transfer) 2 x 3 wheelchair pushups. cues for corect sequencing. performing with min/modA  -AE     Row Name 04/07/23 1200           "Stand-Sit Transfer    Stand-Sit [place] (Transfers) minimum assist (75% patient effort);1 person assist  -AE     Assistive Device (Stand-Sit Transfers) parallel bars  -AE     Comment, (Stand-Sit Transfer) mini squats 2 x 3  -AE     Row Name 04/07/23 1200          Gait/Stairs (Locomotion)    [place] Level (Gait) contact guard;minimum assist (75% patient effort);2 person assist  -AE     Assistive Device (Gait) parallel bars;walker, front-wheeled  -AE     Distance in Feet (Gait) 8' // bars CGA, 55ft Bianca with close wc follow  -AE     Pattern (Gait) step-through;step-to  -AE     Deviations/Abnormal Patterns (Gait) federico decreased;bilateral deviations;base of support, narrow;festinating/shuffling;stride length decreased;weight shifting decreased;left sided deviations;antalgic  -AE     Bilateral Gait Deviations heel strike decreased;weight shift ability decreased;knee buckling bilaterally  -AE     Left Sided Gait Deviations decreased knee extension  -AE     Right Sided Gait Deviations decreased knee extension  -AE     Gait Assessment/Intervention donned L AFO. still minimal L knee buckling throughout but pt able to correct with cueing to keep her L leg \"locked\". she does not like the AFO. cues to slow down throughout, able to maintain upright posture. close wc follow.  -AE     [place] Level (Stairs) minimum assist (75% patient effort);2 person assist  -AE     Handrail Location (Stairs) both sides  -AE     Number of Steps (Stairs) 4in curb  -AE     Ascending Technique (Stairs) step-to-step  -AE     Descending Technique (Stairs) step-to-step  -AE     Stairs Assessment/Intervention stepped onto aerobic step  -AE     Row Name 04/07/23 1200          Positioning and Restraints    Pre-Treatment Position sitting in chair/recliner  -AE     Post Treatment Position wheelchair  -AE     In Wheelchair sitting;call light within reach;encouraged to call for assist;exit alarm on  -AE           User Key  (r) = Recorded By, " (t) = Taken By, (c) = Cosigned By    Initials Name Provider Type    Deanna Lyons, PT Physical Therapist              Wound 03/08/23 1917 Right lateral chest Incision (Active)   Dressing Appearance dry;intact 04/06/23 1929   Closure Liquid skin adhesive 04/07/23 0840   Base clean;dry 04/07/23 0840   Drainage Amount none 04/07/23 0840   Dressing Care dressing applied;dressing changed 04/07/23 0840       Wound 03/11/23 1530 Other (See comments) other (see comments) pubis Abrasion (Active)   Closure Open to air 04/07/23 0840   Base red;scab 04/07/23 0840   Drainage Amount none 04/07/23 0840       Wound 03/20/23 1527 Bilateral upper gluteal MASD (Moisture associated skin damage) (Active)   Dressing Appearance open to air 04/06/23 1929   Closure Open to air 04/07/23 0840   Base clean;dry;blanchable 04/07/23 0840   Drainage Amount none 04/07/23 0840   Care, Wound barrier applied 04/06/23 1929   Dressing Care open to air 04/06/23 1929   Periwound Care barrier ointment applied 04/06/23 1929     Physical Therapy Education     Title: PT OT SLP Therapies (Done)     Topic: Physical Therapy (Done)     Point: Mobility training (Done)     Learning Progress Summary           Patient Acceptance, E, VU by WN at 4/4/2023 2329    Acceptance, E, VU by WN at 4/4/2023 0322    Acceptance, E,D, VU,DU by DP at 4/1/2023 1352    Acceptance, E, VU,DU by MG at 3/31/2023 0825    Acceptance, E, VU,DU by MG at 3/30/2023 0956    Acceptance, E,D, VU,DU by DP at 3/29/2023 1148    Nonacceptance, E,D, VU,DU by DP at 3/28/2023 1144    Acceptance, E,D, VU,DU by DP at 3/27/2023 1601    Acceptance, E,D, VU,NR by EE at 3/25/2023 1423    Acceptance, E,D, NR,VU,DU by DP at 3/21/2023 1448    Acceptance, E, NR by JK at 3/20/2023 1513    Acceptance, E,TB, VU,NR by KP at 3/18/2023 1643   Family Acceptance, E, VU by WN at 4/4/2023 2329    Acceptance, E, VU by WN at 4/4/2023 0322                   Point: Home exercise program (Done)     Learning Progress  Summary           Patient Acceptance, E, VU by WN at 4/4/2023 2329    Acceptance, E, VU by WN at 4/4/2023 0322    Acceptance, E,D, VU,DU by DP at 4/1/2023 1352    Acceptance, E, VU,DU by MG at 3/31/2023 0825    Acceptance, E, VU,DU by MG at 3/30/2023 0956    Acceptance, E,D, VU,DU by DP at 3/29/2023 1148    Nonacceptance, E,D, VU,DU by DP at 3/28/2023 1144    Acceptance, E,D, VU,DU by DP at 3/27/2023 1601    Acceptance, E,D, NR,VU,DU by DP at 3/21/2023 1448    Acceptance, E, NR by JK at 3/20/2023 1513   Family Acceptance, E, VU by WN at 4/4/2023 2329    Acceptance, E, VU by WN at 4/4/2023 0322                   Point: Body mechanics (Done)     Learning Progress Summary           Patient Acceptance, E, VU by WN at 4/4/2023 2329    Acceptance, E, VU by WN at 4/4/2023 0322    Acceptance, E,D, VU,DU by DP at 4/1/2023 1352    Acceptance, E, VU,DU by MG at 3/31/2023 0825    Acceptance, E, VU,DU by MG at 3/30/2023 0956    Acceptance, E,D, VU,DU by DP at 3/29/2023 1148    Nonacceptance, E,D, VU,DU by DP at 3/28/2023 1144    Acceptance, E,D, VU,DU by DP at 3/27/2023 1601    Acceptance, E,D, VU,NR by EE at 3/25/2023 1423    Acceptance, E,D, NR,VU,DU by DP at 3/21/2023 1448   Family Acceptance, E, VU by WN at 4/4/2023 2329    Acceptance, E, VU by WN at 4/4/2023 0322                   Point: Precautions (Done)     Learning Progress Summary           Patient Acceptance, E, VU by WN at 4/4/2023 2329    Acceptance, E, VU by WN at 4/4/2023 0322    Acceptance, E,D, VU,DU by DP at 4/1/2023 1352    Acceptance, E, VU,DU by MG at 3/31/2023 0825    Acceptance, E, VU,DU by MG at 3/30/2023 0956    Acceptance, E,D, VU,DU by DP at 3/29/2023 1148    Nonacceptance, E,D, VU,DU by DP at 3/28/2023 1144    Acceptance, E,D, VU,DU by DP at 3/27/2023 1601    Acceptance, E,D, VU,NR by EE at 3/25/2023 1423    Acceptance, E,D, NR,VU,DU by DP at 3/21/2023 1448   Family Acceptance, E, VU by WN at 4/4/2023 2329    Acceptance, E, VU by WN at 4/4/2023 0322                                User Key     Initials Effective Dates Name Provider Type Discipline    EE 06/16/21 -  Melinda Mann, PT Physical Therapist PT    JK 06/16/21 -  Juana Veloz, PT Physical Therapist PT    KP 06/16/21 -  Yolanda Shannon, PT Physical Therapist PT    MG 05/24/22 -  Yu Villalobos, PT Physical Therapist PT    WN 08/23/22 -  Jose Carrillo, RN Registered Nurse Nurse    DP 08/24/21 -  Papa Marsh, PT Physical Therapist PT                PT Recommendation and Plan                          Time Calculation:      PT Charges     Row Name 04/07/23 1517 04/07/23 1221          Time Calculation    Start Time 1230  -AE 0830  -AE     Stop Time 1300  -AE 0900  -AE     Time Calculation (min) 30 min  -AE 30 min  -AE     PT Received On 04/07/23  -AE 04/07/23  -AE     PT - Next Appointment 04/08/23  -AE 04/08/23  -AE        Time Calculation- PT    Total Timed Code Minutes- PT 30 minute(s)  -AE 30 minute(s)  -AE           User Key  (r) = Recorded By, (t) = Taken By, (c) = Cosigned By    Initials Name Provider Type    AE Deanna Carr, PT Physical Therapist                Therapy Charges for Today     Code Description Service Date Service Provider Modifiers Qty    98683030182 HC PT THER PROC EA 15 MIN 4/6/2023 Deanna Carr, PT GP 1    09433032438 HC PT THERAPEUTIC ACT EA 15 MIN 4/6/2023 Deanna Carr, PT GP 1    59431343489 HC GAIT TRAINING EA 15 MIN 4/6/2023 Deanna Carr, PT GP 1    25808240113 HC PT NEUROMUSC RE EDUCATION EA 15 MIN 4/6/2023 Deanna Carr, PT GP 1    49687671870 HC PT THER SUPP EA 15 MIN 4/6/2023 Deanna Carr, PT GP 2    68887226188 HC PT NEUROMUSC RE EDUCATION EA 15 MIN 4/7/2023 Deanna Carr, PT GP 1    30404303178 HC PT THERAPEUTIC ACT EA 15 MIN 4/7/2023 Deanna Carr, PT GP 1    51996663084 HC GAIT TRAINING EA 15 MIN 4/7/2023 Deanna Carr, PT GP 1    04540014339 HC PT THER SUPP EA 15 MIN 4/7/2023 Deanna Carr, PT GP 1    01294312545 HC  PT NEUROMUSC RE EDUCATION EA 15 MIN 4/7/2023 Deanna Carr, PT GP 1                   Deanna Carr, PT  4/7/2023

## 2023-04-07 NOTE — THERAPY TREATMENT NOTE
Inpatient Rehabilitation - Occupational Therapy Treatment Note    [facility]     Patient Name: Louise GARCIA  : 1965  MRN: [id number]    Today's Date: 2023                 Admit Date: 3/17/2023         ICD-10-CM ICD-9-CM   1. Impaired functional mobility, balance, gait, and endurance  Z74.09 V49.89       Patient Active Problem List   Diagnosis   • Sepsis   • Neck pain, chronic   • Upper back pain   • Paresthesia   • Cervical myelopathy   • Lower extremity weakness   • History of blood clots   • Chronic anticoagulation   • Seizures   • Anemia   • GERD (gastroesophageal reflux disease)   • Guillain-[place] syndrome   • Situational mixed anxiety and depressive disorder   • Mass of middle lobe of right lung   • Oral candidiasis   • Empyema of pleura   • MRSA bacteremia   • Idiopathic peripheral neuropathy       Past Medical History:   Diagnosis Date   • Degenerative disc disease, cervical    • Gestational diabetes    • H/O blood clots    • Hematemesis    • Seizures    • Septic shock        Past Surgical History:   Procedure Laterality Date   • APPENDECTOMY     • BACK SURGERY     • CHOLECYSTECTOMY     • ENDOSCOPY N/A 2016    Procedure: ESOPHAGOGASTRODUODENOSCOPY with biopsy;  Surgeon: Austen Brito MD;  Location: [facility] ENDOSCOPY;  Service:    • HYSTERECTOMY     • NECK SURGERY       approx 6 yrs ago   • THORACOSCOPY Right 3/8/2023    Procedure: THORACOSCOPY WITH DAVINCI ROBOT WITH COMPLETE DECORTICATION;  Surgeon: Chloe Cedillo MD;  Location: [facility] OR;  Service: Robotics - DaVinci;  Laterality: Right;   • TONSILLECTOMY     • TONSILLECTOMY AND ADENOIDECTOMY               [facility] OT ASSESSMENT FLOWSHEET (last 12 hours)     IRF OT Evaluation and Treatment     Row Name 23 1219          OT Time and Intention    Document Type daily treatment  -KB     Mode of Treatment individual therapy;occupational therapy  -KB     Patient Effort good  -KB     Row Name 23 1219          General  Information    Patient Profile Reviewed yes  -KB     Patient/Family/Caregiver Comments/Observations --  good participation  -KB     Existing Precautions/Restrictions fall  -KB     Row Name 04/07/23 1219          Pain Assessment    Pretreatment Pain Rating 0/10 - no pain  -KB     Posttreatment Pain Rating 0/10 - no pain  -KB     Row Name 04/07/23 1219          Cognition/Psychosocial    Orientation Status (Cognition) oriented x 3  -KB     Row Name 04/07/23 1219          Bathing    [place] Level (Bathing) bathing skills;contact guard assist  -KB     Assistive Device (Bathing) grab bar/tub rail  -KB     Position (Bathing) supported sitting  -KB     Row Name 04/07/23 1219          Upper Body Dressing    [place] Level (Upper Body Dressing) upper body dressing skills;set up assistance  -KB     Set-up Assistance (Upper Body Dressing) obtain clothing  -KB     Comment (Upper Body Dressing) wc level  -KB     Row Name 04/07/23 1219          Lower Body Dressing    [place] Level (Lower Body Dressing) doff;don;pants/bottoms;socks;shoes/slippers  -KB     Position (Lower Body Dressing) supported sitting  -KB     Comment (Lower Body Dressing) max with shoes, socks, pull up pants over bottom  -KB     Row Name 04/07/23 1219          Grooming    [place] Level (Grooming) grooming skills;hair care, combing/brushing;oral care regimen;wash face, hands;set up  -KB     Position (Grooming) supported sitting  -KB     Set-up Assistance (Grooming) obtain supplies  -KB     Comment (Grooming) wc level  -KB     Row Name 04/07/23 1219          Shower Transfer    Type (Shower Transfer) squat pivot  -KB     [place] Level (Shower Transfer) minimum assist (75% patient effort);2 person assist  -KB     Assistive Device (Shower Transfer) grab bar, tub/shower  -KB     Row Name 04/07/23 1219          Positioning and Restraints    Pre-Treatment Position sitting in chair/recliner  -KB     Post Treatment Position wheelchair  -KB     In  Chair notified nsg;exit alarm on;with family/caregiver;encouraged to call for assist  -KB           User Key  (r) = Recorded By, (t) = Taken By, (c) = Cosigned By    Initials Name Effective Dates    Reena Guidry, WILI 01/03/23 -                  Occupational Therapy Education     Title: PT OT SLP Therapies (Done)     Topic: Occupational Therapy (Done)     Point: ADL training (Done)     Description:   Instruct learner(s) on proper safety adaptation and remediation techniques during self care or transfers.   Instruct in proper use of assistive devices.              Learning Progress Summary           Patient Acceptance, E, VU by WN at 4/4/2023 2329    Acceptance, E, VU by WN at 4/4/2023 0322   Family Acceptance, E, VU by WN at 4/4/2023 2329    Acceptance, E, VU by WN at 4/4/2023 0322                   Point: Home exercise program (Done)     Description:   Instruct learner(s) on appropriate technique for monitoring, assisting and/or progressing therapeutic exercises/activities.              Learning Progress Summary           Patient Acceptance, E, VU by WN at 4/4/2023 2329    Acceptance, E, VU by WN at 4/4/2023 0322   Family Acceptance, E, VU by WN at 4/4/2023 2329    Acceptance, E, VU by WN at 4/4/2023 0322                   Point: Precautions (Done)     Description:   Instruct learner(s) on prescribed precautions during self-care and functional transfers.              Learning Progress Summary           Patient Acceptance, E, VU by WN at 4/4/2023 2329    Acceptance, E, VU by WN at 4/4/2023 0322   Family Acceptance, E, VU by WN at 4/4/2023 2329    Acceptance, E, VU by WN at 4/4/2023 0322                   Point: Body mechanics (Done)     Description:   Instruct learner(s) on proper positioning and spine alignment during self-care, functional mobility activities and/or exercises.              Learning Progress Summary           Patient Acceptance, E, VU by WN at 4/4/2023 2329    Acceptance, E, VU by WN at 4/4/2023  0322   Family Acceptance, E, VU by WN at 4/4/2023 2329    Acceptance, E, VU by WN at 4/4/2023 0322                               User Key     Initials Effective Dates Name Provider Type Discipline     08/23/22 -  Jose Carrillo, RN Registered Nurse Nurse                    OT Recommendation and Plan                         Time Calculation:      Time Calculation- OT     Row Name 04/07/23 1030             Time Calculation- OT    OT Start Time 1030  -KB      OT Stop Time 1130  -KB      OT Time Calculation (min) 60 min  -KB            User Key  (r) = Recorded By, (t) = Taken By, (c) = Cosigned By    Initials Name Provider Type    KB Reena Tovar, OT Occupational Therapist              Therapy Charges for Today     Code Description Service Date Service Provider Modifiers Qty    93720904448 HC OT SELF CARE/MGMT/TRAIN EA 15 MIN 4/6/2023 Reena Tovar, OT GO 2    37648177418 HC OT THERAPEUTIC ACT EA 15 MIN 4/6/2023 Reena Tovar, OT GO 2    63395423139 HC OT SELF CARE/MGMT/TRAIN EA 15 MIN 4/7/2023 Reena Tovar, OT GO 4                   Reena Tovar OT  4/7/2023

## 2023-04-07 NOTE — PROGRESS NOTES
Family conference held today with patient and . PT, OT, ST, NSG, and SW present. Discussed progress and d/c recommendations.     PT reported they have been working a lot on core strengthening exercises. Patient able to do bed mobility with rail with SBA and on mat with CGA. Transfers require Mod assist. Patient walked 55 ft. Today with Min assist of one and SBA of one with wheelchair following. PT reported patient has done one 4 inch step today with Min assist. PT has not attempted multiple steps with patient at this time. PT discussed goal of getting patient safe to walk with  short distances at discharge.     OT reported patient does bathing seated on shower bench with SBA. Patient able to dress UE with set up but LE dressing still requires 2 people when stands to pull up pants. Patient has improved with being able to bend forward to help with LE dressing. Commode transfers and toileting requires assist of 2 people but OT plans to start working with patient to be able to let go to help with clothing management. OT also working on strengthening exercises.     ST reported patient has been making steady progress with cognition. Patient consistently oriented and doing better with memory tasks. Patient able to recall information after 10 minute delay with no cues. Requires Min cues after 20 minutes. Concentration still fluctuates. Patient still requires min/mod cues for attention to detail with reasoning tasks. Patient still has some trouble with comprehension of more complex directions. ST tried medicine management task today with patient. Patient had initial confusion but once she understood task, was able to do. Recommended patient have assistance at home with medication and finance management. ST also working some on voice therapy. Patient still with mild to moderate hoarseness.     NSG reviewed medications. Patient to get IVIG for 4 more days for total of 5 treatments. Patient using Purewick at  night. Patient is continent during day.  assists patient with bedpan.  wants to look into [employer] for home.   Patient has dry dressing to right flank. Patient taking Tylenol for pain in daytime but stronger pain med at night. Discussed follow up with neurology, surgeon and PCP. NSG to make appointments prior to d/c.     Team recommended patient have home PT, OT, ST after d/c initially and then switch to outpatient.     Discussed equipment for home. Patient has rolling walker, wheelchair, shower chair and BSC at home per .    to go home today and measure doorways and take picture of bathroom and steps outside and inside of their travel trailer.    PT discussed starting family teaching with  on Monday so he can assist patient in room with transfers.     D/C plan is home with . Patient continues to make good progress toward goals and ELOS is 2 more weeks. Will assist with plans.

## 2023-04-08 PROCEDURE — 63710000001 DIPHENHYDRAMINE PER 50 MG: Performed by: PSYCHIATRY & NEUROLOGY

## 2023-04-08 PROCEDURE — 94799 UNLISTED PULMONARY SVC/PX: CPT

## 2023-04-08 PROCEDURE — 25010000002 IMMUNE GLOBULIN (HUMAN) 20 GM/200ML SOLUTION: Performed by: PSYCHIATRY & NEUROLOGY

## 2023-04-08 PROCEDURE — 97112 NEUROMUSCULAR REEDUCATION: CPT | Performed by: PHYSICAL THERAPIST

## 2023-04-08 RX ADMIN — APIXABAN 5 MG: 5 TABLET, FILM COATED ORAL at 20:40

## 2023-04-08 RX ADMIN — PANTOPRAZOLE SODIUM 40 MG: 40 TABLET, DELAYED RELEASE ORAL at 04:54

## 2023-04-08 RX ADMIN — IPRATROPIUM BROMIDE AND ALBUTEROL SULFATE 3 ML: 2.5; .5 SOLUTION RESPIRATORY (INHALATION) at 11:56

## 2023-04-08 RX ADMIN — LAMOTRIGINE 200 MG: 100 TABLET ORAL at 08:25

## 2023-04-08 RX ADMIN — OXYCODONE HYDROCHLORIDE 2.5 MG: 5 TABLET ORAL at 17:41

## 2023-04-08 RX ADMIN — Medication 100 MG: at 08:25

## 2023-04-08 RX ADMIN — APIXABAN 5 MG: 5 TABLET, FILM COATED ORAL at 08:25

## 2023-04-08 RX ADMIN — OXYCODONE HYDROCHLORIDE 2.5 MG: 5 TABLET ORAL at 22:01

## 2023-04-08 RX ADMIN — OXYCODONE HYDROCHLORIDE 2.5 MG: 5 TABLET ORAL at 04:54

## 2023-04-08 RX ADMIN — GABAPENTIN 300 MG: 300 CAPSULE ORAL at 13:30

## 2023-04-08 RX ADMIN — DIPHENHYDRAMINE HYDROCHLORIDE 25 MG: 25 CAPSULE ORAL at 14:09

## 2023-04-08 RX ADMIN — IPRATROPIUM BROMIDE AND ALBUTEROL SULFATE 3 ML: 2.5; .5 SOLUTION RESPIRATORY (INHALATION) at 19:50

## 2023-04-08 RX ADMIN — ACETAMINOPHEN 650 MG: 325 TABLET ORAL at 14:09

## 2023-04-08 RX ADMIN — IMMUNE GLOBULIN (HUMAN) 20 G: 10 INJECTION INTRAVENOUS; SUBCUTANEOUS at 14:14

## 2023-04-08 RX ADMIN — GABAPENTIN 300 MG: 300 CAPSULE ORAL at 20:40

## 2023-04-08 RX ADMIN — IPRATROPIUM BROMIDE AND ALBUTEROL SULFATE 3 ML: 2.5; .5 SOLUTION RESPIRATORY (INHALATION) at 08:12

## 2023-04-08 RX ADMIN — DESVENLAFAXINE SUCCINATE 25 MG: 25 TABLET, EXTENDED RELEASE ORAL at 08:25

## 2023-04-08 RX ADMIN — Medication 1 MG: at 08:25

## 2023-04-08 RX ADMIN — IPRATROPIUM BROMIDE AND ALBUTEROL SULFATE 3 ML: 2.5; .5 SOLUTION RESPIRATORY (INHALATION) at 15:54

## 2023-04-08 RX ADMIN — Medication 500 MCG: at 08:25

## 2023-04-08 RX ADMIN — Medication 5 MG: at 20:40

## 2023-04-08 RX ADMIN — GABAPENTIN 300 MG: 300 CAPSULE ORAL at 04:54

## 2023-04-08 NOTE — THERAPY TREATMENT NOTE
Inpatient Rehabilitation - Physical Therapy Treatment Note       [facility]     Patient Name: Louise GARCIA  : 1965  MRN: [id number]    Today's Date: 2023                    Admit Date: 3/17/2023      Visit Dx:     ICD-10-CM ICD-9-CM   1. Impaired functional mobility, balance, gait, and endurance  Z74.09 V49.89       Patient Active Problem List   Diagnosis   • Sepsis   • Neck pain, chronic   • Upper back pain   • Paresthesia   • Cervical myelopathy   • Lower extremity weakness   • History of blood clots   • Chronic anticoagulation   • Seizures   • Anemia   • GERD (gastroesophageal reflux disease)   • Guillain-[place] syndrome   • Situational mixed anxiety and depressive disorder   • Mass of middle lobe of right lung   • Oral candidiasis   • Empyema of pleura   • MRSA bacteremia   • Idiopathic peripheral neuropathy       Past Medical History:   Diagnosis Date   • Degenerative disc disease, cervical    • Gestational diabetes    • H/O blood clots    • Hematemesis    • Seizures    • Septic shock        Past Surgical History:   Procedure Laterality Date   • APPENDECTOMY     • BACK SURGERY     • CHOLECYSTECTOMY     • ENDOSCOPY N/A 2016    Procedure: ESOPHAGOGASTRODUODENOSCOPY with biopsy;  Surgeon: Austen Brito MD;  Location: [facility] ENDOSCOPY;  Service:    • HYSTERECTOMY     • NECK SURGERY       approx 6 yrs ago   • THORACOSCOPY Right 3/8/2023    Procedure: THORACOSCOPY WITH DAVINCI ROBOT WITH COMPLETE DECORTICATION;  Surgeon: Chloe Cedillo MD;  Location: [facility] OR;  Service: Robotics - DaVinci;  Laterality: Right;   • TONSILLECTOMY     • TONSILLECTOMY AND ADENOIDECTOMY         PT ASSESSMENT (last 12 hours)     IRF PT Evaluation and Treatment     Row Name 23 1300          PT Time and Intention    Document Type daily treatment  -JK     Mode of Treatment physical therapy  -JK     Patient/Family/Caregiver Comments/Observations pt supine in bed  -JK     Row Name 23 1300           General Information    Patient Profile Reviewed yes  -JK     Existing Precautions/Restrictions fall  -ETHAN     Row Name 04/08/23 1300          Pain Assessment    Pretreatment Pain Rating 0/10 - no pain  -JK     Posttreatment Pain Rating 0/10 - no pain  -ETHAN     Row Name 04/08/23 1300          Cognition/Psychosocial    Affect/Mental Status (Cognition) WFL  -ETHAN     Row Name 04/08/23 1300          Bed Mobility    Supine-Sit [place] (Bed Mobility) standby assist  -JK     Sit-Supine [place] (Bed Mobility) not tested  -ETHAN     Row Name 04/08/23 1300          Bed-Chair Transfer    Bed-Chair [place] (Transfers) moderate assist (50% patient effort);verbal cues  -JK     Assistive Device (Bed-Chair Transfers) wheelchair  -CLOVERK     Comment, (Bed-Chair Transfer) squat pivot  -ETHAN     Row Name 04/08/23 1300          Sit-Stand Transfer    Sit-Stand [place] (Transfers) minimum assist (75% patient effort);verbal cues  -JK     Assistive Device (Sit-Stand Transfers) wheelchair;parallel bars;walker, front-wheeled  -JK     Comment, (Sit-Stand Transfer) cues to WS forward  -ETHAN     Row Name 04/08/23 1300          Stand-Sit Transfer    Stand-Sit [place] (Transfers) minimum assist (75% patient effort);verbal cues  -JK     Assistive Device (Stand-Sit Transfers) wheelchair;parallel bars;walker, front-wheeled  -JK     Row Name 04/08/23 1300          Gait/Stairs (Locomotion)    [place] Level (Gait) contact guard;minimum assist (75% patient effort);2 person assist;1 person to manage equipment  tech following with WC closely  -JK     Assistive Device (Gait) parallel bars;walker, front-wheeled  ace wrap DF assist L LE  -JK     Distance in Feet (Gait) 8' // bars x 2; 30', 60' RW  -JK     Pattern (Gait) step-through  -JK     Deviations/Abnormal Patterns (Gait) base of support, wide;left sided deviations;gait speed decreased;stride length decreased  -JK     Bilateral Gait Deviations heel strike decreased  one episode of each  knee slightly giving way on last walk  -JK     Left Sided Gait Deviations heel strike decreased  -JK     Right Sided Gait Deviations heel strike decreased  -JK     Gait Assessment/Intervention ace wrap DF assist L LE; tactile cues L knee intitially with gait  -J     Row Name 04/08/23 1300          Positioning and Restraints    Pre-Treatment Position in bed  -JK     Post Treatment Position wheelchair  -JK     In Wheelchair sitting;exit alarm on;with family/caregiver;encouraged to call for assist;heels elevated  -           User Key  (r) = Recorded By, (t) = Taken By, (c) = Cosigned By    Initials Name Provider Type    Juana Gaona, PT Physical Therapist              Wound 03/08/23 1917 Right lateral chest Incision (Active)   Dressing Appearance dry;intact 04/08/23 0825   Closure Liquid skin adhesive 04/08/23 0825   Base clean;dry 04/08/23 0825   Periwound pink 04/08/23 0825   Drainage Amount none 04/08/23 0825   Care, Wound cleansed with;antimicrobial agent applied 04/08/23 0825   Dressing Care dressing changed 04/08/23 0825   Periwound Care dry periwound area maintained 04/08/23 0825       Wound 03/11/23 1530 Other (See comments) other (see comments) pubis Abrasion (Active)   Closure Open to air 04/08/23 0825   Base red;scab 04/08/23 0825   Drainage Amount none 04/08/23 0825   Dressing Care open to air 04/08/23 0825       Wound 03/20/23 1527 Bilateral upper gluteal MASD (Moisture associated skin damage) (Active)   Dressing Appearance open to air 04/08/23 0825   Closure Open to air 04/08/23 0825   Base blanchable;clean;dry 04/08/23 0825   Drainage Amount none 04/08/23 0825   Dressing Care open to air 04/08/23 0825   Periwound Care barrier ointment applied 04/08/23 0825     Physical Therapy Education     Title: PT OT SLP Therapies (Done)     Topic: Physical Therapy (Done)     Point: Mobility training (Done)     Learning Progress Summary           Patient Acceptance, E, VU,DU,NR by ETHAN at 4/8/2023 1528     Acceptance, E, VU by WN at 4/4/2023 2329    Acceptance, E, VU by WN at 4/4/2023 0322    Acceptance, E,D, VU,DU by DP at 4/1/2023 1352    Acceptance, E, VU,DU by MG at 3/31/2023 0825    Acceptance, E, VU,DU by MG at 3/30/2023 0956    Acceptance, E,D, VU,DU by DP at 3/29/2023 1148    Nonacceptance, E,D, VU,DU by DP at 3/28/2023 1144    Acceptance, E,D, VU,DU by DP at 3/27/2023 1601    Acceptance, E,D, VU,NR by EE at 3/25/2023 1423    Acceptance, E,D, NR,VU,DU by DP at 3/21/2023 1448    Acceptance, E, NR by JK at 3/20/2023 1513    Acceptance, E,TB, VU,NR by SAMANTHA at 3/18/2023 1643   Family Acceptance, E, VU by WN at 4/4/2023 2329    Acceptance, E, VU by WN at 4/4/2023 0322                   Point: Home exercise program (Done)     Learning Progress Summary           Patient Acceptance, E, VU by WN at 4/4/2023 2329    Acceptance, E, VU by WN at 4/4/2023 0322    Acceptance, E,D, VU,DU by DP at 4/1/2023 1352    Acceptance, E, VU,DU by MG at 3/31/2023 0825    Acceptance, E, VU,DU by MG at 3/30/2023 0956    Acceptance, E,D, VU,DU by DP at 3/29/2023 1148    Nonacceptance, E,D, VU,DU by DP at 3/28/2023 1144    Acceptance, E,D, VU,DU by DP at 3/27/2023 1601    Acceptance, E,D, NR,VU,DU by DP at 3/21/2023 1448    Acceptance, E, NR by JK at 3/20/2023 1513   Family Acceptance, E, VU by WN at 4/4/2023 2329    Acceptance, E, VU by WN at 4/4/2023 0322                   Point: Body mechanics (Done)     Learning Progress Summary           Patient Acceptance, E, VU,DU,NR by JK at 4/8/2023 1528    Acceptance, E, VU by WN at 4/4/2023 2329    Acceptance, E, VU by WN at 4/4/2023 0322    Acceptance, E,D, VU,DU by DP at 4/1/2023 1352    Acceptance, E, VU,DU by MG at 3/31/2023 0825    Acceptance, E, VU,DU by MG at 3/30/2023 0956    Acceptance, E,D, VU,DU by DP at 3/29/2023 1148    Nonacceptance, E,D, VU,DU by DP at 3/28/2023 1144    Acceptance, E,D, VU,DU by DP at 3/27/2023 1601    Acceptance, E,D, VU,NR by EE at 3/25/2023 1423    Acceptance,  E,D, NR,VU,DU by DP at 3/21/2023 1448   Family Acceptance, E, VU by WN at 4/4/2023 2329    Acceptance, E, VU by WN at 4/4/2023 0322                   Point: Precautions (Done)     Learning Progress Summary           Patient Acceptance, E, VU by WN at 4/4/2023 2329    Acceptance, E, VU by WN at 4/4/2023 0322    Acceptance, E,D, VU,DU by DP at 4/1/2023 1352    Acceptance, E, VU,DU by MG at 3/31/2023 0825    Acceptance, E, VU,DU by MG at 3/30/2023 0956    Acceptance, E,D, VU,DU by DP at 3/29/2023 1148    Nonacceptance, E,D, VU,DU by DP at 3/28/2023 1144    Acceptance, E,D, VU,DU by DP at 3/27/2023 1601    Acceptance, E,D, VU,NR by EE at 3/25/2023 1423    Acceptance, E,D, NR,VU,DU by DP at 3/21/2023 1448   Family Acceptance, E, VU by WN at 4/4/2023 2329    Acceptance, E, VU by WN at 4/4/2023 0322                               User Key     Initials Effective Dates Name Provider Type Discipline    EE 06/16/21 -  Melinda Mann, PT Physical Therapist PT    JK 06/16/21 -  Juana Veloz, PT Physical Therapist PT    KP 06/16/21 -  Yolanda Shannon, PT Physical Therapist PT    MG 05/24/22 -  Yu Villalobos, PT Physical Therapist PT    WN 08/23/22 -  Jose Carrillo, RN Registered Nurse Nurse    DP 08/24/21 -  Papa Marsh, PT Physical Therapist PT                PT Recommendation and Plan                          Time Calculation:      PT Charges     Row Name 04/08/23 1528             Time Calculation    Start Time 1300  -JK      Stop Time 1330  -JK      Time Calculation (min) 30 min  -JK      PT Received On 04/08/23  -JK      PT - Next Appointment 04/10/23  -JK            User Key  (r) = Recorded By, (t) = Taken By, (c) = Cosigned By    Initials Name Provider Type    Juana Gaona, PT Physical Therapist                Therapy Charges for Today     Code Description Service Date Service Provider Modifiers Qty    22353937442 HC PT NEUROMUSC RE EDUCATION EA 15 MIN 4/8/2023 Juana Veloz, PT GP 2    47507417238 HC PT  THER SUPP EA 15 MIN 4/8/2023 Juana Veloz, PT GP 2                   Juana BRAR. Roselia, PT  4/8/2023

## 2023-04-08 NOTE — PLAN OF CARE
Goal Outcome Evaluation:           Progress: improving  Outcome Evaluation: A&OX4. Forgetful. Difficulty with word-finding at times. Idiopathic peripheral neuropathy. Full code. Hx. chronic anemia and seizures. Daily weight. 0.5 L O2 at nite, for comfort.  Assistx2 with the wheelchair. Very weak in the legs. Continent and incontinent of B&B. Last BM 4/8. Wears a brief. Purewick at night. On scheduled gabapentin and tylenol. Has PRN Aura 2.5 mg. IV in R. hand. Seizure precautions. Siderails padded. On-going pain is better today. Diet: Reg, thins. Ate better at meals. Is drinking boost shakes. Has napped on and off in between and after therapies. Runs tachy.  Participated fully in therapy. Sat in a chair and was more alert, engaged, and awake today. Uses a bedpan. Wears glasses. Getting IVIG today.

## 2023-04-08 NOTE — PROGRESS NOTES
LOS: 22 days   Patient Care Team:  Chloe Schaefer APRN as PCP - General (Family Medicine)  Mikhail Glez MD as Consulting Physician (Neurology)      Patient Name: ELEONORA GARCIA  Patient : 1965    ADMITTING DIAGNOSIS:  Diagnoses    1. IMPAIRED FUNCTIONAL MOBILITY, BALANCE, GAIT, AND ENDURANCE           SUBJECTIVE:    Patient reports that she is tolerating therapies. Weakness in the left leg is better today.  Still with right rib cage pain. Denies CP, SOB, N/V, F/C      REVIEW OF SYSTEMS:    Denies CP, SOB, N/V, F/C      OBJECTIVE:    Vitals:    23 1639   BP: 121/44   Pulse: 91   Resp: 17   Temp:    SpO2: 94%       PHYSICAL EXAM:   General: pleasant, in no acute distress  HEENT: NCAT, sclera anicteric, conjunctiva pink, mucoa moist  Cardiovascular: RRR, +S1+S2, no obvious m/g/r  Lungs: Decreased breath sounds at the right side. Tender in lower rib cage w/ dec sensation  Abdomen: normoactive bowel sounds, soft, ND  Skin: exposed surfaces of skin warm, intact, without erythema  Neuro: awake alert and oriented to person, place, time, and situation, CN II-XII grossly intact  MSK:  MOTOR EXAM -right/left: Shoulder abduction 4+/4+, elbow flexion 5/5, wrist extension 5/5, finger flexion 5/5, hip flexion 3/2, knee extension 4/3, ankle dorsiflexion 4/4      MEDICATIONS  Scheduled Meds:  acetaminophen, 650 mg, Oral, Q24H  apixaban, 5 mg, Oral, Q12H  desvenlafaxine, 25 mg, Oral, Daily  diphenhydrAMINE, 25 mg, Oral, Q24H  folic acid, 1 mg, Oral, Daily  gabapentin, 300 mg, Oral, Q8H  immune globulin (human), 20 g, Intravenous, Daily  ipratropium-albuterol, 3 mL, Nebulization, 4x Daily - RT  lamoTRIgine, 200 mg, Oral, Daily  melatonin, 5 mg, Oral, Nightly  pantoprazole, 40 mg, Oral, Q AM  thiamine, 100 mg, Oral, Daily  vitamin B-12, 500 mcg, Oral, Daily        Continuous Infusions:       PRN Meds:  •  acetaminophen  •  bisacodyl  •  diphenhydrAMINE  •  famotidine  •  hydrocortisone sodium succinate  •   hydrocortisone-bacitracin-zinc oxide-nystatin  •  oxyCODONE  •  polyethylene glycol  •  senna-docusate sodium  •  simethicone      RESULTS:  No results found for: POCGLU  Results from last 7 days   Lab Units 04/07/23  0843 04/06/23  1520 04/03/23  0657   WBC 10*3/mm3 6.83 9.25 8.15   HEMOGLOBIN g/dL 8.6* 8.9* 8.3*   HEMATOCRIT % 26.5* 26.6* 25.0*   PLATELETS 10*3/mm3 357 380 359     Results from last 7 days   Lab Units 04/07/23  0843 04/03/23  0657   SODIUM mmol/L 132* 133*   POTASSIUM mmol/L 3.9 3.9   CHLORIDE mmol/L 95* 95*   CO2 mmol/L 30.2* 31.6*   BUN mg/dL 16 10   CREATININE mg/dL 1.32* 1.06*   CALCIUM mg/dL 10.1 9.7   GLUCOSE mg/dL 118* 91           ASSESSMENT and PLAN:    Idiopathic peripheral neuropathy    Paresthesia    History of blood clots    Chronic anticoagulation    Seizures    Anemia    Guillain-[place] syndrome    MRSA bacteremia    Subacute motor predominant idiopathic peripheral neuropathy with Paraparesis and Areflexia.   S/p IVIG x 5 days  March 30-shows improvement with her strength proximally the upper extremities.  Improvement with her hand strength.  Improved strength in the right lower extremity but continues with profound weakness in the left lower extremity.  With weakness of the left ankle, will look at probable AFO for gait activities.  Transfers are moderate assist.  Ambulated with a rolling walker up to 70 feet with gait deviations minimal moderate assist  -4/6-reconsulted neurology to see if she needs another round of IVIG.  Follow-up assessment with neurology-Previous IVIG dose was 20g daily for 5 days from 2/27-3/3.  Plan is to repeat another course of IVIG      Impaired mobility/impaired self care/ impaired cognition  Left greater than right lower extremity weakness greater than bilateral upper extremity weakness  April 4-strength improved in the bilateral upper extremities and right lower extremity.Weakness  looks about the same the left lower extremity.  On the day she ambulated  further last week she had utilized AFO on the left lower extremity  April 5-stronger with her left hip flexors and knee extensors today  April 6- strength and coordination improving.  Per OT note she is needing set up assist for upper body dressing with max assist for lower body dressing, PT is noticing that the patient appears stronger and is limited by weakness and fear.  Moderate assist between chair and bed but min assist for sit to stand.  Very minimal knee buckling when walking with left AFO.  Making good progress overall.     R lung masslike infiltrate with cavitary lesions/pleural effusion   S/p thoracentesis - Blood cx and pleural fluid  positive for MRSA      chest tube placement given empyema, and she underwent thoracoscopy w/ decortication 3/8/23   -expected postoperative changes with pleural thickening and minimal pleural effusion.  The effusion is too small for intervention and will likely to continue to resolve with time.  Recommend continue good pulmonary hygiene with incentive spirometry hourly as well as increase activity/ambulation as tolerated.  March 23-appears decreased breath sounds more noticeable today on the right compared to the left.  Check follow-up chest x-ray.  On room air during the day, 1 L at night.  March 24- CXR relatively unchanged from previous, reached out to CT surgery given planned outpatient f/u on 3/28, no further imaging planned at this time as patient afebrile with normal WBC, monitor     Right-sided Chest Pain 3/24  -EKG sinus tach  -HS Troponin 25 > 24  -consider Cardiology consult if worsening  -pain reproducible with palpation, pain likely postoperative neuralgia as indicated vs. possible costochondritis, continue gabapentin, ice/heat, monitor     Hypokalemia  -3/24 K 3.3, repleted  -3/29 K 3.7, resolved     mass on TTE with findings concerning for endocarditis - underwent MARIAMA 3/10/2023 which had no vegetation  RUE superficial thrombophlebitis concerning for septic  phlebitis.     ID - continue 4 weeks of vancomycin as recommended by infectious disease dosed by pharmacy to achieve a trough level of 15-20 or area under the curve of 400-600 from last negative blood culture or last intervention which ever is the latest - to complete April 1, 2023     Left hip arthrocentesis March 16 to rule out any hip septic arthritis- no growth to date on fluid.    lower back pain - MRI of spine to rule out discitis or osteomyelitis.  This did have known degenerative changes but  no infection.      DVT prophylaxis - SCDs / apixiban. History of LLE DVT - on Eliquis at home     Pain management   - Tylenol 1,000 mg three times a day/ Celebrex 100 mg q 112 hours/ gabapentin 300 mg q 8 hours Oxycodone 5 mg q 4 hours prn  March 18 - DC Celebrex 2/2 PHYLLIS, and anemia      Anxiety/ muscle spasms - Diazepam 2 mg q 6 hours prn - JONES reviewed     Seizure disorder - Lamotrigine 200 mg daily     ABLA   - March 18- Hgb 7.0 (7.3 on 3/16). Type, cross and transfuse 1 unit PRBCs. Pre-medicate with Tylenol and Benadryl. CBC in am.   March 19- Hgb 8.8 s/p 1 unit PRBcs. Hemoccult positive. On Eliquis for h/o DVT. Follow Hgb. May need GI work up.  March 20 - HGB 9.3 s/p transfusion  March 21-reviewed with internal medicine-hemoglobin stable after transfusion.  Iron studies consistent with inflammation.  No further work-up presently  March 22 - Hgb 8.9, stable  March 23-hemoglobin trend 8.3.  Continue to follow.  Recheck tomorrow  March 29 - Hgb 8.1, stable  April 6- hemoglobin 8.3.  Stable.     PHYLLIS - March 18- Creatinine 1.36 up from 1.12. On IV Vanc and Celebrex. DC Celebrex. BMP in am.  March 19- s/p 500ml NS bolus. Creatinine 1.31.  March 23-creatinine 0.92     TEAM CONF - MARCH 21 - BED MOD X 2. TRANSFERS MAX 2. STAND PIVOT OR SQUAT PIVOT. NON-AMBULATORY. Saturday MARCH 18 GAIT 6 FEET PARALLEL BARS MOD MAX OF 2.   BATH MOD 1-2. LBD DEP. UBD MOD. EATING MIN. GROOMING MIN. TOILETING DEP.   The patient has  difficulty remembering new information due to short-term memory impairments.  Initial evaluation 3.18, pt obtained a score 12/30 on SLUMS cognitive assessment indicating severe cognitive impairment/dementia, goals initiated for memory and sustaining attention  FEES completed 3.7 revealing glottic gap accounting for glottic insufficiency, dysphonia, hoarse/breathy quality, recommend targeting vocal function as appropriate d/t impact on intelligibility.    Regular/thin diet continued since FEES completion 3.7, although pt known to cough with and without PO intake, may be contributed partially to ineffective VF closure.   DRESSING FORMER CHEST TUBE SITE. CONTINUES ON PUREWICK AT NIGHT. O2 AT 1-2 LITERS AT NIGHT.  DECREASED APPETITE. ABD X-RAY THIS AM SHOWS NON-OBSTRUCTIVE BOWEL GAS PATTERN.   ELOS - 4 WEEKS     TEAM CONF - MARCH 28 - BED MIN CTG. TRANSFER MOD ASSIST STAND PIVOT OR SQUAT PIVOT. FATIGUES IN AFERNOONS. GAIT 15 FEET RW MIN X 2. FLUCTUATING ORIENTATION. BETTER DETAIL TO TASK. MIN MOD CUES FOR REASONING TASKS. DYSPHONIA FROM COUGHING.  SLP REVIEWED VOICE HYGIENE, NOT TO DO HARSH THROAT CLEAR. BATH MOD. LBD MAX. UBD MIN. TOILETING MOD X 2. CONTINENT/INCONTINENT BLADDER. RIGHT CHEST TUBE / THORACOSCOPY SITE CARE. VARIABLE INTAKE.   ELOS - 3 WEEKS     TEAM CONF - APRIL 4 - CONTINUES WEAK IN LLE. STRONGER IN BUE AND RIGHT KNEE EXTENSION. BED MIN MOD TO CTG. TRANSFERS MOD MAX STAND PIVOT OR SQUAT PIVOT. WORKED ON GAIT IN PARALLEL BARS MIN MOD OF 2 PERSON. CAN DO 15 FEET IF NOT SO ANXIOUS. TOILET AND SHOWER TRANSFERS MOD ASSIST. DROP ARM BEDSIDE COMMODE. AT NIGHT USING BEDPAN. USING PUREWICK AT NIGHT. BATH MIN. LBD MOD. UBD SET UP.TOILETING MOD MAX 2.    Pt with improved ability to recall salient events  from day.Now presents with mild-moderate hoarse vocal  quality. Improved vocal intensity.  ADDRSSING DYSPHONIA. DIETICIAN REVIEWED FOOD CHOICES. GABAPENTIN FOR RIGHT RIB PAIN/INTERCOSTAL PAIN.CONTINENT BOWEL AND  BLADDER WITH URGENCY.   ELOS - 2-3 WEEKS    CODE STATUS:  Level Of Support Discussed With: Patient  Code Status (Patient has no pulse and is not breathing): CPR (Attempt to Resuscitate)  Medical Interventions (Patient has pulse or is breathing): Full Support      Admission Status: Continues to meet requirements for inpatient admission.       Rigoberto Amaya MD    During rounds, used appropriate personal protective equipment including mask and gloves.  Additional gown if indicated.  Mask used was standard procedure mask. Appropriate PPE was worn during the entire visit.  Hand hygiene was completed before and after.

## 2023-04-08 NOTE — PLAN OF CARE
Goal Outcome Evaluation:      Pt is A/Ox4, calm/cooperative, not OOB overnight. Meds taken whole w puree one at a time. Pt c/o generalized pain and pain to LLE ; prn Oxycodone given x2. Pt has been continent of stool. Purewick utilized overnight for urine.

## 2023-04-09 PROCEDURE — 63710000001 DIPHENHYDRAMINE PER 50 MG: Performed by: PSYCHIATRY & NEUROLOGY

## 2023-04-09 PROCEDURE — 94799 UNLISTED PULMONARY SVC/PX: CPT

## 2023-04-09 PROCEDURE — 25010000002 IMMUNE GLOBULIN (HUMAN) 20 GM/200ML SOLUTION: Performed by: PSYCHIATRY & NEUROLOGY

## 2023-04-09 RX ADMIN — GABAPENTIN 300 MG: 300 CAPSULE ORAL at 06:28

## 2023-04-09 RX ADMIN — IMMUNE GLOBULIN (HUMAN) 20 G: 10 INJECTION INTRAVENOUS; SUBCUTANEOUS at 14:27

## 2023-04-09 RX ADMIN — OXYCODONE HYDROCHLORIDE 2.5 MG: 5 TABLET ORAL at 20:53

## 2023-04-09 RX ADMIN — Medication 1 MG: at 08:23

## 2023-04-09 RX ADMIN — OXYCODONE HYDROCHLORIDE 2.5 MG: 5 TABLET ORAL at 02:47

## 2023-04-09 RX ADMIN — GABAPENTIN 300 MG: 300 CAPSULE ORAL at 14:04

## 2023-04-09 RX ADMIN — DESVENLAFAXINE SUCCINATE 25 MG: 25 TABLET, EXTENDED RELEASE ORAL at 08:24

## 2023-04-09 RX ADMIN — PANTOPRAZOLE SODIUM 40 MG: 40 TABLET, DELAYED RELEASE ORAL at 06:28

## 2023-04-09 RX ADMIN — LAMOTRIGINE 200 MG: 100 TABLET ORAL at 08:22

## 2023-04-09 RX ADMIN — APIXABAN 5 MG: 5 TABLET, FILM COATED ORAL at 20:52

## 2023-04-09 RX ADMIN — OXYCODONE HYDROCHLORIDE 2.5 MG: 5 TABLET ORAL at 07:44

## 2023-04-09 RX ADMIN — GABAPENTIN 300 MG: 300 CAPSULE ORAL at 20:52

## 2023-04-09 RX ADMIN — Medication 500 MCG: at 08:23

## 2023-04-09 RX ADMIN — IPRATROPIUM BROMIDE AND ALBUTEROL SULFATE 3 ML: 2.5; .5 SOLUTION RESPIRATORY (INHALATION) at 15:25

## 2023-04-09 RX ADMIN — OXYCODONE HYDROCHLORIDE 2.5 MG: 5 TABLET ORAL at 11:58

## 2023-04-09 RX ADMIN — IPRATROPIUM BROMIDE AND ALBUTEROL SULFATE 3 ML: 2.5; .5 SOLUTION RESPIRATORY (INHALATION) at 07:24

## 2023-04-09 RX ADMIN — Medication 5 MG: at 20:52

## 2023-04-09 RX ADMIN — OXYCODONE HYDROCHLORIDE 2.5 MG: 5 TABLET ORAL at 16:55

## 2023-04-09 RX ADMIN — APIXABAN 5 MG: 5 TABLET, FILM COATED ORAL at 08:23

## 2023-04-09 RX ADMIN — Medication 100 MG: at 08:23

## 2023-04-09 RX ADMIN — IPRATROPIUM BROMIDE AND ALBUTEROL SULFATE 3 ML: 2.5; .5 SOLUTION RESPIRATORY (INHALATION) at 19:48

## 2023-04-09 RX ADMIN — DIPHENHYDRAMINE HYDROCHLORIDE 25 MG: 25 CAPSULE ORAL at 14:04

## 2023-04-09 RX ADMIN — IPRATROPIUM BROMIDE AND ALBUTEROL SULFATE 3 ML: 2.5; .5 SOLUTION RESPIRATORY (INHALATION) at 11:41

## 2023-04-09 RX ADMIN — ACETAMINOPHEN 650 MG: 325 TABLET ORAL at 14:04

## 2023-04-09 NOTE — PLAN OF CARE
Goal Outcome Evaluation:  Plan of Care Reviewed With: patient        Progress: improving  Outcome Evaluation: Pt assisted up to WC with one person today and sat up for x30 minutes. Tolerating IVIG. Oxycontin 2.5mg given for pain.

## 2023-04-09 NOTE — PROGRESS NOTES
Patient is receiving a booster course of IVIG without problems.  Neurology is going to sign off and will follow-up as needed reconsult for anything.  She is definitely going to need follow-up with outpatient neurology sooner rather than later  Thanks  Ryley Arce MD

## 2023-04-09 NOTE — PLAN OF CARE
Goal Outcome Evaluation:         Pt is A/Ox4, calm/cooperative, not OOB overnight. Meds taken whole w puree one at a time. Pt c/o generalized pain and pain to LLE ; prn Oxycodone given x2. Pt has been continent of stool. Purewick utilized overnight for urine. O2 at 2L via nasal cannula worn overnight.

## 2023-04-09 NOTE — PROGRESS NOTES
LOS: 23 days   Patient Care Team:  Chloe Schaefer APRN as PCP - General (Family Medicine)  Mikhail Glez MD as Consulting Physician (Neurology)      Patient Name: ELEONORA GARCIA  Patient : 1965    ADMITTING DIAGNOSIS:  Diagnoses    1. IMPAIRED FUNCTIONAL MOBILITY, BALANCE, GAIT, AND ENDURANCE           SUBJECTIVE:    Patient reports that she is tolerating therapies. Weakness in the left leg is better today.  Still with right rib cage pain. Denies CP, SOB, N/V, F/C      REVIEW OF SYSTEMS:    Denies CP, SOB, N/V, F/C      OBJECTIVE:    Vitals:    23 1525   BP:    Pulse: 85   Resp: 18   Temp:    SpO2: 95%       PHYSICAL EXAM:   General: pleasant, in no acute distress  HEENT: NCAT, sclera anicteric, conjunctiva pink, mucoa moist  Cardiovascular: RRR, +S1+S2, no obvious m/g/r  Lungs: Decreased breath sounds at the right side. Tender in lower rib cage w/ dec sensation  Abdomen: normoactive bowel sounds, soft, ND  Skin: exposed surfaces of skin warm, intact, without erythema  Neuro: awake alert and oriented to person, place, time, and situation, CN II-XII grossly intact  MSK:  MOTOR EXAM -right/left: Shoulder abduction 4+/4+, elbow flexion 5/5, wrist extension 5/5, finger flexion 5/5, hip flexion 3/2, knee extension 4/3, ankle dorsiflexion 4/4      MEDICATIONS  Scheduled Meds:  acetaminophen, 650 mg, Oral, Q24H  apixaban, 5 mg, Oral, Q12H  desvenlafaxine, 25 mg, Oral, Daily  diphenhydrAMINE, 25 mg, Oral, Q24H  folic acid, 1 mg, Oral, Daily  gabapentin, 300 mg, Oral, Q8H  immune globulin (human), 20 g, Intravenous, Daily  ipratropium-albuterol, 3 mL, Nebulization, 4x Daily - RT  lamoTRIgine, 200 mg, Oral, Daily  melatonin, 5 mg, Oral, Nightly  pantoprazole, 40 mg, Oral, Q AM  thiamine, 100 mg, Oral, Daily  vitamin B-12, 500 mcg, Oral, Daily        Continuous Infusions:       PRN Meds:  •  acetaminophen  •  bisacodyl  •  diphenhydrAMINE  •  famotidine  •  hydrocortisone sodium succinate  •   hydrocortisone-bacitracin-zinc oxide-nystatin  •  oxyCODONE  •  polyethylene glycol  •  senna-docusate sodium  •  simethicone      RESULTS:  No results found for: POCGLU  Results from last 7 days   Lab Units 04/07/23  0843 04/06/23  1520 04/03/23  0657   WBC 10*3/mm3 6.83 9.25 8.15   HEMOGLOBIN g/dL 8.6* 8.9* 8.3*   HEMATOCRIT % 26.5* 26.6* 25.0*   PLATELETS 10*3/mm3 357 380 359     Results from last 7 days   Lab Units 04/07/23  0843 04/03/23  0657   SODIUM mmol/L 132* 133*   POTASSIUM mmol/L 3.9 3.9   CHLORIDE mmol/L 95* 95*   CO2 mmol/L 30.2* 31.6*   BUN mg/dL 16 10   CREATININE mg/dL 1.32* 1.06*   CALCIUM mg/dL 10.1 9.7   GLUCOSE mg/dL 118* 91           ASSESSMENT and PLAN:    Idiopathic peripheral neuropathy    Paresthesia    History of blood clots    Chronic anticoagulation    Seizures    Anemia    Guillain-[place] syndrome    MRSA bacteremia    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   S/p IVIG x 5 days  March 30-shows improvement with her strength proximally the upper extremities.  Improvement with her hand strength.  Improved strength in the right lower extremity but continues with profound weakness in the left lower extremity.  With weakness of the left ankle, will look at probable AFO for gait activities.  Transfers are moderate assist.  Ambulated with a rolling walker up to 70 feet with gait deviations minimal moderate assist  -4/6-reconsulted neurology to see if she needs another round of IVIG.  Follow-up assessment with neurology-Previous IVIG dose was 20g daily for 5 days from 2/27-3/3.  Plan is to repeat another course of IVIG      Impaired mobility/impaired self care/ impaired cognition  Left greater than right lower extremity weakness greater than bilateral upper extremity weakness  April 4-strength improved in the bilateral upper extremities and right lower extremity.Weakness  looks about the same the left lower extremity.  On the day she ambulated  further last week she had utilized AFO on the left lower extremity  April 5-stronger with her left hip flexors and knee extensors today  April 6- strength and coordination improving.  Per OT note she is needing set up assist for upper body dressing with max assist for lower body dressing, PT is noticing that the patient appears stronger and is limited by weakness and fear.  Moderate assist between chair and bed but min assist for sit to stand.  Very minimal knee buckling when walking with left AFO.  Making good progress overall.     R lung masslike infiltrate with cavitary lesions/pleural effusion   S/p thoracentesis - Blood cx and pleural fluid  positive for MRSA      chest tube placement given empyema, and she underwent thoracoscopy w/ decortication 3/8/23   -expected postoperative changes with pleural thickening and minimal pleural effusion.  The effusion is too small for intervention and will likely to continue to resolve with time.  Recommend continue good pulmonary hygiene with incentive spirometry hourly as well as increase activity/ambulation as tolerated.  March 23-appears decreased breath sounds more noticeable today on the right compared to the left.  Check follow-up chest x-ray.  On room air during the day, 1 L at night.  March 24- CXR relatively unchanged from previous, reached out to CT surgery given planned outpatient f/u on 3/28, no further imaging planned at this time as patient afebrile with normal WBC, monitor     Right-sided Chest Pain 3/24  -EKG sinus tach  -HS Troponin 25 > 24  -consider Cardiology consult if worsening  -pain reproducible with palpation, pain likely postoperative neuralgia as indicated vs. possible costochondritis, continue gabapentin, ice/heat, monitor     Hypokalemia  -3/24 K 3.3, repleted  -3/29 K 3.7, resolved     mass on TTE with findings concerning for endocarditis - underwent MARIAMA 3/10/2023 which had no vegetation  RUE superficial thrombophlebitis concerning for septic  phlebitis.     ID - continue 4 weeks of vancomycin as recommended by infectious disease dosed by pharmacy to achieve a trough level of 15-20 or area under the curve of 400-600 from last negative blood culture or last intervention which ever is the latest - to complete April 1, 2023     Left hip arthrocentesis March 16 to rule out any hip septic arthritis- no growth to date on fluid.    lower back pain - MRI of spine to rule out discitis or osteomyelitis.  This did have known degenerative changes but  no infection.      DVT prophylaxis - SCDs / apixiban. History of LLE DVT - on Eliquis at home     Pain management   - Tylenol 1,000 mg three times a day/ Celebrex 100 mg q 112 hours/ gabapentin 300 mg q 8 hours Oxycodone 5 mg q 4 hours prn  March 18 - DC Celebrex 2/2 PHYLLIS, and anemia      Anxiety/ muscle spasms - Diazepam 2 mg q 6 hours prn - JONES reviewed     Seizure disorder - Lamotrigine 200 mg daily     ABLA   - March 18- Hgb 7.0 (7.3 on 3/16). Type, cross and transfuse 1 unit PRBCs. Pre-medicate with Tylenol and Benadryl. CBC in am.   March 19- Hgb 8.8 s/p 1 unit PRBcs. Hemoccult positive. On Eliquis for h/o DVT. Follow Hgb. May need GI work up.  March 20 - HGB 9.3 s/p transfusion  March 21-reviewed with internal medicine-hemoglobin stable after transfusion.  Iron studies consistent with inflammation.  No further work-up presently  March 22 - Hgb 8.9, stable  March 23-hemoglobin trend 8.3.  Continue to follow.  Recheck tomorrow  March 29 - Hgb 8.1, stable  April 6- hemoglobin 8.3.  Stable.     PHYLLIS - March 18- Creatinine 1.36 up from 1.12. On IV Vanc and Celebrex. DC Celebrex. BMP in am.  March 19- s/p 500ml NS bolus. Creatinine 1.31.  March 23-creatinine 0.92     TEAM CONF - MARCH 21 - BED MOD X 2. TRANSFERS MAX 2. STAND PIVOT OR SQUAT PIVOT. NON-AMBULATORY. Saturday MARCH 18 GAIT 6 FEET PARALLEL BARS MOD MAX OF 2.   BATH MOD 1-2. LBD DEP. UBD MOD. EATING MIN. GROOMING MIN. TOILETING DEP.   The patient has  difficulty remembering new information due to short-term memory impairments.  Initial evaluation 3.18, pt obtained a score 12/30 on SLUMS cognitive assessment indicating severe cognitive impairment/dementia, goals initiated for memory and sustaining attention  FEES completed 3.7 revealing glottic gap accounting for glottic insufficiency, dysphonia, hoarse/breathy quality, recommend targeting vocal function as appropriate d/t impact on intelligibility.    Regular/thin diet continued since FEES completion 3.7, although pt known to cough with and without PO intake, may be contributed partially to ineffective VF closure.   DRESSING FORMER CHEST TUBE SITE. CONTINUES ON PUREWICK AT NIGHT. O2 AT 1-2 LITERS AT NIGHT.  DECREASED APPETITE. ABD X-RAY THIS AM SHOWS NON-OBSTRUCTIVE BOWEL GAS PATTERN.   ELOS - 4 WEEKS     TEAM CONF - MARCH 28 - BED MIN CTG. TRANSFER MOD ASSIST STAND PIVOT OR SQUAT PIVOT. FATIGUES IN AFERNOONS. GAIT 15 FEET RW MIN X 2. FLUCTUATING ORIENTATION. BETTER DETAIL TO TASK. MIN MOD CUES FOR REASONING TASKS. DYSPHONIA FROM COUGHING.  SLP REVIEWED VOICE HYGIENE, NOT TO DO HARSH THROAT CLEAR. BATH MOD. LBD MAX. UBD MIN. TOILETING MOD X 2. CONTINENT/INCONTINENT BLADDER. RIGHT CHEST TUBE / THORACOSCOPY SITE CARE. VARIABLE INTAKE.   ELOS - 3 WEEKS     TEAM CONF - APRIL 4 - CONTINUES WEAK IN LLE. STRONGER IN BUE AND RIGHT KNEE EXTENSION. BED MIN MOD TO CTG. TRANSFERS MOD MAX STAND PIVOT OR SQUAT PIVOT. WORKED ON GAIT IN PARALLEL BARS MIN MOD OF 2 PERSON. CAN DO 15 FEET IF NOT SO ANXIOUS. TOILET AND SHOWER TRANSFERS MOD ASSIST. DROP ARM BEDSIDE COMMODE. AT NIGHT USING BEDPAN. USING PUREWICK AT NIGHT. BATH MIN. LBD MOD. UBD SET UP.TOILETING MOD MAX 2.    Pt with improved ability to recall salient events  from day.Now presents with mild-moderate hoarse vocal  quality. Improved vocal intensity.  ADDRSSING DYSPHONIA. DIETICIAN REVIEWED FOOD CHOICES. GABAPENTIN FOR RIGHT RIB PAIN/INTERCOSTAL PAIN.CONTINENT BOWEL AND  BLADDER WITH URGENCY.   ELOS - 2-3 WEEKS    CODE STATUS:  Level Of Support Discussed With: Patient  Code Status (Patient has no pulse and is not breathing): CPR (Attempt to Resuscitate)  Medical Interventions (Patient has pulse or is breathing): Full Support      Admission Status: Continues to meet requirements for inpatient admission.       Rigoberto Amaya MD    During rounds, used appropriate personal protective equipment including mask and gloves.  Additional gown if indicated.  Mask used was standard procedure mask. Appropriate PPE was worn during the entire visit.  Hand hygiene was completed before and after.

## 2023-04-10 ENCOUNTER — APPOINTMENT (OUTPATIENT)
Dept: GENERAL RADIOLOGY | Facility: HOSPITAL | Age: 58
DRG: 74 | End: 2023-04-10
Payer: COMMERCIAL

## 2023-04-10 VITALS
BODY MASS INDEX: 24.54 KG/M2 | SYSTOLIC BLOOD PRESSURE: 107 MMHG | OXYGEN SATURATION: 94 % | DIASTOLIC BLOOD PRESSURE: 67 MMHG | HEART RATE: 80 BPM | WEIGHT: 133.38 LBS | TEMPERATURE: 98.1 F | RESPIRATION RATE: 20 BRPM | HEIGHT: 62 IN

## 2023-04-10 LAB
ANION GAP SERPL CALCULATED.3IONS-SCNC: 6.8 MMOL/L (ref 5–15)
BASOPHILS # BLD AUTO: 0.07 10*3/MM3 (ref 0–0.2)
BASOPHILS NFR BLD AUTO: 0.8 % (ref 0–1.5)
BUN SERPL-MCNC: 17 MG/DL (ref 6–20)
BUN/CREAT SERPL: 15.2 (ref 7–25)
CALCIUM SPEC-SCNC: 10 MG/DL (ref 8.6–10.5)
CHLORIDE SERPL-SCNC: 94 MMOL/L (ref 98–107)
CO2 SERPL-SCNC: 31.2 MMOL/L (ref 22–29)
CREAT SERPL-MCNC: 1.12 MG/DL (ref 0.57–1)
DEPRECATED RDW RBC AUTO: 48.1 FL (ref 37–54)
EGFRCR SERPLBLD CKD-EPI 2021: 56.8 ML/MIN/1.73
EOSINOPHIL # BLD AUTO: 0.18 10*3/MM3 (ref 0–0.4)
EOSINOPHIL NFR BLD AUTO: 2.1 % (ref 0.3–6.2)
ERYTHROCYTE [DISTWIDTH] IN BLOOD BY AUTOMATED COUNT: 14.2 % (ref 12.3–15.4)
GLUCOSE SERPL-MCNC: 97 MG/DL (ref 65–99)
HCT VFR BLD AUTO: 23.7 % (ref 34–46.6)
HGB BLD-MCNC: 7.7 G/DL (ref 12–15.9)
IMM GRANULOCYTES # BLD AUTO: 0.06 10*3/MM3 (ref 0–0.05)
IMM GRANULOCYTES NFR BLD AUTO: 0.7 % (ref 0–0.5)
LYMPHOCYTES # BLD AUTO: 3.45 10*3/MM3 (ref 0.7–3.1)
LYMPHOCYTES NFR BLD AUTO: 39.5 % (ref 19.6–45.3)
MCH RBC QN AUTO: 30.4 PG (ref 26.6–33)
MCHC RBC AUTO-ENTMCNC: 32.5 G/DL (ref 31.5–35.7)
MCV RBC AUTO: 93.7 FL (ref 79–97)
MONOCYTES # BLD AUTO: 0.71 10*3/MM3 (ref 0.1–0.9)
MONOCYTES NFR BLD AUTO: 8.1 % (ref 5–12)
NEUTROPHILS NFR BLD AUTO: 4.27 10*3/MM3 (ref 1.7–7)
NEUTROPHILS NFR BLD AUTO: 48.8 % (ref 42.7–76)
NRBC BLD AUTO-RTO: 0 /100 WBC (ref 0–0.2)
PLATELET # BLD AUTO: 273 10*3/MM3 (ref 140–450)
PMV BLD AUTO: 10.8 FL (ref 6–12)
POTASSIUM SERPL-SCNC: 4.1 MMOL/L (ref 3.5–5.2)
RBC # BLD AUTO: 2.53 10*6/MM3 (ref 3.77–5.28)
SODIUM SERPL-SCNC: 132 MMOL/L (ref 136–145)
WBC NRBC COR # BLD: 8.74 10*3/MM3 (ref 3.4–10.8)

## 2023-04-10 PROCEDURE — 97116 GAIT TRAINING THERAPY: CPT

## 2023-04-10 PROCEDURE — 94761 N-INVAS EAR/PLS OXIMETRY MLT: CPT

## 2023-04-10 PROCEDURE — 97530 THERAPEUTIC ACTIVITIES: CPT

## 2023-04-10 PROCEDURE — 94799 UNLISTED PULMONARY SVC/PX: CPT

## 2023-04-10 PROCEDURE — 94664 DEMO&/EVAL PT USE INHALER: CPT

## 2023-04-10 PROCEDURE — 97112 NEUROMUSCULAR REEDUCATION: CPT

## 2023-04-10 PROCEDURE — 71046 X-RAY EXAM CHEST 2 VIEWS: CPT

## 2023-04-10 PROCEDURE — 80048 BASIC METABOLIC PNL TOTAL CA: CPT | Performed by: PHYSICAL MEDICINE & REHABILITATION

## 2023-04-10 PROCEDURE — 85025 COMPLETE CBC W/AUTO DIFF WBC: CPT | Performed by: PHYSICAL MEDICINE & REHABILITATION

## 2023-04-10 PROCEDURE — 97129 THER IVNTJ 1ST 15 MIN: CPT

## 2023-04-10 PROCEDURE — 63710000001 DIPHENHYDRAMINE PER 50 MG: Performed by: PSYCHIATRY & NEUROLOGY

## 2023-04-10 PROCEDURE — 97535 SELF CARE MNGMENT TRAINING: CPT

## 2023-04-10 PROCEDURE — 97130 THER IVNTJ EA ADDL 15 MIN: CPT

## 2023-04-10 PROCEDURE — 97110 THERAPEUTIC EXERCISES: CPT

## 2023-04-10 PROCEDURE — 25010000002 IMMUNE GLOBULIN (HUMAN) 20 GM/200ML SOLUTION: Performed by: PSYCHIATRY & NEUROLOGY

## 2023-04-10 RX ORDER — OXYCODONE HYDROCHLORIDE 5 MG/1
2.5 TABLET ORAL EVERY 4 HOURS PRN
Status: DISCONTINUED | OUTPATIENT
Start: 2023-04-10 | End: 2023-04-21 | Stop reason: HOSPADM

## 2023-04-10 RX ADMIN — IPRATROPIUM BROMIDE AND ALBUTEROL SULFATE 3 ML: 2.5; .5 SOLUTION RESPIRATORY (INHALATION) at 07:00

## 2023-04-10 RX ADMIN — Medication 100 MG: at 09:22

## 2023-04-10 RX ADMIN — OXYCODONE HYDROCHLORIDE 2.5 MG: 5 TABLET ORAL at 15:40

## 2023-04-10 RX ADMIN — ACETAMINOPHEN 650 MG: 325 TABLET ORAL at 14:43

## 2023-04-10 RX ADMIN — IMMUNE GLOBULIN (HUMAN) 20 G: 10 INJECTION INTRAVENOUS; SUBCUTANEOUS at 15:16

## 2023-04-10 RX ADMIN — OXYCODONE HYDROCHLORIDE 2.5 MG: 5 TABLET ORAL at 21:14

## 2023-04-10 RX ADMIN — APIXABAN 5 MG: 5 TABLET, FILM COATED ORAL at 09:22

## 2023-04-10 RX ADMIN — DIPHENHYDRAMINE HYDROCHLORIDE 25 MG: 25 CAPSULE ORAL at 14:43

## 2023-04-10 RX ADMIN — Medication 500 MCG: at 09:21

## 2023-04-10 RX ADMIN — ACETAMINOPHEN 650 MG: 325 TABLET, FILM COATED ORAL at 09:35

## 2023-04-10 RX ADMIN — OXYCODONE HYDROCHLORIDE 2.5 MG: 5 TABLET ORAL at 04:59

## 2023-04-10 RX ADMIN — GABAPENTIN 300 MG: 300 CAPSULE ORAL at 13:55

## 2023-04-10 RX ADMIN — GABAPENTIN 300 MG: 300 CAPSULE ORAL at 21:14

## 2023-04-10 RX ADMIN — APIXABAN 5 MG: 5 TABLET, FILM COATED ORAL at 21:14

## 2023-04-10 RX ADMIN — IPRATROPIUM BROMIDE AND ALBUTEROL SULFATE 3 ML: 2.5; .5 SOLUTION RESPIRATORY (INHALATION) at 20:17

## 2023-04-10 RX ADMIN — Medication 5 MG: at 21:14

## 2023-04-10 RX ADMIN — OXYCODONE HYDROCHLORIDE 2.5 MG: 5 TABLET ORAL at 09:18

## 2023-04-10 RX ADMIN — LAMOTRIGINE 200 MG: 100 TABLET ORAL at 09:21

## 2023-04-10 RX ADMIN — GABAPENTIN 300 MG: 300 CAPSULE ORAL at 04:58

## 2023-04-10 RX ADMIN — OXYCODONE HYDROCHLORIDE 2.5 MG: 5 TABLET ORAL at 00:38

## 2023-04-10 RX ADMIN — Medication 1 MG: at 09:21

## 2023-04-10 RX ADMIN — PANTOPRAZOLE SODIUM 40 MG: 40 TABLET, DELAYED RELEASE ORAL at 04:59

## 2023-04-10 RX ADMIN — DESVENLAFAXINE SUCCINATE 25 MG: 25 TABLET, EXTENDED RELEASE ORAL at 09:22

## 2023-04-10 NOTE — PROGRESS NOTES
LOS: 24 days   Patient Care Team:  Chloe Schaefer APRN as PCP - General (Family Medicine)  Newton, Mikhail Velazquez MD as Consulting Physician (Neurology)      Patient Name: ELEONORA GARCIA  Patient : 1965    ADMITTING DIAGNOSIS:  Diagnoses    1. IMPAIRED FUNCTIONAL MOBILITY, BALANCE, GAIT, AND ENDURANCE           SUBJECTIVE:  Patient seen and examined at bedside.  She is accompanied by her .  She fevered to 100.8 this morning.  Last dose of IVIG this afternoon.  She otherwise complains of chills.  She denies chest pain, shortness of breath, congestion, sore throat, abdominal pain, urinary urgency and frequency. She has had a cough throughout her stay but it has not gotten worse.     REVIEW OF SYSTEMS:    General: (+) fever and chills, denies weight change, fatigue, weakness,night sweats.   Skin: denies itching, rashes, sores and bruises.   Neurologic: denies headache, nausea, vomiting, or visual changes.   HEENT:  denies discharge, sore throat, allergies, and congestion.   Respiratory: denies shortness of breath, wheeze, cough and hemoptysis.   Cardiac:  denies chest pain or palpitations.   Gastrointestinal:  denies changes in appetite, nausea, vomiting, dysphagia, abdominal pain, constipation, or diarrhea.   Urinary:  denies urgency and frequency.  Musculoskeletal:  denies muscle weakness, pain, or joint stiffness.    OBJECTIVE:    Vitals:    04/10/23 0919   BP:    Pulse: 97   Resp:    Temp: (!) 100.8 °F (38.2 °C)   SpO2: 92%       PHYSICAL EXAM:   General: pleasant, in no acute distress  HEENT: NCAT, sclera anicteric, conjunctiva pink, mucoa moist  Cardiovascular: RRR, +S1+S2, no obvious m/g/r  Lungs: Decreased breath sounds at the right side. Tender in lower rib cage w/ dec sensation  Abdomen: normoactive bowel sounds, soft, ND  Skin: exposed surfaces of skin warm, intact, without erythema  Neuro: awake alert and oriented to person, place, time, and situation, CN II-XII grossly intact  MSK:  MOTOR  EXAM - right/left: Shoulder abduction 4+/4+, elbow flexion 5/5, wrist extension 5/5, finger flexion 5/5, hip flexion 3/3, knee extension 4/3, ankle dorsiflexion 4/4      MEDICATIONS  Scheduled Meds:  acetaminophen, 650 mg, Oral, Q24H  apixaban, 5 mg, Oral, Q12H  desvenlafaxine, 25 mg, Oral, Daily  diphenhydrAMINE, 25 mg, Oral, Q24H  folic acid, 1 mg, Oral, Daily  gabapentin, 300 mg, Oral, Q8H  immune globulin (human), 20 g, Intravenous, Daily  ipratropium-albuterol, 3 mL, Nebulization, 4x Daily - RT  lamoTRIgine, 200 mg, Oral, Daily  melatonin, 5 mg, Oral, Nightly  pantoprazole, 40 mg, Oral, Q AM  thiamine, 100 mg, Oral, Daily  vitamin B-12, 500 mcg, Oral, Daily        Continuous Infusions:       PRN Meds:  •  acetaminophen  •  bisacodyl  •  diphenhydrAMINE  •  famotidine  •  hydrocortisone sodium succinate  •  hydrocortisone-bacitracin-zinc oxide-nystatin  •  oxyCODONE  •  polyethylene glycol  •  senna-docusate sodium  •  simethicone      RESULTS:  No results found for: POCGLU  Results from last 7 days   Lab Units 04/10/23  0603 04/07/23  0843 04/06/23  1520   WBC 10*3/mm3 8.74 6.83 9.25   HEMOGLOBIN g/dL 7.7* 8.6* 8.9*   HEMATOCRIT % 23.7* 26.5* 26.6*   PLATELETS 10*3/mm3 273 357 380     Results from last 7 days   Lab Units 04/10/23  0603 04/07/23  0843   SODIUM mmol/L 132* 132*   POTASSIUM mmol/L 4.1 3.9   CHLORIDE mmol/L 94* 95*   CO2 mmol/L 31.2* 30.2*   BUN mg/dL 17 16   CREATININE mg/dL 1.12* 1.32*   CALCIUM mg/dL 10.0 10.1   GLUCOSE mg/dL 97 118*           ASSESSMENT and PLAN:    Idiopathic peripheral neuropathy    Paresthesia    History of blood clots    Chronic anticoagulation    Seizures    Anemia    Guillain-Galena syndrome    MRSA bacteremia    Subacute motor predominant idiopathic peripheral neuropathy with Paraparesis and Areflexia.   S/p IVIG x 5 days  March 30-shows improvement with her strength proximally the upper extremities.  Improvement with her hand strength.  Improved strength in the right  lower extremity but continues with profound weakness in the left lower extremity.  With weakness of the left ankle, will look at probable AFO for gait activities.  Transfers are moderate assist.  Ambulated with a rolling walker up to 70 feet with gait deviations minimal moderate assist  -4/6-reconsulted neurology to see if she needs another round of IVIG.  Follow-up assessment with neurology-Previous IVIG dose was 20g daily for 5 days from 2/27-3/3.  Plan is to repeat another course of IVIG  - 4/10- fever this morning likely a side effect of IVIG.  She is scheduled to receive her final dose of this course today.  Will obtain CXR to rule out pneumonia.       Impaired mobility/impaired self care/ impaired cognition  Left greater than right lower extremity weakness greater than bilateral upper extremity weakness  April 4-strength improved in the bilateral upper extremities and right lower extremity.Weakness  looks about the same the left lower extremity.  On the day she ambulated further last week she had utilized AFO on the left lower extremity  April 5-stronger with her left hip flexors and knee extensors today  April 6- strength and coordination improving.  Per OT note she is needing set up assist for upper body dressing with max assist for lower body dressing, PT is noticing that the patient appears stronger and is limited by weakness and fear.  Moderate assist between chair and bed but min assist for sit to stand.  Very minimal knee buckling when walking with left AFO.  Making good progress overall.     R lung masslike infiltrate with cavitary lesions/pleural effusion   S/p thoracentesis - Blood cx and pleural fluid  positive for MRSA      Chest tube placement given empyema, and she underwent thoracoscopy w/ decortication 3/8/23  -expected postoperative changes with pleural thickening and minimal pleural effusion.  The effusion is too small for intervention and will likely to continue to resolve with time.   Recommend continue good pulmonary hygiene with incentive spirometry hourly as well as increase activity/ambulation as tolerated.  March 23-appears decreased breath sounds more noticeable today on the right compared to the left.  Check follow-up chest x-ray.  On room air during the day, 1 L at night.  March 24- CXR relatively unchanged from previous, reached out to CT surgery given planned outpatient f/u on 3/28, no further imaging planned at this time as patient afebrile with normal WBC, monitor     Right-sided Chest Pain 3/24  -EKG sinus tach  -HS Troponin 25 > 24  -consider Cardiology consult if worsening  -pain reproducible with palpation, pain likely postoperative neuralgia as indicated vs. possible costochondritis, continue gabapentin, ice/heat, monitor  -4/10- patient continues to complain of right-sided rib pain.  We will touch base with thoracic surgery about nerve block for postoperative neuralgia.     Hypokalemia  -3/24 K 3.3, repleted  -3/29 K 3.7, resolved     Mass on TTE with findings concerning for endocarditis - underwent MARIAMA 3/10/2023 which had no vegetation  RUE superficial thrombophlebitis concerning for septic phlebitis.     ID - continue 4 weeks of vancomycin as recommended by infectious disease dosed by pharmacy to achieve a trough level of 15-20 or area under the curve of 400-600 from last negative blood culture or last intervention which ever is the latest - to complete April 1, 2023     Left hip arthrocentesis March 16 to rule out any hip septic arthritis - no growth to date on fluid.    Lower back pain - MRI of spine to rule out discitis or osteomyelitis.  This did have known degenerative changes but  no infection.      DVT prophylaxis - SCDs / apixiban. History of LLE DVT - on Eliquis at home     Pain management   - Tylenol 1,000 mg three times a day/ Celebrex 100 mg q 112 hours/ gabapentin 300 mg q 8 hours Oxycodone 5 mg q 4 hours prn  March 18 - DC Celebrex 2/2 PHYLLIS, and  anemia      Anxiety/ muscle spasms - Diazepam 2 mg q 6 hours prn - JONES reviewed     Seizure disorder - Lamotrigine 200 mg daily     ABLA   - March 18- Hgb 7.0 (7.3 on 3/16). Type, cross and transfuse 1 unit PRBCs. Pre-medicate with Tylenol and Benadryl. CBC in am.   March 19- Hgb 8.8 s/p 1 unit PRBcs. Hemoccult positive. On Eliquis for h/o DVT. Follow Hgb. May need GI work up.  March 20 - HGB 9.3 s/p transfusion  March 21-reviewed with internal medicine-hemoglobin stable after transfusion.  Iron studies consistent with inflammation.  No further work-up presently  March 22 - Hgb 8.9, stable  March 23-hemoglobin trend 8.3.  Continue to follow.  Recheck tomorrow  March 29 - Hgb 8.1, stable  April 6- hemoglobin 8.3.  Stable.  April 10- hemoglobin slightly down trended to 7.7.  Monitor.     PHYLLIS   March 18- Creatinine 1.36 up from 1.12. On IV Vanc and Celebrex. DC Celebrex. BMP in am.  March 19- s/p 500ml NS bolus. Creatinine 1.31.  March 23-creatinine 0.92  April 10- creatinine downtrending to 1.12 with increased fluids.     TEAM CONF - MARCH 21 - BED MOD X 2. TRANSFERS MAX 2. STAND PIVOT OR SQUAT PIVOT. NON-AMBULATORY. Saturday MARCH 18 GAIT 6 FEET PARALLEL BARS MOD MAX OF 2.   BATH MOD 1-2. LBD DEP. UBD MOD. EATING MIN. GROOMING MIN. TOILETING DEP.   The patient has difficulty remembering new information due to short-term memory impairments.  Initial evaluation 3.18, pt obtained a score 12/30 on SLUMS cognitive assessment indicating severe cognitive impairment/dementia, goals initiated for memory and sustaining attention  FEES completed 3.7 revealing glottic gap accounting for glottic insufficiency, dysphonia, hoarse/breathy quality, recommend targeting vocal function as appropriate d/t impact on intelligibility.    Regular/thin diet continued since FEES completion 3.7, although pt known to cough with and without PO intake, may be contributed partially to ineffective VF closure.   DRESSING FORMER CHEST TUBE SITE.  CONTINUES ON PUREWICK AT NIGHT. O2 AT 1-2 LITERS AT NIGHT.  DECREASED APPETITE. ABD X-RAY THIS AM SHOWS NON-OBSTRUCTIVE BOWEL GAS PATTERN.   ELOS - 4 WEEKS     TEAM CONF - MARCH 28 - BED MIN CTG. TRANSFER MOD ASSIST STAND PIVOT OR SQUAT PIVOT. FATIGUES IN AFERNOONS. GAIT 15 FEET RW MIN X 2. FLUCTUATING ORIENTATION. BETTER DETAIL TO TASK. MIN MOD CUES FOR REASONING TASKS. DYSPHONIA FROM COUGHING.  SLP REVIEWED VOICE HYGIENE, NOT TO DO HARSH THROAT CLEAR. BATH MOD. LBD MAX. UBD MIN. TOILETING MOD X 2. CONTINENT/INCONTINENT BLADDER. RIGHT CHEST TUBE / THORACOSCOPY SITE CARE. VARIABLE INTAKE.   ELOS - 3 WEEKS     TEAM CONF - APRIL 4 - CONTINUES WEAK IN LLE. STRONGER IN BUE AND RIGHT KNEE EXTENSION. BED MIN MOD TO CTG. TRANSFERS MOD MAX STAND PIVOT OR SQUAT PIVOT. WORKED ON GAIT IN PARALLEL BARS MIN MOD OF 2 PERSON. CAN DO 15 FEET IF NOT SO ANXIOUS. TOILET AND SHOWER TRANSFERS MOD ASSIST. DROP ARM BEDSIDE COMMODE. AT NIGHT USING BEDPAN. USING PUREWICK AT NIGHT. BATH MIN. LBD MOD. UBD SET UP.TOILETING MOD MAX 2.    Pt with improved ability to recall salient events  from day.Now presents with mild-moderate hoarse vocal  quality. Improved vocal intensity.  ADDRSSING DYSPHONIA. DIETICIAN REVIEWED FOOD CHOICES. GABAPENTIN FOR RIGHT RIB PAIN/INTERCOSTAL PAIN.CONTINENT BOWEL AND BLADDER WITH URGENCY.   ELOS - 2-3 WEEKS    CODE STATUS:  Level Of Support Discussed With: Patient  Code Status (Patient has no pulse and is not breathing): CPR (Attempt to Resuscitate)  Medical Interventions (Patient has pulse or is breathing): Full Support      Admission Status: Continues to meet requirements for inpatient admission.     Patient seen and evaluated with attending physician, Dr. Rider. Please see attestation.     Olive Torres, DO  Physical Medicine and Rehabilitation   PGY-2    During rounds, used appropriate personal protective equipment including mask and gloves.  Additional gown if indicated.  Mask used was standard procedure  mask. Appropriate PPE was worn during the entire visit.  Hand hygiene was completed before and after.

## 2023-04-10 NOTE — THERAPY TREATMENT NOTE
Inpatient Rehabilitation - Physical Therapy Treatment Note       [facility]     Patient Name: Louise GARCIA  : 1965  MRN: [id number]    Today's Date: 4/10/2023                    Admit Date: 3/17/2023      Visit Dx:     ICD-10-CM ICD-9-CM   1. Impaired functional mobility, balance, gait, and endurance  Z74.09 V49.89       Patient Active Problem List   Diagnosis   • Sepsis   • Neck pain, chronic   • Upper back pain   • Paresthesia   • Cervical myelopathy   • Lower extremity weakness   • History of blood clots   • Chronic anticoagulation   • Seizures   • Anemia   • GERD (gastroesophageal reflux disease)   • Guillain-Hardaway syndrome   • Situational mixed anxiety and depressive disorder   • Mass of middle lobe of right lung   • Oral candidiasis   • Empyema of pleura   • MRSA bacteremia   • Idiopathic peripheral neuropathy       Past Medical History:   Diagnosis Date   • Degenerative disc disease, cervical    • Gestational diabetes    • H/O blood clots    • Hematemesis    • Seizures    • Septic shock        Past Surgical History:   Procedure Laterality Date   • APPENDECTOMY     • BACK SURGERY     • CHOLECYSTECTOMY     • ENDOSCOPY N/A 2016    Procedure: ESOPHAGOGASTRODUODENOSCOPY with biopsy;  Surgeon: Austen Brito MD;  Location: [facility] ENDOSCOPY;  Service:    • HYSTERECTOMY     • NECK SURGERY       approx 6 yrs ago   • THORACOSCOPY Right 3/8/2023    Procedure: THORACOSCOPY WITH DAVINCI ROBOT WITH COMPLETE DECORTICATION;  Surgeon: Chloe Cedillo MD;  Location: [facility] OR;  Service: Robotics - DaVinci;  Laterality: Right;   • TONSILLECTOMY     • TONSILLECTOMY AND ADENOIDECTOMY         PT ASSESSMENT (last 12 hours)     IRF PT Evaluation and Treatment     Row Name 04/10/23 1000          PT Time and Intention    Document Type daily treatment  -AE     Mode of Treatment individual therapy;physical therapy  -AE     Patient/Family/Caregiver Comments/Observations pt feeling more fatigued today,  relates to pain meds and fever  -AE     Row Name 04/10/23 1000          General Information    Patient Profile Reviewed yes  -AE     Existing Precautions/Restrictions fall  -AE     Limitations/Impairments safety/cognitive  -AE     Row Name 04/10/23 1000          Pain Assessment    Pretreatment Pain Rating 0/10 - no pain  -AE     Posttreatment Pain Rating 0/10 - no pain  -AE     Row Name 04/10/23 1000          Cognition/Psychosocial    Affect/Mental Status (Cognition) WFL  -AE     Orientation Status (Cognition) oriented x 3  -AE     Follows Commands (Cognition) follows one-step commands  -AE     Personal Safety Interventions fall prevention program maintained;gait belt;muscle strengthening facilitated;nonskid shoes/slippers when out of bed;supervised activity  -AE     Cognitive Function memory deficit  -AE     Memory Deficit (Cognition) episodic memory;procedural memory  -AE     Row Name 04/10/23 1000          Transfer Assessment/Treatment    Comment, (Transfers) performed caregiver training with  to perform squat pivot transfers in the room  -AE     Row Name 04/10/23 1000          Bed-Chair Transfer    Bed-Chair [place] (Transfers) moderate assist (50% patient effort);verbal cues  -AE     Assistive Device (Bed-Chair Transfers) wheelchair  -AE     Comment, (Bed-Chair Transfer) squat pivot  -AE     Row Name 04/10/23 1000          Chair-Bed Transfer    Chair-Bed [place] (Transfers) moderate assist (50% patient effort)  -AE     Assistive Device (Chair-Bed Transfers) wheelchair  -AE     Comment, (Chair-Bed Transfer) squat pivot  -AE     Row Name 04/10/23 1000          Sit-Stand Transfer    Sit-Stand [place] (Transfers) minimum assist (75% patient effort);moderate assist (50% patient effort);1 person assist  -AE     Assistive Device (Sit-Stand Transfers) wheelchair  -AE     Comment, (Sit-Stand Transfer) from elevated mat with therapist in front of her. struggles to perform anterior weight shifting  and sequencing of knee extension and glute extension. able to progress from modA to Bianca for ~10 trials varying heights.  -AE     Row Name 04/10/23 1000          Stand-Sit Transfer    Stand-Sit [place] (Transfers) minimum assist (75% patient effort);moderate assist (50% patient effort)  -AE     Assistive Device (Stand-Sit Transfers) wheelchair  -AE     Row Name 04/10/23 1000          Gait/Stairs (Locomotion)    Distance in Feet (Gait) 40ft rwx  -AE     Pattern (Gait) step-through  -AE     Deviations/Abnormal Patterns (Gait) base of support, wide;left sided deviations;gait speed decreased;stride length decreased  -AE     Bilateral Gait Deviations heel strike decreased;knee buckling bilaterally  minimal knee buckling, able to corret  -AE     Gait Assessment/Intervention close wheelchair follow  -AE     Row Name 04/10/23 1000          Stretching (Therapeutic Exercise)    Stretching (Therapeutic Exercise) 3 repetitions  quad stretch via heel slide, SADIA STRETCH  -AE     Row Name 04/10/23 1000          Modality Treatment    Treatment Location 1 (Modality) B ant/medial thigh  -AE     Modality Performed thermotherapy (hydrocollator packs)  -AE     Modality Treatment Results 5 min ea leg  -AE     Comment, Modality Treatment pain relief  -AE     Row Name 04/10/23 1000          Positioning and Restraints    Pre-Treatment Position in bed  -AE     Post Treatment Position wheelchair  -AE     In Wheelchair sitting;call light within reach;encouraged to call for assist;exit alarm on;with family/caregiver  -AE           User Key  (r) = Recorded By, (t) = Taken By, (c) = Cosigned By    Initials Name Provider Type    AE Deanna Carr, PT Physical Therapist              Wound 03/08/23 1917 Right lateral chest Incision (Active)   Dressing Appearance open to air 04/09/23 2053   Closure Open to air 04/10/23 0919   Base clean;dry 04/10/23 0919   Drainage Amount none 04/10/23 0919   Care, Wound cleansed with;sterile normal saline  04/09/23 2053   Dressing Care open to air 04/09/23 2053   Periwound Care dry periwound area maintained 04/09/23 2053       Wound 03/11/23 1530 Other (See comments) other (see comments) pubis Abrasion (Active)   Closure Open to air 04/10/23 0919   Base red;scab 04/10/23 0919   Drainage Amount none 04/10/23 0919       Wound 03/20/23 1527 Bilateral upper gluteal MASD (Moisture associated skin damage) (Active)   Dressing Appearance open to air 04/09/23 2053   Closure Open to air 04/10/23 0919   Base clean;dry;blanchable 04/10/23 0919   Drainage Amount none 04/10/23 0919   Care, Wound cleansed with;soap and water;barrier applied 04/09/23 2053   Dressing Care open to air 04/10/23 0919   Periwound Care barrier ointment applied 04/09/23 2053     Physical Therapy Education     Title: PT OT SLP Therapies (Done)     Topic: Physical Therapy (Done)     Point: Mobility training (Done)     Learning Progress Summary           Patient Acceptance, E, VU,DU,NR by ETHAN at 4/8/2023 1528    Acceptance, E, VU by WN at 4/4/2023 2329    Acceptance, E, VU by WN at 4/4/2023 0322    Acceptance, E,D, VU,DU by DP at 4/1/2023 1352    Acceptance, E, VU,DU by MG at 3/31/2023 0825    Acceptance, E, VU,DU by MG at 3/30/2023 0956    Acceptance, E,D, VU,DU by DP at 3/29/2023 1148    Nonacceptance, E,D, VU,DU by DP at 3/28/2023 1144    Acceptance, E,D, VU,DU by DP at 3/27/2023 1601    Acceptance, E,D, VU,NR by EE at 3/25/2023 1423    Acceptance, E,D, NR,VU,DU by DP at 3/21/2023 1448    Acceptance, E, NR by JK at 3/20/2023 1513    Acceptance, E,TB, VU,NR by KP at 3/18/2023 1643   Family Acceptance, E, VU by WN at 4/4/2023 2329    Acceptance, E, VU by WN at 4/4/2023 0322                   Point: Home exercise program (Done)     Learning Progress Summary           Patient Acceptance, E, VU by WN at 4/4/2023 2329    Acceptance, E, VU by WN at 4/4/2023 0322    Acceptance, E,D, VU,DU by DP at 4/1/2023 1352    Acceptance, E, VU,DU by MG at 3/31/2023 0881     Acceptance, E, VU,DU by MG at 3/30/2023 0956    Acceptance, E,D, VU,DU by DP at 3/29/2023 1148    Nonacceptance, E,D, VU,DU by DP at 3/28/2023 1144    Acceptance, E,D, VU,DU by DP at 3/27/2023 1601    Acceptance, E,D, NR,VU,DU by DP at 3/21/2023 1448    Acceptance, E, NR by JK at 3/20/2023 1513   Family Acceptance, E, VU by WN at 4/4/2023 2329    Acceptance, E, VU by WN at 4/4/2023 0322                   Point: Body mechanics (Done)     Learning Progress Summary           Patient Acceptance, E, VU,DU,NR by JK at 4/8/2023 1528    Acceptance, E, VU by WN at 4/4/2023 2329    Acceptance, E, VU by WN at 4/4/2023 0322    Acceptance, E,D, VU,DU by DP at 4/1/2023 1352    Acceptance, E, VU,DU by MG at 3/31/2023 0825    Acceptance, E, VU,DU by MG at 3/30/2023 0956    Acceptance, E,D, VU,DU by DP at 3/29/2023 1148    Nonacceptance, E,D, VU,DU by DP at 3/28/2023 1144    Acceptance, E,D, VU,DU by DP at 3/27/2023 1601    Acceptance, E,D, VU,NR by EE at 3/25/2023 1423    Acceptance, E,D, NR,VU,DU by DP at 3/21/2023 1448   Family Acceptance, E, VU by WN at 4/4/2023 2329    Acceptance, E, VU by WN at 4/4/2023 0322                   Point: Precautions (Done)     Learning Progress Summary           Patient Acceptance, E, VU by WN at 4/4/2023 2329    Acceptance, E, VU by WN at 4/4/2023 0322    Acceptance, E,D, VU,DU by DP at 4/1/2023 1352    Acceptance, E, VU,DU by MG at 3/31/2023 0825    Acceptance, E, VU,DU by MG at 3/30/2023 0956    Acceptance, E,D, VU,DU by DP at 3/29/2023 1148    Nonacceptance, E,D, VU,DU by DP at 3/28/2023 1144    Acceptance, E,D, VU,DU by DP at 3/27/2023 1601    Acceptance, E,D, VU,NR by EE at 3/25/2023 1423    Acceptance, E,D, NR,VU,DU by DP at 3/21/2023 1448   Family Acceptance, E, VU by WN at 4/4/2023 2329    Acceptance, E, VU by WN at 4/4/2023 0322                               User Key     Initials Effective Dates Name Provider Type Discipline     06/16/21 -  Melinda Mann, PT Physical Therapist PT    ETHAN  06/16/21 -  Juana Veloz, PT Physical Therapist PT    KP 06/16/21 -  Yolanda Shannon, PT Physical Therapist PT    MG 05/24/22 -  Yu Villalobos, PT Physical Therapist PT    WN 08/23/22 -  Jose Carrillo, RN Registered Nurse Nurse    DP 08/24/21 -  Papa Marsh, PT Physical Therapist PT                PT Recommendation and Plan                          Time Calculation:      PT Charges     Row Name 04/10/23 1448 04/10/23 1144          Time Calculation    Start Time 1230  -AE 0830  -AE     Stop Time 1300  -AE 0900  -AE     Time Calculation (min) 30 min  -AE 30 min  -AE     PT Received On 04/10/23  -AE 04/10/23  -AE     PT - Next Appointment 04/11/23  -AE 04/11/23  -AE        Time Calculation- PT    Total Timed Code Minutes- PT 30 minute(s)  -AE 30 minute(s)  -AE           User Key  (r) = Recorded By, (t) = Taken By, (c) = Cosigned By    Initials Name Provider Type    AE Deanna Carr, PT Physical Therapist                Therapy Charges for Today     Code Description Service Date Service Provider Modifiers Qty    21792562905 HC PT THERAPEUTIC ACT EA 15 MIN 4/10/2023 Deanna Carr, PT GP 1    50717168928 HC PT NEUROMUSC RE EDUCATION EA 15 MIN 4/10/2023 Deanna Carr, PT GP 1    45041750360 HC PT THER PROC EA 15 MIN 4/10/2023 Deanna Carr, PT GP 1    05996438769 HC GAIT TRAINING EA 15 MIN 4/10/2023 Deanna Carr, PT GP 1                   Deanna Carr PT  4/10/2023

## 2023-04-10 NOTE — THERAPY TREATMENT NOTE
Inpatient Rehabilitation - Occupational Therapy Treatment Note    [facility]     Patient Name: Louise GARCIA  : 1965  MRN: [id number]    Today's Date: 4/10/2023                 Admit Date: 3/17/2023         ICD-10-CM ICD-9-CM   1. Impaired functional mobility, balance, gait, and endurance  Z74.09 V49.89       Patient Active Problem List   Diagnosis   • Sepsis   • Neck pain, chronic   • Upper back pain   • Paresthesia   • Cervical myelopathy   • Lower extremity weakness   • History of blood clots   • Chronic anticoagulation   • Seizures   • Anemia   • GERD (gastroesophageal reflux disease)   • Guillain-[place] syndrome   • Situational mixed anxiety and depressive disorder   • Mass of middle lobe of right lung   • Oral candidiasis   • Empyema of pleura   • MRSA bacteremia   • Idiopathic peripheral neuropathy       Past Medical History:   Diagnosis Date   • Degenerative disc disease, cervical    • Gestational diabetes    • H/O blood clots    • Hematemesis    • Seizures    • Septic shock        Past Surgical History:   Procedure Laterality Date   • APPENDECTOMY     • BACK SURGERY     • CHOLECYSTECTOMY     • ENDOSCOPY N/A 2016    Procedure: ESOPHAGOGASTRODUODENOSCOPY with biopsy;  Surgeon: Austen Brito MD;  Location: [facility] ENDOSCOPY;  Service:    • HYSTERECTOMY     • NECK SURGERY       approx 6 yrs ago   • THORACOSCOPY Right 3/8/2023    Procedure: THORACOSCOPY WITH DAVINCI ROBOT WITH COMPLETE DECORTICATION;  Surgeon: Chloe Cedillo MD;  Location: [facility] OR;  Service: Robotics - DaVinci;  Laterality: Right;   • TONSILLECTOMY     • TONSILLECTOMY AND ADENOIDECTOMY               [facility] OT ASSESSMENT FLOWSHEET (last 12 hours)     IRF OT Evaluation and Treatment     Row Name 04/10/23 1524 04/10/23 1149       OT Time and Intention    Document Type daily treatment  -KB daily treatment  -KB    Mode of Treatment individual therapy;occupational therapy  -KB individual therapy;occupational therapy  -KB     Patient Effort good  -KB good  -KB    Symptoms Noted During/After Treatment fatigue  -KB fatigue  -KB    Row Name 04/10/23 1524 04/10/23 1149       General Information    Patient Profile Reviewed yes  -KB yes  -KB    Patient/Family/Caregiver Comments/Observations good participation  -KB Pt not feeling good this date, but agreeable to participate in OT  -KB    Existing Precautions/Restrictions fall  -KB fall  -KB    Limitations/Impairments -- safety/cognitive  -KB    Row Name 04/10/23 1524 04/10/23 1149       Pain Assessment    Pretreatment Pain Rating 0/10 - no pain  -KB 0/10 - no pain  -KB    Posttreatment Pain Rating 0/10 - no pain  -KB 0/10 - no pain  -KB    Row Name 04/10/23 1524 04/10/23 1149       Cognition/Psychosocial    Affect/Mental Status (Cognition) WFL  -KB WFL  -KB    Orientation Status (Cognition) oriented x 3  -KB oriented x 3  -KB    Row Name 04/10/23 1149          Bathing    [place] Level (Bathing) bathing skills;perineal area;moderate assist (50% patient effort)  -KB     Comment (Bathing) cga for bathing except bottom, [place] are due to need assist to stand  -KB     Row Name 04/10/23 1149          Upper Body Dressing    [place] Level (Upper Body Dressing) upper body dressing skills;doff;don;set up assistance  -KB     Set-up Assistance (Upper Body Dressing) obtain clothing  -KB     Comment (Upper Body Dressing) wc level  -KB     Row Name 04/10/23 1149          Lower Body Dressing    [place] Level (Lower Body Dressing) doff;don;shoes/slippers;socks;moderate assist (50% patient effort)  -KB     Comment (Lower Body Dressing) assist to pull up pants, unable to let go of grab bar to maintain balance and pull of pants  -KB     Row Name 04/10/23 1149          Grooming    [place] Level (Grooming) grooming skills;set up  -KB     Comment (Grooming) wc level  -KB     Row Name 04/10/23 1149          Toileting    [place] Level (Toileting) toileting skills;adjust/manage  clothing;moderate assist (50% patient effort)  -KB     Comment (Toileting) assist with balance, standing, mod assist  -KB     Row Name 04/10/23 1524          Bed-Chair Transfer    Bed-Chair [place] (Transfers) moderate assist (50% patient effort)  -KB     Assistive Device (Bed-Chair Transfers) wheelchair  -KB     Comment, (Bed-Chair Transfer) squat pivot  -KB     Row Name 04/10/23 1524          Chair-Bed Transfer    Chair-Bed [place] (Transfers) moderate assist (50% patient effort)  -KB     Assistive Device (Chair-Bed Transfers) wheelchair  -KB     Comment, (Chair-Bed Transfer) squat pivot  -KB     Row Name 04/10/23 1149          Toilet Transfer    Type (Toilet Transfer) squat pivot  -KB     [place] Level (Toilet Transfer) moderate assist (50% patient effort)  -KB     Row Name 04/10/23 1524          Core Strength (Therapeutic Exercise)    Core Strength (Therapeutic Exercise) abdominal bracing;sitting  -KB     Row Name 04/10/23 1524          Neuromuscular Re-education    Interventions (Neuromuscular Re-education) facilitation/inhibition;weight shifting  -KB     Positioning (Neuromuscular Re-education) sitting;unsupported  -KB     Row Name 04/10/23 1524 04/10/23 1149       Positioning and Restraints    Pre-Treatment Position sitting in chair/recliner  -KB sitting in chair/recliner  -KB    Post Treatment Position wheelchair  -KB wheelchair  -KB    In Bed -- call light within reach;encouraged to call for assist  -KB    In Chair call light within reach;encouraged to call for assist;exit alarm on;with family/caregiver  -KB --          User Key  (r) = Recorded By, (t) = Taken By, (c) = Cosigned By    Initials Name Effective Dates    Reena Guidry OT 01/03/23 -                  Occupational Therapy Education     Title: PT OT SLP Therapies (Done)     Topic: Occupational Therapy (Done)     Point: ADL training (Done)     Description:   Instruct learner(s) on proper safety adaptation and remediation  techniques during self care or transfers.   Instruct in proper use of assistive devices.              Learning Progress Summary           Patient Acceptance, E, VU by WN at 4/4/2023 2329    Acceptance, E, VU by WN at 4/4/2023 0322   Family Acceptance, E, VU by WN at 4/4/2023 2329    Acceptance, E, VU by WN at 4/4/2023 0322                   Point: Home exercise program (Done)     Description:   Instruct learner(s) on appropriate technique for monitoring, assisting and/or progressing therapeutic exercises/activities.              Learning Progress Summary           Patient Acceptance, E, VU by WN at 4/4/2023 2329    Acceptance, E, VU by WN at 4/4/2023 0322   Family Acceptance, E, VU by WN at 4/4/2023 2329    Acceptance, E, VU by WN at 4/4/2023 0322                   Point: Precautions (Done)     Description:   Instruct learner(s) on prescribed precautions during self-care and functional transfers.              Learning Progress Summary           Patient Acceptance, E, VU by WN at 4/4/2023 2329    Acceptance, E, VU by WN at 4/4/2023 0322   Family Acceptance, E, VU by WN at 4/4/2023 2329    Acceptance, E, VU by WN at 4/4/2023 0322                   Point: Body mechanics (Done)     Description:   Instruct learner(s) on proper positioning and spine alignment during self-care, functional mobility activities and/or exercises.              Learning Progress Summary           Patient Acceptance, E, VU by WN at 4/4/2023 2329    Acceptance, E, VU by WN at 4/4/2023 0322   Family Acceptance, E, VU by WN at 4/4/2023 2329    Acceptance, E, VU by WN at 4/4/2023 0322                               User Key     Initials Effective Dates Name Provider Type Discipline     08/23/22 -  Jose Carrillo, RN Registered Nurse Nurse                    OT Recommendation and Plan                         Time Calculation:      Time Calculation- OT     Row Name 04/10/23 1400 04/10/23 1030          Time Calculation- OT    OT Start Time 1400  -KB  1030  -KB     OT Stop Time 1430  -KB 1100  -KB     OT Time Calculation (min) 30 min  -KB 30 min  -KB           User Key  (r) = Recorded By, (t) = Taken By, (c) = Cosigned By    Initials Name Provider Type    Reena Guirdy OT Occupational Therapist              Therapy Charges for Today     Code Description Service Date Service Provider Modifiers Qty    37708788632 HC OT SELF CARE/MGMT/TRAIN EA 15 MIN 4/10/2023 Reena Tovar OT GO 2    [id number]  OT THERAPEUTIC ACT EA 15 MIN 4/10/2023 Reena Tovar OT GO 2                   Reena Tovar OT  4/10/2023

## 2023-04-10 NOTE — PROGRESS NOTES
Inpatient Rehabilitation Plan of Care Note    Plan of Care  Updated Problems/Interventions  Mobility    [PT] Stairs(Active)  Current Status(04/10/2023): 4in step B rails Bianca x 2  Weekly Goal(04/17/2023): PT only  Discharge Goal: 8in step x 3, single rails, Bianca    [PT] Walk(Active)  Current Status(04/10/2023): 60' using FWW Bianca VC with w/c follow  Weekly Goal(04/17/2023): PT only  Discharge Goal: 80ft CGA rwx    [PT] Bed/Chair/Wheelchair(Active)  Current Status(04/10/2023): ModA squat/stand pivot  Weekly Goal(04/17/2023): min/modA  Discharge Goal: Bianca    [PT] Bed Mobility(Active)  Current Status(04/10/2023): SBA/CGA  Weekly Goal(04/17/2023): SBA  Discharge Goal: CGA    Signed by: Deanna Carr PT

## 2023-04-10 NOTE — PROGRESS NOTES
Inpatient Rehabilitation Plan of Care Note    Plan of Care  Care Plan Reviewed - Updates as Follows    Sphincter Control    [RN] Bladder Management(Active)  Current Status(04/10/2023): Bladder urgency  Weekly Goal(04/19/2023): decrease in episodes of incontinence  Discharge Goal: Continent 100%    [RN] Bowel Management(Active)  Current Status(04/10/2023): Patient is 100% continent of bowel  Weekly Goal(04/20/2023): Patient will maintain a daily bowel pattern.  Discharge Goal: Patient will maintain 100% continence of bowel    Performed Intervention(s)  Bladder/bowel training program  Monitor intake and output  Use incontinence products/equipment      Psychosocial    [RN] Coping/Adjustment(Active)  Current Status(04/10/2023): Anxiety r/t uncertainty of disease course  Weekly Goal(04/19/2023): Allow opportunity to express concerns regarding current  status  Discharge Goal: Patient/family will verbalize decreased feelings of anxiety.    Performed Intervention(s)  Verbalizes needs and concerns  Support from family/peer groups      Body Systems    [RN] Neurological(Active)  Current Status(04/10/2023): Impaired physical mobility r/t decreased muscle  strength/control and limited ROM  Weekly Goal(04/12/2023): Patient will have increased ROM  Discharge Goal: Patient will have improved strength and function of BLE and B  hands.    Performed Intervention(s)  Perform active and passive ROM  Frequent position changes      Safety    [RN] Potential for Injury(Active)  Current Status(04/10/2023): Risk for falls  Weekly Goal(04/20/2023): Family/Caregivers regarding safety precautions and need  for close supervision  Discharge Goal: Patient/family will be aware of risk of fall and safety in the  home setting.    Performed Intervention(s)  Safety Rounds  Bed alarm/Chair alarm  Items within reach    Signed by: Val Belcher RN

## 2023-04-10 NOTE — THERAPY TREATMENT NOTE
Inpatient Rehabilitation - Physical Therapy Treatment Note       [facility]     Patient Name: Louise GARCIA  : 1965  MRN: [id number]    Today's Date: 4/10/2023                    Admit Date: 3/17/2023      Visit Dx:     ICD-10-CM ICD-9-CM   1. Impaired functional mobility, balance, gait, and endurance  Z74.09 V49.89       Patient Active Problem List   Diagnosis   • Sepsis   • Neck pain, chronic   • Upper back pain   • Paresthesia   • Cervical myelopathy   • Lower extremity weakness   • History of blood clots   • Chronic anticoagulation   • Seizures   • Anemia   • GERD (gastroesophageal reflux disease)   • Guillain-[place] syndrome   • Situational mixed anxiety and depressive disorder   • Mass of middle lobe of right lung   • Oral candidiasis   • Empyema of pleura   • MRSA bacteremia   • Idiopathic peripheral neuropathy       Past Medical History:   Diagnosis Date   • Degenerative disc disease, cervical    • Gestational diabetes    • H/O blood clots    • Hematemesis    • Seizures    • Septic shock        Past Surgical History:   Procedure Laterality Date   • APPENDECTOMY     • BACK SURGERY     • CHOLECYSTECTOMY     • ENDOSCOPY N/A 2016    Procedure: ESOPHAGOGASTRODUODENOSCOPY with biopsy;  Surgeon: Austen Brito MD;  Location: [facility] ENDOSCOPY;  Service:    • HYSTERECTOMY     • NECK SURGERY       approx 6 yrs ago   • THORACOSCOPY Right 3/8/2023    Procedure: THORACOSCOPY WITH DAVINCI ROBOT WITH COMPLETE DECORTICATION;  Surgeon: Chloe Cedillo MD;  Location: [facility] OR;  Service: Robotics - DaVinci;  Laterality: Right;   • TONSILLECTOMY     • TONSILLECTOMY AND ADENOIDECTOMY         PT ASSESSMENT (last 12 hours)     IRF PT Evaluation and Treatment     Row Name 04/10/23 1000          PT Time and Intention    Document Type daily treatment  -AE     Mode of Treatment individual therapy;physical therapy  -AE     Row Name 04/10/23 1000          General Information    Patient Profile Reviewed yes   -AE     Existing Precautions/Restrictions fall  -AE     Limitations/Impairments safety/cognitive  -AE     Row Name 04/10/23 1000          Pain Assessment    Pretreatment Pain Rating 0/10 - no pain  -AE     Posttreatment Pain Rating 0/10 - no pain  -AE     Row Name 04/10/23 1000          Cognition/Psychosocial    Affect/Mental Status (Cognition) WFL  -AE     Orientation Status (Cognition) oriented x 3  -AE     Follows Commands (Cognition) follows one-step commands  -AE     Personal Safety Interventions fall prevention program maintained;gait belt;muscle strengthening facilitated;nonskid shoes/slippers when out of bed;supervised activity  -AE     Cognitive Function memory deficit  -AE     Memory Deficit (Cognition) episodic memory;procedural memory  -AE     Row Name 04/10/23 1000          Transfer Assessment/Treatment    Comment, (Transfers) performed caregiver training with  to perform squat pivot transfers in the room  -AE     Row Name 04/10/23 1000          Bed-Chair Transfer    Bed-Chair [place] (Transfers) moderate assist (50% patient effort);verbal cues  -AE     Assistive Device (Bed-Chair Transfers) wheelchair  -AE     Comment, (Bed-Chair Transfer) squat pivot  -AE     Row Name 04/10/23 1000          Chair-Bed Transfer    Chair-Bed [place] (Transfers) moderate assist (50% patient effort)  -AE     Assistive Device (Chair-Bed Transfers) wheelchair  -AE     Comment, (Chair-Bed Transfer) squat pivot  -AE     Row Name 04/10/23 1000          Sit-Stand Transfer    Sit-Stand [place] (Transfers) minimum assist (75% patient effort);moderate assist (50% patient effort);1 person assist  -AE     Assistive Device (Sit-Stand Transfers) wheelchair  -AE     Comment, (Sit-Stand Transfer) from elevated mat with therapist in front of her. struggles to perform anterior weight shifting and sequencing of knee extension and glute extension. able to progress from modA to Bianca for ~10 trials varying heights.  -AE      Row Name 04/10/23 1000          Stand-Sit Transfer    Stand-Sit [place] (Transfers) minimum assist (75% patient effort);moderate assist (50% patient effort)  -AE     Assistive Device (Stand-Sit Transfers) wheelchair  -AE     Row Name 04/10/23 1000          Gait/Stairs (Locomotion)    Distance in Feet (Gait) 60ft rwx  -AE     Pattern (Gait) step-through  -AE     Deviations/Abnormal Patterns (Gait) base of support, wide;left sided deviations;gait speed decreased;stride length decreased  -AE     Bilateral Gait Deviations heel strike decreased;knee buckling bilaterally  minimal knee buckling, able to corret  -AE     Gait Assessment/Intervention ace wrap DF assist, tactile cues L knee initially with gait  -AE     Row Name 04/10/23 1000          Positioning and Restraints    Pre-Treatment Position in bed  -AE     Post Treatment Position wheelchair  -AE     In Wheelchair sitting;call light within reach;encouraged to call for assist;exit alarm on;with family/caregiver  -AE           User Key  (r) = Recorded By, (t) = Taken By, (c) = Cosigned By    Initials Name Provider Type    AE Deanna Carr, PT Physical Therapist              Wound 03/08/23 1917 Right lateral chest Incision (Active)   Dressing Appearance open to air 04/09/23 2053   Closure Open to air 04/10/23 0919   Base clean;dry 04/10/23 0919   Drainage Amount none 04/10/23 0919   Care, Wound cleansed with;sterile normal saline 04/09/23 2053   Dressing Care open to air 04/09/23 2053   Periwound Care dry periwound area maintained 04/09/23 2053       Wound 03/11/23 1530 Other (See comments) other (see comments) pubis Abrasion (Active)   Closure Open to air 04/10/23 0919   Base red;scab 04/10/23 0919   Drainage Amount none 04/10/23 0919       Wound 03/20/23 1527 Bilateral upper gluteal MASD (Moisture associated skin damage) (Active)   Dressing Appearance open to air 04/09/23 2053   Closure Open to air 04/10/23 0919   Base clean;dry;blanchable 04/10/23 0919    Drainage Amount none 04/10/23 0919   Care, Wound cleansed with;soap and water;barrier applied 04/09/23 2053   Dressing Care open to air 04/10/23 0919   Periwound Care barrier ointment applied 04/09/23 2053     Physical Therapy Education     Title: PT OT SLP Therapies (Done)     Topic: Physical Therapy (Done)     Point: Mobility training (Done)     Learning Progress Summary           Patient Acceptance, E, VU,DU,NR by JK at 4/8/2023 1528    Acceptance, E, VU by WN at 4/4/2023 2329    Acceptance, E, VU by WN at 4/4/2023 0322    Acceptance, E,D, VU,DU by DP at 4/1/2023 1352    Acceptance, E, VU,DU by MG at 3/31/2023 0825    Acceptance, E, VU,DU by MG at 3/30/2023 0956    Acceptance, E,D, VU,DU by DP at 3/29/2023 1148    Nonacceptance, E,D, VU,DU by DP at 3/28/2023 1144    Acceptance, E,D, VU,DU by DP at 3/27/2023 1601    Acceptance, E,D, VU,NR by EE at 3/25/2023 1423    Acceptance, E,D, NR,VU,DU by DP at 3/21/2023 1448    Acceptance, E, NR by JK at 3/20/2023 1513    Acceptance, E,TB, VU,NR by KP at 3/18/2023 1643   Family Acceptance, E, VU by WN at 4/4/2023 2329    Acceptance, E, VU by WN at 4/4/2023 0322                   Point: Home exercise program (Done)     Learning Progress Summary           Patient Acceptance, E, VU by WN at 4/4/2023 2329    Acceptance, E, VU by WN at 4/4/2023 0322    Acceptance, E,D, VU,DU by DP at 4/1/2023 1352    Acceptance, E, VU,DU by MG at 3/31/2023 0825    Acceptance, E, VU,DU by MG at 3/30/2023 0956    Acceptance, E,D, VU,DU by DP at 3/29/2023 1148    Nonacceptance, E,D, VU,DU by DP at 3/28/2023 1144    Acceptance, E,D, VU,DU by DP at 3/27/2023 1601    Acceptance, E,D, NR,VU,DU by DP at 3/21/2023 1448    Acceptance, E, NR by JK at 3/20/2023 1513   Family Acceptance, E, VU by WN at 4/4/2023 2329    Acceptance, E, VU by WN at 4/4/2023 0322                   Point: Body mechanics (Done)     Learning Progress Summary           Patient Acceptance, E, VU,DU,NR by ETHAN at 4/8/2023 4684     Acceptance, E, VU by WN at 4/4/2023 2329    Acceptance, E, VU by WN at 4/4/2023 0322    Acceptance, E,D, VU,DU by DP at 4/1/2023 1352    Acceptance, E, VU,DU by MG at 3/31/2023 0825    Acceptance, E, VU,DU by MG at 3/30/2023 0956    Acceptance, E,D, VU,DU by DP at 3/29/2023 1148    Nonacceptance, E,D, VU,DU by DP at 3/28/2023 1144    Acceptance, E,D, VU,DU by DP at 3/27/2023 1601    Acceptance, E,D, VU,NR by EE at 3/25/2023 1423    Acceptance, E,D, NR,VU,DU by DP at 3/21/2023 1448   Family Acceptance, E, VU by WN at 4/4/2023 2329    Acceptance, E, VU by WN at 4/4/2023 0322                   Point: Precautions (Done)     Learning Progress Summary           Patient Acceptance, E, VU by WN at 4/4/2023 2329    Acceptance, E, VU by WN at 4/4/2023 0322    Acceptance, E,D, VU,DU by DP at 4/1/2023 1352    Acceptance, E, VU,DU by MG at 3/31/2023 0825    Acceptance, E, VU,DU by MG at 3/30/2023 0956    Acceptance, E,D, VU,DU by DP at 3/29/2023 1148    Nonacceptance, E,D, VU,DU by DP at 3/28/2023 1144    Acceptance, E,D, VU,DU by DP at 3/27/2023 1601    Acceptance, E,D, VU,NR by EE at 3/25/2023 1423    Acceptance, E,D, NR,VU,DU by DP at 3/21/2023 1448   Family Acceptance, E, VU by WN at 4/4/2023 2329    Acceptance, E, VU by WN at 4/4/2023 0322                               User Key     Initials Effective Dates Name Provider Type Discipline    EE 06/16/21 -  Melinda Mann, PT Physical Therapist PT    JDEYSI 06/16/21 -  Juana Veloz, PT Physical Therapist PT    KP 06/16/21 -  Yolanda Shannon, PT Physical Therapist PT    MG 05/24/22 -  Yu Villalobos, PT Physical Therapist PT    WN 08/23/22 -  Jose Carrillo, RN Registered Nurse Nurse    DP 08/24/21 -  Papa Marsh, PT Physical Therapist PT                PT Recommendation and Plan                          Time Calculation:      PT Charges     Row Name 04/10/23 1144             Time Calculation    Start Time 0830  -AE      Stop Time 0900  -AE      Time Calculation (min) 30 min   -AE      PT Received On 04/10/23  -AE      PT - Next Appointment 04/11/23  -AE         Time Calculation- PT    Total Timed Code Minutes- PT 30 minute(s)  -AE            User Key  (r) = Recorded By, (t) = Taken By, (c) = Cosigned By    Initials Name Provider Type    AE Deanna Carr PT Physical Therapist                Therapy Charges for Today     Code Description Service Date Service Provider Modifiers Qty    42400425594 HC PT THERAPEUTIC ACT EA 15 MIN 4/10/2023 Deanna Carr, PT GP 1    12222083503 HC PT NEUROMUSC RE EDUCATION EA 15 MIN 4/10/2023 Deanna Carr, PT GP 1                   Deanna Carr PT  4/10/2023

## 2023-04-10 NOTE — THERAPY PROGRESS REPORT/RE-CERT
Inpatient Rehabilitation - Speech Language Pathology Progress Note    [facility]     Patient Name: Louise GARCIA  : 1965  MRN: [id number]    Today's Date: 4/10/2023                   Admit Date: 3/17/2023       Visit Dx:      ICD-10-CM ICD-9-CM   1. Impaired functional mobility, balance, gait, and endurance  Z74.09 V49.89       Patient Active Problem List   Diagnosis   • Sepsis   • Neck pain, chronic   • Upper back pain   • Paresthesia   • Cervical myelopathy   • Lower extremity weakness   • History of blood clots   • Chronic anticoagulation   • Seizures   • Anemia   • GERD (gastroesophageal reflux disease)   • Guillain-[place] syndrome   • Situational mixed anxiety and depressive disorder   • Mass of middle lobe of right lung   • Oral candidiasis   • Empyema of pleura   • MRSA bacteremia   • Idiopathic peripheral neuropathy       Past Medical History:   Diagnosis Date   • Degenerative disc disease, cervical    • Gestational diabetes    • H/O blood clots    • Hematemesis    • Seizures    • Septic shock        Past Surgical History:   Procedure Laterality Date   • APPENDECTOMY     • BACK SURGERY     • CHOLECYSTECTOMY     • ENDOSCOPY N/A 2016    Procedure: ESOPHAGOGASTRODUODENOSCOPY with biopsy;  Surgeon: Austen Brito MD;  Location: [facility] ENDOSCOPY;  Service:    • HYSTERECTOMY     • NECK SURGERY       approx 6 yrs ago   • THORACOSCOPY Right 3/8/2023    Procedure: THORACOSCOPY WITH DAVINCI ROBOT WITH COMPLETE DECORTICATION;  Surgeon: Chloe Cedillo MD;  Location: [facility] OR;  Service: Robotics - DaVinci;  Laterality: Right;   • TONSILLECTOMY     • TONSILLECTOMY AND ADENOIDECTOMY         SLP Recommendation and Plan  Pt is making steady progress toward cognitive goals. She now presents with an overall mild-moderate cognitive-communication impairment, characterized by reduced attention, memory, executive functioning and reasoning. She is now able to recall 3 errands after 10 minutes with NO  cues. Sustained and selective attention for basic tasks (written 2-step written directions) now requires NO cues; however, pt requires MIN-MOD cues for selective attention when completing complex tasks, such as medication management. Pt now requires NO cues for alternating attention for a card sort task (x1). She requires MIN-MOD cues to complete high-level deductive reasoning tasks. Reduced planning and mental flexibility noted during higher level tasks. Pt requires MIN-MOD cues to complete an abstract divergent thinking task. Oral expression has improved since admission, with occasional word finding delays noted in complex conversation. Pt now presents with mild-moderate hoarse vocal quality, which fluctuates throughout the day. Pt is stimulable to improve vocal quality with cues for diaphragmatic breathing and frontal tone focus. Recommend continued inpatient speech therapy to improve cognition and communication.       SLP EVALUATION (last 72 hours)     SLP SLC Evaluation     Row Name 04/10/23 0800 04/07/23 0930                Communication Assessment/Intervention    Document Type progress note/recertification  -AL therapy note (daily note)  -AL       Patient/Family/Caregiver Comments/Observations -- Pt participated well.  -AL          Pain Scale: Numbers Pre/Post-Treatment    Pretreatment Pain Rating -- 0/10 - no pain  -AL             User Key  (r) = Recorded By, (t) = Taken By, (c) = Cosigned By    Initials Name Effective Dates    Nessa Kraus MS CCC-SLP 06/16/21 -                    EDUCATION    The patient has been educated in the following areas:       Cognitive Impairment Communication Impairment.             SLP GOALS     Row Name 04/10/23 0800 04/07/23 0930          Patient will demonstrate functional cognitive-linguistic skills for return to discharge environment    [place] with minimal cues  -AL --     Time frame by discharge  -AL --     Progress/Outcomes good progress toward goal  -AL --   "      Word Retrieval Skills Goal 1 (SLP)    Improve Word Retrieval Skills By Goal 1 (SLP) completing functional word finding tasks;90%;independently (over 90% accuracy)  -AL --     Progress/Outcomes (Word Retrieval Goal 1, SLP) goal met  -AL --        Ability to Construct Phrase and Sentence Level Response Goal 1 (SLP)    Improve Ability to Construct Phrase and Sentence Level Responses By Goal 1 (SLP) answering question with phrase;constructing a sentence with a key word;90%;independently (over 90% accuracy)  -AL --     Progress/Outcomes (Phrase and Sentence Level Response Goal 1, SLP) goal met  -AL --        Phonation Goal 1 (SLP)    Improve Phonation By Goal 1 (SLP) using appropriate tone focus;90%;with minimal cues (75-90%)  -AL --     Progress/Outcomes (Phonation Goal 1, SLP) good progress toward goal  -AL --        Attention Goal 1 (SLP)    Improve Attention by Goal 1 (SLP) complete sustained attention task;80%;with minimal cues (75-90%)  -AL --     Time Frame (Attention Goal 1, SLP) 1 week  -AL --     Progress/Outcomes (Attention Goal 1, SLP) good progress toward goal  -AL goal ongoing  -AL     Comment (Attention Goal 1, SLP) -- 2-step written directions: 100% with NO cues. Alternating attention for \"[place]\" card sort task: required MIN cues x2 when sorting entire deck. Sorted 25 cards in 1 minute, 43 seconds with NO cues. Medicine management: 4/9 with NO Cues, 7/9 with MIN cues, 9/9 with MOD-MAX cues. Initially had decreased comprehension of task; however, performance improved as task progressed.  -AL        Memory Skills Goal 1 (SLP)    Improve Memory Skills Through Goal 1 (SLP) use memory strategies;use external memory aid;recall details of the day;80%;with moderate cues (50-74%)  -AL --     Time Frame (Memory Skills Goal 1, SLP) 1 week  -AL --     Progress/Outcomes (Memory Skills Goal 1, SLP) goal met  -AL --        Organizational Skills Goal 1 (SLP)    Improve Thought Organization Through Goal 1 (SLP) " completing a divergent naming task;80%;with minimal cues (75-90%)  -AL --     Progress/Outcomes (Thought Organization Skills Goal 1, SLP) good progress toward goal  -AL --        Reasoning Goal 1 (SLP)    Improve Reasoning Through Goal 1 (SLP) complete deductive reasoning task;80%;with minimal cues (75-90%)  -AL complete deductive reasoning task;80%;with minimal cues (75-90%)  -AL     Time Frame (Reasoning Goal 1, SLP) -- 1 week  -AL     Progress/Outcomes (Reasoning Goal 1, SLP) good progress toward goal  -AL good progress toward goal  -AL     Comment (Reasoning Goal 1, SLP) -- [place] Show organizin/8 with NO cues, 3/8 with MIN cues, 7/8 with MOD cues, 8/8 with MAX cues.  -AL        Functional Math Skills Goal 1 (SLP)    Improve Functional Math Skills Through Goal 1 (SLP) complete functional math task;80%;with minimal cues (75-90%)  -AL --     Progress/Outcomes (Functional Math Skills Goal 1, SLP) goal ongoing  -AL --           User Key  (r) = Recorded By, (t) = Taken By, (c) = Cosigned By    Initials Name Provider Type    Nessa Kraus, MS CCC-SLP Speech and Language Pathologist                                                         Nessa Spangler MS CCC-SLP  4/10/2023

## 2023-04-10 NOTE — PROGRESS NOTES
Case Management  Inpatient Rehabilitation Plan of Care and Discharge Plan Note    Rehabilitation Diagnosis:  Branch  Date of Onset:  Branch    Medical Summary:  Branch  Past Medical History: Branch    Plan of Care  Updated Problems/Interventions  Field    Expected Intensity:  Branch  Interdisciplinary Team:  Thea  Estimated Length of Stay/Anticipated Discharge Date: Branch  Anticipated Discharge Destination:  Anticipated discharge destination from inpatient rehabilitation is community  discharge with assistance. Patient lives with  in  travel trailer. 3  fold down steps with rail to enter.  Family conference held with patient and  on 4/7.  D/C plan is home with .  not currently working.      Based on the patient's medical and functional status, their prognosis and  expected level of functional improvement is:  Branch    Signed by: OZZIE Mccarthy

## 2023-04-10 NOTE — THERAPY TREATMENT NOTE
Inpatient Rehabilitation - Speech Language Pathology Treatment Note    [facility]     Patient Name: Louise GARCIA  : 1965  MRN: [id number]    Today's Date: 4/10/2023                   Admit Date: 3/17/2023       Visit Dx:      ICD-10-CM ICD-9-CM   1. Impaired functional mobility, balance, gait, and endurance  Z74.09 V49.89       Patient Active Problem List   Diagnosis   • Sepsis   • Neck pain, chronic   • Upper back pain   • Paresthesia   • Cervical myelopathy   • Lower extremity weakness   • History of blood clots   • Chronic anticoagulation   • Seizures   • Anemia   • GERD (gastroesophageal reflux disease)   • Guillain-[place] syndrome   • Situational mixed anxiety and depressive disorder   • Mass of middle lobe of right lung   • Oral candidiasis   • Empyema of pleura   • MRSA bacteremia   • Idiopathic peripheral neuropathy       Past Medical History:   Diagnosis Date   • Degenerative disc disease, cervical    • Gestational diabetes    • H/O blood clots    • Hematemesis    • Seizures    • Septic shock        Past Surgical History:   Procedure Laterality Date   • APPENDECTOMY     • BACK SURGERY     • CHOLECYSTECTOMY     • ENDOSCOPY N/A 2016    Procedure: ESOPHAGOGASTRODUODENOSCOPY with biopsy;  Surgeon: Austen Brito MD;  Location: [facility] ENDOSCOPY;  Service:    • HYSTERECTOMY     • NECK SURGERY       approx 6 yrs ago   • THORACOSCOPY Right 3/8/2023    Procedure: THORACOSCOPY WITH DAVINCI ROBOT WITH COMPLETE DECORTICATION;  Surgeon: Chloe Cedillo MD;  Location: [facility] OR;  Service: Robotics - DaVinci;  Laterality: Right;   • TONSILLECTOMY     • TONSILLECTOMY AND ADENOIDECTOMY         SLP Recommendation and Plan                                                            SLP EVALUATION (last 72 hours)     SLP SLC Evaluation     Row Name 04/10/23 1330 04/10/23 0945 04/10/23 0800             Communication Assessment/Intervention    Document Type therapy note (daily note)  -AL therapy note  (daily note)  -AL progress note/recertification  -AL      Patient/Family/Caregiver Comments/Observations Pt participated well.  -AL Session began 15 minutes late due to MD assessment of patient. Pt with fever today. Pt took pain meds prior to session. She reported difficulty with focusing due to pain meds.  -AL --         Pain Scale: Numbers Pre/Post-Treatment    Pretreatment Pain Rating 0/10 - no pain  -AL 0/10 - no pain  -AL --            User Key  (r) = Recorded By, (t) = Taken By, (c) = Cosigned By    Initials Name Effective Dates    Nessa Kraus, MS CCC-SLP 06/16/21 -                    EDUCATION    The patient has been educated in the following areas:       Cognitive Impairment Communication Impairment.             SLP GOALS     Row Name 04/10/23 1330 04/10/23 0945 04/10/23 0800       Patient will demonstrate functional cognitive-linguistic skills for return to discharge environment    [place] -- -- with minimal cues  -AL    Time frame -- -- by discharge  -AL    Progress/Outcomes -- -- good progress toward goal  -AL       Word Retrieval Skills Goal 1 (SLP)    Improve Word Retrieval Skills By Goal 1 (SLP) -- -- completing functional word finding tasks;90%;independently (over 90% accuracy)  -AL    Progress/Outcomes (Word Retrieval Goal 1, SLP) -- -- goal met  -AL       Ability to Construct Phrase and Sentence Level Response Goal 1 (SLP)    Improve Ability to Construct Phrase and Sentence Level Responses By Goal 1 (SLP) -- -- answering question with phrase;constructing a sentence with a key word;90%;independently (over 90% accuracy)  -AL    Progress/Outcomes (Phrase and Sentence Level Response Goal 1, SLP) -- -- goal met  -AL       Phonation Goal 1 (SLP)    Improve Phonation By Goal 1 (SLP) -- -- using appropriate tone focus;90%;with minimal cues (75-90%)  -AL    Progress/Outcomes (Phonation Goal 1, SLP) good progress toward goal  -AL -- good progress toward goal  -AL    Comment (Phonation Goal 1,  SLP) Pt exhibited mild-moderate hoarse vocal quality noted. Noted to exhibit increased strong throat clearing in PM session. SLP provided education regarding vocal hygiene. Pt reported increased urge to throat clear since her IVIG treatments.  -AL -- --       Attention Goal 1 (SLP)    Improve Attention by Goal 1 (SLP) -- -- complete sustained attention task;80%;with minimal cues (75-90%)  -AL    Time Frame (Attention Goal 1, SLP) -- -- 1 week  -AL    Progress/Outcomes (Attention Goal 1, SLP) -- -- good progress toward goal  -AL       Memory Skills Goal 1 (SLP)    Improve Memory Skills Through Goal 1 (SLP) use memory strategies;use external memory aid;recall details of the day;80%;with moderate cues (50-74%)  -AL use memory strategies;use external memory aid;recall details of the day;80%;with moderate cues (50-74%)  -AL use memory strategies;use external memory aid;recall details of the day;80%;with moderate cues (50-74%)  -AL    Time Frame (Memory Skills Goal 1, SLP) 1 week  -AL 1 week  -AL 1 week  -AL    Progress/Outcomes (Memory Skills Goal 1, SLP) goal ongoing  -AL goal met  -AL goal met  -AL    Comment (Memory Skills Goal 1, SLP) 4 unit list retention task: 60% with NO cues, 100% with MI Ncues  -AL Recalled 3/3 errands after 10 minutes with NO cues. Goal met x2. Working memory for 4 unit list retention: 3/8 with NO cues, 7/8 with MOD-MAX cues  -AL --       Organizational Skills Goal 1 (SLP)    Improve Thought Organization Through Goal 1 (SLP) completing a divergent naming task;80%;with minimal cues (75-90%)  -AL completing a divergent naming task;80%;with minimal cues (75-90%)  -AL completing a divergent naming task;80%;with minimal cues (75-90%)  -AL    Progress/Outcomes (Thought Organization Skills Goal 1, SLP) goal ongoing  -AL good progress toward goal  -AL good progress toward goal  -AL    Comment (Thought Organization Skills Goal 1, SLP) Ranged from 3-4 with NO cues, 8 with MIN-MOD cues.  -AL Loud: 3 with  NO cues, 4 with additional time.  -AL --       Reasoning Goal 1 (SLP)    Improve Reasoning Through Goal 1 (SLP) -- -- complete deductive reasoning task;80%;with minimal cues (75-90%)  -AL    Progress/Outcomes (Reasoning Goal 1, SLP) -- -- good progress toward goal  -AL    Comment (Reasoning Goal 1, SLP) -- [place] visuospatial reasoning task: 50% with NO cues, 100% with MIN-MAX cues. Required MIN-MOD cues for sustained attention today.  -AL --       Functional Math Skills Goal 1 (SLP)    Improve Functional Math Skills Through Goal 1 (SLP) -- -- complete functional math task;80%;with minimal cues (75-90%)  -AL    Progress/Outcomes (Functional Math Skills Goal 1, SLP) -- -- goal ongoing  -AL          User Key  (r) = Recorded By, (t) = Taken By, (c) = Cosigned By    Initials Name Provider Type    Nessa Kraus MS CCC-SLP Speech and Language Pathologist                            Time Calculation:        Time Calculation- SLP     Row Name 04/10/23 1405 04/10/23 1027          Time Calculation- SLP    SLP Start Time 1330  -AL 0945  -AL     SLP Stop Time 1345  -AL 1030  -AL     SLP Time Calculation (min) 15 min  -AL 45 min  -AL           User Key  (r) = Recorded By, (t) = Taken By, (c) = Cosigned By    Initials Name Provider Type    Nessa Kraus MS CCC-SLP Speech and Language Pathologist                  Therapy Charges for Today     Code Description Service Date Service Provider Modifiers Qty    25374115513 HC ST DEV OF COGN SKILLS INITIAL 15 MIN 4/10/2023 Nessa Spangler MS CCC-SLP  1    71186127045 HC ST DEV OF COGN SKILLS EACH ADDT'L 15 MIN 4/10/2023 Nessa Spangler MS CCC-SLP  3                           MS ARTEMIO JoeSLP  4/10/2023

## 2023-04-10 NOTE — THERAPY TREATMENT NOTE
Inpatient Rehabilitation - Occupational Therapy Treatment Note    [facility]     Patient Name: Louise GARCIA  : 1965  MRN: [id number]    Today's Date: 4/10/2023                 Admit Date: 3/17/2023         ICD-10-CM ICD-9-CM   1. Impaired functional mobility, balance, gait, and endurance  Z74.09 V49.89       Patient Active Problem List   Diagnosis   • Sepsis   • Neck pain, chronic   • Upper back pain   • Paresthesia   • Cervical myelopathy   • Lower extremity weakness   • History of blood clots   • Chronic anticoagulation   • Seizures   • Anemia   • GERD (gastroesophageal reflux disease)   • Guillain-[place] syndrome   • Situational mixed anxiety and depressive disorder   • Mass of middle lobe of right lung   • Oral candidiasis   • Empyema of pleura   • MRSA bacteremia   • Idiopathic peripheral neuropathy       Past Medical History:   Diagnosis Date   • Degenerative disc disease, cervical    • Gestational diabetes    • H/O blood clots    • Hematemesis    • Seizures    • Septic shock        Past Surgical History:   Procedure Laterality Date   • APPENDECTOMY     • BACK SURGERY     • CHOLECYSTECTOMY     • ENDOSCOPY N/A 2016    Procedure: ESOPHAGOGASTRODUODENOSCOPY with biopsy;  Surgeon: Austen Brito MD;  Location: [facility] ENDOSCOPY;  Service:    • HYSTERECTOMY     • NECK SURGERY       approx 6 yrs ago   • THORACOSCOPY Right 3/8/2023    Procedure: THORACOSCOPY WITH DAVINCI ROBOT WITH COMPLETE DECORTICATION;  Surgeon: Chloe Cedillo MD;  Location: [facility] OR;  Service: Robotics - DaVinci;  Laterality: Right;   • TONSILLECTOMY     • TONSILLECTOMY AND ADENOIDECTOMY               [facility] OT ASSESSMENT FLOWSHEET (last 12 hours)     [facility] OT Evaluation and Treatment     Row Name 04/10/23 1149          OT Time and Intention    Document Type daily treatment  -KB     Mode of Treatment individual therapy;occupational therapy  -KB     Patient Effort good  -KB     Symptoms Noted During/After Treatment  fatigue  -KB     Row Name 04/10/23 1149          General Information    Patient Profile Reviewed yes  -KB     Patient/Family/Caregiver Comments/Observations Pt not feeling good this date, but agreeable to participate in OT  -KB     Existing Precautions/Restrictions fall  -KB     Limitations/Impairments safety/cognitive  -KB     Row Name 04/10/23 1149          Pain Assessment    Pretreatment Pain Rating 0/10 - no pain  -KB     Posttreatment Pain Rating 0/10 - no pain  -KB     Row Name 04/10/23 1149          Cognition/Psychosocial    Affect/Mental Status (Cognition) WFL  -KB     Orientation Status (Cognition) oriented x 3  -KB     Row Name 04/10/23 1149          Bathing    [place] Level (Bathing) bathing skills;perineal area;moderate assist (50% patient effort)  -KB     Comment (Bathing) cga for bathing except bottom, [place] are due to need assist to stand  -KB     Row Name 04/10/23 1149          Upper Body Dressing    [place] Level (Upper Body Dressing) upper body dressing skills;doff;don;set up assistance  -KB     Set-up Assistance (Upper Body Dressing) obtain clothing  -KB     Comment (Upper Body Dressing) wc level  -KB     Row Name 04/10/23 1149          Lower Body Dressing    [place] Level (Lower Body Dressing) doff;don;shoes/slippers;socks;moderate assist (50% patient effort)  -KB     Comment (Lower Body Dressing) assist to pull up pants, unable to let go of grab bar to maintain balance and pull of pants  -KB     Row Name 04/10/23 1149          Grooming    [place] Level (Grooming) grooming skills;set up  -KB     Comment (Grooming) wc level  -KB     Row Name 04/10/23 1149          Toileting    [place] Level (Toileting) toileting skills;adjust/manage clothing;moderate assist (50% patient effort)  -KB     Comment (Toileting) assist with balance, standing, mod assist  -KB     Row Name 04/10/23 1149          Toilet Transfer    Type (Toilet Transfer) squat pivot  -KB     [place] Level  (Toilet Transfer) moderate assist (50% patient effort)  -KB     Row Name 04/10/23 1149          Positioning and Restraints    Pre-Treatment Position sitting in chair/recliner  -KB     Post Treatment Position wheelchair  -KB     In Bed call light within reach;encouraged to call for assist  -KB           User Key  (r) = Recorded By, (t) = Taken By, (c) = Cosigned By    Initials Name Effective Dates    Reena Guidry, WILI 01/03/23 -                  Occupational Therapy Education     Title: PT OT SLP Therapies (Done)     Topic: Occupational Therapy (Done)     Point: ADL training (Done)     Description:   Instruct learner(s) on proper safety adaptation and remediation techniques during self care or transfers.   Instruct in proper use of assistive devices.              Learning Progress Summary           Patient Acceptance, E, VU by WN at 4/4/2023 2329    Acceptance, E, VU by WN at 4/4/2023 0322   Family Acceptance, E, VU by WN at 4/4/2023 2329    Acceptance, E, VU by WN at 4/4/2023 0322                   Point: Home exercise program (Done)     Description:   Instruct learner(s) on appropriate technique for monitoring, assisting and/or progressing therapeutic exercises/activities.              Learning Progress Summary           Patient Acceptance, E, VU by WN at 4/4/2023 2329    Acceptance, E, VU by WN at 4/4/2023 0322   Family Acceptance, E, VU by WN at 4/4/2023 2329    Acceptance, E, VU by WN at 4/4/2023 0322                   Point: Precautions (Done)     Description:   Instruct learner(s) on prescribed precautions during self-care and functional transfers.              Learning Progress Summary           Patient Acceptance, E, VU by WN at 4/4/2023 2329    Acceptance, E, VU by WN at 4/4/2023 0322   Family Acceptance, E, VU by WN at 4/4/2023 2329    Acceptance, E, VU by WN at 4/4/2023 0322                   Point: Body mechanics (Done)     Description:   Instruct learner(s) on proper positioning and spine alignment  during self-care, functional mobility activities and/or exercises.              Learning Progress Summary           Patient Acceptance, E, VU by WN at 4/4/2023 2329    Acceptance, E, VU by WN at 4/4/2023 0322   Family Acceptance, E, VU by WN at 4/4/2023 2329    Acceptance, E, VU by WN at 4/4/2023 0322                               User Key     Initials Effective Dates Name Provider Type Discipline     08/23/22 -  Jose Carrillo, RN Registered Nurse Nurse                    OT Recommendation and Plan                         Time Calculation:      Time Calculation- OT     Row Name 04/10/23 1030             Time Calculation- OT    OT Start Time 1030  -KB      OT Stop Time 1100  -KB      OT Time Calculation (min) 30 min  -KB            User Key  (r) = Recorded By, (t) = Taken By, (c) = Cosigned By    Initials Name Provider Type    Reena Guidry OT Occupational Therapist              Therapy Charges for Today     Code Description Service Date Service Provider Modifiers Qty    80324288003 HC OT SELF CARE/MGMT/TRAIN EA 15 MIN 4/10/2023 Reena Tovar OT GO 2                   Reena Tovar OT  4/10/2023

## 2023-04-10 NOTE — PROGRESS NOTES
"Nutrition Services    Patient Name:  Louise GARCIA  YOB: 1965  MRN: [id number]  Admit Date:  3/17/2023    Assessment Date:  04/10/23    FOLLOW UP NOTE - CLINICAL NUTRITION    Comments:  Pt out of room during attempted visit; s/w  at doorway, who reported pt to have no intake changes from last review. Per EMR, PO 0-50%. Pt has not been able to implement recommendations of many small meals/snacks. Pt's wt is down 7# (5%) x 3 wks. Pt does consume occasional Boost, but not TID. Pt often finds reasons to not eat (no milk available to dip crackers in, unable to heat up a poptart so she didn't want it, etc.). Pt's  stated that pt will occasionally get a meal delivered from  that wasn't what she selected but will end up eating it more than most other meals. Pt also eats fairly good when pt's  brings food from outside sources. Encouraged pt's  to continue to bring meals, as able and to try ordering meals for pt for the next couple days, based on foods she has previously liked. Will follow-up on Thursday to assess if these strategies have worked.     RD will continue to follow-up, per protocol.      Encounter Information        Reason for Encounter Follow-up   Current Issues Idiopathic peripheral neuropathy     Current Nutrition Orders & Evaluation of Intake       Oral Nutrition     Current PO Diet Diet: Regular/House Diet; Texture: Regular Texture (IDDSI 7); Fluid Consistency: Thin (IDDSI 0)   Supplement Boost Plus TID   PO Evaluation    % PO Intake/# of days PO 0-50%.    Factors Affecting Intake  constipation, decreased appetite, early satiety      Anthropometrics         Height   Weight Height: 157.5 cm (62\")  Weight: 60.5 kg (133 lb 6.1 oz) (04/06/23 1500)   BMI kg/m2 Body mass index is 24.4 kg/m².   Weight trend Loss, Amount/Timeframe:  Down 7# (5%) x 3 wks.      Physical Findings          Physical Appearance alert, oriented fatigued   Oral/Mouth Cavity teeth missing "   Edema  1+ (trace), 2+ (mild)   Gastrointestinal normoactive, last bowel movement:4/8   Skin  MASD, surgical incision, abrasion   Tubes/Drains none     Labs       Pertinent Labs Reviewed, listed below     Results from last 7 days   Lab Units 04/10/23  0603 04/07/23  0843   SODIUM mmol/L 132* 132*   POTASSIUM mmol/L 4.1 3.9   CHLORIDE mmol/L 94* 95*   CO2 mmol/L 31.2* 30.2*   BUN mg/dL 17 16   CREATININE mg/dL 1.12* 1.32*   CALCIUM mg/dL 10.0 10.1   GLUCOSE mg/dL 97 118*     Results from last 7 days   Lab Units 04/10/23  0603   HEMOGLOBIN g/dL 7.7*   HEMATOCRIT % 23.7*   WBC 10*3/mm3 8.74     Results from last 7 days   Lab Units 04/10/23  0603 04/07/23  0843 04/06/23  1520   PLATELETS 10*3/mm3 273 357 380     COVID19   Date Value Ref Range Status   03/03/2023 Not Detected Not Detected - Ref. Range Final     Lab Results   Component Value Date    HGBA1C 5.10 02/20/2023          Medications           Scheduled Medications apixaban, 5 mg, Oral, Q12H  desvenlafaxine, 25 mg, Oral, Daily  folic acid, 1 mg, Oral, Daily  gabapentin, 300 mg, Oral, Q8H  ipratropium-albuterol, 3 mL, Nebulization, 4x Daily - RT  lamoTRIgine, 200 mg, Oral, Daily  melatonin, 5 mg, Oral, Nightly  pantoprazole, 40 mg, Oral, Q AM  thiamine, 100 mg, Oral, Daily  vitamin B-12, 500 mcg, Oral, Daily       Infusions     PRN Medications •  acetaminophen  •  bisacodyl  •  diphenhydrAMINE  •  famotidine  •  hydrocortisone sodium succinate  •  hydrocortisone-bacitracin-zinc oxide-nystatin  •  oxyCODONE  •  polyethylene glycol  •  senna-docusate sodium  •  simethicone     PLAN OF CARE  Intervention Goal        Intervention Goal(s) Maintain nutrition status, Tolerate PO , Increase intake, Maintain weight, No significant weight loss and PO intake goal %: 75%     Nutrition Intervention        RD Action Advise available snack, Encourage intake, Follow Tx Progress and Care plan reviewed     Prescription        Diet Prescription    Supplement Prescription    EN/PN  Prescription    Prescription Ordered No changes at this time   --  Monitor/Evaluation        Monitor Per protocol, PO intake, Supplement intake, Weight, GI status, Symptoms   Discharge Plan Pending clinical course   Education Will educate as needed       Electronically signed by:  Silke Arias  04/10/23 16:11 EDT

## 2023-04-10 NOTE — PROGRESS NOTES
Inpatient Rehabilitation Functional Measures Assessment and Plan of Care    Plan of Care  Updated Problems/Interventions  Self Care    [OT] Bathing(Active)  Current Status(04/10/2023): MIN  Weekly Goal(04/11/2023): CGA  Discharge Goal: CGA    [OT] Dressing (Lower)(Active)  Current Status(04/10/2023): MOD  Weekly Goal(04/11/2023): MIN  Discharge Goal: MIN    [OT] Dressing (Upper)(Active)  Current Status(04/10/2023): set up  Weekly Goal(04/11/2023): set up  Discharge Goal: Set up    [OT] Eating(Active)  Current Status(04/10/2023): set up  Weekly Goal(04/11/2023): set up  Discharge Goal: Mod I    [OT] Grooming(Active)  Current Status(04/10/2023): set up  Weekly Goal(04/11/2023): supervision  Discharge Goal: supervision    [OT] Toileting(Active)  Current Status(04/10/2023): MOD A x 2  Weekly Goal(04/11/2023): MOD  Discharge Goal: MIN/CGA        Mobility    [OT] Tub/Shower Transfers(Active)  Current Status(04/10/2023): MOD A x 1  Weekly Goal(04/11/2023): Min  Discharge Goal: CGA    [OT] Toilet Transfers(Active)  Current Status(04/10/2023): MOD A X 1  Weekly Goal(04/11/2023): Min  Discharge Goal: CGA    Functional Measures  BA Eating:  Branch  BA Grooming: Branch  BA Bathing:  Branch  BA Upper Body Dressing:  Branch  BA Lower Body Dressing:  Branch  BA Toileting:  Branch    BA Bladder Management  Level of Assistance:  Branch  Frequency/Number of Accidents this Shift:  Branch    BA Bowel Management  Level of Assistance: Branch  Frequency/Number of Accidents this Shift: Branch    BA Bed/Chair/Wheelchair Transfer:  Branch  BA Toilet Transfer:  Branch  BA Tub/Shower Transfer:  Branch    Previously Documented Mode of Locomotion at Discharge: Field  BA Expected Mode of Locomotion at Discharge: Branch  BA Walk/Wheelchair:  Branch  BA Stairs:  Branch    BA Comprehension:  Branch  BA Expression:  Branch  BA Social Interaction:  Branch  BA Problem Solving:  Branch  BA Memory:  Branch    Therapy Mode  Minutes  Occupational Therapy: Branch  Physical Therapy: Branch  Speech Language Pathology:  Branch    Signed by: Reena Tovar OT

## 2023-04-10 NOTE — PROGRESS NOTES
Inpatient Rehabilitation Functional Measures Assessment and Plan of Care    Plan of Care  Updated Problems/Interventions  Cognition    [ST] Memory(Active)  Current Status(04/10/2023): Pt able to recall after 10 minutes with NO cues. Now  able to carry-over information from previous day. Requires overall MOD cues to  complete verbal working memory tasks (x1).  Weekly Goal(04/11/2023): Will recall 3 errands after 15 minutes with NO cues.  Discharge Goal: The patient will improve memory necessary for home-based  participation when given appropriate supervision and cuing.        Communication    [ST] Voice(Active)  Current Status(04/10/2023): Mild-moderate hoarse vocal quality. Stimulable to  improve vocal quality with cues for diaphragmatic breathing and frontal tone  focus. Now requires NO cues for vocal hygience.  Weekly Goal(04/18/2023): Complete frontal tone focus voice exercises with MIN  cues.  Discharge Goal: Functional voice for utilization in all settings, 90%  intelligibility NO cues for compensatory strategies    Signed by: Nessa Spangler, SLP

## 2023-04-10 NOTE — PROGRESS NOTES
Inpatient Rehabilitation Plan of Care Note    Plan of Care  Updated Problems/Interventions  Medical Problem(s)    BNE (Active)  Att'n. - Mildly Imp.  Exec. Fx. - Mildly Imp.  Rsng/Jgmnt - Mildly Imp. for abstract reasoning, Mod. Imp. for common sense  jgmnt  Arith - Mod. Imp.  Visuospatial Skills - WNL  Visual Mem. - WNL  Verbal Mem. - Severely Imp., though significant improvement with cues  Emot - Pt acknowledged mild emotional distress relatd to current condition    Signed by: Jr Naqvi Psy.d     no

## 2023-04-10 NOTE — PROGRESS NOTES
Recreational Therapy Note    Patient Name: Louise GARCIA   MRN: [id number]    Therapeutic Recreation Eval and Treat (last 12 hours)     Therapeutic Recreation Eval & Treat     Row Name 04/10/23 1500       Therapeutic Recreation Participation    Recreation Therapy Participation games  -SS    Games other (see comments)  10,000 dice game  -    Objectives of Recreation Participation increase;sense of autonomy by choosing level of participation  -    Comment, Recreation Participation occ-min cues to follow and recall directions  -    Recreation Therapy Summary of Participation active participation  -          User Key  (r) = Recorded By, (t) = Taken By, (c) = Cosigned By    Initials Name Provider Type    SS Mariel Cantu, CTRS Recreational Therapist                  DELVIN Wei  4/10/2023

## 2023-04-10 NOTE — PLAN OF CARE
Goal Outcome Evaluation:  Plan of Care Reviewed With: patient        Progress: improving  Outcome Evaluation: Pt did have a fever today which was reported to Hayley Ohara (neuro)  and Dr Torres Ok to give IVIG. Oxy 2.5mg given as needed for pain.

## 2023-04-10 NOTE — PLAN OF CARE
Goal Outcome Evaluation:  Plan of Care Reviewed With: patient             Problem: Rehabilitation (IRF) Plan of Care  Goal: Plan of Care Review  Outcome: Ongoing, Progressing  Flowsheets (Taken 4/10/2023 0230)  Plan of Care Reviewed With: patient  Outcome Evaluation:  Louise is alert and oriented x 4. Pleasant and coopertive with care. Can be forgetful at times. meds whole PO with AS, thins. Wearing Purewick at time per pt request, continent of bowel. Cream applied to buttocks; pink and blachable but intact. IV patent to RFA. Seizure precautions in place. PRN Amina administered at 2053 and 0038. wearing 2l nc at HS.  at bedside. No unsafe behaviors. Call light within reach.

## 2023-04-11 PROCEDURE — 97116 GAIT TRAINING THERAPY: CPT

## 2023-04-11 PROCEDURE — 97112 NEUROMUSCULAR REEDUCATION: CPT

## 2023-04-11 PROCEDURE — 97130 THER IVNTJ EA ADDL 15 MIN: CPT

## 2023-04-11 PROCEDURE — 97535 SELF CARE MNGMENT TRAINING: CPT

## 2023-04-11 PROCEDURE — 94799 UNLISTED PULMONARY SVC/PX: CPT

## 2023-04-11 PROCEDURE — 97530 THERAPEUTIC ACTIVITIES: CPT

## 2023-04-11 PROCEDURE — 94761 N-INVAS EAR/PLS OXIMETRY MLT: CPT

## 2023-04-11 PROCEDURE — 94664 DEMO&/EVAL PT USE INHALER: CPT

## 2023-04-11 PROCEDURE — 97129 THER IVNTJ 1ST 15 MIN: CPT

## 2023-04-11 RX ADMIN — Medication 500 MCG: at 08:29

## 2023-04-11 RX ADMIN — IPRATROPIUM BROMIDE AND ALBUTEROL SULFATE 3 ML: 2.5; .5 SOLUTION RESPIRATORY (INHALATION) at 15:42

## 2023-04-11 RX ADMIN — Medication 1 MG: at 08:29

## 2023-04-11 RX ADMIN — DESVENLAFAXINE SUCCINATE 25 MG: 25 TABLET, EXTENDED RELEASE ORAL at 08:29

## 2023-04-11 RX ADMIN — OXYCODONE HYDROCHLORIDE 2.5 MG: 5 TABLET ORAL at 15:08

## 2023-04-11 RX ADMIN — GABAPENTIN 300 MG: 300 CAPSULE ORAL at 14:13

## 2023-04-11 RX ADMIN — PANTOPRAZOLE SODIUM 40 MG: 40 TABLET, DELAYED RELEASE ORAL at 05:23

## 2023-04-11 RX ADMIN — OXYCODONE HYDROCHLORIDE 2.5 MG: 5 TABLET ORAL at 20:28

## 2023-04-11 RX ADMIN — LAMOTRIGINE 200 MG: 100 TABLET ORAL at 08:29

## 2023-04-11 RX ADMIN — IPRATROPIUM BROMIDE AND ALBUTEROL SULFATE 3 ML: 2.5; .5 SOLUTION RESPIRATORY (INHALATION) at 07:13

## 2023-04-11 RX ADMIN — APIXABAN 5 MG: 5 TABLET, FILM COATED ORAL at 20:29

## 2023-04-11 RX ADMIN — Medication 100 MG: at 08:29

## 2023-04-11 RX ADMIN — GABAPENTIN 300 MG: 300 CAPSULE ORAL at 20:29

## 2023-04-11 RX ADMIN — ACETAMINOPHEN 650 MG: 325 TABLET, FILM COATED ORAL at 15:08

## 2023-04-11 RX ADMIN — Medication 5 MG: at 20:28

## 2023-04-11 RX ADMIN — APIXABAN 5 MG: 5 TABLET, FILM COATED ORAL at 08:29

## 2023-04-11 RX ADMIN — GABAPENTIN 300 MG: 300 CAPSULE ORAL at 05:23

## 2023-04-11 NOTE — PROGRESS NOTES
LOS: 25 days   Patient Care Team:  Chloe Schaefer APRN as PCP - General (Family Medicine)  Mikhail Glez MD as Consulting Physician (Neurology)      Patient Name: ELEONORA GARCIA  Patient : 1965    ADMITTING DIAGNOSIS:  Diagnoses    1. IMPAIRED FUNCTIONAL MOBILITY, BALANCE, GAIT, AND ENDURANCE           SUBJECTIVE:    No further fever  Continues weak LLE  Sats in 80's when sleeping. Discussed continuing nasal cannula oxygen at night and check overnight pulse oximetry couple days before discharge      REVIEW OF SYSTEMS:         OBJECTIVE:    Vitals:    23 1225   BP: 124/66   Pulse: 89   Resp: 18   Temp: 98 °F (36.7 °C)   SpO2: 99%       PHYSICAL EXAM:   General: pleasant, in no acute distress  HEENT: NCAT, sclera anicteric, conjunctiva pink, mucoa moist  Cardiovascular: RRR, +S1+S2, no obvious m/g/r  Lungs: Decreased breath sounds at the right side.    Abdomen: normoactive bowel sounds, soft, ND  Skin: exposed surfaces of skin warm, intact, without erythema  Neuro: awake alert and oriented to person, place, time, and situation, CN II-XII grossly intact  MSK:  MOTOR EXAM - right/left: Shoulder abduction 4+/4+, elbow flexion 5/5, wrist extension 5/5, finger flexion 5/5, hip flexion 3/3, knee extension 4/3, ankle dorsiflexion 4/4      MEDICATIONS  Scheduled Meds:  apixaban, 5 mg, Oral, Q12H  desvenlafaxine, 25 mg, Oral, Daily  folic acid, 1 mg, Oral, Daily  gabapentin, 300 mg, Oral, Q8H  ipratropium-albuterol, 3 mL, Nebulization, 4x Daily - RT  lamoTRIgine, 200 mg, Oral, Daily  melatonin, 5 mg, Oral, Nightly  pantoprazole, 40 mg, Oral, Q AM  thiamine, 100 mg, Oral, Daily  vitamin B-12, 500 mcg, Oral, Daily        Continuous Infusions:       PRN Meds:  •  acetaminophen  •  bisacodyl  •  diphenhydrAMINE  •  famotidine  •  hydrocortisone sodium succinate  •  hydrocortisone-bacitracin-zinc oxide-nystatin  •  oxyCODONE  •  polyethylene glycol  •  senna-docusate sodium  •  simethicone      RESULTS:  No  results found for: POCGLU  Results from last 7 days   Lab Units 04/10/23  0603 04/07/23  0843 04/06/23  1520   WBC 10*3/mm3 8.74 6.83 9.25   HEMOGLOBIN g/dL 7.7* 8.6* 8.9*   HEMATOCRIT % 23.7* 26.5* 26.6*   PLATELETS 10*3/mm3 273 357 380     Results from last 7 days   Lab Units 04/10/23  0603 04/07/23  0843   SODIUM mmol/L 132* 132*   POTASSIUM mmol/L 4.1 3.9   CHLORIDE mmol/L 94* 95*   CO2 mmol/L 31.2* 30.2*   BUN mg/dL 17 16   CREATININE mg/dL 1.12* 1.32*   CALCIUM mg/dL 10.0 10.1   GLUCOSE mg/dL 97 118*           ASSESSMENT and PLAN:    Idiopathic peripheral neuropathy    Paresthesia    History of blood clots    Chronic anticoagulation    Seizures    Anemia    Guillain-Browder syndrome    MRSA bacteremia    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   S/p IVIG x 5 days  March 30-shows improvement with her strength proximally the upper extremities.  Improvement with her hand strength.  Improved strength in the right lower extremity but continues with profound weakness in the left lower extremity.  With weakness of the left ankle, will look at probable AFO for gait activities.  Transfers are moderate assist.  Ambulated with a rolling walker up to 70 feet with gait deviations minimal moderate assist  -4/6-reconsulted neurology to see if she needs another round of IVIG.  Follow-up assessment with neurology-Previous IVIG dose was 20g daily for 5 days from 2/27-3/3.  Plan is to repeat another course of IVIG  - 4/10- fever this morning likely a side effect of IVIG.  She is scheduled to receive her final dose of this course today.  Will obtain CXR to rule out pneumonia.       Impaired mobility/impaired self care/ impaired cognition  Left greater than right lower extremity weakness greater than bilateral upper extremity weakness  April 4-strength improved in the bilateral upper extremities and right lower extremity.Weakness  looks about the same the left lower extremity.  On  the day she ambulated further last week she had utilized AFO on the left lower extremity  April 5-stronger with her left hip flexors and knee extensors today  April 6- strength and coordination improving.  Per OT note she is needing set up assist for upper body dressing with max assist for lower body dressing, PT is noticing that the patient appears stronger and is limited by weakness and fear.  Moderate assist between chair and bed but min assist for sit to stand.  Very minimal knee buckling when walking with left AFO.  Making good progress overall.     R lung masslike infiltrate with cavitary lesions/pleural effusion   S/p thoracentesis - Blood cx and pleural fluid  positive for MRSA      Chest tube placement given empyema, and she underwent thoracoscopy w/ decortication 3/8/23  -expected postoperative changes with pleural thickening and minimal pleural effusion.  The effusion is too small for intervention and will likely to continue to resolve with time.  Recommend continue good pulmonary hygiene with incentive spirometry hourly as well as increase activity/ambulation as tolerated.  March 23-appears decreased breath sounds more noticeable today on the right compared to the left.  Check follow-up chest x-ray.  On room air during the day, 1 L at night.  March 24- CXR relatively unchanged from previous, reached out to CT surgery given planned outpatient f/u on 3/28, no further imaging planned at this time as patient afebrile with normal WBC, monitor     Right-sided Chest Pain 3/24  -EKG sinus tach  -HS Troponin 25 > 24  -consider Cardiology consult if worsening  -pain reproducible with palpation, pain likely postoperative neuralgia as indicated vs. possible costochondritis, continue gabapentin, ice/heat, monitor  -4/10- patient continues to complain of right-sided rib pain.  We will touch base with thoracic surgery about nerve block for postoperative neuralgia.     Hypokalemia  -3/24 K 3.3, repleted  -3/29 K 3.7,  resolved     Mass on TTE with findings concerning for endocarditis - underwent MARIAMA 3/10/2023 which had no vegetation  RUE superficial thrombophlebitis concerning for septic phlebitis.     ID - continue 4 weeks of vancomycin as recommended by infectious disease dosed by pharmacy to achieve a trough level of 15-20 or area under the curve of 400-600 from last negative blood culture or last intervention which ever is the latest - to complete April 1, 2023     Left hip arthrocentesis March 16 to rule out any hip septic arthritis - no growth to date on fluid.    Lower back pain - MRI of spine to rule out discitis or osteomyelitis.  This did have known degenerative changes but  no infection.      DVT prophylaxis - SCDs / apixiban. History of LLE DVT - on Eliquis at home     Pain management   - Tylenol 1,000 mg three times a day/ Celebrex 100 mg q 112 hours/ gabapentin 300 mg q 8 hours Oxycodone 5 mg q 4 hours prn  March 18 - DC Celebrex 2/2 PHYLLIS, and anemia      Anxiety/ muscle spasms - Diazepam 2 mg q 6 hours prn - JONES reviewed     Seizure disorder - Lamotrigine 200 mg daily     ABLA   - March 18- Hgb 7.0 (7.3 on 3/16). Type, cross and transfuse 1 unit PRBCs. Pre-medicate with Tylenol and Benadryl. CBC in am.   March 19- Hgb 8.8 s/p 1 unit PRBcs. Hemoccult positive. On Eliquis for h/o DVT. Follow Hgb. May need GI work up.  March 20 - HGB 9.3 s/p transfusion  March 21-reviewed with internal medicine-hemoglobin stable after transfusion.  Iron studies consistent with inflammation.  No further work-up presently  March 22 - Hgb 8.9, stable  March 23-hemoglobin trend 8.3.  Continue to follow.  Recheck tomorrow  March 29 - Hgb 8.1, stable  April 6- hemoglobin 8.3.  Stable.  April 10- hemoglobin slightly down trended to 7.7.  Monitor.     PHYLLIS   March 18- Creatinine 1.36 up from 1.12. On IV Vanc and Celebrex. DC Celebrex. BMP in am.  March 19- s/p 500ml NS bolus. Creatinine 1.31.  March 23-creatinine 0.92  April 10- creatinine  downtrending to 1.12 with increased fluids.     TEAM [facility] - MARCH 21 - BED MOD X 2. TRANSFERS MAX 2. STAND PIVOT OR SQUAT PIVOT. NON-AMBULATORY. Saturday MARCH 18 GAIT 6 FEET PARALLEL BARS MOD MAX OF 2.   BATH MOD 1-2. LBD DEP. UBD MOD. EATING MIN. GROOMING MIN. TOILETING DEP.   The patient has difficulty remembering new information due to short-term memory impairments.  Initial evaluation 3.18, pt obtained a score 12/30 on SLUMS cognitive assessment indicating severe cognitive impairment/dementia, goals initiated for memory and sustaining attention  FEES completed 3.7 revealing glottic gap accounting for glottic insufficiency, dysphonia, hoarse/breathy quality, recommend targeting vocal function as appropriate d/t impact on intelligibility.    Regular/thin diet continued since FEES completion 3.7, although pt known to cough with and without PO intake, may be contributed partially to ineffective VF closure.   DRESSING FORMER CHEST TUBE SITE. CONTINUES ON PUREWICK AT NIGHT. O2 AT 1-2 LITERS AT NIGHT.  DECREASED APPETITE. ABD X-RAY THIS AM SHOWS NON-OBSTRUCTIVE BOWEL GAS PATTERN.   ELOS - 4 WEEKS     TEAM [facility] - MARCH 28 - BED MIN CTG. TRANSFER MOD ASSIST STAND PIVOT OR SQUAT PIVOT. FATIGUES IN AFERNOONS. GAIT 15 FEET RW MIN X 2. FLUCTUATING ORIENTATION. BETTER DETAIL TO TASK. MIN MOD CUES FOR REASONING TASKS. DYSPHONIA FROM COUGHING.  SLP REVIEWED VOICE HYGIENE, NOT TO DO HARSH THROAT CLEAR. BATH MOD. LBD MAX. UBD MIN. TOILETING MOD X 2. CONTINENT/INCONTINENT BLADDER. RIGHT CHEST TUBE / THORACOSCOPY SITE CARE. VARIABLE INTAKE.   ELOS - 3 WEEKS     TEAM [facility] - APRIL 4 - CONTINUES WEAK IN LLE. STRONGER IN BUE AND RIGHT KNEE EXTENSION. BED MIN MOD TO CTG. TRANSFERS MOD MAX STAND PIVOT OR SQUAT PIVOT. WORKED ON GAIT IN PARALLEL BARS MIN MOD OF 2 PERSON. CAN DO 15 FEET IF NOT SO ANXIOUS. TOILET AND SHOWER TRANSFERS MOD ASSIST. DROP ARM BEDSIDE COMMODE. AT NIGHT USING BEDPAN. USING PUREWICK AT NIGHT. BATH MIN. LBD MOD. UBD  SET UP.TOILETING MOD MAX 2.    Pt with improved ability to recall salient events  from day.Now presents with mild-moderate hoarse vocal  quality. Improved vocal intensity.  ADDRSSING DYSPHONIA. DIETICIAN REVIEWED FOOD CHOICES. GABAPENTIN FOR RIGHT RIB PAIN/INTERCOSTAL PAIN.CONTINENT BOWEL AND BLADDER WITH URGENCY.   ELOS - 2-3 WEEKS    Team Conference - 4/11/2023  Nursing: overnight using purewick,   PT: mod assist for transfers due to weight shifting, bed mobility w/wo rails standby/contact guard, shallow four inch step with rails 1-2 person assist, walking 60' with walker, trial AFO and ace wrap assist doesn't seem to help a lot now that she no longer has a hard collapse of her ankle/knee, anticipate she will walk safely with walker in their RV home but need a wheelchair for community ambulation  OT: toilet transfers mod assist with one, clothing management max-mod  SLP: mild-mod cognitive impairment, struggles with oxycodone, max cues for verbal working memory in the morning but min in the afternoon, min-mod cueing, improvements with carryover  Psychology: trouble with thought organization and concentration, trouble with working memory, uncertain of self, lacking confidence with some anxiety completing tasks  Social Work: recommending home health  Discharge Date: 1.5 weeks    CODE STATUS:  Level Of Support Discussed With: Patient  Code Status (Patient has no pulse and is not breathing): CPR (Attempt to Resuscitate)  Medical Interventions (Patient has pulse or is breathing): Full Support      Admission Status: Continues to meet requirements for inpatient admission.       Ryley Rider MD      During rounds, used appropriate personal protective equipment including mask and gloves.  Additional gown if indicated.  Mask used was standard procedure mask. Appropriate PPE was worn during the entire visit.  Hand hygiene was completed before and after.

## 2023-04-11 NOTE — PROGRESS NOTES
Team Conference - 4/11/2023  Nursing: overnight using purewick,   PT: mod assist for transfers due to weight shifting, bed mobility w/wo rails standby/contact guard, shallow four inch step with rails 1-2 person assist, walking 60' with walker, trial AFO and ace wrap assist doesn't seem to help a lot now that she no longer has a hard collapse of her ankle/knee, anticipate she will walk safely with walker in their RV home but need a wheelchair for community ambulation  OT: toilet transfers mod assist with one, clothing management max-mod  SLP: mild-mod cognitive impairment, struggles with oxycodone, max cues for verbal working memory in the morning but min in the afternoon, min-mod cueing, improvements with carryover  Psychology: trouble with thought organization and concentration, trouble with working memory, uncertain of self, lacking confidence with some anxiety completing tasks  Social Work: recommending home health  Discharge Date: 1.5 weeks

## 2023-04-11 NOTE — PROGRESS NOTES
Recreational Therapy Note    Patient Name: Louise GARCIA   MRN: [id number]    Therapeutic Recreation Eval and Treat (last 12 hours)     Therapeutic Recreation Eval & Treat     Row Name 04/11/23 1400       Therapeutic Recreation Participation    Recreation Therapy Participation games  -SS    Games other (see comments)  10,000 dice game  -    Objectives of Recreation Participation increase;sense of autonomy by choosing level of participation;positive attitudes leading to a healthy leisure lifestyle  -    Comment, Recreation Participation occ cues to recallvdirections  -    Recreation Therapy Summary of Participation active participation  -          User Key  (r) = Recorded By, (t) = Taken By, (c) = Cosigned By    Initials Name Provider Type    SS Mariel Cantu, CTRS Recreational Therapist                  DELVIN Wei  4/11/2023

## 2023-04-11 NOTE — THERAPY TREATMENT NOTE
Inpatient Rehabilitation - Physical Therapy Treatment Note       [facility]     Patient Name: Louise GARCIA  : 1965  MRN: [id number]    Today's Date: 2023                    Admit Date: 3/17/2023      Visit Dx:     ICD-10-CM ICD-9-CM   1. Impaired functional mobility, balance, gait, and endurance  Z74.09 V49.89       Patient Active Problem List   Diagnosis   • Sepsis   • Neck pain, chronic   • Upper back pain   • Paresthesia   • Cervical myelopathy   • Lower extremity weakness   • History of blood clots   • Chronic anticoagulation   • Seizures   • Anemia   • GERD (gastroesophageal reflux disease)   • Guillain-Charlotte syndrome   • Situational mixed anxiety and depressive disorder   • Mass of middle lobe of right lung   • Oral candidiasis   • Empyema of pleura   • MRSA bacteremia   • Idiopathic peripheral neuropathy       Past Medical History:   Diagnosis Date   • Degenerative disc disease, cervical    • Gestational diabetes    • H/O blood clots    • Hematemesis    • Seizures    • Septic shock        Past Surgical History:   Procedure Laterality Date   • APPENDECTOMY     • BACK SURGERY     • CHOLECYSTECTOMY     • ENDOSCOPY N/A 2016    Procedure: ESOPHAGOGASTRODUODENOSCOPY with biopsy;  Surgeon: Austen Brito MD;  Location: [facility] ENDOSCOPY;  Service:    • HYSTERECTOMY     • NECK SURGERY       approx 6 yrs ago   • THORACOSCOPY Right 3/8/2023    Procedure: THORACOSCOPY WITH DAVINCI ROBOT WITH COMPLETE DECORTICATION;  Surgeon: Chloe Cedillo MD;  Location: [facility] OR;  Service: Robotics - DaVinci;  Laterality: Right;   • TONSILLECTOMY     • TONSILLECTOMY AND ADENOIDECTOMY         PT ASSESSMENT (last 12 hours)     IRF PT Evaluation and Treatment     Row Name 23 0800          PT Time and Intention    Document Type progress note  -AE     Mode of Treatment individual therapy;physical therapy  -AE     Patient/Family/Caregiver Comments/Observations  present and took pictures of  foot of bed/bathroom and steps to enter bedroom. steps are 3 (8in) steps with horizontal grab bar on L side. no front door picture today but reports 1 small step and 2 other steps to enter with U-shaped bar/rail on L side  -AE     Row Name 04/11/23 0800          General Information    Patient Profile Reviewed yes  -AE     General Observations of Patient fatigued in PM session, eager for therapy  -AE     Existing Precautions/Restrictions fall  -AE     Limitations/Impairments safety/cognitive  -AE     Row Name 04/11/23 0800          Pain Assessment    Pretreatment Pain Rating 0/10 - no pain  -AE     Posttreatment Pain Rating 0/10 - no pain  -AE     Pain Location - Side/Orientation Left  -AE     Pain Location lower  -AE     Pain Location - extremity  -AE     Row Name 04/11/23 0800          Cognition/Psychosocial    Affect/Mental Status (Cognition) WFL  -AE     Orientation Status (Cognition) oriented x 3  -AE     Follows Commands (Cognition) follows one-step commands  -AE     Personal Safety Interventions fall prevention program maintained;gait belt;muscle strengthening facilitated;nonskid shoes/slippers when out of bed;supervised activity  -AE     Cognitive Function memory deficit  -AE     Memory Deficit (Cognition) episodic memory;procedural memory  -AE     Row Name 04/11/23 0800          Transfer Assessment/Treatment    Comment, (Transfers) initial sit<>stand mod/maxA in // bars. progressed to CGA via pulling with max cueing. seems to vary based on time of day, cognition, pain meds, etc. performed ~8 times in AM session  -AE     Row Name 04/11/23 0800          Bed-Chair Transfer    Bed-Chair [place] (Transfers) moderate assist (50% patient effort)  -AE     Assistive Device (Bed-Chair Transfers) wheelchair  -AE     Row Name 04/11/23 0800          Chair-Bed Transfer    Chair-Bed [place] (Transfers) moderate assist (50% patient effort)  -AE     Assistive Device (Chair-Bed Transfers) wheelchair  -AE     Row  Name 04/11/23 0800          Sit-Stand Transfer    Sit-Stand [place] (Transfers) minimum assist (75% patient effort);moderate assist (50% patient effort);1 person assist  -AE     Assistive Device (Sit-Stand Transfers) wheelchair  -AE     Comment, (Sit-Stand Transfer) in PM session performed from EOM slightly elevated with heavy standard bench in front to emphasize pushing and leaning forward. performed ~5x with CGA/Bianca. Still weak quads and glutes, does better with cues for increased speed  -AE     Row Name 04/11/23 0800          Stand-Sit Transfer    Stand-Sit [place] (Transfers) minimum assist (75% patient effort);moderate assist (50% patient effort)  -AE     Assistive Device (Stand-Sit Transfers) wheelchair  -AE     Row Name 04/11/23 0800          Gait/Stairs (Locomotion)    [place] Level (Gait) minimum assist (75% patient effort);1 person assist;1 person to manage equipment  -AE     Assistive Device (Gait) walker, front-wheeled  -AE     Pattern (Gait) step-through  -AE     Deviations/Abnormal Patterns (Gait) base of support, wide;left sided deviations;gait speed decreased;stride length decreased  -AE     Bilateral Gait Deviations heel strike decreased;knee buckling bilaterally  minimal L knee buckling, able to correct  -AE     Left Sided Gait Deviations heel strike decreased  -AE     [place] Level (Stairs) minimum assist (75% patient effort);1 person assist  -AE     Handrail Location (Stairs) both sides;left side (ascending)  -AE     Number of Steps (Stairs) 4in step  -AE     Ascending Technique (Stairs) step-to-step  -AE     Descending Technique (Stairs) step-to-step  -AE     Stairs, Impairments strength decreased;impaired balance;coordination impaired  -AE     Stairs Assessment/Intervention in // bars, alt heel taps onto 12 in step and 4 in step 2 x 3. no knee buckling today but difficulty lifting L leg onto tall step  -AE     Row Name 04/11/23 0800          Balance    Balance Interventions  --  LUE support only and CGA x 30 sec increments. lateral weight shifting 3 x 10 sec increments with cues for quad activation on LLE.  -AE     Row Name 04/11/23 0800          Core Strength (Therapeutic Exercise)    Comment (Core Strength Therapeutic Exercise) reaching forward pushing rolling desk away with min resistance from therapist and returned back to upright sitting. 2 x 3 with CGA/Bianca  -AE     Row Name 04/11/23 0800          Positioning and Restraints    Pre-Treatment Position sitting in chair/recliner  -AE     Post Treatment Position wheelchair  -AE     In Wheelchair sitting;call light within reach;encouraged to call for assist;exit alarm on;with family/caregiver  -AE     Row Name 04/11/23 0800          Weekly Progress Summary (PT)    Functional Goal Overall Progress (PT) progressing toward functional goals as expected  -AE     Weekly Progress Summary (PT) Pt continues to improve activity tolerance and standing balance to CGA/Bianca. Pt has progressed to ambulating 60ft with Bianca and FWW and has began stair training ascending/descending 4 in step with Bianca. Pt has 8in steps at home with L ascending handrail only. Pt still fluctuates with transfers from CGA<>modA. Skilled PT needed to address above impairments. DC recs home with intermittent assist and HHPT to follow.  -AE     Impairments Still Limiting Function (PT) balance impairment;cognitive impairment;communication deficit;coordination impairment;flexibility decreased;functional activity tolerance impairment;inability to direct own health care;motor control impaired;pain;postural control impaired;strength deficit  -AE     Recommendations (PT) intermittent supervision/assist  -AE     Plan for Continued Service After Discharge (PT) HHPT  -AE     Row Name 04/11/23 0800          Bed Mobility Goal 1 (PT-IRF)    Activity/Assistive Device (Bed Mobility Goal 1, PT-IRF) bed mobility activities, all  -AE     [place] Level (Bed Mobility Goal 1, PT-IRF) minimum  assist (75% or more patient effort)  -AE     Progress/Outcomes (Bed Mobility Goal 1, PT-IRF) goal met  -AE     Row Name 04/11/23 0800          Bed Mobility Goal 2 (PT-IRF)    Activity/Assistive Device (Bed Mobility Goal 2, PT-IRF) bed mobility activities, all  -AE     [place] Level (Bed Mobility Goal 2, PT-IRF) supervision required  -AE     Time Frame (Bed Mobility Goal 2, PT-IRF) 2 weeks  -AE     Progress/Outcomes (Bed Mobility Goal 2, PT-IRF) continuing progress toward goal  -AE     Row Name 04/11/23 0800          Transfer Goal 1 (PT-IRF)    Activity/Assistive Device (Transfer Goal 1, PT-IRF) bed-to-chair/chair-to-bed  -AE     [place] Level (Transfer Goal 1, PT-IRF) minimum assist (75% or more patient effort)  -AE     Time Frame (Transfer Goal 1, PT-IRF) 1 week  -AE     Progress/Outcomes (Transfer Goal 1, PT-IRF) continuing progress toward goal  -AE     Row Name 04/11/23 0800          Transfer Goal 2 (PT-IRF)    Activity/Assistive Device (Transfer Goal 2, PT-IRF) all transfers  -AE     [place] Level (Transfer Goal 2, PT-IRF) contact guard required  -AE     Time Frame (Transfer Goal 2, PT-IRF) 2 weeks  -AE     Progress/Outcomes (Transfer Goal 2, PT-IRF) goal revised this date  -AE     Row Name 04/11/23 0800          Transfer Goal 3 (PT-IRF)    Activity/Assistive Device (Transfer Goal 3, PT-IRF) car transfer  -AE     [place] Level (Transfer Goal 3, PT-IRF) minimum assist (75% or more patient effort)  -AE     Time Frame (Transfer Goal 3, PT-IRF) 2 weeks  -AE     Progress/Outcomes (Transfer Goal 3, PT-IRF) continuing progress toward goal  -AE     Row Name 04/11/23 0800          Gait/Walking Locomotion Goal 1 (PT-IRF)    Activity/Assistive Device (Gait/Walking Locomotion Goal 1, PT-IRF) gait (walking locomotion);walker, rolling  -AE     Gait/Walking Locomotion Distance Goal 1 (PT-IRF) 20  -AE     [place] Level (Gait/Walking Locomotion Goal 1, PT-IRF) moderate assist (50-74% patient  effort);tactile cues required  -AE     Progress/Outcomes (Gait/Walking Locomotion Goal 1, PT-IRF) goal met  -AE     Row Name 04/11/23 0800          Stairs Goal 1 (PT-IRF)    Activity/Assistive Device (Stairs Goal 1, PT-IRF) stairs, all skills;using handrail, left;using handrail, right  -AE     Number of Stairs (Stairs Goal 1, PT-IRF) 3  -AE     [place] Level (Stairs Goal 1, PT-IRF) minimum assist (75% or more patient effort)  -AE     Time Frame (Stairs Goal 1, PT-IRF) 2 weeks  -AE     Progress/Outcomes (Stairs Goal 1, PT-IRF) continuing progress toward goal  -AE     Row Name 04/11/23 0800          Strength Goal 1 (PT-IRF)    Strength Goal 1 (PT-IRF) Pt to demonstrate B LE strength of at leat 3/5 throughout.  -AE     Progress/Outcomes (Strength Goal 1, PT-IRF) goal met  -AE     Row Name 04/11/23 0800          Balance Goal 1 (PT)    Activity/Assistive Device (Balance Goal 1, PT) sitting, static  -AE     [place] Level/Cues Needed (Balance Goal 1, PT) conditional independence  -AE     Time Frame (Balance Goal 1, PT) 1 week  -AE     Progress/Outcomes (Balance Goal 1, PT) continuing progress toward goal  -AE           User Key  (r) = Recorded By, (t) = Taken By, (c) = Cosigned By    Initials Name Provider Type    AE Deanna Carr, PT Physical Therapist              Wound 03/08/23 1917 Right lateral chest Incision (Active)   Dressing Appearance open to air 04/11/23 0829   Closure Liquid skin adhesive 04/11/23 0829   Base clean;dry 04/11/23 0829   Periwound pink 04/11/23 0829   Drainage Amount none 04/11/23 0829   Care, Wound cleansed with;soap and water 04/11/23 0829   Dressing Care open to air 04/11/23 0829   Periwound Care dry periwound area maintained 04/11/23 0829       Wound 03/11/23 1530 Other (See comments) other (see comments) pubis Abrasion (Active)   Closure Open to air 04/11/23 0829   Base red;scab 04/11/23 0829   Drainage Amount none 04/11/23 0829   Dressing Care open to air 04/11/23 0829    Periwound Care barrier ointment applied 04/11/23 0829       Wound 03/20/23 1527 Bilateral upper gluteal MASD (Moisture associated skin damage) (Active)   Dressing Appearance open to air 04/10/23 2114   Closure Open to air 04/11/23 0829   Base blanchable;clean;dry 04/11/23 0829   Drainage Amount none 04/11/23 0829   Care, Wound cleansed with;soap and water 04/11/23 0829   Dressing Care open to air 04/11/23 0829   Periwound Care barrier ointment applied 04/11/23 0829     Physical Therapy Education     Title: PT OT SLP Therapies (Done)     Topic: Physical Therapy (Done)     Point: Mobility training (Done)     Learning Progress Summary           Patient Acceptance, E, VU,DU,NR by CLOVERK at 4/8/2023 1528    Acceptance, E, VU by WN at 4/4/2023 2329    Acceptance, E, VU by WN at 4/4/2023 0322    Acceptance, E,D, VU,DU by DP at 4/1/2023 1352    Acceptance, E, VU,DU by MG at 3/31/2023 0825    Acceptance, E, VU,DU by MG at 3/30/2023 0956    Acceptance, E,D, VU,DU by DP at 3/29/2023 1148    Nonacceptance, E,D, VU,DU by DP at 3/28/2023 1144    Acceptance, E,D, VU,DU by DP at 3/27/2023 1601    Acceptance, E,D, VU,NR by EE at 3/25/2023 1423    Acceptance, E,D, NR,VU,DU by DP at 3/21/2023 1448    Acceptance, E, NR by JK at 3/20/2023 1513    Acceptance, E,TB, VU,NR by KP at 3/18/2023 1643   Family Acceptance, E, VU by WN at 4/4/2023 2329    Acceptance, E, VU by WN at 4/4/2023 0322                   Point: Home exercise program (Done)     Learning Progress Summary           Patient Acceptance, E, VU by WN at 4/4/2023 2329    Acceptance, E, VU by WN at 4/4/2023 0322    Acceptance, E,D, VU,DU by DP at 4/1/2023 1352    Acceptance, E, VU,DU by MG at 3/31/2023 0825    Acceptance, E, VU,DU by MG at 3/30/2023 0956    Acceptance, E,D, VU,DU by DP at 3/29/2023 1148    Nonacceptance, E,D, VU,DU by DP at 3/28/2023 1144    Acceptance, E,D, VU,DU by DP at 3/27/2023 1601    Acceptance, E,D, NR,VU,DU by DP at 3/21/2023 1448    Acceptance, E, NR by  JK at 3/20/2023 1513   Family Acceptance, E, VU by WN at 4/4/2023 2329    Acceptance, E, VU by WN at 4/4/2023 0322                   Point: Body mechanics (Done)     Learning Progress Summary           Patient Acceptance, E, VU,DU,NR by JK at 4/8/2023 1528    Acceptance, E, VU by WN at 4/4/2023 2329    Acceptance, E, VU by WN at 4/4/2023 0322    Acceptance, E,D, VU,DU by DP at 4/1/2023 1352    Acceptance, E, VU,DU by MG at 3/31/2023 0825    Acceptance, E, VU,DU by MG at 3/30/2023 0956    Acceptance, E,D, VU,DU by DP at 3/29/2023 1148    Nonacceptance, E,D, VU,DU by DP at 3/28/2023 1144    Acceptance, E,D, VU,DU by DP at 3/27/2023 1601    Acceptance, E,D, VU,NR by EE at 3/25/2023 1423    Acceptance, E,D, NR,VU,DU by DP at 3/21/2023 1448   Family Acceptance, E, VU by WN at 4/4/2023 2329    Acceptance, E, VU by WN at 4/4/2023 0322                   Point: Precautions (Done)     Learning Progress Summary           Patient Acceptance, E, VU by WN at 4/4/2023 2329    Acceptance, E, VU by WN at 4/4/2023 0322    Acceptance, E,D, VU,DU by DP at 4/1/2023 1352    Acceptance, E, VU,DU by MG at 3/31/2023 0825    Acceptance, E, VU,DU by MG at 3/30/2023 0956    Acceptance, E,D, VU,DU by DP at 3/29/2023 1148    Nonacceptance, E,D, VU,DU by DP at 3/28/2023 1144    Acceptance, E,D, VU,DU by DP at 3/27/2023 1601    Acceptance, E,D, VU,NR by EE at 3/25/2023 1423    Acceptance, E,D, NR,VU,DU by DP at 3/21/2023 1448   Family Acceptance, E, VU by WN at 4/4/2023 2329    Acceptance, E, VU by WN at 4/4/2023 0322                               User Key     Initials Effective Dates Name Provider Type Discipline    EE 06/16/21 -  Melinda Mann, PT Physical Therapist PT    JK 06/16/21 -  Juana Veloz, PT Physical Therapist PT    KP 06/16/21 -  Yolanda Shannon, PT Physical Therapist PT    MG 05/24/22 -  Yu Villalobos, PT Physical Therapist PT    WN 08/23/22 -  Jose Carrillo, RN Registered Nurse Nurse    DP 08/24/21 -  Papa Marsh, PT  Physical Therapist PT                PT Recommendation and Plan                          Time Calculation:      PT Charges     Row Name 04/11/23 1343 04/11/23 1050          Time Calculation    Start Time 1230  -AE 1000  -AE     Stop Time 1300  -AE 1030  -AE     Time Calculation (min) 30 min  -AE 30 min  -AE     PT Received On 04/11/23  -AE 04/11/23  -AE     PT - Next Appointment 04/12/23  -AE 04/12/23  -AE     PT Goal Re-Cert Due Date -- 04/18/23  -AE        Time Calculation- PT    Total Timed Code Minutes- PT 30 minute(s)  -AE 30 minute(s)  -AE           User Key  (r) = Recorded By, (t) = Taken By, (c) = Cosigned By    Initials Name Provider Type    AE Deanna Carr, PT Physical Therapist                Therapy Charges for Today     Code Description Service Date Service Provider Modifiers Qty    39189320657 HC PT THERAPEUTIC ACT EA 15 MIN 4/10/2023 Deanna Carr, PT GP 1    39882349106 HC PT NEUROMUSC RE EDUCATION EA 15 MIN 4/10/2023 Deanna aCrr, PT GP 1    96514639932 HC PT THER PROC EA 15 MIN 4/10/2023 Deanna Carr, PT GP 1    62758839597 HC GAIT TRAINING EA 15 MIN 4/10/2023 Deanna Carr, PT GP 1    56323721284 HC GAIT TRAINING EA 15 MIN 4/11/2023 Deanna Carr, PT GP 1    41716609658 HC PT NEUROMUSC RE EDUCATION EA 15 MIN 4/11/2023 Deanna Carr, PT GP 1    92803080055 HC PT NEUROMUSC RE EDUCATION EA 15 MIN 4/11/2023 Deanna Carr, PT GP 1    48399019308 HC PT THERAPEUTIC ACT EA 15 MIN 4/11/2023 Deanna Carr, PT GP 1                   Deanna Carr PT  4/11/2023

## 2023-04-11 NOTE — PLAN OF CARE
Goal Outcome Evaluation:  Plan of Care Reviewed With: patient             Problem: Rehabilitation (IRF) Plan of Care  Goal: Plan of Care Review  Outcome: Ongoing, Progressing  Flowsheets (Taken 4/11/2023 0106)  Plan of Care Reviewed With: patient  Outcome Evaluation:  Louise is alert and oriented x 4. Can be forgetful at times. Meds whole PO with AS, thins. Continent of bowel and bladder. Pt does have frequency and urgency. Cream applied to buttocks; skin intact, pink and blanchable. Seizure precautions in place. No fever noted this shift. PRN Amina administered at 2115 r/t right side discomfort. On RA this evening, has been spot checked throughout the night; sats WNL. No unsafe behaviors. Resting well with eyes closed. Call light within reach.

## 2023-04-11 NOTE — PLAN OF CARE
Goal Outcome Evaluation:           Progress: improving  Outcome Evaluation: A&OX4. Forgetful. Difficulty with word-finding at times. Idiopathic peripheral neuropathy. Full code. Hx. chronic anemia and seizures. Daily weight. 0.5 L O2 at nite, for comfort.  Assistx2 with the wheelchair. Very weak in the legs. Continent and incontinent of B&B. Last BM 4/9. Wears a brief. Purewick at night. On scheduled gabapentin and tylenol. Has PRN Aura 2.5 mg. IV in R. hand. Seizure precautions. Siderails padded. On-going pain is better today. Diet: Reg, thins. Ate better at meals. Is drinking boost shakes. Has napped on and off in between and after therapies. Runs tachy.  Participated fully in therapy. Uses a bedpan. Wears glasses.  at bedside.

## 2023-04-11 NOTE — PROGRESS NOTES
Reviewed progress, goals, and target d/c date for 4/21 per team conference report with patient.  not present due to going for medical test today. Patient feels she is making progress but has concerns about going home until she can do more for herself. She has concerns about  providing the care she needs both physically and emotionally as he gets frustrated easily at times. Discussed their travel trailer and not being accessible for her needs, especially if she cannot do steps by time of d/c. Discussed any other options to stay with family and she stated her son lives in [place] but he works so that would not be option. She asked about option of going to another facility vs going home. Discussed looking at her progress and if making good progress could possibly extend stay here if insurance would okay this. If not, could look at options for SNF closer to home. She does not think her  will be in favor of SNF option. Will plan to review patient's progress with him tomorrow and discuss.

## 2023-04-11 NOTE — THERAPY TREATMENT NOTE
Inpatient Rehabilitation - Speech Language Pathology Treatment Note    [facility]     Patient Name: Louise GARCIA  : 1965  MRN: [id number]    Today's Date: 2023                   Admit Date: 3/17/2023       Visit Dx:      ICD-10-CM ICD-9-CM   1. Impaired functional mobility, balance, gait, and endurance  Z74.09 V49.89       Patient Active Problem List   Diagnosis   • Sepsis   • Neck pain, chronic   • Upper back pain   • Paresthesia   • Cervical myelopathy   • Lower extremity weakness   • History of blood clots   • Chronic anticoagulation   • Seizures   • Anemia   • GERD (gastroesophageal reflux disease)   • Guillain-Sidney syndrome   • Situational mixed anxiety and depressive disorder   • Mass of middle lobe of right lung   • Oral candidiasis   • Empyema of pleura   • MRSA bacteremia   • Idiopathic peripheral neuropathy       Past Medical History:   Diagnosis Date   • Degenerative disc disease, cervical    • Gestational diabetes    • H/O blood clots    • Hematemesis    • Seizures    • Septic shock        Past Surgical History:   Procedure Laterality Date   • APPENDECTOMY     • BACK SURGERY     • CHOLECYSTECTOMY     • ENDOSCOPY N/A 2016    Procedure: ESOPHAGOGASTRODUODENOSCOPY with biopsy;  Surgeon: Austen Brito MD;  Location: [facility] ENDOSCOPY;  Service:    • HYSTERECTOMY     • NECK SURGERY       approx 6 yrs ago   • THORACOSCOPY Right 3/8/2023    Procedure: THORACOSCOPY WITH DAVINCI ROBOT WITH COMPLETE DECORTICATION;  Surgeon: Chloe Cedillo MD;  Location: [facility] OR;  Service: Robotics - DaVinci;  Laterality: Right;   • TONSILLECTOMY     • TONSILLECTOMY AND ADENOIDECTOMY         SLP Recommendation and Plan                                                            SLP EVALUATION (last 72 hours)     SLP SLC Evaluation     Row Name 23 1430 23 0930 04/10/23 1330 04/10/23 0945 04/10/23 0800       Communication Assessment/Intervention    Document Type therapy note (daily  note)  -AL therapy note (daily note)  -AL therapy note (daily note)  -AL therapy note (daily note)  -AL progress note/recertification  -AL    Patient/Family/Caregiver Comments/Observations Pt participated well.  -AL Pt participated well. She reported feeling nervous about going home.  -AL Pt participated well.  -AL Session began 15 minutes late due to MD assessment of patient. Pt with fever today. Pt took pain meds prior to session. She reported difficulty with focusing due to pain meds.  -AL --       Pain Scale: Numbers Pre/Post-Treatment    Pretreatment Pain Rating 0/10 - no pain  -AL 4/10  -AL 0/10 - no pain  -AL 0/10 - no pain  -AL --    Pre/Posttreatment Pain Comment -- Right ribs  -AL -- -- --          User Key  (r) = Recorded By, (t) = Taken By, (c) = Cosigned By    Initials Name Effective Dates    Nessa Kraus, MS CCC-SLP 06/16/21 -                    EDUCATION    The patient has been educated in the following areas:       Cognitive Impairment.             SLP GOALS     Row Name 04/11/23 1430 04/11/23 0930 04/10/23 1330       Phonation Goal 1 (SLP)    Progress/Outcomes (Phonation Goal 1, SLP) -- -- good progress toward goal  -AL    Comment (Phonation Goal 1, SLP) -- -- Pt exhibited mild-moderate hoarse vocal quality noted. Noted to exhibit increased strong throat clearing in PM session. SLP provided education regarding vocal hygiene. Pt reported increased urge to throat clear since her IVIG treatments.  -AL       Attention Goal 1 (SLP)    Improve Attention by Goal 1 (SLP) complete sustained attention task;80%;with minimal cues (75-90%)  -AL -- --    Time Frame (Attention Goal 1, SLP) 1 week  -AL -- --    Progress/Outcomes (Attention Goal 1, SLP) good progress toward goal  -AL -- --    Comment (Attention Goal 1, SLP) Medicine management with pill box: 100% with NO cues  -AL -- --       Memory Skills Goal 1 (SLP)    Improve Memory Skills Through Goal 1 (SLP) use memory strategies;use external memory  aid;recall details of the day;80%;with moderate cues (50-74%)  -AL -- use memory strategies;use external memory aid;recall details of the day;80%;with moderate cues (50-74%)  -AL    Time Frame (Memory Skills Goal 1, SLP) 1 week  -AL -- 1 week  -AL    Progress/Outcomes (Memory Skills Goal 1, SLP) good progress toward goal  -AL -- goal ongoing  -AL    Comment (Memory Skills Goal 1, SLP) Recalled 3/3 errands after 15 minutes and at end of session with NO cues. 4 word list retention task: 70% with NO cues, 100% with MIN cues  -AL -- 4 unit list retention task: 60% with NO cues, 100% with MI Ncues  -AL       Organizational Skills Goal 1 (SLP)    Improve Thought Organization Through Goal 1 (SLP) -- completing a divergent naming task;80%;with minimal cues (75-90%)  -AL completing a divergent naming task;80%;with minimal cues (75-90%)  -AL    Time Frame (Thought Organization Skills Goal 1, SLP) -- 1 week  -AL --    Progress/Outcomes (Thought Organization Skills Goal 1, SLP) -- goal ongoing  -AL goal ongoing  -AL    Comment (Thought Organization Skills Goal 1, SLP) -- Expensive items: 5 with NO cues, 8 with MIN cues  -AL Ranged from 3-4 with NO cues, 8 with MIN-MOD cues.  -AL       Reasoning Goal 1 (SLP)    Improve Reasoning Through Goal 1 (SLP) complete deductive reasoning task;80%;with minimal cues (75-90%)  -AL complete deductive reasoning task;80%;with minimal cues (75-90%)  -AL --    Time Frame (Reasoning Goal 1, SLP) 1 week  -AL 1 week  -AL --    Progress/Outcomes (Reasoning Goal 1, SLP) good progress toward goal  -AL good progress toward goal  -AL --    Comment (Reasoning Goal 1, [facility]) [place] reasoning task: 2/12 with NO cues, 12/12 with MIN-MOD cues  -AL Furniture delivery reasoning task: 3/8 with NO Cues, 8/8 with MIN-MAX cues.  -AL --    Row Name 04/10/23 0945 04/10/23 0800          Patient will demonstrate functional cognitive-linguistic skills for return to discharge environment    [place] -- with  minimal cues  -AL     Time frame -- by discharge  -AL     Progress/Outcomes -- good progress toward goal  -AL        Word Retrieval Skills Goal 1 (SLP)    Improve Word Retrieval Skills By Goal 1 (SLP) -- completing functional word finding tasks;90%;independently (over 90% accuracy)  -AL     Progress/Outcomes (Word Retrieval Goal 1, SLP) -- goal met  -AL        Ability to Construct Phrase and Sentence Level Response Goal 1 (SLP)    Improve Ability to Construct Phrase and Sentence Level Responses By Goal 1 (SLP) -- answering question with phrase;constructing a sentence with a key word;90%;independently (over 90% accuracy)  -AL     Progress/Outcomes (Phrase and Sentence Level Response Goal 1, SLP) -- goal met  -AL        Phonation Goal 1 (SLP)    Improve Phonation By Goal 1 (SLP) -- using appropriate tone focus;90%;with minimal cues (75-90%)  -AL     Progress/Outcomes (Phonation Goal 1, SLP) -- good progress toward goal  -AL        Attention Goal 1 (SLP)    Improve Attention by Goal 1 (SLP) -- complete sustained attention task;80%;with minimal cues (75-90%)  -AL     Time Frame (Attention Goal 1, SLP) -- 1 week  -AL     Progress/Outcomes (Attention Goal 1, SLP) -- good progress toward goal  -AL        Memory Skills Goal 1 (SLP)    Improve Memory Skills Through Goal 1 (SLP) use memory strategies;use external memory aid;recall details of the day;80%;with moderate cues (50-74%)  -AL use memory strategies;use external memory aid;recall details of the day;80%;with moderate cues (50-74%)  -AL     Time Frame (Memory Skills Goal 1, SLP) 1 week  -AL 1 week  -AL     Progress/Outcomes (Memory Skills Goal 1, SLP) goal met  -AL goal met  -AL     Comment (Memory Skills Goal 1, SLP) Recalled 3/3 errands after 10 minutes with NO cues. Goal met x2. Working memory for 4 unit list retention: 3/8 with NO cues, 7/8 with MOD-MAX cues  -AL --        Organizational Skills Goal 1 (SLP)    Improve Thought Organization Through Goal 1 (SLP)  completing a divergent naming task;80%;with minimal cues (75-90%)  -AL completing a divergent naming task;80%;with minimal cues (75-90%)  -AL     Progress/Outcomes (Thought Organization Skills Goal 1, SLP) good progress toward goal  -AL good progress toward goal  -AL     Comment (Thought Organization Skills Goal 1, SLP) Loud: 3 with NO cues, 4 with additional time.  -AL --        Reasoning Goal 1 (SLP)    Improve Reasoning Through Goal 1 (SLP) -- complete deductive reasoning task;80%;with minimal cues (75-90%)  -AL     Progress/Outcomes (Reasoning Goal 1, SLP) -- good progress toward goal  -AL     Comment (Reasoning Goal 1, SLP) [place] visuospatial reasoning task: 50% with NO cues, 100% with MIN-MAX cues. Required MIN-MOD cues for sustained attention today.  -AL --        Functional Math Skills Goal 1 (SLP)    Improve Functional Math Skills Through Goal 1 (SLP) -- complete functional math task;80%;with minimal cues (75-90%)  -AL     Progress/Outcomes (Functional Math Skills Goal 1, SLP) -- goal ongoing  -AL           User Key  (r) = Recorded By, (t) = Taken By, (c) = Cosigned By    Initials Name Provider Type    Nessa Kraus MS CCC-SLP Speech and Language Pathologist                            Time Calculation:        Time Calculation- SLP     Row Name 04/11/23 1522 04/11/23 1239          Time Calculation- SLP    SLP Start Time 1430  -AL 0930  -AL     SLP Stop Time 1500  -AL 1000  -AL     SLP Time Calculation (min) 30 min  -AL 30 min  -AL           User Key  (r) = Recorded By, (t) = Taken By, (c) = Cosigned By    Initials Name Provider Type    Nessa Kraus MS CCC-SLP Speech and Language Pathologist                  Therapy Charges for Today     Code Description Service Date Service Provider Modifiers Qty    56793753120 HC ST DEV OF COGN SKILLS INITIAL 15 MIN 4/10/2023 Nessa Spangler MS CCC-SLP  1    94882522121 HC ST DEV OF COGN SKILLS EACH ADDT'L 15 MIN 4/10/2023 Nessa Spangler MS  CCC-SLP  3    [id number]  ST DEV OF COGN SKILLS INITIAL 15 MIN 4/11/2023 Nessa Spangler, MS CCC-SLP  1    [id number]  ST DEV OF COGN SKILLS EACH ADDT'L 15 MIN 4/11/2023 Nessa Spangler, MS CCC-SLP  3                           Nessa Spangler, MS CCC-SLP  4/11/2023

## 2023-04-11 NOTE — PROGRESS NOTES
Case Management  Inpatient Rehabilitation Team Conference    Conference Date/Time: 4/11/2023 7:57:03 AM    Team Conference Attendees:  Dr. Ryley Naqvi, Dillan King, Pharmacist  Mariel Rodriguez, ISIDROW  Deanna Carr, PT  Heaven Villareal, OT  Nessa Spangler, SLP  Mariel Cantu, CTRS  Myesha De Guzman RD, LD  Ana Burdick, RN  Gaviota Paris, RN    Demographics            Age: 58Y            Gender: Female    Admission Date: 3/17/2023 8:27:00 PM  Rehabilitation Diagnosis:  Idiopathic peripheral neuropathy  Past Medical History: Past Medical History:  DiagnosisDate  ?Degenerative disc disease, cervical?  ?Gestational diabetes?  ?H/O blood clots?  ?Hematemesis?  ?Seizures (HCC)?  ?Septic shock (HCC)?    ?  ?  Surgical History  Past Surgical History:  ProcedureLateralityDate  ?APPENDECTOMY??  ?BACK SURGERY??  ?CHOLECYSTECTOMY??  ?ENDOSCOPYN/A8/30/2016  ?Procedure: ESOPHAGOGASTRODUODENOSCOPY with biopsy;  Surgeon: Austen Brito MD;  [facility]: [facility] ENDOSCOPY;  Service:  ?HYSTERECTOMY??  ?NECK SURGERY??  ? approx 6 yrs ago  ?THORACOSCOPYRight3/8/2023  ?Procedure: THORACOSCOPY WITH DAVINCI ROBOT WITH COMPLETE DECORTICATION;  Surgeon: Chloe Cedillo MD;  Location: [facility] OR;  Service: Robotics -  DaVinci;  Laterality: Right;  ?TONSILLECTOMY??  ?TONSILLECTOMY AND ADENOIDECTOMY?1975    ?      Plan of Care  Anticipated Discharge Date/Estimated Length of Stay: ELOS: 2-3 weeks  Anticipated Discharge Destination: Community discharge with assistance  Discharge Plan : Patient lives with  in  travel trailer. 3 fold down  steps with rail to enter.  Family conference held with patient and  on 4/7.  D/C plan is home with .  not currently working.  Medical Necessity Expected Level Rationale: Goals are to achieve a level of CGA  w/ ADL's and mobility..  Rehabilitation prognosis fair.  Medical prognosis fair.  Intensity and Duration: an average of 3 hours/5 days per  week  Medical Supervision and 24 Hour Rehab Nursing: x  Physical Therapy: x  PT Intensity/Duration: 1 hour / day, 5 days / week, for approximately  indetermine length of stay.  Occupational Therapy: x  OT Intensity/Duration: 1 hour / day, 5 days / week, for approximately  indetermine length of stay.  Speech and Language Therapy: x  SLP Intensity/Duration: 1 hour / day, 5 days / week, for approximately  indetermine length of stay.  Social Work: x  Therapeutic Recreation: x  Psychology: x  Registered Dietician: x  Updated (if changes indicated)    Anticipated Discharge Date/Estimated Length of Stay:   ELOS: DC 4/21    Based on the patient's medical and functional status, their prognosis and  expected level of functional improvement is: Goals are to achieve a level of CGA  w/ ADL's and mobility..  Rehabilitation prognosis fair.  Medical prognosis fair.      Interdisciplinary Problem/Goals/Status    All Rehab Problems:  Sphincter Control    [RN] Bladder Management(Active)  Current Status(04/10/2023): Bladder urgency  Weekly Goal(04/18/2023): decrease in episodes of incontinence  Discharge Goal: Continent 100%    [RN] Bowel Management(Active)  Current Status(04/10/2023): Patient is 100% continent of bowel  Weekly Goal(04/18/2023): Patient will maintain a daily bowel pattern.  Discharge Goal: Patient will maintain 100% continence of bowel        Psychosocial    [RN] Coping/Adjustment(Active)  Current Status(04/10/2023): Anxiety r/t uncertainty of disease course  Weekly Goal(04/18/2023): Allow opportunity to express concerns regarding current  status  Discharge Goal: Patient/family will verbalize decreased feelings of anxiety.        Body Systems    [RN] Neurological(Active)  Current Status(04/10/2023): Impaired physical mobility r/t decreased muscle  strength/control and limited ROM  Weekly Goal(04/18/2023): Patient will have increased ROM  Discharge Goal: Patient will have improved strength and function of BLE and  B  hands.        Safety    [RN] Potential for Injury(Active)  Current Status(04/10/2023): Risk for falls  Weekly Goal(04/18/2023): Family/Caregivers regarding safety precautions and need  for close supervision  Discharge Goal: Patient/family will be aware of risk of fall and safety in the  home setting.        Cognition    [ST] Memory(Active)  Current Status(04/10/2023): Pt able to recall after 10 minutes with NO cues. Now  able to carry-over information from previous day. Requires overall MOD cues to  complete verbal working memory tasks (x1).  Weekly Goal(04/11/2023): Will recall 3 errands after 15 minutes with NO cues.  Discharge Goal: The patient will improve memory necessary for home-based  participation when given appropriate supervision and cuing.        Self Care    [OT] Bathing(Active)  Current Status(04/10/2023): MIN  Weekly Goal(04/11/2023): CGA  Discharge Goal: CGA    [OT] Dressing (Lower)(Active)  Current Status(04/10/2023): MOD  Weekly Goal(04/11/2023): MIN  Discharge Goal: MIN    [OT] Dressing (Upper)(Active)  Current Status(04/10/2023): set up  Weekly Goal(04/11/2023): set up  Discharge Goal: Set up    [OT] Eating(Active)  Current Status(04/10/2023): set up  Weekly Goal(04/11/2023): set up  Discharge Goal: Mod I    [OT] Grooming(Active)  Current Status(04/10/2023): set up  Weekly Goal(04/11/2023): supervision  Discharge Goal: supervision    [OT] Toileting(Active)  Current Status(04/10/2023): MOD A x 2  Weekly Goal(04/11/2023): MOD  Discharge Goal: MIN/CGA        Mobility    [OT] Tub/Shower Transfers(Active)  Current Status(04/10/2023): MOD A x 1  Weekly Goal(04/11/2023): Min  Discharge Goal: CGA    [OT] Bed/Chair/Wheelchair(Active)  Current Status(03/18/2023): Max A x2  Weekly Goal(03/25/2023): Mod A x2  Discharge Goal: CGA/Min A x1    [OT] Toilet Transfers(Active)  Current Status(04/10/2023): MOD A X 1  Weekly Goal(04/11/2023): Min  Discharge Goal: CGA    [PT] Stairs(Active)  Current  Status(04/10/2023): 4in step B rails Bianca x 2  Weekly Goal(04/17/2023): PT only  Discharge Goal: 8in step x 3, single rails, Bianca    [PT] Walk(Active)  Current Status(04/10/2023): 60' using FWW Bianca VC with w/c follow  Weekly Goal(04/17/2023): PT only  Discharge Goal: 80ft CGA rwx    [PT] Bed/Chair/Wheelchair(Active)  Current Status(04/10/2023): ModA squat/stand pivot  Weekly Goal(04/17/2023): min/modA  Discharge Goal: Bianca    [PT] Bed Mobility(Active)  Current Status(04/10/2023): SBA/CGA  Weekly Goal(04/17/2023): SBA  Discharge Goal: CGA        Communication    [ST] Expression(Resolved)  Current Status(03/27/2023): No overt word finding difficulty observed in  conversation.  Weekly Goal(03/28/2023): Goal met  Discharge Goal: Functional language to express wants, needs, ideas, feelings,  concepts with 100% accuracy NO cues for compensations    [ST] Voice(Active)  Current Status(04/10/2023): Mild-moderate hoarse vocal quality. Stimulable to  improve vocal quality with cues for diaphragmatic breathing and frontal tone  focus. Now requires NO cues for vocal hygience.  Weekly Goal(04/18/2023): Complete frontal tone focus voice exercises with MIN  cues.  Discharge Goal: Functional voice for utilization in all settings, 90%  intelligibility NO cues for compensatory strategies        Comments: 3/21 Bed mob Mod A x 2 w/ rails and HOB elevated, Max A x 2 for stand  pivot or squat pivot transfer, Max A x 2 for toilet transfer.  Dep for  toileting.  Difficulty with remembering new information due to short term memory  impairments.  Word finding tasks are 50% w/ mod cues.  Bladder urgency,  continent of bowel.    3/28 Bed Mob Min to CGA w/ HOB elevated.  Mod A for stand pivot or squat with  transfers to bed.  Amb 15' using FWW Min A x 2 VC's w/ w/c follow.  Mod A x 2  for toilet transfer.  Mod A bathing, Mod A x 2 toileting.  Moderate hoarse vocal  quality.  No overt word finding observed in conversation.  Bladder  "urgency,  continent of bowel.    4/4 Bed mob Min / Mod to CGA w/ HOB elevated.  Mod / Max w/ stand pivot or squat  pivot transfers, amb 5 ' using FWW Min A x2 VC w/ w/c follow.  Mod A x 2 toilet  transfer and shower transfer.  Mild - Mod hoarse vocal quality.  Bladder urgency  and continent of bowel.  Anxiety.  Getting family conference scheduled.    4/11 Bed mob SBA / CGA, Mod A squat / stand pivot w/ transfers, can do a 4\" step  w/ B rails Min A x 2, amb 60' using FWW Min A VC's w/ w/c follow.  Mod A x 1  toilet transfer.  Bathing Min A, Mod A x 2 toileting.  Mild - Mod memory  impairment.  No overt word finding difficulty.  Mild - Mod hoarse vocal quality.   Bladder urgency, continent of bowel.    Signed by: Ana Burdick RN    Physician CoSigned By: Ryley Rider 04/11/2023 12:33:49    "

## 2023-04-11 NOTE — THERAPY TREATMENT NOTE
Inpatient Rehabilitation - Occupational Therapy Treatment Note    [facility]     Patient Name: Louise GARCIA  : 1965  MRN: [id number]    Today's Date: 2023                 Admit Date: 3/17/2023         ICD-10-CM ICD-9-CM   1. Impaired functional mobility, balance, gait, and endurance  Z74.09 V49.89       Patient Active Problem List   Diagnosis   • Sepsis   • Neck pain, chronic   • Upper back pain   • Paresthesia   • Cervical myelopathy   • Lower extremity weakness   • History of blood clots   • Chronic anticoagulation   • Seizures   • Anemia   • GERD (gastroesophageal reflux disease)   • Guillain-[place] syndrome   • Situational mixed anxiety and depressive disorder   • Mass of middle lobe of right lung   • Oral candidiasis   • Empyema of pleura   • MRSA bacteremia   • Idiopathic peripheral neuropathy       Past Medical History:   Diagnosis Date   • Degenerative disc disease, cervical    • Gestational diabetes    • H/O blood clots    • Hematemesis    • Seizures    • Septic shock        Past Surgical History:   Procedure Laterality Date   • APPENDECTOMY     • BACK SURGERY     • CHOLECYSTECTOMY     • ENDOSCOPY N/A 2016    Procedure: ESOPHAGOGASTRODUODENOSCOPY with biopsy;  Surgeon: Austen Brito MD;  Location: [facility] ENDOSCOPY;  Service:    • HYSTERECTOMY     • NECK SURGERY       approx 6 yrs ago   • THORACOSCOPY Right 3/8/2023    Procedure: THORACOSCOPY WITH DAVINCI ROBOT WITH COMPLETE DECORTICATION;  Surgeon: Chloe Cedillo MD;  [facility]: [facility] OR;  Service: Robotics - DaVinci;  Laterality: Right;   • TONSILLECTOMY     • TONSILLECTOMY AND ADENOIDECTOMY               [facility] OT ASSESSMENT FLOWSHEET (last 12 hours)     IRF OT Evaluation and Treatment     Row Name 23 1533          OT Time and Intention    Document Type daily treatment  -AF     Mode of Treatment occupational therapy  -AF     Patient Effort good  -AF     Symptoms Noted During/After Treatment fatigue  -AF     Row Name  04/11/23 1533          General Information    Patient/Family/Caregiver Comments/Observations pt sitting up in w/c in room in AM and PM sessions  -AF     General Observations of Patient attempted to sign  off on commode transfers but  had appointment today  -AF     Existing Precautions/Restrictions fall  contact precautions  -AF     Row Name 04/11/23 1533          Pain Assessment    Pretreatment Pain Rating 0/10 - no pain  -AF     Posttreatment Pain Rating 0/10 - no pain  -AF     Row Name 04/11/23 1533          Cognition/Psychosocial    Affect/Mental Status (Cognition) WFL  -AF     Orientation Status (Cognition) oriented x 3  -AF     Follows Commands (Cognition) follows one-step commands  -AF     Personal Safety Interventions fall prevention program maintained;gait belt;nonskid shoes/slippers when out of bed  -AF     Cognitive Function memory deficit  -AF     Memory Deficit (Cognition) episodic memory;procedural memory  -AF     Safety Deficit (Cognition) insight into deficits/self-awareness;judgment  -AF     Comment, Cognition assistance with sequencing commode transfer and sit to stands, but poor time awareness thought she stood for 20 min when she stood fro 3 mins  -AF     Cognitive Interventions/Strategies occupation/activity based interventions  -AF     Row Name 04/11/23 1533          Bathing    [place] Level (Bathing) bathing skills;upper body;set up;lower body;moderate assist (50% patient effort);minimum assist (75% patient effort)  -AF     Position (Bathing) sink side;supported sitting;supported standing  -AF     Comment (Bathing) worked on alternating UEs on walker to complete clothing management  -AF     Row Name 04/11/23 1533          Upper Body Dressing    [place] Level (Upper Body Dressing) upper body dressing skills;set up assistance  -AF     Position (Upper Body Dressing) supported sitting  -AF     Set-up Assistance (Upper Body Dressing) obtain clothing  -AF     Row Name 04/11/23  1533          Lower Body Dressing    Comment (Lower Body Dressing) was dressed prior to OT worked on pants up and down during bathing tasks  -AF     Row Name 04/11/23 1533          Grooming    [place] Level (Grooming) grooming skills;set up  -AF     Position (Grooming) supported sitting  -AF     Row Name 04/11/23 1533          Toileting    [place] Level (Toileting) toileting skills;moderate assist (50% patient effort);verbal cues  -AF     Assistive Device Use (Toileting) grab bar/safety frame;raised toilet seat  -AF     Position (Toileting) supported sitting;supported standing  -AF     Comment (Toileting) used RWX in front for patient to maintain standing balance and work on clothing management techniques  -AF     Row Name 04/11/23 1533          Bed Mobility    Comment, (Bed Mobility) up in w/c  -AF     Row Name 04/11/23 1533          Transfer Assessment/Treatment    Comment, (Transfers) sit to stand at coutner top, in bathroom and on commode with MIN /MOD A requires step by step direction for task and encouragement  -AF     Row Name 04/11/23 1533          Toilet Transfer    Type (Toilet Transfer) squat pivot;stand pivot/stand step  -AF     [place] Level (Toilet Transfer) moderate assist (50% patient effort);verbal cues  -AF     Assistive Device (Toilet Transfer) commode;grab bars/safety frame;wheelchair;walker, front-wheeled  -AF     Comment, (Toilet Transfer) squat pivot to commode from w/c then Stand pivot with RWX from commode to w/c  -AF     Row Name 04/11/23 1533          Shoulder (Therapeutic Exercise)    Shoulder Strengthening (Therapeutic Exercise) bilateral;flexion;extension;scapular stabilization;sitting;10 repetitions;2 sets  #2.5 dowel mary  -AF     Row Name 04/11/23 1533          Neuromuscular Re-education    Comment (Neuromuscular Re-education) in standing at counter top worked on weight shifting latteraly during reaching activity, poor carry over with weight shift at end of activity  fatigue set in  -AF     Row Name 04/11/23 1533          Balance    Static Standing Balance minimal assist  -AF     Comment, Balance stood for 3 mins then 4 mins at counter top MIN A duirng UE activity, with dual task of standing balance and cognition patient required increased assistance to maintain standing balance  -AF     Row Name 04/11/23 1533          Positioning and Restraints    Pre-Treatment Position sitting in chair/recliner  -AF     Post Treatment Position wheelchair  -AF     In Wheelchair sitting;exit alarm on;with SLP;with other staff  with RT in AM, with SLP in PM  -AF           User Key  (r) = Recorded By, (t) = Taken By, (c) = Cosigned By    Initials Name Effective Dates    AF Heaven Villareal, OTR 06/16/21 -                  Occupational Therapy Education     Title: PT OT SLP Therapies (Done)     Topic: Occupational Therapy (Done)     Point: ADL training (Done)     Description:   Instruct learner(s) on proper safety adaptation and remediation techniques during self care or transfers.   Instruct in proper use of assistive devices.              Learning Progress Summary           Patient Acceptance, E, VU by WN at 4/4/2023 2329    Acceptance, E, VU by WN at 4/4/2023 0322   Family Acceptance, E, VU by WN at 4/4/2023 2329    Acceptance, E, VU by WN at 4/4/2023 0322                   Point: Home exercise program (Done)     Description:   Instruct learner(s) on appropriate technique for monitoring, assisting and/or progressing therapeutic exercises/activities.              Learning Progress Summary           Patient Acceptance, E, VU by WN at 4/4/2023 2329    Acceptance, E, VU by WN at 4/4/2023 0322   Family Acceptance, E, VU by WN at 4/4/2023 2329    Acceptance, E, VU by WN at 4/4/2023 0322                   Point: Precautions (Done)     Description:   Instruct learner(s) on prescribed precautions during self-care and functional transfers.              Learning Progress Summary           Patient Acceptance,  E, VU by WN at 4/4/2023 2329    Acceptance, E, VU by WN at 4/4/2023 0322   Family Acceptance, E, VU by WN at 4/4/2023 2329    Acceptance, E, VU by WN at 4/4/2023 0322                   Point: Body mechanics (Done)     Description:   Instruct learner(s) on proper positioning and spine alignment during self-care, functional mobility activities and/or exercises.              Learning Progress Summary           Patient Acceptance, E, VU by WN at 4/4/2023 2329    Acceptance, E, VU by WN at 4/4/2023 0322   Family Acceptance, E, VU by WN at 4/4/2023 2329    Acceptance, E, VU by WN at 4/4/2023 0322                               User Key     Initials Effective Dates Name Provider Type Discipline     08/23/22 -  Jose Carrillo, RN Registered Nurse Nurse                    OT Recommendation and Plan                         Time Calculation:      Time Calculation- OT     Row Name 04/11/23 1541 04/11/23 1540          Time Calculation- OT    OT Start Time 1400  -AF 1030  -AF     OT Stop Time 1430  -AF 1100  -AF     OT Time Calculation (min) 30 min  -AF 30 min  -AF           User Key  (r) = Recorded By, (t) = Taken By, (c) = Cosigned By    Initials Name Provider Type    AF Heaven Villareal OTNAIMA Occupational Therapist              Therapy Charges for Today     Code Description Service Date Service Provider Modifiers Qty    02337929672 HC OT SELF CARE/MGMT/TRAIN EA 15 MIN 4/11/2023 Heaven Villareal OTR GO 2    10243194187 HC OT THERAPEUTIC ACT EA 15 MIN 4/11/2023 Heaven Villareal OTR GO 1    22898382850 HC OT NEUROMUSC RE EDUCATION EA 15 MIN 4/11/2023 Heaven Villareal OTNAIMA GO 1                   MARY ANN Gunn  4/11/2023   no

## 2023-04-12 PROCEDURE — 97129 THER IVNTJ 1ST 15 MIN: CPT

## 2023-04-12 PROCEDURE — 63710000001 ONDANSETRON ODT 4 MG TABLET DISPERSIBLE: Performed by: STUDENT IN AN ORGANIZED HEALTH CARE EDUCATION/TRAINING PROGRAM

## 2023-04-12 PROCEDURE — 94799 UNLISTED PULMONARY SVC/PX: CPT

## 2023-04-12 PROCEDURE — 97110 THERAPEUTIC EXERCISES: CPT

## 2023-04-12 PROCEDURE — 94664 DEMO&/EVAL PT USE INHALER: CPT

## 2023-04-12 PROCEDURE — 97535 SELF CARE MNGMENT TRAINING: CPT

## 2023-04-12 RX ORDER — ONDANSETRON 4 MG/1
4 TABLET, ORALLY DISINTEGRATING ORAL EVERY 6 HOURS PRN
Status: DISCONTINUED | OUTPATIENT
Start: 2023-04-12 | End: 2023-04-21 | Stop reason: HOSPADM

## 2023-04-12 RX ADMIN — OXYCODONE HYDROCHLORIDE 2.5 MG: 5 TABLET ORAL at 16:11

## 2023-04-12 RX ADMIN — Medication 100 MG: at 08:07

## 2023-04-12 RX ADMIN — OXYCODONE HYDROCHLORIDE 2.5 MG: 5 TABLET ORAL at 00:59

## 2023-04-12 RX ADMIN — APIXABAN 5 MG: 5 TABLET, FILM COATED ORAL at 20:12

## 2023-04-12 RX ADMIN — SIMETHICONE 80 MG: 80 TABLET, CHEWABLE ORAL at 08:07

## 2023-04-12 RX ADMIN — PANTOPRAZOLE SODIUM 40 MG: 40 TABLET, DELAYED RELEASE ORAL at 05:24

## 2023-04-12 RX ADMIN — LAMOTRIGINE 200 MG: 100 TABLET ORAL at 08:07

## 2023-04-12 RX ADMIN — GABAPENTIN 300 MG: 300 CAPSULE ORAL at 05:24

## 2023-04-12 RX ADMIN — DOCUSATE SODIUM 50MG AND SENNOSIDES 8.6MG 2 TABLET: 8.6; 5 TABLET, FILM COATED ORAL at 08:07

## 2023-04-12 RX ADMIN — IPRATROPIUM BROMIDE AND ALBUTEROL SULFATE 3 ML: 2.5; .5 SOLUTION RESPIRATORY (INHALATION) at 07:12

## 2023-04-12 RX ADMIN — OXYCODONE HYDROCHLORIDE 2.5 MG: 5 TABLET ORAL at 20:12

## 2023-04-12 RX ADMIN — Medication 1 MG: at 08:07

## 2023-04-12 RX ADMIN — Medication 500 MCG: at 08:07

## 2023-04-12 RX ADMIN — APIXABAN 5 MG: 5 TABLET, FILM COATED ORAL at 08:07

## 2023-04-12 RX ADMIN — DESVENLAFAXINE SUCCINATE 25 MG: 25 TABLET, EXTENDED RELEASE ORAL at 08:07

## 2023-04-12 RX ADMIN — ONDANSETRON 4 MG: 4 TABLET, ORALLY DISINTEGRATING ORAL at 12:28

## 2023-04-12 RX ADMIN — GABAPENTIN 300 MG: 300 CAPSULE ORAL at 20:12

## 2023-04-12 RX ADMIN — Medication 5 MG: at 20:12

## 2023-04-12 RX ADMIN — GABAPENTIN 300 MG: 300 CAPSULE ORAL at 16:14

## 2023-04-12 NOTE — THERAPY TREATMENT NOTE
Inpatient Rehabilitation - Physical Therapy Treatment Note       [facility]     Patient Name: Louise GARCIA  : 1965  MRN: [id number]    Today's Date: 2023                    Admit Date: 3/17/2023      Visit Dx:     ICD-10-CM ICD-9-CM   1. Impaired functional mobility, balance, gait, and endurance  Z74.09 V49.89       Patient Active Problem List   Diagnosis   • Sepsis   • Neck pain, chronic   • Upper back pain   • Paresthesia   • Cervical myelopathy   • Lower extremity weakness   • History of blood clots   • Chronic anticoagulation   • Seizures   • Anemia   • GERD (gastroesophageal reflux disease)   • Guillain-[place] syndrome   • Situational mixed anxiety and depressive disorder   • Mass of middle lobe of right lung   • Oral candidiasis   • Empyema of pleura   • MRSA bacteremia   • Idiopathic peripheral neuropathy       Past Medical History:   Diagnosis Date   • Degenerative disc disease, cervical    • Gestational diabetes    • H/O blood clots    • Hematemesis    • Seizures    • Septic shock        Past Surgical History:   Procedure Laterality Date   • APPENDECTOMY     • BACK SURGERY     • CHOLECYSTECTOMY     • ENDOSCOPY N/A 2016    Procedure: ESOPHAGOGASTRODUODENOSCOPY with biopsy;  Surgeon: Austen Brito MD;  Location: [facility] ENDOSCOPY;  Service:    • HYSTERECTOMY     • NECK SURGERY       approx 6 yrs ago   • THORACOSCOPY Right 3/8/2023    Procedure: THORACOSCOPY WITH DAVINCI ROBOT WITH COMPLETE DECORTICATION;  Surgeon: Chloe Cedillo MD;  Location: [facility] OR;  Service: Robotics - DaVinci;  Laterality: Right;   • TONSILLECTOMY     • TONSILLECTOMY AND ADENOIDECTOMY         PT ASSESSMENT (last 12 hours)     IRF PT Evaluation and Treatment     Row Name 23 1340 23 1014       PT Time and Intention    Document Type daily treatment  -EE --    Mode of Treatment physical therapy  -EE --    Patient/Family/Caregiver Comments/Observations Pt supine in bed; reports she got zofran  and nausea is somewhat improved now. Pt agreeable to LE exercises in bed.  -EE --    Evaluation/Treatment Not Performed -- patient/family declined, not feeling well  -LB    Row Name 04/12/23 1340          General Information    Existing Precautions/Restrictions fall  contact isolation  -EE     Limitations/Impairments safety/cognitive  -EE     Row Name 04/12/23 1340          Pain Assessment    Pretreatment Pain Rating 0/10 - no pain  -EE     Posttreatment Pain Rating 0/10 - no pain  -EE     Row Name 04/12/23 1340          Cognition/Psychosocial    Affect/Mental Status (Cognition) WFL  -EE     Orientation Status (Cognition) oriented x 3  -EE     Follows Commands (Cognition) follows one-step commands;verbal cues/prompting required  -EE     Personal Safety Interventions fall prevention program maintained;muscle strengthening facilitated;supervised activity  -EE     Row Name 04/12/23 1340          Bed Mobility    Scooting/Bridging [place] (Bed Mobility) contact guard;verbal cues  -EE     Assistive Device (Bed Mobility) bed rails  -EE     Row Name 04/12/23 1340          Hip (Therapeutic Exercise)    Hip Isometrics (Therapeutic Exercise) bilateral;gluteal sets;10 repetitions;2 sets  -EE     Hip Strengthening (Therapeutic Exercise) supine;bilateral;aBduction;aDduction;20 repititions  -EE     Row Name 04/12/23 1340          Knee (Therapeutic Exercise)    Knee Isometrics (Therapeutic Exercise) supine;bilateral;quad sets;10 repetitions;2 sets  -EE     Knee Strengthening (Therapeutic Exercise) supine;bilateral;heel slides;20 repititions;SLR (straight leg raise);10 repetitions;2 sets  -EE     Row Name 04/12/23 1340          Ankle (Therapeutic Exercise)    Ankle Strengthening (Therapeutic Exercise) supine;bilateral;dorsiflexion;plantarflexion;20 repititions  -EE     Row Name 04/12/23 1340          Core Strength (Therapeutic Exercise)    Core Strength (Therapeutic Exercise) bridging, bilateral lower extremities;10 repetitions   -     Row Name 04/12/23 1340          Positioning and Restraints    Pre-Treatment Position in bed  -EE     Post Treatment Position bed  -EE     In Bed supine;call light within reach;encouraged to call for assist;exit alarm on;with family/caregiver  -           User Key  (r) = Recorded By, (t) = Taken By, (c) = Cosigned By    Initials Name Provider Type    Anisha Armijo, PT Physical Therapist    EE Melinda Mann, PT Physical Therapist              Wound 03/08/23 1917 Right lateral chest Incision (Active)   Dressing Appearance open to air 04/12/23 0805   Closure None 04/12/23 0805   Base clean;moist;pink;scab 04/12/23 0805   Drainage Amount none 04/12/23 0805   Care, Wound cleansed with;soap and water 04/12/23 0805   Dressing Care open to air 04/12/23 0805   Periwound Care dry periwound area maintained 04/12/23 0805       Wound 03/11/23 1530 Other (See comments) other (see comments) pubis Abrasion (Active)   Dressing Appearance open to air 04/12/23 0805   Closure Open to air 04/12/23 0805   Dressing Care open to air 04/12/23 0805   Periwound Care barrier ointment applied 04/12/23 0805       Wound 03/20/23 1527 Bilateral upper gluteal MASD (Moisture associated skin damage) (Active)   Dressing Appearance open to air 04/11/23 2028   Closure Open to air 04/12/23 0805   Base blanchable;clean;moist;pink 04/12/23 0805   Drainage Amount none 04/12/23 0805   Care, Wound cleansed with;soap and water 04/12/23 0805   Dressing Care open to air 04/12/23 0805   Periwound Care barrier ointment applied 04/12/23 0805     Physical Therapy Education     Title: PT OT SLP Therapies (Done)     Topic: Physical Therapy (Done)     Point: Mobility training (Done)     Learning Progress Summary           Patient Acceptance, E, VU,NR by POWER at 4/12/2023 1341    Acceptance, E, VU,DU,NR by JDEYSI at 4/8/2023 1528    Acceptance, E, VU by YVONNE at 4/4/2023 2329    Acceptance, E, VU by WN at 4/4/2023 0322    Acceptance, JUDIT PARKS, CRISTIANA,MANGO by LOLIS at 4/1/2023 2197     Acceptance, E, VU,DU by MG at 3/31/2023 0825    Acceptance, E, VU,DU by MG at 3/30/2023 0956    Acceptance, E,D, VU,DU by DP at 3/29/2023 1148    Nonacceptance, E,D, VU,DU by DP at 3/28/2023 1144    Acceptance, E,D, VU,DU by DP at 3/27/2023 1601    Acceptance, E,D, VU,NR by EE at 3/25/2023 1423    Acceptance, E,D, NR,VU,DU by DP at 3/21/2023 1448    Acceptance, E, NR by CLOVERK at 3/20/2023 1513    Acceptance, E,TB, VU,NR by SAMANTHA at 3/18/2023 1643   Family Acceptance, E, VU by WN at 4/4/2023 2329    Acceptance, E, VU by WN at 4/4/2023 0322                   Point: Home exercise program (Done)     Learning Progress Summary           Patient Acceptance, E, VU,NR by EE at 4/12/2023 1341    Acceptance, E, VU by WN at 4/4/2023 2329    Acceptance, E, VU by WN at 4/4/2023 0322    Acceptance, E,D, VU,DU by DP at 4/1/2023 1352    Acceptance, E, VU,DU by MG at 3/31/2023 0825    Acceptance, E, VU,DU by MG at 3/30/2023 0956    Acceptance, E,D, VU,DU by DP at 3/29/2023 1148    Nonacceptance, E,D, VU,DU by DP at 3/28/2023 1144    Acceptance, E,D, VU,DU by DP at 3/27/2023 1601    Acceptance, E,D, NR,VU,DU by DP at 3/21/2023 1448    Acceptance, E, NR by JK at 3/20/2023 1513   Family Acceptance, E, VU by WN at 4/4/2023 2329    Acceptance, E, VU by WN at 4/4/2023 0322                   Point: Body mechanics (Done)     Learning Progress Summary           Patient Acceptance, E, VU,DU,NR by CLOVERK at 4/8/2023 1528    Acceptance, E, VU by WN at 4/4/2023 2329    Acceptance, E, VU by WN at 4/4/2023 0322    Acceptance, E,D, VU,DU by DP at 4/1/2023 1352    Acceptance, E, VU,DU by MG at 3/31/2023 0825    Acceptance, E, VU,DU by MG at 3/30/2023 0956    Acceptance, E,D, VU,DU by DP at 3/29/2023 1148    Nonacceptance, E,D, VU,DU by DP at 3/28/2023 1144    Acceptance, E,D, VU,DU by DP at 3/27/2023 1601    Acceptance, E,D, VU,NR by EE at 3/25/2023 1423    Acceptance, E,D, NR,VU,DU by DP at 3/21/2023 1448   Family Acceptance, E, VU by WN at 4/4/2023 2324     Acceptance, E, VU by WN at 4/4/2023 0322                   Point: Precautions (Done)     Learning Progress Summary           Patient Acceptance, E, VU by WN at 4/4/2023 2329    Acceptance, E, VU by WN at 4/4/2023 0322    Acceptance, E,D, VU,DU by DP at 4/1/2023 1352    Acceptance, E, VU,DU by MG at 3/31/2023 0825    Acceptance, E, VU,DU by MG at 3/30/2023 0956    Acceptance, E,D, VU,DU by DP at 3/29/2023 1148    Nonacceptance, E,D, VU,DU by DP at 3/28/2023 1144    Acceptance, E,D, VU,DU by DP at 3/27/2023 1601    Acceptance, E,D, VU,NR by EE at 3/25/2023 1423    Acceptance, E,D, NR,VU,DU by DP at 3/21/2023 1448   Family Acceptance, E, VU by WN at 4/4/2023 2329    Acceptance, E, VU by WN at 4/4/2023 0322                               User Key     Initials Effective Dates Name Provider Type Discipline    EE 06/16/21 -  Melinda Mann, PT Physical Therapist PT    JK 06/16/21 -  Juana Veloz, PT Physical Therapist PT    KP 06/16/21 -  Yolanda Shannon, PT Physical Therapist PT    MG 05/24/22 -  Yu Villalobos, PT Physical Therapist PT    WN 08/23/22 -  Jose Carrillo RN Registered Nurse Nurse    DP 08/24/21 -  Papa Marsh, PT Physical Therapist PT                PT Recommendation and Plan                          Time Calculation:      PT Charges     Row Name 04/12/23 1421             Time Calculation    Start Time 1330  -EE      Stop Time 1400  -EE      Time Calculation (min) 30 min  -EE      PT Received On 04/12/23  -EE      PT - Next Appointment 04/13/23  -EE         Time Calculation- PT    Total Timed Code Minutes- PT 30 minute(s)  -EE            User Key  (r) = Recorded By, (t) = Taken By, (c) = Cosigned By    Initials Name Provider Type    EE Melinda Mann, PT Physical Therapist                Therapy Charges for Today     Code Description Service Date Service Provider Modifiers Qty    35852866358 HC PT THER PROC EA 15 MIN 4/12/2023 Melinda Mann, PT GP 2                   Melinda Mann, PT  4/12/2023

## 2023-04-12 NOTE — PLAN OF CARE
Goal Outcome Evaluation:   Pt declined 0930 speech therapy session due to not feeling well (reported emesis and nausea this morning). Will attempt to see in afternoon.

## 2023-04-12 NOTE — THERAPY TREATMENT NOTE
Inpatient Rehabilitation - Occupational Therapy Treatment Note    [facility]     Patient Name: Louise GARCIA  : 1965  MRN: [id number]    Today's Date: 2023                 Admit Date: 3/17/2023         ICD-10-CM ICD-9-CM   1. 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   • Situational mixed anxiety and depressive disorder   • Mass of middle lobe of right lung   • Oral candidiasis   • Empyema of pleura   • MRSA bacteremia   • Idiopathic peripheral neuropathy       Past Medical History:   Diagnosis Date   • Degenerative disc disease, cervical    • Gestational diabetes    • H/O blood clots    • Hematemesis    • Seizures    • Septic shock        Past Surgical History:   Procedure Laterality Date   • APPENDECTOMY     • BACK SURGERY     • CHOLECYSTECTOMY     • ENDOSCOPY N/A 2016    Procedure: ESOPHAGOGASTRODUODENOSCOPY with biopsy;  Surgeon: Austen Brito MD;  Location: [facility] ENDOSCOPY;  Service:    • HYSTERECTOMY     • NECK SURGERY       approx 6 yrs ago   • THORACOSCOPY Right 3/8/2023    Procedure: THORACOSCOPY WITH DAVINCI ROBOT WITH COMPLETE DECORTICATION;  Surgeon: Chloe Cedillo MD;  Location: [facility] OR;  Service: Robotics - DaVinci;  Laterality: Right;   • TONSILLECTOMY     • TONSILLECTOMY AND ADENOIDECTOMY               [facility] OT ASSESSMENT FLOWSHEET (last 12 hours)     [facility] OT Evaluation and Treatment     Row Name 23 1533          OT Time and Intention    Document Type daily treatment  -AF     Mode of Treatment occupational therapy  -AF     Patient Effort poor  -AF     Symptoms Noted During/After Treatment fatigue;nausea  -AF      Row Name 04/12/23 1533          General Information    Patient/Family/Caregiver Comments/Observations in PM session agreeable to light activity  -AF     General Observations of Patient wanted to sit up in w/c  -AF     Existing Precautions/Restrictions fall  contact precautions  -AF     Row Name 04/12/23 1533          Pain Assessment    Pretreatment Pain Rating 0/10 - no pain  -AF     Posttreatment Pain Rating 0/10 - no pain  -AF     Pre/Posttreatment Pain Comment feeling nauseated  -AF     Row Name 04/12/23 1533          Cognition/Psychosocial    Affect/Mental Status (Cognition) WFL  -AF     Orientation Status (Cognition) oriented x 3  -AF     Follows Commands (Cognition) follows one-step commands;verbal cues/prompting required  -AF     Personal Safety Interventions fall prevention program maintained;gait belt;nonskid shoes/slippers when out of bed  -AF     Cognitive Function memory deficit  -AF     Row Name 04/12/23 1533          Lower Body Dressing    Comment (Lower Body Dressing) worked on figure four crossing BLEs while sitting up in bed to don tennis shoes with MIN A  -AF     Row Name 04/12/23 1533          Grooming    Comment (Grooming) washed face and combed hair seated on EOB  -AF     Row Name 04/12/23 1533          Bed Mobility    Supine-Sit [place]oc[place]ontas (Bed Mobility) standby assist  increased time  -AF     Row Name 04/12/23 1533          Bed-Chair Transfer    Bed-Chair [place]as (Transfers) 2 person assist;moderate assist (50% patient effort)  2 people secondary to her not feeling well  -AF     Assistive Device (Bed-Chair Transfers) wheelchair  -AF     Comment, (Bed-Chair Transfer) squat pivot  -AF     Row Name 04/12/23 1533          Balance    Comment, Balance pt sat on EOB for 8 mins states still not feeling well and felt dizzy when sitting unsupported, agreeable to tranfsering to w/c  -AF     Row Name 04/12/23 1533          Positioning and Restraints    Pre-Treatment Position in bed  -AF     Post  Treatment Position wheelchair  -AF     In Wheelchair sitting;call light within reach;encouraged to call for assist;exit alarm on;with family/caregiver  in room with  present  -AF           User Key  (r) = Recorded By, (t) = Taken By, (c) = Cosigned By    Initials Name Effective Dates    Heaven Reynolds, OTR 06/16/21 -                  Occupational Therapy Education     Title: PT OT SLP Therapies (Done)     Topic: Occupational Therapy (Done)     Point: ADL training (Done)     Description:   Instruct learner(s) on proper safety adaptation and remediation techniques during self care or transfers.   Instruct in proper use of assistive devices.              Learning Progress Summary           Patient Acceptance, E, VU,NR by AF at 4/12/2023 1537    Comment: pts  had pictures of bathroom on phone, looked and discussed BSC over commode and laterally having to step into the bathroom from the EOB. will continue to address. 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    Acceptance, E, VU by WN at 4/4/2023 2329    Acceptance, E, VU by WN at 4/4/2023 0322   Family Acceptance, E, VU,NR by AF at 4/12/2023 1537    Comment: pts  had pictures of bathroom on phone, looked and discussed BSC over commode and laterally having to step into the bathroom from the EOB. will continue to address. 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    Acceptance, E, VU by WN at 4/4/2023 2329    Acceptance, E, VU by WN at 4/4/2023 0322                   Point: Home exercise program (Done)     Description:   Instruct learner(s) on appropriate technique for monitoring, assisting and/or progressing therapeutic exercises/activities.              Learning Progress Summary           Patient Acceptance, E, VU by WN at 4/4/2023 2329    Acceptance, E, VU by WN at 4/4/2023 0322   Family Acceptance, E, VU by WN at 4/4/2023 2329    Acceptance, E, VU by WN at 4/4/2023 0322                    Point: Precautions (Done)     Description:   Instruct learner(s) on prescribed precautions during self-care and functional transfers.              Learning Progress Summary           Patient Acceptance, E, VU by WN at 4/4/2023 2329    Acceptance, E, VU by WN at 4/4/2023 0322   Family Acceptance, E, VU by WN at 4/4/2023 2329    Acceptance, E, VU by WN at 4/4/2023 0322                   Point: Body mechanics (Done)     Description:   Instruct learner(s) on proper positioning and spine alignment during self-care, functional mobility activities and/or exercises.              Learning Progress Summary           Patient Acceptance, E, VU by WN at 4/4/2023 2329    Acceptance, E, VU by WN at 4/4/2023 0322   Family Acceptance, E, VU by WN at 4/4/2023 2329    Acceptance, E, VU by WN at 4/4/2023 0322                               User Key     Initials Effective Dates Name Provider Type Discipline    AF 06/16/21 -  Heaven Villareal OTNAIMA Occupational Therapist OT     08/23/22 -  Jose Carrillo RN Registered Nurse Nurse                    OT Recommendation and Plan                         Time Calculation:      Time Calculation- OT     Row Name 04/12/23 1538 04/12/23 1156          Time Calculation- OT    OT Start Time 1400  -AF --     OT Stop Time 1430  -AF --     OT Time Calculation (min) 30 min  -AF --     OT - Next Appointment -- 04/12/23  -           User Key  (r) = Recorded By, (t) = Taken By, (c) = Cosigned By    Initials Name Provider Type    AF Heaven Villareal OTR Occupational Therapist              Therapy Charges for Today     Code Description Service Date Service Provider Modifiers Qty    14717937154 HC OT SELF CARE/MGMT/TRAIN EA 15 MIN 4/11/2023 Heaven Villareal, OTR GO 2    23825717198 HC OT THERAPEUTIC ACT EA 15 MIN 4/11/2023 Heaven Villareal, OTR GO 1    73382998737 HC OT NEUROMUSC RE EDUCATION EA 15 MIN 4/11/2023 Heaven Villareal, OTR GO 1    36490658233 HC OT SELF CARE/MGMT/TRAIN EA 15 MIN 4/12/2023 Mono  Heaven FUNES, OTR GO 2                   Heaven Villareal, OTR  4/12/2023

## 2023-04-12 NOTE — PLAN OF CARE
Goal Outcome Evaluation:  Plan of Care Reviewed With: patient             Problem: Rehabilitation (IRF) Plan of Care  Goal: Plan of Care Review  Outcome: Ongoing, Progressing  Flowsheets (Taken 4/12/2023 0332)  Plan of Care Reviewed With: patient  Outcome Evaluation:  Louise is alert and oriented x 4; can be forgetful at times. Cooperative with care. Meds whole PO with applesauce, thins. Continues with weakness in BLE and numbness to BUE. Continent of bowel and bladder. Wearing brief; bed pan offered during rounding. Cream applied to buttocks r/t redness. Educated pt about getting off of the Purewick at night r/t going home soon. Purewick not applied due to pt not wanting it, much to pts  disliking. Wearing 1.5 L 02 nc at HS. Seizure precautions in place. PRN Amina applied at 2028 amd 0059 r/t abd discomfort. Turning and repositioning on own. No unsafe behaviors. Targey d/c 4/21. Call light within reach.

## 2023-04-12 NOTE — PROGRESS NOTES
Met with patient's  this afternoon to discuss patient's progress, goals, and target d/c date for 4/21. Discussed option of sub acute to allow patient more time for therapy and recovery before going home.  does not want patient to go to sub acute as he wants to be able to stay with her and doesn't feel he will be allowed to do that in a skilled nursing facility. Discussed he could stay with her during day and go home at night so he could also get rest and decrease his stress level. He stated he is planning to take patient home and is getting things ready for her at home. He would not be opposed to patient staying here longer. Discussed insurance updates weekly and that team will discuss her length of stay and progress and determine if can lengthen her stay here. Discussed insurance may not continue longer stay here but may be agreeable to sub acute. He does not feel patient is competent to make this decision as she has some confusion at times. Discussed her progress with cognition and although she does have some confusion at times she is aware of what's going on and capable of discussing discharge plans/decisions.  will continue to work with PT and OT on family teaching. He stated he plans to get more pictures of their travel trailer and show therapists. Discussed working with OT on transfers to BSC at night since patient has to go frequently and will not have Purewick at home. He stated patient had bladder problems at home prior to this hospitalization and feels staff doesn't understand this. He stated he is looking into getting Purewick for home possibly. Will continue to discuss plans with patient and .

## 2023-04-12 NOTE — SIGNIFICANT NOTE
04/12/23 1156   OTHER   Discipline occupational therapist   Rehab Time/Intention   Session Not Performed patient/family declined, not feeling well  (states that she is not feeling well and waiting on nausea medication, supine in bed with eyes closed, will attempt to resechedule)   Recommendation   OT - Next Appointment 04/12/23

## 2023-04-12 NOTE — PROGRESS NOTES
LOS: 26 days   Patient Care Team:  Chloe Schaefer APRN as PCP - General (Family Medicine)  Newton, Mikhail Velazquez MD as Consulting Physician (Neurology)      Patient Name: ELEONORA GARCIA  Patient : 1965    ADMITTING DIAGNOSIS:  Diagnoses    1. IMPAIRED FUNCTIONAL MOBILITY, BALANCE, GAIT, AND ENDURANCE           SUBJECTIVE:  Patient seen and examined at bedside.  After breakfast this morning, she was noted to be extremely nauseous and had several episodes of emesis.  On interview she complains of persistent nausea and generalized malaise.  She denies fever, chills, body aches.  Per nursing she has not had a bowel movement in several days.    REVIEW OF SYSTEMS:    General: denies weight change, fatigue, weakness, fever, chills, night sweats.   Skin: denies itching, rashes, sores and bruises.   Neurologic: denies headache or visual changes.   HEENT:  denies discharge, sore throat, allergies, and congestion.   Respiratory: denies shortness of breath, wheeze, cough and hemoptysis.   Cardiac:  denies chest pain or palpitations.   Gastrointestinal:  (+) nausea, (+) vomiting, denies changes in appetite,dysphagia, abdominal pain, constipation, or diarrhea.   Urinary:  denies urgency and frequency.  Musculoskeletal:  denies muscle weakness, pain, or joint stiffness.    OBJECTIVE:    Vitals:    23 0712   BP:    Pulse:    Resp: 18   Temp:    SpO2:        PHYSICAL EXAM:   General: pleasant, in no acute distress  HEENT: NCAT, sclera anicteric, conjunctiva pink, mucoa moist  Cardiovascular: RRR, +S1+S2, no obvious m/g/r  Lungs: CTAB, no rhonchi or wheezing  Abdomen: normoactive bowel sounds, soft, tender to palpation in the epigastrum, no guarding or rebound tenderness  Extremities: no peripheral edema  Skin: exposed surfaces of skin warm, intact, without erythema  Neuro: awake alert and oriented to person, place, time, and situation, CN II-XII grossly intact  MSK: antigravity strength  throughout      MEDICATIONS  Scheduled Meds:  apixaban, 5 mg, Oral, Q12H  desvenlafaxine, 25 mg, Oral, Daily  folic acid, 1 mg, Oral, Daily  gabapentin, 300 mg, Oral, Q8H  ipratropium-albuterol, 3 mL, Nebulization, 4x Daily - RT  lamoTRIgine, 200 mg, Oral, Daily  melatonin, 5 mg, Oral, Nightly  pantoprazole, 40 mg, Oral, Q AM  thiamine, 100 mg, Oral, Daily  vitamin B-12, 500 mcg, Oral, Daily        Continuous Infusions:       PRN Meds:  •  acetaminophen  •  bisacodyl  •  diphenhydrAMINE  •  famotidine  •  hydrocortisone sodium succinate  •  hydrocortisone-bacitracin-zinc oxide-nystatin  •  ondansetron ODT  •  oxyCODONE  •  polyethylene glycol  •  senna-docusate sodium  •  simethicone      RESULTS:  No results found for: POCGLU  Results from last 7 days   Lab Units 04/10/23  0603 04/07/23  0843 04/06/23  1520   WBC 10*3/mm3 8.74 6.83 9.25   HEMOGLOBIN g/dL 7.7* 8.6* 8.9*   HEMATOCRIT % 23.7* 26.5* 26.6*   PLATELETS 10*3/mm3 273 357 380     Results from last 7 days   Lab Units 04/10/23  0603 04/07/23  0843   SODIUM mmol/L 132* 132*   POTASSIUM mmol/L 4.1 3.9   CHLORIDE mmol/L 94* 95*   CO2 mmol/L 31.2* 30.2*   BUN mg/dL 17 16   CREATININE mg/dL 1.12* 1.32*   CALCIUM mg/dL 10.0 10.1   GLUCOSE mg/dL 97 118*           ASSESSMENT and PLAN:    Idiopathic peripheral neuropathy    Paresthesia    History of blood clots    Chronic anticoagulation    Seizures    Anemia    Guillain-[place] syndrome    MRSA bacteremia    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   S/p IVIG x 5 days  March 30-shows improvement with her strength proximally the upper extremities.  Improvement with her hand strength.  Improved strength in the right lower extremity but continues with profound weakness in the left lower extremity.  With weakness of the left ankle, will look at probable AFO for gait activities.  Transfers are moderate assist.  Ambulated with a rolling walker up to 70 feet with gait  deviations minimal moderate assist  -4/6-reconsulted neurology to see if she needs another round of IVIG.  Follow-up assessment with neurology-Previous IVIG dose was 20g daily for 5 days from 2/27-3/3.  Plan is to repeat another course of IVIG  - 4/10- fever this morning likely a side effect of IVIG.  She is scheduled to receive her final dose of this course today.  Will obtain CXR to rule out pneumonia.       Impaired mobility/impaired self care/ impaired cognition  Left greater than right lower extremity weakness greater than bilateral upper extremity weakness  April 4-strength improved in the bilateral upper extremities and right lower extremity.Weakness  looks about the same the left lower extremity.  On the day she ambulated further last week she had utilized AFO on the left lower extremity  April 5-stronger with her left hip flexors and knee extensors today  April 6- strength and coordination improving.  Per OT note she is needing set up assist for upper body dressing with max assist for lower body dressing, PT is noticing that the patient appears stronger and is limited by weakness and fear.  Moderate assist between chair and bed but min assist for sit to stand.  Very minimal knee buckling when walking with left AFO.  Making good progress overall.     R lung masslike infiltrate with cavitary lesions/pleural effusion   S/p thoracentesis - Blood cx and pleural fluid  positive for MRSA      Chest tube placement given empyema, and she underwent thoracoscopy w/ decortication 3/8/23  -expected postoperative changes with pleural thickening and minimal pleural effusion.  The effusion is too small for intervention and will likely to continue to resolve with time.  Recommend continue good pulmonary hygiene with incentive spirometry hourly as well as increase activity/ambulation as tolerated.  March 23-appears decreased breath sounds more noticeable today on the right compared to the left.  Check follow-up chest x-ray.   On room air during the day, 1 L at night.  March 24- CXR relatively unchanged from previous, reached out to CT surgery given planned outpatient f/u on 3/28, no further imaging planned at this time as patient afebrile with normal WBC, monitor     Right-sided Chest Pain 3/24  -EKG sinus tach  -HS Troponin 25 > 24  -consider Cardiology consult if worsening  -pain reproducible with palpation, pain likely postoperative neuralgia as indicated vs. possible costochondritis, continue gabapentin, ice/heat, monitor  -4/10- patient continues to complain of right-sided rib pain.  We will touch base with thoracic surgery about nerve block for postoperative neuralgia.  - 4/12- thoracic surgery may refer for outpatient nerve block.  We will continue to monitor and consider going up on gabapentin.     Hypokalemia  -3/24 K 3.3, repleted  -3/29 K 3.7, resolved     Mass on TTE with findings concerning for endocarditis - underwent MARIAMA 3/10/2023 which had no vegetation  RUE superficial thrombophlebitis concerning for septic phlebitis.     ID - continue 4 weeks of vancomycin as recommended by infectious disease dosed by pharmacy to achieve a trough level of 15-20 or area under the curve of 400-600 from last negative blood culture or last intervention which ever is the latest - to complete April 1, 2023     Left hip arthrocentesis March 16 to rule out any hip septic arthritis - no growth to date on fluid.    Lower back pain - MRI of spine to rule out discitis or osteomyelitis.  This did have known degenerative changes but  no infection.      DVT prophylaxis - SCDs / apixiban. History of LLE DVT - on Eliquis at home     Pain management   - Tylenol 1,000 mg three times a day/ Celebrex 100 mg q 112 hours/ gabapentin 300 mg q 8 hours Oxycodone 5 mg q 4 hours prn  March 18 - DC Celebrex 2/2 PHYLLIS, and anemia      Anxiety/ muscle spasms - Diazepam 2 mg q 6 hours prn - JONES reviewed     Seizure disorder - Lamotrigine 200 mg daily     ABLA   -  March 18- Hgb 7.0 (7.3 on 3/16). Type, cross and transfuse 1 unit PRBCs. Pre-medicate with Tylenol and Benadryl. CBC in am.   March 19- Hgb 8.8 s/p 1 unit PRBcs. Hemoccult positive. On Eliquis for h/o DVT. Follow Hgb. May need GI work up.  March 20 - HGB 9.3 s/p transfusion  March 21-reviewed with internal medicine-hemoglobin stable after transfusion.  Iron studies consistent with inflammation.  No further work-up presently  March 22 - Hgb 8.9, stable  March 23-hemoglobin trend 8.3.  Continue to follow.  Recheck tomorrow  March 29 - Hgb 8.1, stable  April 6- hemoglobin 8.3.  Stable.  April 10- hemoglobin slightly down trended to 7.7.  Monitor.     PHYLLIS   March 18- Creatinine 1.36 up from 1.12. On IV Vanc and Celebrex. DC Celebrex. BMP in am.  March 19- s/p 500ml NS bolus. Creatinine 1.31.  March 23-creatinine 0.92  April 10- creatinine downtrending to 1.12 with increased fluids.    Nausea  -4/12-nausea with vomiting this morning.  Declined to participate in speech therapy due to not feeling well.  Zofran 4 mg every 6 hours as needed for nausea and vomiting added and administered.  No signs of systemic inflammation or infection.  Labs will be drawn tomorrow.     TEAM CONF - MARCH 21 - BED MOD X 2. TRANSFERS MAX 2. STAND PIVOT OR SQUAT PIVOT. NON-AMBULATORY. Saturday MARCH 18 GAIT 6 FEET PARALLEL BARS MOD MAX OF 2.   BATH MOD 1-2. LBD DEP. UBD MOD. EATING MIN. GROOMING MIN. TOILETING DEP.   The patient has difficulty remembering new information due to short-term memory impairments.  Initial evaluation 3.18, pt obtained a score 12/30 on SLUMS cognitive assessment indicating severe cognitive impairment/dementia, goals initiated for memory and sustaining attention  FEES completed 3.7 revealing glottic gap accounting for glottic insufficiency, dysphonia, hoarse/breathy quality, recommend targeting vocal function as appropriate d/t impact on intelligibility.    Regular/thin diet continued since FEES completion 3.7, although  pt known to cough with and without PO intake, may be contributed partially to ineffective VF closure.   DRESSING FORMER CHEST TUBE SITE. CONTINUES ON PUREWICK AT NIGHT. O2 AT 1-2 LITERS AT NIGHT.  DECREASED APPETITE. ABD X-RAY THIS AM SHOWS NON-OBSTRUCTIVE BOWEL GAS PATTERN.   ELOS - 4 WEEKS     TEAM CONF - MARCH 28 - BED MIN CTG. TRANSFER MOD ASSIST STAND PIVOT OR SQUAT PIVOT. FATIGUES IN AFERNOONS. GAIT 15 FEET RW MIN X 2. FLUCTUATING ORIENTATION. BETTER DETAIL TO TASK. MIN MOD CUES FOR REASONING TASKS. DYSPHONIA FROM COUGHING.  SLP REVIEWED VOICE HYGIENE, NOT TO DO HARSH THROAT CLEAR. BATH MOD. LBD MAX. UBD MIN. TOILETING MOD X 2. CONTINENT/INCONTINENT BLADDER. RIGHT CHEST TUBE / THORACOSCOPY SITE CARE. VARIABLE INTAKE.   ELOS - 3 WEEKS     TEAM CONF - APRIL 4 - CONTINUES WEAK IN LLE. STRONGER IN BUE AND RIGHT KNEE EXTENSION. BED MIN MOD TO CTG. TRANSFERS MOD MAX STAND PIVOT OR SQUAT PIVOT. WORKED ON GAIT IN PARALLEL BARS MIN MOD OF 2 PERSON. CAN DO 15 FEET IF NOT SO ANXIOUS. TOILET AND SHOWER TRANSFERS MOD ASSIST. DROP ARM BEDSIDE COMMODE. AT NIGHT USING BEDPAN. USING PUREWICK AT NIGHT. BATH MIN. LBD MOD. UBD SET UP.TOILETING MOD MAX 2.    Pt with improved ability to recall salient events  from day.Now presents with mild-moderate hoarse vocal  quality. Improved vocal intensity.  ADDRSSING DYSPHONIA. DIETICIAN REVIEWED FOOD CHOICES. GABAPENTIN FOR RIGHT RIB PAIN/INTERCOSTAL PAIN.CONTINENT BOWEL AND BLADDER WITH URGENCY.   OS - 2-3 WEEKS     Team Conference - 4/11/2023  Nursing: overnight using purewick,   PT: mod assist for transfers due to weight shifting, bed mobility w/wo rails standby/contact guard, shallow four inch step with rails 1-2 person assist, walking 60' with walker, trial AFO and ace wrap assist doesn't seem to help a lot now that she no longer has a hard collapse of her ankle/knee, anticipate she will walk safely with walker in their RV home but need a wheelchair for community ambulation  OT:  toilet transfers mod assist with one, clothing management max-mod  SLP: mild-mod cognitive impairment, struggles with oxycodone, max cues for verbal working memory in the morning but min in the afternoon, min-mod cueing, improvements with carryover  Psychology: trouble with thought organization and concentration, trouble with working memory, uncertain of self, lacking confidence with some anxiety completing tasks  Social Work: recommending home health  Discharge Date: 1.5 weeks    CODE STATUS:  Level Of Support Discussed With: Patient  Code Status (Patient has no pulse and is not breathing): CPR (Attempt to Resuscitate)  Medical Interventions (Patient has pulse or is breathing): Full Support      Admission Status: Continues to meet requirements for inpatient admission.     Patient seen and evaluated with attending physician, Dr. Rider. Please see attestation.     Olive Torres,   Physical Medicine and Rehabilitation   PGY-2    During rounds, used appropriate personal protective equipment including mask and gloves.  Additional gown if indicated.  Mask used was standard procedure mask. Appropriate PPE was worn during the entire visit.  Hand hygiene was completed before and after.

## 2023-04-12 NOTE — THERAPY TREATMENT NOTE
Inpatient Rehabilitation - Speech Language Pathology Treatment Note    [facility]     Patient Name: Louise GARCIA  : 1965  MRN: [id number]    Today's Date: 2023                   Admit Date: 3/17/2023       Visit Dx:      ICD-10-CM ICD-9-CM   1. Impaired functional mobility, balance, gait, and endurance  Z74.09 V49.89       Patient Active Problem List   Diagnosis   • Sepsis   • Neck pain, chronic   • Upper back pain   • Paresthesia   • Cervical myelopathy   • Lower extremity weakness   • History of blood clots   • Chronic anticoagulation   • Seizures   • Anemia   • GERD (gastroesophageal reflux disease)   • Guillain-Turon syndrome   • Situational mixed anxiety and depressive disorder   • Mass of middle lobe of right lung   • Oral candidiasis   • Empyema of pleura   • MRSA bacteremia   • Idiopathic peripheral neuropathy       Past Medical History:   Diagnosis Date   • Degenerative disc disease, cervical    • Gestational diabetes    • H/O blood clots    • Hematemesis    • Seizures    • Septic shock        Past Surgical History:   Procedure Laterality Date   • APPENDECTOMY     • BACK SURGERY     • CHOLECYSTECTOMY     • ENDOSCOPY N/A 2016    Procedure: ESOPHAGOGASTRODUODENOSCOPY with biopsy;  Surgeon: Austen Brito MD;  Location: [facility] ENDOSCOPY;  Service:    • HYSTERECTOMY     • NECK SURGERY       approx 6 yrs ago   • THORACOSCOPY Right 3/8/2023    Procedure: THORACOSCOPY WITH DAVINCI ROBOT WITH COMPLETE DECORTICATION;  Surgeon: Chloe Cedillo MD;  Location: [facility] OR;  Service: Robotics - DaVinci;  Laterality: Right;   • TONSILLECTOMY     • TONSILLECTOMY AND ADENOIDECTOMY         SLP Recommendation and Plan                                                            SLP EVALUATION (last 72 hours)     SLP SLC Evaluation     Row Name 23 1430 23 1430 23 0930 04/10/23 1330 04/10/23 0945       Communication Assessment/Intervention    Document Type therapy note (daily  note)  -AL therapy note (daily note)  -AL therapy note (daily note)  -AL therapy note (daily note)  -AL therapy note (daily note)  -AL    Patient/Family/Caregiver Comments/Observations Pt unable to participate in 0930 session due to episode of emesis and nausea. Attempted session at 1300; pt was asleep and  requested to waken her. Pt participated in session at 1430; however, session ended at 1450 due to pt needing bathroom care. Pt declined further therapies for remainder of day due to not feeling well.  -AL Pt participated well.  -AL Pt participated well. She reported feeling nervous about going home.  -AL Pt participated well.  -AL Session began 15 minutes late due to MD assessment of patient. Pt with fever today. Pt took pain meds prior to session. She reported difficulty with focusing due to pain meds.  -AL       Pain Scale: Numbers Pre/Post-Treatment    Pretreatment Pain Rating 0/10 - no pain  -AL 0/10 - no pain  -AL 4/10  -AL 0/10 - no pain  -AL 0/10 - no pain  -AL    Pre/Posttreatment Pain Comment -- -- Right ribs  -AL -- --    Row Name 04/10/23 0800                   Communication Assessment/Intervention    Document Type progress note/recertification  -AL              User Key  (r) = Recorded By, (t) = Taken By, (c) = Cosigned By    Initials Name Effective Dates    Nessa Kraus, MS CCC-SLP 06/16/21 -                    EDUCATION    The patient has been educated in the following areas:       Cognitive Impairment.             SLP GOALS     Row Name 04/12/23 1430 04/11/23 1430 04/11/23 0930       Attention Goal 1 (SLP)    Improve Attention by Goal 1 (SLP) complete sustained attention task;80%;with minimal cues (75-90%)  -AL complete sustained attention task;80%;with minimal cues (75-90%)  -AL --    Time Frame (Attention Goal 1, SLP) 1 week  -AL 1 week  -AL --    Progress/Outcomes (Attention Goal 1, SLP) goal ongoing  -AL good progress toward goal  -AL --    Comment (Attention Goal 1, SLP)  Sustained attention on opposites word search for 10 minutes with cue x1 for selective attention. She was 55% accurate with NO cues, 100% with MIN-MOD cues.  -AL Medicine management with pill box: 100% with NO cues  -AL --       Memory Skills Goal 1 (SLP)    Improve Memory Skills Through Goal 1 (SLP) -- use memory strategies;use external memory aid;recall details of the day;80%;with moderate cues (50-74%)  -AL --    Time Frame (Memory Skills Goal 1, SLP) -- 1 week  -AL --    Progress/Outcomes (Memory Skills Goal 1, SLP) -- good progress toward goal  -AL --    Comment (Memory Skills Goal 1, SLP) -- Recalled 3/3 errands after 15 minutes and at end of session with NO cues. 4 word list retention task: 70% with NO cues, 100% with MIN cues  -AL --       Organizational Skills Goal 1 (SLP)    Improve Thought Organization Through Goal 1 (SLP) -- -- completing a divergent naming task;80%;with minimal cues (75-90%)  -AL    Time Frame (Thought Organization Skills Goal 1, SLP) -- -- 1 week  -AL    Progress/Outcomes (Thought Organization Skills Goal 1, SLP) -- -- goal ongoing  -AL    Comment (Thought Organization Skills Goal 1, SLP) -- -- Expensive items: 5 with NO cues, 8 with MIN cues  -AL       Reasoning Goal 1 (SLP)    Improve Reasoning Through Goal 1 (SLP) -- complete deductive reasoning task;80%;with minimal cues (75-90%)  -AL complete deductive reasoning task;80%;with minimal cues (75-90%)  -AL    Time Frame (Reasoning Goal 1, SLP) -- 1 week  -AL 1 week  -AL    Progress/Outcomes (Reasoning Goal 1, SLP) -- good progress toward goal  -AL good progress toward goal  -AL    Comment (Reasoning Goal 1, [facility]) -- FlowPlay reasoning task: 2/12 with NO cues, 12/12 with MIN-MOD cues  -AL Furniture delivery reasoning task: 3/8 with NO Cues, 8/8 with MIN-MAX cues.  -AL    Row Name 04/10/23 1330 04/10/23 0945 04/10/23 0800       Patient will demonstrate functional cognitive-linguistic skills for return to discharge environment     [place] -- -- with minimal cues  -AL    Time frame -- -- by discharge  -AL    Progress/Outcomes -- -- good progress toward goal  -AL       Word Retrieval Skills Goal 1 (SLP)    Improve Word Retrieval Skills By Goal 1 (SLP) -- -- completing functional word finding tasks;90%;independently (over 90% accuracy)  -AL    Progress/Outcomes (Word Retrieval Goal 1, SLP) -- -- goal met  -AL       Ability to Construct Phrase and Sentence Level Response Goal 1 (SLP)    Improve Ability to Construct Phrase and Sentence Level Responses By Goal 1 (SLP) -- -- answering question with phrase;constructing a sentence with a key word;90%;independently (over 90% accuracy)  -AL    Progress/Outcomes (Phrase and Sentence Level Response Goal 1, SLP) -- -- goal met  -AL       Phonation Goal 1 (SLP)    Improve Phonation By Goal 1 (SLP) -- -- using appropriate tone focus;90%;with minimal cues (75-90%)  -AL    Progress/Outcomes (Phonation Goal 1, SLP) good progress toward goal  -AL -- good progress toward goal  -AL    Comment (Phonation Goal 1, SLP) Pt exhibited mild-moderate hoarse vocal quality noted. Noted to exhibit increased strong throat clearing in PM session. SLP provided education regarding vocal hygiene. Pt reported increased urge to throat clear since her IVIG treatments.  -AL -- --       Attention Goal 1 (SLP)    Improve Attention by Goal 1 (SLP) -- -- complete sustained attention task;80%;with minimal cues (75-90%)  -AL    Time Frame (Attention Goal 1, SLP) -- -- 1 week  -AL    Progress/Outcomes (Attention Goal 1, SLP) -- -- good progress toward goal  -AL       Memory Skills Goal 1 (SLP)    Improve Memory Skills Through Goal 1 (SLP) use memory strategies;use external memory aid;recall details of the day;80%;with moderate cues (50-74%)  -AL use memory strategies;use external memory aid;recall details of the day;80%;with moderate cues (50-74%)  -AL use memory strategies;use external memory aid;recall details of the day;80%;with  moderate cues (50-74%)  -AL    Time Frame (Memory Skills Goal 1, SLP) 1 week  -AL 1 week  -AL 1 week  -AL    Progress/Outcomes (Memory Skills Goal 1, SLP) goal ongoing  -AL goal met  -AL goal met  -AL    Comment (Memory Skills Goal 1, SLP) 4 unit list retention task: 60% with NO cues, 100% with MI Ncues  -AL Recalled 3/3 errands after 10 minutes with NO cues. Goal met x2. Working memory for 4 unit list retention: 3/8 with NO cues, 7/8 with MOD-MAX cues  -AL --       Organizational Skills Goal 1 (SLP)    Improve Thought Organization Through Goal 1 (SLP) completing a divergent naming task;80%;with minimal cues (75-90%)  -AL completing a divergent naming task;80%;with minimal cues (75-90%)  -AL completing a divergent naming task;80%;with minimal cues (75-90%)  -AL    Progress/Outcomes (Thought Organization Skills Goal 1, SLP) goal ongoing  -AL good progress toward goal  -AL good progress toward goal  -AL    Comment (Thought Organization Skills Goal 1, SLP) Ranged from 3-4 with NO cues, 8 with MIN-MOD cues.  -AL Loud: 3 with NO cues, 4 with additional time.  -AL --       Reasoning Goal 1 (SLP)    Improve Reasoning Through Goal 1 (SLP) -- -- complete deductive reasoning task;80%;with minimal cues (75-90%)  -AL    Progress/Outcomes (Reasoning Goal 1, SLP) -- -- good progress toward goal  -AL    Comment (Reasoning Goal 1, SLP) -- [employer] visuospatial reasoning task: 50% with NO cues, 100% with MIN-MAX cues. Required MIN-MOD cues for sustained attention today.  -AL --       Functional Math Skills Goal 1 (SLP)    Improve Functional Math Skills Through Goal 1 (SLP) -- -- complete functional math task;80%;with minimal cues (75-90%)  -AL    Progress/Outcomes (Functional Math Skills Goal 1, SLP) -- -- goal ongoing  -AL          User Key  (r) = Recorded By, (t) = Taken By, (c) = Cosigned By    Initials Name Provider Type    Nessa Kraus MS CCC-SLP Speech and Language Pathologist                            Time  Calculation:        Time Calculation- SLP     Row Name 04/12/23 1501             Time Calculation- SLP    SLP Start Time 1430  -AL      SLP Stop Time 1445  -AL      SLP Time Calculation (min) 15 min  -AL      SLP Received On 04/12/23  -AL            User Key  (r) = Recorded By, (t) = Taken By, (c) = Cosigned By    Initials Name Provider Type    Nessa Kraus, MS CCC-SLP Speech and Language Pathologist                  Therapy Charges for Today     Code Description Service Date Service Provider Modifiers Qty    92979896231 HC ST DEV OF COGN SKILLS INITIAL 15 MIN 4/11/2023 Nessa Spangler, MS CCC-SLP  1    76901015455 HC ST DEV OF COGN SKILLS EACH ADDT'L 15 MIN 4/11/2023 Nessa Spangler, MS CCC-SLP  3    [id number] HC ST DEV OF COGN SKILLS INITIAL 15 MIN 4/12/2023 Nessa Spangler, MS CCC-SLP  1                           Nessa Spangler MS CCC-SLP  4/12/2023

## 2023-04-13 PROCEDURE — 97112 NEUROMUSCULAR REEDUCATION: CPT

## 2023-04-13 PROCEDURE — 94760 N-INVAS EAR/PLS OXIMETRY 1: CPT

## 2023-04-13 PROCEDURE — 97129 THER IVNTJ 1ST 15 MIN: CPT

## 2023-04-13 PROCEDURE — 94799 UNLISTED PULMONARY SVC/PX: CPT

## 2023-04-13 PROCEDURE — 97110 THERAPEUTIC EXERCISES: CPT

## 2023-04-13 PROCEDURE — 97130 THER IVNTJ EA ADDL 15 MIN: CPT

## 2023-04-13 PROCEDURE — 94761 N-INVAS EAR/PLS OXIMETRY MLT: CPT

## 2023-04-13 PROCEDURE — 97535 SELF CARE MNGMENT TRAINING: CPT

## 2023-04-13 PROCEDURE — 94664 DEMO&/EVAL PT USE INHALER: CPT

## 2023-04-13 PROCEDURE — 97116 GAIT TRAINING THERAPY: CPT

## 2023-04-13 PROCEDURE — 97140 MANUAL THERAPY 1/> REGIONS: CPT

## 2023-04-13 RX ADMIN — IPRATROPIUM BROMIDE AND ALBUTEROL SULFATE 3 ML: 2.5; .5 SOLUTION RESPIRATORY (INHALATION) at 06:59

## 2023-04-13 RX ADMIN — OXYCODONE HYDROCHLORIDE 2.5 MG: 5 TABLET ORAL at 03:28

## 2023-04-13 RX ADMIN — Medication 1 MG: at 08:51

## 2023-04-13 RX ADMIN — GABAPENTIN 300 MG: 300 CAPSULE ORAL at 05:43

## 2023-04-13 RX ADMIN — APIXABAN 5 MG: 5 TABLET, FILM COATED ORAL at 08:51

## 2023-04-13 RX ADMIN — OXYCODONE HYDROCHLORIDE 2.5 MG: 5 TABLET ORAL at 15:34

## 2023-04-13 RX ADMIN — Medication 5 MG: at 20:04

## 2023-04-13 RX ADMIN — Medication 500 MCG: at 08:51

## 2023-04-13 RX ADMIN — GABAPENTIN 300 MG: 300 CAPSULE ORAL at 13:10

## 2023-04-13 RX ADMIN — PANTOPRAZOLE SODIUM 40 MG: 40 TABLET, DELAYED RELEASE ORAL at 05:43

## 2023-04-13 RX ADMIN — ACETAMINOPHEN 650 MG: 325 TABLET, FILM COATED ORAL at 20:33

## 2023-04-13 RX ADMIN — LAMOTRIGINE 200 MG: 100 TABLET ORAL at 08:51

## 2023-04-13 RX ADMIN — DESVENLAFAXINE SUCCINATE 25 MG: 25 TABLET, EXTENDED RELEASE ORAL at 08:51

## 2023-04-13 RX ADMIN — APIXABAN 5 MG: 5 TABLET, FILM COATED ORAL at 20:04

## 2023-04-13 RX ADMIN — OXYCODONE HYDROCHLORIDE 2.5 MG: 5 TABLET ORAL at 20:03

## 2023-04-13 RX ADMIN — GABAPENTIN 300 MG: 300 CAPSULE ORAL at 20:03

## 2023-04-13 RX ADMIN — IPRATROPIUM BROMIDE AND ALBUTEROL SULFATE 3 ML: 2.5; .5 SOLUTION RESPIRATORY (INHALATION) at 21:22

## 2023-04-13 RX ADMIN — Medication 100 MG: at 08:51

## 2023-04-13 NOTE — PLAN OF CARE
Problem: Rehabilitation (IRF) Plan of Care  Goal: Plan of Care Review  Outcome: Ongoing, Progressing  Flowsheets  Taken 4/13/2023 1409 by Jo Zapata, RN  Progress: improving  Outcome Evaluation: Denies nausea this am, able to work with therapy. No c/o discomfort. Napping between therapies.  at bedside.  Taken 4/13/2023 0408 by Dayanna Serna RN  Plan of Care Reviewed With: patient  Goal: Patient-Specific Goal (Individualized)  Outcome: Ongoing, Progressing  Goal: Absence of New-Onset Illness or Injury  Outcome: Ongoing, Progressing  Intervention: Prevent Fall and Fall Injury  Description: Perform fall risk screening on admission and with change in status.  Communicate fall injury risk to the healthcare team.  Promote environmental and care measures specific to identified risk factors.  Develop strategies to prevent or minimize injury during falls and to get up safely after falling.  Perform regular intentional rounding to assess need for position change, pain assessment, personal needs.  Recent Flowsheet Documentation  Taken 4/13/2023 1400 by Jo Zapata, RN  Safety Promotion/Fall Prevention:   assistive device/personal items within reach   clutter free environment maintained   fall prevention program maintained   nonskid shoes/slippers when out of bed   room organization consistent   safety round/check completed   activity supervised  Taken 4/13/2023 1200 by Jo Zapata, RN  Safety Promotion/Fall Prevention:   assistive device/personal items within reach   clutter free environment maintained   fall prevention program maintained   nonskid shoes/slippers when out of bed   room organization consistent   safety round/check completed   activity supervised  Taken 4/13/2023 1000 by Jo Zapata, RN  Safety Promotion/Fall Prevention:   activity supervised   assistive device/personal items within reach   clutter free environment maintained   fall prevention program maintained   lighting adjusted    nonskid shoes/slippers when out of bed   room organization consistent   safety round/check completed  Taken 4/13/2023 0800 by Jo Zapata RN  Safety Promotion/Fall Prevention:   activity supervised   assistive device/personal items within reach   clutter free environment maintained   fall prevention program maintained   lighting adjusted   nonskid shoes/slippers when out of bed   room organization consistent   safety round/check completed  Intervention: Prevent Infection  Description: Maintain skin and mucous membrane integrity; promote hand, oral and pulmonary hygiene.  Optimize fluid balance, nutrition, sleep and glycemic control to maximize infection resistance.  Identify potential sources of infection early to prevent or mitigate progression of infection (e.g., wound, lines, devices).  Evaluate ongoing need for invasive devices; remove promptly when no longer indicated.  Recent Flowsheet Documentation  Taken 4/13/2023 1400 by Jo Zpaata, RN  Infection Prevention:   environmental surveillance performed   equipment surfaces disinfected   hand hygiene promoted   personal protective equipment utilized   rest/sleep promoted   single patient room provided  Taken 4/13/2023 1200 by Jo Zapata RN  Infection Prevention:   environmental surveillance performed   hand hygiene promoted   personal protective equipment utilized   rest/sleep promoted   single patient room provided   equipment surfaces disinfected  Taken 4/13/2023 1000 by Jo Zapata, RN  Infection Prevention:   environmental surveillance performed   hand hygiene promoted   personal protective equipment utilized   equipment surfaces disinfected   rest/sleep promoted   single patient room provided  Taken 4/13/2023 0800 by Jo Zapata, RN  Infection Prevention:   environmental surveillance performed   equipment surfaces disinfected   hand hygiene promoted   personal protective equipment utilized   rest/sleep promoted   single patient room  provided  Goal: Optimal Comfort and Wellbeing  Outcome: Ongoing, Progressing  Goal: Home and Community Transition Plan Established  Outcome: Ongoing, Progressing     Problem: Seizure Disorder Comorbidity  Goal: Maintenance of Seizure Control  Outcome: Ongoing, Progressing  Intervention: Maintain Seizure-Symptom Control  Description: Identify risks that may lower seizure threshold (e.g., hypoglycemia, illness, change in medications); assist in treatment.  Evaluate adherence to management plan (e.g., medication, symptom-control, trigger-avoidance, self-monitoring).  Advocate for continuation of home regimen, including medication, medication administration schedule, diet, sleep schedule and symptom monitoring; acknowledge home management and routine.  Minimize potential seizure triggers, such as stress, sleep deprivation and diet modifications.  Evaluate effectiveness of coping skills; encourage expression of feelings, expectations and concerns related to disease management and quality of life; reinforce education to enhance management plan and wellbeing.  Maintain seizure precautions, such as removal of potential harmful objects, padded side rails, bed in low position, suction and airway protection equipment readily available.  Provide a quiet, calm environment.  If seizure occurs, stay at bedside; monitor and record details of seizure activity.  Maintain patent airway; do not insert anything into the mouth (unless the airway becomes compromised).  Place head of bed flat and turn to side-lying position to prevent aspiration and promote safety; do not restrain.  Anticipate administration of pharmacologic therapy, such as anticonvulsant.  Assess for injury following seizure; monitor, reassure and reorient patient.  Recent Flowsheet Documentation  Taken 4/13/2023 1400 by Jo Zapata RN  Seizure Precautions:   activity supervised   clutter-free environment maintained   side rails padded  Taken 4/13/2023 1200 by  Benny, Jo, RN  Seizure Precautions:   activity supervised   clutter-free environment maintained   side rails padded  Taken 4/13/2023 1000 by Jo Zapata RN  Seizure Precautions:   activity supervised   clutter-free environment maintained   side rails padded  Taken 4/13/2023 0800 by Jo Zapata RN  Seizure Precautions:   clutter-free environment maintained   side rails padded     Problem: Skin Injury Risk Increased  Goal: Skin Health and Integrity  Outcome: Ongoing, Progressing  Intervention: Optimize Skin Protection  Description: Perform a full pressure injury risk assessment, as indicated by screening, upon admission to care unit.  Reassess skin (injury risk, full inspection) frequently (e.g., scheduled interval, with change in condition) to provide optimal early detection and prevention.  Maintain adequate tissue perfusion (e.g., encourage fluid balance; avoid crossing legs, constrictive clothing or devices) to promote tissue oxygenation.  Maintain head of bed at lowest degree of elevation tolerated, considering medical condition and other restrictions.  Avoid positioning onto an area that remains reddened.  Minimize incontinence and moisture (e.g., toileting schedule; moisture-wicking pad, diaper or incontinence collection device; skin moisture barrier).  Cleanse skin promptly and gently when soiled utilizing a pH-balanced cleanser.  Relieve and redistribute pressure (e.g., scheduled position changes, weight shifts, use of support surface, medical device repositioning, protective dressing application, use of positioning device, microclimate control, use of pressure-injury-monitor  Encourage increased activity, such as sitting in a chair at the bedside or early mobilization, when able to tolerate.  Recent Flowsheet Documentation  Taken 4/13/2023 1200 by Jo Zapata, RN  Head of Bed (HOB) Positioning: HOB at 20 degrees  Taken 4/13/2023 1000 by Jo Zapata RN  Head of Bed (HOB) Positioning: HOB  at 20 degrees     Problem: Fall Injury Risk  Goal: Absence of Fall and Fall-Related Injury  Outcome: Ongoing, Progressing  Intervention: Identify and Manage Contributors  Description: Develop a fall prevention plan with the patient and caregiver/family.  Provide reorientation, appropriate sensory stimulation and routines with changes in mental status to decrease risk of fall.  Promote use of personal vision and auditory aids.  Assess assistance level required for safe and effective self-care; provide support as needed, such as toileting, mobilization. For age 65 and older, implement timed toileting with assistance.  Encourage physical activity, such as performance of mobility and self-care at highest level of patient ability, multicomponent exercise program and provision of appropriate assistive devices.  If fall occurs, assess the severity of injury; implement fall injury protocol. Determine the cause and revise fall injury prevention plan.  Regularly review medication contribution to fall risk; adjust medication administration times to minimize risk of falling.  Consider risk related to polypharmacy and age.  Balance adequate pain management with potential for oversedation.  Recent Flowsheet Documentation  Taken 4/13/2023 1400 by Jo Zapata RN  Medication Review/Management: medications reviewed  Taken 4/13/2023 1200 by Jo Zapata, RN  Medication Review/Management: medications reviewed  Taken 4/13/2023 1000 by Jo Zapata, RN  Medication Review/Management: medications reviewed  Intervention: Promote Injury-Free Environment  Description: Provide a safe, barrier-free environment that encourages independent activity.  Keep care area uncluttered and well-lighted.  Determine need for increased observation or monitoring.  Avoid use of devices that minimize mobility, such as restraints or indwelling urinary catheter.  Recent Flowsheet Documentation  Taken 4/13/2023 1400 by Jo Zapata, VIKA  Safety  Promotion/Fall Prevention:   assistive device/personal items within reach   clutter free environment maintained   fall prevention program maintained   nonskid shoes/slippers when out of bed   room organization consistent   safety round/check completed   activity supervised  Taken 4/13/2023 1200 by Jo Zapata, RN  Safety Promotion/Fall Prevention:   assistive device/personal items within reach   clutter free environment maintained   fall prevention program maintained   nonskid shoes/slippers when out of bed   room organization consistent   safety round/check completed   activity supervised  Taken 4/13/2023 1000 by Jo Zapata, RN  Safety Promotion/Fall Prevention:   activity supervised   assistive device/personal items within reach   clutter free environment maintained   fall prevention program maintained   lighting adjusted   nonskid shoes/slippers when out of bed   room organization consistent   safety round/check completed  Taken 4/13/2023 0800 by Jo Zapata, RN  Safety Promotion/Fall Prevention:   activity supervised   assistive device/personal items within reach   clutter free environment maintained   fall prevention program maintained   lighting adjusted   nonskid shoes/slippers when out of bed   room organization consistent   safety round/check completed   Goal Outcome Evaluation:           Progress: improving  Outcome Evaluation: Denies nausea this am, able to work with therapy. No c/o discomfort. Napping between therapies.  at bedside.

## 2023-04-13 NOTE — THERAPY TREATMENT NOTE
Inpatient Rehabilitation - Speech Language Pathology Treatment Note    [facility]     Patient Name: Louise GARCIA  : 1965  MRN: [id number]    Today's Date: 2023                   Admit Date: 3/17/2023       Visit Dx:      ICD-10-CM ICD-9-CM   1. Impaired functional mobility, balance, gait, and endurance  Z74.09 V49.89       Patient Active Problem List   Diagnosis   • Sepsis   • Neck pain, chronic   • Upper back pain   • Paresthesia   • Cervical myelopathy   • Lower extremity weakness   • History of blood clots   • Chronic anticoagulation   • Seizures   • Anemia   • GERD (gastroesophageal reflux disease)   • Guillain-Wingo syndrome   • Situational mixed anxiety and depressive disorder   • Mass of middle lobe of right lung   • Oral candidiasis   • Empyema of pleura   • MRSA bacteremia   • Idiopathic peripheral neuropathy       Past Medical History:   Diagnosis Date   • Degenerative disc disease, cervical    • Gestational diabetes    • H/O blood clots    • Hematemesis    • Seizures    • Septic shock        Past Surgical History:   Procedure Laterality Date   • APPENDECTOMY     • BACK SURGERY     • CHOLECYSTECTOMY     • ENDOSCOPY N/A 2016    Procedure: ESOPHAGOGASTRODUODENOSCOPY with biopsy;  Surgeon: Austen Brito MD;  Location: [facility] ENDOSCOPY;  Service:    • HYSTERECTOMY     • NECK SURGERY       approx 6 yrs ago   • THORACOSCOPY Right 3/8/2023    Procedure: THORACOSCOPY WITH DAVINCI ROBOT WITH COMPLETE DECORTICATION;  Surgeon: Chloe Cedillo MD;  Location: [facility] OR;  Service: Robotics - DaVinci;  Laterality: Right;   • TONSILLECTOMY     • TONSILLECTOMY AND ADENOIDECTOMY         SLP Recommendation and Plan                                                            SLP EVALUATION (last 72 hours)     SLP SLC Evaluation     Row Name 23 1430 23 0930 23 1430 23 1430 23       Communication Assessment/Intervention    Document Type therapy note (daily  "note)  -AL therapy note (daily note)  -AL therapy note (daily note)  -AL therapy note (daily note)  -AL therapy note (daily note)  -AL    Patient/Family/Caregiver Comments/Observations Pt participated well.  -AL Pt is pleasant and cooperative.  -AL Attempted session at 1300; pt was asleep and  requested to waken her. Pt participated in session at 1430; however, session ended at 1450 due to pt needing bathroom care. Pt declined further therapies for remainder of day due to not feeling well.  -AL Pt participated well.  -AL Pt participated well. She reported feeling nervous about going home.  -AL       Pain Scale: Numbers Pre/Post-Treatment    Pretreatment Pain Rating 0/10 - no pain  -AL 0/10 - no pain  -AL 0/10 - no pain  -AL 0/10 - no pain  -AL 4/10  -AL    Pre/Posttreatment Pain Comment -- -- -- -- Right ribs  -AL          User Key  (r) = Recorded By, (t) = Taken By, (c) = Cosigned By    Initials Name Effective Dates    Nessa Kraus, MS CCC-SLP 06/16/21 -                    EDUCATION    The patient has been educated in the following areas:       Cognitive Impairment Communication Impairment.             SLP GOALS     Row Name 04/13/23 1400 04/13/23 0900 04/12/23 1430       Attention Goal 1 (SLP)    Improve Attention by Goal 1 (SLP) complete sustained attention task;80%;with minimal cues (75-90%)  -AL complete sustained attention task;80%;with minimal cues (75-90%)  -AL complete sustained attention task;80%;with minimal cues (75-90%)  -AL    Time Frame (Attention Goal 1, SLP) 1 week  -AL 1 week  -AL 1 week  -AL    Progress/Outcomes (Attention Goal 1, SLP) good progress toward goal  -AL goal ongoing  -AL goal ongoing  -AL    Comment (Attention Goal 1, SLP) Alternating attention for \"Blink\" card sort required overall MIN cues.  -AL Opposites word search: 100% with NO cues.  -AL Sustained attention on opposites word search for 10 minutes with cue x1 for selective attention. She was 55% accurate with NO " cues, 100% with MIN-MOD cues.  -AL       Memory Skills Goal 1 (SLP)    Progress/Outcomes (Memory Skills Goal 1, SLP) good progress toward goal  -AL -- --    Comment (Memory Skills Goal 1, SLP) Recalled salient event from morning session with NO cues. 5 unit list retention task: 65% with NO cues, 100% with MIN cues.  -AL -- --       Reasoning Goal 1 (SLP)    Improve Reasoning Through Goal 1 (SLP) complete deductive reasoning task;80%;with minimal cues (75-90%)  -AL -- --    Time Frame (Reasoning Goal 1, SLP) 1 week  -AL -- --    Progress/Outcomes (Reasoning Goal 1, SLP) good progress toward goal  -AL good progress toward goal  -AL --    Comment (Reasoning Goal 1, SLP) Completed logic puzzle: 30% with NO cues, 100% with MIN-MOD cues.  -AL Initiated logic puzzle task: 30% with NO Cues, 100% with MIN-MOD cues thus far. Plan to complete in next session.  -AL --    Row Name 04/11/23 1430 04/11/23 0930          Attention Goal 1 (SLP)    Improve Attention by Goal 1 (SLP) complete sustained attention task;80%;with minimal cues (75-90%)  -AL --     Time Frame (Attention Goal 1, SLP) 1 week  -AL --     Progress/Outcomes (Attention Goal 1, SLP) good progress toward goal  -AL --     Comment (Attention Goal 1, SLP) Medicine management with pill box: 100% with NO cues  -AL --        Memory Skills Goal 1 (SLP)    Improve Memory Skills Through Goal 1 (SLP) use memory strategies;use external memory aid;recall details of the day;80%;with moderate cues (50-74%)  -AL --     Time Frame (Memory Skills Goal 1, SLP) 1 week  -AL --     Progress/Outcomes (Memory Skills Goal 1, SLP) good progress toward goal  -AL --     Comment (Memory Skills Goal 1, SLP) Recalled 3/3 errands after 15 minutes and at end of session with NO cues. 4 word list retention task: 70% with NO cues, 100% with MIN cues  -AL --        Organizational Skills Goal 1 (SLP)    Improve Thought Organization Through Goal 1 (SLP) -- completing a divergent naming task;80%;with  minimal cues (75-90%)  -AL     Time Frame (Thought Organization Skills Goal 1, SLP) -- 1 week  -AL     Progress/Outcomes (Thought Organization Skills Goal 1, SLP) -- goal ongoing  -AL     Comment (Thought Organization Skills Goal 1, SLP) -- Expensive items: 5 with NO cues, 8 with MIN cues  -AL        Reasoning Goal 1 (SLP)    Improve Reasoning Through Goal 1 (SLP) complete deductive reasoning task;80%;with minimal cues (75-90%)  -AL complete deductive reasoning task;80%;with minimal cues (75-90%)  -AL     Time Frame (Reasoning Goal 1, SLP) 1 week  -AL 1 week  -AL     Progress/Outcomes (Reasoning Goal 1, SLP) good progress toward goal  -AL good progress toward goal  -AL     Comment (Reasoning Goal 1, SLP) [place] reasoning task: 2/12 with NO cues, 12/12 with MIN-MOD cues  -AL Furniture delivery reasoning task: 3/8 with NO Cues, 8/8 with MIN-MAX cues.  -AL           User Key  (r) = Recorded By, (t) = Taken By, (c) = Cosigned By    Initials Name Provider Type    Nessa Kraus MS CCC-SLP Speech and Language Pathologist                            Time Calculation:        Time Calculation- SLP     Row Name 04/13/23 1511 04/13/23 0958          Time Calculation- SLP    SLP Start Time 1430  -AL 0930  -AL     SLP Stop Time 1500  -AL 1000  -AL     SLP Time Calculation (min) 30 min  -AL 30 min  -AL           User Key  (r) = Recorded By, (t) = Taken By, (c) = Cosigned By    Initials Name Provider Type    Nessa Kraus MS CCC-SLP Speech and Language Pathologist                  Therapy Charges for Today     Code Description Service Date Service Provider Modifiers Qty    81467580292 HC ST DEV OF COGN SKILLS INITIAL 15 MIN 4/12/2023 Nessa Spangler MS CCC-SLP  1    40876670426 HC ST DEV OF COGN SKILLS INITIAL 15 MIN 4/13/2023 Nessa Spangler MS CCC-SLP  1    44364614779 HC ST DEV OF COGN SKILLS EACH ADDT'L 15 MIN 4/13/2023 Nessa Spangler MS CCC-SLP  3                           Nessa Spangler MS  CCC-SLP  4/13/2023

## 2023-04-13 NOTE — PROGRESS NOTES
"Nutrition Services    Patient Name:  Louise GARCIA  YOB: 1965  MRN: [id number]  Admit Date:  3/17/2023    Assessment Date:  04/13/23    FOLLOW UP NOTE - CLINICAL NUTRITION    Comments:  Visited pt in room who reported decreased appetite and intakes; reporting nausea, stomach discomfort all day yesterday. Per EMR, pt had zero intakes yesterday. Visually, pt was eating lunch during today's visit and ate ~25% of meal. Pt stated she was saving her Boost for later since she wasn't hungry. Advised pt to drink Boost when meals are not consumed (ex: if 50% of meal consumed, drink 50% minimum of Boost). Encouraged liquid calories and snacks or boost between meals. With pt's 's assist, we weighed pt using standing scale. Current wt at 51.2 kg (112#), down 13# (10%) since February (x2 mo).      Patient meets ASPEN/AND criteria for nutrition diagnosis of severe malnutrition of acute on chronic illness based on: PO intakes, wt loss, muscle and fat losses.     Recommendation:   Intakes have been meeting, on average, 50% of needs or less x ~ 1 mo. May need to consider cyclic enteral feedings (possibly nocturnal to allow for day-time diet), if pt agreeable and aligned with plan of care.   - If desired, please consult RD. Needs calculated below.     RD will continue to follow-up, per protocol.      Encounter Information        Reason for Encounter Follow-up   Current Issues Idiopathic peripheral neuropathy     Current Nutrition Orders & Evaluation of Intake       Oral Nutrition     Current PO Diet Diet: Regular/House Diet; Texture: Regular Texture (IDDSI 7); Fluid Consistency: Thin (IDDSI 0)   Supplement Boost Plus TID   PO Evaluation    % PO Intake/# of days PO 0% on 4/12.   4/13: 25% breakfast , 25% lunch (visually assessed)   Factors Affecting Intake  constipation, decreased appetite, early satiety      Anthropometrics         Height   Weight Height: 157.5 cm (62\")  Weight: 60.5 kg (133 lb 6.1 oz) (04/06/23 " "1500)   BMI kg/m2 Body mass index is 24.4 kg/m².   Weight trend Loss, Amount/Timeframe:  Down 13# (10%) x 2 mo.      Estimated/Assessed Needs       Energy Requirements    Height for Calculation  Height: 157.5 cm (62\")   Weight for Calculation 51.2 kg   Method for Estimation  30 kcal/kg, 35 kcal/kg   EST Needs (kcal/day) 5114-1046 kcal       Protein Requirements    Weight for Calculation 51.2 kg   EST Protein Needs (g/kg) 1.2 - 1.5 gm/kg   EST Daily Needs (g/day) 61-77 g       Fluid Requirements     Method for Estimation 1 mL/kcal    Estimated Needs (mL/day) 8906-3177 mL     Physical Findings          Physical Appearance alert, oriented fatigued   Oral/Mouth Cavity teeth missing   Edema  1+ (trace), 2+ (mild)   Gastrointestinal normoactive, last bowel movement:4/12   Skin  MASD, surgical incision, abrasion   Tubes/Drains none     Labs       Pertinent Labs Reviewed, listed below     Results from last 7 days   Lab Units 04/10/23  0603 04/07/23  0843   SODIUM mmol/L 132* 132*   POTASSIUM mmol/L 4.1 3.9   CHLORIDE mmol/L 94* 95*   CO2 mmol/L 31.2* 30.2*   BUN mg/dL 17 16   CREATININE mg/dL 1.12* 1.32*   CALCIUM mg/dL 10.0 10.1   GLUCOSE mg/dL 97 118*     Results from last 7 days   Lab Units 04/10/23  0603   HEMOGLOBIN g/dL 7.7*   HEMATOCRIT % 23.7*   WBC 10*3/mm3 8.74     Results from last 7 days   Lab Units 04/10/23  0603 04/07/23  0843 04/06/23  1520   PLATELETS 10*3/mm3 273 357 380     COVID19   Date Value Ref Range Status   03/03/2023 Not Detected Not Detected - Ref. Range Final     Lab Results   Component Value Date    HGBA1C 5.10 02/20/2023          Medications           Scheduled Medications apixaban, 5 mg, Oral, Q12H  desvenlafaxine, 25 mg, Oral, Daily  folic acid, 1 mg, Oral, Daily  gabapentin, 300 mg, Oral, Q8H  ipratropium-albuterol, 3 mL, Nebulization, 4x Daily - RT  lamoTRIgine, 200 mg, Oral, Daily  melatonin, 5 mg, Oral, Nightly  pantoprazole, 40 mg, Oral, Q AM  thiamine, 100 mg, Oral, Daily  vitamin B-12, " 500 mcg, Oral, Daily       Infusions     PRN Medications •  acetaminophen  •  bisacodyl  •  diphenhydrAMINE  •  famotidine  •  hydrocortisone sodium succinate  •  hydrocortisone-bacitracin-zinc oxide-nystatin  •  ondansetron ODT  •  oxyCODONE  •  polyethylene glycol  •  senna-docusate sodium  •  simethicone     PLAN OF CARE  Intervention Goal        Intervention Goal(s) Maintain nutrition status, Tolerate PO , Increase intake, Maintain weight, No significant weight loss and PO intake goal %: 60%     Nutrition Intervention        RD Action Advise available snack, Encourage intake, Follow Tx Progress and Care plan reviewed     Prescription        Diet Prescription    Supplement Prescription    EN/PN Prescription Recommend enteral support, if pt agreeable and aligned with plan of care. Consult RD if desired.   Prescription Ordered No, recommended   --  Monitor/Evaluation        Monitor Per protocol, PO intake, Supplement intake, Weight, GI status, Symptoms   Discharge Plan Pending clinical course   Education Will educate as needed       Electronically signed by:  Silke Arias RD  04/13/23 12:14 EDT

## 2023-04-13 NOTE — THERAPY TREATMENT NOTE
Inpatient Rehabilitation - Physical Therapy Treatment Note       [facility]     Patient Name: Louise GARCIA  : 1965  MRN: [id number]    Today's Date: 2023                    Admit Date: 3/17/2023      Visit Dx:     ICD-10-CM ICD-9-CM   1. Impaired functional mobility, balance, gait, and endurance  Z74.09 V49.89       Patient Active Problem List   Diagnosis   • Sepsis   • Neck pain, chronic   • Upper back pain   • Paresthesia   • Cervical myelopathy   • Lower extremity weakness   • History of blood clots   • Chronic anticoagulation   • Seizures   • Anemia   • GERD (gastroesophageal reflux disease)   • Guillain-Weldon syndrome   • Situational mixed anxiety and depressive disorder   • Mass of middle lobe of right lung   • Oral candidiasis   • Empyema of pleura   • MRSA bacteremia   • Idiopathic peripheral neuropathy       Past Medical History:   Diagnosis Date   • Degenerative disc disease, cervical    • Gestational diabetes    • H/O blood clots    • Hematemesis    • Seizures    • Septic shock        Past Surgical History:   Procedure Laterality Date   • APPENDECTOMY     • BACK SURGERY     • CHOLECYSTECTOMY     • ENDOSCOPY N/A 2016    Procedure: ESOPHAGOGASTRODUODENOSCOPY with biopsy;  Surgeon: Austen Brito MD;  Location: [facility] ENDOSCOPY;  Service:    • HYSTERECTOMY     • NECK SURGERY       approx 6 yrs ago   • THORACOSCOPY Right 3/8/2023    Procedure: THORACOSCOPY WITH DAVINCI ROBOT WITH COMPLETE DECORTICATION;  Surgeon: Chloe Cedillo MD;  Location: [facility] OR;  Service: Robotics - DaVinci;  Laterality: Right;   • TONSILLECTOMY     • TONSILLECTOMY AND ADENOIDECTOMY         PT ASSESSMENT (last 12 hours)     IRF PT Evaluation and Treatment     Row Name 23 1100          PT Time and Intention    Document Type daily treatment  -AE     Mode of Treatment individual therapy;physical therapy  -AE     Patient/Family/Caregiver Comments/Observations pt feeling much better today, no  nausea, less LLE pain. more fatigued in PM session  -AE     Row Name 04/13/23 1100          General Information    Patient Profile Reviewed yes  -AE     General Observations of Patient  Eduard present during therapy session. stated she has completed all IVIG  -AE     Existing Precautions/Restrictions fall  contact precautions  -AE     Limitations/Impairments safety/cognitive  -AE     Row Name 04/13/23 1100          Pain Assessment    Pretreatment Pain Rating 0/10 - no pain  -AE     Posttreatment Pain Rating 0/10 - no pain  -AE     Pain Location - Side/Orientation Left  -AE     Pain Location lower  -AE     Pain Location - extremity  -AE     Row Name 04/13/23 1100          Cognition/Psychosocial    Affect/Mental Status (Cognition) WFL  -AE     Orientation Status (Cognition) oriented x 3  -AE     Follows Commands (Cognition) follows one-step commands;verbal cues/prompting required  -AE     Personal Safety Interventions fall prevention program maintained;gait belt;muscle strengthening facilitated;nonskid shoes/slippers when out of bed;supervised activity  -AE     Cognitive Function memory deficit  -AE     Memory Deficit (Cognition) episodic memory;procedural memory  -AE     Row Name 04/13/23 1100          Mobility    Advanced Gait Activity other (see comments)  lateral walking in // bars with BUE support and single UE support. CGA x 2 lengths of // bars  -AE     Additional Documentation Advanced Gait Activity (Row)  -AE     Row Name 04/13/23 1100          Transfer Assessment/Treatment    Comment, (Transfers) performed all transfers pushing BUEs up from wc. consistently performed CGA/Bianca 4 STS  -AE     Row Name 04/13/23 1100          Bed-Chair Transfer    Bed-Chair [place] (Transfers) contact guard;minimum assist (75% patient effort);1 person assist  -AE     Assistive Device (Bed-Chair Transfers) walker, front-wheeled;wheelchair  -AE     Comment, (Bed-Chair Transfer) stand pivot  -AE     Row Name 04/13/23  1100          Chair-Bed Transfer    Chair-Bed [place] (Transfers) minimum assist (75% patient effort);1 person assist  -AE     Assistive Device (Chair-Bed Transfers) wheelchair;walker, front-wheeled  -AE     Comment, (Chair-Bed Transfer) stand pivot  -AE     Row Name 04/13/23 1100          Sit-Stand Transfer    Sit-Stand [place] (Transfers) contact guard;minimum assist (75% patient effort)  -AE     Assistive Device (Sit-Stand Transfers) wheelchair;walker, front-wheeled  -AE     Comment, (Sit-Stand Transfer) 4 STS with FWW and // bars, cues for sequencing  -AE     Row Name 04/13/23 1100          Stand-Sit Transfer    Stand-Sit [place] (Transfers) contact guard;minimum assist (75% patient effort)  -AE     Row Name 04/13/23 1100          Gait/Stairs (Locomotion)    [place] Level (Gait) minimum assist (75% patient effort);1 person assist;1 person to manage equipment  -AE     Assistive Device (Gait) walker, front-wheeled  -AE     Distance in Feet (Gait) 80ft  -AE     Pattern (Gait) step-through  -AE     Deviations/Abnormal Patterns (Gait) base of support, wide;left sided deviations;gait speed decreased;stride length decreased  -AE     Bilateral Gait Deviations heel strike decreased;knee buckling bilaterally  -AE     Left Sided Gait Deviations heel strike decreased  -AE     Right Sided Gait Deviations heel strike decreased  -AE     Gait Assessment/Intervention wc follow at first. occasional mild knee buckling but able to self correct. cues for standing rest breaks when that happens to regroup and focus  -AE     Row Name 04/13/23 1100          Balance    Comment, Balance regular DASHA, semi tandem 2 x 10 sec increments with CGA no UE support  -AE     Row Name 04/13/23 1100          Knee (Therapeutic Exercise)    Knee Strengthening (Therapeutic Exercise) bilateral;supine;SLR (straight leg raise);3 sets;3 repetitions  performed without shoes  -AE     Row Name 04/13/23 1100          Modality Treatment     Treatment Location 1 (Modality) L anterior thigh  -AE     Treatment Location 2 (Modality) massage over L anterior thigh  -AE     Modality Performed thermotherapy (hydrocollator packs)  -AE     Modality Treatment Results 5 min  -AE     Comment, Modality Treatment pain relief  -AE     Row Name 04/13/23 1100          Positioning and Restraints    Pre-Treatment Position sitting in chair/recliner  -AE     Post Treatment Position other  -AE     Other Position return to room with caregiver  -AE           User Key  (r) = Recorded By, (t) = Taken By, (c) = Cosigned By    Initials Name Provider Type    AE Deanna Carr, PT Physical Therapist              Wound 03/08/23 1917 Right lateral chest Incision (Active)   Dressing Appearance open to air 04/13/23 0800   Closure None 04/13/23 0800   Base clean;moist;pink;scab 04/13/23 0800   Drainage Amount none 04/13/23 0800       Wound 03/11/23 1530 Other (See comments) other (see comments) pubis Abrasion (Active)   Dressing Appearance open to air 04/13/23 0800   Drainage Amount none 04/13/23 0800       Wound 03/20/23 1527 Bilateral upper gluteal MASD (Moisture associated skin damage) (Active)   Dressing Appearance open to air 04/13/23 0800   Closure Open to air 04/12/23 2012   Base blanchable;clean;moist;pink 04/12/23 2012   Drainage Amount none 04/13/23 0800   Periwound Care barrier ointment applied 04/12/23 2012     Physical Therapy Education     Title: PT OT SLP Therapies (Done)     Topic: Physical Therapy (Done)     Point: Mobility training (Done)     Learning Progress Summary           Patient Acceptance, E, VU,NR by POWER at 4/12/2023 1341    Acceptance, E, VU,DU,NR by JK at 4/8/2023 1528    Acceptance, E, VU by WN at 4/4/2023 2329    Acceptance, E, VU by WN at 4/4/2023 0322    Acceptance, E,D, VU,DU by DP at 4/1/2023 1352    Acceptance, E, VU,DU by MG at 3/31/2023 0825    Acceptance, E, VU,DU by MG at 3/30/2023 0956    Acceptance, E,D, VU,DU by DP at 3/29/2023 1148     Nonacceptance, E,D, VU,DU by DP at 3/28/2023 1144    Acceptance, E,D, VU,DU by DP at 3/27/2023 1601    Acceptance, E,D, VU,NR by EE at 3/25/2023 1423    Acceptance, E,D, NR,VU,DU by DP at 3/21/2023 1448    Acceptance, E, NR by CLOVERK at 3/20/2023 1513    Acceptance, E,TB, VU,NR by KP at 3/18/2023 1643   Family Acceptance, E, VU by WN at 4/4/2023 2329    Acceptance, E, VU by WN at 4/4/2023 0322                   Point: Home exercise program (Done)     Learning Progress Summary           Patient Acceptance, E, VU,NR by EE at 4/12/2023 1341    Acceptance, E, VU by WN at 4/4/2023 2329    Acceptance, E, VU by WN at 4/4/2023 0322    Acceptance, E,D, VU,DU by DP at 4/1/2023 1352    Acceptance, E, VU,DU by MG at 3/31/2023 0825    Acceptance, E, VU,DU by MG at 3/30/2023 0956    Acceptance, E,D, VU,DU by DP at 3/29/2023 1148    Nonacceptance, E,D, VU,DU by DP at 3/28/2023 1144    Acceptance, E,D, VU,DU by DP at 3/27/2023 1601    Acceptance, E,D, NR,VU,DU by DP at 3/21/2023 1448    Acceptance, E, NR by JK at 3/20/2023 1513   Family Acceptance, E, VU by WN at 4/4/2023 2329    Acceptance, E, VU by WN at 4/4/2023 0322                   Point: Body mechanics (Done)     Learning Progress Summary           Patient Acceptance, E, VU,DU,NR by ETHAN at 4/8/2023 1528    Acceptance, E, VU by WN at 4/4/2023 2329    Acceptance, E, VU by WN at 4/4/2023 0322    Acceptance, E,D, VU,DU by DP at 4/1/2023 1352    Acceptance, E, VU,DU by MG at 3/31/2023 0825    Acceptance, E, VU,DU by MG at 3/30/2023 0956    Acceptance, E,D, VU,DU by DP at 3/29/2023 1148    Nonacceptance, E,D, VU,DU by DP at 3/28/2023 1144    Acceptance, E,D, VU,DU by DP at 3/27/2023 1601    Acceptance, E,D, VU,NR by EE at 3/25/2023 1423    Acceptance, E,D, NR,VU,DU by DP at 3/21/2023 1448   Family Acceptance, E, VU by WN at 4/4/2023 2329    Acceptance, E, VU by WN at 4/4/2023 0322                   Point: Precautions (Done)     Learning Progress Summary           Patient Acceptance, E,  VU by WN at 4/4/2023 2329    Acceptance, E, VU by WN at 4/4/2023 0322    Acceptance, E,D, VU,DU by DP at 4/1/2023 1352    Acceptance, E, VU,DU by MG at 3/31/2023 0825    Acceptance, E, VU,DU by MG at 3/30/2023 0956    Acceptance, E,D, VU,DU by DP at 3/29/2023 1148    Nonacceptance, E,D, VU,DU by DP at 3/28/2023 1144    Acceptance, E,D, VU,DU by DP at 3/27/2023 1601    Acceptance, E,D, VU,NR by EE at 3/25/2023 1423    Acceptance, E,D, NR,VU,DU by DP at 3/21/2023 1448   Family Acceptance, E, VU by WN at 4/4/2023 2329    Acceptance, E, VU by WN at 4/4/2023 0322                               User Key     Initials Effective Dates Name Provider Type Discipline    EE 06/16/21 -  Melinda Mann, PT Physical Therapist PT    JK 06/16/21 -  Juana Veloz, PT Physical Therapist PT    KP 06/16/21 -  Yolanda Shannon, PT Physical Therapist PT    MG 05/24/22 -  Yu Villalobos, PT Physical Therapist PT    WN 08/23/22 -  Jose Carrillo, RN Registered Nurse Nurse    DP 08/24/21 -  Papa Marsh, PT Physical Therapist PT                PT Recommendation and Plan                          Time Calculation:      PT Charges     Row Name 04/13/23 1513 04/13/23 1150          Time Calculation    Start Time 1230  -AE 1000  -AE     Stop Time 1300  -AE 1030  -AE     Time Calculation (min) 30 min  -AE 30 min  -AE     PT Received On 04/13/23  -AE 04/13/23  -AE     PT - Next Appointment 04/14/23  -AE 04/14/23  -AE        Time Calculation- PT    Total Timed Code Minutes- PT 30 minute(s)  -AE 30 minute(s)  -AE           User Key  (r) = Recorded By, (t) = Taken By, (c) = Cosigned By    Initials Name Provider Type    AE Deanna Carr, PT Physical Therapist                Therapy Charges for Today     Code Description Service Date Service Provider Modifiers Qty    25300347032 HC GAIT TRAINING EA 15 MIN 4/13/2023 Deanna Carr, PT GP 1    35719032874 HC PT NEUROMUSC RE EDUCATION EA 15 MIN 4/13/2023 Deanna Carr, PT GP 1    88340056325 HC  PT THER PROC EA 15 MIN 4/13/2023 Deanna Carr, PT GP 1    [id number]  PT MANUAL THERAPY EA 15 MIN 4/13/2023 Deanna Carr, PT GP 1                   Deanna Carr, PT  4/13/2023

## 2023-04-13 NOTE — THERAPY TREATMENT NOTE
Inpatient Rehabilitation - Occupational Therapy Treatment Note    [facility]     Patient Name: Louise GARCIA  : 1965  MRN: [id number]    Today's Date: 2023                 Admit Date: 3/17/2023         ICD-10-CM ICD-9-CM   1. Impaired functional mobility, balance, gait, and endurance  Z74.09 V49.89       Patient Active Problem List   Diagnosis   • Sepsis   • Neck pain, chronic   • Upper back pain   • Paresthesia   • Cervical myelopathy   • Lower extremity weakness   • History of blood clots   • Chronic anticoagulation   • Seizures   • Anemia   • GERD (gastroesophageal reflux disease)   • Guillain-[place] syndrome   • Situational mixed anxiety and depressive disorder   • Mass of middle lobe of right lung   • Oral candidiasis   • Empyema of pleura   • MRSA bacteremia   • Idiopathic peripheral neuropathy       Past Medical History:   Diagnosis Date   • Degenerative disc disease, cervical    • Gestational diabetes    • H/O blood clots    • Hematemesis    • Seizures    • Septic shock        Past Surgical History:   Procedure Laterality Date   • APPENDECTOMY     • BACK SURGERY     • CHOLECYSTECTOMY     • ENDOSCOPY N/A 2016    Procedure: ESOPHAGOGASTRODUODENOSCOPY with biopsy;  Surgeon: Austen Brito MD;  Location: [facility] ENDOSCOPY;  Service:    • HYSTERECTOMY     • NECK SURGERY       approx 6 yrs ago   • THORACOSCOPY Right 3/8/2023    Procedure: THORACOSCOPY WITH DAVINCI ROBOT WITH COMPLETE DECORTICATION;  Surgeon: Chloe Cedillo MD;  Location: [facility] OR;  Service: Robotics - DaVinci;  Laterality: Right;   • TONSILLECTOMY     • TONSILLECTOMY AND ADENOIDECTOMY               [facility] OT ASSESSMENT FLOWSHEET (last 12 hours)     IRF OT Evaluation and Treatment     Row Name 23 1551 23 1218       OT Time and Intention    Document Type daily treatment  -AF daily treatment  -AF    Mode of Treatment occupational therapy  -AF occupational therapy  -AF    Patient Effort good  -AF --     Symptoms Noted During/After Treatment -- fatigue;nausea  -AF    Row Name 04/13/23 1551 04/13/23 1218       General Information    Patient/Family/Caregiver Comments/Observations pt sitting upin w/c in AM and supine in bed in PM  -AF pt sitting up in w/c after PT session  -AF    General Observations of Patient  norberto signed off to assist patient with commode transfers and BSC transfers, completed teaching  -AF --    Existing Precautions/Restrictions fall  contact precautions  -AF fall  contact precautions  -AF    Row Name 04/13/23 1551 04/13/23 1218       Pain Assessment    Pretreatment Pain Rating 0/10 - no pain  -AF 0/10 - no pain  -AF    Posttreatment Pain Rating 0/10 - no pain  -AF 0/10 - no pain  -AF    Row Name 04/13/23 1551 04/13/23 1218       Cognition/Psychosocial    Affect/Mental Status (Cognition) WFL  -AF WFL  -AF    Orientation Status (Cognition) oriented x 3  -AF oriented x 3  -AF    Follows Commands (Cognition) follows one-step commands;verbal cues/prompting required  -AF follows one-step commands;verbal cues/prompting required  -AF    Personal Safety Interventions fall prevention program maintained;gait belt;nonskid shoes/slippers when out of bed  -AF fall prevention program maintained;gait belt;nonskid shoes/slippers when out of bed  -AF    Cognitive Function memory deficit  -AF memory deficit  -AF    Memory Deficit (Cognition) episodic memory;procedural memory  -AF episodic memory;procedural memory  -AF    Safety Deficit (Cognition) -- insight into deficits/self-awareness  -AF    Row Name 04/13/23 1551 04/13/23 1218       Bathing    [place] Level (Bathing) bathing skills;lower body;upper body;contact guard assist  -AF bathing skills;lower body;upper body;contact guard assist;verbal cues  -AF    Assistive Device (Bathing) grab bar/tub rail;hand held shower spray hose;tub bench  -AF grab bar/tub rail;hand held shower spray hose;tub bench  -AF    Position (Bathing) supported  sitting;supported standing  -AF supported sitting;supported standing  -AF    Comment (Bathing) increased ability to stand and complete bathing tasks  -AF --    Row Name 04/13/23 1551 04/13/23 1218       Upper Body Dressing    [place] Level (Upper Body Dressing) upper body dressing skills;set up assistance  -AF upper body dressing skills;set up assistance  -AF    Position (Upper Body Dressing) supported sitting  -AF supported sitting  -AF    Comment (Upper Body Dressing) -- w/c level  -AF    Row Name 04/13/23 1551 04/13/23 1218       Lower Body Dressing    [place] Level (Lower Body Dressing) doff;don;pants/bottoms;shoes/slippers;underwear;socks;minimum assist (75% patient effort);verbal cues  -AF doff;don;pants/bottoms;shoes/slippers;underwear;socks;minimum assist (75% patient effort);verbal cues  -AF    Position (Lower Body Dressing) supported sitting;supported standing  -AF supported standing;supported sitting  -AF    Comment (Lower Body Dressing) pt fidencio to tie shoes seated w/c levle  -AF able to cross legs and start underwear and pants, socks and shoes then tie shoes  -AF    Row Name 04/13/23 1551 04/13/23 1218       Grooming    [place] Level (Grooming) grooming skills;set up  -AF grooming skills;set up  -AF    Position (Grooming) supported sitting;sink side  -AF supported sitting  -AF    Comment (Grooming) w/c level  -AF w/c level  -AF    Row Name 04/13/23 1551          Toileting    [place] Level (Toileting) toileting skills;minimum assist (75% patient effort);moderate assist (50% patient effort);verbal cues  -AF     Assistive Device Use (Toileting) grab bar/safety frame;raised toilet seat  -AF     Position (Toileting) supported sitting;supported standing  -AF     Comment (Toileting) completed with , therapist observed for teaching vc's for set up  -AF     Row Name 04/13/23 1551 04/13/23 1218       Bed Mobility    Supine-Sit [place] (Bed Mobility) standby assist  -AF --     Comment, (Bed Mobility) -- up in w/c  -AF    Row Name 04/13/23 1218          Transfer Assessment/Treatment    Comment, (Transfers) sit to  shower with grab bar use MIN A  -AF     Row Name 04/13/23 1551          Bed-Chair Transfer    Bed-Chair [place] (Transfers) minimum assist (75% patient effort)  -AF     Assistive Device (Bed-Chair Transfers) wheelchair  -AF     Row Name 04/13/23 1551          Toilet Transfer    Type (Toilet Transfer) squat pivot  -AF     [place] Level (Toilet Transfer) minimum assist (75% patient effort);verbal cues  -AF     Assistive Device (Toilet Transfer) commode;grab bars/safety frame;wheelchair  -AF     Comment, (Toilet Transfer)  assisted during transfer  -AF     Row Name 04/13/23 1551 04/13/23 1218       Shower Transfer    Type (Shower Transfer) stand pivot/stand step  -AF stand pivot/stand step  -AF    [place] Level (Shower Transfer) minimum assist (75% patient effort);contact guard;verbal cues  -AF minimum assist (75% patient effort);verbal cues  -AF    Assistive Device (Shower Transfer) grab bar, tub/shower;tub bench  -AF grab bar, tub/shower;tub bench;wheelchair  -AF    Row Name 04/13/23 1551          Motor Skills    Therapeutic Exercise wrist  -AF     Row Name 04/13/23 1551          Shoulder (Therapeutic Exercise)    Shoulder Strengthening (Therapeutic Exercise) bilateral;external rotation;internal rotation;scapular stabilization;sitting;2 lb free weight;10 repetitions;3 sets  -AF     Row Name 04/13/23 1551          Elbow/Forearm (Therapeutic Exercise)    Elbow/Forearm Strengthening (Therapeutic Exercise) bilateral;flexion;extension;supination;pronation;sitting;2 lb free weight;10 repetitions;3 sets  -AF     Row Name 04/13/23 1551          Wrist (Therapeutic Exercise)    Wrist (Therapeutic Exercise) strengthening exercise  -AF     Wrist Strengthening (Therapeutic Exercise) bilateral;extension;flexion;10 repetitions;3 sets  -AF     Row Name 04/13/23 1551  04/13/23 1218       Balance    Static Sitting Balance standby assist  -AF standby assist  -AF    Dynamic Sitting Balance contact guard;standby assist  -AF contact guard;standby assist  -AF    Static Standing Balance -- minimal assist  -AF    Comment, Balance -- worked on standing and letting go and pulling up and down pants/underwear  -AF    Row Name 04/13/23 1551 04/13/23 1218       Positioning and Restraints    Pre-Treatment Position sitting in chair/recliner  -AF sitting in chair/recliner  -AF    Post Treatment Position wheelchair  -AF wheelchair  -AF    In Wheelchair sitting;exit alarm on;with SLP;with family/caregiver  with  in AM and with SLP in PM  -AF sitting;exit alarm on;with family/caregiver  in room with  present  -AF          User Key  (r) = Recorded By, (t) = Taken By, (c) = Cosigned By    Initials Name Effective Dates    Heaven Reynolds, OTR 06/16/21 -                  Occupational Therapy Education     Title: PT OT SLP Therapies (Done)     Topic: Occupational Therapy (Done)     Point: ADL training (Done)     Description:   Instruct learner(s) on proper safety adaptation and remediation techniques during self care or transfers.   Instruct in proper use of assistive devices.              Learning Progress Summary           Patient Acceptance, E, VU,NR by AF at 4/12/2023 1537    Comment: pts  had pictures of bathroom on phone, looked and discussed BSC over commode and laterally having to step into the bathroom from the EOB. will continue to address. 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    Acceptance, E, VU by WN at 4/4/2023 2329    Acceptance, E, VU by WN at 4/4/2023 0322   Family Acceptance, E, VU,NR by AF at 4/12/2023 1537    Comment: pts  had pictures of bathroom on phone, looked and discussed BSC over commode and laterally having to step into the bathroom from the EOB. will continue to address. 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    Acceptance, E, VU by WN at 4/4/2023 2329    Acceptance, E, VU by WN at 4/4/2023 0322                   Point: Home exercise program (Done)     Description:   Instruct learner(s) on appropriate technique for monitoring, assisting and/or progressing therapeutic exercises/activities.              Learning Progress Summary           Patient Acceptance, E, VU by WN at 4/4/2023 2329    Acceptance, E, VU by WN at 4/4/2023 0322   Family Acceptance, E, VU by WN at 4/4/2023 2329    Acceptance, E, VU by WN at 4/4/2023 0322                   Point: Precautions (Done)     Description:   Instruct learner(s) on prescribed precautions during self-care and functional transfers.              Learning Progress Summary           Patient Acceptance, E, VU by WN at 4/4/2023 2329    Acceptance, E, VU by WN at 4/4/2023 0322   Family Acceptance, E, VU by WN at 4/4/2023 2329    Acceptance, E, VU by WN at 4/4/2023 0322                   Point: Body mechanics (Done)     Description:   Instruct learner(s) on proper positioning and spine alignment during self-care, functional mobility activities and/or exercises.              Learning Progress Summary           Patient Acceptance, E, VU by WN at 4/4/2023 2329    Acceptance, E, VU by WN at 4/4/2023 0322   Family Acceptance, E, VU by WN at 4/4/2023 2329    Acceptance, E, VU by WN at 4/4/2023 0322                               User Key     Initials Effective Dates Name Provider Type Discipline    AF 06/16/21 -  Heaven Villareal OTR Occupational Therapist OT     08/23/22 -  Jose Carrillo, RN Registered Nurse Nurse                    OT Recommendation and Plan                         Time Calculation:      Time Calculation- OT     Row Name 04/13/23 1555 04/13/23 1222          Time Calculation- OT    OT Start Time 1400  -AF 1030  -AF     OT Stop Time 1430  -AF 1100  -AF     OT Time Calculation (min) 30 min  -AF 30 min  -AF           User Key  (r) = Recorded By,  (t) = Taken By, (c) = Cosigned By    Initials Name Provider Type    AF Heaven Villareal, OTR Occupational Therapist              Therapy Charges for Today     Code Description Service Date Service Provider Modifiers Qty    22165704980 HC OT SELF CARE/MGMT/TRAIN EA 15 MIN 4/12/2023 Heaven Villareal, OTR GO 2    99868932193 HC OT SELF CARE/MGMT/TRAIN EA 15 MIN 4/13/2023 Heaven Villareal, OTR GO 2    09884712166 HC OT SELF CARE/MGMT/TRAIN EA 15 MIN 4/13/2023 Heaven Villareal, OTR GO 3    97661584529 HC OT NEUROMUSC RE EDUCATION EA 15 MIN 4/13/2023 Heaven Villareal, OTR GO 1                   MARY ANN Gunn  4/13/2023

## 2023-04-13 NOTE — THERAPY TREATMENT NOTE
Inpatient Rehabilitation - Occupational Therapy Treatment Note    [facility]     Patient Name: Louise GARCIA  : 1965  MRN: [id number]    Today's Date: 2023                 Admit Date: 3/17/2023         ICD-10-CM ICD-9-CM   1. Impaired functional mobility, balance, gait, and endurance  Z74.09 V49.89       Patient Active Problem List   Diagnosis   • Sepsis   • Neck pain, chronic   • Upper back pain   • Paresthesia   • Cervical myelopathy   • Lower extremity weakness   • History of blood clots   • Chronic anticoagulation   • Seizures   • Anemia   • GERD (gastroesophageal reflux disease)   • Guillain-[place] syndrome   • Situational mixed anxiety and depressive disorder   • Mass of middle lobe of right lung   • Oral candidiasis   • Empyema of pleura   • MRSA bacteremia   • Idiopathic peripheral neuropathy       Past Medical History:   Diagnosis Date   • Degenerative disc disease, cervical    • Gestational diabetes    • H/O blood clots    • Hematemesis    • Seizures    • Septic shock        Past Surgical History:   Procedure Laterality Date   • APPENDECTOMY     • BACK SURGERY     • CHOLECYSTECTOMY     • ENDOSCOPY N/A 2016    Procedure: ESOPHAGOGASTRODUODENOSCOPY with biopsy;  Surgeon: Austen Brito MD;  Location: [facility] ENDOSCOPY;  Service:    • HYSTERECTOMY     • NECK SURGERY       approx 6 yrs ago   • THORACOSCOPY Right 3/8/2023    Procedure: THORACOSCOPY WITH DAVINCI ROBOT WITH COMPLETE DECORTICATION;  Surgeon: Chloe Cedillo MD;  Location: [facility] OR;  Service: Robotics - DaVinci;  Laterality: Right;   • TONSILLECTOMY     • TONSILLECTOMY AND ADENOIDECTOMY               [facility] OT ASSESSMENT FLOWSHEET (last 12 hours)     IRF OT Evaluation and Treatment     Row Name 23 1218          OT Time and Intention    Document Type daily treatment  -AF     Mode of Treatment occupational therapy  -AF     Symptoms Noted During/After Treatment fatigue;nausea  -AF     Row Name 23 1218           General Information    Patient/Family/Caregiver Comments/Observations pt sitting up in w/c after PT session  -AF     Existing Precautions/Restrictions fall  contact precautions  -AF     Row Name 04/13/23 1218          Pain Assessment    Pretreatment Pain Rating 0/10 - no pain  -AF     Posttreatment Pain Rating 0/10 - no pain  -AF     Row Name 04/13/23 1218          Cognition/Psychosocial    Affect/Mental Status (Cognition) WFL  -AF     Orientation Status (Cognition) oriented x 3  -AF     Follows Commands (Cognition) follows one-step commands;verbal cues/prompting required  -AF     Personal Safety Interventions fall prevention program maintained;gait belt;nonskid shoes/slippers when out of bed  -AF     Cognitive Function memory deficit  -AF     Memory Deficit (Cognition) episodic memory;procedural memory  -AF     Safety Deficit (Cognition) insight into deficits/self-awareness  -AF     Row Name 04/13/23 1218          Bathing    [place] Level (Bathing) bathing skills;lower body;upper body;contact guard assist;verbal cues  -AF     Assistive Device (Bathing) grab bar/tub rail;hand held shower spray hose;tub bench  -AF     Position (Bathing) supported sitting;supported standing  -AF     Row Name 04/13/23 1218          Upper Body Dressing    [place] Level (Upper Body Dressing) upper body dressing skills;set up assistance  -AF     Position (Upper Body Dressing) supported sitting  -AF     Comment (Upper Body Dressing) w/c level  -AF     Row Name 04/13/23 1218          Lower Body Dressing    [place] Level (Lower Body Dressing) doff;don;pants/bottoms;shoes/slippers;underwear;socks;minimum assist (75% patient effort);verbal cues  -AF     Position (Lower Body Dressing) supported standing;supported sitting  -AF     Comment (Lower Body Dressing) able to cross legs and start underwear and pants, socks and shoes then tie shoes  -AF     Row Name 04/13/23 1218          Grooming    [place] Level (Grooming)  grooming skills;set up  -AF     Position (Grooming) supported sitting  -AF     Comment (Grooming) w/c level  -AF     Row Name 04/13/23 1218          Bed Mobility    Comment, (Bed Mobility) up in w/c  -AF     Row Name 04/13/23 1218          Transfer Assessment/Treatment    Comment, (Transfers) sit to  shower with grab bar use MIN A  -AF     Row Name 04/13/23 1218          Shower Transfer    Type (Shower Transfer) stand pivot/stand step  -AF     [place] Level (Shower Transfer) minimum assist (75% patient effort);verbal cues  -AF     Assistive Device (Shower Transfer) grab bar, tub/shower;tub bench;wheelchair  -AF     Row Name 04/13/23 1218          Balance    Static Sitting Balance standby assist  -AF     Dynamic Sitting Balance contact guard;standby assist  -AF     Static Standing Balance minimal assist  -AF     Comment, Balance worked on standing and letting go and pulling up and down pants/underwear  -AF     Row Name 04/13/23 1218          Positioning and Restraints    Pre-Treatment Position sitting in chair/recliner  -AF     Post Treatment Position wheelchair  -AF     In Wheelchair sitting;exit alarm on;with family/caregiver  in room with  present  -AF           User Key  (r) = Recorded By, (t) = Taken By, (c) = Cosigned By    Initials Name Effective Dates    AF Heaven Villareal, OTR 06/16/21 -                  Occupational Therapy Education     Title: PT OT SLP Therapies (Done)     Topic: Occupational Therapy (Done)     Point: ADL training (Done)     Description:   Instruct learner(s) on proper safety adaptation and remediation techniques during self care or transfers.   Instruct in proper use of assistive devices.              Learning Progress Summary           Patient Acceptance, E, VU,NR by AF at 4/12/2023 8613    Comment: pts  had pictures of bathroom on phone, looked and discussed BSC over commode and laterally having to step into the bathroom from the EOB. will continue to address.  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    Acceptance, E, VU by WN at 4/4/2023 2329    Acceptance, E, VU by WN at 4/4/2023 0322   Family Acceptance, E, VU,NR by AF at 4/12/2023 1537    Comment: pts  had pictures of bathroom on phone, looked and discussed BSC over commode and laterally having to step into the bathroom from the EOB. will continue to address. 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    Acceptance, E, VU by WN at 4/4/2023 2329    Acceptance, E, VU by WN at 4/4/2023 0322                   Point: Home exercise program (Done)     Description:   Instruct learner(s) on appropriate technique for monitoring, assisting and/or progressing therapeutic exercises/activities.              Learning Progress Summary           Patient Acceptance, E, VU by WN at 4/4/2023 2329    Acceptance, E, VU by WN at 4/4/2023 0322   Family Acceptance, E, VU by WN at 4/4/2023 2329    Acceptance, E, VU by WN at 4/4/2023 0322                   Point: Precautions (Done)     Description:   Instruct learner(s) on prescribed precautions during self-care and functional transfers.              Learning Progress Summary           Patient Acceptance, E, VU by WN at 4/4/2023 2329    Acceptance, E, VU by WN at 4/4/2023 0322   Family Acceptance, E, VU by WN at 4/4/2023 2329    Acceptance, E, VU by WN at 4/4/2023 0322                   Point: Body mechanics (Done)     Description:   Instruct learner(s) on proper positioning and spine alignment during self-care, functional mobility activities and/or exercises.              Learning Progress Summary           Patient Acceptance, E, VU by WN at 4/4/2023 2329    Acceptance, E, VU by WN at 4/4/2023 0322   Family Acceptance, E, VU by WN at 4/4/2023 2329    Acceptance, E, VU by WN at 4/4/2023 0322                               User Key     Initials Effective Dates Name Provider Type Discipline     06/16/21 -  Heaven Villareal, MARY ANN  Occupational Therapist OT    WN 08/23/22 -  Jose Carrillo, RN Registered Nurse Nurse                    OT Recommendation and Plan                         Time Calculation:      Time Calculation- OT     Row Name 04/13/23 1222             Time Calculation- OT    OT Start Time 1030  -AF      OT Stop Time 1100  -AF      OT Time Calculation (min) 30 min  -AF            User Key  (r) = Recorded By, (t) = Taken By, (c) = Cosigned By    Initials Name Provider Type    AF Heaven Villareal, OTR Occupational Therapist              Therapy Charges for Today     Code Description Service Date Service Provider Modifiers Qty    05559401742 HC OT SELF CARE/MGMT/TRAIN EA 15 MIN 4/12/2023 Heaven Villareal OTR GO 2    38383380842 HC OT SELF CARE/MGMT/TRAIN EA 15 MIN 4/13/2023 Heaven Villareal OTNAIMA GO 2                   MARY ANN Gunn  4/13/2023

## 2023-04-13 NOTE — PROGRESS NOTES
LOS: 27 days   Patient Care Team:  Chloe Schaefer APRN as PCP - General (Family Medicine)  Mikhail Glez MD as Consulting Physician (Neurology)      Patient Name: ELEONORA GARCIA  Patient : 1965    ADMITTING DIAGNOSIS:  Diagnoses    1. IMPAIRED FUNCTIONAL MOBILITY, BALANCE, GAIT, AND ENDURANCE           SUBJECTIVE:    Patient has had decreased p.o. intake with weight loss.  Discussed option of supplemental tube feeds with the dietitian.  She wishes to hold on trying supplemental feeds for now.  She reports that she is eating fairly well with what her  brings in from outside.  No nausea today.  She reports a better day today.  Walked better today  REVIEW OF SYSTEMS:           OBJECTIVE:    Vitals:    23 1125   BP: 101/62   Pulse: 93   Resp: 18   Temp: 97.5 °F (36.4 °C)   SpO2: 98%       PHYSICAL EXAM:   General: pleasant, in no acute distress  HEENT: NCAT, sclera anicteric, conjunctiva pink, mucoa moist  Cardiovascular: RRR, +S1+S2, no obvious m/g/r  Lungs: Decreased breath sounds on the right side-no change  Abdomen: normoactive bowel sounds, soft, tender to palpation in the epigastrum, no guarding or rebound tenderness  Extremities: no peripheral edema  Skin: exposed surfaces of skin warm, intact, without erythema  Neuro: awake alert and oriented to person, place, time, and situation, CN II-XII grossly intact  MSK: Good strength with bilateral shoulder abduction, elbow flexion, elbow extension, wrist extension, finger flexion.  Has weakness with hand intrinsics 4/5.  Bilateral hip flexion 4/5, right knee extension 4/5, left knee extension 3/5, bilateral ankle dorsiflexion 4/5        MEDICATIONS  Scheduled Meds:  apixaban, 5 mg, Oral, Q12H  desvenlafaxine, 25 mg, Oral, Daily  folic acid, 1 mg, Oral, Daily  gabapentin, 300 mg, Oral, Q8H  ipratropium-albuterol, 3 mL, Nebulization, 4x Daily - RT  lamoTRIgine, 200 mg, Oral, Daily  melatonin, 5 mg, Oral, Nightly  pantoprazole, 40 mg, Oral, Q  AM  thiamine, 100 mg, Oral, Daily  vitamin B-12, 500 mcg, Oral, Daily        Continuous Infusions:       PRN Meds:  •  acetaminophen  •  bisacodyl  •  diphenhydrAMINE  •  famotidine  •  hydrocortisone sodium succinate  •  hydrocortisone-bacitracin-zinc oxide-nystatin  •  ondansetron ODT  •  oxyCODONE  •  polyethylene glycol  •  senna-docusate sodium  •  simethicone      RESULTS:  No results found for: POCGLU  Results from last 7 days   Lab Units 04/10/23  0603 04/07/23  0843 04/06/23  1520   WBC 10*3/mm3 8.74 6.83 9.25   HEMOGLOBIN g/dL 7.7* 8.6* 8.9*   HEMATOCRIT % 23.7* 26.5* 26.6*   PLATELETS 10*3/mm3 273 357 380     Results from last 7 days   Lab Units 04/10/23  0603 04/07/23  0843   SODIUM mmol/L 132* 132*   POTASSIUM mmol/L 4.1 3.9   CHLORIDE mmol/L 94* 95*   CO2 mmol/L 31.2* 30.2*   BUN mg/dL 17 16   CREATININE mg/dL 1.12* 1.32*   CALCIUM mg/dL 10.0 10.1   GLUCOSE mg/dL 97 118*           ASSESSMENT and PLAN:    Idiopathic peripheral neuropathy    Paresthesia    History of blood clots    Chronic anticoagulation    Seizures    Anemia    Guillain-[place] syndrome    MRSA bacteremia    Subacute motor predominant idiopathic peripheral neuropathy with Paraparesis and Areflexia.   S/p IVIG x 5 days  March 30-shows improvement with her strength proximally the upper extremities.  Improvement with her hand strength.  Improved strength in the right lower extremity but continues with profound weakness in the left lower extremity.  With weakness of the left ankle, will look at probable AFO for gait activities.  Transfers are moderate assist.  Ambulated with a rolling walker up to 70 feet with gait deviations minimal moderate assist  -4/6-reconsulted neurology to see if she needs another round of IVIG.  Follow-up assessment with neurology-Previous IVIG dose was 20g daily for 5 days from 2/27-3/3.  Plan is to repeat another course of IVIG  - 4/10- fever this morning likely a side effect of IVIG.  She is scheduled to receive  her final dose of this course today.  Will obtain CXR to rule out pneumonia.       Impaired mobility/impaired self care/ impaired cognition  Left greater than right lower extremity weakness greater than bilateral upper extremity weakness  April 4-strength improved in the bilateral upper extremities and right lower extremity.Weakness  looks about the same the left lower extremity.  On the day she ambulated further last week she had utilized AFO on the left lower extremity  April 5-stronger with her left hip flexors and knee extensors today  April 6- strength and coordination improving.  Per OT note she is needing set up assist for upper body dressing with max assist for lower body dressing, PT is noticing that the patient appears stronger and is limited by weakness and fear.  Moderate assist between chair and bed but min assist for sit to stand.  Very minimal knee buckling when walking with left AFO.  Making good progress overall.     R lung masslike infiltrate with cavitary lesions/pleural effusion   S/p thoracentesis - Blood cx and pleural fluid  positive for MRSA      Chest tube placement given empyema, and she underwent thoracoscopy w/ decortication 3/8/23  -expected postoperative changes with pleural thickening and minimal pleural effusion.  The effusion is too small for intervention and will likely to continue to resolve with time.  Recommend continue good pulmonary hygiene with incentive spirometry hourly as well as increase activity/ambulation as tolerated.  March 23-appears decreased breath sounds more noticeable today on the right compared to the left.  Check follow-up chest x-ray.  On room air during the day, 1 L at night.  March 24- CXR relatively unchanged from previous, reached out to CT surgery given planned outpatient f/u on 3/28, no further imaging planned at this time as patient afebrile with normal WBC, monitor     Right-sided Chest Pain 3/24  -EKG sinus tach  -HS Troponin 25 > 24  -consider  Cardiology consult if worsening  -pain reproducible with palpation, pain likely postoperative neuralgia as indicated vs. possible costochondritis, continue gabapentin, ice/heat, monitor  -4/10- patient continues to complain of right-sided rib pain.  We will touch base with thoracic surgery about nerve block for postoperative neuralgia.  - 4/12- thoracic surgery may refer for outpatient nerve block.  We will continue to monitor and consider going up on gabapentin.    Nutrition-  April 13-Patient has had decreased p.o. intake with weight loss.  Discussed option of supplemental tube feeds with the dietitian.     Hypokalemia  -3/24 K 3.3, repleted  -3/29 K 3.7, resolved     Mass on TTE with findings concerning for endocarditis - underwent MARIAMA 3/10/2023 which had no vegetation  RUE superficial thrombophlebitis concerning for septic phlebitis.     ID - continue 4 weeks of vancomycin as recommended by infectious disease dosed by pharmacy to achieve a trough level of 15-20 or area under the curve of 400-600 from last negative blood culture or last intervention which ever is the latest - to complete April 1, 2023     Left hip arthrocentesis March 16 to rule out any hip septic arthritis - no growth to date on fluid.    Lower back pain - MRI of spine to rule out discitis or osteomyelitis.  This did have known degenerative changes but  no infection.      DVT prophylaxis - SCDs / apixiban. History of LLE DVT - on Eliquis at home     Pain management   - Tylenol 1,000 mg three times a day/ Celebrex 100 mg q 112 hours/ gabapentin 300 mg q 8 hours Oxycodone 5 mg q 4 hours prn  March 18 - DC Celebrex 2/2 PHYLLIS, and anemia      Anxiety/ muscle spasms - Diazepam 2 mg q 6 hours prn - JONES reviewed     Seizure disorder - Lamotrigine 200 mg daily     ABLA   - March 18- Hgb 7.0 (7.3 on 3/16). Type, cross and transfuse 1 unit PRBCs. Pre-medicate with Tylenol and Benadryl. CBC in am.   March 19- Hgb 8.8 s/p 1 unit PRBcs. Hemoccult positive.  On Eliquis for h/o DVT. Follow Hgb. May need GI work up.  March 20 - HGB 9.3 s/p transfusion  March 21-reviewed with internal medicine-hemoglobin stable after transfusion.  Iron studies consistent with inflammation.  No further work-up presently  March 22 - Hgb 8.9, stable  March 23-hemoglobin trend 8.3.  Continue to follow.  Recheck tomorrow  March 29 - Hgb 8.1, stable  April 6- hemoglobin 8.3.  Stable.  April 10- hemoglobin slightly down trended to 7.7.  Monitor.     PHYLLIS   March 18- Creatinine 1.36 up from 1.12. On IV Vanc and Celebrex. DC Celebrex. BMP in am.  March 19- s/p 500ml NS bolus. Creatinine 1.31.  March 23-creatinine 0.92  April 10- creatinine downtrending to 1.12 with increased fluids.    Nausea  -4/12-nausea with vomiting this morning.  Declined to participate in speech therapy due to not feeling well.  Zofran 4 mg every 6 hours as needed for nausea and vomiting added and administered.  No signs of systemic inflammation or infection.  Labs will be drawn tomorrow.     TEAM CONF - MARCH 21 - BED MOD X 2. TRANSFERS MAX 2. STAND PIVOT OR SQUAT PIVOT. NON-AMBULATORY. Saturday MARCH 18 GAIT 6 FEET PARALLEL BARS MOD MAX OF 2.   BATH MOD 1-2. LBD DEP. UBD MOD. EATING MIN. GROOMING MIN. TOILETING DEP.   The patient has difficulty remembering new information due to short-term memory impairments.  Initial evaluation 3.18, pt obtained a score 12/30 on SLUMS cognitive assessment indicating severe cognitive impairment/dementia, goals initiated for memory and sustaining attention  FEES completed 3.7 revealing glottic gap accounting for glottic insufficiency, dysphonia, hoarse/breathy quality, recommend targeting vocal function as appropriate d/t impact on intelligibility.    Regular/thin diet continued since FEES completion 3.7, although pt known to cough with and without PO intake, may be contributed partially to ineffective VF closure.   DRESSING FORMER CHEST TUBE SITE. CONTINUES ON PUREWICK AT NIGHT. O2 AT 1-2  LITERS AT NIGHT.  DECREASED APPETITE. ABD X-RAY THIS AM SHOWS NON-OBSTRUCTIVE BOWEL GAS PATTERN.   ELOS - 4 WEEKS     TEAM CONF - MARCH 28 - BED MIN CTG. TRANSFER MOD ASSIST STAND PIVOT OR SQUAT PIVOT. FATIGUES IN AFERNOONS. GAIT 15 FEET RW MIN X 2. FLUCTUATING ORIENTATION. BETTER DETAIL TO TASK. MIN MOD CUES FOR REASONING TASKS. DYSPHONIA FROM COUGHING.  SLP REVIEWED VOICE HYGIENE, NOT TO DO HARSH THROAT CLEAR. BATH MOD. LBD MAX. UBD MIN. TOILETING MOD X 2. CONTINENT/INCONTINENT BLADDER. RIGHT CHEST TUBE / THORACOSCOPY SITE CARE. VARIABLE INTAKE.   ELOS - 3 WEEKS     TEAM CONF - APRIL 4 - CONTINUES WEAK IN LLE. STRONGER IN BUE AND RIGHT KNEE EXTENSION. BED MIN MOD TO CTG. TRANSFERS MOD MAX STAND PIVOT OR SQUAT PIVOT. WORKED ON GAIT IN PARALLEL BARS MIN MOD OF 2 PERSON. CAN DO 15 FEET IF NOT SO ANXIOUS. TOILET AND SHOWER TRANSFERS MOD ASSIST. DROP ARM BEDSIDE COMMODE. AT NIGHT USING BEDPAN. USING PUREWICK AT NIGHT. BATH MIN. LBD MOD. UBD SET UP.TOILETING MOD MAX 2.    Pt with improved ability to recall salient events  from day.Now presents with mild-moderate hoarse vocal  quality. Improved vocal intensity.  ADDRSSING DYSPHONIA. DIETICIAN REVIEWED FOOD CHOICES. GABAPENTIN FOR RIGHT RIB PAIN/INTERCOSTAL PAIN.CONTINENT BOWEL AND BLADDER WITH URGENCY.   ELOS - 2-3 WEEKS     Team Conference - 4/11/2023  Nursing: overnight using purewick,   PT: mod assist for transfers due to weight shifting, bed mobility w/wo rails standby/contact guard, shallow four inch step with rails 1-2 person assist, walking 60' with walker, trial AFO and ace wrap assist doesn't seem to help a lot now that she no longer has a hard collapse of her ankle/knee, anticipate she will walk safely with walker in their RV home but need a wheelchair for community ambulation  OT: toilet transfers mod assist with one, clothing management max-mod  SLP: mild-mod cognitive impairment, struggles with oxycodone, max cues for verbal working memory in the morning but  min in the afternoon, min-mod cueing, improvements with carryover  Psychology: trouble with thought organization and concentration, trouble with working memory, uncertain of self, lacking confidence with some anxiety completing tasks  Social Work: recommending home health  Discharge Date: 1.5 weeks    CODE STATUS:  Level Of Support Discussed With: Patient  Code Status (Patient has no pulse and is not breathing): CPR (Attempt to Resuscitate)  Medical Interventions (Patient has pulse or is breathing): Full Support      Admission Status: Continues to meet requirements for inpatient admission.       Ryley Rider MD    During rounds, used appropriate personal protective equipment including mask and gloves.  Additional gown if indicated.  Mask used was standard procedure mask. Appropriate PPE was worn during the entire visit.  Hand hygiene was completed before and after.

## 2023-04-13 NOTE — PROGRESS NOTES
Inpatient Rehabilitation Plan of Care Note    Plan of Care  Care Plan Reviewed - No updates at this time.    Sphincter Control    [RN] Bladder Management(Active)  Current Status(04/13/2023): Bladder urgency  Weekly Goal(04/20/2023): decrease in episodes of incontinence  Discharge Goal: Continent 100%    [RN] Bowel Management(Active)  Current Status(04/13/2023): Patient is 100% continent of bowel  Weekly Goal(04/20/2023): Patient will maintain a daily bowel pattern.  Discharge Goal: Patient will maintain 100% continence of bowel    Performed Intervention(s)  Bladder/bowel training program  Monitor intake and output  Use incontinence products/equipment      Psychosocial    [RN] Coping/Adjustment(Active)  Current Status(04/13/2023): Anxiety r/t uncertainty of disease course  Weekly Goal(04/20/2023): Allow opportunity to express concerns regarding current  status  Discharge Goal: Patient/family will verbalize decreased feelings of anxiety.    Performed Intervention(s)  Verbalizes needs and concerns  Support from family/peer groups      Body Systems    [RN] Neurological(Active)  Current Status(04/13/2023): Impaired physical mobility r/t decreased muscle  strength/control and limited ROM  Weekly Goal(04/20/2023): Patient will have increased ROM  Discharge Goal: Patient will have improved strength and function of BLE and B  hands.    Performed Intervention(s)  Perform active and passive ROM  Frequent position changes      Safety    [RN] Potential for Injury(Active)  Current Status(04/13/2023): Risk for falls  Weekly Goal(04/20/2023): Family/Caregivers regarding safety precautions and need  for close supervision  Discharge Goal: Patient/family will be aware of risk of fall and safety in the  home setting.    Performed Intervention(s)  Safety Rounds  Bed alarm/Chair alarm  Items within reach    Signed by: Dayanna Serna RN

## 2023-04-13 NOTE — PLAN OF CARE
Goal Outcome Evaluation:  Plan of Care Reviewed With: patient        Progress: improving  Outcome Evaluation: Ms Gil rested well this shift.  Pamela @[facility].  c/o pain to right ribs. PRN Amina given x2 so far this shift.  1.5O2 via NC @[facility].  Meds given w/thins.  spouse at bedside.

## 2023-04-14 LAB
ANION GAP SERPL CALCULATED.3IONS-SCNC: 6.8 MMOL/L (ref 5–15)
BASOPHILS # BLD AUTO: 0.04 10*3/MM3 (ref 0–0.2)
BASOPHILS NFR BLD AUTO: 0.5 % (ref 0–1.5)
BUN SERPL-MCNC: 17 MG/DL (ref 6–20)
BUN/CREAT SERPL: 14.5 (ref 7–25)
CALCIUM SPEC-SCNC: 11.3 MG/DL (ref 8.6–10.5)
CHLORIDE SERPL-SCNC: 98 MMOL/L (ref 98–107)
CO2 SERPL-SCNC: 32.2 MMOL/L (ref 22–29)
CREAT SERPL-MCNC: 1.17 MG/DL (ref 0.57–1)
DEPRECATED RDW RBC AUTO: 49.9 FL (ref 37–54)
EGFRCR SERPLBLD CKD-EPI 2021: 53.9 ML/MIN/1.73
EOSINOPHIL # BLD AUTO: 0.06 10*3/MM3 (ref 0–0.4)
EOSINOPHIL NFR BLD AUTO: 0.7 % (ref 0.3–6.2)
ERYTHROCYTE [DISTWIDTH] IN BLOOD BY AUTOMATED COUNT: 15 % (ref 12.3–15.4)
GLUCOSE SERPL-MCNC: 123 MG/DL (ref 65–99)
HCT VFR BLD AUTO: 23.3 % (ref 34–46.6)
HGB BLD-MCNC: 7.6 G/DL (ref 12–15.9)
IMM GRANULOCYTES # BLD AUTO: 0.06 10*3/MM3 (ref 0–0.05)
IMM GRANULOCYTES NFR BLD AUTO: 0.7 % (ref 0–0.5)
LYMPHOCYTES # BLD AUTO: 3.29 10*3/MM3 (ref 0.7–3.1)
LYMPHOCYTES NFR BLD AUTO: 37.4 % (ref 19.6–45.3)
MCH RBC QN AUTO: 30.2 PG (ref 26.6–33)
MCHC RBC AUTO-ENTMCNC: 32.6 G/DL (ref 31.5–35.7)
MCV RBC AUTO: 92.5 FL (ref 79–97)
MONOCYTES # BLD AUTO: 0.51 10*3/MM3 (ref 0.1–0.9)
MONOCYTES NFR BLD AUTO: 5.8 % (ref 5–12)
NEUTROPHILS NFR BLD AUTO: 4.83 10*3/MM3 (ref 1.7–7)
NEUTROPHILS NFR BLD AUTO: 54.9 % (ref 42.7–76)
NRBC BLD AUTO-RTO: 0.1 /100 WBC (ref 0–0.2)
PLATELET # BLD AUTO: 355 10*3/MM3 (ref 140–450)
PMV BLD AUTO: 11 FL (ref 6–12)
POTASSIUM SERPL-SCNC: 3.8 MMOL/L (ref 3.5–5.2)
RBC # BLD AUTO: 2.52 10*6/MM3 (ref 3.77–5.28)
SODIUM SERPL-SCNC: 137 MMOL/L (ref 136–145)
WBC NRBC COR # BLD: 8.79 10*3/MM3 (ref 3.4–10.8)

## 2023-04-14 PROCEDURE — 80048 BASIC METABOLIC PNL TOTAL CA: CPT | Performed by: PHYSICAL MEDICINE & REHABILITATION

## 2023-04-14 PROCEDURE — 97116 GAIT TRAINING THERAPY: CPT

## 2023-04-14 PROCEDURE — 97530 THERAPEUTIC ACTIVITIES: CPT

## 2023-04-14 PROCEDURE — 97112 NEUROMUSCULAR REEDUCATION: CPT

## 2023-04-14 PROCEDURE — 97110 THERAPEUTIC EXERCISES: CPT

## 2023-04-14 PROCEDURE — 94799 UNLISTED PULMONARY SVC/PX: CPT

## 2023-04-14 PROCEDURE — 97129 THER IVNTJ 1ST 15 MIN: CPT

## 2023-04-14 PROCEDURE — 97130 THER IVNTJ EA ADDL 15 MIN: CPT

## 2023-04-14 PROCEDURE — 85025 COMPLETE CBC W/AUTO DIFF WBC: CPT | Performed by: PHYSICAL MEDICINE & REHABILITATION

## 2023-04-14 RX ADMIN — LAMOTRIGINE 200 MG: 100 TABLET ORAL at 08:14

## 2023-04-14 RX ADMIN — ACETAMINOPHEN 650 MG: 325 TABLET, FILM COATED ORAL at 15:51

## 2023-04-14 RX ADMIN — OXYCODONE HYDROCHLORIDE 2.5 MG: 5 TABLET ORAL at 20:05

## 2023-04-14 RX ADMIN — Medication 100 MG: at 08:14

## 2023-04-14 RX ADMIN — Medication 1 MG: at 08:14

## 2023-04-14 RX ADMIN — IPRATROPIUM BROMIDE AND ALBUTEROL SULFATE 3 ML: 2.5; .5 SOLUTION RESPIRATORY (INHALATION) at 07:18

## 2023-04-14 RX ADMIN — Medication 500 MCG: at 08:14

## 2023-04-14 RX ADMIN — Medication 5 MG: at 20:05

## 2023-04-14 RX ADMIN — IPRATROPIUM BROMIDE AND ALBUTEROL SULFATE 3 ML: 2.5; .5 SOLUTION RESPIRATORY (INHALATION) at 20:31

## 2023-04-14 RX ADMIN — IPRATROPIUM BROMIDE AND ALBUTEROL SULFATE 3 ML: 2.5; .5 SOLUTION RESPIRATORY (INHALATION) at 15:33

## 2023-04-14 RX ADMIN — APIXABAN 5 MG: 5 TABLET, FILM COATED ORAL at 08:14

## 2023-04-14 RX ADMIN — GABAPENTIN 300 MG: 300 CAPSULE ORAL at 20:08

## 2023-04-14 RX ADMIN — OXYCODONE HYDROCHLORIDE 2.5 MG: 5 TABLET ORAL at 15:51

## 2023-04-14 RX ADMIN — APIXABAN 5 MG: 5 TABLET, FILM COATED ORAL at 20:39

## 2023-04-14 RX ADMIN — PANTOPRAZOLE SODIUM 40 MG: 40 TABLET, DELAYED RELEASE ORAL at 06:15

## 2023-04-14 RX ADMIN — DESVENLAFAXINE SUCCINATE 25 MG: 25 TABLET, EXTENDED RELEASE ORAL at 08:14

## 2023-04-14 RX ADMIN — GABAPENTIN 300 MG: 300 CAPSULE ORAL at 06:15

## 2023-04-14 RX ADMIN — GABAPENTIN 300 MG: 300 CAPSULE ORAL at 14:08

## 2023-04-14 NOTE — THERAPY TREATMENT NOTE
Inpatient Rehabilitation - Physical Therapy Treatment Note       [facility]     Patient Name: Louise GARCIA  : 1965  MRN: [id number]    Today's Date: 2023                    Admit Date: 3/17/2023      Visit Dx:     ICD-10-CM ICD-9-CM   1. Impaired functional mobility, balance, gait, and endurance  Z74.09 V49.89       Patient Active Problem List   Diagnosis   • Sepsis   • Neck pain, chronic   • Upper back pain   • Paresthesia   • Cervical myelopathy   • Lower extremity weakness   • History of blood clots   • Chronic anticoagulation   • Seizures   • Anemia   • GERD (gastroesophageal reflux disease)   • Guillain-[place] syndrome   • Situational mixed anxiety and depressive disorder   • Mass of middle lobe of right lung   • Oral candidiasis   • Empyema of pleura   • MRSA bacteremia   • Idiopathic peripheral neuropathy       Past Medical History:   Diagnosis Date   • Degenerative disc disease, cervical    • Gestational diabetes    • H/O blood clots    • Hematemesis    • Seizures    • Septic shock        Past Surgical History:   Procedure Laterality Date   • APPENDECTOMY     • BACK SURGERY     • CHOLECYSTECTOMY     • ENDOSCOPY N/A 2016    Procedure: ESOPHAGOGASTRODUODENOSCOPY with biopsy;  Surgeon: Austen Brito MD;  Location: [facility] ENDOSCOPY;  Service:    • HYSTERECTOMY     • NECK SURGERY       approx 6 yrs ago   • THORACOSCOPY Right 3/8/2023    Procedure: THORACOSCOPY WITH DAVINCI ROBOT WITH COMPLETE DECORTICATION;  Surgeon: Chloe Cedillo MD;  Location: [facility] OR;  Service: Robotics - DaVinci;  Laterality: Right;   • TONSILLECTOMY     • TONSILLECTOMY AND ADENOIDECTOMY         PT ASSESSMENT (last 12 hours)     IRF PT Evaluation and Treatment     Row Name 23 1100          PT Time and Intention    Document Type daily treatment  -AE     Mode of Treatment individual therapy;physical therapy  -AE     Patient/Family/Caregiver Comments/Observations Eduard present for AM session, no  "complaints of pain today  -AE     Row Name 04/14/23 1100          General Information    Patient Profile Reviewed yes  -AE     General Observations of Patient  present during session. states he will take more pictures this weekend  -AE     Existing Precautions/Restrictions fall;other (see comments)  contact  -AE     Limitations/Impairments safety/cognitive  -AE     Row Name 04/14/23 1100          Pain Assessment    Pretreatment Pain Rating 0/10 - no pain  -AE     Posttreatment Pain Rating 0/10 - no pain  -AE     Row Name 04/14/23 1100          Cognition/Psychosocial    Affect/Mental Status (Cognition) WFL  -AE     Orientation Status (Cognition) oriented x 3  -AE     Follows Commands (Cognition) follows one-step commands;verbal cues/prompting required  -AE     Personal Safety Interventions fall prevention program maintained;gait belt;muscle strengthening facilitated;nonskid shoes/slippers when out of bed;supervised activity  -AE     Cognitive Function memory deficit  -AE     Memory Deficit (Cognition) episodic memory;procedural memory  -AE     Safety Deficit (Cognition) insight into deficits/self-awareness;judgment;problem-solving  -AE     Comment, Cognition forgetful  -AE     Row Name 04/14/23 1100          Transfer Assessment/Treatment    Comment, (Transfers) needs cues to \"push through heels\" for knee extension  -AE     Row Name 04/14/23 1100          Bed-Chair Transfer    Bed-Chair [place] (Transfers) minimum assist (75% patient effort)  -AE     Assistive Device (Bed-Chair Transfers) wheelchair  -AE     Row Name 04/14/23 1100          Chair-Bed Transfer    Chair-Bed [place] (Transfers) minimum assist (75% patient effort);1 person assist  -AE     Assistive Device (Chair-Bed Transfers) wheelchair;walker, front-wheeled  -AE     Row Name 04/14/23 1100          Sit-Stand Transfer    Sit-Stand [place] (Transfers) contact guard;minimum assist (75% patient effort)  -AE     Assistive Device " (Sit-Stand Transfers) wheelchair;walker, front-wheeled  -AE     Comment, (Sit-Stand Transfer) sit<>stand from wc with therapist in front. cues to push down through heels and to breathe  -AE     Row Name 04/14/23 1100          Stand-Sit Transfer    Stand-Sit [place] (Transfers) contact guard;minimum assist (75% patient effort)  -AE     Assistive Device (Stand-Sit Transfers) wheelchair  -AE     Row Name 04/14/23 1100          Gait/Stairs (Locomotion)    [place] Level (Gait) minimum assist (75% patient effort);1 person assist;contact guard  -AE     Assistive Device (Gait) walker, front-wheeled  -AE     Distance in Feet (Gait) 40ft, 80ft  -AE     Pattern (Gait) step-through  -AE     Deviations/Abnormal Patterns (Gait) base of support, wide;left sided deviations;gait speed decreased;stride length decreased  -AE     Bilateral Gait Deviations heel strike decreased;knee buckling bilaterally  -AE     Left Sided Gait Deviations heel strike decreased  -AE     Right Sided Gait Deviations heel strike decreased  -AE     [place] Level (Stairs) minimum assist (75% patient effort);1 person assist;2 person assist  -AE     Assistive Device (Stairs) other (see comments)  in PM performed in // bars  -AE     Handrail Location (Stairs) both sides  -AE     Number of Steps (Stairs) 7in step x 2, 7in step x 3, 8in step // bars x 3  -AE     Ascending Technique (Stairs) step-to-step  -AE     Descending Technique (Stairs) step-to-step  -AE     Stairs, Impairments strength decreased;impaired balance;coordination impaired  -AE     Stairs Assessment/Intervention ascends with RLE descends backwards with LLE. required Bianca for first 2 steps with some ataxia throughout. CGA to ascend 3 steps. buckled coming down on last step with 2 therapists present to provide assist  -AE     Row Name 04/14/23 1100          Knee (Therapeutic Exercise)    Knee Strengthening (Therapeutic Exercise) 2 sets;5 repetitions;LAQ (long arc quad);left;hamstring  curls;red;resistance band  first set 1.5#, 2nd set no weight  -AE     Row Name 04/14/23 1100          Modality Treatment    Treatment Location 1 (Modality) L anterior thigh  -AE     Treatment Location 2 (Modality) skin rolling  -AE     Modality Treatment Results 5 min  -AE     Comment, Modality Treatment pain relief  -AE     Row Name 04/14/23 1100          Positioning and Restraints    Pre-Treatment Position sitting in chair/recliner  -AE     Post Treatment Position other  -AE     Other Position return to room with caregiver  -AE           User Key  (r) = Recorded By, (t) = Taken By, (c) = Cosigned By    Initials Name Provider Type    AE Deanna Carr, PT Physical Therapist              Wound 03/08/23 1917 Right lateral chest Incision (Active)   Dressing Appearance open to air 04/14/23 0820   Closure Approximated 04/14/23 0820   Base clean;dry;scab 04/14/23 0820   Periwound intact 04/14/23 0820   Periwound Temperature warm 04/14/23 0820   Periwound Skin Turgor soft 04/14/23 0820   Drainage Amount none 04/14/23 0820   Care, Wound cleansed with;soap and water 04/14/23 0820   Dressing Care open to air 04/14/23 0820   Periwound Care dry periwound area maintained 04/14/23 0820       Wound 03/11/23 1530 Other (See comments) other (see comments) pubis Abrasion (Active)   Dressing Appearance open to air 04/14/23 0820   Closure Open to air 04/14/23 0820   Dressing Care open to air 04/14/23 0820       Wound 03/20/23 1527 Bilateral upper gluteal MASD (Moisture associated skin damage) (Active)   Dressing Appearance open to air 04/14/23 0820   Closure Open to air 04/14/23 0820   Base blanchable;clean;dry 04/14/23 0820   Periwound Temperature warm 04/14/23 0820   Periwound Skin Turgor soft 04/14/23 0820   Edges irregular 04/14/23 0820   Drainage Amount none 04/14/23 0820   Care, Wound cleansed with;soap and water 04/14/23 0820   Dressing Care open to air 04/14/23 0820   Periwound Care barrier ointment applied 04/14/23 0820      Physical Therapy Education     Title: PT OT SLP Therapies (Done)     Topic: Physical Therapy (Done)     Point: Mobility training (Done)     Learning Progress Summary           Patient Acceptance, E, VU,NR by EE at 4/12/2023 1341    Acceptance, E, VU,DU,NR by CLOVERK at 4/8/2023 1528    Acceptance, E, VU by WN at 4/4/2023 2329    Acceptance, E, VU by WN at 4/4/2023 0322    Acceptance, E,D, VU,DU by DP at 4/1/2023 1352    Acceptance, E, VU,DU by MG at 3/31/2023 0825    Acceptance, E, VU,DU by MG at 3/30/2023 0956    Acceptance, E,D, VU,DU by DP at 3/29/2023 1148    Nonacceptance, E,D, VU,DU by DP at 3/28/2023 1144    Acceptance, E,D, VU,DU by DP at 3/27/2023 1601    Acceptance, E,D, VU,NR by EE at 3/25/2023 1423    Acceptance, E,D, NR,VU,DU by DP at 3/21/2023 1448    Acceptance, E, NR by CLOVERK at 3/20/2023 1513    Acceptance, E,TB, VU,NR by SAMANTHA at 3/18/2023 1643   Family Acceptance, E, VU by WN at 4/4/2023 2329    Acceptance, E, VU by WN at 4/4/2023 0322                   Point: Home exercise program (Done)     Learning Progress Summary           Patient Acceptance, E, VU,NR by EE at 4/12/2023 1341    Acceptance, E, VU by WN at 4/4/2023 2329    Acceptance, E, VU by WN at 4/4/2023 0322    Acceptance, E,D, VU,DU by DP at 4/1/2023 1352    Acceptance, E, VU,DU by MG at 3/31/2023 0825    Acceptance, E, VU,DU by MG at 3/30/2023 0956    Acceptance, E,D, VU,DU by DP at 3/29/2023 1148    Nonacceptance, E,D, VU,DU by DP at 3/28/2023 1144    Acceptance, E,D, VU,DU by DP at 3/27/2023 1601    Acceptance, E,D, NR,VU,DU by LOLIS at 3/21/2023 1448    Acceptance, E, NR by ETHAN at 3/20/2023 1513   Family Acceptance, E, VU by WN at 4/4/2023 2329    Acceptance, E, VU by WN at 4/4/2023 0322                   Point: Body mechanics (Done)     Learning Progress Summary           Patient Acceptance, E, VU,DU,NR by ETHAN at 4/8/2023 1528    Acceptance, E, VU by YVONNE at 4/4/2023 2329    Acceptance, E, VU by YVONNE at 4/4/2023 0322    Acceptance, E,D, VU,DU by LOLIS  at 4/1/2023 1352    Acceptance, E, VU,DU by MG at 3/31/2023 0825    Acceptance, E, VU,DU by MG at 3/30/2023 0956    Acceptance, E,D, VU,DU by DP at 3/29/2023 1148    Nonacceptance, E,D, VU,DU by DP at 3/28/2023 1144    Acceptance, E,D, VU,DU by DP at 3/27/2023 1601    Acceptance, E,D, VU,NR by EE at 3/25/2023 1423    Acceptance, E,D, NR,VU,DU by DP at 3/21/2023 1448   Family Acceptance, E, VU by WN at 4/4/2023 2329    Acceptance, E, VU by WN at 4/4/2023 0322                   Point: Precautions (Done)     Learning Progress Summary           Patient Acceptance, E, VU by WN at 4/4/2023 2329    Acceptance, E, VU by WN at 4/4/2023 0322    Acceptance, E,D, VU,DU by DP at 4/1/2023 1352    Acceptance, E, VU,DU by MG at 3/31/2023 0825    Acceptance, E, VU,DU by MG at 3/30/2023 0956    Acceptance, E,D, VU,DU by DP at 3/29/2023 1148    Nonacceptance, E,D, VU,DU by DP at 3/28/2023 1144    Acceptance, E,D, VU,DU by DP at 3/27/2023 1601    Acceptance, E,D, VU,NR by EE at 3/25/2023 1423    Acceptance, E,D, NR,VU,DU by DP at 3/21/2023 1448   Family Acceptance, E, VU by WN at 4/4/2023 2329    Acceptance, E, VU by WN at 4/4/2023 0322                               User Key     Initials Effective Dates Name Provider Type Discipline    EE 06/16/21 -  Melinda Mann, PT Physical Therapist PT    JK 06/16/21 -  Juana Veloz, PT Physical Therapist PT    KP 06/16/21 -  Yolanda Shannon, PT Physical Therapist PT    MG 05/24/22 -  Yu Villalobos, PT Physical Therapist PT    WN 08/23/22 -  Jose Carrillo, RN Registered Nurse Nurse    DP 08/24/21 -  Ppaa Marsh, PT Physical Therapist PT                PT Recommendation and Plan                          Time Calculation:      PT Charges     Row Name 04/14/23 1419 04/14/23 1200          Time Calculation    Start Time 1230  -AE 1000  -AE     Stop Time 1300  -AE 1030  -AE     Time Calculation (min) 30 min  -AE 30 min  -AE     PT Received On 04/14/23  -AE 04/14/23  -AE     PT - Next  Appointment 04/15/23  -AE 04/15/23  -AE        Time Calculation- PT    Total Timed Code Minutes- PT 30 minute(s)  -AE 30 minute(s)  -AE           User Key  (r) = Recorded By, (t) = Taken By, (c) = Cosigned By    Initials Name Provider Type    AE Deanna Carr, PT Physical Therapist                Therapy Charges for Today     Code Description Service Date Service Provider Modifiers Qty    73165083147 HC GAIT TRAINING EA 15 MIN 4/13/2023 Deanna Carr, PT GP 1    83824827393 HC PT NEUROMUSC RE EDUCATION EA 15 MIN 4/13/2023 Deanna Carr, PT GP 1    21444662984 HC PT THER PROC EA 15 MIN 4/13/2023 Deanna Carr, PT GP 1    39461328913 HC PT MANUAL THERAPY EA 15 MIN 4/13/2023 Deanna Carr, PT GP 1    16960719002 HC GAIT TRAINING EA 15 MIN 4/14/2023 Deanna Carr, PT GP 1    71274620611 HC PT THERAPEUTIC ACT EA 15 MIN 4/14/2023 Deanna Carr, PT GP 1    33113282738 HC GAIT TRAINING EA 15 MIN 4/14/2023 Deanna Carr, PT GP 1    34077684011 HC PT THER PROC EA 15 MIN 4/14/2023 Deanna Carr, PT GP 1                   Deanna Carr, PT  4/14/2023

## 2023-04-14 NOTE — THERAPY TREATMENT NOTE
Inpatient Rehabilitation - Speech Language Pathology Treatment Note    [facility]     Patient Name: Louise GARCIA  : 1965  MRN: [id number]    Today's Date: 2023                   Admit Date: 3/17/2023       Visit Dx:      ICD-10-CM ICD-9-CM   1. Impaired functional mobility, balance, gait, and endurance  Z74.09 V49.89       Patient Active Problem List   Diagnosis   • Sepsis   • Neck pain, chronic   • Upper back pain   • Paresthesia   • Cervical myelopathy   • Lower extremity weakness   • History of blood clots   • Chronic anticoagulation   • Seizures   • Anemia   • GERD (gastroesophageal reflux disease)   • Guillain-[place] syndrome   • Situational mixed anxiety and depressive disorder   • Mass of middle lobe of right lung   • Oral candidiasis   • Empyema of pleura   • MRSA bacteremia   • Idiopathic peripheral neuropathy       Past Medical History:   Diagnosis Date   • Degenerative disc disease, cervical    • Gestational diabetes    • H/O blood clots    • Hematemesis    • Seizures    • Septic shock        Past Surgical History:   Procedure Laterality Date   • APPENDECTOMY     • BACK SURGERY     • CHOLECYSTECTOMY     • ENDOSCOPY N/A 2016    Procedure: ESOPHAGOGASTRODUODENOSCOPY with biopsy;  Surgeon: Austen Brito MD;  Location: [facility] ENDOSCOPY;  Service:    • HYSTERECTOMY     • NECK SURGERY       approx 6 yrs ago   • THORACOSCOPY Right 3/8/2023    Procedure: THORACOSCOPY WITH DAVINCI ROBOT WITH COMPLETE DECORTICATION;  Surgeon: Chloe Cedillo MD;  Location[facility];  Service: Robotics - DaVinci;  Laterality: Right;   • TONSILLECTOMY     • TONSILLECTOMY AND ADENOIDECTOMY         SLP Recommendation and Plan                                                            SLP EVALUATION (last 72 hours)     SLP SLC Evaluation     Row Name 23 1500 23 0930 23 1430 23 0930 23 1430       Communication Assessment/Intervention    Document Type therapy note (daily  note)  -AL therapy note (daily note)  -AL therapy note (daily note)  -AL therapy note (daily note)  -AL therapy note (daily note)  -AL    Patient/Family/Caregiver Comments/Observations Pt in good spirits. Participated well.  -AL Pt participated well.  -AL Pt participated well.  -AL Pt is pleasant and cooperative.  -AL Attempted session at 1300; pt was asleep and  requested to waken her. Pt participated in session at 1430; however, session ended at 1450 due to pt needing bathroom care. Pt declined further therapies for remainder of day due to not feeling well.  -AL       Pain Scale: Numbers Pre/Post-Treatment    Pretreatment Pain Rating 0/10 - no pain  -AL 0/10 - no pain  -AL 0/10 - no pain  -AL 0/10 - no pain  -AL 0/10 - no pain  -AL          User Key  (r) = Recorded By, (t) = Taken By, (c) = Cosigned By    Initials Name Effective Dates    Nessa Kraus, MS CCC-SLP 06/16/21 -                    EDUCATION    The patient has been educated in the following areas:       Cognitive Impairment Communication Impairment.             SLP GOALS     Row Name 04/14/23 1500 04/14/23 0930 04/13/23 1400       Phonation Goal 1 (SLP)    Progress/Outcomes (Phonation Goal 1, SLP) -- good progress toward goal  -AL --    Comment (Phonation Goal 1, SLP) -- Pt presented with overall mild hoarse vocal quality, with intermittent periods of clear vocal quality and periods of increased hoarse quality. Pt completed frontal tone focus exercises with overall MOD cues (humming, initial /m/ CV chanting, 2-syllable initial /m/ chanting). Pt able to achieve adequate vocal quality with cues for higher pitch, but unable to produce adequate vocal quality at lower pitch. Pt also read aloud functional phrases with MIN-MOD cues for vocal intensity and elevated pitch; she was able to produce phrase with adequate vocal quality ~50% of the time.  -AL --       Attention Goal 1 (SLP)    Improve Attention by Goal 1 (SLP) -- -- complete sustained  "attention task;80%;with minimal cues (75-90%)  -AL    Time Frame (Attention Goal 1, SLP) -- -- 1 week  -AL    Progress/Outcomes (Attention Goal 1, SLP) -- -- good progress toward goal  -AL    Comment (Attention Goal 1, SLP) -- -- Alternating attention for \"Blink\" card sort required overall MIN cues.  -AL       Memory Skills Goal 1 (SLP)    Improve Memory Skills Through Goal 1 (SLP) use memory strategies;use external memory aid;recall details of the day;80%;with moderate cues (50-74%)  -AL -- --    Time Frame (Memory Skills Goal 1, SLP) 1 week  -AL -- --    Progress/Outcomes (Memory Skills Goal 1, SLP) good progress toward goal  -AL -- good progress toward goal  -AL    Comment (Memory Skills Goal 1, SLP) Working memory for 4 word ordering task: 30% with NO cues, 80% with MIN cues, 100% with MOD-MAX cues.  -AL -- Recalled salient event from morning session with NO cues. 5 unit list retention task: 65% with NO cues, 100% with MIN cues.  -AL       Reasoning Goal 1 (SLP)    Improve Reasoning Through Goal 1 (SLP) complete deductive reasoning task;80%;with minimal cues (75-90%)  -AL -- complete deductive reasoning task;80%;with minimal cues (75-90%)  -AL    Time Frame (Reasoning Goal 1, SLP) 1 week  -AL -- 1 week  -AL    Progress/Outcomes (Reasoning Goal 1, SLP) good progress toward goal  -AL -- good progress toward goal  -AL    Comment (Reasoning Goal 1, SLP) Moderate level logic puzzle: 60% with NO cues, 100% with MIN-MOD cues. Required cues for organization during this task.  -AL -- Completed logic puzzle: 30% with NO cues, 100% with MIN-MOD cues.  -AL    Row Name 04/13/23 0900 04/12/23 1430          Attention Goal 1 (SLP)    Improve Attention by Goal 1 (SLP) complete sustained attention task;80%;with minimal cues (75-90%)  -AL complete sustained attention task;80%;with minimal cues (75-90%)  -AL     Time Frame (Attention Goal 1, SLP) 1 week  -AL 1 week  -AL     Progress/Outcomes (Attention Goal 1, SLP) goal ongoing  " -AL goal ongoing  -AL     Comment (Attention Goal 1, SLP) Opposites word search: 100% with NO cues.  -AL Sustained attention on opposites word search for 10 minutes with cue x1 for selective attention. She was 55% accurate with NO cues, 100% with MIN-MOD cues.  -AL        Reasoning Goal 1 (SLP)    Progress/Outcomes (Reasoning Goal 1, SLP) good progress toward goal  -AL --     Comment (Reasoning Goal 1, SLP) Initiated logic puzzle task: 30% with NO Cues, 100% with MIN-MOD cues thus far. Plan to complete in next session.  -AL --           User Key  (r) = Recorded By, (t) = Taken By, (c) = Cosigned By    Initials Name Provider Type    Nessa Kraus MS CCC-SLP Speech and Language Pathologist                            Time Calculation:        Time Calculation- SLP     Row Name 04/14/23 1539 04/14/23 1119          Time Calculation- SLP    SLP Start Time 1500  -AL 0930  -AL     SLP Stop Time 1530  -AL 1000  -AL     SLP Time Calculation (min) 30 min  -AL 30 min  -AL           User Key  (r) = Recorded By, (t) = Taken By, (c) = Cosigned By    Initials Name Provider Type    Nessa Kraus, MS CCC-SLP Speech and Language Pathologist                  Therapy Charges for Today     Code Description Service Date Service Provider Modifiers Qty    02016132303 HC ST DEV OF COGN SKILLS INITIAL 15 MIN 4/13/2023 Nessa Spangler MS CCC-SLP  1    49533831712 HC ST DEV OF COGN SKILLS EACH ADDT'L 15 MIN 4/13/2023 Nessa Spangler MS CCC-SLP  3    51987687849 HC ST DEV OF COGN SKILLS INITIAL 15 MIN 4/14/2023 Nessa Spangler MS CCC-SLP  1    46821809896 HC ST DEV OF COGN SKILLS EACH ADDT'L 15 MIN 4/14/2023 Nessa Spangler MS CCC-SLP  3                           Nessa Spangler MS CCC-SLP  4/14/2023

## 2023-04-14 NOTE — PROGRESS NOTES
Recreational Therapy Note    Patient Name: Louise GARCIA   MRN: [id number]    Therapeutic Recreation Eval and Treat (last 12 hours)     Therapeutic Recreation Eval & Treat     Row Name 04/14/23 1400       Therapeutic Recreation Participation    Recreation Therapy Participation games  -    Games card games  5 [place]  -    Objectives of Recreation Participation increase;sense of autonomy by choosing level of participation;positive attitudes leading to a healthy leisure lifestyle  -    Comment, Recreation Participation mod cues to follow and recall directions for unfamiliar activity  -    Recreation Therapy Summary of Participation active participation  -          User Key  (r) = Recorded By, (t) = Taken By, (c) = Cosigned By    Initials Name Provider Type    SS Mariel Cantu, CTRS Recreational Therapist                  DELVIN Wei  4/14/2023

## 2023-04-14 NOTE — PROGRESS NOTES
LOS: 28 days   Patient Care Team:  Chloe Schaefer APRN as PCP - General (Family Medicine)  Mikhail Glez MD as Consulting Physician (Neurology)      Patient Name: ELEONORA GARCIA  Patient : 1965    ADMITTING DIAGNOSIS:  Diagnoses    1. IMPAIRED FUNCTIONAL MOBILITY, BALANCE, GAIT, AND ENDURANCE           SUBJECTIVE:    Tolerates therapies  Strength unchanged          REVIEW OF SYSTEMS:           OBJECTIVE:    Vitals:    23 0719   BP:    Pulse: 80   Resp: 18   Temp:    SpO2: 97%       PHYSICAL EXAM:   General: pleasant, in no acute distress  HEENT: NCAT, sclera anicteric, conjunctiva pink, mucoa moist  Cardiovascular: RRR, +S1+S2, no obvious m/g/r  Lungs: Decreased breath sounds on the right side-no change  Abdomen: normoactive bowel sounds, soft, tender to palpation in the epigastrum, no guarding or rebound tenderness  Extremities: no peripheral edema  Skin: exposed surfaces of skin warm, intact, without erythema  Neuro: awake alert and oriented to person, place, time, and situation, CN II-XII grossly intact  MSK: Good strength with bilateral shoulder abduction, elbow flexion, elbow extension, wrist extension, finger flexion.  Has weakness with hand intrinsics 4/5.  Bilateral hip flexion 4/5, right knee extension 4/5, left knee extension 3/5, bilateral ankle dorsiflexion 4/5        MEDICATIONS  Scheduled Meds:  apixaban, 5 mg, Oral, Q12H  desvenlafaxine, 25 mg, Oral, Daily  folic acid, 1 mg, Oral, Daily  gabapentin, 300 mg, Oral, Q8H  ipratropium-albuterol, 3 mL, Nebulization, 4x Daily - RT  lamoTRIgine, 200 mg, Oral, Daily  melatonin, 5 mg, Oral, Nightly  pantoprazole, 40 mg, Oral, Q AM  thiamine, 100 mg, Oral, Daily  vitamin B-12, 500 mcg, Oral, Daily        Continuous Infusions:       PRN Meds:  •  acetaminophen  •  bisacodyl  •  diphenhydrAMINE  •  famotidine  •  hydrocortisone sodium succinate  •  hydrocortisone-bacitracin-zinc oxide-nystatin  •  ondansetron ODT  •  oxyCODONE  •   polyethylene glycol  •  senna-docusate sodium  •  simethicone      RESULTS:  No results found for: POCGLU  Results from last 7 days   Lab Units 04/14/23  0753 04/10/23  0603   WBC 10*3/mm3 8.79 8.74   HEMOGLOBIN g/dL 7.6* 7.7*   HEMATOCRIT % 23.3* 23.7*   PLATELETS 10*3/mm3 355 273     Results from last 7 days   Lab Units 04/14/23  0753 04/10/23  0603   SODIUM mmol/L 137 132*   POTASSIUM mmol/L 3.8 4.1   CHLORIDE mmol/L 98 94*   CO2 mmol/L 32.2* 31.2*   BUN mg/dL 17 17   CREATININE mg/dL 1.17* 1.12*   CALCIUM mg/dL 11.3* 10.0   GLUCOSE mg/dL 123* 97           ASSESSMENT and PLAN:    Idiopathic peripheral neuropathy    Paresthesia    History of blood clots    Chronic anticoagulation    Seizures    Anemia    Guillain-[place] syndrome    MRSA bacteremia    Subacute motor predominant idiopathic peripheral neuropathy with Paraparesis and Areflexia.   S/p IVIG x 5 days  March 30-shows improvement with her strength proximally the upper extremities.  Improvement with her hand strength.  Improved strength in the right lower extremity but continues with profound weakness in the left lower extremity.  With weakness of the left ankle, will look at probable AFO for gait activities.  Transfers are moderate assist.  Ambulated with a rolling walker up to 70 feet with gait deviations minimal moderate assist  -4/6-reconsulted neurology to see if she needs another round of IVIG.  Follow-up assessment with neurology-Previous IVIG dose was 20g daily for 5 days from 2/27-3/3.  Plan is to repeat another course of IVIG  - 4/10- fever this morning likely a side effect of IVIG.  She is scheduled to receive her final dose of this course today.  Will obtain CXR to rule out pneumonia.       Impaired mobility/impaired self care/ impaired cognition  Left greater than right lower extremity weakness greater than bilateral upper extremity weakness  April 4-strength improved in the bilateral upper extremities and right lower extremity.Weakness  looks  about the same the left lower extremity.  On the day she ambulated further last week she had utilized AFO on the left lower extremity  April 5-stronger with her left hip flexors and knee extensors today  April 6- strength and coordination improving.  Per OT note she is needing set up assist for upper body dressing with max assist for lower body dressing, PT is noticing that the patient appears stronger and is limited by weakness and fear.  Moderate assist between chair and bed but min assist for sit to stand.  Very minimal knee buckling when walking with left AFO.  Making good progress overall.     R lung masslike infiltrate with cavitary lesions/pleural effusion   S/p thoracentesis - Blood cx and pleural fluid  positive for MRSA      Chest tube placement given empyema, and she underwent thoracoscopy w/ decortication 3/8/23  -expected postoperative changes with pleural thickening and minimal pleural effusion.  The effusion is too small for intervention and will likely to continue to resolve with time.  Recommend continue good pulmonary hygiene with incentive spirometry hourly as well as increase activity/ambulation as tolerated.  March 23-appears decreased breath sounds more noticeable today on the right compared to the left.  Check follow-up chest x-ray.  On room air during the day, 1 L at night.  March 24- CXR relatively unchanged from previous, reached out to CT surgery given planned outpatient f/u on 3/28, no further imaging planned at this time as patient afebrile with normal WBC, monitor     Right-sided Chest Pain 3/24  -EKG sinus tach  -HS Troponin 25 > 24  -consider Cardiology consult if worsening  -pain reproducible with palpation, pain likely postoperative neuralgia as indicated vs. possible costochondritis, continue gabapentin, ice/heat, monitor  -4/10- patient continues to complain of right-sided rib pain.  We will touch base with thoracic surgery about nerve block for postoperative neuralgia.  - 4/12-  thoracic surgery may refer for outpatient nerve block.  We will continue to monitor and consider going up on gabapentin.    Nutrition-  April 13-Patient has had decreased p.o. intake with weight loss.  Discussed option of supplemental tube feeds with the dietitian.     Hypokalemia  -3/24 K 3.3, repleted  -3/29 K 3.7, resolved     Mass on TTE with findings concerning for endocarditis - underwent MARIAMA 3/10/2023 which had no vegetation  RUE superficial thrombophlebitis concerning for septic phlebitis.     ID - continue 4 weeks of vancomycin as recommended by infectious disease dosed by pharmacy to achieve a trough level of 15-20 or area under the curve of 400-600 from last negative blood culture or last intervention which ever is the latest - to complete April 1, 2023     Left hip arthrocentesis March 16 to rule out any hip septic arthritis - no growth to date on fluid.    Lower back pain - MRI of spine to rule out discitis or osteomyelitis.  This did have known degenerative changes but  no infection.      DVT prophylaxis - SCDs / apixiban. History of LLE DVT - on Eliquis at home     Pain management   - Tylenol 1,000 mg three times a day/ Celebrex 100 mg q 112 hours/ gabapentin 300 mg q 8 hours Oxycodone 5 mg q 4 hours prn  March 18 - DC Celebrex 2/2 PHYLLIS, and anemia      Anxiety/ muscle spasms - Diazepam 2 mg q 6 hours prn - JONES reviewed     Seizure disorder - Lamotrigine 200 mg daily     ABLA   - March 18- Hgb 7.0 (7.3 on 3/16). Type, cross and transfuse 1 unit PRBCs. Pre-medicate with Tylenol and Benadryl. CBC in am.   March 19- Hgb 8.8 s/p 1 unit PRBcs. Hemoccult positive. On Eliquis for h/o DVT.  Per IM -[likely 2/2 anemia of chronic disease; no signs of active bleeding  -s/p 1unit PRBCs w/ appropriate rise in Hb  -iron studies c/w ACD  -would not stop Eliquis at this time unless active bleeding noted or Hb drop continues]  March 20 - HGB 9.3 s/p transfusion  March 21-reviewed with internal medicine-hemoglobin  stable after transfusion.  Iron studies consistent with inflammation.  No further work-up presently  March 22 - Hgb 8.9, stable  March 23-hemoglobin trend 8.3.  Continue to follow.  Recheck tomorrow  March 29 - Hgb 8.1, stable  April 6- hemoglobin 8.3.  Stable.  April 10- hemoglobin slightly down trended to 7.7.  Monitor.  April 14 - Hgb 7.6, felt Anemia of chronic disease     PHYLLIS   March 18- Creatinine 1.36 up from 1.12. On IV Vanc and Celebrex. DC Celebrex. BMP in am.  March 19- s/p 500ml NS bolus. Creatinine 1.31.  March 23-creatinine 0.92  April 10- creatinine downtrending to 1.12 with increased fluids.    Nausea  -4/12-nausea with vomiting this morning.  Declined to participate in speech therapy due to not feeling well.  Zofran 4 mg every 6 hours as needed for nausea and vomiting added and administered.  No signs of systemic inflammation or infection.  Labs will be drawn tomorrow.     TEAM CONF - MARCH 21 - BED MOD X 2. TRANSFERS MAX 2. STAND PIVOT OR SQUAT PIVOT. NON-AMBULATORY. Saturday MARCH 18 GAIT 6 FEET PARALLEL BARS MOD MAX OF 2.   BATH MOD 1-2. LBD DEP. UBD MOD. EATING MIN. GROOMING MIN. TOILETING DEP.   The patient has difficulty remembering new information due to short-term memory impairments.  Initial evaluation 3.18, pt obtained a score 12/30 on SLUMS cognitive assessment indicating severe cognitive impairment/dementia, goals initiated for memory and sustaining attention  FEES completed 3.7 revealing glottic gap accounting for glottic insufficiency, dysphonia, hoarse/breathy quality, recommend targeting vocal function as appropriate d/t impact on intelligibility.    Regular/thin diet continued since FEES completion 3.7, although pt known to cough with and without PO intake, may be contributed partially to ineffective VF closure.   DRESSING FORMER CHEST TUBE SITE. CONTINUES ON PUREWICK AT NIGHT. O2 AT 1-2 LITERS AT NIGHT.  DECREASED APPETITE. ABD X-RAY THIS AM SHOWS NON-OBSTRUCTIVE BOWEL GAS PATTERN.    ELOS - 4 WEEKS     TEAM CONF - MARCH 28 - BED MIN CTG. TRANSFER MOD ASSIST STAND PIVOT OR SQUAT PIVOT. FATIGUES IN AFERNOONS. GAIT 15 FEET RW MIN X 2. FLUCTUATING ORIENTATION. BETTER DETAIL TO TASK. MIN MOD CUES FOR REASONING TASKS. DYSPHONIA FROM COUGHING.  SLP REVIEWED VOICE HYGIENE, NOT TO DO HARSH THROAT CLEAR. BATH MOD. LBD MAX. UBD MIN. TOILETING MOD X 2. CONTINENT/INCONTINENT BLADDER. RIGHT CHEST TUBE / THORACOSCOPY SITE CARE. VARIABLE INTAKE.   ELOS - 3 WEEKS     TEAM CONF - APRIL 4 - CONTINUES WEAK IN LLE. STRONGER IN BUE AND RIGHT KNEE EXTENSION. BED MIN MOD TO CTG. TRANSFERS MOD MAX STAND PIVOT OR SQUAT PIVOT. WORKED ON GAIT IN PARALLEL BARS MIN MOD OF 2 PERSON. CAN DO 15 FEET IF NOT SO ANXIOUS. TOILET AND SHOWER TRANSFERS MOD ASSIST. DROP ARM BEDSIDE COMMODE. AT NIGHT USING BEDPAN. USING PUREWICK AT NIGHT. BATH MIN. LBD MOD. UBD SET UP.TOILETING MOD MAX 2.    Pt with improved ability to recall salient events  from day.Now presents with mild-moderate hoarse vocal  quality. Improved vocal intensity.  ADDRSSING DYSPHONIA. DIETICIAN REVIEWED FOOD CHOICES. GABAPENTIN FOR RIGHT RIB PAIN/INTERCOSTAL PAIN.CONTINENT BOWEL AND BLADDER WITH URGENCY.   ELOS - 2-3 WEEKS     Team Conference - 4/11/2023  Nursing: overnight using purewick,   PT: mod assist for transfers due to weight shifting, bed mobility w/wo rails standby/contact guard, shallow four inch step with rails 1-2 person assist, walking 60' with walker, trial AFO and ace wrap assist doesn't seem to help a lot now that she no longer has a hard collapse of her ankle/knee, anticipate she will walk safely with walker in their RV home but need a wheelchair for community ambulation  OT: toilet transfers mod assist with one, clothing management max-mod  SLP: mild-mod cognitive impairment, struggles with oxycodone, max cues for verbal working memory in the morning but min in the afternoon, min-mod cueing, improvements with carryover  Psychology: trouble with  thought organization and concentration, trouble with working memory, uncertain of self, lacking confidence with some anxiety completing tasks  Social Work: recommending home health  Discharge Date: 1.5 weeks    CODE STATUS:  Level Of Support Discussed With: Patient  Code Status (Patient has no pulse and is not breathing): CPR (Attempt to Resuscitate)  Medical Interventions (Patient has pulse or is breathing): Full Support      Admission Status: Continues to meet requirements for inpatient admission.       Ryley Rider MD    During rounds, used appropriate personal protective equipment including mask and gloves.  Additional gown if indicated.  Mask used was standard procedure mask. Appropriate PPE was worn during the entire visit.  Hand hygiene was completed before and after.

## 2023-04-14 NOTE — PROGRESS NOTES
Inpatient Rehabilitation Plan of Care Note    Plan of Care  Care Plan Reviewed - No updates at this time.    Sphincter Control    Performed Intervention(s)  Bladder/bowel training program  Monitor intake and output  Use incontinence products/equipment      Psychosocial    Performed Intervention(s)  Verbalizes needs and concerns  Support from family/peer groups      Body Systems    Performed Intervention(s)  Perform active and passive ROM  Frequent position changes      Safety    Performed Intervention(s)  Safety Rounds  Bed alarm/Chair alarm  Items within reach    Signed by: Petra Kingsley RN

## 2023-04-14 NOTE — PLAN OF CARE
Goal Outcome Evaluation:  Plan of Care Reviewed With: patient        Progress: improving  Outcome Evaluation: Pt is A&OX4, calm and cooperative. Assist X1 to wc.  at bedside. Meds whole with water or with pureed. Oral intake encouraged. Generalized weakness. Con/Incon of B&B. Purewich at HS. Skin flaky/red. Chest tube sites scabbed and DHIRAJ. Pt educated on use of call light for assistance.

## 2023-04-14 NOTE — THERAPY TREATMENT NOTE
Inpatient Rehabilitation - Occupational Therapy Treatment Note    [facility]     Patient Name: Louise GARCIA  : 1965  MRN: [id number]    Today's Date: 2023                 Admit Date: 3/17/2023         ICD-10-CM ICD-9-CM   1. Impaired functional mobility, balance, gait, and endurance  Z74.09 V49.89       Patient Active Problem List   Diagnosis   • Sepsis   • Neck pain, chronic   • Upper back pain   • Paresthesia   • Cervical myelopathy   • Lower extremity weakness   • History of blood clots   • Chronic anticoagulation   • Seizures   • Anemia   • GERD (gastroesophageal reflux disease)   • Guillain-[place] syndrome   • Situational mixed anxiety and depressive disorder   • Mass of middle lobe of right lung   • Oral candidiasis   • Empyema of pleura   • MRSA bacteremia   • Idiopathic peripheral neuropathy       Past Medical History:   Diagnosis Date   • Degenerative disc disease, cervical    • Gestational diabetes    • H/O blood clots    • Hematemesis    • Seizures    • Septic shock        Past Surgical History:   Procedure Laterality Date   • APPENDECTOMY     • BACK SURGERY     • CHOLECYSTECTOMY     • ENDOSCOPY N/A 2016    Procedure: ESOPHAGOGASTRODUODENOSCOPY with biopsy;  Surgeon: Austen Brito MD;  Location: [facility] ENDOSCOPY;  Service:    • HYSTERECTOMY     • NECK SURGERY       approx 6 yrs ago   • THORACOSCOPY Right 3/8/2023    Procedure: THORACOSCOPY WITH DAVINCI ROBOT WITH COMPLETE DECORTICATION;  Surgeon: Chloe Cedillo MD;  Location: [facility] OR;  Service: Robotics - DaVinci;  Laterality: Right;   • TONSILLECTOMY     • TONSILLECTOMY AND ADENOIDECTOMY               [facility] OT ASSESSMENT FLOWSHEET (last 12 hours)     IRF OT Evaluation and Treatment     Row Name 23 1202          OT Time and Intention    Document Type daily treatment  -AF     Mode of Treatment occupational therapy  -AF     Patient Effort good  -AF     Row Name 23 1207          General Information     Patient/Family/Caregiver Comments/Observations pt sitting up in w/c  -AF     General Observations of Patient patient and  completed dressing prior to OT, states that she did well even tied her shoes  -AF     Existing Precautions/Restrictions fall;other (see comments)  contact precautions  -AF     Row Name 04/14/23 1202          Pain Assessment    Pretreatment Pain Rating 0/10 - no pain  -AF     Posttreatment Pain Rating 0/10 - no pain  -AF     Row Name 04/14/23 1202          Cognition/Psychosocial    Affect/Mental Status (Cognition) WFL  -AF     Orientation Status (Cognition) oriented x 3  -AF     Follows Commands (Cognition) follows one-step commands;verbal cues/prompting required  -AF     Personal Safety Interventions fall prevention program maintained;gait belt;nonskid shoes/slippers when out of bed  -AF     Cognitive Function memory deficit  -AF     Comment, Cognitive Interventions pt required less time to complete visual percpeutal task than previous attempt improved focus to task in noisy enviroment. pt able to follow 1 step direction without cues but with 2-3 step required MOD vc;s  -AF     Row Name 04/14/23 1202          Transfer Assessment/Treatment    Comment, (Transfers) sit to stand at conter top MIN A  -AF     Row Name 04/14/23 1202          Motor Skills    Functional Endurance fair plus tolerated 5 mins standing without rest break  -AF     Row Name 04/14/23 1202          Shoulder (Therapeutic Exercise)    Shoulder Strengthening (Therapeutic Exercise) bilateral;external rotation;internal rotation;scapular stabilization;sitting;2 lb free weight;15 repititions;2 sets  -AF     Row Name 04/14/23 1202          Elbow/Forearm (Therapeutic Exercise)    Elbow/Forearm Strengthening (Therapeutic Exercise) bilateral;flexion;supination;extension;pronation;sitting;2 lb free weight;15 repititions;2 sets  -AF     Row Name 04/14/23 1202          Wrist (Therapeutic Exercise)    Wrist Strengthening (Therapeutic  Exercise) bilateral;flexion;extension;2 lb free weight;15 repititions;2 sets  -AF     Row Name 04/14/23 1202          Aerobic Exercise    Type (Aerobic Exercise) arm bike;for 2 minutes  x 2 sets  -AF     Comment, Aerobic Exercise (Therapeutic Exercise) seated w/c levle  -AF     Row Name 04/14/23 1202          Neuromuscular Re-education    Comment (Neuromuscular Re-education) FMC tasks seated and standing with MIN diffiuclty seocndary to neuropathy  -AF     Row Name 04/14/23 1202          Balance    Static Sitting Balance standby assist  -AF     Static Standing Balance contact guard  -AF     Comment, Balance pt able to stand for 5 mins prior to rest break increased her time by double  -AF     Row Name 04/14/23 1202          Positioning and Restraints    Pre-Treatment Position sitting in chair/recliner  -AF     Post Treatment Position wheelchair  -AF     In Wheelchair sitting;call light within reach;encouraged to call for assist;exit alarm on;with family/caregiver  in room with  present  -AF           User Key  (r) = Recorded By, (t) = Taken By, (c) = Cosigned By    Initials Name Effective Dates    AF Heaven Villareal, OTR 06/16/21 -                  Occupational Therapy Education     Title: PT OT SLP Therapies (Done)     Topic: Occupational Therapy (Done)     Point: ADL training (Done)     Description:   Instruct learner(s) on proper safety adaptation and remediation techniques during self care or transfers.   Instruct in proper use of assistive devices.              Learning Progress Summary           Patient Acceptance, E, VU,NR by AF at 4/12/2023 9464    Comment: pts  had pictures of bathroom on phone, looked and discussed BSC over commode and laterally having to step into the bathroom from the EOB. will continue to address. 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    Acceptance, E, VU by WN at 4/4/2023 8146    Acceptance, E, VU by WN at 4/4/2023 0322   Family  Acceptance, E, VU,NR by AF at 4/12/2023 1537    Comment: pts  had pictures of bathroom on phone, looked and discussed BSC over commode and laterally having to step into the bathroom from the EOB. will continue to address. 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    Acceptance, E, VU by WN at 4/4/2023 2329    Acceptance, E, VU by WN at 4/4/2023 0322                   Point: Home exercise program (Done)     Description:   Instruct learner(s) on appropriate technique for monitoring, assisting and/or progressing therapeutic exercises/activities.              Learning Progress Summary           Patient Acceptance, E, VU by WN at 4/4/2023 2329    Acceptance, E, VU by WN at 4/4/2023 0322   Family Acceptance, E, VU by WN at 4/4/2023 2329    Acceptance, E, VU by WN at 4/4/2023 0322                   Point: Precautions (Done)     Description:   Instruct learner(s) on prescribed precautions during self-care and functional transfers.              Learning Progress Summary           Patient Acceptance, E, VU by WN at 4/4/2023 2329    Acceptance, E, VU by WN at 4/4/2023 0322   Family Acceptance, E, VU by WN at 4/4/2023 2329    Acceptance, E, VU by WN at 4/4/2023 0322                   Point: Body mechanics (Done)     Description:   Instruct learner(s) on proper positioning and spine alignment during self-care, functional mobility activities and/or exercises.              Learning Progress Summary           Patient Acceptance, E, VU by WN at 4/4/2023 2329    Acceptance, E, VU by WN at 4/4/2023 0322   Family Acceptance, E, VU by WN at 4/4/2023 2329    Acceptance, E, VU by WN at 4/4/2023 0322                               User Key     Initials Effective Dates Name Provider Type Discipline    AF 06/16/21 -  Heaven Villareal OTR Occupational Therapist OT    WN 08/23/22 -  Jose Carrillo, RN Registered Nurse Nurse                    OT Recommendation and Plan                         Time  Calculation:      Time Calculation- OT     Row Name 04/14/23 1210             Time Calculation- OT    OT Start Time 1030  -AF      OT Stop Time 1130  -AF      OT Time Calculation (min) 60 min  -AF            User Key  (r) = Recorded By, (t) = Taken By, (c) = Cosigned By    Initials Name Provider Type    AF Heaven Villareal, OTR Occupational Therapist              Therapy Charges for Today     Code Description Service Date Service Provider Modifiers Qty    47401326163 HC OT SELF CARE/MGMT/TRAIN EA 15 MIN 4/13/2023 Heaven Villareal, OTR GO 2    08761635144 HC OT SELF CARE/MGMT/TRAIN EA 15 MIN 4/13/2023 Heaven Villareal, OTR GO 3    10995981995 HC OT NEUROMUSC RE EDUCATION EA 15 MIN 4/13/2023 Heaven Villareal, OTR GO 1    11495247454 HC OT THERAPEUTIC ACT EA 15 MIN 4/14/2023 Heaven Villareal, OTR GO 1    98546138806 HC OT NEUROMUSC RE EDUCATION EA 15 MIN 4/14/2023 Heaven Villareal, OTR GO 2    17916335797 HC OT THER PROC EA 15 MIN 4/14/2023 Heaven Villareal, OTR GO 1                   MARY ANN Gunn  4/14/2023

## 2023-04-14 NOTE — PLAN OF CARE
Goal Outcome Evaluation:       Pt is A/Ox4, calm/cooperative, not OOB overnight. Meds taken whole w thin liquids or puree one at a time. Pt c/o R rib pain ; prn Oxycodone given x1, prn Tylenol given x1. Purewick utilized overnight for urine collection at patient's request. O2 at 2L via nasal cannula worn overnight.

## 2023-04-15 PROCEDURE — 97116 GAIT TRAINING THERAPY: CPT

## 2023-04-15 PROCEDURE — 92507 TX SP LANG VOICE COMM INDIV: CPT

## 2023-04-15 PROCEDURE — 97110 THERAPEUTIC EXERCISES: CPT

## 2023-04-15 PROCEDURE — 97535 SELF CARE MNGMENT TRAINING: CPT

## 2023-04-15 PROCEDURE — 94799 UNLISTED PULMONARY SVC/PX: CPT

## 2023-04-15 RX ADMIN — OXYCODONE HYDROCHLORIDE 2.5 MG: 5 TABLET ORAL at 19:41

## 2023-04-15 RX ADMIN — IPRATROPIUM BROMIDE AND ALBUTEROL SULFATE 3 ML: 2.5; .5 SOLUTION RESPIRATORY (INHALATION) at 20:37

## 2023-04-15 RX ADMIN — GABAPENTIN 300 MG: 300 CAPSULE ORAL at 21:56

## 2023-04-15 RX ADMIN — IPRATROPIUM BROMIDE AND ALBUTEROL SULFATE 3 ML: 2.5; .5 SOLUTION RESPIRATORY (INHALATION) at 14:57

## 2023-04-15 RX ADMIN — DESVENLAFAXINE SUCCINATE 25 MG: 25 TABLET, EXTENDED RELEASE ORAL at 07:43

## 2023-04-15 RX ADMIN — APIXABAN 5 MG: 5 TABLET, FILM COATED ORAL at 21:56

## 2023-04-15 RX ADMIN — PANTOPRAZOLE SODIUM 40 MG: 40 TABLET, DELAYED RELEASE ORAL at 06:24

## 2023-04-15 RX ADMIN — Medication 500 MCG: at 07:44

## 2023-04-15 RX ADMIN — LAMOTRIGINE 200 MG: 100 TABLET ORAL at 07:44

## 2023-04-15 RX ADMIN — Medication 5 MG: at 21:55

## 2023-04-15 RX ADMIN — Medication 100 MG: at 07:43

## 2023-04-15 RX ADMIN — GABAPENTIN 300 MG: 300 CAPSULE ORAL at 06:24

## 2023-04-15 RX ADMIN — APIXABAN 5 MG: 5 TABLET, FILM COATED ORAL at 07:43

## 2023-04-15 RX ADMIN — OXYCODONE HYDROCHLORIDE 2.5 MG: 5 TABLET ORAL at 13:17

## 2023-04-15 RX ADMIN — Medication 1 MG: at 07:43

## 2023-04-15 RX ADMIN — IPRATROPIUM BROMIDE AND ALBUTEROL SULFATE 3 ML: 2.5; .5 SOLUTION RESPIRATORY (INHALATION) at 06:57

## 2023-04-15 RX ADMIN — GABAPENTIN 300 MG: 300 CAPSULE ORAL at 13:17

## 2023-04-15 NOTE — THERAPY TREATMENT NOTE
Inpatient Rehabilitation - Occupational Therapy Treatment Note    [facility]     Patient Name: Louise GARCIA  : 1965  MRN: [id number]    Today's Date: 4/15/2023                 Admit Date: 3/17/2023         ICD-10-CM ICD-9-CM   1. Impaired functional mobility, balance, gait, and endurance  Z74.09 V49.89       Patient Active Problem List   Diagnosis   • Sepsis   • Neck pain, chronic   • Upper back pain   • Paresthesia   • Cervical myelopathy   • Lower extremity weakness   • History of blood clots   • Chronic anticoagulation   • Seizures   • Anemia   • GERD (gastroesophageal reflux disease)   • Guillain-[place] syndrome   • Situational mixed anxiety and depressive disorder   • Mass of middle lobe of right lung   • Oral candidiasis   • Empyema of pleura   • MRSA bacteremia   • Idiopathic peripheral neuropathy       Past Medical History:   Diagnosis Date   • Degenerative disc disease, cervical    • Gestational diabetes    • H/O blood clots    • Hematemesis    • Seizures    • Septic shock        Past Surgical History:   Procedure Laterality Date   • APPENDECTOMY     • BACK SURGERY     • CHOLECYSTECTOMY     • ENDOSCOPY N/A 2016    Procedure: ESOPHAGOGASTRODUODENOSCOPY with biopsy;  Surgeon: Austen Brito MD;  Location: [facility] ENDOSCOPY;  Service:    • HYSTERECTOMY     • NECK SURGERY       approx 6 yrs ago   • THORACOSCOPY Right 3/8/2023    Procedure: THORACOSCOPY WITH DAVINCI ROBOT WITH COMPLETE DECORTICATION;  Surgeon: Chloe Cedillo MD;  Location: [facility] OR;  Service: Robotics - DaVinci;  Laterality: Right;   • TONSILLECTOMY     • TONSILLECTOMY AND ADENOIDECTOMY               IRF OT ASSESSMENT FLOWSHEET (last 12 hours)     IRF OT Evaluation and Treatment     Row Name 04/15/23 1152          OT Time and Intention    Document Type daily treatment  -DN     Mode of Treatment occupational therapy  -DN     Patient Effort good  -DN     Row Name 04/15/23 1152          Pain Assessment    Pretreatment  Pain Rating 0/10 - no pain  -DN     Posttreatment Pain Rating 0/10 - no pain  -DN     Row Name 04/15/23 1152          Cognition/Psychosocial    Affect/Mental Status (Cognition) WFL  -DN     Follows Commands (Cognition) follows one-step commands  -DN     Personal Safety Interventions fall prevention program maintained;gait belt;muscle strengthening facilitated  -DN     Row Name 04/15/23 1152          Bathing    [place] Level (Bathing) bathing skills;contact guard assist;verbal cues  -DN     Position (Bathing) supported sitting;supported standing;sink side  -DN     Set-up Assistance (Bathing) obtain supplies  -DN     Row Name 04/15/23 1152          Upper Body Dressing    [place] Level (Upper Body Dressing) upper body dressing skills;set up assistance  -DN     Position (Upper Body Dressing) supported sitting  -DN     Set-up Assistance (Upper Body Dressing) obtain clothing  -DN     Comment (Upper Body Dressing) w/c  -DN     Row Name 04/15/23 1152          Lower Body Dressing    [place] Level (Lower Body Dressing) doff;don;shoes/slippers;socks  -DN     Position (Lower Body Dressing) supported sitting;supported standing  -DN     Set-up Assistance (Lower Body Dressing) obtain clothing  -DN     Comment (Lower Body Dressing) pt just changed pants and brief before OT arrived  -DN     Row Name 04/15/23 1152          Grooming    [place] Level (Grooming) grooming skills;set up  -DN     Position (Grooming) supported sitting;sink side  -DN     Set-up Assistance (Grooming) obtain supplies  -DN     Comment (Grooming) w/c  -DN     Row Name 04/15/23 1152          Toileting    Comment (Toileting) pt did not need to use commode just finished with nsg  -DN     Row Name 04/15/23 1152          Motor Skills    Coordination fine motor deficit  pt completed FMC with SBA and some extra time to complete, in hand manipulations with coins, irregular peg placements in base, and theraputty exercises for , lateral and 3 point  pinch strengthening.  Pt issued yellow and red theraputty  -DN     Functional Endurance fair + with adls  -DN           User Key  (r) = Recorded By, (t) = Taken By, (c) = Cosigned By    Initials Name Effective Dates    Shin Dallas, WILI 06/16/21 -                  Occupational Therapy Education     Title: PT OT SLP Therapies (Done)     Topic: Occupational Therapy (Done)     Point: ADL training (Done)     Description:   Instruct learner(s) on proper safety adaptation and remediation techniques during self care or transfers.   Instruct in proper use of assistive devices.              Learning Progress Summary           Patient Acceptance, E, VU,NR by AF at 4/12/2023 1537    Comment: pts  had pictures of bathroom on phone, looked and discussed BSC over commode and laterally having to step into the bathroom from the EOB. will continue to address. 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    Acceptance, E, VU by WN at 4/4/2023 2329    Acceptance, E, VU by WN at 4/4/2023 0322   Family Acceptance, E, VU,NR by AF at 4/12/2023 1537    Comment: pts  had pictures of bathroom on phone, looked and discussed BSC over commode and laterally having to step into the bathroom from the EOB. will continue to address. 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    Acceptance, E, VU by WN at 4/4/2023 2329    Acceptance, E, VU by WN at 4/4/2023 0322                   Point: Home exercise program (Done)     Description:   Instruct learner(s) on appropriate technique for monitoring, assisting and/or progressing therapeutic exercises/activities.              Learning Progress Summary           Patient Acceptance, E, VU by WN at 4/4/2023 2329    Acceptance, E, VU by WN at 4/4/2023 0322   Family Acceptance, E, VU by WN at 4/4/2023 2329    Acceptance, E, VU by WN at 4/4/2023 0322                   Point: Precautions (Done)     Description:   Instruct learner(s) on  prescribed precautions during self-care and functional transfers.              Learning Progress Summary           Patient Acceptance, E, VU by WN at 4/4/2023 2329    Acceptance, E, VU by WN at 4/4/2023 0322   Family Acceptance, E, VU by WN at 4/4/2023 2329    Acceptance, E, VU by WN at 4/4/2023 0322                   Point: Body mechanics (Done)     Description:   Instruct learner(s) on proper positioning and spine alignment during self-care, functional mobility activities and/or exercises.              Learning Progress Summary           Patient Acceptance, E, VU by WN at 4/4/2023 2329    Acceptance, E, VU by WN at 4/4/2023 0322   Family Acceptance, E, VU by WN at 4/4/2023 2329    Acceptance, E, VU by WN at 4/4/2023 0322                               User Key     Initials Effective Dates Name Provider Type Discipline    AF 06/16/21 -  Heaven Villareal OTR Occupational Therapist OT    WN 08/23/22 -  Jose Carrillo RN Registered Nurse Nurse                    OT Recommendation and Plan                         Time Calculation:      Time Calculation- OT     Row Name 04/15/23 0800             Time Calculation- OT    OT Start Time 0800  -DN      OT Stop Time 0900  -DN      OT Time Calculation (min) 60 min  -DN            User Key  (r) = Recorded By, (t) = Taken By, (c) = Cosigned By    Initials Name Provider Type    Shin Dallas OT Occupational Therapist              Therapy Charges for Today     Code Description Service Date Service Provider Modifiers Qty    89909014177 HC OT SELF CARE/MGMT/TRAIN EA 15 MIN 4/15/2023 Shin Parada OT GO 2    62090152808 HC OT THER PROC EA 15 MIN 4/15/2023 Shin Parada OT GO 2                   Shin Parada OT  4/15/2023

## 2023-04-15 NOTE — THERAPY TREATMENT NOTE
Inpatient Rehabilitation - Physical Therapy Treatment Note       [facility]     Patient Name: Louise GARCIA  : 1965  MRN: [id number]    Today's Date: 4/15/2023                    Admit Date: 3/17/2023      Visit Dx:     ICD-10-CM ICD-9-CM   1. Impaired functional mobility, balance, gait, and endurance  Z74.09 V49.89       Patient Active Problem List   Diagnosis   • Sepsis   • Neck pain, chronic   • Upper back pain   • Paresthesia   • Cervical myelopathy   • Lower extremity weakness   • History of blood clots   • Chronic anticoagulation   • Seizures   • Anemia   • GERD (gastroesophageal reflux disease)   • Guillain-[place] syndrome   • Situational mixed anxiety and depressive disorder   • Mass of middle lobe of right lung   • Oral candidiasis   • Empyema of pleura   • MRSA bacteremia   • Idiopathic peripheral neuropathy       Past Medical History:   Diagnosis Date   • Degenerative disc disease, cervical    • Gestational diabetes    • H/O blood clots    • Hematemesis    • Seizures    • Septic shock        Past Surgical History:   Procedure Laterality Date   • APPENDECTOMY     • BACK SURGERY     • CHOLECYSTECTOMY     • ENDOSCOPY N/A 2016    Procedure: ESOPHAGOGASTRODUODENOSCOPY with biopsy;  Surgeon: Austen Brito MD;  Location: [facility] ENDOSCOPY;  Service:    • HYSTERECTOMY     • NECK SURGERY       approx 6 yrs ago   • THORACOSCOPY Right 3/8/2023    Procedure: THORACOSCOPY WITH DAVINCI ROBOT WITH COMPLETE DECORTICATION;  Surgeon: Chloe Cedillo MD;  Location: [facility] OR;  Service: Robotics - DaVinci;  Laterality: Right;   • TONSILLECTOMY     • TONSILLECTOMY AND ADENOIDECTOMY         PT ASSESSMENT (last 12 hours)     IRF PT Evaluation and Treatment     Row Name 04/15/23 0800          PT Time and Intention    Document Type daily treatment  -AE     Mode of Treatment physical therapy  -AE     Patient/Family/Caregiver Comments/Observations pt having good day,  stayed at home today  -AE      Row Name 04/15/23 0800          General Information    Patient Profile Reviewed yes  -AE     Existing Precautions/Restrictions fall;other (see comments)  contact precautions  -AE     Limitations/Impairments safety/cognitive  -AE     Row Name 04/15/23 0800          Pain Assessment    Pretreatment Pain Rating 0/10 - no pain  -AE     Posttreatment Pain Rating 0/10 - no pain  -AE     Pain Location - Side/Orientation Left  -AE     Pain Location lower  -AE     Pain Location - extremity  -AE     Row Name 04/15/23 0800          Cognition/Psychosocial    Affect/Mental Status (Cognition) anxious;other (see comments)  forgetful  -AE     Orientation Status (Cognition) oriented x 3  -AE     Follows Commands (Cognition) follows one-step commands;verbal cues/prompting required  -AE     Personal Safety Interventions fall prevention program maintained;gait belt;muscle strengthening facilitated;nonskid shoes/slippers when out of bed;supervised activity  -AE     Cognitive Function memory deficit  -AE     Memory Deficit (Cognition) episodic memory;procedural memory  -AE     Safety Deficit (Cognition) insight into deficits/self-awareness;judgment;problem-solving  -AE     Comment, Cognition forgetful  -AE     Row Name 04/15/23 0800          Bed Mobility    Supine-Sit [place] (Bed Mobility) standby assist  -AE     Row Name 04/15/23 0800          Bed-Chair Transfer    Bed-Chair [place] (Transfers) minimum assist (75% patient effort)  -AE     Assistive Device (Bed-Chair Transfers) wheelchair  -AE     Row Name 04/15/23 0800          Chair-Bed Transfer    Chair-Bed [place] (Transfers) minimum assist (75% patient effort);1 person assist  -AE     Assistive Device (Chair-Bed Transfers) wheelchair;walker, front-wheeled  -AE     Row Name 04/15/23 0800          Sit-Stand Transfer    Sit-Stand [place] (Transfers) contact guard;minimum assist (75% patient effort)  -AE     Assistive Device (Sit-Stand Transfers)  "wheelchair;walker, front-wheeled  -AE     Row Name 04/15/23 0800          Stand-Sit Transfer    Stand-Sit [place] (Transfers) contact guard;minimum assist (75% patient effort)  -AE     Assistive Device (Stand-Sit Transfers) wheelchair  -AE     Row Name 04/15/23 0800          Gait/Stairs (Locomotion)    [place] Level (Gait) minimum assist (75% patient effort);1 person assist;contact guard  -AE     Assistive Device (Gait) walker, front-wheeled  -AE     Distance in Feet (Gait) 50ft weaving around cones, 8ft fwd/bwd in // bars only holding onto therapist hands in front, 8ft x 2 in // bars with SBQC  -AE     Pattern (Gait) step-through  -AE     Deviations/Abnormal Patterns (Gait) base of support, wide;left sided deviations;gait speed decreased;stride length decreased  -AE     Bilateral Gait Deviations heel strike decreased;knee buckling bilaterally  -AE     Left Sided Gait Deviations heel strike decreased  -AE     Right Sided Gait Deviations heel strike decreased  -AE     Gait Assessment/Intervention trialed \"furniture surfing\" while side stepping in // bars without grabbing/pulling to mimic home environment. peformed with CGA. ambulated fwd/bwd in // bars while holding onto therapist hands while therapist walked backwards with close wc follow and CGA. ambulated with SBQC in // bars with cane in RUE with CGA  pt lives in  and walker will not be accessible in bathroom or bedroom. currently only one rail on L side. continue to progress to SBQC for short distances only.  -AE     [place] Level (Stairs) minimum assist (75% patient effort);moderate assist (50% patient effort);1 person assist;1 person to manage equipment  -AE     Handrail Location (Stairs) both sides  -AE     Number of Steps (Stairs) 7in step x 3  -AE     Ascending Technique (Stairs) step-to-step  -AE     Descending Technique (Stairs) step-to-step  -AE     Stairs Assessment/Intervention performed at end of session, pt fatigue with buckling " throughout. required min/modA. 2 person for safety  -AE     Row Name 04/15/23 0800          Balance    Comment, Balance walking in // bars with single UE support, working on furniture surfing. modified SLS and tandem stance in  -AE     Row Name 04/15/23 0800          Hip (Therapeutic Exercise)    Hip Isometrics (Therapeutic Exercise) gluteal sets;10 repetitions  -AE     Hip Strengthening (Therapeutic Exercise) bilateral;marching while seated;aBduction;red;resistance band;15 repititions  -AE     Row Name 04/15/23 0800          Knee (Therapeutic Exercise)    Knee Strengthening (Therapeutic Exercise) sitting;LAQ (long arc quad);hamstring curls;red;resistance tubing;15 repititions;SLR (straight leg raise);10 repetitions  -AE     Row Name 04/15/23 0800          Ankle (Therapeutic Exercise)    Ankle Strengthening (Therapeutic Exercise) bilateral;dorsiflexion;plantarflexion;15 repititions  -AE     Row Name 04/15/23 0800          Core Strength (Therapeutic Exercise)    Core Strength (Therapeutic Exercise) bridging, bilateral lower extremities;10 repetitions  -AE     Row Name 04/15/23 0800          Positioning and Restraints    Pre-Treatment Position sitting in chair/recliner  -AE     Post Treatment Position wheelchair  -AE     In Wheelchair sitting;call light within reach;encouraged to call for assist;exit alarm on  -AE           User Key  (r) = Recorded By, (t) = Taken By, (c) = Cosigned By    Initials Name Provider Type    AE Deanna Carr, PT Physical Therapist              Wound 03/08/23 1917 Right lateral chest Incision (Active)   Dressing Appearance open to air 04/15/23 0741   Closure Approximated 04/15/23 0741   Base clean;dry;scab 04/15/23 0741   Drainage Amount none 04/15/23 0741   Care, Wound cleansed with;soap and water 04/15/23 0741   Dressing Care open to air 04/15/23 0741       Wound 03/11/23 1530 Other (See comments) other (see comments) pubis Abrasion (Active)   Dressing Appearance open to air 04/15/23  0741   Closure Open to air 04/15/23 0741   Base clean;dry;scab 04/15/23 0741   Drainage Amount none 04/15/23 0741   Dressing Care open to air 04/15/23 0741       Wound 03/20/23 1527 Bilateral upper gluteal MASD (Moisture associated skin damage) (Active)   Dressing Appearance open to air 04/15/23 0741   Closure Open to air 04/15/23 0741   Base clean;dry;blanchable 04/15/23 0741   Drainage Amount none 04/15/23 0741   Care, Wound cleansed with;soap and water;barrier applied 04/15/23 0741   Dressing Care open to air 04/15/23 0741   Periwound Care barrier ointment applied 04/15/23 0741     Physical Therapy Education     Title: PT OT SLP Therapies (Done)     Topic: Physical Therapy (Done)     Point: Mobility training (Done)     Learning Progress Summary           Patient Acceptance, E, VU,NR by POWER at 4/12/2023 1341    Acceptance, E, VU,DU,NR by ETHAN at 4/8/2023 1528    Acceptance, E, VU by WN at 4/4/2023 2329    Acceptance, E, VU by WN at 4/4/2023 0322    Acceptance, E,D, VU,DU by DP at 4/1/2023 1352    Acceptance, E, VU,DU by MG at 3/31/2023 0825    Acceptance, E, VU,DU by MG at 3/30/2023 0956    Acceptance, E,D, VU,DU by DP at 3/29/2023 1148    Nonacceptance, E,D, VU,DU by DP at 3/28/2023 1144    Acceptance, E,D, VU,DU by DP at 3/27/2023 1601    Acceptance, E,D, VU,NR by EE at 3/25/2023 1423    Acceptance, E,D, NR,VU,DU by DP at 3/21/2023 1448    Acceptance, E, NR by JK at 3/20/2023 1513    Acceptance, E,TB, VU,NR by KP at 3/18/2023 1643   Family Acceptance, E, VU by WN at 4/4/2023 2329    Acceptance, E, VU by WN at 4/4/2023 0322                   Point: Home exercise program (Done)     Learning Progress Summary           Patient Acceptance, E, VU,NR by EE at 4/12/2023 1341    Acceptance, E, VU by WN at 4/4/2023 2329    Acceptance, E, VU by WN at 4/4/2023 0322    Acceptance, E,D, VU,DU by DP at 4/1/2023 1352    Acceptance, E, VU,DU by MG at 3/31/2023 0825    Acceptance, E, VU,DU by MG at 3/30/2023 0956    Acceptance, E,D,  VU,DU by DP at 3/29/2023 1148    Nonacceptance, E,D, VU,DU by DP at 3/28/2023 1144    Acceptance, E,D, VU,DU by DP at 3/27/2023 1601    Acceptance, E,D, NR,VU,DU by DP at 3/21/2023 1448    Acceptance, E, NR by JK at 3/20/2023 1513   Family Acceptance, E, VU by WN at 4/4/2023 2329    Acceptance, E, VU by WN at 4/4/2023 0322                   Point: Body mechanics (Done)     Learning Progress Summary           Patient Acceptance, E, VU,DU,NR by JK at 4/8/2023 1528    Acceptance, E, VU by WN at 4/4/2023 2329    Acceptance, E, VU by WN at 4/4/2023 0322    Acceptance, E,D, VU,DU by DP at 4/1/2023 1352    Acceptance, E, VU,DU by MG at 3/31/2023 0825    Acceptance, E, VU,DU by MG at 3/30/2023 0956    Acceptance, E,D, VU,DU by DP at 3/29/2023 1148    Nonacceptance, E,D, VU,DU by DP at 3/28/2023 1144    Acceptance, E,D, VU,DU by DP at 3/27/2023 1601    Acceptance, E,D, VU,NR by EE at 3/25/2023 1423    Acceptance, E,D, NR,VU,DU by DP at 3/21/2023 1448   Family Acceptance, E, VU by WN at 4/4/2023 2329    Acceptance, E, VU by WN at 4/4/2023 0322                   Point: Precautions (Done)     Learning Progress Summary           Patient Acceptance, E, VU by WN at 4/4/2023 2329    Acceptance, E, VU by WN at 4/4/2023 0322    Acceptance, E,D, VU,DU by DP at 4/1/2023 1352    Acceptance, E, VU,DU by MG at 3/31/2023 0825    Acceptance, E, VU,DU by MG at 3/30/2023 0956    Acceptance, E,D, VU,DU by DP at 3/29/2023 1148    Nonacceptance, E,D, VU,DU by DP at 3/28/2023 1144    Acceptance, E,D, VU,DU by DP at 3/27/2023 1601    Acceptance, E,D, VU,NR by EE at 3/25/2023 1423    Acceptance, E,D, NR,VU,DU by DP at 3/21/2023 1448   Family Acceptance, E, VU by WN at 4/4/2023 2329    Acceptance, E, VU by WN at 4/4/2023 0322                               User Key     Initials Effective Dates Name Provider Type Discipline     06/16/21 -  Melinda Mann, PT Physical Therapist PT    JK 06/16/21 -  Juana Veloz, PT Physical Therapist PT    KP  06/16/21 -  Yolanda Shannon, PT Physical Therapist PT    MG 05/24/22 -  Yu Villalobos, PT Physical Therapist PT    WN 08/23/22 -  Jose Carrillo, RN Registered Nurse Nurse    DP 08/24/21 -  Papa Marsh, PT Physical Therapist PT                PT Recommendation and Plan                          Time Calculation:      PT Charges     Row Name 04/15/23 1215             Time Calculation    Start Time 0900  -AE      Stop Time 1000  -AE      Time Calculation (min) 60 min  -AE      PT Received On 04/15/23  -AE      PT - Next Appointment 04/16/23  -AE         Time Calculation- PT    Total Timed Code Minutes- PT 60 minute(s)  -AE            User Key  (r) = Recorded By, (t) = Taken By, (c) = Cosigned By    Initials Name Provider Type    AE Deanna Carr, PT Physical Therapist                Therapy Charges for Today     Code Description Service Date Service Provider Modifiers Qty    11557911306 HC GAIT TRAINING EA 15 MIN 4/14/2023 Deanna Carr, PT GP 1    98813739245 HC PT THERAPEUTIC ACT EA 15 MIN 4/14/2023 Deanna Carr, PT GP 1    15679662061 HC GAIT TRAINING EA 15 MIN 4/14/2023 Deanna Carr, PT GP 1    52407451846 HC PT THER PROC EA 15 MIN 4/14/2023 Deanna Carr, PT GP 1    67920752289 HC GAIT TRAINING EA 15 MIN 4/15/2023 Deanna Carr, PT GP 2    97692615574 HC PT THER PROC EA 15 MIN 4/15/2023 Deanna Carr, PT GP 2                   Deanna Carr PT  4/15/2023

## 2023-04-15 NOTE — PLAN OF CARE
Goal Outcome Evaluation:      Pt is A/Ox4, calm/cooperative, not OOB overnight. Meds taken whole w thin liquids one at a time. Pt c/o R rib pain ; prn Oxycodone given x1, prn Tylenol given x1. Purewick utilized overnight for urine collection at patient's request. O2 at 2L via nasal cannula worn overnight.

## 2023-04-15 NOTE — PROGRESS NOTES
LOS: 29 days   Patient Care Team:  Chloe Schaefer APRN as PCP - General (Family Medicine)  Mikhail Glez MD as Consulting Physician (Neurology)      Patient Name: ELEONORA GARCIA  Patient : 1965    ADMITTING DIAGNOSIS:  Diagnoses    1. IMPAIRED FUNCTIONAL MOBILITY, BALANCE, GAIT, AND ENDURANCE           SUBJECTIVE:    Tolerates therapies  Strength unchanged  Overall breathing about the same.    REVIEW OF SYSTEMS:           OBJECTIVE:    Vitals:    04/15/23 1400   BP:    Pulse: 90   Resp: 18   Temp:    SpO2: 97%       PHYSICAL EXAM:   General: pleasant, in no acute distress  HEENT: NCAT, sclera anicteric, conjunctiva pink, mucoa moist  Cardiovascular: RRR, +S1+S2, no obvious m/g/r  Lungs: Decreased breath sounds on the right side-no change  Abdomen: normoactive bowel sounds, soft, tender to palpation in the epigastrum, no guarding or rebound tenderness  Extremities: no peripheral edema  Skin: exposed surfaces of skin warm, intact, without erythema  Neuro: awake alert and oriented to person, place, time, and situation, CN II-XII grossly intact  MSK: Good strength with bilateral shoulder abduction, elbow flexion, elbow extension, wrist extension, finger flexion.  Has weakness with hand intrinsics 4/5.  Bilateral hip flexion 4/5, right knee extension 4/5, left knee extension 3/5, bilateral ankle dorsiflexion 4/5        MEDICATIONS  Scheduled Meds:  apixaban, 5 mg, Oral, Q12H  desvenlafaxine, 25 mg, Oral, Daily  folic acid, 1 mg, Oral, Daily  gabapentin, 300 mg, Oral, Q8H  ipratropium-albuterol, 3 mL, Nebulization, 4x Daily - RT  lamoTRIgine, 200 mg, Oral, Daily  melatonin, 5 mg, Oral, Nightly  pantoprazole, 40 mg, Oral, Q AM  thiamine, 100 mg, Oral, Daily  vitamin B-12, 500 mcg, Oral, Daily        Continuous Infusions:       PRN Meds:  •  acetaminophen  •  bisacodyl  •  diphenhydrAMINE  •  famotidine  •  hydrocortisone sodium succinate  •  hydrocortisone-bacitracin-zinc oxide-nystatin  •  ondansetron  ODT  •  oxyCODONE  •  polyethylene glycol  •  senna-docusate sodium  •  simethicone      RESULTS:  No results found for: POCGLU  Results from last 7 days   Lab Units 04/14/23  0753 04/10/23  0603   WBC 10*3/mm3 8.79 8.74   HEMOGLOBIN g/dL 7.6* 7.7*   HEMATOCRIT % 23.3* 23.7*   PLATELETS 10*3/mm3 355 273     Results from last 7 days   Lab Units 04/14/23  0753 04/10/23  0603   SODIUM mmol/L 137 132*   POTASSIUM mmol/L 3.8 4.1   CHLORIDE mmol/L 98 94*   CO2 mmol/L 32.2* 31.2*   BUN mg/dL 17 17   CREATININE mg/dL 1.17* 1.12*   CALCIUM mg/dL 11.3* 10.0   GLUCOSE mg/dL 123* 97           ASSESSMENT and PLAN:    Idiopathic peripheral neuropathy    Paresthesia    History of blood clots    Chronic anticoagulation    Seizures    Anemia    Guillain-[place] syndrome    MRSA bacteremia    Subacute motor predominant idiopathic peripheral neuropathy with Paraparesis and Areflexia.   S/p IVIG x 5 days  March 30-shows improvement with her strength proximally the upper extremities.  Improvement with her hand strength.  Improved strength in the right lower extremity but continues with profound weakness in the left lower extremity.  With weakness of the left ankle, will look at probable AFO for gait activities.  Transfers are moderate assist.  Ambulated with a rolling walker up to 70 feet with gait deviations minimal moderate assist  -4/6-reconsulted neurology to see if she needs another round of IVIG.  Follow-up assessment with neurology-Previous IVIG dose was 20g daily for 5 days from 2/27-3/3.  Plan is to repeat another course of IVIG  - 4/10- fever this morning likely a side effect of IVIG.  She is scheduled to receive her final dose of this course today.  Will obtain CXR to rule out pneumonia.       Impaired mobility/impaired self care/ impaired cognition  Left greater than right lower extremity weakness greater than bilateral upper extremity weakness  April 4-strength improved in the bilateral upper extremities and right lower  extremity.Weakness  looks about the same the left lower extremity.  On the day she ambulated further last week she had utilized AFO on the left lower extremity  April 5-stronger with her left hip flexors and knee extensors today  April 6- strength and coordination improving.  Per OT note she is needing set up assist for upper body dressing with max assist for lower body dressing, PT is noticing that the patient appears stronger and is limited by weakness and fear.  Moderate assist between chair and bed but min assist for sit to stand.  Very minimal knee buckling when walking with left AFO.  Making good progress overall.     R lung masslike infiltrate with cavitary lesions/pleural effusion   S/p thoracentesis - Blood cx and pleural fluid  positive for MRSA      Chest tube placement given empyema, and she underwent thoracoscopy w/ decortication 3/8/23  -expected postoperative changes with pleural thickening and minimal pleural effusion.  The effusion is too small for intervention and will likely to continue to resolve with time.  Recommend continue good pulmonary hygiene with incentive spirometry hourly as well as increase activity/ambulation as tolerated.  March 23-appears decreased breath sounds more noticeable today on the right compared to the left.  Check follow-up chest x-ray.  On room air during the day, 1 L at night.  March 24- CXR relatively unchanged from previous, reached out to CT surgery given planned outpatient f/u on 3/28, no further imaging planned at this time as patient afebrile with normal WBC, monitor     Right-sided Chest Pain 3/24  -EKG sinus tach  -HS Troponin 25 > 24  -consider Cardiology consult if worsening  -pain reproducible with palpation, pain likely postoperative neuralgia as indicated vs. possible costochondritis, continue gabapentin, ice/heat, monitor  -4/10- patient continues to complain of right-sided rib pain.  We will touch base with thoracic surgery about nerve block for  postoperative neuralgia.  - 4/12- thoracic surgery may refer for outpatient nerve block.  We will continue to monitor and consider going up on gabapentin.    Nutrition-  April 13-Patient has had decreased p.o. intake with weight loss.  Discussed option of supplemental tube feeds with the dietitian.     Hypokalemia  -3/24 K 3.3, repleted  -3/29 K 3.7, resolved     Mass on TTE with findings concerning for endocarditis - underwent MARIAMA 3/10/2023 which had no vegetation  RUE superficial thrombophlebitis concerning for septic phlebitis.     ID - continue 4 weeks of vancomycin as recommended by infectious disease dosed by pharmacy to achieve a trough level of 15-20 or area under the curve of 400-600 from last negative blood culture or last intervention which ever is the latest - to complete April 1, 2023     Left hip arthrocentesis March 16 to rule out any hip septic arthritis - no growth to date on fluid.    Lower back pain - MRI of spine to rule out discitis or osteomyelitis.  This did have known degenerative changes but  no infection.      DVT prophylaxis - SCDs / apixiban. History of LLE DVT - on Eliquis at home     Pain management   - Tylenol 1,000 mg three times a day/ Celebrex 100 mg q 112 hours/ gabapentin 300 mg q 8 hours Oxycodone 5 mg q 4 hours prn  March 18 - DC Celebrex 2/2 PHYLLIS, and anemia      Anxiety/ muscle spasms - Diazepam 2 mg q 6 hours prn - [facility] reviewed     Seizure disorder - Lamotrigine 200 mg daily     ABLA   - March 18- Hgb 7.0 (7.3 on 3/16). Type, cross and transfuse 1 unit PRBCs. Pre-medicate with Tylenol and Benadryl. CBC in am.   March 19- Hgb 8.8 s/p 1 unit PRBcs. Hemoccult positive. On Eliquis for h/o DVT.  Per IM -[likely 2/2 anemia of chronic disease; no signs of active bleeding  -s/p 1unit PRBCs w/ appropriate rise in Hb  -iron studies c/w ACD  -would not stop Eliquis at this time unless active bleeding noted or Hb drop continues]  March 20 - HGB 9.3 s/p transfusion  March 21-reviewed  with internal medicine-hemoglobin stable after transfusion.  Iron studies consistent with inflammation.  No further work-up presently  March 22 - Hgb 8.9, stable  March 23-hemoglobin trend 8.3.  Continue to follow.  Recheck tomorrow  March 29 - Hgb 8.1, stable  April 6- hemoglobin 8.3.  Stable.  April 10- hemoglobin slightly down trended to 7.7.  Monitor.  April 14 - Hgb 7.6, felt Anemia of chronic disease     PHYLLIS   March 18- Creatinine 1.36 up from 1.12. On IV Vanc and Celebrex. DC Celebrex. BMP in am.  March 19- s/p 500ml NS bolus. Creatinine 1.31.  March 23-creatinine 0.92  April 10- creatinine downtrending to 1.12 with increased fluids.    Nausea  -4/12-nausea with vomiting this morning.  Declined to participate in speech therapy due to not feeling well.  Zofran 4 mg every 6 hours as needed for nausea and vomiting added and administered.  No signs of systemic inflammation or infection.  Labs will be drawn tomorrow.     TEAM CONF - MARCH 21 - BED MOD X 2. TRANSFERS MAX 2. STAND PIVOT OR SQUAT PIVOT. NON-AMBULATORY. Saturday MARCH 18 GAIT 6 FEET PARALLEL BARS MOD MAX OF 2.   BATH MOD 1-2. LBD DEP. UBD MOD. EATING MIN. GROOMING MIN. TOILETING DEP.   The patient has difficulty remembering new information due to short-term memory impairments.  Initial evaluation 3.18, pt obtained a score 12/30 on SLUMS cognitive assessment indicating severe cognitive impairment/dementia, goals initiated for memory and sustaining attention  FEES completed 3.7 revealing glottic gap accounting for glottic insufficiency, dysphonia, hoarse/breathy quality, recommend targeting vocal function as appropriate d/t impact on intelligibility.    Regular/thin diet continued since FEES completion 3.7, although pt known to cough with and without PO intake, may be contributed partially to ineffective VF closure.   DRESSING FORMER CHEST TUBE SITE. CONTINUES ON PUREWICK AT NIGHT. O2 AT 1-2 LITERS AT NIGHT.  DECREASED APPETITE. ABD X-RAY THIS AM SHOWS  NON-OBSTRUCTIVE BOWEL GAS PATTERN.   ELOS - 4 WEEKS     TEAM CONF - MARCH 28 - BED MIN CTG. TRANSFER MOD ASSIST STAND PIVOT OR SQUAT PIVOT. FATIGUES IN AFERNOONS. GAIT 15 FEET RW MIN X 2. FLUCTUATING ORIENTATION. BETTER DETAIL TO TASK. MIN MOD CUES FOR REASONING TASKS. DYSPHONIA FROM COUGHING.  SLP REVIEWED VOICE HYGIENE, NOT TO DO HARSH THROAT CLEAR. BATH MOD. LBD MAX. UBD MIN. TOILETING MOD X 2. CONTINENT/INCONTINENT BLADDER. RIGHT CHEST TUBE / THORACOSCOPY SITE CARE. VARIABLE INTAKE.   ELOS - 3 WEEKS     TEAM CONF - APRIL 4 - CONTINUES WEAK IN LLE. STRONGER IN BUE AND RIGHT KNEE EXTENSION. BED MIN MOD TO CTG. TRANSFERS MOD MAX STAND PIVOT OR SQUAT PIVOT. WORKED ON GAIT IN PARALLEL BARS MIN MOD OF 2 PERSON. CAN DO 15 FEET IF NOT SO ANXIOUS. TOILET AND SHOWER TRANSFERS MOD ASSIST. DROP ARM BEDSIDE COMMODE. AT NIGHT USING BEDPAN. USING PUREWICK AT NIGHT. BATH MIN. LBD MOD. UBD SET UP.TOILETING MOD MAX 2.    Pt with improved ability to recall salient events  from day.Now presents with mild-moderate hoarse vocal  quality. Improved vocal intensity.  ADDRSSING DYSPHONIA. DIETICIAN REVIEWED FOOD CHOICES. GABAPENTIN FOR RIGHT RIB PAIN/INTERCOSTAL PAIN.CONTINENT BOWEL AND BLADDER WITH URGENCY.   ELOS - 2-3 WEEKS     Team Conference - 4/11/2023  Nursing: overnight using purewick,   PT: mod assist for transfers due to weight shifting, bed mobility w/wo rails standby/contact guard, shallow four inch step with rails 1-2 person assist, walking 60' with walker, trial AFO and ace wrap assist doesn't seem to help a lot now that she no longer has a hard collapse of her ankle/knee, anticipate she will walk safely with walker in their RV home but need a wheelchair for community ambulation  OT: toilet transfers mod assist with one, clothing management max-mod  SLP: mild-mod cognitive impairment, struggles with oxycodone, max cues for verbal working memory in the morning but min in the afternoon, min-mod cueing, improvements with  carryover  Psychology: trouble with thought organization and concentration, trouble with working memory, uncertain of self, lacking confidence with some anxiety completing tasks  Social Work: recommending home health  Discharge Date: 1.5 weeks    CODE STATUS:  Level Of Support Discussed With: Patient  Code Status (Patient has no pulse and is not breathing): CPR (Attempt to Resuscitate)  Medical Interventions (Patient has pulse or is breathing): Full Support      Admission Status: Continues to meet requirements for inpatient admission.       Ryley Rider MD    During rounds, used appropriate personal protective equipment including mask and gloves.  Additional gown if indicated.  Mask used was standard procedure mask. Appropriate PPE was worn during the entire visit.  Hand hygiene was completed before and after.

## 2023-04-15 NOTE — THERAPY TREATMENT NOTE
Inpatient Rehabilitation - Speech Language Pathology Treatment Note  [facility]     Patient Name: Louise GARCIA  : 1965  MRN: [id number]  Today's Date: 4/15/2023               Admit Date: 3/17/2023     Visit Dx:    ICD-10-CM ICD-9-CM   1. Impaired functional mobility, balance, gait, and endurance  Z74.09 V49.89     Patient Active Problem List   Diagnosis   • Sepsis   • Neck pain, chronic   • Upper back pain   • Paresthesia   • Cervical myelopathy   • Lower extremity weakness   • History of blood clots   • Chronic anticoagulation   • Seizures   • Anemia   • GERD (gastroesophageal reflux disease)   • Guillain-[place] syndrome   • Situational mixed anxiety and depressive disorder   • Mass of middle lobe of right lung   • Oral candidiasis   • Empyema of pleura   • MRSA bacteremia   • Idiopathic peripheral neuropathy     Past Medical History:   Diagnosis Date   • Degenerative disc disease, cervical    • Gestational diabetes    • H/O blood clots    • Hematemesis    • Seizures    • Septic shock      Past Surgical History:   Procedure Laterality Date   • APPENDECTOMY     • BACK SURGERY     • CHOLECYSTECTOMY     • ENDOSCOPY N/A 2016    Procedure: ESOPHAGOGASTRODUODENOSCOPY with biopsy;  Surgeon: Austen Brito MD;  Location: [facility] ENDOSCOPY;  Service:    • HYSTERECTOMY     • NECK SURGERY       approx 6 yrs ago   • THORACOSCOPY Right 3/8/2023    Procedure: THORACOSCOPY WITH DAVINCI ROBOT WITH COMPLETE DECORTICATION;  Surgeon: Chloe Cedillo MD;  Location: [facility] OR;  Service: Robotics - DaVinci;  Laterality: Right;   • TONSILLECTOMY     • TONSILLECTOMY AND ADENOIDECTOMY         SLP Recommendation and Plan                                                            SLP EVALUATION (last 72 hours)     SLP SLC Evaluation     Row Name 04/15/23 1230 04/15/23 1100 23 1500 23 0930 23 1430       Communication Assessment/Intervention    Document Type therapy note (daily note)  -HF therapy  note (daily note)  -HF therapy note (daily note)  -AL therapy note (daily note)  -AL therapy note (daily note)  -AL    Subjective Information no complaints  -HF -- -- -- --    Patient Observations alert;cooperative;agree to therapy  -HF alert;cooperative;agree to therapy  -HF -- -- --    Patient/Family/Caregiver Comments/Observations -- -- Pt in good spirits. Participated well.  -AL Pt participated well.  -AL Pt participated well.  -AL    Patient Effort good  -HF good  -HF -- -- --    Symptoms Noted During/After Treatment none  -HF none  -HF -- -- --       Pain Scale: Numbers Pre/Post-Treatment    Pretreatment Pain Rating -- -- 0/10 - no pain  -AL 0/10 - no pain  -AL 0/10 - no pain  -AL       Phonation Goal 1 (SLP)    Comment (Phonation Goal 1, SLP) Introduced to straw phonation exercises. She required initial MAX cues to executive, faded to MIN-MOD over course of session. Encouraged continued completion as part of home exercises to promote forward resonance.  -HF -- -- -- --       Attention Goal 1 (SLP)    Improve Attention by Goal 1 (SLP) -- complete sustained attention task;80%;with minimal cues (75-90%)  -HF -- -- --    Time Frame (Attention Goal 1, SLP) -- 1 week  -HF -- -- --    Progress/Outcomes (Attention Goal 1, SLP) -- continuing progress toward goal  -HF -- -- --    Comment (Attention Goal 1, SLP) -- Sustained attention/attention-to-detail exercise (written, structured exercise - T.V. guide questions) with MIN cues required, 90% accuracy.  -HF -- -- --       Memory Skills Goal 1 (SLP)    Improve Memory Skills Through Goal 1 (SLP) -- use memory strategies;use external memory aid;recall details of the day;80%;with moderate cues (50-74%)  -HF -- -- --    Time Frame (Memory Skills Goal 1, SLP) -- 1 week  -HF -- -- --    Progress/Outcomes (Memory Skills Goal 1, SLP) -- continuing progress toward goal  -HF -- -- --    Comment (Memory Skills Goal 1, SLP) The patient was 89% accurate for 1-back, but unable to  complete 2-back due to difficulty with attention/memory.  -HF The patient required 3x verbal cue to support working memory when completing written, structured task (T.V. guide questions) due to forgetting what question she was addressing.  -HF -- -- --    Row Name 04/13/23 0930 04/12/23 1430                Communication Assessment/Intervention    Document Type therapy note (daily note)  -AL therapy note (daily note)  -AL       Patient/Family/Caregiver Comments/Observations Pt is pleasant and cooperative.  -AL Attempted session at 1300; pt was asleep and  requested to waken her. Pt participated in session at 1430; however, session ended at 1450 due to pt needing bathroom care. Pt declined further therapies for remainder of day due to not feeling well.  -AL          Pain Scale: Numbers Pre/Post-Treatment    Pretreatment Pain Rating 0/10 - no pain  -AL 0/10 - no pain  -AL             User Key  (r) = Recorded By, (t) = Taken By, (c) = Cosigned By    Initials Name Effective Dates    Nessa Kraus, MS CCC-SLP 06/16/21 -     HF Kacey Song CCC-SLP 12/27/22 -                    EDUCATION  The patient has been educated in the following areas:     Cognitive Impairment Communication Impairment Voice Disorder.           SLP GOALS     Row Name 04/15/23 1230 04/15/23 1100 04/14/23 1500       Phonation Goal 1 (SLP)    Comment (Phonation Goal 1, SLP) Introduced to straw phonation exercises. She required initial MAX cues to executive, faded to MIN-MOD over course of session. Encouraged continued completion as part of home exercises to promote forward resonance.  -HF -- --       Attention Goal 1 (SLP)    Improve Attention by Goal 1 (SLP) -- complete sustained attention task;80%;with minimal cues (75-90%)  -HF --    Time Frame (Attention Goal 1, SLP) -- 1 week  -HF --    Progress/Outcomes (Attention Goal 1, SLP) -- continuing progress toward goal  -HF --    Comment (Attention Goal 1, SLP) -- Sustained  attention/attention-to-detail exercise (written, structured exercise - T.V. guide questions) with MIN cues required, 90% accuracy.  -HF --       Memory Skills Goal 1 (SLP)    Improve Memory Skills Through Goal 1 (SLP) -- use memory strategies;use external memory aid;recall details of the day;80%;with moderate cues (50-74%)  -HF use memory strategies;use external memory aid;recall details of the day;80%;with moderate cues (50-74%)  -AL    Time Frame (Memory Skills Goal 1, SLP) -- 1 week  -HF 1 week  -AL    Progress/Outcomes (Memory Skills Goal 1, SLP) -- continuing progress toward goal  -HF good progress toward goal  -AL    Comment (Memory Skills Goal 1, SLP) The patient was 89% accurate for 1-back, but unable to complete 2-back due to difficulty with attention/memory.  -HF The patient required 3x verbal cue to support working memory when completing written, structured task (T.V. guide questions) due to forgetting what question she was addressing.  -HF Working memory for 4 word ordering task: 30% with NO cues, 80% with MIN cues, 100% with MOD-MAX cues.  -AL       Reasoning Goal 1 (SLP)    Improve Reasoning Through Goal 1 (SLP) -- -- complete deductive reasoning task;80%;with minimal cues (75-90%)  -AL    Time Frame (Reasoning Goal 1, SLP) -- -- 1 week  -AL    Progress/Outcomes (Reasoning Goal 1, SLP) -- -- good progress toward goal  -AL    Comment (Reasoning Goal 1, SLP) -- -- Moderate level logic puzzle: 60% with NO cues, 100% with MIN-MOD cues. Required cues for organization during this task.  -AL    Row Name 04/14/23 0930 04/13/23 1400 04/13/23 0900       Phonation Goal 1 (SLP)    Progress/Outcomes (Phonation Goal 1, SLP) good progress toward goal  -AL -- --    Comment (Phonation Goal 1, SLP) Pt presented with overall mild hoarse vocal quality, with intermittent periods of clear vocal quality and periods of increased hoarse quality. Pt completed frontal tone focus exercises with overall MOD cues (humming, initial  "/m/ CV chanting, 2-syllable initial /m/ chanting). Pt able to achieve adequate vocal quality with cues for higher pitch, but unable to produce adequate vocal quality at lower pitch. Pt also read aloud functional phrases with MIN-MOD cues for vocal intensity and elevated pitch; she was able to produce phrase with adequate vocal quality ~50% of the time.  -AL -- --       Attention Goal 1 (SLP)    Improve Attention by Goal 1 (SLP) -- complete sustained attention task;80%;with minimal cues (75-90%)  -AL complete sustained attention task;80%;with minimal cues (75-90%)  -AL    Time Frame (Attention Goal 1, SLP) -- 1 week  -AL 1 week  -AL    Progress/Outcomes (Attention Goal 1, SLP) -- good progress toward goal  -AL goal ongoing  -AL    Comment (Attention Goal 1, SLP) -- Alternating attention for \"Blink\" card sort required overall MIN cues.  -AL Opposites word search: 100% with NO cues.  -AL       Memory Skills Goal 1 (SLP)    Progress/Outcomes (Memory Skills Goal 1, SLP) -- good progress toward goal  -AL --    Comment (Memory Skills Goal 1, SLP) -- Recalled salient event from morning session with NO cues. 5 unit list retention task: 65% with NO cues, 100% with MIN cues.  -AL --       Reasoning Goal 1 (SLP)    Improve Reasoning Through Goal 1 (SLP) -- complete deductive reasoning task;80%;with minimal cues (75-90%)  -AL --    Time Frame (Reasoning Goal 1, SLP) -- 1 week  -AL --    Progress/Outcomes (Reasoning Goal 1, SLP) -- good progress toward goal  -AL good progress toward goal  -AL    Comment (Reasoning Goal 1, SLP) -- Completed logic puzzle: 30% with NO cues, 100% with MIN-MOD cues.  -AL Initiated logic puzzle task: 30% with NO Cues, 100% with MIN-MOD cues thus far. Plan to complete in next session.  -AL    Row Name 04/12/23 1430             Attention Goal 1 (SLP)    Improve Attention by Goal 1 (SLP) complete sustained attention task;80%;with minimal cues (75-90%)  -AL      Time Frame (Attention Goal 1, SLP) 1 week  " -AL      Progress/Outcomes (Attention Goal 1, SLP) goal ongoing  -AL      Comment (Attention Goal 1, SLP) Sustained attention on opposites word search for 10 minutes with cue x1 for selective attention. She was 55% accurate with NO cues, 100% with MIN-MOD cues.  -AL            User Key  (r) = Recorded By, (t) = Taken By, (c) = Cosigned By    Initials Name Provider Type    Nessa Kraus, MS CCC-SLP Speech and Language Pathologist    Kacey Garcia CCC-SLP Speech and Language Pathologist                        Time Calculation:      Time Calculation- SLP     Row Name 04/15/23 1301 04/15/23 1300          Time Calculation- SLP    SLP Start Time 1230  -HF 1100  -HF     SLP Stop Time 1300  -HF 1130  -HF     SLP Time Calculation (min) 30 min  -HF 30 min  -HF        Untimed Charges    73282-VU Treatment/ST Modification Prosth Aug Alter  60  -HF --        Total Minutes    Untimed Charges Total Minutes 60  -HF --      Total Minutes 60  -HF --           User Key  (r) = Recorded By, (t) = Taken By, (c) = Cosigned By    Initials Name Provider Type     Kacey Song CCC-SLP Speech and Language Pathologist                Therapy Charges for Today     Code Description Service Date Service Provider Modifiers Qty    [id number] HC ST TREATMENT SPEECH 4 4/15/2023 Kacey Song CCC-SLP GN 1                     DEAN Kramer  4/15/2023

## 2023-04-15 NOTE — PROGRESS NOTES
Inpatient Rehabilitation Plan of Care Note    Plan of Care  Care Plan Reviewed - No updates at this time.    Sphincter Control    Performed Intervention(s)  Bladder/bowel training program  Monitor intake and output  Use incontinence products/equipment      Psychosocial    Performed Intervention(s)  Verbalizes needs and concerns  Support from family/peer groups      Body Systems    Performed Intervention(s)  Perform active and passive ROM  Frequent position changes      Safety    Performed Intervention(s)  Safety Rounds  Bed alarm/Chair alarm  Items within reach    Signed by: Elmira Zelaya RN

## 2023-04-15 NOTE — PLAN OF CARE
Goal Outcome Evaluation:  Plan of Care Reviewed With: patient        Progress: improving  Outcome Evaluation: A&Ox4. Assist x1 to wheelchair. Medications whole with thin liquids. Pain treated. Continent/Incontinent. Chest tube sites scabbed and DHIRAJ. Coccyx red blanchable, encouraged to weight shift and use barrier cream. Numbness continues in bilateral hands. Patient she states she feels better than she has. Seizure precautions. Contact Isolation. No unsafe behaviors noted. Participated with therapies.

## 2023-04-16 PROCEDURE — 94799 UNLISTED PULMONARY SVC/PX: CPT

## 2023-04-16 RX ADMIN — Medication 5 MG: at 20:11

## 2023-04-16 RX ADMIN — POLYETHYLENE GLYCOL 3350 17 G: 17 POWDER, FOR SOLUTION ORAL at 20:19

## 2023-04-16 RX ADMIN — Medication 500 MCG: at 07:28

## 2023-04-16 RX ADMIN — IPRATROPIUM BROMIDE AND ALBUTEROL SULFATE 3 ML: 2.5; .5 SOLUTION RESPIRATORY (INHALATION) at 14:31

## 2023-04-16 RX ADMIN — LAMOTRIGINE 200 MG: 100 TABLET ORAL at 07:28

## 2023-04-16 RX ADMIN — GABAPENTIN 300 MG: 300 CAPSULE ORAL at 13:22

## 2023-04-16 RX ADMIN — DESVENLAFAXINE SUCCINATE 25 MG: 25 TABLET, EXTENDED RELEASE ORAL at 07:27

## 2023-04-16 RX ADMIN — IPRATROPIUM BROMIDE AND ALBUTEROL SULFATE 3 ML: 2.5; .5 SOLUTION RESPIRATORY (INHALATION) at 08:06

## 2023-04-16 RX ADMIN — OXYCODONE HYDROCHLORIDE 2.5 MG: 5 TABLET ORAL at 18:47

## 2023-04-16 RX ADMIN — GABAPENTIN 300 MG: 300 CAPSULE ORAL at 20:11

## 2023-04-16 RX ADMIN — Medication 1 MG: at 07:28

## 2023-04-16 RX ADMIN — ACETAMINOPHEN 650 MG: 325 TABLET, FILM COATED ORAL at 08:28

## 2023-04-16 RX ADMIN — OXYCODONE HYDROCHLORIDE 2.5 MG: 5 TABLET ORAL at 08:28

## 2023-04-16 RX ADMIN — PANTOPRAZOLE SODIUM 40 MG: 40 TABLET, DELAYED RELEASE ORAL at 07:27

## 2023-04-16 RX ADMIN — GABAPENTIN 300 MG: 300 CAPSULE ORAL at 07:27

## 2023-04-16 RX ADMIN — APIXABAN 5 MG: 5 TABLET, FILM COATED ORAL at 07:28

## 2023-04-16 RX ADMIN — APIXABAN 5 MG: 5 TABLET, FILM COATED ORAL at 20:11

## 2023-04-16 RX ADMIN — IPRATROPIUM BROMIDE AND ALBUTEROL SULFATE 3 ML: 2.5; .5 SOLUTION RESPIRATORY (INHALATION) at 20:01

## 2023-04-16 RX ADMIN — ACETAMINOPHEN 650 MG: 325 TABLET, FILM COATED ORAL at 18:48

## 2023-04-16 RX ADMIN — Medication 100 MG: at 07:28

## 2023-04-16 NOTE — PROGRESS NOTES
LOS: 30 days   Patient Care Team:  Chloe Schaefer APRN as PCP - General (Family Medicine)  Mikhail Glez MD as Consulting Physician (Neurology)      Patient Name: ELEONORA GARCIA  Patient : 1965    ADMITTING DIAGNOSIS:  Diagnoses    1. IMPAIRED FUNCTIONAL MOBILITY, BALANCE, GAIT, AND ENDURANCE           SUBJECTIVE:    Tolerates therapies  Strength unchanged  Overall breathing about the same.  LLE stronger    REVIEW OF SYSTEMS:           OBJECTIVE:    Vitals:    23 1432   BP:    Pulse:    Resp:    Temp:    SpO2: 95%       PHYSICAL EXAM:   General: pleasant, in no acute distress  HEENT: NCAT, sclera anicteric, conjunctiva pink, mucoa moist  Cardiovascular: RRR, +S1+S2, no obvious m/g/r  Lungs: Decreased breath sounds on the right side-no change  Abdomen: normoactive bowel sounds, soft, tender to palpation in the epigastrum, no guarding or rebound tenderness  Extremities: no peripheral edema  Skin: exposed surfaces of skin warm, intact, without erythema  Neuro: awake alert and oriented to person, place, time, and situation, CN II-XII grossly intact  MSK: Good strength with bilateral shoulder abduction, elbow flexion, elbow extension, wrist extension, finger flexion.  Has weakness with hand intrinsics 4/5.  Bilateral hip flexion 4/5, right knee extension 4/5, left knee extension 3+/5, bilateral ankle dorsiflexion 4/5        MEDICATIONS  Scheduled Meds:  apixaban, 5 mg, Oral, Q12H  desvenlafaxine, 25 mg, Oral, Daily  folic acid, 1 mg, Oral, Daily  gabapentin, 300 mg, Oral, Q8H  ipratropium-albuterol, 3 mL, Nebulization, 4x Daily - RT  lamoTRIgine, 200 mg, Oral, Daily  melatonin, 5 mg, Oral, Nightly  pantoprazole, 40 mg, Oral, Q AM  thiamine, 100 mg, Oral, Daily  vitamin B-12, 500 mcg, Oral, Daily        Continuous Infusions:       PRN Meds:  •  acetaminophen  •  bisacodyl  •  diphenhydrAMINE  •  famotidine  •  hydrocortisone sodium succinate  •  hydrocortisone-bacitracin-zinc oxide-nystatin  •   ondansetron ODT  •  oxyCODONE  •  polyethylene glycol  •  senna-docusate sodium  •  simethicone      RESULTS:  No results found for: POCGLU  Results from last 7 days   Lab Units 04/14/23  0753 04/10/23  0603   WBC 10*3/mm3 8.79 8.74   HEMOGLOBIN g/dL 7.6* 7.7*   HEMATOCRIT % 23.3* 23.7*   PLATELETS 10*3/mm3 355 273     Results from last 7 days   Lab Units 04/14/23  0753 04/10/23  0603   SODIUM mmol/L 137 132*   POTASSIUM mmol/L 3.8 4.1   CHLORIDE mmol/L 98 94*   CO2 mmol/L 32.2* 31.2*   BUN mg/dL 17 17   CREATININE mg/dL 1.17* 1.12*   CALCIUM mg/dL 11.3* 10.0   GLUCOSE mg/dL 123* 97           ASSESSMENT and PLAN:    Idiopathic peripheral neuropathy    Paresthesia    History of blood clots    Chronic anticoagulation    Seizures    Anemia    Guillain-[place] syndrome    MRSA bacteremia    Subacute motor predominant idiopathic peripheral neuropathy with Paraparesis and Areflexia.   S/p IVIG x 5 days  March 30-shows improvement with her strength proximally the upper extremities.  Improvement with her hand strength.  Improved strength in the right lower extremity but continues with profound weakness in the left lower extremity.  With weakness of the left ankle, will look at probable AFO for gait activities.  Transfers are moderate assist.  Ambulated with a rolling walker up to 70 feet with gait deviations minimal moderate assist  -4/6-reconsulted neurology to see if she needs another round of IVIG.  Follow-up assessment with neurology-Previous IVIG dose was 20g daily for 5 days from 2/27-3/3.  Plan is to repeat another course of IVIG  - 4/10- fever this morning likely a side effect of IVIG.  She is scheduled to receive her final dose of this course today.  Will obtain CXR to rule out pneumonia.       Impaired mobility/impaired self care/ impaired cognition  Left greater than right lower extremity weakness greater than bilateral upper extremity weakness  April 4-strength improved in the bilateral upper extremities and right  lower extremity.Weakness  looks about the same the left lower extremity.  On the day she ambulated further last week she had utilized AFO on the left lower extremity  April 5-stronger with her left hip flexors and knee extensors today  April 6- strength and coordination improving.  Per OT note she is needing set up assist for upper body dressing with max assist for lower body dressing, PT is noticing that the patient appears stronger and is limited by weakness and fear.  Moderate assist between chair and bed but min assist for sit to stand.  Very minimal knee buckling when walking with left AFO.  Making good progress overall.     R lung masslike infiltrate with cavitary lesions/pleural effusion   S/p thoracentesis - Blood cx and pleural fluid  positive for MRSA      Chest tube placement given empyema, and she underwent thoracoscopy w/ decortication 3/8/23  -expected postoperative changes with pleural thickening and minimal pleural effusion.  The effusion is too small for intervention and will likely to continue to resolve with time.  Recommend continue good pulmonary hygiene with incentive spirometry hourly as well as increase activity/ambulation as tolerated.  March 23-appears decreased breath sounds more noticeable today on the right compared to the left.  Check follow-up chest x-ray.  On room air during the day, 1 L at night.  March 24- CXR relatively unchanged from previous, reached out to CT surgery given planned outpatient f/u on 3/28, no further imaging planned at this time as patient afebrile with normal WBC, monitor     Right-sided Chest Pain 3/24  -EKG sinus tach  -HS Troponin 25 > 24  -consider Cardiology consult if worsening  -pain reproducible with palpation, pain likely postoperative neuralgia as indicated vs. possible costochondritis, continue gabapentin, ice/heat, monitor  -4/10- patient continues to complain of right-sided rib pain.  We will touch base with thoracic surgery about nerve block for  postoperative neuralgia.  - 4/12- thoracic surgery may refer for outpatient nerve block.  We will continue to monitor and consider going up on gabapentin.    Nutrition-  April 13-Patient has had decreased p.o. intake with weight loss.  Discussed option of supplemental tube feeds with the dietitian.     Hypokalemia  -3/24 K 3.3, repleted  -3/29 K 3.7, resolved     Mass on TTE with findings concerning for endocarditis - underwent MARIAMA 3/10/2023 which had no vegetation  RUE superficial thrombophlebitis concerning for septic phlebitis.     ID - continue 4 weeks of vancomycin as recommended by infectious disease dosed by pharmacy to achieve a trough level of 15-20 or area under the curve of 400-600 from last negative blood culture or last intervention which ever is the latest - to complete April 1, 2023     Left hip arthrocentesis March 16 to rule out any hip septic arthritis - no growth to date on fluid.    Lower back pain - MRI of spine to rule out discitis or osteomyelitis.  This did have known degenerative changes but  no infection.      DVT prophylaxis - SCDs / apixiban. History of LLE DVT - on Eliquis at home     Pain management   - Tylenol 1,000 mg three times a day/ Celebrex 100 mg q 112 hours/ gabapentin 300 mg q 8 hours Oxycodone 5 mg q 4 hours prn  March 18 - DC Celebrex 2/2 PHYLLIS, and anemia      Anxiety/ muscle spasms - Diazepam 2 mg q 6 hours prn - [facility] reviewed     Seizure disorder - Lamotrigine 200 mg daily     ABLA   - March 18- Hgb 7.0 (7.3 on 3/16). Type, cross and transfuse 1 unit PRBCs. Pre-medicate with Tylenol and Benadryl. CBC in am.   March 19- Hgb 8.8 s/p 1 unit PRBcs. Hemoccult positive. On Eliquis for h/o DVT.  Per IM -[likely 2/2 anemia of chronic disease; no signs of active bleeding  -s/p 1unit PRBCs w/ appropriate rise in Hb  -iron studies c/w ACD  -would not stop Eliquis at this time unless active bleeding noted or Hb drop continues]  March 20 - HGB 9.3 s/p transfusion  March 21-reviewed  with internal medicine-hemoglobin stable after transfusion.  Iron studies consistent with inflammation.  No further work-up presently  March 22 - Hgb 8.9, stable  March 23-hemoglobin trend 8.3.  Continue to follow.  Recheck tomorrow  March 29 - Hgb 8.1, stable  April 6- hemoglobin 8.3.  Stable.  April 10- hemoglobin slightly down trended to 7.7.  Monitor.  April 14 - Hgb 7.6, felt Anemia of chronic disease     PHYLLIS   March 18- Creatinine 1.36 up from 1.12. On IV Vanc and Celebrex. DC Celebrex. BMP in am.  March 19- s/p 500ml NS bolus. Creatinine 1.31.  March 23-creatinine 0.92  April 10- creatinine downtrending to 1.12 with increased fluids.    Nausea  -4/12-nausea with vomiting this morning.  Declined to participate in speech therapy due to not feeling well.  Zofran 4 mg every 6 hours as needed for nausea and vomiting added and administered.  No signs of systemic inflammation or infection.  Labs will be drawn tomorrow.     TEAM CONF - MARCH 21 - BED MOD X 2. TRANSFERS MAX 2. STAND PIVOT OR SQUAT PIVOT. NON-AMBULATORY. Saturday MARCH 18 GAIT 6 FEET PARALLEL BARS MOD MAX OF 2.   BATH MOD 1-2. LBD DEP. UBD MOD. EATING MIN. GROOMING MIN. TOILETING DEP.   The patient has difficulty remembering new information due to short-term memory impairments.  Initial evaluation 3.18, pt obtained a score 12/30 on SLUMS cognitive assessment indicating severe cognitive impairment/dementia, goals initiated for memory and sustaining attention  FEES completed 3.7 revealing glottic gap accounting for glottic insufficiency, dysphonia, hoarse/breathy quality, recommend targeting vocal function as appropriate d/t impact on intelligibility.    Regular/thin diet continued since FEES completion 3.7, although pt known to cough with and without PO intake, may be contributed partially to ineffective VF closure.   DRESSING FORMER CHEST TUBE SITE. CONTINUES ON PUREWICK AT NIGHT. O2 AT 1-2 LITERS AT NIGHT.  DECREASED APPETITE. ABD X-RAY THIS AM SHOWS  NON-OBSTRUCTIVE BOWEL GAS PATTERN.   ELOS - 4 WEEKS     TEAM CONF - MARCH 28 - BED MIN CTG. TRANSFER MOD ASSIST STAND PIVOT OR SQUAT PIVOT. FATIGUES IN AFERNOONS. GAIT 15 FEET RW MIN X 2. FLUCTUATING ORIENTATION. BETTER DETAIL TO TASK. MIN MOD CUES FOR REASONING TASKS. DYSPHONIA FROM COUGHING.  SLP REVIEWED VOICE HYGIENE, NOT TO DO HARSH THROAT CLEAR. BATH MOD. LBD MAX. UBD MIN. TOILETING MOD X 2. CONTINENT/INCONTINENT BLADDER. RIGHT CHEST TUBE / THORACOSCOPY SITE CARE. VARIABLE INTAKE.   ELOS - 3 WEEKS     TEAM CONF - APRIL 4 - CONTINUES WEAK IN LLE. STRONGER IN BUE AND RIGHT KNEE EXTENSION. BED MIN MOD TO CTG. TRANSFERS MOD MAX STAND PIVOT OR SQUAT PIVOT. WORKED ON GAIT IN PARALLEL BARS MIN MOD OF 2 PERSON. CAN DO 15 FEET IF NOT SO ANXIOUS. TOILET AND SHOWER TRANSFERS MOD ASSIST. DROP ARM BEDSIDE COMMODE. AT NIGHT USING BEDPAN. USING PUREWICK AT NIGHT. BATH MIN. LBD MOD. UBD SET UP.TOILETING MOD MAX 2.    Pt with improved ability to recall salient events  from day.Now presents with mild-moderate hoarse vocal  quality. Improved vocal intensity.  ADDRSSING DYSPHONIA. DIETICIAN REVIEWED FOOD CHOICES. GABAPENTIN FOR RIGHT RIB PAIN/INTERCOSTAL PAIN.CONTINENT BOWEL AND BLADDER WITH URGENCY.   ELOS - 2-3 WEEKS     Team Conference - 4/11/2023  Nursing: overnight using purewick,   PT: mod assist for transfers due to weight shifting, bed mobility w/wo rails standby/contact guard, shallow four inch step with rails 1-2 person assist, walking 60' with walker, trial AFO and ace wrap assist doesn't seem to help a lot now that she no longer has a hard collapse of her ankle/knee, anticipate she will walk safely with walker in their RV home but need a wheelchair for community ambulation  OT: toilet transfers mod assist with one, clothing management max-mod  SLP: mild-mod cognitive impairment, struggles with oxycodone, max cues for verbal working memory in the morning but min in the afternoon, min-mod cueing, improvements with  carryover  Psychology: trouble with thought organization and concentration, trouble with working memory, uncertain of self, lacking confidence with some anxiety completing tasks  Social Work: recommending home health  Discharge Date: 1.5 weeks    CODE STATUS:  Level Of Support Discussed With: Patient  Code Status (Patient has no pulse and is not breathing): CPR (Attempt to Resuscitate)  Medical Interventions (Patient has pulse or is breathing): Full Support      Admission Status: Continues to meet requirements for inpatient admission.       Ryley Rider MD    During rounds, used appropriate personal protective equipment including mask and gloves.  Additional gown if indicated.  Mask used was standard procedure mask. Appropriate PPE was worn during the entire visit.  Hand hygiene was completed before and after.

## 2023-04-16 NOTE — PLAN OF CARE
Goal Outcome Evaluation:  Plan of Care Reviewed With: patient        Progress: improving  Outcome Evaluation: Pt is A&OX4, calm & cooperative.  at bedside,  checked off to transfer pt. Tolerated meds whole with water. Assist X1 to wc. Numbness to fingers noted. Con of B&B, with some incontient episodes. Purewich at HS. Seizure precautions maintained. Generalized weakness. Chest tube sites, scabbed and DHIRAJ. Pt c/o of pain on R back side, PRN pain meds given. No unsafe behaviors noted. PO intake encouraged.

## 2023-04-16 NOTE — PLAN OF CARE
Goal Outcome Evaluation:  Plan of Care Reviewed With: patient        Progress: improving  Outcome Evaluation: Louise rested well this shift.  Pamela @.  All meds whole w/thins, one at a time.  Barrier cream to buttocks.  Numbness to fingers. Spouse at bedside.  Pain meds x1 this shift.  Cont/incont of b/b.  Urgency of bladder.  Chest tube sites scabbed, DHIRAJ.  C/o pain of right abd/flank.

## 2023-04-17 LAB
ANION GAP SERPL CALCULATED.3IONS-SCNC: 10.6 MMOL/L (ref 5–15)
BASOPHILS # BLD AUTO: 0.04 10*3/MM3 (ref 0–0.2)
BASOPHILS NFR BLD AUTO: 0.4 % (ref 0–1.5)
BUN SERPL-MCNC: 18 MG/DL (ref 6–20)
BUN/CREAT SERPL: 18 (ref 7–25)
CALCIUM SPEC-SCNC: 10.2 MG/DL (ref 8.6–10.5)
CHLORIDE SERPL-SCNC: 101 MMOL/L (ref 98–107)
CO2 SERPL-SCNC: 25.4 MMOL/L (ref 22–29)
CREAT SERPL-MCNC: 1 MG/DL (ref 0.57–1)
DEPRECATED RDW RBC AUTO: 54.8 FL (ref 37–54)
EGFRCR SERPLBLD CKD-EPI 2021: 65 ML/MIN/1.73
EOSINOPHIL # BLD AUTO: 0.1 10*3/MM3 (ref 0–0.4)
EOSINOPHIL NFR BLD AUTO: 0.9 % (ref 0.3–6.2)
ERYTHROCYTE [DISTWIDTH] IN BLOOD BY AUTOMATED COUNT: 16.2 % (ref 12.3–15.4)
GLUCOSE SERPL-MCNC: 95 MG/DL (ref 65–99)
HCT VFR BLD AUTO: 21.8 % (ref 34–46.6)
HGB BLD-MCNC: 7.2 G/DL (ref 12–15.9)
IMM GRANULOCYTES # BLD AUTO: 0.11 10*3/MM3 (ref 0–0.05)
IMM GRANULOCYTES NFR BLD AUTO: 1 % (ref 0–0.5)
LYMPHOCYTES # BLD AUTO: 2.74 10*3/MM3 (ref 0.7–3.1)
LYMPHOCYTES NFR BLD AUTO: 25.7 % (ref 19.6–45.3)
MCH RBC QN AUTO: 30.8 PG (ref 26.6–33)
MCHC RBC AUTO-ENTMCNC: 33 G/DL (ref 31.5–35.7)
MCV RBC AUTO: 93.2 FL (ref 79–97)
MONOCYTES # BLD AUTO: 0.62 10*3/MM3 (ref 0.1–0.9)
MONOCYTES NFR BLD AUTO: 5.8 % (ref 5–12)
NEUTROPHILS NFR BLD AUTO: 66.2 % (ref 42.7–76)
NEUTROPHILS NFR BLD AUTO: 7.04 10*3/MM3 (ref 1.7–7)
NRBC BLD AUTO-RTO: 0.1 /100 WBC (ref 0–0.2)
PLATELET # BLD AUTO: 255 10*3/MM3 (ref 140–450)
PMV BLD AUTO: 10.9 FL (ref 6–12)
POTASSIUM SERPL-SCNC: 4.1 MMOL/L (ref 3.5–5.2)
RBC # BLD AUTO: 2.34 10*6/MM3 (ref 3.77–5.28)
SODIUM SERPL-SCNC: 137 MMOL/L (ref 136–145)
WBC NRBC COR # BLD: 10.65 10*3/MM3 (ref 3.4–10.8)

## 2023-04-17 PROCEDURE — 97535 SELF CARE MNGMENT TRAINING: CPT

## 2023-04-17 PROCEDURE — 97530 THERAPEUTIC ACTIVITIES: CPT

## 2023-04-17 PROCEDURE — 94799 UNLISTED PULMONARY SVC/PX: CPT

## 2023-04-17 PROCEDURE — 97130 THER IVNTJ EA ADDL 15 MIN: CPT

## 2023-04-17 PROCEDURE — 94761 N-INVAS EAR/PLS OXIMETRY MLT: CPT

## 2023-04-17 PROCEDURE — 94664 DEMO&/EVAL PT USE INHALER: CPT

## 2023-04-17 PROCEDURE — 85025 COMPLETE CBC W/AUTO DIFF WBC: CPT | Performed by: PHYSICAL MEDICINE & REHABILITATION

## 2023-04-17 PROCEDURE — 97110 THERAPEUTIC EXERCISES: CPT

## 2023-04-17 PROCEDURE — 97112 NEUROMUSCULAR REEDUCATION: CPT

## 2023-04-17 PROCEDURE — 97129 THER IVNTJ 1ST 15 MIN: CPT

## 2023-04-17 PROCEDURE — 80048 BASIC METABOLIC PNL TOTAL CA: CPT | Performed by: PHYSICAL MEDICINE & REHABILITATION

## 2023-04-17 RX ORDER — EMOLLIENT COMBINATION NO.110
LOTION (ML) TOPICAL AS NEEDED
Status: DISCONTINUED | OUTPATIENT
Start: 2023-04-17 | End: 2023-04-21 | Stop reason: HOSPADM

## 2023-04-17 RX ADMIN — ACETAMINOPHEN 650 MG: 325 TABLET, FILM COATED ORAL at 16:30

## 2023-04-17 RX ADMIN — Medication 5 MG: at 21:04

## 2023-04-17 RX ADMIN — GABAPENTIN 300 MG: 300 CAPSULE ORAL at 21:04

## 2023-04-17 RX ADMIN — OXYCODONE HYDROCHLORIDE 2.5 MG: 5 TABLET ORAL at 16:31

## 2023-04-17 RX ADMIN — Medication 1 MG: at 07:58

## 2023-04-17 RX ADMIN — Medication 500 MCG: at 07:58

## 2023-04-17 RX ADMIN — OXYCODONE HYDROCHLORIDE 2.5 MG: 5 TABLET ORAL at 21:07

## 2023-04-17 RX ADMIN — IPRATROPIUM BROMIDE AND ALBUTEROL SULFATE 3 ML: 2.5; .5 SOLUTION RESPIRATORY (INHALATION) at 11:57

## 2023-04-17 RX ADMIN — APIXABAN 5 MG: 5 TABLET, FILM COATED ORAL at 07:59

## 2023-04-17 RX ADMIN — PANTOPRAZOLE SODIUM 40 MG: 40 TABLET, DELAYED RELEASE ORAL at 05:51

## 2023-04-17 RX ADMIN — LAMOTRIGINE 200 MG: 100 TABLET ORAL at 07:59

## 2023-04-17 RX ADMIN — IPRATROPIUM BROMIDE AND ALBUTEROL SULFATE 3 ML: 2.5; .5 SOLUTION RESPIRATORY (INHALATION) at 21:07

## 2023-04-17 RX ADMIN — GABAPENTIN 300 MG: 300 CAPSULE ORAL at 05:51

## 2023-04-17 RX ADMIN — DESVENLAFAXINE SUCCINATE 25 MG: 25 TABLET, EXTENDED RELEASE ORAL at 07:57

## 2023-04-17 RX ADMIN — GABAPENTIN 300 MG: 300 CAPSULE ORAL at 14:19

## 2023-04-17 RX ADMIN — Medication 100 MG: at 07:58

## 2023-04-17 RX ADMIN — IPRATROPIUM BROMIDE AND ALBUTEROL SULFATE 3 ML: 2.5; .5 SOLUTION RESPIRATORY (INHALATION) at 07:39

## 2023-04-17 RX ADMIN — APIXABAN 5 MG: 5 TABLET, FILM COATED ORAL at 21:04

## 2023-04-17 NOTE — PROGRESS NOTES
Inpatient Rehabilitation Plan of Care Note    Plan of Care  Updated Problems/Interventions  Mobility    [PT] Stairs(Active)  Current Status(04/17/2023): Standard step B rails Bianca x1  Weekly Goal(04/24/2023): PT only  Discharge Goal: 8in step x 3, single rails, Bianca    [PT] Walk(Active)  Current Status(04/17/2023): 80' using FWW CGA/Bianca VC with w/c follow  Weekly Goal(04/24/2023): PT only  Discharge Goal: 80ft CGA rwx    [PT] Bed/Chair/Wheelchair(Active)  Current Status(04/10/2023): CGA stand pivot  Weekly Goal(04/17/2023): CGA  Discharge Goal: CGA    [PT] Bed Mobility(Active)  Current Status(04/17/2023): SBA/CGA  Weekly Goal(04/24/2023): SBA  Discharge Goal: SBA    Signed by: Papa Marsh, PT

## 2023-04-17 NOTE — PROGRESS NOTES
LOS: 31 days   Patient Care Team:  Chloe Schaefer APRN as PCP - General (Family Medicine)  Mikhail Glez MD as Consulting Physician (Neurology)      Patient Name: ELEONORA GARCIA  Patient : 1965    ADMITTING DIAGNOSIS:  Diagnoses    1. IMPAIRED FUNCTIONAL MOBILITY, BALANCE, GAIT, AND ENDURANCE           SUBJECTIVE:  Patient seen and examined in therapy.  She is observed walking with a rolling walker.  Good heel strike with short steps.  She has not had further episodes of nausea or vomiting.  She feels very well today and is working hard with therapies.    REVIEW OF SYSTEMS:    General: denies weight change, fatigue, weakness, fever, chills, night sweats.   Skin: denies itching, rashes, sores and bruises.   Neurologic: denies headache, nausea, vomiting, or visual changes.   HEENT:  denies discharge, sore throat, allergies, and congestion.   Respiratory: denies shortness of breath, wheeze, cough and hemoptysis.   Cardiac:  denies chest pain or palpitations.   Gastrointestinal:  denies changes in appetite, nausea, vomiting, dysphagia, abdominal pain, constipation, or diarrhea.   Urinary:  denies urgency and frequency.  Musculoskeletal:  denies muscle weakness, pain, or joint stiffness.     OBJECTIVE:    Vitals:    23 0739   BP:    Pulse: 81   Resp: 18   Temp:    SpO2: 95%       PHYSICAL EXAM:   General: pleasant, in no acute distress  HEENT: NCAT, sclera anicteric, conjunctiva pink, mucoa moist  Cardiovascular: RRR, +S1+S2, no obvious m/g/r  Lungs: Decreased breath sounds on the right side-no change  Abdomen: normoactive bowel sounds, soft, tender to palpation in the epigastrum, no guarding or rebound tenderness  Extremities: no peripheral edema  Skin: exposed surfaces of skin warm, intact, without erythema  Neuro: awake alert and oriented to person, place, time, and situation, CN II-XII grossly intact  MSK: Good strength with bilateral shoulder abduction, elbow flexion, elbow extension, wrist  extension, finger flexion.  Has weakness with hand intrinsics 4/5.  Bilateral hip flexion 4/5, right knee extension 4/5, left knee extension 4/5, bilateral ankle dorsiflexion 4/5      MEDICATIONS  Scheduled Meds:  apixaban, 5 mg, Oral, Q12H  desvenlafaxine, 25 mg, Oral, Daily  folic acid, 1 mg, Oral, Daily  gabapentin, 300 mg, Oral, Q8H  ipratropium-albuterol, 3 mL, Nebulization, 4x Daily - RT  lamoTRIgine, 200 mg, Oral, Daily  melatonin, 5 mg, Oral, Nightly  pantoprazole, 40 mg, Oral, Q AM  thiamine, 100 mg, Oral, Daily  vitamin B-12, 500 mcg, Oral, Daily        Continuous Infusions:       PRN Meds:  •  acetaminophen  •  bisacodyl  •  diphenhydrAMINE  •  Eucerin original healing lotion  •  famotidine  •  hydrocortisone sodium succinate  •  hydrocortisone-bacitracin-zinc oxide-nystatin  •  ondansetron ODT  •  oxyCODONE  •  polyethylene glycol  •  senna-docusate sodium  •  simethicone      RESULTS:  No results found for: POCGLU  Results from last 7 days   Lab Units 04/17/23  0734 04/14/23  0753   WBC 10*3/mm3 10.65 8.79   HEMOGLOBIN g/dL 7.2* 7.6*   HEMATOCRIT % 21.8* 23.3*   PLATELETS 10*3/mm3 255 355     Results from last 7 days   Lab Units 04/17/23  0734 04/14/23  0753   SODIUM mmol/L 137 137   POTASSIUM mmol/L 4.1 3.8   CHLORIDE mmol/L 101 98   CO2 mmol/L 25.4 32.2*   BUN mg/dL 18 17   CREATININE mg/dL 1.00 1.17*   CALCIUM mg/dL 10.2 11.3*   GLUCOSE mg/dL 95 123*           ASSESSMENT and PLAN:    Idiopathic peripheral neuropathy    Paresthesia    History of blood clots    Chronic anticoagulation    Seizures    Anemia    Guillain-Maramec syndrome    MRSA bacteremia    Subacute motor predominant idiopathic peripheral neuropathy with Paraparesis and Areflexia.   S/p IVIG x 5 days  March 30-shows improvement with her strength proximally the upper extremities.  Improvement with her hand strength.  Improved strength in the right lower extremity but continues with profound weakness in the left lower extremity.  With  weakness of the left ankle, will look at probable AFO for gait activities.  Transfers are moderate assist.  Ambulated with a rolling walker up to 70 feet with gait deviations minimal moderate assist  -4/6-reconsulted neurology to see if she needs another round of IVIG.  Follow-up assessment with neurology-Previous IVIG dose was 20g daily for 5 days from 2/27-3/3.  Plan is to repeat another course of IVIG  - 4/10- fever this morning likely a side effect of IVIG.  She is scheduled to receive her final dose of this course today.  Will obtain CXR to rule out pneumonia.       Impaired mobility/impaired self care/ impaired cognition  Left greater than right lower extremity weakness greater than bilateral upper extremity weakness  April 4-strength improved in the bilateral upper extremities and right lower extremity.Weakness  looks about the same the left lower extremity.  On the day she ambulated further last week she had utilized AFO on the left lower extremity  April 5-stronger with her left hip flexors and knee extensors today  April 6- strength and coordination improving.  Per OT note she is needing set up assist for upper body dressing with max assist for lower body dressing, PT is noticing that the patient appears stronger and is limited by weakness and fear.  Moderate assist between chair and bed but min assist for sit to stand.  Very minimal knee buckling when walking with left AFO.  Making good progress overall.  April 17- strength is significantly improved from last week on the left lower extremity.  She is walking well with PT.  Progressing well with therapies.     R lung masslike infiltrate with cavitary lesions/pleural effusion   S/p thoracentesis - Blood cx and pleural fluid  positive for MRSA      Chest tube placement given empyema, and she underwent thoracoscopy w/ decortication 3/8/23  -expected postoperative changes with pleural thickening and minimal pleural effusion.  The effusion is too small for  intervention and will likely to continue to resolve with time.  Recommend continue good pulmonary hygiene with incentive spirometry hourly as well as increase activity/ambulation as tolerated.  March 23-appears decreased breath sounds more noticeable today on the right compared to the left.  Check follow-up chest x-ray.  On room air during the day, 1 L at night.  March 24- CXR relatively unchanged from previous, reached out to CT surgery given planned outpatient f/u on 3/28, no further imaging planned at this time as patient afebrile with normal WBC, monitor     Right-sided Chest Pain 3/24  -EKG sinus tach  -HS Troponin 25 > 24  -consider Cardiology consult if worsening  -pain reproducible with palpation, pain likely postoperative neuralgia as indicated vs. possible costochondritis, continue gabapentin, ice/heat, monitor  -4/10- patient continues to complain of right-sided rib pain.  We will touch base with thoracic surgery about nerve block for postoperative neuralgia.  - 4/12- thoracic surgery may refer for outpatient nerve block.  We will continue to monitor and consider going up on gabapentin.    Nutrition-  April 13-Patient has had decreased p.o. intake with weight loss.  Discussed option of supplemental tube feeds with the dietitian.     Hypokalemia  -3/24 K 3.3, repleted  -3/29 K 3.7, resolved      Mass on TTE with findings concerning for endocarditis - underwent MARIAMA 3/10/2023 which had no vegetation  RUE superficial thrombophlebitis concerning for septic phlebitis.     ID - continue 4 weeks of vancomycin as recommended by infectious disease dosed by pharmacy to achieve a trough level of 15-20 or area under the curve of 400-600 from last negative blood culture or last intervention which ever is the latest - to complete April 1, 2023     Left hip arthrocentesis March 16 to rule out any hip septic arthritis - no growth to date on fluid.    Lower back pain - MRI of spine to rule out discitis or osteomyelitis.   This did have known degenerative changes but  no infection.      DVT prophylaxis - SCDs / apixiban. History of LLE DVT - on Eliquis at home     Pain management   - Tylenol 1,000 mg three times a day/ Celebrex 100 mg q 112 hours/ gabapentin 300 mg q 8 hours Oxycodone 5 mg q 4 hours prn  March 18 - DC Celebrex 2/2 PHYLLIS, and anemia      Anxiety/ muscle spasms - Diazepam 2 mg q 6 hours prn - JONES reviewed     Seizure disorder - Lamotrigine 200 mg daily     ABLA   - March 18- Hgb 7.0 (7.3 on 3/16). Type, cross and transfuse 1 unit PRBCs. Pre-medicate with Tylenol and Benadryl. CBC in am.   March 19- Hgb 8.8 s/p 1 unit PRBcs. Hemoccult positive. On Eliquis for h/o DVT.  Per IM -[likely 2/2 anemia of chronic disease; no signs of active bleeding  -s/p 1unit PRBCs w/ appropriate rise in Hb  -iron studies c/w ACD  -would not stop Eliquis at this time unless active bleeding noted or Hb drop continues]  March 20 - HGB 9.3 s/p transfusion  March 21-reviewed with internal medicine-hemoglobin stable after transfusion.  Iron studies consistent with inflammation.  No further work-up presently  March 22 - Hgb 8.9, stable  March 23-hemoglobin trend 8.3.  Continue to follow.  Recheck tomorrow  March 29 - Hgb 8.1, stable  April 6- hemoglobin 8.3.  Stable.  April 10- hemoglobin slightly down trended to 7.7.  Monitor.  April 14 - Hgb 7.6, felt Anemia of chronic disease  April 17 - Hgb slowly trending down. 7.2 today. Monitor. Consider iron infusion.      PHYLLIS   March 18- Creatinine 1.36 up from 1.12. On IV Vanc and Celebrex. DC Celebrex. BMP in am.  March 19- s/p 500ml NS bolus. Creatinine 1.31.  March 23-creatinine 0.92  April 10- creatinine downtrending to 1.12 with increased fluids.    Nausea  -4/12-nausea with vomiting this morning.  Declined to participate in speech therapy due to not feeling well.  Zofran 4 mg every 6 hours as needed for nausea and vomiting added and administered.  No signs of systemic inflammation or infection.   Labs will be drawn tomorrow.     TEAM [facility] - MARCH 21 - BED MOD X 2. TRANSFERS MAX 2. STAND PIVOT OR SQUAT PIVOT. NON-AMBULATORY. Saturday MARCH 18 GAIT 6 FEET PARALLEL BARS MOD MAX OF 2.   BATH MOD 1-2. LBD DEP. UBD MOD. EATING MIN. GROOMING MIN. TOILETING DEP.   The patient has difficulty remembering new information due to short-term memory impairments.  Initial evaluation 3.18, pt obtained a score 12/30 on SLUMS cognitive assessment indicating severe cognitive impairment/dementia, goals initiated for memory and sustaining attention  FEES completed 3.7 revealing glottic gap accounting for glottic insufficiency, dysphonia, hoarse/breathy quality, recommend targeting vocal function as appropriate d/t impact on intelligibility.    Regular/thin diet continued since FEES completion 3.7, although pt known to cough with and without PO intake, may be contributed partially to ineffective VF closure.   DRESSING FORMER CHEST TUBE SITE. CONTINUES ON PUREWICK AT NIGHT. O2 AT 1-2 LITERS AT NIGHT.  DECREASED APPETITE. ABD X-RAY THIS AM SHOWS NON-OBSTRUCTIVE BOWEL GAS PATTERN.   ELOS - 4 WEEKS     TE[facility] - MARCH 28 - BED MIN CTG. TRANSFER MOD ASSIST STAND PIVOT OR SQUAT PIVOT. FATIGUES IN AFERNOONS. GAIT 15 FEET RW MIN X 2. FLUCTUATING ORIENTATION. BETTER DETAIL TO TASK. MIN MOD CUES FOR REASONING TASKS. DYSPHONIA FROM COUGHING.  SLP REVIEWED VOICE HYGIENE, NOT TO DO HARSH THROAT CLEAR. BATH MOD. LBD MAX. UBD MIN. TOILETING MOD X 2. CONTINENT/INCONTINENT BLADDER. RIGHT CHEST TUBE / THORACOSCOPY SITE CARE. VARIABLE INTAKE.   ELOS - 3 WEEKS     TEAM [facility] - APRIL 4 - CONTINUES WEAK IN LLE. STRONGER IN BUE AND RIGHT KNEE EXTENSION. BED MIN MOD TO CTG. TRANSFERS MOD MAX STAND PIVOT OR SQUAT PIVOT. WORKED ON GAIT IN PARALLEL BARS MIN MOD OF 2 PERSON. CAN DO 15 FEET IF NOT SO ANXIOUS. TOILET AND SHOWER TRANSFERS MOD ASSIST. DROP ARM BEDSIDE COMMODE. AT NIGHT USING BEDPAN. USING PUREWICK AT NIGHT. BATH MIN. LBD MOD. UBD SET  UP.TOILETING MOD MAX 2.    Pt with improved ability to recall salient events  from day.Now presents with mild-moderate hoarse vocal  quality. Improved vocal intensity.  ADDRSSING DYSPHONIA. DIETICIAN REVIEWED FOOD CHOICES. GABAPENTIN FOR RIGHT RIB PAIN/INTERCOSTAL PAIN.CONTINENT BOWEL AND BLADDER WITH URGENCY.   ELOS - 2-3 WEEKS     Team Conference - 4/11/2023  Nursing: overnight using purewick,   PT: mod assist for transfers due to weight shifting, bed mobility w/wo rails standby/contact guard, shallow four inch step with rails 1-2 person assist, walking 60' with walker, trial AFO and ace wrap assist doesn't seem to help a lot now that she no longer has a hard collapse of her ankle/knee, anticipate she will walk safely with walker in their RV home but need a wheelchair for community ambulation  OT: toilet transfers mod assist with one, clothing management max-mod  SLP: mild-mod cognitive impairment, struggles with oxycodone, max cues for verbal working memory in the morning but min in the afternoon, min-mod cueing, improvements with carryover  Psychology: trouble with thought organization and concentration, trouble with working memory, uncertain of self, lacking confidence with some anxiety completing tasks  Social Work: recommending home health  Discharge Date: 1.5 weeks    CODE STATUS:  Level Of Support Discussed With: Patient  Code Status (Patient has no pulse and is not breathing): CPR (Attempt to Resuscitate)  Medical Interventions (Patient has pulse or is breathing): Full Support      Admission Status: Continues to meet requirements for inpatient admission.       Olive Torres, DO    During rounds, used appropriate personal protective equipment including mask and gloves.  Additional gown if indicated.  Mask used was standard procedure mask. Appropriate PPE was worn during the entire visit.  Hand hygiene was completed before and after.

## 2023-04-17 NOTE — PLAN OF CARE
Goal Outcome Evaluation:  Plan of Care Reviewed With: patient        Progress: improving  Outcome Evaluation: Pt AOX4, calm and cooperative. Pt c/o being constipated, spoke with MD, enema ordered, this RN went back in to pts room and she had a BM. No enema needed. No c/o pain so far this shift, all meds whole w/thins.

## 2023-04-17 NOTE — PROGRESS NOTES
"Nutrition Services    Patient Name:  Louise GARCIA  YOB: 1965  MRN: [id number]  Admit Date:  3/17/2023    Assessment Date:  04/17/23    FOLLOW UP NOTE - CLINICAL NUTRITION    Comments:  Visited pt in room;  at bedside. Since last review, Dr. Rider discussed RD rec'd of supplemental TF with pt, who was wanting to hold off for now, as of 4/13.  Pt reports much improved intakes/appetite today. Per EMR, PO varies %, but overall seems improved. Pt's  bringing in food often from outside sources. Pt reports she is now drinking Boost at every meal.     RD will continue to follow-up, per protocol.      Encounter Information        Reason for Encounter Follow-up   Current Issues Idiopathic peripheral neuropathy     Current Nutrition Orders & Evaluation of Intake       Oral Nutrition     Current PO Diet Diet: Regular/House Diet; Texture: Regular Texture (IDDSI 7); Fluid Consistency: Thin (IDDSI 0)   Supplement Boost Plus TID   PO Evaluation    % PO Intake/# of days Varying, %   Factors Affecting Intake  constipation, decreased appetite, early satiety      Anthropometrics         Height   Weight Height: 157.5 cm (62\")  Weight: 51.2 kg (112 lb 14 oz) (04/13/23 1300)   BMI kg/m2 Body mass index is 20.65 kg/m².   Weight trend Loss, Amount/Timeframe:  Down 13# (10%) x 2 mo.      Estimated/Assessed Needs       Energy Requirements    Height for Calculation  Height: 157.5 cm (62\")   Weight for Calculation 51.2 kg   Method for Estimation  30 kcal/kg, 35 kcal/kg   EST Needs (kcal/day) 5173-3896 kcal       Protein Requirements    Weight for Calculation 51.2 kg   EST Protein Needs (g/kg) 1.2 - 1.5 gm/kg   EST Daily Needs (g/day) 61-77 g       Fluid Requirements     Method for Estimation 1 mL/kcal    Estimated Needs (mL/day) 5038-1358 mL     Physical Findings          Physical Appearance alert, oriented fatigued   Oral/Mouth Cavity teeth missing   Edema  1+ (trace), 2+ (mild)   Gastrointestinal " normoactive, last bowel movement:4/17   Skin  MASD, surgical incision, abrasion   Tubes/Drains none     Labs       Pertinent Labs Reviewed, listed below     Results from last 7 days   Lab Units 04/17/23  0734 04/14/23  0753   SODIUM mmol/L 137 137   POTASSIUM mmol/L 4.1 3.8   CHLORIDE mmol/L 101 98   CO2 mmol/L 25.4 32.2*   BUN mg/dL 18 17   CREATININE mg/dL 1.00 1.17*   CALCIUM mg/dL 10.2 11.3*   GLUCOSE mg/dL 95 123*     Results from last 7 days   Lab Units 04/17/23  0734   HEMOGLOBIN g/dL 7.2*   HEMATOCRIT % 21.8*   WBC 10*3/mm3 10.65     Results from last 7 days   Lab Units 04/17/23  0734 04/14/23  0753   PLATELETS 10*3/mm3 255 355     COVID19   Date Value Ref Range Status   03/03/2023 Not Detected Not Detected - Ref. Range Final     Lab Results   Component Value Date    HGBA1C 5.10 02/20/2023          Medications           Scheduled Medications apixaban, 5 mg, Oral, Q12H  desvenlafaxine, 25 mg, Oral, Daily  folic acid, 1 mg, Oral, Daily  gabapentin, 300 mg, Oral, Q8H  ipratropium-albuterol, 3 mL, Nebulization, 4x Daily - RT  lamoTRIgine, 200 mg, Oral, Daily  melatonin, 5 mg, Oral, Nightly  pantoprazole, 40 mg, Oral, Q AM  thiamine, 100 mg, Oral, Daily  vitamin B-12, 500 mcg, Oral, Daily       Infusions     PRN Medications •  acetaminophen  •  bisacodyl  •  diphenhydrAMINE  •  Eucerin original healing lotion  •  famotidine  •  hydrocortisone sodium succinate  •  hydrocortisone-bacitracin-zinc oxide-nystatin  •  ondansetron ODT  •  oxyCODONE  •  polyethylene glycol  •  senna-docusate sodium  •  simethicone     PLAN OF CARE  Intervention Goal        Intervention Goal(s) Maintain nutrition status, Tolerate PO , Increase intake, Maintain weight, No significant weight loss and PO intake goal %: 60%     Nutrition Intervention        RD Action Advise available snack, Encourage intake, Follow Tx Progress and Care plan reviewed     Prescription        Diet Prescription    Supplement Prescription    EN/PN Prescription     Prescription Ordered No changes at this time   --  Monitor/Evaluation        Monitor Per protocol, PO intake, Supplement intake, Weight, GI status, Symptoms   Discharge Plan Pending clinical course   Education Will educate as needed       Electronically signed by:  Silke Arias RD  04/17/23 15:28 EDT

## 2023-04-17 NOTE — PROGRESS NOTES
Inpatient Rehabilitation Plan of Care Note    Plan of Care  Care Plan Reviewed - Updates as Follows    Sphincter Control    [RN] Bladder Management(Active)  Current Status(04/13/2023): Bladder urgency  Weekly Goal(04/25/2023): decrease in episodes of incontinence  Discharge Goal: Continent 100%    [RN] Bowel Management(Active)  Current Status(04/13/2023): Patient is 100% continent of bowel  Weekly Goal(04/24/2023): Patient will maintain a daily bowel pattern.  Discharge Goal: Patient will maintain 100% continence of bowel    Performed Intervention(s)  Bladder/bowel training program  Monitor intake and output  Use incontinence products/equipment      Psychosocial    [RN] Coping/Adjustment(Active)  Current Status(04/13/2023): Anxiety r/t uncertainty of disease course  Weekly Goal(04/25/2023): Allow opportunity to express concerns regarding current  status  Discharge Goal: Patient/family will verbalize decreased feelings of anxiety.    Performed Intervention(s)  Verbalizes needs and concerns  Support from family/peer groups      Body Systems    [RN] Neurological(Active)  Current Status(04/13/2023): Impaired physical mobility r/t decreased muscle  strength/control and limited ROM  Weekly Goal(04/24/2023): Patient will have increased ROM  Discharge Goal: Patient will have improved strength and function of BLE and B  hands.    Performed Intervention(s)  Perform active and passive ROM  Frequent position changes      Safety    [RN] Potential for Injury(Active)  Current Status(04/13/2023): Risk for falls  Weekly Goal(04/24/2023): Family/Caregivers regarding safety precautions and need  for close supervision  Discharge Goal: Patient/family will be aware of risk of fall and safety in the  home setting.    Performed Intervention(s)  Safety Rounds  Bed alarm/Chair alarm  Items within reach    Signed by: Rola Yan RN

## 2023-04-17 NOTE — PROGRESS NOTES
Recreational Therapy Note    Patient Name: Louise GARCIA   MRN: [id number]    Therapeutic Recreation Eval and Treat (last 12 hours)     Therapeutic Recreation Eval & Treat     Row Name 04/17/23 1300       Therapeutic Recreation Participation    Recreation Therapy Participation games  -SS    Games other (see comments)  [facility]  -    Objectives of Recreation Participation increase;sense of autonomy by choosing level of participation;motivation and activity level through successful participation  -    Comment, Recreation Participation min cues to recall directions;mild difficulty with math  -    Recreation Therapy Summary of Participation active participation  -          User Key  (r) = Recorded By, (t) = Taken By, (c) = Cosigned By    Initials Name Provider Type    SS Mariel Cantu, CTRS Recreational Therapist                  DELVIN Wei  4/17/2023

## 2023-04-17 NOTE — PLAN OF CARE
Goal Outcome Evaluation:  Plan of Care Reviewed With: patient        Progress: improving  Outcome Evaluation: Louise rested on and off this shift.  Miralax given @HS. Spouse at bedside.  Bedpan for bm.  PRN pain meds given x1 so far.  All meds whole w/thins.  Jonathanck @HS.  Increased intake noted.

## 2023-04-17 NOTE — THERAPY PROGRESS REPORT/RE-CERT
Inpatient Rehabilitation - Physical Therapy Progress Note and Treatment Note       [facility]     Patient Name: Louise GARCIA  : 1965  MRN: [id number]    Today's Date: 2023                    Admit Date: 3/17/2023      Visit Dx:     ICD-10-CM ICD-9-CM   1. Impaired functional mobility, balance, gait, and endurance  Z74.09 V49.89       Patient Active Problem List   Diagnosis   • Sepsis   • Neck pain, chronic   • Upper back pain   • Paresthesia   • Cervical myelopathy   • Lower extremity weakness   • History of blood clots   • Chronic anticoagulation   • Seizures   • Anemia   • GERD (gastroesophageal reflux disease)   • Guillain-[place] syndrome   • Situational mixed anxiety and depressive disorder   • Mass of middle lobe of right lung   • Oral candidiasis   • Empyema of pleura   • MRSA bacteremia   • Idiopathic peripheral neuropathy       Past Medical History:   Diagnosis Date   • Degenerative disc disease, cervical    • Gestational diabetes    • H/O blood clots    • Hematemesis    • Seizures    • Septic shock        Past Surgical History:   Procedure Laterality Date   • APPENDECTOMY     • BACK SURGERY     • CHOLECYSTECTOMY     • ENDOSCOPY N/A 2016    Procedure: ESOPHAGOGASTRODUODENOSCOPY with biopsy;  Surgeon: Austen Brito MD;  Location: [facility] ENDOSCOPY;  Service:    • HYSTERECTOMY     • NECK SURGERY       approx 6 yrs ago   • THORACOSCOPY Right 3/8/2023    Procedure: THORACOSCOPY WITH DAVINCI ROBOT WITH COMPLETE DECORTICATION;  Surgeon: Chloe Cedillo MD;  Location: [facility] OR;  Service: Robotics - DaVinci;  Laterality: Right;   • TONSILLECTOMY     • TONSILLECTOMY AND ADENOIDECTOMY         PT ASSESSMENT (last 12 hours)     IRF PT Evaluation and Treatment     Row Name 23 1113          PT Time and Intention    Document Type daily treatment;progress note  -DP     Mode of Treatment individual therapy;physical therapy  -DP     Patient/Family/Caregiver Comments/Observations Pt  stated that she wants additonal time in rehab vs her d/c date 4/21  -DP     Row Name 04/17/23 1113          General Information    Patient Profile Reviewed yes  -DP     Existing Precautions/Restrictions fall;other (see comments)  contact precautions  -DP     Row Name 04/17/23 1113          Pain Assessment    Pretreatment Pain Rating 0/10 - no pain  -DP     Posttreatment Pain Rating 0/10 - no pain  -DP     Row Name 04/17/23 1113          Cognition/Psychosocial    Affect/Mental Status (Cognition) WFL  -DP     Orientation Status (Cognition) oriented x 3  -DP     Follows Commands (Cognition) follows one-step commands  -DP     Personal Safety Interventions safety round/check completed;elopement precautions initiated;fall prevention program maintained;gait belt;nonskid shoes/slippers when out of bed;muscle strengthening facilitated;supervised activity  -DP     Row Name 04/17/23 1113          Sit-Stand Transfer    Sit-Stand [place] (Transfers) contact guard  -DP     Assistive Device (Sit-Stand Transfers) wheelchair;walker, front-wheeled  -DP     Row Name 04/17/23 1113          Stand-Sit Transfer    Stand-Sit [place] (Transfers) contact guard  -DP     Assistive Device (Stand-Sit Transfers) wheelchair  -DP     Row Name 04/17/23 1113          Gait/Stairs (Locomotion)    [place] Level (Gait) minimum assist (75% patient effort);contact guard  -DP     Assistive Device (Gait) walker, front-wheeled  -DP     Distance in Feet (Gait) 80'x3  -DP     Pattern (Gait) step-through  -DP     Deviations/Abnormal Patterns (Gait) base of support, wide;left sided deviations;gait speed decreased;stride length decreased  -DP     Bilateral Gait Deviations heel strike decreased;knee buckling bilaterally  -DP     Left Sided Gait Deviations heel strike decreased  -DP     Right Sided Gait Deviations heel strike decreased  -DP     [place] Level (Stairs) minimum assist (75% patient effort);moderate assist (50% patient effort)  -DP      Assistive Device (Stairs) cane, quad  -DP     Handrail Location (Stairs) both sides;left side (ascending);right side (ascending)  -DP     Number of Steps (Stairs) 4x2 on standard steps  -DP     Ascending Technique (Stairs) step-to-step  -DP     Descending Technique (Stairs) step-to-step  -DP     Stairs Assessment/Intervention trialed using SBQC on steps in RUE with ascending and LUE when descending steps. Pt is Bianca for majoirty of the time but did have minimal buckle in LLE but demonstrated eccentric control as pt was able to slow the rate of buckle compared to previous times.  -DP     Row Name 04/17/23 1113          Safety Issues, Functional Mobility    Impairments Affecting Function (Mobility) balance;cognition;endurance/activity tolerance;postural/trunk control;pain;strength  -DP     Row Name 04/17/23 1113          Hip (Therapeutic Exercise)    Hip Strengthening (Therapeutic Exercise) bilateral;marching while seated;1 lb free weight;5 repetitions;2 sets  -DP     Row Name 04/17/23 1113          Knee (Therapeutic Exercise)    Knee Strengthening (Therapeutic Exercise) bilateral;LAQ (long arc quad);hamstring curls;1 lb free weight;red;resistance band;5 repetitions;2 sets  -DP     Row Name 04/17/23 1113          Ankle (Therapeutic Exercise)    Ankle Strengthening (Therapeutic Exercise) bilateral;dorsiflexion;resistance band;red;10 repetitions  -DP     Row Name 04/17/23 1113          Positioning and Restraints    Pre-Treatment Position sitting in chair/recliner  -DP     Post Treatment Position wheelchair  -DP     In Wheelchair sitting;exit alarm on;with other staff  -DP     Row Name 04/17/23 1113          Weekly Progress Summary (PT)    Weekly Progress Summary (PT) Pt continues to progress toward LTG's and improve gait quality, stair navigation and transfers. Pt has ambulated up to 80 feet with min/CGA and min to modA with stair navigation due to buckle of LLE. Pt has demonstrated increased strength in LLE this  week as she is able to performed steated ther ex with resistance added as compared to date of eval and recent sessions when pt displayed difficulty with full AROM. Pt will continued to benefit from skilled PT services to address impairments above.  -DP     Row Name 04/17/23 1113          IRF PT Goals    Bed Mobility Goal Selection (PT-IRF) bed mobility, PT goal 1;bed mobility, PT goal 2  -DP     Transfer Goal Selection (PT-IRF) transfers, PT goal 1;transfers, PT goal 2;transfers, PT goal 3;transfers, PT goal 4  -DP     Gait (Walking Locomotion) Goal Selection (PT-IRF) gait, PT goal 1;gait, PT goal 2  -DP     Stairs Goal Selection (PT-IRF) stairs, PT goal 2;stairs, PT goal 1  -DP     Strength Goal Selection (PT-IRF) strength, PT goal 1 (free text)  -DP     Balance Goal Selection (PT) balance, PT goal 1  -DP     Row Name 04/17/23 1113          Bed Mobility Goal 1 (PT-IRF)    Activity/Assistive Device (Bed Mobility Goal 1, PT-IRF) bed mobility activities, all  -DP     [place] Level (Bed Mobility Goal 1, PT-IRF) minimum assist (75% or more patient effort)  -DP     Time Frame (Bed Mobility Goal 1, PT-IRF) 1 week  -DP     Progress/Outcomes (Bed Mobility Goal 1, PT-IRF) goal met  -DP     Row Name 04/17/23 1113          Bed Mobility Goal 2 (PT-IRF)    Activity/Assistive Device (Bed Mobility Goal 2, PT-IRF) bed mobility activities, all  -DP     [place] Level (Bed Mobility Goal 2, PT-IRF) supervision required  -DP     Time Frame (Bed Mobility Goal 2, PT-IRF) 2 weeks  -DP     Progress/Outcomes (Bed Mobility Goal 2, PT-IRF) continuing progress toward goal  -DP     Row Name 04/17/23 1113          Transfer Goal 1 (PT-IRF)    Activity/Assistive Device (Transfer Goal 1, PT-IRF) bed-to-chair/chair-to-bed  -DP     [place] Level (Transfer Goal 1, PT-IRF) minimum assist (75% or more patient effort)  -DP     Time Frame (Transfer Goal 1, PT-IRF) 1 week  -DP     Progress/Outcomes (Transfer Goal 1, PT-IRF) goal met  -DP      Row Name 04/17/23 1113          Transfer Goal 2 (PT-IRF)    Activity/Assistive Device (Transfer Goal 2, PT-IRF) all transfers  -DP     [place] Level (Transfer Goal 2, PT-IRF) contact guard required  -DP     Time Frame (Transfer Goal 2, PT-IRF) 2 weeks  -DP     Progress/Outcomes (Transfer Goal 2, PT-IRF) goal ongoing  -DP     Row Name 04/17/23 1113          Transfer Goal 3 (PT-IRF)    Activity/Assistive Device (Transfer Goal 3, PT-IRF) car transfer  -DP     [place] Level (Transfer Goal 3, PT-IRF) minimum assist (75% or more patient effort)  -DP     Time Frame (Transfer Goal 3, PT-IRF) 2 weeks  -DP     Progress/Outcomes (Transfer Goal 3, PT-IRF) continuing progress toward goal  -DP     Row Name 04/17/23 1113          Gait/Walking Locomotion Goal 1 (PT-IRF)    Activity/Assistive Device (Gait/Walking Locomotion Goal 1, PT-IRF) gait (walking locomotion);walker, rolling  -DP     Gait/Walking Locomotion Distance Goal 1 (PT-IRF) 20  -DP     [place] Level (Gait/Walking Locomotion Goal 1, PT-IRF) moderate assist (50-74% patient effort);tactile cues required  -DP     Time Frame (Gait/Walking Locomotion Goal 1, PT-IRF) 2 weeks;short-term goal (STG)  -DP     Progress/Outcomes (Gait/Walking Locomotion Goal 1, PT-IRF) goal met  -DP     Row Name 04/17/23 1113          Stairs Goal 1 (PT-IRF)    Activity/Assistive Device (Stairs Goal 1, PT-IRF) stairs, all skills;using handrail, left;using handrail, right  -DP     Number of Stairs (Stairs Goal 1, PT-IRF) 3  -DP     [place] Level (Stairs Goal 1, PT-IRF) minimum assist (75% or more patient effort)  -DP     Time Frame (Stairs Goal 1, PT-IRF) 2 weeks  -DP     Progress/Outcomes (Stairs Goal 1, PT-IRF) continuing progress toward goal  -DP     Row Name 04/17/23 1113          Strength Goal 1 (PT-IRF)    Strength Goal 1 (PT-IRF) Pt to demonstrate B LE strength of at leat 3/5 throughout.  -DP     Time Frame (Strength Goal 1, PT-IRF) long-term goal (LTG);4 weeks  -DP      Progress/Outcomes (Strength Goal 1, PT-IRF) goal met  -DP     Row Name 04/17/23 1113          Balance Goal 1 (PT)    Activity/Assistive Device (Balance Goal 1, PT) sitting, static  -DP     [place] Level/Cues Needed (Balance Goal 1, PT) conditional independence  -DP     Time Frame (Balance Goal 1, PT) 1 week  -DP     Progress/Outcomes (Balance Goal 1, PT) continuing progress toward goal  -DP           User Key  (r) = Recorded By, (t) = Taken By, (c) = Cosigned By    Initials Name Provider Type    Papa Traylor, PT Physical Therapist              Wound 03/08/23 1917 Right lateral chest Incision (Active)   Dressing Appearance open to air 04/17/23 0756   Closure Approximated;None 04/17/23 0756   Base clean;scab;dry 04/17/23 0756   Periwound intact 04/17/23 0756   Periwound Temperature warm 04/17/23 0756   Periwound Skin Turgor soft 04/17/23 0756   Drainage Amount none 04/17/23 0756   Dressing Care open to air 04/17/23 0756       Wound 03/11/23 1530 Other (See comments) other (see comments) pubis Abrasion (Active)   Dressing Appearance open to air 04/17/23 0756   Closure Open to air 04/17/23 0756   Base clean;dry;scab 04/17/23 0756   Periwound dry 04/17/23 0756   Periwound Temperature warm 04/17/23 0756   Periwound Skin Turgor soft 04/17/23 0756   Drainage Amount none 04/17/23 0756   Dressing Care open to air 04/17/23 0756       Wound 03/20/23 1527 Bilateral upper gluteal MASD (Moisture associated skin damage) (Active)   Dressing Appearance open to air 04/17/23 0756   Closure Open to air 04/17/23 0756   Base blanchable;epithelialization;clean 04/17/23 0756   Periwound Temperature warm 04/17/23 0756   Periwound Skin Turgor soft 04/17/23 0756   Edges irregular 04/17/23 0756   Drainage Amount none 04/17/23 0756   Care, Wound cleansed with;soap and water 04/17/23 0756   Dressing Care open to air 04/17/23 0756   Periwound Care barrier ointment applied 04/17/23 0756     Physical Therapy Education     Title: PT OT SLP  Therapies (Done)     Topic: Physical Therapy (Done)     Point: Mobility training (Done)     Learning Progress Summary           Patient Acceptance, E,D, VU,DU by DP at 4/17/2023 1118    Acceptance, E, VU,NR by EE at 4/12/2023 1341    Acceptance, E, VU,DU,NR by JK at 4/8/2023 1528    Acceptance, E, VU by WN at 4/4/2023 2329    Acceptance, E, VU by WN at 4/4/2023 0322    Acceptance, E,D, VU,DU by DP at 4/1/2023 1352    Acceptance, E, VU,DU by MG at 3/31/2023 0825    Acceptance, E, VU,DU by MG at 3/30/2023 0956    Acceptance, E,D, VU,DU by DP at 3/29/2023 1148    Nonacceptance, E,D, VU,DU by DP at 3/28/2023 1144    Acceptance, E,D, VU,DU by DP at 3/27/2023 1601    Acceptance, E,D, VU,NR by EE at 3/25/2023 1423    Acceptance, E,D, NR,VU,DU by DP at 3/21/2023 1448    Acceptance, E, NR by CLOVERK at 3/20/2023 1513    Acceptance, E,TB, VU,NR by SAMANTHA at 3/18/2023 1643   Family Acceptance, E, VU by WN at 4/4/2023 2329    Acceptance, E, VU by WN at 4/4/2023 0322                   Point: Home exercise program (Done)     Learning Progress Summary           Patient Acceptance, E,D, VU,DU by DP at 4/17/2023 1118    Acceptance, E, VU,NR by EE at 4/12/2023 1341    Acceptance, E, VU by WN at 4/4/2023 2329    Acceptance, E, VU by WN at 4/4/2023 0322    Acceptance, E,D, VU,DU by DP at 4/1/2023 1352    Acceptance, E, VU,DU by MG at 3/31/2023 0825    Acceptance, E, VU,DU by MG at 3/30/2023 0956    Acceptance, E,D, VU,DU by DP at 3/29/2023 1148    Nonacceptance, E,D, VU,DU by DP at 3/28/2023 1144    Acceptance, E,D, VU,DU by DP at 3/27/2023 1601    Acceptance, E,D, NR,VU,DU by DP at 3/21/2023 1448    Acceptance, E, NR by JK at 3/20/2023 1513   Family Acceptance, E, VU by WN at 4/4/2023 2329    Acceptance, E, VU by WN at 4/4/2023 0322                   Point: Body mechanics (Done)     Learning Progress Summary           Patient Acceptance, E,D, VU,DU by DP at 4/17/2023 1118    Acceptance, E, VU,DU,NR by JK at 4/8/2023 1528    Acceptance, E, VU  by WN at 4/4/2023 2329    Acceptance, E, VU by WN at 4/4/2023 0322    Acceptance, E,D, VU,DU by DP at 4/1/2023 1352    Acceptance, E, VU,DU by MG at 3/31/2023 0825    Acceptance, E, VU,DU by MG at 3/30/2023 0956    Acceptance, E,D, VU,DU by DP at 3/29/2023 1148    Nonacceptance, E,D, VU,DU by DP at 3/28/2023 1144    Acceptance, E,D, VU,DU by DP at 3/27/2023 1601    Acceptance, E,D, VU,NR by EE at 3/25/2023 1423    Acceptance, E,D, NR,VU,DU by DP at 3/21/2023 1448   Family Acceptance, E, VU by WN at 4/4/2023 2329    Acceptance, E, VU by WN at 4/4/2023 0322                   Point: Precautions (Done)     Learning Progress Summary           Patient Acceptance, E,D, VU,DU by DP at 4/17/2023 1118    Acceptance, E, VU by WN at 4/4/2023 2329    Acceptance, E, VU by WN at 4/4/2023 0322    Acceptance, E,D, VU,DU by DP at 4/1/2023 1352    Acceptance, E, VU,DU by MG at 3/31/2023 0825    Acceptance, E, VU,DU by MG at 3/30/2023 0956    Acceptance, E,D, VU,DU by DP at 3/29/2023 1148    Nonacceptance, E,D, VU,DU by DP at 3/28/2023 1144    Acceptance, E,D, VU,DU by DP at 3/27/2023 1601    Acceptance, E,D, VU,NR by EE at 3/25/2023 1423    Acceptance, E,D, NR,VU,DU by DP at 3/21/2023 1448   Family Acceptance, E, VU by WN at 4/4/2023 2329    Acceptance, E, VU by WN at 4/4/2023 0322                               User Key     Initials Effective Dates Name Provider Type Discipline    EE 06/16/21 -  Melinda Mann, PT Physical Therapist PT    ETHAN 06/16/21 -  Juana Veloz, PT Physical Therapist PT    KP 06/16/21 -  Yolanda Shannon, PT Physical Therapist PT    MG 05/24/22 -  Yu Villalobos, PT Physical Therapist PT    WN 08/23/22 -  Jose Carrillo, RN Registered Nurse Nurse    DP 08/24/21 -  Papa Marsh, PT Physical Therapist PT                PT Recommendation and Plan                          Time Calculation:      PT Charges     Row Name 04/17/23 1513 04/17/23 1118          Time Calculation    Start Time 1230  -DP 1000  -DP      Stop Time 1300  -DP 1030  -DP     Time Calculation (min) 30 min  -DP 30 min  -DP     PT Received On -- 04/17/23  -DP     PT - Next Appointment -- 04/18/23  -DP        Time Calculation- PT    Total Timed Code Minutes- PT 30 minute(s)  -DP 30 minute(s)  -DP           User Key  (r) = Recorded By, (t) = Taken By, (c) = Cosigned By    Initials Name Provider Type    DP Papa Marsh PT Physical Therapist                Therapy Charges for Today     Code Description Service Date Service Provider Modifiers Qty    [id number]  PT THERAPEUTIC ACT EA 15 MIN 4/17/2023 Papa Marsh PT GP 2    [id number]  PT THERAPEUTIC ACT EA 15 MIN 4/17/2023 Papa Marsh, PT GP 1    [id number]  PT THER PROC EA 15 MIN 4/17/2023 Papa Marsh, PT GP 1              Patient was wearing a face mask during this therapy encounter. Therapist used appropriate personal protective equipment including mask and gloves.  Mask used was standard procedure mask. Appropriate PPE was worn during the entire therapy session. Hand hygiene was completed before and after therapy session. Patient is not in enhanced droplet precautions.         Papa Marsh PT  4/17/2023

## 2023-04-17 NOTE — THERAPY TREATMENT NOTE
Inpatient Rehabilitation - Speech Language Pathology Treatment Note    [facility]     Patient Name: Louise GARCIA  : 1965  MRN: [id number]    Today's Date: 2023                   Admit Date: 3/17/2023       Visit Dx:      ICD-10-CM ICD-9-CM   1. Impaired functional mobility, balance, gait, and endurance  Z74.09 V49.89       Patient Active Problem List   Diagnosis   • Sepsis   • Neck pain, chronic   • Upper back pain   • Paresthesia   • Cervical myelopathy   • Lower extremity weakness   • History of blood clots   • Chronic anticoagulation   • Seizures   • Anemia   • GERD (gastroesophageal reflux disease)   • Guillain-Douglas syndrome   • Situational mixed anxiety and depressive disorder   • Mass of middle lobe of right lung   • Oral candidiasis   • Empyema of pleura   • MRSA bacteremia   • Idiopathic peripheral neuropathy       Past Medical History:   Diagnosis Date   • Degenerative disc disease, cervical    • Gestational diabetes    • H/O blood clots    • Hematemesis    • Seizures    • Septic shock        Past Surgical History:   Procedure Laterality Date   • APPENDECTOMY     • BACK SURGERY     • CHOLECYSTECTOMY     • ENDOSCOPY N/A 2016    Procedure: ESOPHAGOGASTRODUODENOSCOPY with biopsy;  Surgeon: Austen Brito MD;  Location: [facility] ENDOSCOPY;  Service:    • HYSTERECTOMY     • NECK SURGERY       approx 6 yrs ago   • THORACOSCOPY Right 3/8/2023    Procedure: THORACOSCOPY WITH DAVINCI ROBOT WITH COMPLETE DECORTICATION;  Surgeon: Chloe Cedillo MD;  Location: [facility] OR;  Service: Robotics - DaVinci;  Laterality: Right;   • TONSILLECTOMY     • TONSILLECTOMY AND ADENOIDECTOMY         SLP Recommendation and Plan                                                            SLP EVALUATION (last 72 hours)     SLP SLC Evaluation     Row Name 23 1500 23 0930 04/15/23 1230 04/15/23 1100          Communication Assessment/Intervention    Document Type therapy note (daily note)  -AL  therapy note (daily note)  -AL therapy note (daily note)  -HF therapy note (daily note)  -HF     Subjective Information -- -- no complaints  -HF --     Patient Observations -- -- alert;cooperative;agree to therapy  -HF alert;cooperative;agree to therapy  -HF     Patient/Family/Caregiver Comments/Observations Pt participated well.  -AL Pt is pleasant and cooperative.  -AL -- --     Patient Effort -- -- good  -HF good  -HF     Symptoms Noted During/After Treatment -- -- none  -HF none  -HF        Pain Scale: Numbers Pre/Post-Treatment    Pretreatment Pain Rating 0/10 - no pain  -AL 0/10 - no pain  -AL -- --        Phonation Goal 1 (SLP)    Comment (Phonation Goal 1, SLP) -- -- Introduced to straw phonation exercises. She required initial MAX cues to executive, faded to MIN-MOD over course of session. Encouraged continued completion as part of home exercises to promote forward resonance.  -HF --        Attention Goal 1 (SLP)    Improve Attention by Goal 1 (SLP) -- -- -- complete sustained attention task;80%;with minimal cues (75-90%)  -HF     Time Frame (Attention Goal 1, SLP) -- -- -- 1 week  -HF     Progress/Outcomes (Attention Goal 1, SLP) -- -- -- continuing progress toward goal  -HF     Comment (Attention Goal 1, SLP) -- -- -- Sustained attention/attention-to-detail exercise (written, structured exercise - T.V. guide questions) with MIN cues required, 90% accuracy.  -HF        Memory Skills Goal 1 (SLP)    Improve Memory Skills Through Goal 1 (SLP) -- -- -- use memory strategies;use external memory aid;recall details of the day;80%;with moderate cues (50-74%)  -HF     Time Frame (Memory Skills Goal 1, SLP) -- -- -- 1 week  -HF     Progress/Outcomes (Memory Skills Goal 1, SLP) -- -- -- continuing progress toward goal  -HF     Comment (Memory Skills Goal 1, SLP) -- -- The patient was 89% accurate for 1-back, but unable to complete 2-back due to difficulty with attention/memory.  -HF The patient required 3x verbal  cue to support working memory when completing written, structured task (T.V. guide questions) due to forgetting what question she was addressing.  -HF           User Key  (r) = Recorded By, (t) = Taken By, (c) = Cosigned By    Initials Name Effective Dates    Nessa Kraus, MS CCC-SLP 06/16/21 -     HF Kacey Song, CCC-SLP 12/27/22 -                    EDUCATION    The patient has been educated in the following areas:       Cognitive Impairment Communication Impairment.             SLP GOALS     Row Name 04/17/23 1500 04/17/23 0930 04/15/23 1230       Respiratory Support Goal 1 (SLP)    Comment (Respiratory Support Goal 1, SLP) Pt required overall MOD cues for diaphragmatic breathing during voice exercises.  -AL -- --       Phonation Goal 1 (SLP)    Improve Phonation By Goal 1 (SLP) using appropriate tone focus;90%;with minimal cues (75-90%)  -AL -- --    Time Frame (Phonation Goal 1, SLP) short term goal (STG)  -AL -- --    Progress/Outcomes (Phonation Goal 1, SLP) good progress toward goal  -AL -- --    Comment (Phonation Goal 1, SLP) Pt presented with mild-moderate hoarse vocal quality at beginning of session. Participated in humming, initial /m/ CV chanting and initial /m/ single syllable word production with overall MIN-MOD cues to achieve adequate vocal quality. Pt read a multiple paragraph story with initial MOD cues for frontal tone focus; however, as task progressed, pt required NO cues for maintaining adequate vocal quality. Pt required MIN cues in conversation at end of session to maintain adequate vocal quality. Also, benefited from periodic yawn/sign to assist with reducing hyperfunction.  -AL -- Introduced to straw phonation exercises. She required initial MAX cues to executive, faded to MIN-MOD over course of session. Encouraged continued completion as part of home exercises to promote forward resonance.  -HF       Attention Goal 1 (SLP)    Improve Attention by Goal 1 (SLP) -- complete  sustained attention task;80%;with minimal cues (75-90%)  -AL --    Time Frame (Attention Goal 1, SLP) -- 1 week  -AL --    Progress/Outcomes (Attention Goal 1, SLP) -- continuing progress toward goal  -AL --    Comment (Attention Goal 1, SLP) -- Acrostics task: 7/12 with NO cues, 12/ with MIN cues  -AL --       Memory Skills Goal 1 (SLP)    Improve Memory Skills Through Goal 1 (SLP) -- use memory strategies;use external memory aid;recall details of the day;80%;with moderate cues (50-74%)  -AL --    Time Frame (Memory Skills Goal 1, SLP) -- 1 week  -AL --    Progress/Outcomes (Memory Skills Goal 1, SLP) -- continuing progress toward goal  -AL --    Comment (Memory Skills Goal 1, SLP) -- Recalled 3/3 errands after 15 minutes with NO Cues.  -AL The patient was 89% accurate for 1-back, but unable to complete 2-back due to difficulty with attention/memory.  -HF       Organizational Skills Goal 1 (SLP)    Improve Thought Organization Through Goal 1 (SLP) -- completing a divergent naming task;80%;with minimal cues (75-90%)  -AL --    Time Frame (Thought Organization Skills Goal 1, SLP) -- 1 week  -AL --    Progress/Outcomes (Thought Organization Skills Goal 1, SLP) -- goal ongoing  -AL --    Comment (Thought Organization Skills Goal 1, SLP) -- Round: 11 with NO cues. Tall: 6 with NO cues  -AL --    Row Name 04/15/23 1100             Attention Goal 1 (SLP)    Improve Attention by Goal 1 (SLP) complete sustained attention task;80%;with minimal cues (75-90%)  -HF      Time Frame (Attention Goal 1, SLP) 1 week  -HF      Progress/Outcomes (Attention Goal 1, SLP) continuing progress toward goal  -HF      Comment (Attention Goal 1, SLP) Sustained attention/attention-to-detail exercise (written, structured exercise - T.V. guide questions) with MIN cues required, 90% accuracy.  -HF         Memory Skills Goal 1 (SLP)    Improve Memory Skills Through Goal 1 (SLP) use memory strategies;use external memory aid;recall details of the  day;80%;with moderate cues (50-74%)  -HF      Time Frame (Memory Skills Goal 1, SLP) 1 week  -HF      Progress/Outcomes (Memory Skills Goal 1, SLP) continuing progress toward goal  -HF      Comment (Memory Skills Goal 1, SLP) The patient required 3x verbal cue to support working memory when completing written, structured task (T.V. guide questions) due to forgetting what question she was addressing.  -HF            User Key  (r) = Recorded By, (t) = Taken By, (c) = Cosigned By    Initials Name Provider Type    Nessa Kraus MS CCC-SLP Speech and Language Pathologist     Kacey Song CCC-SLP Speech and Language Pathologist                            Time Calculation:        Time Calculation- SLP     Row Name 04/17/23 1540 04/17/23 0959          Time Calculation- SLP    SLP Start Time 1500  -AL 0930  -AL     SLP Stop Time 1530  -AL 1000  -AL     SLP Time Calculation (min) 30 min  -AL 30 min  -AL           User Key  (r) = Recorded By, (t) = Taken By, (c) = Cosigned By    Initials Name Provider Type    Nessa Kraus MS CCC-SLP Speech and Language Pathologist                  Therapy Charges for Today     Code Description Service Date Service Provider Modifiers Qty    68557622932 HC ST DEV OF COGN SKILLS INITIAL 15 MIN 4/17/2023 Nessa Spangler MS CCC-SLP  1    89567529456 HC ST DEV OF COGN SKILLS EACH ADDT'L 15 MIN 4/17/2023 Nessa Spangler, MS CCC-SLP  1    [id number] HC ST DEV OF COGN SKILLS EACH ADDT'L 15 MIN 4/17/2023 Nessa Spangler MS CCC-SLP  2                           Nessa Spangler MS CCC-SLP  4/17/2023

## 2023-04-17 NOTE — THERAPY TREATMENT NOTE
Inpatient Rehabilitation - Speech Language Pathology Treatment Note    [facility]     Patient Name: Louise GARCIA  : 1965  MRN: [id number]    Today's Date: 2023                   Admit Date: 3/17/2023       Visit Dx:      ICD-10-CM ICD-9-CM   1. Impaired functional mobility, balance, gait, and endurance  Z74.09 V49.89       Patient Active Problem List   Diagnosis   • Sepsis   • Neck pain, chronic   • Upper back pain   • Paresthesia   • Cervical myelopathy   • Lower extremity weakness   • History of blood clots   • Chronic anticoagulation   • Seizures   • Anemia   • GERD (gastroesophageal reflux disease)   • Guillain-Mitchell syndrome   • Situational mixed anxiety and depressive disorder   • Mass of middle lobe of right lung   • Oral candidiasis   • Empyema of pleura   • MRSA bacteremia   • Idiopathic peripheral neuropathy       Past Medical History:   Diagnosis Date   • Degenerative disc disease, cervical    • Gestational diabetes    • H/O blood clots    • Hematemesis    • Seizures    • Septic shock        Past Surgical History:   Procedure Laterality Date   • APPENDECTOMY     • BACK SURGERY     • CHOLECYSTECTOMY     • ENDOSCOPY N/A 2016    Procedure: ESOPHAGOGASTRODUODENOSCOPY with biopsy;  Surgeon: Austen Brito MD;  Location: [facility] ENDOSCOPY;  Service:    • HYSTERECTOMY     • NECK SURGERY       approx 6 yrs ago   • THORACOSCOPY Right 3/8/2023    Procedure: THORACOSCOPY WITH DAVINCI ROBOT WITH COMPLETE DECORTICATION;  Surgeon: Chloe Cedillo MD;  Location: [facility] OR;  Service: Robotics - DaVinci;  Laterality: Right;   • TONSILLECTOMY     • TONSILLECTOMY AND ADENOIDECTOMY         SLP Recommendation and Plan                                                            SLP EVALUATION (last 72 hours)     SLP SLC Evaluation     Row Name 23 0930 04/15/23 1230 04/15/23 1100             Communication Assessment/Intervention    Document Type therapy note (daily note)  -AL therapy note  (daily note)  -HF therapy note (daily note)  -HF      Subjective Information -- no complaints  -HF --      Patient Observations -- alert;cooperative;agree to therapy  -HF alert;cooperative;agree to therapy  -HF      Patient/Family/Caregiver Comments/Observations Pt is pleasant and cooperative.  -AL -- --      Patient Effort -- good  -HF good  -HF      Symptoms Noted During/After Treatment -- none  -HF none  -HF         Pain Scale: Numbers Pre/Post-Treatment    Pretreatment Pain Rating 0/10 - no pain  -AL -- --         Phonation Goal 1 (SLP)    Comment (Phonation Goal 1, SLP) -- Introduced to straw phonation exercises. She required initial MAX cues to executive, faded to MIN-MOD over course of session. Encouraged continued completion as part of home exercises to promote forward resonance.  -HF --         Attention Goal 1 (SLP)    Improve Attention by Goal 1 (SLP) -- -- complete sustained attention task;80%;with minimal cues (75-90%)  -HF      Time Frame (Attention Goal 1, SLP) -- -- 1 week  -HF      Progress/Outcomes (Attention Goal 1, SLP) -- -- continuing progress toward goal  -HF      Comment (Attention Goal 1, SLP) -- -- Sustained attention/attention-to-detail exercise (written, structured exercise - T.V. guide questions) with MIN cues required, 90% accuracy.  -HF         Memory Skills Goal 1 (SLP)    Improve Memory Skills Through Goal 1 (SLP) -- -- use memory strategies;use external memory aid;recall details of the day;80%;with moderate cues (50-74%)  -HF      Time Frame (Memory Skills Goal 1, SLP) -- -- 1 week  -HF      Progress/Outcomes (Memory Skills Goal 1, SLP) -- -- continuing progress toward goal  -HF      Comment (Memory Skills Goal 1, SLP) -- The patient was 89% accurate for 1-back, but unable to complete 2-back due to difficulty with attention/memory.  -HF The patient required 3x verbal cue to support working memory when completing written, structured task (T.V. guide questions) due to forgetting what  question she was addressing.  -HF            User Key  (r) = Recorded By, (t) = Taken By, (c) = Cosigned By    Initials Name Effective Dates    AL Nessa Spangler, MS CCC-SLP 06/16/21 -     HF Shiva Songey, CCC-SLP 12/27/22 -                    EDUCATION    The patient has been educated in the following areas:       Cognitive Impairment Communication Impairment.             SLP GOALS     Row Name 04/17/23 0930 04/15/23 1230 04/15/23 1100       Phonation Goal 1 (SLP)    Comment (Phonation Goal 1, SLP) -- Introduced to straw phonation exercises. She required initial MAX cues to executive, faded to MIN-MOD over course of session. Encouraged continued completion as part of home exercises to promote forward resonance.  -HF --       Attention Goal 1 (SLP)    Improve Attention by Goal 1 (SLP) complete sustained attention task;80%;with minimal cues (75-90%)  -AL -- complete sustained attention task;80%;with minimal cues (75-90%)  -HF    Time Frame (Attention Goal 1, SLP) 1 week  -AL -- 1 week  -HF    Progress/Outcomes (Attention Goal 1, SLP) continuing progress toward goal  -AL -- continuing progress toward goal  -HF    Comment (Attention Goal 1, SLP) Acrostics task: 7/12 with NO cues, 12/ with MIN cues  -AL -- Sustained attention/attention-to-detail exercise (written, structured exercise - T.V. guide questions) with MIN cues required, 90% accuracy.  -HF       Memory Skills Goal 1 (SLP)    Improve Memory Skills Through Goal 1 (SLP) use memory strategies;use external memory aid;recall details of the day;80%;with moderate cues (50-74%)  -AL -- use memory strategies;use external memory aid;recall details of the day;80%;with moderate cues (50-74%)  -HF    Time Frame (Memory Skills Goal 1, SLP) 1 week  -AL -- 1 week  -HF    Progress/Outcomes (Memory Skills Goal 1, SLP) continuing progress toward goal  -AL -- continuing progress toward goal  -HF    Comment (Memory Skills Goal 1, SLP) Recalled 3/3 errands after 15 minutes with NO  Cues.  -AL The patient was 89% accurate for 1-back, but unable to complete 2-back due to difficulty with attention/memory.  -HF The patient required 3x verbal cue to support working memory when completing written, structured task (T.V. guide questions) due to forgetting what question she was addressing.  -HF       Organizational Skills Goal 1 (SLP)    Improve Thought Organization Through Goal 1 (SLP) completing a divergent naming task;80%;with minimal cues (75-90%)  -AL -- --    Time Frame (Thought Organization Skills Goal 1, SLP) 1 week  -AL -- --    Progress/Outcomes (Thought Organization Skills Goal 1, SLP) goal ongoing  -AL -- --    Comment (Thought Organization Skills Goal 1, SLP) Round: 11 with NO cues. Tall: 6 with NO cues  -AL -- --          User Key  (r) = Recorded By, (t) = Taken By, (c) = Cosigned By    Initials Name Provider Type    Nessa Kraus MS CCC-SLP Speech and Language Pathologist     Kacey Song CCC-SLP Speech and Language Pathologist                            Time Calculation:        Time Calculation- SLP     Row Name 04/17/23 0959             Time Calculation- SLP    SLP Start Time 0930  -AL      SLP Stop Time 1000  -AL      SLP Time Calculation (min) 30 min  -AL            User Key  (r) = Recorded By, (t) = Taken By, (c) = Cosigned By    Initials Name Provider Type    Nessa Kraus MS CCC-SLP Speech and Language Pathologist                  Therapy Charges for Today     Code Description Service Date Service Provider Modifiers Qty    39613143067 HC ST DEV OF COGN SKILLS INITIAL 15 MIN 4/17/2023 Nessa Spangler MS CCC-SLP  1    [id number] HC ST DEV OF COGN SKILLS EACH ADDT'L 15 MIN 4/17/2023 Nessa Spangler MS CCC-SLP  1                           Nessa Spangler MS CCC-SLP  4/17/2023

## 2023-04-17 NOTE — THERAPY TREATMENT NOTE
Inpatient Rehabilitation - Occupational Therapy Progress Note    [facility]     Patient Name: Louise GARCIA  : 1965  MRN: [id number]    Today's Date: 2023                 Admit Date: 3/17/2023         ICD-10-CM ICD-9-CM   1. Impaired functional mobility, balance, gait, and endurance  Z74.09 V49.89       Patient Active Problem List   Diagnosis   • Sepsis   • Neck pain, chronic   • Upper back pain   • Paresthesia   • Cervical myelopathy   • Lower extremity weakness   • History of blood clots   • Chronic anticoagulation   • Seizures   • Anemia   • GERD (gastroesophageal reflux disease)   • Guillain-[place] syndrome   • Situational mixed anxiety and depressive disorder   • Mass of middle lobe of right lung   • Oral candidiasis   • Empyema of pleura   • MRSA bacteremia   • Idiopathic peripheral neuropathy       Past Medical History:   Diagnosis Date   • Degenerative disc disease, cervical    • Gestational diabetes    • H/O blood clots    • Hematemesis    • Seizures    • Septic shock        Past Surgical History:   Procedure Laterality Date   • APPENDECTOMY     • BACK SURGERY     • CHOLECYSTECTOMY     • ENDOSCOPY N/A 2016    Procedure: ESOPHAGOGASTRODUODENOSCOPY with biopsy;  Surgeon: Austen Brito MD;  Location: [facility] ENDOSCOPY;  Service:    • HYSTERECTOMY     • NECK SURGERY       approx 6 yrs ago   • THORACOSCOPY Right 3/8/2023    Procedure: THORACOSCOPY WITH DAVINCI ROBOT WITH COMPLETE DECORTICATION;  Surgeon: Chloe Cedillo MD;  Location: [facility] OR;  Service: Robotics - DaVinci;  Laterality: Right;   • TONSILLECTOMY     • TONSILLECTOMY AND ADENOIDECTOMY               [facility] OT ASSESSMENT FLOWSHEET (last 12 hours)     IRF OT Evaluation and Treatment     Row Name 23 1100          OT Time and Intention    Document Type daily treatment  -JW     Mode of Treatment occupational therapy  -JW     Patient Effort good  -JW     Symptoms Noted During/After Treatment none  -JW     Row Name  04/17/23 1100          General Information    General Observations of Patient found in WC with  present, pleasant and agreeable to OT  -     Existing Precautions/Restrictions fall  contact px  -     Limitations/Impairments safety/cognitive  -     Row Name 04/17/23 1100          Pain Assessment    Pretreatment Pain Rating 0/10 - no pain  -JW     Posttreatment Pain Rating 0/10 - no pain  -     Row Name 04/17/23 1100          Cognition/Psychosocial    Affect/Mental Status (Cognition) WFL  -     Orientation Status (Cognition) oriented x 3  -JW     Follows Commands (Cognition) follows one-step commands  -     Personal Safety Interventions fall prevention program maintained;gait belt;nonskid shoes/slippers when out of bed;supervised activity  -     Cognitive Function memory deficit  -     Safety Deficit (Cognition) insight into deficits/self-awareness;judgment  -     Row Name 04/17/23 1100          Bathing    [place] Level (Bathing) bathing skills;contact guard assist;verbal cues  -     Position (Bathing) supported sitting;supported standing;sink side  -     Set-up Assistance (Bathing) obtain supplies  -     Comment (Bathing) sitting in  sinkside  -     Row Name 04/17/23 1100          Upper Body Dressing    [place] Level (Upper Body Dressing) upper body dressing skills;set up assistance  -     Position (Upper Body Dressing) supported sitting  -     Set-up Assistance (Upper Body Dressing) obtain clothing  -     Comment (Upper Body Dressing) WC at the sink  -     Row Name 04/17/23 1100          Grooming    [place] Level (Grooming) grooming skills;set up  -     Position (Grooming) supported sitting;sink side  -     Set-up Assistance (Grooming) obtain supplies  -     Comment (Grooming) assist to wash hair at sinkside  -     Row Name 04/17/23 1100          Bed-Chair Transfer    Bed-Chair [place] (Transfers) minimum assist (75% patient effort)  -      Assistive Device (Bed-Chair Transfers) wheelchair  -     Row Name 04/17/23 1100          Motor Skills    Motor Control/Coordination Interventions fine motor manipulation/dexterity activities  FMC task threading small beads onto string, tying string mod difficulty d/t complex coordination  -     Row Name 04/17/23 1100          Positioning and Restraints    Pre-Treatment Position sitting in chair/recliner  -     Post Treatment Position wheelchair  -     In Wheelchair sitting;exit alarm on;with family/caregiver;with SLP  -           User Key  (r) = Recorded By, (t) = Taken By, (c) = Cosigned By    Initials Name Effective Dates    Marisela Dunlap OT 06/10/21 -                  Occupational Therapy Education     Title: PT OT SLP Therapies (Done)     Topic: Occupational Therapy (Done)     Point: ADL training (Done)     Description:   Instruct learner(s) on proper safety adaptation and remediation techniques during self care or transfers.   Instruct in proper use of assistive devices.              Learning Progress Summary           Patient Acceptance, E, VU,NR by AF at 4/12/2023 1537    Comment: pts  had pictures of bathroom on phone, looked and discussed BSC over commode and laterally having to step into the bathroom from the EOB. will continue to address. 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    Acceptance, E, VU by WN at 4/4/2023 2329    Acceptance, E, VU by WN at 4/4/2023 0322   Family Acceptance, E, VU,NR by AF at 4/12/2023 1537    Comment: pts  had pictures of bathroom on phone, looked and discussed BSC over commode and laterally having to step into the bathroom from the EOB. will continue to address. 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    Acceptance, E, VU by WN at 4/4/2023 2329    Acceptance, E, VU by WN at 4/4/2023 0322                   Point: Home exercise program (Done)     Description:   Instruct learner(s) on  appropriate technique for monitoring, assisting and/or progressing therapeutic exercises/activities.              Learning Progress Summary           Patient Acceptance, E, VU by WN at 4/4/2023 2329    Acceptance, E, VU by WN at 4/4/2023 0322   Family Acceptance, E, VU by WN at 4/4/2023 2329    Acceptance, E, VU by WN at 4/4/2023 0322                   Point: Precautions (Done)     Description:   Instruct learner(s) on prescribed precautions during self-care and functional transfers.              Learning Progress Summary           Patient Acceptance, E, VU by WN at 4/4/2023 2329    Acceptance, E, VU by WN at 4/4/2023 0322   Family Acceptance, E, VU by WN at 4/4/2023 2329    Acceptance, E, VU by WN at 4/4/2023 0322                   Point: Body mechanics (Done)     Description:   Instruct learner(s) on proper positioning and spine alignment during self-care, functional mobility activities and/or exercises.              Learning Progress Summary           Patient Acceptance, E, VU by WN at 4/4/2023 2329    Acceptance, E, VU by WN at 4/4/2023 0322   Family Acceptance, E, VU by WN at 4/4/2023 2329    Acceptance, E, VU by WN at 4/4/2023 0322                               User Key     Initials Effective Dates Name Provider Type Discipline    AF 06/16/21 -  Heaven Villareal OTR Occupational Therapist OT     08/23/22 -  Jose Carrillo RN Registered Nurse Nurse                    OT Recommendation and Plan                         Time Calculation:      Time Calculation- OT     Row Name 04/17/23 1540 04/17/23 1539          Time Calculation- OT    OT Start Time 1430  - 1100  -     OT Stop Time 1500  - 1130  -     OT Time Calculation (min) 30 min  - 30 min  -           User Key  (r) = Recorded By, (t) = Taken By, (c) = Cosigned By    Initials Name Provider Type     Marisela Logan OT Occupational Therapist              Therapy Charges for Today     Code Description Service Date Service Provider Modifiers Qty     97113328184 HC OT SELF CARE/MGMT/TRAIN EA 15 MIN 4/17/2023 Marisela Logan OT GO 2    66166215504 HC OT NEUROMUSC RE EDUCATION EA 15 MIN 4/17/2023 Marisela Logan OT GO 2                   Marisela Logan OT  4/17/2023

## 2023-04-18 PROCEDURE — 94761 N-INVAS EAR/PLS OXIMETRY MLT: CPT

## 2023-04-18 PROCEDURE — 94799 UNLISTED PULMONARY SVC/PX: CPT

## 2023-04-18 PROCEDURE — 97110 THERAPEUTIC EXERCISES: CPT

## 2023-04-18 PROCEDURE — 94664 DEMO&/EVAL PT USE INHALER: CPT

## 2023-04-18 PROCEDURE — 97116 GAIT TRAINING THERAPY: CPT

## 2023-04-18 PROCEDURE — 94760 N-INVAS EAR/PLS OXIMETRY 1: CPT

## 2023-04-18 PROCEDURE — 97530 THERAPEUTIC ACTIVITIES: CPT

## 2023-04-18 PROCEDURE — 97112 NEUROMUSCULAR REEDUCATION: CPT

## 2023-04-18 PROCEDURE — 97130 THER IVNTJ EA ADDL 15 MIN: CPT

## 2023-04-18 PROCEDURE — 97129 THER IVNTJ 1ST 15 MIN: CPT

## 2023-04-18 PROCEDURE — 97535 SELF CARE MNGMENT TRAINING: CPT

## 2023-04-18 RX ORDER — ACETAMINOPHEN 325 MG/1
650 TABLET ORAL EVERY 6 HOURS PRN
Status: DISCONTINUED | OUTPATIENT
Start: 2023-04-18 | End: 2023-04-21 | Stop reason: HOSPADM

## 2023-04-18 RX ORDER — ACETAMINOPHEN 500 MG
1000 TABLET ORAL EVERY 6 HOURS PRN
Status: DISCONTINUED | OUTPATIENT
Start: 2023-04-18 | End: 2023-04-18

## 2023-04-18 RX ADMIN — Medication 100 MG: at 07:48

## 2023-04-18 RX ADMIN — GABAPENTIN 300 MG: 300 CAPSULE ORAL at 20:46

## 2023-04-18 RX ADMIN — Medication 500 MCG: at 07:48

## 2023-04-18 RX ADMIN — DESVENLAFAXINE SUCCINATE 25 MG: 25 TABLET, EXTENDED RELEASE ORAL at 07:48

## 2023-04-18 RX ADMIN — Medication 5 MG: at 20:46

## 2023-04-18 RX ADMIN — IPRATROPIUM BROMIDE AND ALBUTEROL SULFATE 3 ML: 2.5; .5 SOLUTION RESPIRATORY (INHALATION) at 20:50

## 2023-04-18 RX ADMIN — APIXABAN 5 MG: 5 TABLET, FILM COATED ORAL at 20:47

## 2023-04-18 RX ADMIN — IPRATROPIUM BROMIDE AND ALBUTEROL SULFATE 3 ML: 2.5; .5 SOLUTION RESPIRATORY (INHALATION) at 07:36

## 2023-04-18 RX ADMIN — OXYCODONE HYDROCHLORIDE 2.5 MG: 5 TABLET ORAL at 20:46

## 2023-04-18 RX ADMIN — APIXABAN 5 MG: 5 TABLET, FILM COATED ORAL at 07:48

## 2023-04-18 RX ADMIN — GABAPENTIN 300 MG: 300 CAPSULE ORAL at 05:43

## 2023-04-18 RX ADMIN — Medication 1 MG: at 07:47

## 2023-04-18 RX ADMIN — LAMOTRIGINE 200 MG: 100 TABLET ORAL at 07:48

## 2023-04-18 RX ADMIN — IPRATROPIUM BROMIDE AND ALBUTEROL SULFATE 3 ML: 2.5; .5 SOLUTION RESPIRATORY (INHALATION) at 16:04

## 2023-04-18 RX ADMIN — PANTOPRAZOLE SODIUM 40 MG: 40 TABLET, DELAYED RELEASE ORAL at 05:43

## 2023-04-18 RX ADMIN — GABAPENTIN 300 MG: 300 CAPSULE ORAL at 14:51

## 2023-04-18 RX ADMIN — OXYCODONE HYDROCHLORIDE 2.5 MG: 5 TABLET ORAL at 14:51

## 2023-04-18 NOTE — PROGRESS NOTES
Recreational Therapy Note    Patient Name: Louise GARCIA   MRN: [id number]    Therapeutic Recreation Eval and Treat (last 12 hours)     Therapeutic Recreation Eval & Treat     Row Name 04/18/23 1400       Therapeutic Recreation Participation    Recreation Therapy Participation games  -    Games other (see comments)  Estefany  -    Objectives of Recreation Participation increase;sense of autonomy by choosing level of participation  -    Comment, Recreation Participation WFL to recall directions;scorekeeping with occ cues  -    Recreation Therapy Summary of Participation active participation  -          User Key  (r) = Recorded By, (t) = Taken By, (c) = Cosigned By    Initials Name Provider Type    SS Mariel Cantu, CTRS Recreational Therapist                  DELVIN Wei  4/18/2023

## 2023-04-18 NOTE — PROGRESS NOTES
LOS: 32 days   Patient Care Team:  Chloe Schaefer APRN as PCP - General (Family Medicine)  Mikhail Glez MD as Consulting Physician (Neurology)      Patient Name: ELEONORA GARCIA  Patient : 1965    ADMITTING DIAGNOSIS:  Diagnoses    1. IMPAIRED FUNCTIONAL MOBILITY, BALANCE, GAIT, AND ENDURANCE           SUBJECTIVE:    Comfortable with her breathing.  Still some right-sided rib pain at times.  Had a O2 sat in the mid 80s last night on room air and placed back on oxygen at nighttime.  Has overnight pulse oximetry study tomorrow.  Gait improving.  Still slow federico.  Worked with a quad cane today.    REVIEW OF SYSTEMS:    General: denies weight change, fatigue, weakness, fever, chills, night sweats.   Skin: denies itching, rashes, sores and bruises.   Neurologic: denies headache, nausea, vomiting, or visual changes.   HEENT:  denies discharge, sore throat, allergies, and congestion.   Respiratory: denies shortness of breath, wheeze, cough and hemoptysis.   Cardiac:  denies chest pain or palpitations.   Gastrointestinal:  denies changes in appetite, nausea, vomiting, dysphagia, abdominal pain, constipation, or diarrhea.   Urinary:  denies urgency and frequency.  Musculoskeletal:  denies muscle weakness, pain, or joint stiffness.     OBJECTIVE:    Vitals:    23 1306   BP: 135/65   Pulse: 93   Resp: 18   Temp: 97.8 °F (36.6 °C)   SpO2: 99%       PHYSICAL EXAM:   General: pleasant, in no acute distress  HEENT: NCAT, sclera anicteric, conjunctiva pink, mucoa moist  Cardiovascular: RRR, +S1+S2, no obvious m/g/r  Lungs: Decreased breath sounds on the right side-no change  Abdomen: normoactive bowel sounds, soft, tender to palpation in the epigastrum, no guarding or rebound tenderness  Extremities: no peripheral edema  Skin: exposed surfaces of skin warm, intact, without erythema  Neuro: awake alert and oriented to person, place, time, and situation, CN II-XII grossly intact  MSK:  Improved strength in  the bilateral upper extremities and lower extremities.  Someweakness left lower extremity compared to right lower extremity      MEDICATIONS  Scheduled Meds:  apixaban, 5 mg, Oral, Q12H  desvenlafaxine, 25 mg, Oral, Daily  folic acid, 1 mg, Oral, Daily  gabapentin, 300 mg, Oral, Q8H  ipratropium-albuterol, 3 mL, Nebulization, 4x Daily - RT  lamoTRIgine, 200 mg, Oral, Daily  melatonin, 5 mg, Oral, Nightly  pantoprazole, 40 mg, Oral, Q AM  thiamine, 100 mg, Oral, Daily  vitamin B-12, 500 mcg, Oral, Daily        Continuous Infusions:       PRN Meds:  •  acetaminophen  •  bisacodyl  •  Eucerin original healing lotion  •  hydrocortisone-bacitracin-zinc oxide-nystatin  •  ondansetron ODT  •  oxyCODONE  •  polyethylene glycol  •  senna-docusate sodium  •  simethicone      RESULTS:  No results found for: POCGLU  Results from last 7 days   Lab Units 04/17/23  0734 04/14/23  0753   WBC 10*3/mm3 10.65 8.79   HEMOGLOBIN g/dL 7.2* 7.6*   HEMATOCRIT % 21.8* 23.3*   PLATELETS 10*3/mm3 255 355     Results from last 7 days   Lab Units 04/17/23  0734 04/14/23  0753   SODIUM mmol/L 137 137   POTASSIUM mmol/L 4.1 3.8   CHLORIDE mmol/L 101 98   CO2 mmol/L 25.4 32.2*   BUN mg/dL 18 17   CREATININE mg/dL 1.00 1.17*   CALCIUM mg/dL 10.2 11.3*   GLUCOSE mg/dL 95 123*           ASSESSMENT and PLAN:    Idiopathic peripheral neuropathy    Paresthesia    History of blood clots    Chronic anticoagulation    Seizures    Anemia    Guillain-[place] syndrome    MRSA bacteremia    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   S/p IVIG x 5 days  March 30-shows improvement with her strength proximally the upper extremities.  Improvement with her hand strength.  Improved strength in the right lower extremity but continues with profound weakness in the left lower extremity.  With weakness of the left ankle, will look at probable AFO for gait activities.  Transfers are moderate assist.  Ambulated with a  rolling walker up to 70 feet with gait deviations minimal moderate assist  -4/6-reconsulted neurology to see if she needs another round of IVIG.  Follow-up assessment with neurology-Previous IVIG dose was 20g daily for 5 days from 2/27-3/3.  Plan is to repeat another course of IVIG  - 4/10- fever this morning likely a side effect of IVIG.  She is scheduled to receive her final dose of this course today.  Will obtain CXR to rule out pneumonia.       Impaired mobility/impaired self care/ impaired cognition  Left greater than right lower extremity weakness greater than bilateral upper extremity weakness  April 4-strength improved in the bilateral upper extremities and right lower extremity.Weakness  looks about the same the left lower extremity.  On the day she ambulated further last week she had utilized AFO on the left lower extremity  April 5-stronger with her left hip flexors and knee extensors today  April 6- strength and coordination improving.  Per OT note she is needing set up assist for upper body dressing with max assist for lower body dressing, PT is noticing that the patient appears stronger and is limited by weakness and fear.  Moderate assist between chair and bed but min assist for sit to stand.  Very minimal knee buckling when walking with left AFO.  Making good progress overall.  April 17- strength is significantly improved from last week on the left lower extremity.  She is walking well with PT.  Progressing well with therapies.     R lung masslike infiltrate with cavitary lesions/pleural effusion   S/p thoracentesis - Blood cx and pleural fluid  positive for MRSA      Chest tube placement given empyema, and she underwent thoracoscopy w/ decortication 3/8/23  -expected postoperative changes with pleural thickening and minimal pleural effusion.  The effusion is too small for intervention and will likely to continue to resolve with time.  Recommend continue good pulmonary hygiene with incentive  spirometry hourly as well as increase activity/ambulation as tolerated.  March 23-appears decreased breath sounds more noticeable today on the right compared to the left.  Check follow-up chest x-ray.  On room air during the day, 1 L at night.  March 24- CXR relatively unchanged from previous, reached out to CT surgery given planned outpatient f/u on 3/28, no further imaging planned at this time as patient afebrile with normal WBC, monitor     Right-sided Chest Pain 3/24  -EKG sinus tach  -HS Troponin 25 > 24  -consider Cardiology consult if worsening  -pain reproducible with palpation, pain likely postoperative neuralgia as indicated vs. possible costochondritis, continue gabapentin, ice/heat, monitor  -4/10- patient continues to complain of right-sided rib pain.  We will touch base with thoracic surgery about nerve block for postoperative neuralgia.  - 4/12- thoracic surgery may refer for outpatient nerve block.  We will continue to monitor and consider going up on gabapentin.    Nutrition-  April 13-Patient has had decreased p.o. intake with weight loss.  Discussed option of supplemental tube feeds with the dietitian.     Hypokalemia  -3/24 K 3.3, repleted  -3/29 K 3.7, resolved      Mass on TTE with findings concerning for endocarditis - underwent MARIAMA 3/10/2023 which had no vegetation  RUE superficial thrombophlebitis concerning for septic phlebitis.     ID - continue 4 weeks of vancomycin as recommended by infectious disease dosed by pharmacy to achieve a trough level of 15-20 or area under the curve of 400-600 from last negative blood culture or last intervention which ever is the latest - to complete April 1, 2023     Left hip arthrocentesis March 16 to rule out any hip septic arthritis - no growth to date on fluid.    Lower back pain - MRI of spine to rule out discitis or osteomyelitis.  This did have known degenerative changes but  no infection.      DVT prophylaxis - SCDs / apixiban. History of LLE DVT -  on Eliquis at home     Pain management   - Tylenol 1,000 mg three times a day/ Celebrex 100 mg q 112 hours/ gabapentin 300 mg q 8 hours Oxycodone 5 mg q 4 hours prn  March 18 - DC Celebrex 2/2 PHYLLIS, and anemia      Anxiety/ muscle spasms - Diazepam 2 mg q 6 hours prn - JONES reviewed     Seizure disorder - Lamotrigine 200 mg daily     ABLA   - March 18- Hgb 7.0 (7.3 on 3/16). Type, cross and transfuse 1 unit PRBCs. Pre-medicate with Tylenol and Benadryl. CBC in am.   March 19- Hgb 8.8 s/p 1 unit PRBcs. Hemoccult positive. On Eliquis for h/o DVT.  Per IM -[likely 2/2 anemia of chronic disease; no signs of active bleeding  -s/p 1unit PRBCs w/ appropriate rise in Hb  -iron studies c/w ACD  -would not stop Eliquis at this time unless active bleeding noted or Hb drop continues]  March 20 - HGB 9.3 s/p transfusion  March 21-reviewed with internal medicine-hemoglobin stable after transfusion.  Iron studies consistent with inflammation.  No further work-up presently  March 22 - Hgb 8.9, stable  March 23-hemoglobin trend 8.3.  Continue to follow.  Recheck tomorrow  March 29 - Hgb 8.1, stable  April 6- hemoglobin 8.3.  Stable.  April 10- hemoglobin slightly down trended to 7.7.  Monitor.  April 14 - Hgb 7.6, felt Anemia of chronic disease  April 17 - Hgb slowly trending down. 7.2 today. Monitor. Consider iron infusion.      PHYLLIS   March 18- Creatinine 1.36 up from 1.12. On IV Vanc and Celebrex. DC Celebrex. BMP in am.  March 19- s/p 500ml NS bolus. Creatinine 1.31.  March 23-creatinine 0.92  April 10- creatinine downtrending to 1.12 with increased fluids.    Nausea  -4/12-nausea with vomiting this morning.  Declined to participate in speech therapy due to not feeling well.  Zofran 4 mg every 6 hours as needed for nausea and vomiting added and administered.  No signs of systemic inflammation or infection.  Labs will be drawn tomorrow.     TEAM CONF - MARCH 21 - BED MOD X 2. TRANSFERS MAX 2. STAND PIVOT OR SQUAT PIVOT.  NON-AMBULATORY. Saturday MARCH 18 GAIT 6 FEET PARALLEL BARS MOD MAX OF 2.   BATH MOD 1-2. LBD DEP. UBD MOD. EATING MIN. GROOMING MIN. TOILETING DEP.   The patient has difficulty remembering new information due to short-term memory impairments.  Initial evaluation 3.18, pt obtained a score 12/30 on SLUMS cognitive assessment indicating severe cognitive impairment/dementia, goals initiated for memory and sustaining attention  FEES completed 3.7 revealing glottic gap accounting for glottic insufficiency, dysphonia, hoarse/breathy quality, recommend targeting vocal function as appropriate d/t impact on intelligibility.    Regular/thin diet continued since FEES completion 3.7, although pt known to cough with and without PO intake, may be contributed partially to ineffective VF closure.   DRESSING FORMER CHEST TUBE SITE. CONTINUES ON PUREWICK AT NIGHT. O2 AT 1-2 LITERS AT NIGHT.  DECREASED APPETITE. ABD X-RAY THIS AM SHOWS NON-OBSTRUCTIVE BOWEL GAS PATTERN.   ELOS - 4 WEEKS     TEAM CONF - MARCH 28 - BED MIN CTG. TRANSFER MOD ASSIST STAND PIVOT OR SQUAT PIVOT. FATIGUES IN AFERNOONS. GAIT 15 FEET RW MIN X 2. FLUCTUATING ORIENTATION. BETTER DETAIL TO TASK. MIN MOD CUES FOR REASONING TASKS. DYSPHONIA FROM COUGHING.  SLP REVIEWED VOICE HYGIENE, NOT TO DO HARSH THROAT CLEAR. BATH MOD. LBD MAX. UBD MIN. TOILETING MOD X 2. CONTINENT/INCONTINENT BLADDER. RIGHT CHEST TUBE / THORACOSCOPY SITE CARE. VARIABLE INTAKE.   ELOS - 3 WEEKS     TEAM CONF - APRIL 4 - CONTINUES WEAK IN LLE. STRONGER IN BUE AND RIGHT KNEE EXTENSION. BED MIN MOD TO CTG. TRANSFERS MOD MAX STAND PIVOT OR SQUAT PIVOT. WORKED ON GAIT IN PARALLEL BARS MIN MOD OF 2 PERSON. CAN DO 15 FEET IF NOT SO ANXIOUS. TOILET AND SHOWER TRANSFERS MOD ASSIST. DROP ARM BEDSIDE COMMODE. AT NIGHT USING BEDPAN. USING PUREWICK AT NIGHT. BATH MIN. LBD MOD. UBD SET UP.TOILETING MOD MAX 2.    Pt with improved ability to recall salient events  from day.Now presents with mild-moderate  hoarse vocal  quality. Improved vocal intensity.  ADDRSSING DYSPHONIA. DIETICIAN REVIEWED FOOD CHOICES. GABAPENTIN FOR RIGHT RIB PAIN/INTERCOSTAL PAIN.CONTINENT BOWEL AND BLADDER WITH URGENCY.   ELOS - 2-3 WEEKS     Team Conference - 4/11/2023  Nursing: overnight using purewick,   PT: mod assist for transfers due to weight shifting, bed mobility w/wo rails standby/contact guard, shallow four inch step with rails 1-2 person assist, walking 60' with walker, trial AFO and ace wrap assist doesn't seem to help a lot now that she no longer has a hard collapse of her ankle/knee, anticipate she will walk safely with walker in their RV home but need a wheelchair for community ambulation  OT: toilet transfers mod assist with one, clothing management max-mod  SLP: mild-mod cognitive impairment, struggles with oxycodone, max cues for verbal working memory in the morning but min in the afternoon, min-mod cueing, improvements with carryover  Psychology: trouble with thought organization and concentration, trouble with working memory, uncertain of self, lacking confidence with some anxiety completing tasks  Social Work: recommending home health  Discharge Date: 1.5 weeks    TEAM CONF - April 18 - STAIR MIN ASSIST. GAIT 80 FEET RW CDTG MIN.   TRANSFERS CTG STAND PIVOT. BED SBA CTG. LBD CTG MIN. UBD SET UP. TOILETING MOD.   Memory( ): Met goal for recall of 3 errands after 15 minutes with NO cues. Requires MIN-MOD cues to complete high-level verbal working memory tasks.Voice( ): Improved vocal quality. Able to achieve adequate vocal quality with MIN-MOD cues for frontal tone focus and diaphragmatic breathing. Overall mild hoarse vocal quality.  NEUROLOGY TO SCHEDULE FOLLOW UP IN ONE MONTH TO RE-ASSESS FOR FURTHER IVIG. OVERNIGHT PULSE OX WED NIGHT. APPETITE IMPROVED, 100%.  ELOS - Friday. HOME HEALTH PT, OT, SLP NURSING    CODE STATUS:  Level Of Support Discussed With: Patient  Code Status (Patient has no pulse and is not  breathing): CPR (Attempt to Resuscitate)  Medical Interventions (Patient has pulse or is breathing): Full Support      Admission Status: Continues to meet requirements for inpatient admission.       Ryley Rider MD    During rounds, used appropriate personal protective equipment including mask and gloves.  Additional gown if indicated.  Mask used was standard procedure mask. Appropriate PPE was worn during the entire visit.  Hand hygiene was completed before and after.

## 2023-04-18 NOTE — PLAN OF CARE
Goal Outcome Evaluation:  Plan of Care Reviewed With: patient, spouse        Progress: improving  Outcome Evaluation: Pain med for R flank and LLE pain. Meds with thin liquids. O2 2L n/c at night for sleep apnea. Purewick at night. R flank puncture/ incisions open to air, approximated, no drainage.  stayed the night in room, has been checked off by therapy to transfer pt.

## 2023-04-18 NOTE — PROGRESS NOTES
Case Management  Inpatient Rehabilitation Team Conference    Conference Date/Time: 4/18/2023 9:46:38 AM    Team Conference Attendees:  Dr. Ryley Naqvi, Dillan King, Pharmacist  Silke Mendosa, MELODY Carr, PT  Heaven Villareal, OT  Nessa Spangler, SLP  Mariel Cantu, CTRS  Ana Burdick, VIKA Paris, VIKA    Demographics            Age: 58Y            Gender: Female    Admission Date: 3/17/2023 8:27:00 PM  Rehabilitation Diagnosis:  Idiopathic peripheral neuropathy  Past Medical History: Past Medical History:  DiagnosisDate  ?Degenerative disc disease, cervical?  ?Gestational diabetes?  ?H/O blood clots?  ?Hematemesis?  ?Seizures (HCC)?  ?Septic shock (HCC)?    ?  ?  Surgical History  Past Surgical History:  ProcedureLateralityDate  ?APPENDECTOMY??  ?BACK SURGERY??  ?CHOLECYSTECTOMY??  ?ENDOSCOPYN/A8/30/2016  ?Procedure: ESOPHAGOGASTRODUODENOSCOPY with biopsy;  Surgeon: Austen Brito MD;  Location: [facility] ENDOSCOPY;  Service:  ?HYSTERECTOMY??  ?NECK SURGERY??  ? approx 6 yrs ago  ?THORACOSCOPYRight3/8/2023  ?Procedure: THORACOSCOPY WITH DAVINCI ROBOT WITH COMPLETE DECORTICATION;  Surgeon: Chloe Cedillo MD;  Location: [facility] OR;  Service: Robotics -  DaVinci;  Laterality: Right;  ?TONSILLECTOMY??  ?TONSILLECTOMY AND ADENOIDECTOMY?1975    ?      Plan of Care  Anticipated Discharge Date/Estimated Length of Stay: ELOS: DC 4/21  Anticipated Discharge Destination: Community discharge with assistance  Discharge Plan : Patient lives with  in  travel trailer. 3 fold down  steps with rail to enter.  Family conference held with patient and  on 4/7.  D/C plan is home with .  not currently working.  Medical Necessity Expected Level Rationale: Goals are to achieve a level of CGA  w/ ADL's and mobility..  Rehabilitation prognosis fair.  Medical prognosis fair.  Intensity and Duration: an average of 3 hours/5 days per week  Medical Supervision and 24  Hour Rehab Nursing: x  Physical Therapy: x  PT Intensity/Duration: 1 hour / day, 5 days / week, for approximately  indetermine length of stay.  Occupational Therapy: x  OT Intensity/Duration: 1 hour / day, 5 days / week, for approximately  indetermine length of stay.  Speech and Language Therapy: x  SLP Intensity/Duration: 1 hour / day, 5 days / week, for approximately  indetermine length of stay.  Social Work: x  Therapeutic Recreation: x  Psychology: x  Registered Dietician: x  Updated (if changes indicated)    Anticipated Discharge Date/Estimated Length of Stay:   ELOS: DC 4/21    Based on the patient's medical and functional status, their prognosis and  expected level of functional improvement is: Goals are to achieve a level of CGA  w/ ADL's and mobility..  Rehabilitation prognosis fair.  Medical prognosis fair.      Interdisciplinary Problem/Goals/Status    All Rehab Problems:  Sphincter Control    [RN] Bladder Management(Active)  Current Status(04/18/2023): Bladder urgency  Weekly Goal(04/25/2023): decrease in episodes of incontinence  Discharge Goal: Continent 100%    [RN] Bowel Management(Active)  Current Status(04/18/2023): Patient is 100% continent of bowel  Weekly Goal(04/24/2023): Patient will maintain a daily bowel pattern.  Discharge Goal: Patient will maintain 100% continence of bowel        Psychosocial    [RN] Coping/Adjustment(Active)  Current Status(04/18/2023): Anxiety r/t uncertainty of disease course  Weekly Goal(04/25/2023): Allow opportunity to express concerns regarding current  status  Discharge Goal: Patient/family will verbalize decreased feelings of anxiety.        Body Systems    [RN] Neurological(Active)  Current Status(04/18/2023): Impaired physical mobility r/t decreased muscle  strength/control and limited ROM  Weekly Goal(04/25/2023): Patient will have increased ROM  Discharge Goal: Patient will have improved strength and function of BLE and B  hands.        Safety    [RN]  Potential for Injury(Active)  Current Status(04/18/2023): Risk for falls  Weekly Goal(04/24/2023): Family/Caregivers regarding safety precautions and need  for close supervision  Discharge Goal: Patient/family will be aware of risk of fall and safety in the  home setting.        Cognition    [ST] Memory(Active)  Current Status(04/18/2023): Met goal for recall of 3 errands after 15 minutes  with NO cues. Requires MIN-MOD cues to complete high-level verbal working memory  tasks.  Weekly Goal(04/21/2023): Will complete high-level verbal working memory tasks  with MIN-NO cues.  Discharge Goal: The patient will improve memory necessary for home-based  participation when given appropriate supervision and cuing.        Self Care    [OT] Bathing(Active)  Current Status(04/18/2023): CGA  Weekly Goal(04/21/2023): CGA  Discharge Goal: CGA    [OT] Dressing (Lower)(Active)  Current Status(04/18/2023): CGA/MIN with standing  Weekly Goal(04/21/2023): CGA  Discharge Goal: CGA    [OT] Dressing (Upper)(Active)  Current Status(04/18/2023): set up  Weekly Goal(04/21/2023): set up  Discharge Goal: Set up    [OT] Eating(Active)  Current Status(04/18/2023): set up  Weekly Goal(04/21/2023): set up  Discharge Goal: Mod I    [OT] Grooming(Active)  Current Status(04/18/2023): set up  Weekly Goal(04/21/2023): supervision  Discharge Goal: supervision    [OT] Toileting(Active)  Current Status(04/18/2023): MOD  Weekly Goal(04/21/2023): MI/CGA  Discharge Goal: MIN/CGA        Mobility    [OT] Tub/Shower Transfers(Active)  Current Status(04/10/2023): MOD A x 1  Weekly Goal(04/11/2023): Min  Discharge Goal: CGA    [OT] Bed/Chair/Wheelchair(Active)  Current Status(04/18/2023): MIN  Weekly Goal(04/21/2023): CGA  Discharge Goal: CGA/Min A x1    [OT] Toilet Transfers(Active)  Current Status(04/18/2023): MOD A X 1  Weekly Goal(04/21/2023): Min  Discharge Goal: CGA    [PT] Stairs(Active)  Current Status(04/17/2023): Standard step B rails Bianca x1  Weekly  Goal(04/24/2023): PT only  Discharge Goal: 8in step x 3, single rails, Bianca    [PT] Walk(Active)  Current Status(04/17/2023): 80' using FWW CGA/Bianca VC with w/c follow  Weekly Goal(04/24/2023): PT only  Discharge Goal: 80ft CGA rwx    [PT] Bed/Chair/Wheelchair(Active)  Current Status(04/10/2023): CGA stand pivot  Weekly Goal(04/17/2023): CGA  Discharge Goal: CGA    [PT] Bed Mobility(Active)  Current Status(04/17/2023): SBA/CGA  Weekly Goal(04/24/2023): SBA  Discharge Goal: SBA        Communication    [ST] Expression(Resolved)  Current Status(03/27/2023): No overt word finding difficulty observed in  conversation.  Weekly Goal(03/28/2023): Goal met  Discharge Goal: Functional language to express wants, needs, ideas, feelings,  concepts with 100% accuracy NO cues for compensations    [ST] Voice(Active)  Current Status(04/18/2023): Improved vocal quality. Able to achieve adequate  vocal quality with MIN-MOD cues for frontal tone focus and diaphragmatic  breathing. Overall mild hoarse vocal quality.  Weekly Goal(04/21/2023): Complete frontal tone focus voice exercises with MIN  cues.  Discharge Goal: Functional voice for utilization in all settings, 90%  intelligibility NO cues for compensatory strategies        Comments: 3/21 Bed mob Mod A x 2 w/ rails and HOB elevated, Max A x 2 for stand  pivot or squat pivot transfer, Max A x 2 for toilet transfer.  Dep for  toileting.  Difficulty with remembering new information due to short term memory  impairments.  Word finding tasks are 50% w/ mod cues.  Bladder urgency,  continent of bowel.    3/28 Bed Mob Min to CGA w/ HOB elevated.  Mod A for stand pivot or squat with  transfers to bed.  Amb 15' using FWW Min A x 2 VC's w/ w/c follow.  Mod A x 2  for toilet transfer.  Mod A bathing, Mod A x 2 toileting.  Moderate hoarse vocal  quality.  No overt word finding observed in conversation.  Bladder urgency,  continent of bowel.    4/4 Bed mob Min / Mod to CGA w/ HOB elevated.  Mod  "/ Max w/ stand pivot or squat  pivot transfers, amb 5 ' using FWW Min A x2 VC w/ w/c follow.  Mod A x 2 toilet  transfer and shower transfer.  Mild - Mod hoarse vocal quality.  Bladder urgency  and continent of bowel.  Anxiety.  Getting family conference scheduled.    4/11 Bed mob SBA / CGA, Mod A squat / stand pivot w/ transfers, can do a 4\" step  w/ B rails Min A x 2, amb 60' using FWW Min A VC's w/ w/c follow.  Mod A x 1  toilet transfer.  Bathing Min A, Mod A x 2 toileting.  Mild - Mod memory  impairment.  No overt word finding difficulty.  Mild - Mod hoarse vocal quality.   Bladder urgency, continent of bowel.    4/18 Bed mob SBA / CGA, CGA stand pivot for transfers, amb 80' using FWW CGA /  Min A VC's w/ w/c follow.  Mod A x 1 toilet transfer,  has been checked  off to assist pt w/ toileting.  Mod A shower transfer.  Mod A toileting.  Good  progress w/ memory and voice.  Bladder urgency, cont of bowel.  Taking pain meds  after therapy.    Signed by: Catarina Bolden, PT    Physician CoSigned By: Ryley Rider 04/18/2023 09:50:45    "

## 2023-04-18 NOTE — PLAN OF CARE
Goal Outcome Evaluation:           Progress: improving  Outcome Evaluation: A&OX4. Forgetful. Difficulty with word-finding at times. Idiopathic peripheral neuropathy. Full code. Hx. chronic anemia and seizures. Daily weight. 2L O2 at nite as she de-sats into 80s when sleeping.  Assistx1 with the wheelchair. Stronger in the legs. Continent and incontinent of B&B. Last BM 4/17. Wears a brief. On scheduled gabapentin and tylenol. Has PRN Aura 2.5 mg. Seizure precautions. Siderails padded. On-going pain is better. Diet: Reg, thins. Ate better at meals. Is drinking boost shakes. Has napped after therapies. Runs tachy. Participated fully in therapy. Uses a bedpan. Wears glasses.  at bedside.  checked off to transfer and to take to the restroom. Overight sleep pulse ox study nite of 4/19. D/C home with  4/21, Friday.

## 2023-04-18 NOTE — THERAPY TREATMENT NOTE
Inpatient Rehabilitation - Physical Therapy Treatment Note       [facility]     Patient Name: Louise GARCIA  : 1965  MRN: [id number]    Today's Date: 2023                    Admit Date: 3/17/2023      Visit Dx:     ICD-10-CM ICD-9-CM   1. Impaired functional mobility, balance, gait, and endurance  Z74.09 V49.89       Patient Active Problem List   Diagnosis   • Sepsis   • Neck pain, chronic   • Upper back pain   • Paresthesia   • Cervical myelopathy   • Lower extremity weakness   • History of blood clots   • Chronic anticoagulation   • Seizures   • Anemia   • GERD (gastroesophageal reflux disease)   • Guillain-Brocton syndrome   • Situational mixed anxiety and depressive disorder   • Mass of middle lobe of right lung   • Oral candidiasis   • Empyema of pleura   • MRSA bacteremia   • Idiopathic peripheral neuropathy       Past Medical History:   Diagnosis Date   • Degenerative disc disease, cervical    • Gestational diabetes    • H/O blood clots    • Hematemesis    • Seizures    • Septic shock        Past Surgical History:   Procedure Laterality Date   • APPENDECTOMY     • BACK SURGERY     • CHOLECYSTECTOMY     • ENDOSCOPY N/A 2016    Procedure: ESOPHAGOGASTRODUODENOSCOPY with biopsy;  Surgeon: Austen Brito MD;  Location[facility];  Service:    • HYSTERECTOMY     • NECK SURGERY       approx 6 yrs ago   • THORACOSCOPY Right 3/8/2023    Procedure: THORACOSCOPY WITH DAVINCI ROBOT WITH COMPLETE DECORTICATION;  Surgeon: Chloe Cedillo MD;  Location: [facility] OR;  Service: Robotics - DaVinci;  Laterality: Right;   • TONSILLECTOMY     • TONSILLECTOMY AND ADENOIDECTOMY         PT ASSESSMENT (last 12 hours)     IRF PT Evaluation and Treatment     Row Name 23 1500          PT Time and Intention    Document Type daily treatment  -AE     Mode of Treatment individual therapy;physical therapy  -AE     Patient/Family/Caregiver Comments/Observations pt eager about using cane during  session  -AE     Row Name 04/18/23 1500          General Information    Patient Profile Reviewed yes  -AE     General Observations of Patient  bought SBQC  -AE     Existing Precautions/Restrictions fall;other (see comments)  contact precautions  -AE     Limitations/Impairments safety/cognitive  -AE     Row Name 04/18/23 1500          Pain Assessment    Pretreatment Pain Rating 3/10  -AE     Posttreatment Pain Rating 3/10  -AE     Pain Location - Side/Orientation Left  -AE     Pain Location anterior  -AE     Pain Location - epigastrium;other (see comments)  L thigh/groin  -AE     Row Name 04/18/23 1500          Cognition/Psychosocial    Affect/Mental Status (Cognition) WFL  -AE     Orientation Status (Cognition) oriented x 3  -AE     Follows Commands (Cognition) follows one-step commands  -AE     Personal Safety Interventions fall prevention program maintained;gait belt;muscle strengthening facilitated;nonskid shoes/slippers when out of bed;supervised activity  -AE     Cognitive Function memory deficit  -AE     Memory Deficit (Cognition) episodic memory;procedural memory  -AE     Row Name 04/18/23 1500          Bed-Chair Transfer    Bed-Chair [place] (Transfers) contact guard;minimum assist (75% patient effort)  -AE     Assistive Device (Bed-Chair Transfers) cane, quad;wheelchair  -AE     Row Name 04/18/23 1500          Chair-Bed Transfer    Chair-Bed [place] (Transfers) contact guard;minimum assist (75% patient effort)  -AE     Assistive Device (Chair-Bed Transfers) wheelchair;cane, quad  -AE     Row Name 04/18/23 1500          Sit-Stand Transfer    Sit-Stand [place] (Transfers) contact guard;minimum assist (75% patient effort)  -AE     Assistive Device (Sit-Stand Transfers) wheelchair;cane, quad  -AE     Row Name 04/18/23 1500          Stand-Sit Transfer    Stand-Sit [place] (Transfers) contact guard  -AE     Assistive Device (Stand-Sit Transfers) wheelchair;cane, quad  -AE     Row Name  04/18/23 1500          Gait/Stairs (Locomotion)    [place] Level (Gait) contact guard;minimum assist (75% patient effort)  -AE     Assistive Device (Gait) cane, quad  -AE     Distance in Feet (Gait) 10ft x 2, 20ft x 2  -AE     Pattern (Gait) step-through  -AE     Deviations/Abnormal Patterns (Gait) base of support, wide;left sided deviations;gait speed decreased;stride length decreased  -AE     Bilateral Gait Deviations heel strike decreased;knee buckling bilaterally  -AE     Left Sided Gait Deviations heel strike decreased  -AE     Right Sided Gait Deviations heel strike decreased  -AE     Gait Assessment/Intervention holds SBQC in RUE. needs min cues for sequencing and to look up  -AE     [place] Level (Stairs) minimum assist (75% patient effort);2 person assist  -AE     Assistive Device (Stairs) cane, quad  -AE     Handrail Location (Stairs) both sides;left side (ascending)  -AE     Number of Steps (Stairs) 4in step x 4, 7in step x 4  -AE     Ascending Technique (Stairs) step-to-step  -AE     Descending Technique (Stairs) step-to-step  -AE     Stairs, Safety Issues sequencing ability decreased;loses balance backward  -AE     Stairs Assessment/Intervention one hard buckle descending backwards down first step requiring therapist assistance in front and back to maintain upright posture. extended sitting break. instructed patient to descend quicker to limit time in single leg stance and no further episodes of buckling  -AE     Row Name 04/18/23 1500          Core Strength (Therapeutic Exercise)    Comment (Core Strength Therapeutic Exercise) holding red 1kg ball for forward weight shifting, trunk flexion down to floor all while returning to upright posture 3 x 5  -AE     Row Name 04/18/23 1500          Positioning and Restraints    Pre-Treatment Position sitting in chair/recliner  -AE     Post Treatment Position wheelchair  -AE     In Wheelchair sitting;call light within reach;encouraged to call for  assist;exit alarm on  -AE           User Key  (r) = Recorded By, (t) = Taken By, (c) = Cosigned By    Initials Name Provider Type    AE Deanna Carr, PT Physical Therapist              Wound 03/08/23 1917 Right lateral chest Incision (Active)   Dressing Appearance open to air 04/18/23 0747   Closure Liquid skin adhesive 04/18/23 0747   Base clean;dry 04/18/23 0747   Periwound pink 04/18/23 0747   Drainage Amount none 04/18/23 0747   Care, Wound cleansed with;soap and water 04/18/23 0747   Dressing Care open to air 04/18/23 0747   Periwound Care dry periwound area maintained 04/18/23 0747       Wound 03/11/23 1530 Other (See comments) other (see comments) pubis Abrasion (Active)   Dressing Appearance open to air 04/17/23 2107   Closure Open to air 04/18/23 0747   Base red;scab 04/18/23 0747   Drainage Amount none 04/18/23 0747   Dressing Care open to air 04/18/23 0747       Wound 03/20/23 1527 Bilateral upper gluteal MASD (Moisture associated skin damage) (Active)   Dressing Appearance open to air 04/17/23 2107   Closure Open to air 04/18/23 0747   Base blanchable;clean;dry 04/18/23 0747   Drainage Amount none 04/18/23 0747   Care, Wound cleansed with;soap and water;barrier applied 04/18/23 0747   Dressing Care open to air 04/18/23 0747   Periwound Care barrier ointment applied 04/18/23 0747     Physical Therapy Education     Title: PT OT SLP Therapies (Done)     Topic: Physical Therapy (Done)     Point: Mobility training (Done)     Learning Progress Summary           Patient Acceptance, E,D, VU,DU by LOLIS at 4/17/2023 1118    Acceptance, E, VU,NR by POWER at 4/12/2023 1341    Acceptance, E, VU,DU,NR by ETHAN at 4/8/2023 1528    Acceptance, E, VU by WN at 4/4/2023 2329    Acceptance, E, VU by YVONNE at 4/4/2023 0322    Acceptance, E,D, VU,DU by DP at 4/1/2023 1352    Acceptance, E, VU,DU by MG at 3/31/2023 0825    Acceptance, E, VU,DU by MG at 3/30/2023 0956    Acceptance, E,D, VU,DU by DP at 3/29/2023 1148     Nonacceptance, E,D, VU,DU by DP at 3/28/2023 1144    Acceptance, E,D, VU,DU by DP at 3/27/2023 1601    Acceptance, E,D, VU,NR by EE at 3/25/2023 1423    Acceptance, E,D, NR,VU,DU by DP at 3/21/2023 1448    Acceptance, E, NR by JK at 3/20/2023 1513    Acceptance, E,TB, VU,NR by SAMANTHA at 3/18/2023 1643   Family Acceptance, E, VU by WN at 4/4/2023 2329    Acceptance, E, VU by WN at 4/4/2023 0322                   Point: Home exercise program (Done)     Learning Progress Summary           Patient Acceptance, E,D, VU,DU by DP at 4/17/2023 1118    Acceptance, E, VU,NR by EE at 4/12/2023 1341    Acceptance, E, VU by WN at 4/4/2023 2329    Acceptance, E, VU by WN at 4/4/2023 0322    Acceptance, E,D, VU,DU by DP at 4/1/2023 1352    Acceptance, E, VU,DU by MG at 3/31/2023 0825    Acceptance, E, VU,DU by MG at 3/30/2023 0956    Acceptance, E,D, VU,DU by DP at 3/29/2023 1148    Nonacceptance, E,D, VU,DU by DP at 3/28/2023 1144    Acceptance, E,D, VU,DU by DP at 3/27/2023 1601    Acceptance, E,D, NR,VU,DU by DP at 3/21/2023 1448    Acceptance, E, NR by JK at 3/20/2023 1513   Family Acceptance, E, VU by WN at 4/4/2023 2329    Acceptance, E, VU by WN at 4/4/2023 0322                   Point: Body mechanics (Done)     Learning Progress Summary           Patient Acceptance, E,D, VU,DU by DP at 4/17/2023 1118    Acceptance, E, VU,DU,NR by CLOVERK at 4/8/2023 1528    Acceptance, E, VU by WN at 4/4/2023 2329    Acceptance, E, VU by WN at 4/4/2023 0322    Acceptance, E,D, VU,DU by DP at 4/1/2023 1352    Acceptance, E, VU,DU by MG at 3/31/2023 0825    Acceptance, E, VU,DU by MG at 3/30/2023 0956    Acceptance, E,D, VU,DU by DP at 3/29/2023 1148    Nonacceptance, E,D, VU,DU by DP at 3/28/2023 1144    Acceptance, E,D, VU,DU by DP at 3/27/2023 1601    Acceptance, E,D, VU,NR by EE at 3/25/2023 1423    Acceptance, E,D, NR,VU,DU by DP at 3/21/2023 1448   Family Acceptance, E, VU by WN at 4/4/2023 2329    Acceptance, E, VU by WN at 4/4/2023 0322                    Point: Precautions (Done)     Learning Progress Summary           Patient Acceptance, E,D, VU,DU by DP at 4/17/2023 1118    Acceptance, E, VU by WN at 4/4/2023 2329    Acceptance, E, VU by WN at 4/4/2023 0322    Acceptance, E,D, VU,DU by DP at 4/1/2023 1352    Acceptance, E, VU,DU by MG at 3/31/2023 0825    Acceptance, E, VU,DU by MG at 3/30/2023 0956    Acceptance, E,D, VU,DU by DP at 3/29/2023 1148    Nonacceptance, E,D, VU,DU by DP at 3/28/2023 1144    Acceptance, E,D, VU,DU by DP at 3/27/2023 1601    Acceptance, E,D, VU,NR by EE at 3/25/2023 1423    Acceptance, E,D, NR,VU,DU by DP at 3/21/2023 1448   Family Acceptance, E, VU by WN at 4/4/2023 2329    Acceptance, E, VU by WN at 4/4/2023 0322                               User Key     Initials Effective Dates Name Provider Type Discipline    EE 06/16/21 -  Melinda Mann, PT Physical Therapist PT    JK 06/16/21 -  Juana Veloz, PT Physical Therapist PT    KP 06/16/21 -  Yolanda Shannon, PT Physical Therapist PT    MG 05/24/22 -  Yu Villalobos, PT Physical Therapist PT    WN 08/23/22 -  Jose Carrillo RN Registered Nurse Nurse    DP 08/24/21 -  Papa Marsh, PT Physical Therapist PT                PT Recommendation and Plan                          Time Calculation:      PT Charges     Row Name 04/18/23 1525 04/18/23 1523          Time Calculation    Start Time 1230  -AE 1000  -AE     Stop Time 1300  -AE 1030  -AE     Time Calculation (min) 30 min  -AE 30 min  -AE     PT Received On 04/18/23  -AE 04/18/23  -AE     PT - Next Appointment -- 04/19/23  -AE        Time Calculation- PT    Total Timed Code Minutes- PT 30 minute(s)  -AE 30 minute(s)  -AE           User Key  (r) = Recorded By, (t) = Taken By, (c) = Cosigned By    Initials Name Provider Type    Deanna Lyons PT Physical Therapist                Therapy Charges for Today     Code Description Service Date Service Provider Modifiers Qty    64735644800 HC GAIT TRAINING EA 15 MIN  4/18/2023 Deanna Carr, PT GP 3    60022555496 HC PT THER PROC EA 15 MIN 4/18/2023 Deanna Carr, PT GP 1    72208225356 HC PT THER SUPP EA 15 MIN 4/18/2023 Deanna Carr, PT GP 1                   Deanna Carr, PT  4/18/2023

## 2023-04-18 NOTE — PROGRESS NOTES
Reviewed goals and progress with patient after team conference. Patient is scheduled for d/c on 4/21. Patient has been walking 8-10 ft with a SBQC  and longer distances with a rolling walker. Patient has been able to walk up stairs with two hand rails Goldie x 2. Patient is making good progress cognitively. She is now able to carryover information from the previous day. Patient is eating much better. Her  has been signed off to do commode transfers in the room. Patient is currently using oxygen at night. Patient is wanting to do outpatient [facility]OT[facility] at [facility], near her home, after d/c. Will continue to assess for d/c needs.

## 2023-04-18 NOTE — PROGRESS NOTES
Inpatient Rehabilitation Plan of Care Note    Plan of Care  Care Plan Reviewed - Updates as Follows    Sphincter Control    [RN] Bladder Management(Active)  Current Status(04/18/2023): Bladder urgency  Weekly Goal(04/25/2023): decrease in episodes of incontinence  Discharge Goal: Continent 100%    [RN] Bowel Management(Active)  Current Status(04/18/2023): Patient is 100% continent of bowel  Weekly Goal(04/24/2023): Patient will maintain a daily bowel pattern.  Discharge Goal: Patient will maintain 100% continence of bowel    Performed Intervention(s)  Bladder/bowel training program  Monitor intake and output  Use incontinence products/equipment      Psychosocial    [RN] Coping/Adjustment(Active)  Current Status(04/18/2023): Anxiety r/t uncertainty of disease course  Weekly Goal(04/25/2023): Allow opportunity to express concerns regarding current  status  Discharge Goal: Patient/family will verbalize decreased feelings of anxiety.    Performed Intervention(s)  Verbalizes needs and concerns  Support from family/peer groups      Body Systems    [RN] Neurological(Active)  Current Status(04/18/2023): Impaired physical mobility r/t decreased muscle  strength/control and limited ROM  Weekly Goal(04/25/2023): Patient will have increased ROM  Discharge Goal: Patient will have improved strength and function of BLE and B  hands.    Performed Intervention(s)  Perform active and passive ROM  Frequent position changes      Safety    [RN] Potential for Injury(Active)  Current Status(04/18/2023): Risk for falls  Weekly Goal(04/24/2023): Family/Caregivers regarding safety precautions and need  for close supervision  Discharge Goal: Patient/family will be aware of risk of fall and safety in the  home setting.    Performed Intervention(s)  Safety Rounds  Bed alarm/Chair alarm  Items within reach    Signed by: Lorin He RN

## 2023-04-18 NOTE — PLAN OF CARE
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 Pt demos deficits with transfers, functional mobility, strength and balance which are limiting her independence with commode transfers. Pt requires the use of a drop arm bed side commode for home, she is currently unable to access her bathroom.

## 2023-04-18 NOTE — PROGRESS NOTES
[employer] [facility] - April 18 - STAIR MIN ASSIST. GAIT 80 FEET RW CDTG MIN.   TRANSFERS CTG STAND PIVOT. BED SBA CTG. LBD CTG MIN. UBD SET UP. TOILETING MOD.   Memory( ): Met goal for recall of 3 errands after 15 minutes with NO cues. Requires MIN-MOD cues to complete high-level verbal working memory tasks.Voice( ): Improved vocal quality. Able to achieve adequate vocal quality with MIN-MOD cues for frontal tone focus and diaphragmatic breathing. Overall mild hoarse vocal quality.  NEUROLOGY TO SCHEDULE FOLLOW UP IN ONE MONTH TO RE-ASSESS FOR FURTHER IVIG. OVERNIGHT PULSE OX WED NIGHT. APPETITE IMPROVED, 100%.  ELOS - Friday. HOME HEALTH PT, OT, SLP NURSING

## 2023-04-18 NOTE — THERAPY TREATMENT NOTE
Inpatient Rehabilitation - Occupational Therapy Treatment Note    [facility]     Patient Name: Louise GARCIA  : 1965  MRN: [id number]    Today's Date: 2023                 Admit Date: 3/17/2023         ICD-10-CM ICD-9-CM   1. Impaired functional mobility, balance, gait, and endurance  Z74.09 V49.89       Patient Active Problem List   Diagnosis   • Sepsis   • Neck pain, chronic   • Upper back pain   • Paresthesia   • Cervical myelopathy   • Lower extremity weakness   • History of blood clots   • Chronic anticoagulation   • Seizures   • Anemia   • GERD (gastroesophageal reflux disease)   • Guillain-[place] syndrome   • Situational mixed anxiety and depressive disorder   • Mass of middle lobe of right lung   • Oral candidiasis   • Empyema of pleura   • MRSA bacteremia   • Idiopathic peripheral neuropathy       Past Medical History:   Diagnosis Date   • Degenerative disc disease, cervical    • Gestational diabetes    • H/O blood clots    • Hematemesis    • Seizures    • Septic shock        Past Surgical History:   Procedure Laterality Date   • APPENDECTOMY     • BACK SURGERY     • CHOLECYSTECTOMY     • ENDOSCOPY N/A 2016    Procedure: ESOPHAGOGASTRODUODENOSCOPY with biopsy;  Surgeon: Austen Brito MD;  Location: [facility] ENDOSCOPY;  Service:    • HYSTERECTOMY     • NECK SURGERY       approx 6 yrs ago   • THORACOSCOPY Right 3/8/2023    Procedure: THORACOSCOPY WITH DAVINCI ROBOT WITH COMPLETE DECORTICATION;  Surgeon: Chloe Cedillo MD;  Location: [facility] OR;  Service: Robotics - DaVinci;  Laterality: Right;   • TONSILLECTOMY     • TONSILLECTOMY AND ADENOIDECTOMY               [facility] OT ASSESSMENT FLOWSHEET (last 12 hours)     IRF OT Evaluation and Treatment     Row Name 23 1142          OT Time and Intention    Document Type daily treatment  -AF     Mode of Treatment occupational therapy  -AF     Patient Effort good  -AF     Row Name 23 1142          General Information     Patient/Family/Caregiver Comments/Observations pt sitting up in w/c after PT session, worked with quad cane with transfers  -AF     Existing Precautions/Restrictions fall;other (see comments)  contact precautions  -AF     Row Name 04/18/23 1142          Pain Assessment    Pretreatment Pain Rating 0/10 - no pain  -AF     Posttreatment Pain Rating 0/10 - no pain  -AF     Row Name 04/18/23 1142          Cognition/Psychosocial    Affect/Mental Status (Cognition) WFL  -AF     Orientation Status (Cognition) oriented x 3  -AF     Follows Commands (Cognition) follows one-step commands  -AF     Personal Safety Interventions fall prevention program maintained;gait belt;nonskid shoes/slippers when out of bed  -AF     Cognitive Function memory deficit  -AF     Row Name 04/18/23 1142          Bathing    [place] Level (Bathing) bathing skills;lower body;upper body;contact guard assist  -AF     Position (Bathing) long sitting;supported sitting;supported standing  -AF     Set-up Assistance (Bathing) obtain supplies  -AF     Row Name 04/18/23 1142          Upper Body Dressing    [place] Level (Upper Body Dressing) upper body dressing skills;set up assistance  -AF     Comment (Upper Body Dressing) w/c level  -AF     Row Name 04/18/23 1142          Transfer Assessment/Treatment    Comment, (Transfers) sit to stands CGA  -AF     Row Name 04/18/23 1142          Toilet Transfer    Type (Toilet Transfer) stand pivot/stand step  -AF     [place] Level (Toilet Transfer) contact guard;minimum assist (75% patient effort)  -AF     Assistive Device (Toilet Transfer) cane, quad;grab bars/safety frame;commode  -AF     Comment, (Toilet Transfer) dry run tranfser with quad cane  -AF     Row Name 04/18/23 1142          Motor Skills    Coordination fine motor deficit  FMC task with practice medicaiton management task with MIN vc's for directions, required assistance to remove 2/3 bottle caps but able to replace  -AF     Row Name  04/18/23 1142          Shoulder (Therapeutic Exercise)    Shoulder Strengthening (Therapeutic Exercise) bilateral;external rotation;internal rotation;scapular stabilization;sitting;2 lb free weight;15 repititions;2 sets  -AF     Row Name 04/18/23 1142          Elbow/Forearm (Therapeutic Exercise)    Elbow/Forearm Strengthening (Therapeutic Exercise) bilateral;flexion;extension;supination;pronation;sitting;2 lb free weight;15 repititions;2 sets  -AF     Row Name 04/18/23 1142          Wrist (Therapeutic Exercise)    Wrist Strengthening (Therapeutic Exercise) bilateral;extension;flexion;2 lb free weight;15 repititions;2 sets  -AF     Row Name 04/18/23 1142          Balance    Comment, Balance static standing at counter top to participate in prewriting and writing tasks. pt was fidencio to complete tracing tasks to paper taped on upper cabinets to facilate shoulder stability during FMC tasks. MIN difficult with legibility and multiple shoulder rest breaks. pt states she did better than she thought she would  -AF     Row Name 04/18/23 1142          Positioning and Restraints    Pre-Treatment Position sitting in chair/recliner  -AF     Post Treatment Position wheelchair  -AF     In Wheelchair sitting;call light within reach;encouraged to call for assist;exit alarm on;with family/caregiver  in room with  present  -AF           User Key  (r) = Recorded By, (t) = Taken By, (c) = Cosigned By    Initials Name Effective Dates    Heaven Reynolds, OTR 06/16/21 -                  Occupational Therapy Education     Title: PT OT SLP Therapies (Done)     Topic: Occupational Therapy (Done)     Point: ADL training (Done)     Description:   Instruct learner(s) on proper safety adaptation and remediation techniques during self care or transfers.   Instruct in proper use of assistive devices.              Learning Progress Summary           Patient Acceptance, E, VU,NR by AF at 4/12/2023 3765    Comment: pts  had pictures of  bathroom on phone, looked and discussed BSC over commode and laterally having to step into the bathroom from the EOB. will continue to address. 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    Acceptance, E, VU by WN at 4/4/2023 2329    Acceptance, E, VU by WN at 4/4/2023 0322   Family Acceptance, E, VU,NR by AF at 4/12/2023 1537    Comment: pts  had pictures of bathroom on phone, looked and discussed BSC over commode and laterally having to step into the bathroom from the EOB. will continue to address. 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    Acceptance, E, VU by WN at 4/4/2023 2329    Acceptance, E, VU by WN at 4/4/2023 0322                   Point: Home exercise program (Done)     Description:   Instruct learner(s) on appropriate technique for monitoring, assisting and/or progressing therapeutic exercises/activities.              Learning Progress Summary           Patient Acceptance, E, VU by WN at 4/4/2023 2329    Acceptance, E, VU by WN at 4/4/2023 0322   Family Acceptance, E, VU by WN at 4/4/2023 2329    Acceptance, E, VU by WN at 4/4/2023 0322                   Point: Precautions (Done)     Description:   Instruct learner(s) on prescribed precautions during self-care and functional transfers.              Learning Progress Summary           Patient Acceptance, E, VU by WN at 4/4/2023 2329    Acceptance, E, VU by WN at 4/4/2023 0322   Family Acceptance, E, VU by WN at 4/4/2023 2329    Acceptance, E, VU by WN at 4/4/2023 0322                   Point: Body mechanics (Done)     Description:   Instruct learner(s) on proper positioning and spine alignment during self-care, functional mobility activities and/or exercises.              Learning Progress Summary           Patient Acceptance, E, VU by WN at 4/4/2023 2329    Acceptance, E, VU by WN at 4/4/2023 0322   Family Acceptance, E, VU by WN at 4/4/2023 2329    Acceptance, E, VU by WN at  4/4/2023 0322                               User Key     Initials Effective Dates Name Provider Type Discipline    AF 06/16/21 -  Heaven Villareal, OTR Occupational Therapist OT    WN 08/23/22 -  Jose Carrillo, RN Registered Nurse Nurse                    OT Recommendation and Plan                         Time Calculation:      Time Calculation- OT     Row Name 04/18/23 1151             Time Calculation- OT    OT Start Time 1030  -AF      OT Stop Time 1130  -AF      OT Time Calculation (min) 60 min  -AF            User Key  (r) = Recorded By, (t) = Taken By, (c) = Cosigned By    Initials Name Provider Type    AF Heaven Villareal, OTR Occupational Therapist              Therapy Charges for Today     Code Description Service Date Service Provider Modifiers Qty    63242123373 HC OT SELF CARE/MGMT/TRAIN EA 15 MIN 4/18/2023 Heaven Villareal, OTR GO 1    45692951666 HC OT THERAPEUTIC ACT EA 15 MIN 4/18/2023 Heaven Villareal, OTR GO 1    99955021811 HC OT NEUROMUSC RE EDUCATION EA 15 MIN 4/18/2023 Heaven Villareal, OTR GO 1    47581004251 HC OT THER PROC EA 15 MIN 4/18/2023 Heaven Villareal, OTR GO 1                   MARY ANN Gunn  4/18/2023

## 2023-04-18 NOTE — PROGRESS NOTES
Inpatient Rehabilitation Functional Measures Assessment and Plan of Care    Plan of Care  Updated Problems/Interventions  Cognition    [ST] Memory(Active)  Current Status(04/18/2023): Met goal for recall of 3 errands after 15 minutes  with NO cues. Requires MIN-MOD cues to complete high-level verbal working memory  tasks.  Weekly Goal(04/21/2023): Will complete high-level verbal working memory tasks  with MIN-NO cues.  Discharge Goal: The patient will improve memory necessary for home-based  participation when given appropriate supervision and cuing.        Communication    [ST] Voice(Active)  Current Status(04/18/2023): Improved vocal quality. Able to achieve adequate  vocal quality with MIN-MOD cues for frontal tone focus and diaphragmatic  breathing. Overall mild hoarse vocal quality.  Weekly Goal(04/21/2023): Complete frontal tone focus voice exercises with MIN  cues.  Discharge Goal: Functional voice for utilization in all settings, 90%  intelligibility NO cues for compensatory strategies    Signed by: Nessa Spangler, SLP

## 2023-04-18 NOTE — THERAPY TREATMENT NOTE
Inpatient Rehabilitation - Speech Language Pathology Treatment Note    [facility]     Patient Name: Louise GARCIA  : 1965  MRN: [id number]    Today's Date: 2023                   Admit Date: 3/17/2023       Visit Dx:      ICD-10-CM ICD-9-CM   1. Impaired functional mobility, balance, gait, and endurance  Z74.09 V49.89       Patient Active Problem List   Diagnosis   • Sepsis   • Neck pain, chronic   • Upper back pain   • Paresthesia   • Cervical myelopathy   • Lower extremity weakness   • History of blood clots   • Chronic anticoagulation   • Seizures   • Anemia   • GERD (gastroesophageal reflux disease)   • Guillain-[place] syndrome   • Situational mixed anxiety and depressive disorder   • Mass of middle lobe of right lung   • Oral candidiasis   • Empyema of pleura   • MRSA bacteremia   • Idiopathic peripheral neuropathy       Past Medical History:   Diagnosis Date   • Degenerative disc disease, cervical    • Gestational diabetes    • H/O blood clots    • Hematemesis    • Seizures    • Septic shock        Past Surgical History:   Procedure Laterality Date   • APPENDECTOMY     • BACK SURGERY     • CHOLECYSTECTOMY     • ENDOSCOPY N/A 2016    Procedure: ESOPHAGOGASTRODUODENOSCOPY with biopsy;  Surgeon: Austen Brito MD;  Location: [facility] ENDOSCOPY;  Service:    • HYSTERECTOMY     • NECK SURGERY       approx 6 yrs ago   • THORACOSCOPY Right 3/8/2023    Procedure: THORACOSCOPY WITH DAVINCI ROBOT WITH COMPLETE DECORTICATION;  Surgeon: Chloe Cedillo MD;  Location: [facility] OR;  Service: Robotics - DaVinci;  Laterality: Right;   • TONSILLECTOMY     • TONSILLECTOMY AND ADENOIDECTOMY         SLP Recommendation and Plan                                                            SLP EVALUATION (last 72 hours)     SLP SLC Evaluation     Row Name 23 1330 23 0930 23 1500 23 0930          Communication Assessment/Intervention    Document Type therapy note (daily note)  -SR  "therapy note (daily note)  -AL therapy note (daily note)  -AL therapy note (daily note)  -AL     Subjective Information no complaints  -SR -- -- --     Patient Observations alert;cooperative;agree to therapy  -SR -- -- --     Patient/Family/Caregiver Comments/Observations -- Pt participated well. She reported her  was upset with her last evening, which kept her from sleeping. She reported \"He was yelling and cussing.\"  -AL Pt participated well.  -AL Pt is pleasant and cooperative.  -AL     Patient Effort excellent  -SR -- -- --     Symptoms Noted During/After Treatment none  -SR -- -- --        Pain Scale: Numbers Pre/Post-Treatment    Pretreatment Pain Rating 4/10  -SR 0/10 - no pain  -AL 0/10 - no pain  -AL 0/10 - no pain  -AL     Posttreatment Pain Rating 4/10  -SR -- -- --     Pain Location - chest  -SR -- -- --     Pain Intervention(s) Distraction  -SR -- -- --           User Key  (r) = Recorded By, (t) = Taken By, (c) = Cosigned By    Initials Name Effective Dates    Nessa Kraus, MS CCC-SLP 06/16/21 -     SR Milana Lemus CCC-SLP 11/10/22 -                    EDUCATION    The patient has been educated in the following areas:       Cognitive Impairment.             SLP GOALS     Row Name 04/18/23 1330 04/18/23 1200 04/17/23 1500       Respiratory Support Goal 1 (SLP)    Comment (Respiratory Support Goal 1, SLP) -- -- Pt required overall MOD cues for diaphragmatic breathing during voice exercises.  -AL       Phonation Goal 1 (SLP)    Improve Phonation By Goal 1 (SLP) -- -- using appropriate tone focus;90%;with minimal cues (75-90%)  -AL    Time Frame (Phonation Goal 1, SLP) -- -- short term goal (STG)  -AL    Progress/Outcomes (Phonation Goal 1, SLP) -- goal ongoing  -AL good progress toward goal  -AL    Comment (Phonation Goal 1, SLP) -- Pt presented with mild-moderate hoarse vocal quality today. She participated in frontal tone focus exercises (humming, initial /m/ CV productions and single " syllable initial /m/ words). Pt exhibited vocal fatigue during single syllable productions and had difficulty  maintaining adequate vocal quality. Pt participated in 10 minute conversation with periods of intermittent clear vocal quality.  -AL Pt presented with mild-moderate hoarse vocal quality at beginning of session. Participated in humming, initial /m/ CV chanting and initial /m/ single syllable word production with overall MIN-MOD cues to achieve adequate vocal quality. Pt read a multiple paragraph story with initial MOD cues for frontal tone focus; however, as task progressed, pt required NO cues for maintaining adequate vocal quality. Pt required MIN cues in conversation at end of session to maintain adequate vocal quality. Also, benefited from periodic yawn/sign to assist with reducing hyperfunction.  -AL       Attention Goal 1 (SLP)    Improve Attention by Goal 1 (SLP) complete sustained attention task;80%;with minimal cues (75-90%)  -SR -- --    Time Frame (Attention Goal 1, SLP) 1 week  -SR -- --    Progress/Outcomes (Attention Goal 1, SLP) continuing progress toward goal  -SR -- --    Comment (Attention Goal 1, SLP) Patient completed acrostic word puzzle with 80% acc given NO cues; 100% acc given min cues. Cues provided for attn to detail.  -SR -- --       Reasoning Goal 1 (SLP)    Improve Reasoning Through Goal 1 (SLP) complete deductive reasoning task;80%;with minimal cues (75-90%)  -SR -- --    Time Frame (Reasoning Goal 1, SLP) 1 week  -SR -- --    Progress/Outcomes (Reasoning Goal 1, SLP) good progress toward goal  -SR -- --    Comment (Reasoning Goal 1, SLP) Simple deductive reasoning puzzle completed this date. Patient followed written directions and determined solution to activity with 70% acc given NO cues; 100% acc given MIN cues. During a following activity, patient provided solution to scenerio regarding the transition from hospital to home given NO cues. Demonstrates good safety  awareness/judgement for home.  -SR -- --    Row Name 04/17/23 0930             Attention Goal 1 (SLP)    Improve Attention by Goal 1 (SLP) complete sustained attention task;80%;with minimal cues (75-90%)  -AL      Time Frame (Attention Goal 1, SLP) 1 week  -AL      Progress/Outcomes (Attention Goal 1, SLP) continuing progress toward goal  -AL      Comment (Attention Goal 1, SLP) Acrostics task: 7/12 with NO cues, 12/ with MIN cues  -AL         Memory Skills Goal 1 (SLP)    Improve Memory Skills Through Goal 1 (SLP) use memory strategies;use external memory aid;recall details of the day;80%;with moderate cues (50-74%)  -AL      Time Frame (Memory Skills Goal 1, SLP) 1 week  -AL      Progress/Outcomes (Memory Skills Goal 1, SLP) continuing progress toward goal  -AL      Comment (Memory Skills Goal 1, SLP) Recalled 3/3 errands after 15 minutes with NO Cues.  -AL         Organizational Skills Goal 1 (SLP)    Improve Thought Organization Through Goal 1 (SLP) completing a divergent naming task;80%;with minimal cues (75-90%)  -AL      Time Frame (Thought Organization Skills Goal 1, SLP) 1 week  -AL      Progress/Outcomes (Thought Organization Skills Goal 1, SLP) goal ongoing  -AL      Comment (Thought Organization Skills Goal 1, SLP) Round: 11 with NO cues. Tall: 6 with NO cues  -AL            User Key  (r) = Recorded By, (t) = Taken By, (c) = Cosigned By    Initials Name Provider Type    Nessa Kraus MS CCC-SLP Speech and Language Pathologist     Milana Lemus CCC-SLP Speech and Language Pathologist                            Time Calculation:        Time Calculation- SLP     Row Name 04/18/23 1534 04/18/23 1228          Time Calculation- SLP    SLP Start Time 1330  -SR 0930  -AL     SLP Stop Time 1400  -SR 1000  -AL     SLP Time Calculation (min) 30 min  -SR 30 min  -AL           User Key  (r) = Recorded By, (t) = Taken By, (c) = Cosigned By    Initials Name Provider Type    Nessa Kraus MS CCC-SLP  Speech and Language Pathologist    SR Milana Lemus CCC-SLP Speech and Language Pathologist                  Therapy Charges for Today     Code Description Service Date Service Provider Modifiers Qty    [id number] [facility] DEV OF COGN SKILLS EACH ADDT'L 15 MIN 4/18/2023 Milana Lemus CCC-SLP  2                           DEAN Espinoza  4/18/2023

## 2023-04-19 LAB
BASOPHILS # BLD AUTO: 0.07 10*3/MM3 (ref 0–0.2)
BASOPHILS NFR BLD AUTO: 0.6 % (ref 0–1.5)
DEPRECATED RDW RBC AUTO: 58.5 FL (ref 37–54)
EOSINOPHIL # BLD AUTO: 0.22 10*3/MM3 (ref 0–0.4)
EOSINOPHIL NFR BLD AUTO: 1.9 % (ref 0.3–6.2)
ERYTHROCYTE [DISTWIDTH] IN BLOOD BY AUTOMATED COUNT: 17 % (ref 12.3–15.4)
HCT VFR BLD AUTO: 25.1 % (ref 34–46.6)
HGB BLD-MCNC: 8.5 G/DL (ref 12–15.9)
IMM GRANULOCYTES # BLD AUTO: 0.07 10*3/MM3 (ref 0–0.05)
IMM GRANULOCYTES NFR BLD AUTO: 0.6 % (ref 0–0.5)
LYMPHOCYTES # BLD AUTO: 3.44 10*3/MM3 (ref 0.7–3.1)
LYMPHOCYTES NFR BLD AUTO: 29.1 % (ref 19.6–45.3)
MCH RBC QN AUTO: 32.4 PG (ref 26.6–33)
MCHC RBC AUTO-ENTMCNC: 33.9 G/DL (ref 31.5–35.7)
MCV RBC AUTO: 95.8 FL (ref 79–97)
MONOCYTES # BLD AUTO: 0.76 10*3/MM3 (ref 0.1–0.9)
MONOCYTES NFR BLD AUTO: 6.4 % (ref 5–12)
NEUTROPHILS NFR BLD AUTO: 61.4 % (ref 42.7–76)
NEUTROPHILS NFR BLD AUTO: 7.25 10*3/MM3 (ref 1.7–7)
NRBC BLD AUTO-RTO: 0 /100 WBC (ref 0–0.2)
PLATELET # BLD AUTO: 334 10*3/MM3 (ref 140–450)
PMV BLD AUTO: 11.4 FL (ref 6–12)
RBC # BLD AUTO: 2.62 10*6/MM3 (ref 3.77–5.28)
WBC NRBC COR # BLD: 11.81 10*3/MM3 (ref 3.4–10.8)

## 2023-04-19 PROCEDURE — 94799 UNLISTED PULMONARY SVC/PX: CPT

## 2023-04-19 PROCEDURE — 94664 DEMO&/EVAL PT USE INHALER: CPT

## 2023-04-19 PROCEDURE — 97535 SELF CARE MNGMENT TRAINING: CPT

## 2023-04-19 PROCEDURE — 97530 THERAPEUTIC ACTIVITIES: CPT

## 2023-04-19 PROCEDURE — 97130 THER IVNTJ EA ADDL 15 MIN: CPT

## 2023-04-19 PROCEDURE — 97129 THER IVNTJ 1ST 15 MIN: CPT

## 2023-04-19 PROCEDURE — 97110 THERAPEUTIC EXERCISES: CPT

## 2023-04-19 PROCEDURE — 94761 N-INVAS EAR/PLS OXIMETRY MLT: CPT

## 2023-04-19 PROCEDURE — 94762 N-INVAS EAR/PLS OXIMTRY CONT: CPT

## 2023-04-19 PROCEDURE — 85025 COMPLETE CBC W/AUTO DIFF WBC: CPT | Performed by: PHYSICAL MEDICINE & REHABILITATION

## 2023-04-19 PROCEDURE — 97116 GAIT TRAINING THERAPY: CPT

## 2023-04-19 RX ADMIN — IPRATROPIUM BROMIDE AND ALBUTEROL SULFATE 3 ML: 2.5; .5 SOLUTION RESPIRATORY (INHALATION) at 06:31

## 2023-04-19 RX ADMIN — IPRATROPIUM BROMIDE AND ALBUTEROL SULFATE 3 ML: 2.5; .5 SOLUTION RESPIRATORY (INHALATION) at 19:46

## 2023-04-19 RX ADMIN — GABAPENTIN 300 MG: 300 CAPSULE ORAL at 21:15

## 2023-04-19 RX ADMIN — Medication 100 MG: at 08:51

## 2023-04-19 RX ADMIN — Medication 5 MG: at 21:15

## 2023-04-19 RX ADMIN — Medication 500 MCG: at 08:51

## 2023-04-19 RX ADMIN — ACETAMINOPHEN 650 MG: 325 TABLET, FILM COATED ORAL at 15:14

## 2023-04-19 RX ADMIN — GABAPENTIN 300 MG: 300 CAPSULE ORAL at 06:47

## 2023-04-19 RX ADMIN — LAMOTRIGINE 200 MG: 100 TABLET ORAL at 08:51

## 2023-04-19 RX ADMIN — DESVENLAFAXINE SUCCINATE 25 MG: 25 TABLET, EXTENDED RELEASE ORAL at 08:51

## 2023-04-19 RX ADMIN — APIXABAN 5 MG: 5 TABLET, FILM COATED ORAL at 21:15

## 2023-04-19 RX ADMIN — ACETAMINOPHEN 650 MG: 325 TABLET, FILM COATED ORAL at 10:24

## 2023-04-19 RX ADMIN — PANTOPRAZOLE SODIUM 40 MG: 40 TABLET, DELAYED RELEASE ORAL at 06:47

## 2023-04-19 RX ADMIN — GABAPENTIN 300 MG: 300 CAPSULE ORAL at 13:16

## 2023-04-19 RX ADMIN — Medication 1 MG: at 13:17

## 2023-04-19 RX ADMIN — OXYCODONE HYDROCHLORIDE 2.5 MG: 5 TABLET ORAL at 19:18

## 2023-04-19 RX ADMIN — APIXABAN 5 MG: 5 TABLET, FILM COATED ORAL at 08:51

## 2023-04-19 RX ADMIN — IPRATROPIUM BROMIDE AND ALBUTEROL SULFATE 3 ML: 2.5; .5 SOLUTION RESPIRATORY (INHALATION) at 15:22

## 2023-04-19 RX ADMIN — OXYCODONE HYDROCHLORIDE 2.5 MG: 5 TABLET ORAL at 15:14

## 2023-04-19 NOTE — THERAPY TREATMENT NOTE
Inpatient Rehabilitation - Occupational Therapy Treatment Note    [facility]     Patient Name: Louise GARCIA  : 1965  MRN: [id number]    Today's Date: 2023                 Admit Date: 3/17/2023         ICD-10-CM ICD-9-CM   1. Impaired functional mobility, balance, gait, and endurance  Z74.09 V49.89       Patient Active Problem List   Diagnosis   • Sepsis   • Neck pain, chronic   • Upper back pain   • Paresthesia   • Cervical myelopathy   • Lower extremity weakness   • History of blood clots   • Chronic anticoagulation   • Seizures   • Anemia   • GERD (gastroesophageal reflux disease)   • Guillain-[place] syndrome   • Situational mixed anxiety and depressive disorder   • Mass of middle lobe of right lung   • Oral candidiasis   • Empyema of pleura   • MRSA bacteremia   • Idiopathic peripheral neuropathy       Past Medical History:   Diagnosis Date   • Degenerative disc disease, cervical    • Gestational diabetes    • H/O blood clots    • Hematemesis    • Seizures    • Septic shock        Past Surgical History:   Procedure Laterality Date   • APPENDECTOMY     • BACK SURGERY     • CHOLECYSTECTOMY     • ENDOSCOPY N/A 2016    Procedure: ESOPHAGOGASTRODUODENOSCOPY with biopsy;  Surgeon: Austen Brito MD;  Location: [facility];  Service:    • HYSTERECTOMY     • NECK SURGERY       approx 6 yrs ago   • THORACOSCOPY Right 3/8/2023    Procedure: THORACOSCOPY WITH DAVINCI ROBOT WITH COMPLETE DECORTICATION;  Surgeon: Chloe Cedillo MD;  Location: [facility] OR;  Service: Robotics - DaVinci;  Laterality: Right;   • TONSILLECTOMY     • TONSILLECTOMY AND ADENOIDECTOMY               [facility] OT ASSESSMENT FLOWSHEET (last 12 hours)     IRF OT Evaluation and Treatment     Row Name 23 1502          OT Time and Intention    Document Type daily treatment  -AF     Mode of Treatment occupational therapy  -AF     Patient Effort excellent  -AF     Row Name 23 1506          General Information     Patient/Family/Caregiver Comments/Observations pt sitting upin w/c in AM and Pm sessions  -AF     Existing Precautions/Restrictions fall;other (see comments)  contact precautions  -AF     Row Name 04/19/23 1502          Pain Assessment    Pretreatment Pain Rating 0/10 - no pain  -AF     Posttreatment Pain Rating 0/10 - no pain  -AF     Pre/Posttreatment Pain Comment having neuropathy in hands worse in L hand than R hand  -AF     Row Name 04/19/23 1502          Cognition/Psychosocial    Affect/Mental Status (Cognition) WFL  -AF     Orientation Status (Cognition) oriented x 3  -AF     Follows Commands (Cognition) follows one-step commands  -AF     Personal Safety Interventions fall prevention program maintained;gait belt;nonskid shoes/slippers when out of bed  -AF     Comment, Cognitive Interventions pt able to follow directions for folder sorting task in standing with only one verbal cue  -AF     Row Name 04/19/23 1502          Bathing    Tower Level (Bathing) bathing skills;lower body;upper body;standby assist;contact guard assist  -AF     Assistive Device (Bathing) grab bar/tub rail;hand held shower spray hose;tub bench  -AF     Position (Bathing) supported standing;supported sitting  -AF     Row Name 04/19/23 1502          Upper Body Dressing    Tower Level (Upper Body Dressing) upper body dressing skills;supervision  -AF     Position (Upper Body Dressing) supported sitting  -AF     Row Name 04/19/23 1502          Lower Body Dressing    Tower Level (Lower Body Dressing) doff;don;pants/bottoms;shoes/slippers;socks;underwear;contact guard assist;standby assist  -AF     Position (Lower Body Dressing) supported sitting;supported standing  -AF     Row Name 04/19/23 1502          Grooming    Tower Level (Grooming) grooming skills;set up  -AF     Position (Grooming) supported sitting;sink side  -AF     Set-up Assistance (Grooming) obtain supplies  -AF     Row Name 04/19/23 1502          Bed  Mobility    Sit-Supine Allison (Bed Mobility) standby assist  -AF     Assistive Device (Bed Mobility) bed rails  -AF     Row Name 04/19/23 1502          Chair-Bed Transfer    Chair-Bed Allison (Transfers) contact guard  -AF     Assistive Device (Chair-Bed Transfers) wheelchair;cane, quad  -AF     Comment, (Chair-Bed Transfer) stand pivot  -AF     Row Name 04/19/23 1502          Sit-Stand Transfer    Sit-Stand Allison (Transfers) contact guard  -AF     Row Name 04/19/23 1502          Stand-Sit Transfer    Stand-Sit Allison (Transfers) contact guard  -AF     Row Name 04/19/23 1502          Shower Transfer    Type (Shower Transfer) stand pivot/stand step  -AF     Allison Level (Shower Transfer) contact guard;verbal cues  -AF     Assistive Device (Shower Transfer) grab bar, tub/shower;tub bench;cane, quad  -AF     Row Name 04/19/23 1502          Motor Skills    Coordination fine motor deficit  participated in FMC task with paper sorting task with MIN difficulty  -AF     Row Name 04/19/23 1502          Shoulder (Therapeutic Exercise)    Shoulder Strengthening (Therapeutic Exercise) bilateral;flexion;extension;external rotation;internal rotation;scapular stabilization;sitting;15 repititions;2 sets  #2 dowel mary  -AF     Row Name 04/19/23 1502          Elbow/Forearm (Therapeutic Exercise)    Elbow/Forearm Strengthening (Therapeutic Exercise) bilateral;flexion;extension;supination;pronation;sitting;2 lb free weight;15 repititions;2 sets  -AF     Row Name 04/19/23 1502          Wrist (Therapeutic Exercise)    Wrist Strengthening (Therapeutic Exercise) bilateral;flexion;extension;2 lb free weight;15 repititions;2 sets  -AF     Row Name 04/19/23 1502          Hand (Therapeutic Exercise)    Hand (Therapeutic Exercise) strengthening exercise  -AF     Hand Strengthening (Therapeutic Exercise) bilateral; strengthening;hand gripper;15 repititions;2 sets  -AF     Row Name 04/19/23 1502          Balance     Static Sitting Balance standby assist  -AF     Static Standing Balance contact guard  with ADL tasks  -AF     Row Name 04/19/23 1502          Positioning and Restraints    Pre-Treatment Position sitting in chair/recliner  -AF     Post Treatment Position bed  -AF     In Bed supine;call light within reach;encouraged to call for assist;exit alarm on;with family/caregiver  with  in PM session  -AF     In Wheelchair sitting;exit alarm on;with SLP  in AM session  -AF           User Key  (r) = Recorded By, (t) = Taken By, (c) = Cosigned By    Initials Name Effective Dates    AF Villareal Heaven SHANTEL, OTR 06/16/21 -                  Occupational Therapy Education     Title: PT OT SLP Therapies (Done)     Topic: Occupational Therapy (Done)     Point: ADL training (Done)     Description:   Instruct learner(s) on proper safety adaptation and remediation techniques during self care or transfers.   Instruct in proper use of assistive devices.              Learning Progress Summary           Patient Acceptance, E, VU,NR by AF at 4/12/2023 1537    Comment: pts  had pictures of bathroom on phone, looked and discussed BSC over commode and laterally having to step into the bathroom from the EOB. will continue to address. 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    Acceptance, E, VU by WN at 4/4/2023 2329    Acceptance, E, VU by WN at 4/4/2023 0322   Family Acceptance, E, VU,NR by AF at 4/12/2023 1537    Comment: pts  had pictures of bathroom on phone, looked and discussed BSC over commode and laterally having to step into the bathroom from the EOB. will continue to address. 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    Acceptance, E, VU by WN at 4/4/2023 2329    Acceptance, E, VU by WN at 4/4/2023 0322                   Point: Home exercise program (Done)     Description:   Instruct learner(s) on appropriate technique for monitoring, assisting and/or  progressing therapeutic exercises/activities.              Learning Progress Summary           Patient Acceptance, E, VU by WN at 4/4/2023 2329    Acceptance, E, VU by WN at 4/4/2023 0322   Family Acceptance, E, VU by WN at 4/4/2023 2329    Acceptance, E, VU by WN at 4/4/2023 0322                   Point: Precautions (Done)     Description:   Instruct learner(s) on prescribed precautions during self-care and functional transfers.              Learning Progress Summary           Patient Acceptance, E, VU by WN at 4/4/2023 2329    Acceptance, E, VU by WN at 4/4/2023 0322   Family Acceptance, E, VU by WN at 4/4/2023 2329    Acceptance, E, VU by WN at 4/4/2023 0322                   Point: Body mechanics (Done)     Description:   Instruct learner(s) on proper positioning and spine alignment during self-care, functional mobility activities and/or exercises.              Learning Progress Summary           Patient Acceptance, E, VU by WN at 4/4/2023 2329    Acceptance, E, VU by WN at 4/4/2023 0322   Family Acceptance, E, VU by WN at 4/4/2023 2329    Acceptance, E, VU by WN at 4/4/2023 0322                               User Key     Initials Effective Dates Name Provider Type Discipline     06/16/21 -  Heaven Villareal OTR Occupational Therapist OT     08/23/22 -  Jose Carrillo RN Registered Nurse Nurse                    OT Recommendation and Plan                         Time Calculation:      Time Calculation- OT     Row Name 04/19/23 1509 04/19/23 1508          Time Calculation- OT    OT Start Time 1430  -AF 0900  -AF     OT Stop Time 1500  -AF 0930  -AF     OT Time Calculation (min) 30 min  -AF 30 min  -AF           User Key  (r) = Recorded By, (t) = Taken By, (c) = Cosigned By    Initials Name Provider Type    AF Heaven Villareal, OTR Occupational Therapist              Therapy Charges for Today     Code Description Service Date Service Provider Modifiers Qty    07286782753  OT SELF CARE/MGMT/TRAIN EA 15 MIN  4/18/2023 Heaven Villareal, OTR GO 1    78095734835 HC OT THERAPEUTIC ACT EA 15 MIN 4/18/2023 Heaven Villareal, OTR GO 1    52566751599 HC OT NEUROMUSC RE EDUCATION EA 15 MIN 4/18/2023 Heaven Villareal, OTR GO 1    83915743729 HC OT THER PROC EA 15 MIN 4/18/2023 Heaven Villareal, OTR GO 1    76363849006 HC OT SELF CARE/MGMT/TRAIN EA 15 MIN 4/19/2023 Heaven Villareal, OTR GO 2    79830978253 HC OT THER PROC EA 15 MIN 4/19/2023 Heaven Villareal, OTR GO 1    64376775304 HC OT THERAPEUTIC ACT EA 15 MIN 4/19/2023 Heaven Villareal, OTR GO 1                   MARY ANN Gunn  4/19/2023

## 2023-04-19 NOTE — THERAPY TREATMENT NOTE
Inpatient Rehabilitation - Speech Language Pathology Treatment Note    [facility]     Patient Name: Louise GARCIA  : 1965  MRN: [id number]    Today's Date: 2023                   Admit Date: 3/17/2023       Visit Dx:      ICD-10-CM ICD-9-CM   1. Impaired functional mobility, balance, gait, and endurance  Z74.09 V49.89       Patient Active Problem List   Diagnosis   • Sepsis   • Neck pain, chronic   • Upper back pain   • Paresthesia   • Cervical myelopathy   • Lower extremity weakness   • History of blood clots   • Chronic anticoagulation   • Seizures   • Anemia   • GERD (gastroesophageal reflux disease)   • Guillain-[place] syndrome   • Situational mixed anxiety and depressive disorder   • Mass of middle lobe of right lung   • Oral candidiasis   • Empyema of pleura   • MRSA bacteremia   • Idiopathic peripheral neuropathy       Past Medical History:   Diagnosis Date   • Degenerative disc disease, cervical    • Gestational diabetes    • H/O blood clots    • Hematemesis    • Seizures    • Septic shock        Past Surgical History:   Procedure Laterality Date   • APPENDECTOMY     • BACK SURGERY     • CHOLECYSTECTOMY     • ENDOSCOPY N/A 2016    Procedure: ESOPHAGOGASTRODUODENOSCOPY with biopsy;  Surgeon: Austen Brito MD;  Location: [facility] ENDOSCOPY;  Service:    • HYSTERECTOMY     • NECK SURGERY       approx 6 yrs ago   • THORACOSCOPY Right 3/8/2023    Procedure: THORACOSCOPY WITH DAVINCI ROBOT WITH COMPLETE DECORTICATION;  Surgeon: Chloe Cedillo MD;  Location: [facility] OR;  Service: Robotics - DaVinci;  Laterality: Right;   • TONSILLECTOMY     • TONSILLECTOMY AND ADENOIDECTOMY         SLP Recommendation and Plan                                                            SLP EVALUATION (last 72 hours)     SLP SLC Evaluation     Row Name 23 1400 23 0930 23 1330 23 0930 23 1500       Communication Assessment/Intervention    Document Type therapy note (daily  "note)  -AL therapy note (daily note)  -AL therapy note (daily note)  -SR therapy note (daily note)  -AL therapy note (daily note)  -AL    Subjective Information -- -- no complaints  -SR -- --    Patient Observations -- -- alert;cooperative;agree to therapy  -SR -- --    Patient/Family/Caregiver Comments/Observations Pt is pleasant and cooperative.  -AL Pt participated well.  -AL -- Pt participated well. She reported her  was upset with her last evening, which kept her from sleeping. She reported \"He was yelling and cussing.\"  -AL Pt participated well.  -AL    Patient Effort -- -- excellent  -SR -- --    Symptoms Noted During/After Treatment -- -- none  -SR -- --       Pain Scale: Numbers Pre/Post-Treatment    Pretreatment Pain Rating 0/10 - no pain  -AL 0/10 - no pain  -AL 4/10  -SR 0/10 - no pain  -AL 0/10 - no pain  -AL    Posttreatment Pain Rating -- -- 4/10  -SR -- --    Pain Location - -- -- chest  -SR -- --    Pain Intervention(s) -- -- Distraction  -SR -- --    Row Name 04/17/23 0930                   Communication Assessment/Intervention    Document Type therapy note (daily note)  -AL        Patient/Family/Caregiver Comments/Observations Pt is pleasant and cooperative.  -AL           Pain Scale: Numbers Pre/Post-Treatment    Pretreatment Pain Rating 0/10 - no pain  -AL              User Key  (r) = Recorded By, (t) = Taken By, (c) = Cosigned By    Initials Name Effective Dates    AL Nessa Spangler, MS CCC-SLP 06/16/21 -     SR Milana Lemus CCC-SLP 11/10/22 -                    EDUCATION    The patient has been educated in the following areas:       Cognitive Impairment Communication Impairment.             SLP GOALS     Row Name 04/19/23 1400 04/19/23 0930 04/18/23 1330       Word Retrieval Skills Goal 1 (SLP)    Comment (Word Retrieval Goal 1, SLP) -- Stating 1 synonym for a word: 10/14 with NO cues, 14/14 with MIN-MOD cues.  -AL --       Phonation Goal 1 (SLP)    Progress/Outcomes (Phonation " Goal 1, SLP) good progress toward goal  -AL -- --    Comment (Phonation Goal 1, SLP) Pt with improved vocal quality today, presenting with adequate vocal quality with occasional intermittent mild raspy quality.  -AL -- --       Attention Goal 1 (SLP)    Improve Attention by Goal 1 (SLP) -- complete sustained attention task;80%;with minimal cues (75-90%)  -AL complete sustained attention task;80%;with minimal cues (75-90%)  -SR    Time Frame (Attention Goal 1, SLP) -- 1 week  -AL 1 week  -SR    Progress/Outcomes (Attention Goal 1, SLP) -- good progress toward goal  -AL continuing progress toward goal  -SR    Comment (Attention Goal 1, SLP) -- Medicine management with pill box and pt's own medicine list: 100% with NO Cues  -AL Patient completed acrostic word puzzle with 80% acc given NO cues; 100% acc given min cues. Cues provided for attn to detail.  -SR       Memory Skills Goal 1 (SLP)    Improve Memory Skills Through Goal 1 (SLP) use memory strategies;use external memory aid;recall details of the day;80%;with moderate cues (50-74%)  -AL -- --    Time Frame (Memory Skills Goal 1, SLP) 1 week  -AL -- --    Progress/Outcomes (Memory Skills Goal 1, SLP) continuing progress toward goal  -AL -- --    Comment (Memory Skills Goal 1, SLP) Recalled 3/3 errands after 20 minutes with NO cues. Working memory for 4 words reversed: 40% with NO cues, 90% with MIN cues, 100% with MAX cues.  -AL -- --       Organizational Skills Goal 1 (SLP)    Improve Thought Organization Through Goal 1 (SLP) completing a divergent naming task;80%;with minimal cues (75-90%)  -AL -- --    Time Frame (Thought Organization Skills Goal 1, SLP) 1 week  -AL -- --    Progress/Outcomes (Thought Organization Skills Goal 1, SLP) good progress toward goal  -AL -- --    Comment (Thought Organization Skills Goal 1, SLP) Sweet items: 7 with NO cues, 12 with MIN cues  -AL -- --       Reasoning Goal 1 (SLP)    Improve Reasoning Through Goal 1 (SLP) complete  deductive reasoning task;80%;with minimal cues (75-90%)  -AL -- complete deductive reasoning task;80%;with minimal cues (75-90%)  -SR    Time Frame (Reasoning Goal 1, SLP) 1 week  -AL -- 1 week  -SR    Progress/Outcomes (Reasoning Goal 1, SLP) good progress toward goal  -AL -- good progress toward goal  -SR    Comment (Reasoning Goal 1, SLP) Reasoning for logic puzzle task: 75% with NO cues, 100% with MIN cues  -AL -- Simple deductive reasoning puzzle completed this date. Patient followed written directions and determined solution to activity with 70% acc given NO cues; 100% acc given MIN cues. During a following activity, patient provided solution to [place] regarding the transition from hospital to home given NO cues. Demonstrates good safety awareness/judgement for home.  -SR    Row Name 04/18/23 1200 04/17/23 1500 04/17/23 0930       Respiratory Support Goal 1 (SLP)    Comment (Respiratory Support Goal 1, SLP) -- Pt required overall MOD cues for diaphragmatic breathing during voice exercises.  -AL --       Phonation Goal 1 (SLP)    Improve Phonation By Goal 1 (SLP) -- using appropriate tone focus;90%;with minimal cues (75-90%)  -AL --    Time Frame (Phonation Goal 1, SLP) -- short term goal (STG)  -AL --    Progress/Outcomes (Phonation Goal 1, SLP) goal ongoing  -AL good progress toward goal  -AL --    Comment (Phonation Goal 1, SLP) Pt presented with mild-moderate hoarse vocal quality today. She participated in frontal tone focus exercises (humming, initial /m/ CV productions and single syllable initial /m/ words). Pt exhibited vocal fatigue during single syllable productions and had difficulty  maintaining adequate vocal quality. Pt participated in 10 minute conversation with periods of intermittent clear vocal quality.  -AL Pt presented with mild-moderate hoarse vocal quality at beginning of session. Participated in humming, initial /m/ CV chanting and initial /m/ single syllable word production with overall  MIN-MOD cues to achieve adequate vocal quality. Pt read a multiple paragraph story with initial MOD cues for frontal tone focus; however, as task progressed, pt required NO cues for maintaining adequate vocal quality. Pt required MIN cues in conversation at end of session to maintain adequate vocal quality. Also, benefited from periodic yawn/sign to assist with reducing hyperfunction.  -AL --       Attention Goal 1 (SLP)    Improve Attention by Goal 1 (SLP) -- -- complete sustained attention task;80%;with minimal cues (75-90%)  -AL    Time Frame (Attention Goal 1, SLP) -- -- 1 week  -AL    Progress/Outcomes (Attention Goal 1, SLP) -- -- continuing progress toward goal  -AL    Comment (Attention Goal 1, SLP) -- -- Acrostics task: 7/12 with NO cues, 12/ with MIN cues  -AL       Memory Skills Goal 1 (SLP)    Improve Memory Skills Through Goal 1 (SLP) -- -- use memory strategies;use external memory aid;recall details of the day;80%;with moderate cues (50-74%)  -AL    Time Frame (Memory Skills Goal 1, SLP) -- -- 1 week  -AL    Progress/Outcomes (Memory Skills Goal 1, SLP) -- -- continuing progress toward goal  -AL    Comment (Memory Skills Goal 1, SLP) -- -- Recalled 3/3 errands after 15 minutes with NO Cues.  -AL       Organizational Skills Goal 1 (SLP)    Improve Thought Organization Through Goal 1 (SLP) -- -- completing a divergent naming task;80%;with minimal cues (75-90%)  -AL    Time Frame (Thought Organization Skills Goal 1, SLP) -- -- 1 week  -AL    Progress/Outcomes (Thought Organization Skills Goal 1, SLP) -- -- goal ongoing  -AL    Comment (Thought Organization Skills Goal 1, SLP) -- -- Round: 11 with NO cues. Tall: 6 with NO cues  -AL          User Key  (r) = Recorded By, (t) = Taken By, (c) = Cosigned By    Initials Name Provider Type    Nessa Kraus, MS CCC-SLP Speech and Language Pathologist    Milana Trujillo CCC-SLP Speech and Language Pathologist                            Time Calculation:         Time Calculation- SLP     Row Name 04/19/23 1438 04/19/23 1000          Time Calculation- SLP    SLP Start Time 1400  -AL 0930  -AL     SLP Stop Time 1430  -AL 1000  -AL     SLP Time Calculation (min) 30 min  -AL 30 min  -AL           User Key  (r) = Recorded By, (t) = Taken By, (c) = Cosigned By    Initials Name Provider Type    Nessa Kraus, MS CCC-SLP Speech and Language Pathologist                  Therapy Charges for Today     Code Description Service Date Service Provider Modifiers Qty    85574188164 HC ST DEV OF COGN SKILLS INITIAL 15 MIN 4/18/2023 Nessa Spangler, MS CCC-SLP  1    11241885942 HC ST DEV OF COGN SKILLS EACH ADDT'L 15 MIN 4/18/2023 Nessa Spangler, MS CCC-SLP  1    92601291076 HC ST DEV OF COGN SKILLS INITIAL 15 MIN 4/19/2023 Nessa Spangler, MS CCC-SLP  1    92752331956 HC ST DEV OF COGN SKILLS EACH ADDT'L 15 MIN 4/19/2023 Nessa Spangler, MS CCC-SLP  3                           Nessa Spangler MS CCC-SLP  4/19/2023

## 2023-04-19 NOTE — PLAN OF CARE
Goal Outcome Evaluation:  Plan of Care Reviewed With: patient        Progress: improving  Outcome Evaluation: Pt is A&OX4, calm and cooperative. Assist X1 with cane. PRN pain meds given. Meds whole with water. Con of B&B, last BM 4/19. Purewick at HS. Seizure precautions maintaned.  at bedside. 2L of O2 via NC at HS. Chest tube sites to R abd, scabbed and DHIRAJ. Generalized weakness noted. Sleep study scheduled for 4/19. No unsafe behaviors noted. Pt uses call light for assistance.

## 2023-04-19 NOTE — PROGRESS NOTES
Discussed follow up therapy after d/c with patient and . They do not want home health services and prefer to go to outpatient rehab at [facility]. Contacted [facility]. They cannot get patient in for almost 2 weeks. Scheduled patient to start there on 5/3 at 1:00 p.m. Discussed with patient and  delay in getting started. Again, discussed having home health therapy see her until can get in for outpatient. Patient stated she is familiar with the home health agency and doesn't want services through them. Discussed other options for outpatient locations--[facility] in [place] or [facility] in [place]. They are okay with either. Contacted [facility] but they only offer PT. Contacted [facility] and they can start patient on Monday, 4/24 at 2:00 p.m. Discussed this with patient.  was not present. She is okay to start at [facility] and then switch to [facility]. Will discuss with both she and  tomorrow. Will continue to assist with plans for d/c on Friday, 4/21.

## 2023-04-19 NOTE — PROGRESS NOTES
Inpatient Rehabilitation Plan of Care Note    Plan of Care  Care Plan Reviewed - Updates as Follows    Sphincter Control    [RN] Bladder Management(Active)  Current Status(04/19/2023): Bladder urgency  Weekly Goal(05/03/2023): decrease in episodes of incontinence  Discharge Goal: Continent 100%    [RN] Bowel Management(Active)  Current Status(04/19/2023): Patient is 100% continent of bowel  Weekly Goal(05/03/2023): Patient will maintain a daily bowel pattern.  Discharge Goal: Patient will maintain 100% continence of bowel    Performed Intervention(s)  Bladder/bowel training program  Monitor intake and output  Use incontinence products/equipment      Psychosocial    [RN] Coping/Adjustment(Active)  Current Status(04/19/2023): Anxiety r/t uncertainty of disease course  Weekly Goal(05/03/2023): Allow opportunity to express concerns regarding current  status  Discharge Goal: Patient/family will verbalize decreased feelings of anxiety.    Performed Intervention(s)  Verbalizes needs and concerns  Support from family/peer groups      Body Systems    [RN] Neurological(Active)  Current Status(04/19/2023): Impaired physical mobility r/t decreased muscle  strength/control and limited ROM  Weekly Goal(05/03/2023): Patient will have increased ROM  Discharge Goal: Patient will have improved strength and function of BLE and B  hands.    Performed Intervention(s)  Perform active and passive ROM  Frequent position changes      Safety    [RN] Potential for Injury(Active)  Current Status(04/19/2023): Risk for falls  Weekly Goal(05/03/2023): Family/Caregivers regarding safety precautions and need  for close supervision  Discharge Goal: Patient/family will be aware of risk of fall and safety in the  home setting.    Performed Intervention(s)  Safety Rounds  Bed alarm/Chair alarm  Items within reach    Signed by: Petra Kingsley RN

## 2023-04-19 NOTE — THERAPY TREATMENT NOTE
Inpatient Rehabilitation - Physical Therapy Treatment Note       [facility]     Patient Name: Louise GARCIA  : 1965  MRN: [id number]    Today's Date: 2023                    Admit Date: 3/17/2023      Visit Dx:     ICD-10-CM ICD-9-CM   1. Impaired functional mobility, balance, gait, and endurance  Z74.09 V49.89       Patient Active Problem List   Diagnosis   • Sepsis   • Neck pain, chronic   • Upper back pain   • Paresthesia   • Cervical myelopathy   • Lower extremity weakness   • History of blood clots   • Chronic anticoagulation   • Seizures   • Anemia   • GERD (gastroesophageal reflux disease)   • Guillain-Almira syndrome   • Situational mixed anxiety and depressive disorder   • Mass of middle lobe of right lung   • Oral candidiasis   • Empyema of pleura   • MRSA bacteremia   • Idiopathic peripheral neuropathy       Past Medical History:   Diagnosis Date   • Degenerative disc disease, cervical    • Gestational diabetes    • H/O blood clots    • Hematemesis    • Seizures    • Septic shock        Past Surgical History:   Procedure Laterality Date   • APPENDECTOMY     • BACK SURGERY     • CHOLECYSTECTOMY     • ENDOSCOPY N/A 2016    Procedure: ESOPHAGOGASTRODUODENOSCOPY with biopsy;  Surgeon: Austen Brito MD;  Location: [facility] ENDOSCOPY;  Service:    • HYSTERECTOMY     • NECK SURGERY       approx 6 yrs ago   • THORACOSCOPY Right 3/8/2023    Procedure: THORACOSCOPY WITH DAVINCI ROBOT WITH COMPLETE DECORTICATION;  Surgeon: Chloe Cedillo MD;  Location: [facility] OR;  Service: Robotics - DaVinci;  Laterality: Right;   • TONSILLECTOMY     • TONSILLECTOMY AND ADENOIDECTOMY         PT ASSESSMENT (last 12 hours)     IRF PT Evaluation and Treatment     Row Name 23 0800          PT Time and Intention    Document Type daily treatment  -AE     Mode of Treatment individual therapy;physical therapy  -AE     Patient/Family/Caregiver Comments/Observations excited about using cane, feeling  more confident. [place]  -AE     Row Name 04/19/23 0800          General Information    Patient Profile Reviewed yes  -AE     General Observations of Patient complaining of some L calf pain today  -AE     Existing Precautions/Restrictions fall;other (see comments)  contact  -AE     Limitations/Impairments safety/cognitive  -AE     Row Name 04/19/23 0800          Pain Assessment    Pretreatment Pain Rating 3/10  -AE     Posttreatment Pain Rating 3/10  -AE     Pain Location - Side/Orientation Left  -AE     Pain Location lower  -AE     Pain Location - extremity  -AE     Pre/Posttreatment Pain Comment L calf pain/soreness today  -AE     Row Name 04/19/23 0800          Cognition/Psychosocial    Affect/Mental Status (Cognition) WFL  -AE     Orientation Status (Cognition) oriented x 3  -AE     Follows Commands (Cognition) follows one-step commands  -AE     Personal Safety Interventions fall prevention program maintained;gait belt;muscle strengthening facilitated;nonskid shoes/slippers when out of bed;supervised activity  -AE     Cognitive Function memory deficit  -AE     Memory Deficit (Cognition) episodic memory;procedural memory  -AE     Safety Deficit (Cognition) insight into deficits/self-awareness;judgment;safety precautions follow-through/compliance  -AE     Comment, Cognition forgetful  -AE     Row Name 04/19/23 0800          Transfer Assessment/Treatment    Transfers car transfer  -AE     Row Name 04/19/23 0800          Bed-Chair Transfer    Bed-Chair [place] (Transfers) contact guard  -AE     Assistive Device (Bed-Chair Transfers) cane, quad;wheelchair  -AE     Row Name 04/19/23 0800          Chair-Bed Transfer    Chair-Bed [place] (Transfers) contact guard  -AE     Assistive Device (Chair-Bed Transfers) wheelchair;cane, quad  -AE     Row Name 04/19/23 0800          Sit-Stand Transfer    Sit-Stand [place] (Transfers) contact guard  -AE      Assistive Device (Sit-Stand Transfers) wheelchair;cane, quad  -AE     Row Name 04/19/23 0800          Stand-Sit Transfer    Stand-Sit [place] (Transfers) contact guard  -AE     Assistive Device (Stand-Sit Transfers) wheelchair;cane, quad  -AE     Row Name 04/19/23 0800          Car Transfer    Type (Car Transfer) stand pivot/stand step  -AE     [place] Level (Car Transfer) moderate assist (50% patient effort);maximum assist (25% patient effort);1 person assist  -AE     Assistive Device (Car Transfer) cane, quad  -AE     Comment, (Car Transfer) pt was CGA/Bianca to get into car with SBQC and min cues. unable to stand up from low position. had to step in and have patient hold onto therapist with mod/maxA to stand up.  -AE     Row Name 04/19/23 0800          Gait/Stairs (Locomotion)    [place] Level (Gait) contact guard;minimum assist (75% patient effort)  -AE     Assistive Device (Gait) cane, quad  -AE     Distance in Feet (Gait) 80ft, 40ft, 20ft x 2  -AE     Pattern (Gait) step-to;step-through  -AE     Deviations/Abnormal Patterns (Gait) base of support, wide;left sided deviations;gait speed decreased;stride length decreased  -AE     Bilateral Gait Deviations heel strike decreased;knee buckling bilaterally  -AE     Left Sided Gait Deviations heel strike decreased  -AE     Right Sided Gait Deviations heel strike decreased  -AE     Gait Assessment/Intervention correct cane placement, performs step to pattern 80% of time. one minor LOB when getting distracted. practiced step through x 20ft. still struggles stepping through with RLE due to weak LLE stance leg  -AE     Assistive Device (Stairs) cane, quad  -AE     Handrail Location (Stairs) both sides;left side (ascending)  -AE     Number of Steps (Stairs) 4 steps x 3  -AE     Ascending Technique (Stairs) step-to-step  -AE     Descending Technique (Stairs) step-to-step  -AE     Stairs, Safety Issues sequencing ability decreased;loses balance backward  -AE      Stairs, Impairments strength decreased;impaired balance;coordination impaired  -AE     Stairs Assessment/Intervention 1st set of stairs with B rails. then performed with SBQC and L ascending HR. performed with CGA x 2 and then CGA x 1  -AE     Row Name 04/19/23 0800          Balance    Comment, Balance walking forward/backwards with therapist in front handheld assist 5ft x 2. performed once without UE assist  -AE     Row Name 04/19/23 0800          Positioning and Restraints    Pre-Treatment Position sitting in chair/recliner  -AE     Post Treatment Position wheelchair  -AE     In Wheelchair sitting;call light within reach;encouraged to call for assist;exit alarm on  -AE           User Key  (r) = Recorded By, (t) = Taken By, (c) = Cosigned By    Initials Name Provider Type    AE Deanna Carr, ALBERTO Physical Therapist              Wound 03/08/23 1917 Right lateral chest Incision (Active)   Dressing Appearance open to air 04/19/23 0830   Closure Approximated 04/19/23 0830   Base clean;dry;scab 04/19/23 0830   Periwound intact 04/19/23 0830   Periwound Temperature warm 04/19/23 0830   Periwound Skin Turgor soft 04/19/23 0830   Drainage Amount none 04/19/23 0830   Care, Wound cleansed with;soap and water 04/19/23 0830   Dressing Care open to air 04/19/23 0830   Periwound Care dry periwound area maintained 04/19/23 0830       Wound 03/11/23 1530 Other (See comments) other (see comments) pubis Abrasion (Active)   Dressing Appearance open to air 04/19/23 0830   Closure Open to air 04/19/23 0830   Dressing Care open to air 04/19/23 0830       Wound 03/20/23 1527 Bilateral upper gluteal MASD (Moisture associated skin damage) (Active)   Dressing Appearance open to air 04/19/23 0830   Closure Open to air 04/19/23 0830   Base blanchable;clean;dry 04/19/23 0830   Periwound Temperature warm 04/19/23 0830   Periwound Skin Turgor soft 04/19/23 0830   Edges irregular 04/19/23 0830   Drainage Amount none 04/19/23 0830   Care, Wound  cleansed with;soap and water 04/19/23 0830   Dressing Care open to air 04/19/23 0830     Physical Therapy Education     Title: PT OT SLP Therapies (Done)     Topic: Physical Therapy (Done)     Point: Mobility training (Done)     Learning Progress Summary           Patient Acceptance, E,D, VU,DU by DP at 4/17/2023 1118    Acceptance, E, VU,NR by EE at 4/12/2023 1341    Acceptance, E, VU,DU,NR by JK at 4/8/2023 1528    Acceptance, E, VU by WN at 4/4/2023 2329    Acceptance, E, VU by WN at 4/4/2023 0322    Acceptance, E,D, VU,DU by DP at 4/1/2023 1352    Acceptance, E, VU,DU by MG at 3/31/2023 0825    Acceptance, E, VU,DU by MG at 3/30/2023 0956    Acceptance, E,D, VU,DU by DP at 3/29/2023 1148    Nonacceptance, E,D, VU,DU by DP at 3/28/2023 1144    Acceptance, E,D, VU,DU by DP at 3/27/2023 1601    Acceptance, E,D, VU,NR by EE at 3/25/2023 1423    Acceptance, E,D, NR,VU,DU by DP at 3/21/2023 1448    Acceptance, E, NR by JK at 3/20/2023 1513    Acceptance, E,TB, VU,NR by KP at 3/18/2023 1643   Family Acceptance, E, VU by WN at 4/4/2023 2329    Acceptance, E, VU by WN at 4/4/2023 0322                   Point: Home exercise program (Done)     Learning Progress Summary           Patient Acceptance, E,D, VU,DU by DP at 4/17/2023 1118    Acceptance, E, VU,NR by EE at 4/12/2023 1341    Acceptance, E, VU by WN at 4/4/2023 2329    Acceptance, E, VU by WN at 4/4/2023 0322    Acceptance, E,D, VU,DU by DP at 4/1/2023 1352    Acceptance, E, VU,DU by MG at 3/31/2023 0825    Acceptance, E, VU,DU by MG at 3/30/2023 0956    Acceptance, E,D, VU,DU by DP at 3/29/2023 1148    Nonacceptance, E,D, VU,DU by DP at 3/28/2023 1144    Acceptance, E,D, VU,DU by DP at 3/27/2023 1601    Acceptance, E,D, NR,VU,DU by DP at 3/21/2023 1448    Acceptance, E, NR by JK at 3/20/2023 1513   Family Acceptance, E, VU by WN at 4/4/2023 2329    Acceptance, E, VU by WN at 4/4/2023 0322                   Point: Body mechanics (Done)     Learning Progress Summary            Patient Acceptance, E,D, VU,DU by DP at 4/17/2023 1118    Acceptance, E, VU,DU,NR by JK at 4/8/2023 1528    Acceptance, E, VU by WN at 4/4/2023 2329    Acceptance, E, VU by WN at 4/4/2023 0322    Acceptance, E,D, VU,DU by DP at 4/1/2023 1352    Acceptance, E, VU,DU by MG at 3/31/2023 0825    Acceptance, E, VU,DU by MG at 3/30/2023 0956    Acceptance, E,D, VU,DU by DP at 3/29/2023 1148    Nonacceptance, E,D, VU,DU by DP at 3/28/2023 1144    Acceptance, E,D, VU,DU by DP at 3/27/2023 1601    Acceptance, E,D, VU,NR by EE at 3/25/2023 1423    Acceptance, E,D, NR,VU,DU by DP at 3/21/2023 1448   Family Acceptance, E, VU by WN at 4/4/2023 2329    Acceptance, E, VU by WN at 4/4/2023 0322                   Point: Precautions (Done)     Learning Progress Summary           Patient Acceptance, E,D, VU,DU by DP at 4/17/2023 1118    Acceptance, E, VU by WN at 4/4/2023 2329    Acceptance, E, VU by WN at 4/4/2023 0322    Acceptance, E,D, VU,DU by DP at 4/1/2023 1352    Acceptance, E, VU,DU by MG at 3/31/2023 0825    Acceptance, E, VU,DU by MG at 3/30/2023 0956    Acceptance, E,D, VU,DU by DP at 3/29/2023 1148    Nonacceptance, E,D, VU,DU by DP at 3/28/2023 1144    Acceptance, E,D, VU,DU by DP at 3/27/2023 1601    Acceptance, E,D, VU,NR by EE at 3/25/2023 1423    Acceptance, E,D, NR,VU,DU by DP at 3/21/2023 1448   Family Acceptance, E, VU by WN at 4/4/2023 2329    Acceptance, E, VU by WN at 4/4/2023 0322                               User Key     Initials Effective Dates Name Provider Type Discipline    EE 06/16/21 -  Melinda Mann, PT Physical Therapist PT    JK 06/16/21 -  Juana Veloz, PT Physical Therapist PT    KP 06/16/21 -  Yolanda Shannon, PT Physical Therapist PT    MG 05/24/22 -  Yu Villalobos, PT Physical Therapist PT    WN 08/23/22 -  Jose Carrillo, RN Registered Nurse Nurse    DP 08/24/21 -  Papa Marsh, PT Physical Therapist PT                PT Recommendation and Plan                          Time  Calculation:      PT Charges     Row Name 04/19/23 1413 04/19/23 1014          Time Calculation    Start Time 1230  -AE 0830  -AE     Stop Time 1300  -AE 0900  -AE     Time Calculation (min) 30 min  -AE 30 min  -AE     PT Received On 04/19/23  -AE 04/19/23  -AE     PT - Next Appointment -- 04/20/23  -AE     PT Goal Re-Cert Due Date -- 04/26/23  -AE        Time Calculation- PT    Total Timed Code Minutes- PT 30 minute(s)  -AE 30 minute(s)  -AE           User Key  (r) = Recorded By, (t) = Taken By, (c) = Cosigned By    Initials Name Provider Type    AE Deanna Carr, ALBERTO Physical Therapist                Therapy Charges for Today     Code Description Service Date Service Provider Modifiers Qty    00569846051 HC GAIT TRAINING EA 15 MIN 4/18/2023 Deanna Carr, PT GP 3    84476044744 HC PT THER PROC EA 15 MIN 4/18/2023 Deanna Carr, PT GP 1    17165547105 HC PT THER SUPP EA 15 MIN 4/18/2023 Deanna Carr, PT GP 1    36496943145 HC GAIT TRAINING EA 15 MIN 4/19/2023 Deanna Carr, PT GP 2    35036684077 HC PT THERAPEUTIC ACT EA 15 MIN 4/19/2023 Deanna Carr, PT GP 2                   Deanna Carr PT  4/19/2023

## 2023-04-19 NOTE — PROGRESS NOTES
Recreational Therapy Note    Patient Name: Louise GARCIA   MRN: [id number]    Therapeutic Recreation Eval and Treat (last 12 hours)     Therapeutic Recreation Eval & Treat     Row Name 04/19/23 1500       Therapeutic Recreation Participation    Recreation Therapy Participation games  -SS    Games other (see comments)  10,000 dice game  -    Objectives of Recreation Participation maintain;sense of autonomy by choosing level of participation;positive attitudes leading to a healthy leisure lifestyle  -SS    Comment, Recreation Participation occ cues to recall directions;scorekeeping WFL  -    Recreation Therapy Summary of Participation active participation  -SS          User Key  (r) = Recorded By, (t) = Taken By, (c) = Cosigned By    Initials Name Provider Type    SS Mariel Cantu, CTRS Recreational Therapist                  DELVIN Wei  4/19/2023

## 2023-04-19 NOTE — PLAN OF CARE
Goal Outcome Evaluation:  Plan of Care Reviewed With: patient        Progress: improving  Outcome Evaluation: Louise rested well this shift.  Amina for pain x1.  Spouse at bedside.  All meds whole w/thins.  Pamela @.  2L O2 via NC.

## 2023-04-20 PROCEDURE — 97110 THERAPEUTIC EXERCISES: CPT

## 2023-04-20 PROCEDURE — 97129 THER IVNTJ 1ST 15 MIN: CPT

## 2023-04-20 PROCEDURE — 97530 THERAPEUTIC ACTIVITIES: CPT

## 2023-04-20 PROCEDURE — 97130 THER IVNTJ EA ADDL 15 MIN: CPT

## 2023-04-20 PROCEDURE — 97535 SELF CARE MNGMENT TRAINING: CPT

## 2023-04-20 PROCEDURE — 94799 UNLISTED PULMONARY SVC/PX: CPT

## 2023-04-20 RX ORDER — DESVENLAFAXINE 25 MG/1
25 TABLET, EXTENDED RELEASE ORAL DAILY
Qty: 30 TABLET | Refills: 1 | Status: SHIPPED | OUTPATIENT
Start: 2023-04-21

## 2023-04-20 RX ORDER — LAMOTRIGINE 200 MG/1
200 TABLET ORAL DAILY
Qty: 30 TABLET | Refills: 0 | Status: SHIPPED | OUTPATIENT
Start: 2023-04-21 | End: 2023-05-21

## 2023-04-20 RX ADMIN — APIXABAN 5 MG: 5 TABLET, FILM COATED ORAL at 20:25

## 2023-04-20 RX ADMIN — GABAPENTIN 300 MG: 300 CAPSULE ORAL at 20:25

## 2023-04-20 RX ADMIN — OXYCODONE HYDROCHLORIDE 2.5 MG: 5 TABLET ORAL at 20:25

## 2023-04-20 RX ADMIN — PANTOPRAZOLE SODIUM 40 MG: 40 TABLET, DELAYED RELEASE ORAL at 06:00

## 2023-04-20 RX ADMIN — IPRATROPIUM BROMIDE AND ALBUTEROL SULFATE 3 ML: 2.5; .5 SOLUTION RESPIRATORY (INHALATION) at 20:33

## 2023-04-20 RX ADMIN — Medication 500 MCG: at 08:37

## 2023-04-20 RX ADMIN — Medication 5 MG: at 20:25

## 2023-04-20 RX ADMIN — GABAPENTIN 300 MG: 300 CAPSULE ORAL at 14:31

## 2023-04-20 RX ADMIN — LAMOTRIGINE 200 MG: 100 TABLET ORAL at 08:37

## 2023-04-20 RX ADMIN — DESVENLAFAXINE SUCCINATE 25 MG: 25 TABLET, EXTENDED RELEASE ORAL at 08:37

## 2023-04-20 RX ADMIN — GABAPENTIN 300 MG: 300 CAPSULE ORAL at 06:00

## 2023-04-20 RX ADMIN — OXYCODONE HYDROCHLORIDE 2.5 MG: 5 TABLET ORAL at 14:31

## 2023-04-20 RX ADMIN — Medication 1 MG: at 08:37

## 2023-04-20 RX ADMIN — Medication 100 MG: at 08:37

## 2023-04-20 RX ADMIN — IPRATROPIUM BROMIDE AND ALBUTEROL SULFATE 3 ML: 2.5; .5 SOLUTION RESPIRATORY (INHALATION) at 07:06

## 2023-04-20 RX ADMIN — IPRATROPIUM BROMIDE AND ALBUTEROL SULFATE 3 ML: 2.5; .5 SOLUTION RESPIRATORY (INHALATION) at 14:51

## 2023-04-20 RX ADMIN — APIXABAN 5 MG: 5 TABLET, FILM COATED ORAL at 08:37

## 2023-04-20 NOTE — PLAN OF CARE
Goal Outcome Evaluation:           Progress: improving  Outcome Evaluation: A&OX4. Forgetful. Difficulty with word-finding at times. Idiopathic peripheral neuropathy. Full code. Hx. chronic anemia and seizures. Daily weight. 2L O2 at nite as she de-sats into 80s when sleeping.  Assistx1 with the wheelchair. Stronger in the legs. Continent and incontinent of B&B. Last BM 4/19. Wears a brief. On scheduled gabapentin and tylenol. Has PRN Aura 2.5 mg. Seizure precautions. Siderails padded. On-going pain is better. Diet: Reg, thins. Ate better at meals. Is drinking boost shakes. Has napped after therapies. Runs tachy. Participated fully in therapy. Uses a bedpan. Wears glasses.  at bedside.  checked off to transfer and to take to the restroom. Overight sleep pulse ox study nite completed. Will need O2 at home at night. Will need a sleep study outpatient for a home CPAP. D/C home with  4/21, Friday.

## 2023-04-20 NOTE — PROGRESS NOTES
SECTION GG      Self Care Performance Discharge:   Oral Hygiene: [place] sets up or cleans up; patient completes activity. [place]  assists only prior to or following the activity.   Toileting Hygiene: : [place] provides verbal cues and/or touching/steadying  and/or contact guard assistance as patient completes activity.   Shower/Bathe Self: [place] provides verbal cues and/or touching/steadying and/or  contact guard assistance as patient completes activity.   Upper Body Dressing: [place] sets up or cleans up; patient completes activity.  [place] assists only prior to or following the activity.   Lower Body Dressing: [place] provides verbal cues and/or touching/steadying  and/or contact guard assistance as patient completes activity.   Putting On/Taking Off Footwear: [place] sets up or cleans up; patient completes  activity. [place] assists only prior to or following the activity.    Mobility Toilet Transfer Discharge: [place] provides verbal cues or  touching/steadying assistance as patient completes activity.    Signed by: Heaven Villareal, OT

## 2023-04-20 NOTE — THERAPY DISCHARGE NOTE
Inpatient Rehabilitation - IRF Occupational Therapy Treatment Note/Discharge  [facility]     Patient Name: Lousie GARCIA  : 1965  MRN: [id number]  Today's Date: 2023               Admit Date: 3/17/2023       ICD-10-CM ICD-9-CM   1. Impaired functional mobility, balance, gait, and endurance  Z74.09 V49.89   2. Nocturnal oxygen desaturation  G47.34 327.24     Patient Active Problem List   Diagnosis   • Sepsis   • Neck pain, chronic   • Upper back pain   • Paresthesia   • Cervical myelopathy   • Lower extremity weakness   • History of blood clots   • Chronic anticoagulation   • Seizures   • Anemia   • GERD (gastroesophageal reflux disease)   • Guillain-Puryear syndrome   • Situational mixed anxiety and depressive disorder   • Mass of middle lobe of right lung   • Oral candidiasis   • Empyema of pleura   • MRSA bacteremia   • Idiopathic peripheral neuropathy     Past Medical History:   Diagnosis Date   • Degenerative disc disease, cervical    • Gestational diabetes    • H/O blood clots    • Hematemesis    • Seizures    • Septic shock      Past Surgical History:   Procedure Laterality Date   • APPENDECTOMY     • BACK SURGERY     • CHOLECYSTECTOMY     • ENDOSCOPY N/A 2016    Procedure: ESOPHAGOGASTRODUODENOSCOPY with biopsy;  Surgeon: Austen Brito MD;  Location: [facility] ENDOSCOPY;  Service:    • HYSTERECTOMY     • NECK SURGERY       approx 6 yrs ago   • THORACOSCOPY Right 3/8/2023    Procedure: THORACOSCOPY WITH DAVINCI ROBOT WITH COMPLETE DECORTICATION;  Surgeon: Chloe Cedillo MD;  Location: [facility] OR;  Service: Robotics - DaVinci;  Laterality: Right;   • TONSILLECTOMY     • TONSILLECTOMY AND ADENOIDECTOMY         [facility] OT ASSESSMENT FLOWSHEET (last 12 hours)     IRF OT Evaluation and Treatment     Row Name 23 1506          OT Time and Intention    Document Type discharge evaluation;daily treatment  -AF     Mode of Treatment occupational therapy  -AF     Patient Effort excellent   -AF     Row Name 04/20/23 1506          General Information    Patient/Family/Caregiver Comments/Observations pt sitting up in w/c in AM and supine in bed in PM  -AF     Existing Precautions/Restrictions fall;other (see comments)  contact precautions  -AF     Row Name 04/20/23 1506          Pain Assessment    Pretreatment Pain Rating 3/10  -AF     Posttreatment Pain Rating 3/10  -AF     Pre/Posttreatment Pain Comment dull pain in R rib area constant, c/o of neuropathy pain in finger tips  -AF     Row Name 04/20/23 1506          Cognition/Psychosocial    Affect/Mental Status (Cognition) WFL  -AF     Orientation Status (Cognition) oriented x 3  -AF     Follows Commands (Cognition) follows one-step commands  -AF     Personal Safety Interventions fall prevention program maintained;gait belt;nonskid shoes/slippers when out of bed  -AF     Comment, Cognition can be forgetful  -AF     Row Name 04/20/23 1506          Range of Motion Comprehensive    General Range of Motion bilateral upper extremity ROM WFL  -AF     Row Name 04/20/23 1506          Strength (Manual Muscle Testing)    Left Hand, Setting 2 (Dynamometer Testing) 30  -AF     Right Hand, Setting 2 (Dynamometer Testing) 30  -AF     Left Hand: Tip (Pincer) Pinch Strength (Pinch Dynamometer Testing) 7  -AF     Left Hand: Lateral (Key) Pinch Strength (Pinch Dynamometer Testing) 5  -AF     Right Hand: Tip (Pincer) Pinch Strength (Pinch Dynamometer Testing) 9  -AF     Right Hand: Lateral (Key) Pinch Strength (Pinch Dynamometer Testing) 8  -AF     Additional Documentation Left Hand, Setting 2 (Dynamometer Testing) (Row);Left Hand: Lateral (Key) Pinch Strength (Pinch Dynamometer Testing) (Row);Left Hand: Tip (Pincer) Pinch Strength (Pinch Dynamometer Testing) (Row);Right Hand, Setting 2 (Dynamometer Testing) (Row);Right Hand: Lateral (Key) Pinch Strength (Pinch Dynamometer Testing) (Row);Right Hand: Tip (Pincer) Pinch Strength (Pinch Dynamometer Testing) (Row)  -AF      Row Name 04/20/23 1506          Strength Comprehensive (MMT)    Comment, General Manual Muscle Testing (MMT) Assessment BUE 3+/5  -AF     [facility] Name 04/20/23 1506          Sensory Assessment (Somatosensory)    Sensory Assessment pt noted constant tingling/numbness and some burning in fingertips  -AF     [facility] Name 04/20/23 1506          Bathing    [place] Level (Bathing) bathing skills;lower body;upper body;standby assist;contact guard assist  -AF     Position (Bathing) supported sitting;supported standing;sink side  -AF     Row Name 04/20/23 1506          Upper Body Dressing    [place] Level (Upper Body Dressing) upper body dressing skills;set up assistance  -AF     [facility] Name 04/20/23 1506          Lower Body Dressing    [place] Level (Lower Body Dressing) doff;don;pants/bottoms;shoes/slippers;socks;underwear;contact guard assist;standby assist  -AF     Position (Lower Body Dressing) supported sitting;supported standing  -AF     Comment (Lower Body Dressing) w/c level  -AF     Row Name 04/20/23 1506          Grooming    [place] Level (Grooming) grooming skills;set up  -AF     Position (Grooming) supported sitting  -AF     Comment (Grooming) seated w/c level  -AF     Row Name 04/20/23 1506          Toileting    [place] Level (Toileting) toileting skills;contact guard assist  -AF     Assistive Device Use (Toileting) grab bar/safety frame;raised toilet seat  -AF     Position (Toileting) supported sitting;supported standing  -AF     Row Name 04/20/23 1506          Bed Mobility    Supine-Sit [place] (Bed Mobility) modified independence  -AF     Row Name 04/20/23 1506          Transfer Assessment/Treatment    Comment, (Transfers) CGA sit/stand with ADL tasks  -AF     Row Name 04/20/23 1506          Bed-Chair Transfer    Bed-Chair [place] (Transfers) contact guard  -AF     Assistive Device (Bed-Chair Transfers) cane, quad;wheelchair  -AF     Row Name 04/20/23 1506          Toilet Transfer     Type (Toilet Transfer) stand pivot/stand step  -AF     [place] Level (Toilet Transfer) contact guard  -AF     Assistive Device (Toilet Transfer) cane, quad;grab bars/safety frame;wheelchair  -AF     Row Name 04/20/23 1506          Motor Skills    Coordination 9 Hole Peg Test of Fine Motor Coordination Results  -AF     Results, 9 Hole Peg Test of Fine Motor Coordination RUE: 28, LUE: 32, boxs and blocks RUE: 57, LUE: 52  -AF     Functional Endurance good  -AF     Row Name 04/20/23 1506          Shoulder (Therapeutic Exercise)    Shoulder Strengthening (Therapeutic Exercise) bilateral;flexion;extension;external rotation;internal rotation;scapular stabilization;sitting;2 lb free weight;10 repetitions;2 sets  -AF     Row Name 04/20/23 1506          Elbow/Forearm (Therapeutic Exercise)    Elbow/Forearm Strengthening (Therapeutic Exercise) bilateral;flexion;extension;supination;pronation;sitting;2 lb free weight;10 repetitions;2 sets  -AF     Row Name 04/20/23 1506          Wrist (Therapeutic Exercise)    Wrist Strengthening (Therapeutic Exercise) bilateral;flexion;extension;2 lb free weight;10 repetitions;2 sets  -AF     Row Name 04/20/23 1506          Aerobic Exercise    Type (Aerobic Exercise) arm bike;for 2 minutes  -AF     Comment, Aerobic Exercise (Therapeutic Exercise) seated  -AF     Row Name 04/20/23 1506          Balance    Static Sitting Balance supervision  -AF     Dynamic Sitting Balance standby assist;contact guard  -AF     Static Standing Balance contact guard  with ADL tasks  -AF     Row Name 04/20/23 1506          Positioning and Restraints    Pre-Treatment Position sitting in chair/recliner  -AF     Post Treatment Position wheelchair  -AF     In Wheelchair sitting;exit alarm on;encouraged to call for assist;call light within reach;with family/caregiver;with other staff  with  in room in AM and with RT iN PM  -AF           User Key  (r) = Recorded By, (t) = Taken By, (c) = Cosigned By     Initials Name Effective Dates    AF Heaven Villareal, OTR 06/16/21 -               Wound 03/08/23 1917 Right lateral chest Incision (Active)   Dressing Appearance open to air 04/20/23 0837   Closure Liquid skin adhesive 04/20/23 0837   Base clean;dry 04/20/23 0837   Periwound pink 04/20/23 0837   Drainage Amount none 04/20/23 0837   Care, Wound cleansed with;soap and water 04/20/23 0837   Dressing Care open to air 04/20/23 0837   Periwound Care dry periwound area maintained 04/20/23 0837       Wound 03/11/23 1530 Other (See comments) other (see comments) pubis Abrasion (Active)   Dressing Appearance open to air 04/19/23 2117   Closure Open to air 04/20/23 0837   Base red;scab 04/20/23 0837   Drainage Amount none 04/20/23 0837       Wound 03/20/23 1527 Bilateral upper gluteal MASD (Moisture associated skin damage) (Active)   Dressing Appearance open to air 04/19/23 2117   Closure Open to air 04/20/23 0837   Base blanchable;clean;dry 04/20/23 0837   Drainage Amount none 04/20/23 0837   Care, Wound cleansed with;soap and water 04/20/23 0837   Dressing Care open to air 04/20/23 0837   Periwound Care barrier ointment applied 04/20/23 0837       Occupational Therapy Education     Title: PT OT SLP Therapies (Done)     Topic: Occupational Therapy (Done)     Point: ADL training (Done)     Description:   Instruct learner(s) on proper safety adaptation and remediation techniques during self care or transfers.   Instruct in proper use of assistive devices.              Learning Progress Summary           Patient Acceptance, E, VU,NR by AF at 4/12/2023 1537    Comment: pts  had pictures of bathroom on phone, looked and discussed BSC over commode and laterally having to step into the bathroom from the EOB. will continue to address. 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    Acceptance, E, VU by WN at 4/4/2023 3993    Acceptance, E, VU by WN at 4/4/2023 0322   Family Acceptance, E, VU,NR by FRANNY  at 4/12/2023 1537    Comment: pts  had pictures of bathroom on phone, looked and discussed BSC over commode and laterally having to step into the bathroom from the EOB. will continue to address. 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    Acceptance, E, VU by WN at 4/4/2023 2329    Acceptance, E, VU by WN at 4/4/2023 0322                   Point: Home exercise program (Done)     Description:   Instruct learner(s) on appropriate technique for monitoring, assisting and/or progressing therapeutic exercises/activities.              Learning Progress Summary           Patient Acceptance, E,H, VU,DU by AF at 4/20/2023 1513    Comment: HEP for UB exs with hand weights and FMC tasks    Acceptance, E, VU by WN at 4/4/2023 2329    Acceptance, E, VU by WN at 4/4/2023 0322   Family Acceptance, E, VU by WN at 4/4/2023 2329    Acceptance, E, VU by WN at 4/4/2023 0322                   Point: Precautions (Done)     Description:   Instruct learner(s) on prescribed precautions during self-care and functional transfers.              Learning Progress Summary           Patient Acceptance, E, VU by WN at 4/4/2023 2329    Acceptance, E, VU by WN at 4/4/2023 0322   Family Acceptance, E, VU by WN at 4/4/2023 2329    Acceptance, E, VU by WN at 4/4/2023 0322                   Point: Body mechanics (Done)     Description:   Instruct learner(s) on proper positioning and spine alignment during self-care, functional mobility activities and/or exercises.              Learning Progress Summary           Patient Acceptance, E, VU by WN at 4/4/2023 2329    Acceptance, E, VU by WN at 4/4/2023 0322   Family Acceptance, E, VU by WN at 4/4/2023 2329    Acceptance, E, VU by WN at 4/4/2023 0322                               User Key     Initials Effective Dates Name Provider Type Discipline    AF 06/16/21 -  Heaven Villareal OTR Occupational Therapist OT    YVONNE 08/23/22 -  Jose Carrillo, RN Registered Nurse Nurse                 OT Recommendation and Plan  Planned Therapy Interventions (OT): activity tolerance training, adaptive equipment training, BADL retraining, cognitive/visual perception retraining, edema control/reduction, functional balance retraining, IADL retraining, wheelchair assessment/training, transfer/mobility retraining, strengthening exercise, ROM/therapeutic exercise, patient/caregiver education/training, occupation/activity based interventions, neuromuscular control/coordination retraining           OT IRF GOALS     Row Name 04/20/23 1514             Bed Mobility Goal 1 (OT-IRF)    Progress/Outcomes (Bed Mobility Goal 1, OT-IRF) goal met  -AF         Transfer Goal 1 (OT-IRF)    Progress/Outcomes (Transfer Goal 1, OT-IRF) goal met  -AF         Transfer Goal 2 (OT-IRF)    Progress/Outcomes (Transfer Goal 2, OT-IRF) goal met  -AF         Bathing Goal 1 (OT-IRF)    Progress/Outcomes (Bathing Goal 1, OT-IRF) goal met  -AF         Bathing Goal 2 (OT-IRF)    Progress/Outcomes (Bathing Goal 2, OT-IRF) goal met  -AF         UB Dressing Goal 2 (OT-IRF)    Progress/Outcomes (UB Dressing Goal 2, OT-IRF) goal met  -AF         LB Dressing Goal 1 (OT-IRF)    Progress/Outcomes (LB Dressing Goal 1, OT-IRF) goal met  -AF         LB Dressing Goal 2 (OT-IRF)    Progress/Outcomes (LB Dressing Goal 2, OT-IRF) goal met  -AF         Grooming Goal 1 (OT-IRF)    Progress/Outcomes (Grooming Goal 1, OT-IRF) goal partially met  -AF         Toileting Goal 1 (OT-IRF)    Progress/Outcomes (Toileting Goal 1, OT-IRF) goal met  -AF         Toileting Goal 2 (OT-IRF)    Progress/Outcomes (Toileting Goal 2, OT-IRF) goal met  -AF         Strength Goal 1 (OT-IRF)    Progress/Outcomes (Strength Goal 1, OT-IRF) goal partially met  -AF         Strength Goal 2 (OT-IRF)    Progress/Outcomes (Strength Goal 2, OT-IRF) goal met  -AF         Balance Goal 1 (OT)    Progress/Outcomes (Balance Goal 1, OT) goal met  -AF         Caregiver Training Goal 1  (OT-IRF)    Progress/Outcomes (Caregiver Training Goal 1, OT-IRF) goal met  -AF            User Key  (r) = Recorded By, (t) = Taken By, (c) = Cosigned By    Initials Name Provider Type    Heaven Reynolds OTNAIMA Occupational Therapist                    Time Calculation:    Time Calculation- OT     Row Name 04/20/23 1515 04/20/23 1514          Time Calculation- OT    OT Start Time 1300  -AF 1000  -AF     OT Stop Time 1330  -AF 1030  -AF     OT Time Calculation (min) 30 min  -AF 30 min  -AF           User Key  (r) = Recorded By, (t) = Taken By, (c) = Cosigned By    Initials Name Provider Type    Heaven Reynolds OTNAIMA Occupational Therapist                Therapy Charges for Today     Code Description Service Date Service Provider Modifiers Qty    07947661660 HC OT SELF CARE/MGMT/TRAIN EA 15 MIN 4/19/2023 Heaven Villareal OTR GO 2    51348687701 HC OT THER PROC EA 15 MIN 4/19/2023 Heaven Villareal, OTR GO 1    55660577218 HC OT THERAPEUTIC ACT EA 15 MIN 4/19/2023 Heaven Villareal, OTR GO 1    05801587791 HC OT SELF CARE/MGMT/TRAIN EA 15 MIN 4/20/2023 Heaven Villareal, OTR GO 2    40677757339 HC OT THER PROC EA 15 MIN 4/20/2023 Heaven Villareal, OTR GO 2               OT Discharge Summary  Reason for Discharge: Discharge from facility, Maximum functional potential achieved  Outcomes Achieved: Patient able to partially acheive established goals, Able to achieve all goals within established timeline  Discharge Destination: Home with assist, Home with outpatient services    MARY ANN Gunn  4/20/2023

## 2023-04-20 NOTE — PROGRESS NOTES
Recreational Therapy Note    Patient Name: Louise GARCIA   MRN: [id number]    Therapeutic Recreation Eval and Treat (last 12 hours)     Therapeutic Recreation Eval & Treat     Row Name 04/20/23 1500       Therapeutic Recreation Participation    Recreation Therapy Participation games  -SS    Games other (see comments)  10,000 dice game  -SS    Objectives of Recreation Participation maintain;sense of autonomy by choosing level of participation  -SS    Comment, Recreation Participation occ cues to recall directions, scorekeeping WFL  -    Recreation Therapy Summary of Participation active participation  -SS          User Key  (r) = Recorded By, (t) = Taken By, (c) = Cosigned By    Initials Name Provider Type    SS Mariel Cantu, CTRS Recreational Therapist                  DELVIN Wei  4/20/2023

## 2023-04-20 NOTE — THERAPY DISCHARGE NOTE
Inpatient Rehabilitation - Speech Language Pathology Treatment Note/Discharge  [facility]     Patient Name: Louise GARCIA  : 1965  MRN: [id number]  Today's Date: 2023               Admit Date: 3/17/2023     Visit Dx:    ICD-10-CM ICD-9-CM   1. Impaired functional mobility, balance, gait, and endurance  Z74.09 V49.89   2. Nocturnal oxygen desaturation  G47.34 327.24     Patient Active Problem List   Diagnosis   • Sepsis   • Neck pain, chronic   • Upper back pain   • Paresthesia   • Cervical myelopathy   • Lower extremity weakness   • History of blood clots   • Chronic anticoagulation   • Seizures   • Anemia   • GERD (gastroesophageal reflux disease)   • Guillain-Steens syndrome   • Situational mixed anxiety and depressive disorder   • Mass of middle lobe of right lung   • Oral candidiasis   • Empyema of pleura   • MRSA bacteremia   • Idiopathic peripheral neuropathy     Past Medical History:   Diagnosis Date   • Degenerative disc disease, cervical    • Gestational diabetes    • H/O blood clots    • Hematemesis    • Seizures    • Septic shock      Past Surgical History:   Procedure Laterality Date   • APPENDECTOMY     • BACK SURGERY     • CHOLECYSTECTOMY     • ENDOSCOPY N/A 2016    Procedure: ESOPHAGOGASTRODUODENOSCOPY with biopsy;  Surgeon: Austen Brito MD;  Location: [facility] ENDOSCOPY;  Service:    • HYSTERECTOMY     • NECK SURGERY       approx 6 yrs ago   • THORACOSCOPY Right 3/8/2023    Procedure: THORACOSCOPY WITH DAVINCI ROBOT WITH COMPLETE DECORTICATION;  Surgeon: Chloe Cedillo MD;  Location: [facility] OR;  Service: Robotics - DaVinci;  Laterality: Right;   • TONSILLECTOMY     • TONSILLECTOMY AND ADENOIDECTOMY         SLP Recommendation and Plan  Pt made excellent progress during inpatient stay toward cognitive-communication and voice goals. At discharge, pt presented with an overall mild cognitive-communication deficit, characterized by reduced attention, working memory,  "reasoning and executive function skills. She met goal for recall of 3 errands after 20 minute delay with NO cues. She was able to recall salient events from morning to afternoon sessions with NO cues. She completed high-level verbal working memory tasks with 80% accuracy with MIN cues. She was able to sustain attention to structured tasks for 15 minutes with NO redirections to task; however, she reported occasionally losing her \"train of thought\" during complex tasks. She was able to complete medicine management using a pill box with NO cues for attention to detail. She was able to complete a moderate level deductive reasoning puzzle with 75% accuracy, MIN-MOD cues to increase. She exhibited mildly reduced planning skills during novel tasks.     Pt also presented with mild dysphonia characterized by intermittent hoarse/strained vocal quality and reduced breath support. FEES completed in acute care 3/7/23 showed glottic gap and mild hyperfunction. She was able to produce adequate vocal quality with use of frontal tone exercises (humming, initial /m/ CV chanting, initial /m/ single and two-syllable word chanting) and yawn/sigh. After completion of exercises, pt was able to produce adequate vocal quality in conversation with MIN-MOD cues for frontal tone focus and slightly elevated pitch. SLP provided written home exercise program for voice. Also, provided education regarding vocal hygiene (hydration, impact of uncontrolled reflux, avoiding harsh throat clearing). If pt continues to exhibit dysphonia, recommend referral to ENT for further assessment. Recommend further outpatient speech therapy to address the above-mentioned cognitive-communication and voice deficits.        SLP EVALUATION (last 72 hours)     SLP [facility] Evaluation     Row Name 04/20/23 1401 04/20/23 0930 04/19/23 1400 04/19/23 0930 04/18/23 1330       Communication Assessment/Intervention    Document Type discharge treatment  -AL therapy note (daily note)  " "-AL therapy note (daily note)  -AL therapy note (daily note)  -AL therapy note (daily note)  -SR    Subjective Information -- -- -- -- no complaints  -SR    Patient Observations -- -- -- -- alert;cooperative;agree to therapy  -SR    Patient/Family/Caregiver Comments/Observations Pt participated well. SLP gave pt home exercise program for voice.  -AL Pt participated well.  -AL Pt is pleasant and cooperative.  -AL Pt participated well.  -AL --    Patient Effort -- -- -- -- excellent  -SR    Symptoms Noted During/After Treatment -- -- -- -- none  -SR       Pain Scale: Numbers Pre/Post-Treatment    Pretreatment Pain Rating 0/10 - no pain  -AL 0/10 - no pain  -AL 0/10 - no pain  -AL 0/10 - no pain  -AL 4/10  -SR    Posttreatment Pain Rating -- -- -- -- 4/10  -SR    Pain Location - -- -- -- -- chest  -SR    Pain Intervention(s) -- -- -- -- Distraction  -SR    Row Name 04/18/23 0959                   Communication Assessment/Intervention    Document Type therapy note (daily note)  -AL        Patient/Family/Caregiver Comments/Observations Pt participated well. She reported her  was upset with her last evening, which kept her from sleeping. She reported \"He was yelling and cussing.\"  -AL           Pain Scale: Numbers Pre/Post-Treatment    Pretreatment Pain Rating 0/10 - no pain  -AL              User Key  (r) = Recorded By, (t) = Taken By, (c) = Cosigned By    Initials Name Effective Dates    AL Nessa Spangler, MS CCC-SLP 06/16/21 -     SR Milana Lemus CCC-SLP 11/10/22 -                    EDUCATION  The patient has been educated in the following areas:   Cognitive Impairment Communication Impairment Dysphagia (Swallowing Impairment).           SLP GOALS     Row Name 04/20/23 1401 04/20/23 1000 04/19/23 1400       Patient will demonstrate functional cognitive-linguistic skills for return to discharge environment    Boundary with minimal cues  -AL -- --    Time frame by discharge  -AL -- --    " Progress/Outcomes goal met  -AL -- --       Respiratory Support Goal 1 (SLP)    Improve Respiratory Support Goal 1 (SLP) sustaining a vowel sound on exhalation;independently (over 90% accuracy)  -AL -- --    Progress/Outcomes (Respiratory Support Goal 1, SLP) goal met  -AL -- --       Phonation Goal 1 (SLP)    Improve Phonation By Goal 1 (SLP) using appropriate tone focus;90%;with minimal cues (75-90%)  -AL -- --    Progress/Outcomes (Phonation Goal 1, SLP) goal met  -AL -- good progress toward goal  -AL    Comment (Phonation Goal 1, SLP) Pt presented with overall mild hoarse vocal quality, characterized by intermittent raspy/strained quality with periods of adequate vocal quality. Pt completed humming /m/ exercise x10 with initial clinician model only. She completed initial /m/ CV chanting with MIN cues faded to NO cues. She read aloud 1 and 2 syllable words with NO cues for adequate vocal quality. She read aloud initial /m/ alliterative sentences with intermittent MIN cues for frontal tone focus (used yawn/sign and humming to reset voice). P t read aloud words with open vowels with overall MIN-MOD cues for frontal tone focus. At end of session with exhibited intermittent minimal raspy vocal quality. Pt was given written home exercise program for voice.  -AL -- Pt with improved vocal quality today, presenting with adequate vocal quality with occasional intermittent mild raspy quality.  -AL       Attention Goal 1 (SLP)    Improve Attention by Goal 1 (SLP) complete sustained attention task;80%;with minimal cues (75-90%)  -AL -- --    Progress/Outcomes (Attention Goal 1, SLP) goal met  -AL -- --       Memory Skills Goal 1 (SLP)    Improve Memory Skills Through Goal 1 (SLP) use memory strategies;use external memory aid;recall details of the day;80%;with moderate cues (50-74%)  -AL -- use memory strategies;use external memory aid;recall details of the day;80%;with moderate cues (50-74%)  -AL    Time Frame (Memory Skills  Goal 1, SLP) -- -- 1 week  -AL    Progress/Outcomes (Memory Skills Goal 1, SLP) goal met  -AL -- continuing progress toward goal  -AL    Comment (Memory Skills Goal 1, SLP) -- -- Recalled 3/3 errands after 20 minutes with NO cues. Working memory for 4 words reversed: 40% with NO cues, 90% with MIN cues, 100% with MAX cues.  -AL       Organizational Skills Goal 1 (SLP)    Improve Thought Organization Through Goal 1 (SLP) completing a divergent naming task;80%;with minimal cues (75-90%)  -AL -- completing a divergent naming task;80%;with minimal cues (75-90%)  -AL    Time Frame (Thought Organization Skills Goal 1, SLP) -- -- 1 week  -AL    Progress/Outcomes (Thought Organization Skills Goal 1, SLP) goal met  -AL -- good progress toward goal  -AL    Comment (Thought Organization Skills Goal 1, SLP) -- -- Sweet items: 7 with NO cues, 12 with MIN cues  -AL       Reasoning Goal 1 (SLP)    Improve Reasoning Through Goal 1 (SLP) complete deductive reasoning task;80%;with minimal cues (75-90%)  -AL complete deductive reasoning task;80%;with minimal cues (75-90%)  -AL complete deductive reasoning task;80%;with minimal cues (75-90%)  -AL    Time Frame (Reasoning Goal 1, SLP) -- 1 week  -AL 1 week  -AL    Progress/Outcomes (Reasoning Goal 1, SLP) goal met  -AL good progress toward goal  -AL good progress toward goal  -AL    Comment (Reasoning Goal 1, SLP) -- Moderate level logic puzzle: 75% with NO cues, 100% with MOD cues. Stating 2 synonyms for a word: stated 1 synonym with NO cues, stated 2 synonyms with 25% accuracy, MIN-MOD cues to increase.  -AL Reasoning for logic puzzle task: 75% with NO cues, 100% with MIN cues  -AL       Functional Math Skills Goal 1 (SLP)    Improve Functional Math Skills Through Goal 1 (SLP) complete functional math task;80%;with minimal cues (75-90%)  -AL -- --    Progress/Outcomes (Functional Math Skills Goal 1, SLP) goal no longer appropriate  -AL -- --    Row Name 04/19/23 0930 04/18/23 4304  04/18/23 1200       Word Retrieval Skills Goal 1 (SLP)    Comment (Word Retrieval Goal 1, SLP) Stating 1 synonym for a word: 10/14 with NO cues, 14/14 with MIN-MOD cues.  -AL -- --       Phonation Goal 1 (SLP)    Progress/Outcomes (Phonation Goal 1, SLP) -- -- goal ongoing  -AL    Comment (Phonation Goal 1, SLP) -- -- Pt presented with mild-moderate hoarse vocal quality today. She participated in frontal tone focus exercises (humming, initial /m/ CV productions and single syllable initial /m/ words). Pt exhibited vocal fatigue during single syllable productions and had difficulty  maintaining adequate vocal quality. Pt participated in 10 minute conversation with periods of intermittent clear vocal quality.  -AL       Attention Goal 1 (SLP)    Improve Attention by Goal 1 (SLP) complete sustained attention task;80%;with minimal cues (75-90%)  -AL complete sustained attention task;80%;with minimal cues (75-90%)  -SR --    Time Frame (Attention Goal 1, SLP) 1 week  -AL 1 week  -SR --    Progress/Outcomes (Attention Goal 1, SLP) good progress toward goal  -AL continuing progress toward goal  -SR --    Comment (Attention Goal 1, SLP) Medicine management with pill box and pt's own medicine list: 100% with NO Cues  -AL Patient completed acrostic word puzzle with 80% acc given NO cues; 100% acc given min cues. Cues provided for attn to detail.  -SR --       Reasoning Goal 1 (SLP)    Improve Reasoning Through Goal 1 (SLP) -- complete deductive reasoning task;80%;with minimal cues (75-90%)  -SR --    Time Frame (Reasoning Goal 1, SLP) -- 1 week  -SR --    Progress/Outcomes (Reasoning Goal 1, SLP) -- good progress toward goal  -SR --    Comment (Reasoning Goal 1, SLP) -- Simple deductive reasoning puzzle completed this date. Patient followed written directions and determined solution to activity with 70% acc given NO cues; 100% acc given MIN cues. During a following activity, patient provided solution to scenerio regarding the  transition from hospital to home given NO cues. Demonstrates good safety awareness/judgement for home.  -SR --          User Key  (r) = Recorded By, (t) = Taken By, (c) = Cosigned By    Initials Name Provider Type    Nessa Kraus MS CCC-SLP Speech and Language Pathologist    Milana Trujillo CCC-SLP Speech and Language Pathologist                    Time Calculation:    Time Calculation- SLP     Row Name 04/20/23 1522 04/20/23 1053          Time Calculation- SLP    SLP Start Time 1400  -AL 0930  -AL     SLP Stop Time 1430  -AL 1000  -AL     SLP Time Calculation (min) 30 min  -AL 30 min  -AL           User Key  (r) = Recorded By, (t) = Taken By, (c) = Cosigned By    Initials Name Provider Type    Nessa Kraus MS CCC-SLP Speech and Language Pathologist                Therapy Charges for Today     Code Description Service Date Service Provider Modifiers Qty    65424793921 HC ST DEV OF COGN SKILLS INITIAL 15 MIN 4/19/2023 Nessa Spangler MS CCC-SLP  1    58284215271 HC ST DEV OF COGN SKILLS EACH ADDT'L 15 MIN 4/19/2023 Nessa Spangler, MS CCC-SLP  3    14596775955 HC ST DEV OF COGN SKILLS INITIAL 15 MIN 4/20/2023 Nessa Spangler, MS CCC-SLP  1    71948310001 HC ST DEV OF COGN SKILLS EACH ADDT'L 15 MIN 4/20/2023 Nessa Spangler MS CCC-SLP  3                        Nessa Spangler MS CCC-SLP  4/20/2023

## 2023-04-20 NOTE — PROGRESS NOTES
Discussed d/c plans for home tomorrow, 4/21, with patient and . Discussed need for home O2 at night based on overnight oximetry. Patient stated she thought she was going to need it and is agreeable. She has no preference for company to use. Referral made to [facility]. They will deliver concentrator to home tomorrow after patient is discharged.  has completed family teaching with PT and OT. Discussed outpatient therapy again as had scheduled patient both at [facility] and [facility]. After discussing, patient and  prefer to do home exercise programs provided by therapists for next week and half before starting outpatient therapy at [facility] on 5/3. Cancelled therapy start date at [facility]. Will fax order, therapy d/c notes, and d/c summary to [facility] tomorrow. Will assist with any further plans for d/c tomorrow, 4/21.

## 2023-04-20 NOTE — THERAPY TREATMENT NOTE
Inpatient Rehabilitation - Physical Therapy Treatment Note       [facility]     Patient Name: Louise GARCIA  : 1965  MRN: [id number]    Today's Date: 2023                    Admit Date: 3/17/2023      Visit Dx:     ICD-10-CM ICD-9-CM   1. Impaired functional mobility, balance, gait, and endurance  Z74.09 V49.89   2. Nocturnal oxygen desaturation  G47.34 327.24       Patient Active Problem List   Diagnosis   • Sepsis   • Neck pain, chronic   • Upper back pain   • Paresthesia   • Cervical myelopathy   • Lower extremity weakness   • History of blood clots   • Chronic anticoagulation   • Seizures   • Anemia   • GERD (gastroesophageal reflux disease)   • Guillain-Comfort syndrome   • Situational mixed anxiety and depressive disorder   • Mass of middle lobe of right lung   • Oral candidiasis   • Empyema of pleura   • MRSA bacteremia   • Idiopathic peripheral neuropathy       Past Medical History:   Diagnosis Date   • Degenerative disc disease, cervical    • Gestational diabetes    • H/O blood clots    • Hematemesis    • Seizures    • Septic shock        Past Surgical History:   Procedure Laterality Date   • APPENDECTOMY     • BACK SURGERY     • CHOLECYSTECTOMY     • ENDOSCOPY N/A 2016    Procedure: ESOPHAGOGASTRODUODENOSCOPY with biopsy;  Surgeon: Austen Brito MD;  Location: [facility] ENDOSCOPY;  Service:    • HYSTERECTOMY     • NECK SURGERY       approx 6 yrs ago   • THORACOSCOPY Right 3/8/2023    Procedure: THORACOSCOPY WITH DAVINCI ROBOT WITH COMPLETE DECORTICATION;  Surgeon: Chloe Cedillo MD;  Location: [facility] OR;  Service: Robotics - DaVinci;  Laterality: Right;   • TONSILLECTOMY     • TONSILLECTOMY AND ADENOIDECTOMY         PT ASSESSMENT (last 12 hours)     IRF PT Evaluation and Treatment     Row Name 23 1400          PT Time and Intention    Document Type discharge evaluation  -AE     Mode of Treatment individual therapy;physical therapy  -AE     Patient/Family/Caregiver  Comments/Observations eager to DC home tomorrow  -AE     Row Name 04/20/23 1506 04/20/23 1400       General Information    Patient Profile Reviewed yes  -AE yes  -AE    General Observations of Patient --  norberto present for family teaching  -AE    Existing Precautions/Restrictions -- fall;other (see comments)  contact precautions  -AE    Limitations/Impairments -- safety/cognitive  -AE    Row Name 04/20/23 1400          Pain Assessment    Pretreatment Pain Rating 2/10  -AE     Posttreatment Pain Rating 2/10  -AE     Pain Location - Side/Orientation Left  -AE     Pain Location lower  -AE     Pain Location - extremity  -AE     Row Name 04/20/23 1400          Cognition/Psychosocial    Affect/Mental Status (Cognition) WFL  -AE     Orientation Status (Cognition) oriented x 3  -AE     Follows Commands (Cognition) follows one-step commands  -AE     Personal Safety Interventions fall prevention program maintained;gait belt;muscle strengthening facilitated;nonskid shoes/slippers when out of bed;supervised activity  -AE     Cognitive Function memory deficit  -AE     Memory Deficit (Cognition) episodic memory;procedural memory  -AE     Safety Deficit (Cognition) insight into deficits/self-awareness;judgment;safety precautions follow-through/compliance  -AE     Comment, Cognition forgetful  -AE     Row Name 04/20/23 1400          Bed Mobility    Rolling Left [place] (Bed Mobility) modified independence  -AE     Rolling Right [place] (Bed Mobility) modified independence  -AE     Scooting/Bridging [place] (Bed Mobility) modified independence  -AE     Supine-Sit [place] (Bed Mobility) modified independence  -AE     Sit-Supine [place] (Bed Mobility) modified independence  -AE     Row Name 04/20/23 1400          Bed-Chair Transfer    Bed-Chair [place] (Transfers) contact guard  -AE     Assistive Device (Bed-Chair Transfers) cane, quad;wheelchair  -AE     Row Name 04/20/23 1400          Chair-Bed  Transfer    Chair-Bed [place] (Transfers) contact guard  -AE     Assistive Device (Chair-Bed Transfers) wheelchair;cane, quad  -AE     Row Name 04/20/23 1400          Sit-Stand Transfer    Sit-Stand [place] (Transfers) standby assist  -AE     Assistive Device (Sit-Stand Transfers) wheelchair;cane, quad  -AE     Row Name 04/20/23 1400          Stand-Sit Transfer    Stand-Sit [place] (Transfers) standby assist  -AE     Assistive Device (Stand-Sit Transfers) wheelchair;cane, quad  -AE     Row Name 04/20/23 1400          Car Transfer    Type (Car Transfer) stand pivot/stand step  -AE     [place] Level (Car Transfer) minimum assist (75% patient effort)  -AE     Assistive Device (Car Transfer) cane, quad  -AE     Row Name 04/20/23 1400          Gait/Stairs (Locomotion)    [place] Level (Gait) contact guard;minimum assist (75% patient effort)  -AE     Assistive Device (Gait) cane, quad  -AE     Distance in Feet (Gait) 160ft  -AE     Pattern (Gait) step-to;step-through  -AE     Deviations/Abnormal Patterns (Gait) base of support, wide;left sided deviations;gait speed decreased;stride length decreased  -AE     Bilateral Gait Deviations heel strike decreased;knee buckling bilaterally  -AE     Left Sided Gait Deviations heel strike decreased  -AE     Right Sided Gait Deviations heel strike decreased  -AE     Gait Assessment/Intervention emphasized turning, becomes distracted or falls into forward flexed posture causing slight trunk instability with Bianca to recover  -AE     [place] Level (Stairs) minimum assist (75% patient effort);1 person assist  -AE     Assistive Device (Stairs) cane, quad  -AE     Handrail Location (Stairs) left side (ascending)  -AE     Number of Steps (Stairs) 4 steps  -AE     Ascending Technique (Stairs) step-to-step  -AE     Descending Technique (Stairs) step-to-step  -AE     Stairs, Safety Issues loses balance backward;weight-shifting ability decreased  -AE     Stairs,  Impairments strength decreased;impaired balance;coordination impaired  -AE     Stairs Assessment/Intervention descending backwards 2/2 anxiety and residual quad weakness with history of L knee buckling  -AE     Comment, (Gait/Stairs) walked over red mat Bianca & SBQC  -AE     Row Name 04/20/23 1400          Positioning and Restraints    Pre-Treatment Position sitting in chair/recliner  -AE     Post Treatment Position wheelchair  -AE     In Bed sitting;call light within reach;encouraged to call for assist;exit alarm on  -AE     Row Name 04/20/23 1400          Transfer Goal 2 (PT-IRF)    Activity/Assistive Device (Transfer Goal 2, PT-IRF) all transfers  -AE     [place] Level (Transfer Goal 2, PT-IRF) contact guard required  -AE     Progress/Outcomes (Transfer Goal 2, PT-IRF) goal met  -AE     Row Name 04/20/23 1400          Transfer Goal 3 (PT-IRF)    Activity/Assistive Device (Transfer Goal 3, PT-IRF) car transfer  -AE     [place] Level (Transfer Goal 3, PT-IRF) minimum assist (75% or more patient effort)  -AE     Progress/Outcomes (Transfer Goal 3, PT-IRF) goal met  -AE     Row Name 04/20/23 1400          Gait/Walking Locomotion Goal 2 (PT-IRF)    Activity/Assistive Device (Gait/Walking Locomotion Goal 2, PT-IRF) gait (walking locomotion);walker, rolling  -AE     Gait/Walking Locomotion Distance Goal 2 (PT-IRF) 150  -AE     [place] Level (Gait/Walking Locomotion Goal 2, PT-IRF) contact guard required  -AE     Progress/Outcomes (Gait/Walking Locomotion Goal 2, PT-IRF) goal met  -AE     Row Name 04/20/23 1400          Stairs Goal 1 (PT-IRF)    Activity/Assistive Device (Stairs Goal 1, PT-IRF) stairs, all skills;using handrail, left;using handrail, right  -AE     Number of Stairs (Stairs Goal 1, PT-IRF) 3  -AE     [place] Level (Stairs Goal 1, PT-IRF) minimum assist (75% or more patient effort)  -AE     Progress/Outcomes (Stairs Goal 1, PT-IRF) goal met  -AE     Row Name 04/20/23 1400           Strength Goal 1 (PT-IRF)    Strength Goal 1 (PT-IRF) Pt to demonstrate B LE strength of at leat 3/5 throughout.  -AE     Progress/Outcomes (Strength Goal 1, PT-IRF) goal met  -AE     Row Name 04/20/23 1400          Balance Goal 1 (PT)    Activity/Assistive Device (Balance Goal 1, PT) sitting, static  -AE     [place] Level/Cues Needed (Balance Goal 1, PT) conditional independence  -AE     Progress/Outcomes (Balance Goal 1, PT) goal met  -AE           User Key  (r) = Recorded By, (t) = Taken By, (c) = Cosigned By    Initials Name Provider Type    AE Deanna Carr, PT Physical Therapist              Wound 03/08/23 1917 Right lateral chest Incision (Active)   Dressing Appearance open to air 04/20/23 0837   Closure Liquid skin adhesive 04/20/23 0837   Base clean;dry 04/20/23 0837   Periwound pink 04/20/23 0837   Drainage Amount none 04/20/23 0837   Care, Wound cleansed with;soap and water 04/20/23 0837   Dressing Care open to air 04/20/23 0837   Periwound Care dry periwound area maintained 04/20/23 0837       Wound 03/20/23 1527 Bilateral upper gluteal MASD (Moisture associated skin damage) (Active)   Dressing Appearance open to air 04/19/23 2117   Closure Open to air 04/20/23 0837   Base blanchable;clean;dry 04/20/23 0837   Drainage Amount none 04/20/23 0837   Care, Wound cleansed with;soap and water 04/20/23 0837   Dressing Care open to air 04/20/23 0837   Periwound Care barrier ointment applied 04/20/23 0837     Physical Therapy Education     Title: PT OT SLP Therapies (Done)     Topic: Physical Therapy (Done)     Point: Mobility training (Done)     Learning Progress Summary           Patient Acceptance, E,D, VU,DU by DP at 4/17/2023 1118    Acceptance, E, VU,NR by POWER at 4/12/2023 1341    Acceptance, E, VU,DU,NR by ETHAN at 4/8/2023 1528    Acceptance, E, VU by WN at 4/4/2023 2329    Acceptance, E, VU by WN at 4/4/2023 0322    Acceptance, JUDIT PARKS VU, DU by DP at 4/1/2023 1352    Acceptance, CRISTIANA PARKS DU by MG at  3/31/2023 0825    Acceptance, E, VU,DU by MG at 3/30/2023 0956    Acceptance, E,D, VU,DU by DP at 3/29/2023 1148    Nonacceptance, E,D, VU,DU by DP at 3/28/2023 1144    Acceptance, E,D, VU,DU by DP at 3/27/2023 1601    Acceptance, E,D, VU,NR by EE at 3/25/2023 1423    Acceptance, E,D, NR,VU,DU by DP at 3/21/2023 1448    Acceptance, E, NR by JK at 3/20/2023 1513    Acceptance, E,TB, VU,NR by SAMANTHA at 3/18/2023 1643   Family Acceptance, E, VU by WN at 4/4/2023 2329    Acceptance, E, VU by WN at 4/4/2023 0322                   Point: Home exercise program (Done)     Learning Progress Summary           Patient Acceptance, E,D, VU,DU by DP at 4/17/2023 1118    Acceptance, E, VU,NR by EE at 4/12/2023 1341    Acceptance, E, VU by WN at 4/4/2023 2329    Acceptance, E, VU by WN at 4/4/2023 0322    Acceptance, E,D, VU,DU by DP at 4/1/2023 1352    Acceptance, E, VU,DU by MG at 3/31/2023 0825    Acceptance, E, VU,DU by MG at 3/30/2023 0956    Acceptance, E,D, VU,DU by DP at 3/29/2023 1148    Nonacceptance, E,D, VU,DU by DP at 3/28/2023 1144    Acceptance, E,D, VU,DU by DP at 3/27/2023 1601    Acceptance, E,D, NR,VU,DU by DP at 3/21/2023 1448    Acceptance, E, NR by JK at 3/20/2023 1513   Family Acceptance, E, VU by WN at 4/4/2023 2329    Acceptance, E, VU by WN at 4/4/2023 0322                   Point: Body mechanics (Done)     Learning Progress Summary           Patient Acceptance, E,D, VU,DU by DP at 4/17/2023 1118    Acceptance, E, VU,DU,NR by JK at 4/8/2023 1528    Acceptance, E, VU by WN at 4/4/2023 2329    Acceptance, E, VU by WN at 4/4/2023 0322    Acceptance, E,D, VU,DU by DP at 4/1/2023 1352    Acceptance, E, VU,DU by MG at 3/31/2023 0825    Acceptance, E, VU,DU by MG at 3/30/2023 0956    Acceptance, E,D, VU,DU by DP at 3/29/2023 1148    Nonacceptance, E,D, VU,DU by DP at 3/28/2023 1144    Acceptance, E,D, VU,DU by DP at 3/27/2023 1601    Acceptance, E,D, VU,NR by EE at 3/25/2023 1423    Acceptance, E,D, NR,VU,DU by DP at  3/21/2023 1448   Family Acceptance, E, VU by WN at 4/4/2023 2329    Acceptance, E, VU by WN at 4/4/2023 0322                   Point: Precautions (Done)     Learning Progress Summary           Patient Acceptance, E,D, VU,DU by DP at 4/17/2023 1118    Acceptance, E, VU by WN at 4/4/2023 2329    Acceptance, E, VU by WN at 4/4/2023 0322    Acceptance, E,D, VU,DU by DP at 4/1/2023 1352    Acceptance, E, VU,DU by MG at 3/31/2023 0825    Acceptance, E, VU,DU by MG at 3/30/2023 0956    Acceptance, E,D, VU,DU by DP at 3/29/2023 1148    Nonacceptance, E,D, VU,DU by DP at 3/28/2023 1144    Acceptance, E,D, VU,DU by DP at 3/27/2023 1601    Acceptance, E,D, VU,NR by EE at 3/25/2023 1423    Acceptance, E,D, NR,VU,DU by DP at 3/21/2023 1448   Family Acceptance, E, VU by WN at 4/4/2023 2329    Acceptance, E, VU by WN at 4/4/2023 0322                               User Key     Initials Effective Dates Name Provider Type Discipline    EE 06/16/21 -  Melinda Mann, PT Physical Therapist PT    JK 06/16/21 -  Juana Veloz, PT Physical Therapist PT    KP 06/16/21 -  Yolanda Shannon, PT Physical Therapist PT    MG 05/24/22 -  Yu Villalobos, PT Physical Therapist PT    WN 08/23/22 -  Jose Carrillo, RN Registered Nurse Nurse    DP 08/24/21 -  Papa Marsh, PT Physical Therapist PT                PT Recommendation and Plan                          Time Calculation:      PT Charges     Row Name 04/20/23 1531 04/20/23 1529          Time Calculation    Start Time 1100  -AE 0830  -AE     Stop Time 1130  -AE 0900  -AE     Time Calculation (min) 30 min  -AE 30 min  -AE     PT Received On 04/20/23  -AE 04/20/23  -AE     PT - Next Appointment -- 04/21/23  -AE        Time Calculation- PT    Total Timed Code Minutes- PT 30 minute(s)  -AE 30 minute(s)  -AE           User Key  (r) = Recorded By, (t) = Taken By, (c) = Cosigned By    Initials Name Provider Type    Deanna Lyons, PT Physical Therapist                Therapy Charges for  Today     Code Description Service Date Service Provider Modifiers Qty    21065421454 HC GAIT TRAINING EA 15 MIN 4/19/2023 Deanna Carr, PT GP 2    84688490492 HC PT THERAPEUTIC ACT EA 15 MIN 4/19/2023 Deanna Carr, PT GP 2    47068324366 HC PT THERAPEUTIC ACT EA 15 MIN 4/20/2023 Deanna Carr, PT GP 3    [id number]  PT THER SUPP EA 15 MIN 4/20/2023 Deanna Carr, PT GP 4                   Deanna Carr, PT  4/20/2023

## 2023-04-20 NOTE — PROGRESS NOTES
Inpatient Rehabilitation Plan of Care Note    Plan of Care  Care Plan Reviewed - Updates as Follows    Sphincter Control    Performed Intervention(s)  Bladder/bowel training program  Monitor intake and output  Use incontinence products/equipment      Psychosocial    Performed Intervention(s)  Verbalizes needs and concerns  Support from family/peer groups      Body Systems    Performed Intervention(s)  Perform active and passive ROM  Frequent position changes      Safety    Performed Intervention(s)  Safety Rounds  Bed alarm/Chair alarm  Items within reach    Signed by: Lorin He RN

## 2023-04-20 NOTE — PLAN OF CARE
Goal Outcome Evaluation:  Plan of Care Reviewed With: patient        Progress: improving  Outcome Evaluation: Pain med for R flank rib area pain and bilat. leg pain. Tingling bilat. fingers and L upper leg. Burning sensation thumbs. Gabapentin at HS. Appears to be sleeping well. Overnight oximetry on RA done per Respiratory Therapist. Meds with thin liquids.  stayed the night, has been checked off by therapy to transfer ptJared Santiago at night.

## 2023-04-20 NOTE — PROGRESS NOTES
"Section C. BIMS  Brief Interview for Mental Status (BIMS) was conducted.  Repetition of Three Words: Three words  Able to report correct year: Correct  Able to report correct month: Accurate within 5 days  Able to report correct day of the week: Correct  Able to recall \"sock\": Yes, no cue required  Able to recall \"blue\": Yes, no cue required  Able to recall \"bed\": No, could not recall    BIMS SUMMARY SCORE: 13 Cognitively intact    Section C. Signs and Symptoms of Delirium (from CAM)  Acute Change in Mental Status:   No  Inattention:   Behavior not present  Disorganized Thinking:   Behavior not present  Altered Level of Consciousness:   Behavior not present    Signed by: Nessa Spangler, SLP    "

## 2023-04-20 NOTE — PROGRESS NOTES
LOS: 34 days   Patient Care Team:  Chloe Schaefer APRN as PCP - General (Family Medicine)  Mikhail Glez MD as Consulting Physician (Neurology)      Patient Name: ELEONORA GARCIA  Patient : 1965    ADMITTING DIAGNOSIS:  Diagnoses    1. IMPAIRED FUNCTIONAL MOBILITY, BALANCE, GAIT, AND ENDURANCE           SUBJECTIVE:  Patient seen and examined at bedside. She had a good night of sleep and states that she did the overnight pulse ox test last night, which she figured her oxygen went down because the nurse put oxygen on her but respiratory therapy took it off. Feels well.     OBJECTIVE:    Vitals:    23 0706   BP:    Pulse: 82   Resp: 18   Temp:    SpO2: 95%       PHYSICAL EXAM:   General: pleasant, in no acute distress  HEENT: NCAT, sclera anicteric, conjunctiva pink, mucoa moist  Cardiovascular: RRR, +S1+S2, no obvious m/g/r  Lungs: Decreased breath sounds on the right side-no change  Abdomen: normoactive bowel sounds, soft, tender to palpation in the epigastrum, no guarding or rebound tenderness  Extremities: no peripheral edema  Skin: exposed surfaces of skin warm, intact, without erythema  Neuro: awake alert and oriented to person, place, time, and situation, CN II-XII grossly intact  MSK: 5/5 in the bilateral upper extremities, 5/5 RLE, LLE: 3+/5 HF/KE, 4+/5 DF/PF      MEDICATIONS  Scheduled Meds:  apixaban, 5 mg, Oral, Q12H  desvenlafaxine, 25 mg, Oral, Daily  folic acid, 1 mg, Oral, Daily  gabapentin, 300 mg, Oral, Q8H  ipratropium-albuterol, 3 mL, Nebulization, 4x Daily - RT  lamoTRIgine, 200 mg, Oral, Daily  melatonin, 5 mg, Oral, Nightly  pantoprazole, 40 mg, Oral, Q AM  thiamine, 100 mg, Oral, Daily  vitamin B-12, 500 mcg, Oral, Daily        Continuous Infusions:       PRN Meds:  •  acetaminophen  •  bisacodyl  •  Eucerin original healing lotion  •  hydrocortisone-bacitracin-zinc oxide-nystatin  •  ondansetron ODT  •  oxyCODONE  •  polyethylene glycol  •  senna-docusate sodium  •   simethicone      RESULTS:  No results found for: POCGLU  Results from last 7 days   Lab Units 04/19/23  0726 04/17/23  0734 04/14/23  0753   WBC 10*3/mm3 11.81* 10.65 8.79   HEMOGLOBIN g/dL 8.5* 7.2* 7.6*   HEMATOCRIT % 25.1* 21.8* 23.3*   PLATELETS 10*3/mm3 334 255 355     Results from last 7 days   Lab Units 04/17/23  0734 04/14/23  0753   SODIUM mmol/L 137 137   POTASSIUM mmol/L 4.1 3.8   CHLORIDE mmol/L 101 98   CO2 mmol/L 25.4 32.2*   BUN mg/dL 18 17   CREATININE mg/dL 1.00 1.17*   CALCIUM mg/dL 10.2 11.3*   GLUCOSE mg/dL 95 123*           ASSESSMENT and PLAN:    Idiopathic peripheral neuropathy    Paresthesia    History of blood clots    Chronic anticoagulation    Seizures    Anemia    Guillain-[place] syndrome    MRSA bacteremia    Subacute motor predominant idiopathic peripheral neuropathy with Paraparesis and Areflexia.   S/p IVIG x 5 days  March 30-shows improvement with her strength proximally the upper extremities.  Improvement with her hand strength.  Improved strength in the right lower extremity but continues with profound weakness in the left lower extremity.  With weakness of the left ankle, will look at probable AFO for gait activities.  Transfers are moderate assist.  Ambulated with a rolling walker up to 70 feet with gait deviations minimal moderate assist  -4/6-reconsulted neurology to see if she needs another round of IVIG.  Follow-up assessment with neurology-Previous IVIG dose was 20g daily for 5 days from 2/27-3/3.  Plan is to repeat another course of IVIG  - 4/10- fever this morning likely a side effect of IVIG.  She is scheduled to receive her final dose of this course today.  Will obtain CXR to rule out pneumonia.       Impaired mobility/impaired self care/ impaired cognition  Left greater than right lower extremity weakness greater than bilateral upper extremity weakness  April 4-strength improved in the bilateral upper extremities and right lower extremity.Weakness  looks about the same  the left lower extremity.  On the day she ambulated further last week she had utilized AFO on the left lower extremity  April 5-stronger with her left hip flexors and knee extensors today  April 6- strength and coordination improving.  Per OT note she is needing set up assist for upper body dressing with max assist for lower body dressing, PT is noticing that the patient appears stronger and is limited by weakness and fear.  Moderate assist between chair and bed but min assist for sit to stand.  Very minimal knee buckling when walking with left AFO.  Making good progress overall.  April 17- strength is significantly improved from last week on the left lower extremity.  She is walking well with PT.  Progressing well with therapies.     R lung masslike infiltrate with cavitary lesions/pleural effusion   S/p thoracentesis - Blood cx and pleural fluid  positive for MRSA   -April 20 - performed overnight pulse ox last night. Will follow up report to determine need for oxygen at home.      Chest tube placement given empyema, and she underwent thoracoscopy w/ decortication 3/8/23  -expected postoperative changes with pleural thickening and minimal pleural effusion.  The effusion is too small for intervention and will likely to continue to resolve with time.  Recommend continue good pulmonary hygiene with incentive spirometry hourly as well as increase activity/ambulation as tolerated.  March 23-appears decreased breath sounds more noticeable today on the right compared to the left.  Check follow-up chest x-ray.  On room air during the day, 1 L at night.  March 24- CXR relatively unchanged from previous, reached out to CT surgery given planned outpatient f/u on 3/28, no further imaging planned at this time as patient afebrile with normal WBC, monitor     Right-sided Chest Pain 3/24  -EKG sinus tach  -HS Troponin 25 > 24  -consider Cardiology consult if worsening  -pain reproducible with palpation, pain likely postoperative  neuralgia as indicated vs. possible costochondritis, continue gabapentin, ice/heat, monitor  -4/10- patient continues to complain of right-sided rib pain.  We will touch base with thoracic surgery about nerve block for postoperative neuralgia.  - 4/12- thoracic surgery may refer for outpatient nerve block.  We will continue to monitor and consider going up on gabapentin.    Nutrition-  April 13-Patient has had decreased p.o. intake with weight loss.  Discussed option of supplemental tube feeds with the dietitian.     Hypokalemia  -3/24 K 3.3, repleted  -3/29 K 3.7, resolved      Mass on TTE with findings concerning for endocarditis - underwent MARIAMA 3/10/2023 which had no vegetation  RUE superficial thrombophlebitis concerning for septic phlebitis.     ID - continue 4 weeks of vancomycin as recommended by infectious disease dosed by pharmacy to achieve a trough level of 15-20 or area under the curve of 400-600 from last negative blood culture or last intervention which ever is the latest - to complete April 1, 2023     Left hip arthrocentesis March 16 to rule out any hip septic arthritis - no growth to date on fluid.    Lower back pain - MRI of spine to rule out discitis or osteomyelitis.  This did have known degenerative changes but  no infection.      DVT prophylaxis - SCDs / apixiban. History of LLE DVT - on Eliquis at home     Pain management   - Tylenol 1,000 mg three times a day/ Celebrex 100 mg q 112 hours/ gabapentin 300 mg q 8 hours Oxycodone 5 mg q 4 hours prn  March 18 - DC Celebrex 2/2 PHYLLIS, and anemia      Anxiety/ muscle spasms - Diazepam 2 mg q 6 hours prn - JONES reviewed     Seizure disorder - Lamotrigine 200 mg daily     ABLA   - March 18- Hgb 7.0 (7.3 on 3/16). Type, cross and transfuse 1 unit PRBCs. Pre-medicate with Tylenol and Benadryl. CBC in am.   March 19- Hgb 8.8 s/p 1 unit PRBcs. Hemoccult positive. On Eliquis for h/o DVT.  Per IM -[likely 2/2 anemia of chronic disease; no signs of active  bleeding  -s/p 1unit PRBCs w/ appropriate rise in Hb  -iron studies c/w ACD  -would not stop Eliquis at this time unless active bleeding noted or Hb drop continues]  March 20 - HGB 9.3 s/p transfusion  March 21-reviewed with internal medicine-hemoglobin stable after transfusion.  Iron studies consistent with inflammation.  No further work-up presently  March 22 - Hgb 8.9, stable  March 23-hemoglobin trend 8.3.  Continue to follow.  Recheck tomorrow  March 29 - Hgb 8.1, stable  April 6- hemoglobin 8.3.  Stable.  April 10- hemoglobin slightly down trended to 7.7.  Monitor.  April 14 - Hgb 7.6, felt Anemia of chronic disease  April 17 - Hgb slowly trending down. 7.2 today. Monitor. Consider iron infusion.   April 19 - Hgb 8.5 today. Will check again on day of discharge. Should follow closely with her PCP after discharge.      PHYLLIS   March 18- Creatinine 1.36 up from 1.12. On IV Vanc and Celebrex. DC Celebrex. BMP in am.  March 19- s/p 500ml NS bolus. Creatinine 1.31.  March 23-creatinine 0.92  April 10- creatinine downtrending to 1.12 with increased fluids.    Nausea, resolved  -4/12-nausea with vomiting this morning.  Declined to participate in speech therapy due to not feeling well.  Zofran 4 mg every 6 hours as needed for nausea and vomiting added and administered.  No signs of systemic inflammation or infection.  Labs will be drawn tomorrow.  April 19 - no further episodes of nausea or emesis     TEAM CONF - MARCH 21 - BED MOD X 2. TRANSFERS MAX 2. STAND PIVOT OR SQUAT PIVOT. NON-AMBULATORY. Saturday MARCH 18 GAIT 6 FEET PARALLEL BARS MOD MAX OF 2.   BATH MOD 1-2. LBD DEP. UBD MOD. EATING MIN. GROOMING MIN. TOILETING DEP.   The patient has difficulty remembering new information due to short-term memory impairments.  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    Regular/thin diet continued since FEES completion 3.7, although pt known to cough with and without PO intake, may be contributed partially to ineffective VF closure.   DRESSING FORMER CHEST TUBE SITE. CONTINUES ON PUREWICK AT NIGHT. O2 AT 1-2 LITERS AT NIGHT.  DECREASED APPETITE. ABD X-RAY THIS AM SHOWS NON-OBSTRUCTIVE BOWEL GAS PATTERN.   ELOS - 4 WEEKS     TEAM CONF - MARCH 28 - BED MIN CTG. TRANSFER MOD ASSIST STAND PIVOT OR SQUAT PIVOT. FATIGUES IN AFERNOONS. GAIT 15 FEET RW MIN X 2. FLUCTUATING ORIENTATION. BETTER DETAIL TO TASK. MIN MOD CUES FOR REASONING TASKS. DYSPHONIA FROM COUGHING.  SLP REVIEWED VOICE HYGIENE, NOT TO DO HARSH THROAT CLEAR. BATH MOD. LBD MAX. UBD MIN. TOILETING MOD X 2. CONTINENT/INCONTINENT BLADDER. RIGHT CHEST TUBE / THORACOSCOPY SITE CARE. VARIABLE INTAKE.   ELOS - 3 WEEKS     TEAM CONF - APRIL 4 - CONTINUES WEAK IN LLE. STRONGER IN BUE AND RIGHT KNEE EXTENSION. BED MIN MOD TO CTG. TRANSFERS MOD MAX STAND PIVOT OR SQUAT PIVOT. WORKED ON GAIT IN PARALLEL BARS MIN MOD OF 2 PERSON. CAN DO 15 FEET IF NOT SO ANXIOUS. TOILET AND SHOWER TRANSFERS MOD ASSIST. DROP ARM BEDSIDE COMMODE. AT NIGHT USING BEDPAN. USING PUREWICK AT NIGHT. BATH MIN. LBD MOD. UBD SET UP.TOILETING MOD MAX 2.    Pt with improved ability to recall salient events  from day.Now presents with mild-moderate hoarse vocal  quality. Improved vocal intensity.  ADDRSSING DYSPHONIA. DIETICIAN REVIEWED FOOD CHOICES. GABAPENTIN FOR RIGHT RIB PAIN/INTERCOSTAL PAIN.CONTINENT BOWEL AND BLADDER WITH URGENCY.   ELOS - 2-3 WEEKS     Team Conference - 4/11/2023  Nursing: overnight using purewick,   PT: mod assist for transfers due to weight shifting, bed mobility w/wo rails standby/contact guard, shallow four inch step with rails 1-2 person assist, walking 60' with  walker, trial AFO and ace wrap assist doesn't seem to help a lot now that she no longer has a hard collapse of her ankle/knee, anticipate she will walk safely with walker in their RV home but need a wheelchair for community ambulation  OT: toilet transfers mod assist with one, clothing management max-mod  SLP: mild-mod cognitive impairment, struggles with oxycodone, max cues for verbal working memory in the morning but min in the afternoon, min-mod cueing, improvements with carryover  Psychology: trouble with thought organization and concentration, trouble with working memory, uncertain of self, lacking confidence with some anxiety completing tasks  Social Work: recommending home health  Discharge Date: 1.5 weeks    TEAM CONF - April 18 - STAIR MIN ASSIST. GAIT 80 FEET RW CDTG MIN.   TRANSFERS CTG STAND PIVOT. BED SBA CTG. LBD CTG MIN. UBD SET UP. TOILETING MOD.   Memory( ): Met goal for recall of 3 errands after 15 minutes with NO cues. Requires MIN-MOD cues to complete high-level verbal working memory tasks.Voice( ): Improved vocal quality. Able to achieve adequate vocal quality with MIN-MOD cues for frontal tone focus and diaphragmatic breathing. Overall mild hoarse vocal quality.  NEUROLOGY TO SCHEDULE FOLLOW UP IN ONE MONTH TO RE-ASSESS FOR FURTHER IVIG. OVERNIGHT PULSE OX WED NIGHT. APPETITE IMPROVED, 100%.  ELOS - Friday. HOME HEALTH PT, OT, SLP NURSING    CODE STATUS:  Level Of Support Discussed With: Patient  Code Status (Patient has no pulse and is not breathing): CPR (Attempt to Resuscitate)  Medical Interventions (Patient has pulse or is breathing): Full Support      Admission Status: Continues to meet requirements for inpatient admission.     Patient seen and evaluated with attending physician, Dr. Rider. Please see attestation.     Olive Torres, DO  Physical Medicine and Rehabilitation   PGY-2     During rounds, used appropriate personal protective equipment including mask and gloves.  Additional  gown if indicated.  Mask used was standard procedure mask. Appropriate PPE was worn during the entire visit.  Hand hygiene was completed before and after.

## 2023-04-21 VITALS
TEMPERATURE: 98.1 F | SYSTOLIC BLOOD PRESSURE: 120 MMHG | DIASTOLIC BLOOD PRESSURE: 70 MMHG | OXYGEN SATURATION: 98 % | RESPIRATION RATE: 18 BRPM | WEIGHT: 112.88 LBS | HEART RATE: 88 BPM | HEIGHT: 62 IN | BODY MASS INDEX: 20.77 KG/M2

## 2023-04-21 LAB
ANION GAP SERPL CALCULATED.3IONS-SCNC: 10.3 MMOL/L (ref 5–15)
BASOPHILS # BLD AUTO: 0.08 10*3/MM3 (ref 0–0.2)
BASOPHILS NFR BLD AUTO: 0.9 % (ref 0–1.5)
BUN SERPL-MCNC: 19 MG/DL (ref 6–20)
BUN/CREAT SERPL: 22.1 (ref 7–25)
CALCIUM SPEC-SCNC: 10.3 MG/DL (ref 8.6–10.5)
CHLORIDE SERPL-SCNC: 103 MMOL/L (ref 98–107)
CO2 SERPL-SCNC: 24.7 MMOL/L (ref 22–29)
CREAT SERPL-MCNC: 0.86 MG/DL (ref 0.57–1)
DEPRECATED RDW RBC AUTO: 61.2 FL (ref 37–54)
EGFRCR SERPLBLD CKD-EPI 2021: 77.9 ML/MIN/1.73
EOSINOPHIL # BLD AUTO: 0.36 10*3/MM3 (ref 0–0.4)
EOSINOPHIL NFR BLD AUTO: 3.9 % (ref 0.3–6.2)
ERYTHROCYTE [DISTWIDTH] IN BLOOD BY AUTOMATED COUNT: 16.7 % (ref 12.3–15.4)
GLUCOSE SERPL-MCNC: 110 MG/DL (ref 65–99)
HCT VFR BLD AUTO: 26.9 % (ref 34–46.6)
HGB BLD-MCNC: 8.6 G/DL (ref 12–15.9)
IMM GRANULOCYTES # BLD AUTO: 0.06 10*3/MM3 (ref 0–0.05)
IMM GRANULOCYTES NFR BLD AUTO: 0.6 % (ref 0–0.5)
LYMPHOCYTES # BLD AUTO: 2.26 10*3/MM3 (ref 0.7–3.1)
LYMPHOCYTES NFR BLD AUTO: 24.3 % (ref 19.6–45.3)
MCH RBC QN AUTO: 31.9 PG (ref 26.6–33)
MCHC RBC AUTO-ENTMCNC: 32 G/DL (ref 31.5–35.7)
MCV RBC AUTO: 99.6 FL (ref 79–97)
MONOCYTES # BLD AUTO: 0.71 10*3/MM3 (ref 0.1–0.9)
MONOCYTES NFR BLD AUTO: 7.6 % (ref 5–12)
NEUTROPHILS NFR BLD AUTO: 5.84 10*3/MM3 (ref 1.7–7)
NEUTROPHILS NFR BLD AUTO: 62.7 % (ref 42.7–76)
NRBC BLD AUTO-RTO: 0.1 /100 WBC (ref 0–0.2)
PLATELET # BLD AUTO: 274 10*3/MM3 (ref 140–450)
PMV BLD AUTO: 12 FL (ref 6–12)
POTASSIUM SERPL-SCNC: 4.2 MMOL/L (ref 3.5–5.2)
RBC # BLD AUTO: 2.7 10*6/MM3 (ref 3.77–5.28)
SODIUM SERPL-SCNC: 138 MMOL/L (ref 136–145)
WBC NRBC COR # BLD: 9.31 10*3/MM3 (ref 3.4–10.8)

## 2023-04-21 PROCEDURE — 85025 COMPLETE CBC W/AUTO DIFF WBC: CPT | Performed by: PHYSICAL MEDICINE & REHABILITATION

## 2023-04-21 PROCEDURE — 80048 BASIC METABOLIC PNL TOTAL CA: CPT | Performed by: PHYSICAL MEDICINE & REHABILITATION

## 2023-04-21 RX ORDER — GABAPENTIN 300 MG/1
300 CAPSULE ORAL EVERY 8 HOURS SCHEDULED
Qty: 90 CAPSULE | Refills: 1 | Status: SHIPPED | OUTPATIENT
Start: 2023-04-21

## 2023-04-21 RX ORDER — OXYCODONE HYDROCHLORIDE 5 MG/1
2.5 TABLET ORAL EVERY 12 HOURS PRN
Qty: 3 TABLET | Refills: 0 | Status: SHIPPED | OUTPATIENT
Start: 2023-04-21 | End: 2023-04-24

## 2023-04-21 RX ADMIN — GABAPENTIN 300 MG: 300 CAPSULE ORAL at 05:32

## 2023-04-21 RX ADMIN — Medication 500 MCG: at 07:47

## 2023-04-21 RX ADMIN — OXYCODONE HYDROCHLORIDE 2.5 MG: 5 TABLET ORAL at 11:02

## 2023-04-21 RX ADMIN — APIXABAN 5 MG: 5 TABLET, FILM COATED ORAL at 07:47

## 2023-04-21 RX ADMIN — OXYCODONE HYDROCHLORIDE 2.5 MG: 5 TABLET ORAL at 01:42

## 2023-04-21 RX ADMIN — Medication 1 MG: at 07:46

## 2023-04-21 RX ADMIN — LAMOTRIGINE 200 MG: 100 TABLET ORAL at 07:47

## 2023-04-21 RX ADMIN — DESVENLAFAXINE SUCCINATE 25 MG: 25 TABLET, EXTENDED RELEASE ORAL at 07:47

## 2023-04-21 RX ADMIN — PANTOPRAZOLE SODIUM 40 MG: 40 TABLET, DELAYED RELEASE ORAL at 05:32

## 2023-04-21 RX ADMIN — Medication 100 MG: at 07:47

## 2023-04-21 NOTE — PROGRESS NOTES
SECTION GG    Eating Performance Discharge: Mantee sets up or cleans up; patient completes  activity. Mantee assists only prior to or following the activity.    Section B. Health Literacy  Frequency of Needing Assistance Reading:  Rarely    Section D. Mood  Presence of little interest or pleasure in doing things:   No  Frequency of having little interest or pleasure in doing things:   Never or 1  day  Presence of feeling down, depressed, or hopeless:   No  Frequency of feeling down, depressed, or hopeless:   Never or 1 day   Interview Ended. Above responses do not meet criteria to continue  Total Severity Score:   0    Section D. Social Isolation  Frequency of Feeling Lonely or Isolated:  Never    Section J. Health Conditions (Pain Effect on Sleep)  Pain Effect on Sleep:   Rarely or not at all    Signed by: Gaviota Paris RN

## 2023-04-21 NOTE — PROGRESS NOTES
Inpatient Rehabilitation Discharge  Section A. Transportation  Issues Due to Lack of Transportation:  No    Section A. Medication List  Medication List to Subsequent Provider:  Not applicable.  Patient was not  discharged to a subsequent provider.  Discharge Location:  01 - Home  Medication List to Patient at Discharge:  Yes - Current reconciled medication  list provided to the patient, family and/or caregiver  Route(s) of Medication List Transmission to Patient:  Verbal (e.g., in-person,  telephone, video conferencing), Paper-based (e.g., fax, copies, printouts)    Signed by: OZZIE Mccarthy

## 2023-04-21 NOTE — PLAN OF CARE
Goal Outcome Evaluation:           Progress: improving  Outcome Evaluation: A&OX4. Forgetful. Difficulty with word-finding at times. Idiopathic peripheral neuropathy. Full code. Hx. chronic anemia and seizures. Daily weight. 2L O2 at nite as she de-sats into 80s when sleeping.  Assistx1 with the wheelchair. Stronger in the legs. Continent and incontinent of B&B. Last BM 4/19. Wears a brief. On scheduled gabapentin and tylenol. Has PRN Aura 2.5 mg. Seizure precautions. Siderails padded. On-going pain is better. Diet: Reg, thins. Ate better at meals. Is drinking boost shakes. Runs tachy. Wears glasses.  at bedside.  checked off to transfer and to take to the restroom. Overight sleep pulse ox study nite completed. Will need O2 at home at night. Will need a sleep study outpatient for a home CPAP. D/C home with  4/21, Friday.

## 2023-04-21 NOTE — DISCHARGE SUMMARY
[facility] - REHABILITATION UNIT    ELEONORA GARCIA  1965    ADMIT DATE:  3/17/2023  8:27 PM  DISCHARGE DATE:  04/21/23      CHIEF COMPLAINT:    Idiopathic peripheral neuropathy  Paresthesia  History of blood clots  Chronic anticoagulation  Seizures  Anemia  MRSA bacteremia    HISTORY OF PRESENT ILLNESS:    This is an extremely pleasant 58 y.o. female transferred to [facility] on March 17, 2023 after hospital stay on acute [facility].     Hospital course noted for - Admitted on 2/20 originally for numbness/paraesthesias starting in chest to b/l LE after a fall. 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 However she again started having weakness in her legs and underwent EMG, though not typical of GBS, given concerning exam was started on 5 days IVIG. Later developed R lung masslike infiltrate with cavitary lesions/pleural effusion for which Pulmonology was consulted, and underwent thoracentesis on 3/3. Blood cx and pleural fluid ended up positive for MRSA for which ID was consulted. Also noted to have TV mass on TTE with findings concerning for endocarditis, and UE doppler with RUE superficial thrombophlebitis concerning for septic phlebitis. CTS consulted for chest tube placement given empyema, and she underwent thoracoscopy w/ decortication 3/8/23.  She underwent MARIAMA 3/10/2023 which had no vegetation. Came out of ICU 3/12.  Chest tubes have been removed. She had hip arthrocentesis to rule out any hip septic arthritis- no growth to date on fluid.  She had some lower back pain so obtained MRI of spine to rule out discitis or osteomyelitis.  This did have known degenerative changes but thankfully no infection.  There was pleural thickening and effusion so cardiothoracic surgery preevaluated and did not recommend any additional procedure.  She will continue 4 weeks of vancomycin as recommended by infectious  disease dosed by pharmacy to achieve a trough level of 15-20 or area under the curve of 400-600 from last negative blood culture or last intervention which ever is the latest.    She admitted to [facility] acute rehab for further antibiotics for infection as well as Subacute motor predominant idiopathic peripheral neuropathy with Paraparesis and Areflexia. She is on 1L O2, mainly at night. Yellowish sputum. Breathing fair Continues weak LLE > RLE > BUE. Some cognitive deficits with decreased processing. Seen by Neurology on March 16 for concern regarding increased weakness. Had MRI Thoracic and Lumbar spine done. Exam was felt unchanged from previous visit.     HOSPITAL COURSE:    Patient participated in a comprehensive acute inpatient rehabilitation program.     During the hospital stay the following areas were addressed:      Subacute motor predominant idiopathic peripheral neuropathy with Paraparesis and Areflexia.   S/p IVIG x 5 days  - Received one course of 5 days of IVIG in acute care. Received another course of 5 days IVIG in rehab.   - Significant improvement noted over the course of rehab stay  - Follow up with neurology      Impaired mobility/impaired self care/ impaired cognition  Left greater than right lower extremity weakness greater than bilateral upper extremity weakness       R lung masslike infiltrate with cavitary lesions/pleural effusion   S/p thoracentesis - Blood cx and pleural fluid  positive for MRSA   -Now requiring nighttime oxygen. Will be sent home with this and close PCP follow up     Chest tube placement given empyema, and she underwent thoracoscopy w/ decortication 3/8/23  -expected postoperative changes with pleural thickening and minimal pleural effusion.  The effusion is too small for intervention and will likely to continue to resolve with time.  Recommend continue good pulmonary hygiene with incentive spirometry hourly as well as increase activity/ambulation as  tolerated.     Right-sided Chest Pain   - Cardiac work up negative   - Pain reproducible with palpation, pain likely postoperative neuralgia as indicated vs. possible costochondritis, continue gabapentin, ice/heat, monitor  - thoracic surgery may refer for outpatient nerve block     Nutrition  - Patient with significant weight loss since admission.   - Counseled on maintaining good nutritional status      Hypokalemia  - Monitored and repleted throughout stay. Improved with increased PO intake.       Mass on TTE with findings concerning for endocarditis - underwent MARIAMA 3/10/2023 which had no vegetation  RUE superficial thrombophlebitis concerning for septic phlebitis.  ID   - Completed 4 weeks of vancomycin as recommended by infectious disease on April 1     Left hip arthrocentesis March 16 to rule out any hip septic arthritis   - no growth to date on fluid    Lower back pain   - MRI of spine to rule out discitis or osteomyelitis.  This did have known degenerative changes but  no infection.      History of LLE DVT   - Continue Eliquis     Pain management  - Continue tylenol 650mg prn   - Continue gabapentin 300mg q8h   - Continue prn oxycodone 2.5mg BID      Anxiety  - Initially treated with Diazepam 2 mg q 6 hours prn  - Started desvenlafaxine 25mg qd with great improvement in her mood  - Continue desvenlafaxine 25mg qd with PCP follow up      Seizure disorder   - Continue Lamotrigine 200 mg daily     ABLA   - Required blood transfusions in acute care  - Hemoglobin stable between 7-8  - Hgb improved to 8.5 on discharge     PHYLLIS   - Creatinine 1.36 on admission from acute care.  - Resolved with fluids and increased PO intake     Nausea, resolved  - Single episode of nausea with vomiting resolved with zofran     DVT prophylaxis - SCDs / apixiban.     Functional Progress:  TEAM CONF - April 18 - STAIR MIN ASSIST. GAIT 80 FEET RW CDTG MIN.   TRANSFERS CTG STAND PIVOT. BED SBA CTG. LBD CTG MIN. UBD SET UP. TOILETING  MOD.   Memory( ): Met goal for recall of 3 errands after 15 minutes with NO cues. Requires MIN-MOD cues to complete high-level verbal working memory tasks.Voice( ): Improved vocal quality. Able to achieve adequate vocal quality with MIN-MOD cues for frontal tone focus and diaphragmatic breathing. Overall mild hoarse vocal quality.  NEUROLOGY TO SCHEDULE FOLLOW UP IN ONE MONTH TO RE-ASSESS FOR FURTHER IVIG. OVERNIGHT PULSE OX WED NIGHT. APPETITE IMPROVED, 100%.  ELOS - Friday. HOME HEALTH PT, OT, SLP NURSING    Physical Exam near the time of discharge:    General: pleasant, in no acute distress  HEENT: NCAT, sclera anicteric, conjunctiva pink, mucoa moist  Cardiovascular: RRR, +S1+S2, no obvious m/g/r  Lungs: Decreased breath sounds on the right side-no change  Abdomen: normoactive bowel sounds, soft, tender to palpation in the epigastrum, no guarding or rebound tenderness  Extremities: no peripheral edema  Skin: exposed surfaces of skin warm, intact, without erythema  Neuro: awake alert and oriented to person, place, time, and situation, CN II-XII grossly intact  MSK: 5/5 in the bilateral upper extremities, 5/5 RLE, LLE: 3+/5 HF/KE, 4+/5 DF/PF    RESULTS:  No results found for: POCGLU  Results from last 7 days   Lab Units 04/19/23  0726 04/17/23  0734   WBC 10*3/mm3 11.81* 10.65   HEMOGLOBIN g/dL 8.5* 7.2*   HEMATOCRIT % 25.1* 21.8*   PLATELETS 10*3/mm3 334 255     Results from last 7 days   Lab Units 04/17/23  0734   SODIUM mmol/L 137   POTASSIUM mmol/L 4.1   CHLORIDE mmol/L 101   CO2 mmol/L 25.4   BUN mg/dL 18   CREATININE mg/dL 1.00   CALCIUM mg/dL 10.2   GLUCOSE mg/dL 95              Discharge Medications      New Medications      Instructions Start Date   Desvenlafaxine Succinate ER 25 MG tablet sustained-release 24 hour   25 mg, Oral, Daily      gabapentin 300 MG capsule  Commonly known as: NEURONTIN   300 mg, Oral, Every 8 Hours Scheduled      oxyCODONE 5 MG immediate release tablet  Commonly known as:  ROXICODONE   2.5 mg, Oral, Every 12 Hours PRN         Changes to Medications      Instructions Start Date   lamoTRIgine 200 MG tablet  Commonly known as: LaMICtal  What changed:   · medication strength  · how much to take  · when to take this   200 mg, Oral, Daily         Continue These Medications      Instructions Start Date   apixaban 5 MG tablet tablet  Commonly known as: ELIQUIS   5 mg, Oral, 2 Times Daily      estrogens (conjugated)-methyltestosterone 0.625-1.25 MG per tablet  Commonly known as: ESTRATEST HS   1 tablet, Oral, Daily      folic acid 1 MG tablet  Commonly known as: FOLVITE   1 mg, Oral, Daily      montelukast 10 MG tablet  Commonly known as: SINGULAIR   10 mg, Oral, Nightly      vitamin B-12 100 MCG tablet  Commonly known as: CYANOCOBALAMIN   50 mcg, Oral, Daily              Follow-up Information     Caleb Reyes MD Follow up on 6/19/2023.    Specialty: Neurology  Why: appointment june 19th @ 3:00pm  Contact information:  [address]  [address]  [place] [id number]  [phone]             Nguyen, Chloe L, MD. Go on 5/2/2023.    Specialty: Thoracic Surgery  Why: appointment on Tuesday, 5/2 @ 1:15 p.m.  Contact information:  [address]  [address]  [place] [id number]  [phone]             [facility]. Go on 5/3/2023.    Specialty: Acute Care Hospital  Why: You are scheduled to start outpatient PT, OT, ST on Wednesday, 5/3 at 1:00 p.m.  Contact information:  [address] [facility] [address] [phone]           Nestor Birmingham, APRN. Go on 4/27/2023.    Why: You are scheduled for PCP follow up at 1:20 p.m.  Contact information:  [address] [place] [address] [phone]                       AVS and Handouts     Admission (Current) (3/17/23)   - [facility] REHABILITATION Principal Problem: Idiopathic peripheral neuropathy     Printed on 3/22/23 at 11:24 AM            Discharge Instructions    None             >30  on discharge    Olive FUNES Sweat, DO

## 2023-04-21 NOTE — PLAN OF CARE
Goal Outcome Evaluation:  Plan of Care Reviewed With: patient        Progress: improving  Outcome Evaluation: Pain med x2 this shift. Meds with thin liquids. Turns self in bed. O2 2L n/c at night. Incont. bladder at night, using Purewick per pt request.  stayed the night. Planned discharge Friday 4/21/23.

## 2023-04-21 NOTE — PROGRESS NOTES
SECTION GG      Mobility Performance Discharge:     Roll Left and Right: Patient completed the activities by themself with no  assistance from a helper.   Sit to Lying: Patient completed the activities by themself with no assistance  from a helper.   Lying to Sitting on Side of Bed: Patient completed the activities by themself  with no assistance from a helper.   Sit to Stand: [place] provides verbal cues and/or touching/steadying and/or  contact guard assistance as patient completes activity. Assistance may be  provided throughout the activity or intermittently.   Chair/Bed to Chair Transfer: [place] provides verbal cues and/or  touching/steadying and/or contact guard assistance as patient completes  activity. Assistance may be provided throughout the activity or intermittently.   Car Transfer: [place] does less than half the effort. [place] lifts, holds or  supports trunk or limbs but provides less than half the effort.   Walk 10 Feet:   [place] provides verbal cues and/or touching/steadying and/or  contact guard assistance as patient completes activity. Assistance may be  provided throughout the activity or intermittently.  Walk 50 Feet with 2 Turns:   [place] does less than half the effort. [place]  lifts, holds or supports trunk or limbs but provides less than half the effort.  Walk 150 Feet:   [place] does less than half the effort. [place] lifts, holds or  supports trunk or limbs but provides less than half the effort.  Walking 10 Feet on Uneven Surfaces:   [place] does less than half the effort.  [place] lifts, holds or supports trunk or limbs but provides less than half the  effort.  1 Step Over Curb or Up/Down Stair:   [place] provides verbal cues and/or  touching/steadying and/or contact guard assistance as patient completes  activity. Assistance may be provided throughout the activity or intermittently.  4 Steps Up and Down, With/Without Rail:   [place] does less than half the effort.  [place] lifts, holds or supports  trunk or limbs but provides less than half the  effort.  12 Steps Up and Down, With/Without Rail:   Not attempted due to medical or  safety concerns.  Picking up an Object:   Not attempted due to medical or safety concerns. Uses  Wheelchair and/or Scooter: No    Section J. Health Conditions (Pain):  Pain Interference with Therapy Activities:   Rarely or not at all  Pain Interference with Day-to-Day Activities:   Rarely or not at all    Signed by: Deanna Carr PT

## 2023-04-21 NOTE — PROGRESS NOTES
Patient d/c home today with . Patient scheduled to start outpatient PT, OT, ST at [facility] on 5/3. Patient and  have home exercise programs from therapists to do until starts outpatient therapy. Psychiatric to deliver home oxygen concentrator to patient's home later today.  will call on their way home.

## 2023-04-24 NOTE — PROGRESS NOTES
PPS CMG Coordinator  Inpatient Rehabilitation Discharge    Mode of Locomotion: Walking.    Discharge Against Medical Advice:  No.  Discharge Information  Patient Discharged Alive:  Yes  Discharge Destination/Living Setting: Home.  At discharge, the patient was discharged to live (with) (02)  Family / Relatives    Diagnosis for Interruption/Death: ICD    Impairment Group: 03.3 Polyneuropathy    Comorbidities: ICD    Complications: ICD    QUALITY INDICATORS  Section A. Medication List  Medication List to Subsequent Provider:  Not applicable.  Patient was not  discharged to a subsequent provider.  Discharge Location:  01 - Home  Medication List to Patient at Discharge:  Yes - Current reconciled medication  list provided to the patient, family and/or caregiver  Route(s) of Medication List Transmission to Patient:  Electronic Health Record,  Verbal (e.g., in-person, telephone, video conferencing), Paper-based (e.g., fax,  copies, printouts)    Section J Health Conditions: Fall(s) Since Admission:  No    Section K. Swallowing/Nutritional Status  Nutritional Approaches Past 7 Days:   None  Nutritional Approaches at Discharge:  None    Section M. Skin Conditions Discharge:  Unhealed Pressure Ulcer(s) at Stage 1 or  Higher:  No    . Current Number of Unhealed Pressure Ulcers  Branch    Section N. Medication:  Medication Intervention: Not applicable - There were no potential clinically  significant medication issues identified since admission or patient is not  taking any medications.  Section . High-Risk Drug Classes: Use and Indication                       Is Taking                    Indication noted  High-RiskDrug Class  E. Anticoagulant     Yes                          Yes  H. Opioid            Yes                          Yes    Section O. Special Treatments, Procedures, and Programs  Oxygen Therapy: Intermittent    Signed by: Ana Burdick RN

## 2023-05-02 ENCOUNTER — HOSPITAL ENCOUNTER (OUTPATIENT)
Dept: GENERAL RADIOLOGY | Facility: HOSPITAL | Age: 58
Discharge: HOME | End: 2023-05-02
Admitting: NURSE PRACTITIONER
Payer: COMMERCIAL

## 2023-05-02 ENCOUNTER — OFFICE VISIT (OUTPATIENT)
Dept: SURGERY | Facility: CLINIC | Age: 58
End: 2023-05-02
Payer: COMMERCIAL

## 2023-05-02 VITALS
BODY MASS INDEX: 22.08 KG/M2 | DIASTOLIC BLOOD PRESSURE: 82 MMHG | WEIGHT: 120 LBS | HEIGHT: 62 IN | SYSTOLIC BLOOD PRESSURE: 132 MMHG | HEART RATE: 81 BPM | OXYGEN SATURATION: 99 %

## 2023-05-02 DIAGNOSIS — J86.9 EMPYEMA OF PLEURA: Primary | ICD-10-CM

## 2023-05-02 DIAGNOSIS — J86.9 EMPYEMA OF PLEURA: ICD-10-CM

## 2023-05-02 PROCEDURE — 99024 POSTOP FOLLOW-UP VISIT: CPT | Performed by: THORACIC SURGERY (CARDIOTHORACIC VASCULAR SURGERY)

## 2023-05-02 PROCEDURE — 1159F MED LIST DOCD IN RCRD: CPT | Performed by: THORACIC SURGERY (CARDIOTHORACIC VASCULAR SURGERY)

## 2023-05-02 PROCEDURE — 1160F RVW MEDS BY RX/DR IN RCRD: CPT | Performed by: THORACIC SURGERY (CARDIOTHORACIC VASCULAR SURGERY)

## 2023-05-02 PROCEDURE — 71046 X-RAY EXAM CHEST 2 VIEWS: CPT

## 2023-05-02 RX ORDER — PANTOPRAZOLE SODIUM 40 MG/1
TABLET, DELAYED RELEASE ORAL
COMMUNITY
Start: 2023-04-27

## 2023-05-02 RX ORDER — OXYBUTYNIN CHLORIDE 5 MG/1
TABLET, EXTENDED RELEASE ORAL
COMMUNITY
Start: 2023-04-27

## 2023-05-02 NOTE — LETTER
May 16, 2023     KASIA Eugene  [address]  [address]  [place] [address]    Patient: Louise GARCIA   YOB: 1965   Date of Visit: 5/2/2023       Dear KASIA Eugene,    Louise GARCIA was in my office today. Below are the relevant portions of my assessment and plan of care.           If you have questions, please do not hesitate to call me. I look forward to following Nathanieltere along with you.         Sincerely,        Chloe Cedillo MD        CC: Mikhail Glez MD

## 2023-05-02 NOTE — PROGRESS NOTES
"Chief Complaint  Empyema  Subjective        Louise SORIANO presents to [facility] THORACIC SURGERY  History of Present Illness   Ms. Louise Soriano is a pleasant 58-year-old lady who presents after decortication.    The patient reports that she is doing well. She has completed rehab. She has been home for 1 week and a few days and is feeling better. Her pain has much improved. She feels she is doing fairly well. She admits that she has had a setback with her walking since she has been home. She starts physical therapy tomorrow. Her surgical incisions are healing well, although they do itch. She mentions that she has never had pneumonia before. 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      Objective   Vital Signs:  /82 (BP Location: Left arm, Patient Position: Sitting, Cuff Size: Adult)   Pulse 81   Ht 157.5 cm (62\")   Wt 54.4 kg (120 lb)   SpO2 99%   BMI 21.95 kg/m²   Estimated body mass index is 21.95 kg/m² as calculated from the following:    Height as of this encounter: 157.5 cm (62\").    Weight as of this encounter: 54.4 kg (120 lb).       BMI is within normal parameters. No other follow-up for BMI required.      Physical Exam  Vitals and nursing note reviewed.   Constitutional:       Appearance: She is well-developed.   HENT:      Head: Normocephalic and atraumatic.      Nose: Nose normal.   Eyes:      Conjunctiva/sclera: Conjunctivae normal.   Cardiovascular:      Rate and Rhythm: Normal rate.   Pulmonary:      Effort: Pulmonary effort is normal.   Abdominal:      Palpations: Abdomen is soft.   Musculoskeletal:      Cervical back: Neck supple.   Skin:     General: Skin is warm and dry.   Neurological:      Mental Status: She is alert and oriented to person, place, and time.   Psychiatric:         Behavior: Behavior normal.         Thought Content: Thought content normal.         Judgment: Judgment normal.          Result Review :          I " have independently reviewed the chest x-ray performed today 05/02/2023, which demonstrates good expansion of bilateral lungs. There is a very small right pleural effusion. There is some elevation of the right hemidiaphragm. Overall, her x-ray looks much better than her postoperative film.           Assessment and Plan   Diagnoses and all orders for this visit:    1. Empyema of pleura (Primary)  -     CT Chest Without Contrast; Future         Ms. Louise Soriano is a pleasant 58-year-old lady status post decortication. She will follow up with a CT of the chest in 2 months as she had her surgery 3 months ago. She will follow up with me after CT of the chest.  She is doing reasonably well and is feeling much better since her surgery.    1. Empyema of pleura  - An order has been placed for CT chest to be completed in 2 months, prior to next visit.          Follow Up   Patient will follow up  in 2 months.  Patient was given instructions and counseling regarding her condition or for health maintenance advice. Please see specific information pulled into the AVS if appropriate.       Transcribed from ambient dictation for Chloe Cedillo MD by Jo Langley.  05/02/23   16:50 EDT    Patient or patient representative verbalized consent to the visit recording.  I have personally performed the services described in this document as transcribed by the above individual, and it is both accurate and complete.

## 2023-05-11 NOTE — PROGRESS NOTES
Infectious Diseases Progress Note    Reji Oliver MD     [facility]  [place]: 14 days  Patient Identification:  Name: Louise GARCIA  Age: 58 y.o.  Sex: female  :  1965  MRN: [id number]         Primary Care Physician: Chloe Schaefer APRN        Subjective: Resting comfortably in bed blood pressure on the low side pain seems to be better controlled.  Interval History: See consultation note.  3/8/2023 underwent thoracoscopy with Nevaeh hernandez with complete decortication  Objective:    Scheduled Meds:acetaminophen, 1,000 mg, Oral, TID  bisacodyl, 10 mg, Rectal, Daily  celecoxib, 100 mg, Oral, Q12H  desvenlafaxine, 25 mg, Oral, Daily  folic acid, 1 mg, Oral, Daily  gabapentin, 300 mg, Oral, Q8H  heparin (porcine), 5,000 Units, Subcutaneous, Q8H  ipratropium-albuterol, 3 mL, Nebulization, 4x Daily - RT  lactulose, 20 g, Oral, BID  lamoTRIgine, 200 mg, Oral, Daily  lidocaine, 1 patch, Transdermal, Q24H  lidocaine, 1 patch, Transdermal, Q24H  lidocaine, 10 mL, Intradermal, Once  lubiprostone, 24 mcg, Oral, Daily With Breakfast  pantoprazole, 40 mg, Intravenous, Q AM  polyethylene glycol, 17 g, Oral, BID  senna-docusate sodium, 2 tablet, Oral, BID  sodium chloride, 10 mL, Intravenous, Q12H  thiamine, 100 mg, Oral, Daily  vancomycin, 1,000 mg, Intravenous, Q12H  vitamin B-12, 500 mcg, Oral, Daily      Continuous Infusions:hold, 1 each  hold, 1 each  lactated ringers, 9 mL/hr, Last Rate: 500 mL/hr (23 1856)  Pharmacy to dose vancomycin,         Vital signs in last 24 hours:  Temp:  [97.7 °F (36.5 °C)-98.9 °F (37.2 °C)] 98 °F (36.7 °C)  Heart Rate:  [] 83  Resp:  [12-24] 18  BP: ()/(40-99) 103/51  Arterial Line BP: (105-117)/(9-60) 105/55    Intake/Output:    Intake/Output Summary (Last 24 hours) at 3/9/2023 0947  Last data filed at 3/9/2023 0700  Gross per 24 hour   Intake 1942 ml   Output 1230 ml   Net 712 ml       Exam:  /51   Pulse 83   Temp 98 °F (36.7 °C) (Oral)   Resp 18   " Ht 157.5 cm (62\")   Wt 56.7 kg (125 lb)   SpO2 100%   Breastfeeding No   BMI 22.86 kg/m²   Patient is examined using the personal protective equipment as per guidelines from infection control for this particular patient as enacted.  Hand washing was performed before and after patient interaction.  General Appearance:  Ill-appearing female who is much more calmer and comfortable compared to 24 hours ago.                          Head:    Normocephalic, without obvious abnormality, atraumatic                           Eyes:    PERRL, conjunctivae/corneas clear, EOM's intact, both eyes                         Throat:   Lips, tongue, gums normal; oral mucosa pink and moist                           Neck:   Supple, symmetrical, trachea midline, no JVD                         Lungs:    Decreased breath sounds bilaterally left greater than right                 Chest Wall:  Left-sided chest tube in place                          Heart:    Regular rate and rhythm, S1 and S2 normal, no murmur, no rub                                         or gallop                  Abdomen:   Soft nontender epigastric and right upper quadrant tenderness noted.                 Extremities: Right forearm IV phlebitis site erythema and tenderness noted.                        Pulses:   Pulses palpable in all extremities                            Skin:   Skin is warm and dry,  no rashes or palpable lesions                  Neurologic: Awake interactive and grossly nonfocal       Data Review:    I reviewed the patient's new clinical results.  Results from last 7 days   Lab Units 03/09/23 0443 03/08/23  0731 03/07/23  0733 03/06/23  0719 03/05/23  0532 03/04/23  0522 03/03/23  0159   WBC 10*3/mm3 25.61* 20.31* 17.78* 16.60* 20.27* 23.89* 31.39*   HEMOGLOBIN g/dL 6.6* 8.2* 8.3* 9.0* 9.3* 8.6* 10.0*   PLATELETS 10*3/mm3 219 165 198 173 167 176 155     Results from last 7 days   Lab Units 03/09/23  0443 03/08/23  0731 03/07/23  0733 " 03/06/23  0719 03/05/23  0532 03/04/23  0522 03/03/23  0159   SODIUM mmol/L 134* 132* 134* 133* 134* 134* 132*   POTASSIUM mmol/L 4.1 3.4* 3.8 3.9 4.2 4.5 4.7   CHLORIDE mmol/L 99 95* 96* 97* 99 101 96*   CO2 mmol/L 29.0 27.9 27.1 24.5 26.0 25.8 25.9   BUN mg/dL 10 9 11 15 19 29* 31*   CREATININE mg/dL 0.59 0.56* 0.53* 0.57 0.57 0.64 0.81   CALCIUM mg/dL 7.7* 8.4* 8.2*  8.1* 8.7 9.0 8.9 9.5   GLUCOSE mg/dL 112* 78 80 82 79 82 143*     Microbiology Results (last 10 days)     Procedure Component Value - Date/Time    Tissue / Bone Culture - Tissue, Pleural Cavity [[id number]] Collected: 03/08/23 2036    Lab Status: Preliminary result Specimen: Tissue from Pleural Cavity Updated: 03/09/23 0455     Gram Stain Occasional WBCs seen      Rare (1+) Gram positive cocci in pairs and clusters    Body Fluid Culture - Body Fluid, Pleural Cavity [[id number]] Collected: 03/08/23 1957    Lab Status: Preliminary result Specimen: Body Fluid from Pleural Cavity Updated: 03/09/23 0323     Body Fluid Culture Abnormal Stain     Gram Stain Moderate (3+) WBCs per low power field      Rare (1+) Gram positive cocci    Body Fluid Culture - Body Fluid, Pleural Cavity [[id number]] Collected: 03/08/23 1923    Lab Status: Preliminary result Specimen: Body Fluid from Pleural Cavity Updated: 03/09/23 0343     Body Fluid Culture Abnormal Stain     Gram Stain Rare (1+) WBCs per low power field      Rare (1+) Gram positive cocci    Culture, CSF - Cerebrospinal Fluid, Lumbar Puncture [[id number]] Collected: 03/06/23 1550    Lab Status: Preliminary result Specimen: Cerebrospinal Fluid from Lumbar Puncture Updated: 03/08/23 0800     CSF Culture No growth at 2 days     Gram Stain No WBCs or organisms seen    Meningitis / Encephalitis Panel, PCR - Cerebrospinal Fluid, Lumbar Puncture [[id number]]  (Normal) Collected: 03/06/23 1550    Lab Status: Final result Specimen: Cerebrospinal Fluid from Lumbar Puncture Updated: 03/06/23 3530     ESCHERICHIA COLI K1, PCR  Not Detected     HAEMOPHILUS INFLUENZAE, PCR Not Detected     LISTERIA MONOCYTOGENES, PCR Not Detected     NEISSERIA MENINGITIDIS, PCR Not Detected     STREPTOCOCCUS AGALACTIAE, PCR Not Detected     STREPTOCOCCUS PNEUMONIAE, PCR Not Detected     CYTOMEGALOVIRUS (CMV), PCR Not Detected     ENTEROVIRUS, PCR Not Detected     HERPES SIMPLEX VIRUS 1 (HSV-1), PCR Not Detected     HERPES SIMPLEX VIRUS 2 (HSV-2), PCR Not Detected     HUMAN PARECHOVIRUS, PCR Not Detected     VARICELLA ZOSTER VIRUS (VZV), PCR Not Detected     CRYPTOCOCCUS NEOFORMANS / GATTII, PCR Not Detected     HUMAN HERPES VIRUS 6 PCR Not Detected    Blood Culture - Blood, Wrist, Left [[id number]]  (Normal) Collected: 03/04/23 2043    Lab Status: Preliminary result Specimen: Blood from Wrist, Left Updated: 03/08/23 2116     Blood Culture No growth at 4 days    Blood Culture - Blood, Arm, Right [[id number]]  (Normal) Collected: 03/04/23 1952    Lab Status: Preliminary result Specimen: Blood from Arm, Right Updated: 03/08/23 2015     Blood Culture No growth at 4 days    S. Pneumo Ag Urine or CSF - Urine, Urine, Clean Catch [[id number]]  (Normal) Collected: 03/04/23 1000    Lab Status: Final result Specimen: Urine, Clean Catch Updated: 03/04/23 1128     Strep Pneumo Ag Negative    Legionella Antigen, Urine - Urine, Urine, Clean Catch [[id number]]  (Normal) Collected: 03/04/23 1000    Lab Status: Final result Specimen: Urine, Clean Catch Updated: 03/04/23 1128     LEGIONELLA ANTIGEN, URINE Negative    Respiratory Culture - Sputum, Cough [[id number]] Collected: 03/04/23 0959    Lab Status: Final result Specimen: Sputum from Cough Updated: 03/04/23 1215     Respiratory Culture Rejected     Gram Stain Many (4+) Epithelial cells per low power field      Many (4+) WBCs per low power field      Many (4+) Mixed bacterial morphotypes seen on Gram Stain    Narrative:      Specimen rejected due to oropharyngeal contamination. Please reorder and recollect specimen if  clinically necessary.    Body Fluid Culture - Body Fluid, Pleural Cavity [[id number]]  (Abnormal)  (Susceptibility) Collected: 03/04/23 0900    Lab Status: Final result Specimen: Body Fluid from Pleural Cavity Updated: 03/06/23 1124     Body Fluid Culture Light growth (2+) Staphylococcus aureus, MRSA     Comment:   Methicillin resistant Staphylococcus aureus, Patient may be an isolation risk.        Gram Stain Rare (1+) WBCs per low power field      No organisms seen    Susceptibility      Staphylococcus aureus, MRSA      YAKOV      Gentamicin Susceptible      Oxacillin Resistant     Rifampin Susceptible      Vancomycin Susceptible                       Susceptibility Comments     Staphylococcus aureus, MRSA    This isolate does not demonstrate inducible clindamycin resistance in vitro.               Anaerobic Culture - Pleural Fluid, Pleural Cavity [[id number]]  (Normal) Collected: 03/04/23 0900    Lab Status: Final result Specimen: Pleural Fluid from Pleural Cavity Updated: 03/09/23 0656     Anaerobic Culture No anaerobes isolated at 5 days    MRSA Screen, PCR (Inpatient) - Swab, Nares [[id number]]  (Abnormal) Collected: 03/03/23 1830    Lab Status: Final result Specimen: Swab from Nares Updated: 03/03/23 2016     MRSA PCR MRSA Detected    Narrative:      The negative predictive value of this diagnostic test is high and should only be used to consider de-escalating anti-MRSA therapy. A positive result may indicate colonization with MRSA and must be correlated clinically.    Respiratory Panel PCR w/COVID-19(SARS-CoV-2) NAOMY/RASHMI/ADINA/PAD/COR/MAD/STEVE In-House, NP Swab in UTM/VTM, 3-4 HR TAT - Swab, Nasopharynx [[id number]]  (Normal) Collected: 03/03/23 1830    Lab Status: Final result Specimen: Swab from Nasopharynx Updated: 03/03/23 2005     ADENOVIRUS, PCR Not Detected     Coronavirus 229E Not Detected     Coronavirus HKU1 Not Detected     Coronavirus NL63 Not Detected     Coronavirus OC43 Not Detected     COVID19 Not  Detected     Human Metapneumovirus Not Detected     Human Rhinovirus/Enterovirus Not Detected     Influenza A PCR Not Detected     Influenza B PCR Not Detected     Parainfluenza Virus 1 Not Detected     Parainfluenza Virus 2 Not Detected     Parainfluenza Virus 3 Not Detected     Parainfluenza Virus 4 Not Detected     RSV, PCR Not Detected     Bordetella pertussis pcr Not Detected     Bordetella parapertussis PCR Not Detected     Chlamydophila pneumoniae PCR Not Detected     Mycoplasma pneumo by PCR Not Detected    Narrative:      In the setting of a positive respiratory panel with a viral infection PLUS a negative procalcitonin without other underlying concern for bacterial infection, consider observing off antibiotics or discontinuation of antibiotics and continue supportive care. If the respiratory panel is positive for atypical bacterial infection (Bordetella pertussis, Chlamydophila pneumoniae, or Mycoplasma pneumoniae), consider antibiotic de-escalation to target atypical bacterial infection.    Blood Culture - Blood, Arm, Left [[id number]]  (Abnormal)  (Susceptibility) Collected: 03/02/23 1955    Lab Status: Final result Specimen: Blood from Arm, Left Updated: 03/06/23 0622     Blood Culture Staphylococcus aureus, MRSA     Comment:   Infectious disease consultation is highly recommended to rule out distant foci of infection.  Methicillin resistant Staphylococcus aureus, Patient may be an isolation risk.        Isolated from Aerobic Bottle     Gram Stain Aerobic Bottle Gram positive cocci in clusters    Susceptibility      Staphylococcus aureus, MRSA      YAKOV      Gentamicin Susceptible      Oxacillin Resistant     Rifampin Susceptible      Vancomycin Susceptible                       Susceptibility Comments     Staphylococcus aureus, MRSA    This isolate does not demonstrate inducible clindamycin resistance in vitro.               Blood Culture - Blood, Arm, Left [[id number]]  (Normal) Collected: 03/02/23 1955     Lab Status: Final result Specimen: Blood from Arm, Left Updated: 03/07/23 2015     Blood Culture No growth at 5 days    Blood Culture ID, PCR - Blood, Arm, Left [[id number]]  (Abnormal) Collected: 03/02/23 1955    Lab Status: Final result Specimen: Blood from Arm, Left Updated: 03/04/23 0439     BCID, PCR Staph aureus. mecA/C and MREJ (methicillin resistance gene) detected. Identification by BCID2 PCR.     BOTTLE TYPE Aerobic Bottle    Narrative:      Infectious disease consultation is highly recommended to rule out distant foci of infection.            Assessment:    Paresthesia    Cervical myelopathy (HCC)    Lower extremity weakness    History of blood clots    Chronic anticoagulation    Seizures (HCC)    Normocytic anemia    GERD (gastroesophageal reflux disease)    Guillain-Houck syndrome (HCC)    Situational mixed anxiety and depressive disorder    Mass of middle lobe of right lung    Oral candidiasis    Empyema of pleura (HCC)    Tricuspid valve vegetation    MRSA bacteremia  1-MRSA bacteremia on hospitalization day #11 and likely underlying etiology:   - Septic phlebitis due to IV access with -     •     Right Cephalic Upper Arm: Acute superficial thrombophlebitis noted.    •     Right Cephalic Forearm: Acute superficial thrombophlebitis noted.     - septic emboli and cavitary lesion in the right lung   - Tricuspid Valve endocarditis   - Empyema due to secondary seeding of the traumatic effusion  2-paresthesia generalized weakness  3-recent fall  4-seizure disorder  5-leukocytosis likely secondary to steroids with superimposed infection  6-other diagnoses per primary team.  7-MRSA colonization     Recommendations/Discussions:  · Continue with IV vancomycin  · Moist heat to the phlebitis site of the right forearm  · Continue to monitor her back pain and evidence of secondary seeding to the joint and spine which may require repeat imaging studies if her symptoms localizes to these areas and affect her  function.  · Discussed with patient's caring nurse.  Reji Oliver MD  3/9/2023  09:47 EST    Parts of this note may be an electronic transcription/translation of spoken language to printed text using the Dragon dictation system.     109

## 2023-06-19 ENCOUNTER — OFFICE VISIT (OUTPATIENT)
Dept: NEUROLOGY | Facility: CLINIC | Age: 58
End: 2023-06-19
Payer: COMMERCIAL

## 2023-06-19 VITALS
BODY MASS INDEX: 22.08 KG/M2 | DIASTOLIC BLOOD PRESSURE: 80 MMHG | HEART RATE: 106 BPM | SYSTOLIC BLOOD PRESSURE: 130 MMHG | OXYGEN SATURATION: 94 % | HEIGHT: 62 IN | WEIGHT: 120 LBS

## 2023-06-19 DIAGNOSIS — R41.3 MEMORY LOSS: ICD-10-CM

## 2023-06-19 DIAGNOSIS — G60.9 IDIOPATHIC PERIPHERAL NEUROPATHY: Primary | ICD-10-CM

## 2023-06-19 PROBLEM — R25.2 SPASM: Status: ACTIVE | Noted: 2023-06-19

## 2023-06-19 PROBLEM — Z86.69 HX OF SEIZURE DISORDER: Status: ACTIVE | Noted: 2018-05-24

## 2023-06-19 PROBLEM — D69.6 THROMBOCYTOPENIA: Status: ACTIVE | Noted: 2018-05-23

## 2023-06-19 PROBLEM — K52.9 ACUTE GASTROENTERITIS: Status: ACTIVE | Noted: 2018-05-23

## 2023-06-19 PROBLEM — E83.42 HYPOMAGNESEMIA: Status: ACTIVE | Noted: 2018-05-23

## 2023-06-19 PROBLEM — G95.20 CERVICAL SPINAL CORD COMPRESSION: Status: ACTIVE | Noted: 2023-02-21

## 2023-06-19 PROBLEM — R65.21 SEPTIC SHOCK: Status: ACTIVE | Noted: 2018-05-23

## 2023-06-19 PROBLEM — I34.0 MI (MITRAL INCOMPETENCE): Status: ACTIVE | Noted: 2023-06-19

## 2023-06-19 PROBLEM — E87.5 HYPERKALEMIA: Status: ACTIVE | Noted: 2018-05-23

## 2023-06-19 PROBLEM — F41.9 ANXIETY: Status: ACTIVE | Noted: 2023-06-19

## 2023-06-19 PROBLEM — A41.9 SEPTIC SHOCK: Status: ACTIVE | Noted: 2018-05-23

## 2023-06-19 PROBLEM — N17.9 ACUTE RENAL INJURY: Status: ACTIVE | Noted: 2018-05-23

## 2023-06-19 PROBLEM — E87.20 METABOLIC ACIDOSIS: Status: ACTIVE | Noted: 2018-05-23

## 2023-06-19 PROBLEM — R79.89 ELEVATED LFTS: Status: ACTIVE | Noted: 2018-05-23

## 2023-06-19 PROBLEM — G47.00 CANNOT SLEEP: Status: ACTIVE | Noted: 2023-06-19

## 2023-06-19 PROBLEM — R56.9 SEIZURE: Status: ACTIVE | Noted: 2023-06-19

## 2023-06-19 PROBLEM — A41.9 SEPSIS: Status: ACTIVE | Noted: 2018-05-23

## 2023-06-19 PROCEDURE — 99215 OFFICE O/P EST HI 40 MIN: CPT | Performed by: PSYCHIATRY & NEUROLOGY

## 2023-06-19 PROCEDURE — 99417 PROLNG OP E/M EACH 15 MIN: CPT | Performed by: PSYCHIATRY & NEUROLOGY

## 2023-06-19 PROCEDURE — 1160F RVW MEDS BY RX/DR IN RCRD: CPT | Performed by: PSYCHIATRY & NEUROLOGY

## 2023-06-19 PROCEDURE — 1159F MED LIST DOCD IN RCRD: CPT | Performed by: PSYCHIATRY & NEUROLOGY

## 2023-06-19 RX ORDER — HYDROCODONE BITARTRATE AND ACETAMINOPHEN 5; 325 MG/1; MG/1
TABLET ORAL
COMMUNITY
Start: 2023-05-22

## 2023-06-19 RX ORDER — GABAPENTIN 300 MG/1
600 CAPSULE ORAL EVERY 8 HOURS SCHEDULED
Qty: 180 CAPSULE | Refills: 5 | Status: SHIPPED | OUTPATIENT
Start: 2023-06-19

## 2023-06-19 NOTE — LETTER
June 19, 2023       No Recipients    Patient: Louise GARCIA   YOB: 1965   Date of Visit: 6/19/2023       Dear Dr. Washington Recipients:    Thank you for referring Louise GARCIA to me for evaluation. Below are the relevant portions of my assessment and plan of care.    If you have questions, please do not hesitate to call me. I look forward to following Louise along with you.         Sincerely,        Caleb Reyes MD        CC:   No Recipients    Caleb Reyes MD  06/19/23 3486  Signed  CC: Peripheral neuropathy    HPI:  Louise GARCIA is a  58 y.o.  right-handed white female who I am seeing for the first time in the office regarding peripheral neuropathy.  She was seen in the hospital by Dr. Arce initially 2/21/2023 and subsequently being seen by Hayley Ohara, Dr. Sharif, Dr. Aleman and Gila Pro.  She was last seen 4/9/2023.  I was asked to see her perform an EMG looking for Guillain Barré syndrome.  Following is the report from that study:     Impression:  Abnormal study consistent with peripheral neuropathy there is mild evidence in the upper extremity with sensory involvement and mild to moderate involvement of the lower extremity with sensory and motor involvement.  Typical features however of Guillain-Barré syndrome were not seen such as dramatically slowed velocities or significant conduction block.  There was no evidence of a right cervical or lumbosacral radiculopathy.  Study results were discussed with the patient.    The patient's history indicates symptoms started after a fall on her back.  She had MRIs of the entire neural axis which I have reviewed.  Brain MRI showed some small vessel changes similar to that in this age group.  MRI of the cervical, thoracic and lumbar spine did not show any cord compression or cauda equina compression.  Some miscellaneous foraminal stenoses were seen however which did not fit with her picture.  Of interest the patient had pneumonia and developed empyema and so  she was severely ill.  She has history of gestational diabetes and a history of seizures.  She is treated with Lamictal long-term.  Gabapentin has been added to help with neuropathic pain which does seem to help at 300 mg 3 times daily.  She still endorses moderate pain. She has a previous cervical disc fusion by Dr. Tejeda several years ago.    Extensive laboratory evaluation was obtained including a lumbar puncture which showed a normal protein but the myelin basic protein was mildly elevated at 4.1.  A limbic encephalitis panel was sent to [facility] which was entirely negative.  More standard labs included a sed rate of 15, CRP less than 0.3, hepatitis C antibody negative, TSH 1.67, free T4 was 0.96, vitamin B-12 level was 298 and folate was greater than 20.  Cryoglobulins negative, SPE/IEP negative but there was an elevation of IgG at 2400.  Further CSF labs showed IgG index 0.6 and IgG synthesis rate 0.4.  Oligoclonal bands were 0 and the NMO antibody was negative on spinal fluid.        Past Medical History:   Diagnosis Date   • Degenerative disc disease, cervical    • Gestational diabetes    • H/O blood clots    • Hematemesis    • Seizures    • Septic shock          Past Surgical History:   Procedure Laterality Date   • APPENDECTOMY     • BACK SURGERY     • CHOLECYSTECTOMY     • ENDOSCOPY N/A 8/30/2016    Procedure: ESOPHAGOGASTRODUODENOSCOPY with biopsy;  Surgeon: Austen Brito MD;  Location: [facility] ENDOSCOPY;  Service:    • HYSTERECTOMY     • NECK SURGERY       approx 6 yrs ago   • THORACOSCOPY Right 3/8/2023    Procedure: THORACOSCOPY WITH DAVINCI ROBOT WITH COMPLETE DECORTICATION;  Surgeon: Chloe Cedillo MD;  Location: [facility] MAIN OR;  Service: Robotics - DaVinci;  Laterality: Right;   • TONSILLECTOMY     • TONSILLECTOMY AND ADENOIDECTOMY  1975           Current Outpatient Medications:   •  apixaban (ELIQUIS) 5 MG tablet tablet, Take 1 tablet by mouth 2 (Two) Times a Day., Disp: , Rfl:   •   Desvenlafaxine Succinate ER 25 MG tablet sustained-release 24 hour, Take 1 tablet by mouth Daily. (Patient taking differently: Take 2 tablets by mouth Daily.), Disp: 30 tablet, Rfl: 1  •  folic acid (FOLVITE) 1 MG tablet, Take 1 tablet by mouth Daily., Disp: , Rfl:   •  gabapentin (NEURONTIN) 300 MG capsule, Take 1 capsule by mouth Every 8 (Eight) Hours., Disp: 90 capsule, Rfl: 1  •  oxybutynin XL (DITROPAN-XL) 5 MG 24 hr tablet, , Disp: , Rfl:   •  pantoprazole (PROTONIX) 40 MG EC tablet, , Disp: , Rfl:   •  vitamin B-12 (CYANOCOBALAMIN) 100 MCG tablet, Take 50 mcg by mouth Daily., Disp: , Rfl:   •  estrogens, conjugated,-methyltestosterone (ESTRATEST HS) 0.625-1.25 MG per tablet, Take 1 tablet by mouth Daily. (Patient not taking: Reported on 5/2/2023), Disp: , Rfl:   •  HYDROcodone-acetaminophen (NORCO) 5-325 MG per tablet, , Disp: , Rfl:   •  lamoTRIgine (LaMICtal) 200 MG tablet, Take 1 tablet by mouth Daily for 30 days., Disp: 30 tablet, Rfl: 0  •  montelukast (SINGULAIR) 10 MG tablet, Take 1 tablet by mouth Every Night. (Patient not taking: Reported on 5/2/2023), Disp: , Rfl:       Family History   Problem Relation Age of Onset   • Cancer Mother    • Diabetes Father    • Heart disease Father    • Stroke Father    • Cancer Sister    • Colon cancer Paternal Uncle    • Diabetes Paternal Grandmother    • Diabetes Paternal Grandfather    • Malig Hyperthermia Neg Hx          Social History     Socioeconomic History   • Marital status:    Tobacco Use   • Smoking status: Never     Passive exposure: Never   • Smokeless tobacco: Never   Vaping Use   • Vaping Use: Never used   Substance and Sexual Activity   • Alcohol use: Yes     Comment: social   • Drug use: No   • Sexual activity: Defer         Allergies   Allergen Reactions   • Penicillins Shortness Of Breath and Swelling   • Sulfa Antibiotics Rash         Pain Scale: 3/10        ROS:  Review of Systems   Constitutional:  Positive for fatigue. Negative for  "activity change and unexpected weight change.   HENT:  Negative for ear pain, hearing loss and tinnitus.    Eyes:  Negative for photophobia, pain and visual disturbance.   Respiratory:  Negative for chest tightness, shortness of breath, wheezing and stridor.    Cardiovascular:  Negative for chest pain and palpitations.   Musculoskeletal:  Positive for gait problem (walker, cane, wheelchair-multiple falls).   Neurological:  Negative for dizziness, weakness and numbness (legs, fingers).   Psychiatric/Behavioral:  Positive for confusion and decreased concentration. Negative for sleep disturbance.      I have reviewed and agree with the above ROS completed by the medical assistant.      Physical Exam:  Vitals:    06/19/23 1429   BP: 130/80   Pulse: 106   SpO2: 94%   Weight: 54.4 kg (120 lb)   Height: 157.5 cm (62\")     Orthostatic BP: Supine blood pressure 180/100; standing 160/100    Body mass index is 21.95 kg/m².    Physical Exam  General: Thin white female no acute distress  HEENT: Normocephalic no evidence of trauma.  Discs flat.  No AV nicking.  Throat  Neck: Supple.  No thyromegaly.  No cervical bruits  Heart: Regular rate and rhythm with a grade 1 2/6 systolic murmur heard best at the aortic area.  No pedal edema.  Extremities: Radial pulses were strong and simultaneous.      Neurological Exam:   Mental Status: Awake, alert, oriented to person, place and time.  Conversant without evidence of an affective disorder, thought disorder, delusions or hallucinations.  Attention span and concentration are normal.  HCF: No aphasia, apraxia or dysarthria.  Recent and remote memory intact.  Knowledge of recent events intact.  CN: I:   II: Visual fields full without left inattention   III, IV, VI: Eye movements intact without nystagmus or ptosis.  Pupils equal  round and reactive to light.   V,VII: Light touch and pinprick intact all 3 divisions of V.  Facial muscles symmetrical.   VIII: Hearing intact to finger rub   IX,X: " Soft palate elevates symmetrically   XI: Sternomastoid and trapezius are strong.   XII: Tongue midline without atrophy or fasciculations  Motor: Normal tone and bulk in the upper and lower extremities   Power testing: The patient requires a lot of coaching to get maximum power.  She had some tremulousness while examining her strength as well as her other movements.  She had good power in all muscles tested in the arms.  In the legs there was more trouble getting full power.  Eventually I feel that the strength was essentially normal.  Reflexes: Upper extremities: +1 biceps and trace triceps and brachioradialis        Lower extremities: Absent knee jerks ankle jerk        Toe signs: Downgoing bilaterally  Sensory: Light touch: Reduced in the fingers and in the feet and lower legs        Pinprick: Some patchiness in the arms but sharp on the fingers.  Patchy in the lower extremities mostly reduced in the feet        Vibration: Normal at the ankle        Position: Intact at the great toe    Cerebellar: Finger-to-nose: Intact           Rapid movement: Intact           Heel-to-shin: Intact  Gait and Station: Comes to stand with some appearance of anxiety.  Mildly tremulous.  Uses a cane.    Results:      Lab Results   Component Value Date    GLUCOSE 110 (H) 04/21/2023    BUN 19 04/21/2023    CREATININE 0.86 04/21/2023    EGFRIFNONA 81 08/29/2016    EGFRIFAFRI  08/29/2016      Comment:      <15 Indicative of kidney failure.    BCR 22.1 04/21/2023    CO2 24.7 04/21/2023    CALCIUM 10.3 04/21/2023    PROTENTOTREF 6.1 02/28/2023    ALBUMIN 1.7 (L) 03/12/2023    LABIL2 0.7 02/28/2023    AST 29 03/12/2023    ALT 14 03/12/2023       Lab Results   Component Value Date    WBC 9.31 04/21/2023    HGB 8.6 (L) 04/21/2023    HCT 26.9 (L) 04/21/2023    MCV 99.6 (H) 04/21/2023     04/21/2023         .  Lab Results   Component Value Date    RPR Non-Reactive 02/28/2023         Lab Results   Component Value Date    TSH 1.670  02/26/2023         Lab Results   Component Value Date    [id number] 298 03/02/2023         Lab Results   Component Value Date    FOLATE >20.00 02/28/2023         Lab Results   Component Value Date    HGBA1C 5.10 02/20/2023         Lab Results   Component Value Date    GLUCOSE 110 (H) 04/21/2023    BUN 19 04/21/2023    CREATININE 0.86 04/21/2023    EGFRIFNONA 81 08/29/2016    EGFRIFAFRI  08/29/2016      Comment:      <15 Indicative of kidney failure.    BCR 22.1 04/21/2023    K 4.2 04/21/2023    CO2 24.7 04/21/2023    CALCIUM 10.3 04/21/2023    PROTENTOTREF 6.1 02/28/2023    ALBUMIN 1.7 (L) 03/12/2023    LABIL2 0.7 02/28/2023    AST 29 03/12/2023    ALT 14 03/12/2023         Lab Results   Component Value Date    WBC 9.31 04/21/2023    HGB 8.6 (L) 04/21/2023    HCT 26.9 (L) 04/21/2023    MCV 99.6 (H) 04/21/2023     04/21/2023             Assessment:   1.  Peripheral neuropathy-origin not entirely clear.  Her EMG was not typical of a primarily demyelinative polyneuropathy and appeared relatively mild.  She was treated empirically with immunoglobulin x2 courses while hospitalized.  There seem to be some benefit at the time.  2.  Memory loss-difficult to decide how much of this may be anxiety and depression.        Plan:  1.  Repeat EMG  2.  Additional labs for peripheral neuropathy to include copper level, motor and sensory neuropathy panel, RPR and a follow-up B12 and folate  3.  To follow-up with me at the time of her EMG  4.  Neuropsych testing  5.  Trial of higher dose gabapentin up to 600 mg 3 times daily      Time: 60 minutes                Dictated utilizing Dragon dictation.

## 2023-06-19 NOTE — PROGRESS NOTES
CC: Peripheral neuropathy    HPI:  Louise GARCIA is a  58 y.o.  right-handed white female who I am seeing for the first time in the office regarding peripheral neuropathy.  She was seen in the hospital by Dr. Arce initially 2/21/2023 and subsequently being seen by Hayley Ohara, Dr. Sharif, Dr. Aleman and Gila Pro.  She was last seen 4/9/2023.  I was asked to see her perform an EMG looking for Guillain Barré syndrome.  Following is the report from that study:     Impression:  Abnormal study consistent with peripheral neuropathy there is mild evidence in the upper extremity with sensory involvement and mild to moderate involvement of the lower extremity with sensory and motor involvement.  Typical features however of Guillain-Barré syndrome were not seen such as dramatically slowed velocities or significant conduction block.  There was no evidence of a right cervical or lumbosacral radiculopathy.  Study results were discussed with the patient.    The patient's history indicates symptoms started after a fall on her back.  She had MRIs of the entire neural axis which I have reviewed.  Brain MRI showed some small vessel changes similar to that in this age group.  MRI of the cervical, thoracic and lumbar spine did not show any cord compression or cauda equina compression.  Some miscellaneous foraminal stenoses were seen however which did not fit with her picture.  Of interest the patient had pneumonia and developed empyema and so she was severely ill.  She has history of gestational diabetes and a history of seizures.  She is treated with Lamictal long-term.  Gabapentin has been added to help with neuropathic pain which does seem to help at 300 mg 3 times daily.  She still endorses moderate pain. She has a previous cervical disc fusion by Dr. Tejeda several years ago.    Extensive laboratory evaluation was obtained including a lumbar puncture which showed a normal protein but the myelin basic protein was mildly  elevated at 4.1.  A limbic encephalitis panel was sent to [facility] which was entirely negative.  More standard labs included a sed rate of 15, CRP less than 0.3, hepatitis C antibody negative, TSH 1.67, free T4 was 0.96, vitamin B-12 level was 298 and folate was greater than 20.  Cryoglobulins negative, SPE/IEP negative but there was an elevation of IgG at 2400.  Further CSF labs showed IgG index 0.6 and IgG synthesis rate 0.4.  Oligoclonal bands were 0 and the NMO antibody was negative on spinal fluid.        Past Medical History:   Diagnosis Date   • Degenerative disc disease, cervical    • Gestational diabetes    • H/O blood clots    • Hematemesis    • Seizures    • Septic shock          Past Surgical History:   Procedure Laterality Date   • APPENDECTOMY     • BACK SURGERY     • CHOLECYSTECTOMY     • ENDOSCOPY N/A 8/30/2016    Procedure: ESOPHAGOGASTRODUODENOSCOPY with biopsy;  Surgeon: Austen Brito MD;  Location: [facility] ENDOSCOPY;  Service:    • HYSTERECTOMY     • NECK SURGERY       approx 6 yrs ago   • THORACOSCOPY Right 3/8/2023    Procedure: THORACOSCOPY WITH DAVINCI ROBOT WITH COMPLETE DECORTICATION;  Surgeon: Chloe Cedillo MD;  Location: [facility] MAIN OR;  Service: Robotics - DaVinci;  Laterality: Right;   • TONSILLECTOMY     • TONSILLECTOMY AND ADENOIDECTOMY  1975           Current Outpatient Medications:   •  apixaban (ELIQUIS) 5 MG tablet tablet, Take 1 tablet by mouth 2 (Two) Times a Day., Disp: , Rfl:   •  Desvenlafaxine Succinate ER 25 MG tablet sustained-release 24 hour, Take 1 tablet by mouth Daily. (Patient taking differently: Take 2 tablets by mouth Daily.), Disp: 30 tablet, Rfl: 1  •  folic acid (FOLVITE) 1 MG tablet, Take 1 tablet by mouth Daily., Disp: , Rfl:   •  gabapentin (NEURONTIN) 300 MG capsule, Take 1 capsule by mouth Every 8 (Eight) Hours., Disp: 90 capsule, Rfl: 1  •  oxybutynin XL (DITROPAN-XL) 5 MG 24 hr tablet, , Disp: , Rfl:   •  pantoprazole (PROTONIX) 40 MG EC  tablet, , Disp: , Rfl:   •  vitamin B-12 (CYANOCOBALAMIN) 100 MCG tablet, Take 50 mcg by mouth Daily., Disp: , Rfl:   •  estrogens, conjugated,-methyltestosterone (ESTRATEST HS) 0.625-1.25 MG per tablet, Take 1 tablet by mouth Daily. (Patient not taking: Reported on 5/2/2023), Disp: , Rfl:   •  HYDROcodone-acetaminophen (NORCO) 5-325 MG per tablet, , Disp: , Rfl:   •  lamoTRIgine (LaMICtal) 200 MG tablet, Take 1 tablet by mouth Daily for 30 days., Disp: 30 tablet, Rfl: 0  •  montelukast (SINGULAIR) 10 MG tablet, Take 1 tablet by mouth Every Night. (Patient not taking: Reported on 5/2/2023), Disp: , Rfl:       Family History   Problem Relation Age of Onset   • Cancer Mother    • Diabetes Father    • Heart disease Father    • Stroke Father    • Cancer Sister    • Colon cancer Paternal Uncle    • Diabetes Paternal Grandmother    • Diabetes Paternal Grandfather    • Malig Hyperthermia Neg Hx          Social History     Socioeconomic History   • Marital status:    Tobacco Use   • Smoking status: Never     Passive exposure: Never   • Smokeless tobacco: Never   Vaping Use   • Vaping Use: Never used   Substance and Sexual Activity   • Alcohol use: Yes     Comment: social   • Drug use: No   • Sexual activity: Defer         Allergies   Allergen Reactions   • Penicillins Shortness Of Breath and Swelling   • Sulfa Antibiotics Rash         Pain Scale: 3/10        ROS:  Review of Systems   Constitutional:  Positive for fatigue. Negative for activity change and unexpected weight change.   HENT:  Negative for ear pain, hearing loss and tinnitus.    Eyes:  Negative for photophobia, pain and visual disturbance.   Respiratory:  Negative for chest tightness, shortness of breath, wheezing and stridor.    Cardiovascular:  Negative for chest pain and palpitations.   Musculoskeletal:  Positive for gait problem (walker, cane, wheelchair-multiple falls).   Neurological:  Negative for dizziness, weakness and numbness (legs, fingers).  "  Psychiatric/Behavioral:  Positive for confusion and decreased concentration. Negative for sleep disturbance.      I have reviewed and agree with the above ROS completed by the medical assistant.      Physical Exam:  Vitals:    06/19/23 1429   BP: 130/80   Pulse: 106   SpO2: 94%   Weight: 54.4 kg (120 lb)   Height: 157.5 cm (62\")     Orthostatic BP: Supine blood pressure 180/100; standing 160/100    Body mass index is 21.95 kg/m².    Physical Exam  General: Thin white female no acute distress  HEENT: Normocephalic no evidence of trauma.  Discs flat.  No AV nicking.  Throat  Neck: Supple.  No thyromegaly.  No cervical bruits  Heart: Regular rate and rhythm with a grade 1 2/6 systolic murmur heard best at the aortic area.  No pedal edema.  Extremities: Radial pulses were strong and simultaneous.      Neurological Exam:   Mental Status: Awake, alert, oriented to person, place and time.  Conversant without evidence of an affective disorder, thought disorder, delusions or hallucinations.  Attention span and concentration are normal.  HCF: No aphasia, apraxia or dysarthria.  Recent and remote memory intact.  Knowledge of recent events intact.  CN: I:   II: Visual fields full without left inattention   III, IV, VI: Eye movements intact without nystagmus or ptosis.  Pupils equal  round and reactive to light.   V,VII: Light touch and pinprick intact all 3 divisions of V.  Facial muscles symmetrical.   VIII: Hearing intact to finger rub   IX,X: Soft palate elevates symmetrically   XI: Sternomastoid and trapezius are strong.   XII: Tongue midline without atrophy or fasciculations  Motor: Normal tone and bulk in the upper and lower extremities   Power testing: The patient requires a lot of coaching to get maximum power.  She had some tremulousness while examining her strength as well as her other movements.  She had good power in all muscles tested in the arms.  In the legs there was more trouble getting full power.  " Eventually I feel that the strength was essentially normal.  Reflexes: Upper extremities: +1 biceps and trace triceps and brachioradialis        Lower extremities: Absent knee jerks ankle jerk        Toe signs: Downgoing bilaterally  Sensory: Light touch: Reduced in the fingers and in the feet and lower legs        Pinprick: Some patchiness in the arms but sharp on the fingers.  Patchy in the lower extremities mostly reduced in the feet        Vibration: Normal at the ankle        Position: Intact at the great toe    Cerebellar: Finger-to-nose: Intact           Rapid movement: Intact           Heel-to-shin: Intact  Gait and Station: Comes to stand with some appearance of anxiety.  Mildly tremulous.  Uses a cane.    Results:      Lab Results   Component Value Date    GLUCOSE 110 (H) 04/21/2023    BUN 19 04/21/2023    CREATININE 0.86 04/21/2023    EGFRIFNONA 81 08/29/2016    EGFRIFAFRI  08/29/2016      Comment:      <15 Indicative of kidney failure.    BCR 22.1 04/21/2023    CO2 24.7 04/21/2023    CALCIUM 10.3 04/21/2023    PROTENTOTREF 6.1 02/28/2023    ALBUMIN 1.7 (L) 03/12/2023    LABIL2 0.7 02/28/2023    AST 29 03/12/2023    ALT 14 03/12/2023       Lab Results   Component Value Date    WBC 9.31 04/21/2023    HGB 8.6 (L) 04/21/2023    HCT 26.9 (L) 04/21/2023    MCV 99.6 (H) 04/21/2023     04/21/2023         .  Lab Results   Component Value Date    RPR Non-Reactive 02/28/2023         Lab Results   Component Value Date    TSH 1.670 02/26/2023         Lab Results   Component Value Date    [id number] 298 03/02/2023         Lab Results   Component Value Date    FOLATE >20.00 02/28/2023         Lab Results   Component Value Date    HGBA1C 5.10 02/20/2023         Lab Results   Component Value Date    GLUCOSE 110 (H) 04/21/2023    BUN 19 04/21/2023    CREATININE 0.86 04/21/2023    EGFRIFNONA 81 08/29/2016    EGFRIFAFRI  08/29/2016      Comment:      <15 Indicative of kidney failure.    BCR 22.1 04/21/2023    K 4.2  04/21/2023    CO2 24.7 04/21/2023    CALCIUM 10.3 04/21/2023    PROTENTOTREF 6.1 02/28/2023    ALBUMIN 1.7 (L) 03/12/2023    LABIL2 0.7 02/28/2023    AST 29 03/12/2023    ALT 14 03/12/2023         Lab Results   Component Value Date    WBC 9.31 04/21/2023    HGB 8.6 (L) 04/21/2023    HCT 26.9 (L) 04/21/2023    MCV 99.6 (H) 04/21/2023     04/21/2023             Assessment:   1.  Peripheral neuropathy-origin not entirely clear.  Her EMG was not typical of a primarily demyelinative polyneuropathy and appeared relatively mild.  She was treated empirically with immunoglobulin x2 courses while hospitalized.  There seem to be some benefit at the time.  Her exam today however does not demonstrate the same degree of disability to cause the amount of trouble walking that she has.  2.  Memory loss-difficult to decide how much of this may be anxiety and depression.        Plan:  1.  Repeat EMG  2.  Additional labs for peripheral neuropathy to include copper level, motor and sensory neuropathy panel, RPR and a follow-up B12 and folate  3.  To follow-up with me at the time of her EMG  4.  Neuropsych testing  5.  Trial of higher dose gabapentin up to 600 mg 3 times daily      Time: 60 minutes                Dictated utilizing Dragon dictation.

## 2023-07-11 ENCOUNTER — OFFICE VISIT (OUTPATIENT)
Dept: SURGERY | Facility: CLINIC | Age: 58
End: 2023-07-11
Payer: COMMERCIAL

## 2023-07-11 VITALS
SYSTOLIC BLOOD PRESSURE: 102 MMHG | WEIGHT: 125 LBS | OXYGEN SATURATION: 95 % | HEART RATE: 82 BPM | BODY MASS INDEX: 23 KG/M2 | DIASTOLIC BLOOD PRESSURE: 76 MMHG | HEIGHT: 62 IN

## 2023-07-11 DIAGNOSIS — J86.9 EMPYEMA OF PLEURA: Primary | ICD-10-CM

## 2023-07-11 LAB
FOLATE SERPL-MCNC: 14.6 NG/ML (ref 4.78–24.2)
RPR SER QL: NORMAL
VIT B12 BLD-MCNC: 1679 PG/ML (ref 211–946)

## 2023-07-11 PROCEDURE — 99213 OFFICE O/P EST LOW 20 MIN: CPT | Performed by: THORACIC SURGERY (CARDIOTHORACIC VASCULAR SURGERY)

## 2023-07-11 PROCEDURE — 1159F MED LIST DOCD IN RCRD: CPT | Performed by: THORACIC SURGERY (CARDIOTHORACIC VASCULAR SURGERY)

## 2023-07-11 PROCEDURE — 1160F RVW MEDS BY RX/DR IN RCRD: CPT | Performed by: THORACIC SURGERY (CARDIOTHORACIC VASCULAR SURGERY)

## 2023-07-11 RX ORDER — PROMETHAZINE HYDROCHLORIDE 25 MG/1
TABLET ORAL
COMMUNITY
Start: 2023-07-03

## 2023-07-11 RX ORDER — EPINEPHRINE 0.3 MG/.3ML
INJECTION SUBCUTANEOUS
COMMUNITY
Start: 2023-07-08

## 2023-07-11 NOTE — LETTER
July 24, 2023     KASIA Eugene  [address]  [place] [address]    Patient: Louise GARCIA   YOB: 1965   Date of Visit: 7/11/2023       Dear KASIA Eugene,    Louise GARCIA was in my office today. Below are the relevant portions of my assessment and plan of care.           If you have questions, please do not hesitate to call me. I look forward to following Nathanieltere along with you.         Sincerely,        Chloe Cedillo MD        CC: Mikhail Glez MD

## 2023-07-11 NOTE — PROGRESS NOTES
"Chief Complaint  empyema    Subjective        Louise SORIANO presents to [facility] THORACIC SURGERY  History of Present Illness    Ms. Louise Soriano is a pleasant 58-year-old lady who presents in follow-up after decortication. She is accompanied by an adult male.    The patient reports that she has a cough that seems to stay in her chest, which is very unusual. When she lays down, she feels like she has a belt around her. She notes that it is more on the right side. She thinks it is getting a little better. She has never been a smoker. She has always exercised. She is not in a wheelchair now.      Objective   Vital Signs:  /76 (BP Location: Left arm, Patient Position: Sitting, Cuff Size: Adult)   Pulse 82   Ht 157.5 cm (62\")   Wt 56.7 kg (125 lb)   SpO2 95%   BMI 22.86 kg/m²   Estimated body mass index is 22.86 kg/m² as calculated from the following:    Height as of this encounter: 157.5 cm (62\").    Weight as of this encounter: 56.7 kg (125 lb).       BMI is within normal parameters. No other follow-up for BMI required.      Physical Exam  Vitals and nursing note reviewed.   Constitutional:       Appearance: She is well-developed.   HENT:      Head: Normocephalic and atraumatic.      Nose: Nose normal.   Eyes:      Conjunctiva/sclera: Conjunctivae normal.   Cardiovascular:      Rate and Rhythm: Normal rate.   Pulmonary:      Effort: Pulmonary effort is normal.   Abdominal:      Palpations: Abdomen is soft.   Musculoskeletal:      Cervical back: Neck supple.   Skin:     General: Skin is warm and dry.   Neurological:      Mental Status: She is alert and oriented to person, place, and time.   Psychiatric:         Behavior: Behavior normal.         Thought Content: Thought content normal.         Judgment: Judgment normal.      Result Review :          I have independently reviewed the CT of the chest performed on 07/07/2023 which demonstrates some pleural thickening on the right that has " improved since her prior imaging. She does have some linear scarring in the lung base on the right. There is no significant effusion. No pleural pericardial effusion. There is an old healed left anterior second rib fracture.         Assessment and Plan   Diagnoses and all orders for this visit:    1. Empyema of pleura (Primary)  -     CT Chest Without Contrast; Future        Ms. Louise Soriano is a very pleasant 58-year-old lady status post decortication for empyema. Her CT of the chest is much improved, although she does still have some pleural thickening and linear scarring.  She will need surveillance with a CT of the chest in 6 months and a return visit.       I spent 20 minutes caring for Louise on this date of service. This time includes time spent by me in the following activities:preparing for the visit, reviewing tests, obtaining and/or reviewing a separately obtained history, performing a medically appropriate examination and/or evaluation , counseling and educating the patient/family/caregiver, ordering medications, tests, or procedures, documenting information in the medical record, and independently interpreting results and communicating that information with the patient/family/caregiver  Follow Up   No follow-ups on file.  Patient was given instructions and counseling regarding her condition or for health maintenance advice. Please see specific information pulled into the AVS if appropriate.       Transcribed from ambient dictation for Chloe Cedillo MD by Michelle [employer] .  07/11/23   13:12 EDT    Patient or patient representative verbalized consent to the visit recording.  I have personally performed the services described in this document as transcribed by the above individual, and it is both accurate and complete.

## 2023-08-10 ENCOUNTER — TELEPHONE (OUTPATIENT)
Dept: SURGERY | Facility: CLINIC | Age: 58
End: 2023-08-10

## 2023-08-10 NOTE — TELEPHONE ENCOUNTER
Caller: Louise GARCIA    Relationship to patient: Self    Best call back number:[phone]    Chief complaint: PT REQUESTING TO KNOW WHEN HER APPT WILL BE AFTER CT IN JAN. PT REQUESTING [place] OFFICE     Type of visit: FOLLOW UP

## 2023-10-05 ENCOUNTER — HOSPITAL ENCOUNTER (OUTPATIENT)
Dept: INFUSION THERAPY | Facility: HOSPITAL | Age: 58
Discharge: HOME | End: 2023-10-05
Admitting: PSYCHIATRY & NEUROLOGY
Payer: COMMERCIAL

## 2023-10-05 DIAGNOSIS — G60.9 IDIOPATHIC PERIPHERAL NEUROPATHY: ICD-10-CM

## 2023-10-05 PROCEDURE — 95886 MUSC TEST DONE W/N TEST COMP: CPT

## 2023-10-05 PROCEDURE — 95886 MUSC TEST DONE W/N TEST COMP: CPT | Performed by: PSYCHIATRY & NEUROLOGY

## 2023-10-05 PROCEDURE — 95911 NRV CNDJ TEST 9-10 STUDIES: CPT | Performed by: PSYCHIATRY & NEUROLOGY

## 2023-10-05 PROCEDURE — 95911 NRV CNDJ TEST 9-10 STUDIES: CPT

## 2023-10-05 NOTE — PROCEDURES
EMG and Nerve Conduction Studies    I.      Instrument used: Neuromax 1002  II.     Please see data sheets for tabular summary of NCS and details on methods, temperatures and lab standards.   III.    EMG muscles tested for upper extremity studies include the deltoid, biceps, triceps, pronator teres, extensor digitorum communis, first dorsal interosseous and abductor pollicis brevis.    IV.   EMG muscles tested for lower extremity studies include the vastus lateralis, tibialis anterior, peroneus longus, medial gastrocnemius and extensor digitorum brevis.    V.    Additional muscles tested as needed.  Paraspinal muscles tested as needed.   VI.   Please see data sheets for tabular summary of EMG findings.   VII. The complete report includes the data sheets.      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  She has had persistent symptoms.  This is a follow-up study to demonstrate if any progression is noted.      Ht: 157.5 cm  Wt: 56.7 kg  HbA1C:   Lab Results   Component Value Date    HGBA1C 5.10 02/20/2023     TSH:   Lab Results   Component Value Date    TSH 1.670 02/26/2023       Technical summary:  Nerve conduction studies were obtained in the right arm and right leg.  Skin temperatures were cold and temperature correction was used in in the right foot.  The hand was warmed and temperature was at least 32 °C measured on the palm.  Needle examination was obtained on selected muscles in the right arm and right leg.    Results:  1.  Prolonged right median antidromic sensory distal latency at 4.0 ms with a normal amplitude.  2.  Prolonged right ulnar antidromic sensory distal latency at 3.9 ms with low amplitude of 12 µV.  3.  Prolonged right radial sensory distal latency at  3.1 ms with normal amplitude.  4.  Slow right median motor conduction velocity of 45.5 m/s with a normal distal latency and amplitudes.  5.  Normal right ulnar motor conduction velocities, distal latency and amplitudes.  6.  Normal right sural sensory distal latency with temperature correction with low amplitude of 4.3 µV.  7.  Normal right superficial peroneal sensory distal latency with temperature correction and low amplitude of 4.3 µV.  8.  Slow right peroneal motor velocity below the knee at 37.9 m/s with a normal velocity in the short segment across the fibular head.  Normal distal latency and amplitudes.  9.  Normal right tibial motor conduction velocity with a normal distal latency and amplitudes.  10.  Needle examination of selected muscles in the right arm and right leg showed normal insertional activities throughout.  There were normal motor units and recruitment patterns throughout.    Impression:  Abnormal study showing mild to moderate peripheral neuropathy.  In comparison to previous study of 2/27/2023 there is perhaps mild improvement in some of the parameters seen and really very little change in others.  Study results were discussed with the patient.    Caleb Reyes M.D.      Addendum:  The balance of labs done from the office follow-up showed normal values for vitamin B12 level of 1679, folate 14.6, copper level 129 and a negative motor and sensory neuropathy panel looking for antibodies against nerve components.    Her neuropsych testing is upcoming and I asked her to get an appointment to see Latrice Bell NP in follow-up after she has had her neuropsych testing.  GNS        Dictated utilizing Dragon dictation.

## 2023-10-13 ENCOUNTER — TELEPHONE (OUTPATIENT)
Dept: NEUROLOGY | Facility: CLINIC | Age: 58
End: 2023-10-13

## 2023-10-13 NOTE — TELEPHONE ENCOUNTER
Caller: Louise GARCIA    Relationship to patient: Self    Best call back number: [phone]    Chief complaint: NEUROPSYCH RESULTS    Type of visit: FOLLOW UP    Requested date: WHEN NEEDED - AFTERNOON    If rescheduling, when is the original appointment: NA     Additional notes: HAD NEUROPSYCH TESTING DONE 1/12/23 AT [facility]. RESULTS WILL TAKE ABOUT 12 DAYS. ADVISED TO CALL AND SET UP FOLLOW UP WITH YONAS CAUSEY FOR RESULTS.

## 2023-11-01 ENCOUNTER — TELEPHONE (OUTPATIENT)
Dept: NEUROLOGY | Facility: CLINIC | Age: 58
End: 2023-11-01
Payer: COMMERCIAL

## 2023-11-27 ENCOUNTER — TELEPHONE (OUTPATIENT)
Dept: NEUROLOGY | Facility: CLINIC | Age: 58
End: 2023-11-27

## 2023-11-27 NOTE — TELEPHONE ENCOUNTER
Caller: Louise GARCIA     Relationship: SELF    Best call back number: [phone]    What is your medical concern? PATIENT HAS HAD MANY FALLS OVER THE LAST 2 WEEKS. SHE FELL TWICE IN THE [facility] WHEN HER  WAS THERE AND THEN FALLS AT HOME. SHE HAD TO HAVE STITCHES IN HER HEAD ( EYEBROW) FROM WHERE SHE HIT HER HEAD. PATIENT IS IN PAIN IN HER FACE.     How long has this issue been going on? 2 WEEKS    Is your provider already aware of this issue? NO    Have you been treated for this issue? SHE WENT TO THE HOSPITAL FOR THESE FALLS AND THE INJURIES OBTAINED DURING THEM.     THE PATIENT WAS SCHEDULED WITH ZONIA CAUSEY ON 11/16/23 BUT HAD FORGOTTEN AND MISSED IT. HER PCP TOLD HER TO GET IN WITH DR. ARBOLEDA'S OFFICE TO GET CHECKED OUT FOR THE FALLS AND THE BLACKOUTS. HER NEXT APPT WITH DR. ARBOLEDA IS 02/22/23.     PATIENT WANTS TO KNOW IF ITS POSSIBLE TO GET IN SOONER FOR HER TO SEE DR. ARBOLEDA OR ZONIA CAUSEY. SHE IS GOING TO CALL HER PCP TO SEE IF SHE CAN GET IMAGING DONE TO SEE IF SHE HAS ANY FRACTURING OR ANYTHING.     PLEASE REVIEW  THANK YOU

## 2023-11-28 ENCOUNTER — OFFICE VISIT (OUTPATIENT)
Dept: NEUROLOGY | Facility: CLINIC | Age: 58
End: 2023-11-28
Payer: COMMERCIAL

## 2023-11-28 ENCOUNTER — LAB (OUTPATIENT)
Dept: LAB | Facility: HOSPITAL | Age: 58
End: 2023-11-28
Payer: COMMERCIAL

## 2023-11-28 VITALS
SYSTOLIC BLOOD PRESSURE: 140 MMHG | HEART RATE: 88 BPM | DIASTOLIC BLOOD PRESSURE: 100 MMHG | BODY MASS INDEX: 23 KG/M2 | HEIGHT: 62 IN | WEIGHT: 125 LBS | OXYGEN SATURATION: 97 %

## 2023-11-28 DIAGNOSIS — G44.52 NEW DAILY PERSISTENT HEADACHE: ICD-10-CM

## 2023-11-28 DIAGNOSIS — H02.402 PTOSIS OF EYELID, LEFT: ICD-10-CM

## 2023-11-28 DIAGNOSIS — R29.6 FALLS FREQUENTLY: ICD-10-CM

## 2023-11-28 DIAGNOSIS — G60.9 IDIOPATHIC PERIPHERAL NEUROPATHY: Primary | ICD-10-CM

## 2023-11-28 DIAGNOSIS — R41.3 MEMORY LOSS: ICD-10-CM

## 2023-11-28 DIAGNOSIS — R44.3 HALLUCINATIONS: ICD-10-CM

## 2023-11-28 PROBLEM — G62.9 POLYNEURITIS: Status: ACTIVE | Noted: 2023-11-28

## 2023-11-28 PROCEDURE — 99215 OFFICE O/P EST HI 40 MIN: CPT

## 2023-11-28 PROCEDURE — 1160F RVW MEDS BY RX/DR IN RCRD: CPT

## 2023-11-28 PROCEDURE — 83519 RIA NONANTIBODY: CPT

## 2023-11-28 PROCEDURE — 36415 COLL VENOUS BLD VENIPUNCTURE: CPT

## 2023-11-28 PROCEDURE — 1159F MED LIST DOCD IN RCRD: CPT

## 2023-11-28 RX ORDER — ONDANSETRON 4 MG/1
4 TABLET, FILM COATED ORAL EVERY 8 HOURS PRN
COMMUNITY

## 2023-11-28 NOTE — PROGRESS NOTES
CC: Peripheral neuropathy, memory loss, and frequent falls    HPI:  Louise GARCIA is a  58 y.o.  right-handed white female who I am seeing for the first time in follow-up regarding peripheral neuropathy, memory loss and frequent falls.  She has a past medical history of seizures, DVTson long-term anticoagulation Eliquis, and a cervical disc fusion by Dr. Tejeda several years ago.  He she was seen in the hospital by Dr. Arce initially 2/21/2023 and subsequently being seen by Hayley Ohara, Dr. Sharif, Dr. Aleman and Gila Pro.  She was last seen by Dr. Reyes on 6/19/2023, with the following history taken from that note with additions/modifications as indicated:    Dr. Reyes performed an EMG on 2/27/2023 with the following impression:    Abnormal study consistent with peripheral neuropathy there is mild evidence in the upper extremity with sensory involvement and mild to moderate involvement of the lower extremity with sensory and motor involvement.  Typical features however of Guillain-Barré syndrome were not seen such as dramatically slowed velocities or significant conduction block.  There was no evidence of a right cervical or lumbosacral radiculopathy.    The patient' symptoms started after a fall on her back.  She reported abrupt onset of numbness from the chin down but sparing the feet associated with weakness and ataxia.. She had MRIs of the entire neural axis which Dr. Reyes and I reviewed.  Brain MRI showed Moderate T2 hyperintensity within the periventricular and subcortical white matter, many of which have a pericallosal distribution. No definite abnormal cranial enhancement or restricted diffusion.  Differential considerations include demyelination such as multiple sclerosis versus chronic ischemic white matter changes.  MRI of the cervical, thoracic and lumbar spine did not show any cord compression or cauda equina compression.  Some miscellaneous foraminal stenoses were seen however which did not fit  with her picture. Of interest the patient had pneumonia and developed empyema and so she was severely ill.   She was treated empirically with IVIG for 5 days gabapentin has been added to help with neuropathic pain which does seem to help at 300 mg 3 times daily.       Extensive laboratory evaluation was obtained including a lumbar puncture which showed a normal protein but the myelin basic protein was mildly elevated at 4.1.  A limbic encephalitis panel was sent to [facility] which was entirely negative.  More standard labs included a sed rate of 15, CRP less than 0.3, hepatitis C antibody negative, TSH 1.67, free T4 was 0.96, vitamin B-12 level was 298 and folate was greater than 20.  Cryoglobulins negative, SPE/IEP negative but there was an elevation of IgG at 2248. Further CSF labs showed IgG index 0.6 and IgG synthesis rate 0.4.  Oligoclonal bands were 0 and the NMO antibody was negative on spinal fluid.  Motor and sensory neuropathy panel was negative.  More recent vitamin B12 level was 1679.    She was referred to [facility] for neuropsych testing for memory problems.  Based on their evaluation on 10/12/2023, her memory was within normal limits but found the presence of significant psychological distress.  There was prominent depression/mild anxiety, mild to moderate visual-spatial and executive dysfunction localizing to right frontal parietal and dorsolateral prefrontal regions.  The visual hallucinations are unclear.    Dr. Reyes repeated her EMG on 10/5/2023 which was still abnormal showing mild to moderate peripheral neuropathy and perhaps mild improvement since the EMG done on 2/27/2023.    History interim    Patient reports that she is having frequent falls and reports at least 3 this month.  She was visiting her  a [facility] hospital when she suffered the first 2 falls.  She stated the numbness started in her ankles and ascended up her legs causing her to fall.  She states that it felt like water was  creeping up her legs.  When she fell she hit her head and lost consciousness.  They obtained a CT of the head and a CT of maxillofacial only the reports are available for me to view.  The CT had reported no acute intracranial abnormalities including no intra or extra-axial hemorrhages, mass effect, midline shift or acute/subacute infarcts.  Her CT maxillofacial showed a minimally displaced fracture of the anterior right maxilla with adjacent subcutaneous air and an air-fluid level within the right maxillary sinus.  She had a laceration above her right eye which required sutures.  She reports that the third fall was at home and she did not go to the ER for an evaluation.  Since her falls she reports her right upper teeth feel numb.  She states that her PCP is going to refer her to an oral surgeon for the fracture.     She also reports a headache that has been constant for the last week and a half.  She states that the pain starts above her eyes and goes to the top of her head and include the right ear.  She describes it as a throbbing pain and rates it an 8 out of 10 on the pain scale.  She denies any nausea, vomiting, fever, visual changes or shortness of breath.  She reports she has been taking ibuprofen which provides some relief but does not take the pain away.  She states that she knows she is not supposed to take ibuprofen since she is on Eliquis but Tylenol was not helping.  She reports some dizziness with sitting to standing but it resolves fairly quickly.  She has noticed some difficulty swallowing at times and questionable jaw fatigue.  She denies any double vision.  She does report that her right eyelid might droop lower than the left but not frequently.  She does not notice it getting worse throughout the day.  Patient states that her brother was diagnosed with myasthenia gravis.    She continues to report numbness in her fingertips, toes, and left lower leg.  She states that her left toes feel more numb  than the right.  She continues to take gabapentin 300 mg 1 tablet in the morning and 2 tablets before bed.  She states the burning in her hands has improved with gabapentin and she no longer wakes up throughout the night in pain.  She has been taking Pristiq which she thinks is helped her in many ways.  She feels like her memory might be a little better.  She states she is walking better but does not understand the falls.  She ambulates with a 4-point cane when she is walking long distance but does not use any assistive device when at home.  As for her hallucinations, she still reports some visual and auditory hallucinations.  She states she thought the dishes were falling and breaking.  She also feels like she sees somebody coming around the corner when no one is there.  When her  was hospitalized at [facility] she thought he was a cult and was trying to get him to leave the hospital until a nurse reoriented her.        Past Medical History:   Diagnosis Date    Degenerative disc disease, cervical     Gestational diabetes     H/O blood clots     Hematemesis     Seizures     Septic shock          Past Surgical History:   Procedure Laterality Date    APPENDECTOMY      BACK SURGERY      CHOLECYSTECTOMY      ENDOSCOPY N/A 8/30/2016    Procedure: ESOPHAGOGASTRODUODENOSCOPY with biopsy;  Surgeon: Austen Brito MD;  Location: [facility] ENDOSCOPY;  Service:     HYSTERECTOMY      NECK SURGERY       approx 6 yrs ago    THORACOSCOPY Right 3/8/2023    Procedure: THORACOSCOPY WITH DAVINCI ROBOT WITH COMPLETE DECORTICATION;  Surgeon: Chloe Cedillo MD;  Location: [facility] MAIN OR;  Service: Robotics - DaVinci;  Laterality: Right;    TONSILLECTOMY      TONSILLECTOMY AND ADENOIDECTOMY  1975           Current Outpatient Medications:     apixaban (ELIQUIS) 5 MG tablet tablet, Take 1 tablet by mouth 2 (Two) Times a Day., Disp: , Rfl:     Cariprazine HCl (Vraylar) 3 MG capsule capsule, Take  by mouth Daily., Disp: , Rfl:      Desvenlafaxine Succinate ER 25 MG tablet sustained-release 24 hour, Take 1 tablet by mouth Daily. (Patient taking differently: Take 2 tablets by mouth Daily.), Disp: 30 tablet, Rfl: 1    EPINEPHrine (EPIPEN) 0.3 MG/0.3ML solution auto-injector injection, , Disp: , Rfl:     folic acid (FOLVITE) 1 MG tablet, Take 1 tablet by mouth Daily., Disp: , Rfl:     gabapentin (NEURONTIN) 300 MG capsule, Take 2 capsules by mouth Every 8 (Eight) Hours., Disp: 180 capsule, Rfl: 5    lamoTRIgine (LaMICtal) 200 MG tablet, Take 1 tablet by mouth Daily for 30 days., Disp: 30 tablet, Rfl: 0    pantoprazole (PROTONIX) 40 MG EC tablet, , Disp: , Rfl:     promethazine (PHENERGAN) 25 MG tablet, , Disp: , Rfl:     vitamin B-12 (CYANOCOBALAMIN) 100 MCG tablet, Take 0.5 tablets by mouth Daily., Disp: , Rfl:     estrogens, conjugated,-methyltestosterone (ESTRATEST HS) 0.625-1.25 MG per tablet, Take 1 tablet by mouth Daily. (Patient not taking: Reported on 5/2/2023), Disp: , Rfl:     HYDROcodone-acetaminophen (NORCO) 5-325 MG per tablet, , Disp: , Rfl:     montelukast (SINGULAIR) 10 MG tablet, Take 1 tablet by mouth Every Night. (Patient not taking: Reported on 5/2/2023), Disp: , Rfl:     oxybutynin XL (DITROPAN-XL) 5 MG 24 hr tablet, , Disp: , Rfl:       Family History   Problem Relation Age of Onset    Cancer Mother     Diabetes Father     Heart disease Father     Stroke Father     Cancer Sister     Colon cancer Paternal Uncle     Diabetes Paternal Grandmother     Diabetes Paternal Grandfather     Malig Hyperthermia Neg Hx          Social History     Socioeconomic History    Marital status:    Tobacco Use    Smoking status: Never     Passive exposure: Never    Smokeless tobacco: Never   Vaping Use    Vaping Use: Never used   Substance and Sexual Activity    Alcohol use: Yes     Comment: social    Drug use: No    Sexual activity: Defer         Allergies   Allergen Reactions    Penicillins Shortness Of Breath and Swelling    Sulfa  "Antibiotics Rash         Pain Scale: 8/10 headache        ROS:  Review of Systems   Neurological:  Positive for dizziness, seizures (last seizure about 1 year ago), syncope, weakness (left leg), light-headedness, numbness (bi lat hand/feet/legs) and headaches. Negative for tremors, facial asymmetry and speech difficulty.   Psychiatric/Behavioral:  Positive for confusion, hallucinations and sleep disturbance. Negative for agitation, behavioral problems, decreased concentration, dysphoric mood, self-injury and suicidal ideas. The patient is not nervous/anxious and is not hyperactive.        I have reviewed and agree with the above ROS completed by medical assistant.    Physical Exam:  Vitals:    11/28/23 0937   Weight: 56.7 kg (125 lb)   Height: 157.5 cm (62\")     Orthostatic BP:    Body mass index is 22.86 kg/m².    Physical Exam  General: Slender white female no acute distress  HEENT: Normocephalic.  Scar on right eyebrow  Neck: Supple.  No thyromegaly.  No cervical bruits.  Heart: Regular rate and rhythm.  Murmur present.  Extremities: Radial pulses strong and simultaneous.  No pedal edema.      Neurological Exam:   Mental Status: Awake, alert, oriented to person, place and time.  Conversant without evidence of an affective disorder, thought disorder, delusions or hallucinations.  Poor concentration.  HCF: No aphasia, apraxia or dysarthria.  Recent and remote memory intact.  Knowledge of recent events intact.  CN: I:   II: Visual fields full without left inattention   III, IV, VI: Eye movements intact with horizontal nystagmus.  Left ptosis.  Pupils round and reactive to light.  Left pupil 4 mm and right pupil 3 mm.   V,VII: Light touch intact all 3 divisions of V.  Pinprick reduced in the right V2/V3 distribution and left V1 distribution.  Facial muscles symmetrical.   VIII: Hearing reduced to finger rub on the right   IX,X: Soft palate elevates symmetrically   XI: Sternomastoid and trapezius are strong.   XII: " Tongue midline without atrophy or fasciculations  Motor: Normal tone in the upper and lower extremities.  Questionable atrophy in the pronator teres muscles bilaterally but more so on the right forearm.   Power testing: Requires a lot of coaching to get maximum power.  Weakness of abductor pollicis brevis muscles and interosseous bilaterally as well as pronator teres muscles bilaterally but good strength more proximally.  Mild weakness of the toe extensors but good strength in all other muscles tested in the lower extremities.  Reflexes: Upper extremities: +1 biceps and brachial radialis.  Absent triceps        Lower extremities: Areflexia        Toe signs: Downgoing bilaterally  Sensory: Light touch: Reduced on the fingers and feet        Pinprick: Patchiness in the arms reduced on the fingers and feet/toes        Vibration: Reduced at the ankles        Position: Intact at the great toes    Cerebellar: Finger-to-nose: Normal           Rapid movement: Normal           Heel-to-shin: Normal  Gait and Station: Comes to stand pushing off the chair.  Mildly broad-based.  Cautious antalgic gait.  Ambulated with a 4-point cane.    Results:      Lab Results   Component Value Date    GLUCOSE 110 (H) 04/21/2023    BUN 19 04/21/2023    CREATININE 0.86 04/21/2023    EGFRIFNONA 81 08/29/2016    EGFRIFAFRI  08/29/2016      Comment:      <15 Indicative of kidney failure.    BCR 22.1 04/21/2023    CO2 24.7 04/21/2023    CALCIUM 10.3 04/21/2023    PROTENTOTREF 6.1 02/28/2023    ALBUMIN 1.7 (L) 03/12/2023    LABIL2 0.7 02/28/2023    AST 29 03/12/2023    ALT 14 03/12/2023       .  Lab Results   Component Value Date    RPR Non-Reactive 07/11/2023       Lab Results   Component Value Date    TSH 1.670 02/26/2023       Lab Results   Component Value Date    ZIZQGWWL88 1,679 (H) 07/11/2023       Lab Results   Component Value Date    FOLATE 14.60 07/11/2023       Lab Results   Component Value Date    HGBA1C 5.10 02/20/2023       Lab Results    Component Value Date    ARSENIC <1 02/26/2023       Lab Results   Component Value Date    MERCURY <1.0 02/26/2023       Lab Results   Component Value Date    COPPER 129 07/11/2023       Lab Results   Component Value Date    SEDRATE 15 02/26/2023       Lab Results   Component Value Date    CRP <0.30 02/26/2023             Assessment:   1.  Peripheral neuropathy-gabapentin helps the burning symptoms in her hands.  2.  Frequent falls- recent injury minimally displaced fracture of the anterior right maxilla  3.  Persistent daily headache post falls  4.  Ptosis of the left eye  5.  Memory loss- patient states has improved some after starting Pristiq.  Neuropsych testing reported that her memory was intact but found the presence of significant psychological distress and somatic worry.  6.  Hallucinations-combination of visual and auditory hallucinations but denies any tactile hallucinations.    Complex case- CSF showed increased myelin basic protein which could be expected with myelopathy but there were no oligoclonal banding.  She had normal IgG synthesis rate and NMO.  Her total protein was only 23.4 which she does not line up with a demyelinating type process such as GBS.  She does have some pericallosal white matter changes on the MRI but testing to date does not support a multiple sclerosis diagnosis.  She did have ptosis of the left eye today on exam but denies any diplopia.  She reports that her brother has myasthenia gravis.  She has had increasing falls and daily persistent headaches.  Discussed getting a second opinion at [facility] and repeating imaging of the brain.  Patient agrees with plan.        Plan:  1.  CT of the head without contrast  2.  MRI of the brain with and without contrast  3.  Check acetylcholine receptor antibody panel  4.  Continue gabapentin 300 mg 1 tablet in the morning and 2 tablets before bed  5.  Referral to [facility] for second opinion  6.  Recommend using an assistive  device at all times to prevent falls  7.  Keep follow-up appointment with Dr. Reyes in February 2024        Time: Spent 50 minutes in total encounter time. This included time for chart review, obtaining history, performing pertinent physical examination, updating medical information, ordering tests/referrals, discussion of diagnosis, medical management, counseling, and encounter documentation.                  Dictated utilizing Dragon dictation.

## 2023-12-01 ENCOUNTER — TELEPHONE (OUTPATIENT)
Dept: NEUROLOGY | Facility: CLINIC | Age: 58
End: 2023-12-01
Payer: COMMERCIAL

## 2023-12-01 NOTE — TELEPHONE ENCOUNTER
Patients provider Dr. Nestor Birmingham order a CT Brain 11/28/23. Patients insurance company my not approve CT brain request that I've started. All clinical notes have been faxed, pending tracking #  [id number].

## 2023-12-07 ENCOUNTER — PATIENT ROUNDING (BHMG ONLY) (OUTPATIENT)
Dept: NEUROLOGY | Facility: CLINIC | Age: 58
End: 2023-12-07
Payer: COMMERCIAL

## 2023-12-07 ENCOUNTER — OFFICE VISIT (OUTPATIENT)
Dept: NEUROSURGERY | Facility: CLINIC | Age: 58
End: 2023-12-07
Payer: COMMERCIAL

## 2023-12-07 VITALS
DIASTOLIC BLOOD PRESSURE: 82 MMHG | BODY MASS INDEX: 24.29 KG/M2 | HEIGHT: 62 IN | WEIGHT: 132 LBS | SYSTOLIC BLOOD PRESSURE: 124 MMHG | HEART RATE: 79 BPM

## 2023-12-07 DIAGNOSIS — M47.816 LUMBAR FACET ARTHROPATHY: ICD-10-CM

## 2023-12-07 DIAGNOSIS — G89.29 CHRONIC MIDLINE LOW BACK PAIN WITH RIGHT-SIDED SCIATICA: Primary | ICD-10-CM

## 2023-12-07 DIAGNOSIS — M51.36 DDD (DEGENERATIVE DISC DISEASE), LUMBAR: ICD-10-CM

## 2023-12-07 DIAGNOSIS — M48.061 FORAMINAL STENOSIS OF LUMBAR REGION: ICD-10-CM

## 2023-12-07 DIAGNOSIS — M54.41 CHRONIC MIDLINE LOW BACK PAIN WITH RIGHT-SIDED SCIATICA: Primary | ICD-10-CM

## 2023-12-07 RX ORDER — BIOTIN 5 MG
TABLET ORAL
COMMUNITY

## 2023-12-07 RX ORDER — CHOLECALCIFEROL (VITAMIN D3) 125 MCG
10 CAPSULE ORAL
COMMUNITY

## 2023-12-07 NOTE — PROGRESS NOTES
"Chief Complaint  Back Pain, Hip Pain (right), and Leg Pain (Right hip to mid thigh)    Subjective          Louise GARCIA who is a 58 y.o. year old female who presents to [facility] for Evaluation of the Spine.     The patient complains of pain located in the Lumbar Spine.  Patients states the pain has been present for \"a while\".  The pain came on gradually.  The pain scaled level is 6.  The pain does radiate. Dermatomes are located on right Lumbar at: not below the knee..  The pain is constant and waxing/waning and described as sharp.  The pain is worse at no particular time of day. Patient states NSAIDs makes the pain better.  Patient states nothing worsens the pain.    Associated Symptoms Include:Denies Numbness, Tingling, Weakness, and Loss of Bowel or Bladder Control.  Conservative Interventions Include: NSAIDs that were not very effective. Tylenol is ineffective. Gabapentin is ineffective.    Was this the result of an injury or accident? : No    History of Previous Spinal Surgery?: Yes.  Cervical, Date 13 years ago, anterior fusion and corpectomy at C4, C5 and C6.     reports that she has never smoked. She has never been exposed to tobacco smoke. She has never used smokeless tobacco.    Review of Systems   Musculoskeletal:  Positive for back pain.        Objective   Vital Signs:   /82 (BP Location: Right arm, Patient Position: Sitting)   Pulse 79   Ht 157.5 cm (62\")   Wt 59.9 kg (132 lb)   BMI 24.14 kg/m²       Physical Exam  Constitutional:       Appearance: Normal appearance. She is normal weight.   Pulmonary:      Effort: Pulmonary effort is normal.   Musculoskeletal:         General: Tenderness (midline lumbar and right lumbar area) present.      Comments: SLR bilaterally causes pain into the respective thigh not passing the knees   Neurological:      General: No focal deficit present.      Mental Status: She is alert and oriented to person, place, and " time.      Sensory: No sensory deficit.      Motor: No weakness.      Deep Tendon Reflexes: Reflexes normal.   Psychiatric:         Mood and Affect: Mood normal.         Behavior: Behavior normal.        Neurologic Exam     Mental Status   Oriented to person, place, and time.        Result Review     I have personally reviewed the MRI of the lumbar spine without contrast from 9/25/2023 which shows mild to moderate spondylosis and facet arthritis at L4-L5 and L5-S1.  There is moderate central canal stenosis at L4-L5 with mild bilateral foraminal stenosis.  There is no significant central canal narrowing with moderate right and mild left foraminal stenosis at L5-S1.    I personally reviewed the radiology report for MRI of cervical spine with and without contrast from 2/21/2023 which shows anterior fusion hardware and corpectomy noted at C4, C5 and C6.  There is mild anterolisthesis of C3 on C4.  There is flattening of the cervical spinal cord at C3-C4 and C4-C5.  There is right lateral recess stenosis at C6-C7.  Spinal cord signal was in normal range.     Assessment and Plan    Diagnoses and all orders for this visit:    1. Chronic midline low back pain with right-sided sciatica (Primary)  -     Ambulatory Referral to Pain Management    2. DDD (degenerative disc disease), lumbar  -     Ambulatory Referral to Pain Management    3. Foraminal stenosis of lumbar region  -     Ambulatory Referral to Pain Management    4. Lumbar facet arthropathy  -     Ambulatory Referral to Pain Management    She has pain in the back and right leg not passing the knee.    There is no high-grade stenosis in the lumbar spine that I expect surgery to help with. There is at least moderate right foraminal narrowing at L5-S1 and moderate central canal narrowing at L4-L5. I would not recommend surgery as a first-line treatment. I expect surgery is less likely to help, unless the leg pain worsens to an L5 pattern to the middle toes of the right  foot.    She has seen some benefit from PT.    She would like to consider LESB vs MBBs/RFA in the lumbar spine and I will refer her to [facility] in [place] to discuss.    The patient was counseled on basic recommendations for the reduction and prevention of back, neck, or spine pain in association with spinal disorders, including: cessation/avoidance of nicotine use, maintenance of a healthy BMI and weight, focusing on building/maintaining core strength through core exercise, and avoidance of activities which worsen the pain. The patient will monitor for changes in symptoms and notify our clinic of these changes as needed.    She will follow-up PRN.    Follow Up   Return if symptoms worsen or fail to improve.  Patient was given instructions and counseling regarding her condition or for health maintenance advice. Please see specific information pulled into the AVS if appropriate.

## 2023-12-11 LAB
ACHR BIND AB SER-SCNC: <0.03 NMOL/L (ref 0–0.24)
ACHR BLOCK AB SER-ACNC: 14 % (ref 0–25)
ACHR MOD AB SER QL FC: 10 % (ref 0–45)

## 2023-12-18 ENCOUNTER — TELEPHONE (OUTPATIENT)
Dept: NEUROLOGY | Facility: CLINIC | Age: 58
End: 2023-12-18

## 2023-12-18 NOTE — TELEPHONE ENCOUNTER
Provider: KASIA TAYLOR & DR. ASHIA ARBOLEDA    Caller: Louise GARCIA    Relationship to Patient: Self    Phone Number: [phone]    Reason for Call: PT CALLED TO CHECK THE STATUS OF HER REFERRAL TO [facility]. I ADVISED PER THE REFERRAL COMMUNICATIONS THAT AS OF 12/11/23, [facility] IS CURRENTLY EXPERIENCING A DELAY IN PATIENT SCHEDULING BUT WOULD BE CONTACTING HER WHEN THEY ARE READY TO SCHEDULE. PT VERBALIZED UNDERSTANDING. PT DOES MENTION THAT SHE FELL EARLIER THIS MORNING. SHE STATES SHE FELL BACK ONTO A TABLE BUT MANAGED TO FALL ON HER BUTTOCK. PT DENIES HITTING HER HEAD OR ANY NOTABLE INJURY.    PT MENTIONS WORSENING IN HER NEUROPATHY WITH INCREASED DIFFICULTY WALKING. PT STATES THAT SHE OCCASIONALLY HAS TO TAKE IBUPROFEN AS TYLENOL DOES NOT HELP AT ALL. PT STATES SHE DOES NOT TAKE IBUPROFEN EVERYDAY, JUST WHEN NEEDED.    SHE IS NOW SEEING PAIN MANAGEMENT AND MAY DECIDE TO PROCEED WITH INJECTIONS.    LEASE REVIEW AND ADVISE.

## 2023-12-18 NOTE — TELEPHONE ENCOUNTER
Noted  Pt will need to follow with [facility] for second opinion

## 2024-01-03 DIAGNOSIS — G60.9 IDIOPATHIC PERIPHERAL NEUROPATHY: ICD-10-CM

## 2024-01-03 NOTE — TELEPHONE ENCOUNTER
Caller: Louise GARCIA    Relationship: Self    Best call back number: [phone]    Requested Prescriptions:   Requested Prescriptions     Pending Prescriptions Disp Refills    gabapentin (NEURONTIN) 300 MG capsule 180 capsule 5     Sig: Take 2 capsules by mouth Every 8 (Eight) Hours.        Pharmacy where request should be sent: [facility] - [place], [place] - [address] [place] [address] - [phone]  - [phone] FX     Last office visit with prescribing clinician: 2023   Last telemedicine visit with prescribing clinician: Visit date not found   Next office visit with prescribing clinician: 2024     Additional details provided by patient: CURRENT SCRIPT IS     Does the patient have less than a 3 day supply:  [] Yes  [x] No    Would you like a call back once the refill request has been completed: [] Yes [x] No    If the office needs to give you a call back, can they leave a voicemail: [] Yes [x] No    Juvenal Zurita Rep   24 15:26 EST

## 2024-01-04 RX ORDER — GABAPENTIN 300 MG/1
600 CAPSULE ORAL EVERY 8 HOURS SCHEDULED
Qty: 180 CAPSULE | Refills: 2 | Status: SHIPPED | OUTPATIENT
Start: 2024-01-04

## 2024-01-09 ENCOUNTER — HOSPITAL ENCOUNTER (OUTPATIENT)
Dept: CT IMAGING | Facility: HOSPITAL | Age: 59
Discharge: HOME | End: 2024-01-09
Admitting: THORACIC SURGERY (CARDIOTHORACIC VASCULAR SURGERY)
Payer: COMMERCIAL

## 2024-01-09 ENCOUNTER — TELEPHONE (OUTPATIENT)
Dept: NEUROLOGY | Facility: CLINIC | Age: 59
End: 2024-01-09
Payer: COMMERCIAL

## 2024-01-09 DIAGNOSIS — J86.9 EMPYEMA OF PLEURA: ICD-10-CM

## 2024-01-09 PROCEDURE — 71250 CT THORAX DX C-: CPT

## 2024-01-09 NOTE — TELEPHONE ENCOUNTER
[facility] patient financial clearance was not met. Patient will need to be referred elsewhere.

## 2024-01-11 ENCOUNTER — HOSPITAL ENCOUNTER (OUTPATIENT)
Dept: MRI IMAGING | Facility: HOSPITAL | Age: 59
Discharge: HOME | End: 2024-01-11
Payer: COMMERCIAL

## 2024-01-11 DIAGNOSIS — R29.6 FALLS FREQUENTLY: ICD-10-CM

## 2024-01-11 DIAGNOSIS — G44.52 NEW DAILY PERSISTENT HEADACHE: ICD-10-CM

## 2024-01-11 PROCEDURE — 70553 MRI BRAIN STEM W/O & W/DYE: CPT

## 2024-01-11 PROCEDURE — A9577 INJ MULTIHANCE: HCPCS

## 2024-01-11 PROCEDURE — 0 GADOBENATE DIMEGLUMINE 529 MG/ML SOLUTION

## 2024-01-11 RX ADMIN — GADOBENATE DIMEGLUMINE 10 ML: 529 INJECTION, SOLUTION INTRAVENOUS at 15:48

## 2024-02-12 ENCOUNTER — TELEPHONE (OUTPATIENT)
Dept: NEUROLOGY | Facility: CLINIC | Age: 59
End: 2024-02-12

## 2024-02-15 ENCOUNTER — TELEPHONE (OUTPATIENT)
Dept: OTHER | Facility: HOSPITAL | Age: 59
End: 2024-02-15
Payer: COMMERCIAL

## 2024-06-17 ENCOUNTER — TELEPHONE (OUTPATIENT)
Dept: SURGERY | Facility: CLINIC | Age: 59
End: 2024-06-17
Payer: COMMERCIAL

## 2024-06-17 NOTE — TELEPHONE ENCOUNTER
Caller: GARCIA, Nathanieltere PANTOJA    Relationship: Self    Best call back number: [phone]     What is the best time to reach you: ANY    Who are you requesting to speak with (clinical staff, provider,  specific staff member): [facility] STAFF    Do you know the name of the person who called: PT    What was the call regarding: PT WAS JORGE AT [facility] 6/18/24 @ 1000-ORIG APPT CANCELLED (BY OFFICE)DUE TO INSURANCE NOT PAYING FOR CT-PT HAD CT IN [place] AND WAS ADVISED THIS COULD BE USED AND TO KEEP APPT 6/18/24-APPT WAS CANCELLED AND PT WOULD LIKE TO SEE PROVIDER-SHE HAS NOT FOLLOWED UP SINCE JAN CT AND IS UNSURE IF INS WILL PAY FOR ANOTHER BUT SHE NEEDS TO F/U WITH PROVIDER-PT DID NOT CANCEL ANY OF THESE APPTS-PT WOULD LIKE TO HAVE/KEEP SAME APPT    Is it okay if the provider responds through MyChart: NO

## 2024-06-17 NOTE — TELEPHONE ENCOUNTER
Hub staff attempted to follow warm transfer process and was unsuccessful     Caller: Louise GARCIA    Relationship to patient: Self    Best call back number:     [phone]       Patient is needing: AVIS- APPT CANCELLED DUE TO IMAGING-PLEASE CALL PT

## 2024-06-17 NOTE — TELEPHONE ENCOUNTER
I spoke with patient and it was determined that she would like a referral to a provider in Network .    DB 11:09  6/17/2024        Hub staff attempted to follow warm transfer process and was unsuccessful     Caller: Louise GARCIA    Relationship to patient: Self    Best call back number:     [phone]       Patient is needing: AVIS- APPT CANCELLED DUE TO IMAGING-PLEASE CALL PT

## 2024-06-20 ENCOUNTER — TELEPHONE (OUTPATIENT)
Dept: SURGERY | Facility: CLINIC | Age: 59
End: 2024-06-20
Payer: COMMERCIAL

## 2024-06-20 NOTE — TELEPHONE ENCOUNTER
I notified pt that we have not been able to locate a Thoracic Surgeon that will accept her insurance to refer her to. I suggested the patient look in her insurance portal, follow the prompts to locate a provider and we will gladly refer her to them. Pt was agreeable and understood    DB 8:18  6/20/2024

## 2025-03-11 NOTE — PROGRESS NOTES
"  Infectious Diseases Progress Note    Reji Oliver MD     [facility]  [place]: 12 days  Patient Identification:  Name: Louise GARCIA  Age: 58 y.o.  Sex: female  :  1965  MRN: [id number]         Primary Care Physician: Chloe Schaefer, KASIA        Subjective: Anxious but planned surgery on Wednesday.  Denies any new pain and discomfort.  Overall feeling better.  Interval History: See consultation note.    Objective:    Scheduled Meds:bisacodyl, 10 mg, Rectal, Daily  desvenlafaxine, 25 mg, Oral, Daily  folic acid, 1 mg, Oral, Daily  gabapentin, 100 mg, Oral, Q12H  ipratropium-albuterol, 3 mL, Nebulization, 4x Daily - RT  lactulose, 20 g, Oral, BID  lamoTRIgine, 200 mg, Oral, Daily  lidocaine, 1 patch, Transdermal, Q24H  lidocaine, 1 patch, Transdermal, Q24H  lidocaine, 10 mL, Intradermal, Once  lubiprostone, 24 mcg, Oral, Daily With Breakfast  pantoprazole, 40 mg, Intravenous, Q AM  polyethylene glycol, 17 g, Oral, BID  senna-docusate sodium, 2 tablet, Oral, BID  sodium chloride, 10 mL, Intravenous, Q12H  thiamine, 100 mg, Oral, Daily  vancomycin, 1,000 mg, Intravenous, Q12H  vitamin B-12, 500 mcg, Oral, Daily      Continuous Infusions:hold, 1 each  hold, 1 each  Pharmacy to dose vancomycin,         Vital signs in last 24 hours:  Temp:  [98.4 °F (36.9 °C)-99 °F (37.2 °C)] 98.4 °F (36.9 °C)  Heart Rate:  [] 92  Resp:  [15-23] 18  BP: (116-151)/(64-99) 140/73    Intake/Output:    Intake/Output Summary (Last 24 hours) at 3/7/2023 1007  Last data filed at 3/6/2023 1900  Gross per 24 hour   Intake --   Output 275 ml   Net -275 ml       Exam:  /73   Pulse 92   Temp 98.4 °F (36.9 °C) (Oral)   Resp 18   Ht 157.5 cm (62\")   Wt 56.7 kg (125 lb)   SpO2 96%   Breastfeeding No   BMI 22.86 kg/m²   Patient is examined using the personal protective equipment as per guidelines from infection control for this particular patient as enacted.  Hand washing was performed before and after patient " Pt back to room 5513 via bed from MRI.   interaction.  General Appearance:  Ill-appearing female who is much more calmer and comfortable compared to 24 hours ago.                          Head:    Normocephalic, without obvious abnormality, atraumatic                           Eyes:    PERRL, conjunctivae/corneas clear, EOM's intact, both eyes                         Throat:   Lips, tongue, gums normal; oral mucosa pink and moist                           Neck:   Supple, symmetrical, trachea midline, no JVD                         Lungs:    Decreased breath sounds bilaterally left greater than right                 Chest Wall:    No tenderness or deformity                          Heart:    Regular rate and rhythm, S1 and S2 normal, no murmur, no rub                                         or gallop                  Abdomen:   Soft nontender epigastric and right upper quadrant tenderness noted.                 Extremities: Right forearm IV phlebitis site erythema and tenderness noted.                        Pulses:   Pulses palpable in all extremities                            Skin:   Skin is warm and dry,  no rashes or palpable lesions                  Neurologic: Awake interactive and grossly nonfocal       Data Review:    I reviewed the patient's new clinical results.  Results from last 7 days   Lab Units 03/07/23  0733 03/06/23  0719 03/05/23  0532 03/04/23  0522 03/03/23  0159 03/02/23  0633 03/01/23  0742   WBC 10*3/mm3 17.78* 16.60* 20.27* 23.89* 31.39* 23.40* 19.16*   HEMOGLOBIN g/dL 8.3* 9.0* 9.3* 8.6* 10.0* 9.9* 9.4*   PLATELETS 10*3/mm3 198 173 167 176 155 168 243     Results from last 7 days   Lab Units 03/07/23  0733 03/06/23  0719 03/05/23  0532 03/04/23  0522 03/03/23  0159 03/02/23  0633 03/01/23  0653   SODIUM mmol/L 134* 133* 134* 134* 132* 135* 136   POTASSIUM mmol/L 3.8 3.9 4.2 4.5 4.7 4.1 3.7   CHLORIDE mmol/L 96* 97* 99 101 96* 99 101   CO2 mmol/L 27.1 24.5 26.0 25.8 25.9 26.4 27.0   BUN mg/dL 11 15 19 29* 31* 23* 17   CREATININE  mg/dL 0.53* 0.57 0.57 0.64 0.81 0.68 0.61   CALCIUM mg/dL 8.2*  8.1* 8.7 9.0 8.9 9.5 8.6 8.1*   GLUCOSE mg/dL 80 82 79 82 143* 95 122*     Microbiology Results (last 10 days)     Procedure Component Value - Date/Time    Culture, CSF - Cerebrospinal Fluid, Lumbar Puncture [[id number]] Collected: 03/06/23 1550    Lab Status: Preliminary result Specimen: Cerebrospinal Fluid from Lumbar Puncture Updated: 03/06/23 1813     Gram Stain No WBCs or organisms seen    Meningitis / Encephalitis Panel, PCR - Cerebrospinal Fluid, Lumbar Puncture [[id number]]  (Normal) Collected: 03/06/23 1550    Lab Status: Final result Specimen: Cerebrospinal Fluid from Lumbar Puncture Updated: 03/06/23 1725     ESCHERICHIA COLI K1, PCR Not Detected     HAEMOPHILUS INFLUENZAE, PCR Not Detected     LISTERIA MONOCYTOGENES, PCR Not Detected     NEISSERIA MENINGITIDIS, PCR Not Detected     STREPTOCOCCUS AGALACTIAE, PCR Not Detected     STREPTOCOCCUS PNEUMONIAE, PCR Not Detected     CYTOMEGALOVIRUS (CMV), PCR Not Detected     ENTEROVIRUS, PCR Not Detected     HERPES SIMPLEX VIRUS 1 (HSV-1), PCR Not Detected     HERPES SIMPLEX VIRUS 2 (HSV-2), PCR Not Detected     HUMAN PARECHOVIRUS, PCR Not Detected     VARICELLA ZOSTER VIRUS (VZV), PCR Not Detected     CRYPTOCOCCUS NEOFORMANS / GATTII, PCR Not Detected     HUMAN HERPES VIRUS 6 PCR Not Detected    Blood Culture - Blood, Wrist, Left [[id number]]  (Normal) Collected: 03/04/23 2043    Lab Status: Preliminary result Specimen: Blood from Wrist, Left Updated: 03/06/23 2115     Blood Culture No growth at 2 days    Blood Culture - Blood, Arm, Right [[id number]]  (Normal) Collected: 03/04/23 1952    Lab Status: Preliminary result Specimen: Blood from Arm, Right Updated: 03/06/23 2015     Blood Culture No growth at 2 days    S. Pneumo Ag Urine or CSF - Urine, Urine, Clean Catch [[id number]]  (Normal) Collected: 03/04/23 1000    Lab Status: Final result Specimen: Urine, Clean Catch Updated: 03/04/23 1128     Strep  Pneumo Ag Negative    Legionella Antigen, Urine - Urine, Urine, Clean Catch [[id number]]  (Normal) Collected: 03/04/23 1000    Lab Status: Final result Specimen: Urine, Clean Catch Updated: 03/04/23 1128     LEGIONELLA ANTIGEN, URINE Negative    Respiratory Culture - Sputum, Cough [[id number]] Collected: 03/04/23 0959    Lab Status: Final result Specimen: Sputum from Cough Updated: 03/04/23 1215     Respiratory Culture Rejected     Gram Stain Many (4+) Epithelial cells per low power field      Many (4+) WBCs per low power field      Many (4+) Mixed bacterial morphotypes seen on Gram Stain    Narrative:      Specimen rejected due to oropharyngeal contamination. Please reorder and recollect specimen if clinically necessary.    Body Fluid Culture - Body Fluid, Pleural Cavity [[id number]]  (Abnormal)  (Susceptibility) Collected: 03/04/23 0900    Lab Status: Final result Specimen: Body Fluid from Pleural Cavity Updated: 03/06/23 1124     Body Fluid Culture Light growth (2+) Staphylococcus aureus, MRSA     Comment:   Methicillin resistant Staphylococcus aureus, Patient may be an isolation risk.        Gram Stain Rare (1+) WBCs per low power field      No organisms seen    Susceptibility      Staphylococcus aureus, MRSA      YAKOV      Gentamicin Susceptible      Oxacillin Resistant     Rifampin Susceptible      Vancomycin Susceptible                       Susceptibility Comments     Staphylococcus aureus, MRSA    This isolate does not demonstrate inducible clindamycin resistance in vitro.               Anaerobic Culture - Pleural Fluid, Pleural Cavity [[id number]]  (Normal) Collected: 03/04/23 0900    Lab Status: Preliminary result Specimen: Pleural Fluid from Pleural Cavity Updated: 03/07/23 0707     Anaerobic Culture Screening for Anaerobes    MRSA Screen, PCR (Inpatient) - Swab, Nares [[id number]]  (Abnormal) Collected: 03/03/23 1830    Lab Status: Final result Specimen: Swab from Nares Updated: 03/03/23 2016     MRSA PCR  MRSA Detected    Narrative:      The negative predictive value of this diagnostic test is high and should only be used to consider de-escalating anti-MRSA therapy. A positive result may indicate colonization with MRSA and must be correlated clinically.    Respiratory Panel PCR w/COVID-19(SARS-CoV-2) NAOMY/RASHMI/ADINA/PAD/COR/MAD/STEVE In-House, NP Swab in UTM/VTM, 3-4 HR TAT - Swab, Nasopharynx [[id number]]  (Normal) Collected: 03/03/23 1830    Lab Status: Final result Specimen: Swab from Nasopharynx Updated: 03/03/23 2005     ADENOVIRUS, PCR Not Detected     Coronavirus 229E Not Detected     Coronavirus HKU1 Not Detected     Coronavirus NL63 Not Detected     Coronavirus OC43 Not Detected     COVID19 Not Detected     Human Metapneumovirus Not Detected     Human Rhinovirus/Enterovirus Not Detected     Influenza A PCR Not Detected     Influenza B PCR Not Detected     Parainfluenza Virus 1 Not Detected     Parainfluenza Virus 2 Not Detected     Parainfluenza Virus 3 Not Detected     Parainfluenza Virus 4 Not Detected     RSV, PCR Not Detected     Bordetella pertussis pcr Not Detected     Bordetella parapertussis PCR Not Detected     Chlamydophila pneumoniae PCR Not Detected     Mycoplasma pneumo by PCR Not Detected    Narrative:      In the setting of a positive respiratory panel with a viral infection PLUS a negative procalcitonin without other underlying concern for bacterial infection, consider observing off antibiotics or discontinuation of antibiotics and continue supportive care. If the respiratory panel is positive for atypical bacterial infection (Bordetella pertussis, Chlamydophila pneumoniae, or Mycoplasma pneumoniae), consider antibiotic de-escalation to target atypical bacterial infection.    Blood Culture - Blood, Arm, Left [[id number]]  (Abnormal)  (Susceptibility) Collected: 03/02/23 1955    Lab Status: Final result Specimen: Blood from Arm, Left Updated: 03/06/23 0622     Blood Culture Staphylococcus aureus, MRSA      Comment:   Infectious disease consultation is highly recommended to rule out distant foci of infection.  Methicillin resistant Staphylococcus aureus, Patient may be an isolation risk.        Isolated from Aerobic Bottle     Gram Stain Aerobic Bottle Gram positive cocci in clusters    Susceptibility      Staphylococcus aureus, MRSA      YAKOV      Gentamicin Susceptible      Oxacillin Resistant     Rifampin Susceptible      Vancomycin Susceptible                       Susceptibility Comments     Staphylococcus aureus, MRSA    This isolate does not demonstrate inducible clindamycin resistance in vitro.               Blood Culture - Blood, Arm, Left [[id number]]  (Normal) Collected: 03/02/23 1955    Lab Status: Preliminary result Specimen: Blood from Arm, Left Updated: 03/06/23 2015     Blood Culture No growth at 4 days    Blood Culture ID, PCR - Blood, Arm, Left [[id number]]  (Abnormal) Collected: 03/02/23 1955    Lab Status: Final result Specimen: Blood from Arm, Left Updated: 03/04/23 0439     BCID, PCR Staph aureus. mecA/C and MREJ (methicillin resistance gene) detected. Identification by BCID2 PCR.     BOTTLE TYPE Aerobic Bottle    Narrative:      Infectious disease consultation is highly recommended to rule out distant foci of infection.            Assessment:    Paresthesia    Cervical myelopathy (HCC)    Lower extremity weakness    History of blood clots    Chronic anticoagulation    Seizures (HCC)    Leukocytosis    Normocytic anemia    Hyperglycemia    GERD (gastroesophageal reflux disease)    Guillain-Trumann syndrome (HCC)    Situational mixed anxiety and depressive disorder    Upper abdominal pain    Mass of middle lobe of right lung    Oral candidiasis    Empyema of pleura ([facility])    Tricuspid valve vegetation  1-MRSA bacteremia on hospitalization day #11 and likely underlying etiology:   - Septic phlebitis due to IV access with -     •     Right Cephalic Upper Arm: Acute superficial thrombophlebitis  noted.    •     Right Cephalic Forearm: Acute superficial thrombophlebitis noted.     - septic emboli and cavitary lesion in the right lung   - Tricuspid Valve endocarditis   - Empyema due to secondary seeding of the traumatic effusion  2-paresthesia generalized weakness  3-recent fall  4-seizure disorder  5-leukocytosis likely secondary to steroids with superimposed infection  6-other diagnoses per primary team.  7-MRSA colonization     Recommendations/Discussions:  · Continue with IV vancomycin  · Moist heat to the phlebitis site of the right forearm  · Plans for treatment for empyema per thoracic surgery service  · Continue to monitor her back pain and evidence of secondary seeding to the joint and spine which may require repeat imaging studies if her symptoms localizes to these areas and affect her function.  · Discussed with Dr. Mckay.  Reji Oliver MD  3/7/2023  10:07 EST    Parts of this note may be an electronic transcription/translation of spoken language to printed text using the Dragon dictation system.

## (undated) DEVICE — 3M™ STERI-DRAPE™ INSTRUMENT POUCH 1018L: Brand: STERI-DRAPE™

## (undated) DEVICE — SOL ANTISTICK CAUTRY ELECTROLUBE LF

## (undated) DEVICE — UNIVERSAL PACK: Brand: CARDINAL HEALTH

## (undated) DEVICE — 2, DISPOSABLE SUCTION/IRRIGATOR WITH DISPOSABLE TIP: Brand: STRYKEFLOW

## (undated) DEVICE — 28 FR STRAIGHT – SOFT PVC CATHETER: Brand: PVC THORACIC CATHETERS

## (undated) DEVICE — GLV SURG BIOGEL LTX PF 6

## (undated) DEVICE — SUT SILK 0 PSL 18IN 580H

## (undated) DEVICE — CANNULA SEAL

## (undated) DEVICE — SEAL

## (undated) DEVICE — EXTENSION SET, MALE LUER LOCK ADAPTER WITH RETRACTABLE COLLAR

## (undated) DEVICE — BLADELESS OBTURATOR: Brand: WECK VISTA

## (undated) DEVICE — WOUND RETRACTOR AND PROTECTOR: Brand: ALEXIS WOUND PROTECTOR-RETRACTOR

## (undated) DEVICE — NDL INJ UROL WILLIAMS CYSTO 3.7F 23G 8MM

## (undated) DEVICE — SYR LUERLOK 20CC BX/50

## (undated) DEVICE — ANTIBACTERIAL UNDYED BRAIDED (POLYGLACTIN 910), SYNTHETIC ABSORBABLE SUTURE: Brand: COATED VICRYL

## (undated) DEVICE — TOTAL TRAY, 16FR 10ML SIL FOLEY, URN: Brand: MEDLINE

## (undated) DEVICE — APPL CHLORAPREP HI/LITE 26ML ORNG

## (undated) DEVICE — COLUMN DRAPE

## (undated) DEVICE — ENDOPATH XCEL BLADELESS TROCARS WITH STABILITY SLEEVES: Brand: ENDOPATH XCEL

## (undated) DEVICE — NDL HYPO PRECISIONGLIDE REG 20G 1 1/2

## (undated) DEVICE — MARKR SKIN W/RULR 2TP

## (undated) DEVICE — ADHS SKIN SURG TISS VISC PREMIERPRO EXOFIN HI/VISC FAST/DRY

## (undated) DEVICE — LOU THORACIC: Brand: MEDLINE INDUSTRIES, INC.

## (undated) DEVICE — ELECTRD BLD EZ CLN MOD XLNG 2.75IN

## (undated) DEVICE — SPNG LAP CIGARETTE KITTNER 5MM STRL PK/5

## (undated) DEVICE — PENCL ES MEGADINE EZ/CLEAN BUTN W/HOLSTR 10FT

## (undated) DEVICE — PATIENT RETURN ELECTRODE, SINGLE-USE, CONTACT QUALITY MONITORING, ADULT, WITH 9FT CORD, FOR PATIENTS WEIGING OVER 33LBS. (15KG): Brand: MEGADYNE

## (undated) DEVICE — CONN TBG Y 5 IN 1 LF STRL

## (undated) DEVICE — TBG INSUFFLATION LUER LOCK: Brand: MEDLINE INDUSTRIES, INC.

## (undated) DEVICE — SMOKE EVACUATION TUBING WITH 7/8 IN TO 1/4 IN REDUCER: Brand: BUFFALO FILTER

## (undated) DEVICE — C-MAC® GUIDE: Brand: C-MAC / DBLADE

## (undated) DEVICE — KT CLN CLEANOR SCPE

## (undated) DEVICE — KT ANTI FOG W/FLD AND SPNG

## (undated) DEVICE — MEDI-VAC YANKAUER SUCTION HANDLE W/BULBOUS TIP: Brand: CARDINAL HEALTH

## (undated) DEVICE — SOL NACL 0.9PCT 1000ML

## (undated) DEVICE — ARM DRAPE

## (undated) DEVICE — TUBING, SUCTION, 1/4" X 20', STRAIGHT: Brand: MEDLINE INDUSTRIES, INC.

## (undated) DEVICE — SPONGE,LAP,12"X12",XR,ST,5/PK,40PK/CS: Brand: MEDLINE

## (undated) DEVICE — GLV SURG BIOGEL LTX PF 6 1/2

## (undated) DEVICE — LAPAROSCOPIC SMOKE FILTRATION SYSTEM: Brand: PALL LAPAROSHIELD® PLUS LAPAROSCOPIC SMOKE FILTRATION SYSTEM

## (undated) DEVICE — OASIS DRAIN, SINGLE, INLINE & ATS COMPATIBLE: Brand: OASIS

## (undated) DEVICE — REDUCER: Brand: ENDOWRIST

## (undated) DEVICE — SUT SILK 0 TIES 30IN A306H

## (undated) DEVICE — LABEL SHEET CUSTOM 2X2 YELLOW: Brand: MEDLINE INDUSTRIES, INC.